# Patient Record
Sex: FEMALE | Race: WHITE | HISPANIC OR LATINO | Employment: OTHER | URBAN - METROPOLITAN AREA
[De-identification: names, ages, dates, MRNs, and addresses within clinical notes are randomized per-mention and may not be internally consistent; named-entity substitution may affect disease eponyms.]

---

## 2017-01-12 ENCOUNTER — TELEPHONE (OUTPATIENT)
Dept: TRANSPLANT | Facility: CLINIC | Age: 27
End: 2017-01-12

## 2017-01-12 NOTE — TELEPHONE ENCOUNTER
Spoke with patient and states she is back [place] the country from [place].Patient states will have fasting labs drawn on 1/16/17. Patient voice a  understanding of medication and appointment compliance.

## 2017-01-20 ENCOUNTER — TELEPHONE (OUTPATIENT)
Dept: TRANSPLANT | Facility: CLINIC | Age: 27
End: 2017-01-20

## 2017-01-20 NOTE — TELEPHONE ENCOUNTER
Patient no show for 1/16/17 labs. After leaving multiple voice messages spoke with patient and she states she will do labs on 1/23/17.

## 2017-01-23 ENCOUNTER — LAB VISIT (OUTPATIENT)
Dept: LAB | Facility: HOSPITAL | Age: 27
End: 2017-01-23
Attending: INTERNAL MEDICINE
Payer: MEDICARE

## 2017-01-23 DIAGNOSIS — Z94.0 KIDNEY REPLACED BY TRANSPLANT: ICD-10-CM

## 2017-01-23 LAB
ALBUMIN SERPL BCP-MCNC: 3.5 G/DL
ANION GAP SERPL CALC-SCNC: 11 MMOL/L
BASOPHILS # BLD AUTO: 0.03 K/UL
BASOPHILS NFR BLD: 0.5 %
BUN SERPL-MCNC: 89 MG/DL
CALCIUM SERPL-MCNC: 8.7 MG/DL
CHLORIDE SERPL-SCNC: 112 MMOL/L
CO2 SERPL-SCNC: 13 MMOL/L
CREAT SERPL-MCNC: 10.2 MG/DL
DIFFERENTIAL METHOD: ABNORMAL
EOSINOPHIL # BLD AUTO: 0.2 K/UL
EOSINOPHIL NFR BLD: 3.1 %
ERYTHROCYTE [DISTWIDTH] IN BLOOD BY AUTOMATED COUNT: 13.1 %
EST. GFR  (AFRICAN AMERICAN): 5.5 ML/MIN/1.73 M^2
EST. GFR  (NON AFRICAN AMERICAN): 4.7 ML/MIN/1.73 M^2
GLUCOSE SERPL-MCNC: 79 MG/DL
HCT VFR BLD AUTO: 31.7 %
HGB BLD-MCNC: 10.9 G/DL
LYMPHOCYTES # BLD AUTO: 0.6 K/UL
LYMPHOCYTES NFR BLD: 10.5 %
MAGNESIUM SERPL-MCNC: 1.7 MG/DL
MCH RBC QN AUTO: 30.8 PG
MCHC RBC AUTO-ENTMCNC: 34.4 %
MCV RBC AUTO: 90 FL
MONOCYTES # BLD AUTO: 0.4 K/UL
MONOCYTES NFR BLD: 7.1 %
NEUTROPHILS # BLD AUTO: 4.6 K/UL
NEUTROPHILS NFR BLD: 78.6 %
PHOSPHATE SERPL-MCNC: 6.5 MG/DL
PLATELET # BLD AUTO: 203 K/UL
PMV BLD AUTO: 11.6 FL
POTASSIUM SERPL-SCNC: 4.7 MMOL/L
RBC # BLD AUTO: 3.54 M/UL
SODIUM SERPL-SCNC: 136 MMOL/L
WBC # BLD AUTO: 5.88 K/UL

## 2017-01-23 PROCEDURE — 80069 RENAL FUNCTION PANEL: CPT

## 2017-01-23 PROCEDURE — 36415 COLL VENOUS BLD VENIPUNCTURE: CPT | Mod: PO

## 2017-01-23 PROCEDURE — 83735 ASSAY OF MAGNESIUM: CPT

## 2017-01-23 PROCEDURE — 85025 COMPLETE CBC W/AUTO DIFF WBC: CPT

## 2017-01-23 PROCEDURE — 80197 ASSAY OF TACROLIMUS: CPT

## 2017-01-24 LAB — TACROLIMUS BLD-MCNC: <1.5 NG/ML

## 2017-01-25 LAB
CYTOMEGALOVIRUS DNA: NOT DETECTED
CYTOMEGALOVIRUS LOG (IU/ML): <2.4 LOG (10) COPIES/ML
CYTOMEGALOVIRUS PCR, QUANT: <250 COPIES/ML
CYTOMEGALOVIRUS SOURCE: NORMAL

## 2017-01-26 ENCOUNTER — TELEPHONE (OUTPATIENT)
Dept: TRANSPLANT | Facility: CLINIC | Age: 27
End: 2017-01-26

## 2017-01-26 NOTE — PROGRESS NOTES
Results reviewed, and action is needed: Her creatinine had worsened dramatically.  Prograf level undetectable, suspicious for ongoing nonadherence.  At this time, I do not feel transplant has anything else to offer this young woman.  Please assess how she is feeling: Any shortness of breath, nausea/vomiting, confusion, marked lethargy?  [These would be symptoms she may need to start dialysis]. Also, encourage her to see her nephrologist ASAP.

## 2017-01-26 NOTE — TELEPHONE ENCOUNTER
----- Message from Linda Maya MD sent at 1/25/2017  6:35 PM CST -----  Results reviewed, and action is needed: Her creatinine had worsened dramatically.  Prograf level undetectable, suspicious for ongoing nonadherence.  At this time, I do not feel transplant has anything else to offer this young woman.  Please assess how she is feeling: Any shortness of breath, nausea/vomiting, confusion, marked lethargy?  [These would be symptoms she may need to start dialysis]. Also, encourage her to see her nephrologist ASAP.

## 2017-01-26 NOTE — TELEPHONE ENCOUNTER
Patient repeated back and voice a understanding of orders and denies any issues at present.Patient voice a understanding to present to the ER if she develops SOB ,Nausea/Vomiting ,confusion and marked lethargy.  Spoke with Fransisca Onofre LPN at DR Miller [facility] and will attempt to get patient in ASAP to see DR Miller.

## 2017-02-02 ENCOUNTER — TELEPHONE (OUTPATIENT)
Dept: PHARMACY | Facility: CLINIC | Age: 27
End: 2017-02-02

## 2017-02-02 ENCOUNTER — OFFICE VISIT (OUTPATIENT)
Dept: NEPHROLOGY | Facility: CLINIC | Age: 27
End: 2017-02-02
Payer: MEDICARE

## 2017-02-02 VITALS
WEIGHT: 230.38 LBS | SYSTOLIC BLOOD PRESSURE: 150 MMHG | HEART RATE: 76 BPM | DIASTOLIC BLOOD PRESSURE: 110 MMHG | HEIGHT: 66 IN | BODY MASS INDEX: 37.03 KG/M2

## 2017-02-02 DIAGNOSIS — E83.39 HYPERPHOSPHATEMIA: ICD-10-CM

## 2017-02-02 DIAGNOSIS — T86.11 KIDNEY TRANSPLANT REJECTION: ICD-10-CM

## 2017-02-02 DIAGNOSIS — E87.20 ACIDOSIS, METABOLIC: ICD-10-CM

## 2017-02-02 DIAGNOSIS — N18.5 CKD (CHRONIC KIDNEY DISEASE) STAGE 5, GFR LESS THAN 15 ML/MIN: ICD-10-CM

## 2017-02-02 DIAGNOSIS — Z94.0 HX OF KIDNEY TRANSPLANT: Primary | ICD-10-CM

## 2017-02-02 DIAGNOSIS — Z94.0 H/O KIDNEY TRANSPLANT: ICD-10-CM

## 2017-02-02 PROCEDURE — 99999 PR PBB SHADOW E&M-EST. PATIENT-LVL III: CPT | Mod: PBBFAC,,, | Performed by: INTERNAL MEDICINE

## 2017-02-02 PROCEDURE — 99213 OFFICE O/P EST LOW 20 MIN: CPT | Mod: PBBFAC,PO | Performed by: INTERNAL MEDICINE

## 2017-02-02 PROCEDURE — 99215 OFFICE O/P EST HI 40 MIN: CPT | Mod: S$PBB,,, | Performed by: INTERNAL MEDICINE

## 2017-02-02 RX ORDER — VALGANCICLOVIR 450 MG/1
450 TABLET, FILM COATED ORAL EVERY OTHER DAY
Qty: 30 TABLET | Refills: 10
Start: 2017-02-02 | End: 2017-02-06 | Stop reason: SDUPTHER

## 2017-02-02 RX ORDER — SULFAMETHOXAZOLE AND TRIMETHOPRIM 400; 80 MG/1; MG/1
1 TABLET ORAL
Qty: 12 TABLET | Refills: 5
Start: 2017-02-03 | End: 2017-02-06 | Stop reason: SDUPTHER

## 2017-02-02 RX ORDER — MYCOPHENOLIC ACID 360 MG/1
360 TABLET, DELAYED RELEASE ORAL 2 TIMES DAILY
Qty: 60 TABLET | Refills: 10 | Status: ON HOLD | OUTPATIENT
Start: 2017-02-02 | End: 2017-11-27

## 2017-02-02 RX ORDER — PREDNISONE 10 MG/1
TABLET ORAL
Qty: 100 TABLET | Refills: 0 | Status: SHIPPED | OUTPATIENT
Start: 2017-02-02 | End: 2017-02-03 | Stop reason: SDUPTHER

## 2017-02-02 RX ORDER — TACROLIMUS 1 MG/1
CAPSULE ORAL
Qty: 270 CAPSULE | Refills: 10
Start: 2017-02-02 | End: 2017-02-03 | Stop reason: SDUPTHER

## 2017-02-02 RX ORDER — SODIUM BICARBONATE 650 MG/1
2600 TABLET ORAL 4 TIMES DAILY
Qty: 480 TABLET | Refills: 11 | Status: ON HOLD | OUTPATIENT
Start: 2017-02-02 | End: 2017-11-27

## 2017-02-02 NOTE — PATIENT INSTRUCTIONS
Restart Prograf and sodium bicarbonate.   PROGRAF LEVEL ON 2/9/17 PLEASE HOLD MORNING DOSE UNTIL AFTER BLOOD WORK IS DONE

## 2017-02-02 NOTE — TELEPHONE ENCOUNTER
Rx for prograf required a PA and B vs D determination. It was determined that the medication to be covered under part B. Rx will be transferred to [facility] and pt verbalized understanding.

## 2017-02-02 NOTE — MR AVS SNAPSHOT
[facility]  [address] [place] [address]  Phone: [phone]  Fax: [phone]                  Leydi Cordero   2017 10:00 AM   Office Visit    Descripción:  Female : 1990   Personal Médico:  Won Miller MD   Departamento:  [facility]           Razón de la valentin     Kidney Transplant     Follow-up           Diagnósticos de Esta Visita        Comentarios    Hx of kidney transplant    -  Primario     CKD (chronic kidney disease) stage 5, GFR less than 15 ml/min         Kidney transplant rejection         Acidosis, metabolic         Hyperphosphatemia                Lista de tareas           Citas próximas        Personal Médico Departamento Tfno del dpto    2017 8:05 AM LABORATORY, [facility] [phone]    3/2/2017 8:10 AM SPECIMEN, [facility] [phone]    3/2/2017 8:20 AM LABORATORY, [facility] [phone]    3/9/2017 10:00 AM Won Miller MD [facility] [phone]      Metas (5 Years of Data)     Ninguna      Follow-Up and Disposition     Return in about 2 weeks (around 2017).      [facility]     [facility] Enfermeras - Asistencia   Enfermeras registradas de [facility] pueden ayudarle a reservar young valentin, proveer educación para la juliano, asesoría clínica, y otros servicios de asesoramiento.   Llame para remy servicio gratuito a [phone].             Medicamentos           Mensaje sobre Medicamentos     Verificar los cambios y / o adiciones a hernadez régimen de medicación son los mismos que discutir con hernadez médico. Si cualquiera de estos cambios o adiciones son incorrectos, por favor notifique a hernadez proveedor de atención médica.             Verifique que la siguiente lista de medicamentos es young representación exacta de los medicamentos que está tomando actualmente. Si no hay ningunos reportados, la lista puede estar en zhong. Si  "no es correcta, por favor póngase en contacto con hernadez proveedor de atención médica. Lleve esta lista con usted en leonarda de emergencia.           Medicamentos Actuales     amlodipine (NORVASC) 10 MG tablet Take 1 tablet (10 mg total) by mouth once daily.    aspirin (ECOTRIN) 81 MG EC tablet Take 1 tablet (81 mg total) by mouth once daily.    mycophenolate (MYFORTIC) 360 MG TbEC Take 1 tablet (360 mg total) by mouth 2 (two) times daily.    nystatin (MYCOSTATIN) 100,000 unit/mL suspension Take 5 mLs (500,000 Units total) by mouth 4 (four) times daily. STOP 11/9    pantoprazole (PROTONIX) 40 MG tablet Take 1 tablet (40 mg total) by mouth once daily.    predniSONE (DELTASONE) 10 MG tablet 20 mg qd 10/10-11/9; 15 mg qd 11/10-12/9; 10 mg 12/10-1/9; 5 mg thereafter    sevelamer carbonate (RENVELA) 800 mg Tab Take 1 tablet (800 mg total) by mouth 3 (three) times daily with meals.    sodium bicarbonate 650 MG tablet Take 4 tablets (2,600 mg total) by mouth 4 (four) times daily.    sulfamethoxazole-trimethoprim 400-80mg (BACTRIM,SEPTRA) 400-80 mg per tablet Take 1 tablet by mouth every Mon, Wed, Fri. STOP 4/2/17    tacrolimus (PROGRAF) 1 MG Cap Take 5 mg in the morning and 4 mg in the evening    valganciclovir (VALCYTE) 450 mg Tab Take 1 tablet (450 mg total) by mouth every other day.           Información de referencia clínica           Lakeisha signos vitales jasmine     PS Pulso Bessie Peso BMI (IMC)       150/110 76 5' 6" (1.676 m) 104.5 kg (230 lb 6.1 oz) 37.18 kg/m2       Blood Pressure          Most Recent Value    BP  (!)  150/110      Alergias     A partir del:  2/2/2017        Heparin Analogues      Vacunas     Administradas en la fecha de la visita:  2/2/2017        None      Orders Placed During Today's Visit     Exámenes/Procedimientos futuros Se espera el Vence    Protein / creatinine ratio, urine  2/2/2017 4/3/2018    PTH, intact  2/2/2017 4/3/2018    Urinalysis  2/2/2017 4/3/2018    Exámenes de laboratorio recurrentes " Intervalo Vence    CBC auto differential  Every Weekday until 2/2/2018 2/2/2018    Renal function panel  Every Weekday until 2/2/2018 2/2/2018    Tacrolimus level  Every Weekday until 2/2/2018 2/2/2018      Maintenance Dialysis History     Patient has no recorded history of maintenance dialysis.      Transplant Information        Txp Date Organ Coordinator Care Team    4/15/2015 Kidney Viki Baez RN       Registrarse para [facility]     La activación de hernadez cuenta [facility] es tan fácil timi 1-2-3!    1) Ir a [email or web address], seleccione Registrarse Ahora, meter el código de activación y hernadez fecha de nacimiento, y seleccione Próximo.    [id number]  Expires: 3/19/2017 10:43 AM      2) Crear un nombre de usuario y contraseña para usar cuando se visita [facility] en el futuro y selecciona young pregunta de seguridad en leonarda de que pierda hernadez contraseña y seleccione Próximo.    3) Introduzca hernadez dirección de correo electrónico y ayleen clic en Registrarse!    Información Adicional  Si tiene alguna pregunta, por favor, e-mail [email or web address] o llame al [phone] para hablar con nuestro personal. Recuerde, [facility] no debe ser usada para necesidades urgentes. En leonarda de emergencia médica, llame al [phone].        Instrucciones    Restart Prograf and sodium bicarbonate.   PROGRAF LEVEL ON 2/9/17 PLEASE HOLD MORNING DOSE UNTIL AFTER BLOOD WORK IS DONE       Smoking Cessation     Si desea dejar de fumar:  · Usted puede ser elegible para recibir servicios gratuitos si usted es un residente de [place] y comenzó a fumar cigarrillos antes del 1 de septiembre de 1988. Llame al Smoking Cessation Trust (SCT) a [phone] o [phone].   Llame 1-800-QUIT-NOW si usted no cumple con los criterios anteriores.            Language Assistance Services     ATTENTION: Language assistance services are available, free of charge. Please call [phone].      ATENCIÓN: Si habla yehuda, tiene a hernadez disposición servicios  gratuitos de asistencia lingüística. Llame al [phone].     [facility] Ý: N?u b?n nói Ti?ng Vi?t, có các d?ch v? h? tr? ngôn ng? mi?n phí dành cho b?n. G?i s? [phone].         [facility] cumple con las leyes federales aplicables de derechos civiles y no discrimina por motivos de mata, color, origen nacional, edad, discapacidad, o sexo.                 Leydi Lawrenceo Boswell   2017 10:00 AM   Office Visit    Description:  Female : 1990   Provider:  Won Miller MD   [facility]           Reason for Visit     Kidney Transplant     Follow-up           Diagnoses this Visit        Comments    Hx of kidney transplant    -  Primary     CKD (chronic kidney disease) stage 5, GFR less than 15 ml/min         Kidney transplant rejection         Acidosis, metabolic         Hyperphosphatemia                To Do List           Future Appointments        Provider Department Dept Phone    2017 8:05 AM LABORATORY, [facility] [phone]    3/2/2017 8:10 AM SPECIMEN, [facility] [phone]    3/2/2017 8:20 AM LABORATORY, [facility] [phone]    3/9/2017 10:00 AM Won Miller MD [facility] [phone]      Goals     None      Follow-Up and Disposition     Return in about 2 weeks (around 2017).      [facility]     [facility] Care [facility] -  Assistance  Registered nurses in the [facility] provide clinical advisement, health education, appointment booking, and other advisory services.  Call for this free service at [phone].             Medications           Message regarding Medications     Verify the changes and/or additions to your medication regime listed below are the same as discussed with your clinician today.  If any of these changes or additions are incorrect, please notify your healthcare provider.             Verify that the below list of  "medications is an accurate representation of the medications you are currently taking.  If none reported, the list may be blank. If incorrect, please contact your healthcare provider. Carry this list with you in case of emergency.           Current Medications     amlodipine (NORVASC) 10 MG tablet Take 1 tablet (10 mg total) by mouth once daily.    aspirin (ECOTRIN) 81 MG EC tablet Take 1 tablet (81 mg total) by mouth once daily.    mycophenolate (MYFORTIC) 360 MG TbEC Take 1 tablet (360 mg total) by mouth 2 (two) times daily.    nystatin (MYCOSTATIN) 100,000 unit/mL suspension Take 5 mLs (500,000 Units total) by mouth 4 (four) times daily. STOP 11/9    pantoprazole (PROTONIX) 40 MG tablet Take 1 tablet (40 mg total) by mouth once daily.    predniSONE (DELTASONE) 10 MG tablet 20 mg qd 10/10-11/9; 15 mg qd 11/10-12/9; 10 mg 12/10-1/9; 5 mg thereafter    sevelamer carbonate (RENVELA) 800 mg Tab Take 1 tablet (800 mg total) by mouth 3 (three) times daily with meals.    sodium bicarbonate 650 MG tablet Take 4 tablets (2,600 mg total) by mouth 4 (four) times daily.    sulfamethoxazole-trimethoprim 400-80mg (BACTRIM,SEPTRA) 400-80 mg per tablet Take 1 tablet by mouth every Mon, Wed, Fri. STOP 4/2/17    tacrolimus (PROGRAF) 1 MG Cap Take 5 mg in the morning and 4 mg in the evening    valganciclovir (VALCYTE) 450 mg Tab Take 1 tablet (450 mg total) by mouth every other day.           Clinical Reference Information           Your Vitals Were     BP Pulse Height Weight BMI       150/110 76 5' 6" (1.676 m) 104.5 kg (230 lb 6.1 oz) 37.18 kg/m2       Blood Pressure          Most Recent Value    BP  (!)  150/110      Allergies as of 2/2/2017     Heparin Analogues      Immunizations Administered on Date of Encounter - 2/2/2017     None      Orders Placed During Today's Visit     Future Labs/Procedures Expected by Expires    Protein / creatinine ratio, urine  2/2/2017 4/3/2018    PTH, intact  2/2/2017 4/3/2018    Urinalysis  " 2/2/2017 4/3/2018    Recurring Lab Work Interval Expires    CBC auto differential  Every Weekday until 2/2/2018 2/2/2018    Renal function panel  Every Weekday until 2/2/2018 2/2/2018    Tacrolimus level  Every Weekday until 2/2/2018 2/2/2018      Maintenance Dialysis History     Patient has no recorded history of maintenance dialysis.      Transplant Information        Txp Date Organ Coordinator Care Team    4/15/2015 Kidney Viki Baez RN       [facility] Sign-Up     Activating your [facility] account is as easy as 1-2-3!     1) Visit [email or web address], select Sign Up Now, enter this activation code and your date of birth, then select Next.  [email or web address]  Expires: 3/19/2017 10:43 AM      2) Create a username and password to use when you visit [facility] in the future and select a security question in case you lose your password and select Next.    3) Enter your e-mail address and click Sign Up!    Additional Information  If you have questions, please e-mail [email or web address] or call [phone] to talk to our [facility] staff. Remember, [facility] is NOT to be used for urgent needs. For medical emergencies, dial 911.         Instructions    Restart Prograf and sodium bicarbonate.   PROGRAF LEVEL ON 2/9/17 PLEASE HOLD MORNING DOSE UNTIL AFTER BLOOD WORK IS DONE       Smoking Cessation     If you would like to quit smoking:   You may be eligible for free services if you are a [place] resident and started smoking cigarettes before September 1, 1988.  Call the Smoking Cessation Trust (SCT) toll free at (110) 448-9982 or ([phone] 069-7717.   Call 8-164-QUIT-NOW if you do not meet the above criteria.            Language Assistance Services     ATTENTION: Language assistance services are available, free of charge. Please call [phone].      ATENCIÓN: Si habla español, tiene a hernadez disposición servicios gratuitos de asistencia lingüística. Llame al [phone].     CHÚ Ý: N?u b?n nói Ti?ng Vi?t, có các d?ch  v? h? tr? ngôn ng? mi?n phí ashah cho b?n. G?i s? [phone].         [facility] complies with applicable Federal civil rights laws and does not discriminate on the basis of race, color, national origin, age, disability, or sex.

## 2017-02-02 NOTE — PROGRESS NOTES
PROGRESS NOTE FOR ESTABLISHED PATIENT    PHYSICIAN REQUESTING THE CONSULT: Self-referred    REASON FOR VISIT: Renal transplant    26 y.o. year old female with history of ESRD, hypertensive nephropathy, s/p renal transplant, GERD presents to the renal clinic for evaluation of renal transplant.     Patient has been diagnosed with biopsy-proven renal rejection at [facility] at the end of 2016. She was started on treatment including, solumedrol, thymoglobulin, IV Ig and plasmapheresis. Patient also developed C. Diff. Colitis and was started on appropriate antibiotics. Renal function has been deteriorating and last creatinine from 1/23/17 was 10.2.    She presents to clinic today without major complaints. However, she reports poor appetite. She denies nausea/vomiting, LE edema. Patient states that she ran out of Prograf several weeks ago (was not able to get refills because of insurance issues).   She also needs refills for her sodium bicarbonate.     Patient is Eritrean-speaking only and presents with friend who is acting as .         Past Medical History   Diagnosis Date    ESRD (end stage renal disease)     H/O kidney transplant     Hypertension     Hypertensive nephropathy        Past Surgical History   Procedure Laterality Date    Right leg hemodialysis access      Kidney transplant  04/15/2015       Review of patient's allergies indicates:   Allergen Reactions    Heparin analogues          Current Outpatient Prescriptions   Medication Sig Dispense Refill    amlodipine (NORVASC) 10 MG tablet Take 1 tablet (10 mg total) by mouth once daily. 30 tablet 1    aspirin (ECOTRIN) 81 MG EC tablet Take 1 tablet (81 mg total) by mouth once daily. 30 tablet 10    mycophenolate (MYFORTIC) 360 MG TbEC Take 1 tablet (360 mg total) by mouth 2 (two) times daily. 60 tablet 10    nystatin (MYCOSTATIN) 100,000 unit/mL suspension Take 5 mLs (500,000 Units total) by mouth 4 (four) times daily. STOP 11/9 473  mL 1    pantoprazole (PROTONIX) 40 MG tablet Take 1 tablet (40 mg total) by mouth once daily. 30 tablet 10    predniSONE (DELTASONE) 10 MG tablet 20 mg qd 10/10-11/9; 15 mg qd 11/10-12/9; 10 mg 12/10-1/9; 5 mg thereafter 100 tablet 0    sevelamer carbonate (RENVELA) 800 mg Tab Take 1 tablet (800 mg total) by mouth 3 (three) times daily with meals. 90 tablet 11    sodium bicarbonate 650 MG tablet Take 4 tablets (2,600 mg total) by mouth 4 (four) times daily. 480 tablet 11    sulfamethoxazole-trimethoprim 400-80mg (BACTRIM,SEPTRA) 400-80 mg per tablet Take 1 tablet by mouth every Mon, Wed, Fri. STOP 4/2/17 12 tablet 5    tacrolimus (PROGRAF) 1 MG Cap Take 5 mg in the morning and 4 mg in the evening 270 capsule 10    valganciclovir (VALCYTE) 450 mg Tab Take 1 tablet (450 mg total) by mouth every other day. 30 tablet 10     No current facility-administered medications for this visit.        No family history on file.    Social History     Social History    Marital status: Single     Spouse name: N/A    Number of children: N/A    Years of education: N/A     Occupational History    Not on file.     Social History Main Topics    Smoking status: Current Every Day Smoker     Packs/day: 0.50    Smokeless tobacco: Not on file    Alcohol use No    Drug use: No    Sexual activity: Not on file     Other Topics Concern    Not on file     Social History Narrative       Review of Systems:  1. GENERAL: patient denies any fever, weight changes, generalized weakness, dizziness.  2. HEENT: patient denies headaches, visual disturbances, swallowing problems, sinus pain, nasal congestion.  3. CARDIOVASCULAR: patient denies chest pain, palpitations.  4. PULMONARY: patient reports SOB with exercise, none at present, no coughing, hemoptysis, wheezing.  5. GASTROINTESTINAL: patient denies abdominal pain, nausea, vomiting, diarrhea.  6. GENITOURINARY: patient denies dysuria, hematuria, hesitancy, frequency  7. EXTREMITIES:  "patient denies LE edema, LE cramping.  8. DERMATOLOGY: patient denies rashes, ulcers.  9. NEURO: patient denies tremors, extremity weakness, extremity numbness/tingling.  10. MUSCULOSKELETAL: patient denies joint pain  11. HEMATOLOGY: patient denies rectal or gum bleeding.  12: PSYCH: patient denies anxiety, depression.      PHYSICAL EXAM:  Visit Vitals    BP (!) 150/110    Ht 5' 6" (1.676 m)    Wt 104.5 kg (230 lb 6.1 oz)    BMI 37.18 kg/m2       GENERAL: Pleasant young lady presents to clinic with non-labored breathing.  HEENT: PER, no nasal discharge, no icterus, no oral exudates, moist mucosal membranes.  NECK: no thyroid mass, no lymphadenopathy.  HEART: RRR S1/S2, no rubs, good peripheral pulses.  LUNGS: CTA bilaterally, no wheezing, breathing is nonlabored.  ABDOMEN: soft, nontender, not distended, bowel sounds are present, no abdominal hernia.  EXTREM: no LE edema.  SKIN: no rashes, skin is warm and dry.  NEURO: A & O x 3, no obvious focal signs.        Lab Results   Component Value Date    CREATININE 10.2 (H) 01/23/2017    BUN 89 (H) 01/23/2017     01/23/2017    K 4.7 01/23/2017     (H) 01/23/2017    CO2 13 (L) 01/23/2017     Lab Results   Component Value Date    .0 (H) 08/22/2016    CALCIUM 8.7 01/23/2017    PHOS 6.5 (H) 01/23/2017     Lab Results   Component Value Date    ALBUMIN 3.5 01/23/2017     Lab Results   Component Value Date    WBC 5.88 01/23/2017    HGB 10.9 (L) 01/23/2017    HCT 31.7 (L) 01/23/2017    MCV 90 01/23/2017     01/23/2017        Protein Creatinine Ratios:    Creatinine, Random Ur   Date Value Ref Range Status   09/29/2016 54.0 15.0 - 325.0 mg/dL Final     Comment:     The random urine reference ranges provided were established   for 24 hour urine collections.  No reference ranges exist for  random urine specimens.  Correlate clinically.     08/22/2016 50.0 15.0 - 325.0 mg/dL Final     Comment:     The random urine reference ranges provided were " established   for 24 hour urine collections.  No reference ranges exist for  random urine specimens.  Correlate clinically.       Protein, Urine Random   Date Value Ref Range Status   09/29/2016 11 0 - 15 mg/dL Final     Comment:     The random urine reference ranges provided were established   for 24 hour urine collections.  No reference ranges exist for  random urine specimens.  Correlate clinically.     08/22/2016 <7 0 - 15 mg/dL Final     Comment:     The random urine reference ranges provided were established   for 24 hour urine collections.  No reference ranges exist for  random urine specimens.  Correlate clinically.       Prot/Creat Ratio, Ur   Date Value Ref Range Status   09/29/2016 0.20 0.00 - 0.20 Final   08/22/2016 Unable to calculate 0.00 - 0.20 Final     Tacrolimus: < 1.5 (1/23/17)        ASSESSMENT AND PLAN:  26 y.o. year old female with history of ESRD, hypertensive nephropathy, s/p renal transplant, GERD presents to the renal clinic for evaluation of renal transplant.     1. Renal transplant:  Patient's was diagnosed with biopsy-proven rejection at end of 2016. She was started on plasmapheresis, IV IG, thymoglobulin. Her renal function has been worsening since last visit with creatinine at 10.2. Tacrolimus level was < 1.5.  Patient was educated about likelihood of dialysis in near future and was asked to make decision about modality. She has opted for hemodialysis and referral to Vascular Surgery was made. Will refer patient for renal transplant evaluation. She will return in 2 weeks for follow up.      2. Electrolytes: Hyperphosphatemia: monitor for now.    3. Acid base status: metabolic acidosis. Will refill sodium bicarbonate.     4. Volume: Euvolemic.    5. Hypertension: acceptable BP control.     6. Medications: Reviewed. Patient has not been using bicarbonate and Prograf for several weeks. Will contact [facility] and ask for free refills. Will send patient to financial aid to help with  her insurance issues. Will check tacrolimus level in 1 week.     7. Mild hyperparathyroidism: monitor.     Total time spent 40 minutes. More than 50% of time was spent on educating patient about medication adherence and likelihood of dialysis in near future.

## 2017-02-03 RX ORDER — TACROLIMUS 1 MG/1
CAPSULE ORAL
Qty: 270 CAPSULE | Refills: 10
Start: 2017-02-03 | End: 2017-02-06 | Stop reason: SDUPTHER

## 2017-02-03 RX ORDER — PREDNISONE 10 MG/1
TABLET ORAL
Qty: 100 TABLET | Refills: 0 | Status: ON HOLD | OUTPATIENT
Start: 2017-02-03 | End: 2017-11-27

## 2017-02-06 RX ORDER — SULFAMETHOXAZOLE AND TRIMETHOPRIM 400; 80 MG/1; MG/1
1 TABLET ORAL
Qty: 12 TABLET | Refills: 5 | Status: SHIPPED | OUTPATIENT
Start: 2017-02-06 | End: 2017-08-05

## 2017-02-06 RX ORDER — TACROLIMUS 1 MG/1
CAPSULE ORAL
Qty: 270 CAPSULE | Refills: 10 | Status: ON HOLD | OUTPATIENT
Start: 2017-02-06 | End: 2017-11-27

## 2017-02-06 RX ORDER — VALGANCICLOVIR 450 MG/1
450 TABLET, FILM COATED ORAL EVERY OTHER DAY
Qty: 30 TABLET | Refills: 10 | Status: ON HOLD | OUTPATIENT
Start: 2017-02-06 | End: 2017-11-27

## 2017-02-07 ENCOUNTER — TELEPHONE (OUTPATIENT)
Dept: TRANSPLANT | Facility: CLINIC | Age: 27
End: 2017-02-07

## 2017-02-09 ENCOUNTER — LAB VISIT (OUTPATIENT)
Dept: LAB | Facility: HOSPITAL | Age: 27
End: 2017-02-09
Attending: INTERNAL MEDICINE
Payer: MEDICARE

## 2017-02-09 DIAGNOSIS — Z94.0 H/O KIDNEY TRANSPLANT: ICD-10-CM

## 2017-02-09 PROCEDURE — 80197 ASSAY OF TACROLIMUS: CPT

## 2017-02-09 PROCEDURE — 36415 COLL VENOUS BLD VENIPUNCTURE: CPT | Mod: PO

## 2017-02-10 LAB — TACROLIMUS BLD-MCNC: 7.3 NG/ML

## 2017-02-18 NOTE — PROGRESS NOTES
Patient came to the [facility] emergency room for nosebleed.  Emergency room physician called me and verified labs.  Patient's creatinine is at baseline which was 10 in the last visit with Dr. Miller.  Did not have any reason for admission.  No indication for acute hemodialysis.  Patient sent home.  Patient should be preparing for impending need for renal replacement therapy as outlined by Dr. Miller in his last note.

## 2017-03-08 DIAGNOSIS — N17.9 AKI (ACUTE KIDNEY INJURY): Primary | ICD-10-CM

## 2017-03-09 ENCOUNTER — TELEPHONE (OUTPATIENT)
Dept: NEPHROLOGY | Facility: CLINIC | Age: 27
End: 2017-03-09

## 2017-05-03 ENCOUNTER — TELEPHONE (OUTPATIENT)
Dept: TRANSPLANT | Facility: CLINIC | Age: 27
End: 2017-05-03

## 2017-05-03 NOTE — TELEPHONE ENCOUNTER
I reviewed pt chart and noticed that she has missed some FU labs and appts with Nephrology. I called her-I spoke with pt's friend/ Cousin (Neida) as pt is unable to speak English. I let Neida know that pt has missed several appts and I am unsure if she is getting appropriate FU.  Pt's friend says that Ms Cordero is very sick, and needs to go to the Hospital.  She is concerned that she needs dialysis.  I reassured her and reinforced that is likely a good decision.  She will take Ms Cordero to [facility] after she finishes work.  I will let the team know.      [phone]  Neida, pt's friend

## 2017-05-29 ENCOUNTER — DOCUMENTATION ONLY (OUTPATIENT)
Dept: TRANSPLANT | Facility: CLINIC | Age: 27
End: 2017-05-29

## 2017-11-27 ENCOUNTER — HOSPITAL ENCOUNTER (INPATIENT)
Facility: HOSPITAL | Age: 27
LOS: 7 days | Discharge: HOME | DRG: 659 | End: 2017-12-04
Attending: SURGERY | Admitting: SURGERY
Payer: MEDICARE

## 2017-11-27 ENCOUNTER — HOSPITAL ENCOUNTER (EMERGENCY)
Facility: HOSPITAL | Age: 27
Discharge: OTHER INSTITUTION | End: 2017-11-27
Attending: EMERGENCY MEDICINE
Payer: MEDICARE

## 2017-11-27 VITALS
OXYGEN SATURATION: 100 % | SYSTOLIC BLOOD PRESSURE: 151 MMHG | WEIGHT: 230 LBS | RESPIRATION RATE: 17 BRPM | TEMPERATURE: 98 F | HEIGHT: 66 IN | HEART RATE: 83 BPM | DIASTOLIC BLOOD PRESSURE: 100 MMHG | BODY MASS INDEX: 36.96 KG/M2

## 2017-11-27 DIAGNOSIS — T86.11 ANTIBODY MEDIATED REJECTION OF KIDNEY TRANSPLANT: ICD-10-CM

## 2017-11-27 DIAGNOSIS — T86.11 ACUTE REJECTION OF KIDNEY TRANSPLANT: ICD-10-CM

## 2017-11-27 DIAGNOSIS — Z01.818 PRE-OP EXAM: ICD-10-CM

## 2017-11-27 DIAGNOSIS — E87.5 HYPERKALEMIA: ICD-10-CM

## 2017-11-27 DIAGNOSIS — R31.9 HEMATURIA, UNSPECIFIED TYPE: Primary | ICD-10-CM

## 2017-11-27 DIAGNOSIS — Z94.0 H/O KIDNEY TRANSPLANT: ICD-10-CM

## 2017-11-27 DIAGNOSIS — R31.9 HEMATURIA, UNSPECIFIED TYPE: ICD-10-CM

## 2017-11-27 DIAGNOSIS — N18.6 ESRD (END STAGE RENAL DISEASE): ICD-10-CM

## 2017-11-27 DIAGNOSIS — I10 ESSENTIAL HYPERTENSION: ICD-10-CM

## 2017-11-27 DIAGNOSIS — E83.39 HYPERPHOSPHATEMIA: ICD-10-CM

## 2017-11-27 DIAGNOSIS — Z90.5 S/P NEPHRECTOMY: Primary | ICD-10-CM

## 2017-11-27 DIAGNOSIS — R31.9 HEMATURIA: ICD-10-CM

## 2017-11-27 LAB
ABO + RH BLD: NORMAL
ALBUMIN SERPL BCP-MCNC: 3.2 G/DL
ALP SERPL-CCNC: 179 U/L
ALT SERPL W/O P-5'-P-CCNC: 9 U/L
ANION GAP SERPL CALC-SCNC: 14 MMOL/L
AST SERPL-CCNC: 10 U/L
B-HCG UR QL: NEGATIVE
BACTERIA #/AREA URNS HPF: ABNORMAL /HPF
BASOPHILS # BLD AUTO: 0.01 K/UL
BASOPHILS NFR BLD: 0.3 %
BILIRUB SERPL-MCNC: 0.5 MG/DL
BILIRUB UR QL STRIP: NEGATIVE
BLD GP AB SCN CELLS X3 SERPL QL: NORMAL
BUN SERPL-MCNC: 76 MG/DL
CALCIUM SERPL-MCNC: 9.4 MG/DL
CHLORIDE SERPL-SCNC: 103 MMOL/L
CLARITY UR: ABNORMAL
CO2 SERPL-SCNC: 21 MMOL/L
COLOR UR: YELLOW
CREAT SERPL-MCNC: 17.2 MG/DL
DIFFERENTIAL METHOD: ABNORMAL
EOSINOPHIL # BLD AUTO: 0.3 K/UL
EOSINOPHIL NFR BLD: 7.4 %
ERYTHROCYTE [DISTWIDTH] IN BLOOD BY AUTOMATED COUNT: 17.3 %
EST. GFR  (AFRICAN AMERICAN): 3 ML/MIN/1.73 M^2
EST. GFR  (NON AFRICAN AMERICAN): 3 ML/MIN/1.73 M^2
GLUCOSE SERPL-MCNC: 81 MG/DL
GLUCOSE UR QL STRIP: NEGATIVE
HCT VFR BLD AUTO: 31.9 %
HGB BLD-MCNC: 10 G/DL
HGB UR QL STRIP: ABNORMAL
HYALINE CASTS #/AREA URNS LPF: 0 /LPF
KETONES UR QL STRIP: NEGATIVE
LEUKOCYTE ESTERASE UR QL STRIP: ABNORMAL
LYMPHOCYTES # BLD AUTO: 0.7 K/UL
LYMPHOCYTES NFR BLD: 17.2 %
MCH RBC QN AUTO: 28 PG
MCHC RBC AUTO-ENTMCNC: 31.3 G/DL
MCV RBC AUTO: 89 FL
MICROSCOPIC COMMENT: ABNORMAL
MONOCYTES # BLD AUTO: 0.2 K/UL
MONOCYTES NFR BLD: 5.9 %
NEUTROPHILS # BLD AUTO: 2.7 K/UL
NEUTROPHILS NFR BLD: 69.2 %
NITRITE UR QL STRIP: NEGATIVE
PH UR STRIP: 8 [PH] (ref 5–8)
PLATELET # BLD AUTO: 185 K/UL
PMV BLD AUTO: 10.7 FL
POTASSIUM SERPL-SCNC: 5.9 MMOL/L
PROT SERPL-MCNC: 7.2 G/DL
PROT UR QL STRIP: ABNORMAL
RBC # BLD AUTO: 3.57 M/UL
RBC #/AREA URNS HPF: >100 /HPF (ref 0–4)
SODIUM SERPL-SCNC: 138 MMOL/L
SP GR UR STRIP: 1 (ref 1–1.03)
SQUAMOUS #/AREA URNS HPF: 10 /HPF
URN SPEC COLLECT METH UR: ABNORMAL
UROBILINOGEN UR STRIP-ACNC: NEGATIVE EU/DL
WBC # BLD AUTO: 3.9 K/UL
WBC #/AREA URNS HPF: 10 /HPF (ref 0–5)

## 2017-11-27 PROCEDURE — 99285 EMERGENCY DEPT VISIT HI MDM: CPT | Mod: 25

## 2017-11-27 PROCEDURE — 96374 THER/PROPH/DIAG INJ IV PUSH: CPT

## 2017-11-27 PROCEDURE — 86900 BLOOD TYPING SEROLOGIC ABO: CPT

## 2017-11-27 PROCEDURE — 99223 1ST HOSP IP/OBS HIGH 75: CPT | Mod: AI,,, | Performed by: NURSE PRACTITIONER

## 2017-11-27 PROCEDURE — 81025 URINE PREGNANCY TEST: CPT

## 2017-11-27 PROCEDURE — 81000 URINALYSIS NONAUTO W/SCOPE: CPT

## 2017-11-27 PROCEDURE — 80053 COMPREHEN METABOLIC PANEL: CPT

## 2017-11-27 PROCEDURE — G0257 UNSCHED DIALYSIS ESRD PT HOS: HCPCS

## 2017-11-27 PROCEDURE — 63600175 PHARM REV CODE 636 W HCPCS: Performed by: EMERGENCY MEDICINE

## 2017-11-27 PROCEDURE — 86901 BLOOD TYPING SEROLOGIC RH(D): CPT

## 2017-11-27 PROCEDURE — 20600001 HC STEP DOWN PRIVATE ROOM

## 2017-11-27 PROCEDURE — 93005 ELECTROCARDIOGRAM TRACING: CPT

## 2017-11-27 PROCEDURE — 99223 1ST HOSP IP/OBS HIGH 75: CPT | Mod: ,,, | Performed by: INTERNAL MEDICINE

## 2017-11-27 PROCEDURE — 85025 COMPLETE CBC W/AUTO DIFF WBC: CPT

## 2017-11-27 RX ORDER — AMLODIPINE BESYLATE 10 MG/1
10 TABLET ORAL DAILY
Status: DISCONTINUED | OUTPATIENT
Start: 2017-11-28 | End: 2017-12-04 | Stop reason: HOSPADM

## 2017-11-27 RX ORDER — SODIUM CHLORIDE 9 MG/ML
INJECTION, SOLUTION INTRAVENOUS ONCE
Status: DISCONTINUED | OUTPATIENT
Start: 2017-11-27 | End: 2017-11-27 | Stop reason: HOSPADM

## 2017-11-27 RX ORDER — METHYLPREDNISOLONE SOD SUCC 125 MG
80 VIAL (EA) INJECTION
Status: COMPLETED | OUTPATIENT
Start: 2017-11-27 | End: 2017-11-27

## 2017-11-27 RX ORDER — SODIUM CHLORIDE 0.9 % (FLUSH) 0.9 %
3 SYRINGE (ML) INJECTION
Status: DISCONTINUED | OUTPATIENT
Start: 2017-11-28 | End: 2017-12-04 | Stop reason: HOSPADM

## 2017-11-27 RX ORDER — ACETAMINOPHEN 325 MG/1
650 TABLET ORAL EVERY 8 HOURS PRN
Status: DISCONTINUED | OUTPATIENT
Start: 2017-11-28 | End: 2017-11-28

## 2017-11-27 RX ORDER — SEVELAMER CARBONATE 800 MG/1
800 TABLET, FILM COATED ORAL
Status: DISCONTINUED | OUTPATIENT
Start: 2017-11-28 | End: 2017-12-04 | Stop reason: HOSPADM

## 2017-11-27 RX ORDER — SODIUM CHLORIDE 9 MG/ML
INJECTION, SOLUTION INTRAVENOUS
Status: DISCONTINUED | OUTPATIENT
Start: 2017-11-27 | End: 2017-11-27 | Stop reason: HOSPADM

## 2017-11-27 RX ORDER — PREDNISONE 20 MG/1
40 TABLET ORAL DAILY
Status: DISCONTINUED | OUTPATIENT
Start: 2017-11-28 | End: 2017-12-04

## 2017-11-27 RX ADMIN — METHYLPREDNISOLONE SODIUM SUCCINATE 80 MG: 125 INJECTION, POWDER, FOR SOLUTION INTRAMUSCULAR; INTRAVENOUS at 02:11

## 2017-11-27 NOTE — PLAN OF CARE
Patient received hd for 2 hours. Net removal 2 liters. No access problems. Dr. Parrish visited during hd.

## 2017-11-27 NOTE — HPI
Patient is a 27-year-old Brazilian-speaking female with a failed kidney transplant now on hemodialysis Monday Wednesday Friday under care of Dr. Miller.  Has been having gross hematuria for the last 2 weeks.  Comes in with right lower quadrant severe pain.  Denies any fevers.  Communication and review system and history taking is limited because of language barrier.  In the emergency room translation services were deployed.  I also used translation services.    Patient has not received dialysis today

## 2017-11-27 NOTE — ED PROVIDER NOTES
SCRIBE #1 NOTE: I, Mak Ayala, am scribing for, and in the presence of, Roberto Reddy MD. I have scribed the entire note.      History      Chief Complaint   Patient presents with    Chronic Kidney Disease     on dialysis, had a failed transplant 3 years ago, sent from [facility] for vaginal bleeding and nose bleeds for 2 weeks       Review of patient's allergies indicates:   Allergen Reactions    Heparin analogues         HPI   HPI    11/27/2017, 11:48 AM   History obtained from the patient      History of Present Illness: Leydi Cordero is a 27 y.o. female patient who presents to the Emergency Department for vaginal bleeding which onset gradually 2 days ago. Symptoms are constant and moderate in severity. Pt has a Hx of a failed kidney transplant 3 years ago. Pt was sent over from [facility] because Dr. Miller was concerned her failed kidney transplant was getting worse. Pt is currently on dialysis.   No mitigating or exacerbating factors reported. Associated sxs include hematuria and nose bleeds. Patient denies any fever, chills, n/v/d, abdominal pain, blood in stool, dysuria,  and all other sxs at this time. No further complaints or concerns at this time.         Arrival mode: [facility]    PCP: Won Miller MD       Past Medical History:  Past Medical History:   Diagnosis Date    ESRD (end stage renal disease)     H/O kidney transplant     Hypertension     Hypertensive nephropathy        Past Surgical History:  Past Surgical History:   Procedure Laterality Date    KIDNEY TRANSPLANT  04/15/2015    right leg hemodialysis access           Family History:  Unknown    Social History:  Social History     Social History Main Topics    Smoking status: Current Every Day Smoker     Packs/day: 0.50    Smokeless tobacco: Unknown    Alcohol use No    Drug use: No    Sexual activity: Unknown       ROS   Review of Systems   Constitutional: Negative for chills and fever.   HENT: Positive for nosebleeds.  "Negative for sore throat.    Respiratory: Negative for shortness of breath.    Cardiovascular: Negative for chest pain.   Gastrointestinal: Negative for abdominal pain, diarrhea, nausea and vomiting.   Genitourinary: Positive for hematuria and vaginal bleeding. Negative for dysuria, vaginal discharge and vaginal pain.   Musculoskeletal: Negative for back pain.   Skin: Negative for rash.   Neurological: Negative for dizziness, weakness, light-headedness and headaches.   Hematological: Does not bruise/bleed easily.       Physical Exam      Initial Vitals [11/27/17 1141]   BP Pulse Resp Temp SpO2   (!) 166/100 68 20 98 °F (36.7 °C) 98 %      MAP       122          Physical Exam  Nursing Notes and Vital Signs Reviewed.  Constitutional: Patient is in no acute distress. Well-developed and well-nourished.  Head: Atraumatic. Normocephalic.  Eyes: PERRL. EOM intact. Conjunctivae are not pale. No scleral icterus.  ENT: Mucous membranes are moist. Oropharynx is clear and symmetric.    Neck: Supple. Full ROM. No lymphadenopathy.  Cardiovascular: Regular rate. Regular rhythm. No murmurs, rubs, or gallops. Distal pulses are 2+ and symmetric.  Pulmonary/Chest: No respiratory distress. Clear to auscultation bilaterally. No wheezing or rales.  Abdominal: Soft and non-distended.  There is no tenderness.  No rebound, guarding, or rigidity.   Musculoskeletal: Moves all extremities. No obvious deformities. No edema.   Skin: Warm and dry.  Neurological:  Alert, awake, and appropriate.  Normal speech.  No acute focal neurological deficits are appreciated.  Psychiatric: Normal affect. Good eye contact. Appropriate in content.    ED Course    Procedures  ED Vital Signs:  Vitals:    11/27/17 1141   BP: (!) 166/100   Pulse: 68   Resp: 20   Temp: 98 °F (36.7 °C)   TempSrc: Oral   SpO2: 98%   Weight: 104.3 kg (230 lb)   Height: 5' 6" (1.676 m)       Abnormal Lab Results:  Labs Reviewed   CBC W/ AUTO DIFFERENTIAL - Abnormal; Notable for the " following:        Result Value    RBC 3.57 (*)     Hemoglobin 10.0 (*)     Hematocrit 31.9 (*)     MCHC 31.3 (*)     RDW 17.3 (*)     Lymph # 0.7 (*)     Mono # 0.2 (*)     Lymph% 17.2 (*)     All other components within normal limits   COMPREHENSIVE METABOLIC PANEL - Abnormal; Notable for the following:     Potassium 5.9 (*)     CO2 21 (*)     BUN, Bld 76 (*)     Creatinine 17.2 (*)     Albumin 3.2 (*)     Alkaline Phosphatase 179 (*)     ALT 9 (*)     eGFR if  3 (*)     eGFR if non  3 (*)     All other components within normal limits   URINALYSIS - Abnormal; Notable for the following:     Appearance, UA Hazy (*)     Protein, UA 2+ (*)     Occult Blood UA 3+ (*)     Leukocytes, UA Trace (*)     All other components within normal limits   URINALYSIS MICROSCOPIC - Abnormal; Notable for the following:     RBC, UA >100 (*)     WBC, UA 10 (*)     All other components within normal limits   PREGNANCY TEST, URINE RAPID   TYPE & SCREEN        All Lab Results:  Results for orders placed or performed during the hospital encounter of 11/27/17   CBC auto differential   Result Value Ref Range    WBC 3.90 3.90 - 12.70 K/uL    RBC 3.57 (L) 4.00 - 5.40 M/uL    Hemoglobin 10.0 (L) 12.0 - 16.0 g/dL    Hematocrit 31.9 (L) 37.0 - 48.5 %    MCV 89 82 - 98 fL    MCH 28.0 27.0 - 31.0 pg    MCHC 31.3 (L) 32.0 - 36.0 g/dL    RDW 17.3 (H) 11.5 - 14.5 %    Platelets 185 150 - 350 K/uL    MPV 10.7 9.2 - 12.9 fL    Gran # 2.7 1.8 - 7.7 K/uL    Lymph # 0.7 (L) 1.0 - 4.8 K/uL    Mono # 0.2 (L) 0.3 - 1.0 K/uL    Eos # 0.3 0.0 - 0.5 K/uL    Baso # 0.01 0.00 - 0.20 K/uL    Gran% 69.2 38.0 - 73.0 %    Lymph% 17.2 (L) 18.0 - 48.0 %    Mono% 5.9 4.0 - 15.0 %    Eosinophil% 7.4 0.0 - 8.0 %    Basophil% 0.3 0.0 - 1.9 %    Differential Method Automated    Comprehensive metabolic panel   Result Value Ref Range    Sodium 138 136 - 145 mmol/L    Potassium 5.9 (H) 3.5 - 5.1 mmol/L    Chloride 103 95 - 110 mmol/L    CO2 21 (L)  23 - 29 mmol/L    Glucose 81 70 - 110 mg/dL    BUN, Bld 76 (H) 6 - 20 mg/dL    Creatinine 17.2 (H) 0.5 - 1.4 mg/dL    Calcium 9.4 8.7 - 10.5 mg/dL    Total Protein 7.2 6.0 - 8.4 g/dL    Albumin 3.2 (L) 3.5 - 5.2 g/dL    Total Bilirubin 0.5 0.1 - 1.0 mg/dL    Alkaline Phosphatase 179 (H) 55 - 135 U/L    AST 10 10 - 40 U/L    ALT 9 (L) 10 - 44 U/L    Anion Gap 14 8 - 16 mmol/L    eGFR if African American 3 (A) >60 mL/min/1.73 m^2    eGFR if non African American 3 (A) >60 mL/min/1.73 m^2   Urinalysis   Result Value Ref Range    Specimen UA Urine, Clean Catch     Color, UA Yellow Yellow, Straw, Fransisca    Appearance, UA Hazy (A) Clear    pH, UA 8.0 5.0 - 8.0    Specific Gravity, UA 1.005 1.005 - 1.030    Protein, UA 2+ (A) Negative    Glucose, UA Negative Negative    Ketones, UA Negative Negative    Bilirubin (UA) Negative Negative    Occult Blood UA 3+ (A) Negative    Nitrite, UA Negative Negative    Urobilinogen, UA Negative <2.0 EU/dL    Leukocytes, UA Trace (A) Negative   Pregnancy, urine rapid   Result Value Ref Range    Preg Test, Ur Negative    Urinalysis Microscopic   Result Value Ref Range    RBC, UA >100 (H) 0 - 4 /hpf    WBC, UA 10 (H) 0 - 5 /hpf    Bacteria, UA None None-Occ /hpf    Squam Epithel, UA 10 /hpf    Hyaline Casts, UA 0 0-1/lpf /lpf    Microscopic Comment SEE COMMENT                   The Emergency Provider reviewed the vital signs and test results, which are outlined above.    ED Discussion     1:30 PM: Dr. Reddy discussed the pt's case with Dr. Parrish (Nephrology) who recommends to give the pt steroids and transfer the pt to [place] because the pt is having hyperacute rejection and we do not have transplant services here.    2:48 PM: Consult with Dr. Roman (Transplant Services) at [facility] concerning pt. There are no transplant services, which the patient requires, offered at [facility] at this time. Dr. Roman expresses understanding and will accept transfer for  transplant services.  Accepting Facility: [facility]  Accepting Physician: Dr. Roman    2:51 PM: Re-evaluated pt. Informed pt and family that there are no transplant services available at this time. I have discussed test results, shared treatment plan, and the need for transfer with patient and family at bedside. All historical, clinical, radiographic, and laboratory findings were reviewed with the patient/family in detail. Patient will be transferred by [facility] services with cardiac monitoring care required en route. Patient understands that there could be unforeseen motor vehicle accidents or loss of vital signs that could result in potential death or permanent disability. Pt and family express understanding at this time and agree with all information. All questions answered. Pt and family have no further questions or concerns at this time. Pt is ready for transfer.           ED Medication(s):  Medications   methylPREDNISolone sodium succinate injection 80 mg (80 mg Intravenous Given 11/27/17 1425)       New Prescriptions    No medications on file             Medical Decision Making    Medical Decision Making:   Clinical Tests:   Lab Tests: Ordered and Reviewed           Scribe Attestation:   Scribe #1: I performed the above scribed service and the documentation accurately describes the services I performed. I attest to the accuracy of the note.    Attending:   Physician Attestation Statement for Scribe #1: I, Roberto Reddy MD, personally performed the services described in this documentation, as scribed by Mak Ayala, in my presence, and it is both accurate and complete.          Clinical Impression       ICD-10-CM ICD-9-CM   1. Hematuria, unspecified type R31.9 599.70   2. Acute rejection of kidney transplant T86.11 996.81       Disposition:   Disposition: Transferred  Condition: Stable         Roberto Reddy MD  11/27/17 1613

## 2017-11-27 NOTE — CONSULTS
[facility] -   Nephrology  Consult Note    Patient Name: Leydi Cordero  MRN: [id number]  Admission Date: 11/27/2017  Hospital Length of Stay: 0 days  Attending Provider: Roberto Reddy MD   Primary Care Physician: Won Miller MD  Principal Problem:<principal problem not specified>    Consults  Subjective:     HPI: Patient is a 27-year-old Greek-speaking female with a failed kidney transplant now on hemodialysis Monday Wednesday Friday under care of Dr. Miller.  Has been having gross hematuria for the last 2 weeks.  Comes in with right lower quadrant severe pain.  Denies any fevers.  Communication and review system and history taking is limited because of language barrier.  In the emergency room translation services were deployed.  I also used translation services.    Patient has not received dialysis today    Past Medical History:   Diagnosis Date    ESRD (end stage renal disease)     H/O kidney transplant     Hypertension     Hypertensive nephropathy        Past Surgical History:   Procedure Laterality Date    KIDNEY TRANSPLANT  04/15/2015    right leg hemodialysis access         Review of patient's allergies indicates:   Allergen Reactions    Heparin analogues      No current facility-administered medications for this encounter.      Current Outpatient Prescriptions   Medication    amlodipine (NORVASC) 10 MG tablet    aspirin (ECOTRIN) 81 MG EC tablet    mycophenolate (MYFORTIC) 360 MG TbEC    nystatin (MYCOSTATIN) 100,000 unit/mL suspension    pantoprazole (PROTONIX) 40 MG tablet    predniSONE (DELTASONE) 10 MG tablet    sevelamer carbonate (RENVELA) 800 mg Tab    sodium bicarbonate 650 MG tablet    tacrolimus (PROGRAF) 1 MG Cap    valganciclovir (VALCYTE) 450 mg Tab     Family History     None        Social History Main Topics    Smoking status: Current Every Day Smoker     Packs/day: 0.50    Smokeless tobacco: Not on file    Alcohol use No    Drug use: No     Sexual activity: Not on file     Review of Systems   Constitutional: Negative for activity change, appetite change, chills, diaphoresis, fatigue, fever and unexpected weight change.   HENT: Negative for congestion, dental problem, drooling, postnasal drip, rhinorrhea and voice change.    Eyes: Negative for discharge.   Respiratory: Negative for apnea, cough, choking, chest tightness, shortness of breath, wheezing and stridor.    Cardiovascular: Negative for chest pain, palpitations and leg swelling.   Gastrointestinal: Negative for abdominal distention, blood in stool, constipation, diarrhea, nausea, rectal pain and vomiting.   Endocrine: Negative for cold intolerance, heat intolerance, polydipsia and polyuria.   Genitourinary: Positive for decreased urine volume, hematuria and pelvic pain. Negative for difficulty urinating, dysuria, enuresis, flank pain, frequency and urgency.   Musculoskeletal: Negative for arthralgias, back pain, gait problem and joint swelling.   Skin: Negative for rash.   Allergic/Immunologic: Negative for food allergies and immunocompromised state.   Neurological: Negative for dizziness, tremors, syncope, numbness and headaches.   Hematological: Does not bruise/bleed easily.   Psychiatric/Behavioral: Negative for agitation, behavioral problems and self-injury. The patient is not nervous/anxious and is not hyperactive.    All other systems reviewed and are negative.    Objective:     Vital Signs (Most Recent):  Temp: 98 °F (36.7 °C) (11/27/17 1141)  Pulse: 68 (11/27/17 1141)  Resp: 20 (11/27/17 1141)  BP: (!) 166/100 (11/27/17 1141)  SpO2: 98 % (11/27/17 1141)  O2 Device (Oxygen Therapy): room air (11/27/17 1141) Vital Signs (24h Range):  Temp:  [98 °F (36.7 °C)] 98 °F (36.7 °C)  Pulse:  [68] 68  Resp:  [20] 20  SpO2:  [98 %] 98 %  BP: (166)/(100) 166/100     Weight: 104.3 kg (230 lb) (11/27/17 1141)  Body mass index is 37.12 kg/m².  Body surface area is 2.2 meters squared.    No  intake/output data recorded.    Physical Exam   Constitutional: She is oriented to person, place, and time. No distress.   HENT:   Head: Normocephalic and atraumatic.   Nose: Nose normal.   Eyes: Conjunctivae and EOM are normal. Pupils are equal, round, and reactive to light.   Neck: Normal range of motion. No JVD present. No tracheal deviation present. No thyromegaly present.   Cardiovascular: Normal rate, regular rhythm, normal heart sounds and intact distal pulses.  Exam reveals no gallop and no friction rub.    No murmur heard.  Pulmonary/Chest: Effort normal and breath sounds normal. No respiratory distress. She has no wheezes. She has no rales. She exhibits no tenderness.   Abdominal: Soft. Bowel sounds are normal. She exhibits no distension and no mass. There is no tenderness. No hernia.   Severe tenderness and rebound tenderness in the allograft in the right lower quadrant.  Patient's old kidney transplant which had rejected and failed seems to be swollen and extremely tender.   Musculoskeletal: Normal range of motion. She exhibits no edema, tenderness or deformity.   Left upper arm fistula has a positive bruit and thrill   Neurological: She is alert and oriented to person, place, and time. She has normal reflexes. She displays normal reflexes. No cranial nerve deficit. She exhibits normal muscle tone. Coordination normal.   Skin: Skin is warm. She is not diaphoretic. No erythema. There is pallor.   Psychiatric: She has a normal mood and affect. Her behavior is normal. Judgment and thought content normal.   Nursing note and vitals reviewed.      Significant Labs:  All labs within the past 24 hours have been reviewed.    Significant Imaging:  Labs: Reviewed    Assessment/Plan:     ESRD (end stage renal disease)    Hemodialysis today before the transfer        Hyperkalemia    Patient has missed dialysis today.  We will provide emergency dialysis in the emergency room.  Prepare for transfer for [place] once  transfer arrangements have been completed.  Meanwhile we will continue to provide the care with urgent hemodialysis for hyperkalemia        H/O kidney transplant    Patient has failed kidney transplant and now has clinical signs and symptoms of hyperacute rejection.  Patient is very noncompliant in the past.  Ideally I would have put her on IV steroids and electively scheduled her for transplant nephrectomy.  Considering the patient's previous history of noncompliance I would recommend the patient to be transferred to [place] for further management and possible transplant nephrectomy.  He has discussed in detail with the transfer center as well as Dr. Reddy in the emergency room.  Patient agrees with the plan and is giving consent for transfer.                Thank you for your consult.     Ann Marie Parrish MD  Nephrology  [facility]

## 2017-11-27 NOTE — ED NOTES
Discussed POC with Dr. Reddy and Dr. Parrish. Pt will go to dialysis until bed is ready in [facility] NO for pt, dialysis RN called to state he is ready now.

## 2017-11-27 NOTE — SUBJECTIVE & OBJECTIVE
Past Medical History:   Diagnosis Date    ESRD (end stage renal disease)     H/O kidney transplant     Hypertension     Hypertensive nephropathy        Past Surgical History:   Procedure Laterality Date    KIDNEY TRANSPLANT  04/15/2015    right leg hemodialysis access         Review of patient's allergies indicates:   Allergen Reactions    Heparin analogues      No current facility-administered medications for this encounter.      Current Outpatient Prescriptions   Medication    amlodipine (NORVASC) 10 MG tablet    aspirin (ECOTRIN) 81 MG EC tablet    mycophenolate (MYFORTIC) 360 MG TbEC    nystatin (MYCOSTATIN) 100,000 unit/mL suspension    pantoprazole (PROTONIX) 40 MG tablet    predniSONE (DELTASONE) 10 MG tablet    sevelamer carbonate (RENVELA) 800 mg Tab    sodium bicarbonate 650 MG tablet    tacrolimus (PROGRAF) 1 MG Cap    valganciclovir (VALCYTE) 450 mg Tab     Family History     None        Social History Main Topics    Smoking status: Current Every Day Smoker     Packs/day: 0.50    Smokeless tobacco: Not on file    Alcohol use No    Drug use: No    Sexual activity: Not on file     Review of Systems   Constitutional: Negative for activity change, appetite change, chills, diaphoresis, fatigue, fever and unexpected weight change.   HENT: Negative for congestion, dental problem, drooling, postnasal drip, rhinorrhea and voice change.    Eyes: Negative for discharge.   Respiratory: Negative for apnea, cough, choking, chest tightness, shortness of breath, wheezing and stridor.    Cardiovascular: Negative for chest pain, palpitations and leg swelling.   Gastrointestinal: Negative for abdominal distention, blood in stool, constipation, diarrhea, nausea, rectal pain and vomiting.   Endocrine: Negative for cold intolerance, heat intolerance, polydipsia and polyuria.   Genitourinary: Positive for decreased urine volume, hematuria and pelvic pain. Negative for difficulty urinating, dysuria,  enuresis, flank pain, frequency and urgency.   Musculoskeletal: Negative for arthralgias, back pain, gait problem and joint swelling.   Skin: Negative for rash.   Allergic/Immunologic: Negative for food allergies and immunocompromised state.   Neurological: Negative for dizziness, tremors, syncope, numbness and headaches.   Hematological: Does not bruise/bleed easily.   Psychiatric/Behavioral: Negative for agitation, behavioral problems and self-injury. The patient is not nervous/anxious and is not hyperactive.    All other systems reviewed and are negative.    Objective:     Vital Signs (Most Recent):  Temp: 98 °F (36.7 °C) (11/27/17 1141)  Pulse: 68 (11/27/17 1141)  Resp: 20 (11/27/17 1141)  BP: (!) 166/100 (11/27/17 1141)  SpO2: 98 % (11/27/17 1141)  O2 Device (Oxygen Therapy): room air (11/27/17 1141) Vital Signs (24h Range):  Temp:  [98 °F (36.7 °C)] 98 °F (36.7 °C)  Pulse:  [68] 68  Resp:  [20] 20  SpO2:  [98 %] 98 %  BP: (166)/(100) 166/100     Weight: 104.3 kg (230 lb) (11/27/17 1141)  Body mass index is 37.12 kg/m².  Body surface area is 2.2 meters squared.    No intake/output data recorded.    Physical Exam   Constitutional: She is oriented to person, place, and time. No distress.   HENT:   Head: Normocephalic and atraumatic.   Nose: Nose normal.   Eyes: Conjunctivae and EOM are normal. Pupils are equal, round, and reactive to light.   Neck: Normal range of motion. No JVD present. No tracheal deviation present. No thyromegaly present.   Cardiovascular: Normal rate, regular rhythm, normal heart sounds and intact distal pulses.  Exam reveals no gallop and no friction rub.    No murmur heard.  Pulmonary/Chest: Effort normal and breath sounds normal. No respiratory distress. She has no wheezes. She has no rales. She exhibits no tenderness.   Abdominal: Soft. Bowel sounds are normal. She exhibits no distension and no mass. There is no tenderness. No hernia.   Severe tenderness and rebound tenderness in the  allograft in the right lower quadrant.  Patient's old kidney transplant which had rejected and failed seems to be swollen and extremely tender.   Musculoskeletal: Normal range of motion. She exhibits no edema, tenderness or deformity.   Left upper arm fistula has a positive bruit and thrill   Neurological: She is alert and oriented to person, place, and time. She has normal reflexes. She displays normal reflexes. No cranial nerve deficit. She exhibits normal muscle tone. Coordination normal.   Skin: Skin is warm. She is not diaphoretic. No erythema. There is pallor.   Psychiatric: She has a normal mood and affect. Her behavior is normal. Judgment and thought content normal.   Nursing note and vitals reviewed.      Significant Labs:  All labs within the past 24 hours have been reviewed.    Significant Imaging:  Labs: Reviewed

## 2017-11-27 NOTE — ED NOTES
Jackie from [facility] called with room 8071 at [facility], setting up for patient transport.

## 2017-11-27 NOTE — ASSESSMENT & PLAN NOTE
Patient has missed dialysis today.  We will provide emergency dialysis in the emergency room.  Prepare for transfer for [place] once transfer arrangements have been completed.  Meanwhile we will continue to provide the care with urgent hemodialysis for hyperkalemia

## 2017-11-28 LAB
ALBUMIN SERPL BCP-MCNC: 3 G/DL
ALP SERPL-CCNC: 182 U/L
ALT SERPL W/O P-5'-P-CCNC: 6 U/L
ANION GAP SERPL CALC-SCNC: 12 MMOL/L
ANION GAP SERPL CALC-SCNC: 18 MMOL/L
AST SERPL-CCNC: 14 U/L
BASOPHILS # BLD AUTO: 0.01 K/UL
BASOPHILS NFR BLD: 0.1 %
BILIRUB SERPL-MCNC: 0.3 MG/DL
BUN SERPL-MCNC: 56 MG/DL
BUN SERPL-MCNC: 61 MG/DL
CALCIUM SERPL-MCNC: 9.4 MG/DL
CALCIUM SERPL-MCNC: 9.6 MG/DL
CHLORIDE SERPL-SCNC: 101 MMOL/L
CHLORIDE SERPL-SCNC: 102 MMOL/L
CO2 SERPL-SCNC: 18 MMOL/L
CO2 SERPL-SCNC: 22 MMOL/L
CREAT SERPL-MCNC: 12.9 MG/DL
CREAT SERPL-MCNC: 13.5 MG/DL
DIFFERENTIAL METHOD: ABNORMAL
EOSINOPHIL # BLD AUTO: 0 K/UL
EOSINOPHIL NFR BLD: 0 %
ERYTHROCYTE [DISTWIDTH] IN BLOOD BY AUTOMATED COUNT: 16.9 %
EST. GFR  (AFRICAN AMERICAN): 3.9 ML/MIN/1.73 M^2
EST. GFR  (AFRICAN AMERICAN): 4.1 ML/MIN/1.73 M^2
EST. GFR  (NON AFRICAN AMERICAN): 3.4 ML/MIN/1.73 M^2
EST. GFR  (NON AFRICAN AMERICAN): 3.5 ML/MIN/1.73 M^2
GLUCOSE SERPL-MCNC: 111 MG/DL
GLUCOSE SERPL-MCNC: 273 MG/DL
HCT VFR BLD AUTO: 30.8 %
HGB BLD-MCNC: 9.7 G/DL
IMM GRANULOCYTES # BLD AUTO: 0.02 K/UL
IMM GRANULOCYTES NFR BLD AUTO: 0.3 %
INR PPP: 1
LYMPHOCYTES # BLD AUTO: 0.2 K/UL
LYMPHOCYTES NFR BLD: 2.9 %
MAGNESIUM SERPL-MCNC: 2 MG/DL
MCH RBC QN AUTO: 28.1 PG
MCHC RBC AUTO-ENTMCNC: 31.5 G/DL
MCV RBC AUTO: 89 FL
MONOCYTES # BLD AUTO: 0.3 K/UL
MONOCYTES NFR BLD: 4.4 %
NEUTROPHILS # BLD AUTO: 6.7 K/UL
NEUTROPHILS NFR BLD: 92.3 %
NRBC BLD-RTO: 0 /100 WBC
PHOSPHATE SERPL-MCNC: 2.7 MG/DL
PLATELET # BLD AUTO: 172 K/UL
PMV BLD AUTO: 11.3 FL
POTASSIUM SERPL-SCNC: 4.6 MMOL/L
POTASSIUM SERPL-SCNC: 6.1 MMOL/L
POTASSIUM SERPL-SCNC: 6.5 MMOL/L
PROT SERPL-MCNC: 7.2 G/DL
PROTHROMBIN TIME: 10.3 SEC
RBC # BLD AUTO: 3.45 M/UL
SODIUM SERPL-SCNC: 136 MMOL/L
SODIUM SERPL-SCNC: 137 MMOL/L
WBC # BLD AUTO: 7.21 K/UL

## 2017-11-28 PROCEDURE — 20600001 HC STEP DOWN PRIVATE ROOM

## 2017-11-28 PROCEDURE — 63600175 PHARM REV CODE 636 W HCPCS: Performed by: NURSE PRACTITIONER

## 2017-11-28 PROCEDURE — 93041 RHYTHM ECG TRACING: CPT

## 2017-11-28 PROCEDURE — 93005 ELECTROCARDIOGRAM TRACING: CPT

## 2017-11-28 PROCEDURE — 83735 ASSAY OF MAGNESIUM: CPT

## 2017-11-28 PROCEDURE — 36415 COLL VENOUS BLD VENIPUNCTURE: CPT

## 2017-11-28 PROCEDURE — 25000003 PHARM REV CODE 250

## 2017-11-28 PROCEDURE — 25000242 PHARM REV CODE 250 ALT 637 W/ HCPCS: Performed by: NURSE PRACTITIONER

## 2017-11-28 PROCEDURE — 36430 TRANSFUSION BLD/BLD COMPNT: CPT

## 2017-11-28 PROCEDURE — 94640 AIRWAY INHALATION TREATMENT: CPT

## 2017-11-28 PROCEDURE — 85610 PROTHROMBIN TIME: CPT

## 2017-11-28 PROCEDURE — 85025 COMPLETE CBC W/AUTO DIFF WBC: CPT

## 2017-11-28 PROCEDURE — 94761 N-INVAS EAR/PLS OXIMETRY MLT: CPT

## 2017-11-28 PROCEDURE — 99223 1ST HOSP IP/OBS HIGH 75: CPT | Mod: ,,, | Performed by: INTERNAL MEDICINE

## 2017-11-28 PROCEDURE — 80053 COMPREHEN METABOLIC PANEL: CPT

## 2017-11-28 PROCEDURE — 25000003 PHARM REV CODE 250: Performed by: NURSE PRACTITIONER

## 2017-11-28 PROCEDURE — 99232 SBSQ HOSP IP/OBS MODERATE 35: CPT | Mod: ,,, | Performed by: NURSE PRACTITIONER

## 2017-11-28 PROCEDURE — 80048 BASIC METABOLIC PNL TOTAL CA: CPT

## 2017-11-28 PROCEDURE — 84132 ASSAY OF SERUM POTASSIUM: CPT

## 2017-11-28 PROCEDURE — 93010 ELECTROCARDIOGRAM REPORT: CPT | Mod: ,,, | Performed by: INTERNAL MEDICINE

## 2017-11-28 PROCEDURE — 84100 ASSAY OF PHOSPHORUS: CPT

## 2017-11-28 RX ORDER — ACETAMINOPHEN 325 MG/1
650 TABLET ORAL EVERY 8 HOURS PRN
Status: DISCONTINUED | OUTPATIENT
Start: 2017-11-28 | End: 2017-12-04 | Stop reason: HOSPADM

## 2017-11-28 RX ORDER — SODIUM BICARBONATE 1 MEQ/ML
50 SYRINGE (ML) INTRAVENOUS ONCE
Status: COMPLETED | OUTPATIENT
Start: 2017-11-28 | End: 2017-11-28

## 2017-11-28 RX ORDER — DIPHENHYDRAMINE HCL 25 MG
25 CAPSULE ORAL EVERY 6 HOURS PRN
Status: DISCONTINUED | OUTPATIENT
Start: 2017-11-28 | End: 2017-12-04 | Stop reason: HOSPADM

## 2017-11-28 RX ORDER — DEXTROSE MONOHYDRATE 25 G/50ML
INJECTION, SOLUTION INTRAVENOUS
Status: COMPLETED
Start: 2017-11-28 | End: 2017-11-28

## 2017-11-28 RX ORDER — CALCIUM CARBONATE 200(500)MG
500 TABLET,CHEWABLE ORAL 3 TIMES DAILY PRN
Status: DISCONTINUED | OUTPATIENT
Start: 2017-11-28 | End: 2017-12-04 | Stop reason: HOSPADM

## 2017-11-28 RX ORDER — ALBUTEROL SULFATE 0.83 MG/ML
10 SOLUTION RESPIRATORY (INHALATION) ONCE
Status: COMPLETED | OUTPATIENT
Start: 2017-11-28 | End: 2017-11-28

## 2017-11-28 RX ADMIN — ALBUTEROL SULFATE 10 MG: 2.5 SOLUTION RESPIRATORY (INHALATION) at 01:11

## 2017-11-28 RX ADMIN — PREDNISONE 40 MG: 20 TABLET ORAL at 08:11

## 2017-11-28 RX ADMIN — SEVELAMER CARBONATE 800 MG: 800 TABLET, FILM COATED ORAL at 12:11

## 2017-11-28 RX ADMIN — ACETAMINOPHEN 650 MG: 325 TABLET ORAL at 03:11

## 2017-11-28 RX ADMIN — DEXTROSE MONOHYDRATE 50 ML: 25 INJECTION, SOLUTION INTRAVENOUS at 04:11

## 2017-11-28 RX ADMIN — CALCIUM GLUCONATE 1 G: 94 INJECTION, SOLUTION INTRAVENOUS at 04:11

## 2017-11-28 RX ADMIN — SODIUM POLYSTYRENE SULFONATE 30 G: 15 SUSPENSION ORAL; RECTAL at 04:11

## 2017-11-28 RX ADMIN — INSULIN HUMAN 10 UNITS: 100 INJECTION, SOLUTION PARENTERAL at 04:11

## 2017-11-28 RX ADMIN — SODIUM BICARBONATE 50 MEQ: 84 INJECTION, SOLUTION INTRAVENOUS at 04:11

## 2017-11-28 RX ADMIN — DIPHENHYDRAMINE HYDROCHLORIDE 25 MG: 25 CAPSULE ORAL at 03:11

## 2017-11-28 RX ADMIN — ALBUTEROL SULFATE 10 MG: 2.5 SOLUTION RESPIRATORY (INHALATION) at 05:11

## 2017-11-28 RX ADMIN — CALCIUM CARBONATE (ANTACID) CHEW TAB 500 MG 500 MG: 500 CHEW TAB at 10:11

## 2017-11-28 RX ADMIN — SEVELAMER CARBONATE 800 MG: 800 TABLET, FILM COATED ORAL at 05:11

## 2017-11-28 RX ADMIN — AMLODIPINE BESYLATE 10 MG: 10 TABLET ORAL at 08:11

## 2017-11-28 NOTE — HPI
Leydi Cordero is a 27yr female with a PMH of ESRD 2/2 hypertensive nephropathy, s/p renal transplant 4-15-15 at [facility] in [place] with Campath induction. 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 She reports her kidney txp failing in 2017 and resumed HD 5 months ago on a MWF schedule through a LUE graft (last HD 11/27/17). She reports urinating 1-2 times daily. She was transferred from [facility] for hematuria and concern for acute rejection of kidney requiring transplant nephrectomy. She reports hematuria and allograft site pain for 2 weeks. She denies taking any immunosuppressants currently. She was given solumedrol 80mg IV in [place] for presumed rejection. Will plan to continue steroids and surgery to discuss transplant nephrectomy.   Nephrology consulted for inpatient dialysis treatment. She normaly dialyzes on MWF in [facility] via [facility], duration of 3.5 hrs, UF 2 L, unknown EDW and las HD was on yesterday for hyperkalemia, 2 hrs and had 2 L removed .

## 2017-11-28 NOTE — ASSESSMENT & PLAN NOTE
Nephrology consulted for inpatient dialysis treatment. She normaly dialyzes on MWF in [facility] via [facility], duration of 3.5 hrs, UF 2 L, unknown EDW and las HD was on yesterday for hyperkalemia, 2 hrs and had 2 L removed .    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   - Blood pressure controlled 137/61 mmHg, continue current treatment  - Will follow renal transplant evaluation

## 2017-11-28 NOTE — SUBJECTIVE & OBJECTIVE
Subjective:   History of Present Illness:  Leydi Cordero is a 27yr female with a PMH of ESRD 2/2 hypertensive nephropathy, s/p renal transplant 4-15-15 at [facility] in [place] with Campath induction. 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 She reports her kidney txp failing in 2017 and resumed HD 5 months ago on a MWF schedule through a LUE graft (last HD 11/27/17). She reports urinating 1-2 times daily. She was transferred from [facility] for hematuria and concern for acute rejection of kidney requiring transplant nephrectomy. She reports hematuria and allograft site pain for 2 weeks. She denies taking any immunosuppressants currently. She was given solumedrol 80mg IV in [place] for presumed rejection. Will plan to continue steroids and surgery to discuss transplant nephrectomy tomorrow.       Hospital Course:  Interval history: no acute events overnight. Pt denies hematuria. No pain noted over allograft. Potassium 6.5 this AM. Shifted with insulin, d50, calcium gluconate and albuterol with improvement to 4.6. Nephrology consulted. Will resume HD tomorrow. Monitor.       Past Medical, Surgical, Family, and Social History:   Unchanged from H&P.    Scheduled Meds:   amLODIPine  10 mg Oral Daily    calcium gluconate IVPB  1 g Intravenous Once    dextrose 50%  25 g Intravenous Once    predniSONE  40 mg Oral Daily    sevelamer carbonate  800 mg Oral TID WM     Continuous Infusions:   PRN Meds:acetaminophen, calcium gluconate IVPB **AND** calcium gluconate IVPB, diphenhydrAMINE, sodium chloride 0.9%    Intake/Output - Last 3 Shifts       11/26 0700 - 11/27 0659 11/27 0700 - 11/28 0659 11/28 0700 - 11/29 0659    P.O.   240    Total Intake(mL/kg)   240 (2.5)    Urine (mL/kg/hr)  50 50 (0.1)    Stool   0 (0)    Total Output   50 50    Net   -50 +190           Urine Occurrence   0 x     "Stool Occurrence   1 x           Review of Systems   Constitutional: Negative for activity change, appetite change, chills, fatigue and fever.   HENT: Negative for congestion and facial swelling.    Eyes: Negative.    Respiratory: Negative for cough, chest tightness, shortness of breath and wheezing.    Cardiovascular: Negative for chest pain, palpitations and leg swelling.   Gastrointestinal: Positive for abdominal pain. Negative for abdominal distention, constipation, diarrhea, nausea and vomiting.   Endocrine: Negative.    Genitourinary: Positive for decreased urine volume and hematuria. Negative for difficulty urinating, dysuria, frequency and urgency.   Musculoskeletal: Negative for back pain and neck pain.   Skin: Negative for color change, pallor, rash and wound.   Allergic/Immunologic: Negative for immunocompromised state.   Neurological: Negative for dizziness, tremors, weakness and headaches.   Hematological: Negative.    Psychiatric/Behavioral: Negative for agitation, confusion and sleep disturbance.   All other systems reviewed and are negative.     Objective:     Vital Signs (Most Recent):  Temp: 97.4 °F (36.3 °C) (11/28/17 1138)  Pulse: 78 (11/28/17 1138)  Resp: 18 (11/28/17 1138)  BP: (!) 143/86 (11/28/17 1138)  SpO2: 100 % (11/28/17 1138) Vital Signs (24h Range):  Temp:  [97.4 °F (36.3 °C)-98.6 °F (37 °C)] 97.4 °F (36.3 °C)  Pulse:  [] 78  Resp:  [17-20] 18  SpO2:  [94 %-100 %] 100 %  BP: (128-174)/() 143/86     Weight: 94.2 kg (207 lb 10.8 oz)  Height: 5' 6" (167.6 cm)  Body mass index is 33.52 kg/m².    Physical Exam   Constitutional: She is oriented to person, place, and time. She is active and cooperative.   Eyes: Pupils are equal, round, and reactive to light.   Cardiovascular: Normal rate, regular rhythm, intact distal pulses and normal pulses.    Murmur heard.  Pulmonary/Chest: Effort normal and breath sounds normal. No respiratory distress. She has no decreased breath sounds. She " has no wheezes. She has no rales.   Abdominal: Soft. Normal appearance and bowel sounds are normal. She exhibits no distension. There is no tenderness (tenderness over allograft site). There is no guarding.   Musculoskeletal: Normal range of motion. She exhibits edema (+1 generalized).   Neurological: She is alert and oriented to person, place, and time.   Skin: Skin is warm, dry and intact.   LUE AV graft +thrill/+bruit   Nursing note and vitals reviewed.      Laboratory:  CBC:   Recent Labs  Lab 11/27/17  1220 11/28/17  0616   WBC 3.90 7.21   RBC 3.57* 3.45*   HGB 10.0* 9.7*   HCT 31.9* 30.8*    172   MCV 89 89   MCH 28.0 28.1   MCHC 31.3* 31.5*     BMP:   Recent Labs  Lab 11/27/17  1220 11/28/17  0007 11/28/17  0229 11/28/17  0616   GLU 81 111*  --  273*    136  --  137   K 5.9* 6.1* 6.5* 4.6    102  --  101   CO2 21* 22*  --  18*   BUN 76* 56*  --  61*   CREATININE 17.2* 12.9*  --  13.5*   CALCIUM 9.4 9.6  --  9.4       Diagnostic Results:  None

## 2017-11-28 NOTE — ASSESSMENT & PLAN NOTE
- hematuria x 2 weeks associated with allograft site tenderness. Given 80mg solumedrol in [place], will start prednisone 40mg PO.   - plan discussed with surgery  - NPO after midnight for possible transplant nephrectomy in AM, holding aspirin.

## 2017-11-28 NOTE — ASSESSMENT & PLAN NOTE
- K 6.5 this AM.  - shifted with insulin, d50, calcium gluconate and albuterol.  - repeat K improved to 4.6. Monitor.

## 2017-11-28 NOTE — SUBJECTIVE & OBJECTIVE
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 She reports her kidney txp failing in 2017 and resumed HD 5 months ago on a MWF schedule through a LUE graft (last HD 11/27/17). She reports urinating 1-2 times daily. She was transferred from [facility] for hematuria and concern for acute rejection of kidney requiring transplant nephrectomy. She reports hematuria and allograft site pain for 2 weeks. She denies taking any immunosuppressants currently. She was given solumedrol 80mg IV in [place] for presumed rejection. Will plan to continue steroids and surgery to discuss transplant nephrectomy tomorrow.       PTA Medications   Medication Sig    amlodipine (NORVASC) 10 MG tablet Take 1 tablet (10 mg total) by mouth once daily.    aspirin (ECOTRIN) 81 MG EC tablet Take 1 tablet (81 mg total) by mouth once daily.    mycophenolate (MYFORTIC) 360 MG TbEC Take 1 tablet (360 mg total) by mouth 2 (two) times daily.    nystatin (MYCOSTATIN) 100,000 unit/mL suspension Take 5 mLs (500,000 Units total) by mouth 4 (four) times daily. STOP 11/9    pantoprazole (PROTONIX) 40 MG tablet Take 1 tablet (40 mg total) by mouth once daily.    predniSONE (DELTASONE) 10 MG tablet 20 mg po qd 10/10-11/9; 15 mg po  qd 11/10-12/9; 10 mg po 12/10-1/9; 5 mg po daily thereafter    sevelamer carbonate (RENVELA) 800 mg Tab Take 1 tablet (800 mg total) by mouth 3 (three) times daily with meals.    sodium bicarbonate 650 MG tablet Take 4 tablets (2,600 mg total) by mouth 4 (four) times daily.    tacrolimus (PROGRAF) 1 MG Cap Take 5 mg po in the morning and 4 mg po in the  evening    valganciclovir (VALCYTE) 450 mg Tab Take 1 tablet (450 mg total) by mouth every other day.       Review of patient's allergies indicates:   Allergen Reactions    Heparin analogues        Past Medical History:   Diagnosis Date    ESRD (end stage renal disease)     H/O kidney transplant     Hypertension     Hypertensive nephropathy      Past Surgical History:   Procedure Laterality Date    KIDNEY TRANSPLANT  04/15/2015    right leg hemodialysis access       Family History     None        Social History Main Topics    Smoking status: Current Every Day Smoker     Packs/day: 0.50    Smokeless tobacco: Not on file    Alcohol use No    Drug use: No    Sexual activity: Not on file        Review of Systems   Constitutional: Negative for appetite change, chills, fatigue and fever.   Respiratory: Negative for cough, chest tightness and shortness of breath.    Cardiovascular: Negative for chest pain, palpitations and leg swelling.   Gastrointestinal: Positive for abdominal pain. Negative for abdominal distention, constipation, diarrhea, nausea and vomiting.   Genitourinary: Positive for decreased urine volume and hematuria. Negative for difficulty urinating, dysuria, frequency and urgency.   Musculoskeletal: Negative for back pain and neck pain.   Skin: Negative for color change, pallor, rash and wound.   Neurological: Negative for dizziness, weakness and headaches.   Psychiatric/Behavioral: Negative for confusion and sleep disturbance.   All other systems reviewed and are negative.    Objective:     Vital Signs (Most Recent):           There is no height or weight on file to calculate BMI.     Physical Exam   Constitutional: She is oriented to person, place, and time. She is active and cooperative.   Eyes: Pupils are equal, round, and reactive to light.   Cardiovascular: Normal rate, regular rhythm, intact distal pulses and normal pulses.    Murmur heard.  Pulmonary/Chest: Effort normal and breath  sounds normal. No respiratory distress. She has no decreased breath sounds. She has no wheezes. She has no rales.   Abdominal: Soft. Normal appearance and bowel sounds are normal. She exhibits no distension. There is tenderness (tenderness over allograft site) in the right lower quadrant. There is no guarding.   Musculoskeletal: Normal range of motion. She exhibits edema (+1 generalized).   Neurological: She is alert and oriented to person, place, and time.   Skin: Skin is warm, dry and intact.   LUE AV graft +thrill/+bruit   Nursing note and vitals reviewed.      Laboratory  CBC:   Recent Labs  Lab 11/27/17  1220   WBC 3.90   RBC 3.57*   HGB 10.0*   HCT 31.9*      MCV 89   MCH 28.0   MCHC 31.3*     CMP:   Recent Labs  Lab 11/27/17  1220   GLU 81   CALCIUM 9.4   ALBUMIN 3.2*   PROT 7.2      K 5.9*   CO2 21*      BUN 76*   CREATININE 17.2*   ALKPHOS 179*   ALT 9*   AST 10     Coagulation: No results for input(s): APTT in the last 168 hours.    Invalid input(s): PT  Labs within the past 24 hours have been reviewed.    Diagnostic Results:  recent diagnostic studies reviewed.

## 2017-11-28 NOTE — PROGRESS NOTES
[facility]  Kidney Transplant  Progress Note      Reason for Follow-up: Reassessment of Kidney Transplant Referral - 2/7/2017 recipient and management of immunosuppression.      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 She reports her kidney txp failing in 2017 and resumed HD 5 months ago on a MWF schedule through a LUE graft (last HD 11/27/17). She reports urinating 1-2 times daily. She was transferred from [place] [facility] for hematuria and concern for acute rejection of kidney requiring transplant nephrectomy. She reports hematuria and allograft site pain for 2 weeks. She denies taking any immunosuppressants currently. She was given solumedrol 80mg IV in [place] for presumed rejection. Will plan to continue steroids and surgery to discuss transplant nephrectomy tomorrow.       Hospital Course:  Interval history: no acute events overnight. Pt denies hematuria. No pain noted over allograft. Potassium 6.5 this AM. Shifted with insulin, d50, calcium gluconate and albuterol with improvement to 4.6. Nephrology consulted. Will resume HD tomorrow. Monitor.       Past Medical, Surgical, Family, and Social History:   Unchanged from H&P.    Scheduled Meds:   amLODIPine  10 mg Oral Daily    calcium gluconate IVPB  1 g Intravenous Once    dextrose 50%  25 g Intravenous Once    predniSONE  40 mg Oral Daily    sevelamer carbonate  800 mg Oral TID WM     Continuous Infusions:   PRN Meds:acetaminophen, calcium gluconate IVPB **AND** calcium gluconate IVPB, diphenhydrAMINE, sodium chloride 0.9%    Intake/Output - Last 3 Shifts       11/26 0700 - 11/27 0659 11/27 0700 - 11/28 0659 11/28  "0700 - 11/29 0659    P.O.   240    Total Intake(mL/kg)   240 (2.5)    Urine (mL/kg/hr)  50 50 (0.1)    Stool   0 (0)    Total Output   50 50    Net   -50 +190           Urine Occurrence   0 x    Stool Occurrence   1 x           Review of Systems   Constitutional: Negative for activity change, appetite change, chills, fatigue and fever.   HENT: Negative for congestion and facial swelling.    Eyes: Negative.    Respiratory: Negative for cough, chest tightness, shortness of breath and wheezing.    Cardiovascular: Negative for chest pain, palpitations and leg swelling.   Gastrointestinal: Positive for abdominal pain. Negative for abdominal distention, constipation, diarrhea, nausea and vomiting.   Endocrine: Negative.    Genitourinary: Positive for decreased urine volume and hematuria. Negative for difficulty urinating, dysuria, frequency and urgency.   Musculoskeletal: Negative for back pain and neck pain.   Skin: Negative for color change, pallor, rash and wound.   Allergic/Immunologic: Negative for immunocompromised state.   Neurological: Negative for dizziness, tremors, weakness and headaches.   Hematological: Negative.    Psychiatric/Behavioral: Negative for agitation, confusion and sleep disturbance.   All other systems reviewed and are negative.     Objective:     Vital Signs (Most Recent):  Temp: 97.4 °F (36.3 °C) (11/28/17 1138)  Pulse: 78 (11/28/17 1138)  Resp: 18 (11/28/17 1138)  BP: (!) 143/86 (11/28/17 1138)  SpO2: 100 % (11/28/17 1138) Vital Signs (24h Range):  Temp:  [97.4 °F (36.3 °C)-98.6 °F (37 °C)] 97.4 °F (36.3 °C)  Pulse:  [] 78  Resp:  [17-20] 18  SpO2:  [94 %-100 %] 100 %  BP: (128-174)/() 143/86     Weight: 94.2 kg (207 lb 10.8 oz)  Height: 5' 6" (167.6 cm)  Body mass index is 33.52 kg/m².    Physical Exam   Constitutional: She is oriented to person, place, and time. She is active and cooperative.   Eyes: Pupils are equal, round, and reactive to light.   Cardiovascular: Normal rate, " regular rhythm, intact distal pulses and normal pulses.    Murmur heard.  Pulmonary/Chest: Effort normal and breath sounds normal. No respiratory distress. She has no decreased breath sounds. She has no wheezes. She has no rales.   Abdominal: Soft. Normal appearance and bowel sounds are normal. She exhibits no distension. There is no tenderness (tenderness over allograft site). There is no guarding.   Musculoskeletal: Normal range of motion. She exhibits edema (+1 generalized).   Neurological: She is alert and oriented to person, place, and time.   Skin: Skin is warm, dry and intact.   LUE AV graft +thrill/+bruit   Nursing note and vitals reviewed.      Laboratory:  CBC:   Recent Labs  Lab 11/27/17  1220 11/28/17  0616   WBC 3.90 7.21   RBC 3.57* 3.45*   HGB 10.0* 9.7*   HCT 31.9* 30.8*    172   MCV 89 89   MCH 28.0 28.1   MCHC 31.3* 31.5*     BMP:   Recent Labs  Lab 11/27/17  1220 11/28/17  0007 11/28/17  0229 11/28/17  0616   GLU 81 111*  --  273*    136  --  137   K 5.9* 6.1* 6.5* 4.6    102  --  101   CO2 21* 22*  --  18*   BUN 76* 56*  --  61*   CREATININE 17.2* 12.9*  --  13.5*   CALCIUM 9.4 9.6  --  9.4       Diagnostic Results:  None    Assessment/Plan:     * Hematuria    - hematuria x 2 weeks associated with allograft site tenderness. Given 80 mg solumedrol in [place], will continue prednisone 40mg PO.   - plan discussed with surgery.  - hematuria resolved this AM. No pain over allograft.   - will hold off on transplant nephrectomy for now. Continue steroids.  - diet advanced.        ESRD (end stage renal disease)    - failed kidney txp, on HD MWF for approximately 5 months.  - last HD 11/27.   - nephrology consulted.  - plan for HD tomorrow.         H/O kidney transplant    - failed kidney txp, resumed HD approximately 5 months ago.  - denies taking any immunosuppressants drugs.          Essential hypertension    - continue amlodipine. Monitor.        Hyperphosphatemia    - continue renvela.  Stable.         Hyperkalemia    - K 6.5 this AM.  - shifted with insulin, d50, calcium gluconate and albuterol.  - repeat K improved to 4.6. Monitor.                Discharge Planning: not a candidate for dc at this time.    Leeanne Arriaga NP  Kidney Transplant  [facility]

## 2017-11-28 NOTE — ASSESSMENT & PLAN NOTE
- failed kidney txp, resumed HD approximately 5 months ago.  - denies taking any immunosuppressants drugs.

## 2017-11-28 NOTE — ASSESSMENT & PLAN NOTE
- failed kidney txp, resumed HD approximately 5 months ago.  - denies taking any immunosuppressants drugs  - possible transplant nephrectomy in AM

## 2017-11-28 NOTE — HPI
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 She reports her kidney txp failing in 2017 and resumed HD 5 months ago on a MWF schedule through a LUE graft (last HD 11/27/17). She reports urinating 1-2 times daily. She was transferred from [place] [facility] for hematuria and concern for acute rejection of kidney requiring transplant nephrectomy. She reports hematuria and allograft site pain for 2 weeks. She denies taking any immunosuppressants currently. She was given solumedrol 80mg IV in [place] for presumed rejection. Will plan to continue steroids and surgery to discuss transplant nephrectomy tomorrow.

## 2017-11-28 NOTE — CONSULTS
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 She reports her kidney txp failing in 2017 and resumed HD 5 months ago on a MWF schedule through a LUE graft (last HD 11/27/17). She reports urinating 1-2 times daily. She was transferred from [place] [facility] for hematuria and concern for acute rejection of kidney requiring transplant nephrectomy. She reports hematuria and allograft site pain for 2 weeks. She denies taking any immunosuppressants currently. She was given solumedrol 80mg IV in [place] for presumed rejection. Will plan to continue steroids and surgery to discuss transplant nephrectomy.   Nephrology consulted for inpatient dialysis treatment. She normaly dialyzes on MWF in [facility] via [facility], duration of 3.5 hrs, UF 2 L, unknown EDW and las HD was on yesterday for hyperkalemia, 2 hrs and had 2 L removed .    Past Medical History:   Diagnosis Date    ESRD (end stage renal disease)     H/O kidney transplant     Hypertension     Hypertensive nephropathy        Past Surgical History:   Procedure Laterality Date    KIDNEY TRANSPLANT  04/15/2015    right leg hemodialysis access         Review of patient's allergies indicates:   Allergen Reactions    Heparin analogues       Current Facility-Administered Medications   Medication Frequency    acetaminophen tablet 650 mg Q8H PRN    amLODIPine tablet 10 mg Daily    calcium gluconate 1 g in dextrose 5 % 100 mL IVPB (premix) Once    And    calcium gluconate 1 g in dextrose 5 % 100 mL IVPB (premix) Q10 Min PRN    dextrose 50% injection 25 g Once    diphenhydrAMINE capsule 25 mg Q6H PRN    predniSONE tablet 40 mg Daily    sevelamer carbonate tablet 800 mg TID WM    sodium chloride 0.9% flush 3 mL PRN     Family History     None        Social History Main Topics    Smoking status: Current Every Day Smoker     Packs/day: 0.50    Smokeless tobacco: Not on file    Alcohol use No    Drug use: No    Sexual activity: Not on file     Review of Systems   Constitutional: Negative for appetite change, chills, fatigue and fever.   Respiratory: Negative for cough, chest tightness and shortness of breath.    Cardiovascular: Negative for chest pain, palpitations and leg swelling.   Gastrointestinal: Positive for abdominal pain. Negative for abdominal distention, constipation, diarrhea, nausea and vomiting.   Genitourinary: Positive for decreased urine volume and hematuria. Negative for difficulty urinating, dysuria, frequency and urgency.   Musculoskeletal: Negative for back pain and neck pain.   Skin: Negative for color change, pallor, rash and wound.   Neurological: Negative for dizziness, weakness and headaches.   Psychiatric/Behavioral: Negative for confusion and sleep disturbance.   All other systems reviewed and are negative.    Objective:     Vital Signs (Most Recent):  Temp: 97.8 °F (36.6 °C) (11/28/17 0751)  Pulse: 96 (11/28/17 0751)  Resp: 18 (11/28/17 0751)  BP: 137/61 (11/28/17 0751)  SpO2: 100 % (11/28/17 0751)  O2 Device (Oxygen Therapy): room air (11/28/17 0506) Vital Signs (24h Range):  Temp:  [97.6 °F (36.4 °C)-98.6 °F (37 °C)] 97.8 °F (36.6 °C)  Pulse:  [] 96  Resp:  [17-20] 18  SpO2:  [94 %-100 %] 100 %  BP:  (128-174)/() 137/61     Weight: 94.2 kg (207 lb 10.8 oz) (11/28/17 5381)  Body mass index is 33.52 kg/m².  Body surface area is 2.09 meters squared.    I/O last 3 completed shifts:  In: -   Out: 50 [Urine:50]    Physical Exam   Constitutional: She is oriented to person, place, and time. She is active and cooperative.   Eyes: Pupils are equal, round, and reactive to light.   Cardiovascular: Normal rate, regular rhythm, intact distal pulses and normal pulses.    Murmur heard.  Pulmonary/Chest: Effort normal and breath sounds normal. No respiratory distress. She has no decreased breath sounds. She has no wheezes. She has no rales.   Abdominal: Soft. Normal appearance and bowel sounds are normal. She exhibits no distension. There is tenderness in the right lower quadrant. There is no guarding.   Musculoskeletal: Normal range of motion. She exhibits edema (+1 generalized).   Neurological: She is alert and oriented to person, place, and time.   Skin: Skin is warm, dry and intact.   LUE AV graft +thrill/+bruit   Nursing note and vitals reviewed.      Significant Labs:  ABGs: No results for input(s): PH, PCO2, HCO3, POCSATURATED, BE in the last 168 hours.  BMP:   Recent Labs  Lab 11/28/17  0616   *      CO2 18*   BUN 61*   CREATININE 13.5*   CALCIUM 9.4   MG 2.0     CBC:   Recent Labs  Lab 11/28/17  0616   WBC 7.21   RBC 3.45*   HGB 9.7*   HCT 30.8*      MCV 89   MCH 28.1   MCHC 31.5*     CMP:   Recent Labs  Lab 11/28/17  0616   *   CALCIUM 9.4   ALBUMIN 3.0*   PROT 7.2      K 4.6   CO2 18*      BUN 61*   CREATININE 13.5*   ALKPHOS 182*   ALT 6*   AST 14   BILITOT 0.3     LFTs:   Recent Labs  Lab 11/28/17  0616   ALT 6*   AST 14   ALKPHOS 182*   BILITOT 0.3   PROT 7.2   ALBUMIN 3.0*     PTH: No results for input(s): PTH in the last 168 hours.    Recent Labs  Lab 11/27/17  1232   COLORU Yellow   SPECGRAV 1.005   PHUR 8.0   PROTEINUA 2+*   BACTERIA None   NITRITE Negative    LEUKOCYTESUR Trace*   UROBILINOGEN Negative   HYALINECASTS 0     All labs within the past 24 hours have been reviewed.    Assessment/Plan:     ESRD (end stage renal disease)    Nephrology consulted for inpatient dialysis treatment. She normaly dialyzes on MWF in [facility] via [facility], duration of 3.5 hrs, UF 2 L, unknown EDW and las HD was on yesterday for hyperkalemia, 2 hrs and had 2 L removed .    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   - Blood pressure controlled 137/61 mmHg, continue current treatment  - Will follow renal transplant evaluation             Cordell Gramajo  Nephrology  Fellow  [facility]    Pager [phone]      I have reviewed and concur with the fellow's history, physical, assessment, and plan. I have personally interviewed and examined the patient at bedside.

## 2017-11-28 NOTE — HOSPITAL COURSE
Interval history: no acute events overnight. No pain noted over allograft but patient continues to have hematuria. HD this am prior to transplant nephrectomy. Nephrology following and managing dialysis. Monitor.

## 2017-11-28 NOTE — ASSESSMENT & PLAN NOTE
- failed kidney txp, on HD MWF for approximately 5 months.  - last HD 11/27.   - nephrology consulted.  - plan for HD tomorrow.

## 2017-11-28 NOTE — SUBJECTIVE & OBJECTIVE
Past Medical History:   Diagnosis Date    ESRD (end stage renal disease)     H/O kidney transplant     Hypertension     Hypertensive nephropathy        Past Surgical History:   Procedure Laterality Date    KIDNEY TRANSPLANT  04/15/2015    right leg hemodialysis access         Review of patient's allergies indicates:   Allergen Reactions    Heparin analogues      Current Facility-Administered Medications   Medication Frequency    acetaminophen tablet 650 mg Q8H PRN    amLODIPine tablet 10 mg Daily    calcium gluconate 1 g in dextrose 5 % 100 mL IVPB (premix) Once    And    calcium gluconate 1 g in dextrose 5 % 100 mL IVPB (premix) Q10 Min PRN    dextrose 50% injection 25 g Once    diphenhydrAMINE capsule 25 mg Q6H PRN    predniSONE tablet 40 mg Daily    sevelamer carbonate tablet 800 mg TID WM    sodium chloride 0.9% flush 3 mL PRN     Family History     None        Social History Main Topics    Smoking status: Current Every Day Smoker     Packs/day: 0.50    Smokeless tobacco: Not on file    Alcohol use No    Drug use: No    Sexual activity: Not on file     Review of Systems   Constitutional: Negative for appetite change, chills, fatigue and fever.   Respiratory: Negative for cough, chest tightness and shortness of breath.    Cardiovascular: Negative for chest pain, palpitations and leg swelling.   Gastrointestinal: Positive for abdominal pain. Negative for abdominal distention, constipation, diarrhea, nausea and vomiting.   Genitourinary: Positive for decreased urine volume and hematuria. Negative for difficulty urinating, dysuria, frequency and urgency.   Musculoskeletal: Negative for back pain and neck pain.   Skin: Negative for color change, pallor, rash and wound.   Neurological: Negative for dizziness, weakness and headaches.   Psychiatric/Behavioral: Negative for confusion and sleep disturbance.   All other systems reviewed and are negative.    Objective:     Vital Signs (Most  Recent):  Temp: 97.8 °F (36.6 °C) (11/28/17 0751)  Pulse: 96 (11/28/17 0751)  Resp: 18 (11/28/17 0751)  BP: 137/61 (11/28/17 0751)  SpO2: 100 % (11/28/17 0751)  O2 Device (Oxygen Therapy): room air (11/28/17 0506) Vital Signs (24h Range):  Temp:  [97.6 °F (36.4 °C)-98.6 °F (37 °C)] 97.8 °F (36.6 °C)  Pulse:  [] 96  Resp:  [17-20] 18  SpO2:  [94 %-100 %] 100 %  BP: (128-174)/() 137/61     Weight: 94.2 kg (207 lb 10.8 oz) (11/28/17 0751)  Body mass index is 33.52 kg/m².  Body surface area is 2.09 meters squared.    I/O last 3 completed shifts:  In: -   Out: 50 [Urine:50]    Physical Exam   Constitutional: She is oriented to person, place, and time. She is active and cooperative.   Eyes: Pupils are equal, round, and reactive to light.   Cardiovascular: Normal rate, regular rhythm, intact distal pulses and normal pulses.    Murmur heard.  Pulmonary/Chest: Effort normal and breath sounds normal. No respiratory distress. She has no decreased breath sounds. She has no wheezes. She has no rales.   Abdominal: Soft. Normal appearance and bowel sounds are normal. She exhibits no distension. There is tenderness in the right lower quadrant. There is no guarding.   Musculoskeletal: Normal range of motion. She exhibits edema (+1 generalized).   Neurological: She is alert and oriented to person, place, and time.   Skin: Skin is warm, dry and intact.   LUE AV graft +thrill/+bruit   Nursing note and vitals reviewed.      Significant Labs:  ABGs: No results for input(s): PH, PCO2, HCO3, POCSATURATED, BE in the last 168 hours.  BMP:   Recent Labs  Lab 11/28/17 0616   *      CO2 18*   BUN 61*   CREATININE 13.5*   CALCIUM 9.4   MG 2.0     CBC:   Recent Labs  Lab 11/28/17  0616   WBC 7.21   RBC 3.45*   HGB 9.7*   HCT 30.8*      MCV 89   MCH 28.1   MCHC 31.5*     CMP:   Recent Labs  Lab 11/28/17 0616   *   CALCIUM 9.4   ALBUMIN 3.0*   PROT 7.2      K 4.6   CO2 18*      BUN 61*    CREATININE 13.5*   ALKPHOS 182*   ALT 6*   AST 14   BILITOT 0.3     LFTs:   Recent Labs  Lab 11/28/17  0616   ALT 6*   AST 14   ALKPHOS 182*   BILITOT 0.3   PROT 7.2   ALBUMIN 3.0*     PTH: No results for input(s): PTH in the last 168 hours.    Recent Labs  Lab 11/27/17  1232   COLORU Yellow   SPECGRAV 1.005   PHUR 8.0   PROTEINUA 2+*   BACTERIA None   NITRITE Negative   LEUKOCYTESUR Trace*   UROBILINOGEN Negative   HYALINECASTS 0     All labs within the past 24 hours have been reviewed.

## 2017-11-28 NOTE — DISCHARGE SUMMARY
[facility]  Kidney Transplant  Discharge Summary    Patient Name: Leydi Cordero  MRN: [id number]  Admission Date: 11/27/2017  Hospital Length of Stay: 7 days  Discharge Date and Time:  12/04/2017 1:26 PM  Attending Physician: Fam Stuton MD  Discharging Provider: Leeanne Arriaga NP  Primary Care Provider: Won Miller MD    HPI: Leydi Cordero is a 27yr female with a PMH of ESRD 2/2 hypertensive nephropathy, s/p renal transplant 4-15-15 at [facility] in [place] with Campath induction. 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 She reports her kidney txp failing in 2017 and resumed HD 5 months ago on a MWF schedule through a LUE graft (last HD 11/27/17 prior to admit). She reports urinating 1-2 times daily. She was transferred from [place] ER for hematuria and concern for acute rejection of kidney requiring transplant nephrectomy. She reported hematuria and allograft site pain for 2 weeks. She underwent a right transplant nephrectomy on 12/1/17. Pt progressing well post operatively. GABBIE drain removed 12/4. RLQ incision intact.    She denied taking any immunosuppressants currently. She was given solumedrol 80mg IV in [place] for presumed rejection then prednisone 40 mg daily. Plan is to continue steroids with quick taper. Bactrim and Nystatin was started 11/29. Bactrim later dc'd 12/2 due to hyperkalemia. She was dialyzed due to hyperkalemia for 2 hours with 2 L removed. Last HD Monday 12/4 prior to discharge.    Ms. Cordero is stable for discharge today. She has no complaints. She will return to her home HD unit dialyzing on MWF schedule. She will f/u with Dr Marroquin in 2 weeks in clinic. Pt verbalized all discharge instructions.        Final Active Diagnoses:    Diagnosis Date Noted POA    ESRD (end stage renal disease) [N18.6] 11/27/2017 Yes    H/O kidney  transplant [Z94.0] 08/11/2016 Not Applicable    Essential hypertension [I10] 08/11/2016 Yes    Hyperphosphatemia [E83.39] 10/07/2016 Yes    S/p nephrectomy 12/1/17 [Z90.5] 12/03/2017 Not Applicable      Problems Resolved During this Admission:    Diagnosis Date Noted Date Resolved POA    PRINCIPAL PROBLEM:  Hematuria [R31.9] 11/27/2017 12/04/2017 Yes    Hyperkalemia [E87.5] 11/27/2017 12/04/2017 Yes       Treatments: as above    Consults         Status Ordering Provider     Inpatient consult to Nephrology  Once     Provider:  (Not yet assigned)    Completed MELBA SILVA          Pending Diagnostic Studies:     None        Significant Diagnostic Studies: Labs:   BMP:     Recent Labs  Lab 12/02/17 2045 12/03/17 0428 12/04/17  0404   GLU  --  81 86   NA  --  135* 132*   K 4.1 4.9 4.6   CL  --  98 97   CO2  --  25 21*   BUN  --  31* 50*   CREATININE  --  6.0* 8.3*   CALCIUM  --  8.6* 8.4*   MG  --  1.8 1.8    and CBC     Recent Labs  Lab 12/03/17 0428 12/04/17  0405   WBC 13.30* 10.52   HGB 10.4* 9.2*   HCT 32.2* 28.5*    161       Discharged Condition: good    Disposition: Home or Self Care    Follow Up: as above    Patient Instructions:     Diet general     Activity as tolerated     Call MD for:  temperature >100.4     Call MD for:  persistent nausea and vomiting or diarrhea     Call MD for:  severe uncontrolled pain     Call MD for:  redness, tenderness, or signs of infection (pain, swelling, redness, odor or green/yellow discharge around incision site)     Call MD for:  difficulty breathing or increased cough     Call MD for:  severe persistent headache     Call MD for:  worsening rash     Call MD for:  persistent dizziness, light-headedness, or visual disturbances     Call MD for:  increased confusion or weakness     Call MD for:   Order Comments: Any unusual problems or concerns.       Medications:  Reconciled Home Medications:   Discharge Medication List as of 12/4/2017  1:50 PM      START taking  these medications    Details   famotidine (PEPCID) 20 MG tablet Take 1 tablet (20 mg total) by mouth once daily., Starting Mon 12/4/2017, Until Tue 12/4/2018, Normal      nystatin (MYCOSTATIN) 100,000 unit/mL suspension Take 5 mLs (500,000 Units total) by mouth 4 (four) times daily with meals and nightly. Stop 12/18/17, Starting Mon 12/4/2017, Until Mon 12/18/2017, Normal      oxyCODONE-acetaminophen (PERCOCET) 5-325 mg per tablet Take 1-2 tablets by mouth every 4 (four) hours as needed for Pain., Starting Mon 12/4/2017, Print      predniSONE (DELTASONE) 10 MG tablet Take by mouth daily: 20mg 12/4-12/6, 15mg 12/7-12/9, 10mg 12/10-12/12, 5mg 12/13-, Normal         CONTINUE these medications which have NOT CHANGED    Details   amlodipine (NORVASC) 10 MG tablet Take 1 tablet (10 mg total) by mouth once daily., Starting Mon 10/10/2016, Until Mon 11/27/2017, Normal      aspirin (ECOTRIN) 81 MG EC tablet Take 1 tablet (81 mg total) by mouth once daily., Starting 8/30/2016, Until Discontinued, OTC      sevelamer carbonate (RENVELA) 800 mg Tab Take 1 tablet (800 mg total) by mouth 3 (three) times daily with meals., Starting Mon 10/10/2016, Until Mon 11/27/2017, Normal           Time spent caring for patient (Greater than 1/2 spent in direct face-to-face contact): > 30 minutes    Leeanne Arriaga NP  Kidney Transplant  [facility]

## 2017-11-28 NOTE — HPI
27 yr female with a PMH of ESRD 2/2 hypertensive nephropathy, s/p renal transplant 4-15-15 at [facility] in [place] with Campath induction. 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 She reports her kidney txp failing in 2017 and resumed HD 5 months ago on a MWF schedule through a LUE graft (last HD today 11/29/17). She reports urinating 1-2 times daily. She was transferred from [place] ER for hematuria and concern for acute rejection of kidney requiring transplant nephrectomy. She reports hematuria and allograft site pain for 2 weeks. She was given solumedrol 80mg IV in [place] for presumed rejection. Will plan to continue steroids 40 mg po daily. No pain noted over allograft but patient continues to have hematuria. Will plan for transplant nephrectomy later in week, likely Friday. Bactrim and Nystatin restarted today. Pt is French speaking only. Bactrim held today due to hyperkalemia.  HD this am then txp nephrectomy.

## 2017-11-28 NOTE — H&P
[facility]  Kidney Transplant  H&P      Subjective:     Chief Complaint/Reason for Admission: hematuria    History of Present Illness:  Leydi Cordero is a 27yr female with a PMH of ESRD 2/2 hypertensive nephropathy, s/p renal transplant 4-15-15 at [facility] in [place] with Campath induction. 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 She reports her kidney txp failing in 2017 and resumed HD 5 months ago on a MWF schedule through a LUE graft (last HD 11/27/17). She reports urinating 1-2 times daily. She was transferred from [facility] for hematuria and concern for acute rejection of kidney requiring transplant nephrectomy. She reports hematuria and allograft site pain for 2 weeks. She denies taking any immunosuppressants currently. She was given solumedrol 80mg IV in [place] for presumed rejection. Will plan to continue steroids and surgery to discuss transplant nephrectomy tomorrow.       PTA Medications   Medication Sig    amlodipine (NORVASC) 10 MG tablet Take 1 tablet (10 mg total) by mouth once daily.    aspirin (ECOTRIN) 81 MG EC tablet Take 1 tablet (81 mg total) by mouth once daily.    sevelamer carbonate (RENVELA) 800 mg Tab Take 1 tablet (800 mg total) by mouth 3 (three) times daily with meals.       Review of patient's allergies indicates:   Allergen Reactions    Heparin analogues        Past Medical History:   Diagnosis Date    ESRD (end stage renal disease)     H/O kidney transplant     Hypertension     Hypertensive nephropathy      Past Surgical History:   Procedure Laterality Date    KIDNEY TRANSPLANT  04/15/2015    right leg hemodialysis access       Family History     None        Social History Main Topics    Smoking status: Current Every Day Smoker     Packs/day: 0.50    Smokeless tobacco: Not on file    Alcohol use No    Drug use: No     Sexual activity: Not on file        Review of Systems   Constitutional: Negative for appetite change, chills, fatigue and fever.   Respiratory: Negative for cough, chest tightness and shortness of breath.    Cardiovascular: Negative for chest pain, palpitations and leg swelling.   Gastrointestinal: Positive for abdominal pain. Negative for abdominal distention, constipation, diarrhea, nausea and vomiting.   Genitourinary: Positive for decreased urine volume and hematuria. Negative for difficulty urinating, dysuria, frequency and urgency.   Musculoskeletal: Negative for back pain and neck pain.   Skin: Negative for color change, pallor, rash and wound.   Neurological: Negative for dizziness, weakness and headaches.   Psychiatric/Behavioral: Negative for confusion and sleep disturbance.   All other systems reviewed and are negative.    Objective:     Vital Signs (Most Recent):           There is no height or weight on file to calculate BMI.     Physical Exam   Constitutional: She is oriented to person, place, and time. She is active and cooperative.   Eyes: Pupils are equal, round, and reactive to light.   Cardiovascular: Normal rate, regular rhythm, intact distal pulses and normal pulses.    Murmur heard.  Pulmonary/Chest: Effort normal and breath sounds normal. No respiratory distress. She has no decreased breath sounds. She has no wheezes. She has no rales.   Abdominal: Soft. Normal appearance and bowel sounds are normal. She exhibits no distension. There is tenderness (tenderness over allograft site) in the right lower quadrant. There is no guarding.   Musculoskeletal: Normal range of motion. She exhibits edema (+1 generalized).   Neurological: She is alert and oriented to person, place, and time.   Skin: Skin is warm, dry and intact.   LUE AV graft +thrill/+bruit   Nursing note and vitals reviewed.      Laboratory  CBC:   Recent Labs  Lab 11/27/17  1220   WBC 3.90   RBC 3.57*   HGB 10.0*   HCT 31.9*       MCV 89   MCH 28.0   MCHC 31.3*     CMP:   Recent Labs  Lab 11/27/17  1220   GLU 81   CALCIUM 9.4   ALBUMIN 3.2*   PROT 7.2      K 5.9*   CO2 21*      BUN 76*   CREATININE 17.2*   ALKPHOS 179*   ALT 9*   AST 10     Coagulation: No results for input(s): APTT in the last 168 hours.    Invalid input(s): PT  Labs within the past 24 hours have been reviewed.    Diagnostic Results:  recent diagnostic studies reviewed.    Assessment/Plan:     * Hematuria    - hematuria x 2 weeks associated with allograft site tenderness. Given 80mg solumedrol in [place], will start prednisone 40mg PO.   - plan discussed with surgery  - NPO after midnight for possible transplant nephrectomy in AM, holding aspirin.        ESRD (end stage renal disease)    - failed kidney txp, on HD MWF for approximately 5 months  - last HD 11/27.         Hyperphosphatemia    - continue renvela. Repeat labs in AM.         Essential hypertension    - continue amlodipine. Monitor.        H/O kidney transplant    - failed kidney txp, resumed HD approximately 5 months ago.  - denies taking any immunosuppressants drugs  - possible transplant nephrectomy in AM              Beulah Pappas DNP  Kidney Transplant  [facility]

## 2017-11-28 NOTE — ASSESSMENT & PLAN NOTE
- hematuria x 2 weeks associated with allograft site tenderness. Given 80 mg solumedrol in [place], will continue prednisone 40mg PO.   - plan discussed with surgery.  - hematuria resolved this AM. No pain over allograft.   - will hold off on transplant nephrectomy for now. Continue steroids.  - diet advanced.

## 2017-11-28 NOTE — NURSING
Pt admitted to room via stretcher aaox4. Pt Puerto Rican speaking only.  called. NP  Beulah at bedside to assess. Pt npo after mn for possible nephrectomy. Pt presents with hematuria x 2 weeks. No other c/o. Pt ambulates independently. Pt had dialysis Monday x 2 hrs with 2liters removed.

## 2017-11-28 NOTE — PROGRESS NOTES
Admit Note     Met with patient and , Lcuía to assess needs. Patient is a 27 y.o.  female, admitted for post kidney txp in [place], 4/15/15 with graft failure roughly six months ago.  Pt is now on dialysis MWF at [facility] in [facility].      Patient admitted from home on 11/27/2017 .  At this time, patient presents as alert and oriented x 4, pleasant, good eye contact, well groomed, recall good, concentration/judgement good, average intelligence, calm, communicative, cooperative, asking and answering questions appropriately and speaking through an .  At this time, patients caregiver presents as unavailable.  Pt stated  was at work..    Household/Family Systems     Patient resides with patient's , at [id number];   [place] [address].  Support system includes  Eugene Pa [phone] and brother Eugene Paredes.  Patient does not have dependents that are need of being cared for.     Patients primary caregiver is Eugene Oanhsyedmary, patients , phone number [phone].  Confirmed patients contact information is [phone] (home);   Telephone Information:   Mobile [phone]   .    During admission, patient's caregiver plans to stay at home.  Confirmed patient and patients caregivers do have access to reliable transportation.    Cognitive Status/Learning     Patient reports reading ability as 10th grade in Scottish only and states patient does have difficulty with reading, writing and comprehensionin English.  Patient reports patient learns best by verbal instructions in Scottish.   Needed: Yes; primary language is  Scottish.  Patient informed of  services available and how to access services..   Highest education level: High School (9-12) or GED    Vocation/Disability   .  Working for Income: No  If no, reason not working: Disability    Patient is disabled due to ESRD since 2010.  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    Adherence     Patient reports a high level of adherence to patients health care regimen. However, pt has demonstrated multiple, serious instances of noncompliance with care (missed medications, missed appointments, rejection).  Adherence counseling and education provided. Patient verbalizes understanding.    Substance Use    Patient reports the following substance usage.    Tobacco: none, patient denies any use.  Alcohol: none, patient denies any use.  Illicit Drugs/Non-prescribed Medications: none, patient denies any use.  Patient states clear understanding of the potential impact of substance use.  Substance abstinence/cessation counseling, education and resources provided and reviewed.     Services Utilizing/ADLS    Infusion Service: Prior to admission, patient utilizing? no  Home Health: Prior to admission, patient utilizing? no  DME: Prior to admission, no  Pulmonary/Cardiac Rehab: Prior to admission, no  Dialysis:  Prior to admission, yes  Transplant Specialty Pharmacy:  Prior to admission, yes; [facility] [place] [address]  Pt stated she did not know the address or street..    Prior to admission, patient reports patient was independent with ADLS and was driving.  Patient reports patient is able to care for self at this time..  Patient indicates a willingness to care for self once medically cleared to do so.    Insurance/Medications    Insured by   Payor/Plan Subscr  Sex Relation Sub. Ins. ID Effective Group Num   1. [place] - [place]* EDMOND WILKES * 1990 Female  [id number]                                    [address]   2. [place] ED[place]IL[place] * 1990 Female  [id number]* 10/1/16                                    [address] [address]      Primary Insurance (for UNOS reporting): Public Insurance - Medicare FFS (Fee For Service)  Secondary Insurance (for UNOS reporting): Public Insurance - Medicaid    Patient reports patient is able to  obtain and afford medications at this time and at time of discharge.    Living Will/Healthcare Power of     Patient states patient does not have a LW and/or HCPA.   provided education regarding LW and HCPA and the completion of forms.    Coping/Mental Health    Patient is coping adequately with the aid of  family members and friends. .  Patient indicates mental health difficulties. She states she has difficulty with anxiety.    Discharge Planning    At time of discharge, patient plans to return to patient's home under the care of  or brother.  Patients  and brother will transport patient.  Per rounds today, expected discharge date is likely tomorrow.. Patient and patients caregiver  verbalize understanding and are involved in treatment planning and discharge process.    Additional Concerns    Patient is being followed for needs, education, resources, information, emotional support, supportive counseling, and for supportive and skilled discharge plan of care.  remains available. Patient denies additional needs and/or concerns at this time. Patient verbalizes understanding and agreement with information reviewed, social work availability, and how to access available resources as needed.

## 2017-11-29 LAB
ALBUMIN SERPL BCP-MCNC: 3.1 G/DL
ALP SERPL-CCNC: 157 U/L
ALT SERPL W/O P-5'-P-CCNC: 6 U/L
ANION GAP SERPL CALC-SCNC: 17 MMOL/L
AST SERPL-CCNC: 9 U/L
BASOPHILS # BLD AUTO: 0.01 K/UL
BASOPHILS NFR BLD: 0.1 %
BILIRUB SERPL-MCNC: 0.4 MG/DL
BUN SERPL-MCNC: 78 MG/DL
CALCIUM SERPL-MCNC: 9.1 MG/DL
CHLORIDE SERPL-SCNC: 99 MMOL/L
CO2 SERPL-SCNC: 21 MMOL/L
CREAT SERPL-MCNC: 15.3 MG/DL
DIFFERENTIAL METHOD: ABNORMAL
EOSINOPHIL # BLD AUTO: 0 K/UL
EOSINOPHIL NFR BLD: 0 %
ERYTHROCYTE [DISTWIDTH] IN BLOOD BY AUTOMATED COUNT: 17.1 %
EST. GFR  (AFRICAN AMERICAN): 3.3 ML/MIN/1.73 M^2
EST. GFR  (NON AFRICAN AMERICAN): 2.9 ML/MIN/1.73 M^2
GLUCOSE SERPL-MCNC: 83 MG/DL
HCT VFR BLD AUTO: 29.5 %
HGB BLD-MCNC: 9.2 G/DL
IMM GRANULOCYTES # BLD AUTO: 0.03 K/UL
IMM GRANULOCYTES NFR BLD AUTO: 0.4 %
LYMPHOCYTES # BLD AUTO: 0.6 K/UL
LYMPHOCYTES NFR BLD: 7.7 %
MAGNESIUM SERPL-MCNC: 2.1 MG/DL
MCH RBC QN AUTO: 27.6 PG
MCHC RBC AUTO-ENTMCNC: 31.2 G/DL
MCV RBC AUTO: 89 FL
MONOCYTES # BLD AUTO: 0.4 K/UL
MONOCYTES NFR BLD: 5.5 %
NEUTROPHILS # BLD AUTO: 6.8 K/UL
NEUTROPHILS NFR BLD: 86.3 %
NRBC BLD-RTO: 0 /100 WBC
PHOSPHATE SERPL-MCNC: 6.5 MG/DL
PLATELET # BLD AUTO: 166 K/UL
PMV BLD AUTO: 11.1 FL
POTASSIUM SERPL-SCNC: 4.8 MMOL/L
PROT SERPL-MCNC: 6.7 G/DL
RBC # BLD AUTO: 3.33 M/UL
SODIUM SERPL-SCNC: 137 MMOL/L
WBC # BLD AUTO: 7.82 K/UL

## 2017-11-29 PROCEDURE — 90935 HEMODIALYSIS ONE EVALUATION: CPT | Mod: ,,, | Performed by: INTERNAL MEDICINE

## 2017-11-29 PROCEDURE — 36415 COLL VENOUS BLD VENIPUNCTURE: CPT

## 2017-11-29 PROCEDURE — 84100 ASSAY OF PHOSPHORUS: CPT

## 2017-11-29 PROCEDURE — 99232 SBSQ HOSP IP/OBS MODERATE 35: CPT | Mod: ,,, | Performed by: NURSE PRACTITIONER

## 2017-11-29 PROCEDURE — 80100016 HC MAINTENANCE HEMODIALYSIS

## 2017-11-29 PROCEDURE — 25000003 PHARM REV CODE 250: Performed by: NURSE PRACTITIONER

## 2017-11-29 PROCEDURE — 63600175 PHARM REV CODE 636 W HCPCS: Performed by: NURSE PRACTITIONER

## 2017-11-29 PROCEDURE — 80053 COMPREHEN METABOLIC PANEL: CPT

## 2017-11-29 PROCEDURE — 83735 ASSAY OF MAGNESIUM: CPT

## 2017-11-29 PROCEDURE — 20600001 HC STEP DOWN PRIVATE ROOM

## 2017-11-29 PROCEDURE — 85025 COMPLETE CBC W/AUTO DIFF WBC: CPT

## 2017-11-29 RX ORDER — SULFAMETHOXAZOLE AND TRIMETHOPRIM 800; 160 MG/1; MG/1
1 TABLET ORAL
Status: CANCELLED | OUTPATIENT
Start: 2017-11-29

## 2017-11-29 RX ORDER — SODIUM CHLORIDE 9 MG/ML
INJECTION, SOLUTION INTRAVENOUS ONCE
Status: DISCONTINUED | OUTPATIENT
Start: 2017-11-29 | End: 2017-12-02

## 2017-11-29 RX ORDER — NYSTATIN 100000 [USP'U]/ML
500000 SUSPENSION ORAL
Status: DISCONTINUED | OUTPATIENT
Start: 2017-11-29 | End: 2017-12-04 | Stop reason: HOSPADM

## 2017-11-29 RX ORDER — SULFAMETHOXAZOLE AND TRIMETHOPRIM 400; 80 MG/1; MG/1
1 TABLET ORAL
Status: DISCONTINUED | OUTPATIENT
Start: 2017-11-29 | End: 2017-12-04

## 2017-11-29 RX ADMIN — NYSTATIN 500000 UNITS: 500000 SUSPENSION ORAL at 05:11

## 2017-11-29 RX ADMIN — NYSTATIN 500000 UNITS: 500000 SUSPENSION ORAL at 01:11

## 2017-11-29 RX ADMIN — SEVELAMER CARBONATE 800 MG: 800 TABLET, FILM COATED ORAL at 01:11

## 2017-11-29 RX ADMIN — SULFAMETHOXAZOLE AND TRIMETHOPRIM 1 TABLET: 400; 80 TABLET ORAL at 01:11

## 2017-11-29 RX ADMIN — SEVELAMER CARBONATE 800 MG: 800 TABLET, FILM COATED ORAL at 05:11

## 2017-11-29 RX ADMIN — AMLODIPINE BESYLATE 10 MG: 10 TABLET ORAL at 01:11

## 2017-11-29 RX ADMIN — NYSTATIN 500000 UNITS: 500000 SUSPENSION ORAL at 09:11

## 2017-11-29 RX ADMIN — PREDNISONE 40 MG: 20 TABLET ORAL at 01:11

## 2017-11-29 NOTE — PROGRESS NOTES
[facility]  Kidney Transplant  Progress Note      Reason for Follow-up: Reassessment of Kidney Transplant Referral - 2/7/2017 recipient and management of immunosuppression.      Subjective:   History of Present Illness:  Leydi Cordero is a 27yr female with a PMH of ESRD 2/2 hypertensive nephropathy, s/p renal transplant 4-15-15 at [facility] in [place] with Campath induction. 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 She reports her kidney txp failing in 2017 and resumed HD 5 months ago on a MWF schedule through a LUE graft (last HD 11/27/17). She reports urinating 1-2 times daily. She was transferred from [place] ER for hematuria and concern for acute rejection of kidney requiring transplant nephrectomy. She reports hematuria and allograft site pain for 2 weeks. She denies taking any immunosuppressants currently. She was given solumedrol 80mg IV in [place] for presumed rejection. Will plan to continue steroids and surgery to discuss transplant nephrectomy tomorrow.       Hospital Course:  Interval history: no acute events overnight. No pain noted over allograft but patient continues to have hematuria. Will plan for transplant nephrectomy later in week. Nephrology following. HD scheduled today. Monitor.       Past Medical, Surgical, Family, and Social History:   Unchanged from H&P.    Scheduled Meds:   sodium chloride 0.9%   Intravenous Once    amLODIPine  10 mg Oral Daily    nystatin  500,000 Units Oral QID (WM & HS)    predniSONE  40 mg Oral Daily    sevelamer carbonate  800 mg Oral TID WM    sulfamethoxazole-trimethoprim 400-80mg  1 tablet Oral Every Mon, Wed, Fri     Continuous Infusions:   PRN Meds:acetaminophen, calcium carbonate, diphenhydrAMINE, sodium chloride 0.9%    Intake/Output - Last 3 Shifts       11/27 0700 - 11/28 0659 11/28 0700 - 11/29 0659  "11/29 0700 - 11/30 0659    P.O.  920     Total Intake(mL/kg)  920 (9.7)     Urine (mL/kg/hr) 50 500 (0.2)     Stool  0 (0)     Total Output 50 500      Net -50 +420             Urine Occurrence  0 x     Stool Occurrence  2 x            Review of Systems   Constitutional: Negative for activity change, appetite change, chills, fatigue and fever.   HENT: Negative for congestion and facial swelling.    Eyes: Negative.    Respiratory: Negative for cough, chest tightness, shortness of breath and wheezing.    Cardiovascular: Negative for chest pain, palpitations and leg swelling.   Gastrointestinal: Positive for abdominal pain. Negative for abdominal distention, constipation, diarrhea, nausea and vomiting.   Endocrine: Negative.    Genitourinary: Positive for decreased urine volume and hematuria. Negative for difficulty urinating, dysuria, frequency and urgency.   Musculoskeletal: Negative for back pain and neck pain.   Skin: Negative for color change, pallor, rash and wound.   Allergic/Immunologic: Negative for immunocompromised state.   Neurological: Negative for dizziness, tremors, weakness and headaches.   Hematological: Negative.    Psychiatric/Behavioral: Negative for agitation, confusion and sleep disturbance.   All other systems reviewed and are negative.     Objective:     Vital Signs (Most Recent):  Temp: 97.8 °F (36.6 °C) (11/29/17 0855)  Pulse: 66 (11/29/17 1215)  Resp: 18 (11/29/17 0855)  BP: 137/87 (11/29/17 1215)  SpO2: 100 % (11/29/17 0746) Vital Signs (24h Range):  Temp:  [97.8 °F (36.6 °C)-98.6 °F (37 °C)] 97.8 °F (36.6 °C)  Pulse:  [] 66  Resp:  [18] 18  SpO2:  [98 %-100 %] 100 %  BP: (131-160)/(75-96) 137/87     Weight: 94.5 kg (208 lb 5.4 oz)  Height: 5' 6" (167.6 cm)  Body mass index is 33.63 kg/m².    Physical Exam   Constitutional: She is oriented to person, place, and time. She is active and cooperative.   Eyes: Pupils are equal, round, and reactive to light.   Cardiovascular: Normal rate, " regular rhythm, intact distal pulses and normal pulses.    Murmur heard.  Pulmonary/Chest: Effort normal and breath sounds normal. No respiratory distress. She has no decreased breath sounds. She has no wheezes. She has no rales.   Abdominal: Soft. Normal appearance and bowel sounds are normal. She exhibits no distension. There is no tenderness. There is no guarding.   Musculoskeletal: Normal range of motion. She exhibits edema (+1 generalized).   Neurological: She is alert and oriented to person, place, and time.   Skin: Skin is warm, dry and intact.   LUE AV graft +thrill/+bruit   Psychiatric: She has a normal mood and affect. Her behavior is normal.   Nursing note and vitals reviewed.      Laboratory:  CBC:   Recent Labs  Lab 11/27/17  1220 11/28/17  0616 11/29/17  0407   WBC 3.90 7.21 7.82   RBC 3.57* 3.45* 3.33*   HGB 10.0* 9.7* 9.2*   HCT 31.9* 30.8* 29.5*    172 166   MCV 89 89 89   MCH 28.0 28.1 27.6   MCHC 31.3* 31.5* 31.2*     BMP:   Recent Labs  Lab 11/28/17  0007 11/28/17  0229 11/28/17  0616 11/29/17  0407   *  --  273* 83     --  137 137   K 6.1* 6.5* 4.6 4.8     --  101 99   CO2 22*  --  18* 21*   BUN 56*  --  61* 78*   CREATININE 12.9*  --  13.5* 15.3*   CALCIUM 9.6  --  9.4 9.1       Diagnostic Results:  None    Assessment/Plan:     * Hematuria    - hematuria x 2 weeks associated with allograft site tenderness. Given 80 mg solumedrol in [place], will continue prednisone 40mg PO.   - plan discussed with surgery.  - no plan over allograft but pt continues to have hematuria.   - plan for transplant nephrectomy Friday 12/1/17.          ESRD (end stage renal disease)    - failed kidney txp, on HD MWF for approximately 5 months.  - last HD 11/27.   - nephrology consulted.  - HD scheduled today 11/29.        H/O kidney transplant    - failed kidney txp, resumed HD approximately 5 months ago.  - denies taking any immunosuppressants drugs.  - start Nystatin and Bactrim today.           Essential hypertension    - continue amlodipine. Monitor.        Hyperphosphatemia    - continue renvela.         Hyperkalemia    - Potassium improved.  - Shifted on admit for K of 6.5. Monitor.                Discharge Planning: not a candidate for dc at this time.    Leeanne Arriaga NP  Kidney Transplant  [facility]

## 2017-11-29 NOTE — PLAN OF CARE
Problem: Patient Care Overview  Goal: Plan of Care Review  Outcome: Ongoing (interventions implemented as appropriate)  North Korean speaking pt only.   utilized.  Pt AAOx4, VSS.  TELE monitoring in progress for high potassium overnight.  NSR, HR 70s-80s.  No complaints of pain, n/v, SOB.  Pt no longer w/ hematuria.  Urine clear yellow, 150 cc output.  Surgery canceled.  NPO status changed to renal diet.  Pt tolerating fine.  No accuchecks.  AM labs monitored.  Potassium 4.6 this am.  Plan for HD in the morning.  Bed lowered and locked in place.  Call bell in reach.  Pt instructed to call nurse for assistance.  No acute events/falls/injuries.  See flowsheet for further assessment findings.

## 2017-11-29 NOTE — ASSESSMENT & PLAN NOTE
- failed kidney txp, resumed HD approximately 5 months ago.  - denies taking any immunosuppressants drugs.  - start Nystatin and Bactrim today.

## 2017-11-29 NOTE — PROGRESS NOTES
3.5hr HD treatment completed 2L of fluid removed pt tolerated well. Both needles of a SANYA graft removed and pressure held until hemostasis achieved dressing applied. Report given to RIDGE Cuadra.

## 2017-11-29 NOTE — PROGRESS NOTES
SW attempted to meet with pt to assess for needs, however she was unavailable.  Per rounds this morning, she will be scheduled to have nephrectomy soon and will remain hospitalized.  SW remains available.

## 2017-11-29 NOTE — ASSESSMENT & PLAN NOTE
- failed kidney txp, on HD MWF for approximately 5 months.  - last HD 11/27.   - nephrology consulted.  - HD scheduled today 11/29.

## 2017-11-29 NOTE — PROGRESS NOTES
Report received from RIDGE Cuadra. Pt arrived from floor AAOx4. Maintenance dialysis initiated via SANYA graft without difficulty.

## 2017-11-29 NOTE — PROGRESS NOTES
Pt sent down to dialysis.  Report given to RIDGE Kendrick.  VSS before leaving unit.  No issues.  Will monitor for pts return to the unit.

## 2017-11-29 NOTE — ASSESSMENT & PLAN NOTE
- hematuria x 2 weeks associated with allograft site tenderness. Given 80 mg solumedrol in [place], will continue prednisone 40mg PO.   - plan discussed with surgery.  - no plan over allograft but pt continues to have hematuria.   - plan for transplant nephrectomy Friday 12/1/17.

## 2017-11-29 NOTE — SUBJECTIVE & OBJECTIVE
Subjective:   History of Present Illness:  Leydi Cordero is a 27yr female with a PMH of ESRD 2/2 hypertensive nephropathy, s/p renal transplant 4-15-15 at [facility] in [place] with Campath induction. 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 She reports her kidney txp failing in 2017 and resumed HD 5 months ago on a MWF schedule through a LUE graft (last HD 11/27/17). She reports urinating 1-2 times daily. She was transferred from [facility] for hematuria and concern for acute rejection of kidney requiring transplant nephrectomy. She reports hematuria and allograft site pain for 2 weeks. She denies taking any immunosuppressants currently. She was given solumedrol 80mg IV in [place] for presumed rejection. Will plan to continue steroids and surgery to discuss transplant nephrectomy tomorrow.       Hospital Course:  Interval history: no acute events overnight. No pain noted over allograft but patient continues to have hematuria. Will plan for transplant nephrectomy later in week. Nephrology following. HD scheduled today. Monitor.       Past Medical, Surgical, Family, and Social History:   Unchanged from H&P.    Scheduled Meds:   sodium chloride 0.9%   Intravenous Once    amLODIPine  10 mg Oral Daily    nystatin  500,000 Units Oral QID (WM & HS)    predniSONE  40 mg Oral Daily    sevelamer carbonate  800 mg Oral TID WM    sulfamethoxazole-trimethoprim 400-80mg  1 tablet Oral Every Mon, Wed, Fri     Continuous Infusions:   PRN Meds:acetaminophen, calcium carbonate, diphenhydrAMINE, sodium chloride 0.9%    Intake/Output - Last 3 Shifts       11/27 0700 - 11/28 0659 11/28 0700 - 11/29 0659 11/29 0700 - 11/30 0659    P.O.  920     Total Intake(mL/kg)  920 (9.7)     Urine (mL/kg/hr) 50 500 (0.2)     Stool  0 (0)     Total Output 50 500      Net -50 +420             Urine Occurrence  0 x   "   Stool Occurrence  2 x            Review of Systems   Constitutional: Negative for activity change, appetite change, chills, fatigue and fever.   HENT: Negative for congestion and facial swelling.    Eyes: Negative.    Respiratory: Negative for cough, chest tightness, shortness of breath and wheezing.    Cardiovascular: Negative for chest pain, palpitations and leg swelling.   Gastrointestinal: Positive for abdominal pain. Negative for abdominal distention, constipation, diarrhea, nausea and vomiting.   Endocrine: Negative.    Genitourinary: Positive for decreased urine volume and hematuria. Negative for difficulty urinating, dysuria, frequency and urgency.   Musculoskeletal: Negative for back pain and neck pain.   Skin: Negative for color change, pallor, rash and wound.   Allergic/Immunologic: Negative for immunocompromised state.   Neurological: Negative for dizziness, tremors, weakness and headaches.   Hematological: Negative.    Psychiatric/Behavioral: Negative for agitation, confusion and sleep disturbance.   All other systems reviewed and are negative.     Objective:     Vital Signs (Most Recent):  Temp: 97.8 °F (36.6 °C) (11/29/17 0855)  Pulse: 66 (11/29/17 1215)  Resp: 18 (11/29/17 0855)  BP: 137/87 (11/29/17 1215)  SpO2: 100 % (11/29/17 0746) Vital Signs (24h Range):  Temp:  [97.8 °F (36.6 °C)-98.6 °F (37 °C)] 97.8 °F (36.6 °C)  Pulse:  [] 66  Resp:  [18] 18  SpO2:  [98 %-100 %] 100 %  BP: (131-160)/(75-96) 137/87     Weight: 94.5 kg (208 lb 5.4 oz)  Height: 5' 6" (167.6 cm)  Body mass index is 33.63 kg/m².    Physical Exam   Constitutional: She is oriented to person, place, and time. She is active and cooperative.   Eyes: Pupils are equal, round, and reactive to light.   Cardiovascular: Normal rate, regular rhythm, intact distal pulses and normal pulses.    Murmur heard.  Pulmonary/Chest: Effort normal and breath sounds normal. No respiratory distress. She has no decreased breath sounds. She has no " wheezes. She has no rales.   Abdominal: Soft. Normal appearance and bowel sounds are normal. She exhibits no distension. There is no tenderness. There is no guarding.   Musculoskeletal: Normal range of motion. She exhibits edema (+1 generalized).   Neurological: She is alert and oriented to person, place, and time.   Skin: Skin is warm, dry and intact.   LUE AV graft +thrill/+bruit   Psychiatric: She has a normal mood and affect. Her behavior is normal.   Nursing note and vitals reviewed.      Laboratory:  CBC:   Recent Labs  Lab 11/27/17  1220 11/28/17  0616 11/29/17  0407   WBC 3.90 7.21 7.82   RBC 3.57* 3.45* 3.33*   HGB 10.0* 9.7* 9.2*   HCT 31.9* 30.8* 29.5*    172 166   MCV 89 89 89   MCH 28.0 28.1 27.6   MCHC 31.3* 31.5* 31.2*     BMP:   Recent Labs  Lab 11/28/17  0007 11/28/17  0229 11/28/17  0616 11/29/17  0407   *  --  273* 83     --  137 137   K 6.1* 6.5* 4.6 4.8     --  101 99   CO2 22*  --  18* 21*   BUN 56*  --  61* 78*   CREATININE 12.9*  --  13.5* 15.3*   CALCIUM 9.6  --  9.4 9.1       Diagnostic Results:  None

## 2017-11-30 LAB
ABO + RH BLD: NORMAL
ALBUMIN SERPL BCP-MCNC: 3.3 G/DL
ALP SERPL-CCNC: 157 U/L
ALT SERPL W/O P-5'-P-CCNC: 7 U/L
ANION GAP SERPL CALC-SCNC: 11 MMOL/L
AST SERPL-CCNC: 11 U/L
BASOPHILS # BLD AUTO: 0.01 K/UL
BASOPHILS NFR BLD: 0.1 %
BILIRUB SERPL-MCNC: 0.4 MG/DL
BLD GP AB SCN CELLS X3 SERPL QL: NORMAL
BUN SERPL-MCNC: 42 MG/DL
CALCIUM SERPL-MCNC: 9.2 MG/DL
CHLORIDE SERPL-SCNC: 97 MMOL/L
CO2 SERPL-SCNC: 25 MMOL/L
CREAT SERPL-MCNC: 9 MG/DL
DIFFERENTIAL METHOD: ABNORMAL
EOSINOPHIL # BLD AUTO: 0 K/UL
EOSINOPHIL NFR BLD: 0 %
ERYTHROCYTE [DISTWIDTH] IN BLOOD BY AUTOMATED COUNT: 17 %
EST. GFR  (AFRICAN AMERICAN): 6.3 ML/MIN/1.73 M^2
EST. GFR  (NON AFRICAN AMERICAN): 5.5 ML/MIN/1.73 M^2
GLUCOSE SERPL-MCNC: 89 MG/DL
HAV IGM SERPL QL IA: NEGATIVE
HBV CORE IGM SERPL QL IA: NEGATIVE
HBV SURFACE AG SERPL QL IA: NEGATIVE
HCT VFR BLD AUTO: 32 %
HCV AB SERPL QL IA: NEGATIVE
HGB BLD-MCNC: 10.1 G/DL
IMM GRANULOCYTES # BLD AUTO: 0.03 K/UL
IMM GRANULOCYTES NFR BLD AUTO: 0.4 %
LYMPHOCYTES # BLD AUTO: 0.5 K/UL
LYMPHOCYTES NFR BLD: 6.9 %
MAGNESIUM SERPL-MCNC: 2 MG/DL
MCH RBC QN AUTO: 28.4 PG
MCHC RBC AUTO-ENTMCNC: 31.6 G/DL
MCV RBC AUTO: 90 FL
MONOCYTES # BLD AUTO: 0.2 K/UL
MONOCYTES NFR BLD: 3.2 %
NEUTROPHILS # BLD AUTO: 6.5 K/UL
NEUTROPHILS NFR BLD: 89.4 %
NRBC BLD-RTO: 0 /100 WBC
PHOSPHATE SERPL-MCNC: 4.6 MG/DL
PLATELET # BLD AUTO: 179 K/UL
PMV BLD AUTO: 10.9 FL
POTASSIUM SERPL-SCNC: 5.9 MMOL/L
PROT SERPL-MCNC: 7.3 G/DL
RBC # BLD AUTO: 3.56 M/UL
SODIUM SERPL-SCNC: 133 MMOL/L
WBC # BLD AUTO: 7.24 K/UL

## 2017-11-30 PROCEDURE — 99233 SBSQ HOSP IP/OBS HIGH 50: CPT | Mod: ,,, | Performed by: NURSE PRACTITIONER

## 2017-11-30 PROCEDURE — 86901 BLOOD TYPING SEROLOGIC RH(D): CPT

## 2017-11-30 PROCEDURE — 80053 COMPREHEN METABOLIC PANEL: CPT

## 2017-11-30 PROCEDURE — 63600175 PHARM REV CODE 636 W HCPCS: Performed by: NURSE PRACTITIONER

## 2017-11-30 PROCEDURE — 83735 ASSAY OF MAGNESIUM: CPT

## 2017-11-30 PROCEDURE — 25000003 PHARM REV CODE 250: Performed by: NURSE PRACTITIONER

## 2017-11-30 PROCEDURE — 85025 COMPLETE CBC W/AUTO DIFF WBC: CPT

## 2017-11-30 PROCEDURE — 84100 ASSAY OF PHOSPHORUS: CPT

## 2017-11-30 PROCEDURE — 86920 COMPATIBILITY TEST SPIN: CPT

## 2017-11-30 PROCEDURE — 80074 ACUTE HEPATITIS PANEL: CPT

## 2017-11-30 PROCEDURE — 20600001 HC STEP DOWN PRIVATE ROOM

## 2017-11-30 PROCEDURE — 86900 BLOOD TYPING SEROLOGIC ABO: CPT

## 2017-11-30 RX ORDER — SODIUM CHLORIDE 9 MG/ML
INJECTION, SOLUTION INTRAVENOUS
Status: DISCONTINUED | OUTPATIENT
Start: 2017-11-30 | End: 2017-12-02

## 2017-11-30 RX ORDER — ALBUTEROL SULFATE 0.83 MG/ML
2.5 SOLUTION RESPIRATORY (INHALATION) ONCE AS NEEDED
Status: CANCELLED | OUTPATIENT
Start: 2017-11-30 | End: 2017-11-30

## 2017-11-30 RX ORDER — SODIUM CHLORIDE 9 MG/ML
INJECTION, SOLUTION INTRAVENOUS ONCE
Status: COMPLETED | OUTPATIENT
Start: 2017-11-30 | End: 2017-12-01

## 2017-11-30 RX ORDER — PENTAMIDINE ISETHIONATE 300 MG/300MG
300 INHALANT RESPIRATORY (INHALATION) ONCE
Status: CANCELLED | OUTPATIENT
Start: 2017-11-30 | End: 2017-11-30

## 2017-11-30 RX ADMIN — PREDNISONE 40 MG: 20 TABLET ORAL at 08:11

## 2017-11-30 RX ADMIN — AMLODIPINE BESYLATE 10 MG: 10 TABLET ORAL at 08:11

## 2017-11-30 RX ADMIN — SEVELAMER CARBONATE 800 MG: 800 TABLET, FILM COATED ORAL at 12:11

## 2017-11-30 RX ADMIN — NYSTATIN 500000 UNITS: 500000 SUSPENSION ORAL at 12:11

## 2017-11-30 RX ADMIN — NYSTATIN 500000 UNITS: 500000 SUSPENSION ORAL at 08:11

## 2017-11-30 RX ADMIN — NYSTATIN 500000 UNITS: 500000 SUSPENSION ORAL at 05:11

## 2017-11-30 RX ADMIN — SEVELAMER CARBONATE 800 MG: 800 TABLET, FILM COATED ORAL at 05:11

## 2017-11-30 RX ADMIN — SEVELAMER CARBONATE 800 MG: 800 TABLET, FILM COATED ORAL at 08:11

## 2017-11-30 RX ADMIN — CALCIUM CARBONATE (ANTACID) CHEW TAB 500 MG 500 MG: 500 CHEW TAB at 08:11

## 2017-11-30 NOTE — PHYSICIAN QUERY
PT Name: Leydi Cordero  MR #: [id number]     Physician Query Form - Documentation Clarification      CDS/: Nadia Alonzo RN, CCDS             Contact information: [email or web address]    This form is a permanent document in the medical record.     Query Date: November 30, 2017    By submitting this query, we are merely seeking further clarification of documentation. Please utilize your independent clinical judgment when addressing the question(s) below.    The Medical record reflects the following:    Supporting Clinical Findings Location in Medical Record     hematuria x 2 weeks     no plan over allograft but pt continues to have hematuria.    11/27 h/p    11/29 progress note                                                                                      Doctor, Please specify diagnosis or diagnoses associated with above clinical findings.  Please further specify hematuria.    Provider Use Only      (   x )  Gross    (    )  Recurrent    (    x)  Intermittent    (    )  Other_________________                                                                                                                         [  ] Clinically undetermined

## 2017-11-30 NOTE — ASSESSMENT & PLAN NOTE
- Potassium improved.  - Shifted on admit for K of 6.5. Monitor.  - hold bactrim as K 5.9 today, monitor

## 2017-11-30 NOTE — PLAN OF CARE
Problem: Patient Care Overview  Goal: Plan of Care Review  Outcome: Ongoing (interventions implemented as appropriate)  Turkmen speaking pt only.   utilized.  Meals ordered by phone .    Pt AAOx4, VSS.  TELE monitoring discontinued this afternoon.  No complaints of pain, n/v, SOB.    HD today w/ 2L off, no issues.  Urine now pink w/ some blood noted.  Clear, no clots seen.  150 cc output.  Plan for transplant nephrectomy at the end of the week on Friday 12/1.  Renal diet, pt tolerating fine.  No accuchecks.      Bed lowered and locked in place.  Call bell in reach.  Pt instructed to call nurse for assistance.  No acute events/falls/injuries.  See flowsheet for further assessment findings.

## 2017-11-30 NOTE — PLAN OF CARE
Problem: Patient Care Overview  Goal: Plan of Care Review  Outcome: Ongoing (interventions implemented as appropriate)  No acute events overnight. VSS. Plan for nephrectomy tomorrow. Denies pain. Still with blood noted in urine. Will cont to monitor.     Problem: Fall Risk (Adult)  Goal: Absence of Falls  Patient will demonstrate the desired outcomes by discharge/transition of care.   Outcome: Ongoing (interventions implemented as appropriate)  Fall precautions maintained. Bed wheels locked, bed in lowest position, upper SR up x 2, call light in reach, non-skid socks when OOB. Instructed pt to call for assistance as needed.     Problem: Hemodialysis (Adult)  Goal: Signs and Symptoms of Listed Potential Problems Will be Absent, Minimized or Managed (Hemodialysis)  Signs and symptoms of listed potential problems will be absent, minimized or managed by discharge/transition of care (reference Hemodialysis (Adult) CPG).   Outcome: Ongoing (interventions implemented as appropriate)  HD 11/29 with 2 L removed. Oliguric.

## 2017-11-30 NOTE — ASSESSMENT & PLAN NOTE
- failed kidney txp, on HD MWF for approximately 5 months.  - last HD 11/29.   - nephrology consulted.

## 2017-11-30 NOTE — SUBJECTIVE & OBJECTIVE
Subjective:   History of Present Illness:  Leydi Cordero is a 27yr female with a PMH of ESRD 2/2 hypertensive nephropathy, s/p renal transplant 4-15-15 at [facility] in [place] with Campath induction. 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 She reports her kidney txp failing in 2017 and resumed HD 5 months ago on a MWF schedule through a LUE graft (last HD 11/27/17). She reports urinating 1-2 times daily. She was transferred from [facility] for hematuria and concern for acute rejection of kidney requiring transplant nephrectomy. She reports hematuria and allograft site pain for 2 weeks. She denies taking any immunosuppressants currently. She was given solumedrol 80mg IV in [place] for presumed rejection. Will plan to continue steroids and surgery to discuss transplant nephrectomy tomorrow.       Hospital Course:  Interval history: no acute events overnight. No pain noted over allograft but patient continues to have hematuria. Will plan for transplant nephrectomy later in week. Nephrology following. HD scheduled today. Monitor.       Past Medical, Surgical, Family, and Social History:   Unchanged from H&P.    Scheduled Meds:   sodium chloride 0.9%   Intravenous Once    amLODIPine  10 mg Oral Daily    nystatin  500,000 Units Oral QID (WM & HS)    predniSONE  40 mg Oral Daily    sevelamer carbonate  800 mg Oral TID WM    sulfamethoxazole-trimethoprim 400-80mg  1 tablet Oral Every Mon, Wed, Fri     Continuous Infusions:   PRN Meds:acetaminophen, calcium carbonate, diphenhydrAMINE, sodium chloride 0.9%    Intake/Output - Last 3 Shifts       11/28 0700 - 11/29 0659 11/29 0700 - 11/30 0659 11/30 0700 - 12/01 0659    P.O. 920 580 450    Other  650     Total Intake(mL/kg) 920 (9.7) 1230 (13.6) 450 (5)    Urine (mL/kg/hr) 500 (0.2) 325 (0.1)     Other  2650 (1.2)     Stool 0 (0) 0 (0)      "Total Output 500 2975      Net +420 -1745 +450           Urine Occurrence 0 x 0 x     Stool Occurrence 2 x 0 x 0 x           Review of Systems   Constitutional: Negative for activity change, appetite change, chills, fatigue and fever.   HENT: Negative for congestion and facial swelling.    Eyes: Negative.    Respiratory: Negative for cough, chest tightness, shortness of breath and wheezing.    Cardiovascular: Negative for chest pain, palpitations and leg swelling.   Gastrointestinal: Positive for abdominal pain. Negative for abdominal distention, constipation, diarrhea, nausea and vomiting.   Endocrine: Negative.    Genitourinary: Positive for decreased urine volume and hematuria. Negative for difficulty urinating, dysuria, frequency and urgency.   Musculoskeletal: Negative for back pain and neck pain.   Skin: Negative for color change, pallor, rash and wound.   Allergic/Immunologic: Negative for immunocompromised state.   Neurological: Negative for dizziness, tremors, weakness and headaches.   Hematological: Negative.    Psychiatric/Behavioral: Negative for agitation, confusion and sleep disturbance.   All other systems reviewed and are negative.     Objective:     Vital Signs (Most Recent):  Temp: 98.4 °F (36.9 °C) (11/30/17 1124)  Pulse: 80 (11/30/17 1124)  Resp: 18 (11/30/17 1124)  BP: 131/74 (11/30/17 1124)  SpO2: 100 % (11/30/17 1124) Vital Signs (24h Range):  Temp:  [97.7 °F (36.5 °C)-98.4 °F (36.9 °C)] 98.4 °F (36.9 °C)  Pulse:  [68-86] 80  Resp:  [16-18] 18  SpO2:  [96 %-100 %] 100 %  BP: (121-135)/(69-94) 131/74     Weight: 90.5 kg (199 lb 8.3 oz)  Height: 5' 6" (167.6 cm)  Body mass index is 32.2 kg/m².    Physical Exam   Constitutional: She is oriented to person, place, and time. She appears well-developed and well-nourished. She is active and cooperative.   Eyes: Pupils are equal, round, and reactive to light.   Cardiovascular: Normal rate, regular rhythm, intact distal pulses and normal pulses.  "   Murmur heard.  Pulmonary/Chest: Effort normal and breath sounds normal. No respiratory distress. She has no decreased breath sounds. She has no wheezes. She has no rales.   Abdominal: Soft. Normal appearance and bowel sounds are normal. She exhibits no distension. There is no tenderness. There is no guarding.   Musculoskeletal: Normal range of motion. She exhibits edema (+1 generalized).   Neurological: She is alert and oriented to person, place, and time.   Skin: Skin is warm, dry and intact.   LUE AV graft +thrill/+bruit   Psychiatric: She has a normal mood and affect. Her behavior is normal.   Nursing note and vitals reviewed.      Laboratory:  CBC:     Recent Labs  Lab 11/28/17  0616 11/29/17 0407 11/30/17  0523   WBC 7.21 7.82 7.24   RBC 3.45* 3.33* 3.56*   HGB 9.7* 9.2* 10.1*   HCT 30.8* 29.5* 32.0*    166 179   MCV 89 89 90   MCH 28.1 27.6 28.4   MCHC 31.5* 31.2* 31.6*     BMP:     Recent Labs  Lab 11/28/17  0616 11/29/17  0407 11/30/17  0523   * 83 89    137 133*   K 4.6 4.8 5.9*    99 97   CO2 18* 21* 25   BUN 61* 78* 42*   CREATININE 13.5* 15.3* 9.0*   CALCIUM 9.4 9.1 9.2       Diagnostic Results:  None

## 2017-11-30 NOTE — ASSESSMENT & PLAN NOTE
- failed kidney txp, resumed HD approximately 5 months ago.  - denies taking any immunosuppressants drugs.  - start Nystatin and Bactrim due to steroids.

## 2017-11-30 NOTE — PLAN OF CARE
Problem: Patient Care Overview  Goal: Plan of Care Review  Outcome: Ongoing (interventions implemented as appropriate)  Patient is AAOx3, independent. Patient denies any pain or nausea. Patient has been sitting up in the chair and walking in the halls throughout the day. Patient will be NPO after MN for a transplant nephrectomy and will also have HD tomorrow. Patient voided 50cc of yellow urine, no blood noted. Reminded the patient to call for assistance. Call light and personal items are within reach.

## 2017-11-30 NOTE — PROGRESS NOTES
[facility]  K[facility] Trans[facility]  Progress Note      Reason for Follow-up: Reassessment of Kidney Transplant Referral - 2/7/2017 recipient and management of immunosuppression.      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 She reports her kidney txp failing in 2017 and resumed HD 5 months ago on a MWF schedule through a LUE graft (last HD 11/27/17). She reports urinating 1-2 times daily. She was transferred from [place] [facility] for hematuria and concern for acute rejection of kidney requiring transplant nephrectomy. She reports hematuria and allograft site pain for 2 weeks. She denies taking any immunosuppressants currently. She was given solumedrol 80mg IV in [place] for presumed rejection. Will plan to continue steroids and surgery to discuss transplant nephrectomy tomorrow.       Hospital Course:  Interval history: no acute events overnight. No pain noted over allograft but patient continues to have hematuria. Will plan for transplant nephrectomy later in week. Nephrology following. HD scheduled today. Monitor.       Past Medical, Surgical, Family, and Social History:   Unchanged from H&P.    Scheduled Meds:   sodium chloride 0.9%   Intravenous Once    amLODIPine  10 mg Oral Daily    nystatin  500,000 Units Oral QID (WM & HS)    predniSONE  40 mg Oral Daily    sevelamer carbonate  800 mg Oral TID WM    sulfamethoxazole-trimethoprim 400-80mg  1 tablet Oral Every Mon, Wed, Fri     Continuous Infusions:   PRN Meds:acetaminophen, calcium carbonate, diphenhydrAMINE, sodium chloride 0.9%    Intake/Output - Last 3 Shifts       11/28 0700 - 11/29 0659 11/29 0700 - 11/30 0659  "11/30 0700 - 12/01 0659    P.O. 920 580 450    Other  650     Total Intake(mL/kg) 920 (9.7) 1230 (13.6) 450 (5)    Urine (mL/kg/hr) 500 (0.2) 325 (0.1)     Other  2650 (1.2)     Stool 0 (0) 0 (0)     Total Output 500 2975      Net +420 -1745 +450           Urine Occurrence 0 x 0 x     Stool Occurrence 2 x 0 x 0 x           Review of Systems   Constitutional: Negative for activity change, appetite change, chills, fatigue and fever.   HENT: Negative for congestion and facial swelling.    Eyes: Negative.    Respiratory: Negative for cough, chest tightness, shortness of breath and wheezing.    Cardiovascular: Negative for chest pain, palpitations and leg swelling.   Gastrointestinal: Positive for abdominal pain. Negative for abdominal distention, constipation, diarrhea, nausea and vomiting.   Endocrine: Negative.    Genitourinary: Positive for decreased urine volume and hematuria. Negative for difficulty urinating, dysuria, frequency and urgency.   Musculoskeletal: Negative for back pain and neck pain.   Skin: Negative for color change, pallor, rash and wound.   Allergic/Immunologic: Negative for immunocompromised state.   Neurological: Negative for dizziness, tremors, weakness and headaches.   Hematological: Negative.    Psychiatric/Behavioral: Negative for agitation, confusion and sleep disturbance.   All other systems reviewed and are negative.     Objective:     Vital Signs (Most Recent):  Temp: 98.4 °F (36.9 °C) (11/30/17 1124)  Pulse: 80 (11/30/17 1124)  Resp: 18 (11/30/17 1124)  BP: 131/74 (11/30/17 1124)  SpO2: 100 % (11/30/17 1124) Vital Signs (24h Range):  Temp:  [97.7 °F (36.5 °C)-98.4 °F (36.9 °C)] 98.4 °F (36.9 °C)  Pulse:  [68-86] 80  Resp:  [16-18] 18  SpO2:  [96 %-100 %] 100 %  BP: (121-135)/(69-94) 131/74     Weight: 90.5 kg (199 lb 8.3 oz)  Height: 5' 6" (167.6 cm)  Body mass index is 32.2 kg/m².    Physical Exam   Constitutional: She is oriented to person, place, and time. She appears well-developed " and well-nourished. She is active and cooperative.   Eyes: Pupils are equal, round, and reactive to light.   Cardiovascular: Normal rate, regular rhythm, intact distal pulses and normal pulses.    Murmur heard.  Pulmonary/Chest: Effort normal and breath sounds normal. No respiratory distress. She has no decreased breath sounds. She has no wheezes. She has no rales.   Abdominal: Soft. Normal appearance and bowel sounds are normal. She exhibits no distension. There is no tenderness. There is no guarding.   Musculoskeletal: Normal range of motion. She exhibits edema (+1 generalized).   Neurological: She is alert and oriented to person, place, and time.   Skin: Skin is warm, dry and intact.   LUE AV graft +thrill/+bruit   Psychiatric: She has a normal mood and affect. Her behavior is normal.   Nursing note and vitals reviewed.      Laboratory:  CBC:     Recent Labs  Lab 11/28/17 0616 11/29/17 0407 11/30/17  0523   WBC 7.21 7.82 7.24   RBC 3.45* 3.33* 3.56*   HGB 9.7* 9.2* 10.1*   HCT 30.8* 29.5* 32.0*    166 179   MCV 89 89 90   MCH 28.1 27.6 28.4   MCHC 31.5* 31.2* 31.6*     BMP:     Recent Labs  Lab 11/28/17 0616 11/29/17 0407 11/30/17  0523   * 83 89    137 133*   K 4.6 4.8 5.9*    99 97   CO2 18* 21* 25   BUN 61* 78* 42*   CREATININE 13.5* 15.3* 9.0*   CALCIUM 9.4 9.1 9.2       Diagnostic Results:  None    Assessment/Plan:     * Hematuria    - hematuria x 2 weeks associated with allograft site tenderness. Given 80 mg solumedrol in BR, will continue prednisone 40mg PO.   - plan discussed with surgery.  - no plan over allograft but pt continues to have hematuria.   - plan for transplant nephrectomy Friday 12/1/17.          ESRD (end stage renal disease)    - failed kidney txp, on HD MWF for approximately 5 months.  - last HD 11/29.   - nephrology consulted.          H/O kidney transplant    - failed kidney txp, resumed HD approximately 5 months ago.  - denies taking any immunosuppressants  drugs.  - start Nystatin and Bactrim due to steroids.          Hyperkalemia    - Potassium improved.  - Shifted on admit for K of 6.5. Monitor.  - hold bactrim as K 5.9 today, monitor          Hyperphosphatemia    - continue renvela.         Essential hypertension    - continue amlodipine. Monitor.            Discharge Planning:  Not candidate at this time      Helena Jalloh NP  Kidney Transplant  [facility]

## 2017-12-01 ENCOUNTER — ANESTHESIA EVENT (OUTPATIENT)
Dept: SURGERY | Facility: HOSPITAL | Age: 27
DRG: 659 | End: 2017-12-01
Payer: MEDICARE

## 2017-12-01 ENCOUNTER — ANESTHESIA (OUTPATIENT)
Dept: SURGERY | Facility: HOSPITAL | Age: 27
DRG: 659 | End: 2017-12-01
Payer: MEDICARE

## 2017-12-01 LAB
ABO + RH BLD: NORMAL
ALBUMIN SERPL BCP-MCNC: 3.3 G/DL
ALP SERPL-CCNC: 165 U/L
ALT SERPL W/O P-5'-P-CCNC: 10 U/L
ANION GAP SERPL CALC-SCNC: 14 MMOL/L
AST SERPL-CCNC: 9 U/L
BASOPHILS # BLD AUTO: 0.02 K/UL
BASOPHILS NFR BLD: 0.3 %
BILIRUB SERPL-MCNC: 0.4 MG/DL
BLD GP AB SCN CELLS X3 SERPL QL: NORMAL
BUN SERPL-MCNC: 68 MG/DL
CALCIUM SERPL-MCNC: 9.3 MG/DL
CHLORIDE SERPL-SCNC: 96 MMOL/L
CO2 SERPL-SCNC: 23 MMOL/L
CREAT SERPL-MCNC: 10.8 MG/DL
DIFFERENTIAL METHOD: ABNORMAL
EOSINOPHIL # BLD AUTO: 0 K/UL
EOSINOPHIL NFR BLD: 0.1 %
ERYTHROCYTE [DISTWIDTH] IN BLOOD BY AUTOMATED COUNT: 16.7 %
EST. GFR  (AFRICAN AMERICAN): 5.1 ML/MIN/1.73 M^2
EST. GFR  (NON AFRICAN AMERICAN): 4.4 ML/MIN/1.73 M^2
GLUCOSE SERPL-MCNC: 72 MG/DL
HCT VFR BLD AUTO: 34.3 %
HGB BLD-MCNC: 11 G/DL
IMM GRANULOCYTES # BLD AUTO: 0.03 K/UL
IMM GRANULOCYTES NFR BLD AUTO: 0.4 %
LYMPHOCYTES # BLD AUTO: 0.9 K/UL
LYMPHOCYTES NFR BLD: 11.7 %
MAGNESIUM SERPL-MCNC: 2.1 MG/DL
MCH RBC QN AUTO: 28.6 PG
MCHC RBC AUTO-ENTMCNC: 32.1 G/DL
MCV RBC AUTO: 89 FL
MONOCYTES # BLD AUTO: 0.5 K/UL
MONOCYTES NFR BLD: 6 %
NEUTROPHILS # BLD AUTO: 6.2 K/UL
NEUTROPHILS NFR BLD: 81.5 %
NRBC BLD-RTO: 0 /100 WBC
PHOSPHATE SERPL-MCNC: 5.4 MG/DL
PLATELET # BLD AUTO: 176 K/UL
PMV BLD AUTO: 11.5 FL
POTASSIUM SERPL-SCNC: 5 MMOL/L
PROT SERPL-MCNC: 7.2 G/DL
RBC # BLD AUTO: 3.84 M/UL
SODIUM SERPL-SCNC: 133 MMOL/L
WBC # BLD AUTO: 7.62 K/UL

## 2017-12-01 PROCEDURE — C1751 CATH, INF, PER/CENT/MIDLINE: HCPCS | Performed by: NURSE ANESTHETIST, CERTIFIED REGISTERED

## 2017-12-01 PROCEDURE — 27201037 HC PRESSURE MONITORING SET UP

## 2017-12-01 PROCEDURE — 20600001 HC STEP DOWN PRIVATE ROOM

## 2017-12-01 PROCEDURE — 71000039 HC RECOVERY, EACH ADD'L HOUR: Performed by: SURGERY

## 2017-12-01 PROCEDURE — 25000003 PHARM REV CODE 250: Performed by: NURSE ANESTHETIST, CERTIFIED REGISTERED

## 2017-12-01 PROCEDURE — 90935 HEMODIALYSIS ONE EVALUATION: CPT | Mod: ,,, | Performed by: INTERNAL MEDICINE

## 2017-12-01 PROCEDURE — 25000003 PHARM REV CODE 250: Performed by: SURGERY

## 2017-12-01 PROCEDURE — 25000003 PHARM REV CODE 250: Performed by: NURSE PRACTITIONER

## 2017-12-01 PROCEDURE — 27201423 OPTIME MED/SURG SUP & DEVICES STERILE SUPPLY: Performed by: SURGERY

## 2017-12-01 PROCEDURE — 85025 COMPLETE CBC W/AUTO DIFF WBC: CPT

## 2017-12-01 PROCEDURE — 88307 TISSUE EXAM BY PATHOLOGIST: CPT | Mod: 26,,, | Performed by: PATHOLOGY

## 2017-12-01 PROCEDURE — 86901 BLOOD TYPING SEROLOGIC RH(D): CPT

## 2017-12-01 PROCEDURE — 50370 RMVL TRANSPLANTED RNL ALGRFT: CPT | Mod: GC,,, | Performed by: SURGERY

## 2017-12-01 PROCEDURE — 63600175 PHARM REV CODE 636 W HCPCS: Performed by: SURGERY

## 2017-12-01 PROCEDURE — 84100 ASSAY OF PHOSPHORUS: CPT

## 2017-12-01 PROCEDURE — 86900 BLOOD TYPING SEROLOGIC ABO: CPT

## 2017-12-01 PROCEDURE — 63600175 PHARM REV CODE 636 W HCPCS

## 2017-12-01 PROCEDURE — 63600175 PHARM REV CODE 636 W HCPCS: Performed by: NURSE PRACTITIONER

## 2017-12-01 PROCEDURE — D9220A PRA ANESTHESIA: Mod: ANES,,, | Performed by: ANESTHESIOLOGY

## 2017-12-01 PROCEDURE — 27800505 HC CATH, RADIAL ARTERY KIT: Performed by: NURSE ANESTHETIST, CERTIFIED REGISTERED

## 2017-12-01 PROCEDURE — 25000003 PHARM REV CODE 250: Performed by: INTERNAL MEDICINE

## 2017-12-01 PROCEDURE — 63600175 PHARM REV CODE 636 W HCPCS: Performed by: NURSE ANESTHETIST, CERTIFIED REGISTERED

## 2017-12-01 PROCEDURE — 83735 ASSAY OF MAGNESIUM: CPT

## 2017-12-01 PROCEDURE — 99233 SBSQ HOSP IP/OBS HIGH 50: CPT | Mod: ,,, | Performed by: NURSE PRACTITIONER

## 2017-12-01 PROCEDURE — 27100025 HC TUBING, SET FLUID WARMER: Performed by: NURSE ANESTHETIST, CERTIFIED REGISTERED

## 2017-12-01 PROCEDURE — 94761 N-INVAS EAR/PLS OXIMETRY MLT: CPT

## 2017-12-01 PROCEDURE — 37000008 HC ANESTHESIA 1ST 15 MINUTES: Performed by: SURGERY

## 2017-12-01 PROCEDURE — P9021 RED BLOOD CELLS UNIT: HCPCS

## 2017-12-01 PROCEDURE — 37000009 HC ANESTHESIA EA ADD 15 MINS: Performed by: SURGERY

## 2017-12-01 PROCEDURE — 80053 COMPREHEN METABOLIC PANEL: CPT

## 2017-12-01 PROCEDURE — 90935 HEMODIALYSIS ONE EVALUATION: CPT

## 2017-12-01 PROCEDURE — 36000709 HC OR TIME LEV III EA ADD 15 MIN: Performed by: SURGERY

## 2017-12-01 PROCEDURE — 27201040 HC RC 50 FILTER

## 2017-12-01 PROCEDURE — 71000033 HC RECOVERY, INTIAL HOUR: Performed by: SURGERY

## 2017-12-01 PROCEDURE — 88307 TISSUE EXAM BY PATHOLOGIST: CPT | Performed by: PATHOLOGY

## 2017-12-01 PROCEDURE — 36000708 HC OR TIME LEV III 1ST 15 MIN: Performed by: SURGERY

## 2017-12-01 PROCEDURE — 36620 INSERTION CATHETER ARTERY: CPT | Mod: 59,,, | Performed by: ANESTHESIOLOGY

## 2017-12-01 PROCEDURE — 0TT00ZZ RESECTION OF RIGHT KIDNEY, OPEN APPROACH: ICD-10-PCS | Performed by: SURGERY

## 2017-12-01 PROCEDURE — D9220A PRA ANESTHESIA: Mod: CRNA,,, | Performed by: NURSE ANESTHETIST, CERTIFIED REGISTERED

## 2017-12-01 PROCEDURE — 36415 COLL VENOUS BLD VENIPUNCTURE: CPT

## 2017-12-01 RX ORDER — ONDANSETRON 2 MG/ML
4 INJECTION INTRAMUSCULAR; INTRAVENOUS EVERY 12 HOURS PRN
Status: DISCONTINUED | OUTPATIENT
Start: 2017-12-01 | End: 2017-12-04 | Stop reason: HOSPADM

## 2017-12-01 RX ORDER — CEFAZOLIN SODIUM 2 G/50ML
2 SOLUTION INTRAVENOUS
Status: COMPLETED | OUTPATIENT
Start: 2017-12-01 | End: 2017-12-02

## 2017-12-01 RX ORDER — HYDROMORPHONE HCL IN 0.9% NACL 6 MG/30 ML
PATIENT CONTROLLED ANALGESIA SYRINGE INTRAVENOUS CONTINUOUS
Status: DISCONTINUED | OUTPATIENT
Start: 2017-12-01 | End: 2017-12-02

## 2017-12-01 RX ORDER — METOCLOPRAMIDE HYDROCHLORIDE 5 MG/ML
5 INJECTION INTRAMUSCULAR; INTRAVENOUS EVERY 6 HOURS PRN
Status: DISCONTINUED | OUTPATIENT
Start: 2017-12-01 | End: 2017-12-04 | Stop reason: HOSPADM

## 2017-12-01 RX ORDER — MIDAZOLAM HYDROCHLORIDE 1 MG/ML
INJECTION, SOLUTION INTRAMUSCULAR; INTRAVENOUS
Status: DISCONTINUED | OUTPATIENT
Start: 2017-12-01 | End: 2017-12-01

## 2017-12-01 RX ORDER — HYDROMORPHONE HCL IN 0.9% NACL 6 MG/30 ML
PATIENT CONTROLLED ANALGESIA SYRINGE INTRAVENOUS
Status: COMPLETED
Start: 2017-12-01 | End: 2017-12-01

## 2017-12-01 RX ORDER — MEPERIDINE HYDROCHLORIDE 50 MG/ML
12.5 INJECTION INTRAMUSCULAR; INTRAVENOUS; SUBCUTANEOUS ONCE AS NEEDED
Status: DISCONTINUED | OUTPATIENT
Start: 2017-12-01 | End: 2017-12-01 | Stop reason: HOSPADM

## 2017-12-01 RX ORDER — GLYCOPYRROLATE 0.2 MG/ML
INJECTION INTRAMUSCULAR; INTRAVENOUS
Status: DISCONTINUED | OUTPATIENT
Start: 2017-12-01 | End: 2017-12-01

## 2017-12-01 RX ORDER — PHENYLEPHRINE HYDROCHLORIDE 10 MG/ML
INJECTION INTRAVENOUS
Status: DISCONTINUED | OUTPATIENT
Start: 2017-12-01 | End: 2017-12-01

## 2017-12-01 RX ORDER — ACETAMINOPHEN 10 MG/ML
INJECTION, SOLUTION INTRAVENOUS
Status: DISCONTINUED | OUTPATIENT
Start: 2017-12-01 | End: 2017-12-01

## 2017-12-01 RX ORDER — CEFAZOLIN SODIUM 1 G/3ML
INJECTION, POWDER, FOR SOLUTION INTRAMUSCULAR; INTRAVENOUS
Status: DISCONTINUED | OUTPATIENT
Start: 2017-12-01 | End: 2017-12-01

## 2017-12-01 RX ORDER — DIPHENHYDRAMINE HCL 25 MG
25 CAPSULE ORAL EVERY 4 HOURS PRN
Status: DISCONTINUED | OUTPATIENT
Start: 2017-12-01 | End: 2017-12-01 | Stop reason: ALTCHOICE

## 2017-12-01 RX ORDER — DEXAMETHASONE SODIUM PHOSPHATE 4 MG/ML
INJECTION, SOLUTION INTRA-ARTICULAR; INTRALESIONAL; INTRAMUSCULAR; INTRAVENOUS; SOFT TISSUE
Status: DISCONTINUED | OUTPATIENT
Start: 2017-12-01 | End: 2017-12-01

## 2017-12-01 RX ORDER — MUPIROCIN 20 MG/G
1 OINTMENT TOPICAL 2 TIMES DAILY
Status: DISCONTINUED | OUTPATIENT
Start: 2017-12-01 | End: 2017-12-04 | Stop reason: HOSPADM

## 2017-12-01 RX ORDER — CISATRACURIUM BESYLATE 10 MG/ML
INJECTION, SOLUTION INTRAVENOUS
Status: DISCONTINUED | OUTPATIENT
Start: 2017-12-01 | End: 2017-12-01

## 2017-12-01 RX ORDER — AMOXICILLIN 250 MG
1 CAPSULE ORAL 2 TIMES DAILY
Status: DISCONTINUED | OUTPATIENT
Start: 2017-12-01 | End: 2017-12-04 | Stop reason: HOSPADM

## 2017-12-01 RX ORDER — NALOXONE HCL 0.4 MG/ML
0.02 VIAL (ML) INJECTION
Status: DISCONTINUED | OUTPATIENT
Start: 2017-12-01 | End: 2017-12-02

## 2017-12-01 RX ORDER — BUPIVACAINE HYDROCHLORIDE 2.5 MG/ML
INJECTION, SOLUTION EPIDURAL; INFILTRATION; INTRACAUDAL
Status: DISCONTINUED | OUTPATIENT
Start: 2017-12-01 | End: 2017-12-01 | Stop reason: HOSPADM

## 2017-12-01 RX ORDER — CEFAZOLIN SODIUM 1 G/3ML
INJECTION, POWDER, FOR SOLUTION INTRAMUSCULAR; INTRAVENOUS
Status: DISCONTINUED | OUTPATIENT
Start: 2017-12-01 | End: 2017-12-01 | Stop reason: HOSPADM

## 2017-12-01 RX ORDER — FENTANYL CITRATE 50 UG/ML
INJECTION, SOLUTION INTRAMUSCULAR; INTRAVENOUS
Status: DISCONTINUED | OUTPATIENT
Start: 2017-12-01 | End: 2017-12-01

## 2017-12-01 RX ORDER — ONDANSETRON 2 MG/ML
INJECTION INTRAMUSCULAR; INTRAVENOUS
Status: DISCONTINUED | OUTPATIENT
Start: 2017-12-01 | End: 2017-12-01

## 2017-12-01 RX ORDER — NEOSTIGMINE METHYLSULFATE 1 MG/ML
INJECTION, SOLUTION INTRAVENOUS
Status: DISCONTINUED | OUTPATIENT
Start: 2017-12-01 | End: 2017-12-01

## 2017-12-01 RX ORDER — LIDOCAINE HCL/PF 100 MG/5ML
SYRINGE (ML) INTRAVENOUS
Status: DISCONTINUED | OUTPATIENT
Start: 2017-12-01 | End: 2017-12-01

## 2017-12-01 RX ORDER — FAMOTIDINE 20 MG/1
20 TABLET, FILM COATED ORAL DAILY
Status: DISCONTINUED | OUTPATIENT
Start: 2017-12-02 | End: 2017-12-04 | Stop reason: HOSPADM

## 2017-12-01 RX ORDER — SUCCINYLCHOLINE CHLORIDE 20 MG/ML
INJECTION INTRAMUSCULAR; INTRAVENOUS
Status: DISCONTINUED | OUTPATIENT
Start: 2017-12-01 | End: 2017-12-01

## 2017-12-01 RX ORDER — HEPARIN SODIUM 5000 [USP'U]/ML
5000 INJECTION, SOLUTION INTRAVENOUS; SUBCUTANEOUS EVERY 8 HOURS
Status: DISCONTINUED | OUTPATIENT
Start: 2017-12-01 | End: 2017-12-04 | Stop reason: HOSPADM

## 2017-12-01 RX ORDER — HYDROMORPHONE HYDROCHLORIDE 1 MG/ML
0.2 INJECTION, SOLUTION INTRAMUSCULAR; INTRAVENOUS; SUBCUTANEOUS EVERY 5 MIN PRN
Status: DISCONTINUED | OUTPATIENT
Start: 2017-12-01 | End: 2017-12-04 | Stop reason: HOSPADM

## 2017-12-01 RX ORDER — PROPOFOL 10 MG/ML
VIAL (ML) INTRAVENOUS
Status: DISCONTINUED | OUTPATIENT
Start: 2017-12-01 | End: 2017-12-01

## 2017-12-01 RX ORDER — KETAMINE HYDROCHLORIDE 100 MG/ML
INJECTION, SOLUTION INTRAMUSCULAR; INTRAVENOUS
Status: DISCONTINUED | OUTPATIENT
Start: 2017-12-01 | End: 2017-12-01

## 2017-12-01 RX ORDER — SODIUM CHLORIDE 9 MG/ML
INJECTION, SOLUTION INTRAVENOUS CONTINUOUS
Status: DISCONTINUED | OUTPATIENT
Start: 2017-12-01 | End: 2017-12-02

## 2017-12-01 RX ORDER — SODIUM CHLORIDE 9 MG/ML
INJECTION, SOLUTION INTRAVENOUS CONTINUOUS
Status: DISCONTINUED | OUTPATIENT
Start: 2017-12-01 | End: 2017-12-01

## 2017-12-01 RX ORDER — SODIUM CHLORIDE 9 MG/ML
INJECTION, SOLUTION INTRAVENOUS CONTINUOUS PRN
Status: DISCONTINUED | OUTPATIENT
Start: 2017-12-01 | End: 2017-12-01

## 2017-12-01 RX ADMIN — FENTANYL CITRATE 100 MCG: 50 INJECTION, SOLUTION INTRAMUSCULAR; INTRAVENOUS at 01:12

## 2017-12-01 RX ADMIN — FENTANYL CITRATE 50 MCG: 50 INJECTION, SOLUTION INTRAMUSCULAR; INTRAVENOUS at 02:12

## 2017-12-01 RX ADMIN — AMLODIPINE BESYLATE 10 MG: 10 TABLET ORAL at 05:12

## 2017-12-01 RX ADMIN — Medication: at 04:12

## 2017-12-01 RX ADMIN — PROPOFOL 150 MG: 10 INJECTION, EMULSION INTRAVENOUS at 01:12

## 2017-12-01 RX ADMIN — ONDANSETRON 4 MG: 2 INJECTION INTRAMUSCULAR; INTRAVENOUS at 07:12

## 2017-12-01 RX ADMIN — SODIUM CHLORIDE 300 ML: 0.9 INJECTION, SOLUTION INTRAVENOUS at 10:12

## 2017-12-01 RX ADMIN — CEFAZOLIN SODIUM 2 G: 2 SOLUTION INTRAVENOUS at 11:12

## 2017-12-01 RX ADMIN — DIPHENHYDRAMINE HYDROCHLORIDE 25 MG: 25 CAPSULE ORAL at 07:12

## 2017-12-01 RX ADMIN — STANDARDIZED SENNA CONCENTRATE AND DOCUSATE SODIUM 1 TABLET: 8.6; 5 TABLET, FILM COATED ORAL at 09:12

## 2017-12-01 RX ADMIN — PREDNISONE 40 MG: 20 TABLET ORAL at 05:12

## 2017-12-01 RX ADMIN — MIDAZOLAM HYDROCHLORIDE 2 MG: 1 INJECTION, SOLUTION INTRAMUSCULAR; INTRAVENOUS at 01:12

## 2017-12-01 RX ADMIN — ACETAMINOPHEN 1000 MG: 10 INJECTION, SOLUTION INTRAVENOUS at 01:12

## 2017-12-01 RX ADMIN — SODIUM CHLORIDE, SODIUM GLUCONATE, SODIUM ACETATE, POTASSIUM CHLORIDE, MAGNESIUM CHLORIDE, SODIUM PHOSPHATE, DIBASIC, AND POTASSIUM PHOSPHATE: .53; .5; .37; .037; .03; .012; .00082 INJECTION, SOLUTION INTRAVENOUS at 01:12

## 2017-12-01 RX ADMIN — CISATRACURIUM BESYLATE 2 MG: 10 INJECTION INTRAVENOUS at 03:12

## 2017-12-01 RX ADMIN — SULFAMETHOXAZOLE AND TRIMETHOPRIM 1 TABLET: 400; 80 TABLET ORAL at 05:12

## 2017-12-01 RX ADMIN — NYSTATIN 500000 UNITS: 500000 SUSPENSION ORAL at 09:12

## 2017-12-01 RX ADMIN — SODIUM CHLORIDE: 0.9 INJECTION, SOLUTION INTRAVENOUS at 02:12

## 2017-12-01 RX ADMIN — ONDANSETRON 4 MG: 2 INJECTION INTRAMUSCULAR; INTRAVENOUS at 03:12

## 2017-12-01 RX ADMIN — SODIUM CHLORIDE: 0.9 INJECTION, SOLUTION INTRAVENOUS at 11:12

## 2017-12-01 RX ADMIN — NYSTATIN 500000 UNITS: 500000 SUSPENSION ORAL at 05:12

## 2017-12-01 RX ADMIN — DEXAMETHASONE SODIUM PHOSPHATE 8 MG: 4 INJECTION, SOLUTION INTRAMUSCULAR; INTRAVENOUS at 02:12

## 2017-12-01 RX ADMIN — CISATRACURIUM BESYLATE 20 MG: 10 INJECTION INTRAVENOUS at 01:12

## 2017-12-01 RX ADMIN — NEOSTIGMINE METHYLSULFATE 4 MG: 1 INJECTION INTRAVENOUS at 03:12

## 2017-12-01 RX ADMIN — GLYCOPYRROLATE 0.4 MG: 0.2 INJECTION, SOLUTION INTRAMUSCULAR; INTRAVENOUS at 03:12

## 2017-12-01 RX ADMIN — PHENYLEPHRINE HYDROCHLORIDE 100 MCG: 10 INJECTION INTRAVENOUS at 02:12

## 2017-12-01 RX ADMIN — LIDOCAINE HYDROCHLORIDE 100 MG: 20 INJECTION, SOLUTION INTRAVENOUS at 01:12

## 2017-12-01 RX ADMIN — MUPIROCIN 1 G: 20 OINTMENT TOPICAL at 09:12

## 2017-12-01 RX ADMIN — SODIUM CHLORIDE: 0.9 INJECTION, SOLUTION INTRAVENOUS at 04:12

## 2017-12-01 RX ADMIN — FENTANYL CITRATE 50 MCG: 50 INJECTION, SOLUTION INTRAMUSCULAR; INTRAVENOUS at 03:12

## 2017-12-01 RX ADMIN — PHENYLEPHRINE HYDROCHLORIDE 200 MCG: 10 INJECTION INTRAVENOUS at 02:12

## 2017-12-01 RX ADMIN — CEFAZOLIN 2 G: 1 INJECTION, POWDER, FOR SOLUTION INTRAVENOUS at 01:12

## 2017-12-01 RX ADMIN — KETAMINE HYDROCHLORIDE 30 MG: 100 INJECTION, SOLUTION, CONCENTRATE INTRAMUSCULAR; INTRAVENOUS at 02:12

## 2017-12-01 RX ADMIN — CISATRACURIUM BESYLATE 4 MG: 10 INJECTION INTRAVENOUS at 02:12

## 2017-12-01 NOTE — ANESTHESIA PROCEDURE NOTES
Arterial    Diagnosis: intraoperative BP monitoring    Patient location during procedure: done in OR  Procedure start time: 12/1/2017 1:41 PM  Timeout: 12/1/2017 1:40 PM  Procedure end time: 12/1/2017 1:42 PM  Staffing  Resident/CRNA: MACEY MAYS  Performed: resident/CRNA   Anesthesiologist was present at the time of the procedure.  Preanesthetic Checklist  Completed: patient identified, site marked, surgical consent, pre-op evaluation, timeout performed, IV checked, risks and benefits discussed, monitors and equipment checked and anesthesia consent givenArterial  Skin Prep: chlorhexidine gluconate  Local Infiltration: none  Orientation: right  Location: radial  Catheter Size: 20 G  Catheter placement by Anatomical landmarks. Heme positive aspiration all ports.Insertion Attempts: 1  Assessment  Dressing: secured with tape and tegaderm  Patient: Tolerated well

## 2017-12-01 NOTE — PROGRESS NOTES
HD called report - 3hrs, 2L off.  Plan for transfer to OR for transplant nephrectomy.  OR aware to call upon arrival so this rn can bring consents and take pts phone back to room.

## 2017-12-01 NOTE — NURSING TRANSFER
Nursing Transfer Note      12/1/2017     Transfer To: [id number]    Transfer via stretcher    Transfer with cardiac monitoring    Transported by PCT    Medicines sent: PCA dilaudid    Chart send with patient: Yes    Notified: patient    Patient reassessed at: 12/1/17 1700    Upon arrival to floor:

## 2017-12-01 NOTE — PROGRESS NOTES
SW attempted to meet with pt in room for continuity of care and to assess needs. Pt is currently in dialysis and SHANE was able to speak with pt's  Eugene'. Eugene denies any concerns at this time and reports coping well. SHANE remains available to pt and family.

## 2017-12-01 NOTE — ASSESSMENT & PLAN NOTE
- failed kidney txp, resumed HD approximately 5 months ago.  - denies taking any immunosuppressants drugs.  - start Nystatin and Bactrim due to steroids. Bactrim now on hold due to hyperkalemia

## 2017-12-01 NOTE — OP NOTE
Date of surgery: 12/1/2017    SURGEON:  Saloni Marroquin M.D.                                                                                                                           ASSISTANT:  Awa Padron MD.                                                                                                                         PREOPERATIVE DIAGNOSIS: Hematuria, acute cellular rejection                                                                                 POSTOPERATIVE DIAGNOSIS:  Hematuria, acute cellular rejection                                                                            OPERATION: Right transplant nephrectomy                                                                                                                        ANESTHESIA:  General.                                                                                                                                     PROCEDURE IN DETAIL:  Under general anesthesia with endotracheal             intubation, the patient was prepped and draped in standard fashion.  A       long right sided incision was used where his previous transplant was performed.  This incision was carried down through the lower fascia to the surface of the kidney.The castillo sedrick retractor was placed, bleeding was controlled with pressure and Knunet.    We made a small whole in the peritoneum in two areas, these were repaired with 3-0 vicryl suture.  We encountered bleeding from the capsule of the kidney and the kidney itself while we dissected it free using a combination of sharp and blunt dissection.  Vascular clamps were place high in the hilum of the kidney.  The hilum was divided between clamps and doubled tied with 0 silk ties. The artery, vein and collecting system were over sewn with running 4-0 prolene.    Hemostasis at the capsule was achieved using argon beam electrocautery, aura.    The patients blood pressure remained stable, total  blood loss was approximately 100 cc.      The inflamed transplant kidney allograft was handed off for final pathology.     Antibiotic irrigation was then used.  A 10 flat GABBIE was placed in the hilar area and secured to the skin.  We confirmed a bounding pulse in the iliac artery distal to the hilum.We placed knuknit in the cavity.    Marcaine local anesthetic was injected into the fascia.     The fascia, was closed with looped #1 PDS suture in a continuous manner in 2 layers.  Skin        was closed with staples.  Needle, instrument, and sponge counts werecorrect.      The patient was transferred to the recovery        unit in stable condition.         Details of the operation were reviewed with the patients family in detail by Dr. Awa Padron. All questions were answered.

## 2017-12-01 NOTE — ASSESSMENT & PLAN NOTE
- Potassium improved.  - Shifted on admit for K of 6.5. Monitor.  - holding bactrim as K 5.9 today, improved K 5 today

## 2017-12-01 NOTE — PROGRESS NOTES
[facility]  Kidney Transplant  Progress Note      Reason for Follow-up: Reassessment of Kidney Transplant Referral - 2/7/2017 recipient and management of immunosuppression.      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 She reports her kidney txp failing in 2017 and resumed HD 5 months ago on a MWF schedule through a LUE graft (last HD 11/27/17). She reports urinating 1-2 times daily. She was transferred from [place] [facility] for hematuria and concern for acute rejection of kidney requiring transplant nephrectomy. She reports hematuria and allograft site pain for 2 weeks. She denies taking any immunosuppressants currently. She was given solumedrol 80mg IV in [place] for presumed rejection. Will plan to continue steroids and surgery to discuss transplant nephrectomy tomorrow.       Hospital Course:  Interval history: no acute events overnight. No pain noted over allograft but patient continues to have hematuria. HD this am prior to transplant nephrectomy. Nephrology following and managing dialysis. Monitor.       Past Medical, Surgical, Family, and Social History:   Unchanged from H&P.    Scheduled Meds:   sodium chloride 0.9%   Intravenous Once    amLODIPine  10 mg Oral Daily    nystatin  500,000 Units Oral QID (WM & HS)    predniSONE  40 mg Oral Daily    sevelamer carbonate  800 mg Oral TID WM    sulfamethoxazole-trimethoprim 400-80mg  1 tablet Oral Every Mon, Wed, Fri     Continuous Infusions:   PRN Meds:sodium chloride 0.9%, acetaminophen, calcium carbonate, diphenhydrAMINE, sodium chloride 0.9%    Intake/Output - Last 3 Shifts       11/29 0700 - 11/30 0659 11/30 0700 -  "12/01 0659 12/01 0700 - 12/02 0659    P.O. 580 1665     I.V. (mL/kg)   600 (6.5)    Other 650      Total Intake(mL/kg) 1230 (13.6) 1665 (17.9) 600 (6.5)    Urine (mL/kg/hr) 325 (0.1) 450 (0.2)     Other 2650 (1.2)      Stool 0 (0) 0 (0)     Total Output 2975 450      Net -1745 +1215 +600           Urine Occurrence 0 x      Stool Occurrence 0 x 1 x            Review of Systems   Constitutional: Negative for activity change, appetite change, chills, fatigue and fever.   HENT: Negative for congestion and facial swelling.    Eyes: Negative.    Respiratory: Negative for cough, chest tightness, shortness of breath and wheezing.    Cardiovascular: Negative for chest pain, palpitations and leg swelling.   Gastrointestinal: Positive for abdominal pain. Negative for abdominal distention, constipation, diarrhea, nausea and vomiting.   Endocrine: Negative.    Genitourinary: Positive for decreased urine volume and hematuria. Negative for difficulty urinating, dysuria, frequency and urgency.   Musculoskeletal: Negative for back pain and neck pain.   Skin: Negative for color change, pallor, rash and wound.   Allergic/Immunologic: Negative for immunocompromised state.   Neurological: Negative for dizziness, tremors, weakness and headaches.   Hematological: Negative.    Psychiatric/Behavioral: Negative for agitation, confusion and sleep disturbance.   All other systems reviewed and are negative.     Objective:     Vital Signs (Most Recent):  Temp: 98.2 °F (36.8 °C) (12/01/17 1255)  Pulse: 74 (12/01/17 1255)  Resp: 16 (12/01/17 1255)  BP: 139/84 (12/01/17 1255)  SpO2: 100 % (12/01/17 1255) Vital Signs (24h Range):  Temp:  [97.8 °F (36.6 °C)-98.6 °F (37 °C)] 98.2 °F (36.8 °C)  Pulse:  [62-90] 74  Resp:  [16-20] 16  SpO2:  [97 %-100 %] 100 %  BP: (115-139)/(62-96) 139/84     Weight: 93 kg (205 lb)  Height: 5' 6" (167.6 cm)  Body mass index is 33.09 kg/m².    Physical Exam   Constitutional: She is oriented to person, place, and time. " She appears well-developed and well-nourished. She is active and cooperative.   HENT:   Head: Normocephalic and atraumatic.   Eyes: Pupils are equal, round, and reactive to light.   Neck: Normal range of motion.   Cardiovascular: Normal rate, regular rhythm, intact distal pulses and normal pulses.    Murmur heard.  Pulmonary/Chest: Effort normal and breath sounds normal. No respiratory distress. She has no decreased breath sounds. She has no wheezes. She has no rales.   Abdominal: Soft. Normal appearance and bowel sounds are normal. She exhibits no distension. There is no tenderness. There is no guarding.   Musculoskeletal: Normal range of motion. She exhibits edema (+1 generalized).   Neurological: She is alert and oriented to person, place, and time.   Skin: Skin is warm, dry and intact.   LUE AV graft +thrill/+bruit   Psychiatric: She has a normal mood and affect. Her behavior is normal.   Nursing note and vitals reviewed.      Laboratory:  CBC:     Recent Labs  Lab 11/29/17  0407 11/30/17  0523 12/01/17  0509   WBC 7.82 7.24 7.62   RBC 3.33* 3.56* 3.84*   HGB 9.2* 10.1* 11.0*   HCT 29.5* 32.0* 34.3*    179 176   MCV 89 90 89   MCH 27.6 28.4 28.6   MCHC 31.2* 31.6* 32.1     BMP:     Recent Labs  Lab 11/29/17  0407 11/30/17  0523 12/01/17  0509   GLU 83 89 72    133* 133*   K 4.8 5.9* 5.0   CL 99 97 96   CO2 21* 25 23   BUN 78* 42* 68*   CREATININE 15.3* 9.0* 10.8*   CALCIUM 9.1 9.2 9.3       Diagnostic Results:  None    Assessment/Plan:     * Hematuria    - hematuria x 2 weeks associated with allograft site tenderness. Given 80 mg solumedrol in BR, will continue prednisone 40mg PO.   - plan discussed with surgery.  - no plan over allograft but pt continues to have hematuria.   - plan for transplant nephrectomy Friday 12/1/17.          ESRD (end stage renal disease)    - failed kidney txp, on HD MWF for approximately 5 months.  - last HD 11/29.   - nephrology consulted.          H/O kidney transplant     - failed kidney txp, resumed HD approximately 5 months ago.  - denies taking any immunosuppressants drugs.  - start Nystatin and Bactrim due to steroids. Bactrim now on hold due to hyperkalemia          Hyperkalemia    - Potassium improved.  - Shifted on admit for K of 6.5. Monitor.  - holding bactrim as K 5.9 today, improved K 5 today        Hyperphosphatemia    - continue renvela.         Essential hypertension    - continue amlodipine. Monitor.            Discharge Planning: not candidate at this time      Helena Jalloh NP  Kidney Transplant  [facility]

## 2017-12-01 NOTE — PROGRESS NOTES
Dialysis completed. Needles removed from left upper arm graft with pressure held to sites for 10 minutes each with hemostasis achieved. Gauze and tape to sites. . Patient dialyzed for 3 hours with fluid removal of 2 liters. Tolerated well with stable vital signs. Report called to floor to RIDGE Richardson.

## 2017-12-01 NOTE — SUBJECTIVE & OBJECTIVE
Subjective:   History of Present Illness:  Leydi Cordero is a 27yr female with a PMH of ESRD 2/2 hypertensive nephropathy, s/p renal transplant 4-15-15 at [facility] in [place] with Campath induction. 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 She reports her kidney txp failing in 2017 and resumed HD 5 months ago on a MWF schedule through a LUE graft (last HD 11/27/17). She reports urinating 1-2 times daily. She was transferred from [facility] for hematuria and concern for acute rejection of kidney requiring transplant nephrectomy. She reports hematuria and allograft site pain for 2 weeks. She denies taking any immunosuppressants currently. She was given solumedrol 80mg IV in [place] for presumed rejection. Will plan to continue steroids and surgery to discuss transplant nephrectomy tomorrow.       Hospital Course:  Interval history: no acute events overnight. No pain noted over allograft but patient continues to have hematuria. HD this am prior to transplant nephrectomy. Nephrology following and managing dialysis. Monitor.       Past Medical, Surgical, Family, and Social History:   Unchanged from H&P.    Scheduled Meds:   sodium chloride 0.9%   Intravenous Once    amLODIPine  10 mg Oral Daily    nystatin  500,000 Units Oral QID (WM & HS)    predniSONE  40 mg Oral Daily    sevelamer carbonate  800 mg Oral TID WM    sulfamethoxazole-trimethoprim 400-80mg  1 tablet Oral Every Mon, Wed, Fri     Continuous Infusions:   PRN Meds:sodium chloride 0.9%, acetaminophen, calcium carbonate, diphenhydrAMINE, sodium chloride 0.9%    Intake/Output - Last 3 Shifts       11/29 0700 - 11/30 0659 11/30 0700 - 12/01 0659 12/01 0700 - 12/02 0659    P.O. 580 1665     I.V. (mL/kg)   600 (6.5)    Other 650      Total Intake(mL/kg) 1230 (13.6) 1665 (17.9) 600 (6.5)    Urine (mL/kg/hr) 325 (0.1) 450 (0.2)   "   Other 2650 (1.2)      Stool 0 (0) 0 (0)     Total Output 2975 450      Net -1745 +1215 +600           Urine Occurrence 0 x      Stool Occurrence 0 x 1 x            Review of Systems   Constitutional: Negative for activity change, appetite change, chills, fatigue and fever.   HENT: Negative for congestion and facial swelling.    Eyes: Negative.    Respiratory: Negative for cough, chest tightness, shortness of breath and wheezing.    Cardiovascular: Negative for chest pain, palpitations and leg swelling.   Gastrointestinal: Positive for abdominal pain. Negative for abdominal distention, constipation, diarrhea, nausea and vomiting.   Endocrine: Negative.    Genitourinary: Positive for decreased urine volume and hematuria. Negative for difficulty urinating, dysuria, frequency and urgency.   Musculoskeletal: Negative for back pain and neck pain.   Skin: Negative for color change, pallor, rash and wound.   Allergic/Immunologic: Negative for immunocompromised state.   Neurological: Negative for dizziness, tremors, weakness and headaches.   Hematological: Negative.    Psychiatric/Behavioral: Negative for agitation, confusion and sleep disturbance.   All other systems reviewed and are negative.     Objective:     Vital Signs (Most Recent):  Temp: 98.2 °F (36.8 °C) (12/01/17 1255)  Pulse: 74 (12/01/17 1255)  Resp: 16 (12/01/17 1255)  BP: 139/84 (12/01/17 1255)  SpO2: 100 % (12/01/17 1255) Vital Signs (24h Range):  Temp:  [97.8 °F (36.6 °C)-98.6 °F (37 °C)] 98.2 °F (36.8 °C)  Pulse:  [62-90] 74  Resp:  [16-20] 16  SpO2:  [97 %-100 %] 100 %  BP: (115-139)/(62-96) 139/84     Weight: 93 kg (205 lb)  Height: 5' 6" (167.6 cm)  Body mass index is 33.09 kg/m².    Physical Exam   Constitutional: She is oriented to person, place, and time. She appears well-developed and well-nourished. She is active and cooperative.   HENT:   Head: Normocephalic and atraumatic.   Eyes: Pupils are equal, round, and reactive to light.   Neck: Normal " range of motion.   Cardiovascular: Normal rate, regular rhythm, intact distal pulses and normal pulses.    Murmur heard.  Pulmonary/Chest: Effort normal and breath sounds normal. No respiratory distress. She has no decreased breath sounds. She has no wheezes. She has no rales.   Abdominal: Soft. Normal appearance and bowel sounds are normal. She exhibits no distension. There is no tenderness. There is no guarding.   Musculoskeletal: Normal range of motion. She exhibits edema (+1 generalized).   Neurological: She is alert and oriented to person, place, and time.   Skin: Skin is warm, dry and intact.   LUE AV graft +thrill/+bruit   Psychiatric: She has a normal mood and affect. Her behavior is normal.   Nursing note and vitals reviewed.      Laboratory:  CBC:     Recent Labs  Lab 11/29/17 0407 11/30/17 0523 12/01/17  0509   WBC 7.82 7.24 7.62   RBC 3.33* 3.56* 3.84*   HGB 9.2* 10.1* 11.0*   HCT 29.5* 32.0* 34.3*    179 176   MCV 89 90 89   MCH 27.6 28.4 28.6   MCHC 31.2* 31.6* 32.1     BMP:     Recent Labs  Lab 11/29/17 0407 11/30/17 0523 12/01/17  0509   GLU 83 89 72    133* 133*   K 4.8 5.9* 5.0   CL 99 97 96   CO2 21* 25 23   BUN 78* 42* 68*   CREATININE 15.3* 9.0* 10.8*   CALCIUM 9.1 9.2 9.3       Diagnostic Results:  None

## 2017-12-01 NOTE — PROGRESS NOTES
Patient received from floor with report from Yoly Dietz.  Maintenance dialysis began per orders via 15 gauge fistula needles to left upper arm graft.

## 2017-12-01 NOTE — PROGRESS NOTES
Back from pacu.  Incision with island drsg and scant drainaage that I outlines.  NS at 125/hr.  pca dil infusing.  Late meds administered at this time

## 2017-12-01 NOTE — TRANSFER OF CARE
"Anesthesia Transfer of Care Note    Patient: Leydi Cordero    Procedure(s) Performed: Procedure(s) (LRB):  TRANSPLANT NEPHRECTOMY (Right)    Patient location: PACU    Anesthesia Type: general    Transport from OR: Transported from OR on room air with adequate spontaneous ventilation    Post pain: adequate analgesia    Post assessment: no apparent anesthetic complications    Post vital signs: stable    Level of consciousness: awake    Nausea/Vomiting: no nausea/vomiting    Complications: none    Transfer of care protocol was followed      Last vitals:   Visit Vitals  /84 (BP Location: Right arm, Patient Position: Sitting)   Pulse 74   Temp 36.8 °C (98.2 °F) (Oral)   Resp 16   Ht 5' 6" (1.676 m)   Wt 93 kg (205 lb)   SpO2 100%   Breastfeeding? No   BMI 33.09 kg/m²     "

## 2017-12-01 NOTE — ANESTHESIA PREPROCEDURE EVALUATION
[facility]  Anesthesia Pre-Operative Evaluation         Patient Name: Leydi Cordero  YOB: 1990  MRN: [id number]    SUBJECTIVE:     Pre-operative evaluation for Procedure(s) (LRB):  TRANSPLANT NEPHRECTOMY (Right)     12/01/2017    Leydi Cordero is a 27 y.o. female, Bengali speaking, w/ a significant PMHx of ESRD 2/2 hypertensive nephropathy s/p renal transplant 4/15, course complicated by biopsy proven rejection, underwent rejection treatment, now on HD MWF, current smoker, presents with hematuria and concern for acute rejection of kidney and now presents for the above procedure(s).    Patient is undergoing HD today. NPO since midnight.      LDA:   AV Graft Lt arm  24 G Rt forearm    Prev airway: None documented.    Drips: None documented.       Patient Active Problem List   Diagnosis    H/O kidney transplant    Essential hypertension    Acidosis, metabolic    FRANCISCA (acute kidney injury)    Antibody mediated rejection of kidney transplant    Prophylactic immunotherapy    Immunosuppressive management encounter following kidney transplant    Acute rejection of kidney transplant    Hyperphosphatemia    Diarrhea    C. difficile colitis    Hyperkalemia    ESRD (end stage renal disease)    Hematuria       Review of patient's allergies indicates:   Allergen Reactions    Heparin analogues        Current Inpatient Medications:   sodium chloride 0.9%   Intravenous Once    sodium chloride 0.9%   Intravenous Once    amLODIPine  10 mg Oral Daily    nystatin  500,000 Units Oral QID (WM & HS)    predniSONE  40 mg Oral Daily    sevelamer carbonate  800 mg Oral TID WM    sulfamethoxazole-trimethoprim 400-80mg  1 tablet Oral Every Mon, Wed, Fri       No current facility-administered medications on file prior to encounter.      Current Outpatient Prescriptions on File Prior to Encounter   Medication Sig Dispense Refill    amlodipine (NORVASC) 10 MG tablet Take 1  tablet (10 mg total) by mouth once daily. 30 tablet 1    aspirin (ECOTRIN) 81 MG EC tablet Take 1 tablet (81 mg total) by mouth once daily. 30 tablet 10    sevelamer carbonate (RENVELA) 800 mg Tab Take 1 tablet (800 mg total) by mouth 3 (three) times daily with meals. 90 tablet 11       Past Surgical History:   Procedure Laterality Date    KIDNEY TRANSPLANT  04/15/2015    right leg hemodialysis access         Social History     Social History    Marital status: Single     Spouse name: N/A    Number of children: N/A    Years of education: N/A     Occupational History    Not on file.     Social History Main Topics    Smoking status: Current Every Day Smoker     Packs/day: 0.50    Smokeless tobacco: Not on file    Alcohol use No    Drug use: No    Sexual activity: Not on file     Other Topics Concern    Not on file     Social History Narrative    No narrative on file       OBJECTIVE:     Vital Signs Range (Last 24H):  Temp:  [36.6 °C (97.8 °F)-37 °C (98.6 °F)]   Pulse:  [70-90]   Resp:  [16-18]   BP: (127-135)/(74-90)   SpO2:  [97 %-100 %]       CBC:   Recent Labs      17   0523  17   0509   WBC  7.24  7.62   RBC  3.56*  3.84*   HGB  10.1*  11.0*   HCT  32.0*  34.3*   PLT  179  176   MCV  90  89   MCH  28.4  28.6   MCHC  31.6*  32.1       CMP:   Recent Labs      17   0523  17   0509   NA  133*  133*   K  5.9*  5.0   CL  97  96   CO2  25  23   BUN  42*  68*   CREATININE  9.0*  10.8*   GLU  89  72   MG  2.0  2.1   PHOS  4.6*  5.4*   CALCIUM  9.2  9.3   ALBUMIN  3.3*  3.3*   PROT  7.3  7.2   ALKPHOS  157*  165*   ALT  7*  10   AST  11  9*   BILITOT  0.4  0.4       INR:  No results for input(s): INR, PROTIME, APTT in the last 72 hours.    Invalid input(s): PT    Diagnostic Studies: No relevant studies.    EK17  Vent. Rate : 083 BPM     Atrial Rate : 083 BPM     P-R Int : 122 ms          QRS Dur : 078 ms      QT Int : 374 ms       P-R-T Axes : 060 020 058 degrees     QTc Int  : 439 ms    Normal sinus rhythm  Normal ECG    2D ECHO:  No results found for this or any previous visit.      ASSESSMENT/PLAN:         Pre-op Assessment    I have reviewed the Patient Summary Reports.      I have reviewed the Medications.     Review of Systems  Anesthesia Hx:  No problems with previous Anesthesia  History of prior surgery of interest to airway management or planning: Denies Family Hx of Anesthesia complications.   Denies Personal Hx of Anesthesia complications.   Social:  Smoker    Cardiovascular:   Hypertension    Pulmonary:  Pulmonary Normal    Renal/:   Chronic Renal Disease, ESRD    Hepatic/GI:  Hepatic/GI Normal    Neurological:  Neurology Normal    Endocrine:  Endocrine Normal        Physical Exam  General:  Obesity    Airway/Jaw/Neck:  Airway Findings: Mouth Opening: Normal Tongue: Normal  Mallampati: II        Eyes/Ears/Nose:  EYES/EARS/NOSE FINDINGS: Normal   Dental:  DENTAL FINDINGS: Normal   Chest/Lungs:  Chest/Lungs Findings: Clear to auscultation, Normal Respiratory Rate     Heart/Vascular:  Heart Findings: Rate: Normal  Rhythm: Regular Rhythm  Sounds: Normal     Abdomen:  Abdomen Findings: Normal    Musculoskeletal:  Musculoskeletal Findings: Normal   Skin:  Skin Findings: Normal    Mental Status:  Mental Status Findings: Normal        Anesthesia Plan  Type of Anesthesia, risks & benefits discussed:  Anesthesia Type:  general  Patient's Preference:   Intra-op Monitoring Plan: standard ASA monitors and arterial line  Intra-op Monitoring Plan Comments:   Post Op Pain Control Plan: per primary service following discharge from PACU and multimodal analgesia  Post Op Pain Control Plan Comments:   Induction:   IV  Beta Blocker:  Patient is not currently on a Beta-Blocker (No further documentation required).       Informed Consent: Patient understands risks and agrees with Anesthesia plan.  Questions answered. Anesthesia consent signed with patient.  ASA Score: 4     Day of Surgery Review  of History & Physical:

## 2017-12-01 NOTE — ANESTHESIA PREPROCEDURE EVALUATION
12/01/2017  Leydi Cordero is a 27 y.o., female.    Anesthesia Evaluation    I have reviewed the Patient Summary Reports.    I have reviewed the Nursing Notes.      Review of Systems  Anesthesia Hx:  No problems with previous Anesthesia    Hematology/Oncology:  Hematology Normal   Oncology Normal     EENT/Dental:EENT/Dental Normal   Cardiovascular:   Hypertension    Pulmonary:  Pulmonary Normal    Renal/:   Chronic Renal Disease    Hepatic/GI:  Hepatic/GI Normal    Musculoskeletal:  Musculoskeletal Normal    Neurological:  Neurology Normal    Endocrine:  Endocrine Normal    Dermatological:  Skin Normal    Psych:  Psychiatric Normal           Physical Exam  General:  Well nourished    Airway/Jaw/Neck:  Airway Findings: Mouth Opening: Normal Tongue: Normal  General Airway Assessment: Adult  Mallampati: II  TM Distance: Normal, at least 6 cm        Eyes/Ears/Nose:  EYES/EARS/NOSE FINDINGS: Normal   Dental:  Dental Findings: In tact   Chest/Lungs:  Chest/Lungs Clear    Heart/Vascular:  Heart Findings: Normal Heart murmur: negative Vascular Findings: Normal    Abdomen:  Abdomen Findings: Normal    Musculoskeletal:  Musculoskeletal Findings: Normal   Skin:  Skin Findings: Normal    Mental Status:  Mental Status Findings: Normal        Anesthesia Plan  Type of Anesthesia, risks & benefits discussed:  Anesthesia Type:  general  Patient's Preference:   Intra-op Monitoring Plan:   Intra-op Monitoring Plan Comments:   Post Op Pain Control Plan:   Post Op Pain Control Plan Comments:   Induction:   IV  Beta Blocker:  Patient is not currently on a Beta-Blocker (No further documentation required).       Informed Consent: Patient understands risks and agrees with Anesthesia plan.  Questions answered. Anesthesia consent signed with patient.  ASA Score: 4     Day of Surgery Review of History & Physical:    H&P  update referred to the surgeon.         Ready For Surgery From Anesthesia Perspective.

## 2017-12-02 LAB
ALBUMIN SERPL BCP-MCNC: 3 G/DL
ALBUMIN SERPL BCP-MCNC: 3.1 G/DL
ALP SERPL-CCNC: 153 U/L
ALP SERPL-CCNC: 154 U/L
ALT SERPL W/O P-5'-P-CCNC: 7 U/L
ALT SERPL W/O P-5'-P-CCNC: 8 U/L
ANION GAP SERPL CALC-SCNC: 10 MMOL/L
ANION GAP SERPL CALC-SCNC: 12 MMOL/L
AST SERPL-CCNC: 18 U/L
AST SERPL-CCNC: 24 U/L
BASOPHILS # BLD AUTO: 0.02 K/UL
BASOPHILS NFR BLD: 0.2 %
BILIRUB SERPL-MCNC: 0.2 MG/DL
BILIRUB SERPL-MCNC: 0.3 MG/DL
BUN SERPL-MCNC: 42 MG/DL
BUN SERPL-MCNC: 43 MG/DL
CALCIUM SERPL-MCNC: 8.7 MG/DL
CALCIUM SERPL-MCNC: 8.8 MG/DL
CHLORIDE SERPL-SCNC: 102 MMOL/L
CHLORIDE SERPL-SCNC: 105 MMOL/L
CO2 SERPL-SCNC: 16 MMOL/L
CO2 SERPL-SCNC: 19 MMOL/L
CREAT SERPL-MCNC: 7.8 MG/DL
CREAT SERPL-MCNC: 8 MG/DL
DIFFERENTIAL METHOD: ABNORMAL
EOSINOPHIL # BLD AUTO: 0 K/UL
EOSINOPHIL NFR BLD: 0 %
ERYTHROCYTE [DISTWIDTH] IN BLOOD BY AUTOMATED COUNT: 16.7 %
EST. GFR  (AFRICAN AMERICAN): 7.3 ML/MIN/1.73 M^2
EST. GFR  (AFRICAN AMERICAN): 7.5 ML/MIN/1.73 M^2
EST. GFR  (NON AFRICAN AMERICAN): 6.3 ML/MIN/1.73 M^2
EST. GFR  (NON AFRICAN AMERICAN): 6.5 ML/MIN/1.73 M^2
GLUCOSE SERPL-MCNC: 85 MG/DL
GLUCOSE SERPL-MCNC: 88 MG/DL
HCT VFR BLD AUTO: 32.3 %
HGB BLD-MCNC: 10.6 G/DL
IMM GRANULOCYTES # BLD AUTO: 0.16 K/UL
IMM GRANULOCYTES NFR BLD AUTO: 1.2 %
LYMPHOCYTES # BLD AUTO: 0.8 K/UL
LYMPHOCYTES NFR BLD: 5.8 %
MAGNESIUM SERPL-MCNC: 2.2 MG/DL
MCH RBC QN AUTO: 28.7 PG
MCHC RBC AUTO-ENTMCNC: 32.8 G/DL
MCV RBC AUTO: 88 FL
MONOCYTES # BLD AUTO: 0.4 K/UL
MONOCYTES NFR BLD: 3.3 %
NEUTROPHILS # BLD AUTO: 11.8 K/UL
NEUTROPHILS NFR BLD: 89.5 %
NRBC BLD-RTO: 0 /100 WBC
PHOSPHATE SERPL-MCNC: 5.1 MG/DL
PLATELET # BLD AUTO: 149 K/UL
PMV BLD AUTO: 11.6 FL
POCT GLUCOSE: 112 MG/DL (ref 70–110)
POCT GLUCOSE: 130 MG/DL (ref 70–110)
POCT GLUCOSE: 189 MG/DL (ref 70–110)
POCT GLUCOSE: 80 MG/DL (ref 70–110)
POCT GLUCOSE: 81 MG/DL (ref 70–110)
POCT GLUCOSE: 86 MG/DL (ref 70–110)
POCT GLUCOSE: 88 MG/DL (ref 70–110)
POTASSIUM SERPL-SCNC: 4.1 MMOL/L
POTASSIUM SERPL-SCNC: 6 MMOL/L
POTASSIUM SERPL-SCNC: 6.9 MMOL/L
POTASSIUM SERPL-SCNC: 6.9 MMOL/L
POTASSIUM SERPL-SCNC: 7.3 MMOL/L
POTASSIUM SERPL-SCNC: 8.1 MMOL/L
PROT SERPL-MCNC: 6.6 G/DL
PROT SERPL-MCNC: 7 G/DL
RBC # BLD AUTO: 3.69 M/UL
SODIUM SERPL-SCNC: 131 MMOL/L
SODIUM SERPL-SCNC: 133 MMOL/L
WBC # BLD AUTO: 13.15 K/UL

## 2017-12-02 PROCEDURE — 63600175 PHARM REV CODE 636 W HCPCS: Performed by: NURSE PRACTITIONER

## 2017-12-02 PROCEDURE — 99233 SBSQ HOSP IP/OBS HIGH 50: CPT | Mod: ,,, | Performed by: NURSE PRACTITIONER

## 2017-12-02 PROCEDURE — 25000003 PHARM REV CODE 250: Performed by: SURGERY

## 2017-12-02 PROCEDURE — 84100 ASSAY OF PHOSPHORUS: CPT

## 2017-12-02 PROCEDURE — 80053 COMPREHEN METABOLIC PANEL: CPT | Mod: 91

## 2017-12-02 PROCEDURE — 36415 COLL VENOUS BLD VENIPUNCTURE: CPT

## 2017-12-02 PROCEDURE — 90935 HEMODIALYSIS ONE EVALUATION: CPT

## 2017-12-02 PROCEDURE — 93010 ELECTROCARDIOGRAM REPORT: CPT | Mod: ,,, | Performed by: INTERNAL MEDICINE

## 2017-12-02 PROCEDURE — 25000003 PHARM REV CODE 250: Performed by: NURSE PRACTITIONER

## 2017-12-02 PROCEDURE — 84132 ASSAY OF SERUM POTASSIUM: CPT | Mod: 91

## 2017-12-02 PROCEDURE — 25000003 PHARM REV CODE 250: Performed by: INTERNAL MEDICINE

## 2017-12-02 PROCEDURE — 93005 ELECTROCARDIOGRAM TRACING: CPT

## 2017-12-02 PROCEDURE — 84132 ASSAY OF SERUM POTASSIUM: CPT

## 2017-12-02 PROCEDURE — 80053 COMPREHEN METABOLIC PANEL: CPT

## 2017-12-02 PROCEDURE — 63600175 PHARM REV CODE 636 W HCPCS: Performed by: SURGERY

## 2017-12-02 PROCEDURE — 83735 ASSAY OF MAGNESIUM: CPT

## 2017-12-02 PROCEDURE — 20600001 HC STEP DOWN PRIVATE ROOM

## 2017-12-02 PROCEDURE — 85025 COMPLETE CBC W/AUTO DIFF WBC: CPT

## 2017-12-02 PROCEDURE — 90935 HEMODIALYSIS ONE EVALUATION: CPT | Mod: ,,, | Performed by: INTERNAL MEDICINE

## 2017-12-02 RX ORDER — OXYCODONE AND ACETAMINOPHEN 10; 325 MG/1; MG/1
1 TABLET ORAL EVERY 4 HOURS PRN
Status: DISCONTINUED | OUTPATIENT
Start: 2017-12-02 | End: 2017-12-04 | Stop reason: HOSPADM

## 2017-12-02 RX ORDER — OXYCODONE AND ACETAMINOPHEN 5; 325 MG/1; MG/1
1 TABLET ORAL EVERY 4 HOURS PRN
Status: DISCONTINUED | OUTPATIENT
Start: 2017-12-02 | End: 2017-12-04 | Stop reason: HOSPADM

## 2017-12-02 RX ORDER — SODIUM CHLORIDE 9 MG/ML
INJECTION, SOLUTION INTRAVENOUS ONCE
Status: COMPLETED | OUTPATIENT
Start: 2017-12-02 | End: 2017-12-02

## 2017-12-02 RX ORDER — SODIUM CHLORIDE 9 MG/ML
INJECTION, SOLUTION INTRAVENOUS
Status: DISCONTINUED | OUTPATIENT
Start: 2017-12-02 | End: 2017-12-03

## 2017-12-02 RX ORDER — POTASSIUM CHLORIDE 29.8 MG/ML
40 INJECTION INTRAVENOUS ONCE
Status: DISCONTINUED | OUTPATIENT
Start: 2017-12-02 | End: 2017-12-02

## 2017-12-02 RX ADMIN — SODIUM CHLORIDE 300 ML: 900 INJECTION, SOLUTION INTRAVENOUS at 04:12

## 2017-12-02 RX ADMIN — SODIUM POLYSTYRENE SULFONATE 30 G: 15 SUSPENSION ORAL; RECTAL at 09:12

## 2017-12-02 RX ADMIN — NYSTATIN 500000 UNITS: 500000 SUSPENSION ORAL at 06:12

## 2017-12-02 RX ADMIN — FAMOTIDINE 20 MG: 20 TABLET, FILM COATED ORAL at 08:12

## 2017-12-02 RX ADMIN — AMLODIPINE BESYLATE 10 MG: 10 TABLET ORAL at 08:12

## 2017-12-02 RX ADMIN — PREDNISONE 40 MG: 20 TABLET ORAL at 08:12

## 2017-12-02 RX ADMIN — STANDARDIZED SENNA CONCENTRATE AND DOCUSATE SODIUM 1 TABLET: 8.6; 5 TABLET, FILM COATED ORAL at 08:12

## 2017-12-02 RX ADMIN — MUPIROCIN 1 G: 20 OINTMENT TOPICAL at 09:12

## 2017-12-02 RX ADMIN — MUPIROCIN 1 G: 20 OINTMENT TOPICAL at 08:12

## 2017-12-02 RX ADMIN — DEXTROSE MONOHYDRATE 25 G: 25 INJECTION, SOLUTION INTRAVENOUS at 09:12

## 2017-12-02 RX ADMIN — NYSTATIN 500000 UNITS: 500000 SUSPENSION ORAL at 12:12

## 2017-12-02 RX ADMIN — SEVELAMER CARBONATE 800 MG: 800 TABLET, FILM COATED ORAL at 12:12

## 2017-12-02 RX ADMIN — NYSTATIN 500000 UNITS: 500000 SUSPENSION ORAL at 09:12

## 2017-12-02 RX ADMIN — NYSTATIN 500000 UNITS: 500000 SUSPENSION ORAL at 08:12

## 2017-12-02 RX ADMIN — SODIUM CHLORIDE: 0.9 INJECTION, SOLUTION INTRAVENOUS at 08:12

## 2017-12-02 RX ADMIN — CEFAZOLIN SODIUM 2 G: 2 SOLUTION INTRAVENOUS at 06:12

## 2017-12-02 RX ADMIN — SEVELAMER CARBONATE 800 MG: 800 TABLET, FILM COATED ORAL at 06:12

## 2017-12-02 RX ADMIN — INSULIN HUMAN 10 UNITS: 100 INJECTION, SOLUTION PARENTERAL at 09:12

## 2017-12-02 RX ADMIN — SEVELAMER CARBONATE 800 MG: 800 TABLET, FILM COATED ORAL at 08:12

## 2017-12-02 RX ADMIN — STANDARDIZED SENNA CONCENTRATE AND DOCUSATE SODIUM 1 TABLET: 8.6; 5 TABLET, FILM COATED ORAL at 09:12

## 2017-12-02 NOTE — PROGRESS NOTES
Notified FIOR Willett NP of the following:   Lab reported critical K of 8.1, hemolyzed finger stick.   NP ordered to recollect STAT.   Will carry out orders. Will continue to monitor.

## 2017-12-02 NOTE — PROGRESS NOTES
HEMODIALYSIS NOTE  Patient evaluated while undergoing hemodialysis indicated for ESRD. Tolerating session with current UFR, no complications.

## 2017-12-02 NOTE — PROGRESS NOTES
Dialysis tx completed. # hours. 2 liters removed. Needles pulled from left arm fistula. Manual pressure held. Hemostasis achieved. Gauze and tape to sites. Tolerated tx well. Report given to Emilie SABILLON

## 2017-12-02 NOTE — PROGRESS NOTES
Notified FIOR Willett NP of the following:   Patient's repeat potassium resulted at 7.3 - still hemolyzed.   NP ordered stat K and stat EKG. Will continue to monitor.

## 2017-12-02 NOTE — PROGRESS NOTES
Patient transferred to HD per stretcher by transport aide.   Transport aide aware of need for telemetry monitoring, IV fluids and cuellar.   Report called to receiving nurse.   No acute distress noted. Will monitor for return.

## 2017-12-02 NOTE — PLAN OF CARE
Problem: Patient Care Overview  Goal: Plan of Care Review  Patient is AAOx4. Bed low, locked, call bell within reach, non skid socks on. Patient educated to use call bell for assistance, verbalized understanding. Patient remains free from falls or injury so far this shift - ambulates with standby assistance.   Afebrile, WBC 13.1. Continuing NS at 125 cc/hr.   Telemetry monitoring SR 60's. Potassium elevated at 6.9 - patient shifted with d5 and insulin.   RLQ incision with scant bloody drainage outlined. RLQ GABBIE with bloody output 30 cc.   Hendrickson to gravity with pink tinged urine - urine output 150 cc so far this shift.   Continuing PCA pump for pain - patient reports pain well controlled.   CR 8.0 - HD today.   See flow sheet for complete assessment details.

## 2017-12-02 NOTE — PROGRESS NOTES
Notified FIOR Willett NP of the following:  Orders to remove cuellar at 0600, urine characteristics changed from clear yellow to pink tinged.   NP stated to keep cuellar and continue to monitor.

## 2017-12-02 NOTE — PROGRESS NOTES
Notified FIOR Willett NP of the following:  Patient's repeat K 6.9.   NP to place orders to shift.   Will carry out orders. Will continue to monitor.

## 2017-12-02 NOTE — PROGRESS NOTES
Patient arrived on unit via stretcher. Report received from Emilie Frederick. Maintenance dialysis initiated via left arm fistula with 15 g needles x 2 without difficulty. Lines connected and secured. Good blood flows established.

## 2017-12-03 PROBLEM — Z90.5 S/P NEPHRECTOMY: Status: ACTIVE | Noted: 2017-12-03

## 2017-12-03 LAB
ALBUMIN SERPL BCP-MCNC: 3 G/DL
ALP SERPL-CCNC: 144 U/L
ALT SERPL W/O P-5'-P-CCNC: <5 U/L
ANION GAP SERPL CALC-SCNC: 12 MMOL/L
AST SERPL-CCNC: 19 U/L
BASOPHILS # BLD AUTO: 0.02 K/UL
BASOPHILS NFR BLD: 0.2 %
BILIRUB SERPL-MCNC: 0.3 MG/DL
BUN SERPL-MCNC: 31 MG/DL
CALCIUM SERPL-MCNC: 8.6 MG/DL
CHLORIDE SERPL-SCNC: 98 MMOL/L
CO2 SERPL-SCNC: 25 MMOL/L
CREAT SERPL-MCNC: 6 MG/DL
DIFFERENTIAL METHOD: ABNORMAL
EOSINOPHIL # BLD AUTO: 0 K/UL
EOSINOPHIL NFR BLD: 0.2 %
ERYTHROCYTE [DISTWIDTH] IN BLOOD BY AUTOMATED COUNT: 16.6 %
EST. GFR  (AFRICAN AMERICAN): 10.3 ML/MIN/1.73 M^2
EST. GFR  (NON AFRICAN AMERICAN): 8.9 ML/MIN/1.73 M^2
GLUCOSE SERPL-MCNC: 81 MG/DL
HCT VFR BLD AUTO: 32.2 %
HGB BLD-MCNC: 10.4 G/DL
IMM GRANULOCYTES # BLD AUTO: 0.09 K/UL
IMM GRANULOCYTES NFR BLD AUTO: 0.7 %
LYMPHOCYTES # BLD AUTO: 1 K/UL
LYMPHOCYTES NFR BLD: 7.4 %
MAGNESIUM SERPL-MCNC: 1.8 MG/DL
MCH RBC QN AUTO: 28.7 PG
MCHC RBC AUTO-ENTMCNC: 32.3 G/DL
MCV RBC AUTO: 89 FL
MONOCYTES # BLD AUTO: 0.8 K/UL
MONOCYTES NFR BLD: 5.6 %
NEUTROPHILS # BLD AUTO: 11.4 K/UL
NEUTROPHILS NFR BLD: 85.9 %
NRBC BLD-RTO: 0 /100 WBC
PHOSPHATE SERPL-MCNC: 5.2 MG/DL
PLATELET # BLD AUTO: 186 K/UL
PMV BLD AUTO: 11.7 FL
POTASSIUM SERPL-SCNC: 4.9 MMOL/L
PROT SERPL-MCNC: 6.5 G/DL
RBC # BLD AUTO: 3.63 M/UL
SODIUM SERPL-SCNC: 135 MMOL/L
WBC # BLD AUTO: 13.3 K/UL

## 2017-12-03 PROCEDURE — 85025 COMPLETE CBC W/AUTO DIFF WBC: CPT

## 2017-12-03 PROCEDURE — 25000003 PHARM REV CODE 250: Performed by: SURGERY

## 2017-12-03 PROCEDURE — 83735 ASSAY OF MAGNESIUM: CPT

## 2017-12-03 PROCEDURE — 99233 SBSQ HOSP IP/OBS HIGH 50: CPT | Mod: ,,, | Performed by: NURSE PRACTITIONER

## 2017-12-03 PROCEDURE — 25000003 PHARM REV CODE 250: Performed by: NURSE PRACTITIONER

## 2017-12-03 PROCEDURE — 63600175 PHARM REV CODE 636 W HCPCS: Performed by: NURSE PRACTITIONER

## 2017-12-03 PROCEDURE — 84100 ASSAY OF PHOSPHORUS: CPT

## 2017-12-03 PROCEDURE — 80053 COMPREHEN METABOLIC PANEL: CPT

## 2017-12-03 PROCEDURE — 20600001 HC STEP DOWN PRIVATE ROOM

## 2017-12-03 PROCEDURE — 36415 COLL VENOUS BLD VENIPUNCTURE: CPT

## 2017-12-03 PROCEDURE — 63600175 PHARM REV CODE 636 W HCPCS: Performed by: SURGERY

## 2017-12-03 RX ORDER — SODIUM CHLORIDE 9 MG/ML
INJECTION, SOLUTION INTRAVENOUS ONCE
Status: DISCONTINUED | OUTPATIENT
Start: 2017-12-03 | End: 2017-12-04 | Stop reason: HOSPADM

## 2017-12-03 RX ORDER — SODIUM CHLORIDE 9 MG/ML
INJECTION, SOLUTION INTRAVENOUS
Status: DISCONTINUED | OUTPATIENT
Start: 2017-12-03 | End: 2017-12-04 | Stop reason: HOSPADM

## 2017-12-03 RX ORDER — LIDOCAINE HYDROCHLORIDE 10 MG/ML
10 INJECTION, SOLUTION EPIDURAL; INFILTRATION; INTRACAUDAL; PERINEURAL ONCE
Status: DISCONTINUED | OUTPATIENT
Start: 2017-12-03 | End: 2017-12-03

## 2017-12-03 RX ADMIN — MUPIROCIN 1 G: 20 OINTMENT TOPICAL at 09:12

## 2017-12-03 RX ADMIN — SEVELAMER CARBONATE 800 MG: 800 TABLET, FILM COATED ORAL at 09:12

## 2017-12-03 RX ADMIN — SEVELAMER CARBONATE 800 MG: 800 TABLET, FILM COATED ORAL at 05:12

## 2017-12-03 RX ADMIN — AMLODIPINE BESYLATE 10 MG: 10 TABLET ORAL at 09:12

## 2017-12-03 RX ADMIN — STANDARDIZED SENNA CONCENTRATE AND DOCUSATE SODIUM 1 TABLET: 8.6; 5 TABLET, FILM COATED ORAL at 09:12

## 2017-12-03 RX ADMIN — FAMOTIDINE 20 MG: 20 TABLET, FILM COATED ORAL at 09:12

## 2017-12-03 RX ADMIN — NYSTATIN 500000 UNITS: 500000 SUSPENSION ORAL at 12:12

## 2017-12-03 RX ADMIN — PREDNISONE 40 MG: 20 TABLET ORAL at 09:12

## 2017-12-03 RX ADMIN — STANDARDIZED SENNA CONCENTRATE AND DOCUSATE SODIUM 1 TABLET: 8.6; 5 TABLET, FILM COATED ORAL at 08:12

## 2017-12-03 RX ADMIN — NYSTATIN 500000 UNITS: 500000 SUSPENSION ORAL at 09:12

## 2017-12-03 RX ADMIN — SEVELAMER CARBONATE 800 MG: 800 TABLET, FILM COATED ORAL at 12:12

## 2017-12-03 RX ADMIN — NYSTATIN 500000 UNITS: 500000 SUSPENSION ORAL at 08:12

## 2017-12-03 RX ADMIN — NYSTATIN 500000 UNITS: 500000 SUSPENSION ORAL at 05:12

## 2017-12-03 NOTE — HOSPITAL COURSE
No acute events overnight.  Doing well.  Plan to remove cuellar today.  Likely remove drain tomorrow and d/c home.  Tolerated HD well.  K+ 4.9 today.

## 2017-12-03 NOTE — PROGRESS NOTES
[facility]  Liver Transplant  Progress Note    Patient Name: Leydi Cordero  MRN: [id number]  Admission Date: 11/27/2017  Hospital Length of Stay: 6 days  Code Status: Full Code  Primary Care Provider: Won Miller MD  Post-Operative Day: 963    ORGAN:     Disease Etiology:   Donor Type:     CDC High Risk:     Donor CMV Status:   Donor CMV Status:   Donor HBcAB:     Donor HCV Status:     Whole or Partial:   Biliary Anastomosis:   Arterial Anatomy:   Subjective:     History of Present Illness:  27 yr female with a PMH of ESRD 2/2 hypertensive nephropathy, s/p renal transplant 4-15-15 at [facility] in [place] with Campath induction. 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 She reports her kidney txp failing in 2017 and resumed HD 5 months ago on a MWF schedule through a LUE graft (last HD today 11/29/17). She reports urinating 1-2 times daily. She was transferred from [facility] for hematuria and concern for acute rejection of kidney requiring transplant nephrectomy. She reports hematuria and allograft site pain for 2 weeks. She was given solumedrol 80mg IV in [place] for presumed rejection. Will plan to continue steroids 40 mg po daily. No pain noted over allograft but patient continues to have hematuria. Will plan for transplant nephrectomy later in week, likely Friday. Bactrim and Nystatin restarted today. Pt is [place] speaking only. Bactrim held today due to hyperkalemia.  HD this am then txp nephrectomy.    Hospital Course:  No acute events overnight.  Doing well.  Plan to remove cuellar today.  Likely remove drain tomorrow and d/c home.  Tolerated HD well.  K+ 4.9 today.        Subjective:   History of Present Illness:  27 yr female with a PMH of ESRD 2/2 hypertensive nephropathy, s/p renal transplant 4-15-15 at [facility] in [place] with  Campath induction. 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 She reports her kidney txp failing in 2017 and resumed HD 5 months ago on a MWF schedule through a LUE graft (last HD today 11/29/17). She reports urinating 1-2 times daily. She was transferred from [facility] for hematuria and concern for acute rejection of kidney requiring transplant nephrectomy. She reports hematuria and allograft site pain for 2 weeks. She was given solumedrol 80mg IV in [place] for presumed rejection. Will plan to continue steroids 40 mg po daily. No pain noted over allograft but patient continues to have hematuria. Will plan for transplant nephrectomy later in week, likely Friday. Bactrim and Nystatin restarted today. Pt is [place] speaking only. Bactrim held today due to hyperkalemia.  HD this am then txp nephrectomy.    Hospital Course:  No acute events overnight.  Doing well.  Plan to remove cuellar today.  Likely remove drain tomorrow and d/c home.  Tolerated HD well.  K+ 4.9 today.        Past Medical, Surgical, Family, and Social History:   Unchanged from H&P.    Scheduled Meds:   amLODIPine  10 mg Oral Daily    famotidine  20 mg Oral Daily    heparin (porcine)  5,000 Units Subcutaneous Q8H    mupirocin  1 g Nasal BID    nystatin  500,000 Units Oral QID (WM & HS)    predniSONE  40 mg Oral Daily    senna-docusate 8.6-50 mg  1 tablet Oral BID    sevelamer carbonate  800 mg Oral TID WM    sulfamethoxazole-trimethoprim 400-80mg  1 tablet Oral Every Mon, Wed, Fri     Continuous Infusions:   PRN Meds:sodium chloride 0.9%, acetaminophen, calcium carbonate, [COMPLETED] dextrose 50% **AND** dextrose 50% **AND** [COMPLETED] insulin regular, diphenhydrAMINE, HYDROmorphone, metoclopramide HCl, ondansetron, oxyCODONE-acetaminophen, oxyCODONE-acetaminophen, sodium chloride 0.9%    Intake/Output - Last 3 Shifts       12/01  0700 - 12/02 0659 12/02 0700 - 12/03 0659 12/03 0700 - 12/04 0659    P.O. 420 720     I.V. (mL/kg) 2954.8 (29.4) 1170 (11.8)     Other 600 600     IV Piggyback 100      Total Intake(mL/kg) 4074.8 (40.5) 2490 (25.2)     Urine (mL/kg/hr) 315 (0.1) 305 (0.1) 50 (0.2)    Emesis/NG output  0 (0)     Drains 130 (0.1) 60 (0)     Other 2600 (1.1) 2600 (1.1)     Stool 0 (0) 0 (0)     Total Output 3045 2965 50    Net +1029.8 -475 -50           Urine Occurrence  0 x     Stool Occurrence 0 x 0 x     Emesis Occurrence  0 x            Review of Systems   Constitutional: Negative for activity change, appetite change, chills, fatigue and fever.   HENT: Negative for congestion and facial swelling.    Eyes: Negative.    Respiratory: Negative for cough, chest tightness, shortness of breath and wheezing.    Cardiovascular: Negative for chest pain, palpitations and leg swelling.   Gastrointestinal: Positive for abdominal pain. Negative for abdominal distention, constipation, diarrhea, nausea and vomiting.   Endocrine: Negative.    Genitourinary: Positive for decreased urine volume. Negative for difficulty urinating, dysuria, frequency, hematuria and urgency.   Musculoskeletal: Negative for back pain and neck pain.   Skin: Negative for color change, pallor, rash and wound.   Allergic/Immunologic: Negative for immunocompromised state.   Neurological: Negative for dizziness, tremors, weakness and headaches.   Hematological: Negative.    Psychiatric/Behavioral: Negative for agitation, confusion and sleep disturbance.   All other systems reviewed and are negative.     Objective:     Vital Signs (Most Recent):  Temp: 98 °F (36.7 °C) (12/03/17 0743)  Pulse: 79 (12/03/17 0743)  Resp: 18 (12/03/17 0743)  BP: 130/77 (12/03/17 0743)  SpO2: 99 % (12/03/17 0743) Vital Signs (24h Range):  Temp:  [97.6 °F (36.4 °C)-98.4 °F (36.9 °C)] 98 °F (36.7 °C)  Pulse:  [62-84] 79  Resp:  [16-20] 18  SpO2:  [98 %-100 %] 99 %  BP: (119-151)/(71-98) 130/77  "    Weight: 98.9 kg (218 lb 0.6 oz)  Height: 5' 6" (167.6 cm)  Body mass index is 35.19 kg/m².    Physical Exam   Constitutional: She is oriented to person, place, and time. She appears well-developed and well-nourished. She is active and cooperative.   HENT:   Head: Normocephalic and atraumatic.   Eyes: Pupils are equal, round, and reactive to light.   Neck: Normal range of motion.   Cardiovascular: Normal rate, regular rhythm, intact distal pulses and normal pulses.    Murmur heard.  Pulmonary/Chest: Effort normal and breath sounds normal. No respiratory distress. She has no decreased breath sounds. She has no wheezes. She has no rales.   Abdominal: Soft. Normal appearance and bowel sounds are normal. She exhibits no distension. There is no tenderness. There is no guarding.   Musculoskeletal: Normal range of motion. She exhibits edema (+1 generalized).   Neurological: She is alert and oriented to person, place, and time.   Skin: Skin is warm, dry and intact.   LUE AV graft +thrill/+bruit   Psychiatric: She has a normal mood and affect. Her behavior is normal.   Nursing note and vitals reviewed.      Laboratory:  CBC:     Recent Labs  Lab 12/01/17  0509 12/02/17  0524 12/03/17  0428   WBC 7.62 13.15* 13.30*   RBC 3.84* 3.69* 3.63*   HGB 11.0* 10.6* 10.4*   HCT 34.3* 32.3* 32.2*    149* 186   MCV 89 88 89   MCH 28.6 28.7 28.7   MCHC 32.1 32.8 32.3     BMP:     Recent Labs  Lab 12/02/17  0524 12/02/17  0753  12/02/17  1137 12/02/17  2045 12/03/17  0428   GLU 88 85  --   --   --  81   * 131*  --   --   --  135*   K 8.1* 7.3*  < > 6.0* 4.1 4.9    102  --   --   --  98   CO2 16* 19*  --   --   --  25   BUN 43* 42*  --   --   --  31*   CREATININE 7.8* 8.0*  --   --   --  6.0*   CALCIUM 8.7 8.8  --   --   --  8.6*   < > = values in this interval not displayed.    Diagnostic Results:  None    Assessment/Plan:     * Hematuria    - improved.          S/p nephrectomy    - overall doing well.  Hendrickson removed " today.  Plan to remove GABBIE tomorrow.             ESRD (end stage renal disease)    - Cont HD MWF.          Hyperkalemia    - stable 4.9 today.  Tolerated HD 12/2.          Hyperphosphatemia    - stable.            Essential hypertension    - well managed.          H/O kidney transplant                  VTE Risk Mitigation         Ordered     heparin (porcine) injection 5,000 Units  Every 8 hours     Route:  Subcutaneous        12/01/17 1556     Medium Risk of VTE  Once      12/01/17 1556     Place sequential compression device  Until discontinued      12/01/17 1556          The patients clinical status was discussed at multidisplinary rounds, involving transplant surgery, transplant medicine, pharmacy, nursing, nutrition, and social work    Discharge Planning:  [facility]  Fax   Ph       Shahla Willett NP  Liver Transplant  [facility]

## 2017-12-03 NOTE — PLAN OF CARE
Problem: Patient Care Overview  Goal: Plan of Care Review  Outcome: Ongoing (interventions implemented as appropriate)  Patient remains free from falls. Patient and  have no questions or concerns at this time. PCA d/c per NP PO pain medications offered patient declined at this time. Surgical dressing remains intact dried drainage area nitish. Hendrickson catheter patent clear yellow urine noted. Potassium Q 4 last reading K=4.1 per orders frequent labs discontinued. Will continue to monitor.

## 2017-12-03 NOTE — PLAN OF CARE
Problem: Patient Care Overview  Goal: Plan of Care Review  Patient is AAOx4. Bed low, locked, call bell within reach, non skid socks on. Patient educated to use call bell for assistance, verbalized understanding. Patient remains free from falls or injury so far this shift - ambulates with standby assistance.   Afebrile, WBC 13.30.   Telemetry monitoring SR 60's. Potassium 4.9 with AM labs.   RLQ incision SOMMER with staples, painted with betadine. RLQ GABBIE with bloody output 30 cc - serous drainage noted from insertion site - gauze dressing applied.    Hendrickson dcd, patient is HD dependent and oliguric - urine output 50 cc so far this shift.   No complaints of pain so far this shift.   See flow sheet for complete assessment details.   Possible discharge tomorrow.

## 2017-12-03 NOTE — SUBJECTIVE & OBJECTIVE
Subjective:   History of Present Illness:  No notes on file    Hospital Course:  No notes on file      Past Medical, Surgical, Family, and Social History:   Unchanged from H&P.    Scheduled Meds:   amLODIPine  10 mg Oral Daily    famotidine  20 mg Oral Daily    heparin (porcine)  5,000 Units Subcutaneous Q8H    mupirocin  1 g Nasal BID    nystatin  500,000 Units Oral QID (WM & HS)    predniSONE  40 mg Oral Daily    senna-docusate 8.6-50 mg  1 tablet Oral BID    sevelamer carbonate  800 mg Oral TID WM    sulfamethoxazole-trimethoprim 400-80mg  1 tablet Oral Every Mon, Wed, Fri     Continuous Infusions:   PRN Meds:sodium chloride 0.9%, acetaminophen, calcium carbonate, [COMPLETED] dextrose 50% **AND** dextrose 50% **AND** [COMPLETED] insulin regular, diphenhydrAMINE, HYDROmorphone, metoclopramide HCl, ondansetron, oxyCODONE-acetaminophen, oxyCODONE-acetaminophen, sodium chloride 0.9%    Intake/Output - Last 3 Shifts       12/01 0700 - 12/02 0659 12/02 0700 - 12/03 0659 12/03 0700 - 12/04 0659    P.O. 420 720     I.V. (mL/kg) 2954.8 (29.4) 1170 (11.8)     Other 600 600     IV Piggyback 100      Total Intake(mL/kg) 4074.8 (40.5) 2490 (25.2)     Urine (mL/kg/hr) 315 (0.1) 305 (0.1)     Emesis/NG output  0 (0)     Drains 130 (0.1) 60 (0)     Other 2600 (1.1) 2600 (1.1)     Stool 0 (0) 0 (0)     Total Output 3045 2965      Net +1029.8 -475             Urine Occurrence  0 x     Stool Occurrence 0 x 0 x     Emesis Occurrence  0 x            Review of Systems   Constitutional: Negative for activity change, appetite change, chills, fatigue and fever.   HENT: Negative for congestion and facial swelling.    Eyes: Negative.    Respiratory: Negative for cough, chest tightness, shortness of breath and wheezing.    Cardiovascular: Negative for chest pain, palpitations and leg swelling.   Gastrointestinal: Positive for abdominal pain. Negative for abdominal distention, constipation, diarrhea, nausea and vomiting.  "  Endocrine: Negative.    Genitourinary: Positive for decreased urine volume and hematuria. Negative for difficulty urinating, dysuria, frequency and urgency.   Musculoskeletal: Negative for back pain and neck pain.   Skin: Negative for color change, pallor, rash and wound.   Allergic/Immunologic: Negative for immunocompromised state.   Neurological: Negative for dizziness, tremors, weakness and headaches.   Hematological: Negative.    Psychiatric/Behavioral: Negative for agitation, confusion and sleep disturbance.   All other systems reviewed and are negative.     Objective:     Vital Signs (Most Recent):  Temp: 98 °F (36.7 °C) (12/03/17 0743)  Pulse: 79 (12/03/17 0743)  Resp: 18 (12/03/17 0743)  BP: 130/77 (12/03/17 0743)  SpO2: 99 % (12/03/17 0743) Vital Signs (24h Range):  Temp:  [97.6 °F (36.4 °C)-98.4 °F (36.9 °C)] 98 °F (36.7 °C)  Pulse:  [62-84] 79  Resp:  [16-20] 18  SpO2:  [98 %-100 %] 99 %  BP: (119-151)/(71-98) 130/77     Weight: 98.9 kg (218 lb 0.6 oz)  Height: 5' 6" (167.6 cm)  Body mass index is 35.19 kg/m².    Physical Exam   Constitutional: She is oriented to person, place, and time. She appears well-developed and well-nourished. She is active and cooperative.   HENT:   Head: Normocephalic and atraumatic.   Eyes: Pupils are equal, round, and reactive to light.   Neck: Normal range of motion.   Cardiovascular: Normal rate, regular rhythm, intact distal pulses and normal pulses.    Murmur heard.  Pulmonary/Chest: Effort normal and breath sounds normal. No respiratory distress. She has no decreased breath sounds. She has no wheezes. She has no rales.   Abdominal: Soft. Normal appearance and bowel sounds are normal. She exhibits no distension. There is no tenderness. There is no guarding.   Musculoskeletal: Normal range of motion. She exhibits edema (+1 generalized).   Neurological: She is alert and oriented to person, place, and time.   Skin: Skin is warm, dry and intact.   LUE AV graft +thrill/+bruit "   Psychiatric: She has a normal mood and affect. Her behavior is normal.   Nursing note and vitals reviewed.      Laboratory:  CBC:     Recent Labs  Lab 12/01/17  0509 12/02/17  0524 12/03/17 0428   WBC 7.62 13.15* 13.30*   RBC 3.84* 3.69* 3.63*   HGB 11.0* 10.6* 10.4*   HCT 34.3* 32.3* 32.2*    149* 186   MCV 89 88 89   MCH 28.6 28.7 28.7   MCHC 32.1 32.8 32.3     BMP:     Recent Labs  Lab 12/02/17  0524 12/02/17  0753  12/02/17  1137 12/02/17 2045 12/03/17 0428   GLU 88 85  --   --   --  81   * 131*  --   --   --  135*   K 8.1* 7.3*  < > 6.0* 4.1 4.9    102  --   --   --  98   CO2 16* 19*  --   --   --  25   BUN 43* 42*  --   --   --  31*   CREATININE 7.8* 8.0*  --   --   --  6.0*   CALCIUM 8.7 8.8  --   --   --  8.6*   < > = values in this interval not displayed.    Diagnostic Results:  None

## 2017-12-03 NOTE — PROGRESS NOTES
Dr. Luu at bedside:  Requesting to remove patient's L EJ PIV and keep R FA 24 g.  Notified MD that the 24 g PIV is due to be changed and patient is a hard stick.   MD stated OK to extend 24 g PIV until tomorrow.

## 2017-12-03 NOTE — PROGRESS NOTES
[facility]  Liver Transplant  Progress Note    Patient Name: Leydi Cordero  MRN: [id number]  Admission Date: 11/27/2017  Hospital Length of Stay: 6 days  Code Status: Full Code  Primary Care Provider: Won Miller MD  Post-Operative Day: 963    ORGAN:     Disease Etiology:   Donor Type:     CDC High Risk:     Donor CMV Status:   Donor CMV Status:   Donor HBcAB:     Donor HCV Status:     Whole or Partial:   Biliary Anastomosis:   Arterial Anatomy:   Subjective:     History of Present Illness:  No notes on file    Hospital Course:  No notes on file      Subjective:   History of Present Illness:  No notes on file    Hospital Course:  No notes on file      Past Medical, Surgical, Family, and Social History:   Unchanged from H&P.    Scheduled Meds:   amLODIPine  10 mg Oral Daily    famotidine  20 mg Oral Daily    heparin (porcine)  5,000 Units Subcutaneous Q8H    mupirocin  1 g Nasal BID    nystatin  500,000 Units Oral QID (WM & HS)    predniSONE  40 mg Oral Daily    senna-docusate 8.6-50 mg  1 tablet Oral BID    sevelamer carbonate  800 mg Oral TID WM    sulfamethoxazole-trimethoprim 400-80mg  1 tablet Oral Every Mon, Wed, Fri     Continuous Infusions:   PRN Meds:sodium chloride 0.9%, acetaminophen, calcium carbonate, [COMPLETED] dextrose 50% **AND** dextrose 50% **AND** [COMPLETED] insulin regular, diphenhydrAMINE, HYDROmorphone, metoclopramide HCl, ondansetron, oxyCODONE-acetaminophen, oxyCODONE-acetaminophen, sodium chloride 0.9%    Intake/Output - Last 3 Shifts       12/01 0700 - 12/02 0659 12/02 0700 - 12/03 0659 12/03 0700 - 12/04 0659    P.O. 420 720     I.V. (mL/kg) 2954.8 (29.4) 1170 (11.8)     Other 600 600     IV Piggyback 100      Total Intake(mL/kg) 4074.8 (40.5) 2490 (25.2)     Urine (mL/kg/hr) 315 (0.1) 305 (0.1)     Emesis/NG output  0 (0)     Drains 130 (0.1) 60 (0)     Other 2600 (1.1) 2600 (1.1)     Stool 0 (0) 0 (0)     Total Output 3045 2965      Net +1029.8  "-475             Urine Occurrence  0 x     Stool Occurrence 0 x 0 x     Emesis Occurrence  0 x            Review of Systems   Constitutional: Negative for activity change, appetite change, chills, fatigue and fever.   HENT: Negative for congestion and facial swelling.    Eyes: Negative.    Respiratory: Negative for cough, chest tightness, shortness of breath and wheezing.    Cardiovascular: Negative for chest pain, palpitations and leg swelling.   Gastrointestinal: Positive for abdominal pain. Negative for abdominal distention, constipation, diarrhea, nausea and vomiting.   Endocrine: Negative.    Genitourinary: Positive for decreased urine volume and hematuria. Negative for difficulty urinating, dysuria, frequency and urgency.   Musculoskeletal: Negative for back pain and neck pain.   Skin: Negative for color change, pallor, rash and wound.   Allergic/Immunologic: Negative for immunocompromised state.   Neurological: Negative for dizziness, tremors, weakness and headaches.   Hematological: Negative.    Psychiatric/Behavioral: Negative for agitation, confusion and sleep disturbance.   All other systems reviewed and are negative.     Objective:     Vital Signs (Most Recent):  Temp: 98 °F (36.7 °C) (12/03/17 0743)  Pulse: 79 (12/03/17 0743)  Resp: 18 (12/03/17 0743)  BP: 130/77 (12/03/17 0743)  SpO2: 99 % (12/03/17 0743) Vital Signs (24h Range):  Temp:  [97.6 °F (36.4 °C)-98.4 °F (36.9 °C)] 98 °F (36.7 °C)  Pulse:  [62-84] 79  Resp:  [16-20] 18  SpO2:  [98 %-100 %] 99 %  BP: (119-151)/(71-98) 130/77     Weight: 98.9 kg (218 lb 0.6 oz)  Height: 5' 6" (167.6 cm)  Body mass index is 35.19 kg/m².    Physical Exam   Constitutional: She is oriented to person, place, and time. She appears well-developed and well-nourished. She is active and cooperative.   HENT:   Head: Normocephalic and atraumatic.   Eyes: Pupils are equal, round, and reactive to light.   Neck: Normal range of motion.   Cardiovascular: Normal rate, regular " rhythm, intact distal pulses and normal pulses.    Murmur heard.  Pulmonary/Chest: Effort normal and breath sounds normal. No respiratory distress. She has no decreased breath sounds. She has no wheezes. She has no rales.   Abdominal: Soft. Normal appearance and bowel sounds are normal. She exhibits no distension. There is no tenderness. There is no guarding.   Musculoskeletal: Normal range of motion. She exhibits edema (+1 generalized).   Neurological: She is alert and oriented to person, place, and time.   Skin: Skin is warm, dry and intact.   LUE AV graft +thrill/+bruit   Psychiatric: She has a normal mood and affect. Her behavior is normal.   Nursing note and vitals reviewed.      Laboratory:  CBC:     Recent Labs  Lab 12/01/17  0509 12/02/17  0524 12/03/17  0428   WBC 7.62 13.15* 13.30*   RBC 3.84* 3.69* 3.63*   HGB 11.0* 10.6* 10.4*   HCT 34.3* 32.3* 32.2*    149* 186   MCV 89 88 89   MCH 28.6 28.7 28.7   MCHC 32.1 32.8 32.3     BMP:     Recent Labs  Lab 12/02/17  0524 12/02/17  0753  12/02/17  1137 12/02/17  2045 12/03/17  0428   GLU 88 85  --   --   --  81   * 131*  --   --   --  135*   K 8.1* 7.3*  < > 6.0* 4.1 4.9    102  --   --   --  98   CO2 16* 19*  --   --   --  25   BUN 43* 42*  --   --   --  31*   CREATININE 7.8* 8.0*  --   --   --  6.0*   CALCIUM 8.7 8.8  --   --   --  8.6*   < > = values in this interval not displayed.    Diagnostic Results:  None    Assessment/Plan:     * Hematuria    - improved.          S/p nephrectomy    - overall doing well.  Hendrickson removed today.  Plan to remove GABBIE tomorrow.             ESRD (end stage renal disease)    - Cont HD MWF.          Hyperkalemia    - stable 4.9 today.  Tolerated HD 12/2.          Hyperphosphatemia    - stable.            Essential hypertension    - well managed.          H/O kidney transplant                  VTE Risk Mitigation         Ordered     heparin (porcine) injection 5,000 Units  Every 8 hours     Route:  Subcutaneous         12/01/17 1556     Medium Risk of VTE  Once      12/01/17 1556     Place sequential compression device  Until discontinued      12/01/17 1556          The patients clinical status was discussed at multidisplinary rounds, involving transplant surgery, transplant medicine, pharmacy, nursing, nutrition, and social work    Discharge Planning:  [facility] [place]  Fax   Ph       Shahla Willett NP  Liver Transplant  [facility]

## 2017-12-03 NOTE — SUBJECTIVE & OBJECTIVE
Subjective:   History of Present Illness:  27 yr female with a PMH of ESRD 2/2 hypertensive nephropathy, s/p renal transplant 4-15-15 at [facility] in [place] with Campath induction. 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 She reports her kidney txp failing in 2017 and resumed HD 5 months ago on a MWF schedule through a LUE graft (last HD today 11/29/17). She reports urinating 1-2 times daily. She was transferred from [facility] for hematuria and concern for acute rejection of kidney requiring transplant nephrectomy. She reports hematuria and allograft site pain for 2 weeks. She was given solumedrol 80mg IV in [place] for presumed rejection. Will plan to continue steroids 40 mg po daily. No pain noted over allograft but patient continues to have hematuria. Will plan for transplant nephrectomy later in week, likely Friday. Bactrim and Nystatin restarted today. Pt is Haitian speaking only. Bactrim held today due to hyperkalemia.  HD this am then txp nephrectomy.    Hospital Course:  No acute events overnight.  Doing well.  Plan to remove cuellar today.  Likely remove drain tomorrow and d/c home.  Tolerated HD well.  K+ 4.9 today.        Past Medical, Surgical, Family, and Social History:   Unchanged from H&P.    Scheduled Meds:   amLODIPine  10 mg Oral Daily    famotidine  20 mg Oral Daily    heparin (porcine)  5,000 Units Subcutaneous Q8H    mupirocin  1 g Nasal BID    nystatin  500,000 Units Oral QID (WM & HS)    predniSONE  40 mg Oral Daily    senna-docusate 8.6-50 mg  1 tablet Oral BID    sevelamer carbonate  800 mg Oral TID WM    sulfamethoxazole-trimethoprim 400-80mg  1 tablet Oral Every Mon, Wed, Fri     Continuous Infusions:   PRN Meds:sodium chloride 0.9%, acetaminophen, calcium carbonate, [COMPLETED] dextrose 50% **AND** dextrose 50% **AND** [COMPLETED] insulin regular,  diphenhydrAMINE, HYDROmorphone, metoclopramide HCl, ondansetron, oxyCODONE-acetaminophen, oxyCODONE-acetaminophen, sodium chloride 0.9%    Intake/Output - Last 3 Shifts       12/01 0700 - 12/02 0659 12/02 0700 - 12/03 0659 12/03 0700 - 12/04 0659    P.O. 420 720     I.V. (mL/kg) 2954.8 (29.4) 1170 (11.8)     Other 600 600     IV Piggyback 100      Total Intake(mL/kg) 4074.8 (40.5) 2490 (25.2)     Urine (mL/kg/hr) 315 (0.1) 305 (0.1) 50 (0.2)    Emesis/NG output  0 (0)     Drains 130 (0.1) 60 (0)     Other 2600 (1.1) 2600 (1.1)     Stool 0 (0) 0 (0)     Total Output 3045 2965 50    Net +1029.8 -475 -50           Urine Occurrence  0 x     Stool Occurrence 0 x 0 x     Emesis Occurrence  0 x            Review of Systems   Constitutional: Negative for activity change, appetite change, chills, fatigue and fever.   HENT: Negative for congestion and facial swelling.    Eyes: Negative.    Respiratory: Negative for cough, chest tightness, shortness of breath and wheezing.    Cardiovascular: Negative for chest pain, palpitations and leg swelling.   Gastrointestinal: Positive for abdominal pain. Negative for abdominal distention, constipation, diarrhea, nausea and vomiting.   Endocrine: Negative.    Genitourinary: Positive for decreased urine volume. Negative for difficulty urinating, dysuria, frequency, hematuria and urgency.   Musculoskeletal: Negative for back pain and neck pain.   Skin: Negative for color change, pallor, rash and wound.   Allergic/Immunologic: Negative for immunocompromised state.   Neurological: Negative for dizziness, tremors, weakness and headaches.   Hematological: Negative.    Psychiatric/Behavioral: Negative for agitation, confusion and sleep disturbance.   All other systems reviewed and are negative.     Objective:     Vital Signs (Most Recent):  Temp: 98 °F (36.7 °C) (12/03/17 0743)  Pulse: 79 (12/03/17 0743)  Resp: 18 (12/03/17 0743)  BP: 130/77 (12/03/17 0743)  SpO2: 99 % (12/03/17 0743) Vital  "Signs (24h Range):  Temp:  [97.6 °F (36.4 °C)-98.4 °F (36.9 °C)] 98 °F (36.7 °C)  Pulse:  [62-84] 79  Resp:  [16-20] 18  SpO2:  [98 %-100 %] 99 %  BP: (119-151)/(71-98) 130/77     Weight: 98.9 kg (218 lb 0.6 oz)  Height: 5' 6" (167.6 cm)  Body mass index is 35.19 kg/m².    Physical Exam   Constitutional: She is oriented to person, place, and time. She appears well-developed and well-nourished. She is active and cooperative.   HENT:   Head: Normocephalic and atraumatic.   Eyes: Pupils are equal, round, and reactive to light.   Neck: Normal range of motion.   Cardiovascular: Normal rate, regular rhythm, intact distal pulses and normal pulses.    Murmur heard.  Pulmonary/Chest: Effort normal and breath sounds normal. No respiratory distress. She has no decreased breath sounds. She has no wheezes. She has no rales.   Abdominal: Soft. Normal appearance and bowel sounds are normal. She exhibits no distension. There is no tenderness. There is no guarding.   Musculoskeletal: Normal range of motion. She exhibits edema (+1 generalized).   Neurological: She is alert and oriented to person, place, and time.   Skin: Skin is warm, dry and intact.   LUE AV graft +thrill/+bruit   Psychiatric: She has a normal mood and affect. Her behavior is normal.   Nursing note and vitals reviewed.      Laboratory:  CBC:     Recent Labs  Lab 12/01/17  0509 12/02/17 0524 12/03/17  0428   WBC 7.62 13.15* 13.30*   RBC 3.84* 3.69* 3.63*   HGB 11.0* 10.6* 10.4*   HCT 34.3* 32.3* 32.2*    149* 186   MCV 89 88 89   MCH 28.6 28.7 28.7   MCHC 32.1 32.8 32.3     BMP:     Recent Labs  Lab 12/02/17  0524 12/02/17  0753  12/02/17  1137 12/02/17 2045 12/03/17  0428   GLU 88 85  --   --   --  81   * 131*  --   --   --  135*   K 8.1* 7.3*  < > 6.0* 4.1 4.9    102  --   --   --  98   CO2 16* 19*  --   --   --  25   BUN 43* 42*  --   --   --  31*   CREATININE 7.8* 8.0*  --   --   --  6.0*   CALCIUM 8.7 8.8  --   --   --  8.6*   < > = values " in this interval not displayed.    Diagnostic Results:  None

## 2017-12-03 NOTE — PROGRESS NOTES
Patient returned to U 8071 per stretcher by transport aide.   No acute distress noted. Will continue to monitor.

## 2017-12-04 VITALS
DIASTOLIC BLOOD PRESSURE: 75 MMHG | HEART RATE: 80 BPM | TEMPERATURE: 98 F | RESPIRATION RATE: 18 BRPM | SYSTOLIC BLOOD PRESSURE: 126 MMHG | OXYGEN SATURATION: 100 % | WEIGHT: 217.81 LBS | HEIGHT: 66 IN | BODY MASS INDEX: 35.01 KG/M2

## 2017-12-04 PROBLEM — R31.9 HEMATURIA: Status: RESOLVED | Noted: 2017-11-27 | Resolved: 2017-12-04

## 2017-12-04 PROBLEM — E87.5 HYPERKALEMIA: Status: RESOLVED | Noted: 2017-11-27 | Resolved: 2017-12-04

## 2017-12-04 LAB
ALBUMIN SERPL BCP-MCNC: 2.8 G/DL
ALP SERPL-CCNC: 127 U/L
ALT SERPL W/O P-5'-P-CCNC: <5 U/L
ANION GAP SERPL CALC-SCNC: 14 MMOL/L
AST SERPL-CCNC: 11 U/L
BASOPHILS # BLD AUTO: 0.01 K/UL
BASOPHILS NFR BLD: 0.1 %
BILIRUB SERPL-MCNC: 0.3 MG/DL
BLD PROD TYP BPU: NORMAL
BLOOD UNIT EXPIRATION DATE: NORMAL
BLOOD UNIT TYPE CODE: 5100
BLOOD UNIT TYPE: NORMAL
BUN SERPL-MCNC: 50 MG/DL
CALCIUM SERPL-MCNC: 8.4 MG/DL
CHLORIDE SERPL-SCNC: 97 MMOL/L
CO2 SERPL-SCNC: 21 MMOL/L
CODING SYSTEM: NORMAL
CREAT SERPL-MCNC: 8.3 MG/DL
DIFFERENTIAL METHOD: ABNORMAL
DISPENSE STATUS: NORMAL
EOSINOPHIL # BLD AUTO: 0 K/UL
EOSINOPHIL NFR BLD: 0.2 %
ERYTHROCYTE [DISTWIDTH] IN BLOOD BY AUTOMATED COUNT: 16.4 %
EST. GFR  (AFRICAN AMERICAN): 7 ML/MIN/1.73 M^2
EST. GFR  (NON AFRICAN AMERICAN): 6 ML/MIN/1.73 M^2
GLUCOSE SERPL-MCNC: 69 MG/DL (ref 70–110)
GLUCOSE SERPL-MCNC: 86 MG/DL
HCO3 UR-SCNC: 20.9 MMOL/L (ref 24–28)
HCT VFR BLD AUTO: 28.5 %
HCT VFR BLD CALC: 21 %PCV (ref 36–54)
HGB BLD-MCNC: 9.2 G/DL
IMM GRANULOCYTES # BLD AUTO: 0.13 K/UL
IMM GRANULOCYTES NFR BLD AUTO: 1.2 %
LYMPHOCYTES # BLD AUTO: 1 K/UL
LYMPHOCYTES NFR BLD: 9 %
MAGNESIUM SERPL-MCNC: 1.8 MG/DL
MCH RBC QN AUTO: 28.1 PG
MCHC RBC AUTO-ENTMCNC: 32.3 G/DL
MCV RBC AUTO: 87 FL
MONOCYTES # BLD AUTO: 0.6 K/UL
MONOCYTES NFR BLD: 5.4 %
NEUTROPHILS # BLD AUTO: 8.8 K/UL
NEUTROPHILS NFR BLD: 84.1 %
NRBC BLD-RTO: 0 /100 WBC
PCO2 BLDA: 24 MMHG (ref 35–45)
PH SMN: 7.55 [PH] (ref 7.35–7.45)
PHOSPHATE SERPL-MCNC: 6 MG/DL
PLATELET # BLD AUTO: 161 K/UL
PMV BLD AUTO: 11.6 FL
PO2 BLDA: 373 MMHG (ref 80–100)
POC BE: -2 MMOL/L
POC IONIZED CALCIUM: 0.94 MMOL/L (ref 1.06–1.42)
POC SATURATED O2: 100 % (ref 95–100)
POC TCO2: 22 MMOL/L (ref 23–27)
POTASSIUM BLD-SCNC: 3 MMOL/L (ref 3.5–5.1)
POTASSIUM SERPL-SCNC: 4.6 MMOL/L
PROT SERPL-MCNC: 6 G/DL
RBC # BLD AUTO: 3.27 M/UL
SAMPLE: ABNORMAL
SODIUM BLD-SCNC: 141 MMOL/L (ref 136–145)
SODIUM SERPL-SCNC: 132 MMOL/L
TRANS ERYTHROCYTES VOL PATIENT: NORMAL ML
WBC # BLD AUTO: 10.52 K/UL

## 2017-12-04 PROCEDURE — 99239 HOSP IP/OBS DSCHRG MGMT >30: CPT | Mod: 24,,, | Performed by: NURSE PRACTITIONER

## 2017-12-04 PROCEDURE — 84100 ASSAY OF PHOSPHORUS: CPT

## 2017-12-04 PROCEDURE — 63600175 PHARM REV CODE 636 W HCPCS: Performed by: NURSE PRACTITIONER

## 2017-12-04 PROCEDURE — 25000003 PHARM REV CODE 250: Performed by: NURSE PRACTITIONER

## 2017-12-04 PROCEDURE — 36415 COLL VENOUS BLD VENIPUNCTURE: CPT

## 2017-12-04 PROCEDURE — 90935 HEMODIALYSIS ONE EVALUATION: CPT

## 2017-12-04 PROCEDURE — 83735 ASSAY OF MAGNESIUM: CPT

## 2017-12-04 PROCEDURE — 25000003 PHARM REV CODE 250: Performed by: SURGERY

## 2017-12-04 PROCEDURE — 85025 COMPLETE CBC W/AUTO DIFF WBC: CPT

## 2017-12-04 PROCEDURE — 80053 COMPREHEN METABOLIC PANEL: CPT

## 2017-12-04 RX ORDER — NYSTATIN 100000 [USP'U]/ML
500000 SUSPENSION ORAL
Qty: 280 ML | Refills: 0 | Status: SHIPPED | OUTPATIENT
Start: 2017-12-04 | End: 2017-12-18

## 2017-12-04 RX ORDER — PREDNISONE 20 MG/1
20 TABLET ORAL DAILY
Status: DISCONTINUED | OUTPATIENT
Start: 2017-12-05 | End: 2017-12-04 | Stop reason: HOSPADM

## 2017-12-04 RX ORDER — LIDOCAINE HYDROCHLORIDE 10 MG/ML
10 INJECTION INFILTRATION; PERINEURAL ONCE
Status: COMPLETED | OUTPATIENT
Start: 2017-12-04 | End: 2017-12-04

## 2017-12-04 RX ORDER — FAMOTIDINE 20 MG/1
20 TABLET, FILM COATED ORAL DAILY
Qty: 30 TABLET | Refills: 11 | Status: SHIPPED | OUTPATIENT
Start: 2017-12-04 | End: 2018-08-01 | Stop reason: ALTCHOICE

## 2017-12-04 RX ORDER — PREDNISONE 10 MG/1
TABLET ORAL
Qty: 50 TABLET | Refills: 0 | Status: SHIPPED | OUTPATIENT
Start: 2017-12-04 | End: 2018-08-01 | Stop reason: ALTCHOICE

## 2017-12-04 RX ORDER — OXYCODONE AND ACETAMINOPHEN 5; 325 MG/1; MG/1
1-2 TABLET ORAL EVERY 4 HOURS PRN
Qty: 40 TABLET | Refills: 0 | Status: SHIPPED | OUTPATIENT
Start: 2017-12-04 | End: 2018-02-05

## 2017-12-04 RX ORDER — PREDNISONE 20 MG/1
20 TABLET ORAL ONCE
Status: COMPLETED | OUTPATIENT
Start: 2017-12-04 | End: 2017-12-04

## 2017-12-04 RX ADMIN — SEVELAMER CARBONATE 800 MG: 800 TABLET, FILM COATED ORAL at 01:12

## 2017-12-04 RX ADMIN — LIDOCAINE HYDROCHLORIDE 10 ML: 10 INJECTION, SOLUTION INFILTRATION; PERINEURAL at 01:12

## 2017-12-04 RX ADMIN — PREDNISONE 20 MG: 20 TABLET ORAL at 01:12

## 2017-12-04 RX ADMIN — STANDARDIZED SENNA CONCENTRATE AND DOCUSATE SODIUM 1 TABLET: 8.6; 5 TABLET, FILM COATED ORAL at 09:12

## 2017-12-04 RX ADMIN — FAMOTIDINE 20 MG: 20 TABLET, FILM COATED ORAL at 01:12

## 2017-12-04 RX ADMIN — MUPIROCIN 1 G: 20 OINTMENT TOPICAL at 01:12

## 2017-12-04 RX ADMIN — AMLODIPINE BESYLATE 10 MG: 10 TABLET ORAL at 01:12

## 2017-12-04 RX ADMIN — NYSTATIN 500000 UNITS: 500000 SUSPENSION ORAL at 01:12

## 2017-12-04 NOTE — PROGRESS NOTES
Discharge instructions given to and reviewed with pt.  All understanding verbalized.   utilized during discharge process.  RLQ GABBIE removed by NP at bedside prior to discharge, no issues.  RLQ painted w/ betadine and pt educated on process.  All understanding demonstrated by pt.  Peripheral IV removed, no issues.  VSS before discharge.     Pt currently waiting on ride from friend.  Wheelchair refused by pt.  Will monitor until pt leaves unit.

## 2017-12-04 NOTE — PLAN OF CARE
Problem: Patient Care Overview  Goal: Plan of Care Review  Outcome: Ongoing (interventions implemented as appropriate)  Hemodialysis is complete.  Blood returned.  AV Fistula to SANYA  was then de accessed and needles removed.  Hemostasis attained.  No bleed or hematoma.  Fistula has Bruit and Thrill.  HD time = 210 min.  UF = 2500 ml.

## 2017-12-04 NOTE — ASSESSMENT & PLAN NOTE
Nephrology consulted for inpatient dialysis treatment. She normaly dialyzes on MWF in [facility] via SANYA AVF, duration of 3.5 hrs, UF 2 L, unknown EDW and las HD was on yesterday for hyperkalemia, 2 hrs and had 2 L removed .    Plan:  - HD today  - monitor labs and adjust baths as indicated

## 2017-12-04 NOTE — SUBJECTIVE & OBJECTIVE
Interval History: s/p nephrectomy, seen in [facility] on dialysis, no issues, tolerating well    Review of patient's allergies indicates:   Allergen Reactions    Heparin analogues      Current Facility-Administered Medications   Medication Frequency    0.9%  NaCl infusion PRN    0.9%  NaCl infusion Once    acetaminophen tablet 650 mg Q8H PRN    amLODIPine tablet 10 mg Daily    calcium carbonate 200 mg calcium (500 mg) chewable tablet 500 mg TID PRN    diphenhydrAMINE capsule 25 mg Q6H PRN    famotidine tablet 20 mg Daily    heparin (porcine) injection 5,000 Units Q8H    HYDROmorphone injection 0.2 mg Q5 Min PRN    lidocaine HCL 10 mg/ml (1%) injection 10 mL Once    metoclopramide HCl injection 5 mg Q6H PRN    mupirocin 2 % ointment 1 g BID    nystatin 100,000 unit/mL suspension 500,000 Units QID (WM & HS)    ondansetron injection 4 mg Q12H PRN    oxyCODONE-acetaminophen  mg per tablet 1 tablet Q4H PRN    oxyCODONE-acetaminophen 5-325 mg per tablet 1 tablet Q4H PRN    [START ON 12/5/2017] predniSONE tablet 20 mg Daily    senna-docusate 8.6-50 mg per tablet 1 tablet BID    sevelamer carbonate tablet 800 mg TID WM    sodium chloride 0.9% flush 3 mL PRN       Objective:     Vital Signs (Most Recent):  Temp: 97.8 °F (36.6 °C) (12/04/17 0733)  Pulse: 90 (12/04/17 1037)  Resp: 18 (12/04/17 0915)  BP: (!) 147/83 (12/04/17 1030)  SpO2: 100 % (12/04/17 0733)  O2 Device (Oxygen Therapy): room air (12/04/17 0915) Vital Signs (24h Range):  Temp:  [97.7 °F (36.5 °C)-98.2 °F (36.8 °C)] 97.8 °F (36.6 °C)  Pulse:  [68-98] 90  Resp:  [17-18] 18  SpO2:  [95 %-100 %] 100 %  BP: (130-149)/(77-97) 147/83     Weight: 98.8 kg (217 lb 13 oz) (12/04/17 0600)  Body mass index is 35.16 kg/m².  Body surface area is 2.14 meters squared.    I/O last 3 completed shifts:  In: 980 [P.O.:980]  Out: 775 [Urine:705; Drains:70]    Physical Exam   Constitutional: She is oriented to person, place, and time.   HENT:   Head:  Normocephalic and atraumatic.   Eyes: EOM are normal.   Neck: No JVD present.   Cardiovascular: Normal rate and regular rhythm.    Pulmonary/Chest: Effort normal and breath sounds normal.   Abdominal: Soft. She exhibits no distension.   Musculoskeletal: She exhibits no edema or tenderness.   Neurological: She is alert and oriented to person, place, and time.   Skin: Skin is warm.   Psychiatric: She has a normal mood and affect. Her behavior is normal.

## 2017-12-04 NOTE — PROGRESS NOTES
Discharge Note:    Pt will discharge to own home  under the care of pt's friend: Robert with no DME/HH/infusion needs. Pt aware of, involved in, and coping well with this discharge plan. SHANE utilized [facility] to communicate with pt. Pt did not have any concerns with the discharge plan at this time. SHANE remains available at [phone].

## 2017-12-04 NOTE — PLAN OF CARE
Problem: Patient Care Overview  Goal: Plan of Care Review  Outcome: Ongoing (interventions implemented as appropriate)  Pt free from falls. Pt wears non slip socks when ambulating. Pt bed low and locked position. Pt afebrile. Pt IV site without redness or edema.Pt has denied any pain or discomfort this shift. Incision to right abdomen [painted with betadine. Alpesh drain scant drainage of serosanguinous.Pt to have dialysis this am.

## 2017-12-04 NOTE — PROGRESS NOTES
Report given to dialysis RN.  Pt transferred down via stretcher w/ transport.  VSS before leaving unit.  No complaints from pt.  Will monitor for pts return to the unit.

## 2017-12-04 NOTE — PROGRESS NOTES
Pt arrived VIA Pt. Escort and was transferred to bed without incident.  .AV Fistula  To SANYA was then prepped and cannulated with 15 gauge needles as per protocol.  Both lines aspirate and flush without resistance or infiltration.  Patient denies any pain.  Maintenance dialysis was then initiated.

## 2017-12-04 NOTE — PROGRESS NOTES
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 She reports her kidney txp failing in 2017 and resumed HD 5 months ago on a MWF schedule through a LUE graft (last HD 11/27/17). She reports urinating 1-2 times daily. She was transferred from [place] [facility] for hematuria and concern for acute rejection of kidney requiring transplant nephrectomy. She reports hematuria and allograft site pain for 2 weeks. She denies taking any immunosuppressants currently. She was given solumedrol 80mg IV in [place] for presumed rejection. Will plan to continue steroids and surgery to discuss transplant nephrectomy.   Nephrology consulted for inpatient dialysis treatment. She normaly dialyzes on MWF in [facility] via SANYA AVF, duration of 3.5 hrs, UF 2 L, unknown EDW and las HD was on yesterday for hyperkalemia, 2 hrs and had 2 L removed .    Interval History: s/p nephrectomy, seen in [facility] on dialysis, no issues, tolerating well    Review of patient's allergies indicates:   Allergen Reactions    Heparin analogues      Current Facility-Administered Medications   Medication Frequency    0.9%  NaCl infusion PRN    0.9%  NaCl infusion Once    acetaminophen tablet 650 mg Q8H PRN    amLODIPine tablet 10 mg Daily    calcium carbonate 200 mg calcium (500 mg) chewable tablet 500 mg TID PRN    diphenhydrAMINE capsule 25 mg Q6H PRN    famotidine tablet 20  mg Daily    heparin (porcine) injection 5,000 Units Q8H    HYDROmorphone injection 0.2 mg Q5 Min PRN    lidocaine HCL 10 mg/ml (1%) injection 10 mL Once    metoclopramide HCl injection 5 mg Q6H PRN    mupirocin 2 % ointment 1 g BID    nystatin 100,000 unit/mL suspension 500,000 Units QID (WM & HS)    ondansetron injection 4 mg Q12H PRN    oxyCODONE-acetaminophen  mg per tablet 1 tablet Q4H PRN    oxyCODONE-acetaminophen 5-325 mg per tablet 1 tablet Q4H PRN    [START ON 12/5/2017] predniSONE tablet 20 mg Daily    senna-docusate 8.6-50 mg per tablet 1 tablet BID    sevelamer carbonate tablet 800 mg TID WM    sodium chloride 0.9% flush 3 mL PRN       Objective:     Vital Signs (Most Recent):  Temp: 97.8 °F (36.6 °C) (12/04/17 0733)  Pulse: 90 (12/04/17 1037)  Resp: 18 (12/04/17 0915)  BP: (!) 147/83 (12/04/17 1030)  SpO2: 100 % (12/04/17 0733)  O2 Device (Oxygen Therapy): room air (12/04/17 0915) Vital Signs (24h Range):  Temp:  [97.7 °F (36.5 °C)-98.2 °F (36.8 °C)] 97.8 °F (36.6 °C)  Pulse:  [68-98] 90  Resp:  [17-18] 18  SpO2:  [95 %-100 %] 100 %  BP: (130-149)/(77-97) 147/83     Weight: 98.8 kg (217 lb 13 oz) (12/04/17 0600)  Body mass index is 35.16 kg/m².  Body surface area is 2.14 meters squared.    I/O last 3 completed shifts:  In: 980 [P.O.:980]  Out: 775 [Urine:705; Drains:70]    Physical Exam   Constitutional: She is oriented to person, place, and time.   HENT:   Head: Normocephalic and atraumatic.   Eyes: EOM are normal.   Neck: No JVD present.   Cardiovascular: Normal rate and regular rhythm.    Pulmonary/Chest: Effort normal and breath sounds normal.   Abdominal: Soft. She exhibits no distension.   Musculoskeletal: She exhibits no edema or tenderness.   Neurological: She is alert and oriented to person, place, and time.   Skin: Skin is warm.   Psychiatric: She has a normal mood and affect. Her behavior is normal.           Assessment/Plan:     ESRD (end stage renal disease)    Nephrology  consulted for inpatient dialysis treatment. She normaly dialyzes on MWF in [facility] via SANYA AVF, duration of 3.5 hrs, UF 2 L, unknown EDW and las HD was on yesterday for hyperkalemia, 2 hrs and had 2 L removed .    Plan:  - HD today  - monitor labs and adjust baths as indicated            Thank you for your consult. I will follow-up with patient. Please contact us if you have any additional questions.    Fam Nash MD  Nephrology  [facility]      I have reviewed and concur with the fellow's history, physical, assessment, and plan. I have personally interviewed and examined the patient at bedside

## 2017-12-05 NOTE — ANESTHESIA POSTPROCEDURE EVALUATION
"Anesthesia Post Evaluation    Patient: Leydi Cordero    Procedure(s) Performed: Procedure(s) (LRB):  TRANSPLANT NEPHRECTOMY (Right)    Final Anesthesia Type: general  Patient location during evaluation: PACU  Patient participation: Yes- Able to Participate  Level of consciousness: awake and alert  Post-procedure vital signs: reviewed and stable  Pain management: adequate  Airway patency: patent  PONV status at discharge: No PONV  Anesthetic complications: no      Cardiovascular status: blood pressure returned to baseline  Respiratory status: spontaneous ventilation and room air  Hydration status: euvolemic  Follow-up not needed.        Visit Vitals  /75 (BP Location: Right arm, Patient Position: Lying)   Pulse 80   Temp 36.6 °C (97.8 °F) (Oral)   Resp 18   Ht 5' 6" (1.676 m)   Wt 98.8 kg (217 lb 13 oz)   SpO2 100%   Breastfeeding? No   BMI 35.16 kg/m²       Pain/Dilcia Score: Pain Assessment Performed: Yes (12/4/2017 11:50 AM)  Presence of Pain: denies (12/4/2017 11:50 AM)      "

## 2017-12-15 ENCOUNTER — TELEPHONE (OUTPATIENT)
Dept: TRANSPLANT | Facility: CLINIC | Age: 27
End: 2017-12-15

## 2017-12-15 NOTE — TELEPHONE ENCOUNTER
Placed call to patient to inform of appointment with surgeon on 12/20/17 at 1400, patient does not speak english, writer called international department and they will call and inform patient of appointment.

## 2017-12-20 ENCOUNTER — OFFICE VISIT (OUTPATIENT)
Dept: TRANSPLANT | Facility: CLINIC | Age: 27
End: 2017-12-20
Payer: MEDICARE

## 2017-12-20 VITALS
HEART RATE: 101 BPM | DIASTOLIC BLOOD PRESSURE: 100 MMHG | SYSTOLIC BLOOD PRESSURE: 148 MMHG | TEMPERATURE: 98 F | RESPIRATION RATE: 18 BRPM | HEIGHT: 66 IN | OXYGEN SATURATION: 100 % | WEIGHT: 204.13 LBS | BODY MASS INDEX: 32.81 KG/M2

## 2017-12-20 DIAGNOSIS — Z90.5 S/P NEPHRECTOMY: ICD-10-CM

## 2017-12-20 DIAGNOSIS — Z90.5 STATUS POST NEPHRECTOMY: Primary | ICD-10-CM

## 2017-12-20 PROCEDURE — 99213 OFFICE O/P EST LOW 20 MIN: CPT | Mod: PBBFAC | Performed by: TRANSPLANT SURGERY

## 2017-12-20 PROCEDURE — 99999 PR PBB SHADOW E&M-EST. PATIENT-LVL III: CPT | Mod: PBBFAC,,, | Performed by: TRANSPLANT SURGERY

## 2017-12-20 PROCEDURE — 99024 POSTOP FOLLOW-UP VISIT: CPT | Mod: ,,, | Performed by: TRANSPLANT SURGERY

## 2017-12-20 NOTE — LETTER
December 21, 2017        Won Miller  [address]  [place] [address]  Phone: [phone]  Fax: [phone]             [facility]  [address]  [facility] [address]  Phone: [phone]   Patient: Leydi Cordero   MR Number: [id number]   YOB: 1990   Date of Visit: 12/20/2017       Dear Dr. Won Miller    Thank you for referring Leydi Cordero to me for evaluation. Attached you will find relevant portions of my assessment and plan of care.    If you have questions, please do not hesitate to call me. I look forward to following Leydi Cordero along with you.    Sincerely,    Fam Sutton MD    Enclosure    If you would like to receive this communication electronically, please contact [email or web address] or [phone] to request [employer] access.    [employer] is a tool which provides read-only access to select patient information with whom you have a relationship. Its easy to use and provides real time access to review your patients record including encounter summaries, notes, results, and demographic information.    If you feel you have received this communication in error or would no longer like to receive these types of communications, please e-mail [email or web address]

## 2017-12-20 NOTE — PROGRESS NOTES
Transplant Surgery  Kidney Transplant Recipient Follow-up    Referring Physician: Won Miller  Current Nephrologist: Won Miller    Subjective:     Chief Complaint: Leydi Cordero is a 27 y.o. year old White female who is status post Kidney transplant performed on 4/15/2015.  She developed rejection of the kidney transplant and it was declared failed on June 2017, when she was started back on dialysis. Admitted in November with RLQ pain and hematuria, underwent a transplant nephrectomy on 12/1/2017. Comes in to clinic today for surgery follow up.        History of Present Illness: She reports no concerns.  From a transplant perspective, she reports normal urination and no incisional pain.      Review of Systems   Constitutional: Negative for chills, fatigue and fever.   HENT: Negative.    Eyes: Negative.    Respiratory: Negative for cough, shortness of breath and wheezing.    Cardiovascular: Negative for chest pain, palpitations and leg swelling.   Gastrointestinal: Negative for abdominal distention, abdominal pain, constipation, diarrhea, nausea and vomiting.   Genitourinary: Positive for decreased urine volume. Negative for dysuria, flank pain and hematuria.   Musculoskeletal: Negative for gait problem.   Neurological: Negative for tremors, light-headedness and headaches.   Psychiatric/Behavioral: Negative for behavioral problems and dysphoric mood.       Objective:     Physical Exam   Constitutional: She is oriented to person, place, and time. She appears well-nourished.   Cardiovascular: Normal rate and regular rhythm.    Pulmonary/Chest: Effort normal and breath sounds normal. No respiratory distress. She has no rales.   Abdominal: Soft. Bowel sounds are normal. She exhibits no distension. There is no tenderness.       Neurological: She is alert and oriented to person, place, and time.   Vitals reviewed.    Lab Results   Component Value Date    CREATININE 8.3 (H) 12/04/2017    BUN 50 (H) 12/04/2017      Lab Results   Component Value Date    WBC 10.52 12/04/2017    HGB 9.2 (L) 12/04/2017    HCT 28.5 (L) 12/04/2017    HCT 21 (L) 12/01/2017     12/04/2017     Lab Results   Component Value Date    TACROLIMUS 7.3 02/09/2017         Assessment and Plan:        · S/P Kidney transplant. Failed in June 2017.  · S/p transplant Nephrectomy on 12/1/2017.  · She has finished the course of steroids prescribed. No longer on IS medication  · Continue follow up by her PCP and General Nephrologist.  · If any complication related with her surgery, please don't hesitate to contact Transplant service.    Follow-up: Patient reminded to call with any health changes, since these can be early signs of significant complications.  Also, I advised the patient to be sure any new medications or changes of old medications are discussed with either a pharmacist, or physician knowledgeable with transplant to avoid rejection/drug toxicity related to significant drug interactions.    Awa Padron MD       [facility] Patient Status  Functional Status: 80% - Normal activity with effort: some symptoms of disease  Physical Capacity: No Limitations     Discussed with fellow and agree with above.    Fam Sutton MD

## 2018-01-11 ENCOUNTER — OFFICE VISIT (OUTPATIENT)
Dept: SURGERY | Facility: CLINIC | Age: 28
End: 2018-01-11
Payer: MEDICARE

## 2018-01-11 ENCOUNTER — LAB VISIT (OUTPATIENT)
Dept: LAB | Facility: HOSPITAL | Age: 28
End: 2018-01-11
Attending: SURGERY
Payer: MEDICARE

## 2018-01-11 VITALS
BODY MASS INDEX: 33.24 KG/M2 | HEIGHT: 66 IN | HEART RATE: 99 BPM | RESPIRATION RATE: 18 BRPM | SYSTOLIC BLOOD PRESSURE: 139 MMHG | DIASTOLIC BLOOD PRESSURE: 93 MMHG | WEIGHT: 206.81 LBS

## 2018-01-11 DIAGNOSIS — E21.2 HYPERPARATHYROIDISM, TERTIARY: ICD-10-CM

## 2018-01-11 DIAGNOSIS — E21.2 HYPERPARATHYROIDISM, TERTIARY: Primary | ICD-10-CM

## 2018-01-11 LAB — PTH-INTACT SERPL-MCNC: 993 PG/ML

## 2018-01-11 PROCEDURE — 83970 ASSAY OF PARATHORMONE: CPT

## 2018-01-11 PROCEDURE — 99204 OFFICE O/P NEW MOD 45 MIN: CPT | Mod: S$PBB,,, | Performed by: SURGERY

## 2018-01-11 PROCEDURE — 36415 COLL VENOUS BLD VENIPUNCTURE: CPT | Mod: PO

## 2018-01-11 PROCEDURE — 99999 PR PBB SHADOW E&M-EST. PATIENT-LVL IV: CPT | Mod: PBBFAC,,, | Performed by: SURGERY

## 2018-01-11 PROCEDURE — 99214 OFFICE O/P EST MOD 30 MIN: CPT | Mod: PBBFAC,PO | Performed by: SURGERY

## 2018-01-11 RX ORDER — AMLODIPINE BESYLATE 10 MG/1
TABLET ORAL
COMMUNITY
Start: 2017-05-09 | End: 2018-08-01 | Stop reason: ALTCHOICE

## 2018-01-11 RX ORDER — ONDANSETRON 4 MG/1
8 TABLET, ORALLY DISINTEGRATING ORAL EVERY 8 HOURS PRN
Status: CANCELLED | OUTPATIENT
Start: 2018-01-11

## 2018-01-11 NOTE — PROGRESS NOTES
Patient ID: Leydi Cordero is a 27 y.o. female.    Tertiary hyperparathyroidism    Chief Complaint: Consult (parathyroidectomy)      HPI:  Has end-stage renal disease.  She was treated with hemodialysis.  She underwent a kidney transplant but that failed.  She presents now to discuss parathyroidectomy per [facility] followed just.  She has a elevated parathyroid hormone level.  She only speaks Macedonian.  The [employer]  system was used during the visit        Review of Systems   Constitutional: Negative for appetite change, chills, fatigue, fever and unexpected weight change.   HENT: Negative for hearing loss and rhinorrhea.    Eyes: Negative for visual disturbance.   Respiratory: Negative for apnea, cough, shortness of breath and wheezing.    Cardiovascular: Negative for chest pain and palpitations.   Gastrointestinal: Negative for abdominal distention, abdominal pain, blood in stool, constipation, diarrhea, nausea and vomiting.   Genitourinary: Negative for dysuria, frequency and urgency.   Musculoskeletal: Negative for arthralgias and neck pain.   Skin: Negative for rash.   Neurological: Negative for seizures, weakness, numbness and headaches.   Hematological: Negative for adenopathy. Does not bruise/bleed easily.   Psychiatric/Behavioral: Negative for hallucinations. The patient is not nervous/anxious.        Current Outpatient Prescriptions   Medication Sig Dispense Refill    amLODIPine (NORVASC) 10 MG tablet TK 1 T PO ONCE D      aspirin (ECOTRIN) 81 MG EC tablet Take 1 tablet (81 mg total) by mouth once daily. 30 tablet 10    oxyCODONE-acetaminophen (PERCOCET) 5-325 mg per tablet Take 1-2 tablets by mouth every 4 (four) hours as needed for Pain. 40 tablet 0    predniSONE (DELTASONE) 10 MG tablet Take by mouth daily: 20mg 12/4-12/6, 15mg 12/7-12/9, 10mg 12/10-12/12, 5mg 12/13- 50 tablet 0    famotidine (PEPCID) 20 MG tablet Take 1 tablet (20 mg total) by mouth once daily. 30 tablet 11     sevelamer carbonate (RENVELA) 800 mg Tab Take 1 tablet (800 mg total) by mouth 3 (three) times daily with meals. 90 tablet 11     No current facility-administered medications for this visit.        Review of patient's allergies indicates:   Allergen Reactions    Heparin analogues        Past Medical History:   Diagnosis Date    ESRD (end stage renal disease)     H/O kidney transplant     Hypertension     Hypertensive nephropathy        Past Surgical History:   Procedure Laterality Date    KIDNEY TRANSPLANT  04/15/2015    right leg hemodialysis access         History reviewed. No pertinent family history.    Social History     Social History    Marital status: Single     Spouse name: N/A    Number of children: N/A    Years of education: N/A     Occupational History    Not on file.     Social History Main Topics    Smoking status: Current Every Day Smoker     Packs/day: 0.50    Smokeless tobacco: Not on file    Alcohol use No    Drug use: No    Sexual activity: Not on file     Other Topics Concern    Not on file     Social History Narrative    No narrative on file       Vitals:    01/11/18 1026   BP: (!) 139/93   Pulse: 99   Resp: 18       Physical Exam   Constitutional:   Overweight   HENT:   Head: Normocephalic and atraumatic.   Eyes: Pupils are equal, round, and reactive to light.   Neck: Normal range of motion. Neck supple. No JVD present. No tracheal deviation present. No thyromegaly present.   Cardiovascular: Normal rate, regular rhythm, normal heart sounds and intact distal pulses.    Pulmonary/Chest: Effort normal and breath sounds normal.   Abdominal: Soft. Bowel sounds are normal. She exhibits no distension. There is no tenderness.   Neurological: She is alert.   Skin: Skin is warm. Capillary refill takes less than 2 seconds.   Psychiatric: She has a normal mood and affect. Her behavior is normal. Judgment and thought content normal.   Vitals reviewed.    PTH, INTACT 1563  pg/mL(High)  CORRECTED CALCIUM 9.3 MG/DL(Normal)  CORRECTED CALCIUM  PHOSPHORUS  68.8 Calc(High)  CALCIUM 9.0 mg/dL(Normal)  PHOSPHORUS 7.4 mg/dL(High)  SUSANNAH/PHOS PRODUCT 66.6 Calc(High)  Assessment & Plan:    tertiary hyperparathyroidism.    Parathyroid exploration with 3-1/2 gland removal.  The rationale for surgery, risks benefits and complications were cusp.  This includes infection, bleeding, injury to the recurrent laryngeal nerve, postoperative echo calcium media.    We will obtain a thyroid ultrasound prior to surgery to make sure there were no thyroid abnormalities.  If there are any abnormalities found those will be addressed at the time of surgery.    Please note that the [facility]  system was used.  Our  was an inner ID number is [id number]

## 2018-01-11 NOTE — PATIENT INSTRUCTIONS
"    La cirugía está programada para el 6 de febrero de 2018 a las 7 de la mañana. El hospital lo llamará con un horario de llegada. Tienes que estar allí a las 545 o 6:00.    No hay comida sólida después de la medianoche con un sorbo de agua.    Voy a llamarlo con los resultados de ultrasonido de tiroides    Calcio (en milly)  ¿Tiene remy análisis otros nombres?  Calcio total, calcio ionizado.  ¿Qué es remy análisis?  Un análisis de calcio en la milly mide la cantidad de calcio que circula en hernadez torrente sanguíneo. Hernadez proveedor de atención médica puede usar remy análisis para ayudar a diagnosticar y monitorear varias afecciones que pueden ocurrir. Existen dos tipos de análisis de calcio en la milly: análisis de calcio total y otra de calcio ionizado. El análisis de calcio ionizado mide el calcio "iker" en hernadez milly, es decir, el calcio que no está unido a otras partes de la milly.   ¿Por qué daniela realizarme remy análisis?  Hernadez proveedor de atención médica puede pedirle un análisis de calcio en la milly para ayudar a diagnosticar varios trastornos, que incluyen las enfermedades renales, la pancreatitis y las enfermedades de la glándula paratiroidea. Los niveles de calcio también pueden ser anormales en muchos tipos de cáncer. También es posible que hernadez proveedor le pida remy análisis timi parte de un chequeo médico de rutina.   Un nivel normal de calcio en la milly es un buen signo de que hernadez cuerpo probablemente está funcionando batsheva. Los niveles de calcio que son demasiado bajos, lo que se denomina hipocalciemia, o demasiado altos, lo que se denomina hipercalciemia, pueden indicar diferentes problemas.  Las personas con niveles anormales de calcio pueden no tener ningún síntoma. Los niveles de calcio muy bajos pueden provocar convulsiones, latidos cardíacos irregulares, espasmos musculares u hormigueo en las gladys o los pies. Las personas con niveles altos de calcio pueden tener náuseas, vómito, mucha sed o " estreñimiento. Según cuáles son los resultados de young prueba de calcio en la milly, hernadez proveedor de atención médica puede determinar cómo tratar la causa subyacente de cualquier problema de jluiano que usted pueda tener.  ¿Qué otros análisis podrían hacerme junto con remy?   El nivel de calcio puede medirse por varios motivos. Las pruebas adicionales variarán según lo que hernadez proveedor de atención médica esté buscando.   Hernadez proveedor también puede pedirle pruebas y análisis sobre el funcionamiento de taylor riñones, taylor niveles de vitamina D, de fósforo y de la hormona paratiroidea. Estas pruebas y análisis pueden ayudar a averiguar cuál es la causa de taylor niveles de calcio anormales.  ¿Qué significan los resultados de mi análisis?  Muchos aspectos pueden afectar los resultados de taylor análisis de laboratorio. Estos incluyen el método que usa cada laboratorio para realizar el análisis. Es posible que algunos análisis de laboratorio tengan valores normales un poco diferentes de los que se mencionan a continuación. Aunque los resultados de taylor análisis trinity diferentes del valor normal, es posible que usted no tenga ningún problema. Para saber qué significan taylor resultados, hable con hernadez proveedor de atención médica.  Un rango normal de calcio total en la milly para los adultos es, por lo general, de entre 8.5 y 10.3 miligramos/decilitro (mg/dL). El nivel de calcio ionizado, generalmente, debería ser mayor de 4.6 mg/dL para que se considere normal.   ¿Cómo se realiza remy análisis?  Para remy análisis, se requiere young muestra de milly, que se extrae a través de young aguja que se coloca en young vena de hernadez brazo.  ¿Implica remy análisis algún riesgo?  Mary Ann young muestra de milly con young aguja implica riesgos que incluyen sangrado, infección, moretones o sensación de mareo. Cuando pinchen hernadez brazo con la aguja, es posible que sienta young leve sensación de escozor o dolor. Después, el sitio puede estar levemente adolorido.  ¿Qué  cosas podrían afectar los resultados de mi análisis?  Varios factores pueden afectar el resultado de un análisis de calcio en la milly. Celeste análisis, generalmente, se realiza al mismo tiempo que los análisis de otros componentes sanguíneos, para obtener un mejor panorama de hernadez estado de juliano general. Determinados medicamentos pueden cambiar los niveles de calcio en la milly y afectar los resultados del análisis.   ¿Cómo me preparo para celeste análisis?  No necesita prepararse para celeste análisis. Asegúrese de que hernadez proveedor de atención médica sepa todos los medicamentos, las hierbas medicinales, las vitaminas y los suplementos que usted está tomando. [place] incluye los medicamentos que no necesitan receta y cualquier droga ilícita que pueda usar.  © 2757-5471 [employer]. [address] [place] [address]. Todos los derechos reservados. Esta información no pretende sustituir la atención médica profesional. Sólo hernadez médico puede diagnosticar y tratar un problema de juliano.        ¿Qué es el hiperparatiroidismo primario?    Las glándulas paratiroides son cuatro glándulas muy pequeñas ubicadas en el zachary que producen la hormona paratiroidea humana (PTH, por taylor siglas en inglés). Hernadez función es controlar el nivel de calcio y fósforo en la milly. El hiperparatiroidismo primario se produce cuando hay demasiada PTH en la milly y eso sucede cuando young o más de estas glándulas están demasiado activas. A continuación, verá más información sobre esta afección.  ¿Cuál es hernadez causa?  El hiperparatiroidismo primario suele producirse porque se agranda young de las glándulas paratiroides. En la gran mayoría de los casos, esto es consecuencia de un crecimiento zora (no canceroso) llamado adenoma. En algunos casos, el agrandamiento afecta a más de young glándula paratiroides. También puede deberse a otros problemas médicos, timi young insuficiencia renal o raquitismo, heather en estos casos el calcio no suele  estar en niveles altos. A esto se le llama hiperparatiroidismo secundario.  ¿Por qué es un problema?  Con el hiperparatiroidismo primario, lakeisha glándulas producen demasiada PTH. La función de la PTH es indicarle al cuerpo que controle el nivel de calcio. Si hay demasiada PTH, eso significa que hernadez cuerpo aumentará el nivel de calcio en la milly, lo cual ocasiona un problema llamado hipercalcemia, que es cuando el nivel de calcio en la milly es demasiado alto. La hipercalcemia puede ocasionar problemas de juliano graves.     Un nivel alto de calcio puede deberse a otras condiciones, timi mary ann demasiados suplementos de calcio y vitamina D, ciertas afecciones genéticas u otras circunstancias. [place] médicos buscarán si el hiperparatiroidismo primario es la causa por la que usted tiene altos niveles de calcio. Si es así, conversarán con usted sobre un plan de tratamiento adecuado.  Factores de riesgo del hiperparatiroidismo primario  Los factores de riesgo de esta afección son los siguientes:  · Ser kimmy (es menos común en los hombres)  · Ser mayor (las probabilidades aumentan con la edad)  · Tener padres o hermanos con hiperparatiroidismo u otros tumores endocrinos  · Ciertos trastornos de los riñones  · Mary Ann ciertos medicamentos  · Mar recibido radiación en la maricarmen o el zachary  Síntomas del hiperparatiroidismo  Los síntomas de esta enfermedad pueden incluir:  · Debilidad muscular  · Depresión  · Cansancio  · Confusión y pérdida de la memoria  · Zunilda memoria  · Náuseas y vómito  · Dolor en la vanessa del estómago (abdomen)  · Excrementos duros (estreñimiento)  · Úlceras de estómago  · Necesidad de orinar a menudo  · Cálculos (piedras) en los riñones  · Dolor en los huesos o en las articulaciones  · Trastornos de los huesos (osteopenia u osteoporosis), más fracturas de los huesos  · Presión arterial chacorta  · Más sed y deseos de orinar  Lo que usted puede hacer  Si el hiperparatiroidismo primario se mannie sin tratar,  puede empeorar con el tiempo. El tratamiento puede consistir en young operación para eliminar las glándulas paratiroides agrandadas a fin de restablecer el nivel normal de calcio en la milly. Puede que taylor médicos deseen asegurarse de que usted consume un nivel adecuado de vitamina D y calcio antes de hacer young cirugía. [place] reducirá el riesgo de tener bajo nivel de calcio después de la operación. Hernadez médico hablará con usted acerca de taylor opciones de tratamiento. Asegúrese de hacerle todas las preguntas que tenga.  Date Last Reviewed: 8/13/2014  © 8533-4494 [employer]. [address] [place] [id number]. Todos los derechos reservados. Esta información no pretende sustituir la atención médica profesional. Sólo hernadez médico puede diagnosticar y tratar un problema de juliano.        ¿Qué son las glándulas paratiroides?  Las glándulas paratiroides son cuatro glándulas pequeñas (con el tamaño aproximado de un chícharo o arveja) ubicadas detrás de la glándula tiroides. Hernadez función principal es mantener el nivel de calcio en la milly dentro de ciertos límites. [place] ayuda a lograr un funcionamiento adecuado de los músculos y de los nervios, y también ayuda a mantener los huesos hannah. Cuando existe un problema con las glándulas paratiroides, el nivel de calcio en la milly puede llegar a ser demasiado alto, lo cual afecta a todo el cuerpo.      Las glándulas paratiroides  Las glándulas paratiroides se encuentran detrás de la glándula tiroides en el zachary. Las glándulas paratiroides producen la hormona paratiroidea (PTH, por taylor siglas en inglés) para controlar el nivel de calcio y fósforo en la milly. Esta es young sustancia química que indica al organismo cómo controlar el nivel de calcio.  ¿Cómo funcionan las glándulas paratiroides?  Cuando el nivel de calcio en la milly es bajo, las glándulas producen más PTH. [place] indica al organismo que debe aumentar la cantidad de calcio en la milly. Para ello,  el cuerpo puede absorber más calcio de los alimentos, a través del intestino. O batsheva puede faustina calcio de los huesos. Cuando el nivel de calcio en la milly es alto, las glándulas producen menos PTH. [place] indica al organismo que debe reducir la cantidad de calcio en la milly. Para ello, los intestinos absorben menos calcio, se obie menos calcio de los huesos y los riñones filtran más calcio de la milly y lo eliminan.  Date Last Reviewed: 8/13/2014  © 6409-6194 [employer]. [address] [place] [address], [place] [address]. Todos los derechos reservados. Esta información no pretende sustituir la atención médica profesional. Sólo hernadez médico puede diagnosticar y tratar un problema de juliano.        Paratirina  ¿Tiene remy análisis otros nombres?  Ensayo de hormona paratiroidea, hormona paratiroidea, parathormona, [id number]  ¿Qué es remy análisis?  Remy análisis mide young sustancia denominada paratirina (PTH, por taylor siglas en inglés) en hernadez milly. La PTH es producida por cuatro glándulas paratiroideas diminutas que se encuentran en hernadez zachary. La PTH circula en hernadez milly y es necesaria para regular el nivel de calcio en hernadez milly. Hernadez corazón, huesos, sistema nervioso y riñones necesitan un nivel normal de calcio en la milly para funcionar timi deberían.  Si hernadez nivel de calcio es demasiado bajo, taylor glándulas paratiroideas liberan PTH para hacer que haya más cantidad de calcio en hernadez milly. Si hernadez nivel de calcio es demasiado alto, taylor glándulas paratiroideas oneal de producir PTH. Medir la cantidad de PTH en hernadez milly ayuda a hernadez proveedor de atención médica a averiguar la causa de un nivel anormal de calcio.  Por lo general, remy análisis se realiza temprano por la mañana, que es el mejor momento para medir la PTH.  ¿Por qué daniela realizarme remy análisis?  ted podría necesitar remy análisis si en un análisis de milly anterior se halló que hernadez nivel de calcio era demasiado alto o demasiado bajo. También  podría necesitar celeste análisis si tiene signos o síntomas de un nivel anormal de calcio. Los niveles de PTH ayudan a hernadez proveedor de atención médica a averiguar si un nivel anormal de calcio se debe a problemas con taylor glándulas paratiroideas o tiene otra causa.  Los signos y síntomas de un nivel alto de calcio o hipercalcemia incluyen:  · Huesos débiles  · Cálculos renales  · Fatiga  · Rosalind  · Náuseas  · Necesidad de orinar a menudo  · Sed  Los síntomas de un nivel bajo de calcio o hipocalcemia incluyen:  · Hormigueo y entumecimiento  · Depresión u otros cambios de humor  · Espasmos musculares o convulsiones  · Latidos cardíacos irregulares  ¿Qué otros análisis podrían hacerme junto con celeste?   Es posible que hernadez proveedor de atención médica le pida otros análisis de milly junto con un análisis de PTH. Es común revisar hernadez nivel de calcio en la milly junto con el nivel de PTH. También es posible que le revisen los niveles de otros minerales en la milly. Además, podrían hacerle pruebas para medir hernadez función renal.  ¿Qué significan los resultados de mi análisis?  Muchas cosas pueden afectar los resultados de taylor análisis de laboratorio. Por ejemplo, el método que usa cada laboratorio para realizar el análisis. Aunque los resultados de taylor análisis trinity diferentes del valor normal, es posible que usted no tenga ningún problema. Para saber qué significan taylor resultados, hable con hernadez proveedor de atención médica.  Los resultados se indican en picogramos por mililitro (pg/mL). Celeste análisis mide regi formas de PTH: molécula intacta, fragmentos N terminal y fragmentos C terminal. Los niveles normales son:  · N-terminal: entre 8 y 24 pg/mL  · Molécula intacta: entre 10 y 65 pg/mL  · C-terminal: entre 50 y 330 pg/mL  Algunas de las causas más comunes de los niveles altos de PTH son:  · Glándulas paratiroideas hiperactivas, young afección denominada hiperparatiroidismo; esta puede tener diferentes causas  · Incapacidad de  los riñones de responder normalmente a la PTH  · Deficiencia de vitamina D hereditaria  · Lesión de la médula mackenzie  · Nivel bajo de calcio no relacionado con las glándulas paratiroideas  Algunas de las causas más comunes de los niveles bajos de PTH son:  · Glándulas paratiroideas con baja actividad, young afección denominada hipoparatiroidismo. Puede tener diferentes causas  · Glándula tiroidea hiperactiva o hipertiroidismo  · Deficiencia de magnesio  · Nivel alto de calcio no relacionado con las glándulas paratiroideas  Hernadez proveedor de atención médica analizará los resultados de la PTH junto con el nivel de calcio en la milly y, posiblemente, otros análisis para comprender mejor lo que significan los resultados.  ¿Cómo se realiza remy análisis?  Para remy análisis, se requiere young muestra de milly, que se extrae a través de young aguja que se coloca en young vena de hernadez brazo.  ¿Implica remy análisis algún riesgo?  Mary Ann young muestra de milly con young aguja implica riesgos que incluyen sangrado, infección, moretones o sensación de mareo. Cuando pinchen hernadez brazo con la aguja, es posible que sienta young leve sensación de escozor o dolor. Después, el sitio puede estar levemente adolorido.  ¿Qué cosas podrían afectar los resultados de mi análisis?  Las afecciones tales timi las grasas en milly más altas que lo normal, la deficiencia de vitamina D y el síndrome de leche y alcalinos, también denominado síndrome de [place], pueden interferir en los resultados de hernadez análisis. Muchos tipos de medicamentos también pueden alterar los resultados. Estos incluyen las sobredosis de medicamentos que contienen fosfatos y vitamina A o D.  ¿Cómo me preparo para remy análisis?  Es posible que tenga que estar en ayunas kika 10 horas antes del análisis. Asegúrese de que hernadez proveedor de atención médica sepa todos los medicamentos, las hierbas medicinales, las vitaminas y los suplementos que usted está tomando. [place] incluye los  medicamentos que no necesitan receta y cualquier droga ilícita que pudiera estar usando.  © 9190-7863 [employer]. [address] [place] [address]. Todos los derechos reservados. Esta información no pretende sustituir la atención médica profesional. Sólo hernadez médico puede diagnosticar y tratar un problema de juliano.        Cirugía de paratiroides: Hernadez evaluación por pruebas con imágenes         Tomografía con sestamibi       Ecografía        Tomografía computarizada       Hernadez médico deberá hacerle young evaluación para obtener más información acerca de hernadez enfermedad a fin de determinar si usted tiene hiperparatiroidismo primario. [place] también le dará al médico la información necesaria para determinar cuál es el mejor plan de tratamiento para usted.  Historia clínica y examen médico  Kika las preguntas sobre hernadez historia clínica, recuerde describir todos los síntomas que tenga, aunque parezcan menores. Mencione también otros problemas médicos que tenga o haya tenido en el pasado. Es posible que el médico le ayleen preguntas acerca de los alimentos que come y los medicamentos que obie. Kika el chequeo, el médico le examinará la maricarmen y el zachary. También es posible que le examine otras partes del cuerpo para descartar otros problemas.  Pruebas de diagnóstico  Le harán ciertas pruebas para diagnosticar el hiperparatiroidismo primario, descartar otras causas de problemas y evaluar el riesgo de problemas médicos asociados, timi trastornos de los riñones o de los huesos. Estas pruebas pueden consistir en:  · Análisis de milly. Se janie muestras de milly de young vena y se analizan para patricia si presentan un alto nivel de calcio y de la hormona paratiroidea humana (o PTH). También es posible que se revisen los niveles de vitamina D, magnesio, fosfatasa alcalina y fósforo.  · Análisis de orina. Se janie muestras de orina kika un período de 24 horas y se analizan para patricia si muestran un alto nivel de  calcio o problemas en los riñones.  · Prueba de densidad ósea. Se janie imágenes de la cadera, la parte inferior de la espalda o el antebrazo. En esta prueba, se mide la cantidad de calcio presente en los huesos para revisar en qué estado se encuentran.  Podrán realizarse pruebas con imágenes para ayudar al médico a ubicar las glándulas paratiroides y determinar si están agrandadas.  · La tomografía con sestamibi se utiliza para ubicar las glándulas paratiroides agrandadas. La prueba puede durar de 3 a 4 horas. Denise la prueba se inyecta en las venas un líquido radiactivo (no peligroso), el cual ayuda a identificar las glándulas paratiroides agrandadas cuando se usa young cámara especial.  · También podrán hacerle young ecografía para encontrar las glándulas paratiroides agrandadas. Esta es young prueba rápida en la que se utilizan ondas sonoras inocuas para formar imágenes de las glándulas paratiroides. Estas imágenes aparecen en un monitor.  · Young tomografía computarizada (TC) combina los manish X y la tecnología de procesamiento de datos por computadora para formar imágenes.   · En la resonancia magnética (RM) se utilizan imanes y ondas de radio para formar imágenes. Celeste tipo de pruebas es menos frecuente, heather también puede utilizarse para ubicar las glándulas paratiroides agrandadas.  Date Last Reviewed: 8/13/2014  © 7414-1418 [employer]. [address] [place] [id number]. Todos los derechos reservados. Esta información no pretende sustituir la atención médica profesional. Sólo hernadez médico puede diagnosticar y tratar un problema de juliano.        Paratiroidectomía (cirugía de paratiroides)    Hernadez médico ha descubierto que young o varias de taylor glándulas paratiroides están agrandadas, lo cual puede ser la causa de hernadez hiperparatiroidismo primario. Las glándulas paratiroides controlan el nivel de calcio en la milly. El hiperparatiroidismo primario produce un aumento del nivel de calcio en la milly  (hipercalcemia), lo cual puede ann lugar a numerosos problemas en todo el cuerpo. Para tratar el hiperparatiroidismo primario suelen eliminarse las glándulas paratiroides agrandadas mediante cirugía.  Preparativos para la operación  Young vez programada la fecha de la operación, le indicarán cómo debe prepararse. Siga todas las instrucciones y no dude en hacer todas las preguntas necesarias para aclarar las dudas que tenga. Niko preparación para la cirugía, lauren vez deba hacer lo siguiente:  · Informe a hernadez médico de todos los medicamentos que esté tomando, incluso medicamentos de venta iker, vitaminas y suplementos. Es posible que deba dejar de faustina ciertos medicamentos, niko aspirina o ibuprofeno, young o dos semanas antes de la operación.  · No coma ni yogesh nada kika 6 a 8 horas antes de la operación. El médico le dará instrucciones específicas de antemano.  · Póngase de acuerdo con un familiar o amigo adulto para que lo lleve a casa después de la operación.  El día de la operación  Llegue puntualmente a hernadez valentin. Antes de ir a la operación:  · Necesitará registrarse. [place] puede hacerse de antemano kika young visita previa, o batsheva por Internet o por teléfono. Tenga preparados taylor documentos de identificación y hernadez seguro médico.  · Le colocarán young sonda intravenosa en young vena del brazo o de la mano para administrarle líquidos y medicamentos.  · Un médico o young enfermera hablarán con usted acerca del tipo de anestesia (medicamento contra el dolor) que recibirá kika la operación.  Kika la operación  Es posible que deban quitarle young o más glándulas paratiroides. La decisión acerca del número de glándulas que le quitarán suele tomarse kika la operación. Consulte con hernadez médico para obtener más información.  Extracción de las glándulas  · Se realiza young incisión en el zachary.  · Se ubican y se eliminan las glándulas paratiroides agrandadas.  · En algunos casos, las cuatro glándulas están agrandadas. En remy  leonarda, se eliminan regi glándulas y la mitad de otra. La media glándula restante suele producir layne cantidad de hormona suficiente para reemplazar las cuatro glándulas normales. En algunos casos, poco frecuentes, se eliminan las cuatro glándulas. A continuación se coloca layne parte de layne glándula en otra parte del cuerpo, normalmente en el zachary o en el brazo. Celeste procedimiento se llama autotrasplante paratiroideo. La glándula trasplantada sigue funcionando desde hernadez nueva ubicación.  · Layne vez terminada la cirugía, se neelima la incisión con suturas, tiras de cinta quirúrgica o pegamento quirúrgico.     Riesgos y complicaciones  Hernadez médico hablará con usted acerca de los riesgos y complicaciones posibles de la cirugía. Entre estos se encuentran los siguientes:  · Lesiones a los nervios de la laringe que se conectan con las cuerdas vocales  · Imposibilidad de encontrar la glándula o glándulas agrandadas, lo cual requerirá cirugía adicional  · Sangrado  · Infección  · Reacción a la anestesia  · Complicaciones de la glándula tiroides  · Bajos niveles de calcio y de hormona paratiroidea, lo cual puede requerir tratamiento temporal con tabletas de calcio y vitamina D activa   Hernadez recuperación  La recuperación de la paratiroidectomía suele ser rápida. Es posible que regrese a casa el mismo día o que deba pasar la noche en el hospital. Layne vez que esté listo para regresar a casa, le darán instrucciones sobre cómo cuidarse. Siga exactamente las instrucciones.     Cuándo debe llamar al médico  Llame a hernadez médico si tiene cualquiera de estos síntomas kika hernadez recuperación:  · Adormecimiento u hormigueo en las puntas de los dedos o alrededor de la boca  · Calambres o espasmos musculares  · Hinchazón en el zachary  · Fiebre superior a 100.4°F (38.0°C)  · Aumento del enrojecimiento, hinchazón o supuración en el sitio de la incisión  · Náuseas o vómito  · Voz cada vez más ronca  · Dificultad para respirar  · Dificultad para  tragar  · Ritmo cardíaco irregular   Date Last Reviewed: 8/13/2014  © 6954-2587 [employer], [employer]. [address] [place] [address], [place] [address]. Todos los derechos reservados. Esta información no pretende sustituir la atención médica profesional. Sólo hernadez médico puede diagnosticar y tratar un problema de juliano.

## 2018-01-11 NOTE — PROGRESS NOTES
Patient Leydi Cordero, MRN [id number], was dependent on dialysis (ICD10 Z99.2) at the time of this visit on 1/11/18. This addendum is made to the medical record on 01/11/2018.

## 2018-01-16 NOTE — PROGRESS NOTES
Patient Leydi Cordero, MRN [id number], was dependent on dialysis (ICD10 Z99.2) at the time of this visit on 1/11/18. This addendum is made to the medical record on 01/16/2018.

## 2018-01-18 NOTE — PROGRESS NOTES
Patient Leydi Cordero, MRN [id number], was dependent on dialysis (ICD10 Z99.2) at the time of this visit on 1/11/18. This addendum is made to the medical record on 01/18/2018.

## 2018-01-19 ENCOUNTER — HOSPITAL ENCOUNTER (OUTPATIENT)
Dept: RADIOLOGY | Facility: HOSPITAL | Age: 28
Discharge: HOME | End: 2018-01-19
Attending: SURGERY
Payer: MEDICARE

## 2018-01-19 DIAGNOSIS — E21.2 HYPERPARATHYROIDISM, TERTIARY: ICD-10-CM

## 2018-01-19 PROCEDURE — 76536 US EXAM OF HEAD AND NECK: CPT | Mod: 26,,, | Performed by: RADIOLOGY

## 2018-01-19 PROCEDURE — 76536 US EXAM OF HEAD AND NECK: CPT | Mod: TC,PO

## 2018-02-02 RX ORDER — LIDOCAINE AND PRILOCAINE 25; 25 MG/G; MG/G
CREAM TOPICAL
Qty: 30 G | Refills: 5 | Status: SHIPPED | OUTPATIENT
Start: 2018-02-02 | End: 2018-09-27

## 2018-02-05 ENCOUNTER — ANESTHESIA EVENT (OUTPATIENT)
Dept: SURGERY | Facility: HOSPITAL | Age: 28
End: 2018-02-05
Payer: MEDICARE

## 2018-02-05 NOTE — PRE ADMISSION SCREENING
Pre op instructions reviewed with patient per phone: via [employer]    To confirm, Your surgeon has instructed you:  Surgery is scheduled 02/06/18 at 0700.      [facility] main lobby by 0500.   Pre admit office to call later today only if arrival time changes.      INSTRUCTIONS IMPORTANT!!!  ¨ Do not eat, drink, or smoke after 12 midnight, may have water and apple juice until 3 h prior to surgery. OK to brush teeth, no gum, candy or mints!    ¨ Take only these medicines with a small swallow of water-morning of surgery.  Amlodipine, Pepcid, Prednisone      ____  Do not wear makeup, including mascara.  ____  No powder, lotions or creams to surgical area.  ____  Please remove all jewelry, including piercings and leave at home.  ____  No money or valuables needed. Please leave at home.  ____  Please bring identification and insurance information to hospital.  ____  If going home the same day, arrange for a ride home. You will not be able to   drive if Anesthesia was used.  ____  Children, under 12 years old, must remain in the waiting room with an adult.  They are not allowed in patient areas.  ____  Wear loose fitting clothing. Allow for dressings, bandages.  ____  Stop Aspirin, Ibuprofen, Motrin and Aleve at least 5-7 days before surgery, unless otherwise instructed by your doctor, or the nurse.   You MAY use Tylenol/acetaminophen until day of surgery.  ____  If you take diabetic medication, do not take am of surgery unless instructed by   Doctor.  ____ Stop taking any Fish Oil supplement or any Vitamins that contain Vitamin E at least 5 days prior to surgery.          Bathing Instructions-- The night before surgery and the morning prior to coming to the hospital:   -Do not shave the surgical area.   -Shower and wash your hair and body as usual with anti-bacterial  soap and shampoo.   -Rinse your hair and body completely.   -Use one packet of hibiclens to  wash the surgical site (using your hand) gently for 5 minutes.  Do not scrub you skin too hard.   -Do not use hibiclens on your head, face, or genitals.   -Do not wash with anti-bacterial soap after you use the hibiclens.   -Rinse your body thoroughly.   -Dry with clean, soft towel.  Do not use lotion, cream, deodorant, or powders on   the surgical site.    Use antibacterial soap in place of hibiclens if your surgery is on the head, face or genitals.         Surgical Site Infection    Prevention of surgical site infections:     -Keep incisions clean and dry.   -Do not soak/submerge incisions in water until completely healed.   -Do not apply lotions, powders, creams, or deodorants to site.   -Always make sure hands are cleaned with antibacterial soap/ alcohol-based   prior to touching the surgical site.  (This includes doctors, nurses, staff, and yourself.)    Signs and symptoms:   -Redness and pain around the area where you had surgery   -Drainage of cloudy fluid from your surgical wound   -Fever over 100.4  I have read or had read and explained to me, and understand the above information.

## 2018-02-06 ENCOUNTER — SURGERY (OUTPATIENT)
Age: 28
End: 2018-02-06

## 2018-02-06 ENCOUNTER — ANESTHESIA (OUTPATIENT)
Dept: SURGERY | Facility: HOSPITAL | Age: 28
End: 2018-02-06
Payer: MEDICARE

## 2018-02-06 ENCOUNTER — HOSPITAL ENCOUNTER (OUTPATIENT)
Facility: HOSPITAL | Age: 28
Discharge: HOME | End: 2018-02-07
Attending: SURGERY | Admitting: SURGERY
Payer: MEDICARE

## 2018-02-06 DIAGNOSIS — N18.6 ESRD (END STAGE RENAL DISEASE): ICD-10-CM

## 2018-02-06 DIAGNOSIS — E21.2 HYPERPARATHYROIDISM, TERTIARY: ICD-10-CM

## 2018-02-06 LAB
ANION GAP SERPL CALC-SCNC: 14 MMOL/L
BASOPHILS # BLD AUTO: 0.02 K/UL
BASOPHILS NFR BLD: 0.3 %
BUN SERPL-MCNC: 47 MG/DL
CALCIUM SERPL-MCNC: 9.4 MG/DL
CHLORIDE SERPL-SCNC: 98 MMOL/L
CO2 SERPL-SCNC: 25 MMOL/L
CREAT SERPL-MCNC: 10.8 MG/DL
DIFFERENTIAL METHOD: ABNORMAL
EOSINOPHIL # BLD AUTO: 0.4 K/UL
EOSINOPHIL NFR BLD: 6.3 %
ERYTHROCYTE [DISTWIDTH] IN BLOOD BY AUTOMATED COUNT: 18.6 %
EST. GFR  (AFRICAN AMERICAN): 5 ML/MIN/1.73 M^2
EST. GFR  (NON AFRICAN AMERICAN): 4 ML/MIN/1.73 M^2
GLUCOSE SERPL-MCNC: 82 MG/DL
HCT VFR BLD AUTO: 38.6 %
HGB BLD-MCNC: 12.2 G/DL
LYMPHOCYTES # BLD AUTO: 1 K/UL
LYMPHOCYTES NFR BLD: 15 %
MCH RBC QN AUTO: 29.6 PG
MCHC RBC AUTO-ENTMCNC: 31.6 G/DL
MCV RBC AUTO: 94 FL
MONOCYTES # BLD AUTO: 0.6 K/UL
MONOCYTES NFR BLD: 8 %
NEUTROPHILS # BLD AUTO: 4.8 K/UL
NEUTROPHILS NFR BLD: 70.4 %
PLATELET # BLD AUTO: 161 K/UL
PMV BLD AUTO: 10 FL
POTASSIUM SERPL-SCNC: 5 MMOL/L
POTASSIUM SERPL-SCNC: 5 MMOL/L
RBC # BLD AUTO: 4.12 M/UL
SODIUM SERPL-SCNC: 137 MMOL/L
WBC # BLD AUTO: 6.87 K/UL

## 2018-02-06 PROCEDURE — 36000706: Performed by: SURGERY

## 2018-02-06 PROCEDURE — 60500 EXPLORE PARATHYROID GLANDS: CPT | Mod: 80,,, | Performed by: SURGERY

## 2018-02-06 PROCEDURE — 27201423 OPTIME MED/SURG SUP & DEVICES STERILE SUPPLY: Performed by: SURGERY

## 2018-02-06 PROCEDURE — 25000003 PHARM REV CODE 250: Performed by: SURGERY

## 2018-02-06 PROCEDURE — 88331 PATH CONSLTJ SURG 1 BLK 1SPC: CPT | Mod: 26,,, | Performed by: PATHOLOGY

## 2018-02-06 PROCEDURE — 85025 COMPLETE CBC W/AUTO DIFF WBC: CPT

## 2018-02-06 PROCEDURE — 63600175 PHARM REV CODE 636 W HCPCS: Performed by: SURGERY

## 2018-02-06 PROCEDURE — 37000008 HC ANESTHESIA 1ST 15 MINUTES: Performed by: SURGERY

## 2018-02-06 PROCEDURE — 63600175 PHARM REV CODE 636 W HCPCS: Performed by: NURSE ANESTHETIST, CERTIFIED REGISTERED

## 2018-02-06 PROCEDURE — 71000033 HC RECOVERY, INTIAL HOUR: Performed by: SURGERY

## 2018-02-06 PROCEDURE — 80048 BASIC METABOLIC PNL TOTAL CA: CPT

## 2018-02-06 PROCEDURE — 25000003 PHARM REV CODE 250: Performed by: NURSE ANESTHETIST, CERTIFIED REGISTERED

## 2018-02-06 PROCEDURE — 88305 TISSUE EXAM BY PATHOLOGIST: CPT | Mod: 59 | Performed by: PATHOLOGY

## 2018-02-06 PROCEDURE — 36000707: Performed by: SURGERY

## 2018-02-06 PROCEDURE — 63600175 PHARM REV CODE 636 W HCPCS: Performed by: ANESTHESIOLOGY

## 2018-02-06 PROCEDURE — 88305 TISSUE EXAM BY PATHOLOGIST: CPT | Mod: 26,,, | Performed by: PATHOLOGY

## 2018-02-06 PROCEDURE — 99214 OFFICE O/P EST MOD 30 MIN: CPT | Mod: ,,, | Performed by: INTERNAL MEDICINE

## 2018-02-06 PROCEDURE — 71000039 HC RECOVERY, EACH ADD'L HOUR: Performed by: SURGERY

## 2018-02-06 PROCEDURE — 60500 EXPLORE PARATHYROID GLANDS: CPT | Mod: ,,, | Performed by: SURGERY

## 2018-02-06 PROCEDURE — 37000009 HC ANESTHESIA EA ADD 15 MINS: Performed by: SURGERY

## 2018-02-06 RX ORDER — CHLORHEXIDINE GLUCONATE ORAL RINSE 1.2 MG/ML
10 SOLUTION DENTAL 2 TIMES DAILY
Status: DISCONTINUED | OUTPATIENT
Start: 2018-02-06 | End: 2018-02-07 | Stop reason: HOSPADM

## 2018-02-06 RX ORDER — LABETALOL HYDROCHLORIDE 5 MG/ML
10 INJECTION, SOLUTION INTRAVENOUS ONCE
Status: DISCONTINUED | OUTPATIENT
Start: 2018-02-06 | End: 2018-02-06 | Stop reason: DRUGHIGH

## 2018-02-06 RX ORDER — OXYCODONE HYDROCHLORIDE 5 MG/1
10 TABLET ORAL ONCE AS NEEDED
Status: DISCONTINUED | OUTPATIENT
Start: 2018-02-06 | End: 2018-02-06 | Stop reason: HOSPADM

## 2018-02-06 RX ORDER — HYDROCODONE BITARTRATE AND ACETAMINOPHEN 5; 325 MG/1; MG/1
1 TABLET ORAL EVERY 4 HOURS PRN
Status: DISCONTINUED | OUTPATIENT
Start: 2018-02-06 | End: 2018-02-07 | Stop reason: HOSPADM

## 2018-02-06 RX ORDER — ONDANSETRON 2 MG/ML
INJECTION INTRAMUSCULAR; INTRAVENOUS
Status: DISCONTINUED | OUTPATIENT
Start: 2018-02-06 | End: 2018-02-06

## 2018-02-06 RX ORDER — ONDANSETRON 8 MG/1
8 TABLET, ORALLY DISINTEGRATING ORAL EVERY 8 HOURS PRN
Status: DISCONTINUED | OUTPATIENT
Start: 2018-02-06 | End: 2018-02-06 | Stop reason: HOSPADM

## 2018-02-06 RX ORDER — FENTANYL CITRATE 50 UG/ML
INJECTION, SOLUTION INTRAMUSCULAR; INTRAVENOUS
Status: DISCONTINUED | OUTPATIENT
Start: 2018-02-06 | End: 2018-02-06

## 2018-02-06 RX ORDER — FAMOTIDINE 20 MG/1
20 TABLET, FILM COATED ORAL DAILY
Status: DISCONTINUED | OUTPATIENT
Start: 2018-02-06 | End: 2018-02-07 | Stop reason: HOSPADM

## 2018-02-06 RX ORDER — HYDROCODONE BITARTRATE AND ACETAMINOPHEN 7.5; 325 MG/1; MG/1
1 TABLET ORAL EVERY 4 HOURS PRN
Status: DISCONTINUED | OUTPATIENT
Start: 2018-02-06 | End: 2018-02-07 | Stop reason: HOSPADM

## 2018-02-06 RX ORDER — MEPERIDINE HYDROCHLORIDE 25 MG/ML
25 INJECTION INTRAMUSCULAR; INTRAVENOUS; SUBCUTANEOUS
Status: DISCONTINUED | OUTPATIENT
Start: 2018-02-06 | End: 2018-02-07 | Stop reason: HOSPADM

## 2018-02-06 RX ORDER — ACETAMINOPHEN 10 MG/ML
INJECTION, SOLUTION INTRAVENOUS
Status: DISCONTINUED | OUTPATIENT
Start: 2018-02-06 | End: 2018-02-06

## 2018-02-06 RX ORDER — SODIUM CHLORIDE 9 MG/ML
INJECTION, SOLUTION INTRAVENOUS CONTINUOUS PRN
Status: DISCONTINUED | OUTPATIENT
Start: 2018-02-06 | End: 2018-02-06

## 2018-02-06 RX ORDER — PROPOFOL 10 MG/ML
VIAL (ML) INTRAVENOUS
Status: DISCONTINUED | OUTPATIENT
Start: 2018-02-06 | End: 2018-02-06

## 2018-02-06 RX ORDER — SODIUM CHLORIDE 9 MG/ML
INJECTION, SOLUTION INTRAVENOUS CONTINUOUS
Status: DISCONTINUED | OUTPATIENT
Start: 2018-02-06 | End: 2018-02-06

## 2018-02-06 RX ORDER — ONDANSETRON 2 MG/ML
4 INJECTION INTRAMUSCULAR; INTRAVENOUS DAILY PRN
Status: DISCONTINUED | OUTPATIENT
Start: 2018-02-06 | End: 2018-02-06 | Stop reason: HOSPADM

## 2018-02-06 RX ORDER — SUCCINYLCHOLINE CHLORIDE 20 MG/ML
INJECTION INTRAMUSCULAR; INTRAVENOUS
Status: DISCONTINUED | OUTPATIENT
Start: 2018-02-06 | End: 2018-02-06

## 2018-02-06 RX ORDER — CEFAZOLIN SODIUM 2 G/50ML
2 SOLUTION INTRAVENOUS
Status: COMPLETED | OUTPATIENT
Start: 2018-02-06 | End: 2018-02-06

## 2018-02-06 RX ORDER — MIDAZOLAM HYDROCHLORIDE 1 MG/ML
INJECTION, SOLUTION INTRAMUSCULAR; INTRAVENOUS
Status: DISCONTINUED | OUTPATIENT
Start: 2018-02-06 | End: 2018-02-06

## 2018-02-06 RX ORDER — LABETALOL HYDROCHLORIDE 5 MG/ML
10 INJECTION, SOLUTION INTRAVENOUS ONCE
Status: COMPLETED | OUTPATIENT
Start: 2018-02-06 | End: 2018-02-06

## 2018-02-06 RX ORDER — LIDOCAINE HYDROCHLORIDE 20 MG/ML
INJECTION, SOLUTION EPIDURAL; INFILTRATION; INTRACAUDAL; PERINEURAL
Status: DISCONTINUED | OUTPATIENT
Start: 2018-02-06 | End: 2018-02-06

## 2018-02-06 RX ORDER — FENTANYL CITRATE 50 UG/ML
25 INJECTION, SOLUTION INTRAMUSCULAR; INTRAVENOUS EVERY 5 MIN PRN
Status: DISCONTINUED | OUTPATIENT
Start: 2018-02-06 | End: 2018-02-06 | Stop reason: HOSPADM

## 2018-02-06 RX ORDER — ONDANSETRON 8 MG/1
8 TABLET, ORALLY DISINTEGRATING ORAL EVERY 8 HOURS PRN
Status: DISCONTINUED | OUTPATIENT
Start: 2018-02-06 | End: 2018-02-07 | Stop reason: HOSPADM

## 2018-02-06 RX ORDER — AMLODIPINE BESYLATE 10 MG/1
10 TABLET ORAL DAILY
Status: DISCONTINUED | OUTPATIENT
Start: 2018-02-06 | End: 2018-02-07 | Stop reason: HOSPADM

## 2018-02-06 RX ORDER — PREDNISONE 20 MG/1
20 TABLET ORAL DAILY
Status: DISCONTINUED | OUTPATIENT
Start: 2018-02-06 | End: 2018-02-07 | Stop reason: HOSPADM

## 2018-02-06 RX ORDER — SODIUM CHLORIDE, SODIUM LACTATE, POTASSIUM CHLORIDE, CALCIUM CHLORIDE 600; 310; 30; 20 MG/100ML; MG/100ML; MG/100ML; MG/100ML
INJECTION, SOLUTION INTRAVENOUS CONTINUOUS
Status: DISCONTINUED | OUTPATIENT
Start: 2018-02-06 | End: 2018-02-06

## 2018-02-06 RX ORDER — ACETAMINOPHEN 10 MG/ML
1000 INJECTION, SOLUTION INTRAVENOUS ONCE
Status: DISCONTINUED | OUTPATIENT
Start: 2018-02-06 | End: 2018-02-06 | Stop reason: SDUPTHER

## 2018-02-06 RX ORDER — BUPIVACAINE HYDROCHLORIDE 2.5 MG/ML
INJECTION, SOLUTION EPIDURAL; INFILTRATION; INTRACAUDAL
Status: DISCONTINUED | OUTPATIENT
Start: 2018-02-06 | End: 2018-02-06 | Stop reason: HOSPADM

## 2018-02-06 RX ADMIN — PROPOFOL 50 MG: 10 INJECTION, EMULSION INTRAVENOUS at 08:02

## 2018-02-06 RX ADMIN — ACETAMINOPHEN 1000 MG: 10 INJECTION, SOLUTION INTRAVENOUS at 08:02

## 2018-02-06 RX ADMIN — SUCCINYLCHOLINE CHLORIDE 80 MG: 20 INJECTION, SOLUTION INTRAMUSCULAR; INTRAVENOUS at 07:02

## 2018-02-06 RX ADMIN — FENTANYL CITRATE 100 MCG: 50 INJECTION, SOLUTION INTRAMUSCULAR; INTRAVENOUS at 07:02

## 2018-02-06 RX ADMIN — PROPOFOL 150 MG: 10 INJECTION, EMULSION INTRAVENOUS at 07:02

## 2018-02-06 RX ADMIN — CEFAZOLIN SODIUM 2 G: 2 SOLUTION INTRAVENOUS at 07:02

## 2018-02-06 RX ADMIN — FENTANYL CITRATE 50 MCG: 50 INJECTION, SOLUTION INTRAMUSCULAR; INTRAVENOUS at 07:02

## 2018-02-06 RX ADMIN — SODIUM CHLORIDE 100 ML: 9 INJECTION, SOLUTION INTRAVENOUS at 08:02

## 2018-02-06 RX ADMIN — SODIUM CHLORIDE: 0.9 INJECTION, SOLUTION INTRAVENOUS at 07:02

## 2018-02-06 RX ADMIN — CHLORHEXIDINE GLUCONATE 10 ML: 1.2 RINSE ORAL at 08:02

## 2018-02-06 RX ADMIN — BUPIVACAINE HYDROCHLORIDE 30 ML: 2.5 INJECTION, SOLUTION EPIDURAL; INFILTRATION; INTRACAUDAL; PERINEURAL at 08:02

## 2018-02-06 RX ADMIN — LIDOCAINE HYDROCHLORIDE 80 MG: 20 INJECTION, SOLUTION EPIDURAL; INFILTRATION; INTRACAUDAL; PERINEURAL at 07:02

## 2018-02-06 RX ADMIN — FAMOTIDINE 20 MG: 20 TABLET, FILM COATED ORAL at 02:02

## 2018-02-06 RX ADMIN — PREDNISONE 20 MG: 20 TABLET ORAL at 02:02

## 2018-02-06 RX ADMIN — FENTANYL CITRATE 25 MCG: 50 INJECTION, SOLUTION INTRAMUSCULAR; INTRAVENOUS at 10:02

## 2018-02-06 RX ADMIN — MIDAZOLAM HYDROCHLORIDE 2 MG: 1 INJECTION, SOLUTION INTRAMUSCULAR; INTRAVENOUS at 07:02

## 2018-02-06 RX ADMIN — LABETALOL HYDROCHLORIDE 10 MG: 5 INJECTION, SOLUTION INTRAVENOUS at 09:02

## 2018-02-06 RX ADMIN — CHLORHEXIDINE GLUCONATE 10 ML: 1.2 RINSE ORAL at 02:02

## 2018-02-06 RX ADMIN — ONDANSETRON 4 MG: 2 INJECTION, SOLUTION INTRAMUSCULAR; INTRAVENOUS at 08:02

## 2018-02-06 RX ADMIN — AMLODIPINE BESYLATE 10 MG: 10 TABLET ORAL at 02:02

## 2018-02-06 RX ADMIN — SODIUM CHLORIDE: 0.9 INJECTION, SOLUTION INTRAVENOUS at 02:02

## 2018-02-06 NOTE — H&P (VIEW-ONLY)
Patient ID: Leydi Cordero is a 27 y.o. female.    Tertiary hyperparathyroidism    Chief Complaint: Consult (parathyroidectomy)      HPI:  Has end-stage renal disease.  She was treated with hemodialysis.  She underwent a kidney transplant but that failed.  She presents now to discuss parathyroidectomy per [facility] followed just.  She has a elevated parathyroid hormone level.  She only speaks Korean.  The [employer]  system was used during the visit        Review of Systems   Constitutional: Negative for appetite change, chills, fatigue, fever and unexpected weight change.   HENT: Negative for hearing loss and rhinorrhea.    Eyes: Negative for visual disturbance.   Respiratory: Negative for apnea, cough, shortness of breath and wheezing.    Cardiovascular: Negative for chest pain and palpitations.   Gastrointestinal: Negative for abdominal distention, abdominal pain, blood in stool, constipation, diarrhea, nausea and vomiting.   Genitourinary: Negative for dysuria, frequency and urgency.   Musculoskeletal: Negative for arthralgias and neck pain.   Skin: Negative for rash.   Neurological: Negative for seizures, weakness, numbness and headaches.   Hematological: Negative for adenopathy. Does not bruise/bleed easily.   Psychiatric/Behavioral: Negative for hallucinations. The patient is not nervous/anxious.        Current Outpatient Prescriptions   Medication Sig Dispense Refill    amLODIPine (NORVASC) 10 MG tablet TK 1 T PO ONCE D      aspirin (ECOTRIN) 81 MG EC tablet Take 1 tablet (81 mg total) by mouth once daily. 30 tablet 10    oxyCODONE-acetaminophen (PERCOCET) 5-325 mg per tablet Take 1-2 tablets by mouth every 4 (four) hours as needed for Pain. 40 tablet 0    predniSONE (DELTASONE) 10 MG tablet Take by mouth daily: 20mg 12/4-12/6, 15mg 12/7-12/9, 10mg 12/10-12/12, 5mg 12/13- 50 tablet 0    famotidine (PEPCID) 20 MG tablet Take 1 tablet (20 mg total) by mouth once daily. 30 tablet 11     sevelamer carbonate (RENVELA) 800 mg Tab Take 1 tablet (800 mg total) by mouth 3 (three) times daily with meals. 90 tablet 11     No current facility-administered medications for this visit.        Review of patient's allergies indicates:   Allergen Reactions    Heparin analogues        Past Medical History:   Diagnosis Date    ESRD (end stage renal disease)     H/O kidney transplant     Hypertension     Hypertensive nephropathy        Past Surgical History:   Procedure Laterality Date    KIDNEY TRANSPLANT  04/15/2015    right leg hemodialysis access         History reviewed. No pertinent family history.    Social History     Social History    Marital status: Single     Spouse name: N/A    Number of children: N/A    Years of education: N/A     Occupational History    Not on file.     Social History Main Topics    Smoking status: Current Every Day Smoker     Packs/day: 0.50    Smokeless tobacco: Not on file    Alcohol use No    Drug use: No    Sexual activity: Not on file     Other Topics Concern    Not on file     Social History Narrative    No narrative on file       Vitals:    01/11/18 1026   BP: (!) 139/93   Pulse: 99   Resp: 18       Physical Exam   Constitutional:   Overweight   HENT:   Head: Normocephalic and atraumatic.   Eyes: Pupils are equal, round, and reactive to light.   Neck: Normal range of motion. Neck supple. No JVD present. No tracheal deviation present. No thyromegaly present.   Cardiovascular: Normal rate, regular rhythm, normal heart sounds and intact distal pulses.    Pulmonary/Chest: Effort normal and breath sounds normal.   Abdominal: Soft. Bowel sounds are normal. She exhibits no distension. There is no tenderness.   Neurological: She is alert.   Skin: Skin is warm. Capillary refill takes less than 2 seconds.   Psychiatric: She has a normal mood and affect. Her behavior is normal. Judgment and thought content normal.   Vitals reviewed.    PTH, INTACT 1563  pg/mL(High)  CORRECTED CALCIUM 9.3 MG/DL(Normal)  CORRECTED CALCIUM  PHOSPHORUS  68.8 Calc(High)  CALCIUM 9.0 mg/dL(Normal)  PHOSPHORUS 7.4 mg/dL(High)  SUSANNAH/PHOS PRODUCT 66.6 Calc(High)  Assessment & Plan:    tertiary hyperparathyroidism.    Parathyroid exploration with 3-1/2 gland removal.  The rationale for surgery, risks benefits and complications were cusp.  This includes infection, bleeding, injury to the recurrent laryngeal nerve, postoperative echo calcium media.    We will obtain a thyroid ultrasound prior to surgery to make sure there were no thyroid abnormalities.  If there are any abnormalities found those will be addressed at the time of surgery.    Please note that the WatchGuard  system was used.  Our  was an inner ID number is [id number]

## 2018-02-06 NOTE — TRANSFER OF CARE
"Anesthesia Transfer of Care Note    Patient: Leydi Cordero    Procedure(s) Performed: Procedure(s) (LRB):  PARATHYROIDECTOMY-PARTIAL (N/A)    Patient location: PACU    Anesthesia Type: general    Transport from OR: Transported from OR on room air with adequate spontaneous ventilation    Post pain: adequate analgesia    Post assessment: no apparent anesthetic complications    Post vital signs: stable    Level of consciousness: awake, alert and oriented    Nausea/Vomiting: no nausea/vomiting    Complications: none    Transfer of care protocol was followed      Last vitals:   Visit Vitals  BP (!) 206/88   Pulse 96   Temp 36.6 °C (97.9 °F) (Temporal)   Resp 19   Ht 5' 6" (1.676 m)   Wt 93.6 kg (206 lb 5.6 oz)   LMP 11/06/2017 (Approximate)   SpO2 98%   Breastfeeding? No   BMI 33.31 kg/m²     "

## 2018-02-06 NOTE — ASSESSMENT & PLAN NOTE
We will evaluate the patient with laboratory/calcium phosphorus and potassium evaluation in the morning and decide on dialysis needs to keep her on scheduled for Monday Wednesday Friday.

## 2018-02-06 NOTE — OP NOTE
[facility]  Surgery Department  Operative Note    SUMMARY     Date of Procedure: 2/6/2018     Procedure: Procedure(s) (LRB):  PARATHYROIDECTOMY-PARTIAL (N/A)     Surgeon(s) and Role:     * Brandon Jimenez MD - Primary     * Anslemo Arvizu MD - Assisting        Pre-Operative Diagnosis: Hyperparathyroidism, tertiary [E21.2]    Post-Operative Diagnosis: Post-Op Diagnosis Codes:     * Hyperparathyroidism, tertiary [E21.2]    Anesthesia: General    Technical Procedures Used: 4 parathyroid glands were identified and confirmed with frozen section.  3-1/2 were removed.    The left inferior gland was left in place and tagged with a Prolene suture    Description of the Findings of the Procedure:     The patient is brought into the operating room placed on the operating table in the supine position.  Endotracheal anesthesia was induced.  IV antibiotics were administered.  Pneumatic compression devices placed on the lower extremities.  The neck was placed in extension.  The upper chest and neck were prepped and draped in the standard fashion    A timeout was performed.    A collar incision was made between the sternocleidomastoid muscles.  Subcutaneous tissues were dissected using electrocautery.  Subplatysmal flaps were raised.  The strap muscle was identified and opened in the midline.  The strap muscles were freed from the undersurface of the right thyroid gland.    We dissected around the lower pole vessels and identified an enlarged parathyroid gland.  This was biopsied and sent for frozen section.  We initially thought that this was the superior gland and labeled as such.  Further dissection along the lower pole vessels did not identify any enlarged glands but 2 candidates were identified and biopsied.    We then dissected the left strap muscles from the undersurface of the thyroid gland and once again enlarged parathyroid gland was easily identified.  We felt that this was the superior gland and  biopsy dated been sent for frozen section.    We dissected along the inferior vessels but no enlarged glands were identified.    We did elevate some thymic fat and no glands were located in that.    We then returned to the right side were thymic fat was identified elevated and removed but no parathyroid glands were identified.    At this point the decision was made to look again for superior glands.    We rotated the right lobe of the thyroid more medially and identified an enlarged right superior parathyroid gland.  This was biopsied and sent for frozen section.    We then rotated the left lobe of the thyroid gland more superiorly, clipping tying and dividing the middle thyroid vein during this process and identified an enlarged left superior gland.  The left superior gland was removed in its entirety and sent for frozen section.    The pathologist confirmed that we had identified for parathyroid glands with the original right and left superior glands actually being inferior gland and the new heart true right and left superior glands being parathyroids.    We then removed most of the left inferior gland, take the area with a Prolene suture.    We then removed the right superior gland in its entirety and sent it for permanent pathologic analysis.  We reinspected the left inferior gland and it appeared healthy as such we removed the right inferior gland in its entirety and sent it for permanent section.    The final gland removal is as follows the right and left superior glands were removed.  The right inferior gland was removed.  Most of the left inferior gland was removed and this area was tagged with a Prolene suture.    After ensuring hemostasis Surgicel was placed.  The strap muscles were closed with 2-0 Vicryl in a figure-of-eight fashion.  Marcaine was infiltrated.  Evie was closed with 3-0 Vicryl.  The skin was closed with 4-0 Monocryl in a subcuticular manner.  A derma Flex dressing was placed     She  tolerated the procedure well  Counts were correct    Significant Surgical Tasks Conducted by the Assistant(s), if Applicable:     Assistance with identification and removal of 3-1/2 parathyroid glands    Complications: No    Estimated Blood Loss (EBL): 5 mL           Implants: * No implants in log *    Specimens:   Specimen (12h ago through future)    Start     Ordered    02/06/18 0824  Specimen to Pathology - Surgery  Once     Comments:  1. Right inferior parathyroid (frozen)2. Right inferior parathyroid #1?(frozen)3. Right inferior parathyroid #2?( frozen)4. Left inferior parathyroid ( frozen)5. Left inferior parathyroid (Frozen )6. True Right superior parathyroid( frozen )7. True left superior  parathyroid (frozen)8. True left superior parathyroid (perm)9. Remainder of true right inferior parathyroid gland (perm)10.Remainder of true right superior parathyroid gland (perm)11. Right thymic fatDx.Hyperparathyroidism, tertiary      02/06/18 0919                  Condition: Stable    Disposition: PACU - hemodynamically stable.    Attestation: I performed the procedure.

## 2018-02-06 NOTE — NURSING
Secure chat message sent to Dr Jimenez in regards to order for NS at 75. Pt is a renal patient and scheduled for dialysis tomorrow. Pt is on clear liquids and tolerating well. Wanted to clarify that the normal saline was indeed needed. Answer back from provider was to follow the order placed.

## 2018-02-06 NOTE — PROGRESS NOTES
Dr gutierrez spoke with pt's mother deja through the language line  no. [id number]. Explanation of surgery given.

## 2018-02-06 NOTE — HPI
Patient is a 27-year-old [place] American female with ESRD on hemodialysis after failed kidney transplantation.  She is currently on hemodialysis Monday Wednesday Friday under care of Dr. Miller.  She is admitted today for elective parathyroidectomy.  Patient seen postoperatively in the postop care.  Patient will need dialysis tomorrow.  Follow the consultation is requested for management of calcium and phosphorus balance postoperatively as well as evaluation and management of dialysis needs

## 2018-02-06 NOTE — NURSING
Pt's neck measurement at 15.75 inches. Pt denies any pain or difficulty breathing or swallowing at this time. Tape measure at bedside. Trach tray at beside.

## 2018-02-06 NOTE — ASSESSMENT & PLAN NOTE
We'll start on calcitriol and institute  parathyroidectomy protocol in the dialysis unit.  Check calcium and phosphorus in the morning.

## 2018-02-06 NOTE — CONSULTS
[facility]   Nephrology  Consult Note    Patient Name: Leydi Cordero  MRN: [id number]  Admission Date: 2/6/2018  Hospital Length of Stay: 0 days  Attending Provider: Brandon Jimenez MD   Primary Care Physician: oWn Miller MD  Principal Problem:<principal problem not specified>    Consults  Subjective:     HPI: Patient is a 27-year-old [place] American female with ESRD on hemodialysis after failed kidney transplantation.  She is currently on hemodialysis Monday Wednesday Friday under care of Dr. Miller.  She is admitted today for elective parathyroidectomy.  Patient seen postoperatively in the postop care.  Patient will need dialysis tomorrow.  Follow the consultation is requested for management of calcium and phosphorus balance postoperatively as well as evaluation and management of dialysis needs    Past Medical History:   Diagnosis Date    Encounter for blood transfusion     ESRD (end stage renal disease)     H/O kidney transplant     Hypertension     Hypertensive nephropathy        Past Surgical History:   Procedure Laterality Date    hemodialysis access left arm      kidney removal       KIDNEY TRANSPLANT  04/15/2015    right leg hemodialysis access         Review of patient's allergies indicates:   Allergen Reactions    Heparin analogues Rash     Current Facility-Administered Medications   Medication Frequency    0.9%  NaCl infusion Continuous    amLODIPine tablet 10 mg Daily    chlorhexidine 0.12 % solution 10 mL BID    famotidine tablet 20 mg Daily    hydrocodone-acetaminophen 5-325mg per tablet 1 tablet Q4H PRN    hydrocodone-acetaminophen 7.5-325mg per tablet 1 tablet Q4H PRN    meperidine (PF) injection 25 mg Q3H PRN    nozaseptin (NOZIN) nasal  BID    ondansetron disintegrating tablet 8 mg Q8H PRN    predniSONE tablet 20 mg Daily     Family History     None        Social History Main Topics    Smoking status: Current Every Day Smoker      Packs/day: 0.50    Smokeless tobacco: Never Used      Comment: no smoking after m.n prior to surgery    Alcohol use No    Drug use: No    Sexual activity: Not on file     Review of Systems   Constitutional: Negative for activity change, appetite change, chills, diaphoresis, fatigue, fever and unexpected weight change.   HENT: Positive for sore throat. Negative for congestion, dental problem, drooling, postnasal drip, rhinorrhea and voice change.         Postoperative neck pain   Eyes: Negative for discharge.   Respiratory: Negative for apnea, cough, choking, chest tightness, shortness of breath, wheezing and stridor.    Cardiovascular: Negative for chest pain, palpitations and leg swelling.   Gastrointestinal: Negative for abdominal distention, blood in stool, constipation, diarrhea, nausea, rectal pain and vomiting.   Endocrine: Negative for cold intolerance, heat intolerance, polydipsia and polyuria.   Genitourinary: Negative for decreased urine volume, difficulty urinating, dysuria, enuresis, flank pain, frequency, hematuria and urgency.   Musculoskeletal: Negative for arthralgias, back pain, gait problem and joint swelling.   Skin: Negative for rash.   Allergic/Immunologic: Negative for food allergies and immunocompromised state.   Neurological: Negative for dizziness, tremors, syncope, numbness and headaches.   Hematological: Does not bruise/bleed easily.   Psychiatric/Behavioral: Negative for agitation, behavioral problems and self-injury. The patient is not nervous/anxious and is not hyperactive.    All other systems reviewed and are negative.    Objective:     Vital Signs (Most Recent):  Temp: 97.6 °F (36.4 °C) (02/06/18 1302)  Pulse: 78 (02/06/18 1302)  Resp: 18 (02/06/18 1302)  BP: (!) 160/94 (02/06/18 1302)  SpO2: 97 % (02/06/18 1302)  O2 Device (Oxygen Therapy): room air (02/06/18 1302) Vital Signs (24h Range):  Temp:  [97.6 °F (36.4 °C)-98.4 °F (36.9 °C)] 97.6 °F (36.4 °C)  Pulse:  []  78  Resp:  [14-20] 18  SpO2:  [94 %-100 %] 97 %  BP: (141-214)/() 160/94     Weight: 94 kg (207 lb 3.7 oz) (02/06/18 1302)  Body mass index is 33.45 kg/m².  Body surface area is 2.09 meters squared.    No intake/output data recorded.    Physical Exam   Constitutional: She is oriented to person, place, and time. No distress.   HENT:   Head: Normocephalic and atraumatic.   Nose: Nose normal.   Eyes: Conjunctivae and EOM are normal. Pupils are equal, round, and reactive to light.   Neck: Normal range of motion. No JVD present. No tracheal deviation present. No thyromegaly present.   Postoperative scar mildly tender but no swelling   Cardiovascular: Normal rate, regular rhythm, normal heart sounds and intact distal pulses.  Exam reveals no gallop and no friction rub.    No murmur heard.  Pulmonary/Chest: Effort normal and breath sounds normal. No respiratory distress. She has no wheezes. She has no rales. She exhibits no tenderness.   Abdominal: Soft. Bowel sounds are normal. She exhibits no distension and no mass. There is no tenderness. No hernia.   No tenderness in the area of the kidney transplant   Musculoskeletal: Normal range of motion. She exhibits no edema, tenderness or deformity.   Neurological: She is alert and oriented to person, place, and time. She has normal reflexes. She displays normal reflexes. No cranial nerve deficit. She exhibits normal muscle tone. Coordination normal.   Skin: Skin is warm. She is not diaphoretic. No erythema. There is pallor.   Psychiatric: She has a normal mood and affect. Her behavior is normal. Judgment and thought content normal.   Nursing note and vitals reviewed.      Significant Labs:  All labs within the past 24 hours have been reviewed.    Significant Imaging:  Labs: Reviewed    Assessment/Plan:     Hyperparathyroidism, tertiary    We'll start on calcitriol and institute  parathyroidectomy protocol in the dialysis unit.  Check calcium and phosphorus in the  morning.        ESRD (end stage renal disease)    We will evaluate the patient with laboratory/calcium phosphorus and potassium evaluation in the morning and decide on dialysis needs to keep her on scheduled for Monday Wednesday Friday.            Thank you for your consult.     Ann Marie Parrish MD  Nephrology  [facility]

## 2018-02-06 NOTE — SUBJECTIVE & OBJECTIVE
Past Medical History:   Diagnosis Date    Encounter for blood transfusion     ESRD (end stage renal disease)     H/O kidney transplant     Hypertension     Hypertensive nephropathy        Past Surgical History:   Procedure Laterality Date    hemodialysis access left arm      kidney removal       KIDNEY TRANSPLANT  04/15/2015    right leg hemodialysis access         Review of patient's allergies indicates:   Allergen Reactions    Heparin analogues Rash     Current Facility-Administered Medications   Medication Frequency    0.9%  NaCl infusion Continuous    amLODIPine tablet 10 mg Daily    chlorhexidine 0.12 % solution 10 mL BID    famotidine tablet 20 mg Daily    hydrocodone-acetaminophen 5-325mg per tablet 1 tablet Q4H PRN    hydrocodone-acetaminophen 7.5-325mg per tablet 1 tablet Q4H PRN    meperidine (PF) injection 25 mg Q3H PRN    nozaseptin (NOZIN) nasal  BID    ondansetron disintegrating tablet 8 mg Q8H PRN    predniSONE tablet 20 mg Daily     Family History     None        Social History Main Topics    Smoking status: Current Every Day Smoker     Packs/day: 0.50    Smokeless tobacco: Never Used      Comment: no smoking after m.n prior to surgery    Alcohol use No    Drug use: No    Sexual activity: Not on file     Review of Systems   Constitutional: Negative for activity change, appetite change, chills, diaphoresis, fatigue, fever and unexpected weight change.   HENT: Positive for sore throat. Negative for congestion, dental problem, drooling, postnasal drip, rhinorrhea and voice change.         Postoperative neck pain   Eyes: Negative for discharge.   Respiratory: Negative for apnea, cough, choking, chest tightness, shortness of breath, wheezing and stridor.    Cardiovascular: Negative for chest pain, palpitations and leg swelling.   Gastrointestinal: Negative for abdominal distention, blood in stool, constipation, diarrhea, nausea, rectal pain and vomiting.   Endocrine:  Negative for cold intolerance, heat intolerance, polydipsia and polyuria.   Genitourinary: Negative for decreased urine volume, difficulty urinating, dysuria, enuresis, flank pain, frequency, hematuria and urgency.   Musculoskeletal: Negative for arthralgias, back pain, gait problem and joint swelling.   Skin: Negative for rash.   Allergic/Immunologic: Negative for food allergies and immunocompromised state.   Neurological: Negative for dizziness, tremors, syncope, numbness and headaches.   Hematological: Does not bruise/bleed easily.   Psychiatric/Behavioral: Negative for agitation, behavioral problems and self-injury. The patient is not nervous/anxious and is not hyperactive.    All other systems reviewed and are negative.    Objective:     Vital Signs (Most Recent):  Temp: 97.6 °F (36.4 °C) (02/06/18 1302)  Pulse: 78 (02/06/18 1302)  Resp: 18 (02/06/18 1302)  BP: (!) 160/94 (02/06/18 1302)  SpO2: 97 % (02/06/18 1302)  O2 Device (Oxygen Therapy): room air (02/06/18 1302) Vital Signs (24h Range):  Temp:  [97.6 °F (36.4 °C)-98.4 °F (36.9 °C)] 97.6 °F (36.4 °C)  Pulse:  [] 78  Resp:  [14-20] 18  SpO2:  [94 %-100 %] 97 %  BP: (141-214)/() 160/94     Weight: 94 kg (207 lb 3.7 oz) (02/06/18 1302)  Body mass index is 33.45 kg/m².  Body surface area is 2.09 meters squared.    No intake/output data recorded.    Physical Exam   Constitutional: She is oriented to person, place, and time. No distress.   HENT:   Head: Normocephalic and atraumatic.   Nose: Nose normal.   Eyes: Conjunctivae and EOM are normal. Pupils are equal, round, and reactive to light.   Neck: Normal range of motion. No JVD present. No tracheal deviation present. No thyromegaly present.   Postoperative scar mildly tender but no swelling   Cardiovascular: Normal rate, regular rhythm, normal heart sounds and intact distal pulses.  Exam reveals no gallop and no friction rub.    No murmur heard.  Pulmonary/Chest: Effort normal and breath sounds  normal. No respiratory distress. She has no wheezes. She has no rales. She exhibits no tenderness.   Abdominal: Soft. Bowel sounds are normal. She exhibits no distension and no mass. There is no tenderness. No hernia.   No tenderness in the area of the kidney transplant   Musculoskeletal: Normal range of motion. She exhibits no edema, tenderness or deformity.   Neurological: She is alert and oriented to person, place, and time. She has normal reflexes. She displays normal reflexes. No cranial nerve deficit. She exhibits normal muscle tone. Coordination normal.   Skin: Skin is warm. She is not diaphoretic. No erythema. There is pallor.   Psychiatric: She has a normal mood and affect. Her behavior is normal. Judgment and thought content normal.   Nursing note and vitals reviewed.      Significant Labs:  All labs within the past 24 hours have been reviewed.    Significant Imaging:  Labs: Reviewed

## 2018-02-06 NOTE — NURSING
Patient arrived to room via stretcher from PACU accompanied by nurse. Transferred to bed . Patient is aaox4 with no distress noted. Patient has been orientated to room. Heart monitor in place, vital signs taken and stable, no questions or concerns at this time. Personal belongings and call light within reach. Pt reminded to call for assistance. Bed in lowest position and locked in place. No c/o pain at this time.

## 2018-02-06 NOTE — INTERVAL H&P NOTE
The patient has been examined and the H&P has been reviewed:    I concur with the findings and no changes have occurred since H&P was written.     Lap drawn    Anesthesia/Surgery risks, benefits and alternative options discussed and understood by patient/family.          Active Hospital Problems    Diagnosis  POA    Hyperparathyroidism, tertiary [E21.2]  Yes      Resolved Hospital Problems    Diagnosis Date Resolved POA   No resolved problems to display.

## 2018-02-06 NOTE — ANESTHESIA POSTPROCEDURE EVALUATION
"Anesthesia Post Evaluation    Patient: Leydi Cordero    Procedure(s) Performed: Procedure(s) (LRB):  PARATHYROIDECTOMY-PARTIAL (N/A)    Final Anesthesia Type: general  Patient location during evaluation: PACU  Patient participation: Yes- Able to Participate  Level of consciousness: awake  Post-procedure vital signs: reviewed and stable  Pain management: adequate  Airway patency: patent  PONV status at discharge: No PONV  Anesthetic complications: no      Cardiovascular status: stable  Respiratory status: unassisted  Hydration status: euvolemic  Follow-up not needed.        Visit Vitals  BP (!) 160/94 (Patient Position: Lying)   Pulse 78   Temp 36.4 °C (97.6 °F) (Oral)   Resp 18   Ht 5' 6" (1.676 m)   Wt 94 kg (207 lb 3.7 oz)   LMP 11/06/2017 (Approximate)   SpO2 97%   Breastfeeding? No   BMI 33.45 kg/m²       Pain/Dilcia Score: Pain Assessment Performed: Yes (2/6/2018  1:00 PM)  Presence of Pain: denies (2/6/2018  1:00 PM)  Pain Rating Prior to Med Admin: 6 (2/6/2018 10:15 AM)  Dilcia Score: 10 (2/6/2018 12:00 PM)      "

## 2018-02-06 NOTE — PLAN OF CARE
Problem: Patient Care Overview  Goal: Plan of Care Review  Outcome: Ongoing (interventions implemented as appropriate)  Pt AAO x 4   VSS.  Pt remained free of falls this shift.   Pt has no c/o of pain this shift.  Plan of care reviewed. Patient verbalizes understanding.    Bed low, side rails up x 2, wheels locked, call light in reach.   Patient instructed to call for assistance.   Hourly rounding completed.   Will continue to monitor.    Pt speaks minimal english, mainly Swazi. Mother at bedside to interpret. Pt will plan for dialysis tomorrow.   Pt has yet to void, but states despite dialysis, she does normally void.

## 2018-02-07 LAB
ALBUMIN SERPL BCP-MCNC: 3.5 G/DL
ANION GAP SERPL CALC-SCNC: 19 MMOL/L
BASOPHILS # BLD AUTO: 0.01 K/UL
BASOPHILS NFR BLD: 0.1 %
BUN SERPL-MCNC: 57 MG/DL
CALCIUM SERPL-MCNC: 8.3 MG/DL
CHLORIDE SERPL-SCNC: 97 MMOL/L
CO2 SERPL-SCNC: 18 MMOL/L
CREAT SERPL-MCNC: 12.3 MG/DL
DIFFERENTIAL METHOD: ABNORMAL
EOSINOPHIL # BLD AUTO: 0 K/UL
EOSINOPHIL NFR BLD: 0 %
ERYTHROCYTE [DISTWIDTH] IN BLOOD BY AUTOMATED COUNT: 18.3 %
EST. GFR  (AFRICAN AMERICAN): 4 ML/MIN/1.73 M^2
EST. GFR  (NON AFRICAN AMERICAN): 4 ML/MIN/1.73 M^2
GLUCOSE SERPL-MCNC: 99 MG/DL
HCT VFR BLD AUTO: 36.4 %
HGB BLD-MCNC: 11.8 G/DL
LYMPHOCYTES # BLD AUTO: 0.8 K/UL
LYMPHOCYTES NFR BLD: 8.4 %
MCH RBC QN AUTO: 30.2 PG
MCHC RBC AUTO-ENTMCNC: 32.4 G/DL
MCV RBC AUTO: 93 FL
MONOCYTES # BLD AUTO: 0.6 K/UL
MONOCYTES NFR BLD: 6.5 %
NEUTROPHILS # BLD AUTO: 7.8 K/UL
NEUTROPHILS NFR BLD: 85 %
PHOSPHATE SERPL-MCNC: 4.8 MG/DL
PLATELET # BLD AUTO: 165 K/UL
PMV BLD AUTO: 11 FL
POTASSIUM SERPL-SCNC: 5.2 MMOL/L
PTH-INTACT SERPL-MCNC: 123.5 PG/ML
RBC # BLD AUTO: 3.91 M/UL
SODIUM SERPL-SCNC: 134 MMOL/L
WBC # BLD AUTO: 9.22 K/UL

## 2018-02-07 PROCEDURE — 83970 ASSAY OF PARATHORMONE: CPT

## 2018-02-07 PROCEDURE — 90935 HEMODIALYSIS ONE EVALUATION: CPT | Mod: ,,, | Performed by: INTERNAL MEDICINE

## 2018-02-07 PROCEDURE — 25000003 PHARM REV CODE 250: Performed by: SURGERY

## 2018-02-07 PROCEDURE — 36415 COLL VENOUS BLD VENIPUNCTURE: CPT

## 2018-02-07 PROCEDURE — 80069 RENAL FUNCTION PANEL: CPT

## 2018-02-07 PROCEDURE — G0257 UNSCHED DIALYSIS ESRD PT HOS: HCPCS

## 2018-02-07 PROCEDURE — 85025 COMPLETE CBC W/AUTO DIFF WBC: CPT

## 2018-02-07 PROCEDURE — 63600175 PHARM REV CODE 636 W HCPCS: Performed by: SURGERY

## 2018-02-07 PROCEDURE — 25000003 PHARM REV CODE 250: Performed by: INTERNAL MEDICINE

## 2018-02-07 RX ORDER — CALCIUM ACETATE 667 MG/1
2668 CAPSULE ORAL
Status: DISCONTINUED | OUTPATIENT
Start: 2018-02-07 | End: 2018-02-07 | Stop reason: HOSPADM

## 2018-02-07 RX ORDER — SODIUM CHLORIDE 9 MG/ML
INJECTION, SOLUTION INTRAVENOUS
Status: DISCONTINUED | OUTPATIENT
Start: 2018-02-07 | End: 2018-02-07 | Stop reason: HOSPADM

## 2018-02-07 RX ORDER — SODIUM CHLORIDE 9 MG/ML
INJECTION, SOLUTION INTRAVENOUS ONCE
Status: DISCONTINUED | OUTPATIENT
Start: 2018-02-07 | End: 2018-02-07 | Stop reason: HOSPADM

## 2018-02-07 RX ORDER — HYDROCODONE BITARTRATE AND ACETAMINOPHEN 5; 325 MG/1; MG/1
1 TABLET ORAL EVERY 4 HOURS PRN
Qty: 20 TABLET | Refills: 0 | Status: SHIPPED | OUTPATIENT
Start: 2018-02-07 | End: 2018-08-01 | Stop reason: ALTCHOICE

## 2018-02-07 RX ORDER — CALCITRIOL 0.25 UG/1
0.5 CAPSULE ORAL DAILY
Status: DISCONTINUED | OUTPATIENT
Start: 2018-02-07 | End: 2018-02-07 | Stop reason: HOSPADM

## 2018-02-07 RX ADMIN — CALCITRIOL 0.5 MCG: 0.25 CAPSULE, LIQUID FILLED ORAL at 01:02

## 2018-02-07 RX ADMIN — CALCIUM ACETATE 2668 MG: 667 CAPSULE ORAL at 01:02

## 2018-02-07 RX ADMIN — CHLORHEXIDINE GLUCONATE 10 ML: 1.2 RINSE ORAL at 08:02

## 2018-02-07 RX ADMIN — PREDNISONE 20 MG: 20 TABLET ORAL at 08:02

## 2018-02-07 RX ADMIN — FAMOTIDINE 20 MG: 20 TABLET, FILM COATED ORAL at 08:02

## 2018-02-07 NOTE — PROGRESS NOTES
[facility]  General Surgery  Progress Note    Subjective:     History of Present Illness:  No notes on file    Post-Op Info:  Procedure(s) (LRB):  PARATHYROIDECTOMY-PARTIAL (N/A)   1 Day Post-Op     Interval History: Pain controlled, diet tolerated    Medications:  Continuous Infusions:  Scheduled Meds:   amLODIPine  10 mg Oral Daily    chlorhexidine  10 mL Mouth/Throat BID    famotidine  20 mg Oral Daily    nozaseptin   Each Nare BID    predniSONE  20 mg Oral Daily     PRN Meds:hydrocodone-acetaminophen 5-325mg, hydrocodone-acetaminophen 7.5-325mg, meperidine, ondansetron     Review of patient's allergies indicates:   Allergen Reactions    Heparin analogues Rash     Objective:     Vital Signs (Most Recent):  Temp: 97.4 °F (36.3 °C) (02/07/18 0713)  Pulse: 85 (02/07/18 0713)  Resp: 16 (02/07/18 0713)  BP: 122/74 (02/07/18 0713)  SpO2: 96 % (02/07/18 0713) Vital Signs (24h Range):  Temp:  [97.4 °F (36.3 °C)-98.1 °F (36.7 °C)] 97.4 °F (36.3 °C)  Pulse:  [] 85  Resp:  [14-20] 16  SpO2:  [94 %-100 %] 96 %  BP: (122-214)/() 122/74     Weight: 94.5 kg (208 lb 5.4 oz)  Body mass index is 33.63 kg/m².    Intake/Output - Last 3 Shifts       02/05 0700 - 02/06 0659 02/06 0700 - 02/07 0659 02/07 0700 - 02/08 0659    P.O.  355     I.V. (mL/kg)  250 (2.7)     Total Intake(mL/kg)  605 (6.4)     Blood  5     Total Output   5      Net   +600             Urine Occurrence  2 x           Physical Exam   Constitutional: No distress.   Overweight   HENT:   Head: Normocephalic and atraumatic.   Collar incision is clean   Neck: Normal range of motion.   Cardiovascular: Normal rate and regular rhythm.    Pulmonary/Chest: Effort normal and breath sounds normal.   Abdominal: Soft. Bowel sounds are normal.   Skin:   Dialysis access with thrill and bruit       Significant Labs:  CBC:   Recent Labs  Lab 02/07/18  0429   WBC 9.22   RBC 3.91*   HGB 11.8*   HCT 36.4*      MCV 93   MCH 30.2   MCHC 32.4      BMP:   Recent Labs  Lab 02/07/18  0429   GLU 99   *   K 5.2*   CL 97   CO2 18*   BUN 57*   CREATININE 12.3*   CALCIUM 8.3*     CMP:   Recent Labs  Lab 02/07/18  0429   GLU 99   CALCIUM 8.3*   ALBUMIN 3.5   *   K 5.2*   CO2 18*   CL 97   BUN 57*   CREATININE 12.3*     Parathyroid hormone 125    Significant Diagnostics:  None    Assessment/Plan:     Hyperparathyroidism, tertiary    Postop day 1 from 3 to have gland parathyroidectomy.  Doing well.  Calcium is acceptable limits      Patient can be discharged home from the surgical point.  We'll be happy to discharge her if there is no contraindications from nephrology        ESRD (end stage renal disease)    Nephrology has been consult.            Brandon Jimenez MD  General Surgery  [facility]

## 2018-02-07 NOTE — ASSESSMENT & PLAN NOTE
Postop day 1 from 3 to have gland parathyroidectomy.  Doing well.  Calcium is acceptable limits      Patient can be discharged home from the surgical point.  We'll be happy to discharge her if there is no contraindications from nephrology

## 2018-02-07 NOTE — PLAN OF CARE
Problem: Patient Care Overview  Goal: Plan of Care Review  Outcome: Ongoing (interventions implemented as appropriate)  Pt. on RA; family at bedside. Neck circumference measured at 15 in. Family at bedside; no c/o of pain. LUE shunt intact; plan for dialysis in the A.M. Trach set at bedside. Will continue to monitor.

## 2018-02-07 NOTE — PROCEDURES
"Leydi Cordero is a 27 y.o. female patient.    Temp: 98.2 °F (36.8 °C) (02/07/18 0915)  Pulse: 78 (02/07/18 1200)  Resp: 18 (02/07/18 1200)  BP: (!) 137/98 (02/07/18 1200)  SpO2: 96 % (02/07/18 0713)  Weight: 94.5 kg (208 lb 5.4 oz) (02/07/18 0713)  Height: 5' 6" (167.6 cm) (02/06/18 0714)       Hemodialysis inpatient If "per protocol" is selected for one or more ingredients (K+, Ca++, Na+, Bicarb) for the dialysate bath solution, select the hyperlink for the protocol instructions.  Date/Time: 2/7/2018 12:03 PM  Performed by: ANN MARIE PARRISH  Authorized by: HOWARD LEDESMA     Prepare patient for dialysis  Date/Time: 2/7/2018 12:03 PM  Performed by: ANN MARIE PARRISH  Authorized by: HOWARD LEDESMA         Patient Seen on HD ; HD is for ESRD ; HD orders written and reviewed with HD nurse and nursing staff ;   Access is functioning well ; UF Goal is 3 litres as tolerated ; Will Give Epogen for anemia ; F-180 dialyzer ;  DFR is 500 ; patient is tolerating HD well ; next HD will be scheduled based on daily rounding and patient's clinical situation     Start PO calcitriol 0.5 mcg daily and Phoslo and d/c renvela --explained to patient using translation demetrius     Parameters for Hypotension outlined in orders and communications with nurses     Ann Marie Parrish  2/7/2018  "

## 2018-02-07 NOTE — SUBJECTIVE & OBJECTIVE
Interval History: Pain controlled, diet tolerated    Medications:  Continuous Infusions:  Scheduled Meds:   amLODIPine  10 mg Oral Daily    chlorhexidine  10 mL Mouth/Throat BID    famotidine  20 mg Oral Daily    nozaseptin   Each Nare BID    predniSONE  20 mg Oral Daily     PRN Meds:hydrocodone-acetaminophen 5-325mg, hydrocodone-acetaminophen 7.5-325mg, meperidine, ondansetron     Review of patient's allergies indicates:   Allergen Reactions    Heparin analogues Rash     Objective:     Vital Signs (Most Recent):  Temp: 97.4 °F (36.3 °C) (02/07/18 0713)  Pulse: 85 (02/07/18 0713)  Resp: 16 (02/07/18 0713)  BP: 122/74 (02/07/18 0713)  SpO2: 96 % (02/07/18 0713) Vital Signs (24h Range):  Temp:  [97.4 °F (36.3 °C)-98.1 °F (36.7 °C)] 97.4 °F (36.3 °C)  Pulse:  [] 85  Resp:  [14-20] 16  SpO2:  [94 %-100 %] 96 %  BP: (122-214)/() 122/74     Weight: 94.5 kg (208 lb 5.4 oz)  Body mass index is 33.63 kg/m².    Intake/Output - Last 3 Shifts       02/05 0700 - 02/06 0659 02/06 0700 - 02/07 0659 02/07 0700 - 02/08 0659    P.O.  355     I.V. (mL/kg)  250 (2.7)     Total Intake(mL/kg)  605 (6.4)     Blood  5     Total Output   5      Net   +600             Urine Occurrence  2 x           Physical Exam   Constitutional: No distress.   Overweight   HENT:   Head: Normocephalic and atraumatic.   Collar incision is clean   Neck: Normal range of motion.   Cardiovascular: Normal rate and regular rhythm.    Pulmonary/Chest: Effort normal and breath sounds normal.   Abdominal: Soft. Bowel sounds are normal.   Skin:   Dialysis access with thrill and bruit       Significant Labs:  CBC:   Recent Labs  Lab 02/07/18  0429   WBC 9.22   RBC 3.91*   HGB 11.8*   HCT 36.4*      MCV 93   MCH 30.2   MCHC 32.4     BMP:   Recent Labs  Lab 02/07/18 0429   GLU 99   *   K 5.2*   CL 97   CO2 18*   BUN 57*   CREATININE 12.3*   CALCIUM 8.3*     CMP:   Recent Labs  Lab 02/07/18 0429   GLU 99   CALCIUM 8.3*   ALBUMIN 3.5   NA  134*   K 5.2*   CO2 18*   CL 97   BUN 57*   CREATININE 12.3*       Significant Diagnostics:  None

## 2018-02-07 NOTE — PLAN OF CARE
Patient received hd as ordered today. Net removal 3 liters. No access problems. Dr. Parrish visited during hd.

## 2018-02-07 NOTE — NURSING
Patient discharged from observation after treatment for parathyroidectomy. Reviewed discharge instructions and medications. Expressed the need for follow up appointments per physicians recommendations. Patient was given the opportunity for questions and all were answered to their satisfaction. Patient discharged in no acute distress.   IV removed from Rt forearm without incident. Compression dressing applied and patient instructed to leave on no longer than 30 minutes.

## 2018-02-07 NOTE — NURSING
Pt returned from dialysis with no complaints. Meds administered per orders.   Secure chat msg to DR Parrish for his ok to d/c pt today, which he replied would be fine. Msg to DR Jimenez to place d/c orders. Pt waiting in room with mother at bedside.

## 2018-02-07 NOTE — DISCHARGE INSTRUCTIONS
Please call for any fever, increase in pain, nausea or vomiting or redness or drainage from incision(s).    No lifting more than 30 pounds for 2 weeks    May shower once dressings are removed  Remove steri strips as they begin to fall off    If you become constipated from the pain medication you can use over the counter laxatives,  Miralax or Glycolax, or Magnesium Citrate for severe constipation.      Neck swelling, difficultly swallowing  Tingling of numbness of your hand or around your mouth      Removed the glued on dressing as it start to come loose.  You may need to cut it away slowly    Llame para detectar fiebre, aumento del dolor, náuseas, vómitos, enrojecimiento o drenaje de la incisión.    Sin levantar más de 30 libras por 2 semanas    Si se constipa del medicamento para el dolor, puede usar laxantes de venta iker, Miralax o Glycolax, o Citrato de magnesio para el estreñimiento severo.    Puede ducharse    Hinchazón del zachary, dificultad para tragar  Hormigueo de entumecimiento de hernadez mano o alrededor de hernadez boca      Se quitó el pegamento del vendaje cuando comenzó a aflojarse. Es posible que deba cortarlo lentamente

## 2018-02-07 NOTE — DISCHARGE SUMMARY
[facility]   General Surgery  Discharge Summary      Patient Name: Leydi Cordero  MRN: [id number]  Admission Date: 2/6/2018  Hospital Length of Stay: 0 days  Discharge Date and Time:  02/07/2018 2:21 PM  Attending Physician: Howard Ledesma MD   Discharging Provider: Howard Ledesma MD  Primary Care Provider: Won Miller MD    HPI:   No notes on file    Procedure(s) (LRB):  PARATHYROIDECTOMY-PARTIAL (N/A)      Indwelling Lines/Drains at time of discharge:   Lines/Drains/Airways     Drain                 Hemodialysis AV Fistula Left upper arm -- days         Hemodialysis AV Graft Left upper arm -- days              Hospital Course: Postop day 1.  Patient tolerated diet.  Very little pain.  No neck swelling    Seen by nephology and can be discharged homed    Consults:   Consults         Status Ordering Provider     Inpatient consult to Nephrology  Once     Provider:  Ann Marie Parrish MD    Acknowledged HOWARD LEDESMA          Significant Diagnostic Studies: Labs:   BMP:   Recent Labs  Lab 02/06/18  0650 02/07/18  0429   GLU 82 99    134*   K 5.0  5.0 5.2*   CL 98 97   CO2 25 18*   BUN 47* 57*   CREATININE 10.8* 12.3*   CALCIUM 9.4 8.3*   , CMP   Recent Labs  Lab 02/06/18  0650 02/07/18  0429    134*   K 5.0  5.0 5.2*   CL 98 97   CO2 25 18*   GLU 82 99   BUN 47* 57*   CREATININE 10.8* 12.3*   CALCIUM 9.4 8.3*   ALBUMIN  --  3.5   ANIONGAP 14 19*   ESTGFRAFRICA 5* 4*   EGFRNONAA 4* 4*    and CBC   Recent Labs  Lab 02/06/18  0650 02/07/18  0429   WBC 6.87 9.22   HGB 12.2 11.8*   HCT 38.6 36.4*    165     Parathyroid hormone 125    Pending Diagnostic Studies:     None        Final Active Diagnoses:    Diagnosis Date Noted POA    PRINCIPAL PROBLEM:  Hyperparathyroidism, tertiary [E21.2] 01/11/2018 Yes    ESRD (end stage renal disease) [N18.6] 11/27/2017 Yes      Problems Resolved During this Admission:    Diagnosis Date Noted Date Resolved POA      Discharged  Condition: stable    Disposition: Home or Self Care    Follow Up:  Follow-up Information     Brandon Jimenez MD In 2 weeks.    Specialty:  General Surgery  Why:  post op appt, or as scheduled  Contact information:  [address]  [place] [address] [phone]                 Patient Instructions:     Lifting restrictions   Order Comments: No lifting more than 30 pounds for 2 weeks     Notify your health care provider if you experience any of the following:  temperature >100.4     Notify your health care provider if you experience any of the following:  persistent nausea and vomiting or diarrhea     Notify your health care provider if you experience any of the following:  severe uncontrolled pain     Notify your health care provider if you experience any of the following:  redness, tenderness, or signs of infection (pain, swelling, redness, odor or green/yellow discharge around incision site)     Notify your health care provider if you experience any of the following:   Order Comments: Neck swelling, difficultly swallowing  Tingling of numbness of your hand or around your mouth     No dressing needed   Order Comments: Leave the glued on dressing in place  Remove as it comes loose       Medications:  Reconciled Home Medications:   Current Discharge Medication List      START taking these medications    Details   hydrocodone-acetaminophen 5-325mg (NORCO) 5-325 mg per tablet Take 1 tablet by mouth every 4 (four) hours as needed.  Qty: 20 tablet, Refills: 0         CONTINUE these medications which have NOT CHANGED    Details   amLODIPine (NORVASC) 10 MG tablet TK 1 T PO ONCE D      aspirin (ECOTRIN) 81 MG EC tablet Take 1 tablet (81 mg total) by mouth once daily.  Qty: 30 tablet, Refills: 10    Associated Diagnoses: H/O kidney transplant      famotidine (PEPCID) 20 MG tablet Take 1 tablet (20 mg total) by mouth once daily.  Qty: 30 tablet, Refills: 11      lidocaine-prilocaine (EMLA) cream APPLY HALF AN HOUR  BEFORE  DIALYSIS  Qty: 30 g, Refills: 5      predniSONE (DELTASONE) 10 MG tablet Take by mouth daily: 20mg 12/4-12/6, 15mg 12/7-12/9, 10mg 12/10-12/12, 5mg 12/13-  Qty: 50 tablet, Refills: 0      sevelamer carbonate (RENVELA) 800 mg Tab Take 1 tablet (800 mg total) by mouth 3 (three) times daily with meals.  Qty: 90 tablet, Refills: 11           Time spent on the discharge of patient: 15 minutes    Brandon Jimenez MD  General Surgery  [facility]

## 2018-02-09 VITALS
HEIGHT: 66 IN | OXYGEN SATURATION: 96 % | HEART RATE: 82 BPM | WEIGHT: 208.31 LBS | RESPIRATION RATE: 16 BRPM | SYSTOLIC BLOOD PRESSURE: 129 MMHG | BODY MASS INDEX: 33.48 KG/M2 | TEMPERATURE: 99 F | DIASTOLIC BLOOD PRESSURE: 81 MMHG

## 2018-05-31 ENCOUNTER — TELEPHONE (OUTPATIENT)
Dept: TRANSPLANT | Facility: CLINIC | Age: 28
End: 2018-05-31

## 2018-06-16 RX ORDER — LISINOPRIL 10 MG/1
TABLET ORAL
Qty: 90 TABLET | Refills: 2 | Status: SHIPPED | OUTPATIENT
Start: 2018-06-16 | End: 2019-02-01

## 2018-07-18 DIAGNOSIS — Z76.82 ORGAN TRANSPLANT CANDIDATE: ICD-10-CM

## 2018-07-19 DIAGNOSIS — Z76.82 ORGAN TRANSPLANT CANDIDATE: Primary | ICD-10-CM

## 2018-08-01 ENCOUNTER — HOSPITAL ENCOUNTER (OUTPATIENT)
Dept: RADIOLOGY | Facility: HOSPITAL | Age: 28
Discharge: HOME | End: 2018-08-01
Attending: NURSE PRACTITIONER
Payer: MEDICARE

## 2018-08-01 ENCOUNTER — OFFICE VISIT (OUTPATIENT)
Dept: TRANSPLANT | Facility: CLINIC | Age: 28
End: 2018-08-01
Payer: MEDICARE

## 2018-08-01 ENCOUNTER — TELEPHONE (OUTPATIENT)
Dept: TRANSPLANT | Facility: CLINIC | Age: 28
End: 2018-08-01

## 2018-08-01 VITALS
SYSTOLIC BLOOD PRESSURE: 138 MMHG | DIASTOLIC BLOOD PRESSURE: 98 MMHG | BODY MASS INDEX: 31.9 KG/M2 | OXYGEN SATURATION: 100 % | TEMPERATURE: 98 F | RESPIRATION RATE: 16 BRPM | WEIGHT: 203.25 LBS | HEIGHT: 67 IN | HEART RATE: 77 BPM

## 2018-08-01 DIAGNOSIS — Z76.82 ORGAN TRANSPLANT CANDIDATE: Primary | ICD-10-CM

## 2018-08-01 DIAGNOSIS — Z76.82 ORGAN TRANSPLANT CANDIDATE: ICD-10-CM

## 2018-08-01 DIAGNOSIS — I10 ESSENTIAL HYPERTENSION: ICD-10-CM

## 2018-08-01 DIAGNOSIS — Z94.0 H/O KIDNEY TRANSPLANT: ICD-10-CM

## 2018-08-01 DIAGNOSIS — Z90.89 S/P PARATHYROIDECTOMY: ICD-10-CM

## 2018-08-01 DIAGNOSIS — E21.2 HYPERPARATHYROIDISM, TERTIARY: ICD-10-CM

## 2018-08-01 DIAGNOSIS — Z01.818 PRE-TRANSPLANT EVALUATION FOR CHRONIC KIDNEY DISEASE: ICD-10-CM

## 2018-08-01 DIAGNOSIS — Z90.5 S/P NEPHRECTOMY: ICD-10-CM

## 2018-08-01 DIAGNOSIS — Z98.890 S/P PARATHYROIDECTOMY: ICD-10-CM

## 2018-08-01 DIAGNOSIS — N18.6 ESRD (END STAGE RENAL DISEASE): ICD-10-CM

## 2018-08-01 DIAGNOSIS — E83.39 HYPERPHOSPHATEMIA: ICD-10-CM

## 2018-08-01 PROBLEM — E89.2 S/P PARATHYROIDECTOMY: Status: ACTIVE | Noted: 2018-08-01

## 2018-08-01 PROCEDURE — 72170 X-RAY EXAM OF PELVIS: CPT | Mod: TC,TXP

## 2018-08-01 PROCEDURE — 99999 PR PBB SHADOW E&M-EST. PATIENT-LVL IV: CPT | Mod: PBBFAC,TXP,, | Performed by: NURSE PRACTITIONER

## 2018-08-01 PROCEDURE — 99205 OFFICE O/P NEW HI 60 MIN: CPT | Mod: S$PBB,TXP,, | Performed by: NURSE PRACTITIONER

## 2018-08-01 PROCEDURE — 99214 OFFICE O/P EST MOD 30 MIN: CPT | Mod: PBBFAC,25,TXP | Performed by: NURSE PRACTITIONER

## 2018-08-01 PROCEDURE — 99204 OFFICE O/P NEW MOD 45 MIN: CPT | Mod: S$PBB,TXP,, | Performed by: PHYSICIAN ASSISTANT

## 2018-08-01 PROCEDURE — 71046 X-RAY EXAM CHEST 2 VIEWS: CPT | Mod: 26,TXP,, | Performed by: RADIOLOGY

## 2018-08-01 PROCEDURE — 76770 US EXAM ABDO BACK WALL COMP: CPT | Mod: 26,TXP,, | Performed by: RADIOLOGY

## 2018-08-01 PROCEDURE — 72170 X-RAY EXAM OF PELVIS: CPT | Mod: 26,TXP,, | Performed by: RADIOLOGY

## 2018-08-01 PROCEDURE — 99244 OFF/OP CNSLTJ NEW/EST MOD 40: CPT | Mod: S$PBB,TXP,, | Performed by: TRANSPLANT SURGERY

## 2018-08-01 PROCEDURE — 76770 US EXAM ABDO BACK WALL COMP: CPT | Mod: TC,TXP

## 2018-08-01 PROCEDURE — 71046 X-RAY EXAM CHEST 2 VIEWS: CPT | Mod: TC,TXP

## 2018-08-01 PROCEDURE — 93978 VASCULAR STUDY: CPT | Mod: 26,TXP,, | Performed by: RADIOLOGY

## 2018-08-01 PROCEDURE — 93978 VASCULAR STUDY: CPT | Mod: TC,TXP

## 2018-08-01 RX ORDER — CALCITRIOL 0.5 UG/1
1 CAPSULE ORAL DAILY
Status: ON HOLD | COMMUNITY
End: 2018-08-10 | Stop reason: HOSPADM

## 2018-08-01 RX ORDER — SEVELAMER HYDROCHLORIDE 800 MG/1
1600 TABLET, FILM COATED ORAL
COMMUNITY
End: 2019-05-21 | Stop reason: SDUPTHER

## 2018-08-01 RX ORDER — CALCIUM CARBONATE 200(500)MG
1 TABLET,CHEWABLE ORAL
Status: ON HOLD | COMMUNITY
End: 2018-08-10 | Stop reason: HOSPADM

## 2018-08-01 NOTE — PROGRESS NOTES
Transplant Nephrology  Kidney Transplant Recipient Evaluation    Referring Physician: Won Miller  Current Nephrologist: Won Miller    Subjective:   CC:  Initial evaluation of kidney transplant candidacy.    HPI:  Ms. Cordero is a 28 y.o. year old White female who has presented to be evaluated as a potential kidney transplant recipient.  She has ESRD secondary to HTN and failed kidney transplant .  Patient is currently on hemodialysis started ~ 10/ 2017. Patient is dialyzing on TTS schedule.  Patient reports that she is tolerating dialysis well..She dialyzes for 3 1/2 hours. Her dialysis time was recently lowered from 4 hours.  She has a LUE AV fistula for dialysis access.     Overall feels well. No health concerns today. Denies chest pain, SOB, leg pain, abdominal pain or LUTs.  Pt reports HTN since ~ age 16.  No donors at this time.     Family HX colon cancer--Her father was diagnosed with colon cancer and  at age 63.     HX C-diff infection 10/2016  2018 partial parathyroidectomy    Previous Transplant: yes; organ: kidney, date:  in [place], complications: [facility] in , tx w/ solumedrol pulse, thymo and plasma pharesis . Pt underwent transplant nephrectomy in 2017. See noted below:    10/3/2016 [facility] Admission note:  Summary: 26 y.o.  female with history of ESRD secondary to HTN s/p  donor renal transplant on 4/15/15 (Campath induction, < 21 y/o donor, pumped, CIT 46 hours, at [facility] in [place], [place]), GERD who presents as a transfer from [facility] ED after being found to have abnormal labs requested by her PCP. Post transplant complications include ACR/ABMR late 2016 for which she received Thymoglobulin x 7 and Plasmapheresis x 8 at [facility] ([place] that time). She  Was on  tacrolimus, mycophenolate, and prednisone as maintenance immunosuppression. She is no longer on IS meds.    She was [place] at [facility] for pyuria and bacteriuria (in addition  to FRANCISCA) for which she received 1 dose of ceftriaxone.   She underwent a right transplant nephrectomy on 12/1/17.         Past Medical History:   Diagnosis Date    Encounter for blood transfusion     ESRD (end stage renal disease)     H/O kidney transplant     Hypertension     Hypertensive nephropathy          Past Medical and Surgical History: Ms. Cordero  has a past medical history of Encounter for blood transfusion; ESRD (end stage renal disease); H/O kidney transplant; Hypertension; and Hypertensive nephropathy.  She has a past surgical history that includes right leg hemodialysis access; Kidney transplant (04/15/2015); kidney removal ; hemodialysis access left arm; Parathyroidectomy; Nephrectomy; and transplant nephrectomy (12/01/2017).    Past Social and Family History: Ms. Cordero reports that she has been smoking.  She has been smoking about 0.50 packs per day. She has never used smokeless tobacco. She reports that she does not drink alcohol or use drugs. Her family history includes Cancer in her father; Colon cancer in her father; Diabetes in her maternal uncle; Hypertension in her father; Kidney disease in her maternal aunt; No Known Problems in her brother, mother, and sister.    Review of Systems   Constitutional: Negative for activity change, appetite change, chills, fatigue, fever and unexpected weight change.   HENT: Negative for congestion, facial swelling, postnasal drip, rhinorrhea, sinus pressure, sore throat and trouble swallowing.    Eyes: Negative for pain, redness and visual disturbance.   Respiratory: Positive for cough. Negative for chest tightness, shortness of breath and wheezing.         Reports dry cough for the past 2 months.   Is being tx by her nephrologist.  Denies fever or chills, no SOB or wheezing.    Cardiovascular: Negative.  Negative for chest pain, palpitations and leg swelling.   Gastrointestinal: Negative for abdominal pain, diarrhea, nausea and vomiting.  "  Genitourinary: Negative for dysuria, flank pain and urgency.   Musculoskeletal: Negative for gait problem, neck pain and neck stiffness.   Skin: Negative for rash.   Allergic/Immunologic: Negative for environmental allergies, food allergies and immunocompromised state.   Neurological: Negative for dizziness, weakness, light-headedness and headaches.   Psychiatric/Behavioral: Negative for agitation and confusion. The patient is not nervous/anxious.        Objective:   Blood pressure (!) 138/98, pulse 77, temperature 98.1 °F (36.7 °C), temperature source Oral, resp. rate 16, height 5' 6.93" (1.7 m), weight 92.2 kg (203 lb 4.2 oz), SpO2 100 %.body mass index is 31.9 kg/m².    Physical Exam   Constitutional: She is oriented to person, place, and time. She appears well-developed and well-nourished.   HENT:   Head: Normocephalic.   Mouth/Throat: Oropharynx is clear and moist. No oropharyngeal exudate.   Eyes: Conjunctivae and EOM are normal. Pupils are equal, round, and reactive to light. No scleral icterus.   Neck: Normal range of motion. Neck supple.   Cardiovascular: Normal rate, regular rhythm and normal heart sounds.    Pulmonary/Chest: Effort normal and breath sounds normal.   Abdominal: Soft. Normal appearance and bowel sounds are normal. She exhibits no distension and no mass. There is no splenomegaly or hepatomegaly. There is no tenderness. There is no rebound, no guarding, no CVA tenderness, no tenderness at McBurney's point and negative Moon's sign.       Musculoskeletal: Normal range of motion. She exhibits no edema.        Arms:  Lymphadenopathy:     She has no cervical adenopathy.   Neurological: She is alert and oriented to person, place, and time. She exhibits normal muscle tone. Coordination normal.   Skin: Skin is warm and dry.   Psychiatric: She has a normal mood and affect. Her behavior is normal.   Vitals reviewed.      Labs:  Lab Results   Component Value Date    WBC 8.08 08/01/2018    HGB 12.1 " 08/01/2018    HCT 37.1 08/01/2018     (L) 08/01/2018    K 5.0 08/01/2018    CL 97 08/01/2018    CO2 24 08/01/2018    BUN 49 (H) 08/01/2018    CREATININE 11.8 (H) 08/01/2018    EGFRNONAA 3.9 (A) 08/01/2018    CALCIUM 10.6 (H) 08/01/2018    PHOS 5.8 (H) 08/01/2018    MG 1.8 12/04/2017    ALBUMIN 3.9 08/01/2018    AST 11 08/01/2018    ALT 9 (L) 08/01/2018    UTPCR 0.20 09/29/2016    PTH 25.0 08/01/2018    TACROLIMUS 7.3 02/09/2017       Lab Results   Component Value Date    BILIRUBINUA Negative 11/27/2017    PROTEINUA 2+ (A) 11/27/2017    NITRITE Negative 11/27/2017    RBCUA >100 (H) 11/27/2017    WBCUA 10 (H) 11/27/2017       No results found for: HLAABCTYPE    Labs were reviewed with the patient.    Assessment:     1. Organ transplant candidate    2. ESRD (end stage renal disease)    3. Pre-transplant evaluation for chronic kidney disease    4. Essential hypertension    5. H/O kidney transplant    6. Hyperparathyroidism, tertiary    7. Hyperphosphatemia    8. S/p nephrectomy 12/1/17    9. S/P parathyroidectomy--partial        Plan:     Transplant Candidacy:   Based on available information, Ms. Cordero is a high-risk kidney transplant candidate d/t HX long time exposure to IS meds and past failed kidney txp. .  Final determination of transplant candidacy will be made once workup is complete and reviewed by the selection committee.    Lamar Marcum NP       [facility] Patient Status  Functional Status: 60% - Requires occasional assistance but is able to care for needs  Physical Capacity: No Limitations

## 2018-08-01 NOTE — PROGRESS NOTES
PHARM.D. PRE-TRANSPLANT NOTE:    This patient's medication therapy was evaluated as part of her pre-transplant evaluation.    The following pharmacologic concerns were noted:    A) patient on aspirin 81mg daily      Current Outpatient Prescriptions   Medication Sig Dispense Refill    aspirin (ECOTRIN) 81 MG EC tablet Take 1 tablet (81 mg total) by mouth once daily. 30 tablet 10    calcitRIOL (ROCALTROL) 0.5 MCG Cap Take 1 mcg by mouth once daily.      calcium carbonate (TUMS) 200 mg calcium (500 mg) chewable tablet Take 1 tablet by mouth 3 (three) times daily with meals.      lidocaine-prilocaine (EMLA) cream APPLY HALF AN HOUR BEFORE  DIALYSIS 30 g 5    lisinopril 10 MG tablet TAKE 1 BY MOUTH DAILY 90 tablet 2    sevelamer HCl (RENAGEL) 800 MG Tab Take 1,600 mg by mouth 3 (three) times daily with meals.       No current facility-administered medications for this visit.        Patient is Yakut speaking and requires an . Saw patient with .  Currently Ms. Cordero is responsible for preparing / administering this patient's medications on a daily basis.  I am available for consultation and can be contacted, as needed by the other members of the Kidney Transplant team.

## 2018-08-01 NOTE — PROGRESS NOTES
Pre Transplant Infectious Diseases Consult  Kidney Transplant Recipient Evaluation    Requesting Physician:     Reason for Visit:    Chief Complaint   Patient presents with    Kidney Transplant Evaluation     History of Present Illness  Leydi Cordero is a 28 y.o. year old female Mosotho speaking only with advanced Kidney disease currently being evaluated for Kidney transplant. Etiology of kidney disease is HTN. S/p kidney transplant in [place] 2015. . Currently on HD, LUE AVF. Denies having any complications with her access. Not on any immunosuppressive medications.    Patient denies any recent fever, chills, or infective illnesses.      1) Do you have a history of:   YES NO   Diabetes      [] [x]     Diabetic Foot Infection/Bone Infection  []        [x]     Surgical Removal of Spleen   []  [x]     2) Have you had recurrent infections involving:             YES NO  Sinus infections  []         [x]   Sore Throat   []         [x]                 Prostate Infections  []         [x]              Bladder Infections  [x]         [] would take abx but unknown which abx she took.                     Kidney Infections  []         [x]                               Intestinal Infections  []         [x]      Skin Infections   [x]         [] had multiple abscesses in various regions. Last abscess was 7 months ago. She had one on her right underarm that required I&D which was about 1.5 years ago.       Reproductive Infections          []  [x]   Periodontal Disease  []         [x]        3)Have you ever had: YES     NO UNKNOWN      Chicken Pox   [x]         []  []   Shingles   []         [x]  []   Orolabial Herpes             []  [x]  []   Genital Herpes  []         [x]  []   Cytomegalovirus  []         [x]  []   Vanessa-Barr Virus  []         [x]  []   Genital Warts   []         [x]  []   Hepatitis A   []         [x]  []   Hepatitis B   []         [x]  []   Hepatitis C   []         [x]  []   Syphilis   []         [x]  []    Gonorrhea   []         [x]  []   Pelvic Inflammatory   Disease   []         [x]  []   Chlamydia Infection  []         [x]  []   Intestinal parasites   or worms   []         [x]  []   Fungal Infections  []         [x]  []   Blood Infections  []         [x]  []   Comment:     Had a catheter site infection that was used for dialysis about 4 years ago. She was seen in [facility]. Had it removed and replaced of her right groin. She had that removed once she underwent her kidney transplant.     4) Have you ever been exposed   YES NO  To someone with tuberculosis?  []   [x]   If yes, what treatment did you receive:     5) What states have you lived in?  [place]     6) What countries have you visited for more than 2 weeks? [place]                        YES NO  7) Did you have any associated infections?  []  [x]       8) Are you planning to travel outside the    [x]  []   [place] after your transplant?    9) Household                   YES NO  Do you have pets living in your house?    [x]         []   If yes, describe:      Do you spend time or live on a farm or     []         [x]   have livestock or other farm animals?  If yes, which ones:    Do you have a fish tank?          []  [x]       Do you have a litter box?      []         [x]     Do you fish or hunt?       []         [x]     Do you clean or skin fish or animals?    []         [x]     Do you consume raw or undercooked    []         [x]   meat, fish, or shellfish?      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         12)Do you garden or otherwise  YES NO   work in the soil?    []         [x]   13)Do you hike, camp, or spend     time in wooded areas?   []         [x]        14) The patient's immunization history was reviewed.    Have you ever received:  YES NO UNKNOWN DATES   Routine Childhood vaccines  [x]         []  []      Influenza vaccine   [x]  []  []    Pneumovax    [x]  []  []     Tetanus-diptheria   []         [x]  []     Hepatitis A vaccine series       []  [x]  []    Hepatitis B vaccine series         [x]  []  []    Meningitis vaccine   []         [x]  []    Varicella vaccine   []         [x]  []        Based on the patients immunization history and serologies, immunizations were ordered:         Ordered  Not Ordered  Influenza Vaccine     []    [x]   Hepatitis A series at 0,  6 months   [x]    []   Hepatitis B seriesat 0, 1, and 6 months  []    [x]   Hepatitis B High Dose 0,1, and 6 months  []    [x]   Prevnar      []    [x]   Pneumovax      []    [x]    TDap       [x]    []    Zoster       []    [x]   Menactra      []    [x]            The patient was encouraged to contact us about any problems that may develop after immunization and possible side effects were reviewed.       Previous Transplant: yes; organ: kidney, date: 2015, complications: failure.    Etiology of Kidney Disease:     Allergies: Heparin analogues    There is no immunization history on file for this patient.  Past Medical History:   Diagnosis Date    Encounter for blood transfusion     ESRD (end stage renal disease)     H/O kidney transplant     Hypertension     Hypertensive nephropathy      Past Surgical History:   Procedure Laterality Date    hemodialysis access left arm      kidney removal       KIDNEY TRANSPLANT  04/15/2015    right leg hemodialysis access        Social History     Social History    Marital status: Single     Spouse name: N/A    Number of children: N/A    Years of education: N/A     Occupational History    Not on file.     Social History Main Topics    Smoking status: Current Every Day Smoker     Packs/day: 0.50    Smokeless tobacco: Never Used      Comment: no smoking after m.n prior to surgery    Alcohol use No    Drug use: No    Sexual activity: Not on file     Other Topics Concern    Not on file     Social History Narrative    No narrative on file       Review of Systems   Constitution: Negative for chills,  decreased appetite, fever, weakness, malaise/fatigue, night sweats, weight gain and weight loss.   HENT: Negative for congestion, ear pain, hearing loss, hoarse voice, sore throat and tinnitus.    Eyes: Negative for blurred vision, pain, vision loss in left eye, vision loss in right eye and visual disturbance.   Cardiovascular: Negative for chest pain, dyspnea on exertion, leg swelling and palpitations.   Respiratory: Negative for cough, shortness of breath, sputum production and wheezing.    Skin: Negative for dry skin, itching, rash and suspicious lesions.   Musculoskeletal: Negative for back pain, joint pain, myalgias and neck pain.   Gastrointestinal: Negative for abdominal pain, constipation, diarrhea, heartburn, nausea and vomiting.   Genitourinary: Negative for dysuria, flank pain, frequency, hematuria, hesitancy and urgency.   Neurological: Negative for dizziness, headaches, numbness and paresthesias.   Psychiatric/Behavioral: Negative for depression and memory loss. The patient does not have insomnia and is not nervous/anxious.      Physical Exam   Constitutional: She is oriented to person, place, and time. She appears well-developed and well-nourished. No distress.       HENT:   Head: Normocephalic and atraumatic.   Eyes: EOM are normal. Pupils are equal, round, and reactive to light.   Neck: Normal range of motion. Neck supple.   Cardiovascular: Normal rate, regular rhythm, normal heart sounds and intact distal pulses.  Exam reveals no gallop and no friction rub.    No murmur heard.  Pulmonary/Chest: Effort normal and breath sounds normal. No respiratory distress. She has no wheezes. She has no rales. She exhibits no tenderness.   Abdominal: Soft. Bowel sounds are normal. She exhibits no distension and no mass. There is no tenderness. There is no rebound and no guarding. No hernia.   Musculoskeletal: Normal range of motion. She exhibits no edema, tenderness or deformity.   Neurological: She is alert and  oriented to person, place, and time. No cranial nerve deficit. Coordination normal.   Skin: Skin is warm and dry. She is not diaphoretic. No erythema. No pallor.   Psychiatric: She has a normal mood and affect. Her behavior is normal. Thought content normal.   Nursing note and vitals reviewed.    Diagnostics: No results found for: RPR  No results found for: CMVANTIBODIE  No results found for: HIV1X2  No results found for: HTLVIIIANTIB  Hepatitis B Surface Ag   Date Value Ref Range Status   11/30/2017 Negative  Final     No results found for: HEPBCAB  Hepatitis C Ab   Date Value Ref Range Status   11/30/2017 Negative  Final     No results found for: TOXOIGG  No components found for: TOXOIGGINTER  No results found for: IRO3REL  No results found for: BGY2ADA  No results found for: VARICELLAZOS  No results found for: VARICELLAINT  No results found for: STRONGANTIGG  No results found for: EPSTEINBARRV  No results found for: HEPBSAB  No results found for: QUANTIFERON  Hep A IgM   Date Value Ref Range Status   11/30/2017 Negative  Final     No results found for: PPD           Transplant Infectious Diseases - Candidacy    Assessment/Plan:     Transplant Candidacy: Based on available information, there are no identified significant barriers to transplantation from an infectious disease standpoint pending acceptable serologies.  Final determination of transplant candidacy will be made once evaluation is complete and reviewed by the Transplant Selection Committee.    Quantiferon gold, HIV, Strongyloides, and RPR pending. If positive, please refer to ID clinic.    Vaccines today:  Tdap  Hepatitis A vaccine today, 6 months     Aaliyah Lee PA-C         Counseling:I discussed with Aguilakathrin the risk for increased susceptibility to infections following transplantation including increased risk for infection right after transplant and if rejection should occur.  The patients has been counseled on the importance of vaccinations  including but not limited to a yearly flu vaccine.  Specific guidance has been provided to the patient regarding the patients occupation, hobbies and activities to avoid future infectious complications including but not limited to avoiding undercooked meats and seafood, proper hygiene, and contact with animals.

## 2018-08-01 NOTE — LETTER
August 2, 2018        Won Miller  [address]  [place] [address]  Phone: [phone]  Fax: [phone]             [facility]- [facility]  [address]  [facility] [address]  Phone: [phone]   Patient: Leydi Cordero   MR Number: [id number]   YOB: 1990   Date of Visit: 8/1/2018       Dear Dr. Won Miller    Thank you for referring Leydi Cordero to me for evaluation. Attached you will find relevant portions of my assessment and plan of care.    If you have questions, please do not hesitate to call me. I look forward to following Leydi Cordero along with you.    Sincerely,    Lamar Marcum, NP    Enclosure    If you would like to receive this communication electronically, please contact [email or web address] or [phone] to request [facility] [employer] access.    [facility] [employer] is a tool which provides read-only access to select patient information with whom you have a relationship. Its easy to use and provides real time access to review your patients record including encounter summaries, notes, results, and demographic information.    If you feel you have received this communication in error or would no longer like to receive these types of communications, please e-mail [email or web address]

## 2018-08-01 NOTE — PROGRESS NOTES
MYCOPHENOLATE REMS PROGRAM:    I reviewed the mycophenolate REMS program with the patient.  Provided the mycophenolate REMS Guide to the patient.   The patient and prescriber signed the acknowledgement form, which will be scanned into the permanent electronic medical record.  Pt verbalized understanding and had the opportunity to ask questions.

## 2018-08-01 NOTE — TELEPHONE ENCOUNTER
Reviewed pt transplant labs.  Notified dialysis unit dietitian of the following abnormal labs via fax.     Phosphorus    5.8mg/dl        Lamar Ko MS RD LDN

## 2018-08-01 NOTE — PROGRESS NOTES
Transplant Surgery  Kidney Transplant Recipient Evaluation    Referring Physician: Won Miller  Current Nephrologist: Won Miller    Subjective:     Reason for Visit: evaluate transplant candidacy    History of Present Illness: Leydi Cordero is a 28 y.o. year old female undergoing transplant evaluation.    Dialysis History: Leydi is on hemodialysis.      Transplant History: 4/15/2015 (Kidney)    Etiology of Renal Disease: Hypertensive Nephrosclerosis (based on medical records from referral).    Review of Systems    Objective:     Physical Exam:  Constitutional:   Vitals reviewed: yes   Well-nourished and well-groomed: yes  Eyes:   Sclerae icteric: no   Extraocular movements intact: yes  GI:    Bowel sounds normal: yes   Tenderness: no    If yes, quadrant/location: not applicable   Palpable masses: no    If yes, quadrant/location: not applicable   Hepatosplenomegaly: no   Ascites: no   Hernia: no    If yes, type/location: not applicable   Surgical scars: yes    If yes, type/location: right kidney transplant  Resp:   Effort normal: yes   Breath sounds normal: yes    CV:   Regular rate and rhythm: yes   Heart sounds normal: yes   Femoral pulses normal: yes   Extremities edematous: no  Skin:   Rashes or lesions present: no    If yes, describe:not applicable   Jaundice:: no    Musculoskeletal:   Gait normal: yes   Strength normal: yes  Psych:   Oriented to person, place, and time: yes   Affect and mood normal: yes    Additional comments: substantial truncal obesity although not prohibitive of transplant    Counseling: We provided Leydi Cordero with a group education session today.  We discussed kidney transplantation at length with her, including risks, potential complications, and alternatives in the management of her renal failure.  The discussion included complications related to anesthesia, bleeding, infection, primary nonfunction, and ATN.  I discussed the typical postoperative course, length of  hospitalization, the need for long-term immunosuppression, and the need for long-term routine follow-up.  I discussed living-donor and -donor transplantation and the relative advantages and disadvantages of each.  I also discussed average waiting times for both living donation and  donation.  I discussed national and center-specific survival rates.  I also mentioned the potential benefit of multicenter listing to candidates listed with centers within more than one organ procurement organization.  All questions were answered.    Final determination of transplant candidacy will be made once evaluation is complete and reviewed by the Kidney & Kidney/Pancreas Selection Committee.         Transplant Surgery - Candidacy   Assessment/Plan:   Leydi Cordero has end stage renal disease (ESRD) on dialysis. I see no surgical contraindication to placing a kidney transplant. Based on available information, Leydi Cordero is a suitable kidney transplant candidate. Weight loss advisable.    Mak Briones MD

## 2018-08-02 NOTE — PROGRESS NOTES
INITIAL PATIENT EDUCATION NOTE    Ms. Leydi Cordero was seen in pre-kidney transplant clinic for evaluation for kidney, kidney/pancreas or pancreas only transplant.  The patient attended a group education session that discussed/reviewed the following aspects of transplantation: evaluation and selection committee process, UNOS waitlist management/multiple listings, types of organs offered (KDPI < 85%, KDPI > 85%, PHS increased risk, DCD), financial aspects, surgical procedures, dietary instruction pre- and post-transplant, health maintenance pre- and post-transplant, post-transplant hospitalization and outpatient follow-up, potential to participate in a research protocol, and medication management and side effects.  A question and answer session was provided after the presentation.    The patient was seen by all members of the multi-disciplinary team to include: Nephrologist/PA, Surgeon, , Transplant Coordinator, , Pharmacist and Dietician (if applicable).    The patient reviewed and signed all consents for evaluation which were witnessed and sent to scanning into the EPIC chart.    The patient was given an education book and plan for further evaluation based on her individual assessment.      The patient was encouraged to call with any questions or concerns.    Education provided via

## 2018-08-06 ENCOUNTER — TELEPHONE (OUTPATIENT)
Dept: TRANSPLANT | Facility: CLINIC | Age: 28
End: 2018-08-06

## 2018-08-06 NOTE — TELEPHONE ENCOUNTER
Nutritional assessment from dialysis unit received and reviewed--no nutritional changes to plan needed at this time.  Pt to continue to follow-up with renal dietitians recommendations.      Lamar Ko MS RD LDN

## 2018-08-07 DIAGNOSIS — Z76.82 ORGAN TRANSPLANT CANDIDATE: Primary | ICD-10-CM

## 2018-08-08 NOTE — PROGRESS NOTES
Transplant Recipient Adult Psychosocial Assessment  Speaks Monegasque only; interviewed with  [employer].    Leydi Cordero  [address]  [place] [address]    Telephone Information:   Mobile [phone]   Home  [phone] (home)  Work  There is no work phone number on file.  E-mail  [email or web address]    Sex: female  YOB: 1990  Age: 28 y.o.    Encounter Date: 2018  [place] Citizen: yes  Primary Language: Monegasque   Needed: yes    Emergency Contact:  Name: Sanjeev Green  *Speaks Monegasque Only*  Relationship: significant other  Address: same as above  Phone Numbers:  n/a (home), n/a (work), [phone] (mobile)    Family/Social Support:   Number of dependents/: none  Marital history: never , no children  Other family dynamics: Father is , mother is living; 5 sisters and 9 brothers    Household Composition:  Name: Leydi Lawrenceo  Age: 28  Relationship: patient  Does person drive? yes    Name: Sanjeev Green  Age: 35  Relationship: significant other  Does person drive? yes    Name: Eugene Cordero [phone]  Age: 22  Relationship: brother  Does person drive? yes    Do you and your caregivers have access to reliable transportation? yes     PRIMARY CAREGIVER: Marisela Correa, age 53, friend will be primary caregiver, phone number [phone]. Marisela drives, lives in [place] and states she is able to take off work.  Initially, pt stated her SANDI, Kalina Hutchison would be primary caregiver, but she was not present.  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      provided in-depth information to patient and caregiver regarding pre- and post-transplant caregiver role.   strongly encourages patient and caregiver to have concrete plan regarding post-transplant care giving, including back-up caregiver(s) to ensure care giving needs are met as  "needed.    Patient and Caregiver states understanding all aspects of caregiver role/commitment and is able/willing/committed to being caregiver to the fullest extent necessary.    Patient and Caregiver verbalizes understanding of the education provided today and caregiver responsibilities.         remains available. Patient and Caregiver agree to contact  in a timely manner if concerns arise.      Able to take time off work without financial concerns: yes.     Additional Significant Others who will Assist with Transplant:  Name: Sanjeev Green  Age: 35  City:  State: [place]  Relationship: significant other  Does person drive? yes    Name: Eugene Cordero  Age: 22  City: [place] State: [place]  Relationship: brother  Does person drive? yes    Living Will: no  Healthcare Power of : no  Advance Directives on file: <<no information> per medical record.  Verbally reviewed LW/HCPA information.   provided patient with copy of LW/HCPA documents and provided education on completion of forms.    Living Donors: No. Education and resource information given to patient.    Highest Education Level: High School (9-12) or GED in [place]  Reading Ability: Pt was unable to rank grade at which she reads but stated "she does not learn well.".Speaks and reads Macedonian only  Reports difficulty with: learning and reading and speaking English  Learns Best By:  Verbal instructions in Macedonian     Status: no  VA Benefits: no     Working for Income: No  If no, reason not working: Disability  Patient is disabled.  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    Spouse/Significant Other Employment:  and  (paid cash)    Disabled: yes: date disability began: age 18, due to: ESRD.    Monthly Income:  SSI: $720.00  Food [place]: $193.00  Able to afford all costs now and if transplanted, including medications: yes  Patient and Caregiver " verbalizes understanding of personal responsibilities related to transplant costs and the importance of having a financial plan to ensure that patients transplant costs are fully covered.       provided fundraising information/education. Patient and Caregiververbalizes understanding.   remains available.    Insurance:   Payor/Plan Subscr  Sex Relation Sub. Ins. ID Effective Group Num   1. [place] - [place]* EDMON[place] WILKES * 1990 Female  [id number] NO                                   [address]   2. [place] * 1990 Female  [id number]* 10/1/16 NO                                   [address] [address]     Primary Insurance (for UNOS reporting): Public Insurance - Medicare FFS (Fee For Service)  Secondary Insurance (for UNOS reporting): Public Insurance - Medicaid  Patient and Caregiver verbalizes clear understanding that patient may experience difficulty obtaining and/or be denied insurance coverage post-surgery. This includes and is not limited to disability insurance, life insurance, health insurance, burial insurance, long term care insurance, and other insurances.      Patient and Caregiver also reports understanding that future health concerns related to or unrelated to transplantation may not be covered by patient's insurance.  Resources and information provided and reviewed.     Patient and Caregiver provides verbal permission to release any necessary information to outside resources for patient care and discharge planning.  Resources and information provided are reviewed.      Dialysis Adherence: Patient reports good adherence to all dialysis treatments.  Dialysis center reports over last four months that the patient has had 0 AMAs, 2 no-shows, and rescheduled 3 times.  Nurses, Margot and Agustina stated  are a problem for her to get transportation.  She refuses to change her schedule and has declined transportation services as she does not  "want to "wait on a bus".  "Patient does not always have a ride and refuses to have transportation set up for her."  .    Infusion Service: patient utilizing? no  Home Health: patient utilizing? no  DME: no  Pulmonary/Cardiac Rehab: none   ADLS:  Pt states ability to independently accomplish all adls.    Adherence:   Pt states current and expected compliance with all healthcare recommendations.  However pt admits she missed immunosuppressant medications occasionally prior to going into rejection.  She also has a 25% appointment no show rate and a history of difficulty with transportation.  Adherence education and counseling provided.     Per History Section:  Past Medical History:   Diagnosis Date    Encounter for blood transfusion     ESRD (end stage renal disease)     H/O kidney transplant     Hypertension     Hypertensive nephropathy      Social History   Substance Use Topics    Smoking status: Current Every Day Smoker     Packs/day: 0.50    Smokeless tobacco: Never Used      Comment: no smoking after m.n prior to surgery    Alcohol use No     History   Drug Use No     History   Sexual Activity    Sexual activity: Not on file       Per Today's Psychosocial:  Tobacco: Pt stated she smokes about five cigarettes a day.  .  Alcohol: none, patient denies any use.  Illicit Drugs/Non-prescribed Medications: none, patient denies any use.    Patient and Caregiver states clear understanding of the potential impact of substance use as it relates to transplant candidacy and is aware of possible random substance screening.  Substance abstinence/cessation counseling, education and resources provided and reviewed.     Arrests/DWI/Treatment/Rehab: patient denies    Psychiatric History:    Mental Health: anxiety  Psychiatrist/Counselor: PCP  Medications:  Cannot remember the name  Suicide/Homicide Issues: Pt denies current or past suicidal/homicidal ideation.   Safety at home: Pt denies any home safety " "concerns.    Knowledge: Patient and Caregiver states having clear understanding and realistic expectations regarding the potential risks and potential benefits of organ transplantation and organ donation and agrees to discuss with health care team members and support system members, as well as to utilize available resources and express questions and/or concerns in order to further facilitate the pt informed decision-making.  Resources and information provided and reviewed.    Patient and Caregiver is aware of [facility]'s affiliation and/or partnership with agencies in home health care, LTAC, SNF, [facility], and other hospitals and clinics.    Understanding: Patient and Caregiver reports having a clear understanding of the many lifetime commitments involved with being a transplant recipient, including costs, compliance, medications, lab work, procedures, appointments, concrete and financial planning, preparedness, timely and appropriate communication of concerns, abstinence (ETOH, tobacco, illicit non-prescribed drugs), adherence to all health care team recommendations, support system and caregiver involvement, appropriate and timely resource utilization and follow-through, mental health counseling as needed/recommended, and patient and caregiver responsibilities.  Social Service Handbook, resources and detailed educational information provided and reviewed.  Educational information provided.    Patient and Caregiver also reports current and expected compliance with health care regime and states having a clear understanding of the importance of compliance.  Counseling and education provided.  See above in "Adherence".    Patient and Caregiver reports a clear understanding that risks and benefits may be involved with organ transplantation and with organ donation.       Patient and Caregiver also reports clear understanding that psychosocial risk factors may affect patient, and include but are not limited to feelings of " depression, generalized anxiety, anxiety regarding dependence on others, post traumatic stress disorder, feelings of guilt and other emotional and/or mental concerns, and/or exacerbation of existing mental health concerns.  Detailed resources provided and discussed.      Patient and Caregiver agrees to access appropriate resources in a timely manner as needed and/or as recommended, and to communicate concerns appropriately.  Patient and Caregiver also reports a clear understanding of treatment options available.     Patient and Caregiver received education in a group setting.   reviewed education, provided additional information, and answered questions.    Feelings or Concerns: denies concerns    Coping: pt stated she ivni by cooking and going out dancing.    Goals: get off dialysis.  Patient referred to Vocational Rehabilitation.    Interview Behavior: Patient and Caregiver presents as alert and oriented x 4, pleasant, good eye contact, well groomed, recall good, concentration/judgement good, average intelligence, calm, communicative, cooperative and asking and answering questions appropriately. Pt was accompanied by her friend Marisela.  Encounter lasted well over an hour.         Transplant Social Work - Candidacy  Assessment/Plan:     Psychosocial Suitability: Patient presents as an unacceptable candidate for kidney transplant at this time. Based on psychosocial risk factors, patient presents as high risk, due to low income, fragile support system, transportation issues, pt's history of noncompliance and high no-show rate.    Recommendations/Additional Comments: Recommend pt fundraise, demonstrate strong compliance with appointments and caregiver support.  Return to clinic with consistent caregivers.    Vane Gilbert LCSW

## 2018-08-09 ENCOUNTER — HOSPITAL ENCOUNTER (OUTPATIENT)
Facility: HOSPITAL | Age: 28
Discharge: HOME | End: 2018-08-10
Attending: EMERGENCY MEDICINE | Admitting: INTERNAL MEDICINE
Payer: MEDICARE

## 2018-08-09 DIAGNOSIS — R11.0 NAUSEA: ICD-10-CM

## 2018-08-09 DIAGNOSIS — E87.1 HYPONATREMIA: ICD-10-CM

## 2018-08-09 DIAGNOSIS — R51.9 NONINTRACTABLE HEADACHE, UNSPECIFIED CHRONICITY PATTERN, UNSPECIFIED HEADACHE TYPE: ICD-10-CM

## 2018-08-09 DIAGNOSIS — Z99.2 END-STAGE RENAL DISEASE ON HEMODIALYSIS: ICD-10-CM

## 2018-08-09 DIAGNOSIS — E87.5 HYPERKALEMIA: ICD-10-CM

## 2018-08-09 DIAGNOSIS — E83.52 HYPERCALCEMIA: Primary | ICD-10-CM

## 2018-08-09 DIAGNOSIS — Z90.89 S/P PARATHYROIDECTOMY: ICD-10-CM

## 2018-08-09 DIAGNOSIS — Z98.890 S/P PARATHYROIDECTOMY: ICD-10-CM

## 2018-08-09 DIAGNOSIS — N18.6 ESRD (END STAGE RENAL DISEASE): ICD-10-CM

## 2018-08-09 DIAGNOSIS — N18.6 END-STAGE RENAL DISEASE ON HEMODIALYSIS: ICD-10-CM

## 2018-08-09 DIAGNOSIS — I10 ESSENTIAL HYPERTENSION: ICD-10-CM

## 2018-08-09 PROBLEM — Z72.0 TOBACCO ABUSE: Status: ACTIVE | Noted: 2018-08-09

## 2018-08-09 PROBLEM — R11.2 NAUSEA AND VOMITING: Status: ACTIVE | Noted: 2018-08-09

## 2018-08-09 LAB
ALBUMIN SERPL BCP-MCNC: 4.2 G/DL
ALP SERPL-CCNC: 72 U/L
ALT SERPL W/O P-5'-P-CCNC: 13 U/L
ANION GAP SERPL CALC-SCNC: 14 MMOL/L
AST SERPL-CCNC: 28 U/L
BASOPHILS # BLD AUTO: 0.02 K/UL
BASOPHILS NFR BLD: 0.2 %
BILIRUB SERPL-MCNC: 0.6 MG/DL
BUN SERPL-MCNC: 25 MG/DL
CALCIUM SERPL-MCNC: 12.7 MG/DL
CHLORIDE SERPL-SCNC: 95 MMOL/L
CO2 SERPL-SCNC: 19 MMOL/L
CREAT SERPL-MCNC: 7.5 MG/DL
DIFFERENTIAL METHOD: ABNORMAL
EOSINOPHIL # BLD AUTO: 0 K/UL
EOSINOPHIL NFR BLD: 0.4 %
ERYTHROCYTE [DISTWIDTH] IN BLOOD BY AUTOMATED COUNT: 14.9 %
EST. GFR  (AFRICAN AMERICAN): 8 ML/MIN/1.73 M^2
EST. GFR  (NON AFRICAN AMERICAN): 7 ML/MIN/1.73 M^2
GLUCOSE SERPL-MCNC: 98 MG/DL
HCT VFR BLD AUTO: 41.1 %
HGB BLD-MCNC: 14 G/DL
LYMPHOCYTES # BLD AUTO: 0.7 K/UL
LYMPHOCYTES NFR BLD: 7.2 %
MCH RBC QN AUTO: 33 PG
MCHC RBC AUTO-ENTMCNC: 34.1 G/DL
MCV RBC AUTO: 97 FL
MONOCYTES # BLD AUTO: 0.9 K/UL
MONOCYTES NFR BLD: 8.8 %
NEUTROPHILS # BLD AUTO: 8.6 K/UL
NEUTROPHILS NFR BLD: 83.4 %
PLATELET # BLD AUTO: 184 K/UL
PMV BLD AUTO: 10.9 FL
POCT GLUCOSE: 109 MG/DL (ref 70–110)
POTASSIUM SERPL-SCNC: 5.5 MMOL/L
PROT SERPL-MCNC: 8.2 G/DL
RBC # BLD AUTO: 4.24 M/UL
SODIUM SERPL-SCNC: 128 MMOL/L
TROPONIN I SERPL DL<=0.01 NG/ML-MCNC: <0.006 NG/ML
TSH SERPL DL<=0.005 MIU/L-ACNC: 1.27 UIU/ML
WBC # BLD AUTO: 10.29 K/UL

## 2018-08-09 PROCEDURE — 85025 COMPLETE CBC W/AUTO DIFF WBC: CPT | Mod: NTX

## 2018-08-09 PROCEDURE — 80053 COMPREHEN METABOLIC PANEL: CPT | Mod: NTX

## 2018-08-09 PROCEDURE — 96361 HYDRATE IV INFUSION ADD-ON: CPT | Mod: NTX

## 2018-08-09 PROCEDURE — 25000003 PHARM REV CODE 250: Mod: NTX | Performed by: EMERGENCY MEDICINE

## 2018-08-09 PROCEDURE — 99214 OFFICE O/P EST MOD 30 MIN: CPT | Mod: NTX,,, | Performed by: INTERNAL MEDICINE

## 2018-08-09 PROCEDURE — 93010 ELECTROCARDIOGRAM REPORT: CPT | Mod: NTX,,, | Performed by: INTERNAL MEDICINE

## 2018-08-09 PROCEDURE — 63600175 PHARM REV CODE 636 W HCPCS: Mod: NTX | Performed by: EMERGENCY MEDICINE

## 2018-08-09 PROCEDURE — 93005 ELECTROCARDIOGRAM TRACING: CPT | Mod: NTX

## 2018-08-09 PROCEDURE — 99285 EMERGENCY DEPT VISIT HI MDM: CPT | Mod: 25,NTX

## 2018-08-09 PROCEDURE — 96374 THER/PROPH/DIAG INJ IV PUSH: CPT | Mod: NTX

## 2018-08-09 PROCEDURE — G0378 HOSPITAL OBSERVATION PER HR: HCPCS | Mod: NTX

## 2018-08-09 PROCEDURE — 84484 ASSAY OF TROPONIN QUANT: CPT | Mod: NTX

## 2018-08-09 PROCEDURE — 96375 TX/PRO/DX INJ NEW DRUG ADDON: CPT | Mod: NTX

## 2018-08-09 PROCEDURE — 84443 ASSAY THYROID STIM HORMONE: CPT | Mod: NTX

## 2018-08-09 PROCEDURE — 82962 GLUCOSE BLOOD TEST: CPT | Mod: NTX

## 2018-08-09 RX ORDER — ACETAMINOPHEN 325 MG/1
650 TABLET ORAL EVERY 8 HOURS PRN
Status: DISCONTINUED | OUTPATIENT
Start: 2018-08-09 | End: 2018-08-10 | Stop reason: HOSPADM

## 2018-08-09 RX ORDER — MORPHINE SULFATE 4 MG/ML
4 INJECTION, SOLUTION INTRAMUSCULAR; INTRAVENOUS EVERY 4 HOURS PRN
Status: DISCONTINUED | OUTPATIENT
Start: 2018-08-09 | End: 2018-08-10 | Stop reason: HOSPADM

## 2018-08-09 RX ORDER — CLONIDINE HYDROCHLORIDE 0.1 MG/1
0.1 TABLET ORAL
Status: COMPLETED | OUTPATIENT
Start: 2018-08-09 | End: 2018-08-09

## 2018-08-09 RX ORDER — SEVELAMER HYDROCHLORIDE 800 MG/1
1600 TABLET, FILM COATED ORAL
Status: DISCONTINUED | OUTPATIENT
Start: 2018-08-09 | End: 2018-08-09 | Stop reason: RX

## 2018-08-09 RX ORDER — FAMOTIDINE 20 MG/1
20 TABLET, FILM COATED ORAL DAILY
Status: DISCONTINUED | OUTPATIENT
Start: 2018-08-10 | End: 2018-08-10 | Stop reason: HOSPADM

## 2018-08-09 RX ORDER — IPRATROPIUM BROMIDE AND ALBUTEROL SULFATE 2.5; .5 MG/3ML; MG/3ML
3 SOLUTION RESPIRATORY (INHALATION) EVERY 6 HOURS PRN
Status: DISCONTINUED | OUTPATIENT
Start: 2018-08-09 | End: 2018-08-10 | Stop reason: HOSPADM

## 2018-08-09 RX ORDER — ASPIRIN 81 MG/1
81 TABLET ORAL DAILY
Status: DISCONTINUED | OUTPATIENT
Start: 2018-08-10 | End: 2018-08-10 | Stop reason: HOSPADM

## 2018-08-09 RX ORDER — SODIUM CHLORIDE 9 MG/ML
1000 INJECTION, SOLUTION INTRAVENOUS
Status: COMPLETED | OUTPATIENT
Start: 2018-08-09 | End: 2018-08-09

## 2018-08-09 RX ORDER — SEVELAMER CARBONATE 800 MG/1
1600 TABLET, FILM COATED ORAL
Status: DISCONTINUED | OUTPATIENT
Start: 2018-08-09 | End: 2018-08-10 | Stop reason: HOSPADM

## 2018-08-09 RX ORDER — METOCLOPRAMIDE HYDROCHLORIDE 5 MG/ML
10 INJECTION INTRAMUSCULAR; INTRAVENOUS
Status: COMPLETED | OUTPATIENT
Start: 2018-08-09 | End: 2018-08-09

## 2018-08-09 RX ORDER — ONDANSETRON 2 MG/ML
4 INJECTION INTRAMUSCULAR; INTRAVENOUS EVERY 8 HOURS PRN
Status: DISCONTINUED | OUTPATIENT
Start: 2018-08-09 | End: 2018-08-10 | Stop reason: HOSPADM

## 2018-08-09 RX ORDER — MORPHINE SULFATE 4 MG/ML
2 INJECTION, SOLUTION INTRAMUSCULAR; INTRAVENOUS EVERY 4 HOURS PRN
Status: DISCONTINUED | OUTPATIENT
Start: 2018-08-09 | End: 2018-08-10 | Stop reason: HOSPADM

## 2018-08-09 RX ORDER — HYDROMORPHONE HYDROCHLORIDE 2 MG/ML
0.5 INJECTION, SOLUTION INTRAMUSCULAR; INTRAVENOUS; SUBCUTANEOUS
Status: COMPLETED | OUTPATIENT
Start: 2018-08-09 | End: 2018-08-09

## 2018-08-09 RX ORDER — IBUPROFEN 200 MG
1 TABLET ORAL DAILY
Status: DISCONTINUED | OUTPATIENT
Start: 2018-08-10 | End: 2018-08-10 | Stop reason: HOSPADM

## 2018-08-09 RX ORDER — LISINOPRIL 10 MG/1
10 TABLET ORAL DAILY
Status: DISCONTINUED | OUTPATIENT
Start: 2018-08-10 | End: 2018-08-10 | Stop reason: HOSPADM

## 2018-08-09 RX ORDER — DIPHENHYDRAMINE HYDROCHLORIDE 50 MG/ML
25 INJECTION INTRAMUSCULAR; INTRAVENOUS
Status: COMPLETED | OUTPATIENT
Start: 2018-08-09 | End: 2018-08-09

## 2018-08-09 RX ORDER — ONDANSETRON 2 MG/ML
4 INJECTION INTRAMUSCULAR; INTRAVENOUS
Status: COMPLETED | OUTPATIENT
Start: 2018-08-09 | End: 2018-08-09

## 2018-08-09 RX ADMIN — DIPHENHYDRAMINE HYDROCHLORIDE 25 MG: 50 INJECTION, SOLUTION INTRAMUSCULAR; INTRAVENOUS at 02:08

## 2018-08-09 RX ADMIN — METOCLOPRAMIDE 10 MG: 5 INJECTION, SOLUTION INTRAMUSCULAR; INTRAVENOUS at 02:08

## 2018-08-09 RX ADMIN — HYDROMORPHONE HYDROCHLORIDE 0.5 MG: 2 INJECTION, SOLUTION INTRAMUSCULAR; INTRAVENOUS; SUBCUTANEOUS at 02:08

## 2018-08-09 RX ADMIN — SEVELAMER CARBONATE 1600 MG: 800 TABLET, FILM COATED ORAL at 06:08

## 2018-08-09 RX ADMIN — SODIUM POLYSTYRENE SULFONATE 15 G: 15 SUSPENSION ORAL; RECTAL at 04:08

## 2018-08-09 RX ADMIN — CLONIDINE HYDROCHLORIDE 0.1 MG: 0.1 TABLET ORAL at 01:08

## 2018-08-09 RX ADMIN — ONDANSETRON 4 MG: 2 INJECTION, SOLUTION INTRAMUSCULAR; INTRAVENOUS at 01:08

## 2018-08-09 RX ADMIN — ACETAMINOPHEN 650 MG: 325 TABLET ORAL at 10:08

## 2018-08-09 RX ADMIN — SODIUM CHLORIDE 1000 ML: 0.9 INJECTION, SOLUTION INTRAVENOUS at 04:08

## 2018-08-09 NOTE — ED NOTES
"Pt reports having headache and dizziness after having dialysis this morning. States "its hard to talk" -per Marcel SABILLON who is translating at bedside.  "

## 2018-08-09 NOTE — HPI
Leydi Cordero is a 28 y.o. female Gabonese speaking patient with ESRD (HD T,TH,S), HTN, and h/o renal transplant 3 years ago in [place]  who presents to the Emergency Department for a HA which onset gradually after dialysis today. She states that she has had HAs in the past after dialysis but was not evaluated in the ED. She states that this HA is worse. Associated sxs include dizziness and N/V. Symptoms are constant and moderate in severity. No mitigating or exacerbating factors reported. Patient denies fever, chills, CP, SOB, abd pain, syncope, and all other sxs at this time. Patient confirms hx of  HTN but did not take her medications this morning. She states that she has them at home and does not need a refill. No further complaints or concerns at this time.  Pt smokes 1/2 ppd of cigarettes.  Pt is followed outpatient by Dr. Petty (Nephrology).  ER workup: K 5.5, Calcium 12.7, BUN/Creatinine 25/7.5, Na 128.  Chest xray unremarkable and CT of head negative.  ER discussed case with Nephrology.  Hospital Medicine consulted for admission to Observation Unit.

## 2018-08-09 NOTE — ED NOTES
Pt lying in bed with eyes closed. No further vomiting at this time. Skin warm and dry. Resp even and unlabored with equal chest rise and fall. No s/s of pain or distress at this time.

## 2018-08-09 NOTE — H&P
[facility] Hospital Medicine  History & Physical    Patient Name: Leydi Cordero  MRN: [id number]  Admission Date: 8/9/2018  Attending Physician: Ezra Smith MD   Primary Care Provider: Primary Doctor No         Patient information was obtained from patient and ER records.     Subjective:     Principal Problem:  Nausea and Vomiting    Chief Complaint:   Chief Complaint   Patient presents with    Emesis     headache after dialysis today-pt completed full tx today. [facility] reports pt having anxiety-out of anx medication, R arm numbness.         HPI: Leydi Cordero is a 28 y.o. female Syriac speaking patient with ESRD (HD T,TH,S), HTN, and h/o renal transplant 3 years ago in [place]  who presents to the Emergency Department for a HA which onset gradually after dialysis today. She states that she has had HAs in the past after dialysis but was not evaluated in the ED. She states that this HA is worse. Associated sxs include dizziness and N/V. Symptoms are constant and moderate in severity. No mitigating or exacerbating factors reported. Patient denies fever, chills, CP, SOB, abd pain, syncope, and all other sxs at this time. Patient confirms hx of HTN and reports compliance with prescribed medications but did not take her medications this morning. She states that she has them at home and does not need a refill. No further complaints or concerns at this time.  Pt smokes 1/2 ppd of cigarettes.  Pt is followed outpatient by Dr. Petty (Nephrology).  ER workup: K 5.5, Calcium 12.7, BUN/Creatinine 25/7.5, Na 128.  Chest xray unremarkable and CT of head negative.  ER discussed case with Nephrology.  Hospital Medicine consulted for admission to Observation Unit.      Past Medical History:   Diagnosis Date    Encounter for blood transfusion     ESRD (end stage renal disease)     H/O kidney transplant     Hypertension     Hypertensive nephropathy        Past Surgical History:    Procedure Laterality Date    hemodialysis access left arm      kidney removal       KIDNEY TRANSPLANT  04/15/2015    NEPHRECTOMY      OVARIAN CYST REMOVAL      PARATHYROIDECTOMY      partial     right leg hemodialysis access      transplant nephrectomy  12/01/2017       Review of patient's allergies indicates:   Allergen Reactions    Heparin analogues Rash       No current facility-administered medications on file prior to encounter.      Current Outpatient Prescriptions on File Prior to Encounter   Medication Sig    aspirin (ECOTRIN) 81 MG EC tablet Take 1 tablet (81 mg total) by mouth once daily.    calcitRIOL (ROCALTROL) 0.5 MCG Cap Take 1 mcg by mouth once daily.    calcium carbonate (TUMS) 200 mg calcium (500 mg) chewable tablet Take 1 tablet by mouth 3 (three) times daily with meals.    lisinopril 10 MG tablet TAKE 1 BY MOUTH DAILY    sevelamer HCl (RENAGEL) 800 MG Tab Take 1,600 mg by mouth 3 (three) times daily with meals.    lidocaine-prilocaine (EMLA) cream APPLY HALF AN HOUR BEFORE  DIALYSIS     Family History     Problem Relation (Age of Onset)    Cancer Father    Colon cancer Father    Diabetes Maternal Uncle    Hypertension Father    Kidney disease Maternal Aunt    No Known Problems Mother, Sister, Brother        Social History Main Topics    Smoking status: Current Every Day Smoker     Packs/day: 0.50    Smokeless tobacco: Never Used      Comment: no smoking after m.n prior to surgery    Alcohol use No    Drug use: No    Sexual activity: Not on file     Review of Systems   Constitutional: Positive for fatigue. Negative for activity change, appetite change, chills and unexpected weight change.   HENT: Negative for congestion, postnasal drip, sinus pressure, sore throat and voice change.    Eyes: Negative for redness, itching and visual disturbance.   Respiratory: Negative for cough, chest tightness, shortness of breath and wheezing.    Cardiovascular: Negative for chest pain,  palpitations and leg swelling.   Gastrointestinal: Positive for nausea and vomiting. Negative for abdominal distention, abdominal pain and diarrhea.   Endocrine: Negative for cold intolerance and heat intolerance.   Genitourinary: Negative for flank pain.        Anuric   Musculoskeletal: Negative for arthralgias, back pain, gait problem and myalgias.   Skin: Negative for color change and wound.   Neurological: Positive for dizziness and headaches. Negative for syncope and weakness.   Hematological: Does not bruise/bleed easily.   Psychiatric/Behavioral: Negative for agitation, confusion and sleep disturbance. The patient is not nervous/anxious.      Objective:     Vital Signs (Most Recent):  Temp: 98.2 °F (36.8 °C) (08/09/18 1601)  Pulse: 70 (08/09/18 1601)  Resp: 20 (08/09/18 1601)  BP: (!) 141/66 (08/09/18 1601)  SpO2: 99 % (08/09/18 1601) Vital Signs (24h Range):  Temp:  [97.8 °F (36.6 °C)-98.7 °F (37.1 °C)] 98.2 °F (36.8 °C)  Pulse:  [67-90] 70  Resp:  [13-21] 20  SpO2:  [96 %-100 %] 99 %  BP: (141-162)/() 141/66     Weight: 92.1 kg (203 lb)  Body mass index is 31.86 kg/m².    Physical Exam   Constitutional: She is oriented to person, place, and time. She appears well-developed and well-nourished.   HENT:   Head: Normocephalic.   Nose: Nose normal.   Mouth/Throat: Oropharynx is clear and moist.   Eyes: Conjunctivae are normal.   Neck: Normal range of motion.   Cardiovascular: Normal rate, regular rhythm, normal heart sounds and intact distal pulses.  Exam reveals no friction rub.    No murmur heard.  Pulmonary/Chest: Effort normal and breath sounds normal. No respiratory distress. She has no wheezes.   Abdominal: Soft. Bowel sounds are normal. She exhibits no distension. There is no tenderness.   Genitourinary:   Genitourinary Comments: Anuric   Musculoskeletal: Normal range of motion.   AVG to LUE-+ bruit and +thrill    Neurological: She is alert and oriented to person, place, and time.   Skin: Skin is  warm and dry.   Psychiatric: She has a normal mood and affect. Her behavior is normal. Thought content normal.           Significant Labs:   CBC:   Recent Labs  Lab 08/09/18  1309   WBC 10.29   HGB 14.0   HCT 41.1        CMP:   Recent Labs  Lab 08/09/18  1309   *   K 5.5*   CL 95   CO2 19*   GLU 98   BUN 25*   CREATININE 7.5*   CALCIUM 12.7*   PROT 8.2   ALBUMIN 4.2   BILITOT 0.6   ALKPHOS 72   AST 28   ALT 13   ANIONGAP 14   EGFRNONAA 7*     Troponin:   Recent Labs  Lab 08/09/18  1309   TROPONINI <0.006       Significant Imaging:   Imaging Results          CT Head Without Contrast (Final result)  Result time 08/09/18 14:34:19    Final result by Mak Kearney MD (08/09/18 14:34:19)                 Impression:      No acute abnormality.    All CT scans at this facility use dose modulation, iterative reconstruction, and/or weight based dosing when appropriate to reduce radiation dose to as low as reasonable achievable.      Electronically signed by: Mak Kearney MD  Date:    08/09/2018  Time:    14:34             Narrative:    EXAMINATION:  CT HEAD WITHOUT CONTRAST    CLINICAL HISTORY:  Headache, acute, norm neuro exam;    TECHNIQUE:  Low dose axial CT images obtained throughout the head without intravenous contrast. Sagittal and coronal reconstructions were performed.    All CT scans at this facility use dose modulation, iterative reconstruction, and/or weight based dosing when appropriate to reduce radiation dose to as low as reasonable achievable.    COMPARISON:  None.    FINDINGS:  Intracranial compartment:    The brain parenchyma appears normal. No parenchymal mass, hemorrhage, edema or major vascular distribution infarct.    Ventricles and sulci are normal in size for age without evidence of hydrocephalus.    No extra-axial blood or fluid collections.    Skull/extracranial contents (limited evaluation): No fracture. Mastoid air cells and paranasal sinuses are essentially clear.                                X-Ray Chest PA And Lateral (Final result)  Result time 08/09/18 12:19:26    Final result by Eze Ibarra Jr., MD (08/09/18 12:19:26)                 Impression:      No acute findings.      Electronically signed by: Eze Ibarra MD  Date:    08/09/2018  Time:    12:19             Narrative:    EXAMINATION:  XR CHEST PA AND LATERAL    CLINICAL HISTORY:  Chest Pain;    COMPARISON:  08/01/2018    FINDINGS:  Heart size is normal.  Lungs appear clear of active disease.  No infiltrates or effusions.  No suspicious mass. No significant change.                              Assessment/Plan:     Nausea and vomiting    -post HD  -antiemetics prn   -CT of head unremarkable          Hyponatremia    Na 128  -gentle hydration  -repeat lab in am          Tobacco abuse    -smoking cessation   -Nicotine patch   -Duonebs prn          Hypercalcemia    -pt takes tums and calcitriol at home  Gentle hydration   -Nephrology following             ESRD (end stage renal disease)    -Nephrology consulted   -pt completed HD today with 3 L removed  -outpatient HD schedule T, TH, S  -pt followed outpatient by Dr. Petty (Nephrology)          Hyperkalemia    -mildly elevated post HD  -Kayexalate given in ER  -repeat labs in am           Essential hypertension    -home medications resumed in am   -Clonidine given in ER          VTE Risk Mitigation         Ordered     IP VTE HIGH RISK PATIENT  Once      08/09/18 1814     Place KEERTHI hose  Until discontinued      08/09/18 1814     Place sequential compression device  Until discontinued      08/09/18 1814             Enedelia Clemente NP  Department of Hospital Medicine   [facility]

## 2018-08-09 NOTE — ASSESSMENT & PLAN NOTE
1. ESRD on HD : TTS , schedule, had HD today ,     2. Nausea - not sure if this is due to significant hypercalcemia, patient had parathyroid surgery in the past and is on calcium supplements, maybe these need to be discontinued, also if patient is on high calcium bath at outpatient dialysis this need to be adjusted, will contact her dialysis unit tomorrow and discuss same    3.  Hyperkalemia - mild at 5.5, patient received Kayexalate    4.  Hypertension - resume home blood pressure medications.    5.  Anemia of chronic kidney disease - stable Hb     6.  Disposition - discharged home in stable

## 2018-08-09 NOTE — ED NOTES
Pt lying in bed comfortably. Family at bedside. AAO x 4. Skin warm and dry. Resp even and unlabored with equal chest rise and fall. Denies any needs or assist at this time.

## 2018-08-09 NOTE — CONSULTS
[facility] -   Nephrology  Consult Note      Patient Name: Leydi Cordero  MRN: [id number]  Admission Date: 8/9/2018  Hospital Length of Stay: 0 days  Attending Provider: Ezra Smith MD   Primary Care Physician: Primary Doctor No  Principal Problem:<principal problem not specified>    Consults  Subjective:     HPI: Leydi Cordero is a pleasant 28-year-old  woman was sent to the emergency room from dialysis unit today as she was not feeling well, patient has been having some headache nausea and vomitings.  Admission labs revealed significant hypercalcemia with a serum calcium of 12.7, also mild hyperkalemia with a serum potassium of 5.5.  Patient had couple of episodes of vomitings in the emergency room.  She has no fevers chills, no elevated WBC count noted. She has a LUE AVF for access,     Past Medical History:   Diagnosis Date    Encounter for blood transfusion     ESRD (end stage renal disease)     H/O kidney transplant     Hypertension     Hypertensive nephropathy        Past Surgical History:   Procedure Laterality Date    hemodialysis access left arm      kidney removal       KIDNEY TRANSPLANT  04/15/2015    NEPHRECTOMY      OVARIAN CYST REMOVAL      PARATHYROIDECTOMY      partial     right leg hemodialysis access      transplant nephrectomy  12/01/2017       Review of patient's allergies indicates:   Allergen Reactions    Heparin analogues Rash     No current facility-administered medications for this encounter.      Current Outpatient Prescriptions   Medication    aspirin (ECOTRIN) 81 MG EC tablet    calcitRIOL (ROCALTROL) 0.5 MCG Cap    calcium carbonate (TUMS) 200 mg calcium (500 mg) chewable tablet    lisinopril 10 MG tablet    sevelamer HCl (RENAGEL) 800 MG Tab    lidocaine-prilocaine (EMLA) cream     Family History     Problem Relation (Age of Onset)    Cancer Father    Colon cancer Father    Diabetes Maternal Uncle    Hypertension Father     Kidney disease Maternal Aunt    No Known Problems Mother, Sister, Brother        Social History Main Topics    Smoking status: Current Every Day Smoker     Packs/day: 0.50    Smokeless tobacco: Never Used      Comment: no smoking after m.n prior to surgery    Alcohol use No    Drug use: No    Sexual activity: Not on file     Review of Systems   Constitutional: Positive for activity change and fatigue. Negative for appetite change and fever.   HENT: Negative for congestion, facial swelling, sore throat, trouble swallowing and voice change.    Eyes: Negative for redness and visual disturbance.   Respiratory: Negative for apnea, cough, chest tightness, shortness of breath and wheezing.    Cardiovascular: Negative for chest pain, palpitations and leg swelling.   Gastrointestinal: Positive for nausea and vomiting. Negative for abdominal distention, abdominal pain, blood in stool, constipation and diarrhea.   Genitourinary: Negative for decreased urine volume, difficulty urinating, dysuria, flank pain, frequency, hematuria, pelvic pain and urgency.   Musculoskeletal: Negative for back pain, gait problem and joint swelling.   Skin: Negative for color change and rash.   Neurological: Negative for dizziness, syncope, weakness and headaches.   Hematological: Does not bruise/bleed easily.   Psychiatric/Behavioral: Negative for agitation, behavioral problems and confusion. The patient is not nervous/anxious.      Objective:     Vital Signs (Most Recent):  Temp: 98.2 °F (36.8 °C) (08/09/18 1601)  Pulse: 70 (08/09/18 1601)  Resp: 20 (08/09/18 1601)  BP: (!) 141/66 (08/09/18 1601)  SpO2: 99 % (08/09/18 1601) Vital Signs (24h Range):  Temp:  [97.8 °F (36.6 °C)-98.7 °F (37.1 °C)] 98.2 °F (36.8 °C)  Pulse:  [67-90] 70  Resp:  [13-21] 20  SpO2:  [96 %-100 %] 99 %  BP: (141-162)/() 141/66     Weight: 92.1 kg (203 lb) (08/09/18 1229)  Body mass index is 31.86 kg/m².  Body surface area is 2.09 meters squared.    No  intake/output data recorded.    Physical Exam   Constitutional: She appears well-developed and well-nourished. No distress.   HENT:   Head: Normocephalic and atraumatic.   Mouth/Throat: Oropharynx is clear and moist. No oropharyngeal exudate.   Eyes: Conjunctivae and EOM are normal. Pupils are equal, round, and reactive to light.   Neck: Normal range of motion. Neck supple. No JVD present. Carotid bruit is not present. No tracheal deviation present. No thyroid mass and no thyromegaly present.   Cardiovascular: Normal rate, regular rhythm, normal heart sounds and intact distal pulses.  Exam reveals no gallop and no friction rub.    No murmur heard.  Pulmonary/Chest: Effort normal and breath sounds normal. No respiratory distress. She has no wheezes. She has no rales. She exhibits no tenderness.   Abdominal: Soft. Bowel sounds are normal. She exhibits no distension, no abdominal bruit, no ascites and no mass. There is no hepatosplenomegaly. There is no tenderness. There is no rebound, no guarding and no CVA tenderness.   Musculoskeletal: Normal range of motion. She exhibits no edema or tenderness.   LUE AVF with a good thrill    Lymphadenopathy:     She has no cervical adenopathy.   Neurological: She is alert. She has normal reflexes. She displays normal reflexes. No cranial nerve deficit. She exhibits normal muscle tone. Coordination normal.   Skin: Skin is warm and intact. No rash noted. No erythema. No pallor.       Significant Labs:  CBC:   Recent Labs  Lab 08/09/18  1309   WBC 10.29   RBC 4.24   HGB 14.0   HCT 41.1      MCV 97   MCH 33.0*   MCHC 34.1     CMP:   Recent Labs  Lab 08/09/18  1309   GLU 98   CALCIUM 12.7*   ALBUMIN 4.2   PROT 8.2   *   K 5.5*   CO2 19*   CL 95   BUN 25*   CREATININE 7.5*   ALKPHOS 72   ALT 13   AST 28   BILITOT 0.6     Coagulation: No results for input(s): PT, INR, APTT in the last 168 hours.  All labs within the past 24 hours have been reviewed.    Significant Imaging:  reviewed     Assessment/Plan:     ESRD (end stage renal disease)    1. ESRD on HD : TTS , schedule, had HD today ,     2. Nausea - not sure if this is due to significant hypercalcemia, patient had parathyroid surgery in the past and is on calcium supplements, maybe these need to be discontinued, also if patient is on high calcium bath at outpatient dialysis this need to be adjusted, will contact her dialysis unit tomorrow and discuss same    3.  Hyperkalemia - mild at 5.5, patient received Kayexalate    4.  Hypertension - resume home blood pressure medications.    5.  Anemia of chronic kidney disease - stable Hb     6.  Disposition - discharged home in stable            Thank you for your consult. I will follow-up with patient. Please contact us if you have any additional questions.     Total time spent 70 minutes including time needed to review the records,  patient  evaluation, documentation, face-to-face discussion with the patient,  primary and ER teams   more than 50% of the time was spent on coordination of care and counseling.       Rah Santoyo MD   Nephrology  [facility] [place]

## 2018-08-09 NOTE — SUBJECTIVE & OBJECTIVE
Past Medical History:   Diagnosis Date    Encounter for blood transfusion     ESRD (end stage renal disease)     H/O kidney transplant     Hypertension     Hypertensive nephropathy        Past Surgical History:   Procedure Laterality Date    hemodialysis access left arm      kidney removal       KIDNEY TRANSPLANT  04/15/2015    NEPHRECTOMY      OVARIAN CYST REMOVAL      PARATHYROIDECTOMY      partial     right leg hemodialysis access      transplant nephrectomy  12/01/2017       Review of patient's allergies indicates:   Allergen Reactions    Heparin analogues Rash     No current facility-administered medications for this encounter.      Current Outpatient Prescriptions   Medication    aspirin (ECOTRIN) 81 MG EC tablet    calcitRIOL (ROCALTROL) 0.5 MCG Cap    calcium carbonate (TUMS) 200 mg calcium (500 mg) chewable tablet    lisinopril 10 MG tablet    sevelamer HCl (RENAGEL) 800 MG Tab    lidocaine-prilocaine (EMLA) cream     Family History     Problem Relation (Age of Onset)    Cancer Father    Colon cancer Father    Diabetes Maternal Uncle    Hypertension Father    Kidney disease Maternal Aunt    No Known Problems Mother, Sister, Brother        Social History Main Topics    Smoking status: Current Every Day Smoker     Packs/day: 0.50    Smokeless tobacco: Never Used      Comment: no smoking after m.n prior to surgery    Alcohol use No    Drug use: No    Sexual activity: Not on file     Review of Systems   Constitutional: Positive for activity change and fatigue. Negative for appetite change and fever.   HENT: Negative for congestion, facial swelling, sore throat, trouble swallowing and voice change.    Eyes: Negative for redness and visual disturbance.   Respiratory: Negative for apnea, cough, chest tightness, shortness of breath and wheezing.    Cardiovascular: Negative for chest pain, palpitations and leg swelling.   Gastrointestinal: Positive for nausea and vomiting. Negative for  abdominal distention, abdominal pain, blood in stool, constipation and diarrhea.   Genitourinary: Negative for decreased urine volume, difficulty urinating, dysuria, flank pain, frequency, hematuria, pelvic pain and urgency.   Musculoskeletal: Negative for back pain, gait problem and joint swelling.   Skin: Negative for color change and rash.   Neurological: Negative for dizziness, syncope, weakness and headaches.   Hematological: Does not bruise/bleed easily.   Psychiatric/Behavioral: Negative for agitation, behavioral problems and confusion. The patient is not nervous/anxious.      Objective:     Vital Signs (Most Recent):  Temp: 98.2 °F (36.8 °C) (08/09/18 1601)  Pulse: 70 (08/09/18 1601)  Resp: 20 (08/09/18 1601)  BP: (!) 141/66 (08/09/18 1601)  SpO2: 99 % (08/09/18 1601) Vital Signs (24h Range):  Temp:  [97.8 °F (36.6 °C)-98.7 °F (37.1 °C)] 98.2 °F (36.8 °C)  Pulse:  [67-90] 70  Resp:  [13-21] 20  SpO2:  [96 %-100 %] 99 %  BP: (141-162)/() 141/66     Weight: 92.1 kg (203 lb) (08/09/18 1229)  Body mass index is 31.86 kg/m².  Body surface area is 2.09 meters squared.    No intake/output data recorded.    Physical Exam   Constitutional: She appears well-developed and well-nourished. No distress.   HENT:   Head: Normocephalic and atraumatic.   Mouth/Throat: Oropharynx is clear and moist. No oropharyngeal exudate.   Eyes: Conjunctivae and EOM are normal. Pupils are equal, round, and reactive to light.   Neck: Normal range of motion. Neck supple. No JVD present. Carotid bruit is not present. No tracheal deviation present. No thyroid mass and no thyromegaly present.   Cardiovascular: Normal rate, regular rhythm, normal heart sounds and intact distal pulses.  Exam reveals no gallop and no friction rub.    No murmur heard.  Pulmonary/Chest: Effort normal and breath sounds normal. No respiratory distress. She has no wheezes. She has no rales. She exhibits no tenderness.   Abdominal: Soft. Bowel sounds are normal.  She exhibits no distension, no abdominal bruit, no ascites and no mass. There is no hepatosplenomegaly. There is no tenderness. There is no rebound, no guarding and no CVA tenderness.   Musculoskeletal: Normal range of motion. She exhibits no edema or tenderness.   LUE AVF with a good thrill    Lymphadenopathy:     She has no cervical adenopathy.   Neurological: She is alert. She has normal reflexes. She displays normal reflexes. No cranial nerve deficit. She exhibits normal muscle tone. Coordination normal.   Skin: Skin is warm and intact. No rash noted. No erythema. No pallor.       Significant Labs:  CBC:   Recent Labs  Lab 08/09/18  1309   WBC 10.29   RBC 4.24   HGB 14.0   HCT 41.1      MCV 97   MCH 33.0*   MCHC 34.1     CMP:   Recent Labs  Lab 08/09/18  1309   GLU 98   CALCIUM 12.7*   ALBUMIN 4.2   PROT 8.2   *   K 5.5*   CO2 19*   CL 95   BUN 25*   CREATININE 7.5*   ALKPHOS 72   ALT 13   AST 28   BILITOT 0.6     Coagulation: No results for input(s): PT, INR, APTT in the last 168 hours.  All labs within the past 24 hours have been reviewed.    Significant Imaging: reviewed

## 2018-08-09 NOTE — SUBJECTIVE & OBJECTIVE
Past Medical History:   Diagnosis Date    Encounter for blood transfusion     ESRD (end stage renal disease)     H/O kidney transplant     Hypertension     Hypertensive nephropathy        Past Surgical History:   Procedure Laterality Date    hemodialysis access left arm      kidney removal       KIDNEY TRANSPLANT  04/15/2015    NEPHRECTOMY      OVARIAN CYST REMOVAL      PARATHYROIDECTOMY      partial     right leg hemodialysis access      transplant nephrectomy  12/01/2017       Review of patient's allergies indicates:   Allergen Reactions    Heparin analogues Rash       No current facility-administered medications on file prior to encounter.      Current Outpatient Prescriptions on File Prior to Encounter   Medication Sig    aspirin (ECOTRIN) 81 MG EC tablet Take 1 tablet (81 mg total) by mouth once daily.    calcitRIOL (ROCALTROL) 0.5 MCG Cap Take 1 mcg by mouth once daily.    calcium carbonate (TUMS) 200 mg calcium (500 mg) chewable tablet Take 1 tablet by mouth 3 (three) times daily with meals.    lisinopril 10 MG tablet TAKE 1 BY MOUTH DAILY    sevelamer HCl (RENAGEL) 800 MG Tab Take 1,600 mg by mouth 3 (three) times daily with meals.    lidocaine-prilocaine (EMLA) cream APPLY HALF AN HOUR BEFORE  DIALYSIS     Family History     Problem Relation (Age of Onset)    Cancer Father    Colon cancer Father    Diabetes Maternal Uncle    Hypertension Father    Kidney disease Maternal Aunt    No Known Problems Mother, Sister, Brother        Social History Main Topics    Smoking status: Current Every Day Smoker     Packs/day: 0.50    Smokeless tobacco: Never Used      Comment: no smoking after m.n prior to surgery    Alcohol use No    Drug use: No    Sexual activity: Not on file     Review of Systems   Constitutional: Positive for fatigue. Negative for activity change, appetite change, chills and unexpected weight change.   HENT: Negative for congestion, postnasal drip, sinus pressure, sore throat  and voice change.    Eyes: Negative for redness, itching and visual disturbance.   Respiratory: Negative for cough, chest tightness, shortness of breath and wheezing.    Cardiovascular: Negative for chest pain, palpitations and leg swelling.   Gastrointestinal: Positive for nausea and vomiting. Negative for abdominal distention, abdominal pain and diarrhea.   Endocrine: Negative for cold intolerance and heat intolerance.   Genitourinary: Negative for flank pain.        Anuric   Musculoskeletal: Negative for arthralgias, back pain, gait problem and myalgias.   Skin: Negative for color change and wound.   Neurological: Positive for dizziness and headaches. Negative for syncope and weakness.   Hematological: Does not bruise/bleed easily.   Psychiatric/Behavioral: Negative for agitation, confusion and sleep disturbance. The patient is not nervous/anxious.      Objective:     Vital Signs (Most Recent):  Temp: 98.2 °F (36.8 °C) (08/09/18 1601)  Pulse: 70 (08/09/18 1601)  Resp: 20 (08/09/18 1601)  BP: (!) 141/66 (08/09/18 1601)  SpO2: 99 % (08/09/18 1601) Vital Signs (24h Range):  Temp:  [97.8 °F (36.6 °C)-98.7 °F (37.1 °C)] 98.2 °F (36.8 °C)  Pulse:  [67-90] 70  Resp:  [13-21] 20  SpO2:  [96 %-100 %] 99 %  BP: (141-162)/() 141/66     Weight: 92.1 kg (203 lb)  Body mass index is 31.86 kg/m².    Physical Exam   Constitutional: She is oriented to person, place, and time. She appears well-developed and well-nourished.   HENT:   Head: Normocephalic.   Nose: Nose normal.   Mouth/Throat: Oropharynx is clear and moist.   Eyes: Conjunctivae are normal.   Neck: Normal range of motion.   Cardiovascular: Normal rate, regular rhythm, normal heart sounds and intact distal pulses.  Exam reveals no friction rub.    No murmur heard.  Pulmonary/Chest: Effort normal and breath sounds normal. No respiratory distress. She has no wheezes.   Abdominal: Soft. Bowel sounds are normal. She exhibits no distension. There is no tenderness.    Genitourinary:   Genitourinary Comments: Anuric   Musculoskeletal: Normal range of motion.   AVG to LUE-+ bruit and +thrill    Neurological: She is alert and oriented to person, place, and time.   Skin: Skin is warm and dry.   Psychiatric: She has a normal mood and affect. Her behavior is normal. Thought content normal.           Significant Labs:   CBC:   Recent Labs  Lab 08/09/18  1309   WBC 10.29   HGB 14.0   HCT 41.1        CMP:   Recent Labs  Lab 08/09/18  1309   *   K 5.5*   CL 95   CO2 19*   GLU 98   BUN 25*   CREATININE 7.5*   CALCIUM 12.7*   PROT 8.2   ALBUMIN 4.2   BILITOT 0.6   ALKPHOS 72   AST 28   ALT 13   ANIONGAP 14   EGFRNONAA 7*     Troponin:   Recent Labs  Lab 08/09/18  1309   TROPONINI <0.006       Significant Imaging:   Imaging Results          CT Head Without Contrast (Final result)  Result time 08/09/18 14:34:19    Final result by Mak Kearney MD (08/09/18 14:34:19)                 Impression:      No acute abnormality.    All CT scans at this facility use dose modulation, iterative reconstruction, and/or weight based dosing when appropriate to reduce radiation dose to as low as reasonable achievable.      Electronically signed by: Mak Kearney MD  Date:    08/09/2018  Time:    14:34             Narrative:    EXAMINATION:  CT HEAD WITHOUT CONTRAST    CLINICAL HISTORY:  Headache, acute, norm neuro exam;    TECHNIQUE:  Low dose axial CT images obtained throughout the head without intravenous contrast. Sagittal and coronal reconstructions were performed.    All CT scans at this facility use dose modulation, iterative reconstruction, and/or weight based dosing when appropriate to reduce radiation dose to as low as reasonable achievable.    COMPARISON:  None.    FINDINGS:  Intracranial compartment:    The brain parenchyma appears normal. No parenchymal mass, hemorrhage, edema or major vascular distribution infarct.    Ventricles and sulci are normal in size for age without  evidence of hydrocephalus.    No extra-axial blood or fluid collections.    Skull/extracranial contents (limited evaluation): No fracture. Mastoid air cells and paranasal sinuses are essentially clear.                               X-Ray Chest PA And Lateral (Final result)  Result time 08/09/18 12:19:26    Final result by Eze Ibarra Jr., MD (08/09/18 12:19:26)                 Impression:      No acute findings.      Electronically signed by: Eze Ibarra MD  Date:    08/09/2018  Time:    12:19             Narrative:    EXAMINATION:  XR CHEST PA AND LATERAL    CLINICAL HISTORY:  Chest Pain;    COMPARISON:  08/01/2018    FINDINGS:  Heart size is normal.  Lungs appear clear of active disease.  No infiltrates or effusions.  No suspicious mass. No significant change.

## 2018-08-09 NOTE — HPI
Leydi Cordero is a pleasant 28-year-old  woman was sent to the emergency room from dialysis unit today as she was not feeling well, patient has been having some headache nausea and vomitings.  Admission labs revealed significant hypercalcemia with a serum calcium of 12.7, also mild hyperkalemia with a serum potassium of 5.5.  Patient had couple of episodes of vomitings in the emergency room.  She has no fevers chills, no elevated WBC count noted. She has a LUE AVF for access,

## 2018-08-09 NOTE — ED PROVIDER NOTES
SCRIBE #1 NOTE: I, Sarah Mcgrath, am scribing for, and in the presence of, Ezra Smith MD. I have scribed the entire note.      History      Chief Complaint   Patient presents with    Emesis     headache after dialysis today-pt completed full tx today. [facility] reports pt having anxiety-out of anx medication, R arm numbness.        Review of patient's allergies indicates:   Allergen Reactions    Heparin analogues Rash        HPI   HPI    8/9/2018, 12:19 PM   History obtained from the       History of Present Illness: Leydi Cordero is a 28 y.o. female Maltese speaking patient with ESRD on dialysis who presents to the Emergency Department for a HA which onset gradually after dialysis today. She states that she has had HAs in the past after dialysis but was not evaluated in the ED. She states that this HA is worse. Associated sxs include dizziness, N/V. Symptoms are constant and moderate in severity. No mitigating or exacerbating factors reported. Patient denies fever, chills, CP, SOB, abd pain, syncope, and all other sxs at this time. Blood pressure is 146/107 here. Patient has HTN but did not take her medications this morning. She states that she has them at home and does not need a refill. No further complaints or concerns at this time.       Arrival mode: Personal vehicle    PCP: Primary Doctor No       Past Medical History:  Past Medical History:   Diagnosis Date    Encounter for blood transfusion     ESRD (end stage renal disease)     H/O kidney transplant     Hypertension     Hypertensive nephropathy        Past Surgical History:  Past Surgical History:   Procedure Laterality Date    hemodialysis access left arm      kidney removal       KIDNEY TRANSPLANT  04/15/2015    NEPHRECTOMY      OVARIAN CYST REMOVAL      PARATHYROIDECTOMY      partial     right leg hemodialysis access      transplant nephrectomy  12/01/2017         Family History:  Family History   Problem  Relation Age of Onset    No Known Problems Mother     Colon cancer Father     Cancer Father     Hypertension Father     No Known Problems Sister     No Known Problems Brother     Kidney disease Maternal Aunt     Diabetes Maternal Uncle        Social History:  Social History     Social History Main Topics    Smoking status: Current Every Day Smoker     Packs/day: 0.50    Smokeless tobacco: Never Used      Comment: no smoking after m.n prior to surgery    Alcohol use No    Drug use: No    Sexual activity: Unknown       ROS   Review of Systems   Constitutional: Negative for chills, diaphoresis and fever.   HENT: Negative for sore throat.    Respiratory: Negative for shortness of breath.    Cardiovascular: Negative for chest pain.   Gastrointestinal: Positive for nausea and vomiting. Negative for abdominal pain.   Genitourinary: Negative for dysuria.   Musculoskeletal: Negative for back pain.   Skin: Negative for rash.   Neurological: Positive for dizziness and headaches. Negative for seizures, syncope, weakness, light-headedness and numbness.   Hematological: Does not bruise/bleed easily.   All other systems reviewed and are negative.      Physical Exam      Initial Vitals [08/09/18 1157]   BP Pulse Resp Temp SpO2   (!) 150/100 86 18 97.8 °F (36.6 °C) 100 %      MAP       --          Physical Exam  Nursing Notes and Vital Signs Reviewed.  Constitutional: Patient is in no acute distress. Awake and alert. Well-developed and well-nourished.  Head: Atraumatic. Normocephalic.  Eyes: PERRL. EOM intact. Conjunctivae are not pale. No scleral icterus.  ENT: Mucous membranes are moist. Oropharynx is clear and symmetric.    Neck: Supple. Full ROM. No lymphadenopathy.  Cardiovascular: Regular rate. Regular rhythm. No murmurs, rubs, or gallops. Distal pulses are 2+ and symmetric.  Pulmonary/Chest: No respiratory distress. Clear to auscultation bilaterally. No wheezing, rales, or rhonchi.  Abdominal: Soft and  non-distended.  There is no tenderness.  No rebound, guarding, or rigidity.  Good bowel sounds.    Musculoskeletal: Moves all extremities. No obvious deformities. No edema. No calf tenderness.  Skin: Warm and dry.  Neurological:  Alert, awake, and appropriate.  Normal speech.  No acute focal neurological deficits are appreciated.  Psychiatric: Normal affect. Good eye contact. Appropriate in content.    ED Course    Procedures  ED Vital Signs:  Vitals:    08/09/18 1157 08/09/18 1229 08/09/18 1232 08/09/18 1254   BP: (!) 150/100  (!) 147/106    Pulse: 86  75 71   Resp: 18  13    Temp: 97.8 °F (36.6 °C)      TempSrc: Oral      SpO2: 100%  98%    Weight:  92.1 kg (203 lb)      08/09/18 1338 08/09/18 1352 08/09/18 1420 08/09/18 1421   BP: (!) 143/92 (!) 143/92 (!) 162/102 (!) 148/100   Pulse: 68  67 69   Resp: (!) 21  15 19   Temp: 98.7 °F (37.1 °C)      TempSrc: Oral      SpO2: 100%  100% 96%   Weight:        08/09/18 1423 08/09/18 1459   BP: (!) 155/112 (!) 151/87   Pulse: 90 72   Resp: 20 20   Temp:     TempSrc:     SpO2: 100% 100%   Weight:         Abnormal Lab Results:  Labs Reviewed   CBC W/ AUTO DIFFERENTIAL - Abnormal; Notable for the following:        Result Value    MCH 33.0 (*)     RDW 14.9 (*)     Gran # (ANC) 8.6 (*)     Lymph # 0.7 (*)     Gran% 83.4 (*)     Lymph% 7.2 (*)     All other components within normal limits   COMPREHENSIVE METABOLIC PANEL - Abnormal; Notable for the following:     Sodium 128 (*)     Potassium 5.5 (*)     CO2 19 (*)     BUN, Bld 25 (*)     Creatinine 7.5 (*)     Calcium 12.7 (*)     eGFR if  8 (*)     eGFR if non  7 (*)     All other components within normal limits    Narrative:       CA, critical result(s) called and verbal readback obtained from   Luz Marina Aiken RN, 08/09/2018 13:48   TSH   TROPONIN I   POCT GLUCOSE   POCT GLUCOSE MONITORING CONTINUOUS        All Lab Results:  Results for orders placed or performed during the hospital  encounter of 08/09/18   CBC auto differential   Result Value Ref Range    WBC 10.29 3.90 - 12.70 K/uL    RBC 4.24 4.00 - 5.40 M/uL    Hemoglobin 14.0 12.0 - 16.0 g/dL    Hematocrit 41.1 37.0 - 48.5 %    MCV 97 82 - 98 fL    MCH 33.0 (H) 27.0 - 31.0 pg    MCHC 34.1 32.0 - 36.0 g/dL    RDW 14.9 (H) 11.5 - 14.5 %    Platelets 184 150 - 350 K/uL    MPV 10.9 9.2 - 12.9 fL    Gran # (ANC) 8.6 (H) 1.8 - 7.7 K/uL    Lymph # 0.7 (L) 1.0 - 4.8 K/uL    Mono # 0.9 0.3 - 1.0 K/uL    Eos # 0.0 0.0 - 0.5 K/uL    Baso # 0.02 0.00 - 0.20 K/uL    Gran% 83.4 (H) 38.0 - 73.0 %    Lymph% 7.2 (L) 18.0 - 48.0 %    Mono% 8.8 4.0 - 15.0 %    Eosinophil% 0.4 0.0 - 8.0 %    Basophil% 0.2 0.0 - 1.9 %    Differential Method Automated    Comprehensive metabolic panel   Result Value Ref Range    Sodium 128 (L) 136 - 145 mmol/L    Potassium 5.5 (H) 3.5 - 5.1 mmol/L    Chloride 95 95 - 110 mmol/L    CO2 19 (L) 23 - 29 mmol/L    Glucose 98 70 - 110 mg/dL    BUN, Bld 25 (H) 6 - 20 mg/dL    Creatinine 7.5 (H) 0.5 - 1.4 mg/dL    Calcium 12.7 (HH) 8.7 - 10.5 mg/dL    Total Protein 8.2 6.0 - 8.4 g/dL    Albumin 4.2 3.5 - 5.2 g/dL    Total Bilirubin 0.6 0.1 - 1.0 mg/dL    Alkaline Phosphatase 72 55 - 135 U/L    AST 28 10 - 40 U/L    ALT 13 10 - 44 U/L    Anion Gap 14 8 - 16 mmol/L    eGFR if African American 8 (A) >60 mL/min/1.73 m^2    eGFR if non African American 7 (A) >60 mL/min/1.73 m^2   TSH   Result Value Ref Range    TSH 1.274 0.400 - 4.000 uIU/mL   Troponin I   Result Value Ref Range    Troponin I <0.006 0.000 - 0.026 ng/mL   POCT glucose   Result Value Ref Range    POCT Glucose 109 70 - 110 mg/dL     Imaging Results:  Imaging Results          CT Head Without Contrast (Final result)  Result time 08/09/18 14:34:19    Final result by Mak Kearney MD (08/09/18 14:34:19)                 Impression:      No acute abnormality.    All CT scans at this facility use dose modulation, iterative reconstruction, and/or weight based dosing when appropriate  to reduce radiation dose to as low as reasonable achievable.      Electronically signed by: Mak Kearney MD  Date:    08/09/2018  Time:    14:34             Narrative:    EXAMINATION:  CT HEAD WITHOUT CONTRAST    CLINICAL HISTORY:  Headache, acute, norm neuro exam;    TECHNIQUE:  Low dose axial CT images obtained throughout the head without intravenous contrast. Sagittal and coronal reconstructions were performed.    All CT scans at this facility use dose modulation, iterative reconstruction, and/or weight based dosing when appropriate to reduce radiation dose to as low as reasonable achievable.    COMPARISON:  None.    FINDINGS:  Intracranial compartment:    The brain parenchyma appears normal. No parenchymal mass, hemorrhage, edema or major vascular distribution infarct.    Ventricles and sulci are normal in size for age without evidence of hydrocephalus.    No extra-axial blood or fluid collections.    Skull/extracranial contents (limited evaluation): No fracture. Mastoid air cells and paranasal sinuses are essentially clear.                               X-Ray Chest PA And Lateral (Final result)  Result time 08/09/18 12:19:26    Final result by Eze Ibarra Jr., MD (08/09/18 12:19:26)                 Impression:      No acute findings.      Electronically signed by: Eze Ibarra MD  Date:    08/09/2018  Time:    12:19             Narrative:    EXAMINATION:  XR CHEST PA AND LATERAL    CLINICAL HISTORY:  Chest Pain;    COMPARISON:  08/01/2018    FINDINGS:  Heart size is normal.  Lungs appear clear of active disease.  No infiltrates or effusions.  No suspicious mass. No significant change.                               The EKG was ordered, reviewed, and independently interpreted by the ED provider.  Interpretation time: 12:35  Rate: 70 BPM  Rhythm: normal sinus rhythm  Interpretation: No acute ST changes. No STEMI.      The Emergency Provider reviewed the vital signs and test results, which are outlined  above.    ED Discussion     3:32 PM: Dr. Smith discussed the pt's case with Dr. Santoyo (Nephrology) who recommends 50 ml per hr of NS for 10 hours to total 500 cc, Kayexalate, and to place patient in Observation.    3:50 PM: Discussed case with Kate Remy ([facility]). Dr. Tucker agrees with current care and management of pt and accepts admission.   Admitting Service: Hospital medicine   Admitting Physician: Dr. Tucker  Admit to: Obs-Tele    Re-evaluated pt. I have discussed test results, shared treatment plan, and the need for admission with patient via . Pt expresses understanding at this time and agree with all information. All questions answered. Pt has no further questions or concerns at this time. Pt is ready for admit.    ED Medication(s):  Medications   sodium polystyrene 15 gram/60 mL suspension 15 g (not administered)   0.9%  NaCl infusion (not administered)   ondansetron injection 4 mg (4 mg Intravenous Given 8/9/18 1324)   cloNIDine tablet 0.1 mg (0.1 mg Oral Given 8/9/18 1352)   hydromorphone (PF) injection 0.5 mg (0.5 mg Intravenous Given 8/9/18 1457)   metoclopramide HCl injection 10 mg (10 mg Intravenous Given 8/9/18 1452)   diphenhydrAMINE injection 25 mg (25 mg Intravenous Given 8/9/18 1451)       New Prescriptions    No medications on file            Medical Decision Making    Medical Decision Making:   Clinical Tests:   Lab Tests: Reviewed and Ordered  Radiological Study: Ordered and Reviewed  Medical Tests: Ordered and Reviewed           Scribe Attestation:   Scribe #1: I performed the above scribed service and the documentation accurately describes the services I performed. I attest to the accuracy of the note.    Attending:   Physician Attestation Statement for Scribe #1: I, Ezra Smith MD, personally performed the services described in this documentation, as scribed by Sarah Mcgrath, in my presence, and it is both accurate and complete.          Clinical Impression        ICD-10-CM ICD-9-CM   1. Hypercalcemia E83.52 275.42   2. Hyponatremia E87.1 276.1   3. Nonintractable headache, unspecified chronicity pattern, unspecified headache type R51 784.0   4. Nausea R11.0 787.02   5. End-stage renal disease on hemodialysis N18.6 585.6    Z99.2 V45.11       Disposition:   Disposition: Placed in Observation (Obs-Tele)  Condition: Jesse Smith MD  08/10/18 1552

## 2018-08-10 VITALS
TEMPERATURE: 98 F | OXYGEN SATURATION: 97 % | HEART RATE: 78 BPM | RESPIRATION RATE: 16 BRPM | BODY MASS INDEX: 32.49 KG/M2 | HEIGHT: 67 IN | DIASTOLIC BLOOD PRESSURE: 80 MMHG | WEIGHT: 207 LBS | SYSTOLIC BLOOD PRESSURE: 134 MMHG

## 2018-08-10 PROBLEM — R11.0 NAUSEA: Status: ACTIVE | Noted: 2018-08-09

## 2018-08-10 LAB
ALBUMIN SERPL BCP-MCNC: 3.5 G/DL
ALP SERPL-CCNC: 57 U/L
ALT SERPL W/O P-5'-P-CCNC: 10 U/L
ANION GAP SERPL CALC-SCNC: 10 MMOL/L
AST SERPL-CCNC: 13 U/L
BASOPHILS # BLD AUTO: 0.02 K/UL
BASOPHILS NFR BLD: 0.3 %
BILIRUB SERPL-MCNC: 0.5 MG/DL
BUN SERPL-MCNC: 35 MG/DL
CALCIUM SERPL-MCNC: 10.9 MG/DL
CHLORIDE SERPL-SCNC: 94 MMOL/L
CO2 SERPL-SCNC: 24 MMOL/L
CREAT SERPL-MCNC: 9.3 MG/DL
DIFFERENTIAL METHOD: ABNORMAL
EOSINOPHIL # BLD AUTO: 0.1 K/UL
EOSINOPHIL NFR BLD: 1.5 %
ERYTHROCYTE [DISTWIDTH] IN BLOOD BY AUTOMATED COUNT: 14.9 %
EST. GFR  (AFRICAN AMERICAN): 6 ML/MIN/1.73 M^2
EST. GFR  (NON AFRICAN AMERICAN): 5 ML/MIN/1.73 M^2
GLUCOSE SERPL-MCNC: 85 MG/DL
HCT VFR BLD AUTO: 37.1 %
HGB BLD-MCNC: 12.4 G/DL
LYMPHOCYTES # BLD AUTO: 1 K/UL
LYMPHOCYTES NFR BLD: 15.9 %
MCH RBC QN AUTO: 33.2 PG
MCHC RBC AUTO-ENTMCNC: 33.4 G/DL
MCV RBC AUTO: 99 FL
MONOCYTES # BLD AUTO: 0.5 K/UL
MONOCYTES NFR BLD: 9 %
NEUTROPHILS # BLD AUTO: 4.4 K/UL
NEUTROPHILS NFR BLD: 73.3 %
PLATELET # BLD AUTO: 133 K/UL
PMV BLD AUTO: 11 FL
POTASSIUM SERPL-SCNC: 4.9 MMOL/L
PROT SERPL-MCNC: 6.5 G/DL
RBC # BLD AUTO: 3.74 M/UL
SODIUM SERPL-SCNC: 128 MMOL/L
WBC # BLD AUTO: 5.97 K/UL

## 2018-08-10 PROCEDURE — 36415 COLL VENOUS BLD VENIPUNCTURE: CPT | Mod: NTX

## 2018-08-10 PROCEDURE — S4991 NICOTINE PATCH NONLEGEND: HCPCS | Mod: NTX | Performed by: NURSE PRACTITIONER

## 2018-08-10 PROCEDURE — 80053 COMPREHEN METABOLIC PANEL: CPT | Mod: NTX

## 2018-08-10 PROCEDURE — G0378 HOSPITAL OBSERVATION PER HR: HCPCS | Mod: NTX

## 2018-08-10 PROCEDURE — 99214 OFFICE O/P EST MOD 30 MIN: CPT | Mod: NTX,,, | Performed by: INTERNAL MEDICINE

## 2018-08-10 PROCEDURE — 85025 COMPLETE CBC W/AUTO DIFF WBC: CPT | Mod: NTX

## 2018-08-10 PROCEDURE — 25000003 PHARM REV CODE 250: Mod: NTX | Performed by: EMERGENCY MEDICINE

## 2018-08-10 PROCEDURE — 25000003 PHARM REV CODE 250: Mod: NTX | Performed by: NURSE PRACTITIONER

## 2018-08-10 RX ADMIN — LISINOPRIL 10 MG: 10 TABLET ORAL at 09:08

## 2018-08-10 RX ADMIN — SEVELAMER CARBONATE 1600 MG: 800 TABLET, FILM COATED ORAL at 07:08

## 2018-08-10 RX ADMIN — NICOTINE 1 PATCH: 14 PATCH, EXTENDED RELEASE TRANSDERMAL at 09:08

## 2018-08-10 RX ADMIN — ASPIRIN 81 MG: 81 TABLET, COATED ORAL at 09:08

## 2018-08-10 RX ADMIN — FAMOTIDINE 20 MG: 20 TABLET ORAL at 09:08

## 2018-08-10 NOTE — HOSPITAL COURSE
The pt is a 29 yo female who was palced in observation for symptomatic hypercalcemia of 12.7 on gentle IV hydration. 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 Calcium improved to 10.9. Care discussed with Nephrology who switched pt to standard calcium baths. Pt received HD.Dr. Ovalle recommended discontinuing Tums and calcitriol at discharge. Outpt HD unit will do f/u labs and will determine is Tums and calcitriol need to be re-started and at what dose.   Pt was counseled on this using language line. She verbalized understanding.Pt will present to outpt HD unit tomorrow

## 2018-08-10 NOTE — PROGRESS NOTES
Patient arrived to room via stretcher from ER accompanied by nurse: Luz Marina. Transferred to bed vi a indpenedent ambulation . Patient is aaox4 appears lethargic and tired, Tajik speaker only with no distress noted. Patient verbalized tiredness. Patient has been orientated to room, fall precautions, rounding sheet, menu, and board. Heart monitor in place, vital signs taken and stable, no questions or concerns at this time. Personal belongings and call bell within reach. Pt reminded to call for assistance. Bed in lowest position and locked in place. No c/o pain at this time.    Patient states that N/V has been intermittently occurring since patient restarted her dialysis, roughly, one year ago. States she suffers a lot of anxiety during dialysis. Pt had a kidney transplanted in Dec. 2017. mHx of active tobacco use: approx. 6-7 cigarettes weekly. SKIN: Scars noted to LR quad of abdomen. Scar to anterior lower neck. Scars noted to left upper extremety where shunt for dialysis is in place. No N/V upon admit. Patient is infusing NS @50 per MD orders until midnight. POC discussed with patient in native language by [place] speaking nurse. Needs further reinforcement, will reassess further in morning.

## 2018-08-10 NOTE — PLAN OF CARE
CM met with patient and spouse at bedside to assess discharge needs. Patient lives at home with spouse and is independent with needs. Receives HD @ [facility]. Patient ambulates safely independently and denies any recent falls. No dc needs identified at this time. CM assessment and MOON completed, placed in chart, and copy given to patient.     DC Plan: Home/Selfcare  Pt agrees with dc plan    Primary Plan/payor: Medicare Part A&B  Secondary: Medicaid       08/10/18 8953   Discharge Assessment   Assessment Type Discharge Planning Assessment   Confirmed/corrected address and phone number on facesheet? Yes   Assessment information obtained from? Patient   Expected Length of Stay (days) 1   Communicated expected length of stay with patient/caregiver yes   Prior to hospitilization cognitive status: Alert/Oriented   Prior to hospitalization functional status: Independent   Current cognitive status: Alert/Oriented   Current Functional Status: Independent   Lives With spouse   Able to Return to Prior Arrangements yes   Is patient able to care for self after discharge? Yes   Patient's perception of discharge disposition home or selfcare   Readmission Within The Last 30 Days no previous admission in last 30 days   Patient currently being followed by outpatient case management? No   Patient currently receives any other outside agency services? No   Equipment Currently Used at Home none   Do you have any problems affording any of your prescribed medications? No   Is the patient taking medications as prescribed? yes   Does the patient have transportation home? Yes   Dialysis Name and Scheduled days [facility]   Does the patient receive services at the [facility]? No   Discharge Plan A Home   Patient/Family In Agreement With Plan yes

## 2018-08-10 NOTE — SUBJECTIVE & OBJECTIVE
"Interval History: Pt was seen and examined. H/o was reviewed. 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 Pt remained stable overnight, no c/o's (I spoke pt in [place]). Feels "well." Labs were reviewed.    Review of patient's allergies indicates:   Allergen Reactions    Heparin analogues Rash     Current Facility-Administered Medications   Medication Frequency    acetaminophen tablet 650 mg Q8H PRN    albuterol-ipratropium 2.5 mg-0.5 mg/3 mL nebulizer solution 3 mL Q6H PRN    aspirin EC tablet 81 mg Daily    famotidine tablet 20 mg Daily    lisinopril tablet 10 mg Daily    morphine injection 2 mg Q4H PRN    morphine injection 4 mg Q4H PRN    nicotine 14 mg/24 hr 1 patch Daily    ondansetron injection 4 mg Q8H PRN    sevelamer carbonate tablet 1,600 mg TID WM       Objective:     Vital Signs (Most Recent):  Temp: 98.1 °F (36.7 °C) (08/10/18 0741)  Pulse: 72 (08/10/18 0741)  Resp: 16 (08/10/18 0741)  BP: 113/61 (08/10/18 0741)  SpO2: 96 % (08/10/18 0741)  O2 Device (Oxygen Therapy): room air (08/10/18 0741) Vital Signs (24h Range):  Temp:  [96.8 °F (36 °C)-98.7 °F (37.1 °C)] 98.1 °F (36.7 °C)  Pulse:  [67-90] 72  Resp:  [13-25] 16  SpO2:  [96 %-100 %] 96 %  BP: (100-162)/() 113/61     Weight: 93.9 kg (207 lb 0.2 oz) (08/09/18 2324)  Body mass index is 32.42 kg/m².  Body surface area is 2.11 meters squared.    No intake/output data recorded.    Physical Exam   Constitutional: She appears well-developed and well-nourished.   HENT:   Head: Normocephalic and atraumatic.   Neck: No JVD present.   Cardiovascular: Normal rate, regular rhythm and normal heart sounds.  Exam reveals no friction rub.    Pulmonary/Chest: Effort normal and breath sounds normal. No respiratory distress. She has no rales.   Abdominal: Soft. Bowel sounds are normal. She exhibits no " distension. There is no tenderness.   Musculoskeletal: She exhibits no edema.   Neurological: She is alert.   Skin: Skin is warm and dry.   Psychiatric: She has a normal mood and affect. Her behavior is normal. Thought content normal.   Nursing note and vitals reviewed.      Significant Labs: reviewed  BMP  Lab Results   Component Value Date     (L) 08/10/2018    K 4.9 08/10/2018    CL 94 (L) 08/10/2018    CO2 24 08/10/2018    BUN 35 (H) 08/10/2018    CREATININE 9.3 (H) 08/10/2018    CALCIUM 10.9 (H) 08/10/2018    ANIONGAP 10 08/10/2018    ESTGFRAFRICA 6 (A) 08/10/2018    EGFRNONAA 5 (A) 08/10/2018     Lab Results   Component Value Date    WBC 5.97 08/10/2018    HGB 12.4 08/10/2018    HCT 37.1 08/10/2018    MCV 99 (H) 08/10/2018     (L) 08/10/2018         Significant Imaging: reviewed

## 2018-08-10 NOTE — PROGRESS NOTES
"[facility]  Nephrology  Progress Note    Patient Name: Leydi Cordero  MRN: [id number]  Admission Date: 8/9/2018  Hospital Length of Stay: 0 days  Attending Provider: Tim Tucker MD   Primary Care Physician: Primary Doctor No  Principal Problem:Nausea and vomiting, ESRD    Subjective:     HPI: Leydi Cordero is a pleasant 28-year-old  woman was sent to the emergency room from dialysis unit today as she was not feeling well, patient has been having some headache nausea and vomitings.  Admission labs revealed significant hypercalcemia with a serum calcium of 12.7, also mild hyperkalemia with a serum potassium of 5.5.  Patient had couple of episodes of vomitings in the emergency room.  She has no fevers chills, no elevated WBC count noted. She has a LUE AVF for access,     Interval History: Pt was seen and examined. H/o was reviewed. 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 Pt remained stable overnight, no c/o's (I spoke pt in [place]). Feels "well." Labs were reviewed.    Review of patient's allergies indicates:   Allergen Reactions    Heparin analogues Rash     Current Facility-Administered Medications   Medication Frequency    acetaminophen tablet 650 mg Q8H PRN    albuterol-ipratropium 2.5 mg-0.5 mg/3 mL nebulizer solution 3 mL Q6H PRN    aspirin EC tablet 81 mg Daily    famotidine tablet 20 mg Daily    lisinopril tablet 10 mg Daily    morphine injection 2 mg Q4H PRN    morphine injection 4 mg Q4H PRN    nicotine 14 mg/24 hr 1 patch Daily    ondansetron injection 4 mg Q8H PRN    sevelamer carbonate tablet 1,600 mg TID WM       Objective:     Vital Signs (Most Recent):  Temp: 98.1 °F (36.7 °C) (08/10/18 0741)  Pulse: 72 (08/10/18 0741)  Resp: 16 (08/10/18 0741)  BP: 113/61 (08/10/18 0741)  SpO2: 96 % " (08/10/18 0741)  O2 Device (Oxygen Therapy): room air (08/10/18 0741) Vital Signs (24h Range):  Temp:  [96.8 °F (36 °C)-98.7 °F (37.1 °C)] 98.1 °F (36.7 °C)  Pulse:  [67-90] 72  Resp:  [13-25] 16  SpO2:  [96 %-100 %] 96 %  BP: (100-162)/() 113/61     Weight: 93.9 kg (207 lb 0.2 oz) (08/09/18 2324)  Body mass index is 32.42 kg/m².  Body surface area is 2.11 meters squared.    No intake/output data recorded.    Physical Exam   Constitutional: She appears well-developed and well-nourished.   HENT:   Head: Normocephalic and atraumatic.   Neck: No JVD present.   Cardiovascular: Normal rate, regular rhythm and normal heart sounds.  Exam reveals no friction rub.    Pulmonary/Chest: Effort normal and breath sounds normal. No respiratory distress. She has no rales.   Abdominal: Soft. Bowel sounds are normal. She exhibits no distension. There is no tenderness.   Musculoskeletal: She exhibits no edema.   Neurological: She is alert.   Skin: Skin is warm and dry.   Psychiatric: She has a normal mood and affect. Her behavior is normal. Thought content normal.   Nursing note and vitals reviewed.      Significant Labs: reviewed  BMP  Lab Results   Component Value Date     (L) 08/10/2018    K 4.9 08/10/2018    CL 94 (L) 08/10/2018    CO2 24 08/10/2018    BUN 35 (H) 08/10/2018    CREATININE 9.3 (H) 08/10/2018    CALCIUM 10.9 (H) 08/10/2018    ANIONGAP 10 08/10/2018    ESTGFRAFRICA 6 (A) 08/10/2018    EGFRNONAA 5 (A) 08/10/2018     Lab Results   Component Value Date    WBC 5.97 08/10/2018    HGB 12.4 08/10/2018    HCT 37.1 08/10/2018    MCV 99 (H) 08/10/2018     (L) 08/10/2018         Significant Imaging: reviewed    Assessment/Plan:   27 y/o female with ESRD and hypercalcemia:    ESRD (end stage renal disease)    1. ESRD on HD qTTS  Last HD was yesterday  GI sx's are secondary to hypercalcemia  Hypercalcemia improved, s Ca is almost normal  GI sx's resolved.  Hypercalcemia has occured iatrogenically: due to pt  taking Tums and calcitriol and being on high Ca bath on HD (3.5)  Discussed with outpt HD nurse (Margot this am)  Switched pt to standard Ca bath  Recommend d/c Tums and calcitriol on d/c  Outpt HD unit will do f/u labs and will determine is Tums and calcitriol need to be re-started and at what dose.  Discussed with hospitalist and with pt.    Hyperkalemia - K normal this am    Hyponatremia: chronic, mild, stable, no changed from yesterday  Due to ESRD  will correct with routine HD  No indications to correct or treat aggressively.    Hypertension - BP controlled  Meds reviewed  On monotherapy with low dose ACE-I.        Plans and recommendations:  As discussed above  OK to d/c home  Pt will present to outpt HD unit tomorrow. Pt verbalized understanding.    Kerri Ovalle MD  Nephrology  [facility]

## 2018-08-10 NOTE — PLAN OF CARE
Pt AAOx4, Congolese speaker. POC discussed w/patient, verbalized understanding. NSR on monitor. VSS. Voids per [facility]. Patient turns independently in bed. Fall precautions in place, bed alarm REFUSED, patient will call for mobility, bed in lowest, call light and personal items within reach. Patient has been free of any N/V. Discontinued NS infusion at midnight per MD orders.

## 2018-08-10 NOTE — PLAN OF CARE
08/10/18 1151   AARON Message   Medicare Outpatient and Observation Notification regarding financial responsibility Given to patient/caregiver;Explained to patient/caregiver;Signed/date by patient/caregiver   Date AARON was signed 08/10/18   Time AARON was signed 1157

## 2018-08-10 NOTE — PROGRESS NOTES
Discharge instructions given to pt. Verbalized understanding. Iv removed. Cath tip intact. Tolerated. Tele removed. Pt refused wheelchair. Walked to front entrance. Home with friend. No distress noted. Encouraged to get a primary care physician. Verbalized understanding.

## 2018-08-10 NOTE — DISCHARGE SUMMARY
[facility] Hospital Medicine  Discharge Summary      Patient Name: Leydi Cordero  MRN: [id number]  Admission Date: 8/9/2018  Hospital Length of Stay: 0 days  Discharge Date and Time:  08/10/2018 4:44 PM  Attending Physician: Dr. Tucker   Discharging Provider: Rachelle Saucedo NP  Primary Care Provider: Primary Doctor No      HPI:   Leydi Cordero is a 28 y.o. female Nepalese speaking patient with ESRD (HD T,TH,S), HTN, and h/o renal transplant 3 years ago in [place]  who presents to the Emergency Department for a HA which onset gradually after dialysis today. She states that she has had HAs in the past after dialysis but was not evaluated in the ED. She states that this HA is worse. Associated sxs include dizziness and N/V. Symptoms are constant and moderate in severity. No mitigating or exacerbating factors reported. Patient denies fever, chills, CP, SOB, abd pain, syncope, and all other sxs at this time. Patient confirms hx of  HTN but did not take her medications this morning. She states that she has them at home and does not need a refill. No further complaints or concerns at this time.  Pt smokes 1/2 ppd of cigarettes.  Pt is followed outpatient by Dr. Petty (Nephrology).  ER workup: K 5.5, Calcium 12.7, BUN/Creatinine 25/7.5, Na 128.  Chest xray unremarkable and CT of head negative.  ER discussed case with Nephrology.  Hospital Medicine consulted for admission to Observation Unit.      * No surgery found *      Hospital Course:   The pt is a 29 yo female who was palced in observation for symptomatic hypercalcemia of 12.7 on gentle IV hydration. 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 Calcium improved to 10.9. Care discussed with Nephrology who switched pt to standard calcium baths. Pt received HD.Dr. Ovalle recommended discontinuing Tums and calcitriol at  discharge. Outpt HD unit will do f/u labs and will determine is Tums and calcitriol need to be re-started and at what dose.   Pt was counseled on this using language line. She verbalized understanding.Pt will present to outpt HD unit tomorrow     Consults:   Consults         Status Ordering Provider     Inpatient consult to Nephrology  Once     Provider:  MD Davie Monteiro JON M.            Final Active Diagnoses:    Diagnosis Date Noted POA    PRINCIPAL PROBLEM:  Hypercalcemia [E83.52] 08/09/2018 Yes    Hyperkalemia [E87.5] 11/27/2017 Yes    End-stage renal disease on hemodialysis [N18.6, Z99.2]  Not Applicable    Tobacco abuse [Z72.0] 08/09/2018 Yes    Hyponatremia [E87.1] 08/09/2018 Yes    Nausea [R11.0] 08/09/2018 Yes    S/P parathyroidectomy--partial [E89.2] 08/01/2018 Not Applicable    ESRD (end stage renal disease) [N18.6] 11/27/2017 Yes    Essential hypertension [I10] 08/11/2016 Yes      Problems Resolved During this Admission:    Diagnosis Date Noted Date Resolved POA       Discharged Condition: stable    Disposition: Home or Self Care    Follow Up:  Follow-up Information     Please follow up.    Why:  HD as scheduled               Patient Instructions:     Activity as tolerated         Significant Diagnostic Studies:   Imaging Results          CT Head Without Contrast (Final result)  Result time 08/09/18 14:34:19    Final result by Mak Kearney MD (08/09/18 14:34:19)                 Impression:      No acute abnormality.    All CT scans at this facility use dose modulation, iterative reconstruction, and/or weight based dosing when appropriate to reduce radiation dose to as low as reasonable achievable.      Electronically signed by: Mak Kearney MD  Date:    08/09/2018  Time:    14:34             Narrative:    EXAMINATION:  CT HEAD WITHOUT CONTRAST    CLINICAL HISTORY:  Headache, acute, norm neuro exam;    TECHNIQUE:  Low dose axial CT images obtained throughout the head  without intravenous contrast. Sagittal and coronal reconstructions were performed.    All CT scans at this facility use dose modulation, iterative reconstruction, and/or weight based dosing when appropriate to reduce radiation dose to as low as reasonable achievable.    COMPARISON:  None.    FINDINGS:  Intracranial compartment:    The brain parenchyma appears normal. No parenchymal mass, hemorrhage, edema or major vascular distribution infarct.    Ventricles and sulci are normal in size for age without evidence of hydrocephalus.    No extra-axial blood or fluid collections.    Skull/extracranial contents (limited evaluation): No fracture. Mastoid air cells and paranasal sinuses are essentially clear.                               X-Ray Chest PA And Lateral (Final result)  Result time 08/09/18 12:19:26    Final result by Eze Ibarra Jr., MD (08/09/18 12:19:26)                 Impression:      No acute findings.      Electronically signed by: Eze Ibarra MD  Date:    08/09/2018  Time:    12:19             Narrative:    EXAMINATION:  XR CHEST PA AND LATERAL    CLINICAL HISTORY:  Chest Pain;    COMPARISON:  08/01/2018    FINDINGS:  Heart size is normal.  Lungs appear clear of active disease.  No infiltrates or effusions.  No suspicious mass. No significant change.                                Pending Diagnostic Studies:     None         Medications:  Reconciled Home Medications:      Medication List      CONTINUE taking these medications    aspirin 81 MG EC tablet  Commonly known as:  ECOTRIN  Take 1 tablet (81 mg total) by mouth once daily.     lidocaine-prilocaine cream  Commonly known as:  EMLA  APPLY HALF AN HOUR BEFORE  DIALYSIS     lisinopril 10 MG tablet  TAKE 1 BY MOUTH DAILY     sevelamer HCl 800 MG Tab  Commonly known as:  RENAGEL  Take 1,600 mg by mouth 3 (three) times daily with meals.        STOP taking these medications    calcitRIOL 0.5 MCG Cap  Commonly known as:  ROCALTROL     TUMS 200 mg  calcium (500 mg) chewable tablet  Generic drug:  calcium carbonate            Indwelling Lines/Drains at time of discharge:   Lines/Drains/Airways     Drain                 Hemodialysis AV Fistula Left upper arm -- days         Hemodialysis AV Graft Left upper arm -- days                Time spent on the discharge of patient: 42 minutes  Patient was seen and examined on the date of discharge and determined to be suitable for discharge.         Rachelle Saucedo NP  Department of Hospital Medicine  [facility]

## 2018-08-10 NOTE — ED NOTES
Pt lying in bed comfortably. AAO x 4. Skin warm and dry. Resp even and unlabored with equal chest rise and fall. Denies any needs or assist at this time.

## 2018-08-22 ENCOUNTER — HOSPITAL ENCOUNTER (OUTPATIENT)
Dept: CARDIOLOGY | Facility: HOSPITAL | Age: 28
Discharge: HOME | End: 2018-08-22
Attending: NURSE PRACTITIONER
Payer: MEDICARE

## 2018-08-22 ENCOUNTER — HOSPITAL ENCOUNTER (OUTPATIENT)
Dept: RADIOLOGY | Facility: HOSPITAL | Age: 28
Discharge: HOME | End: 2018-08-22
Attending: NURSE PRACTITIONER
Payer: MEDICARE

## 2018-08-22 ENCOUNTER — HOSPITAL ENCOUNTER (OUTPATIENT)
Dept: PULMONOLOGY | Facility: HOSPITAL | Age: 28
Discharge: HOME | End: 2018-08-22
Attending: NURSE PRACTITIONER
Payer: MEDICARE

## 2018-08-22 DIAGNOSIS — Z76.82 ORGAN TRANSPLANT CANDIDATE: ICD-10-CM

## 2018-08-22 LAB
DIASTOLIC DYSFUNCTION: NO
DIASTOLIC DYSFUNCTION: NO
ESTIMATED PA SYSTOLIC PRESSURE: 23.25
RETIRED EF AND QEF - SEE NOTES: 55 (ref 55–65)
TRICUSPID VALVE REGURGITATION: ABNORMAL

## 2018-08-22 PROCEDURE — 78452 HT MUSCLE IMAGE SPECT MULT: CPT | Mod: 26,TXP,, | Performed by: INTERNAL MEDICINE

## 2018-08-22 PROCEDURE — 93016 CV STRESS TEST SUPVJ ONLY: CPT | Mod: TXP,,, | Performed by: INTERNAL MEDICINE

## 2018-08-22 PROCEDURE — 93306 TTE W/DOPPLER COMPLETE: CPT | Mod: TXP

## 2018-08-22 PROCEDURE — 93017 CV STRESS TEST TRACING ONLY: CPT | Mod: TXP

## 2018-08-22 PROCEDURE — 93018 CV STRESS TEST I&R ONLY: CPT | Mod: TXP,,, | Performed by: INTERNAL MEDICINE

## 2018-08-22 PROCEDURE — A9502 TC99M TETROFOSMIN: HCPCS | Mod: TXP

## 2018-08-22 PROCEDURE — 63600175 PHARM REV CODE 636 W HCPCS: Mod: TXP | Performed by: NURSE PRACTITIONER

## 2018-08-22 PROCEDURE — 93306 TTE W/DOPPLER COMPLETE: CPT | Mod: 26,TXP,, | Performed by: INTERNAL MEDICINE

## 2018-08-22 RX ORDER — REGADENOSON 0.08 MG/ML
0.4 INJECTION, SOLUTION INTRAVENOUS ONCE
Status: COMPLETED | OUTPATIENT
Start: 2018-08-22 | End: 2018-08-22

## 2018-08-22 RX ADMIN — REGADENOSON 0.4 MG: 0.08 INJECTION, SOLUTION INTRAVENOUS at 03:08

## 2018-08-24 DIAGNOSIS — Z94.0 H/O KIDNEY TRANSPLANT: ICD-10-CM

## 2018-08-25 RX ORDER — ASPIRIN 81 MG/1
TABLET ORAL
Qty: 100 TABLET | Refills: 10 | Status: ON HOLD | OUTPATIENT
Start: 2018-08-25 | End: 2021-01-14 | Stop reason: HOSPADM

## 2018-09-05 ENCOUNTER — OFFICE VISIT (OUTPATIENT)
Dept: OBSTETRICS AND GYNECOLOGY | Facility: CLINIC | Age: 28
End: 2018-09-05
Payer: MEDICARE

## 2018-09-05 VITALS
SYSTOLIC BLOOD PRESSURE: 130 MMHG | WEIGHT: 207.88 LBS | BODY MASS INDEX: 33.55 KG/M2 | DIASTOLIC BLOOD PRESSURE: 90 MMHG

## 2018-09-05 DIAGNOSIS — N94.89 OTHER SPECIFIED CONDITIONS ASSOCIATED WITH FEMALE GENITAL ORGANS AND MENSTRUAL CYCLE: ICD-10-CM

## 2018-09-05 DIAGNOSIS — Z11.3 SCREEN FOR STD (SEXUALLY TRANSMITTED DISEASE): ICD-10-CM

## 2018-09-05 DIAGNOSIS — N93.9 ABNORMAL UTERINE BLEEDING (AUB): Primary | ICD-10-CM

## 2018-09-05 PROCEDURE — 99204 OFFICE O/P NEW MOD 45 MIN: CPT | Mod: S$PBB,,, | Performed by: OBSTETRICS & GYNECOLOGY

## 2018-09-05 PROCEDURE — 99212 OFFICE O/P EST SF 10 MIN: CPT | Mod: PBBFAC | Performed by: OBSTETRICS & GYNECOLOGY

## 2018-09-05 PROCEDURE — 81025 URINE PREGNANCY TEST: CPT | Mod: PBBFAC | Performed by: OBSTETRICS & GYNECOLOGY

## 2018-09-05 PROCEDURE — 99999 PR PBB SHADOW E&M-EST. PATIENT-LVL II: CPT | Mod: PBBFAC,,, | Performed by: OBSTETRICS & GYNECOLOGY

## 2018-09-05 PROCEDURE — 87491 CHLMYD TRACH DNA AMP PROBE: CPT

## 2018-09-05 RX ORDER — FERROUS SULFATE, DRIED 160(50) MG
1 TABLET, EXTENDED RELEASE ORAL 2 TIMES DAILY WITH MEALS
COMMUNITY
End: 2019-11-20 | Stop reason: ALTCHOICE

## 2018-09-05 RX ORDER — ACETAMINOPHEN AND CODEINE PHOSPHATE 120; 12 MG/5ML; MG/5ML
1 SOLUTION ORAL DAILY
Qty: 28 TABLET | Refills: 11 | Status: SHIPPED | OUTPATIENT
Start: 2018-09-05 | End: 2018-09-07 | Stop reason: SDUPTHER

## 2018-09-05 NOTE — PROGRESS NOTES
Subjective:       Patient ID: Leydi Cordero is a 28 y.o. female.    Chief Complaint:  Menstrual Problem      History of Present Illness  HPI  Dysfunctional Uterine Bleeding  Patient complains of irregular menses. She had been bleeding regularly.  Her most recent periods has been heavy and prolonged (now on 4th week of bleeding). Periods normally last 7 days. Dysmenorrhea:mild, occurring first 1-2 days of flow. Cyclic symptoms include: none. Current contraception: none. History of infertility: no. History of abnormal Pap smear: no.  Last pap NILM in 2018 with Dr. Ana New at .  Was told at this same visit that she had uterine fibroids and an ovarian cyst.  Pt has not followed up with Dr. New since.  Pt has a long history of renal disease and is on kidney transplant list.  Has dialysis 3 x weekly.        GYN & OB History  No LMP recorded.   Date of Last Pap: No result found    OB History    Para Term  AB Living   0 0 0 0 0 0   SAB TAB Ectopic Multiple Live Births   0 0 0 0 0             Review of Systems  Review of Systems   Constitutional: Positive for fatigue. Negative for activity change, appetite change and fever.   Respiratory: Negative for shortness of breath.    Cardiovascular: Negative for chest pain, palpitations and leg swelling.   Gastrointestinal: Negative for abdominal pain, bloating, blood in stool, constipation, diarrhea, nausea and vomiting.   Genitourinary: Positive for menorrhagia, menstrual problem and vaginal bleeding. Negative for dyspareunia, dysuria, flank pain, frequency, genital sores, hematuria, pelvic pain, vaginal discharge, vaginal pain, dysmenorrhea, urinary incontinence and vaginal odor.   Musculoskeletal: Negative for back pain.   Neurological: Negative for syncope and headaches.   Breast: Negative for breast mass, breast pain, nipple discharge and skin changes          Objective:    Physical Exam:   Constitutional: She is oriented to person,  place, and time. She appears well-developed and well-nourished. No distress.    HENT:   Head: Normocephalic and atraumatic.    Eyes: EOM are normal. Pupils are equal, round, and reactive to light.    Neck: Normal range of motion. Neck supple.    Cardiovascular: Normal rate, regular rhythm and normal heart sounds.     Pulmonary/Chest: Effort normal and breath sounds normal.        Abdominal: Soft. Bowel sounds are normal. She exhibits no distension. There is no tenderness.     Genitourinary: Vagina normal and uterus normal. Pelvic exam was performed with patient supine. There is no rash, tenderness, lesion or injury on the right labia. There is no rash, tenderness, lesion or injury on the left labia. Uterus is not deviated, not enlarged and not tender. Cervix is normal. Right adnexum displays no mass, no tenderness and no fullness. Left adnexum displays no mass, no tenderness and no fullness. No erythema, tenderness or bleeding in the vagina. No foreign body in the vagina. No signs of injury around the vagina. No vaginal discharge found. Cervix exhibits no motion tenderness, no discharge and no friability.   Genitourinary Comments: UPT today Negative           Musculoskeletal: Normal range of motion and moves all extremeties. She exhibits no edema or tenderness.       Neurological: She is alert and oriented to person, place, and time.    Skin: Skin is warm and dry.    Psychiatric: She has a normal mood and affect. Her behavior is normal. Thought content normal.          Assessment:        1. Abnormal uterine bleeding (AUB)    2. Screen for STD (sexually transmitted disease)    3. Other specified conditions associated with female genital organs and menstrual cycle              Plan:      Abnormal uterine bleeding (AUB)  -     POCT urine pregnancy  -     US Pelvis Comp with Transvag NON-OB (xpd; Future; Expected date: 09/05/2018  -     norethindrone (ORTHO MICRONOR) 0.35 mg tablet; Take 1 tablet (0.35 mg total) by  mouth once daily.  Dispense: 28 tablet; Refill: 11  -     Recent labs reviewed (WNL).  Pt was counseled on treatment options, including associated risks and benefits of each.  Options are limited given history of renal failure.  Pt voiced understanding and desires to proceed with Micronor.  Medication dosing, side-effects, risks, benefits, and alternatives were discussed.  Medical history was reviewed and pt is a candidate for Micronoruse.    Screen for STD (sexually transmitted disease)  -     C. trachomatis/N. gonorrhoeae by AMP DNA    Other specified conditions associated with female genital organs and menstrual cycle   -     C. trachomatis/N. gonorrhoeae by AMP DNA      Follow-up in about 3 months (around 12/5/2018).

## 2018-09-06 LAB
C TRACH DNA SPEC QL NAA+PROBE: NOT DETECTED
N GONORRHOEA DNA SPEC QL NAA+PROBE: NOT DETECTED

## 2018-09-07 ENCOUNTER — TELEPHONE (OUTPATIENT)
Dept: OBSTETRICS AND GYNECOLOGY | Facility: CLINIC | Age: 28
End: 2018-09-07

## 2018-09-07 DIAGNOSIS — N93.9 ABNORMAL UTERINE BLEEDING (AUB): ICD-10-CM

## 2018-09-07 RX ORDER — ACETAMINOPHEN AND CODEINE PHOSPHATE 120; 12 MG/5ML; MG/5ML
1 SOLUTION ORAL DAILY
Qty: 28 TABLET | Refills: 11 | Status: SHIPPED | OUTPATIENT
Start: 2018-09-07 | End: 2019-02-08

## 2018-09-07 NOTE — TELEPHONE ENCOUNTER
----- Message from Gay Blanco LPN sent at 9/7/2018 11:10 AM CDT -----  Will you please call her?  J  ----- Message -----  From: Jourdan Conklin MD  Sent: 9/6/2018   8:05 AM  To: Rubin Soliman Staff    Please contact pt to inform that test results are within normal range.  Keep scheduled appt.

## 2018-09-07 NOTE — TELEPHONE ENCOUNTER
Spoke with patient. Notified patient of negative gonorrhea and chlamydia results. Patient verbalized understanding.

## 2018-09-10 ENCOUNTER — TELEPHONE (OUTPATIENT)
Dept: OBSTETRICS AND GYNECOLOGY | Facility: CLINIC | Age: 28
End: 2018-09-10

## 2018-09-12 ENCOUNTER — TELEPHONE (OUTPATIENT)
Dept: RADIOLOGY | Facility: HOSPITAL | Age: 28
End: 2018-09-12

## 2018-09-20 RX ORDER — ALPRAZOLAM 0.25 MG/1
TABLET ORAL
Qty: 12 TABLET | Refills: 0 | OUTPATIENT
Start: 2018-09-20 | End: 2019-03-23

## 2018-09-24 ENCOUNTER — HOSPITAL ENCOUNTER (EMERGENCY)
Facility: HOSPITAL | Age: 28
Discharge: HOME | End: 2018-09-24
Attending: EMERGENCY MEDICINE
Payer: MEDICARE

## 2018-09-24 VITALS
RESPIRATION RATE: 20 BRPM | HEART RATE: 82 BPM | DIASTOLIC BLOOD PRESSURE: 74 MMHG | HEIGHT: 66 IN | OXYGEN SATURATION: 99 % | SYSTOLIC BLOOD PRESSURE: 147 MMHG | WEIGHT: 204.56 LBS | TEMPERATURE: 98 F | BODY MASS INDEX: 32.88 KG/M2

## 2018-09-24 DIAGNOSIS — N92.0 MENORRHAGIA WITH REGULAR CYCLE: Primary | ICD-10-CM

## 2018-09-24 DIAGNOSIS — N18.6 END-STAGE RENAL DISEASE ON HEMODIALYSIS: ICD-10-CM

## 2018-09-24 DIAGNOSIS — R42 DIZZINESS: ICD-10-CM

## 2018-09-24 DIAGNOSIS — Z99.2 END-STAGE RENAL DISEASE ON HEMODIALYSIS: ICD-10-CM

## 2018-09-24 LAB
ALBUMIN SERPL BCP-MCNC: 3.5 G/DL
ALP SERPL-CCNC: 58 U/L
ALT SERPL W/O P-5'-P-CCNC: 8 U/L
ANION GAP SERPL CALC-SCNC: 20 MMOL/L
AST SERPL-CCNC: 12 U/L
BASOPHILS # BLD AUTO: 0.02 K/UL
BASOPHILS NFR BLD: 0.3 %
BILIRUB SERPL-MCNC: 0.5 MG/DL
BUN SERPL-MCNC: 75 MG/DL
CALCIUM SERPL-MCNC: 6.6 MG/DL
CHLORIDE SERPL-SCNC: 95 MMOL/L
CO2 SERPL-SCNC: 22 MMOL/L
CREAT SERPL-MCNC: 13.1 MG/DL
DIFFERENTIAL METHOD: ABNORMAL
EOSINOPHIL # BLD AUTO: 0.2 K/UL
EOSINOPHIL NFR BLD: 3.8 %
ERYTHROCYTE [DISTWIDTH] IN BLOOD BY AUTOMATED COUNT: 15 %
EST. GFR  (AFRICAN AMERICAN): 4 ML/MIN/1.73 M^2
EST. GFR  (NON AFRICAN AMERICAN): 3 ML/MIN/1.73 M^2
GLUCOSE SERPL-MCNC: 90 MG/DL
HCT VFR BLD AUTO: 30.5 %
HGB BLD-MCNC: 10.2 G/DL
LYMPHOCYTES # BLD AUTO: 1.5 K/UL
LYMPHOCYTES NFR BLD: 26.3 %
MCH RBC QN AUTO: 32.3 PG
MCHC RBC AUTO-ENTMCNC: 33.4 G/DL
MCV RBC AUTO: 97 FL
MONOCYTES # BLD AUTO: 0.3 K/UL
MONOCYTES NFR BLD: 4.5 %
NEUTROPHILS # BLD AUTO: 3.7 K/UL
NEUTROPHILS NFR BLD: 65.1 %
PLATELET # BLD AUTO: 131 K/UL
PMV BLD AUTO: 11 FL
POTASSIUM SERPL-SCNC: 5.1 MMOL/L
PROT SERPL-MCNC: 6.4 G/DL
RBC # BLD AUTO: 3.16 M/UL
SODIUM SERPL-SCNC: 137 MMOL/L
TROPONIN I SERPL DL<=0.01 NG/ML-MCNC: 0.01 NG/ML
WBC # BLD AUTO: 5.74 K/UL

## 2018-09-24 PROCEDURE — 93010 ELECTROCARDIOGRAM REPORT: CPT | Mod: NTX,,, | Performed by: INTERNAL MEDICINE

## 2018-09-24 PROCEDURE — 36415 COLL VENOUS BLD VENIPUNCTURE: CPT | Mod: NTX

## 2018-09-24 PROCEDURE — 93005 ELECTROCARDIOGRAM TRACING: CPT | Mod: NTX

## 2018-09-24 PROCEDURE — 80053 COMPREHEN METABOLIC PANEL: CPT | Mod: NTX

## 2018-09-24 PROCEDURE — 84484 ASSAY OF TROPONIN QUANT: CPT | Mod: NTX

## 2018-09-24 PROCEDURE — 99284 EMERGENCY DEPT VISIT MOD MDM: CPT | Mod: 25,NTX

## 2018-09-24 PROCEDURE — 85025 COMPLETE CBC W/AUTO DIFF WBC: CPT | Mod: NTX

## 2018-09-24 NOTE — ED PROVIDER NOTES
"SCRIBE #1 NOTE: I, Lucy Jose, am scribing for, and in the presence of, Bam Mcgrath Jr., MD. I have scribed the entire note.      History      Chief Complaint   Patient presents with    Dizziness     SIster states, "SHe has been bleeding for 2 months and is dizzy and she's also a dialysis patient."       Review of patient's allergies indicates:   Allergen Reactions    Heparin analogues Rash        HPI   HPI    9/24/2018, 6:34 PM   History obtained from the patient via sister translating at   HPI limited secondary to language barrier      History of Present Illness: Leydi Cordero is a 28 y.o. female patient with a PMHx of ESRD, HTN, PSHx Kidney Txp, partial parathyroidectomy, who presents to the Emergency Department for dizziness which onset gradually PTA. Symptoms are episodic and moderate in severity.  No mitigating or exacerbating factors reported. Associated sxs include vaginal bleeding x2-3 months. Patient denies any fever, chills, syncope, n/v, abd pain, CP, SOB, HA, visual disturbances, and all other sxs at this time. Pt dialyzes T Th Sat, she last dialyzed 2 days ago. Her care is followed by Dr. Miller (Nephrology) and Dr. Conklin (OB/GYN). Pt has been evaluated by Dr. Conklin for the vaginal bleeding and was placed on oral contraceptives. She reports they have not been helping. No further complaints or concerns at this time.       Arrival mode: Personal vehicle    PCP: Primary Doctor No       Past Medical History:  Past Medical History:   Diagnosis Date    Encounter for blood transfusion     ESRD (end stage renal disease)     Fibroma     H/O kidney transplant     Hypertension     Hypertensive nephropathy     Ovarian cyst        Past Surgical History:  Past Surgical History:   Procedure Laterality Date    hemodialysis access left arm      kidney removal       KIDNEY TRANSPLANT  04/15/2015    NEPHRECTOMY      OVARIAN CYST REMOVAL      PARATHYROIDECTOMY      partial     " PARATHYROIDECTOMY-PARTIAL N/A 2/6/2018    Performed by [facility] at [facility] OR    right leg hemodialysis access      transplant nephrectomy  12/01/2017    TRANSPLANT NEPHRECTOMY Right 12/1/2017    Performed by Juan Marroquin MD at [facility] OR [address] [place] [address]         Family History:  Family History   Problem Relation Age of Onset    No Known Problems Mother     Colon cancer Father     Cancer Father     Hypertension Father     No Known Problems Sister     No Known Problems Brother     Kidney disease Maternal Aunt     Diabetes Maternal Uncle        Social History:  Social History     Tobacco Use    Smoking status: Current Every Day Smoker     Packs/day: 0.25    Smokeless tobacco: Never Used    Tobacco comment: no smoking after m.n prior to surgery   Substance and Sexual Activity    Alcohol use: No    Drug use: No    Sexual activity: Yes     Partners: Male     Birth control/protection: None       ROS   Review of Systems   Constitutional: Negative for chills, fatigue and fever.   HENT: Negative for sore throat.    Eyes: Negative for visual disturbance.   Respiratory: Negative for shortness of breath.    Cardiovascular: Negative for chest pain and palpitations.   Gastrointestinal: Negative for abdominal pain, nausea and vomiting.   Genitourinary: Positive for vaginal bleeding. Negative for dysuria.   Musculoskeletal: Negative for back pain.   Skin: Negative for rash.   Neurological: Positive for dizziness. Negative for syncope, weakness, numbness and headaches.   Hematological: Does not bruise/bleed easily.   All other systems reviewed and are negative.    Physical Exam      Initial Vitals [09/24/18 1816]   BP Pulse Resp Temp SpO2   (!) 155/92 82 20 97.5 °F (36.4 °C) 99 %      MAP       --          Physical Exam  Nursing Notes and Vital Signs Reviewed.  Constitutional: Patient is in no acute distress. Well-developed and well-nourished.  Head: Atraumatic. Normocephalic.  Eyes: PERRL. EOM intact. Conjunctivae  "are not pale. No scleral icterus.  ENT: Mucous membranes are moist. Oropharynx is clear and symmetric.    Neck: Supple. Full ROM. No lymphadenopathy.  Cardiovascular: Regular rate. Regular rhythm. No murmurs, rubs, or gallops. Distal pulses are 2+ and symmetric.  Pulmonary/Chest: No respiratory distress. Clear to auscultation bilaterally. No wheezing or rales.  Abdominal: Soft and non-distended.  There is no tenderness.  No rebound, guarding, or rigidity. Good bowel sounds.  Genitourinary: No CVA tenderness  Musculoskeletal: Moves all extremities. No obvious deformities. No edema. No calf tenderness. AV graft in LUE.   Skin: Warm and dry.  Neurological:  Alert, awake, and appropriate.  Normal speech.  No acute focal neurological deficits are appreciated.  Psychiatric: Anxious affect. Good eye contact. Appropriate in content.    ED Course    Procedures  ED Vital Signs:  Vitals:    09/24/18 1816 09/24/18 1928 09/24/18 1930 09/24/18 1932   BP: (!) 155/92 131/81 (!) 147/85 (!) 147/74   Pulse: 82 78 76 82   Resp: 20      Temp: 97.5 °F (36.4 °C)      TempSrc: Oral      SpO2: 99%      Weight: 92.8 kg (204 lb 9.4 oz)      Height: 5' 6" (1.676 m)          Abnormal Lab Results:  Labs Reviewed   CBC W/ AUTO DIFFERENTIAL - Abnormal; Notable for the following components:       Result Value    RBC 3.16 (*)     Hemoglobin 10.2 (*)     Hematocrit 30.5 (*)     MCH 32.3 (*)     RDW 15.0 (*)     Platelets 131 (*)     All other components within normal limits   COMPREHENSIVE METABOLIC PANEL - Abnormal; Notable for the following components:    CO2 22 (*)     BUN, Bld 75 (*)     Creatinine 13.1 (*)     Calcium 6.6 (*)     ALT 8 (*)     Anion Gap 20 (*)     eGFR if  4 (*)     eGFR if non  3 (*)     All other components within normal limits    Narrative:     CA critical result(s) called and verbal readback obtained from   Linda Sharp RN ED, 09/24/2018 20:23   TROPONIN I        All Lab Results:  Results " for orders placed or performed during the hospital encounter of 09/24/18   CBC auto differential   Result Value Ref Range    WBC 5.74 3.90 - 12.70 K/uL    RBC 3.16 (L) 4.00 - 5.40 M/uL    Hemoglobin 10.2 (L) 12.0 - 16.0 g/dL    Hematocrit 30.5 (L) 37.0 - 48.5 %    MCV 97 82 - 98 fL    MCH 32.3 (H) 27.0 - 31.0 pg    MCHC 33.4 32.0 - 36.0 g/dL    RDW 15.0 (H) 11.5 - 14.5 %    Platelets 131 (L) 150 - 350 K/uL    MPV 11.0 9.2 - 12.9 fL    Gran # (ANC) 3.7 1.8 - 7.7 K/uL    Lymph # 1.5 1.0 - 4.8 K/uL    Mono # 0.3 0.3 - 1.0 K/uL    Eos # 0.2 0.0 - 0.5 K/uL    Baso # 0.02 0.00 - 0.20 K/uL    Gran% 65.1 38.0 - 73.0 %    Lymph% 26.3 18.0 - 48.0 %    Mono% 4.5 4.0 - 15.0 %    Eosinophil% 3.8 0.0 - 8.0 %    Basophil% 0.3 0.0 - 1.9 %    Differential Method Automated    Comprehensive metabolic panel   Result Value Ref Range    Sodium 137 136 - 145 mmol/L    Potassium 5.1 3.5 - 5.1 mmol/L    Chloride 95 95 - 110 mmol/L    CO2 22 (L) 23 - 29 mmol/L    Glucose 90 70 - 110 mg/dL    BUN, Bld 75 (H) 6 - 20 mg/dL    Creatinine 13.1 (H) 0.5 - 1.4 mg/dL    Calcium 6.6 (LL) 8.7 - 10.5 mg/dL    Total Protein 6.4 6.0 - 8.4 g/dL    Albumin 3.5 3.5 - 5.2 g/dL    Total Bilirubin 0.5 0.1 - 1.0 mg/dL    Alkaline Phosphatase 58 55 - 135 U/L    AST 12 10 - 40 U/L    ALT 8 (L) 10 - 44 U/L    Anion Gap 20 (H) 8 - 16 mmol/L    eGFR if African American 4 (A) >60 mL/min/1.73 m^2    eGFR if non African American 3 (A) >60 mL/min/1.73 m^2   Troponin I   Result Value Ref Range    Troponin I 0.009 0.000 - 0.026 ng/mL     The EKG was ordered, reviewed, and independently interpreted by the ED provider.  Interpretation time: 1853  Rate: 78 BPM  Rhythm: normal sinus rhythm  Interpretation: Possible LAE. Prolonged QT. No STEMI.         The Emergency Provider reviewed the vital signs and test results, which are outlined above.    ED Discussion     7:55 PM: Discussed pt's case with Dr. Miller (Nephrology) who recommends discharging pt home and having her f/u OP  with ob/gyn.     8:41 PM: Reassessed pt at this time. Discussed with pt all pertinent ED information and results. Discussed pt dx and plan of tx. Pt needs to f/u with OB/GYN and Nephrology. Gave pt all f/u and return to the ED instructions. All questions and concerns were addressed at this time. Pt expresses understanding of information and instructions, and is comfortable with plan to discharge. Pt is stable for discharge.    I discussed with patient and/or family/caretaker that evaluation in the ED does not suggest any emergent or life threatening medical conditions requiring immediate intervention beyond what was provided in the ED, and I believe patient is safe for discharge.  Regardless, an unremarkable evaluation in the ED does not preclude the development or presence of a serious of life threatening condition. As such, patient was instructed to return immediately for any worsening or change in current symptoms.    ED Medication(s):  Medications - No data to display    Follow-up Information     Go to  Jourdan Conklin MD.    Specialty:  Obstetrics and Gynecology  Why:  Go to your appt scheduled this Thursdya with Dr. Conklin to discuss further treatment options of hevay vaginal bleeding not stopped despite birth control pills  Contact information:  [address]  [place] [address]  [phone]             Go to  Won Miller MD.    Specialty:  Nephrology  Why:  Go to dialysis as previously scheduled on Tuesday, thursday and saturday  Contact information:  [address] [address]  [phone]                     Medical Decision Making    Medical Decision Making:   Clinical Tests:   Lab Tests: Ordered and Reviewed  Medical Tests: Reviewed and Ordered           Scribe Attestation:   Scribe #1: I performed the above scribed service and the documentation accurately describes the services I performed. I attest to the accuracy of the note.    Attending:   Physician Attestation Statement for Scribe  #1: I, Bam Mcgrath Jr., MD, personally performed the services described in this documentation, as scribed by Lucy Garcia, in my presence, and it is both accurate and complete.          Clinical Impression       ICD-10-CM ICD-9-CM   1. Menorrhagia with regular cycle N92.0 626.2   2. Dizziness R42 780.4   3. End-stage renal disease on hemodialysis N18.6 585.6    Z99.2 V45.11       Disposition:   Disposition: Discharged  Condition: Stable         Bam Mcgrath Jr., MD  09/24/18 3677

## 2018-09-25 NOTE — ED NOTES
Unable to obtain IV at this time. Will wait for further orders prior to attempting IV placement again.

## 2018-09-27 ENCOUNTER — OFFICE VISIT (OUTPATIENT)
Dept: OBSTETRICS AND GYNECOLOGY | Facility: CLINIC | Age: 28
End: 2018-09-27
Payer: MEDICARE

## 2018-09-27 DIAGNOSIS — N93.9 ABNORMAL UTERINE BLEEDING (AUB): Primary | ICD-10-CM

## 2018-09-27 PROCEDURE — 99213 OFFICE O/P EST LOW 20 MIN: CPT | Mod: S$PBB,TXP,, | Performed by: OBSTETRICS & GYNECOLOGY

## 2018-09-27 PROCEDURE — 99212 OFFICE O/P EST SF 10 MIN: CPT | Mod: PBBFAC,NTX | Performed by: OBSTETRICS & GYNECOLOGY

## 2018-09-27 PROCEDURE — 99999 PR PBB SHADOW E&M-EST. PATIENT-LVL II: CPT | Mod: PBBFAC,TXP,, | Performed by: OBSTETRICS & GYNECOLOGY

## 2018-09-27 RX ORDER — MEDROXYPROGESTERONE ACETATE 10 MG/1
10 TABLET ORAL DAILY
Qty: 10 TABLET | Refills: 0 | Status: SHIPPED | OUTPATIENT
Start: 2018-09-27 | End: 2019-02-08

## 2018-09-27 NOTE — PROGRESS NOTES
Subjective:       Patient ID: Leydi Cordero is a 28 y.o. female.    Chief Complaint:  Menorrhagia      History of Present Illness  HPI  Pt is here to follow up on a recent ER visit.  Pt reports that she started bleeding again shortly after her last visit with me.  She has been bleeding on a daily basis since.  Bleeding is heavy and she is passing clots.  Is currently on third week of Micronor.  Pt was not able to make ultrasound appointment.    GYN & OB History  No LMP recorded.   Date of Last Pap: No result found    OB History    Para Term  AB Living   0 0 0 0 0 0   SAB TAB Ectopic Multiple Live Births   0 0 0 0 0             Review of Systems  Review of Systems   Constitutional: Positive for fatigue. Negative for activity change, appetite change, fever and unexpected weight change.   Respiratory: Negative for shortness of breath.    Cardiovascular: Negative for chest pain.   Gastrointestinal: Negative for abdominal pain, bloating, blood in stool, constipation, diarrhea, nausea and vomiting.   Genitourinary: Positive for menorrhagia, menstrual problem and vaginal bleeding. Negative for pelvic pain, vaginal discharge, vaginal pain and vaginal odor.   Musculoskeletal: Negative for back pain.   Neurological: Negative for syncope and headaches.           Objective:    Physical Exam:   Constitutional: She is oriented to person, place, and time. She appears well-developed and well-nourished. No distress.       Cardiovascular: Normal rate and regular rhythm.     Pulmonary/Chest: Effort normal.        Abdominal: Soft. Bowel sounds are normal. She exhibits no distension. There is no tenderness.     Genitourinary: Uterus normal. Pelvic exam was performed with patient supine. There is no rash, tenderness, lesion or injury on the right labia. There is no rash, tenderness, lesion or injury on the left labia. Uterus is not deviated, not enlarged and not tender. Cervix is normal. Right adnexum displays  no mass, no tenderness and no fullness. Left adnexum displays no mass, no tenderness and no fullness. There is bleeding (moderate amount of old clot in vault; minimal active bleeding) in the vagina. No erythema or tenderness in the vagina. No foreign body in the vagina. No signs of injury around the vagina. No vaginal discharge found. Cervix exhibits no motion tenderness, no discharge and no friability.           Musculoskeletal: Normal range of motion and moves all extremeties. She exhibits no edema or tenderness.       Neurological: She is alert and oriented to person, place, and time.    Skin: Skin is warm and dry.    Psychiatric: She has a normal mood and affect. Her behavior is normal. Thought content normal.          Assessment:        1. Abnormal uterine bleeding (AUB)             Plan:      Abnormal uterine bleeding (AUB)  -     medroxyPROGESTERone (PROVERA) 10 MG tablet; Take 1 tablet (10 mg total) by mouth once daily.  Dispense: 10 tablet; Refill: 0  -     Recommend Provera x 10 days and monitor progress.  Will also need ultrasound as ordered at last visit.  Restart Micronor with next menses.  If symptoms worsen or fail to improve, recommend [facility] (pt would need preop clearance).  Pt voiced understanding.      Follow-up in about 3 months (around 12/27/2018).

## 2018-09-28 ENCOUNTER — COMMITTEE REVIEW (OUTPATIENT)
Dept: TRANSPLANT | Facility: CLINIC | Age: 28
End: 2018-09-28

## 2018-09-28 NOTE — COMMITTEE REVIEW
Native Organ Dx: Hypertensive Nephrosclerosis      Not approved for LRD/CAD transplant due to transportation issues, non-compliance with dialysis treatments, and poor caregiver plan.  May be re-referred in the future after showing at least 6 months of compliance with all dialysis treatments and medical recommendations, have a caregiver plan, and adequate transportation. We recommend smoking cessation.     Called [employer] at [phone]. [place],  translated to patient. Pt voiced understanding. Answered all questions. Pt states she will work on doing what she needs to do to be reconsidered in the future.     Note written by China Allison RN    ===============================================    I was present at the meeting and attest to the decision of the committee.    José Antonio Venegas  09/28/2018

## 2018-09-28 NOTE — LETTER
September 28, 2018    Leydi Chi  [address]  [place] [address]    Dear Leydi Chi:  MRN: [id number]    It is the duty of the [facility] Selection Committee to determine which patients are candidates for a transplant. For this reason, our committee has the difficult task of evaluating patients to determine which ones have the greatest chance of having a successful transplant. We are aware of the magnitude of this responsibility, and we approach it with reverence and humility.    It is with regret I inform you that you are not approved as a transplant candidate due to lack of suitable caregivers, lack of adequate transportation, and non-compliance with dialysis treatments. We also recommend smoking cessation.  Your future transplant appointments for kidney transplant have been canceled.  Based on this review, we have determined that at this time, you are not a candidate for a transplant at [facility].  You may be re-referred for consideration for a kidney transplant in the future once you show at least 6 months of compliance with dialysis and all medical recommendations, have adequate transportation, and a confirmed caregiver.     The selection committee carefully considers each patient's transplant candidacy and determines whether it is safe to proceed with transplantation on a case-by-case basis using established selection criteria.  At present, the risk of proceeding with an elective transplant surgery has become too high.                                                                               Although the selection committee believes you are not a suitable transplant candidate, you have the option to be evaluated at other transplant centers who may have different selection criteria.  You may request your [facility] records be sent to any center of your choice by contacting our Medical Records Department at [phone].                                                                                Attached is a letter from the [employer] Network for Organ Sharing (UNOS).  It describes the services and information offered to patients by UNOS and the Organ Procurement and Transplant Network.    The [facility] sincerely wishes you the best and remains available to answer any questions.  Please do not hesitate to contact our pre-transplant office if we can assist you in any other way.                                                                               Sincerely,    Linda Marx MD  Medical Director, Kidney & Kidney/Pancreas Transplantation    Cc: Dr. Won Miller/[facility]    Encl: UNOS Letter    CSC/kp          OPTN/UNOS: Your Resource for Organ Transplant Information        If you have a question regarding your own medical care, you always should call your transplant center first. However, for general organ transplant-related information, you can call the [employer] Organ Sharing (UNOS) toll-free patient services line at [phone].    Anyone, including potential transplant candidates, recipients, family members/friends, living donors, and/or donor family members can call this number to:    · talk about organ donation, living donation, how transplant and donation work, the donation process, transplant policies, and transplant/donor information;  · get a free patient information kit with helpful booklets, waiting list and transplant information, and a list of all transplant centers;  · ask questions about the Organ Procurement and Transplantation Network (OPTN) web site (www.optn.transplant.hrsa.gov); the UNOS Web site (www.unos.org); or the UNOS web site for living donors and transplant recipients (www.transplantliving.org);  · learn how UNOS and the OPTN can help you;  · talk about any concerns that you may have with a transplant center and how they perform    UNOS is a not-for-profit organization that  provides all of the administrative services for the national OPTN under federal contract to the Health Resources and Services Administration (HRSA), an agency under the U.S. Department of Health and Human Services (HHS).     UNOS and OPTN responsibilities include:    · writing educational material for patients, the public and professionals;  · helping to make people aware of the need for donated organs and tissue;  · writing organ transplant policy with help from doctors, nurses, transplant patients/candidates, donor families, living donors, and the public;  · coordinating the organ matching and placement process;  · collecting information about every organ transplant and donation that occurs in the [place].    Remember, you should contact your transplant center directly if you have questions or concerns about your own medical care including medical records, work-up progress and test reports. [facility] is not your transplant center, and staff at [facility] will not be able to transfer you to your transplant center, so keep your transplant centers phone number handy. But, while you research your transplant needs and learn as much as you can about transplantation and donation, we welcome your call to our toll-free patient services line at [phone].

## 2018-10-03 ENCOUNTER — HOSPITAL ENCOUNTER (OUTPATIENT)
Dept: RADIOLOGY | Facility: HOSPITAL | Age: 28
Discharge: HOME | End: 2018-10-03
Attending: OBSTETRICS & GYNECOLOGY
Payer: MEDICARE

## 2018-10-03 ENCOUNTER — TELEPHONE (OUTPATIENT)
Dept: RADIOLOGY | Facility: HOSPITAL | Age: 28
End: 2018-10-03

## 2018-10-03 DIAGNOSIS — N93.9 ABNORMAL UTERINE BLEEDING (AUB): ICD-10-CM

## 2018-10-03 PROCEDURE — 76856 US EXAM PELVIC COMPLETE: CPT | Mod: TC

## 2018-10-03 PROCEDURE — 76830 TRANSVAGINAL US NON-OB: CPT | Mod: 26,,, | Performed by: RADIOLOGY

## 2018-10-03 PROCEDURE — 76830 TRANSVAGINAL US NON-OB: CPT | Mod: TC

## 2018-10-03 PROCEDURE — 76856 US EXAM PELVIC COMPLETE: CPT | Mod: 26,,, | Performed by: RADIOLOGY

## 2018-12-05 ENCOUNTER — DOCUMENTATION ONLY (OUTPATIENT)
Dept: NEPHROLOGY | Facility: CLINIC | Age: 28
End: 2018-12-05

## 2018-12-05 NOTE — H&P
History & Physical      Chief Complaint:  H&P    HPI:        28 y.o. Female  with ESRD on HD TTS at [facility]. Pt recieves HD via a left upper AVG.           ROS:        Constitutional: Negative for fever, chills, weight loss, malaise/fatigue and diaphoresis.   HENT: Negative for hearing loss, ear pain, nosebleeds, congestion, sore throat, neck pain, tinnitus and ear discharge.    Eyes: Negative for blurred vision, double vision, photophobia, pain, discharge and redness.   Respiratory: Negative for cough, hemoptysis, sputum production, shortness of breath, wheezing and stridor.    Cardiovascular: Negative for chest pain, palpitations, orthopnea, claudication, leg swelling and PND.   Gastrointestinal: Negative for heartburn, nausea, vomiting, abdominal pain, diarrhea, constipation, blood in stool and melena.   Genitourinary: Negative for dysuria, urgency, frequency, hematuria and flank pain.   Musculoskeletal: Negative for myalgias, back pain, joint pain and falls.   Skin: Negative for itching and rash.   Neurological: Negative for dizziness, tingling, tremors, sensory change, speech change, focal weakness, seizures, loss of consciousness, weakness and headaches.   Endo/Heme/Allergies: Negative for environmental allergies and polydipsia. Does not bruise/bleed easily.   Psychiatric/Behavioral: Negative for depression, suicidal ideas, hallucinations, memory loss and substance abuse. The patient is not nervous/anxious and does not have insomnia.    All 14 systems reviewed and negative except as noted above.      PMHx:      Past Medical History:   Diagnosis Date    A-fib     On Xarelto 20 qd since 3/2017    Anxiety     CHF (congestive heart failure) 03/24/2018    Dx[facility]    ESRD (end stage renal disease) on dialysis     HCV antibody positive 04/13/2018    Hypertension     Kidney disease     Pancytopenia     Recurrent pleural effusion on right     Started following catheter  placement c/b pneumothorax        PMSx:      Past Surgical History:   Procedure Laterality Date    ARTERIAL BYPASS SURGRY      bilateral breast cyst excisions      bilateral kidney cancer      s/p bilateral nephrectomy    CHOLECYSTECTOMY      COLONOSCOPY W/ POLYPECTOMY  02/04/2016    DR. RANDALL PARKS / FESTUS. TUBALR ADENOMA MID TRANSVERSE AND DISTAL SIGMOID COLON, EARLY HYPERPLASTIC SPLENIC FLEXURE . REPEAT 3 YRS    left shoulder repair      NEPHRECTOMY Bilateral 2005    PARATHYROIDECTOMY          Social Hx:      Social History     Social History    Marital status: Single     Spouse name: N/A    Number of children: N/A    Years of education: N/A     Occupational History    Not on file.     Social History Main Topics    Smoking status: Light Tobacco Smoker    Smokeless tobacco: Never Used    Alcohol use No    Drug use: Unknown    Sexual activity: Not on file     Other Topics Concern    Not on file     Social History Narrative    No narrative on file        Family Hx:      No family history on file.     VITALS:          Physical Exam   Nursing Notes and Vital Signs Reviewed.     Constitutional: Well developed, well nourished. AAOx3, NAD, speech/ comprehension clear   Head: Atraumatic. Normocephalic.   Eyes: PERRL. EOMI. Conjunctivae are not pale. No scleral icterus.   ENT: Mucous membranes are dry. No tongue tremors. Throat clear.  Neck: Supple. No JVD or LN or Carotid Bruits noted B.  Cardiovascular: S1S2 RRR, no murmurs, rubs, or gallops. Distal pulses are 2+ and symmetric.   Pulmonary/Chest: No evidence of respiratory distress. Clear to auscultation bilaterally. No wheezing, rales or rhonchi. No chest wall TTP.   Abdominal: Soft and non-distended. There is no tenderness. No rebound, guarding, or rigidity. No organomegaly. No mass or viscera palpable  Musculoskeletal: FROM in all extremities. No deformities, no TTP, no edema. No midline spinal TTP. No step-offs. Pelvis is stable to compression. No  cyanosis. Moves all four extremities.   Skin: Skin is warm and dry.   Neurological: No gross neurological deficits, Strength 5/5 B, is equal in the upper and lower extremities bilaterally. No sensory deficits to light touch. No pronator drift.  DTRs are 2+ and equal throughout.   Psychiatric: Good eye contact. Normal Affect.      Laboratory Data:  Reviewed and noted in plan where applicable- Please see chart for full laboratory data.         Lab Results   Component Value Date    INR 1.1 01/02/2013    INR 1.1 11/06/2012       Lab Results   Component Value Date    WBC 3.83 (L) 04/11/2018    HGB 10.8 (L) 04/11/2018    HCT 32.8 (L) 04/11/2018     (H) 04/11/2018    PLT 90 (L) 04/11/2018       BMP  @[id number](GLU,NA,K,Cl,CO2,BUN,Creatinine,Calcium,MG)@      Radiology:  Reviewed and noted in plan where applicable- Please see chart for full radiology data.    Medications:  Current Outpatient Prescriptions   Medication Sig    atenolol (TENORMIN) 100 MG tablet take 1 tablet by mouth once daily    AURYXIA 210 mg iron Tab TAKE (2) TABLETS THREE TIMES DAILY WITH MEALS.    calcitRIOL (ROCALTROL) 0.5 MCG Cap TAKE  (1)  CAPSULE  TWICE DAILY.    clobetasol 0.05% (TEMOVATE) 0.05 % Oint Apply topically 2 (two) times daily.    cloNIDine (CATAPRES) 0.1 MG tablet TAKE ONE TABLET BY MOUTH THREE TIMES DAILY    hydrALAZINE (APRESOLINE) 100 MG tablet TAKE ONE TABLET BY MOUTH TWICE DAILY    hydrOXYzine pamoate (VISTARIL) 50 MG Cap Take 1 capsule (50 mg total) by mouth nightly as needed (insomnia, anxiety, itchiness).    morphine (MSIR) 15 MG tablet Take 15 mg by mouth 2 (two) times daily.    NIFEDIAC CC 90 mg TbSR TAKE ONE TABLET BY MOUTH TWICE DAILY    ondansetron (ZOFRAN) 4 MG tablet Take 1 tablet (4 mg total) by mouth every 8 (eight) hours as needed for Nausea.    oxymorphone (OPANA) 10 MG tablet Take 10 mg by mouth 4 (four) times daily.    RENVELA 800 mg Tab TAKE 4 TABLETS THREE TIMES DAILY WITH MEALS AND 3 WITH  SNACKS    XARELTO 20 mg Tab      No current facility-administered medications for this visit.          ASSESSMENT/PLAN:     ACTIVE PROBLEMS:    Patient Active Problem List   Diagnosis    Hypertension    Anemia in ESRD (end-stage renal disease)    Chronic hepatitis C without hepatic coma    Thrombocytopenia    History of colon polyps           PLAN:      Assessment and plan:    1.  ESRD: Doing very well on  dialysis    2.  Anemia continue Epogen and iron per protocol    3.  Hypertension much better controlled    4.  Hyperparathyroidism, patient had parathyroidectomy.    5. Hyperphosphatemia- controlled with phos binders.     6. Hypocalcemia- controlled with Tums.      Viv Keenan, FNP-C

## 2019-02-01 ENCOUNTER — OFFICE VISIT (OUTPATIENT)
Dept: INTERNAL MEDICINE | Facility: CLINIC | Age: 29
End: 2019-02-01
Payer: MEDICARE

## 2019-02-01 VITALS
HEART RATE: 86 BPM | SYSTOLIC BLOOD PRESSURE: 158 MMHG | DIASTOLIC BLOOD PRESSURE: 110 MMHG | WEIGHT: 208.75 LBS | BODY MASS INDEX: 33.55 KG/M2 | OXYGEN SATURATION: 100 % | HEIGHT: 66 IN | TEMPERATURE: 97 F

## 2019-02-01 DIAGNOSIS — N18.6 END-STAGE RENAL DISEASE ON HEMODIALYSIS: ICD-10-CM

## 2019-02-01 DIAGNOSIS — Z99.2 END-STAGE RENAL DISEASE ON HEMODIALYSIS: ICD-10-CM

## 2019-02-01 DIAGNOSIS — I10 ESSENTIAL HYPERTENSION: Primary | ICD-10-CM

## 2019-02-01 DIAGNOSIS — R21 RASH: ICD-10-CM

## 2019-02-01 PROCEDURE — 99999 PR PBB SHADOW E&M-EST. PATIENT-LVL III: CPT | Mod: PBBFAC,,, | Performed by: FAMILY MEDICINE

## 2019-02-01 PROCEDURE — 99999 PR PBB SHADOW E&M-EST. PATIENT-LVL III: ICD-10-PCS | Mod: PBBFAC,,, | Performed by: FAMILY MEDICINE

## 2019-02-01 PROCEDURE — 99204 OFFICE O/P NEW MOD 45 MIN: CPT | Mod: S$PBB,,, | Performed by: FAMILY MEDICINE

## 2019-02-01 PROCEDURE — 99213 OFFICE O/P EST LOW 20 MIN: CPT | Mod: PBBFAC,PN | Performed by: FAMILY MEDICINE

## 2019-02-01 PROCEDURE — 99204 PR OFFICE/OUTPT VISIT, NEW, LEVL IV, 45-59 MIN: ICD-10-PCS | Mod: S$PBB,,, | Performed by: FAMILY MEDICINE

## 2019-02-01 RX ORDER — HYDROXYZINE HYDROCHLORIDE 25 MG/1
25 TABLET, FILM COATED ORAL 3 TIMES DAILY PRN
Qty: 30 TABLET | Refills: 0 | Status: SHIPPED | OUTPATIENT
Start: 2019-02-01 | End: 2019-11-20 | Stop reason: ALTCHOICE

## 2019-02-01 RX ORDER — LISINOPRIL 20 MG/1
20 TABLET ORAL DAILY
Qty: 90 TABLET | Refills: 3 | Status: SHIPPED | OUTPATIENT
Start: 2019-02-01 | End: 2019-11-20 | Stop reason: ALTCHOICE

## 2019-02-01 RX ORDER — METOPROLOL SUCCINATE 25 MG/1
25 TABLET, EXTENDED RELEASE ORAL 2 TIMES DAILY
COMMUNITY
End: 2019-02-08

## 2019-02-01 NOTE — PROGRESS NOTES
Subjective:       Patient ID: Leydi Chi is a 28 y.o. female.    Chief Complaint: Establish Care and Rash    Establish Care:      Pt is a 28 year old S/P Kidney transplant on currently dialysis presenting with rash and HTN. Pt reports rash has been there for about 2 weeks. Not gotten worse but itching. Just on her back. She does report change in soaps. Pt is on dialysis Tue, Thur and Sat. Pt is on the transplant list for a 2nd time. Review of labs      Review of Systems   Constitutional: Negative.    Respiratory: Negative.    Cardiovascular: Negative.    Genitourinary: Negative.    Hematological: Negative.    Psychiatric/Behavioral: Negative.        Objective:      Physical Exam   Constitutional: She appears well-developed and well-nourished.   Cardiovascular: Normal rate and regular rhythm. Exam reveals no friction rub.   No murmur heard.  Pulmonary/Chest: Effort normal and breath sounds normal. No stridor. She has no wheezes.   Abdominal: Soft. Bowel sounds are normal. There is no tenderness. There is no guarding.   Skin:            Assessment:       1. Essential hypertension    2. End-stage renal disease on hemodialysis    3. Rash        Plan:         Essential hypertension  Comments:  Will increase the the lisinopril 20 mg.     End-stage renal disease on hemodialysis  Comments:  Continue on dialysis    Rash  Comments:   hydroxyzine 25 mg tid      Other orders  -     lisinopril (PRINIVIL,ZESTRIL) 20 MG tablet; Take 1 tablet (20 mg total) by mouth once daily.  Dispense: 90 tablet; Refill: 3  -     hydrOXYzine HCl (ATARAX) 25 MG tablet; Take 1 tablet (25 mg total) by mouth 3 (three) times daily as needed for Itching.  Dispense: 30 tablet; Refill: 0

## 2019-02-08 ENCOUNTER — OFFICE VISIT (OUTPATIENT)
Dept: OBSTETRICS AND GYNECOLOGY | Facility: CLINIC | Age: 29
End: 2019-02-08
Payer: MEDICARE

## 2019-02-08 VITALS
SYSTOLIC BLOOD PRESSURE: 140 MMHG | DIASTOLIC BLOOD PRESSURE: 84 MMHG | BODY MASS INDEX: 33.38 KG/M2 | HEIGHT: 66 IN | WEIGHT: 207.69 LBS

## 2019-02-08 DIAGNOSIS — N93.9 ABNORMAL UTERINE BLEEDING (AUB): Primary | ICD-10-CM

## 2019-02-08 PROCEDURE — 99213 OFFICE O/P EST LOW 20 MIN: CPT | Mod: PBBFAC,PN | Performed by: OBSTETRICS & GYNECOLOGY

## 2019-02-08 PROCEDURE — 99999 PR PBB SHADOW E&M-EST. PATIENT-LVL III: CPT | Mod: PBBFAC,,, | Performed by: OBSTETRICS & GYNECOLOGY

## 2019-02-08 PROCEDURE — 99212 OFFICE O/P EST SF 10 MIN: CPT | Mod: S$PBB,,, | Performed by: OBSTETRICS & GYNECOLOGY

## 2019-02-08 PROCEDURE — 99212 PR OFFICE/OUTPT VISIT, EST, LEVL II, 10-19 MIN: ICD-10-PCS | Mod: S$PBB,,, | Performed by: OBSTETRICS & GYNECOLOGY

## 2019-02-08 PROCEDURE — 99999 PR PBB SHADOW E&M-EST. PATIENT-LVL III: ICD-10-PCS | Mod: PBBFAC,,, | Performed by: OBSTETRICS & GYNECOLOGY

## 2019-02-08 RX ORDER — MEDROXYPROGESTERONE ACETATE 10 MG/1
TABLET ORAL
Qty: 10 TABLET | Refills: 5 | Status: SHIPPED | OUTPATIENT
Start: 2019-02-08 | End: 2019-11-20 | Stop reason: ALTCHOICE

## 2019-02-08 NOTE — PROGRESS NOTES
Subjective:       Patient ID: Leydi Chi is a 28 y.o. female.    Chief Complaint:  Metrorrhagia      History of Present Illness  HPI  Pt is here for follow up AUB.  Pt reports that she has not been taking the Micronor she was prescribed at last visit.  Pt only took the 10 days of Provera and states that her bleeding has not improved.  Pt went to [place] to visit in 10/2018.  While there, she felt week and went to the hospital ([facility];[phone]).  She was diagnosed with severe anemia and was transfused with 1 unit of blood product.  Pt also had a D+C performed by Gyn there (Dr. Javi Miller).  Pt did not follow up afterwards and is unaware of pathology results.   Bleeding stopped for one month after surgery then restarted.  Has been bleeding since.  Pt is tired of these symptoms and would like to discuss options.  Pt would like to know if she is a candidate for hysterectomy.    GYN & OB History  No LMP recorded (lmp unknown).   Date of Last Pap: No result found    OB History    Para Term  AB Living   0 0 0 0 0 0   SAB TAB Ectopic Multiple Live Births   0 0 0 0 0             Review of Systems  Review of Systems   Constitutional: Positive for fatigue. Negative for activity change, appetite change, fever and unexpected weight change.   Respiratory: Negative for shortness of breath.    Cardiovascular: Negative for chest pain, palpitations and leg swelling.   Gastrointestinal: Negative for abdominal pain, bloating, blood in stool, constipation, diarrhea, nausea and vomiting.   Genitourinary: Positive for menorrhagia, menstrual problem and vaginal bleeding. Negative for dysmenorrhea, vaginal discharge, vaginal pain, vaginal dryness and vaginal odor.   Musculoskeletal: Negative for back pain.   Neurological: Negative for syncope and headaches.           Objective:    Physical Exam:   Constitutional: She is oriented to person, place, and time. She appears well-developed  and well-nourished. No distress.                           Neurological: She is alert and oriented to person, place, and time.     Psychiatric: She has a normal mood and affect. Her behavior is normal. Thought content normal.          Assessment:        1. Abnormal uterine bleeding (AUB)              Plan:      Abnormal uterine bleeding (AUB)  -     medroxyPROGESTERone (PROVERA) 10 MG tablet; Take one tablet by mouth daily for ten days each month.  Start taking on menstrual cycle day # 14.  Dispense: 10 tablet; Refill: 5  -     Pt has been non-compliant with medications/treatments.  Pt was advised that this non-compliance plays a large role in her lack of improvement and was counseled on risks of continued non-compliance.  Pt was again counseled on treatment options, including medical vs surgical.  Would recommend restarting trial of medical therapy.  Pt voiced understanding and desires to proceed with cyclic Provera.  Medication dosing, side-effects, risks, benefits, and alternatives were discussed.  Medical history was reviewed and pt is a candidate for Provera use.      Follow-up in about 3 months (around 5/8/2019).

## 2019-02-12 ENCOUNTER — HOSPITAL ENCOUNTER (EMERGENCY)
Facility: HOSPITAL | Age: 29
Discharge: HOME | End: 2019-02-12
Attending: EMERGENCY MEDICINE
Payer: MEDICARE

## 2019-02-12 VITALS
WEIGHT: 211.69 LBS | HEART RATE: 72 BPM | RESPIRATION RATE: 26 BRPM | HEIGHT: 66 IN | BODY MASS INDEX: 34.02 KG/M2 | SYSTOLIC BLOOD PRESSURE: 164 MMHG | TEMPERATURE: 99 F | DIASTOLIC BLOOD PRESSURE: 105 MMHG | OXYGEN SATURATION: 99 %

## 2019-02-12 DIAGNOSIS — I95.1 ORTHOSTATIC SYNCOPE: Primary | ICD-10-CM

## 2019-02-12 DIAGNOSIS — R42 DIZZINESS: ICD-10-CM

## 2019-02-12 LAB
ALBUMIN SERPL BCP-MCNC: 3.7 G/DL
ALP SERPL-CCNC: 80 U/L
ALT SERPL W/O P-5'-P-CCNC: 13 U/L
ANION GAP SERPL CALC-SCNC: 15 MMOL/L
APTT BLDCRRT: 24.6 SEC
AST SERPL-CCNC: 20 U/L
BASOPHILS # BLD AUTO: 0.02 K/UL
BASOPHILS NFR BLD: 0.3 %
BILIRUB SERPL-MCNC: 0.4 MG/DL
BUN SERPL-MCNC: 46 MG/DL
CALCIUM SERPL-MCNC: 8.1 MG/DL
CHLORIDE SERPL-SCNC: 91 MMOL/L
CO2 SERPL-SCNC: 24 MMOL/L
CREAT SERPL-MCNC: 8.9 MG/DL
DIFFERENTIAL METHOD: ABNORMAL
EOSINOPHIL # BLD AUTO: 0.1 K/UL
EOSINOPHIL NFR BLD: 0.9 %
ERYTHROCYTE [DISTWIDTH] IN BLOOD BY AUTOMATED COUNT: 15.4 %
EST. GFR  (AFRICAN AMERICAN): 6 ML/MIN/1.73 M^2
EST. GFR  (NON AFRICAN AMERICAN): 5 ML/MIN/1.73 M^2
GLUCOSE SERPL-MCNC: 116 MG/DL
HCT VFR BLD AUTO: 31.6 %
HGB BLD-MCNC: 10.6 G/DL
INR PPP: 0.9
LYMPHOCYTES # BLD AUTO: 1 K/UL
LYMPHOCYTES NFR BLD: 14.9 %
MCH RBC QN AUTO: 33.8 PG
MCHC RBC AUTO-ENTMCNC: 33.5 G/DL
MCV RBC AUTO: 101 FL
MONOCYTES # BLD AUTO: 0.2 K/UL
MONOCYTES NFR BLD: 3.6 %
NEUTROPHILS # BLD AUTO: 5.3 K/UL
NEUTROPHILS NFR BLD: 80.3 %
PLATELET # BLD AUTO: 136 K/UL
PMV BLD AUTO: 11.8 FL
POTASSIUM SERPL-SCNC: 3.9 MMOL/L
PROT SERPL-MCNC: 7 G/DL
PROTHROMBIN TIME: 10 SEC
RBC # BLD AUTO: 3.14 M/UL
SODIUM SERPL-SCNC: 130 MMOL/L
TROPONIN I SERPL DL<=0.01 NG/ML-MCNC: <0.006 NG/ML
WBC # BLD AUTO: 6.65 K/UL

## 2019-02-12 PROCEDURE — 86803 HEPATITIS C AB TEST: CPT

## 2019-02-12 PROCEDURE — 85610 PROTHROMBIN TIME: CPT

## 2019-02-12 PROCEDURE — 96360 HYDRATION IV INFUSION INIT: CPT

## 2019-02-12 PROCEDURE — 80053 COMPREHEN METABOLIC PANEL: CPT

## 2019-02-12 PROCEDURE — 86703 HIV-1/HIV-2 1 RESULT ANTBDY: CPT

## 2019-02-12 PROCEDURE — 85025 COMPLETE CBC W/AUTO DIFF WBC: CPT

## 2019-02-12 PROCEDURE — 85730 THROMBOPLASTIN TIME PARTIAL: CPT

## 2019-02-12 PROCEDURE — 84484 ASSAY OF TROPONIN QUANT: CPT

## 2019-02-12 PROCEDURE — 93010 ELECTROCARDIOGRAM REPORT: CPT | Mod: ,,, | Performed by: INTERNAL MEDICINE

## 2019-02-12 PROCEDURE — 25000003 PHARM REV CODE 250: Performed by: EMERGENCY MEDICINE

## 2019-02-12 PROCEDURE — 99285 EMERGENCY DEPT VISIT HI MDM: CPT | Mod: 25

## 2019-02-12 PROCEDURE — 93005 ELECTROCARDIOGRAM TRACING: CPT

## 2019-02-12 PROCEDURE — 93010 EKG 12-LEAD: ICD-10-PCS | Mod: ,,, | Performed by: INTERNAL MEDICINE

## 2019-02-12 RX ADMIN — SODIUM CHLORIDE 250 ML: 0.9 INJECTION, SOLUTION INTRAVENOUS at 01:02

## 2019-02-12 NOTE — DISCHARGE INSTRUCTIONS
Continue all home medications.  Follow up with her doctor tomorrow for re-evaluation.  Return as needed for any worsening symptoms, problems, questions or concerns.

## 2019-02-12 NOTE — ED PROVIDER NOTES
SCRIBE #1 NOTE: I, Lucy Garcia, am scribing for, and in the presence of, Montrell Chavez Jr., MD. I have scribed the entire note.      History      Chief Complaint   Patient presents with    Anxiety     with dizziness at dialysis today       Review of patient's allergies indicates:   Allergen Reactions    Heparin analogues Rash        HPI   HPI    2/12/2019, 10:54 AM   History obtained from the patient   HPI limited secondary to language barrier      History of Present Illness: Leydi Chi is a 29 y.o. female patient with a PMHx of HTN and ESRD, PSHx of failed kidney txp who presents to the Emergency Department for dizziness which onset suddenly just PTA. Pt states during dialysis treatment she became very dizzy but did not lose conciousness. Symptoms are episodic and moderate in severity. No mitigating or exacerbating factors reported. Associated sxs include n/v and generalized weakness. Pt is also c/o anxiety. Patient denies any fever, chills, rhinorrhea, abd pain, CP, leg swelling, diarrhea, falls, head trauma, and all other sxs at this time. No further complaints or concerns at this time.       Arrival mode: EMS    PCP: Primary Doctor No       Past Medical History:  Past Medical History:   Diagnosis Date    Encounter for blood transfusion     ESRD (end stage renal disease)     Fibroma     H/O kidney transplant     Hypertension     Hypertensive nephropathy     Ovarian cyst        Past Surgical History:  Past Surgical History:   Procedure Laterality Date    hemodialysis access left arm      kidney removal       KIDNEY TRANSPLANT  04/15/2015    NEPHRECTOMY      OVARIAN CYST REMOVAL      PARATHYROIDECTOMY      partial     PARATHYROIDECTOMY-PARTIAL N/A 2/6/2018    Performed by [facility] at [facility] OR    right leg hemodialysis access      transplant nephrectomy  12/01/2017    TRANSPLANT NEPHRECTOMY Right 12/1/2017    Performed by Juan Marroquin MD at [facility] OR [address] [place] [address]          Family History:  Family History   Problem Relation Age of Onset    No Known Problems Mother     Colon cancer Father     Cancer Father     Hypertension Father     No Known Problems Sister     No Known Problems Brother     Kidney disease Maternal Aunt     Diabetes Maternal Uncle        Social History:  Social History     Tobacco Use    Smoking status: Current Every Day Smoker     Packs/day: 0.25    Smokeless tobacco: Never Used    Tobacco comment: no smoking after m.n prior to surgery   Substance and Sexual Activity    Alcohol use: No    Drug use: No    Sexual activity: Yes     Partners: Male     Birth control/protection: None       ROS   Review of Systems   Constitutional: Negative for chills and fever.        (+) generalized weakness   HENT: Negative for rhinorrhea and sore throat.    Respiratory: Negative for cough and shortness of breath.    Cardiovascular: Negative for chest pain and leg swelling.   Gastrointestinal: Positive for nausea and vomiting. Negative for abdominal pain and diarrhea.   Genitourinary: Negative for dysuria.   Musculoskeletal: Negative for back pain.   Skin: Negative for rash.   Neurological: Positive for dizziness. Negative for syncope, weakness and headaches.   Hematological: Does not bruise/bleed easily.   All other systems reviewed and are negative.      Physical Exam      Initial Vitals [02/12/19 1048]   BP Pulse Resp Temp SpO2   (!) 154/109 78 18 98.5 °F (36.9 °C) 98 %      MAP       --          Physical Exam  Nursing Notes and Vital Signs Reviewed.  Constitutional: Patient is in no acute distress. Well-developed and well-nourished.  Head: Atraumatic. Normocephalic.  Eyes: PERRL. EOM intact. Conjunctivae are not pale. No scleral icterus.  ENT: Mucous membranes are moist. Oropharynx is clear and symmetric.    Neck: Supple. Full ROM. No lymphadenopathy.  Cardiovascular: Regular rate. Regular rhythm. No murmurs, rubs, or gallops. Distal pulses are 2+ and  "symmetric.  Pulmonary/Chest: No respiratory distress. Clear to auscultation bilaterally. No wheezing or rales.  Abdominal: Soft and non-distended.  There is no tenderness.  No rebound, guarding, or rigidity.  Musculoskeletal: Moves all extremities. No obvious deformities. No edema. No calf tenderness.  Skin: Warm and dry.  Neurological:  Alert, awake, and appropriate.  Normal speech.  No acute focal neurological deficits are appreciated.  Psychiatric: Normal affect. Good eye contact. Appropriate in content.    ED Course    Procedures  ED Vital Signs:  Vitals:    02/12/19 1048 02/12/19 1102 02/12/19 1103 02/12/19 1120   BP: (!) 154/109   (!) 180/109   Pulse: 78  73 72   Resp: 18   (!) 21   Temp: 98.5 °F (36.9 °C)      TempSrc: Oral      SpO2: 98%   100%   Weight:  96 kg (211 lb 11.2 oz)     Height: 5' 6" (1.676 m)       02/12/19 1205 02/12/19 1206 02/12/19 1207 02/12/19 1310   BP: (!) 165/100 (!) 156/93 (!) 141/88 (!) 168/99   Pulse: 67 80 85 75   Resp: 20 (!) 22 (!) 22 18   Temp:       TempSrc:       SpO2: 100% 100% 100% 100%   Weight:       Height:           Abnormal Lab Results:  Labs Reviewed   CBC W/ AUTO DIFFERENTIAL - Abnormal; Notable for the following components:       Result Value    RBC 3.14 (*)     Hemoglobin 10.6 (*)     Hematocrit 31.6 (*)      (*)     MCH 33.8 (*)     RDW 15.4 (*)     Platelets 136 (*)     Mono # 0.2 (*)     Gran% 80.3 (*)     Lymph% 14.9 (*)     Mono% 3.6 (*)     All other components within normal limits   COMPREHENSIVE METABOLIC PANEL - Abnormal; Notable for the following components:    Sodium 130 (*)     Chloride 91 (*)     Glucose 116 (*)     BUN, Bld 46 (*)     Creatinine 8.9 (*)     Calcium 8.1 (*)     eGFR if  6 (*)     eGFR if non  5 (*)     All other components within normal limits   TROPONIN I   APTT   PROTIME-INR   HIV 1 / 2 ANTIBODY   HEPATITIS C ANTIBODY   POCT GLUCOSE MONITORING CONTINUOUS        All Lab Results:  Results for orders " placed or performed during the hospital encounter of 02/12/19   CBC auto differential   Result Value Ref Range    WBC 6.65 3.90 - 12.70 K/uL    RBC 3.14 (L) 4.00 - 5.40 M/uL    Hemoglobin 10.6 (L) 12.0 - 16.0 g/dL    Hematocrit 31.6 (L) 37.0 - 48.5 %     (H) 82 - 98 fL    MCH 33.8 (H) 27.0 - 31.0 pg    MCHC 33.5 32.0 - 36.0 g/dL    RDW 15.4 (H) 11.5 - 14.5 %    Platelets 136 (L) 150 - 350 K/uL    MPV 11.8 9.2 - 12.9 fL    Gran # (ANC) 5.3 1.8 - 7.7 K/uL    Lymph # 1.0 1.0 - 4.8 K/uL    Mono # 0.2 (L) 0.3 - 1.0 K/uL    Eos # 0.1 0.0 - 0.5 K/uL    Baso # 0.02 0.00 - 0.20 K/uL    Gran% 80.3 (H) 38.0 - 73.0 %    Lymph% 14.9 (L) 18.0 - 48.0 %    Mono% 3.6 (L) 4.0 - 15.0 %    Eosinophil% 0.9 0.0 - 8.0 %    Basophil% 0.3 0.0 - 1.9 %    Differential Method Automated    Comprehensive metabolic panel   Result Value Ref Range    Sodium 130 (L) 136 - 145 mmol/L    Potassium 3.9 3.5 - 5.1 mmol/L    Chloride 91 (L) 95 - 110 mmol/L    CO2 24 23 - 29 mmol/L    Glucose 116 (H) 70 - 110 mg/dL    BUN, Bld 46 (H) 6 - 20 mg/dL    Creatinine 8.9 (H) 0.5 - 1.4 mg/dL    Calcium 8.1 (L) 8.7 - 10.5 mg/dL    Total Protein 7.0 6.0 - 8.4 g/dL    Albumin 3.7 3.5 - 5.2 g/dL    Total Bilirubin 0.4 0.1 - 1.0 mg/dL    Alkaline Phosphatase 80 55 - 135 U/L    AST 20 10 - 40 U/L    ALT 13 10 - 44 U/L    Anion Gap 15 8 - 16 mmol/L    eGFR if African American 6 (A) >60 mL/min/1.73 m^2    eGFR if non African American 5 (A) >60 mL/min/1.73 m^2   Troponin I   Result Value Ref Range    Troponin I <0.006 0.000 - 0.026 ng/mL   APTT   Result Value Ref Range    aPTT 24.6 21.0 - 32.0 sec   Protime-INR   Result Value Ref Range    Prothrombin Time 10.0 9.0 - 12.5 sec    INR 0.9 0.8 - 1.2       Imaging Results:  Imaging Results          CT Head Without Contrast (Final result)  Result time 02/12/19 12:44:03    Final result by HOWARD Lucas Sr., MD (02/12/19 12:44:03)                 Impression:      1. There are subacute to chronic appearing ischemic  changes in the left basal ganglia.  2. There is no intracranial hemorrhage.  3. If additional imaging evaluation is clinically indicated, I recommend consideration of an MRI examination of the brain without and with IV contrast.  All CT scans at this facility use dose modulation, iterative reconstruction, and/or weight base dosing when appropriate to reduce radiation dose when appropriate to reduce radiation dose to as low as reasonably achievable.      Electronically signed by: Josef Lucas MD  Date:    02/12/2019  Time:    12:44             Narrative:    EXAMINATION:  CT HEAD WITHOUT CONTRAST    CLINICAL HISTORY:  Dizziness;Syncope/fainting;    TECHNIQUE:  Standard brain CT protocol without IV contrast was performed.    COMPARISON:  08/09/2018    FINDINGS:  There are subacute to chronic appearing ischemic changes in the left basal ganglia.  There is no intracranial hemorrhage.  There is no skull fracture. The paranasal sinuses are normal in appearance.                               X-Ray Chest AP Portable (Final result)  Result time 02/12/19 12:09:05    Final result by HOWARD Lucas Sr., MD (02/12/19 12:09:05)                 Impression:      1. The lungs are clear.  2. Surgical changes  .      Electronically signed by: Josef Lucas MD  Date:    02/12/2019  Time:    12:09             Narrative:    EXAMINATION:  XR CHEST AP PORTABLE    CLINICAL HISTORY:  dizziness;    COMPARISON:  08/10/2018    FINDINGS:  The size of the heart is normal. The lungs are clear. There is no pneumothorax.  The costophrenic angles are sharp.  There are surgical clips projected over the superior aspect of the thoracic spine.  There are surgical clips projected over the medial aspect of the left upper extremity.                               The EKG was ordered, reviewed, and independently interpreted by the ED provider.  Interpretation time: 11:07  Rate: 75 BPM  Rhythm: normal sinus rhythm  Interpretation: Minimal voltage  criteria for LVH. Prolonged QT. No STEMI.         The Emergency Provider reviewed the vital signs and test results, which are outlined above.    ED Discussion     1:09 PM: Discussed pt's case with Dr. Kearney (Radiology) who states there are no acute infarcts. 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    1:10 PM: Reassessed pt at this time.  Pt states her condition has improved at this time. Discussed with pt all pertinent ED information and results. Discussed pt dx and plan of tx. Gave pt all f/u and return to the ED instructions. All questions and concerns were addressed at this time. Pt expresses understanding of information and instructions, and is comfortable with plan to discharge. Pt is stable for discharge.    I discussed with patient and/or family/caretaker that evaluation in the ED does not suggest any emergent or life threatening medical conditions requiring immediate intervention beyond what was provided in the ED, and I believe patient is safe for discharge.  Regardless, an unremarkable evaluation in the ED does not preclude the development or presence of a serious of life threatening condition. As such, patient was instructed to return immediately for any worsening or change in current symptoms.    2:10 PM  Patient was stable nontoxic at discharge. She was orthostatic on exam felt much better after IV fluids.  Onset of symptoms during dialysis consistent with orthostatic syncope as cause of her issues.  The CT scan was initially read as subacute ischemic changes, had this reviewed by Radiology  who noted that the findings in question were actually an old infarct.  Nothing acute going on inside of the patient's brain and she has a reason for her syncope, she was safe for discharge in my opinion with close follow-up.  The discussed all with the patient verbalized understanding agreement.  She seems reliable.    ED Medication(s):  Medications   sodium chloride 0.9% bolus 250 mL (not administered)      Current Discharge Medication List   None         Follow-up Information     Ines Alaniz MD. Call in 1 day.    Specialty:  Family Medicine  Contact information:  [address] [facility] [id number] [phone]                     Medical Decision Making    Medical Decision Making:   Clinical Tests:   Lab Tests: Ordered and Reviewed  Radiological Study: Reviewed and Ordered  Medical Tests: Ordered and Reviewed           Scribe Attestation:   Scribe #1: I performed the above scribed service and the documentation accurately describes the services I performed. I attest to the accuracy of the note.    Attending:   Physician Attestation Statement for Scribe #1: I, Montrell Chavez Jr., MD, personally performed the services described in this documentation, as scribed by Lucy Garcia, in my presence, and it is both accurate and complete.          Clinical Impression       ICD-10-CM ICD-9-CM   1. Orthostatic syncope I95.1 458.0   2. Dizziness R42 780.4       Disposition:   Disposition: Discharged  Condition: Stable         Montrell Chavez Jr., MD  02/12/19 1411

## 2019-02-13 LAB
HCV AB SERPL QL IA: NEGATIVE
HIV 1+2 AB+HIV1 P24 AG SERPL QL IA: NEGATIVE

## 2019-02-21 ENCOUNTER — PATIENT OUTREACH (OUTPATIENT)
Dept: ADMINISTRATIVE | Facility: HOSPITAL | Age: 29
End: 2019-02-21

## 2019-04-30 NOTE — ANESTHESIA PREPROCEDURE EVALUATION
02/05/2018  Leydi Cordero is a 27 y.o., female.    Anesthesia Evaluation    I have reviewed the Patient Summary Reports.    I have reviewed the Nursing Notes.      Review of Systems  Anesthesia Hx:  No problems with previous Anesthesia  History of prior surgery of interest to airway management or planning: Denies Family Hx of Anesthesia complications.   Denies Personal Hx of Anesthesia complications.   Cardiovascular:   Hypertension ECG has been reviewed.    Renal/:   Chronic Renal Disease, ESRD, Dialysis Failed kidney transplant   Musculoskeletal:  Musculoskeletal Normal    Neurological:  Neurology Normal    Endocrine:   tertiary hyperparathyrodidsm  Chronic steroids       Physical Exam  General:  Well nourished    Airway/Jaw/Neck:  Airway Findings: Mouth Opening: Normal General Airway Assessment: Adult  Mallampati: II       Chest/Lungs:  Chest/Lungs Findings: Clear to auscultation     Heart/Vascular:  Heart Findings: Rate: Normal  Rhythm: Regular Rhythm        Mental Status:  Mental Status Findings:  Cooperative         Anesthesia Plan  Type of Anesthesia, risks & benefits discussed:  Anesthesia Type:  general  Patient's Preference:   Intra-op Monitoring Plan:   Intra-op Monitoring Plan Comments:   Post Op Pain Control Plan: multimodal analgesia  Post Op Pain Control Plan Comments:   Induction:   IV  Beta Blocker:  Patient is not currently on a Beta-Blocker (No further documentation required).       Informed Consent: Patient understands risks and agrees with Anesthesia plan.  Questions answered. Anesthesia consent signed with patient.  ASA Score: 3     Day of Surgery Review of History & Physical: I have interviewed and examined the patient. I have reviewed the patient's H&P dated: 1/11/18.       Anesthesia Plan Notes: Language line utilzed        Ready For Surgery From Anesthesia Perspective.  Hpi Title: Evaluation of Skin Lesions

## 2019-05-21 DIAGNOSIS — Z76.0 MEDICATION REFILL: ICD-10-CM

## 2019-05-21 DIAGNOSIS — N18.6 ESRD (END STAGE RENAL DISEASE): Primary | ICD-10-CM

## 2019-05-21 RX ORDER — SEVELAMER HYDROCHLORIDE 800 MG/1
2400 TABLET, FILM COATED ORAL
Qty: 330 TABLET | Refills: 3 | Status: SHIPPED | OUTPATIENT
Start: 2019-05-21 | End: 2020-04-06 | Stop reason: SDUPTHER

## 2019-07-03 ENCOUNTER — HOSPITAL ENCOUNTER (EMERGENCY)
Facility: HOSPITAL | Age: 29
Discharge: HOME | End: 2019-07-03
Attending: EMERGENCY MEDICINE
Payer: MEDICARE

## 2019-07-03 VITALS
WEIGHT: 206.25 LBS | RESPIRATION RATE: 16 BRPM | SYSTOLIC BLOOD PRESSURE: 175 MMHG | HEART RATE: 77 BPM | HEIGHT: 67 IN | OXYGEN SATURATION: 100 % | BODY MASS INDEX: 32.37 KG/M2 | TEMPERATURE: 98 F | DIASTOLIC BLOOD PRESSURE: 88 MMHG

## 2019-07-03 DIAGNOSIS — M79.602 PAIN OF LEFT UPPER EXTREMITY: Primary | ICD-10-CM

## 2019-07-03 DIAGNOSIS — L98.8 FISTULA: ICD-10-CM

## 2019-07-03 DIAGNOSIS — M79.603 ARM PAIN: ICD-10-CM

## 2019-07-03 LAB
ALBUMIN SERPL BCP-MCNC: 3.9 G/DL (ref 3.5–5.2)
ALP SERPL-CCNC: 105 U/L (ref 55–135)
ALT SERPL W/O P-5'-P-CCNC: 13 U/L (ref 10–44)
ANION GAP SERPL CALC-SCNC: 19 MMOL/L (ref 8–16)
AST SERPL-CCNC: 17 U/L (ref 10–40)
BASOPHILS # BLD AUTO: 0.01 K/UL (ref 0–0.2)
BASOPHILS NFR BLD: 0.2 % (ref 0–1.9)
BILIRUB SERPL-MCNC: 0.5 MG/DL (ref 0.1–1)
BUN SERPL-MCNC: 74 MG/DL (ref 6–20)
CALCIUM SERPL-MCNC: 7.7 MG/DL (ref 8.7–10.5)
CHLORIDE SERPL-SCNC: 98 MMOL/L (ref 95–110)
CO2 SERPL-SCNC: 20 MMOL/L (ref 23–29)
CREAT SERPL-MCNC: 15.3 MG/DL (ref 0.5–1.4)
DIFFERENTIAL METHOD: ABNORMAL
EOSINOPHIL # BLD AUTO: 0.2 K/UL (ref 0–0.5)
EOSINOPHIL NFR BLD: 2.7 % (ref 0–8)
ERYTHROCYTE [DISTWIDTH] IN BLOOD BY AUTOMATED COUNT: 15.8 % (ref 11.5–14.5)
EST. GFR  (AFRICAN AMERICAN): 3 ML/MIN/1.73 M^2
EST. GFR  (NON AFRICAN AMERICAN): 3 ML/MIN/1.73 M^2
GLUCOSE SERPL-MCNC: 71 MG/DL (ref 70–110)
HCT VFR BLD AUTO: 32.2 % (ref 37–48.5)
HGB BLD-MCNC: 10.4 G/DL (ref 12–16)
LYMPHOCYTES # BLD AUTO: 1.2 K/UL (ref 1–4.8)
LYMPHOCYTES NFR BLD: 17.6 % (ref 18–48)
MCH RBC QN AUTO: 30.5 PG (ref 27–31)
MCHC RBC AUTO-ENTMCNC: 32.3 G/DL (ref 32–36)
MCV RBC AUTO: 94 FL (ref 82–98)
MONOCYTES # BLD AUTO: 0.3 K/UL (ref 0.3–1)
MONOCYTES NFR BLD: 3.8 % (ref 4–15)
NEUTROPHILS # BLD AUTO: 5 K/UL (ref 1.8–7.7)
NEUTROPHILS NFR BLD: 76 % (ref 38–73)
PLATELET # BLD AUTO: 171 K/UL (ref 150–350)
PMV BLD AUTO: 10.6 FL (ref 9.2–12.9)
POTASSIUM SERPL-SCNC: 5.8 MMOL/L (ref 3.5–5.1)
PROT SERPL-MCNC: 7.5 G/DL (ref 6–8.4)
RBC # BLD AUTO: 3.41 M/UL (ref 4–5.4)
SODIUM SERPL-SCNC: 137 MMOL/L (ref 136–145)
WBC # BLD AUTO: 6.64 K/UL (ref 3.9–12.7)

## 2019-07-03 PROCEDURE — 85025 COMPLETE CBC W/AUTO DIFF WBC: CPT

## 2019-07-03 PROCEDURE — 99284 EMERGENCY DEPT VISIT MOD MDM: CPT | Mod: 25

## 2019-07-03 PROCEDURE — 36000 PLACE NEEDLE IN VEIN: CPT

## 2019-07-03 PROCEDURE — 80053 COMPREHEN METABOLIC PANEL: CPT

## 2019-07-03 RX ORDER — HYDROCODONE BITARTRATE AND ACETAMINOPHEN 5; 325 MG/1; MG/1
1 TABLET ORAL EVERY 4 HOURS PRN
Qty: 11 TABLET | Refills: 0 | Status: SHIPPED | OUTPATIENT
Start: 2019-07-03 | End: 2019-07-13

## 2019-07-03 NOTE — ED NOTES
Patient identifiers verified and correct for Leydi Cordero.    LOC: The patient is awake, alert and aware of environment with an appropriate affect, the patient is oriented x 3 and speaking appropriately.  APPEARANCE: Patient resting comfortably and in no acute distress, patient is clean and well groomed, patient's clothing is properly fastened.  SKIN: The skin is warm and dry, color consistent with ethnicity, patient has normal skin turgor and moist mucus membranes, skin intact, no breakdown or bruising noted.  MUSCULOSKELETAL: Patient moving all extremities spontaneously, no obvious swelling or deformities noted. Pt reports pain to LUE  RESPIRATORY: Airway is open and patent, respirations are spontaneous, patient has a normal effort and rate, no accessory muscle use noted, bilateral breath sounds clear.  CARDIAC: Patient has a normal rate and regular rhythm, no periphreal edema noted, capillary refill < 3 seconds. AV fistula noted to LUE.  ABDOMEN: Soft and non tender to palpation, no distention noted, normoactive bowel sounds present in all four quadrants.  NEUROLOGIC: PERRL, **mm bilaterally, eyes open spontaneously, behavior appropriate to situation, follows commands, facial expression symmetrical, bilateral hand grasp equal and even, purposeful motor response noted, normal sensation in all extremities when touched with a finger.

## 2019-07-03 NOTE — ED PROVIDER NOTES
SCRIBE #1 NOTE: I, Corinne Mack, am scribing for, and in the presence of, Roberto Reddy MD. I have scribed the entire note.      History      Chief Complaint   Patient presents with    Shunt Problem     Dialysis shunt to LUE x 2 days. Pain to arm. Last dialysis saturday.        Review of patient's allergies indicates:   Allergen Reactions    Heparin analogues Rash        HPI   HPI    7/3/2019, 1:49 PM   History obtained from the patient      History of Present Illness: Leydi Cordero is a 29 y.o. female patient with PMHx of ESRD and HTN who presents to the Emergency Department for shunt pain which onset gradually 2 days ago. Pt dialyzes on Tuesdays, Thursdays, and Saturdays and last had dialysis on Saturday. Symptoms are constant and moderate in severity. Palpation worsens the pt's pain. No mitigating factors reported. No associated sxs reported. Patient denies any fever, chills, CP, SOB, N/V/D, abd pain, back pain, neck pain, HA, dizziness, and all other sxs at this time. No prior Tx reported. No further complaints or concerns at this time.         Arrival mode: Personal vehicle    PCP: Primary Doctor No       Past Medical History:  Past Medical History:   Diagnosis Date    Encounter for blood transfusion     ESRD (end stage renal disease)     Fibroma     H/O kidney transplant     Hypertension     Hypertensive nephropathy     Ovarian cyst        Past Surgical History:  Past Surgical History:   Procedure Laterality Date    hemodialysis access left arm      kidney removal       KIDNEY TRANSPLANT  04/15/2015    NEPHRECTOMY      OVARIAN CYST REMOVAL      PARATHYROIDECTOMY      partial     PARATHYROIDECTOMY-PARTIAL N/A 2/6/2018    Performed by Brandon Jimenez MD at [facility]    right leg hemodialysis access      transplant nephrectomy  12/01/2017    TRANSPLANT NEPHRECTOMY Right 12/1/2017    Performed by Juan Marroquin MD at [facility] OR [address] [place] [address]         Family History:  Family History    Problem Relation Age of Onset    No Known Problems Mother     Colon cancer Father     Cancer Father     Hypertension Father     No Known Problems Sister     No Known Problems Brother     Kidney disease Maternal Aunt     Diabetes Maternal Uncle        Social History:  Social History     Tobacco Use    Smoking status: Current Every Day Smoker     Packs/day: 0.25    Smokeless tobacco: Never Used    Tobacco comment: no smoking after m.n prior to surgery   Substance and Sexual Activity    Alcohol use: No    Drug use: No    Sexual activity: Yes     Partners: Male     Birth control/protection: None       ROS   Review of Systems   Constitutional: Negative for chills and fever.   Respiratory: Negative for cough and shortness of breath.    Cardiovascular: Negative for chest pain and leg swelling.   Gastrointestinal: Negative for abdominal pain, diarrhea, nausea and vomiting.   Musculoskeletal: Negative for back pain, neck pain and neck stiffness.        (+) LUE shunt pain   Skin: Negative for rash and wound.   Neurological: Negative for dizziness, light-headedness, numbness and headaches.   All other systems reviewed and are negative.    Physical Exam      Initial Vitals [07/03/19 1340]   BP Pulse Resp Temp SpO2   (!) 179/103 85 16 97.8 °F (36.6 °C) 99 %      MAP       --          Physical Exam  Nursing Notes and Vital Signs Reviewed.  Constitutional: Patient is in no acute distress. Well-developed and well-nourished.  Head: Atraumatic. Normocephalic.  Eyes: PERRL. EOM intact. Conjunctivae are not pale. No scleral icterus.  ENT: Mucous membranes are moist. Oropharynx is clear and symmetric.    Neck: Supple. Full ROM. No lymphadenopathy.  Cardiovascular: Regular rate. Regular rhythm. No murmurs, rubs, or gallops. Distal pulses are 2+ and symmetric.  Pulmonary/Chest: No respiratory distress. Clear to auscultation bilaterally. No wheezing or rales.  Abdominal: Soft and non-distended.  There is no tenderness.  No  "rebound, guarding, or rigidity.   Musculoskeletal: Moves all extremities. No obvious deformities. No edema. Good thrill to the lateral aspect of the LUE shunt. There is slight thrill to the medial aspect of the LUE shunt with some TTP.  Skin: Warm and dry.  Neurological:  Alert, awake, and appropriate.  Normal speech.  No acute focal neurological deficits are appreciated.  Psychiatric: Normal affect. Good eye contact. Appropriate in content.    ED Course    Procedures  ED Vital Signs:  Vitals:    07/03/19 1339 07/03/19 1340 07/03/19 1411 07/03/19 1531   BP:  (!) 179/103  (!) 161/104   Pulse:  85 82 86   Resp:  16     Temp:  97.8 °F (36.6 °C)     TempSrc:  Oral     SpO2:  99% 100% 99%   Weight: 93.6 kg (206 lb 3.9 oz)      Height: 5' 7" (1.702 m)       07/03/19 1555   BP: (!) 175/88   Pulse: 77   Resp:    Temp:    TempSrc:    SpO2: 100%   Weight:    Height:        Abnormal Lab Results:  Labs Reviewed   CBC W/ AUTO DIFFERENTIAL - Abnormal; Notable for the following components:       Result Value    RBC 3.41 (*)     Hemoglobin 10.4 (*)     Hematocrit 32.2 (*)     RDW 15.8 (*)     Gran% 76.0 (*)     Lymph% 17.6 (*)     Mono% 3.8 (*)     All other components within normal limits   COMPREHENSIVE METABOLIC PANEL - Abnormal; Notable for the following components:    Potassium 5.8 (*)     CO2 20 (*)     BUN, Bld 74 (*)     Creatinine 15.3 (*)     Calcium 7.7 (*)     Anion Gap 19 (*)     eGFR if  3 (*)     eGFR if non  3 (*)     All other components within normal limits        All Lab Results:  Results for orders placed or performed during the hospital encounter of 07/03/19   CBC auto differential   Result Value Ref Range    WBC 6.64 3.90 - 12.70 K/uL    RBC 3.41 (L) 4.00 - 5.40 M/uL    Hemoglobin 10.4 (L) 12.0 - 16.0 g/dL    Hematocrit 32.2 (L) 37.0 - 48.5 %    Mean Corpuscular Volume 94 82 - 98 fL    Mean Corpuscular Hemoglobin 30.5 27.0 - 31.0 pg    Mean Corpuscular Hemoglobin Conc 32.3 32.0 " - 36.0 g/dL    RDW 15.8 (H) 11.5 - 14.5 %    Platelets 171 150 - 350 K/uL    MPV 10.6 9.2 - 12.9 fL    Gran # (ANC) 5.0 1.8 - 7.7 K/uL    Lymph # 1.2 1.0 - 4.8 K/uL    Mono # 0.3 0.3 - 1.0 K/uL    Eos # 0.2 0.0 - 0.5 K/uL    Baso # 0.01 0.00 - 0.20 K/uL    Gran% 76.0 (H) 38.0 - 73.0 %    Lymph% 17.6 (L) 18.0 - 48.0 %    Mono% 3.8 (L) 4.0 - 15.0 %    Eosinophil% 2.7 0.0 - 8.0 %    Basophil% 0.2 0.0 - 1.9 %    Differential Method Automated    Comprehensive metabolic panel   Result Value Ref Range    Sodium 137 136 - 145 mmol/L    Potassium 5.8 (H) 3.5 - 5.1 mmol/L    Chloride 98 95 - 110 mmol/L    CO2 20 (L) 23 - 29 mmol/L    Glucose 71 70 - 110 mg/dL    BUN, Bld 74 (H) 6 - 20 mg/dL    Creatinine 15.3 (H) 0.5 - 1.4 mg/dL    Calcium 7.7 (L) 8.7 - 10.5 mg/dL    Total Protein 7.5 6.0 - 8.4 g/dL    Albumin 3.9 3.5 - 5.2 g/dL    Total Bilirubin 0.5 0.1 - 1.0 mg/dL    Alkaline Phosphatase 105 55 - 135 U/L    AST 17 10 - 40 U/L    ALT 13 10 - 44 U/L    Anion Gap 19 (H) 8 - 16 mmol/L    eGFR if African American 3 (A) >60 mL/min/1.73 m^2    eGFR if non African American 3 (A) >60 mL/min/1.73 m^2       Imaging Results:  Imaging Results          US Upper Extremity Arteries Left (In process)              Per ultrasound tech.  No clot, No abscess, no abnormality of the fistula.           The Emergency Provider reviewed the vital signs and test results, which are outlined above.    ED Discussion     3:51 PM: Reassessed pt at this time. Pt is awake, alert, and in no distress. Discussed with pt all pertinent ED information and results. Discussed pt dx and plan of tx. Pt should go to dialysis after d/c. Gave pt all f/u and return to the ED instructions. All questions and concerns were addressed at this time. Pt expresses understanding of information and instructions, and is comfortable with plan to discharge. Pt is stable for discharge.    I discussed with patient and/or family/caretaker that evaluation in the ED does not suggest any  emergent or life threatening medical conditions requiring immediate intervention beyond what was provided in the ED, and I believe patient is safe for discharge.  Regardless, an unremarkable evaluation in the ED does not preclude the development or presence of a serious of life threatening condition. As such, patient was instructed to return immediately for any worsening or change in current symptoms.      ED Medication(s):  Medications - No data to display       Medication List      START taking these medications    HYDROcodone-acetaminophen 5-325 mg per tablet  Commonly known as:  NORCO  Take 1 tablet by mouth every 4 (four) hours as needed for Pain.        ASK your doctor about these medications    ALPRAZolam 0.25 MG tablet  Commonly known as:  XANAX  Take 1 tablet by mouth 30 minutes prior to dialysis on mon, wed, and friday     aspirin 81 MG EC tablet  Commonly known as:  ECOTRIN  TAKE 1 BY MOUTH DAILY     CALCIUM 500 + D 500 mg(1,250mg) -200 unit per tablet  Generic drug:  calcium-vitamin D3     hydrOXYzine HCl 25 MG tablet  Commonly known as:  ATARAX  Take 1 tablet (25 mg total) by mouth 3 (three) times daily as needed for Itching.     lisinopril 20 MG tablet  Commonly known as:  PRINIVIL,ZESTRIL  Take 1 tablet (20 mg total) by mouth once daily.     medroxyPROGESTERone 10 MG tablet  Commonly known as:  PROVERA  Take one tablet by mouth daily for ten days each month.  Start taking on menstrual cycle day # 14.     sevelamer HCl 800 MG Tab  Commonly known as:  RENAGEL  Take 3 tablets (2,400 mg total) by mouth 3 (three) times daily with meals. Take 3 tablets TID with meals and 2 with snacks.           Where to Get Your Medications      You can get these medications from any pharmacy    Bring a paper prescription for each of these medications  · HYDROcodone-acetaminophen 5-325 mg per tablet         Follow-up Information     [facility] In 2 days.    Contact information:  [address] [facility]  [place] [address]  [phone]                     Medical Decision Making    Medical Decision Making:   Clinical Tests:   Lab Tests: Ordered and Reviewed  Radiological Study: Ordered and Reviewed     Additional MDM:   Smoking Cessation: The patient is a smoker. The patient was counseled on smoking cessation for: 3 minutes. The patient was counseled on tobacco related  health complications.        Scribe Attestation:   Scribe #1: I performed the above scribed service and the documentation accurately describes the services I performed. I attest to the accuracy of the note 07/03/2019.    Attending:   Physician Attestation Statement for Scribe #1: I, Roberto Reddy MD, personally performed the services described in this documentation, as scribed by Corinne Mack, in my presence, and it is both accurate and complete.          Clinical Impression       ICD-10-CM ICD-9-CM   1. Pain of left upper extremity M79.602 729.5   2. Arm pain M79.603 729.5   3. Fistula L98.8 686.9       Disposition:   Disposition: Discharged  Condition: Stable           Roberto Reddy MD  07/03/19 1600

## 2019-09-11 ENCOUNTER — TELEPHONE (OUTPATIENT)
Dept: TRANSPLANT | Facility: HOSPITAL | Age: 29
End: 2019-09-11

## 2019-09-11 NOTE — TELEPHONE ENCOUNTER
Compliance check:  SW attempted to contact dialysis SW for updates about caregivers compliance and transportation.     SW will follow up accordingly.

## 2019-09-12 ENCOUNTER — TELEPHONE (OUTPATIENT)
Dept: TRANSPLANT | Facility: HOSPITAL | Age: 29
End: 2019-09-12

## 2019-09-12 NOTE — TELEPHONE ENCOUNTER
SHANE spoke with pt's dialysis unit SHANE Albertina about concerns regarding previous transplant and now being referred for another transplant. Albertina reports that in the last 3 months, pt has had 0 AMAs, 0 no-shows and is overall compliant. No concerns for transportation as patient has a partner who brings her frequently. She reports she spoke with pt about making sure she had transportation and caregivers before making the referral. When SW asked how she communicates with her(pt is Yakut speaking only) she reports she call's pt's family member and lets them  for her.    SHANE explained that may be a reliable solution and in fact they shouldn't be using family members to interpret medical information to her. SHANE encouraged them to please start using the language line. Albertina reports that she feels the patients family are reliable and the patient is stable enough to be transplant.     SHANE remains available. SHANE feels patient can come in for round bisi based on compliance with dialysis. SHANE will discuss other caregiver concerns in person. SW remains available.

## 2019-09-13 DIAGNOSIS — Z76.82 ORGAN TRANSPLANT CANDIDATE: Primary | ICD-10-CM

## 2019-09-19 ENCOUNTER — TELEPHONE (OUTPATIENT)
Dept: TRANSPLANT | Facility: CLINIC | Age: 29
End: 2019-09-19

## 2019-10-07 ENCOUNTER — TELEPHONE (OUTPATIENT)
Dept: TRANSPLANT | Facility: CLINIC | Age: 29
End: 2019-10-07

## 2019-11-06 DIAGNOSIS — Z76.82 ORGAN TRANSPLANT CANDIDATE: Primary | ICD-10-CM

## 2019-11-20 ENCOUNTER — TELEPHONE (OUTPATIENT)
Dept: TRANSPLANT | Facility: CLINIC | Age: 29
End: 2019-11-20

## 2019-11-20 ENCOUNTER — OFFICE VISIT (OUTPATIENT)
Dept: TRANSPLANT | Facility: CLINIC | Age: 29
End: 2019-11-20
Payer: MEDICARE

## 2019-11-20 ENCOUNTER — CLINICAL SUPPORT (OUTPATIENT)
Dept: INFECTIOUS DISEASES | Facility: CLINIC | Age: 29
End: 2019-11-20
Payer: MEDICARE

## 2019-11-20 ENCOUNTER — HOSPITAL ENCOUNTER (OUTPATIENT)
Dept: RADIOLOGY | Facility: HOSPITAL | Age: 29
Discharge: HOME | End: 2019-11-20
Attending: NURSE PRACTITIONER
Payer: MEDICARE

## 2019-11-20 VITALS
BODY MASS INDEX: 31.85 KG/M2 | TEMPERATURE: 98 F | HEART RATE: 71 BPM | RESPIRATION RATE: 16 BRPM | DIASTOLIC BLOOD PRESSURE: 96 MMHG | WEIGHT: 198.19 LBS | OXYGEN SATURATION: 99 % | SYSTOLIC BLOOD PRESSURE: 148 MMHG | HEIGHT: 66 IN

## 2019-11-20 DIAGNOSIS — T86.12 FAILED KIDNEY TRANSPLANT: ICD-10-CM

## 2019-11-20 DIAGNOSIS — Z76.82 ORGAN TRANSPLANT CANDIDATE: ICD-10-CM

## 2019-11-20 DIAGNOSIS — N18.6 ESRD ON DIALYSIS: Primary | ICD-10-CM

## 2019-11-20 DIAGNOSIS — Z99.2 ESRD ON DIALYSIS: ICD-10-CM

## 2019-11-20 DIAGNOSIS — Z01.818 PRE-TRANSPLANT EVALUATION FOR CHRONIC KIDNEY DISEASE: ICD-10-CM

## 2019-11-20 DIAGNOSIS — Z99.2 ESRD ON DIALYSIS: Primary | ICD-10-CM

## 2019-11-20 DIAGNOSIS — Z91.199 H/O NONCOMPLIANCE WITH MEDICAL TREATMENT, PRESENTING HAZARDS TO HEALTH: ICD-10-CM

## 2019-11-20 DIAGNOSIS — N18.6 ESRD ON DIALYSIS: ICD-10-CM

## 2019-11-20 DIAGNOSIS — N18.6 ESRD (END STAGE RENAL DISEASE): ICD-10-CM

## 2019-11-20 DIAGNOSIS — I12.9 RENAL HYPERTENSION: ICD-10-CM

## 2019-11-20 PROCEDURE — 99215 PR OFFICE/OUTPT VISIT, EST, LEVL V, 40-54 MIN: ICD-10-PCS | Mod: S$PBB,TXP,, | Performed by: INTERNAL MEDICINE

## 2019-11-20 PROCEDURE — 99204 OFFICE O/P NEW MOD 45 MIN: CPT | Mod: S$PBB,TXP,, | Performed by: TRANSPLANT SURGERY

## 2019-11-20 PROCEDURE — 99999 PR PBB SHADOW E&M-EST. PATIENT-LVL IV: CPT | Mod: PBBFAC,TXP,, | Performed by: INTERNAL MEDICINE

## 2019-11-20 PROCEDURE — 90472 IMMUNIZATION ADMIN EACH ADD: CPT | Mod: PBBFAC,TXP

## 2019-11-20 PROCEDURE — 99204 PR OFFICE/OUTPT VISIT, NEW, LEVL IV, 45-59 MIN: ICD-10-PCS | Mod: S$PBB,TXP,, | Performed by: TRANSPLANT SURGERY

## 2019-11-20 PROCEDURE — 99215 OFFICE O/P EST HI 40 MIN: CPT | Mod: S$PBB,TXP,, | Performed by: INTERNAL MEDICINE

## 2019-11-20 PROCEDURE — 76770 US EXAM ABDO BACK WALL COMP: CPT | Mod: 26,TXP,, | Performed by: RADIOLOGY

## 2019-11-20 PROCEDURE — 99214 OFFICE O/P EST MOD 30 MIN: CPT | Mod: PBBFAC,25,TXP | Performed by: INTERNAL MEDICINE

## 2019-11-20 PROCEDURE — 99999 PR PBB SHADOW E&M-EST. PATIENT-LVL IV: ICD-10-PCS | Mod: PBBFAC,TXP,, | Performed by: INTERNAL MEDICINE

## 2019-11-20 PROCEDURE — 99204 PR OFFICE/OUTPT VISIT, NEW, LEVL IV, 45-59 MIN: ICD-10-PCS | Mod: S$PBB,TXP,, | Performed by: PHYSICIAN ASSISTANT

## 2019-11-20 PROCEDURE — 99204 OFFICE O/P NEW MOD 45 MIN: CPT | Mod: S$PBB,TXP,, | Performed by: PHYSICIAN ASSISTANT

## 2019-11-20 PROCEDURE — 93978 VASCULAR STUDY: CPT | Mod: TC,TXP

## 2019-11-20 PROCEDURE — 93978 US DOPP ILIACS BILATERAL: ICD-10-PCS | Mod: 26,TXP,, | Performed by: RADIOLOGY

## 2019-11-20 PROCEDURE — 76770 US EXAM ABDO BACK WALL COMP: CPT | Mod: TC,TXP

## 2019-11-20 PROCEDURE — 90715 TDAP VACCINE 7 YRS/> IM: CPT | Mod: PBBFAC,TXP

## 2019-11-20 PROCEDURE — 90471 IMMUNIZATION ADMIN: CPT | Mod: PBBFAC,TXP

## 2019-11-20 PROCEDURE — 76770 US RETROPERITONEAL COMPLETE: ICD-10-PCS | Mod: 26,TXP,, | Performed by: RADIOLOGY

## 2019-11-20 PROCEDURE — 93978 VASCULAR STUDY: CPT | Mod: 26,TXP,, | Performed by: RADIOLOGY

## 2019-11-20 RX ORDER — LABETALOL 100 MG/1
100 TABLET, FILM COATED ORAL 2 TIMES DAILY
COMMUNITY
End: 2020-01-02 | Stop reason: SDUPTHER

## 2019-11-20 NOTE — PROGRESS NOTES
INITIAL PATIENT EDUCATION NOTE    Ms. Leydi Cordero was seen in pre-kidney transplant clinic for evaluation for kidney, kidney/pancreas or pancreas only transplant.  The patient attended a group education session that discussed/reviewed the following aspects of transplantation: evaluation and selection committee process, UNOS waitlist management/multiple listings, types of organs offered (KDPI < 85%, KDPI > 85%, PHS increased risk, DCD, HCV+, HIV+ for HIV+ recipients and enbloc/dual), financial aspects, surgical procedures, dietary instruction pre- and post-transplant, health maintenance pre- and post-transplant, post-transplant hospitalization and outpatient follow-up, potential to participate in a research protocol, and medication management and side effects.  A question and answer session was provided after the presentation.    The patient was seen by all members of the multi-disciplinary team to include: Nephrologist/PA, Surgeon, , Transplant Coordinator, , Pharmacist and Dietician (if applicable).    The patient reviewed and signed all consents for evaluation which were witnessed and sent to scanning into the [facility] chart.    The patient was given an education book and plan for further evaluation based on her individual assessment.      The patient was encouraged to call with any questions or concerns.

## 2019-11-20 NOTE — PROGRESS NOTES
"   Transplant Nephrology  Kidney Transplant Recipient Evaluation    Referring Physician: Won Miller  Current Nephrologist: Won Miller    Subjective:   CC:  Initial evaluation of kidney transplant candidacy.    HPI:  Ms. Oumar Cordero is a 29 y.o. year old White female who has presented to be evaluated as a potential kidney transplant recipient.  She has ESRD secondary to HTN.  Patient is currently on hemodialysis started on 2/7/2008 until transplant 4/15/15, then restarted around 2017. Patient is dialyzing on TTS schedule.  Patient reports that she is tolerating dialysis well.. She has a LUE AV graft for dialysis access.     Previous Transplant: yes; Failed DD renal transplant 4/15/15 (per old notes:in EMR [place] induction, < 21 y/o donor, pumped, CIT 46 hours, at [facility] in [place], [place]). AMR 2016 rx'd SM, thymo x3, PLEXx 5, IVIG x2.  She reports she traveled to [place] to visit her dying father, and ran out of medication, which then precipitated AMR.    Previously evaluated in 2018 for 2nd transplant, but found not suitable: "not approved as a transplant candidate due to lack of suitable caregivers, lack of adequate transportation, and non-compliance with dialysis treatments. We also recommend smoking cessation."    Note from GYN 2/8/19: "Pt has been non-compliant with medications/treatments [for abnormal uterine bleeding].  Pt was advised that this non-compliance plays a large role in her lack of improvement and was counseled on risks of continued non-compliance."  When questioned about her gyn issues, she states that she received massage that help correct the problem.  She states she was not taking the medicine this is it was not helping her.    She reports she is tolerating dialysis well.  She is active doing house chores and independent in ADLs.  She does not exercise, however.  She reports making urine about twice a day.  She notes she had urinary tract infections prior to her " Patient started period early and she isn't sure if she should stop taking her birth control or continue taking them. transplant, but not while on immunosuppression.    Past Medical and Surgical History: Ms. Oumar Cordero  has a past medical history of Encounter for blood transfusion, ESRD (end stage renal disease), Fibroma, H/O kidney transplant, Hypertension, Hypertensive nephropathy, and Ovarian cyst.  She has a past surgical history that includes right leg hemodialysis access; Kidney transplant (04/15/2015); kidney removal ; hemodialysis access left arm; Parathyroidectomy; Nephrectomy; transplant nephrectomy (12/01/2017); and Ovarian cyst removal.    Past Social and Family History: Ms. Oumar Cordero reports that she quit smoking about a year ago. She smoked 0.50 packs per day. She has never used smokeless tobacco. She reports that she does not drink alcohol or use drugs. Her family history includes Cancer in her father; Colon cancer in her father; Diabetes in her maternal uncle; Hypertension in her father, maternal aunt, and other; Kidney disease in her maternal aunt and other; No Known Problems in her brother, mother, and sister.  Total smoking hx 14 yrs x 0.45ppd = 7 pk yrs    Review of Systems   Constitutional: Positive for fatigue. Negative for unexpected weight change.   HENT: Negative for hearing loss and mouth sores.    Eyes: Negative for visual disturbance.   Respiratory: Positive for shortness of breath (Only when fluid overloaded, not presently).    Cardiovascular: Negative for chest pain.   Gastrointestinal: Negative for abdominal pain, nausea and vomiting.   Genitourinary: Positive for decreased urine volume (2x day) and vaginal bleeding (irregular). Negative for dysuria and urgency.   Musculoskeletal: Negative for gait problem.   Skin: Negative for rash.   Neurological: Positive for weakness. Negative for seizures.   Hematological: Does not bruise/bleed easily.   Psychiatric/Behavioral: Negative for sleep disturbance.     Objective:   Blood pressure (!) 148/96, pulse 71, temperature 97.7 °F (36.5 °C),  "temperature source Oral, resp. rate 16, height 5' 6.3" (1.684 m), weight 89.9 kg (198 lb 3.1 oz), SpO2 99 %.body mass index is 31.7 kg/m².    Physical Exam   Constitutional: She is oriented to person, place, and time. She is cooperative. No distress.   HENT:   Mouth/Throat: Mucous membranes are normal. No oral lesions.   Eyes: Conjunctivae and lids are normal. No scleral icterus.   Neck: Trachea normal. Neck supple. No JVD present. No thyroid mass present.   Cardiovascular: Normal rate and regular rhythm.   Murmur heard.  Pulmonary/Chest: Effort normal. She has no rales.   Abdominal: Soft. She exhibits no mass. There is no hepatosplenomegaly. There is no tenderness. There is no CVA tenderness.   Musculoskeletal: Normal range of motion. She exhibits no edema.   Neurological: She is alert and oriented to person, place, and time. She displays no tremor. Gait normal.   Skin: Skin is warm and dry. No lesion and no rash noted. No cyanosis. Nails show no clubbing.   Psychiatric: She has a normal mood and affect. Her speech is normal. Cognition and memory are normal.     Labs:  Lab Results   Component Value Date    WBC 6.21 11/20/2019    HGB 11.6 (L) 11/20/2019    HCT 37.2 11/20/2019     11/20/2019    K 4.9 11/20/2019    CL 97 11/20/2019    CO2 26 11/20/2019    BUN 46 (H) 11/20/2019    CREATININE 9.7 (H) 11/20/2019    EGFRNONAA 4.9 (A) 11/20/2019    CALCIUM 7.7 (L) 11/20/2019    PHOS 5.0 (H) 11/20/2019    MG 1.8 12/04/2017    ALBUMIN 3.6 11/20/2019    AST 15 11/20/2019    ALT 12 11/20/2019    UTPCR 0.20 09/29/2016    .0 (H) 11/20/2019    TACROLIMUS 7.3 02/09/2017     cPRA 100% on 8/1/18  Labs were reviewed with the patient.    Assessment:     1. ESRD on dialysis    2. Failed kidney transplant    3. Renal hypertension    4. H/O noncompliance with medical treatment, presenting hazards to health      Plan:     Transplant Candidacy:   Based on available information, Ms. Oumar Cordero is a high-risk kidney " transplant candidate d/t cPRA 100%, prior AMR causing graft loss and h/o non-adherence.   -She requires the routine w/u and no additional testing at present.    Meets center eligibility for accepting HCV+ donor offer - yes.  Patient educated on HCV+ donors. Leydi is willing to accept HCV+ donor offer - no  [not sure]  Patient is a candidate for KDPI > 85 kidney donor offer - no [age].  Final determination of transplant candidacy will be made once workup is complete and reviewed by the selection committee.    Linda Maya MD     [facility] Patient Status  Functional Status: 60% - Requires occasional assistance but is able to care for needs  Physical Capacity: No Limitations

## 2019-11-20 NOTE — PROGRESS NOTES
MYCOPHENOLATE REMS PROGRAM:    I reviewed the mycophenolate REMS program with the patient and .  Provided the mycophenolate REMS Guide to the patient.   The patient and prescriber signed the acknowledgement form, which will be scanned into the permanent electronic medical record.  Pt verbalized understanding and had the opportunity to ask questions.

## 2019-11-20 NOTE — PROGRESS NOTES
Transplant Surgery  Kidney Transplant Recipient Evaluation    Referring Physician: Won Miller  Current Nephrologist: Won Miller    Subjective:     Reason for Visit: evaluate transplant candidacy    History of Present Illness: Leydi Cordero is a 29 y.o. year old female undergoing transplant evaluation.    Dialysis History: Leydi is on hemodialysis.      Transplant History: 4/15/2015 (Kidney)    Etiology of Renal Disease: Hypertensive Nephrosclerosis (based on medical records from referral).    Review of Systems   Constitutional: Negative for activity change and chills.   HENT: Negative for congestion and sore throat.    Eyes: Negative for discharge and redness.   Respiratory: Negative for cough and shortness of breath.    Cardiovascular: Negative for chest pain and palpitations.   Gastrointestinal: Negative for nausea and vomiting.   Endocrine: Negative for polydipsia and polyuria.   Genitourinary: Negative for dysuria and frequency.   Musculoskeletal: Negative for arthralgias and gait problem.   Skin: Negative for pallor and rash.   Neurological: Negative for dizziness and light-headedness.   Hematological: Negative for adenopathy. Does not bruise/bleed easily.   Psychiatric/Behavioral: Negative for agitation and suicidal ideas.       Objective:     Physical Exam:  Constitutional:   Vitals reviewed: yes   Well-nourished and well-groomed: yes  Eyes:   Sclerae icteric: no   Extraocular movements intact: yes  GI:    Bowel sounds normal: yes   Tenderness: no    If yes, quadrant/location: not applicable   Palpable masses: no    If yes, quadrant/location: not applicable   Hepatosplenomegaly: no   Ascites: no   Hernia: no    If yes, type/location: not applicable   Surgical scars: yes    If yes, type/location: right kidney transplant  Resp:   Effort normal: yes   Breath sounds normal: yes    CV:   Regular rate and rhythm: yes   Heart sounds normal: yes   Femoral pulses normal: yes   Extremities  edematous: no  Skin:   Rashes or lesions present: no    If yes, describe:not applicable   Jaundice:: no    Musculoskeletal:   Gait normal: yes   Strength normal: yes  Psych:   Oriented to person, place, and time: yes   Affect and mood normal: yes    Additional comments: not applicable    Counseling: We provided Leydi Cordero with a group education session today.  We discussed kidney transplantation at length with her, including risks, potential complications, and alternatives in the management of her renal failure.  The discussion included complications related to anesthesia, bleeding, infection, primary nonfunction, and ATN.  I discussed the typical postoperative course, length of hospitalization, the need for long-term immunosuppression, and the need for long-term routine follow-up.  I discussed living-donor and -donor transplantation and the relative advantages and disadvantages of each.  I also discussed average waiting times for both living donation and  donation.  I discussed national and center-specific survival rates.  I also mentioned the potential benefit of multicenter listing to candidates listed with centers within more than one organ procurement organization.  All questions were answered.    Final determination of transplant candidacy will be made once evaluation is complete and reviewed by the Kidney & Kidney/Pancreas Selection Committee.         Transplant Surgery - Candidacy   Assessment/Plan:   Leydi Cordero has end stage renal disease (ESRD) on dialysis. I see no surgical contraindication to placing a kidney transplant. Based on available information, Leydi Cordero is a suitable kidney transplant candidate. Prior transplant located in the right iliac fossa.    Fam Sutton MD

## 2019-11-20 NOTE — PROGRESS NOTES
Pre Transplant Infectious Diseases Consult  Kidney Transplant Recipient Evaluation    Requesting Physician: Won Miller MD    Reason for Visit:    Chief Complaint   Patient presents with    Kidney Transplant Pre-evaluation     History of Present Illness  Leydi Cordero is a 29 y.o. year old female Jordanian speaking only with advanced Kidney disease currently being evaluated for Kidney transplant. Etiology of kidney disease is HTN. S/p kidney transplant in [place] 2015 and failed in 2017 and was removed 12/1/17. Currently on HD, LUE AVF. Denies having any complications with her access. Not on any immunosuppressive medications.  She has history of C Diff x 1 in past that was treated without recurrence.  Patient denies any recent fever, chills, or infective illnesses.      1) Do you have a history of:   YES NO   Diabetes      [] [x]     Diabetic Foot Infection/Bone Infection  []        [x]     Surgical Removal of Spleen   []  [x]     2) Have you had recurrent infections involving:             YES NO  Sinus infections  []         [x]   Sore Throat   []         [x]                 Prostate Infections  []         [x]              Bladder Infections  [x]         [] would take abx but unknown which abx she took.                     Kidney Infections  []         [x]                               Intestinal Infections  []         [x]      Skin Infections   [x]         [] had multiple abscesses in various regions. Last abscess was 7 months ago. She had one on her right underarm that required I&D which was >1yr ago.       Reproductive Infections          []  [x]   Periodontal Disease  []         [x]        3)Have you ever had: YES     NO UNKNOWN      Chicken Pox   [x]         []  []   Shingles   []         [x]  []   Orolabial Herpes             []  [x]  []   Genital Herpes  []         [x]  []   Cytomegalovirus  []         [x]  []   Vanessa-Barr Virus  []         [x]  []   Genital Warts   []         [x]  []   Hepatitis  A   []         [x]  []   Hepatitis B   []         [x]  []   Hepatitis C   []         [x]  []   Syphilis   []         [x]  []   Gonorrhea   []         [x]  []   Pelvic Inflammatory   Disease   []         [x]  []   Chlamydia Infection  []         [x]  []   Intestinal parasites   or worms   []         [x]  []   Fungal Infections  []         [x]  []   Blood Infections  []         [x]  []   Comment:     Had a catheter site infection that was used for dialysis about 5 years ago. She was seen in [facility]. Had it removed and replaced of her right groin which was removed and replaced with AV fistula LUE.     4) Have you ever been exposed   YES NO  To someone with tuberculosis?  []   [x]   If yes, what treatment did you receive:     5) What states have you lived in?  [place]     6) What countries have you visited for more than 2 weeks? [place]                        YES NO  7) Did you have any associated infections?  []  [x]       8) Are you planning to travel outside the    [x]  []   [place] after your transplant?    9) Household                   YES NO  Do you have pets living in your house?    []         [x]   If yes, describe:      Do you spend time or live on a farm or     []         [x]   have livestock or other farm animals?  If yes, which ones:    Do you have a fish tank?          []  [x]       Do you have a litter box?      []         [x]     Do you fish or hunt?       []         [x]     Do you clean or skin fish or animals?    []         [x]     Do you consume raw or undercooked    []         [x]   meat, fish, or shellfish?      10) What occupations have you had?  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         12)Do you garden or otherwise  YES NO   work in the soil?    []         [x]   13)Do you hike, camp, or spend     time in wooded areas?   []         [x]        14) The patient's immunization history was reviewed.    Have you ever received:  YES NO UNKNOWN DATES   Routine Childhood vaccines  [x]          []  []      Influenza vaccine   [x]  []  [] 10/2019   Pneumovax    []  [x]  []     Tetanus-diptheria   []         [x]  []    Hepatitis A vaccine series       []  [x]  []    Hepatitis B vaccine series         [x]  []  [] Immune   Meningitis vaccine   []         [x]  []    Varicella vaccine   []         [x]  []        Based on the patients immunization history and serologies, immunizations were ordered:         Ordered  Not Ordered  Influenza Vaccine     []    [x]   Hepatitis A series at 0,  6 months   [x]    []   Hepatitis B seriesat 0, 1, and 6 months  []    [x]   Hepatitis B High Dose 0,1, and 6 months  []    [x]   Prevnar      []    [x]   Pneumovax      []    [x]    TDap       [x]    []    Zoster       []    [x]   Menactra      []    [x]            The patient was encouraged to contact us about any problems that may develop after immunization and possible side effects were reviewed.       Previous Transplant: yes; organ: kidney, date: 2015, complications: failure.    Etiology of Kidney Disease:     Allergies: Heparin analogues    There is no immunization history on file for this patient.  Past Medical History:   Diagnosis Date    Encounter for blood transfusion     ESRD (end stage renal disease)     Fibroma     H/O kidney transplant     Hypertension     Hypertensive nephropathy     Ovarian cyst      Past Surgical History:   Procedure Laterality Date    hemodialysis access left arm      kidney removal       KIDNEY TRANSPLANT  04/15/2015    NEPHRECTOMY      OVARIAN CYST REMOVAL      PARATHYROIDECTOMY      partial     right leg hemodialysis access      transplant nephrectomy  12/01/2017      Social History     Socioeconomic History    Marital status: Single     Spouse name: Not on file    Number of children: Not on file    Years of education: Not on file    Highest education level: Not on file   Occupational History    Not on file   Social Needs    Financial resource strain: Not on file     Food insecurity:     Worry: Not on file     Inability: Not on file    Transportation needs:     Medical: Not on file     Non-medical: Not on file   Tobacco Use    Smoking status: Former Smoker     Packs/day: 0.50     Years: 14.00     Pack years: 7.00     Last attempt to quit: 2018     Years since quittin.0    Smokeless tobacco: Never Used   Substance and Sexual Activity    Alcohol use: No    Drug use: No    Sexual activity: Yes     Partners: Male     Birth control/protection: None   Lifestyle    Physical activity:     Days per week: Not on file     Minutes per session: Not on file    Stress: Not on file   Relationships    Social connections:     Talks on phone: Not on file     Gets together: Not on file     Attends Jainism service: Not on file     Active member of club or organization: Not on file     Attends meetings of clubs or organizations: Not on file     Relationship status: Not on file   Other Topics Concern    Not on file   Social History Narrative    Not on file       Review of Systems   Constitution: Negative for chills, decreased appetite, fever, malaise/fatigue, night sweats, weight gain and weight loss.   HENT: Negative for congestion, ear pain, hearing loss, hoarse voice, sore throat and tinnitus.    Eyes: Negative for blurred vision, pain, vision loss in left eye, vision loss in right eye and visual disturbance.   Cardiovascular: Negative for chest pain, dyspnea on exertion, leg swelling and palpitations.   Respiratory: Positive for shortness of breath (before HD). Negative for cough, sputum production and wheezing.    Skin: Negative for dry skin, itching, rash and suspicious lesions.   Musculoskeletal: Negative for back pain, joint pain, myalgias and neck pain.   Gastrointestinal: Positive for constipation. Negative for abdominal pain, diarrhea, heartburn, nausea and vomiting.   Genitourinary: Negative for dysuria, flank pain, frequency, hematuria, hesitancy and urgency.    Neurological: Negative for dizziness, headaches, numbness, paresthesias and weakness.   Psychiatric/Behavioral: Negative for depression and memory loss. The patient does not have insomnia and is not nervous/anxious.      Physical Exam   Constitutional: She is oriented to person, place, and time. She appears well-developed and well-nourished. No distress.       HENT:   Head: Normocephalic and atraumatic.   Mouth/Throat: She does not have dentures. No oral lesions. Normal dentition. No dental abscesses, uvula swelling, lacerations or dental caries.   Eyes: Pupils are equal, round, and reactive to light. EOM are normal.   Neck: Normal range of motion. Neck supple.   Cardiovascular: Normal rate, regular rhythm, normal heart sounds and intact distal pulses. Exam reveals no gallop and no friction rub.   No murmur heard.  Pulmonary/Chest: Effort normal and breath sounds normal. No stridor. No respiratory distress. She has no wheezes. She has no rales. She exhibits no tenderness.   Abdominal: Soft. Bowel sounds are normal. She exhibits no distension and no mass. There is no tenderness. There is no rebound and no guarding. No hernia.   Musculoskeletal: Normal range of motion. She exhibits no edema, tenderness or deformity.   Lymphadenopathy:        Head (right side): No submental, no submandibular, no tonsillar, no preauricular, no posterior auricular and no occipital adenopathy present.        Head (left side): No submental, no submandibular, no tonsillar, no preauricular, no posterior auricular and no occipital adenopathy present.     She has no cervical adenopathy.        Right cervical: No superficial cervical, no deep cervical and no posterior cervical adenopathy present.       Left cervical: No superficial cervical, no deep cervical and no posterior cervical adenopathy present.     She has no axillary adenopathy.        Right axillary: No pectoral and no lateral adenopathy present.        Left axillary: No lateral  adenopathy present.       Right: No inguinal, no supraclavicular and no epitrochlear adenopathy present.        Left: No inguinal, no supraclavicular and no epitrochlear adenopathy present.   Neurological: She is alert and oriented to person, place, and time. No cranial nerve deficit. Coordination normal.   Skin: Skin is warm and dry. She is not diaphoretic. No erythema. No pallor.   Psychiatric: She has a normal mood and affect. Her behavior is normal. Thought content normal.   Nursing note and vitals reviewed.    Diagnostics:   RPR   Date Value Ref Range Status   08/01/2018 Non-reactive Non-reactive Final     No results found for: CMVANTIBODIE  No results found for: HIV1X2  No results found for: HTLVIIIANTIB  Hepatitis B Surface Ag   Date Value Ref Range Status   11/20/2019 Negative Negative Final     Hep B Core Total Ab   Date Value Ref Range Status   11/20/2019 Negative  Final     Hepatitis C Ab   Date Value Ref Range Status   11/20/2019 Negative Negative Final     No results found for: TOXOIGG  No components found for: TOXOIGGINTER  No results found for: VWP9UGE  No results found for: ENZ3YXE  Varicella Zoster IgG   Date Value Ref Range Status   08/01/2018 3.34 (H) 0.00 - 0.90 ISR Final     Varicella Interpretation   Date Value Ref Range Status   08/01/2018 Positive (A) Negative Final     Comment:     <or=0.90     Negative        No detectable IgG antibody to Varicella zoster  by the LM test. Such individuals are presumed to be   uninfected with Varicella zoster and to be susceptible to   primary infection.  0.91-1.09    Equivocal  >or=1.10     Positive        Indicates presence of detectable IgG antibody to Varicella   zoster by the LM test. Indicative of previous or current   infection.       Strongyloides Ab IgG   Date Value Ref Range Status   08/01/2018 Negative Negative Final     Comment:     No detectable levels of IgG antibodies to Strongyloides.  Repeat testing in 1-2 weeks if clinically  indicated.  Test Performed by:  [facility]  [address] [place] [address]       No results found for: EPSTEINBARRV  No results found for: HEPBSAB  No results found for: QUANTIFERON  Hep A IgM   Date Value Ref Range Status   11/30/2017 Negative  Final     No results found for: PPD           Transplant Infectious Diseases - Candidacy    Assessment/Plan:     Transplant Candidacy: Based on available information, there are no identified significant barriers to transplantation from an infectious disease standpoint pending acceptable serologies. Refer to ID clinic for any serologies that require further evaluation.  Given prior Hx of C Diff may be at increased risk of recurrence post TXPLT.   Final determination of transplant candidacy will be made once evaluation is complete and reviewed by the Transplant Selection Committee.    Vaccines today:  Tdap  Hepatitis A vaccine today, 6 months - rx given for second dose  Prevnar/Pneumovax - Rx given for dialysis    [place]         Counseling:I discussed with Aguilai the risk for increased susceptibility to infections following transplantation including increased risk for infection right after transplant and if rejection should occur.  The patients has been counseled on the importance of vaccinations including but not limited to a yearly flu vaccine.  Specific guidance has been provided to the patient regarding the patients occupation, hobbies and activities to avoid future infectious complications including but not limited to avoiding undercooked meats and seafood, proper hygiene, and contact with animals.

## 2019-11-20 NOTE — PATIENT INSTRUCTIONS
From your doctor:  Thank you for visiting us today and considering kidney transplantation.    Congratulations on stopping smoking!  Keep it up!  Do your best to increase your physical activity and do some exercise.  See some helpful hints below.  Please feel free to contact us with any questions or concerns.  Regards,  Dr. Marni Story    Aerobic Exercise for a Healthy Heart  Exercise is a lot more than an energy booster and a stress reliever. It also strengthens your heart muscle, lowers your blood pressure and blood cholesterol, and burns calories.      Remember, some activity is better than none.     Choose an Aerobic Activity  Choose a nonstop activity that makes your heart and lungs work harder than they do when you rest or walk normally. This aerobic exercise can improve the way your heart and other muscles use oxygen. Make it fun by exercising with a friend and choosing an activity you enjoy. Here are some ideas:  · Walking  · Swimming  · Bicycling  · Stair climbing  · Dancing  · Jogging  Exercise Regularly  If you havent been exercising regularly,  get your doctors okay first. Then start slowly.  Here are some tips:  · Begin exercising 3 times a week for 5-10 minutes at a time.  · When you feel comfortable, add a few minutes each week.  · Slowly build up to exercising 3-4 times each week for 20-40 minutes. Aim for a total of 150 or more minutes a week.  · Be sure to carry your nitroglycerin with you when you exercise.  · If you get angina when youre exercising, stop what youre doing, take your nitroglycerin, and call your doctor.  © 8028-9777 [facility], [address] [place] [address], [place] [address]. All rights reserved. This information is not intended as a substitute for professional medical care. Always follow your healthcare professional's instructions.

## 2019-11-20 NOTE — PROGRESS NOTES
PHARM.D. PRE-TRANSPLANT NOTE:    This patient's medication therapy was evaluated as part of her pre-transplant evaluation.      The following general pharmacologic concerns were noted: none    The following pharmacologic concerns related to HCV therapy were noted: none      This patient's medication profile was reviewed for considerations for DAA Hepatitis C therapy:    [x]  No current inducers of CYP 3A4 or PGP  [x]  No amiodarone on this patient's EMR profile in the last 24 months  [x]  No past or current atrial fibrillation on this patient's EMR profile       Current Outpatient Medications   Medication Sig Dispense Refill    aspirin (ECOTRIN) 81 MG EC tablet TAKE 1 BY MOUTH DAILY 100 tablet 10    labetalol (NORMODYNE) 100 MG tablet Take 100 mg by mouth 2 (two) times daily.      sevelamer HCl (RENAGEL) 800 MG Tab Take 3 tablets (2,400 mg total) by mouth 3 (three) times daily with meals. Take 3 tablets TID with meals and 2 with snacks. 330 tablet 3     No current facility-administered medications for this visit.          Currently she is responsible for preparing / administering this patient's medications on a daily basis.  I am available for consultation and can be contacted, as needed by the other members of the Kidney Transplant team.

## 2019-11-20 NOTE — LETTER
November 21, 2019        Won Miller  [address] [place] [address]  Phone: [phone]  Fax: [phone]             [facility]  [address]  [facility] [address]  Phone: [phone]   Patient: Leydi Cordero   MR Number: [id number]   YOB: 1990   Date of Visit: 11/20/2019       Dear Dr. Won Miller    Thank you for referring Leydi Cordero to me for evaluation. Attached you will find relevant portions of my assessment and plan of care.    If you have questions, please do not hesitate to call me. I look forward to following Leydi Cordero along with you.    Sincerely,    Linda Maya MD    Enclosure    If you would like to receive this communication electronically, please contact [email or web address] or [phone] to request alaTest Link access.    alaTest Link is a tool which provides read-only access to select patient information with whom you have a relationship. Its easy to use and provides real time access to review your patients record including encounter summaries, notes, results, and demographic information.    If you feel you have received this communication in error or would no longer like to receive these types of communications, please e-mail [email or web address]

## 2019-11-20 NOTE — TELEPHONE ENCOUNTER
Reviewed pt transplant labs.  Notified dialysis unit dietitian of the following abnormal labs via fax and requested their most recent nutrition note on this pt.  Once this note is received it will be scanned into pt's chart.    [employer]

## 2019-11-21 NOTE — PROGRESS NOTES
"Transplant Recipient Adult Psychosocial Assessment  Speaks Portuguese only; interviewed with  Jamilah.    Leydi Cordero  [address]  [facility] [address]  Telephone Information:   Mobile [phone]   Home  [phone] (home)  Work  There is no work phone number on file.  E-mail  [email or web address]    Sex: female  YOB: 1990  Age: 29 y.o.    Encounter Date: 2019  [place] Citizen: yes  Primary Language: Portuguese   Needed: yes    Emergency Contact:  Name: Kyle Moody *Speaks Portuguese Only*  Relationship: friend (moved from [place] together, friends for 11 years)  Address: same as pt  Phone Numbers:  [phone] (mobile)    Family/Social Support:   Number of dependents/: none  Marital history: Pt was  once and  1 1/2 years ago.   Other family dynamics: Father is , mother is living in [place]; 5 sisters and 9 brothers. Family is supportive of pt going through transplant process.     Household Composition:  Name: Leydi Oseguera  Age: 29  Relationship: patient  Does person drive? yes    Name: Kyle Moody [phone]  Age: 29  Relationship: friend Pt referred to friend as "brother"   Does person drive? yes    Name: Eugene Cordero [phone]  Age: 23  Relationship: brother  Does person drive? yes   Works most of the time    Do you and your caregivers have access to reliable transportation? yes     PRIMARY CAREGIVER: Kyle Moody, friend will be primary caregiver, phone number [phone]. Kyle drives, lives in [facility] and doesn't work.      provided in-depth information to patient and caregiver regarding pre- and post-transplant caregiver role.   strongly encourages patient and caregiver to have concrete plan regarding post-transplant care giving, including back-up caregiver(s) to ensure care giving needs are met as needed.    Patient and Caregiver states " understanding all aspects of caregiver role/commitment and is able/willing/committed to being caregiver to the fullest extent necessary.    Patient and Caregiver verbalizes understanding of the education provided today and caregiver responsibilities.         remains available. Patient and Caregiver agree to contact  in a timely manner if concerns arise.      Able to take time off work without financial concerns: yes.     Additional Significant Others who will Assist with Transplant:  Pt reports her brother could help out if needed but that he works most of the time.     Living Will: no  Healthcare Power of : no  Advance Directives on file: <<no information> per medical record.  Verbally reviewed LW/HCPA information.   provided patient with copy of LW/HCPA documents and provided education on completion of forms.    Living Donors: No. Education and resource information given to patient.    Highest Education Level: High School (9-12) or GED in [place]  Reading Ability: 12th grade Speaks and reads Vietnamese only  Reports difficulty with: N/A  Learns Best By:  Visual learner     Status: no  VA Benefits: no     Working for Income: No  If no, reason not working: Disability  Patient is disabled.  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    Spouse/Significant Other Employment: Pt denies S.O. Pt's friend Kyle doesn't work.     Disabled: yes: date disability began: age 18, due to: ESRD.    Monthly Income:  SSI: $668  Food [place]: $194  Able to afford all costs now and if transplanted, including medications: yes Pt received a kidney transplant in 2015 in [place], [place] and is able to afford medications now.   Patient and Caregiver verbalizes understanding of personal responsibilities related to transplant costs and the importance of having a financial plan to ensure that patients transplant costs are fully covered.        provided fundraising information/education. Patient and Caregiververbalizes understanding.   remains available.    Insurance:   Payor/Plan Subscr  Sex Relation Sub. Ins. ID Effective Group Num   1. [place] - [place]* EDMOND WILKES * 1990 Female  [id number] NO                                   [address]   2. [place] * 1990 Female  [id number]* 10/1/16 NO                                   [address] [address]     Primary Insurance (for UNOS reporting): Public Insurance - Medicare FFS (Fee For Service)  Secondary Insurance (for UNOS reporting): Public Insurance - Medicaid  Patient and Caregiver verbalizes clear understanding that patient may experience difficulty obtaining and/or be denied insurance coverage post-surgery. This includes and is not limited to disability insurance, life insurance, health insurance, burial insurance, long term care insurance, and other insurances.      Patient and Caregiver also reports understanding that future health concerns related to or unrelated to transplantation may not be covered by patient's insurance.  Resources and information provided and reviewed.     Patient and Caregiver provides verbal permission to release any necessary information to outside resources for patient care and discharge planning.  Resources and information provided are reviewed.      Dialysis Adherence: Patient reports good adherence to all dialysis treatments.  Pt reports having poor compliance in the past and understands it impacts her suitability for transplant and has been compliant now. SW left a message with Nicci at pt's dialysis unit. SW to follow up, please see other note.     Infusion Service: patient utilizing? no  Home Health: patient utilizing? no  DME: no  Pulmonary/Cardiac Rehab: none   ADLS:  Pt states ability to independently accomplish all adls.    Adherence:   Pt states current and expected compliance with all healthcare  recommendations.  Adherence education and counseling provided.     Per History Section:  Past Medical History:   Diagnosis Date    Encounter for blood transfusion     ESRD (end stage renal disease)     Fibroma     H/O kidney transplant     Hypertension     Hypertensive nephropathy     Ovarian cyst      Social History     Tobacco Use    Smoking status: Former Smoker     Packs/day: 0.50     Years: 14.00     Pack years: 7.00     Last attempt to quit: 2018     Years since quittin.0    Smokeless tobacco: Never Used   Substance Use Topics    Alcohol use: No     Social History     Substance and Sexual Activity   Drug Use No     Social History     Substance and Sexual Activity   Sexual Activity Yes    Partners: Male    Birth control/protection: None       Per Today's Psychosocial:  Tobacco: Pt reports she quit smoking cigarettes in 2018. Pt reports she started smoking at age 15 and used to smoke 5 cigarettes per day. Pt reports she quit so that she would be suitable for transplant.   Alcohol: none, patient denies any use.  Illicit Drugs/Non-prescribed Medications: none, patient denies any use.    Patient and Caregiver states clear understanding of the potential impact of substance use as it relates to transplant candidacy and is aware of possible random substance screening.  Substance abstinence/cessation counseling, education and resources provided and reviewed.     Arrests/DWI/Treatment/Rehab: patient denies    Psychiatric History:    Mental Health: Pt denies any mental health difficulties at this time.   Psychiatrist/Counselor: Pt denies ever seeing a psychiatrist or counselor.   Medications:  Pt denies  Suicide/Homicide Issues: Pt denies current or past suicidal/homicidal ideation.   Safety at home: Pt denies any home safety concerns.    Knowledge: Patient and Caregiver states having clear understanding and realistic expectations regarding the potential risks and potential benefits of organ  transplantation and organ donation and agrees to discuss with health care team members and support system members, as well as to utilize available resources and express questions and/or concerns in order to further facilitate the pt informed decision-making.  Resources and information provided and reviewed.    Patient and Caregiver is aware of [facility]'s affiliation and/or partnership with agencies in home health care, LTAC, SNF, [facility] and other hospitals and clinics.    Understanding: Patient and Caregiver reports having a clear understanding of the many lifetime commitments involved with being a transplant recipient, including costs, compliance, medications, lab work, procedures, appointments, concrete and financial planning, preparedness, timely and appropriate communication of concerns, abstinence (ETOH, tobacco, illicit non-prescribed drugs), adherence to all health care team recommendations, support system and caregiver involvement, appropriate and timely resource utilization and follow-through, mental health counseling as needed/recommended, and patient and caregiver responsibilities.  Social Service Handbook, resources and detailed educational information provided and reviewed.  Educational information provided.    Patient and Caregiver also reports current and expected compliance with health care regime and states having a clear understanding of the importance of compliance.      Patient and Caregiver reports a clear understanding that risks and benefits may be involved with organ transplantation and with organ donation.       Patient and Caregiver also reports clear understanding that psychosocial risk factors may affect patient, and include but are not limited to feelings of depression, generalized anxiety, anxiety regarding dependence on others, post traumatic stress disorder, feelings of guilt and other emotional and/or mental concerns, and/or exacerbation of existing mental health concerns.  Detailed  resources provided and discussed.      Patient and Caregiver agrees to access appropriate resources in a timely manner as needed and/or as recommended, and to communicate concerns appropriately.  Patient and Caregiver also reports a clear understanding of treatment options available.     Patient and Caregiver received education in a group setting.   reviewed education, provided additional information, and answered questions.    Feelings or Concerns: Pt and caregiver deny feelings or concerns.     Coping: Pt reports coping by watching TV and using her phone. Pt reports using these coping methods in the hospital and post transplant.     Goals: Pt reports wanting to get off dialysis and travel to [place] post transplant.  Patient referred to Vocational Rehabilitation.    Interview Behavior: Patient and Caregiver presents as alert and oriented x 4, pleasant, good eye contact, well groomed, recall good, concentration/judgement good, average intelligence, calm, communicative, cooperative and asking and answering questions appropriately. Pt was accompanied by her friend Kyle Moody, per pt's request.          Transplant Social Work - Candidacy  Assessment/Plan:     Psychosocial Suitability: Patient presents as a suitable candidate for kidney transplant at this time. Based on psychosocial risk factors, patient presents as medium risk, due to past history of non-compliance. Pt reports being highly compliant now that she understands it impacts her suitability for transplant. SW awaiting dialysis adherence check. Pt can afford her transplant medications now, has no psychosocial concerns and has a suitable caregiver plan in place.     Recommendations/Additional Comments: SW recommends pt conduct fundraising to assist in the transplant process. SW recommends that pt remain aware of potential mental health concerns and contact the team if any concerns arise. SW recommends that pt remain abstinent from  tobacco, ETOH and drug use. SW support pt's continued dialysis adherence. SW remains available to answer any questions or concerns that arise as the pt moves through the transplant process.     Елена Ayon LMSW

## 2019-11-26 ENCOUNTER — TELEPHONE (OUTPATIENT)
Dept: TRANSPLANT | Facility: CLINIC | Age: 29
End: 2019-11-26

## 2019-12-03 ENCOUNTER — OFFICE VISIT (OUTPATIENT)
Dept: OBSTETRICS AND GYNECOLOGY | Facility: CLINIC | Age: 29
End: 2019-12-03
Payer: MEDICARE

## 2019-12-03 VITALS
DIASTOLIC BLOOD PRESSURE: 92 MMHG | HEIGHT: 66 IN | BODY MASS INDEX: 32.95 KG/M2 | SYSTOLIC BLOOD PRESSURE: 154 MMHG | WEIGHT: 205 LBS

## 2019-12-03 DIAGNOSIS — Z01.419 WELL WOMAN EXAM WITH ROUTINE GYNECOLOGICAL EXAM: Primary | ICD-10-CM

## 2019-12-03 PROCEDURE — G0101 CA SCREEN;PELVIC/BREAST EXAM: HCPCS | Mod: PBBFAC

## 2019-12-03 PROCEDURE — 99212 OFFICE O/P EST SF 10 MIN: CPT | Mod: PBBFAC | Performed by: OBSTETRICS & GYNECOLOGY

## 2019-12-03 PROCEDURE — 87624 HPV HI-RISK TYP POOLED RSLT: CPT

## 2019-12-03 PROCEDURE — G0101 PR CA SCREEN;PELVIC/BREAST EXAM: ICD-10-PCS | Mod: S$PBB,,, | Performed by: OBSTETRICS & GYNECOLOGY

## 2019-12-03 PROCEDURE — 88141 PR  CYTOPATH CERV/VAG INTERPRET: ICD-10-PCS | Mod: ,,, | Performed by: PATHOLOGY

## 2019-12-03 PROCEDURE — G0101 CA SCREEN;PELVIC/BREAST EXAM: HCPCS | Mod: S$PBB,,, | Performed by: OBSTETRICS & GYNECOLOGY

## 2019-12-03 PROCEDURE — 99999 PR PBB SHADOW E&M-EST. PATIENT-LVL II: ICD-10-PCS | Mod: PBBFAC,,, | Performed by: OBSTETRICS & GYNECOLOGY

## 2019-12-03 PROCEDURE — 99999 PR PBB SHADOW E&M-EST. PATIENT-LVL II: CPT | Mod: PBBFAC,,, | Performed by: OBSTETRICS & GYNECOLOGY

## 2019-12-03 PROCEDURE — 88141 CYTOPATH C/V INTERPRET: CPT | Mod: ,,, | Performed by: PATHOLOGY

## 2019-12-03 PROCEDURE — 88175 CYTOPATH C/V AUTO FLUID REDO: CPT | Performed by: PATHOLOGY

## 2019-12-03 RX ORDER — AMLODIPINE BESYLATE 10 MG/1
TABLET ORAL
Status: ON HOLD | COMMUNITY
Start: 2017-05-09 | End: 2021-01-14 | Stop reason: HOSPADM

## 2019-12-03 RX ORDER — SEVELAMER CARBONATE 800 MG/1
TABLET, FILM COATED ORAL
Refills: 6 | COMMUNITY
Start: 2019-09-14 | End: 2019-12-03 | Stop reason: SDUPTHER

## 2019-12-03 RX ORDER — ONDANSETRON 4 MG/1
4 TABLET, ORALLY DISINTEGRATING ORAL EVERY 8 HOURS PRN
COMMUNITY
Start: 2019-04-14 | End: 2021-01-15 | Stop reason: ALTCHOICE

## 2019-12-03 NOTE — PROGRESS NOTES
Subjective:       Patient ID: Leydi Cordero is a 29 y.o. female.    Chief Complaint:  Well Woman      History of Present Illness  HPI  Annual Exam-Premenopausal  Patient presents for annual exam. The patient has no complaints today. The patient is sexually active. GYN screening history: last pap: approximate date  and was normal. The patient wears seatbelts: yes. The patient participates in regular exercise: no. Has the patient ever been transfused or tattooed?: yes. The patient reports that there is not domestic violence in her life.  Menses are regular.  Denies excessive bleeding or cramping.      GYN & OB History  Patient's last menstrual period was 2019.   Date of Last Pap: No result found    OB History    Para Term  AB Living   0 0 0 0 0 0   SAB TAB Ectopic Multiple Live Births   0 0 0 0 0       Review of Systems  Review of Systems   Constitutional: Negative for activity change, appetite change, chills, fatigue, fever and unexpected weight change.   Respiratory: Negative for shortness of breath.    Cardiovascular: Negative for chest pain, palpitations and leg swelling.   Gastrointestinal: Negative for abdominal pain, bloating, blood in stool, constipation, diarrhea, nausea and vomiting.   Genitourinary: Negative for dysmenorrhea, dyspareunia, dysuria, flank pain, frequency, genital sores, hematuria, menorrhagia, menstrual problem, pelvic pain, urgency, vaginal bleeding, vaginal discharge, vaginal pain, urinary incontinence, postcoital bleeding, vaginal dryness and vaginal odor.   Musculoskeletal: Negative for back pain.   Integumentary:  Negative for breast mass, nipple discharge, breast skin changes and breast tenderness.   Neurological: Negative for syncope and headaches.   Breast: Negative for asymmetry, lump, mass, mastodynia, nipple discharge, skin changes and tenderness          Objective:    Physical Exam:   Constitutional: She is oriented to person, place, and time.  She appears well-developed and well-nourished. No distress.    HENT:   Head: Normocephalic and atraumatic.    Eyes: Pupils are equal, round, and reactive to light. EOM are normal.    Neck: Normal range of motion.    Cardiovascular: Normal rate, regular rhythm and normal heart sounds.     Pulmonary/Chest: Effort normal and breath sounds normal. Right breast exhibits no inverted nipple, no mass, no nipple discharge, no skin change, no tenderness, no bleeding and no swelling. Left breast exhibits no inverted nipple, no mass, no nipple discharge, no skin change, no tenderness, no bleeding and no swelling. Breasts are symmetrical.        Abdominal: Soft. Bowel sounds are normal. She exhibits no distension. There is no tenderness.     Genitourinary: Vagina normal and uterus normal. Pelvic exam was performed with patient supine. There is no rash, tenderness, lesion or injury on the right labia. There is no rash, tenderness, lesion or injury on the left labia. Uterus is not deviated, not enlarged and not tender. Cervix is normal. Right adnexum displays no mass, no tenderness and no fullness. Left adnexum displays no mass, no tenderness and no fullness. No erythema, tenderness or bleeding in the vagina. No foreign body in the vagina. No signs of injury around the vagina. No vaginal discharge found. Cervix exhibits no motion tenderness, no discharge and no friability. Additional cervical findings: pap smear done          Musculoskeletal: Normal range of motion and moves all extremeties. She exhibits no edema or tenderness.       Neurological: She is alert and oriented to person, place, and time.    Skin: Skin is warm and dry.    Psychiatric: She has a normal mood and affect. Her behavior is normal. Thought content normal.          Assessment:        1. Well woman exam with routine gynecological exam             Plan:      Well woman exam with routine gynecological exam  -     Liquid-Based Pap Smear, Screening  -     Pt was  counseled on cervical/vaginal screening guidelines and recommendations.  Last pap NILM on 2018.  If today's pap smear result is negative, next pap smear will be due in 2020 (renal transplant).  -     Pt was advised on current breast cancer screening recommendations.  Pt desires to proceed with breast exam today.  -     Follow up with PCP for routine health maintenance needs.      Follow up in about 1 year (around 12/3/2020).

## 2019-12-04 ENCOUNTER — HOSPITAL ENCOUNTER (OUTPATIENT)
Dept: CARDIOLOGY | Facility: HOSPITAL | Age: 29
Discharge: HOME | End: 2019-12-04
Attending: NURSE PRACTITIONER
Payer: MEDICARE

## 2019-12-04 DIAGNOSIS — Z76.82 ORGAN TRANSPLANT CANDIDATE: ICD-10-CM

## 2019-12-04 LAB
DIASTOLIC DYSFUNCTION: NO
ESTIMATED PA SYSTOLIC PRESSURE: 44.73
MITRAL VALVE REGURGITATION: ABNORMAL
RETIRED EF AND QEF - SEE NOTES: 60 (ref 55–65)
TRICUSPID VALVE REGURGITATION: ABNORMAL

## 2019-12-04 PROCEDURE — 93306 TTE W/DOPPLER COMPLETE: CPT | Mod: 26,TXP,, | Performed by: INTERNAL MEDICINE

## 2019-12-04 PROCEDURE — 93306 TTE W/DOPPLER COMPLETE: CPT | Mod: TXP

## 2019-12-04 PROCEDURE — 93306 2D ECHO WITH COLOR FLOW DOPPLER: ICD-10-PCS | Mod: 26,TXP,, | Performed by: INTERNAL MEDICINE

## 2019-12-11 ENCOUNTER — TELEPHONE (OUTPATIENT)
Dept: TRANSPLANT | Facility: CLINIC | Age: 29
End: 2019-12-11

## 2019-12-11 DIAGNOSIS — Z76.82 ORGAN TRANSPLANT CANDIDATE: Primary | ICD-10-CM

## 2019-12-11 NOTE — TELEPHONE ENCOUNTER
----- Message from Michelle Abadie, RN sent at 12/11/2019 11:15 AM CST -----      ----- Message -----  From: Lamar Marcum NP  Sent: 12/4/2019   4:04 PM CST  To: [facility] Pre-Kidney Transplant Clinical    Results reviewed, action needed.  Fax labs to dialysis/nephrologist concerning  pulm HTN  Moderate to severe tricuspid regurgitation--CARDIOLOGY REFERRAL

## 2019-12-11 NOTE — TELEPHONE ENCOUNTER
With the use of hospital interpretor I have spoken with Leydi and explained the need for cardiology referral and she is agreeable. I also advised I would send the report to her dialysis unit to assist with fluid removal. She verbalized understanding.

## 2019-12-19 LAB
FINAL PATHOLOGIC DIAGNOSIS: ABNORMAL
Lab: ABNORMAL

## 2019-12-23 ENCOUNTER — DOCUMENTATION ONLY (OUTPATIENT)
Dept: NEPHROLOGY | Facility: CLINIC | Age: 29
End: 2019-12-23

## 2019-12-23 DIAGNOSIS — Z76.82 ORGAN TRANSPLANT CANDIDATE: Primary | ICD-10-CM

## 2019-12-23 NOTE — H&P
History & Physical      Chief Complaint:  H&P    HPI:        29 y.o.  female Patient with ESRD on HD MWF at [facility]. She started HD 2/7/2008 and had a transplant in [place] 4/15/15.   Her cause of end-stage renal disease is HTN and failed transplant.  His/Her current dialysis   prescription is as follows:  Dialyzer:  Gambro Revaclr 300.  Dialysate:    Sodium 138, calcium 2.5, potassium 2.0.  Duration of treatment is 210 mins three   times a week on Monday, Wednesday, and Friday.  Blood flow rate 350 mL/min,   dialysate flow rate 600 mL/min.  Current hemodialysis access is a left   arm hemodialysis graft, has positive thrill, positive bruit.  Established dry   weight 90.5    kg.           ROS:        Constitutional: Negative for fever, chills, weight loss, malaise/fatigue and diaphoresis.   HENT: Negative for hearing loss, ear pain, nosebleeds, congestion, sore throat, neck pain, tinnitus and ear discharge.    Eyes: Negative for blurred vision, double vision, photophobia, pain, discharge and redness.   Respiratory: Negative for cough, hemoptysis, sputum production, shortness of breath, wheezing and stridor.    Cardiovascular: Negative for chest pain, palpitations, orthopnea, claudication, leg swelling and PND.   Gastrointestinal: Negative for heartburn, nausea, vomiting, abdominal pain, diarrhea, constipation, blood in stool and melena.   Genitourinary: Negative for dysuria, urgency, frequency, hematuria and flank pain.   Musculoskeletal: Negative for myalgias, back pain, joint pain and falls.   Skin: Negative for itching and rash.   Neurological: Negative for dizziness, tingling, tremors, sensory change, speech change, focal weakness, seizures, loss of consciousness, weakness and headaches.   Endo/Heme/Allergies: Negative for environmental allergies and polydipsia. Does not bruise/bleed easily.   Psychiatric/Behavioral: Negative for depression, suicidal ideas, hallucinations, memory  loss and substance abuse. The patient is not nervous/anxious and does not have insomnia.    All 14 systems reviewed and negative except as noted above.            Past Medical History:   Diagnosis Date    Encounter for blood transfusion     ESRD (end stage renal disease)     Fibroma     H/O kidney transplant     Hypertension     Hypertensive nephropathy     Ovarian cyst        Past Surgical History:   Procedure Laterality Date    hemodialysis access left arm      kidney removal       KIDNEY TRANSPLANT  04/15/2015    NEPHRECTOMY      OVARIAN CYST REMOVAL      PARATHYROIDECTOMY      partial     right leg hemodialysis access      transplant nephrectomy  2017       Family History   Problem Relation Age of Onset    No Known Problems Mother     Colon cancer Father     Cancer Father     Hypertension Father     No Known Problems Sister     No Known Problems Brother     Kidney disease Maternal Aunt     Hypertension Maternal Aunt     Diabetes Maternal Uncle     Kidney disease Other     Hypertension Other        Social History     Socioeconomic History    Marital status: Single     Spouse name: Not on file    Number of children: Not on file    Years of education: Not on file    Highest education level: Not on file   Occupational History    Not on file   Social Needs    Financial resource strain: Not on file    Food insecurity:     Worry: Not on file     Inability: Not on file    Transportation needs:     Medical: Not on file     Non-medical: Not on file   Tobacco Use    Smoking status: Former Smoker     Packs/day: 0.50     Years: 14.00     Pack years: 7.00     Last attempt to quit: 2018     Years since quittin.0    Smokeless tobacco: Never Used   Substance and Sexual Activity    Alcohol use: No    Drug use: No    Sexual activity: Yes     Partners: Male     Birth control/protection: None   Lifestyle    Physical activity:     Days per week: Not on file     Minutes per  session: Not on file    Stress: Not on file   Relationships    Social connections:     Talks on phone: Not on file     Gets together: Not on file     Attends Oriental orthodox service: Not on file     Active member of club or organization: Not on file     Attends meetings of clubs or organizations: Not on file     Relationship status: Not on file   Other Topics Concern    Not on file   Social History Narrative    Not on file       Current Outpatient Medications   Medication Sig Dispense Refill    amLODIPine (NORVASC) 10 MG tablet AMLODIPINE BESYLATE 10MG TABLETS  TK 1 T PO ONCE D      aspirin (ECOTRIN) 81 MG EC tablet TAKE 1 BY MOUTH DAILY 100 tablet 10    labetalol (NORMODYNE) 100 MG tablet Take 100 mg by mouth 2 (two) times daily.      ondansetron (ZOFRAN-ODT) 4 MG TbDL Take 4 mg by mouth every 8 (eight) hours as needed.      sevelamer HCl (RENAGEL) 800 MG Tab Take 3 tablets (2,400 mg total) by mouth 3 (three) times daily with meals. Take 3 tablets TID with meals and 2 with snacks. 330 tablet 3     No current facility-administered medications for this visit.        Review of patient's allergies indicates:   Allergen Reactions    Heparin analogues Rash         There were no vitals filed for this visit.          Physical Exam   Nursing Notes and Vital Signs Reviewed.     Constitutional: Well developed, well nourished. AAOx3, NAD, speech/ comprehension clear   Head: Atraumatic. Normocephalic.   Eyes: PERRL. EOMI. Conjunctivae are not pale. No scleral icterus.   ENT: Mucous membranes are dry. No tongue tremors. Throat clear.  Neck: Supple. No JVD or LN or Carotid Bruits noted B.  Cardiovascular: S1S2 RRR, no murmurs, rubs, or gallops. Distal pulses are 2+ and symmetric.   Pulmonary/Chest: No evidence of respiratory distress. Clear to auscultation bilaterally. No wheezing, rales or rhonchi. No chest wall TTP.   Abdominal: Soft and non-distended. There is no tenderness. No rebound, guarding, or rigidity. No  organomegaly. No mass or viscera palpable  Musculoskeletal: FROM in all extremities. No deformities, no TTP, no edema. No midline spinal TTP. No step-offs. Pelvis is stable to compression. No cyanosis. Moves all four extremities.   Skin: Skin is warm and dry.   Neurological: No gross neurological deficits, Strength 5/5 B, is equal in the upper and lower extremities bilaterally. No sensory deficits to light touch. No pronator drift.  DTRs are 2+ and equal throughout.   Psychiatric: Good eye contact. Normal Affect.      Laboratory Data:  Reviewed and noted in plan where applicable- Please see chart for full laboratory data.       Lab Results   Component Value Date    WBC 6.21 11/20/2019    HGB 11.6 (L) 11/20/2019    HCT 37.2 11/20/2019     (H) 11/20/2019     11/20/2019     BMP  Lab Results   Component Value Date     11/20/2019    K 4.9 11/20/2019    CL 97 11/20/2019    CO2 26 11/20/2019    BUN 46 (H) 11/20/2019    CREATININE 9.7 (H) 11/20/2019    CALCIUM 7.7 (L) 11/20/2019    ANIONGAP 13 11/20/2019    ESTGFRAFRICA 5.7 (A) 11/20/2019    EGFRNONAA 4.9 (A) 11/20/2019     CMP  Sodium   Date Value Ref Range Status   11/20/2019 136 136 - 145 mmol/L Final     Potassium   Date Value Ref Range Status   11/20/2019 4.9 3.5 - 5.1 mmol/L Final     Chloride   Date Value Ref Range Status   11/20/2019 97 95 - 110 mmol/L Final     CO2   Date Value Ref Range Status   11/20/2019 26 23 - 29 mmol/L Final     Glucose   Date Value Ref Range Status   11/20/2019 76 70 - 110 mg/dL Final     BUN, Bld   Date Value Ref Range Status   11/20/2019 46 (H) 6 - 20 mg/dL Final     Creatinine   Date Value Ref Range Status   11/20/2019 9.7 (H) 0.5 - 1.4 mg/dL Final     Calcium   Date Value Ref Range Status   11/20/2019 7.7 (L) 8.7 - 10.5 mg/dL Final     Total Protein   Date Value Ref Range Status   11/20/2019 7.1 6.0 - 8.4 g/dL Final     Albumin   Date Value Ref Range Status   11/20/2019 3.6 3.5 - 5.2 g/dL Final     Total Bilirubin    Date Value Ref Range Status   11/20/2019 0.5 0.1 - 1.0 mg/dL Final     Comment:     For infants and newborns, interpretation of results should be based  on gestational age, weight and in agreement with clinical  observations.  Premature Infant recommended reference ranges:  Up to 24 hours.............<8.0 mg/dL  Up to 48 hours............<12.0 mg/dL  3-5 days..................<15.0 mg/dL  6-29 days.................<15.0 mg/dL       Alkaline Phosphatase   Date Value Ref Range Status   11/20/2019 99 55 - 135 U/L Final     AST   Date Value Ref Range Status   11/20/2019 15 10 - 40 U/L Final     ALT   Date Value Ref Range Status   11/20/2019 12 10 - 44 U/L Final     Anion Gap   Date Value Ref Range Status   11/20/2019 13 8 - 16 mmol/L Final     eGFR if    Date Value Ref Range Status   11/20/2019 5.7 (A) >60 mL/min/1.73 m^2 Final     eGFR if non    Date Value Ref Range Status   11/20/2019 4.9 (A) >60 mL/min/1.73 m^2 Final     Comment:     Calculation used to obtain the estimated glomerular filtration  rate (eGFR) is the CKD-EPI equation.        Lab Results   Component Value Date    CALCIUM 7.7 (L) 11/20/2019    PHOS 5.0 (H) 11/20/2019     Lab Results   Component Value Date    K 4.9 11/20/2019     Lab Results   Component Value Date    LABPROT 10.1 11/20/2019    ALBUMIN 3.6 11/20/2019     Lab Results   Component Value Date    HGBA1C 4.4 08/01/2018       BMP  @[id number](GLU,NA,K,Cl,CO2,BUN,Creatinine,Calcium,MG)@      Radiology:  Reviewed and noted in plan where applicable- Please see chart for full radiology data.            ASSESSMENT/PLAN:     Patient Active Problem List   Diagnosis    H/O kidney transplant    Essential hypertension    Acidosis, metabolic    Antibody mediated rejection of kidney transplant    Prophylactic immunotherapy    Immunosuppressive management encounter following kidney transplant    Acute rejection of kidney transplant    Hyperphosphatemia    Diarrhea     C. difficile colitis    Hyperkalemia    ESRD (end stage renal disease)    S/p nephrectomy 12/1/17    Hyperparathyroidism, tertiary    S/P parathyroidectomy--partial    Hypercalcemia    Tobacco abuse    Hyponatremia    Nausea    End-stage renal disease on hemodialysis    Rash         PLAN:      Assessment and plan:    1.  ESRD: Doing very well on  dialysis. We will continue hemodialysis treatments three   times a week, 210 mins each treatment, maintaining a URR of 70% or greater and   a Kt/V of 1.20.  Currently, the patient is stable.    2.  Anemia: .  We will check hemoglobin at least monthly,   target range 10 to 11, transferrin saturation monthly, and ferritin quarterly.    We will dose Epogen and iron according to monthly blood work and according to   protocol.    3 . Hypertension.  The patient's blood pressure generally stabilizes during   Treatment. Currently controlled with current medications, sodium and fluid   restrictions and dialysis prescription. BP is much better controlled    4.  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  We will check intact PTH on   a quarterly basis.  We will dose vitamin D according to blood work and protocol.      TISHA Javier

## 2019-12-24 LAB
HPV HR 12 DNA SPEC QL NAA+PROBE: NEGATIVE
HPV16 AG SPEC QL: POSITIVE
HPV18 DNA SPEC QL NAA+PROBE: NEGATIVE

## 2019-12-26 DIAGNOSIS — Z76.82 ORGAN TRANSPLANT CANDIDATE: Primary | ICD-10-CM

## 2020-01-02 ENCOUNTER — OFFICE VISIT (OUTPATIENT)
Dept: CARDIOLOGY | Facility: CLINIC | Age: 30
End: 2020-01-02
Payer: MEDICARE

## 2020-01-02 ENCOUNTER — CLINICAL SUPPORT (OUTPATIENT)
Dept: CARDIOLOGY | Facility: CLINIC | Age: 30
End: 2020-01-02
Payer: MEDICARE

## 2020-01-02 ENCOUNTER — TELEPHONE (OUTPATIENT)
Dept: PULMONOLOGY | Facility: CLINIC | Age: 30
End: 2020-01-02

## 2020-01-02 VITALS
DIASTOLIC BLOOD PRESSURE: 92 MMHG | SYSTOLIC BLOOD PRESSURE: 140 MMHG | WEIGHT: 210.13 LBS | HEART RATE: 79 BPM | BODY MASS INDEX: 33.6 KG/M2

## 2020-01-02 DIAGNOSIS — I10 ESSENTIAL HYPERTENSION: ICD-10-CM

## 2020-01-02 DIAGNOSIS — I36.1 NONRHEUMATIC TRICUSPID VALVE REGURGITATION: ICD-10-CM

## 2020-01-02 DIAGNOSIS — R06.09 DYSPNEA ON EXERTION: ICD-10-CM

## 2020-01-02 DIAGNOSIS — N18.6 END-STAGE RENAL DISEASE ON HEMODIALYSIS: ICD-10-CM

## 2020-01-02 DIAGNOSIS — I10 ESSENTIAL HYPERTENSION: Primary | ICD-10-CM

## 2020-01-02 DIAGNOSIS — R06.09 DOE (DYSPNEA ON EXERTION): ICD-10-CM

## 2020-01-02 DIAGNOSIS — R07.89 OTHER CHEST PAIN: ICD-10-CM

## 2020-01-02 DIAGNOSIS — I27.20 PULMONARY HYPERTENSION: ICD-10-CM

## 2020-01-02 DIAGNOSIS — Z01.810 PREOP CARDIOVASCULAR EXAM: Primary | ICD-10-CM

## 2020-01-02 DIAGNOSIS — Z99.2 END-STAGE RENAL DISEASE ON HEMODIALYSIS: ICD-10-CM

## 2020-01-02 PROCEDURE — 99205 PR OFFICE/OUTPT VISIT, NEW, LEVL V, 60-74 MIN: ICD-10-PCS | Mod: S$PBB,25,NTX, | Performed by: INTERNAL MEDICINE

## 2020-01-02 PROCEDURE — 93010 ELECTROCARDIOGRAM REPORT: CPT | Mod: S$PBB,NTX,, | Performed by: INTERNAL MEDICINE

## 2020-01-02 PROCEDURE — 99213 OFFICE O/P EST LOW 20 MIN: CPT | Mod: PBBFAC,NTX,25 | Performed by: INTERNAL MEDICINE

## 2020-01-02 PROCEDURE — 93010 EKG 12-LEAD: ICD-10-PCS | Mod: S$PBB,NTX,, | Performed by: INTERNAL MEDICINE

## 2020-01-02 PROCEDURE — 99999 PR PBB SHADOW E&M-EST. PATIENT-LVL III: ICD-10-PCS | Mod: PBBFAC,TXP,, | Performed by: INTERNAL MEDICINE

## 2020-01-02 PROCEDURE — 93005 ELECTROCARDIOGRAM TRACING: CPT | Mod: PBBFAC,NTX | Performed by: INTERNAL MEDICINE

## 2020-01-02 PROCEDURE — 99205 OFFICE O/P NEW HI 60 MIN: CPT | Mod: S$PBB,25,NTX, | Performed by: INTERNAL MEDICINE

## 2020-01-02 PROCEDURE — 99999 PR PBB SHADOW E&M-EST. PATIENT-LVL III: CPT | Mod: PBBFAC,TXP,, | Performed by: INTERNAL MEDICINE

## 2020-01-02 RX ORDER — LABETALOL 200 MG/1
200 TABLET, FILM COATED ORAL 2 TIMES DAILY
Qty: 60 TABLET | Refills: 2 | Status: ON HOLD | OUTPATIENT
Start: 2020-01-02 | End: 2021-01-14 | Stop reason: SDUPTHER

## 2020-01-02 NOTE — PROGRESS NOTES
Subjective:   Patient ID:  Leydi Cordero is a 29 y.o. female who presents for cardiac consult of Dizziness (consult ) and Shortness of Breath      HPI  The patient came in today for cardiac consult of Dizziness (consult ) and Shortness of Breath    Leydi Cordero is a 29 y.o. female pt with HTN, ESRD on HD, h/o kidney transplant, pulm HTN, mod-severe TR, anemia here for preop CV eval.     Info from chart -  female Patient with ESRD on HD MWF at [facility]. She started HD 2/7/2008 and had a transplant in [place] 4/15/15.   Her cause of end-stage renal disease is HTN and failed transplant.     ECHO with mod to severe TR with elevated PA pressure. She gets abd pain/cramping if more than 2 L removed with HD. Her BP has been high.   Stress test scheduled for next Friday.     Patient feels no chest pain, no leg swelling, no PND, no palpitation, no dizziness, no syncope, no CNS symptoms.    Patient has fairly good exercise tolerance.    Patient is compliant with medications.    ECG - NSR    CONCLUSIONS     1 - Concentric hypertrophy.     2 - No wall motion abnormalities.     3 - Normal left ventricular systolic function (EF 60-65%).     4 - Normal left ventricular diastolic function.     5 - Normal right ventricular systolic function .     6 - Pulmonary hypertension. The estimated PA systolic pressure is 45 mmHg.     7 - Trivial mitral regurgitation.     8 - Moderate to severe tricuspid regurgitation.       This document has been electronically    SIGNED BY: Max Casey MD On: 12/04/2019 14:30    Past Medical History:   Diagnosis Date    Encounter for blood transfusion     ESRD (end stage renal disease)     Fibroma     H/O kidney transplant     Hypertension     Hypertensive nephropathy     Ovarian cyst        Past Surgical History:   Procedure Laterality Date    hemodialysis access left arm      kidney removal       KIDNEY TRANSPLANT  04/15/2015    NEPHRECTOMY      OVARIAN CYST  REMOVAL      PARATHYROIDECTOMY      partial     right leg hemodialysis access      transplant nephrectomy  2017       Social History     Tobacco Use    Smoking status: Former Smoker     Packs/day: 0.50     Years: 14.00     Pack years: 7.00     Last attempt to quit: 2018     Years since quittin.1    Smokeless tobacco: Never Used   Substance Use Topics    Alcohol use: No    Drug use: No       Family History   Problem Relation Age of Onset    No Known Problems Mother     Colon cancer Father     Cancer Father     Hypertension Father     No Known Problems Sister     No Known Problems Brother     Kidney disease Maternal Aunt     Hypertension Maternal Aunt     Diabetes Maternal Uncle     Kidney disease Other     Hypertension Other        Patient's Medications   New Prescriptions    No medications on file   Previous Medications    AMLODIPINE (NORVASC) 10 MG TABLET    AMLODIPINE BESYLATE 10MG TABLETS  TK 1 T PO ONCE D    ASPIRIN (ECOTRIN) 81 MG EC TABLET    TAKE 1 BY MOUTH DAILY    ONDANSETRON (ZOFRAN-ODT) 4 MG TBDL    Take 4 mg by mouth every 8 (eight) hours as needed.    SEVELAMER HCL (RENAGEL) 800 MG TAB    Take 3 tablets (2,400 mg total) by mouth 3 (three) times daily with meals. Take 3 tablets TID with meals and 2 with snacks.   Modified Medications    Modified Medication Previous Medication    LABETALOL (NORMODYNE) 200 MG TABLET labetalol (NORMODYNE) 100 MG tablet       Take 1 tablet (200 mg total) by mouth 2 (two) times daily.    Take 100 mg by mouth 2 (two) times daily.   Discontinued Medications    No medications on file       Review of Systems   Constitutional: Positive for malaise/fatigue.   HENT: Negative.    Eyes: Negative.    Respiratory: Positive for shortness of breath.    Cardiovascular: Negative.    Gastrointestinal: Negative.    Genitourinary: Negative.    Musculoskeletal: Negative.    Skin: Negative.    Neurological: Negative.    Endo/Heme/Allergies: Negative.     Psychiatric/Behavioral: Negative.    All 12 systems otherwise negative.      Wt Readings from Last 3 Encounters:   01/02/20 95.3 kg (210 lb 1.6 oz)   12/03/19 93 kg (205 lb 0.4 oz)   11/20/19 89.9 kg (198 lb 3.1 oz)     Temp Readings from Last 3 Encounters:   11/20/19 97.7 °F (36.5 °C) (Oral)   07/03/19 97.8 °F (36.6 °C) (Oral)   02/12/19 98.5 °F (36.9 °C) (Oral)     BP Readings from Last 3 Encounters:   01/02/20 (!) 140/92   12/03/19 (!) 154/92   11/20/19 (!) 148/96     Pulse Readings from Last 3 Encounters:   01/02/20 79   11/20/19 71   07/03/19 77       BP (!) 140/92 (BP Location: Right arm, Patient Position: Sitting, BP Method: Medium (Manual))   Pulse 79   Wt 95.3 kg (210 lb 1.6 oz)   BMI 33.60 kg/m²     Objective:   Physical Exam   Constitutional: She is oriented to person, place, and time. She appears well-developed and well-nourished. No distress.   HENT:   Head: Normocephalic and atraumatic.   Nose: Nose normal.   Mouth/Throat: Oropharynx is clear and moist.   Eyes: Conjunctivae and EOM are normal. No scleral icterus.   Neck: Normal range of motion. Neck supple. No JVD present. No thyromegaly present.   Cardiovascular: Normal rate, regular rhythm, S1 normal and S2 normal. Exam reveals no gallop, no S3, no S4 and no friction rub.   Murmur heard.  Pulmonary/Chest: Effort normal and breath sounds normal. No stridor. No respiratory distress. She has no wheezes. She has no rales. She exhibits no tenderness.   Abdominal: Soft. Bowel sounds are normal. She exhibits no distension and no mass. There is no tenderness. There is no rebound.   Genitourinary:   Genitourinary Comments: Deferred   Musculoskeletal: Normal range of motion. She exhibits no edema, tenderness or deformity.   Lymphadenopathy:     She has no cervical adenopathy.   Neurological: She is alert and oriented to person, place, and time. She exhibits normal muscle tone. Coordination normal.   Skin: Skin is warm and dry. No rash noted. She is not  diaphoretic. No erythema. No pallor.   Psychiatric: She has a normal mood and affect. Her behavior is normal. Judgment and thought content normal.   Nursing note and vitals reviewed.      Lab Results   Component Value Date     11/20/2019    K 4.9 11/20/2019    CL 97 11/20/2019    CO2 26 11/20/2019    BUN 46 (H) 11/20/2019    CREATININE 9.7 (H) 11/20/2019    GLU 76 11/20/2019    HGBA1C 4.4 08/01/2018    MG 1.8 12/04/2017    AST 15 11/20/2019    ALT 12 11/20/2019    ALBUMIN 3.6 11/20/2019    PROT 7.1 11/20/2019    BILITOT 0.5 11/20/2019    WBC 6.21 11/20/2019    HGB 11.6 (L) 11/20/2019    HCT 37.2 11/20/2019    HCT 21 (L) 12/01/2017     (H) 11/20/2019     11/20/2019    INR 1.0 11/20/2019    TSH 1.274 08/09/2018    CHOL 130 11/20/2019    HDL 44 11/20/2019    LDLCALC 60.6 (L) 11/20/2019    TRIG 127 11/20/2019     Assessment:      1. Preop cardiovascular exam    2. End-stage renal disease on hemodialysis    3. Essential hypertension    4. Nonrheumatic tricuspid valve regurgitation    5. Pulmonary hypertension    6. DAVIDSON (dyspnea on exertion)    7. Other chest pain    8. Dyspnea on exertion        Plan:    1. Preop CV eval - renal transplant  - nuclear stress pending  - ECHO complete    2. HTN  - cont meds  - increase Labetolol    3. Tricuspid Reg  - cont to monitor  - needs more volume removal    4. Pulm HTN  - refer to pulm - r/o NASIMA vs other pulm etiologies  - likely needs more volume removal, may need RHC    5. Dyspnea  - check CXR, BNP    Thank you for allowing me to participate in this patient's care. Please do not hesitate to contact me with any questions or concerns. Consult note has been forwarded to the referral physician.

## 2020-01-03 ENCOUNTER — HOSPITAL ENCOUNTER (OUTPATIENT)
Dept: RADIOLOGY | Facility: HOSPITAL | Age: 30
Discharge: HOME | End: 2020-01-03
Attending: INTERNAL MEDICINE
Payer: MEDICARE

## 2020-01-03 DIAGNOSIS — R06.09 DOE (DYSPNEA ON EXERTION): ICD-10-CM

## 2020-01-03 PROCEDURE — 71046 X-RAY EXAM CHEST 2 VIEWS: CPT | Mod: TC,TXP

## 2020-01-03 PROCEDURE — 71046 XR CHEST PA AND LATERAL: ICD-10-PCS | Mod: 26,NTX,, | Performed by: RADIOLOGY

## 2020-01-03 PROCEDURE — 71046 X-RAY EXAM CHEST 2 VIEWS: CPT | Mod: 26,NTX,, | Performed by: RADIOLOGY

## 2020-01-08 ENCOUNTER — TELEPHONE (OUTPATIENT)
Dept: PULMONOLOGY | Facility: CLINIC | Age: 30
End: 2020-01-08

## 2020-01-08 NOTE — TELEPHONE ENCOUNTER
----- Message from Tracy Langston RRT sent at 1/6/2020  3:55 PM CST -----  Contact: Pt       ----- Message -----  From: Lanre Jack  Sent: 1/6/2020   3:47 PM CST  To: [facility] Clinical Staff    Type:  Patient Returning Call    Who Called:Leydi Cordero  Who Left Message for Patient:PETRA SUE  Does the patient know what this is regarding?:appointment  Would the patient rather a call back or a response [facility]? Call Back  Best Call Back Number:[phone] (home)  Additional Information:

## 2020-01-09 ENCOUNTER — CLINICAL SUPPORT (OUTPATIENT)
Dept: CARDIOLOGY | Facility: CLINIC | Age: 30
End: 2020-01-09
Attending: INTERNAL MEDICINE
Payer: MEDICARE

## 2020-01-09 ENCOUNTER — HOSPITAL ENCOUNTER (OUTPATIENT)
Dept: RADIOLOGY | Facility: HOSPITAL | Age: 30
Discharge: HOME | End: 2020-01-09
Attending: INTERNAL MEDICINE
Payer: MEDICARE

## 2020-01-09 DIAGNOSIS — R06.09 DYSPNEA ON EXERTION: ICD-10-CM

## 2020-01-09 DIAGNOSIS — R07.89 OTHER CHEST PAIN: ICD-10-CM

## 2020-01-09 DIAGNOSIS — Z01.810 PREOP CARDIOVASCULAR EXAM: ICD-10-CM

## 2020-01-09 PROCEDURE — 93016 CV STRESS TEST SUPVJ ONLY: CPT | Mod: S$PBB,NTX,, | Performed by: INTERNAL MEDICINE

## 2020-01-09 PROCEDURE — A9502 TC99M TETROFOSMIN: HCPCS | Mod: TXP

## 2020-01-09 PROCEDURE — 93016 NM MULTI TREAD STRESS CARDIAC COMPONENT: ICD-10-PCS | Mod: S$PBB,NTX,, | Performed by: INTERNAL MEDICINE

## 2020-01-09 PROCEDURE — 78452 NM MULTI TREAD STRESS CARDIAC COMPONENT: ICD-10-PCS | Mod: 26,NTX,, | Performed by: INTERNAL MEDICINE

## 2020-01-09 PROCEDURE — 93018 NM MULTI TREAD STRESS CARDIAC COMPONENT: ICD-10-PCS | Mod: S$PBB,NTX,, | Performed by: INTERNAL MEDICINE

## 2020-01-09 PROCEDURE — 93018 CV STRESS TEST I&R ONLY: CPT | Mod: S$PBB,NTX,, | Performed by: INTERNAL MEDICINE

## 2020-01-09 PROCEDURE — 78452 HT MUSCLE IMAGE SPECT MULT: CPT | Mod: 26,NTX,, | Performed by: INTERNAL MEDICINE

## 2020-01-09 PROCEDURE — 93017 CV STRESS TEST TRACING ONLY: CPT | Mod: PBBFAC,NTX | Performed by: INTERNAL MEDICINE

## 2020-01-10 LAB — DIASTOLIC DYSFUNCTION: NO

## 2020-01-17 ENCOUNTER — OFFICE VISIT (OUTPATIENT)
Dept: PULMONOLOGY | Facility: CLINIC | Age: 30
End: 2020-01-17
Payer: MEDICARE

## 2020-01-17 VITALS
OXYGEN SATURATION: 96 % | HEIGHT: 66 IN | RESPIRATION RATE: 20 BRPM | BODY MASS INDEX: 33.04 KG/M2 | DIASTOLIC BLOOD PRESSURE: 84 MMHG | SYSTOLIC BLOOD PRESSURE: 130 MMHG | HEART RATE: 95 BPM | WEIGHT: 205.56 LBS

## 2020-01-17 DIAGNOSIS — I07.1 MODERATE TRICUSPID REGURGITATION: ICD-10-CM

## 2020-01-17 DIAGNOSIS — Z87.891 FORMER SMOKER: ICD-10-CM

## 2020-01-17 DIAGNOSIS — J45.901 ASTHMATIC BRONCHITIS WITH ACUTE EXACERBATION, UNSPECIFIED ASTHMA SEVERITY, UNSPECIFIED WHETHER PERSISTENT: Primary | ICD-10-CM

## 2020-01-17 DIAGNOSIS — N18.6 ESRD (END STAGE RENAL DISEASE): ICD-10-CM

## 2020-01-17 DIAGNOSIS — I27.20 PULMONARY HYPERTENSION: ICD-10-CM

## 2020-01-17 PROCEDURE — 99999 PR PBB SHADOW E&M-EST. PATIENT-LVL IV: ICD-10-PCS | Mod: PBBFAC,TXP,, | Performed by: INTERNAL MEDICINE

## 2020-01-17 PROCEDURE — 99214 OFFICE O/P EST MOD 30 MIN: CPT | Mod: PBBFAC,TXP | Performed by: INTERNAL MEDICINE

## 2020-01-17 PROCEDURE — 99214 PR OFFICE/OUTPT VISIT, EST, LEVL IV, 30-39 MIN: ICD-10-PCS | Mod: S$PBB,ICN,NTX, | Performed by: INTERNAL MEDICINE

## 2020-01-17 PROCEDURE — 99999 PR PBB SHADOW E&M-EST. PATIENT-LVL IV: CPT | Mod: PBBFAC,TXP,, | Performed by: INTERNAL MEDICINE

## 2020-01-17 PROCEDURE — 99214 OFFICE O/P EST MOD 30 MIN: CPT | Mod: S$PBB,ICN,NTX, | Performed by: INTERNAL MEDICINE

## 2020-01-17 RX ORDER — SEVELAMER CARBONATE 800 MG/1
TABLET, FILM COATED ORAL
COMMUNITY
Start: 2019-12-11 | End: 2020-01-17 | Stop reason: SDUPTHER

## 2020-01-17 RX ORDER — PREDNISONE 10 MG/1
TABLET ORAL
Qty: 21 TABLET | Refills: 0 | Status: ON HOLD | OUTPATIENT
Start: 2020-01-17 | End: 2021-01-11 | Stop reason: HOSPADM

## 2020-01-17 RX ORDER — AZITHROMYCIN 250 MG/1
TABLET, FILM COATED ORAL
Qty: 6 TABLET | Refills: 0 | Status: SHIPPED | OUTPATIENT
Start: 2020-01-17 | End: 2020-06-29

## 2020-01-17 RX ORDER — ALBUTEROL SULFATE 90 UG/1
2 AEROSOL, METERED RESPIRATORY (INHALATION) EVERY 6 HOURS PRN
Qty: 18 G | Refills: 11 | Status: SHIPPED | OUTPATIENT
Start: 2020-01-17 | End: 2021-01-15 | Stop reason: ALTCHOICE

## 2020-01-17 NOTE — PROGRESS NOTES
Initial Outpatient Pulmonary Evaluation       SUBJECTIVE:     Chief Complaint   Patient presents with    Shortness of Breath       History of Present Illness:    Patient is a 29 y.o. female O[place]i-speaking patient translators on the screen translating.    Referred to me by Cardiology for evaluation of worsening coughing with yellow sputum production wheezing and shortness of breaths at chest tightness and nighttime symptoms at least 2 nights per week for the last 3 months.    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  She is still urinating small amounts.    Seven pack year smoking history quit in 2018.    Has a cousin and an aunt with asthma.        Review of Systems   Constitutional: Positive for weight gain, fatigue and weakness. Negative for fever and chills.   HENT: Negative for nosebleeds.    Eyes: Negative for redness.   Respiratory: Positive for cough, sputum production, shortness of breath, wheezing and dyspnea on extertion. Negative for choking.    Cardiovascular: Negative for chest pain and leg swelling.   Genitourinary: Negative for hematuria.        Kidney transplant rejection now on dialysis   Endocrine: Negative for cold intolerance.    Musculoskeletal: Positive for arthralgias.        Left upper extremity AV fistula   Skin: Negative for rash.   Gastrointestinal: Negative for vomiting.   Neurological: Negative for syncope.   Hematological: Negative for adenopathy.   Psychiatric/Behavioral: Positive for sleep disturbance. Negative for confusion.       Review of patient's allergies indicates:   Allergen Reactions    Heparin analogues Rash       Current Outpatient Medications   Medication Sig Dispense Refill    amLODIPine (NORVASC) 10 MG tablet AMLODIPINE BESYLATE 10MG  TABLETS  TK 1 T PO ONCE D      aspirin (ECOTRIN) 81 MG EC tablet TAKE 1 BY MOUTH DAILY 100 tablet 10    labetalol (NORMODYNE) 200 MG tablet Take 1 tablet (200 mg total) by mouth 2 (two) times daily. 60 tablet 2    ondansetron (ZOFRAN-ODT) 4 MG TbDL Take 4 mg by mouth every 8 (eight) hours as needed.      sevelamer HCl (RENAGEL) 800 MG Tab Take 3 tablets (2,400 mg total) by mouth 3 (three) times daily with meals. Take 3 tablets TID with meals and 2 with snacks. 330 tablet 3    albuterol (PROVENTIL/VENTOLIN HFA) 90 mcg/actuation inhaler Inhale 2 puffs into the lungs every 6 (six) hours as needed for Wheezing. Rescue 18 g 11    azithromycin (Z-ANGEL) 250 MG tablet Take 2 tablets by mouth on day 1; Take 1 tablet by mouth on days 2-5 6 tablet 0    predniSONE (DELTASONE) 10 MG tablet Prednisone 40 mg daily for 3 days then 20 mg daily for 3 days then 10 mg daily for 3 days 21 tablet 0     No current facility-administered medications for this visit.        Past Medical History:   Diagnosis Date    Encounter for blood transfusion     ESRD (end stage renal disease)     Fibroma     H/O kidney transplant     Hypertension     Hypertensive nephropathy     Ovarian cyst      Past Surgical History:   Procedure Laterality Date    hemodialysis access left arm      kidney removal       KIDNEY TRANSPLANT  04/15/2015    NEPHRECTOMY      OVARIAN CYST REMOVAL      PARATHYROIDECTOMY      partial     right leg hemodialysis access      transplant nephrectomy  12/01/2017     Family History   Problem Relation Age of Onset    No Known Problems Mother     Colon cancer Father     Cancer Father     Hypertension Father     No Known Problems Sister     No Known Problems Brother     Kidney disease Maternal Aunt     Hypertension Maternal Aunt     Diabetes Maternal Uncle     Kidney disease Other     Hypertension Other      Social History     Tobacco Use    Smoking status: Former Smoker     Packs/day: 0.50     Years: 14.00  "    Pack years: 7.00     Last attempt to quit: 2018     Years since quittin.1    Smokeless tobacco: Never Used   Substance Use Topics    Alcohol use: No    Drug use: No          OBJECTIVE:     Vital Signs (Most Recent)  Vital Signs  Pulse: 95  Resp: 20  SpO2: 96 %  BP: 130/84  Height and Weight  Height: 5' 6.3" (168.4 cm)  Weight: 93.2 kg (205 lb 9.3 oz)  BSA (Calculated - sq m): 2.09 sq meters  BMI (Calculated): 32.9  Weight in (lb) to have BMI = 25: 156]  Wt Readings from Last 2 Encounters:   20 93.2 kg (205 lb 9.3 oz)   20 95.3 kg (210 lb 1.6 oz)         Physical Exam:  Physical Exam   Constitutional: She is oriented to person, place, and time. She appears well-developed and well-nourished. She is obese.   HENT:   Head: Normocephalic.   Neck: Neck supple.   Cardiovascular: Normal rate, regular rhythm and normal heart sounds.   Pulmonary/Chest: Normal expansion and effort normal. No stridor. No respiratory distress. She has decreased breath sounds. She has wheezes. She exhibits no tenderness.   Abdominal: Soft.   Musculoskeletal: She exhibits deformity. She exhibits no tenderness.   Left upper extremity AV fistula   Lymphadenopathy:     She has no cervical adenopathy.   Neurological: She is alert and oriented to person, place, and time. Gait normal.   Skin: Skin is warm. No cyanosis. Nails show no clubbing.   Psychiatric: She has a normal mood and affect. Her behavior is normal. Judgment and thought content normal.   Nursing note and vitals reviewed.      Laboratory:    Lab Results   Component Value Date    WBC 6.21 2019    RBC 3.65 (L) 2019    HGB 11.6 (L) 2019    HCT 37.2 2019     (H) 2019    MCH 31.8 (H) 2019    MCHC 31.2 (L) 2019    RDW 16.7 (H) 2019     2019    MPV 10.9 2019    GRAN 4.3 2019    GRAN 69.6 2019    LYMPH 1.1 2019    LYMPH 18.4 2019    MONO 0.5 2019    MONO 8.5 " 11/20/2019    EOS 0.2 11/20/2019    BASO 0.04 11/20/2019    EOSINOPHIL 2.6 11/20/2019    BASOPHIL 0.6 11/20/2019       BMP  Lab Results   Component Value Date     11/20/2019    K 4.9 11/20/2019    CL 97 11/20/2019    CO2 26 11/20/2019    BUN 46 (H) 11/20/2019    CREATININE 9.7 (H) 11/20/2019    CALCIUM 7.7 (L) 11/20/2019    ANIONGAP 13 11/20/2019    ESTGFRAFRICA 5.7 (A) 11/20/2019    EGFRNONAA 4.9 (A) 11/20/2019    AST 15 11/20/2019    ALT 12 11/20/2019    PROT 7.1 11/20/2019       Lab Results   Component Value Date     (H) 01/03/2020       Lab Results   Component Value Date    TSH 1.274 08/09/2018       No results found for: SEDRATE    No results found for: CRP    No results found for: IGE    No results found for: ASPERGILLUS  No results found for: AFUMIGATUSCL     No results found for: ACE    Diagnostic Results:    I have personally reviewed today the following studies :      CXR 1/3/2019 clear lungs     Nuc stress 1/2020:    Impression: NORMAL MYOCARDIAL PERFUSION  1. The perfusion scan is free of evidence for myocardial ischemia or injury.   2. Resting wall motion is physiologic.   3. Resting LV function is normal.   4. The ventricular volumes are normal at rest and stress.   5. The extracardiac distribution of radioactivity is normal.      Echo Dec 2019:      1 - Concentric hypertrophy.     2 - No wall motion abnormalities.     3 - Normal left ventricular systolic function (EF 60-65%).     4 - Normal left ventricular diastolic function.     5 - Normal right ventricular systolic function .     6 - Pulmonary hypertension. The estimated PA systolic pressure is 45 mmHg.     7 - Trivial mitral regurgitation.     8 - Moderate to severe tricuspid regurgitation    Echo 8/2018    1 - Concentric remodeling.     2 - No wall motion abnormalities.     3 - Normal left ventricular systolic function (EF 55-60%).     4 - Normal left ventricular diastolic function.     5 - Normal right ventricular systolic function  .     6 - The estimated PA systolic pressure is 23 mmHg.     7 - Moderate tricuspid regurgitation    ASSESSMENT/PLAN:     Asthmatic bronchitis with acute exacerbation, unspecified asthma severity, unspecified whether persistent  -     azithromycin (Z-ANGEL) 250 MG tablet; Take 2 tablets by mouth on day 1; Take 1 tablet by mouth on days 2-5  Dispense: 6 tablet; Refill: 0  -     predniSONE (DELTASONE) 10 MG tablet; Prednisone 40 mg daily for 3 days then 20 mg daily for 3 days then 10 mg daily for 3 days  Dispense: 21 tablet; Refill: 0  -     albuterol (PROVENTIL/VENTOLIN HFA) 90 mcg/actuation inhaler; Inhale 2 puffs into the lungs every 6 (six) hours as needed for Wheezing. Rescue  Dispense: 18 g; Refill: 11  -     Spirometry with/without bronchodilator; Future; Expected date: 01/31/2020    Pulmonary hypertension  -     Ambulatory Referral to Cardiology  -     NM Lung Scan Ventilation Perfusion; Future; Expected date: 01/17/2020    Moderate tricuspid regurgitation  -     NM Lung Scan Ventilation Perfusion; Future; Expected date: 01/17/2020    ESRD (end stage renal disease)    Former smoker      Asthmatic bronchitis.  With exacerbation.  Albuterol p.r.n.    Prednisone taper.  Z-Angel.    Rule out pulmonary venous thromboembolism V/Q scan.    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    AV fistula presence also can be a contributing factor.    Continue hemodialysis for now.    Encouraged patient to continue smoking cessation.    Will likely start a controller inhaler next visit.    Follow up in about 3 weeks (around 2/7/2020).    This note was prepared using voice recognition system and is likely to have sound alike errors that may have been overlooked even after proof reading.  Please call me with any questions    Discussed diagnosis, its evaluation, treatment and usual course. All questions answered.    Thank you for the courtesy of participating in the care of this  patient    Maribeth Barajas MD

## 2020-01-17 NOTE — LETTER
January 17, 2020      Carlos Trinidad MD  [address]  [place] [address]           [facility]  [address] [place] [id number]  Phone: [phone]  Fax: [phone]          Patient: Leydi Cordero   MR Number: [id number]   YOB: 1990   Date of Visit: 1/17/2020       Dear Dr. Carlos Trinidad:    Thank you for referring Leydi Cordero to me for evaluation. Attached you will find relevant portions of my assessment and plan of care.    If you have questions, please do not hesitate to call me. I look forward to following Leydi Cordero along with you.    Sincerely,    Maribeth Barajas MD    Enclosure  CC:  No Recipients    If you would like to receive this communication electronically, please contact [email or web address] or [phone] to request more information on [employer] access.    For providers and/or their staff who would like to refer a patient to [facility], please contact us through our one-stop-shop provider referral line, [facility], at [phone].    If you feel you have received this communication in error or would no longer like to receive these types of communications, please e-mail [email or web address]

## 2020-01-23 ENCOUNTER — HOSPITAL ENCOUNTER (OUTPATIENT)
Dept: RADIOLOGY | Facility: HOSPITAL | Age: 30
Discharge: HOME | End: 2020-01-23
Attending: INTERNAL MEDICINE
Payer: MEDICARE

## 2020-01-23 DIAGNOSIS — I27.20 PULMONARY HYPERTENSION: ICD-10-CM

## 2020-01-23 DIAGNOSIS — I07.1 MODERATE TRICUSPID REGURGITATION: ICD-10-CM

## 2020-01-23 PROCEDURE — A9540 TC99M MAA: HCPCS | Mod: NTX

## 2020-01-23 PROCEDURE — 71046 X-RAY EXAM CHEST 2 VIEWS: CPT | Mod: TC,TXP

## 2020-02-27 ENCOUNTER — TELEPHONE (OUTPATIENT)
Dept: PULMONOLOGY | Facility: CLINIC | Age: 30
End: 2020-02-27

## 2020-04-06 DIAGNOSIS — Z76.0 MEDICATION REFILL: ICD-10-CM

## 2020-04-06 DIAGNOSIS — N18.6 ESRD (END STAGE RENAL DISEASE): ICD-10-CM

## 2020-04-06 RX ORDER — SEVELAMER HYDROCHLORIDE 800 MG/1
2400 TABLET, FILM COATED ORAL
Qty: 330 TABLET | Refills: 3 | Status: ON HOLD | OUTPATIENT
Start: 2020-04-06 | End: 2021-01-14 | Stop reason: HOSPADM

## 2020-04-15 ENCOUNTER — HOSPITAL ENCOUNTER (EMERGENCY)
Facility: HOSPITAL | Age: 30
Discharge: HOME | End: 2020-04-15
Attending: EMERGENCY MEDICINE
Payer: MEDICARE

## 2020-04-15 VITALS
TEMPERATURE: 98 F | SYSTOLIC BLOOD PRESSURE: 147 MMHG | HEART RATE: 83 BPM | RESPIRATION RATE: 19 BRPM | OXYGEN SATURATION: 100 % | DIASTOLIC BLOOD PRESSURE: 83 MMHG

## 2020-04-15 DIAGNOSIS — N18.6 ESRD ON HEMODIALYSIS: ICD-10-CM

## 2020-04-15 DIAGNOSIS — R51.9 NONINTRACTABLE HEADACHE, UNSPECIFIED CHRONICITY PATTERN, UNSPECIFIED HEADACHE TYPE: Primary | ICD-10-CM

## 2020-04-15 DIAGNOSIS — Z99.2 ESRD ON HEMODIALYSIS: ICD-10-CM

## 2020-04-15 DIAGNOSIS — R20.0 NUMBNESS: ICD-10-CM

## 2020-04-15 LAB
ALBUMIN SERPL BCP-MCNC: 3.6 G/DL (ref 3.5–5.2)
ALP SERPL-CCNC: 104 U/L (ref 55–135)
ALT SERPL W/O P-5'-P-CCNC: 14 U/L (ref 10–44)
ANION GAP SERPL CALC-SCNC: 19 MMOL/L (ref 8–16)
AST SERPL-CCNC: 25 U/L (ref 10–40)
BASOPHILS # BLD AUTO: 0.03 K/UL (ref 0–0.2)
BASOPHILS NFR BLD: 0.2 % (ref 0–1.9)
BILIRUB SERPL-MCNC: 0.4 MG/DL (ref 0.1–1)
BUN SERPL-MCNC: 26 MG/DL (ref 6–20)
CALCIUM SERPL-MCNC: 8.4 MG/DL (ref 8.7–10.5)
CHLORIDE SERPL-SCNC: 91 MMOL/L (ref 95–110)
CO2 SERPL-SCNC: 23 MMOL/L (ref 23–29)
CREAT SERPL-MCNC: 6.6 MG/DL (ref 0.5–1.4)
DIFFERENTIAL METHOD: ABNORMAL
EOSINOPHIL # BLD AUTO: 0 K/UL (ref 0–0.5)
EOSINOPHIL NFR BLD: 0.3 % (ref 0–8)
ERYTHROCYTE [DISTWIDTH] IN BLOOD BY AUTOMATED COUNT: 14.2 % (ref 11.5–14.5)
EST. GFR  (AFRICAN AMERICAN): 9 ML/MIN/1.73 M^2
EST. GFR  (NON AFRICAN AMERICAN): 8 ML/MIN/1.73 M^2
GLUCOSE SERPL-MCNC: 88 MG/DL (ref 70–110)
HCT VFR BLD AUTO: 41.2 % (ref 37–48.5)
HGB BLD-MCNC: 14.1 G/DL (ref 12–16)
IMM GRANULOCYTES # BLD AUTO: 0.09 K/UL (ref 0–0.04)
IMM GRANULOCYTES NFR BLD AUTO: 0.7 % (ref 0–0.5)
LIPASE SERPL-CCNC: 55 U/L (ref 4–60)
LYMPHOCYTES # BLD AUTO: 0.7 K/UL (ref 1–4.8)
LYMPHOCYTES NFR BLD: 5.7 % (ref 18–48)
MCH RBC QN AUTO: 35 PG (ref 27–31)
MCHC RBC AUTO-ENTMCNC: 34.2 G/DL (ref 32–36)
MCV RBC AUTO: 102 FL (ref 82–98)
MONOCYTES # BLD AUTO: 0.7 K/UL (ref 0.3–1)
MONOCYTES NFR BLD: 5.4 % (ref 4–15)
NEUTROPHILS # BLD AUTO: 10.9 K/UL (ref 1.8–7.7)
NEUTROPHILS NFR BLD: 87.7 % (ref 38–73)
NRBC BLD-RTO: 0 /100 WBC
PLATELET # BLD AUTO: 124 K/UL (ref 150–350)
PMV BLD AUTO: 10.7 FL (ref 9.2–12.9)
POCT GLUCOSE: 96 MG/DL (ref 70–110)
POTASSIUM SERPL-SCNC: 4.6 MMOL/L (ref 3.5–5.1)
PROT SERPL-MCNC: 8 G/DL (ref 6–8.4)
RBC # BLD AUTO: 4.03 M/UL (ref 4–5.4)
SODIUM SERPL-SCNC: 133 MMOL/L (ref 136–145)
WBC # BLD AUTO: 12.49 K/UL (ref 3.9–12.7)

## 2020-04-15 PROCEDURE — 93010 EKG 12-LEAD: ICD-10-PCS | Mod: NTX,,, | Performed by: INTERNAL MEDICINE

## 2020-04-15 PROCEDURE — 85025 COMPLETE CBC W/AUTO DIFF WBC: CPT | Mod: NTX

## 2020-04-15 PROCEDURE — 80053 COMPREHEN METABOLIC PANEL: CPT | Mod: NTX

## 2020-04-15 PROCEDURE — 93010 ELECTROCARDIOGRAM REPORT: CPT | Mod: NTX,,, | Performed by: INTERNAL MEDICINE

## 2020-04-15 PROCEDURE — 93005 ELECTROCARDIOGRAM TRACING: CPT | Mod: NTX

## 2020-04-15 PROCEDURE — 99284 EMERGENCY DEPT VISIT MOD MDM: CPT | Mod: 25,NTX

## 2020-04-15 PROCEDURE — 83690 ASSAY OF LIPASE: CPT | Mod: NTX

## 2020-04-15 PROCEDURE — 82962 GLUCOSE BLOOD TEST: CPT | Mod: NTX

## 2020-04-15 RX ORDER — BUTALBITAL, ACETAMINOPHEN AND CAFFEINE 50; 325; 40 MG/1; MG/1; MG/1
1 TABLET ORAL EVERY 4 HOURS PRN
Qty: 30 TABLET | Refills: 0 | Status: SHIPPED | OUTPATIENT
Start: 2020-04-15 | End: 2020-05-15

## 2020-04-15 NOTE — ED PROVIDER NOTES
SCRIBE #1 NOTE: ILokesh am scribing for, and in the presence of, Montrell Chavez Jr., MD. I have scribed the HPI, ROS, and PEx.     SCRIBE #2 NOTE: I, Vane Moore, am scribing for, and in the presence of,  Daniel Collier Jr., MD. I have scribed the remaining portions of the note not scribed by Scribe #1.     History      Chief Complaint   Patient presents with    Numbness     c/o numbness to left arm onset at 1345 with swelling to her tongue. Pt has HD fistula to left arm.    Oral Swelling       Review of patient's allergies indicates:   Allergen Reactions    Heparin analogues Rash        HPI   HPI    4/15/2020, 3:18 PM   History obtained from the patient      History of Present Illness: Leydi Cordero is a 30 y.o. female patient with a PMHx of ESRD who presents to the Emergency Department for LUE numbness, onset 13:45 today after dialysis. Pt dialyzes every M/W/F. Symptoms are constant and moderate in severity. No mitigating or exacerbating factors reported. Associated sxs include n/v en route to the ED. Patient denies any fever, chills, SOB, CP, weakness, dizziness, headache, and all other sxs at this time. No prior Tx reported. No further complaints or concerns at this time.     Arrival mode: EMS    PCP: Primary Doctor No       Past Medical History:  Past Medical History:   Diagnosis Date    Encounter for blood transfusion     ESRD (end stage renal disease)     Fibroma     H/O kidney transplant     Hypertension     Hypertensive nephropathy     Ovarian cyst        Past Surgical History:  Past Surgical History:   Procedure Laterality Date    hemodialysis access left arm      kidney removal       KIDNEY TRANSPLANT  04/15/2015    NEPHRECTOMY      OVARIAN CYST REMOVAL      PARATHYROIDECTOMY      partial     right leg hemodialysis access      transplant nephrectomy  12/01/2017         Family History:  Family History   Problem Relation Age of Onset    No Known Problems Mother      Colon cancer Father     Cancer Father     Hypertension Father     No Known Problems Sister     No Known Problems Brother     Kidney disease Maternal Aunt     Hypertension Maternal Aunt     Diabetes Maternal Uncle     Kidney disease Other     Hypertension Other        Social History:  Social History     Tobacco Use    Smoking status: Former Smoker     Packs/day: 0.50     Years: 14.00     Pack years: 7.00     Last attempt to quit: 2018     Years since quittin.4    Smokeless tobacco: Never Used   Substance and Sexual Activity    Alcohol use: No    Drug use: No    Sexual activity: Yes     Partners: Male     Birth control/protection: None       ROS   Review of Systems   Constitutional: Negative for chills, diaphoresis, fatigue and fever.   HENT: Negative for sore throat.    Respiratory: Negative for shortness of breath.    Cardiovascular: Negative for chest pain.   Gastrointestinal: Positive for nausea (en route to the ED) and vomiting (en route to the ED). Negative for diarrhea.   Genitourinary: Negative for dysuria.   Musculoskeletal: Negative for back pain.   Skin: Negative for rash.   Neurological: Positive for numbness (LUE). Negative for dizziness, seizures, weakness, light-headedness and headaches.   Hematological: Does not bruise/bleed easily.   All other systems reviewed and are negative.    Physical Exam      Initial Vitals [04/15/20 1509]   BP Pulse Resp Temp SpO2   (!) 130/100 90 20 97.9 °F (36.6 °C) 99 %      MAP       --          Physical Exam  Nursing Notes and Vital Signs Reviewed.  Constitutional: Patient is in no acute distress. Well-developed and well-nourished.  Head: Atraumatic. Normocephalic.  Eyes: PERRL. EOM intact. Conjunctivae are not pale. No scleral icterus.  ENT: Mucous membranes are moist. Oropharynx is clear and symmetric.    Neck: Supple. Full ROM. No lymphadenopathy.  Cardiovascular: Regular rate. Regular rhythm. No murmurs, rubs, or gallops. Distal pulses are 2+  and symmetric.  Pulmonary/Chest: No respiratory distress. Clear to auscultation bilaterally. No wheezing or rales.  Abdominal: Soft and non-distended.  There is no tenderness.  No rebound, guarding, or rigidity.   Musculoskeletal: Moves all extremities. No obvious deformities. No edema. Tape to LUE dialysis shunt.  Skin: Warm and dry.  Neurological:  Alert, awake, and appropriate.  Normal speech.  No acute focal neurological deficits are appreciated.  Psychiatric: Normal affect. Good eye contact. Appropriate in content.    ED Course    Procedures  ED Vital Signs:  Vitals:    04/15/20 1509 04/15/20 1521 04/15/20 1529 04/15/20 1708   BP: (!) 130/100      Pulse: 90 86 92 80   Resp: 20 17  13   Temp: 97.9 °F (36.6 °C)      TempSrc: Oral      SpO2: 99% 96%  100%    04/15/20 1718 04/15/20 1732   BP: (!) 149/92 (!) 147/83   Pulse:  83   Resp:  19   Temp:     TempSrc:     SpO2:  100%       Abnormal Lab Results:  Labs Reviewed   CBC W/ AUTO DIFFERENTIAL - Abnormal; Notable for the following components:       Result Value    Mean Corpuscular Volume 102 (*)     Mean Corpuscular Hemoglobin 35.0 (*)     Platelets 124 (*)     Immature Granulocytes 0.7 (*)     Gran # (ANC) 10.9 (*)     Immature Grans (Abs) 0.09 (*)     Lymph # 0.7 (*)     Gran% 87.7 (*)     Lymph% 5.7 (*)     All other components within normal limits   COMPREHENSIVE METABOLIC PANEL - Abnormal; Notable for the following components:    Sodium 133 (*)     Chloride 91 (*)     BUN, Bld 26 (*)     Creatinine 6.6 (*)     Calcium 8.4 (*)     Anion Gap 19 (*)     eGFR if  9 (*)     eGFR if non  8 (*)     All other components within normal limits   LIPASE   POCT GLUCOSE        All Lab Results:  Results for orders placed or performed during the hospital encounter of 04/15/20   CBC auto differential   Result Value Ref Range    WBC 12.49 3.90 - 12.70 K/uL    RBC 4.03 4.00 - 5.40 M/uL    Hemoglobin 14.1 12.0 - 16.0 g/dL    Hematocrit 41.2 37.0 -  48.5 %    Mean Corpuscular Volume 102 (H) 82 - 98 fL    Mean Corpuscular Hemoglobin 35.0 (H) 27.0 - 31.0 pg    Mean Corpuscular Hemoglobin Conc 34.2 32.0 - 36.0 g/dL    RDW 14.2 11.5 - 14.5 %    Platelets 124 (L) 150 - 350 K/uL    MPV 10.7 9.2 - 12.9 fL    Immature Granulocytes 0.7 (H) 0.0 - 0.5 %    Gran # (ANC) 10.9 (H) 1.8 - 7.7 K/uL    Immature Grans (Abs) 0.09 (H) 0.00 - 0.04 K/uL    Lymph # 0.7 (L) 1.0 - 4.8 K/uL    Mono # 0.7 0.3 - 1.0 K/uL    Eos # 0.0 0.0 - 0.5 K/uL    Baso # 0.03 0.00 - 0.20 K/uL    nRBC 0 0 /100 WBC    Gran% 87.7 (H) 38.0 - 73.0 %    Lymph% 5.7 (L) 18.0 - 48.0 %    Mono% 5.4 4.0 - 15.0 %    Eosinophil% 0.3 0.0 - 8.0 %    Basophil% 0.2 0.0 - 1.9 %    Differential Method Automated    Comprehensive metabolic panel   Result Value Ref Range    Sodium 133 (L) 136 - 145 mmol/L    Potassium 4.6 3.5 - 5.1 mmol/L    Chloride 91 (L) 95 - 110 mmol/L    CO2 23 23 - 29 mmol/L    Glucose 88 70 - 110 mg/dL    BUN, Bld 26 (H) 6 - 20 mg/dL    Creatinine 6.6 (H) 0.5 - 1.4 mg/dL    Calcium 8.4 (L) 8.7 - 10.5 mg/dL    Total Protein 8.0 6.0 - 8.4 g/dL    Albumin 3.6 3.5 - 5.2 g/dL    Total Bilirubin 0.4 0.1 - 1.0 mg/dL    Alkaline Phosphatase 104 55 - 135 U/L    AST 25 10 - 40 U/L    ALT 14 10 - 44 U/L    Anion Gap 19 (H) 8 - 16 mmol/L    eGFR if African American 9 (A) >60 mL/min/1.73 m^2    eGFR if non African American 8 (A) >60 mL/min/1.73 m^2   Lipase   Result Value Ref Range    Lipase 55 4 - 60 U/L   POCT glucose   Result Value Ref Range    POCT Glucose 96 70 - 110 mg/dL     Imaging Results:  Imaging Results          CT Head Without Contrast (Final result)  Result time 04/15/20 16:23:11    Final result by Reno Marie Jr., MD (04/15/20 16:23:11)                 Impression:      No acute or significant CT abnormality.    All CT scans at this facility use dose modulation, iterative reconstruction, and/or weight base dosing when appropriate to reduce radiation dose to as low as reasonably  achievable.      Electronically signed by: Reno Marie Jr., MD  Date:    04/15/2020  Time:    16:23             Narrative:    EXAMINATION:  CT HEAD WITHOUT CONTRAST    CLINICAL HISTORY:  Focal neuro deficit, new, fixed or worsening, <6 hours;    TECHNIQUE:  Contiguous axial images were obtained from the skull base through the vertex without intravenous contrast.    COMPARISON:  Prior CT from February 12, 2019.    FINDINGS:  No intracranial hemorrhage. No mass effect or midline shift. Normal parenchymal attenuation. The ventricles and sulci are normal in size and configuration. The paranasal sinuses and mastoid air cells are clear.  No concerning osseous findings.                               US Upper Extremity Arteries Left (Final result)  Result time 04/15/20 16:19:14    Final result by Reno Marie Jr., MD (04/15/20 16:19:14)                 Impression:      Elevated velocities between the left axillary artery and dialysis graft suggest stenosis of 20-49% in this area.  No sign of occlusion.      Electronically signed by: Reno Marie Jr., MD  Date:    04/15/2020  Time:    16:19             Narrative:    EXAMINATION:  US UPPER EXTREMITY ARTERIES LEFT    CLINICAL HISTORY:  Anesthesia of skin    TECHNIQUE:  Real-time sonographic evaluation was performed of the left upper extremity arteries.  Color flow, Doppler, and grayscale spot images were obtained.    COMPARISON:  None    FINDINGS:  Arterial waveforms and peak systolic velocities in cm/s were obtained as detailed below:    Left subclavian artery: Triphasic, 100 centimeters/second    Left axillary artery: Triphasic, 95 centimeters/second    Left brachial artery dialysis graft: Triphasic, 189 centimeters/second    Left brachial artery mid: Triphasic, 60 centimeters/second    Left brachial artery distal: Triphasic, 79 centimeters/second    Left radial artery: Triphasic, 51 centimeters/second    Surrounding soft tissues are unremarkable.                                The EKG was ordered, reviewed, and independently interpreted by the ED provider.  Interpretation time: 15:29  Rate: 92 BPM  Rhythm: normal sinus rhythm  Interpretation: LVH. Prolonged QT. No STEMI.           The Emergency Provider reviewed the vital signs and test results, which are outlined above.    ED Discussion     4:00 PM: Dr. Chavez transfers care of pt to Dr. Collier pending lab and imaging results.    4:27 PM: Dr. Collier evaluated pt. Agree with Dr. Chavez's assessment. D/w pt any concerns expressed at this time. Answered all questions. Pt expresses understanding at this time.    5:57 PM: Reassessed pt at this time. Pt states numbness has completely resolved. No focal neurology deficits noted. He is c/o of a mild HA but it is improving. Discussed with pt all pertinent ED information and results. Discussed pt dx and plan of tx. Gave pt all f/u and return to the ED instructions. All questions and concerns were addressed at this time. Pt expresses understanding of information and instructions, and is comfortable with plan to discharge. Pt is stable for discharge.    I discussed with patient and/or family/caretaker that evaluation in the ED does not suggest any emergent or life threatening medical conditions requiring immediate intervention beyond what was provided in the ED, and I believe patient is safe for discharge.  Regardless, an unremarkable evaluation in the ED does not preclude the development or presence of a serious of life threatening condition. As such, patient was instructed to return immediately for any worsening or change in current symptoms.       Patient's headache is either consistent with previous headache and/or lacks features concerning for emergent or life threatening condition.  I do not suspect SAH, meningitis, increased IC pressure, infectious, toxic, vascular, CNS, or other EMC.  I have discussed this at length with patient and/or family/caretaker.         ED  Medication(s):  Medications - No data to display    Follow-up Information     [facility]. Schedule an appointment as soon as possible for a visit in 1 week.    Contact information:  [address] [facility] [address] [phone]             [facility] - Internal Medicine. Schedule an appointment as soon as possible for a visit in 1 week.    Specialty:  Internal Medicine  Contact information:  [address] [facility] [address]  [facility] [address] [phone]           [facility] - .    Specialty:  Emergency Medicine  Why:  As needed, If symptoms worsen  Contact information:  [address] [facility] [address] [phone]                Discharge Medication List as of 4/15/2020  5:50 PM      START taking these medications    Details   butalbital-acetaminophen-caffeine -40 mg (FIORICET, ESGIC) -40 mg per tablet Take 1 tablet by mouth every 4 (four) hours as needed for Pain or Headaches., Starting Wed 4/15/2020, Until Fri 5/15/2020, Print               Medical Decision Making    Medical Decision Making:   Clinical Tests:   Lab Tests: Ordered and Reviewed  Radiological Study: Ordered and Reviewed  Medical Tests: Ordered and Reviewed           Scribe Attestation:   Scribe #1: I performed the above scribed service and the documentation accurately describes the services I performed. I attest to the accuracy of the note.    Attending:   Physician Attestation Statement for Scribe #1: I, Montrell Chavez Jr., MD, personally performed the services described in this documentation, as scribed by Lokesh Reid, in my presence, and it is both accurate and complete.       Scribe Attestation:   Scribe #2: I performed the above scribed service and the documentation accurately describes the services I performed. I attest to the accuracy of the note.    Attending Attestation:           Physician Attestation for Scribe:    Physician Attestation Statement for Scribe #2: I,  Daniel Collier Jr., MD, reviewed documentation, as scribed by Vane Moore in my presence, and it is both accurate and complete. I also acknowledge and confirm the content of the note done by Scribe #1.          Clinical Impression       ICD-10-CM ICD-9-CM   1. Nonintractable headache, unspecified chronicity pattern, unspecified headache type R51 784.0   2. Numbness R20.0 782.0   3. ESRD on hemodialysis N18.6 585.6    Z99.2 V45.11       Disposition:   Disposition: Discharged  Condition: Stable         Daniel Collier Jr., MD  04/16/20 0016

## 2020-06-11 ENCOUNTER — TELEPHONE (OUTPATIENT)
Dept: TRANSPLANT | Facility: CLINIC | Age: 30
End: 2020-06-11

## 2020-06-11 NOTE — TELEPHONE ENCOUNTER
I attempted to call Leydi about her transplant workup using  however I got voice mail. I called the dialysis unit and left a message to have her call me via  phone number. She will be there tomorrow and get that message. She has no showed appts, potentially related to Covid,  for pulmonary testing and follow up, and a colposcopy.

## 2020-06-12 ENCOUNTER — TELEPHONE (OUTPATIENT)
Dept: TRANSPLANT | Facility: CLINIC | Age: 30
End: 2020-06-12

## 2020-06-12 NOTE — TELEPHONE ENCOUNTER
Call received via interpretor as a return call. I advised that Leydi has not completed her workup and this must be done to complete her transplant workup. I verified with her that she is still interested in transplant. I advised that we will be rescheduling he appts and if she fails to follow through she is at risk of having her transplant chart closed. She is agreeable to appts and voices understanding. I have sent a message to gyn to get her colpo rescheduled.

## 2020-06-18 ENCOUNTER — TELEPHONE (OUTPATIENT)
Dept: TRANSPLANT | Facility: CLINIC | Age: 30
End: 2020-06-18

## 2020-06-23 ENCOUNTER — TELEPHONE (OUTPATIENT)
Dept: TRANSPLANT | Facility: CLINIC | Age: 30
End: 2020-06-23

## 2020-06-29 ENCOUNTER — PROCEDURE VISIT (OUTPATIENT)
Dept: OBSTETRICS AND GYNECOLOGY | Facility: CLINIC | Age: 30
End: 2020-06-29
Payer: MEDICARE

## 2020-06-29 DIAGNOSIS — R87.810 ASCUS WITH POSITIVE HIGH RISK HPV CERVICAL: Primary | ICD-10-CM

## 2020-06-29 DIAGNOSIS — R87.610 ASCUS WITH POSITIVE HIGH RISK HPV CERVICAL: Primary | ICD-10-CM

## 2020-06-29 PROCEDURE — 81025 URINE PREGNANCY TEST: CPT | Mod: PBBFAC | Performed by: OBSTETRICS & GYNECOLOGY

## 2020-06-29 PROCEDURE — 88305 TISSUE EXAM BY PATHOLOGIST: CPT | Mod: 26,,, | Performed by: PATHOLOGY

## 2020-06-29 PROCEDURE — 57456 ENDOCERV CURETTAGE W/SCOPE: CPT | Mod: PBBFAC | Performed by: OBSTETRICS & GYNECOLOGY

## 2020-06-29 PROCEDURE — 88305 TISSUE EXAM BY PATHOLOGIST: CPT | Performed by: PATHOLOGY

## 2020-06-29 PROCEDURE — 57456 COLPOSCOPY-TODAY: ICD-10-PCS | Mod: S$PBB,,, | Performed by: OBSTETRICS & GYNECOLOGY

## 2020-06-29 PROCEDURE — 88305 TISSUE EXAM BY PATHOLOGIST: ICD-10-PCS | Mod: 26,,, | Performed by: PATHOLOGY

## 2020-06-29 NOTE — PATIENT INSTRUCTIONS
Colposcopy  Colposcopy is a procedure that gives your health care provider a magnified view of the cervix. It is done using a lighted microscope called a colposcope. In most cases, a sample of cervical cells is taken during a biopsy. The sample can then be studied in a lab. If any problems are found, you and your health care provider will discuss treatment options. It usually takes less than 30 minutes, and you can often go back to your normal routine right away.  Reasons for the procedure  Colposcopy is usually done as a follow-up exam to help find the cause of an abnormal Pap test. Results of an abnormal Pap test can mean that the cells do not appear normal or that there are cancerous cells. Abnormal cells can also be caused by infections. HPV (human papillomavirus) is a large family of viruses that can be passed from person to person through sex. HPV can cause genital warts. It can also cause changes in cervical cells. If an HPV test is positive and the Pap test is abnormal, a colposcopy may be recommended. Colposcopy is also used to evaluate other problems. These include pain or bleeding during sexual intercourse, or a lesion on the vulva or vagina.  What are the risks?  Problems after colposcopy are very rare, but can include:  · Bleeding (if a biopsy is done)  · Infection  Getting ready for the procedure  Colposcopy is normally done in your health care providers office. It will be scheduled for a time when youre not having your menstrual period. You may be asked to sign a form giving your consent to have the procedure. A day or 2 before the procedure, your health care provider may also ask you to:  · Avoid sexual intercourse.  · Stop using tampons.  · Avoid using creams or other vaginal medicines.  · Avoid douching.  · Take over-the-counter pain medicines an hour or 2 before the procedure.  During colposcopy  · You will be asked to lie on an exam table with your knees bent, just as you do for a Pap  test.  · An instrument called a speculum is inserted into the vagina to hold it open.  · A vinegar solution is applied to the cervix to make the abnormal cells easier to see. You may feel pressure or a slight burning for a few moments. In some cases, the cervix may be numbed first with an anesthetic.  · The cervix is viewed through the colposcope, which is placed outside the vagina.  · If your health care provider sees abnormal areas on the cervix, a biopsy will be done. The tissue sample is sent to a lab for study.  · An endocervical curettage may also be done at the time of colposcopy. In this procedure, an instrument is put into the endocervical canal to get a sample of cells from the endocervix. This area can't be seen with a colposcope.   · You may feel slight pinching or cramping during the biopsy. Medicine may be applied to the biopsy site to stop bleeding.  After the procedure  · If you feel lightheaded or dizzy, you can rest on the table until youre ready to get dressed.  · If a biopsy was done, you may have mild cramping or light bleeding for a few days. You may also have discharge from the medicine used to stop bleeding at the biopsy site.  · Use pads, not tampons, for at least the first 24 hours.  · If you have any discomfort, over-the-counter pain medicine can provide relief.  · Ask your health care provider when you can resume sexual intercourse.  Follow-up  If a biopsy was done, your health care provider will get the lab report in a week or 2. You and your health care provider can then discuss the results. In some cases, you may be scheduled for further tests or treatment. Be sure to keep follow-up appointments with your health care provider.     Call your health care provider if you have:  · Heavy vaginal bleeding (more than a pad an hour for 2 hours).  · Severe or increasing pelvic pain.  · A fever over 100.4°F (38°C)  · Foul-smelling or unusual vaginal discharge.   Date Last Reviewed: 5/10/2015  ©  2458-6379 [employer]. [address] [place] [address], [place] [address]. All rights reserved. This information is not intended as a substitute for professional medical care. Always follow your healthcare professional's instructions.

## 2020-06-29 NOTE — PROCEDURES
Colposcopy-Today    Date/Time: 2020 2:30 PM  Performed by: Jourdan Conklin MD  Authorized by: Jourdan Conklin MD     Consent Done?:  Yes (Written)  Timeout:Immediately prior to procedure a time out was called to verify the correct patient, procedure, equipment, support staff and site/side marked as required  Assistants?: No      Colposcopy Site:  Cervix  Position:  Supine  Acrowhite Lesion: No    Atypical Vessels: No    Transformation Zone Adequate?: Yes    Biopsy?: No    ECC Performed?: Yes    LEEP Performed?: No    Estimated blood loss (cc):  1   Patient tolerated the procedure well with no immediate complications.   Post-operative instructions were provided for the patient.   Patient was discharged and will follow up if any complications occur     COLPOSCOPY:    Leydi Cordero is a 30 y.o. female   presents for colposcopy.  No LMP recorded..  Her most recent pap smear shows atypical squamous cellularity of undetermined significance (ASCUS).      The abnormal test findings were discussed, as well as HPV infection, need for colposcopy and possible biopsies to determine the plan of care, treatments available, the minimal risk of bleeding and infection with colposcopy, and alternatives to colposcopy.  Pt voiced understanding and desires to proceed.      UPT is negative    COLPOSCOPY EXAM:   TIME OUT PERFORMED.     No gross vulvovaginal or cervix lesions noted.  On colpo: no visible lesions, no mosaicism, no punctation and no abnormal vasculature    No biopsies.  ECC was performed.  SCJ was entirely visualized.    Hemostasis was adequate with application of Monsel's solution.  The speculum was removed.  The patient did tolerate the procedure well.    All collected specimens sent to pathology for histologic analysis.    Post-colposcopy counseling:  The patient was instructed to manage post-colposcopy cramping with NSAIDs or Tylenol, or with a prescription per the medication card.  Avoid  intercourse, douching, or tampons in the vagina for at least 2-3 days.  Expect a clumpy blackish discharge due to Monsel's solution application for several days.  Report heavy bleeding, worsening pain or pain that does not respond to above medications, or foul-smelling vaginal discharge. HPV vaccine recommended according to FDA age guidelines.  Importance of follow-up stressed.      Follow up based on colposcopy results.  Impression:  Negative colpo.  If confirmed by pathology results, recommend follow-up pap in 12 months.

## 2020-07-02 LAB
FINAL PATHOLOGIC DIAGNOSIS: NORMAL
GROSS: NORMAL

## 2020-07-06 ENCOUNTER — TELEPHONE (OUTPATIENT)
Dept: OBSTETRICS AND GYNECOLOGY | Facility: CLINIC | Age: 30
End: 2020-07-06

## 2020-07-06 NOTE — TELEPHONE ENCOUNTER
----- Message from Jourdan Conklin MD sent at 7/6/2020  3:31 PM CDT -----  ECC results benign.  Results are consistent with pap and colpo findings.  Recommend follow up pap in 12 months.  Will have nurse contact pt to notify.

## 2020-07-09 DIAGNOSIS — J45.901 ASTHMATIC BRONCHITIS WITH ACUTE EXACERBATION, UNSPECIFIED ASTHMA SEVERITY, UNSPECIFIED WHETHER PERSISTENT: Primary | ICD-10-CM

## 2020-07-18 ENCOUNTER — LAB VISIT (OUTPATIENT)
Dept: OTOLARYNGOLOGY | Facility: CLINIC | Age: 30
End: 2020-07-18
Payer: MEDICARE

## 2020-07-18 DIAGNOSIS — J45.901 ASTHMATIC BRONCHITIS WITH ACUTE EXACERBATION, UNSPECIFIED ASTHMA SEVERITY, UNSPECIFIED WHETHER PERSISTENT: ICD-10-CM

## 2020-07-18 PROCEDURE — U0003 INFECTIOUS AGENT DETECTION BY NUCLEIC ACID (DNA OR RNA); SEVERE ACUTE RESPIRATORY SYNDROME CORONAVIRUS 2 (SARS-COV-2) (CORONAVIRUS DISEASE [COVID-19]), AMPLIFIED PROBE TECHNIQUE, MAKING USE OF HIGH THROUGHPUT TECHNOLOGIES AS DESCRIBED BY CMS-2020-01-R: HCPCS | Mod: NTX

## 2020-07-19 LAB — SARS-COV-2 RNA RESP QL NAA+PROBE: NOT DETECTED

## 2020-07-21 ENCOUNTER — CLINICAL SUPPORT (OUTPATIENT)
Dept: PULMONOLOGY | Facility: CLINIC | Age: 30
End: 2020-07-21
Payer: MEDICARE

## 2020-07-21 ENCOUNTER — OFFICE VISIT (OUTPATIENT)
Dept: CARDIOLOGY | Facility: CLINIC | Age: 30
End: 2020-07-21
Payer: MEDICARE

## 2020-07-21 VITALS
WEIGHT: 226.06 LBS | OXYGEN SATURATION: 99 % | HEIGHT: 66 IN | SYSTOLIC BLOOD PRESSURE: 110 MMHG | HEART RATE: 94 BPM | RESPIRATION RATE: 18 BRPM | DIASTOLIC BLOOD PRESSURE: 70 MMHG | BODY MASS INDEX: 36.33 KG/M2

## 2020-07-21 DIAGNOSIS — Z94.0 H/O KIDNEY TRANSPLANT: ICD-10-CM

## 2020-07-21 DIAGNOSIS — I10 ESSENTIAL HYPERTENSION: Primary | ICD-10-CM

## 2020-07-21 DIAGNOSIS — Z01.810 PREOP CARDIOVASCULAR EXAM: ICD-10-CM

## 2020-07-21 DIAGNOSIS — I36.1 NONRHEUMATIC TRICUSPID VALVE REGURGITATION: ICD-10-CM

## 2020-07-21 DIAGNOSIS — N18.6 ESRD (END STAGE RENAL DISEASE): ICD-10-CM

## 2020-07-21 DIAGNOSIS — J45.901 ASTHMATIC BRONCHITIS WITH ACUTE EXACERBATION, UNSPECIFIED ASTHMA SEVERITY, UNSPECIFIED WHETHER PERSISTENT: ICD-10-CM

## 2020-07-21 LAB
BRPFT: NORMAL
FEF 25 75 LLN: 2.44
FEF 25 75 PRE REF: 99.7 %
FEF 25 75 REF: 3.78
FEV1 FVC LLN: 73
FEV1 FVC PRE REF: 100.3 %
FEV1 FVC REF: 85
FEV1 LLN: 2.77
FEV1 PRE REF: 95.7 %
FEV1 REF: 3.45
FVC LLN: 3.27
FVC PRE REF: 94.9 %
FVC REF: 4.1
PEF LLN: 5.29
PEF PRE REF: 99.1 %
PEF REF: 7.44
PRE FEF 25 75: 3.76 L/S (ref 2.44–5.11)
PRE FET 100: 7.27 SEC
PRE FEV1 FVC: 85.07 % (ref 73.33–96.38)
PRE FEV1: 3.31 L (ref 2.77–4.13)
PRE FVC: 3.89 L (ref 3.27–4.92)
PRE PEF: 7.37 L/S (ref 5.29–9.58)

## 2020-07-21 PROCEDURE — 99999 PR PBB SHADOW E&M-EST. PATIENT-LVL III: ICD-10-PCS | Mod: PBBFAC,TXP,, | Performed by: INTERNAL MEDICINE

## 2020-07-21 PROCEDURE — 99215 OFFICE O/P EST HI 40 MIN: CPT | Mod: S$PBB,TXP,, | Performed by: INTERNAL MEDICINE

## 2020-07-21 PROCEDURE — 94010 BREATHING CAPACITY TEST: CPT | Mod: PBBFAC,TXP

## 2020-07-21 PROCEDURE — 94010 BREATHING CAPACITY TEST: ICD-10-PCS | Mod: 26,S$PBB,TXP, | Performed by: INTERNAL MEDICINE

## 2020-07-21 PROCEDURE — 99999 PR PBB SHADOW E&M-EST. PATIENT-LVL III: CPT | Mod: PBBFAC,TXP,, | Performed by: INTERNAL MEDICINE

## 2020-07-21 PROCEDURE — 99213 OFFICE O/P EST LOW 20 MIN: CPT | Mod: PBBFAC,TXP,25 | Performed by: INTERNAL MEDICINE

## 2020-07-21 PROCEDURE — 99215 PR OFFICE/OUTPT VISIT, EST, LEVL V, 40-54 MIN: ICD-10-PCS | Mod: S$PBB,TXP,, | Performed by: INTERNAL MEDICINE

## 2020-07-21 PROCEDURE — 94010 BREATHING CAPACITY TEST: CPT | Mod: 26,S$PBB,TXP, | Performed by: INTERNAL MEDICINE

## 2020-07-21 NOTE — PROGRESS NOTES
Subjective:   Patient ID:  Leydi Cordero is a 30 y.o. female who presents for cardiac consult of Pre-op Exam      HPI  The patient came in today for cardiac consult of Pre-op Exam    Leydi Cordero is a 30 y.o. female pt with HTN, ESRD on HD, h/o kidney transplant, pulm HTN, mod-severe TR, anemia here for preop CV eval.     1/2/20 visit  Info from chart -  female Patient with ESRD on HD MWF at [facility]. She started HD 2/7/2008 and had a transplant in [place] 4/15/15.   Her cause of end-stage renal disease is HTN and failed transplant.     ECHO with mod to severe TR with elevated PA pressure. She gets abd pain/cramping if more than 2 L removed with HD. Her BP has been high.   Stress test scheduled for next Friday.     7/21/20  Nuclear stress neg for ischemia 1/2020. BNP was elevated last visit and referred to pulm. She had pulm eval. She had neg VQ scan. Will repeat nuclear stress and ECHO in Jan 2021. Overall feels well today.     Patient feels no chest pain, no leg swelling, no PND, no palpitation, no dizziness, no syncope, no CNS symptoms.    Patient has fairly good exercise tolerance.    Patient is compliant with medications.    ECG - NSR    Nuclear Quantitative Functional Analysis:   LVEF: 65 %     Impression: NORMAL MYOCARDIAL PERFUSION   1. The perfusion scan is free of evidence for myocardial ischemia or injury.   2. Resting wall motion is physiologic.   3. Resting LV function is normal.   4. The ventricular volumes are normal at rest and stress.   5. The extracardiac distribution of radioactivity is normal.       This document has been electronically    SIGNED BY: Max Casey MD On: 01/10/2020 12:42      CONCLUSIONS     1 - Concentric hypertrophy.     2 - No wall motion abnormalities.     3 - Normal left ventricular systolic function (EF 60-65%).     4 - Normal left ventricular diastolic function.     5 - Normal right ventricular systolic function .     6 - Pulmonary  hypertension. The estimated PA systolic pressure is 45 mmHg.     7 - Trivial mitral regurgitation.     8 - Moderate to severe tricuspid regurgitation.       This document has been electronically    SIGNED BY: Max Casey MD On: 2019 14:30    Past Medical History:   Diagnosis Date    Encounter for blood transfusion     ESRD (end stage renal disease)     Fibroma     H/O kidney transplant     Hypertension     Hypertensive nephropathy     Ovarian cyst        Past Surgical History:   Procedure Laterality Date    hemodialysis access left arm      kidney removal       KIDNEY TRANSPLANT  04/15/2015    NEPHRECTOMY      OVARIAN CYST REMOVAL      PARATHYROIDECTOMY      partial     right leg hemodialysis access      transplant nephrectomy  2017       Social History     Tobacco Use    Smoking status: Former Smoker     Packs/day: 0.50     Years: 14.00     Pack years: 7.00     Quit date: 2018     Years since quittin.6    Smokeless tobacco: Never Used   Substance Use Topics    Alcohol use: No    Drug use: No       Family History   Problem Relation Age of Onset    No Known Problems Mother     Colon cancer Father     Cancer Father     Hypertension Father     No Known Problems Sister     No Known Problems Brother     Kidney disease Maternal Aunt     Hypertension Maternal Aunt     Diabetes Maternal Uncle     Kidney disease Other     Hypertension Other        Patient's Medications   New Prescriptions    No medications on file   Previous Medications    ALBUTEROL (PROVENTIL/VENTOLIN HFA) 90 MCG/ACTUATION INHALER    Inhale 2 puffs into the lungs every 6 (six) hours as needed for Wheezing. Rescue    AMLODIPINE (NORVASC) 10 MG TABLET    AMLODIPINE BESYLATE 10MG TABLETS  TK 1 T PO ONCE D    ASPIRIN (ECOTRIN) 81 MG EC TABLET    TAKE 1 BY MOUTH DAILY    LABETALOL (NORMODYNE) 200 MG TABLET    Take 1 tablet (200 mg total) by mouth 2 (two) times daily.    ONDANSETRON (ZOFRAN-ODT) 4 MG TBDL    Take  "4 mg by mouth every 8 (eight) hours as needed.    PREDNISONE (DELTASONE) 10 MG TABLET    Prednisone 40 mg daily for 3 days then 20 mg daily for 3 days then 10 mg daily for 3 days    SEVELAMER HCL (RENAGEL) 800 MG TAB    Take 3 tablets (2,400 mg total) by mouth 3 (three) times daily with meals. Take 3 tablets TID with meals and 2 with snacks.   Modified Medications    No medications on file   Discontinued Medications    No medications on file       Review of Systems   Constitutional: Positive for malaise/fatigue.   HENT: Negative.    Eyes: Negative.    Respiratory: Positive for shortness of breath.    Cardiovascular: Negative.    Gastrointestinal: Negative.    Genitourinary: Negative.    Musculoskeletal: Negative.    Skin: Negative.    Neurological: Negative.    Endo/Heme/Allergies: Negative.    Psychiatric/Behavioral: Negative.    All 12 systems otherwise negative.      Wt Readings from Last 3 Encounters:   07/21/20 102.6 kg (226 lb 1.3 oz)   01/17/20 93.2 kg (205 lb 9.3 oz)   01/02/20 95.3 kg (210 lb 1.6 oz)     Temp Readings from Last 3 Encounters:   04/15/20 97.9 °F (36.6 °C) (Oral)   11/20/19 97.7 °F (36.5 °C) (Oral)   07/03/19 97.8 °F (36.6 °C) (Oral)     BP Readings from Last 3 Encounters:   07/21/20 110/70   04/15/20 (!) 147/83   01/17/20 130/84     Pulse Readings from Last 3 Encounters:   07/21/20 94   04/15/20 83   01/17/20 95       /70 (BP Location: Right arm, Patient Position: Sitting, BP Method: Medium (Manual))   Pulse 94   Resp 18   Ht 5' 6" (1.676 m)   Wt 102.6 kg (226 lb 1.3 oz)   SpO2 99%   BMI 36.49 kg/m²     Objective:   Physical Exam   Constitutional: She is oriented to person, place, and time. She appears well-developed and well-nourished. No distress.   HENT:   Head: Normocephalic and atraumatic.   Nose: Nose normal.   Mouth/Throat: Oropharynx is clear and moist.   Eyes: Conjunctivae and EOM are normal. No scleral icterus.   Neck: Normal range of motion. Neck supple. No JVD present. " No thyromegaly present.   Cardiovascular: Normal rate, regular rhythm, S1 normal and S2 normal. Exam reveals no gallop, no S3, no S4 and no friction rub.   Murmur heard.  Pulmonary/Chest: Effort normal and breath sounds normal. No stridor. No respiratory distress. She has no wheezes. She has no rales. She exhibits no tenderness.   Abdominal: Soft. Bowel sounds are normal. She exhibits no distension and no mass. There is no abdominal tenderness. There is no rebound.   Genitourinary:    Genitourinary Comments: Deferred     Musculoskeletal: Normal range of motion.         General: No tenderness, deformity or edema.   Lymphadenopathy:     She has no cervical adenopathy.   Neurological: She is alert and oriented to person, place, and time. She exhibits normal muscle tone. Coordination normal.   Skin: Skin is warm and dry. No rash noted. She is not diaphoretic. No erythema. No pallor.   Psychiatric: She has a normal mood and affect. Her behavior is normal. Judgment and thought content normal.   Nursing note and vitals reviewed.      Lab Results   Component Value Date     (L) 04/15/2020    K 4.6 04/15/2020    CL 91 (L) 04/15/2020    CO2 23 04/15/2020    BUN 26 (H) 04/15/2020    CREATININE 6.6 (H) 04/15/2020    GLU 88 04/15/2020    HGBA1C 4.4 08/01/2018    MG 1.8 12/04/2017    AST 25 04/15/2020    ALT 14 04/15/2020    ALBUMIN 3.6 04/15/2020    PROT 8.0 04/15/2020    BILITOT 0.4 04/15/2020    WBC 12.49 04/15/2020    HGB 14.1 04/15/2020    HCT 41.2 04/15/2020    HCT 21 (L) 12/01/2017     (H) 04/15/2020     (L) 04/15/2020    INR 1.0 11/20/2019    TSH 1.274 08/09/2018    CHOL 130 11/20/2019    HDL 44 11/20/2019    LDLCALC 60.6 (L) 11/20/2019    TRIG 127 11/20/2019     (H) 01/03/2020     Assessment:      1. Essential hypertension    2. Preop cardiovascular exam    3. Nonrheumatic tricuspid valve regurgitation    4. ESRD (end stage renal disease)    5. H/O kidney transplant        Plan:    1. Pre-OP CV  evaluation prior to renal transplant  Low periop risk of CV events for moderate risk procedure.  Good functional and exercise capacity.  No chest pain, active arrhythmia and CHF symptoms.  Ok to proceed to the scheduled surgery without further cardiac study until 1/2021.  Continue Labetolol  Periop.  Repeat ECHO and Stress in Jan 2021.     2. HTN  - cont meds  - increased Labetolol    3. Tricuspid Reg  - cont to monitor    4. Pulm HTN  - f/u - r/o NASIMA vs other pulm etiologies    5. ESRD on HD, s/p renal transplant  - cont tx per nephro    Thank you for allowing me to participate in this patient's care. Please do not hesitate to contact me with any questions or concerns. Consult note has been forwarded to the referral physician.

## 2020-07-27 ENCOUNTER — OFFICE VISIT (OUTPATIENT)
Dept: PULMONOLOGY | Facility: CLINIC | Age: 30
End: 2020-07-27
Payer: MEDICARE

## 2020-07-27 VITALS
RESPIRATION RATE: 17 BRPM | SYSTOLIC BLOOD PRESSURE: 128 MMHG | WEIGHT: 223.88 LBS | BODY MASS INDEX: 35.98 KG/M2 | HEIGHT: 66 IN | OXYGEN SATURATION: 98 % | TEMPERATURE: 98 F | HEART RATE: 107 BPM | DIASTOLIC BLOOD PRESSURE: 86 MMHG

## 2020-07-27 DIAGNOSIS — I27.20 PULMONARY HYPERTENSION: ICD-10-CM

## 2020-07-27 DIAGNOSIS — I07.1 MODERATE TRICUSPID REGURGITATION: ICD-10-CM

## 2020-07-27 DIAGNOSIS — R29.818 SUSPECTED SLEEP APNEA: Primary | ICD-10-CM

## 2020-07-27 DIAGNOSIS — F17.210 TOBACCO DEPENDENCE DUE TO CIGARETTES: ICD-10-CM

## 2020-07-27 DIAGNOSIS — R06.83 SNORING: ICD-10-CM

## 2020-07-27 DIAGNOSIS — N18.6 ESRD (END STAGE RENAL DISEASE): ICD-10-CM

## 2020-07-27 DIAGNOSIS — G47.8 NON-RESTORATIVE SLEEP: ICD-10-CM

## 2020-07-27 PROCEDURE — 99215 PR OFFICE/OUTPT VISIT, EST, LEVL V, 40-54 MIN: ICD-10-PCS | Mod: S$PBB,NTX,, | Performed by: INTERNAL MEDICINE

## 2020-07-27 PROCEDURE — 99214 OFFICE O/P EST MOD 30 MIN: CPT | Mod: PBBFAC,NTX | Performed by: INTERNAL MEDICINE

## 2020-07-27 PROCEDURE — 99999 PR PBB SHADOW E&M-EST. PATIENT-LVL IV: ICD-10-PCS | Mod: PBBFAC,TXP,, | Performed by: INTERNAL MEDICINE

## 2020-07-27 PROCEDURE — 99215 OFFICE O/P EST HI 40 MIN: CPT | Mod: S$PBB,NTX,, | Performed by: INTERNAL MEDICINE

## 2020-07-27 PROCEDURE — 99999 PR PBB SHADOW E&M-EST. PATIENT-LVL IV: CPT | Mod: PBBFAC,TXP,, | Performed by: INTERNAL MEDICINE

## 2020-07-27 RX ORDER — NITROFURANTOIN 25; 75 MG/1; MG/1
CAPSULE ORAL
Status: ON HOLD | COMMUNITY
Start: 2020-05-16 | End: 2021-01-14 | Stop reason: HOSPADM

## 2020-07-27 NOTE — PROGRESS NOTES
Pulmonary Outpatient Follow Up Visit     Subjective:       Patient ID: Leydi Cordero is a 30 y.o. female.    Chief Complaint: Asthma      HPI        30-year-old female patient presenting for 6 months follow-up.      She was initially referred January 2020 by Cardiology for evaluation of worsening coughing with yellow sputum production wheezing and shortness of breaths at chest tightness and nighttime symptoms at least 2 nights per week for the last 3 months.  I had the impression of asthmatic bronchitis with exacerbation, she was treated with prednisone and a Z-Kalia.    She states she is not having coughing wheezing or shortness of breath and not needing albuterol.         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  She is still urinating small amounts.     Seven pack year smoking history quit in 2018.  Now smoking again last cigarette was yesterday.     Has a cousin and an aunt with asthma.        STOP - BANG Questionnaire:     1. Snoring : Do you snore loudly ?    Yes    2. Tired : Do you often feel tired, fatigued, or sleepy during daytime? Yes    3. Observed: Has anyone observed you stop breathing during your sleep?   Yes     4. Blood pressure : Do you have or are you being treated for high blood pressure?   Yes    5. BMI :BMI more than 35 kg/m2?   Yes    6. Age : Age over 50 yr old?   No    7. Neck circumference:   For male, is your shirt collar 17 inches / 43cm or larger?  For female, is your shirt collar 16 inches / 41cm or larger?    Yes    8. Gender: Gender male?   No    STOP BANG SCORE 6    High risk of NASIMA: Yes 5 - 8  Intermediate risk of NASIMA: Yes 3 - 4  Low risk of NASIMA: Yes 0 - 2      References:   STOP Questionnaire   A Tool to Screen Patients for Obstructive Sleep Apnea:  JUDITH Echols.C.P.C., SUSAN Norris.B.B.S., Dre Frederick M.D.,Radha Carrizales, Ph.D., JESSICA EricksonB.B.S.,_ Santosh Louis.,_ Carlie Roth M.D., REINA Ravi.R.C.P.C.; Anesthesiology 2008; 108:812-21 Copyright © 2008, the American Society of Anesthesiologists, Inc. [employer]      Review of Systems   Constitutional: Positive for weight gain, fatigue and weakness. Negative for fever and chills.   HENT: Negative for nosebleeds.    Eyes: Negative for redness.   Respiratory: Positive for snoring and shortness of breath. Negative for cough, sputum production, choking, wheezing and dyspnea on extertion.    Cardiovascular: Negative for chest pain and leg swelling.   Genitourinary: Negative for hematuria.        Kidney transplant rejection now on dialysis   Endocrine: Negative for cold intolerance.    Musculoskeletal: Positive for arthralgias.        Left upper extremity AV fistula   Skin: Negative for rash.   Gastrointestinal: Negative for vomiting.   Neurological: Positive for weakness. Negative for syncope.   Hematological: Negative for adenopathy.   Psychiatric/Behavioral: Positive for sleep disturbance. Negative for confusion.       Outpatient Encounter Medications as of 7/27/2020   Medication Sig Dispense Refill    amLODIPine (NORVASC) 10 MG tablet AMLODIPINE BESYLATE 10MG TABLETS  TK 1 T PO ONCE D      aspirin (ECOTRIN) 81 MG EC tablet TAKE 1 BY MOUTH DAILY 100 tablet 10    labetalol (NORMODYNE) 200 MG tablet Take 1 tablet (200 mg total) by mouth 2 (two) times daily. 60 tablet 2    nitrofurantoin, macrocrystal-monohydrate, (MACROBID) 100 MG capsule       ondansetron (ZOFRAN-ODT) 4 MG TbDL Take 4 mg by mouth every 8 (eight) hours as needed.      predniSONE (DELTASONE) 10 MG tablet Prednisone 40 mg daily for 3 days then 20 mg daily for 3 days then 10 mg daily for 3 days 21 tablet 0    sevelamer HCL (RENAGEL) 800 MG Tab Take 3 tablets (2,400 mg total)  "by mouth 3 (three) times daily with meals. Take 3 tablets TID with meals and 2 with snacks. 330 tablet 3    albuterol (PROVENTIL/VENTOLIN HFA) 90 mcg/actuation inhaler Inhale 2 puffs into the lungs every 6 (six) hours as needed for Wheezing. Rescue (Patient not taking: Reported on 7/27/2020) 18 g 11     No facility-administered encounter medications on file as of 7/27/2020.        Objective:     Vital Signs (Most Recent)  Vital Signs  Temp: 97.9 °F (36.6 °C)  Temp src: Temporal  Pulse: 107  Resp: 17  SpO2: 98 %  BP: 128/86  Height and Weight  Height: 5' 6" (167.6 cm)  Weight: 101.5 kg (223 lb 14 oz)  BSA (Calculated - sq m): 2.17 sq meters  BMI (Calculated): 36.2  Weight in (lb) to have BMI = 25: 154.6]  Wt Readings from Last 2 Encounters:   07/27/20 101.5 kg (223 lb 14 oz)   07/21/20 102.6 kg (226 lb 1.3 oz)       Physical Exam   Constitutional: She is oriented to person, place, and time. She appears well-developed and well-nourished. She is obese.   HENT:   Head: Normocephalic.   Neck: Neck supple.   Cardiovascular: Normal rate, regular rhythm and normal heart sounds.   Pulmonary/Chest: Normal expansion and effort normal. No stridor. No respiratory distress. She has decreased breath sounds. She has wheezes. She exhibits no tenderness.   Abdominal: Soft.   Musculoskeletal:         General: Deformity present. No tenderness.      Comments: Left upper extremity AV fistula   Lymphadenopathy:     She has no cervical adenopathy.   Neurological: She is alert and oriented to person, place, and time. Gait normal.   Skin: Skin is warm. No cyanosis. Nails show no clubbing.   Psychiatric: She has a normal mood and affect. Her behavior is normal. Judgment and thought content normal.   Nursing note and vitals reviewed.      Laboratory  Lab Results   Component Value Date    WBC 12.49 04/15/2020    RBC 4.03 04/15/2020    HGB 14.1 04/15/2020    HCT 41.2 04/15/2020     (H) 04/15/2020    MCH 35.0 (H) 04/15/2020    MCHC 34.2 " 04/15/2020    RDW 14.2 04/15/2020     (L) 04/15/2020    MPV 10.7 04/15/2020    GRAN 10.9 (H) 04/15/2020    GRAN 87.7 (H) 04/15/2020    LYMPH 0.7 (L) 04/15/2020    LYMPH 5.7 (L) 04/15/2020    MONO 0.7 04/15/2020    MONO 5.4 04/15/2020    EOS 0.0 04/15/2020    BASO 0.03 04/15/2020    EOSINOPHIL 0.3 04/15/2020    BASOPHIL 0.2 04/15/2020       BMP  Lab Results   Component Value Date     (L) 04/15/2020    K 4.6 04/15/2020    CL 91 (L) 04/15/2020    CO2 23 04/15/2020    BUN 26 (H) 04/15/2020    CREATININE 6.6 (H) 04/15/2020    CALCIUM 8.4 (L) 04/15/2020    ANIONGAP 19 (H) 04/15/2020    ESTGFRAFRICA 9 (A) 04/15/2020    EGFRNONAA 8 (A) 04/15/2020    AST 25 04/15/2020    ALT 14 04/15/2020    PROT 8.0 04/15/2020       Lab Results   Component Value Date     (H) 01/03/2020       Lab Results   Component Value Date    TSH 1.274 08/09/2018       No results found for: SEDRATE    No results found for: CRP  No results found for: IGE     No results found for: ASPERGILLUS  No results found for: AFUMIGATUSCL     No results found for: ACE     Diagnostic Results:      I have personally reviewed today the following studies:    CXR 1/3/2019 clear lungs      Nuc stress 1/2020:     Impression: NORMAL MYOCARDIAL PERFUSION  1. The perfusion scan is free of evidence for myocardial ischemia or injury.   2. Resting wall motion is physiologic.   3. Resting LV function is normal.   4. The ventricular volumes are normal at rest and stress.   5. The extracardiac distribution of radioactivity is normal.        Echo Dec 2019:      1 - Concentric hypertrophy.     2 - No wall motion abnormalities.     3 - Normal left ventricular systolic function (EF 60-65%).     4 - Normal left ventricular diastolic function.     5 - Normal right ventricular systolic function .     6 - Pulmonary hypertension. The estimated PA systolic pressure is 45 mmHg.     7 - Trivial mitral regurgitation.     8 - Moderate to severe tricuspid regurgitation     Echo  8/2018    1 - Concentric remodeling.     2 - No wall motion abnormalities.     3 - Normal left ventricular systolic function (EF 55-60%).     4 - Normal left ventricular diastolic function.     5 - Normal right ventricular systolic function .     6 - The estimated PA systolic pressure is 23 mmHg.     7 - Moderate tricuspid regurgitation       Spirometry 7/21/2020:    Grade A-D -acceptable quality of tracings   Normal spirometry. (FEV1/VC greater than or equal to LLN and FVC greater than or equal to LLN)   Flow volume loops are normal.   Overall there is no significant ventilatory impairment. (FEV1 >LLN)         Assessment/Plan:   Suspected sleep apnea  -     Home Sleep Studies; Future    Snoring  -     Home Sleep Studies; Future    Non-restorative sleep  -     Home Sleep Studies; Future    Pulmonary hypertension    Moderate tricuspid regurgitation    ESRD (end stage renal disease)    Tobacco dependence due to cigarettes  -     Ambulatory referral/consult to Smoking Cessation Program; Future; Expected date: 08/03/2020      Continue albuterol p.r.n.    Spirometry not in favor of asthma at the moment.    Will not add controller inhaler.    Suspected obstructive sleep apnea, proceed with home sleep study.    No evidence of pulmonary vascular disease on V/Q scan, pulmonary hypertension to be reassessed with 2D echo ordered by Cardiology, rule out nocturnal hypoxemia/NASIMA.    AV fistula presents could be contributing to pulmonary hypertension via shunt    Counseled patient 5 min about smoking cessation.    Willing to continue effort to quit smoking.     was called today during today's visit.          Follow up in about 3 months (around 10/27/2020).    This note was prepared using voice recognition system and is likely to have sound alike errors that may have been overlooked even after proof reading.  Please call me with any questions    Discussed diagnosis, its evaluation, treatment and usual course. All  questions answered.      Maribeth Barajas MD

## 2020-07-30 ENCOUNTER — TELEPHONE (OUTPATIENT)
Dept: PULMONOLOGY | Facility: CLINIC | Age: 30
End: 2020-07-30

## 2020-07-30 NOTE — TELEPHONE ENCOUNTER
----- Message from Nikki Sanches sent at 7/30/2020 11:56 AM CDT -----  Regarding: her machine  Contact: cassius with [facility] [employer] department  Caller is requesting a call back regarding her machine that was suppose to be delivery today. please call back at [phone] (home). Thanks.

## 2020-07-30 NOTE — TELEPHONE ENCOUNTER
Spoke to pt about Home Sleep pt had misinformation about when she would receive Home Sleep equipment with  on the line she accepted and understood

## 2020-07-31 ENCOUNTER — PROCEDURE VISIT (OUTPATIENT)
Dept: SLEEP MEDICINE | Facility: CLINIC | Age: 30
End: 2020-07-31
Payer: MEDICARE

## 2020-07-31 DIAGNOSIS — R29.818 SUSPECTED SLEEP APNEA: ICD-10-CM

## 2020-07-31 DIAGNOSIS — R06.83 SNORING: ICD-10-CM

## 2020-07-31 DIAGNOSIS — G47.33 OSA (OBSTRUCTIVE SLEEP APNEA): Primary | ICD-10-CM

## 2020-07-31 DIAGNOSIS — G47.8 NON-RESTORATIVE SLEEP: ICD-10-CM

## 2020-07-31 PROCEDURE — 95806 SLEEP STUDY UNATT&RESP EFFT: CPT | Mod: 26,S$PBB,NTX, | Performed by: INTERNAL MEDICINE

## 2020-07-31 PROCEDURE — 95806 PR SLEEP STUDY, UNATTENDED, SIMUL RECORD HR/O2 SAT/RESP FLOW/RESP EFFT: ICD-10-PCS | Mod: 26,S$PBB,NTX, | Performed by: INTERNAL MEDICINE

## 2020-07-31 PROCEDURE — 95806 SLEEP STUDY UNATT&RESP EFFT: CPT | Mod: PBBFAC,NTX | Performed by: INTERNAL MEDICINE

## 2020-07-31 NOTE — Clinical Note
1 night study  MODERATE OBSTRUCTIVE SLEEP APNEA with overall AHI 27.6/hr ( 204 events): night #1  Oxygen desaturation: 87%. SpO2 between 85% to 89% for < 1 min.  Patient snored 91% time above 50 .  Heart rate range: 73 bpm - 107 bpm  REC's:  Therapy with APAP at 5-20 cm WP using mask of choice with heated humidification is an option.

## 2020-08-01 NOTE — PROCEDURES
Home Sleep Studies    Date/Time: 7/31/2020 8:00 AM  Performed by: Shan Smyth MD  Authorized by: Maribeth Barajas MD       1 night study  MODERATE OBSTRUCTIVE SLEEP APNEA with overall AHI 27.6/hr ( 204 events): night #1  Oxygen desaturation: 87%. SpO2 between 85% to 89% for < 1 min.  Patient snored 91% time above 50 .  Heart rate range: 73 bpm - 107 bpm  REC's:  Therapy with APAP at 5-20 cm WP using mask of choice with heated humidification is an option.

## 2020-08-03 ENCOUNTER — CLINICAL SUPPORT (OUTPATIENT)
Dept: SMOKING CESSATION | Facility: CLINIC | Age: 30
End: 2020-08-03

## 2020-08-03 DIAGNOSIS — F17.200 NICOTINE DEPENDENCE: Primary | ICD-10-CM

## 2020-08-03 DIAGNOSIS — G47.33 SEVERE OBSTRUCTIVE SLEEP APNEA: Primary | ICD-10-CM

## 2020-08-03 PROCEDURE — 99999 PR PBB SHADOW E&M-EST. PATIENT-LVL II: ICD-10-PCS | Mod: PBBFAC,TXP,,

## 2020-08-03 PROCEDURE — 99404 PREV MED CNSL INDIV APPRX 60: CPT | Mod: S$GLB,TXP,,

## 2020-08-03 PROCEDURE — 99999 PR PBB SHADOW E&M-EST. PATIENT-LVL II: CPT | Mod: PBBFAC,TXP,,

## 2020-08-03 PROCEDURE — 99404 PR PREVENT COUNSEL,INDIV,60 MIN: ICD-10-PCS | Mod: S$GLB,TXP,,

## 2020-08-03 RX ORDER — NICOTINE 7MG/24HR
1 PATCH, TRANSDERMAL 24 HOURS TRANSDERMAL DAILY
Qty: 28 PATCH | Refills: 0 | Status: ON HOLD | OUTPATIENT
Start: 2020-08-03 | End: 2021-01-14 | Stop reason: HOSPADM

## 2020-08-03 NOTE — PROGRESS NOTES
Pt was contacted today for smoking cessation initial intake. [facility]. She is smoking 3-4 cpd, but reports not having any since yesterday noon. Reports still having a few left, but goal is to get rid of pack and not buy anymore. She is ready to get quit for her health and to get listed for a kidney transplant. She will be participating in weekly tobacco cessation phone follow up visits. She is interested in Chantix which is the only medication that is covered through the [facility] fund; however because of her ESRD asked her to get clearance from her nephrologist first. She is interested in using nicotine patches as well; however explained to patient it is not covered through [facility] fund, but will send Rx and hopefully her private insurance covers it. Will send Rx for 7 mg nicotine patches. Pt is to follow up next week via telephone call.

## 2020-08-03 NOTE — Clinical Note
Pt was contacted today for smoking cessation initial intake. Pt is an [facility] smoking cessation fund patient. She is smoking 3-4 cpd, but reports not having any since yesterday noon. Reports still having a few left, but goal is to get rid of pack and not buy anymore. She is ready to get quit for her health and to get listed for a kidney transplant. She will be participating in weekly tobacco cessation phone follow up visits. She is interested in Chantix which is the only medication that is covered through the [facility] fund; however because of her ESRD asked her to get clearance from her nephrologist first. She is interested in using nicotine patches as well; however explained to patient it is not covered through [facility] fund, but will send Rx and hopefully her private insurance covers it. Will send Rx for 7 mg nicotine patches. Pt is to follow up next week via telephone call.

## 2020-08-04 ENCOUNTER — TELEPHONE (OUTPATIENT)
Dept: TRANSPLANT | Facility: CLINIC | Age: 30
End: 2020-08-04

## 2020-08-04 NOTE — TELEPHONE ENCOUNTER
----- Message from Maribeth Barajas MD sent at 8/3/2020  5:39 PM CDT -----  Regarding: RE: Leydi Oseguera  She was just tested for obstructive sleep apnea and she was found to have severe obstructive sleep apnea CPAP was ordered and she is supposed to wear it ideally continuously during sleep with a minimum of 4 hr a night 7 nights out of 10 to be considered compliant however  obstructive sleep apnea is not a condition that should preclude her from proceeding with kidney transplant planning.  Thank you   ----- Message -----  From: Annette Perez RN  Sent: 8/3/2020  10:58 AM CDT  To: Maribeth Barajas MD  Subject: Leydi Oseguera                                 Hi Dr. Barajas,  Ms. Oumar is being evaluated for kidney transplant. I reviewed her progress note and I see she was referred for sleep medicine. At this time is she cleared from a pulmonary perspective to be listed for kidney transplant?  I would like to present her case to the committee next week.  Thanks so much,  Annette Perez RN  Kidney Transplant Coordinator

## 2020-08-10 ENCOUNTER — CLINICAL SUPPORT (OUTPATIENT)
Dept: SMOKING CESSATION | Facility: CLINIC | Age: 30
End: 2020-08-10

## 2020-08-10 DIAGNOSIS — F17.200 NICOTINE DEPENDENCE: Primary | ICD-10-CM

## 2020-08-10 PROCEDURE — 99402 PR PREVENT COUNSEL,INDIV,30 MIN: ICD-10-PCS | Mod: S$GLB,TXP,,

## 2020-08-10 PROCEDURE — 99999 PR PBB SHADOW E&M-EST. PATIENT-LVL II: CPT | Mod: PBBFAC,TXP,,

## 2020-08-10 PROCEDURE — 99999 PR PBB SHADOW E&M-EST. PATIENT-LVL II: ICD-10-PCS | Mod: PBBFAC,TXP,,

## 2020-08-10 PROCEDURE — 99402 PREV MED CNSL INDIV APPRX 30: CPT | Mod: S$GLB,TXP,,

## 2020-08-10 NOTE — PROGRESS NOTES
Individual Follow-Up Form    8/10/2020    Quit Date: 8/2/2020    Clinical Status of Patient: Outpatient    Length of Service: 30 minutes    Continuing Medication: no    Other Medications: none     Target Symptoms: Withdrawal and medication side effects. The following were  rated moderate (3) to severe (4) on TCRS:  · Moderate (3): none  · Severe (4): none    Comments: Followed up with patient via telephone call on her smoking cessation progress. Patient remains tobacco free since 8/2/2020 with no lapses or relapses. Congratulated patient on her hard work and success. Pt stated her insurance didn't cover the nicotine patches, so she paid for them out of pocket. She reports using a patch for 1 day and then not wearing again. She feels fine and doesn't really have any cravings. Most of the cravings are after meals and she uses gum to help with those cravings. Advised patient to keep patches handy and if she was to start having strong urges or cravings to use the patch. Patient stated she feels fine and knows that she has to push through and remain tobacco free in order to move on with her kidney transplant requirements. Reviewed session handout. The patient denies any abnormal behavioral or mental changes at this time. Will follow up with patient next week.      Diagnosis: F17.200    Next Visit: 1 week

## 2020-08-10 NOTE — Clinical Note
Followed up with patient via telephone call on her smoking cessation progress. Patient remains tobacco free since 8/2/2020 with no lapses or relapses. Congratulated patient on her hard work and success. Pt stated her insurance didn't cover the nicotine patches, so she paid for them out of pocket. She reports using a patch for 1 day and then not wearing again. She feels fine and doesn't really have any cravings. Most of the cravings are after meals and she uses gum to help with those cravings. Advised patient to keep patches handy and if she was to start having strong urges or cravings to use the patch. Patient stated she feels fine and knows that she has to push through and remain tobacco free in order to move on with her kidney transplant requirements. Reviewed session handout. The patient denies any abnormal behavioral or mental changes at this time. Will follow up with patient next week.

## 2020-08-12 ENCOUNTER — TELEPHONE (OUTPATIENT)
Dept: TRANSPLANT | Facility: CLINIC | Age: 30
End: 2020-08-12

## 2020-08-12 NOTE — TELEPHONE ENCOUNTER
Dialysis Adherence:    Katrin nurse at 's dialysis unit reports over the last three months that patient has had 0 AMAs, 0 no shows and no issues with labs, medication management, transportation or caregiver support.    SW remains available at [phone].

## 2020-08-14 ENCOUNTER — COMMITTEE REVIEW (OUTPATIENT)
Dept: TRANSPLANT | Facility: CLINIC | Age: 30
End: 2020-08-14

## 2020-08-14 ENCOUNTER — TELEPHONE (OUTPATIENT)
Dept: TRANSPLANT | Facility: CLINIC | Age: 30
End: 2020-08-14

## 2020-08-14 NOTE — COMMITTEE REVIEW
Native Organ Dx: Hypertensive Nephrosclerosis      SELECTION COMMITTEE NOTE    Leydi Cordero was presented at selection committee on 8/21/2020.  Patient met selection criteria for kidney transplant related to ESRD due to   Hypertensive Nephrosclerosis.  No absolute contraindications to transplant at this time.  Patient will be placed on the cadaveric wait list pending final financial approval from insurance company.  Patient will return to clinic for routine appointment in 1 year(s). Patient does not meet criteria for High KDPI kidney offer due to age. Patient does not meet HCV+ kidney offer due to choice. Patient does not meet criteria for dual/enbloc, due to age.          Note written by Annette Perez RN    ===============================================    I was present at the meeting and attest to the decision of the committee

## 2020-08-14 NOTE — LETTER
August 14, 2020    Won Miller  [address]  [place] [address]     Dear Dr. Miller:    Patient: Leydi Cordero   MR Number: [id number]   YOB: 1990     Your patient, Leydi Cordero, was recently discussed at the [facility] Selection Committee meeting on 8/14/2020. I am happy to inform you that Leydi has been approved for transplantation.  She has met selection criteria for a kidney transplant related to ESRD secondary to primary diagnosis of Hypertensive Nephrosclerosis. Your patient will be placed on the cadaveric wait list pending final financial approval from insurance company.     We appreciate your confidence in allowing us to participate in your patients care.  If you have any questions or concerns, please do not hesitate to contact me.    Sincerely,      Linda Marx MD  Medical Director, Kidney & Kidney/Pancreas Transplantation    tj/Enclosed    Cc:Won Miller MD       [facility]

## 2020-08-14 NOTE — TELEPHONE ENCOUNTER
I called patient with assistance of  to inform her that her case was personated at  committee this morning for kidney transplant listing and she was approved for listing. I explained that her information was sent to financial coordinators to obtain approval to list from her insurance company. Patient has high PRA due to previous transplant and it was explained to her that it will be difficult for her to crossmatch negative due to her prior sensitizing event, however, not impossible. Patient given time to ask questions and all questions answered. Patient verbalized understanding of all information discussed.  Patient informed once she is listed she will received an official letter in the mail and a call for her listing coordinator.

## 2020-08-17 ENCOUNTER — CLINICAL SUPPORT (OUTPATIENT)
Dept: SMOKING CESSATION | Facility: CLINIC | Age: 30
End: 2020-08-17

## 2020-08-17 DIAGNOSIS — F17.200 NICOTINE DEPENDENCE: Primary | ICD-10-CM

## 2020-08-17 PROCEDURE — 99403 PREV MED CNSL INDIV APPRX 45: CPT | Mod: S$GLB,TXP,,

## 2020-08-17 PROCEDURE — 99403 PR PREVENT COUNSEL,INDIV,45 MIN: ICD-10-PCS | Mod: S$GLB,TXP,,

## 2020-08-17 PROCEDURE — 99999 PR PBB SHADOW E&M-EST. PATIENT-LVL II: CPT | Mod: PBBFAC,TXP,,

## 2020-08-17 PROCEDURE — 99999 PR PBB SHADOW E&M-EST. PATIENT-LVL II: ICD-10-PCS | Mod: PBBFAC,TXP,,

## 2020-08-17 NOTE — PROGRESS NOTES
Individual Follow-Up Form    8/17/2020    Quit Date: 8/2/2020    Clinical Status of Patient: Outpatient    Length of Service: 45 minutes    Continuing Medication: yes  Patches    Other Medications: none     Target Symptoms: Withdrawal and medication side effects. The following were  rated moderate (3) to severe (4) on TCRS:  · Moderate (3): none  · Severe (4): none    Comments: Followed up with patient via telephone call on her smoking cessation progress. Patient remains tobacco free since 8/2/2020 with no lapses or relapses. Congratulated patient on her hard work and success. Pt used 7 mg nicotine patches 3 times, she reports she feels fine and doesn't really have any cravings. Most of the cravings are after meals and she uses gum to help with those cravings. She states she was around her brother-in-law recently,  who smokes, but reports going inside the house before he offered her one.  Reviewed strategies, cues, and triggers. Introduced the negative impact of tobacco on health, the health advantages of discontinuing the use of tobacco, time line improved health changes after a quit, withdrawal issues to expect from nicotine and habit, and ways to achieve the goal of a quit. The patient denies any abnormal behavioral or mental changes at this time. Will follow up with patient next week.       Diagnosis: F17.200    Next Visit: 1 week

## 2020-08-19 ENCOUNTER — TELEPHONE (OUTPATIENT)
Dept: TRANSPLANT | Facility: CLINIC | Age: 30
End: 2020-08-19

## 2020-08-19 DIAGNOSIS — Z76.82 ORGAN TRANSPLANT CANDIDATE: Primary | ICD-10-CM

## 2020-08-19 DIAGNOSIS — G47.33 SEVERE OBSTRUCTIVE SLEEP APNEA: Primary | ICD-10-CM

## 2020-08-19 NOTE — LETTER
2020    Leydi Cordero  [address] [place] [address]    Dear Leydi Cordero:  MRN: [id number]  Congratulations! As of 2020, your name has been placed on the  donor waiting list at the [facility].  Your candidacy for kidney transplant is based on the following criteria: Hypertension.    If you have any friends or family members interested in donating a kidney to you, please have them call the donor coordinator.  If you are a pre-dialysis patient or if you dialyze at home monthly transplant blood kits will be sent directly to you.  If you dialyze at a dialysis center the kits will be sent to your dialysis unit.      While active on the list, you must keep us notified of any changes in your health status.  Should your medical condition change and transplantation is no longer a viable option, it may be necessary to deactivate your status or to remove you from the UNOS list.    Please notify your coordinator when there are changes to your telephone number, address, insurance coverage, or dialysis unit.  If you have any alternate phone numbers that you would like us to have, please let us know.  Your Listed Transplant Coordinator will be Neel Garrett RN.  Feel free to call your coordinator at [phone] or [phone] should you have any questions.    The [facility] for Medicaid [employer] and the [employer] for Organ Sharing requires that we inform any patient placed on our waiting list of information that would impact their ability to receive a transplant.  [facility] and Kidney/Pancreas transplant program has a transplant surgeon and physician available 365 days a year, 24 hours a day, and 7 days a week to facilitate organ acceptance, procurement, and implantation, and to address urgent patient issues.  You will be notified in writing of any changes to our key personnel and of any changes to our staffing plan that would  impact your ability to receive a transplant.    Attached is a letter from the [employer] for Organ Sharing (UNOS).  It describes the services and information offered to patients by UNOS and the Organ Procurement and Transplant Network.    Again, we congratulate you on your success and look forward to working with you very closely in the future.  Sincerely,    Linda Marx M.D.                                                Medical Director, Kidney and Kidney/Pancreas Transplant    tj/Enclosed  Cc: MD [facility]  [address] [place] [address]  [phone]               The Organ Procurement and Transplantation Network   Toll-free patient services line: Your resource for organ transplant information     If you have a question regarding your own medical care, you always should call your transplant hospital first. However, for general organ transplant-related information, you can call the Organ Procurement and Transplantation Network (OPTN) toll-free patient services line at [phone].     Anyone, including potential transplant candidates, candidates, recipients, family members, friends, living donors, and donor family members, can call this number to:     · Talk about organ donation, living donation, the transplant process, the donation process, and transplant policies.   · Get a free patient information kit with helpful booklets, waiting list and transplant information, and a list of all transplant hospitals.   · Ask questions about the OPTN website (https://optn.transplant.hrsa.gov/), the [employer] Network for Organ Sharings (UNOS) website (https://unos.org/), or the UNOS website for living donors and transplant recipients. (https://www.transplantliving.org/).   · Learn how the OPTN can help you.   · Talk about any concerns that you may have with a transplant hospital.     The nations transplant system, the  OPTN, is managed under federal contract by the [employer] for Organ Sharing (UNOS), which is a non-profit charitable organization. The OPTN helps create and define organ sharing policies that make the best use of donated organs. This process continuously evaluating new advances and discoveries so policies can be adapted to best serve patients waiting for transplants. To do so, the OPTN works closely with transplant professionals, transplant patients, transplant candidates, donor families, living donors, and the public. All transplant programs and organ procurement organizations throughout the country are OPTN members and are obligated to follow the policies the OPTN creates for allocating organs.     The OPTN also is responsible for:   · Providing educational material for patients, the public, and professionals.   · Raising awareness of the need for donated organs and tissue.   · Coordinating organ procurement, matching, and placement.   · Collecting information about every organ transplant and donation that occurs in the [place].     Remember, you should contact your transplant hospital directly if you have questions or concerns about your own medical care including medical records, work-up progress, and test results.     We are not your transplant hospital, and our staff will not be able to answer questions about your case, so please keep your transplant hospitals phone number handy.   However, while you research your transplant needs and learn as much as you can about transplantation and donation, we welcome your call to our toll-free patient services line at [phone].

## 2020-08-19 NOTE — TELEPHONE ENCOUNTER
KIDNEY WAIT LISTING NOTE    Date of Financial clearance to list: 2020    [facility]:     Organ: Kidney  Name:       Leydi Cordero   : 1990          Gender:     female    MRN#: [id number]                                 State of Permanent Residence:  [address] [place] [address] [address]  [place] [address]  Ethnicity: //   Race:      White    CLINICAL INFORMATION   Candidate Medical Urgency Status: Active (1)  Number of Previous Kidney Transplants: 1  Number of Previous Solid Organ Transplants: 1  Did you enter number of previous kidney or other solid organ transplants? yes  Is this Candidate a Prior Living Donor: no  (If yes, please generate letter to UNOS with patient's date of donation, recipient SSN, signed by Surgical Director after patient is listed in order to receive priority points).      ABO  ABO Blood Group:   O POS     ABO Confirmation: (THESE DATES MUST BE PRIOR TO THE LIST DATE AND SUPPORTED BY SEPARATE LAB REPORTS)    Internal Results    Lab Results   Component Value Date    GROUPTRH O POS 2018    GROUPTRH O POS 2017     No results found for: ABO    External Results    ABO Date 1:    ABO Date 2  Are either of these ABO results based on External Labs? yes  (If Yes, STOP and go to source document in Media Tab for verification).    VITALS  Height: 5'6   Weight:  89.9 kg  (Use height from Transplant clinic visits only).  Did you enter height/weight? Yes    HLA    Class I:  Lab Results   Component Value Date    QSUV7UA 2 2018    YTGX7ZP XX 2018    PZBO1CM 51 2018    WZPK3AR 56 2018    QHLXP7AM 4 2018    ANEKR5AC 6 2018    XXCUG5WT 1 2018    HFESS7KJ 15 2018       Class II:  Lab Results   Component Value Date    KFUNNI56QF 1 2018    TQBVEW68BW 10 2018    XSMVVK446TE XX 2018    DNVXPA4705 XX 2018    BWRCU0UF 5 2018    WSACS3LM XX 2018       Tested for HLA Antibodies: Yes,  "antibodies detected     If result is "Positive" antibodies are detected     If result is "Negative or questionable" no antibodies detected    Lab Results   Component Value Date    CIPRAS Positive 08/01/2018    CIIPRAS Positive 08/01/2018       DIALYSIS INFORMATION  Is patient Pre-Dialysis: No     GFR Information  Report GFR being used as the criteria for placement on the kidney list. If not, leave blank  GFR < or = 20 ml/min? n/a  If Yes, Specify value  ___   ml/min     Initial date GFR became 20 or less:   Is GFR obtained from an Outside lab Result? n/a  (If YES verify with source document scanned into media)    If patient on Dialysis:    Is candidate currently on dialysis for ESRD? Yes  If Yes,  Date Chronic Dialysis Started:   5/10/2017  (verify with source document in Media Tab)   Dialysis Unit Name: LUCILA  CARLITA CAMARILLO  [facility] [facility] [facility] [address]                        Physician Name:  Dr. Linda Story  NPI#: [id number]    DIABETES INFORMATION  Primary Native Kidney Diagnosis: Hypertensive Nephrosclerosis  C-Peptide Value - No results found for: CPEPTIDE  Current Diabetes Status: None    FOR NON-KIDNEY DEPARTMENT USE ONLY:  Additional Organs Registered? none    Maximum Acceptable Number of HLA Mismatches  ABDR:     6      (0-6)               AB:               (0-4)  ADR:   _____  (0-4)              BDR: _____ (0-4)  A:        _____  (0-2)              B:      _____ (0-2)          DR: ______ (0-2)    Will Recipient Accept?   Accept HBcAB Positive Organ:            Yes  Accept HBV LEELA Positive Organ:        no  Accept HCV Antibody Positive Organ: no   Accept HCV LEELA Positive Organ: no    Dual Kidney and En Bloc Opt In : No  Dual  Local:   No  Dual Import:   No  En Bloc Local:   No  En Bloc Import: No     Accept KDPI > 85: Single: No     Local: No     Import: No  Accept KDPI > 85: Dual: No     Local: No     Import: No      ### NURSE TO VERIFY CONSENT AND MAKE ANY NECESSARY CHANGES NEEDED IN " UNET AT THE TIME OF VERIFICATION ###    Unacceptible Antigens  If yes, list     Lab Results   Component Value Date    FW0YWXN  11/20/2019     A25,A26,A66,A32,A34,A43,A3,Cw7,A23,A68,A69,A33,A24,A11,A80,A74,A1,A31,A29,A36,A30,B45,B63,B44,B38,B82    CIABCLM WEAK CW12, B39 11/20/2019    CIIAB  11/20/2019     DR52,DR13,DR8,DR17,DR14,DR7,DR12,DR18,DR11,DR9,DR15,DR16,    ABCMT DR4, DR51-- WEAK DQ6 11/20/2019       ### DO NOT LIST IF ANTIGEN VALUE WEAK ###

## 2020-08-20 DIAGNOSIS — Z76.82 ORGAN TRANSPLANT CANDIDATE: Primary | ICD-10-CM

## 2020-08-24 ENCOUNTER — CLINICAL SUPPORT (OUTPATIENT)
Dept: SMOKING CESSATION | Facility: CLINIC | Age: 30
End: 2020-08-24

## 2020-08-24 DIAGNOSIS — F17.200 NICOTINE DEPENDENCE: Primary | ICD-10-CM

## 2020-08-24 PROCEDURE — 99999 PR PBB SHADOW E&M-EST. PATIENT-LVL II: ICD-10-PCS | Mod: PBBFAC,TXP,,

## 2020-08-24 PROCEDURE — 99401 PR PREVENT COUNSEL,INDIV,15 MIN: ICD-10-PCS | Mod: S$GLB,TXP,,

## 2020-08-24 PROCEDURE — 99999 PR PBB SHADOW E&M-EST. PATIENT-LVL II: CPT | Mod: PBBFAC,TXP,,

## 2020-08-24 PROCEDURE — 99401 PREV MED CNSL INDIV APPRX 15: CPT | Mod: S$GLB,TXP,,

## 2020-08-24 NOTE — Clinical Note
Followed up with patient via telephone call on her smoking cessation progress. Patient remains tobacco free since 8/2/2020 with no lapses or relapses. Pt reports using the  7 mg nicotine patches when cravings are strong. Most of the cravings are after meals and she uses gum to help with those cravings as well. Pt stated she feels great, and doesn't plan on going back to smoking. Reviewed strategies, controlling environment, cues, triggers, new goals set. Introduced high risk situations with preparation interventions, caffeine similarities with withdrawal issues of habit and nicotine, Alcohol, Understanding urges, cravings, stress and relaxation. Open discussion with intervention discussion.Pt denies any abnormal behavioral or mental changes at this time. Will follow up with patient next week.

## 2020-08-24 NOTE — PROGRESS NOTES
Individual Follow-Up Form    8/24/2020    Quit Date: 8/2/2020    Clinical Status of Patient: Outpatient via telephone call    Length of Service: 15 minutes    Continuing Medication: yes  Patches    Other Medications: none     Target Symptoms: Withdrawal and medication side effects. The following were  rated moderate (3) to severe (4) on TCRS:  · Moderate (3): none  · Severe (4): none    Comments:  Followed up with patient via telephone call on her smoking cessation progress. Patient remains tobacco free since 8/2/2020 with no lapses or relapses. Pt reports using the  7 mg nicotine patches when cravings are strong. Most of the cravings are after meals and she uses gum to help with those cravings as well. Pt stated she feels great, and doesn't plan on going back to smoking. Reviewed strategies, controlling environment, cues, triggers, new goals set. Introduced high risk situations with preparation interventions, caffeine similarities with withdrawal issues of habit and nicotine, Alcohol, Understanding urges, cravings, stress and relaxation. Open discussion with intervention discussion.Pt denies any abnormal behavioral or mental changes at this time. Will follow up with patient next week.       Diagnosis: F17.200    Next Visit: 1 week

## 2020-08-26 ENCOUNTER — TELEPHONE (OUTPATIENT)
Dept: PULMONOLOGY | Facility: CLINIC | Age: 30
End: 2020-08-26

## 2020-08-31 ENCOUNTER — CLINICAL SUPPORT (OUTPATIENT)
Dept: SMOKING CESSATION | Facility: CLINIC | Age: 30
End: 2020-08-31

## 2020-08-31 DIAGNOSIS — F17.200 NICOTINE DEPENDENCE: Primary | ICD-10-CM

## 2020-08-31 PROCEDURE — 99999 PR PBB SHADOW E&M-EST. PATIENT-LVL II: ICD-10-PCS | Mod: PBBFAC,TXP,,

## 2020-08-31 PROCEDURE — 99401 PR PREVENT COUNSEL,INDIV,15 MIN: ICD-10-PCS | Mod: S$GLB,TXP,,

## 2020-08-31 PROCEDURE — 99999 PR PBB SHADOW E&M-EST. PATIENT-LVL II: CPT | Mod: PBBFAC,TXP,,

## 2020-08-31 PROCEDURE — 99401 PREV MED CNSL INDIV APPRX 15: CPT | Mod: S$GLB,TXP,,

## 2020-08-31 NOTE — PROGRESS NOTES
Individual Follow-Up Form    8/31/2020    Quit Date: 8/2/20    Clinical Status of Patient: Outpatient    Length of Service: 15 minutes    Continuing Medication: no    Other Medications: none     Target Symptoms: Withdrawal and medication side effects. The following were  rated moderate (3) to severe (4) on TCRS:  · Moderate (3): none  · Severe (4): none    Comments:  Followed up with patient via telephone call on her smoking cessation progress. Patient remains tobacco free since 8/2/2020 with no lapses or relapses. Congratulated patient on making it 1 month tobacco free. Pt is no longer using patches, but she has plenty left if needed. Most of the cravings are after meals and she uses gum to help with those cravings as well. Pt stated she feels great, and doesn't plan on going back to smoking.Reviewed strategies, habitual behavior, stress, and high risk situations. Introduced stress with addition interventions, SOLVE, relaxation with interventions, nutrition, exercise, weight gain, and the importance of rewarding oneself for accomplishments toward becoming tobacco free. Open discussion of all items with interventions. Pt denies any abnormal behavioral or mental changes at this time. Patient has completed program and will follow up PRN. Provided her with name and number to call if further help or support was needed.       Diagnosis: F17.200    Next Visit: PRN

## 2020-08-31 NOTE — Clinical Note
Followed up with patient via telephone call on her smoking cessation progress. Patient remains tobacco free since 8/2/2020 with no lapses or relapses. Congratulated patient on making it 1 month tobacco free. Pt is no longer using patches, but she has plenty left if needed. Most of the cravings are after meals and she uses gum to help with those cravings as well. Pt stated she feels great, and doesn't plan on going back to smoking. Pt denies any abnormal behavioral or mental changes at this time. Patient has completed program and will follow up PRN. Provided her with name and number to call if further help or support was needed.

## 2020-12-03 ENCOUNTER — OFFICE VISIT (OUTPATIENT)
Dept: PULMONOLOGY | Facility: CLINIC | Age: 30
End: 2020-12-03
Payer: MEDICARE

## 2020-12-03 VITALS
OXYGEN SATURATION: 98 % | HEIGHT: 66 IN | HEART RATE: 95 BPM | WEIGHT: 239.19 LBS | BODY MASS INDEX: 38.44 KG/M2 | RESPIRATION RATE: 16 BRPM | TEMPERATURE: 98 F | SYSTOLIC BLOOD PRESSURE: 120 MMHG | DIASTOLIC BLOOD PRESSURE: 76 MMHG

## 2020-12-03 DIAGNOSIS — I27.20 PULMONARY HYPERTENSION: ICD-10-CM

## 2020-12-03 DIAGNOSIS — N18.6 ESRD (END STAGE RENAL DISEASE): ICD-10-CM

## 2020-12-03 DIAGNOSIS — G47.33 OSA ON CPAP: ICD-10-CM

## 2020-12-03 DIAGNOSIS — G47.34 NOCTURNAL HYPOXEMIA: ICD-10-CM

## 2020-12-03 DIAGNOSIS — G47.33 MODERATE OBSTRUCTIVE SLEEP APNEA: Primary | ICD-10-CM

## 2020-12-03 DIAGNOSIS — Z87.891 FORMER SMOKER: ICD-10-CM

## 2020-12-03 PROCEDURE — 99215 PR OFFICE/OUTPT VISIT, EST, LEVL V, 40-54 MIN: ICD-10-PCS | Mod: S$PBB,NTX,, | Performed by: INTERNAL MEDICINE

## 2020-12-03 PROCEDURE — 99999 PR PBB SHADOW E&M-EST. PATIENT-LVL V: CPT | Mod: PBBFAC,TXP,, | Performed by: INTERNAL MEDICINE

## 2020-12-03 PROCEDURE — 99215 OFFICE O/P EST HI 40 MIN: CPT | Mod: PBBFAC,TXP | Performed by: INTERNAL MEDICINE

## 2020-12-03 PROCEDURE — 99999 PR PBB SHADOW E&M-EST. PATIENT-LVL V: ICD-10-PCS | Mod: PBBFAC,TXP,, | Performed by: INTERNAL MEDICINE

## 2020-12-03 PROCEDURE — 99215 OFFICE O/P EST HI 40 MIN: CPT | Mod: S$PBB,NTX,, | Performed by: INTERNAL MEDICINE

## 2020-12-03 RX ORDER — HYDROCODONE BITARTRATE AND ACETAMINOPHEN 5; 325 MG/1; MG/1
TABLET ORAL
Status: ON HOLD | COMMUNITY
Start: 2020-09-19 | End: 2021-01-14 | Stop reason: HOSPADM

## 2020-12-03 NOTE — PROGRESS NOTES
Pulmonary Outpatient Follow Up Visit     Subjective:       Patient ID: Leydi Cordero is a 30 y.o. female.    Chief Complaint: Pulmonary Hypertension      HPI          30-year-old female patient presenting for 3 months follow-up.      She was initially referred January 2020 by Cardiology for evaluation of worsening coughing with yellow sputum production wheezing and shortness of breaths at chest tightness and nighttime symptoms at least 2 nights per week for the last 3 months.  I had the impression of asthmatic bronchitis with exacerbation, she was treated with prednisone and a Z-Kalia.    Denies coughing wheezing or shortness of breath and not needing albuterol.    Not smoking since 2018.      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  She is on the list for transplant as per the last letter she received, she is awaiting a donor.      She is still urinating small amounts.     Seven pack year smoking history quit in 2018.  Now smoking again last cigarette was yesterday.     Has a cousin and an aunt with asthma.    Underwent home sleep study, moderate obstructive sleep apnea with AHI of 27 events per hour.  Suboptimal compliance with CPAP therapy.  Requesting a nasal mask.    Review of Systems   Constitutional: Positive for weight gain, fatigue and weakness. Negative for fever and chills.   HENT: Negative for nosebleeds.    Eyes: Negative for redness.   Respiratory: Positive for snoring and shortness of breath. Negative for cough, sputum production, choking, wheezing and dyspnea on extertion.    Cardiovascular: Negative for chest pain and leg swelling.   Genitourinary: Negative for hematuria.        Kidney transplant rejection now on dialysis   Endocrine: Negative for cold intolerance.    Musculoskeletal:  "Positive for arthralgias.        Left upper extremity AV fistula   Skin: Negative for rash.   Gastrointestinal: Negative for vomiting.   Neurological: Positive for weakness. Negative for syncope.   Hematological: Negative for adenopathy.   Psychiatric/Behavioral: Positive for sleep disturbance. Negative for confusion.       Outpatient Encounter Medications as of 12/3/2020   Medication Sig Dispense Refill    albuterol (PROVENTIL/VENTOLIN HFA) 90 mcg/actuation inhaler Inhale 2 puffs into the lungs every 6 (six) hours as needed for Wheezing. Rescue 18 g 11    amLODIPine (NORVASC) 10 MG tablet AMLODIPINE BESYLATE 10MG TABLETS  TK 1 T PO ONCE D      aspirin (ECOTRIN) 81 MG EC tablet TAKE 1 BY MOUTH DAILY 100 tablet 10    HYDROcodone-acetaminophen (NORCO) 5-325 mg per tablet TK 1 T PO  Q 4 H PRF PAIN      labetalol (NORMODYNE) 200 MG tablet Take 1 tablet (200 mg total) by mouth 2 (two) times daily. 60 tablet 2    nicotine (NICODERM CQ) 7 mg/24 hr Place 1 patch onto the skin once daily. 28 patch 0    nitrofurantoin, macrocrystal-monohydrate, (MACROBID) 100 MG capsule       ondansetron (ZOFRAN-ODT) 4 MG TbDL Take 4 mg by mouth every 8 (eight) hours as needed.      predniSONE (DELTASONE) 10 MG tablet Prednisone 40 mg daily for 3 days then 20 mg daily for 3 days then 10 mg daily for 3 days 21 tablet 0    sevelamer HCL (RENAGEL) 800 MG Tab Take 3 tablets (2,400 mg total) by mouth 3 (three) times daily with meals. Take 3 tablets TID with meals and 2 with snacks. 330 tablet 3     No facility-administered encounter medications on file as of 12/3/2020.        Objective:     Vital Signs (Most Recent)  Vital Signs  Temp: 97.5 °F (36.4 °C)  Pulse: 95  Resp: 16  SpO2: 98 %  BP: 120/76  Height and Weight  Height: 5' 6" (167.6 cm)  Weight: 108.5 kg (239 lb 3.2 oz)  BSA (Calculated - sq m): 2.25 sq meters  BMI (Calculated): 38.6  Weight in (lb) to have BMI = 25: 154.6]  Wt Readings from Last 2 Encounters:   12/03/20 108.5 kg " (239 lb 3.2 oz)   07/27/20 101.5 kg (223 lb 14 oz)       Physical Exam   Constitutional: She is oriented to person, place, and time. She appears well-developed and well-nourished. She is obese.   HENT:   Head: Normocephalic.   Neck: Neck supple.   Cardiovascular: Normal rate, regular rhythm and normal heart sounds.   Pulmonary/Chest: Normal expansion and effort normal. No stridor. No respiratory distress. She has decreased breath sounds. She has no wheezes. She exhibits no tenderness.   Abdominal: Soft.   Musculoskeletal:         General: Deformity present. No tenderness.      Comments: Left upper extremity AV fistula   Lymphadenopathy:     She has no cervical adenopathy.   Neurological: She is alert and oriented to person, place, and time. Gait normal.   Skin: Skin is warm. No cyanosis. Nails show no clubbing.   Psychiatric: She has a normal mood and affect. Her behavior is normal. Judgment and thought content normal.   Nursing note and vitals reviewed.      Laboratory  Lab Results   Component Value Date    WBC 12.49 04/15/2020    RBC 4.03 04/15/2020    HGB 14.1 04/15/2020    HCT 41.2 04/15/2020     (H) 04/15/2020    MCH 35.0 (H) 04/15/2020    MCHC 34.2 04/15/2020    RDW 14.2 04/15/2020     (L) 04/15/2020    MPV 10.7 04/15/2020    GRAN 10.9 (H) 04/15/2020    GRAN 87.7 (H) 04/15/2020    LYMPH 0.7 (L) 04/15/2020    LYMPH 5.7 (L) 04/15/2020    MONO 0.7 04/15/2020    MONO 5.4 04/15/2020    EOS 0.0 04/15/2020    BASO 0.03 04/15/2020    EOSINOPHIL 0.3 04/15/2020    BASOPHIL 0.2 04/15/2020       BMP  Lab Results   Component Value Date     (L) 04/15/2020    K 4.6 04/15/2020    CL 91 (L) 04/15/2020    CO2 23 04/15/2020    BUN 26 (H) 04/15/2020    CREATININE 6.6 (H) 04/15/2020    CALCIUM 8.4 (L) 04/15/2020    ANIONGAP 19 (H) 04/15/2020    ESTGFRAFRICA 9 (A) 04/15/2020    EGFRNONAA 8 (A) 04/15/2020    AST 25 04/15/2020    ALT 14 04/15/2020    PROT 8.0 04/15/2020       Lab Results   Component Value Date      (H) 01/03/2020       Lab Results   Component Value Date    TSH 1.274 08/09/2018       No results found for: SEDRATE    No results found for: CRP  No results found for: IGE     No results found for: ASPERGILLUS  No results found for: AFUMIGATUSCL     No results found for: ACE     Diagnostic Results:      I have personally reviewed today the following studies:    CXR 1/3/2019 clear lungs      Nuc stress 1/2020:     Impression: NORMAL MYOCARDIAL PERFUSION  1. The perfusion scan is free of evidence for myocardial ischemia or injury.   2. Resting wall motion is physiologic.   3. Resting LV function is normal.   4. The ventricular volumes are normal at rest and stress.   5. The extracardiac distribution of radioactivity is normal.        Echo Dec 2019:      1 - Concentric hypertrophy.     2 - No wall motion abnormalities.     3 - Normal left ventricular systolic function (EF 60-65%).     4 - Normal left ventricular diastolic function.     5 - Normal right ventricular systolic function .     6 - Pulmonary hypertension. The estimated PA systolic pressure is 45 mmHg.     7 - Trivial mitral regurgitation.     8 - Moderate to severe tricuspid regurgitation     Echo 8/2018    1 - Concentric remodeling.     2 - No wall motion abnormalities.     3 - Normal left ventricular systolic function (EF 55-60%).     4 - Normal left ventricular diastolic function.     5 - Normal right ventricular systolic function .     6 - The estimated PA systolic pressure is 23 mmHg.     7 - Moderate tricuspid regurgitation       Spirometry 7/21/2020:    Grade A-D -acceptable quality of tracings   Normal spirometry. (FEV1/VC greater than or equal to LLN and FVC greater than or equal to LLN)   Flow volume loops are normal.   Overall there is no significant ventilatory impairment. (FEV1 >LLN)       Compliance Summary  10/28/2020 - 11/26/2020 (30 days)  Days with Device Usage 12 days  Days without Device Usage 18 days  Percent Days with Device  Usage 40.0%  Cumulative Usage 1 day 17 hrs. 58 mins. 50 secs.  Maximum Usage (1 Day) 5 hrs. 59 mins. 40 secs.  Average Usage (All Days) 1 hrs. 23 mins. 57 secs.  Average Usage (Days Used) 3 hrs. 29 mins. 54 secs.  Minimum Usage (1 Day) 31 mins. 26 secs.  Percent of Days with Usage >= 4 Hours 16.7%  Percent of Days with Usage < 4 Hours 83.3%  Date Range  Total Blower Time 1 day 18 hrs. 53 secs.  Average AHI 7.4  Auto-CPAP Summary  Auto-CPAP Mean Pressure 11.8 cmH2O  Auto-CPAP Peak Average Pressure 15.9 cmH2O  Average Device Pressure <= 90% of Time 15.5 cmH2O  Average Time in Large Leak Per Day 50 secs.      Assessment and Recommendations  Based on the [employer] practice parameter of CPAP would be the guideline  recommendation of choice.  Other therapies may include ENT procedures were appropriate, significant weight loss.  Inspire hypoglossal nerve stimulator and nasal PROVENT or mandibular advancement device may also  be  considered.  Close follow up to ensure resolution of symptoms.  1 night study  MODERATE OBSTRUCTIVE SLEEP APNEA with overall AHI 27.6/hr ( 204 events): night #1  Oxygen desaturation: 87%. SpO2 between 85% to 89% for < 1 min.  Patient snored 91% time above 50 .  Heart rate range: 73 bpm - 107 bpm  REC's:  Therapy with APAP at 5-20 cm WP using mask of choice with heated humidification is an option.  Weight loss/management. with regular exercise per direction of physician.  Avoid drowsy driving.  Follow up in sleep clinic    Assessment/Plan:   Moderate obstructive sleep apnea  -     HME - OTHER    NASIMA on CPAP  -     HME - OTHER    Nocturnal hypoxemia  -     HME - OTHER    Pulmonary hypertension  -     HME - OTHER    ESRD (end stage renal disease)    Former smoker      Continue albuterol p.r.n.    Spirometry not in favor of asthma at the moment.    Remains asymptomatic.    No evidence of pulmonary vascular disease on V/Q scan, pulmonary hypertension to be reassessed with 2D  echo ordered by Cardiology, I asked the staff to schedule 2D echo.    AV fistula presents could be contributing to pulmonary hypertension via shunt, also NASIMA/hypoxemia are partially contributing to her pulmonary hypertension.  Will decide on the need for right heart catheter after repeat 2D echo.    Encouraged patient to continue smoking cessation.    Will change CPAP pressure to 15 cm water since her residual is 7.3 and the 90% pressure is 15.5 cm water.  Change mask to nasal mask.  I emphasized to the patient the need to be compliant with CPAP therapy ideally continuously during sleep with a minimum of 4 hr a night 7 nights out of 10.      Up-to-date on influenza pneumococcal vaccines with dialysis center.    [employer]  was used today.  Patient states understanding of the plan.      Follow up in about 3 months (around 3/3/2021).    This note was prepared using voice recognition system and is likely to have sound alike errors that may have been overlooked even after proof reading.  Please call me with any questions    Discussed diagnosis, its evaluation, treatment and usual course. All questions answered.      Maribeth Barajas MD

## 2020-12-09 ENCOUNTER — DOCUMENTATION ONLY (OUTPATIENT)
Dept: NEPHROLOGY | Facility: CLINIC | Age: 30
End: 2020-12-09

## 2020-12-09 NOTE — H&P
History & Physical      Chief Complaint:  H&P    HPI:        29 y.o.  female Patient with ESRD on HD MWF at [facility]. She started HD 2/7/2008 and had a transplant in [place] 4/15/15.   Her cause of end-stage renal disease is HTN and failed transplant.  His/Her current dialysis   prescription is as follows:  Dialyzer:  Gambro Revaclr 300.  Dialysate:    Sodium 138, calcium 2.5, potassium 2.0.  Duration of treatment is 210 mins three   times a week on Monday, Wednesday, and Friday.  Blood flow rate 350 mL/min,   dialysate flow rate 600 mL/min.  Current hemodialysis access is a left   leg hemodialysis graft, has positive thrill, positive bruit.  Established dry   weight  106  kg.           ROS:        Constitutional: Negative for fever, chills, weight loss, malaise/fatigue and diaphoresis.   HENT: Negative for hearing loss, ear pain, nosebleeds, congestion, sore throat, neck pain, tinnitus and ear discharge.    Eyes: Negative for blurred vision, double vision, photophobia, pain, discharge and redness.   Respiratory: Negative for cough, hemoptysis, sputum production, shortness of breath, wheezing and stridor.    Cardiovascular: Negative for chest pain, palpitations, orthopnea, claudication, leg swelling and PND.   Gastrointestinal: Negative for heartburn, nausea, vomiting, abdominal pain, diarrhea, constipation, blood in stool and melena.   Genitourinary: Negative for dysuria, urgency, frequency, hematuria and flank pain.   Musculoskeletal: Negative for myalgias, back pain, joint pain and falls.   Skin: Negative for itching and rash.   Neurological: Negative for dizziness, tingling, tremors, sensory change, speech change, focal weakness, seizures, loss of consciousness, weakness and headaches.   Endo/Heme/Allergies: Negative for environmental allergies and polydipsia. Does not bruise/bleed easily.   Psychiatric/Behavioral: Negative for depression, suicidal ideas, hallucinations, memory loss  and substance abuse. The patient is not nervous/anxious and does not have insomnia.    All 14 systems reviewed and negative except as noted above.            Past Medical History:   Diagnosis Date    Anxiety     Encounter for blood transfusion     ESRD (end stage renal disease)     Fibroma     H/O kidney transplant     Hypertension     Hypertensive nephropathy     Ovarian cyst        Past Surgical History:   Procedure Laterality Date    hemodialysis access left arm      kidney removal       KIDNEY TRANSPLANT  04/15/2015    NEPHRECTOMY      OVARIAN CYST REMOVAL      PARATHYROIDECTOMY      partial     right leg hemodialysis access      transplant nephrectomy  2017       Family History   Problem Relation Age of Onset    No Known Problems Mother     Colon cancer Father     Cancer Father     Hypertension Father     No Known Problems Sister     No Known Problems Brother     Kidney disease Maternal Aunt     Hypertension Maternal Aunt     Diabetes Maternal Uncle     Kidney disease Other     Hypertension Other        Social History     Socioeconomic History    Marital status: Single     Spouse name: Not on file    Number of children: Not on file    Years of education: Not on file    Highest education level: Not on file   Occupational History    Not on file   Social Needs    Financial resource strain: Not on file    Food insecurity     Worry: Not on file     Inability: Not on file    Transportation needs     Medical: Not on file     Non-medical: Not on file   Tobacco Use    Smoking status: Former Smoker     Packs/day: 0.50     Years: 14.00     Pack years: 7.00     Types: Cigarettes     Quit date: 2020     Years since quittin.3    Smokeless tobacco: Never Used   Substance and Sexual Activity    Alcohol use: No    Drug use: No    Sexual activity: Yes     Partners: Male     Birth control/protection: None   Lifestyle    Physical activity     Days per week: Not on file      Minutes per session: Not on file    Stress: Not on file   Relationships    Social connections     Talks on phone: Not on file     Gets together: Not on file     Attends Buddhist service: Not on file     Active member of club or organization: Not on file     Attends meetings of clubs or organizations: Not on file     Relationship status: Not on file   Other Topics Concern    Not on file   Social History Narrative    Not on file       Current Outpatient Medications   Medication Sig Dispense Refill    albuterol (PROVENTIL/VENTOLIN HFA) 90 mcg/actuation inhaler Inhale 2 puffs into the lungs every 6 (six) hours as needed for Wheezing. Rescue 18 g 11    amLODIPine (NORVASC) 10 MG tablet AMLODIPINE BESYLATE 10MG TABLETS  TK 1 T PO ONCE D      aspirin (ECOTRIN) 81 MG EC tablet TAKE 1 BY MOUTH DAILY 100 tablet 10    HYDROcodone-acetaminophen (NORCO) 5-325 mg per tablet TK 1 T PO  Q 4 H PRF PAIN      labetalol (NORMODYNE) 200 MG tablet Take 1 tablet (200 mg total) by mouth 2 (two) times daily. 60 tablet 2    nicotine (NICODERM CQ) 7 mg/24 hr Place 1 patch onto the skin once daily. 28 patch 0    nitrofurantoin, macrocrystal-monohydrate, (MACROBID) 100 MG capsule       ondansetron (ZOFRAN-ODT) 4 MG TbDL Take 4 mg by mouth every 8 (eight) hours as needed.      predniSONE (DELTASONE) 10 MG tablet Prednisone 40 mg daily for 3 days then 20 mg daily for 3 days then 10 mg daily for 3 days 21 tablet 0    sevelamer HCL (RENAGEL) 800 MG Tab Take 3 tablets (2,400 mg total) by mouth 3 (three) times daily with meals. Take 3 tablets TID with meals and 2 with snacks. 330 tablet 3     No current facility-administered medications for this visit.        Review of patient's allergies indicates:   Allergen Reactions    Heparin analogues Rash         There were no vitals filed for this visit.          Physical Exam   Nursing Notes and Vital Signs Reviewed.     Constitutional: Well developed, well nourished. AAOx3, NAD, speech/  comprehension clear   Head: Atraumatic. Normocephalic.   Eyes: PERRL. EOMI. Conjunctivae are not pale. No scleral icterus.   ENT: Mucous membranes are dry. No tongue tremors. Throat clear.  Neck: Supple. No JVD or LN or Carotid Bruits noted B.  Cardiovascular: S1S2 RRR, no murmurs, rubs, or gallops. Distal pulses are 2+ and symmetric.   Pulmonary/Chest: No evidence of respiratory distress. Clear to auscultation bilaterally. No wheezing, rales or rhonchi. No chest wall TTP.   Abdominal: Soft and non-distended. There is no tenderness. No rebound, guarding, or rigidity. No organomegaly. No mass or viscera palpable  Musculoskeletal: FROM in all extremities. No deformities, no TTP, no edema. No midline spinal TTP. No step-offs. Pelvis is stable to compression. No cyanosis. Moves all four extremities.   Skin: Skin is warm and dry.   Neurological: No gross neurological deficits, Strength 5/5 B, is equal in the upper and lower extremities bilaterally. No sensory deficits to light touch. No pronator drift.  DTRs are 2+ and equal throughout.   Psychiatric: Good eye contact. Normal Affect.      Laboratory Data:  Reviewed and noted in plan where applicable- Please see chart for full laboratory data.       Lab Results   Component Value Date    WBC 12.49 04/15/2020    HGB 14.1 04/15/2020    HCT 41.2 04/15/2020     (H) 04/15/2020     (L) 04/15/2020     BMP  Lab Results   Component Value Date     (L) 04/15/2020    K 4.6 04/15/2020    CL 91 (L) 04/15/2020    CO2 23 04/15/2020    BUN 26 (H) 04/15/2020    CREATININE 6.6 (H) 04/15/2020    CALCIUM 8.4 (L) 04/15/2020    ANIONGAP 19 (H) 04/15/2020    ESTGFRAFRICA 9 (A) 04/15/2020    EGFRNONAA 8 (A) 04/15/2020     CMP  Sodium   Date Value Ref Range Status   04/15/2020 133 (L) 136 - 145 mmol/L Final     Potassium   Date Value Ref Range Status   04/15/2020 4.6 3.5 - 5.1 mmol/L Final     Chloride   Date Value Ref Range Status   04/15/2020 91 (L) 95 - 110 mmol/L Final      CO2   Date Value Ref Range Status   04/15/2020 23 23 - 29 mmol/L Final     Glucose   Date Value Ref Range Status   04/15/2020 88 70 - 110 mg/dL Final     BUN   Date Value Ref Range Status   04/15/2020 26 (H) 6 - 20 mg/dL Final     Creatinine   Date Value Ref Range Status   04/15/2020 6.6 (H) 0.5 - 1.4 mg/dL Final     Calcium   Date Value Ref Range Status   04/15/2020 8.4 (L) 8.7 - 10.5 mg/dL Final     Total Protein   Date Value Ref Range Status   04/15/2020 8.0 6.0 - 8.4 g/dL Final     Albumin   Date Value Ref Range Status   04/15/2020 3.6 3.5 - 5.2 g/dL Final     Total Bilirubin   Date Value Ref Range Status   04/15/2020 0.4 0.1 - 1.0 mg/dL Final     Comment:     For infants and newborns, interpretation of results should be based  on gestational age, weight and in agreement with clinical  observations.  Premature Infant recommended reference ranges:  Up to 24 hours.............<8.0 mg/dL  Up to 48 hours............<12.0 mg/dL  3-5 days..................<15.0 mg/dL  6-29 days.................<15.0 mg/dL       Alkaline Phosphatase   Date Value Ref Range Status   04/15/2020 104 55 - 135 U/L Final     AST   Date Value Ref Range Status   04/15/2020 25 10 - 40 U/L Final     ALT   Date Value Ref Range Status   04/15/2020 14 10 - 44 U/L Final     Anion Gap   Date Value Ref Range Status   04/15/2020 19 (H) 8 - 16 mmol/L Final     eGFR if    Date Value Ref Range Status   04/15/2020 9 (A) >60 mL/min/1.73 m^2 Final     eGFR if non    Date Value Ref Range Status   04/15/2020 8 (A) >60 mL/min/1.73 m^2 Final     Comment:     Calculation used to obtain the estimated glomerular filtration  rate (eGFR) is the CKD-EPI equation.        Lab Results   Component Value Date    CALCIUM 8.4 (L) 04/15/2020    PHOS 5.0 (H) 11/20/2019     Lab Results   Component Value Date    K 4.6 04/15/2020     Lab Results   Component Value Date    LABPROT 10.1 11/20/2019    ALBUMIN 3.6 04/15/2020     Lab Results    Component Value Date    HGBA1C 4.4 08/01/2018       BMP  @[id number](GLU,NA,K,Cl,CO2,BUN,Creatinine,Calcium,MG)@      Radiology:  Reviewed and noted in plan where applicable- Please see chart for full radiology data.            ASSESSMENT/PLAN:     Patient Active Problem List   Diagnosis    H/O kidney transplant    Essential hypertension    Acidosis, metabolic    Antibody mediated rejection of kidney transplant    Prophylactic immunotherapy    Immunosuppressive management encounter following kidney transplant    Acute rejection of kidney transplant    Hyperphosphatemia    Diarrhea    C. difficile colitis    Hyperkalemia    ESRD (end stage renal disease)    S/p nephrectomy 12/1/17    Hyperparathyroidism, tertiary    S/P parathyroidectomy--partial    Hypercalcemia    Tobacco abuse    Hyponatremia    Nausea    ESRD on hemodialysis    Rash    Nonrheumatic tricuspid valve regurgitation    Pulmonary hypertension    Nonintractable headache    Preop cardiovascular exam         PLAN:      Assessment and plan:    1.  ESRD: Doing very well on  dialysis. We will continue hemodialysis treatments three   times a week, 210 mins each treatment, maintaining a URR of 70% or greater and   a Kt/V of 1.20.  Currently, the patient is stable.    2.  Anemia: .  We will check hemoglobin at least monthly,   target range 10 to 11, transferrin saturation monthly, and ferritin quarterly.    We will dose Epogen and iron according to monthly blood work and according to   protocol.    3 . Hypertension.  The patient's blood pressure generally stabilizes during   Treatment. Currently controlled with current medications, sodium and fluid   restrictions and dialysis prescription. BP is much better controlled    4.  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  We will check intact PTH on   a quarterly basis.  We will dose vitamin D according to blood work and protocol.      TISHA Javier

## 2020-12-11 ENCOUNTER — PES CALL (OUTPATIENT)
Dept: ADMINISTRATIVE | Facility: CLINIC | Age: 30
End: 2020-12-11

## 2021-01-08 ENCOUNTER — TELEPHONE (OUTPATIENT)
Dept: TRANSPLANT | Facility: CLINIC | Age: 31
End: 2021-01-08

## 2021-01-08 DIAGNOSIS — Z76.82 AWAITING ORGAN TRANSPLANT STATUS: Primary | ICD-10-CM

## 2021-01-09 ENCOUNTER — HOSPITAL ENCOUNTER (INPATIENT)
Facility: HOSPITAL | Age: 31
LOS: 5 days | Discharge: HOME | DRG: 652 | End: 2021-01-14
Attending: TRANSPLANT SURGERY | Admitting: SURGERY
Payer: MEDICARE

## 2021-01-09 ENCOUNTER — ANESTHESIA EVENT (OUTPATIENT)
Dept: SURGERY | Facility: HOSPITAL | Age: 31
DRG: 652 | End: 2021-01-09
Payer: MEDICARE

## 2021-01-09 ENCOUNTER — ANESTHESIA (OUTPATIENT)
Dept: SURGERY | Facility: HOSPITAL | Age: 31
DRG: 652 | End: 2021-01-09
Payer: MEDICARE

## 2021-01-09 DIAGNOSIS — E87.5 HYPERKALEMIA: ICD-10-CM

## 2021-01-09 DIAGNOSIS — N18.6 ESRD (END STAGE RENAL DISEASE): ICD-10-CM

## 2021-01-09 DIAGNOSIS — Z94.0 DECEASED-DONOR KIDNEY TRANSPLANT: Primary | ICD-10-CM

## 2021-01-09 DIAGNOSIS — Z99.2 DELAYED GRAFT FUNCTION OF KIDNEY TRANSPLANT DUE TO ATN REQUIRING ACUTE DIALYSIS: ICD-10-CM

## 2021-01-09 DIAGNOSIS — Z94.0 H/O KIDNEY TRANSPLANT: ICD-10-CM

## 2021-01-09 DIAGNOSIS — Z29.89 PROPHYLACTIC IMMUNOTHERAPY: ICD-10-CM

## 2021-01-09 DIAGNOSIS — Z79.60 LONG-TERM USE OF IMMUNOSUPPRESSANT MEDICATION: ICD-10-CM

## 2021-01-09 DIAGNOSIS — Z94.0 DECEASED-DONOR KIDNEY TRANSPLANT: ICD-10-CM

## 2021-01-09 DIAGNOSIS — T86.12 FAILED KIDNEY TRANSPLANT: ICD-10-CM

## 2021-01-09 DIAGNOSIS — Z01.811 PRE-OP CHEST EXAM: ICD-10-CM

## 2021-01-09 DIAGNOSIS — I10 ESSENTIAL HYPERTENSION: ICD-10-CM

## 2021-01-09 DIAGNOSIS — T82.868D THROMBOSIS DUE TO CENTRAL VENOUS ACCESS DEVICE, SUBSEQUENT ENCOUNTER: ICD-10-CM

## 2021-01-09 DIAGNOSIS — D69.6 THROMBOCYTOPENIA: ICD-10-CM

## 2021-01-09 DIAGNOSIS — D62 ACUTE BLOOD LOSS ANEMIA: ICD-10-CM

## 2021-01-09 DIAGNOSIS — Z91.89 AT RISK FOR OPPORTUNISTIC INFECTIONS: ICD-10-CM

## 2021-01-09 DIAGNOSIS — T86.19 DELAYED GRAFT FUNCTION OF KIDNEY TRANSPLANT DUE TO ATN REQUIRING ACUTE DIALYSIS: ICD-10-CM

## 2021-01-09 LAB
25(OH)D3+25(OH)D2 SERPL-MCNC: 32 NG/ML (ref 30–96)
ABO + RH BLD: NORMAL
ALBUMIN SERPL BCP-MCNC: 3.5 G/DL (ref 3.5–5.2)
ALP SERPL-CCNC: 98 U/L (ref 55–135)
ALT SERPL W/O P-5'-P-CCNC: 28 U/L (ref 10–44)
ANION GAP SERPL CALC-SCNC: 16 MMOL/L (ref 8–16)
APTT BLDCRRT: 27.2 SEC (ref 21–32)
AST SERPL-CCNC: 25 U/L (ref 10–40)
BASOPHILS # BLD AUTO: 0.06 K/UL (ref 0–0.2)
BASOPHILS NFR BLD: 0.8 % (ref 0–1.9)
BILIRUB SERPL-MCNC: 0.4 MG/DL (ref 0.1–1)
BLD GP AB SCN CELLS X3 SERPL QL: NORMAL
BUN SERPL-MCNC: 28 MG/DL (ref 6–20)
CALCIUM SERPL-MCNC: 8.2 MG/DL (ref 8.7–10.5)
CHLORIDE SERPL-SCNC: 93 MMOL/L (ref 95–110)
CHOLEST SERPL-MCNC: 171 MG/DL (ref 120–199)
CHOLEST/HDLC SERPL: 3.7 {RATIO} (ref 2–5)
CO2 SERPL-SCNC: 29 MMOL/L (ref 23–29)
CREAT SERPL-MCNC: 7.9 MG/DL (ref 0.5–1.4)
CREAT UR-MCNC: <5 MG/DL (ref 15–325)
DIFFERENTIAL METHOD: ABNORMAL
EOSINOPHIL # BLD AUTO: 0.1 K/UL (ref 0–0.5)
EOSINOPHIL NFR BLD: 1.2 % (ref 0–8)
ERYTHROCYTE [DISTWIDTH] IN BLOOD BY AUTOMATED COUNT: 15.4 % (ref 11.5–14.5)
EST. GFR  (AFRICAN AMERICAN): 7.2 ML/MIN/1.73 M^2
EST. GFR  (NON AFRICAN AMERICAN): 6.3 ML/MIN/1.73 M^2
GLUCOSE SERPL-MCNC: 81 MG/DL (ref 70–110)
HCT VFR BLD AUTO: 30.5 % (ref 37–48.5)
HCT VFR BLD AUTO: 40.3 % (ref 37–48.5)
HDLC SERPL-MCNC: 46 MG/DL (ref 40–75)
HDLC SERPL: 26.9 % (ref 20–50)
HGB BLD-MCNC: 13 G/DL (ref 12–16)
IMM GRANULOCYTES # BLD AUTO: 0.02 K/UL (ref 0–0.04)
IMM GRANULOCYTES NFR BLD AUTO: 0.3 % (ref 0–0.5)
INR PPP: 1 (ref 0.8–1.2)
LDH SERPL L TO P-CCNC: 233 U/L (ref 110–260)
LDLC SERPL CALC-MCNC: 92.6 MG/DL (ref 63–159)
LYMPHOCYTES # BLD AUTO: 1.7 K/UL (ref 1–4.8)
LYMPHOCYTES NFR BLD: 22.7 % (ref 18–48)
MCH RBC QN AUTO: 33.9 PG (ref 27–31)
MCHC RBC AUTO-ENTMCNC: 32.3 G/DL (ref 32–36)
MCV RBC AUTO: 105 FL (ref 82–98)
MONOCYTES # BLD AUTO: 0.5 K/UL (ref 0.3–1)
MONOCYTES NFR BLD: 6.4 % (ref 4–15)
NEUTROPHILS # BLD AUTO: 5.1 K/UL (ref 1.8–7.7)
NEUTROPHILS NFR BLD: 68.6 % (ref 38–73)
NONHDLC SERPL-MCNC: 125 MG/DL
NRBC BLD-RTO: 0 /100 WBC
PHOSPHATE SERPL-MCNC: 4.1 MG/DL (ref 2.7–4.5)
PLATELET # BLD AUTO: 182 K/UL (ref 150–350)
PMV BLD AUTO: 11.2 FL (ref 9.2–12.9)
POTASSIUM SERPL-SCNC: 4.1 MMOL/L (ref 3.5–5.1)
PROT SERPL-MCNC: 7 G/DL (ref 6–8.4)
PROT UR-MCNC: 512 MG/DL (ref 0–15)
PROT/CREAT UR: ABNORMAL MG/G{CREAT} (ref 0–0.2)
PROTHROMBIN TIME: 10.6 SEC (ref 9–12.5)
PTH-INTACT SERPL-MCNC: 292 PG/ML (ref 9–77)
RBC # BLD AUTO: 3.83 M/UL (ref 4–5.4)
SARS-COV-2 RDRP RESP QL NAA+PROBE: NEGATIVE
SODIUM SERPL-SCNC: 138 MMOL/L (ref 136–145)
TRIGL SERPL-MCNC: 162 MG/DL (ref 30–150)
URATE SERPL-MCNC: 3.5 MG/DL (ref 2.4–5.7)
WBC # BLD AUTO: 7.48 K/UL (ref 3.9–12.7)

## 2021-01-09 PROCEDURE — 81200001 HC KIDNEY ACQUISITION - CADAVER

## 2021-01-09 PROCEDURE — 85014 HEMATOCRIT: CPT

## 2021-01-09 PROCEDURE — 50323 PR TRANSPLANT,PREP CADAVER RENAL GRAFT: ICD-10-PCS | Mod: 51,RT,GC, | Performed by: TRANSPLANT SURGERY

## 2021-01-09 PROCEDURE — 63600175 PHARM REV CODE 636 W HCPCS: Mod: JG,NTX | Performed by: PHYSICIAN ASSISTANT

## 2021-01-09 PROCEDURE — 86920 COMPATIBILITY TEST SPIN: CPT

## 2021-01-09 PROCEDURE — 36620 PR INSERT CATH,ART,PERCUT,SHORTTERM: ICD-10-PCS | Mod: 59,,, | Performed by: ANESTHESIOLOGY

## 2021-01-09 PROCEDURE — 87522 HEPATITIS C REVRS TRNSCRPJ: CPT

## 2021-01-09 PROCEDURE — 50360 PR TRANSPLANTATION OF KIDNEY: ICD-10-PCS | Mod: RT,GC,, | Performed by: TRANSPLANT SURGERY

## 2021-01-09 PROCEDURE — 85025 COMPLETE CBC W/AUTO DIFF WBC: CPT

## 2021-01-09 PROCEDURE — 81261 IGH GENE REARRANGE AMP METH: CPT | Performed by: PATHOLOGY

## 2021-01-09 PROCEDURE — 88342 IMHCHEM/IMCYTCHM 1ST ANTB: CPT | Performed by: PATHOLOGY

## 2021-01-09 PROCEDURE — 36000930 HC OR TIME LEV VII 1ST 15 MIN: Performed by: TRANSPLANT SURGERY

## 2021-01-09 PROCEDURE — 50360 RNL ALTRNSPLJ W/O RCP NFRCT: CPT | Mod: RT,GC,, | Performed by: TRANSPLANT SURGERY

## 2021-01-09 PROCEDURE — 93005 ELECTROCARDIOGRAM TRACING: CPT | Mod: NTX

## 2021-01-09 PROCEDURE — 88342 IMHCHEM/IMCYTCHM 1ST ANTB: CPT | Mod: 26,,, | Performed by: PATHOLOGY

## 2021-01-09 PROCEDURE — 86705 HEP B CORE ANTIBODY IGM: CPT

## 2021-01-09 PROCEDURE — 36000931 HC OR TIME LEV VII EA ADD 15 MIN: Performed by: TRANSPLANT SURGERY

## 2021-01-09 PROCEDURE — 93010 EKG 12-LEAD: ICD-10-PCS | Mod: NTX,,, | Performed by: INTERNAL MEDICINE

## 2021-01-09 PROCEDURE — 87340 HEPATITIS B SURFACE AG IA: CPT

## 2021-01-09 PROCEDURE — 86706 HEP B SURFACE ANTIBODY: CPT

## 2021-01-09 PROCEDURE — 50605 PR URETEROTOMY TO INSERT STENT: ICD-10-PCS | Mod: 51,RT,GC, | Performed by: TRANSPLANT SURGERY

## 2021-01-09 PROCEDURE — 37000008 HC ANESTHESIA 1ST 15 MINUTES: Performed by: TRANSPLANT SURGERY

## 2021-01-09 PROCEDURE — 88341 IMHCHEM/IMCYTCHM EA ADD ANTB: CPT | Mod: 59 | Performed by: PATHOLOGY

## 2021-01-09 PROCEDURE — 99223 1ST HOSP IP/OBS HIGH 75: CPT | Mod: AI,NTX,, | Performed by: PHYSICIAN ASSISTANT

## 2021-01-09 PROCEDURE — 86900 BLOOD TYPING SEROLOGIC ABO: CPT

## 2021-01-09 PROCEDURE — U0002 COVID-19 LAB TEST NON-CDC: HCPCS | Mod: NTX

## 2021-01-09 PROCEDURE — 82570 ASSAY OF URINE CREATININE: CPT

## 2021-01-09 PROCEDURE — 84550 ASSAY OF BLOOD/URIC ACID: CPT

## 2021-01-09 PROCEDURE — D9220A PRA ANESTHESIA: ICD-10-PCS | Mod: ANES,,, | Performed by: ANESTHESIOLOGY

## 2021-01-09 PROCEDURE — 25000003 PHARM REV CODE 250: Performed by: STUDENT IN AN ORGANIZED HEALTH CARE EDUCATION/TRAINING PROGRAM

## 2021-01-09 PROCEDURE — 88341 PR IHC OR ICC EACH ADD'L SINGLE ANTIBODY  STAINPR: ICD-10-PCS | Mod: 26,,, | Performed by: PATHOLOGY

## 2021-01-09 PROCEDURE — 88342 CHG IMMUNOCYTOCHEMISTRY: ICD-10-PCS | Mod: 26,,, | Performed by: PATHOLOGY

## 2021-01-09 PROCEDURE — D9220A PRA ANESTHESIA: Mod: ANES,,, | Performed by: ANESTHESIOLOGY

## 2021-01-09 PROCEDURE — 86703 HIV-1/HIV-2 1 RESULT ANTBDY: CPT

## 2021-01-09 PROCEDURE — C1729 CATH, DRAINAGE: HCPCS | Performed by: TRANSPLANT SURGERY

## 2021-01-09 PROCEDURE — 20600001 HC STEP DOWN PRIVATE ROOM

## 2021-01-09 PROCEDURE — 25000003 PHARM REV CODE 250: Performed by: NURSE ANESTHETIST, CERTIFIED REGISTERED

## 2021-01-09 PROCEDURE — 81264 IGK REARRANGEABN CLONAL POP: CPT | Performed by: PATHOLOGY

## 2021-01-09 PROCEDURE — 88305 TISSUE EXAM BY PATHOLOGIST: ICD-10-PCS | Mod: 26,,, | Performed by: PATHOLOGY

## 2021-01-09 PROCEDURE — 71000039 HC RECOVERY, EACH ADD'L HOUR: Performed by: TRANSPLANT SURGERY

## 2021-01-09 PROCEDURE — 63600175 PHARM REV CODE 636 W HCPCS: Performed by: TRANSPLANT SURGERY

## 2021-01-09 PROCEDURE — 80053 COMPREHEN METABOLIC PANEL: CPT

## 2021-01-09 PROCEDURE — 81300002 HC KIDNEY TRANSPORT, GROUND 4-5 HOURS

## 2021-01-09 PROCEDURE — 36620 INSERTION CATHETER ARTERY: CPT | Mod: 59,,, | Performed by: ANESTHESIOLOGY

## 2021-01-09 PROCEDURE — 88341 IMHCHEM/IMCYTCHM EA ADD ANTB: CPT | Mod: 26,,, | Performed by: PATHOLOGY

## 2021-01-09 PROCEDURE — 50327 PR TRANSPLANT,PREP RENAL GRAFT/VENOUS: ICD-10-PCS | Mod: 51,RT,GC, | Performed by: TRANSPLANT SURGERY

## 2021-01-09 PROCEDURE — 37000009 HC ANESTHESIA EA ADD 15 MINS: Performed by: TRANSPLANT SURGERY

## 2021-01-09 PROCEDURE — 80069 RENAL FUNCTION PANEL: CPT

## 2021-01-09 PROCEDURE — 88305 TISSUE EXAM BY PATHOLOGIST: CPT | Performed by: PATHOLOGY

## 2021-01-09 PROCEDURE — 82306 VITAMIN D 25 HYDROXY: CPT

## 2021-01-09 PROCEDURE — 88365 INSITU HYBRIDIZATION (FISH): CPT | Mod: 26,,, | Performed by: PATHOLOGY

## 2021-01-09 PROCEDURE — 63600175 PHARM REV CODE 636 W HCPCS: Performed by: STUDENT IN AN ORGANIZED HEALTH CARE EDUCATION/TRAINING PROGRAM

## 2021-01-09 PROCEDURE — 71000015 HC POSTOP RECOV 1ST HR: Performed by: TRANSPLANT SURGERY

## 2021-01-09 PROCEDURE — 80061 LIPID PANEL: CPT

## 2021-01-09 PROCEDURE — 71000033 HC RECOVERY, INTIAL HOUR: Performed by: TRANSPLANT SURGERY

## 2021-01-09 PROCEDURE — 88364 INSITU HYBRIDIZATION (FISH): CPT | Mod: 59 | Performed by: PATHOLOGY

## 2021-01-09 PROCEDURE — C2617 STENT, NON-COR, TEM W/O DEL: HCPCS | Performed by: TRANSPLANT SURGERY

## 2021-01-09 PROCEDURE — 71000016 HC POSTOP RECOV ADDL HR: Performed by: TRANSPLANT SURGERY

## 2021-01-09 PROCEDURE — 88365 PR  TISSUE HYBRIDIZATION: ICD-10-PCS | Mod: 26,,, | Performed by: PATHOLOGY

## 2021-01-09 PROCEDURE — 85610 PROTHROMBIN TIME: CPT

## 2021-01-09 PROCEDURE — 84100 ASSAY OF PHOSPHORUS: CPT

## 2021-01-09 PROCEDURE — D9220A PRA ANESTHESIA: ICD-10-PCS | Mod: CRNA,,, | Performed by: NURSE ANESTHETIST, CERTIFIED REGISTERED

## 2021-01-09 PROCEDURE — 88305 TISSUE EXAM BY PATHOLOGIST: CPT | Mod: 26,,, | Performed by: PATHOLOGY

## 2021-01-09 PROCEDURE — S5010 5% DEXTROSE AND 0.45% SALINE: HCPCS | Performed by: STUDENT IN AN ORGANIZED HEALTH CARE EDUCATION/TRAINING PROGRAM

## 2021-01-09 PROCEDURE — 63600175 PHARM REV CODE 636 W HCPCS: Performed by: NURSE ANESTHETIST, CERTIFIED REGISTERED

## 2021-01-09 PROCEDURE — D9220A PRA ANESTHESIA: Mod: CRNA,,, | Performed by: NURSE ANESTHETIST, CERTIFIED REGISTERED

## 2021-01-09 PROCEDURE — 27201423 OPTIME MED/SURG SUP & DEVICES STERILE SUPPLY: Performed by: TRANSPLANT SURGERY

## 2021-01-09 PROCEDURE — 25000003 PHARM REV CODE 250: Mod: NTX | Performed by: PHYSICIAN ASSISTANT

## 2021-01-09 PROCEDURE — 83615 LACTATE (LD) (LDH) ENZYME: CPT

## 2021-01-09 PROCEDURE — 85730 THROMBOPLASTIN TIME PARTIAL: CPT

## 2021-01-09 PROCEDURE — 93010 ELECTROCARDIOGRAM REPORT: CPT | Mod: NTX,,, | Performed by: INTERNAL MEDICINE

## 2021-01-09 PROCEDURE — 88365 INSITU HYBRIDIZATION (FISH): CPT | Performed by: PATHOLOGY

## 2021-01-09 PROCEDURE — 83970 ASSAY OF PARATHORMONE: CPT

## 2021-01-09 PROCEDURE — 50605 INSERT URETERAL SUPPORT: CPT | Mod: 51,RT,GC, | Performed by: TRANSPLANT SURGERY

## 2021-01-09 PROCEDURE — 99223 PR INITIAL HOSPITAL CARE,LEVL III: ICD-10-PCS | Mod: AI,NTX,, | Performed by: PHYSICIAN ASSISTANT

## 2021-01-09 DEVICE — STENT DOUBLE J 7FRX12CM
Type: IMPLANTABLE DEVICE | Site: URETER | Status: NON-FUNCTIONAL
Removed: 2021-02-03

## 2021-01-09 RX ORDER — PROPOFOL 10 MG/ML
VIAL (ML) INTRAVENOUS
Status: DISCONTINUED | OUTPATIENT
Start: 2021-01-09 | End: 2021-01-09

## 2021-01-09 RX ORDER — ACETAMINOPHEN 650 MG/20.3ML
650 LIQUID ORAL ONCE
Status: COMPLETED | OUTPATIENT
Start: 2021-01-09 | End: 2021-01-09

## 2021-01-09 RX ORDER — DIPHENHYDRAMINE HCL 50 MG
50 CAPSULE ORAL ONCE AS NEEDED
Status: DISCONTINUED | OUTPATIENT
Start: 2021-01-09 | End: 2021-01-14 | Stop reason: HOSPADM

## 2021-01-09 RX ORDER — ONDANSETRON 2 MG/ML
INJECTION INTRAMUSCULAR; INTRAVENOUS
Status: DISCONTINUED | OUTPATIENT
Start: 2021-01-09 | End: 2021-01-09

## 2021-01-09 RX ORDER — MANNITOL 250 MG/ML
INJECTION, SOLUTION INTRAVENOUS
Status: DISCONTINUED | OUTPATIENT
Start: 2021-01-09 | End: 2021-01-09

## 2021-01-09 RX ORDER — NYSTATIN 100000 [USP'U]/ML
500000 SUSPENSION ORAL
Status: DISCONTINUED | OUTPATIENT
Start: 2021-01-10 | End: 2021-01-14 | Stop reason: HOSPADM

## 2021-01-09 RX ORDER — METHYLPREDNISOLONE SODIUM SUCCINATE 1 G/16ML
INJECTION INTRAMUSCULAR; INTRAVENOUS
Status: DISCONTINUED | OUTPATIENT
Start: 2021-01-09 | End: 2021-01-09

## 2021-01-09 RX ORDER — MYCOPHENOLATE MOFETIL 250 MG/1
1000 CAPSULE ORAL 2 TIMES DAILY
Status: DISCONTINUED | OUTPATIENT
Start: 2021-01-09 | End: 2021-01-14 | Stop reason: HOSPADM

## 2021-01-09 RX ORDER — TACROLIMUS 1 MG/1
3 CAPSULE ORAL 2 TIMES DAILY
Status: DISCONTINUED | OUTPATIENT
Start: 2021-01-10 | End: 2021-01-11

## 2021-01-09 RX ORDER — PREGABALIN 75 MG/1
75 CAPSULE ORAL
Status: DISCONTINUED | OUTPATIENT
Start: 2021-01-09 | End: 2021-01-11

## 2021-01-09 RX ORDER — CISATRACURIUM BESYLATE 10 MG/ML
INJECTION, SOLUTION INTRAVENOUS
Status: DISCONTINUED | OUTPATIENT
Start: 2021-01-09 | End: 2021-01-09

## 2021-01-09 RX ORDER — CEFAZOLIN SODIUM 1 G/3ML
2 INJECTION, POWDER, FOR SOLUTION INTRAMUSCULAR; INTRAVENOUS
Status: COMPLETED | OUTPATIENT
Start: 2021-01-10 | End: 2021-01-10

## 2021-01-09 RX ORDER — TRAMADOL HYDROCHLORIDE 50 MG/1
50 TABLET ORAL EVERY 6 HOURS PRN
Status: DISCONTINUED | OUTPATIENT
Start: 2021-01-09 | End: 2021-01-11

## 2021-01-09 RX ORDER — NEOSTIGMINE METHYLSULFATE 0.5 MG/ML
INJECTION, SOLUTION INTRAVENOUS
Status: DISCONTINUED | OUTPATIENT
Start: 2021-01-09 | End: 2021-01-09

## 2021-01-09 RX ORDER — LIDOCAINE HYDROCHLORIDE 20 MG/ML
INJECTION INTRAVENOUS
Status: DISCONTINUED | OUTPATIENT
Start: 2021-01-09 | End: 2021-01-09

## 2021-01-09 RX ORDER — SODIUM CHLORIDE 0.9 % (FLUSH) 0.9 %
10 SYRINGE (ML) INJECTION
Status: DISCONTINUED | OUTPATIENT
Start: 2021-01-09 | End: 2021-01-12

## 2021-01-09 RX ORDER — MIDAZOLAM HYDROCHLORIDE 1 MG/ML
INJECTION INTRAMUSCULAR; INTRAVENOUS
Status: DISCONTINUED | OUTPATIENT
Start: 2021-01-09 | End: 2021-01-09

## 2021-01-09 RX ORDER — LABETALOL 200 MG/1
200 TABLET, FILM COATED ORAL 2 TIMES DAILY
Status: DISCONTINUED | OUTPATIENT
Start: 2021-01-09 | End: 2021-01-09

## 2021-01-09 RX ORDER — HEPARIN SODIUM 10000 [USP'U]/ML
INJECTION, SOLUTION INTRAVENOUS; SUBCUTANEOUS
Status: DISCONTINUED | OUTPATIENT
Start: 2021-01-09 | End: 2021-01-09 | Stop reason: HOSPADM

## 2021-01-09 RX ORDER — FAMOTIDINE 20 MG/1
20 TABLET, FILM COATED ORAL NIGHTLY
Status: DISCONTINUED | OUTPATIENT
Start: 2021-01-09 | End: 2021-01-13

## 2021-01-09 RX ORDER — HYDROMORPHONE HYDROCHLORIDE 2 MG/ML
INJECTION, SOLUTION INTRAMUSCULAR; INTRAVENOUS; SUBCUTANEOUS
Status: DISCONTINUED | OUTPATIENT
Start: 2021-01-09 | End: 2021-01-09

## 2021-01-09 RX ORDER — PREDNISONE 20 MG/1
20 TABLET ORAL DAILY
Status: DISCONTINUED | OUTPATIENT
Start: 2021-01-12 | End: 2021-01-14 | Stop reason: HOSPADM

## 2021-01-09 RX ORDER — EPINEPHRINE 1 MG/ML
1 INJECTION, SOLUTION INTRACARDIAC; INTRAMUSCULAR; INTRAVENOUS; SUBCUTANEOUS ONCE AS NEEDED
Status: DISCONTINUED | OUTPATIENT
Start: 2021-01-09 | End: 2021-01-14 | Stop reason: HOSPADM

## 2021-01-09 RX ORDER — ACETAMINOPHEN 325 MG/1
650 TABLET ORAL
Status: DISCONTINUED | OUTPATIENT
Start: 2021-01-10 | End: 2021-01-11

## 2021-01-09 RX ORDER — BISACODYL 5 MG
10 TABLET, DELAYED RELEASE (ENTERIC COATED) ORAL NIGHTLY
Status: DISCONTINUED | OUTPATIENT
Start: 2021-01-09 | End: 2021-01-13

## 2021-01-09 RX ORDER — DIPHENHYDRAMINE HYDROCHLORIDE 50 MG/ML
50 INJECTION INTRAMUSCULAR; INTRAVENOUS ONCE
Status: DISCONTINUED | OUTPATIENT
Start: 2021-01-09 | End: 2021-01-09

## 2021-01-09 RX ORDER — HEPARIN SODIUM 5000 [USP'U]/ML
5000 INJECTION, SOLUTION INTRAVENOUS; SUBCUTANEOUS ONCE
Status: DISCONTINUED | OUTPATIENT
Start: 2021-01-09 | End: 2021-01-09

## 2021-01-09 RX ORDER — ONDANSETRON 2 MG/ML
4 INJECTION INTRAMUSCULAR; INTRAVENOUS ONCE AS NEEDED
Status: DISCONTINUED | OUTPATIENT
Start: 2021-01-09 | End: 2021-01-14 | Stop reason: HOSPADM

## 2021-01-09 RX ORDER — HEPARIN SODIUM 5000 [USP'U]/ML
5000 INJECTION, SOLUTION INTRAVENOUS; SUBCUTANEOUS EVERY 8 HOURS
Status: DISCONTINUED | OUTPATIENT
Start: 2021-01-09 | End: 2021-01-11

## 2021-01-09 RX ORDER — GLUCAGON 1 MG
1 KIT INJECTION CONTINUOUS PRN
Status: DISCONTINUED | OUTPATIENT
Start: 2021-01-09 | End: 2021-01-14 | Stop reason: HOSPADM

## 2021-01-09 RX ORDER — HYDROMORPHONE HYDROCHLORIDE 1 MG/ML
0.2 INJECTION, SOLUTION INTRAMUSCULAR; INTRAVENOUS; SUBCUTANEOUS EVERY 5 MIN PRN
Status: DISCONTINUED | OUTPATIENT
Start: 2021-01-09 | End: 2021-01-11

## 2021-01-09 RX ORDER — DIPHENHYDRAMINE HCL 25 MG
25 CAPSULE ORAL
Status: COMPLETED | OUTPATIENT
Start: 2021-01-10 | End: 2021-01-11

## 2021-01-09 RX ORDER — OXYCODONE HYDROCHLORIDE 5 MG/1
5 TABLET ORAL EVERY 6 HOURS PRN
Status: DISCONTINUED | OUTPATIENT
Start: 2021-01-09 | End: 2021-01-11

## 2021-01-09 RX ORDER — DEXTROSE MONOHYDRATE AND SODIUM CHLORIDE 5; .45 G/100ML; G/100ML
INJECTION, SOLUTION INTRAVENOUS CONTINUOUS
Status: DISCONTINUED | OUTPATIENT
Start: 2021-01-09 | End: 2021-01-11

## 2021-01-09 RX ORDER — MUPIROCIN 20 MG/G
1 OINTMENT TOPICAL 2 TIMES DAILY
Status: DISCONTINUED | OUTPATIENT
Start: 2021-01-09 | End: 2021-01-14 | Stop reason: HOSPADM

## 2021-01-09 RX ORDER — IBUPROFEN 200 MG
24 TABLET ORAL
Status: DISCONTINUED | OUTPATIENT
Start: 2021-01-09 | End: 2021-01-14 | Stop reason: HOSPADM

## 2021-01-09 RX ORDER — CEFAZOLIN SODIUM 1 G/3ML
INJECTION, POWDER, FOR SOLUTION INTRAMUSCULAR; INTRAVENOUS
Status: DISCONTINUED | OUTPATIENT
Start: 2021-01-09 | End: 2021-01-09 | Stop reason: HOSPADM

## 2021-01-09 RX ORDER — IBUPROFEN 200 MG
16 TABLET ORAL
Status: DISCONTINUED | OUTPATIENT
Start: 2021-01-09 | End: 2021-01-14 | Stop reason: HOSPADM

## 2021-01-09 RX ORDER — ALBUTEROL SULFATE 90 UG/1
2 AEROSOL, METERED RESPIRATORY (INHALATION) EVERY 6 HOURS PRN
Status: DISCONTINUED | OUTPATIENT
Start: 2021-01-09 | End: 2021-01-14 | Stop reason: HOSPADM

## 2021-01-09 RX ORDER — METHYLPREDNISOLONE SOD SUCC 125 MG
125 VIAL (EA) INJECTION ONCE
Status: COMPLETED | OUTPATIENT
Start: 2021-01-11 | End: 2021-01-11

## 2021-01-09 RX ORDER — HEPARIN SODIUM 1000 [USP'U]/ML
INJECTION, SOLUTION INTRAVENOUS; SUBCUTANEOUS
Status: DISCONTINUED | OUTPATIENT
Start: 2021-01-09 | End: 2021-01-09

## 2021-01-09 RX ORDER — PHENYLEPHRINE HCL IN 0.9% NACL 20MG/250ML
0-5 PLASTIC BAG, INJECTION (ML) INTRAVENOUS CONTINUOUS
Status: DISCONTINUED | OUTPATIENT
Start: 2021-01-09 | End: 2021-01-11

## 2021-01-09 RX ORDER — FUROSEMIDE 10 MG/ML
INJECTION INTRAMUSCULAR; INTRAVENOUS
Status: DISCONTINUED | OUTPATIENT
Start: 2021-01-09 | End: 2021-01-09

## 2021-01-09 RX ORDER — SUCCINYLCHOLINE CHLORIDE 20 MG/ML
INJECTION INTRAMUSCULAR; INTRAVENOUS
Status: DISCONTINUED | OUTPATIENT
Start: 2021-01-09 | End: 2021-01-09

## 2021-01-09 RX ORDER — SULFAMETHOXAZOLE AND TRIMETHOPRIM 400; 80 MG/1; MG/1
1 TABLET ORAL EVERY MORNING
Status: DISCONTINUED | OUTPATIENT
Start: 2021-01-10 | End: 2021-01-10

## 2021-01-09 RX ORDER — PHENYLEPHRINE HYDROCHLORIDE 10 MG/ML
INJECTION INTRAVENOUS
Status: DISCONTINUED | OUTPATIENT
Start: 2021-01-09 | End: 2021-01-09

## 2021-01-09 RX ORDER — CEFAZOLIN SODIUM 1 G/3ML
2 INJECTION, POWDER, FOR SOLUTION INTRAMUSCULAR; INTRAVENOUS
Status: COMPLETED | OUTPATIENT
Start: 2021-01-09 | End: 2021-01-09

## 2021-01-09 RX ORDER — SODIUM CHLORIDE 9 MG/ML
INJECTION, SOLUTION INTRAVENOUS CONTINUOUS
Status: DISCONTINUED | OUTPATIENT
Start: 2021-01-09 | End: 2021-01-11

## 2021-01-09 RX ORDER — DOCUSATE SODIUM 100 MG/1
100 CAPSULE, LIQUID FILLED ORAL 3 TIMES DAILY
Status: DISCONTINUED | OUTPATIENT
Start: 2021-01-10 | End: 2021-01-13

## 2021-01-09 RX ORDER — DIPHENHYDRAMINE HYDROCHLORIDE 50 MG/ML
INJECTION INTRAMUSCULAR; INTRAVENOUS
Status: DISCONTINUED | OUTPATIENT
Start: 2021-01-09 | End: 2021-01-09

## 2021-01-09 RX ORDER — FENTANYL CITRATE 50 UG/ML
INJECTION, SOLUTION INTRAMUSCULAR; INTRAVENOUS
Status: DISCONTINUED | OUTPATIENT
Start: 2021-01-09 | End: 2021-01-09

## 2021-01-09 RX ORDER — MUPIROCIN 20 MG/G
OINTMENT TOPICAL
Status: DISCONTINUED | OUTPATIENT
Start: 2021-01-09 | End: 2021-01-14 | Stop reason: HOSPADM

## 2021-01-09 RX ORDER — ACETAMINOPHEN 500 MG
1000 TABLET ORAL EVERY 8 HOURS
Status: DISPENSED | OUTPATIENT
Start: 2021-01-09 | End: 2021-01-11

## 2021-01-09 RX ADMIN — CISATRACURIUM BESYLATE 6 MG: 10 INJECTION INTRAVENOUS at 04:01

## 2021-01-09 RX ADMIN — CALCIUM CHLORIDE 300 MG: 100 INJECTION, SOLUTION INTRAVENOUS at 03:01

## 2021-01-09 RX ADMIN — MANNITOL 25 G: 250 INJECTION, SOLUTION INTRAVENOUS at 05:01

## 2021-01-09 RX ADMIN — PHENYLEPHRINE HYDROCHLORIDE 200 MCG: 10 INJECTION INTRAVENOUS at 06:01

## 2021-01-09 RX ADMIN — CALCIUM CHLORIDE 200 MG: 100 INJECTION, SOLUTION INTRAVENOUS at 05:01

## 2021-01-09 RX ADMIN — CISATRACURIUM BESYLATE 8 MG: 10 INJECTION INTRAVENOUS at 03:01

## 2021-01-09 RX ADMIN — LIDOCAINE HYDROCHLORIDE 100 MG: 20 INJECTION, SOLUTION INTRAVENOUS at 08:01

## 2021-01-09 RX ADMIN — CISATRACURIUM BESYLATE 4 MG: 10 INJECTION INTRAVENOUS at 06:01

## 2021-01-09 RX ADMIN — PHENYLEPHRINE HYDROCHLORIDE 100 MCG: 10 INJECTION INTRAVENOUS at 07:01

## 2021-01-09 RX ADMIN — CEFAZOLIN 2 G: 330 INJECTION, POWDER, FOR SOLUTION INTRAMUSCULAR; INTRAVENOUS at 03:01

## 2021-01-09 RX ADMIN — CALCIUM CHLORIDE 100 MG: 100 INJECTION, SOLUTION INTRAVENOUS at 06:01

## 2021-01-09 RX ADMIN — SODIUM CHLORIDE 0.4 MCG/KG/MIN: 9 INJECTION, SOLUTION INTRAVENOUS at 02:01

## 2021-01-09 RX ADMIN — PROPOFOL 50 MG: 10 INJECTION, EMULSION INTRAVENOUS at 03:01

## 2021-01-09 RX ADMIN — MIDAZOLAM HYDROCHLORIDE 2 MG: 1 INJECTION, SOLUTION INTRAMUSCULAR; INTRAVENOUS at 02:01

## 2021-01-09 RX ADMIN — PHENYLEPHRINE HYDROCHLORIDE 100 MCG: 10 INJECTION INTRAVENOUS at 05:01

## 2021-01-09 RX ADMIN — FENTANYL CITRATE 50 MCG: 50 INJECTION, SOLUTION INTRAMUSCULAR; INTRAVENOUS at 03:01

## 2021-01-09 RX ADMIN — HEPARIN SODIUM 3000 UNITS: 1000 INJECTION, SOLUTION INTRAVENOUS; SUBCUTANEOUS at 04:01

## 2021-01-09 RX ADMIN — HEPARIN SODIUM 150 MG: 1000 INJECTION, SOLUTION INTRAVENOUS; SUBCUTANEOUS at 03:01

## 2021-01-09 RX ADMIN — PHENYLEPHRINE HYDROCHLORIDE 100 MCG: 10 INJECTION INTRAVENOUS at 08:01

## 2021-01-09 RX ADMIN — CALCIUM CHLORIDE 200 MG: 100 INJECTION, SOLUTION INTRAVENOUS at 06:01

## 2021-01-09 RX ADMIN — CALCIUM CHLORIDE 200 MG: 100 INJECTION, SOLUTION INTRAVENOUS at 03:01

## 2021-01-09 RX ADMIN — FUROSEMIDE 100 MG: 10 INJECTION, SOLUTION INTRAMUSCULAR; INTRAVENOUS at 05:01

## 2021-01-09 RX ADMIN — PROPOFOL 200 MG: 10 INJECTION, EMULSION INTRAVENOUS at 02:01

## 2021-01-09 RX ADMIN — CEFAZOLIN 2 G: 330 INJECTION, POWDER, FOR SOLUTION INTRAMUSCULAR; INTRAVENOUS at 07:01

## 2021-01-09 RX ADMIN — FENTANYL CITRATE 25 MCG: 50 INJECTION, SOLUTION INTRAMUSCULAR; INTRAVENOUS at 06:01

## 2021-01-09 RX ADMIN — CALCIUM CHLORIDE 200 MG: 100 INJECTION, SOLUTION INTRAVENOUS at 07:01

## 2021-01-09 RX ADMIN — SUCCINYLCHOLINE CHLORIDE 160 MG: 20 INJECTION, SOLUTION INTRAMUSCULAR; INTRAVENOUS at 02:01

## 2021-01-09 RX ADMIN — FENTANYL CITRATE 25 MCG: 50 INJECTION, SOLUTION INTRAMUSCULAR; INTRAVENOUS at 03:01

## 2021-01-09 RX ADMIN — CALCIUM CHLORIDE 300 MG: 100 INJECTION, SOLUTION INTRAVENOUS at 05:01

## 2021-01-09 RX ADMIN — PHENYLEPHRINE HYDROCHLORIDE 200 MCG: 10 INJECTION INTRAVENOUS at 05:01

## 2021-01-09 RX ADMIN — PHENYLEPHRINE HYDROCHLORIDE 200 MCG: 10 INJECTION INTRAVENOUS at 07:01

## 2021-01-09 RX ADMIN — DIPHENHYDRAMINE HYDROCHLORIDE 50 MG: 50 INJECTION INTRAMUSCULAR; INTRAVENOUS at 02:01

## 2021-01-09 RX ADMIN — HYDROMORPHONE HYDROCHLORIDE 0.4 MG: 2 INJECTION INTRAMUSCULAR; INTRAVENOUS; SUBCUTANEOUS at 08:01

## 2021-01-09 RX ADMIN — ACETAMINOPHEN ORAL SOLUTION 650 MG: 650 SOLUTION ORAL at 02:01

## 2021-01-09 RX ADMIN — CISATRACURIUM BESYLATE 2 MG: 10 INJECTION INTRAVENOUS at 02:01

## 2021-01-09 RX ADMIN — LABETALOL HYDROCHLORIDE 200 MG: 200 TABLET, FILM COATED ORAL at 09:01

## 2021-01-09 RX ADMIN — FENTANYL CITRATE 25 MCG: 50 INJECTION, SOLUTION INTRAMUSCULAR; INTRAVENOUS at 02:01

## 2021-01-09 RX ADMIN — ONDANSETRON 4 MG: 2 INJECTION, SOLUTION INTRAMUSCULAR; INTRAVENOUS at 07:01

## 2021-01-09 RX ADMIN — ACETAMINOPHEN 1000 MG: 325 TABLET ORAL at 11:01

## 2021-01-09 RX ADMIN — METHYLPREDNISOLONE SODIUM SUCCINATE 500 MG: 1 INJECTION, POWDER, LYOPHILIZED, FOR SOLUTION INTRAMUSCULAR; INTRAVENOUS at 02:01

## 2021-01-09 RX ADMIN — PHENYLEPHRINE HYDROCHLORIDE 300 MCG: 10 INJECTION INTRAVENOUS at 05:01

## 2021-01-09 RX ADMIN — PHENYLEPHRINE HYDROCHLORIDE 200 MCG: 10 INJECTION INTRAVENOUS at 08:01

## 2021-01-09 RX ADMIN — DEXTROSE AND SODIUM CHLORIDE 50 ML/HR: 5; .45 INJECTION, SOLUTION INTRAVENOUS at 10:01

## 2021-01-09 RX ADMIN — CISATRACURIUM BESYLATE 6 MG: 10 INJECTION INTRAVENOUS at 02:01

## 2021-01-09 RX ADMIN — CISATRACURIUM BESYLATE 4 MG: 10 INJECTION INTRAVENOUS at 04:01

## 2021-01-09 RX ADMIN — FENTANYL CITRATE 25 MCG: 50 INJECTION, SOLUTION INTRAMUSCULAR; INTRAVENOUS at 05:01

## 2021-01-09 RX ADMIN — CISATRACURIUM BESYLATE 4 MG: 10 INJECTION INTRAVENOUS at 05:01

## 2021-01-09 RX ADMIN — LIDOCAINE HYDROCHLORIDE 80 MG: 20 INJECTION, SOLUTION INTRAVENOUS at 02:01

## 2021-01-09 RX ADMIN — NEOSTIGMINE METHYLSULFATE 5 MG: 0.5 INJECTION INTRAVENOUS at 08:01

## 2021-01-09 RX ADMIN — FAMOTIDINE 20 MG: 20 TABLET, FILM COATED ORAL at 10:01

## 2021-01-10 PROBLEM — T82.868A: Status: ACTIVE | Noted: 2021-01-10

## 2021-01-10 LAB
ACANTHOCYTES BLD QL SMEAR: ABNORMAL
ALBUMIN SERPL BCP-MCNC: 2.6 G/DL (ref 3.5–5.2)
ALBUMIN SERPL BCP-MCNC: 3.2 G/DL (ref 3.5–5.2)
ALBUMIN SERPL BCP-MCNC: 3.4 G/DL (ref 3.5–5.2)
ANION GAP SERPL CALC-SCNC: 11 MMOL/L (ref 8–16)
ANION GAP SERPL CALC-SCNC: 13 MMOL/L (ref 8–16)
ANION GAP SERPL CALC-SCNC: 13 MMOL/L (ref 8–16)
ANION GAP SERPL CALC-SCNC: 19 MMOL/L (ref 8–16)
ANION GAP SERPL CALC-SCNC: 21 MMOL/L (ref 8–16)
ANISOCYTOSIS BLD QL SMEAR: ABNORMAL
AUER BODIES BLD QL SMEAR: ABNORMAL
BASO STIPL BLD QL SMEAR: ABNORMAL
BASOPHILS # BLD AUTO: 0.01 K/UL (ref 0–0.2)
BASOPHILS # BLD AUTO: 0.01 K/UL (ref 0–0.2)
BASOPHILS # BLD AUTO: 0.02 K/UL (ref 0–0.2)
BASOPHILS # BLD AUTO: ABNORMAL K/UL (ref 0–0.2)
BASOPHILS NFR BLD: 0.1 % (ref 0–1.9)
BASOPHILS NFR BLD: ABNORMAL % (ref 0–1.9)
BLASTS NFR BLD MANUAL: ABNORMAL %
BUN SERPL-MCNC: 16 MG/DL (ref 6–20)
BUN SERPL-MCNC: 18 MG/DL (ref 6–20)
BUN SERPL-MCNC: 22 MG/DL (ref 6–20)
BUN SERPL-MCNC: 35 MG/DL (ref 6–20)
BUN SERPL-MCNC: 43 MG/DL (ref 6–20)
BURR CELLS BLD QL SMEAR: ABNORMAL
CABOT RINGS BLD QL SMEAR: ABNORMAL
CALCIUM SERPL-MCNC: 7.8 MG/DL (ref 8.7–10.5)
CALCIUM SERPL-MCNC: 8.2 MG/DL (ref 8.7–10.5)
CALCIUM SERPL-MCNC: 8.3 MG/DL (ref 8.7–10.5)
CALCIUM SERPL-MCNC: 8.4 MG/DL (ref 8.7–10.5)
CALCIUM SERPL-MCNC: 8.9 MG/DL (ref 8.7–10.5)
CHLORIDE SERPL-SCNC: 100 MMOL/L (ref 95–110)
CHLORIDE SERPL-SCNC: 92 MMOL/L (ref 95–110)
CHLORIDE SERPL-SCNC: 97 MMOL/L (ref 95–110)
CHLORIDE SERPL-SCNC: 98 MMOL/L (ref 95–110)
CHLORIDE SERPL-SCNC: 99 MMOL/L (ref 95–110)
CO2 SERPL-SCNC: 18 MMOL/L (ref 23–29)
CO2 SERPL-SCNC: 19 MMOL/L (ref 23–29)
CO2 SERPL-SCNC: 26 MMOL/L (ref 23–29)
CO2 SERPL-SCNC: 27 MMOL/L (ref 23–29)
CO2 SERPL-SCNC: 27 MMOL/L (ref 23–29)
CREAT SERPL-MCNC: 3.5 MG/DL (ref 0.5–1.4)
CREAT SERPL-MCNC: 3.5 MG/DL (ref 0.5–1.4)
CREAT SERPL-MCNC: 4.7 MG/DL (ref 0.5–1.4)
CREAT SERPL-MCNC: 8.3 MG/DL (ref 0.5–1.4)
CREAT SERPL-MCNC: 8.9 MG/DL (ref 0.5–1.4)
DACRYOCYTES BLD QL SMEAR: ABNORMAL
DIFFERENTIAL METHOD: ABNORMAL
DOHLE BOD BLD QL SMEAR: ABNORMAL
EOSINOPHIL # BLD AUTO: 0 K/UL (ref 0–0.5)
EOSINOPHIL # BLD AUTO: ABNORMAL K/UL (ref 0–0.5)
EOSINOPHIL NFR BLD: 0 % (ref 0–8)
EOSINOPHIL NFR BLD: ABNORMAL % (ref 0–8)
ERYTHROCYTE [DISTWIDTH] IN BLOOD BY AUTOMATED COUNT: 17 % (ref 11.5–14.5)
ERYTHROCYTE [DISTWIDTH] IN BLOOD BY AUTOMATED COUNT: 17.8 % (ref 11.5–14.5)
ERYTHROCYTE [DISTWIDTH] IN BLOOD BY AUTOMATED COUNT: 18.3 % (ref 11.5–14.5)
ERYTHROCYTE [DISTWIDTH] IN BLOOD BY AUTOMATED COUNT: ABNORMAL % (ref 11.5–14.5)
EST. GFR  (AFRICAN AMERICAN): 13.5 ML/MIN/1.73 M^2
EST. GFR  (AFRICAN AMERICAN): 19.3 ML/MIN/1.73 M^2
EST. GFR  (AFRICAN AMERICAN): 19.3 ML/MIN/1.73 M^2
EST. GFR  (AFRICAN AMERICAN): 6.3 ML/MIN/1.73 M^2
EST. GFR  (AFRICAN AMERICAN): 6.8 ML/MIN/1.73 M^2
EST. GFR  (NON AFRICAN AMERICAN): 11.8 ML/MIN/1.73 M^2
EST. GFR  (NON AFRICAN AMERICAN): 16.8 ML/MIN/1.73 M^2
EST. GFR  (NON AFRICAN AMERICAN): 16.8 ML/MIN/1.73 M^2
EST. GFR  (NON AFRICAN AMERICAN): 5.4 ML/MIN/1.73 M^2
EST. GFR  (NON AFRICAN AMERICAN): 5.9 ML/MIN/1.73 M^2
GIANT PLATELETS BLD QL SMEAR: ABNORMAL
GLUCOSE SERPL-MCNC: 105 MG/DL (ref 70–110)
GLUCOSE SERPL-MCNC: 121 MG/DL (ref 70–110)
GLUCOSE SERPL-MCNC: 122 MG/DL (ref 70–110)
GLUCOSE SERPL-MCNC: 137 MG/DL (ref 70–110)
GLUCOSE SERPL-MCNC: 147 MG/DL (ref 70–110)
HCT VFR BLD AUTO: 25.7 % (ref 37–48.5)
HCT VFR BLD AUTO: 26.5 % (ref 37–48.5)
HCT VFR BLD AUTO: 27 % (ref 37–48.5)
HCT VFR BLD AUTO: ABNORMAL % (ref 37–48.5)
HEINZ BOD BLD QL SMEAR: ABNORMAL
HGB BLD-MCNC: 8.6 G/DL (ref 12–16)
HGB BLD-MCNC: 8.7 G/DL (ref 12–16)
HGB BLD-MCNC: 9 G/DL (ref 12–16)
HGB BLD-MCNC: ABNORMAL G/DL (ref 12–16)
HGB C CRY RBC QL MICRO: ABNORMAL
HOWELL-JOLLY BOD BLD QL SMEAR: ABNORMAL
HYPOCHROMIA BLD QL SMEAR: ABNORMAL
IMM GRANULOCYTES # BLD AUTO: 0.06 K/UL (ref 0–0.04)
IMM GRANULOCYTES # BLD AUTO: 0.08 K/UL (ref 0–0.04)
IMM GRANULOCYTES # BLD AUTO: 0.09 K/UL (ref 0–0.04)
IMM GRANULOCYTES # BLD AUTO: ABNORMAL K/UL (ref 0–0.04)
IMM GRANULOCYTES NFR BLD AUTO: 0.4 % (ref 0–0.5)
IMM GRANULOCYTES NFR BLD AUTO: 0.5 % (ref 0–0.5)
IMM GRANULOCYTES NFR BLD AUTO: 0.6 % (ref 0–0.5)
IMM GRANULOCYTES NFR BLD AUTO: ABNORMAL % (ref 0–0.5)
LYMPHOCYTES # BLD AUTO: 0.1 K/UL (ref 1–4.8)
LYMPHOCYTES # BLD AUTO: 0.1 K/UL (ref 1–4.8)
LYMPHOCYTES # BLD AUTO: 0.2 K/UL (ref 1–4.8)
LYMPHOCYTES # BLD AUTO: ABNORMAL K/UL (ref 1–4.8)
LYMPHOCYTES NFR BLD: 0.5 % (ref 18–48)
LYMPHOCYTES NFR BLD: 0.5 % (ref 18–48)
LYMPHOCYTES NFR BLD: 1 % (ref 18–48)
LYMPHOCYTES NFR BLD: ABNORMAL % (ref 18–48)
MAGNESIUM SERPL-MCNC: 1.7 MG/DL (ref 1.6–2.6)
MAGNESIUM SERPL-MCNC: 1.7 MG/DL (ref 1.6–2.6)
MAGNESIUM SERPL-MCNC: 2 MG/DL (ref 1.6–2.6)
MAGNESIUM SERPL-MCNC: 2.7 MG/DL (ref 1.6–2.6)
MCH RBC QN AUTO: 32.7 PG (ref 27–31)
MCH RBC QN AUTO: 33.1 PG (ref 27–31)
MCH RBC QN AUTO: 33.3 PG (ref 27–31)
MCH RBC QN AUTO: ABNORMAL PG (ref 27–31)
MCHC RBC AUTO-ENTMCNC: 32.8 G/DL (ref 32–36)
MCHC RBC AUTO-ENTMCNC: 33.3 G/DL (ref 32–36)
MCHC RBC AUTO-ENTMCNC: 33.5 G/DL (ref 32–36)
MCHC RBC AUTO-ENTMCNC: ABNORMAL G/DL (ref 32–36)
MCV RBC AUTO: 100 FL (ref 82–98)
MCV RBC AUTO: 100 FL (ref 82–98)
MCV RBC AUTO: 99 FL (ref 82–98)
MCV RBC AUTO: ABNORMAL FL (ref 82–98)
MEGAKARYOCYTIC FRAGMENTS: ABNORMAL
METAMYELOCYTES NFR BLD MANUAL: ABNORMAL %
MONOCYTES # BLD AUTO: 0.4 K/UL (ref 0.3–1)
MONOCYTES # BLD AUTO: 0.5 K/UL (ref 0.3–1)
MONOCYTES # BLD AUTO: 0.6 K/UL (ref 0.3–1)
MONOCYTES # BLD AUTO: ABNORMAL K/UL (ref 0.3–1)
MONOCYTES NFR BLD: 2.7 % (ref 4–15)
MONOCYTES NFR BLD: 3.4 % (ref 4–15)
MONOCYTES NFR BLD: 3.6 % (ref 4–15)
MONOCYTES NFR BLD: ABNORMAL % (ref 4–15)
MYELOCYTES NFR BLD MANUAL: ABNORMAL %
NEUTROPHILS # BLD AUTO: 13.7 K/UL (ref 1.8–7.7)
NEUTROPHILS # BLD AUTO: 14.5 K/UL (ref 1.8–7.7)
NEUTROPHILS # BLD AUTO: 16.8 K/UL (ref 1.8–7.7)
NEUTROPHILS # BLD AUTO: ABNORMAL K/UL (ref 1.8–7.7)
NEUTROPHILS NFR BLD: 95.1 % (ref 38–73)
NEUTROPHILS NFR BLD: 95.3 % (ref 38–73)
NEUTROPHILS NFR BLD: 96.1 % (ref 38–73)
NEUTROPHILS NFR BLD: ABNORMAL % (ref 38–73)
NEUTS BAND NFR BLD MANUAL: ABNORMAL %
NRBC BLD-RTO: 0 /100 WBC
NRBC BLD-RTO: ABNORMAL /100 WBC
OVALOCYTES BLD QL SMEAR: ABNORMAL
PAPPENHEIMER BOD BLD QL SMEAR: ABNORMAL
PHOSPHATE SERPL-MCNC: 3 MG/DL (ref 2.7–4.5)
PHOSPHATE SERPL-MCNC: 3.4 MG/DL (ref 2.7–4.5)
PHOSPHATE SERPL-MCNC: 4.8 MG/DL (ref 2.7–4.5)
PHOSPHATE SERPL-MCNC: 5 MG/DL (ref 2.7–4.5)
PHOSPHATE SERPL-MCNC: 5.3 MG/DL (ref 2.7–4.5)
PLASMODIUM BLD QL SMEAR: ABNORMAL
PLATELET # BLD AUTO: 100 K/UL (ref 150–350)
PLATELET # BLD AUTO: 84 K/UL (ref 150–350)
PLATELET # BLD AUTO: 90 K/UL (ref 150–350)
PLATELET # BLD AUTO: ABNORMAL K/UL (ref 150–350)
PLATELET BLD QL SMEAR: ABNORMAL
PMV BLD AUTO: 11.2 FL (ref 9.2–12.9)
PMV BLD AUTO: 11.6 FL (ref 9.2–12.9)
PMV BLD AUTO: 11.9 FL (ref 9.2–12.9)
PMV BLD AUTO: ABNORMAL FL (ref 9.2–12.9)
POCT GLUCOSE: 108 MG/DL (ref 70–110)
POCT GLUCOSE: 110 MG/DL (ref 70–110)
POCT GLUCOSE: 121 MG/DL (ref 70–110)
POCT GLUCOSE: 125 MG/DL (ref 70–110)
POCT GLUCOSE: 149 MG/DL (ref 70–110)
POCT GLUCOSE: 176 MG/DL (ref 70–110)
POCT GLUCOSE: 186 MG/DL (ref 70–110)
POCT GLUCOSE: <20 MG/DL (ref 70–110)
POIKILOCYTOSIS BLD QL SMEAR: ABNORMAL
POLYCHROMASIA BLD QL SMEAR: ABNORMAL
POTASSIUM SERPL-SCNC: 4 MMOL/L (ref 3.5–5.1)
POTASSIUM SERPL-SCNC: 4.1 MMOL/L (ref 3.5–5.1)
POTASSIUM SERPL-SCNC: 4.8 MMOL/L (ref 3.5–5.1)
POTASSIUM SERPL-SCNC: 6.3 MMOL/L (ref 3.5–5.1)
POTASSIUM SERPL-SCNC: 7.4 MMOL/L (ref 3.5–5.1)
PROMYELOCYTES NFR BLD MANUAL: ABNORMAL %
RBC # BLD AUTO: 2.6 M/UL (ref 4–5.4)
RBC # BLD AUTO: 2.66 M/UL (ref 4–5.4)
RBC # BLD AUTO: 2.7 M/UL (ref 4–5.4)
RBC # BLD AUTO: ABNORMAL M/UL (ref 4–5.4)
RBC AGGLUT BLD QL: ABNORMAL
ROULEAUX BLD QL SMEAR: ABNORMAL
SCHISTOCYTES BLD QL SMEAR: ABNORMAL
SCHISTOCYTES BLD QL SMEAR: ABNORMAL
SICKLE CELLS BLD QL SMEAR: ABNORMAL
SMUDGE CELLS BLD QL SMEAR: ABNORMAL
SODIUM SERPL-SCNC: 132 MMOL/L (ref 136–145)
SODIUM SERPL-SCNC: 134 MMOL/L (ref 136–145)
SODIUM SERPL-SCNC: 137 MMOL/L (ref 136–145)
SODIUM SERPL-SCNC: 138 MMOL/L (ref 136–145)
SODIUM SERPL-SCNC: 139 MMOL/L (ref 136–145)
SPHEROCYTES BLD QL SMEAR: ABNORMAL
STOMATOCYTES BLD QL SMEAR: ABNORMAL
TACROLIMUS BLD-MCNC: <1.5 NG/ML (ref 5–15)
TARGETS BLD QL SMEAR: ABNORMAL
TOXIC GRANULES BLD QL SMEAR: ABNORMAL
WBC # BLD AUTO: 14.26 K/UL (ref 3.9–12.7)
WBC # BLD AUTO: 15.2 K/UL (ref 3.9–12.7)
WBC # BLD AUTO: 17.57 K/UL (ref 3.9–12.7)
WBC # BLD AUTO: ABNORMAL K/UL (ref 3.9–12.7)
WBC NRBC COR # BLD: ABNORMAL K/UL (ref 3.9–12.7)
WBC OTHER NFR BLD MANUAL: ABNORMAL %
WBC TOXIC VACUOLES BLD QL SMEAR: ABNORMAL

## 2021-01-10 PROCEDURE — 90945 DIALYSIS ONE EVALUATION: CPT | Mod: ,,, | Performed by: INTERNAL MEDICINE

## 2021-01-10 PROCEDURE — 83735 ASSAY OF MAGNESIUM: CPT

## 2021-01-10 PROCEDURE — 94761 N-INVAS EAR/PLS OXIMETRY MLT: CPT

## 2021-01-10 PROCEDURE — 83735 ASSAY OF MAGNESIUM: CPT | Mod: 91

## 2021-01-10 PROCEDURE — 80069 RENAL FUNCTION PANEL: CPT | Mod: 91

## 2021-01-10 PROCEDURE — 63600175 PHARM REV CODE 636 W HCPCS: Performed by: STUDENT IN AN ORGANIZED HEALTH CARE EDUCATION/TRAINING PROGRAM

## 2021-01-10 PROCEDURE — 90945 PR DIALYSIS, NOT HEMO, 1 EVAL: ICD-10-PCS | Mod: ,,, | Performed by: INTERNAL MEDICINE

## 2021-01-10 PROCEDURE — 93010 EKG 12-LEAD: ICD-10-PCS | Mod: ,,, | Performed by: INTERNAL MEDICINE

## 2021-01-10 PROCEDURE — 20000000 HC ICU ROOM

## 2021-01-10 PROCEDURE — 90945 DIALYSIS ONE EVALUATION: CPT

## 2021-01-10 PROCEDURE — 99223 PR INITIAL HOSPITAL CARE,LEVL III: ICD-10-PCS | Mod: 25,,, | Performed by: INTERNAL MEDICINE

## 2021-01-10 PROCEDURE — 99233 SBSQ HOSP IP/OBS HIGH 50: CPT | Mod: ,,, | Performed by: SURGERY

## 2021-01-10 PROCEDURE — 25000003 PHARM REV CODE 250: Performed by: STUDENT IN AN ORGANIZED HEALTH CARE EDUCATION/TRAINING PROGRAM

## 2021-01-10 PROCEDURE — P9016 RBC LEUKOCYTES REDUCED: HCPCS

## 2021-01-10 PROCEDURE — 25000003 PHARM REV CODE 250: Performed by: INTERNAL MEDICINE

## 2021-01-10 PROCEDURE — 99223 1ST HOSP IP/OBS HIGH 75: CPT | Mod: 25,,, | Performed by: INTERNAL MEDICINE

## 2021-01-10 PROCEDURE — P9047 ALBUMIN (HUMAN), 25%, 50ML: HCPCS | Mod: JG | Performed by: STUDENT IN AN ORGANIZED HEALTH CARE EDUCATION/TRAINING PROGRAM

## 2021-01-10 PROCEDURE — 93005 ELECTROCARDIOGRAM TRACING: CPT

## 2021-01-10 PROCEDURE — 99900035 HC TECH TIME PER 15 MIN (STAT)

## 2021-01-10 PROCEDURE — 85025 COMPLETE CBC W/AUTO DIFF WBC: CPT | Mod: 91

## 2021-01-10 PROCEDURE — 93010 ELECTROCARDIOGRAM REPORT: CPT | Mod: ,,, | Performed by: INTERNAL MEDICINE

## 2021-01-10 PROCEDURE — S5010 5% DEXTROSE AND 0.45% SALINE: HCPCS | Performed by: STUDENT IN AN ORGANIZED HEALTH CARE EDUCATION/TRAINING PROGRAM

## 2021-01-10 PROCEDURE — 80197 ASSAY OF TACROLIMUS: CPT

## 2021-01-10 PROCEDURE — 99233 PR SUBSEQUENT HOSPITAL CARE,LEVL III: ICD-10-PCS | Mod: ,,, | Performed by: SURGERY

## 2021-01-10 RX ORDER — SODIUM CHLORIDE 9 MG/ML
INJECTION, SOLUTION INTRAVENOUS ONCE
Status: CANCELLED | OUTPATIENT
Start: 2021-01-10 | End: 2021-01-10

## 2021-01-10 RX ORDER — HYDRALAZINE HYDROCHLORIDE 25 MG/1
25 TABLET, FILM COATED ORAL EVERY 8 HOURS PRN
Status: DISCONTINUED | OUTPATIENT
Start: 2021-01-10 | End: 2021-01-13

## 2021-01-10 RX ORDER — MAGNESIUM SULFATE HEPTAHYDRATE 40 MG/ML
2 INJECTION, SOLUTION INTRAVENOUS
Status: DISCONTINUED | OUTPATIENT
Start: 2021-01-10 | End: 2021-01-11

## 2021-01-10 RX ORDER — SODIUM CHLORIDE 9 MG/ML
INJECTION, SOLUTION INTRAVENOUS
Status: CANCELLED | OUTPATIENT
Start: 2021-01-10

## 2021-01-10 RX ORDER — HYDROCODONE BITARTRATE AND ACETAMINOPHEN 500; 5 MG/1; MG/1
TABLET ORAL CONTINUOUS
Status: DISCONTINUED | OUTPATIENT
Start: 2021-01-10 | End: 2021-01-11

## 2021-01-10 RX ORDER — NOREPINEPHRINE BITARTRATE/D5W 4MG/250ML
0-3 PLASTIC BAG, INJECTION (ML) INTRAVENOUS CONTINUOUS
Status: DISCONTINUED | OUTPATIENT
Start: 2021-01-10 | End: 2021-01-11

## 2021-01-10 RX ORDER — NICARDIPINE HYDROCHLORIDE 0.2 MG/ML
0-15 INJECTION INTRAVENOUS CONTINUOUS
Status: DISCONTINUED | OUTPATIENT
Start: 2021-01-10 | End: 2021-01-11

## 2021-01-10 RX ORDER — ALBUMIN HUMAN 50 G/1000ML
SOLUTION INTRAVENOUS
Status: COMPLETED
Start: 2021-01-10 | End: 2021-01-10

## 2021-01-10 RX ORDER — MAGNESIUM SULFATE HEPTAHYDRATE 40 MG/ML
2 INJECTION, SOLUTION INTRAVENOUS ONCE
Status: COMPLETED | OUTPATIENT
Start: 2021-01-10 | End: 2021-01-10

## 2021-01-10 RX ORDER — LABETALOL HCL 20 MG/4 ML
5 SYRINGE (ML) INTRAVENOUS ONCE
Status: COMPLETED | OUTPATIENT
Start: 2021-01-10 | End: 2021-01-10

## 2021-01-10 RX ORDER — SULFAMETHOXAZOLE AND TRIMETHOPRIM 400; 80 MG/1; MG/1
1 TABLET ORAL EVERY MORNING
Status: DISCONTINUED | OUTPATIENT
Start: 2021-01-19 | End: 2021-01-14 | Stop reason: HOSPADM

## 2021-01-10 RX ORDER — HYDROCODONE BITARTRATE AND ACETAMINOPHEN 500; 5 MG/1; MG/1
TABLET ORAL
Status: DISCONTINUED | OUTPATIENT
Start: 2021-01-10 | End: 2021-01-12

## 2021-01-10 RX ORDER — ALBUMIN HUMAN 250 G/1000ML
50 SOLUTION INTRAVENOUS ONCE
Status: DISCONTINUED | OUTPATIENT
Start: 2021-01-10 | End: 2021-01-10 | Stop reason: ALTCHOICE

## 2021-01-10 RX ORDER — NOREPINEPHRINE BITARTRATE/D5W 4MG/250ML
0-3 PLASTIC BAG, INJECTION (ML) INTRAVENOUS CONTINUOUS
Status: DISCONTINUED | OUTPATIENT
Start: 2021-01-10 | End: 2021-01-10

## 2021-01-10 RX ADMIN — OXYCODONE HYDROCHLORIDE 5 MG: 5 TABLET ORAL at 08:01

## 2021-01-10 RX ADMIN — CALCIUM GLUCONATE 1000 MG: 94 INJECTION, SOLUTION INTRAVENOUS at 01:01

## 2021-01-10 RX ADMIN — ACETAMINOPHEN 1000 MG: 325 TABLET ORAL at 03:01

## 2021-01-10 RX ADMIN — THERA TABS 1 TABLET: TAB at 08:01

## 2021-01-10 RX ADMIN — NYSTATIN 500000 UNITS: 100000 SUSPENSION ORAL at 08:01

## 2021-01-10 RX ADMIN — HEPARIN SODIUM 150 MG: 1000 INJECTION, SOLUTION INTRAVENOUS; SUBCUTANEOUS at 04:01

## 2021-01-10 RX ADMIN — DEXTROSE AND SODIUM CHLORIDE 50 ML/HR: 5; .45 INJECTION, SOLUTION INTRAVENOUS at 05:01

## 2021-01-10 RX ADMIN — ACETAMINOPHEN 1000 MG: 325 TABLET ORAL at 08:01

## 2021-01-10 RX ADMIN — DIPHENHYDRAMINE HYDROCHLORIDE 25 MG: 25 CAPSULE ORAL at 08:01

## 2021-01-10 RX ADMIN — MYCOPHENOLATE MOFETIL 1000 MG: 250 CAPSULE ORAL at 11:01

## 2021-01-10 RX ADMIN — TACROLIMUS 3 MG: 1 CAPSULE ORAL at 06:01

## 2021-01-10 RX ADMIN — Medication 5 MG: at 05:01

## 2021-01-10 RX ADMIN — MYCOPHENOLATE MOFETIL 1000 MG: 250 CAPSULE ORAL at 09:01

## 2021-01-10 RX ADMIN — MAGNESIUM SULFATE 2 G: 2 INJECTION INTRAVENOUS at 05:01

## 2021-01-10 RX ADMIN — FAMOTIDINE 20 MG: 20 TABLET, FILM COATED ORAL at 08:01

## 2021-01-10 RX ADMIN — DOCUSATE SODIUM 100 MG: 100 CAPSULE, LIQUID FILLED ORAL at 08:01

## 2021-01-10 RX ADMIN — ALBUMIN (HUMAN) 50 G: 12.5 SOLUTION INTRAVENOUS at 03:01

## 2021-01-10 RX ADMIN — Medication 0.04 MCG/KG/MIN: at 05:01

## 2021-01-10 RX ADMIN — CEFAZOLIN 2 G: 330 INJECTION, POWDER, FOR SOLUTION INTRAMUSCULAR; INTRAVENOUS at 11:01

## 2021-01-10 RX ADMIN — HEPARIN SODIUM 5000 UNITS: 5000 INJECTION INTRAVENOUS; SUBCUTANEOUS at 09:01

## 2021-01-10 RX ADMIN — HEPARIN SODIUM 5000 UNITS: 5000 INJECTION INTRAVENOUS; SUBCUTANEOUS at 02:01

## 2021-01-10 RX ADMIN — MUPIROCIN 1 G: 20 OINTMENT TOPICAL at 12:01

## 2021-01-10 RX ADMIN — INSULIN HUMAN 5 UNITS: 100 INJECTION, SOLUTION PARENTERAL at 01:01

## 2021-01-10 RX ADMIN — MUPIROCIN 1 G: 20 OINTMENT TOPICAL at 08:01

## 2021-01-10 RX ADMIN — BISACODYL 10 MG: 5 TABLET, COATED ORAL at 08:01

## 2021-01-10 RX ADMIN — CEFAZOLIN 2 G: 330 INJECTION, POWDER, FOR SOLUTION INTRAMUSCULAR; INTRAVENOUS at 03:01

## 2021-01-10 RX ADMIN — SODIUM CHLORIDE 200 ML/HR: 0.9 INJECTION, SOLUTION INTRAVENOUS at 09:01

## 2021-01-10 RX ADMIN — TRAMADOL HYDROCHLORIDE 50 MG: 50 TABLET, COATED ORAL at 04:01

## 2021-01-10 RX ADMIN — DEXTROSE 250 MG: 5 SOLUTION INTRAVENOUS at 03:01

## 2021-01-10 RX ADMIN — BISACODYL 10 MG: 5 TABLET, COATED ORAL at 12:01

## 2021-01-10 RX ADMIN — NICARDIPINE HYDROCHLORIDE 2.5 MG/HR: 0.2 INJECTION, SOLUTION INTRAVENOUS at 07:01

## 2021-01-10 RX ADMIN — TACROLIMUS 3 MG: 1 CAPSULE ORAL at 08:01

## 2021-01-10 RX ADMIN — HYDRALAZINE HYDROCHLORIDE 25 MG: 25 TABLET, FILM COATED ORAL at 04:01

## 2021-01-10 RX ADMIN — DEXTROSE AND SODIUM CHLORIDE 50 ML/HR: 5; .45 INJECTION, SOLUTION INTRAVENOUS at 07:01

## 2021-01-10 RX ADMIN — MYCOPHENOLATE MOFETIL 1000 MG: 250 CAPSULE ORAL at 12:01

## 2021-01-10 RX ADMIN — DOCUSATE SODIUM 100 MG: 100 CAPSULE, LIQUID FILLED ORAL at 03:01

## 2021-01-11 LAB
ALBUMIN SERPL BCP-MCNC: 2.9 G/DL (ref 3.5–5.2)
ALBUMIN SERPL BCP-MCNC: 3.1 G/DL (ref 3.5–5.2)
ALBUMIN SERPL BCP-MCNC: 3.1 G/DL (ref 3.5–5.2)
ALBUMIN SERPL BCP-MCNC: 3.2 G/DL (ref 3.5–5.2)
ANION GAP SERPL CALC-SCNC: 10 MMOL/L (ref 8–16)
ANION GAP SERPL CALC-SCNC: 12 MMOL/L (ref 8–16)
ANION GAP SERPL CALC-SCNC: 12 MMOL/L (ref 8–16)
ANION GAP SERPL CALC-SCNC: 15 MMOL/L (ref 8–16)
BASOPHILS # BLD AUTO: 0 K/UL (ref 0–0.2)
BASOPHILS # BLD AUTO: 0 K/UL (ref 0–0.2)
BASOPHILS # BLD AUTO: 0.01 K/UL (ref 0–0.2)
BASOPHILS NFR BLD: 0 % (ref 0–1.9)
BASOPHILS NFR BLD: 0 % (ref 0–1.9)
BASOPHILS NFR BLD: 0.1 % (ref 0–1.9)
BUN SERPL-MCNC: 27 MG/DL (ref 6–20)
BUN SERPL-MCNC: 29 MG/DL (ref 6–20)
BUN SERPL-MCNC: 34 MG/DL (ref 6–20)
BUN SERPL-MCNC: 38 MG/DL (ref 6–20)
CALCIUM SERPL-MCNC: 7.3 MG/DL (ref 8.7–10.5)
CALCIUM SERPL-MCNC: 8 MG/DL (ref 8.7–10.5)
CALCIUM SERPL-MCNC: 8.1 MG/DL (ref 8.7–10.5)
CALCIUM SERPL-MCNC: 8.4 MG/DL (ref 8.7–10.5)
CHLORIDE SERPL-SCNC: 98 MMOL/L (ref 95–110)
CHLORIDE SERPL-SCNC: 99 MMOL/L (ref 95–110)
CO2 SERPL-SCNC: 24 MMOL/L (ref 23–29)
CO2 SERPL-SCNC: 26 MMOL/L (ref 23–29)
CREAT SERPL-MCNC: 5 MG/DL (ref 0.5–1.4)
CREAT SERPL-MCNC: 5 MG/DL (ref 0.5–1.4)
CREAT SERPL-MCNC: 5.2 MG/DL (ref 0.5–1.4)
CREAT SERPL-MCNC: 5.4 MG/DL (ref 0.5–1.4)
DIFFERENTIAL METHOD: ABNORMAL
EOSINOPHIL # BLD AUTO: 0 K/UL (ref 0–0.5)
EOSINOPHIL NFR BLD: 0 % (ref 0–8)
EOSINOPHIL NFR BLD: 0.1 % (ref 0–8)
ERYTHROCYTE [DISTWIDTH] IN BLOOD BY AUTOMATED COUNT: 18.2 % (ref 11.5–14.5)
ERYTHROCYTE [DISTWIDTH] IN BLOOD BY AUTOMATED COUNT: 18.2 % (ref 11.5–14.5)
ERYTHROCYTE [DISTWIDTH] IN BLOOD BY AUTOMATED COUNT: 18.4 % (ref 11.5–14.5)
ERYTHROCYTE [DISTWIDTH] IN BLOOD BY AUTOMATED COUNT: 18.6 % (ref 11.5–14.5)
EST. GFR  (AFRICAN AMERICAN): 11.5 ML/MIN/1.73 M^2
EST. GFR  (AFRICAN AMERICAN): 12 ML/MIN/1.73 M^2
EST. GFR  (AFRICAN AMERICAN): 12.6 ML/MIN/1.73 M^2
EST. GFR  (AFRICAN AMERICAN): 12.6 ML/MIN/1.73 M^2
EST. GFR  (NON AFRICAN AMERICAN): 10.4 ML/MIN/1.73 M^2
EST. GFR  (NON AFRICAN AMERICAN): 10.9 ML/MIN/1.73 M^2
EST. GFR  (NON AFRICAN AMERICAN): 10.9 ML/MIN/1.73 M^2
EST. GFR  (NON AFRICAN AMERICAN): 9.9 ML/MIN/1.73 M^2
GLUCOSE SERPL-MCNC: 100 MG/DL (ref 70–110)
GLUCOSE SERPL-MCNC: 104 MG/DL (ref 70–110)
GLUCOSE SERPL-MCNC: 106 MG/DL (ref 70–110)
GLUCOSE SERPL-MCNC: 121 MG/DL (ref 70–110)
GLUCOSE SERPL-MCNC: 148 MG/DL (ref 70–110)
GLUCOSE SERPL-MCNC: 159 MG/DL (ref 70–110)
GLUCOSE SERPL-MCNC: 79 MG/DL (ref 70–110)
HBV CORE IGM SERPL QL IA: NEGATIVE
HBV SURFACE AB SER QL IA: POSITIVE
HBV SURFACE AB SERPL IA-ACNC: 222 MIU/ML
HBV SURFACE AG SERPL QL IA: NEGATIVE
HCO3 UR-SCNC: 24.7 MMOL/L (ref 24–28)
HCO3 UR-SCNC: 25 MMOL/L (ref 24–28)
HCO3 UR-SCNC: 28.4 MMOL/L (ref 24–28)
HCT VFR BLD AUTO: 23.4 % (ref 37–48.5)
HCT VFR BLD AUTO: 23.6 % (ref 37–48.5)
HCT VFR BLD AUTO: 25.7 % (ref 37–48.5)
HCT VFR BLD AUTO: 25.9 % (ref 37–48.5)
HCT VFR BLD AUTO: 26.1 % (ref 37–48.5)
HCT VFR BLD CALC: 25 %PCV (ref 36–54)
HCT VFR BLD CALC: 28 %PCV (ref 36–54)
HCT VFR BLD CALC: 32 %PCV (ref 36–54)
HCV RNA SERPL NAA+PROBE-LOG IU: <1.08 LOG (10) IU/ML
HCV RNA SERPL QL NAA+PROBE: NOT DETECTED IU/ML
HCV RNA SPEC NAA+PROBE-ACNC: <12 IU/ML
HGB BLD-MCNC: 7.6 G/DL (ref 12–16)
HGB BLD-MCNC: 7.7 G/DL (ref 12–16)
HGB BLD-MCNC: 8.5 G/DL (ref 12–16)
HGB BLD-MCNC: 8.6 G/DL (ref 12–16)
HGB BLD-MCNC: 8.6 G/DL (ref 12–16)
HIV 1+2 AB+HIV1 P24 AG SERPL QL IA: NEGATIVE
IMM GRANULOCYTES # BLD AUTO: 0.05 K/UL (ref 0–0.04)
IMM GRANULOCYTES # BLD AUTO: 0.06 K/UL (ref 0–0.04)
IMM GRANULOCYTES # BLD AUTO: 0.07 K/UL (ref 0–0.04)
IMM GRANULOCYTES NFR BLD AUTO: 0.4 % (ref 0–0.5)
IMM GRANULOCYTES NFR BLD AUTO: 0.5 % (ref 0–0.5)
IMM GRANULOCYTES NFR BLD AUTO: 1 % (ref 0–0.5)
LYMPHOCYTES # BLD AUTO: 0.1 K/UL (ref 1–4.8)
LYMPHOCYTES NFR BLD: 0.6 % (ref 18–48)
LYMPHOCYTES NFR BLD: 0.9 % (ref 18–48)
LYMPHOCYTES NFR BLD: 1.1 % (ref 18–48)
LYMPHOCYTES NFR BLD: 1.3 % (ref 18–48)
LYMPHOCYTES NFR BLD: 1.4 % (ref 18–48)
MAGNESIUM SERPL-MCNC: 2.5 MG/DL (ref 1.6–2.6)
MCH RBC QN AUTO: 32.9 PG (ref 27–31)
MCH RBC QN AUTO: 33.1 PG (ref 27–31)
MCH RBC QN AUTO: 33.2 PG (ref 27–31)
MCH RBC QN AUTO: 33.3 PG (ref 27–31)
MCH RBC QN AUTO: 33.5 PG (ref 27–31)
MCHC RBC AUTO-ENTMCNC: 32.2 G/DL (ref 32–36)
MCHC RBC AUTO-ENTMCNC: 32.9 G/DL (ref 32–36)
MCHC RBC AUTO-ENTMCNC: 33 G/DL (ref 32–36)
MCHC RBC AUTO-ENTMCNC: 33.1 G/DL (ref 32–36)
MCHC RBC AUTO-ENTMCNC: 33.2 G/DL (ref 32–36)
MCV RBC AUTO: 100 FL (ref 82–98)
MCV RBC AUTO: 100 FL (ref 82–98)
MCV RBC AUTO: 101 FL (ref 82–98)
MCV RBC AUTO: 102 FL (ref 82–98)
MCV RBC AUTO: 102 FL (ref 82–98)
MONOCYTES # BLD AUTO: 0.1 K/UL (ref 0.3–1)
MONOCYTES # BLD AUTO: 0.2 K/UL (ref 0.3–1)
MONOCYTES # BLD AUTO: 0.2 K/UL (ref 0.3–1)
MONOCYTES # BLD AUTO: 0.4 K/UL (ref 0.3–1)
MONOCYTES NFR BLD: 1 % (ref 4–15)
MONOCYTES NFR BLD: 1.8 % (ref 4–15)
MONOCYTES NFR BLD: 1.9 % (ref 4–15)
MONOCYTES NFR BLD: 2.8 % (ref 4–15)
MONOCYTES NFR BLD: 3.7 % (ref 4–15)
NEUTROPHILS # BLD AUTO: 10.5 K/UL (ref 1.8–7.7)
NEUTROPHILS # BLD AUTO: 11 K/UL (ref 1.8–7.7)
NEUTROPHILS # BLD AUTO: 11.9 K/UL (ref 1.8–7.7)
NEUTROPHILS # BLD AUTO: 6.5 K/UL (ref 1.8–7.7)
NEUTROPHILS # BLD AUTO: 9.1 K/UL (ref 1.8–7.7)
NEUTROPHILS NFR BLD: 94.2 % (ref 38–73)
NEUTROPHILS NFR BLD: 95.5 % (ref 38–73)
NEUTROPHILS NFR BLD: 96.7 % (ref 38–73)
NEUTROPHILS NFR BLD: 96.8 % (ref 38–73)
NEUTROPHILS NFR BLD: 97 % (ref 38–73)
NRBC BLD-RTO: 0 /100 WBC
PCO2 BLDA: 34.6 MMHG (ref 35–45)
PCO2 BLDA: 37.6 MMHG (ref 35–45)
PCO2 BLDA: 42.3 MMHG (ref 35–45)
PH SMN: 7.38 [PH] (ref 7.35–7.45)
PH SMN: 7.43 [PH] (ref 7.35–7.45)
PH SMN: 7.52 [PH] (ref 7.35–7.45)
PHOSPHATE SERPL-MCNC: 5 MG/DL (ref 2.7–4.5)
PHOSPHATE SERPL-MCNC: 5.1 MG/DL (ref 2.7–4.5)
PHOSPHATE SERPL-MCNC: 5.1 MG/DL (ref 2.7–4.5)
PHOSPHATE SERPL-MCNC: 5.4 MG/DL (ref 2.7–4.5)
PLATELET # BLD AUTO: 58 K/UL (ref 150–350)
PLATELET # BLD AUTO: 66 K/UL (ref 150–350)
PLATELET # BLD AUTO: 70 K/UL (ref 150–350)
PLATELET # BLD AUTO: 73 K/UL (ref 150–350)
PLATELET # BLD AUTO: 74 K/UL (ref 150–350)
PMV BLD AUTO: 11 FL (ref 9.2–12.9)
PMV BLD AUTO: 11.5 FL (ref 9.2–12.9)
PMV BLD AUTO: 11.7 FL (ref 9.2–12.9)
PMV BLD AUTO: 12.2 FL (ref 9.2–12.9)
PMV BLD AUTO: 12.2 FL (ref 9.2–12.9)
PO2 BLDA: 145 MMHG (ref 80–100)
PO2 BLDA: 165 MMHG (ref 80–100)
PO2 BLDA: 180 MMHG (ref 80–100)
POC BE: 0 MMOL/L
POC BE: 1 MMOL/L
POC BE: 6 MMOL/L
POC IONIZED CALCIUM: 0.79 MMOL/L (ref 1.06–1.42)
POC IONIZED CALCIUM: 0.85 MMOL/L (ref 1.06–1.42)
POC IONIZED CALCIUM: 1.03 MMOL/L (ref 1.06–1.42)
POC SATURATED O2: 100 % (ref 95–100)
POC SATURATED O2: 100 % (ref 95–100)
POC SATURATED O2: 99 % (ref 95–100)
POC TCO2: 26 MMOL/L (ref 23–27)
POC TCO2: 26 MMOL/L (ref 23–27)
POC TCO2: 29 MMOL/L (ref 23–27)
POCT GLUCOSE: 113 MG/DL (ref 70–110)
POCT GLUCOSE: 118 MG/DL (ref 70–110)
POCT GLUCOSE: 126 MG/DL (ref 70–110)
POCT GLUCOSE: 139 MG/DL (ref 70–110)
POTASSIUM BLD-SCNC: 5 MMOL/L (ref 3.5–5.1)
POTASSIUM BLD-SCNC: 5.4 MMOL/L (ref 3.5–5.1)
POTASSIUM BLD-SCNC: 5.9 MMOL/L (ref 3.5–5.1)
POTASSIUM SERPL-SCNC: 4 MMOL/L (ref 3.5–5.1)
POTASSIUM SERPL-SCNC: 4.5 MMOL/L (ref 3.5–5.1)
POTASSIUM SERPL-SCNC: 4.7 MMOL/L (ref 3.5–5.1)
POTASSIUM SERPL-SCNC: 4.9 MMOL/L (ref 3.5–5.1)
RBC # BLD AUTO: 2.31 M/UL (ref 4–5.4)
RBC # BLD AUTO: 2.56 M/UL (ref 4–5.4)
RBC # BLD AUTO: 2.57 M/UL (ref 4–5.4)
RBC # BLD AUTO: 2.6 M/UL (ref 4–5.4)
SAMPLE: ABNORMAL
SODIUM BLD-SCNC: 131 MMOL/L (ref 136–145)
SODIUM BLD-SCNC: 132 MMOL/L (ref 136–145)
SODIUM BLD-SCNC: 134 MMOL/L (ref 136–145)
SODIUM SERPL-SCNC: 134 MMOL/L (ref 136–145)
SODIUM SERPL-SCNC: 137 MMOL/L (ref 136–145)
SODIUM SERPL-SCNC: 137 MMOL/L (ref 136–145)
SODIUM SERPL-SCNC: 138 MMOL/L (ref 136–145)
WBC # BLD AUTO: 10.85 K/UL (ref 3.9–12.7)
WBC # BLD AUTO: 11.39 K/UL (ref 3.9–12.7)
WBC # BLD AUTO: 12.44 K/UL (ref 3.9–12.7)
WBC # BLD AUTO: 6.73 K/UL (ref 3.9–12.7)
WBC # BLD AUTO: 9.68 K/UL (ref 3.9–12.7)

## 2021-01-11 PROCEDURE — 25000003 PHARM REV CODE 250: Performed by: STUDENT IN AN ORGANIZED HEALTH CARE EDUCATION/TRAINING PROGRAM

## 2021-01-11 PROCEDURE — 63600175 PHARM REV CODE 636 W HCPCS: Performed by: STUDENT IN AN ORGANIZED HEALTH CARE EDUCATION/TRAINING PROGRAM

## 2021-01-11 PROCEDURE — 83735 ASSAY OF MAGNESIUM: CPT

## 2021-01-11 PROCEDURE — 99223 PR INITIAL HOSPITAL CARE,LEVL III: ICD-10-PCS | Mod: ,,, | Performed by: SURGERY

## 2021-01-11 PROCEDURE — 99223 1ST HOSP IP/OBS HIGH 75: CPT | Mod: ,,, | Performed by: SURGERY

## 2021-01-11 PROCEDURE — 80069 RENAL FUNCTION PANEL: CPT | Mod: 91

## 2021-01-11 PROCEDURE — 85025 COMPLETE CBC W/AUTO DIFF WBC: CPT | Mod: 91

## 2021-01-11 PROCEDURE — 99233 PR SUBSEQUENT HOSPITAL CARE,LEVL III: ICD-10-PCS | Mod: 24,GC,, | Performed by: SURGERY

## 2021-01-11 PROCEDURE — 80069 RENAL FUNCTION PANEL: CPT

## 2021-01-11 PROCEDURE — 99233 SBSQ HOSP IP/OBS HIGH 50: CPT | Mod: 24,GC,, | Performed by: SURGERY

## 2021-01-11 PROCEDURE — 94761 N-INVAS EAR/PLS OXIMETRY MLT: CPT

## 2021-01-11 PROCEDURE — 25000003 PHARM REV CODE 250: Performed by: PHYSICIAN ASSISTANT

## 2021-01-11 PROCEDURE — 63600175 PHARM REV CODE 636 W HCPCS: Performed by: ANESTHESIOLOGY

## 2021-01-11 PROCEDURE — 99233 SBSQ HOSP IP/OBS HIGH 50: CPT | Mod: ,,, | Performed by: INTERNAL MEDICINE

## 2021-01-11 PROCEDURE — 97802 MEDICAL NUTRITION INDIV IN: CPT

## 2021-01-11 PROCEDURE — 99900035 HC TECH TIME PER 15 MIN (STAT)

## 2021-01-11 PROCEDURE — 63600175 PHARM REV CODE 636 W HCPCS

## 2021-01-11 PROCEDURE — 20600001 HC STEP DOWN PRIVATE ROOM

## 2021-01-11 PROCEDURE — 99233 PR SUBSEQUENT HOSPITAL CARE,LEVL III: ICD-10-PCS | Mod: ,,, | Performed by: INTERNAL MEDICINE

## 2021-01-11 RX ORDER — TACROLIMUS 1 MG/1
4 CAPSULE ORAL 2 TIMES DAILY
Status: DISCONTINUED | OUTPATIENT
Start: 2021-01-11 | End: 2021-01-12

## 2021-01-11 RX ORDER — METHYLPREDNISOLONE SOD SUCC 125 MG
VIAL (EA) INJECTION
Status: COMPLETED
Start: 2021-01-11 | End: 2021-01-11

## 2021-01-11 RX ORDER — HYDROMORPHONE HYDROCHLORIDE 1 MG/ML
0.2 INJECTION, SOLUTION INTRAMUSCULAR; INTRAVENOUS; SUBCUTANEOUS EVERY 6 HOURS PRN
Status: DISCONTINUED | OUTPATIENT
Start: 2021-01-11 | End: 2021-01-12

## 2021-01-11 RX ORDER — DIPHENHYDRAMINE HCL 25 MG
25 CAPSULE ORAL ONCE
Status: COMPLETED | OUTPATIENT
Start: 2021-01-11 | End: 2021-01-11

## 2021-01-11 RX ORDER — SULFAMETHOXAZOLE AND TRIMETHOPRIM 400; 80 MG/1; MG/1
1 TABLET ORAL EVERY MORNING
Qty: 30 TABLET | Refills: 5 | Status: SHIPPED | OUTPATIENT
Start: 2021-01-11 | End: 2021-01-14

## 2021-01-11 RX ORDER — PREDNISONE 5 MG/1
TABLET ORAL
Qty: 120 TABLET | Refills: 11 | Status: SHIPPED | OUTPATIENT
Start: 2021-01-11 | End: 2022-01-12 | Stop reason: SDUPTHER

## 2021-01-11 RX ORDER — LABETALOL 100 MG/1
200 TABLET, FILM COATED ORAL 2 TIMES DAILY
Status: DISCONTINUED | OUTPATIENT
Start: 2021-01-11 | End: 2021-01-14 | Stop reason: HOSPADM

## 2021-01-11 RX ORDER — VALGANCICLOVIR 450 MG/1
900 TABLET, FILM COATED ORAL DAILY
Qty: 60 TABLET | Refills: 2 | Status: SHIPPED | OUTPATIENT
Start: 2021-01-09 | End: 2021-01-14

## 2021-01-11 RX ORDER — OXYCODONE HYDROCHLORIDE 10 MG/1
10 TABLET ORAL EVERY 4 HOURS PRN
Status: DISCONTINUED | OUTPATIENT
Start: 2021-01-11 | End: 2021-01-14 | Stop reason: HOSPADM

## 2021-01-11 RX ORDER — HEPARIN SODIUM 5000 [USP'U]/ML
5000 INJECTION, SOLUTION INTRAVENOUS; SUBCUTANEOUS EVERY 12 HOURS
Status: DISCONTINUED | OUTPATIENT
Start: 2021-01-11 | End: 2021-01-14 | Stop reason: HOSPADM

## 2021-01-11 RX ORDER — DIPHENHYDRAMINE HCL 25 MG
25 CAPSULE ORAL EVERY 6 HOURS PRN
Status: DISCONTINUED | OUTPATIENT
Start: 2021-01-11 | End: 2021-01-11

## 2021-01-11 RX ORDER — MYCOPHENOLATE MOFETIL 250 MG/1
1000 CAPSULE ORAL 2 TIMES DAILY
Qty: 240 CAPSULE | Refills: 11 | Status: ON HOLD | OUTPATIENT
Start: 2021-01-11 | End: 2021-01-20 | Stop reason: SDUPTHER

## 2021-01-11 RX ORDER — DIPHENHYDRAMINE HCL 25 MG
25 CAPSULE ORAL EVERY 6 HOURS PRN
Status: DISCONTINUED | OUTPATIENT
Start: 2021-01-11 | End: 2021-01-14 | Stop reason: HOSPADM

## 2021-01-11 RX ORDER — OXYCODONE HYDROCHLORIDE 5 MG/1
5 TABLET ORAL EVERY 4 HOURS PRN
Status: DISCONTINUED | OUTPATIENT
Start: 2021-01-11 | End: 2021-01-11

## 2021-01-11 RX ORDER — NYSTATIN 100000 [USP'U]/ML
500000 SUSPENSION ORAL
Qty: 480 ML | Refills: 0 | Status: ON HOLD | OUTPATIENT
Start: 2021-01-11 | End: 2021-02-14 | Stop reason: HOSPADM

## 2021-01-11 RX ORDER — OXYCODONE HYDROCHLORIDE 5 MG/1
5 TABLET ORAL EVERY 4 HOURS PRN
Status: DISCONTINUED | OUTPATIENT
Start: 2021-01-11 | End: 2021-01-14 | Stop reason: HOSPADM

## 2021-01-11 RX ORDER — SODIUM CHLORIDE 9 MG/ML
INJECTION, SOLUTION INTRAVENOUS CONTINUOUS
Status: DISCONTINUED | OUTPATIENT
Start: 2021-01-11 | End: 2021-01-12

## 2021-01-11 RX ORDER — TACROLIMUS 1 MG/1
6 CAPSULE ORAL EVERY 12 HOURS
Qty: 360 CAPSULE | Refills: 11 | Status: ON HOLD | OUTPATIENT
Start: 2021-01-11 | End: 2021-01-20 | Stop reason: SDUPTHER

## 2021-01-11 RX ORDER — VALGANCICLOVIR 450 MG/1
450 TABLET, FILM COATED ORAL
Status: DISCONTINUED | OUTPATIENT
Start: 2021-01-18 | End: 2021-01-14 | Stop reason: HOSPADM

## 2021-01-11 RX ADMIN — NYSTATIN 500000 UNITS: 100000 SUSPENSION ORAL at 03:01

## 2021-01-11 RX ADMIN — ACETAMINOPHEN 1000 MG: 325 TABLET ORAL at 04:01

## 2021-01-11 RX ADMIN — HEPARIN SODIUM 150 MG: 1000 INJECTION, SOLUTION INTRAVENOUS; SUBCUTANEOUS at 05:01

## 2021-01-11 RX ADMIN — SODIUM CHLORIDE 125 ML/HR: 0.9 INJECTION, SOLUTION INTRAVENOUS at 11:01

## 2021-01-11 RX ADMIN — TRAMADOL HYDROCHLORIDE 50 MG: 50 TABLET, COATED ORAL at 02:01

## 2021-01-11 RX ADMIN — METHYLPREDNISOLONE SODIUM SUCCINATE 125 MG: 125 INJECTION, POWDER, FOR SOLUTION INTRAMUSCULAR; INTRAVENOUS at 03:01

## 2021-01-11 RX ADMIN — TACROLIMUS 4 MG: 1 CAPSULE ORAL at 05:01

## 2021-01-11 RX ADMIN — DIPHENHYDRAMINE HYDROCHLORIDE 25 MG: 25 CAPSULE ORAL at 04:01

## 2021-01-11 RX ADMIN — HEPARIN SODIUM 5000 UNITS: 5000 INJECTION INTRAVENOUS; SUBCUTANEOUS at 05:01

## 2021-01-11 RX ADMIN — DIPHENHYDRAMINE HYDROCHLORIDE 25 MG: 25 CAPSULE ORAL at 08:01

## 2021-01-11 RX ADMIN — HYDROMORPHONE HYDROCHLORIDE 0.2 MG: 1 INJECTION, SOLUTION INTRAMUSCULAR; INTRAVENOUS; SUBCUTANEOUS at 03:01

## 2021-01-11 RX ADMIN — NYSTATIN 500000 UNITS: 100000 SUSPENSION ORAL at 08:01

## 2021-01-11 RX ADMIN — LABETALOL HYDROCHLORIDE 200 MG: 100 TABLET, FILM COATED ORAL at 08:01

## 2021-01-11 RX ADMIN — DOCUSATE SODIUM 100 MG: 100 CAPSULE, LIQUID FILLED ORAL at 03:01

## 2021-01-11 RX ADMIN — OXYCODONE 5 MG: 5 TABLET ORAL at 10:01

## 2021-01-11 RX ADMIN — MYCOPHENOLATE MOFETIL 1000 MG: 250 CAPSULE ORAL at 10:01

## 2021-01-11 RX ADMIN — MUPIROCIN 1 G: 20 OINTMENT TOPICAL at 08:01

## 2021-01-11 RX ADMIN — HYDRALAZINE HYDROCHLORIDE 25 MG: 25 TABLET, FILM COATED ORAL at 12:01

## 2021-01-11 RX ADMIN — FAMOTIDINE 20 MG: 20 TABLET, FILM COATED ORAL at 08:01

## 2021-01-11 RX ADMIN — DOCUSATE SODIUM 100 MG: 100 CAPSULE, LIQUID FILLED ORAL at 08:01

## 2021-01-11 RX ADMIN — NYSTATIN 500000 UNITS: 100000 SUSPENSION ORAL at 05:01

## 2021-01-11 RX ADMIN — HEPARIN SODIUM 5000 UNITS: 5000 INJECTION INTRAVENOUS; SUBCUTANEOUS at 08:01

## 2021-01-11 RX ADMIN — SODIUM CHLORIDE 50 ML/HR: 0.9 INJECTION, SOLUTION INTRAVENOUS at 11:01

## 2021-01-11 RX ADMIN — BISACODYL 10 MG: 5 TABLET, COATED ORAL at 08:01

## 2021-01-11 RX ADMIN — ACETAMINOPHEN 650 MG: 325 TABLET ORAL at 08:01

## 2021-01-11 RX ADMIN — TACROLIMUS 3 MG: 1 CAPSULE ORAL at 08:01

## 2021-01-11 RX ADMIN — THERA TABS 1 TABLET: TAB at 08:01

## 2021-01-11 RX ADMIN — ACETAMINOPHEN 1000 MG: 325 TABLET ORAL at 05:01

## 2021-01-12 PROBLEM — Z91.89 AT RISK FOR OPPORTUNISTIC INFECTIONS: Status: ACTIVE | Noted: 2021-01-12

## 2021-01-12 PROBLEM — Z79.60 LONG-TERM USE OF IMMUNOSUPPRESSANT MEDICATION: Status: ACTIVE | Noted: 2021-01-12

## 2021-01-12 LAB
ALBUMIN SERPL BCP-MCNC: 2.9 G/DL (ref 3.5–5.2)
ANION GAP SERPL CALC-SCNC: 9 MMOL/L (ref 8–16)
ANISOCYTOSIS BLD QL SMEAR: SLIGHT
BASOPHILS # BLD AUTO: 0 K/UL (ref 0–0.2)
BASOPHILS NFR BLD: 0 % (ref 0–1.9)
BUN SERPL-MCNC: 43 MG/DL (ref 6–20)
CALCIUM SERPL-MCNC: 7 MG/DL (ref 8.7–10.5)
CHLORIDE SERPL-SCNC: 102 MMOL/L (ref 95–110)
CO2 SERPL-SCNC: 24 MMOL/L (ref 23–29)
CREAT SERPL-MCNC: 5.3 MG/DL (ref 0.5–1.4)
CREAT UR-MCNC: 40 MG/DL (ref 15–325)
DIFFERENTIAL METHOD: ABNORMAL
EOSINOPHIL # BLD AUTO: 0 K/UL (ref 0–0.5)
EOSINOPHIL NFR BLD: 0 % (ref 0–8)
ERYTHROCYTE [DISTWIDTH] IN BLOOD BY AUTOMATED COUNT: 17.9 % (ref 11.5–14.5)
ERYTHROCYTE [DISTWIDTH] IN BLOOD BY AUTOMATED COUNT: 17.9 % (ref 11.5–14.5)
ERYTHROCYTE [DISTWIDTH] IN BLOOD BY AUTOMATED COUNT: 18.3 % (ref 11.5–14.5)
EST. GFR  (AFRICAN AMERICAN): 11.7 ML/MIN/1.73 M^2
EST. GFR  (NON AFRICAN AMERICAN): 10.2 ML/MIN/1.73 M^2
GLUCOSE SERPL-MCNC: 111 MG/DL (ref 70–110)
HAPTOGLOB SERPL-MCNC: 112 MG/DL (ref 30–250)
HCT VFR BLD AUTO: 22.3 % (ref 37–48.5)
HCT VFR BLD AUTO: 22.7 % (ref 37–48.5)
HCT VFR BLD AUTO: 23.1 % (ref 37–48.5)
HGB BLD-MCNC: 7.1 G/DL (ref 12–16)
HGB BLD-MCNC: 7.3 G/DL (ref 12–16)
HGB BLD-MCNC: 7.4 G/DL (ref 12–16)
HYPOCHROMIA BLD QL SMEAR: ABNORMAL
IMM GRANULOCYTES # BLD AUTO: 0.06 K/UL (ref 0–0.04)
IMM GRANULOCYTES # BLD AUTO: 0.07 K/UL (ref 0–0.04)
IMM GRANULOCYTES # BLD AUTO: 0.08 K/UL (ref 0–0.04)
IMM GRANULOCYTES NFR BLD AUTO: 1.1 % (ref 0–0.5)
IMM GRANULOCYTES NFR BLD AUTO: 1.3 % (ref 0–0.5)
IMM GRANULOCYTES NFR BLD AUTO: 1.5 % (ref 0–0.5)
LDH SERPL L TO P-CCNC: 313 U/L (ref 110–260)
LYMPHOCYTES # BLD AUTO: 0.1 K/UL (ref 1–4.8)
LYMPHOCYTES # BLD AUTO: 0.2 K/UL (ref 1–4.8)
LYMPHOCYTES # BLD AUTO: 0.2 K/UL (ref 1–4.8)
LYMPHOCYTES NFR BLD: 2 % (ref 18–48)
LYMPHOCYTES NFR BLD: 2.8 % (ref 18–48)
LYMPHOCYTES NFR BLD: 3.2 % (ref 18–48)
MAGNESIUM SERPL-MCNC: 2.5 MG/DL (ref 1.6–2.6)
MCH RBC QN AUTO: 32.9 PG (ref 27–31)
MCH RBC QN AUTO: 33.6 PG (ref 27–31)
MCH RBC QN AUTO: 33.6 PG (ref 27–31)
MCHC RBC AUTO-ENTMCNC: 31.8 G/DL (ref 32–36)
MCHC RBC AUTO-ENTMCNC: 32 G/DL (ref 32–36)
MCHC RBC AUTO-ENTMCNC: 32.2 G/DL (ref 32–36)
MCV RBC AUTO: 102 FL (ref 82–98)
MCV RBC AUTO: 105 FL (ref 82–98)
MCV RBC AUTO: 106 FL (ref 82–98)
MONOCYTES # BLD AUTO: 0.2 K/UL (ref 0.3–1)
MONOCYTES NFR BLD: 3.3 % (ref 4–15)
MONOCYTES NFR BLD: 4.2 % (ref 4–15)
MONOCYTES NFR BLD: 4.5 % (ref 4–15)
NEUTROPHILS # BLD AUTO: 4.2 K/UL (ref 1.8–7.7)
NEUTROPHILS # BLD AUTO: 5.2 K/UL (ref 1.8–7.7)
NEUTROPHILS # BLD AUTO: 5.7 K/UL (ref 1.8–7.7)
NEUTROPHILS NFR BLD: 90.8 % (ref 38–73)
NEUTROPHILS NFR BLD: 91.9 % (ref 38–73)
NEUTROPHILS NFR BLD: 93.4 % (ref 38–73)
NRBC BLD-RTO: 0 /100 WBC
OVALOCYTES BLD QL SMEAR: ABNORMAL
PHOSPHATE SERPL-MCNC: 5 MG/DL (ref 2.7–4.5)
PLATELET # BLD AUTO: 61 K/UL (ref 150–350)
PLATELET # BLD AUTO: 64 K/UL (ref 150–350)
PLATELET # BLD AUTO: 66 K/UL (ref 150–350)
PMV BLD AUTO: 12.2 FL (ref 9.2–12.9)
PMV BLD AUTO: 12.4 FL (ref 9.2–12.9)
PMV BLD AUTO: 12.6 FL (ref 9.2–12.9)
POCT GLUCOSE: 112 MG/DL (ref 70–110)
POCT GLUCOSE: 99 MG/DL (ref 70–110)
POIKILOCYTOSIS BLD QL SMEAR: SLIGHT
POLYCHROMASIA BLD QL SMEAR: ABNORMAL
POTASSIUM SERPL-SCNC: 4.3 MMOL/L (ref 3.5–5.1)
PROT UR-MCNC: 33 MG/DL (ref 0–15)
PROT/CREAT UR: 0.83 MG/G{CREAT} (ref 0–0.2)
RBC # BLD AUTO: 2.11 M/UL (ref 4–5.4)
RBC # BLD AUTO: 2.2 M/UL (ref 4–5.4)
RBC # BLD AUTO: 2.22 M/UL (ref 4–5.4)
SODIUM SERPL-SCNC: 135 MMOL/L (ref 136–145)
TACROLIMUS BLD-MCNC: 5.9 NG/ML (ref 5–15)
WBC # BLD AUTO: 4.62 K/UL (ref 3.9–12.7)
WBC # BLD AUTO: 5.67 K/UL (ref 3.9–12.7)
WBC # BLD AUTO: 6.11 K/UL (ref 3.9–12.7)

## 2021-01-12 PROCEDURE — 99232 SBSQ HOSP IP/OBS MODERATE 35: CPT | Mod: ,,, | Performed by: SURGERY

## 2021-01-12 PROCEDURE — 86359 T CELLS TOTAL COUNT: CPT

## 2021-01-12 PROCEDURE — 80069 RENAL FUNCTION PANEL: CPT

## 2021-01-12 PROCEDURE — 25000003 PHARM REV CODE 250: Performed by: STUDENT IN AN ORGANIZED HEALTH CARE EDUCATION/TRAINING PROGRAM

## 2021-01-12 PROCEDURE — 25000003 PHARM REV CODE 250: Performed by: PHYSICIAN ASSISTANT

## 2021-01-12 PROCEDURE — 83735 ASSAY OF MAGNESIUM: CPT

## 2021-01-12 PROCEDURE — 86022 PLATELET ANTIBODIES: CPT

## 2021-01-12 PROCEDURE — 20600001 HC STEP DOWN PRIVATE ROOM

## 2021-01-12 PROCEDURE — 63600175 PHARM REV CODE 636 W HCPCS: Performed by: STUDENT IN AN ORGANIZED HEALTH CARE EDUCATION/TRAINING PROGRAM

## 2021-01-12 PROCEDURE — 97803 MED NUTRITION INDIV SUBSEQ: CPT

## 2021-01-12 PROCEDURE — 83615 LACTATE (LD) (LDH) ENZYME: CPT

## 2021-01-12 PROCEDURE — 99233 SBSQ HOSP IP/OBS HIGH 50: CPT | Mod: ,,, | Performed by: PHYSICIAN ASSISTANT

## 2021-01-12 PROCEDURE — 83010 ASSAY OF HAPTOGLOBIN QUANT: CPT

## 2021-01-12 PROCEDURE — 99233 PR SUBSEQUENT HOSPITAL CARE,LEVL III: ICD-10-PCS | Mod: ,,, | Performed by: PHYSICIAN ASSISTANT

## 2021-01-12 PROCEDURE — 99232 PR SUBSEQUENT HOSPITAL CARE,LEVL II: ICD-10-PCS | Mod: ,,, | Performed by: SURGERY

## 2021-01-12 PROCEDURE — 63600175 PHARM REV CODE 636 W HCPCS: Performed by: TRANSPLANT SURGERY

## 2021-01-12 PROCEDURE — 80197 ASSAY OF TACROLIMUS: CPT

## 2021-01-12 PROCEDURE — 82570 ASSAY OF URINE CREATININE: CPT

## 2021-01-12 PROCEDURE — 85025 COMPLETE CBC W/AUTO DIFF WBC: CPT

## 2021-01-12 RX ORDER — CALCIUM CARBONATE 500(1250)
1000 TABLET ORAL 2 TIMES DAILY
Status: DISCONTINUED | OUTPATIENT
Start: 2021-01-12 | End: 2021-01-14 | Stop reason: HOSPADM

## 2021-01-12 RX ORDER — TACROLIMUS 1 MG/1
5 CAPSULE ORAL 2 TIMES DAILY
Status: DISCONTINUED | OUTPATIENT
Start: 2021-01-12 | End: 2021-01-13

## 2021-01-12 RX ORDER — SODIUM CHLORIDE 9 MG/ML
INJECTION, SOLUTION INTRAVENOUS CONTINUOUS
Status: ACTIVE | OUTPATIENT
Start: 2021-01-12 | End: 2021-01-12

## 2021-01-12 RX ADMIN — HEPARIN SODIUM 5000 UNITS: 5000 INJECTION INTRAVENOUS; SUBCUTANEOUS at 10:01

## 2021-01-12 RX ADMIN — LABETALOL HYDROCHLORIDE 200 MG: 100 TABLET, FILM COATED ORAL at 10:01

## 2021-01-12 RX ADMIN — CALCIUM 1000 MG: 500 TABLET ORAL at 08:01

## 2021-01-12 RX ADMIN — LABETALOL HYDROCHLORIDE 200 MG: 100 TABLET, FILM COATED ORAL at 08:01

## 2021-01-12 RX ADMIN — NYSTATIN 500000 UNITS: 100000 SUSPENSION ORAL at 06:01

## 2021-01-12 RX ADMIN — THERA TABS 1 TABLET: TAB at 10:01

## 2021-01-12 RX ADMIN — HEPARIN SODIUM 5000 UNITS: 5000 INJECTION INTRAVENOUS; SUBCUTANEOUS at 08:01

## 2021-01-12 RX ADMIN — LABETALOL HYDROCHLORIDE 200 MG: 100 TABLET, FILM COATED ORAL at 09:01

## 2021-01-12 RX ADMIN — SODIUM CHLORIDE 100 ML/HR: 0.9 INJECTION, SOLUTION INTRAVENOUS at 10:01

## 2021-01-12 RX ADMIN — FAMOTIDINE 20 MG: 20 TABLET, FILM COATED ORAL at 08:01

## 2021-01-12 RX ADMIN — MYCOPHENOLATE MOFETIL 1000 MG: 250 CAPSULE ORAL at 10:01

## 2021-01-12 RX ADMIN — NYSTATIN 500000 UNITS: 100000 SUSPENSION ORAL at 08:01

## 2021-01-12 RX ADMIN — CALCIUM 1000 MG: 500 TABLET ORAL at 01:01

## 2021-01-12 RX ADMIN — NYSTATIN 500000 UNITS: 100000 SUSPENSION ORAL at 01:01

## 2021-01-12 RX ADMIN — OXYCODONE HYDROCHLORIDE 10 MG: 10 TABLET ORAL at 07:01

## 2021-01-12 RX ADMIN — NYSTATIN 500000 UNITS: 100000 SUSPENSION ORAL at 10:01

## 2021-01-12 RX ADMIN — PREDNISONE 20 MG: 20 TABLET ORAL at 10:01

## 2021-01-12 RX ADMIN — OXYCODONE 5 MG: 5 TABLET ORAL at 03:01

## 2021-01-12 RX ADMIN — TACROLIMUS 5 MG: 1 CAPSULE ORAL at 06:01

## 2021-01-12 RX ADMIN — MUPIROCIN 1 G: 20 OINTMENT TOPICAL at 08:01

## 2021-01-12 RX ADMIN — MUPIROCIN 1 G: 20 OINTMENT TOPICAL at 10:01

## 2021-01-12 RX ADMIN — MYCOPHENOLATE MOFETIL 1000 MG: 250 CAPSULE ORAL at 08:01

## 2021-01-12 RX ADMIN — TACROLIMUS 4 MG: 1 CAPSULE ORAL at 07:01

## 2021-01-13 PROBLEM — D62 ACUTE BLOOD LOSS ANEMIA: Status: ACTIVE | Noted: 2021-01-13

## 2021-01-13 PROBLEM — D69.6 THROMBOCYTOPENIA: Status: ACTIVE | Noted: 2021-01-13

## 2021-01-13 LAB
ABSOLUTE CD3: 2 CELLS/UL (ref 700–2100)
ALBUMIN SERPL BCP-MCNC: 2.9 G/DL (ref 3.5–5.2)
ANION GAP SERPL CALC-SCNC: 11 MMOL/L (ref 8–16)
BASOPHILS # BLD AUTO: 0 K/UL (ref 0–0.2)
BASOPHILS # BLD AUTO: 0.01 K/UL (ref 0–0.2)
BASOPHILS # BLD AUTO: 0.01 K/UL (ref 0–0.2)
BASOPHILS NFR BLD: 0 % (ref 0–1.9)
BASOPHILS NFR BLD: 0.2 % (ref 0–1.9)
BASOPHILS NFR BLD: 0.2 % (ref 0–1.9)
BLD PROD TYP BPU: NORMAL
BLD PROD TYP BPU: NORMAL
BLOOD UNIT EXPIRATION DATE: NORMAL
BLOOD UNIT EXPIRATION DATE: NORMAL
BLOOD UNIT TYPE CODE: 5100
BLOOD UNIT TYPE CODE: 5100
BLOOD UNIT TYPE: NORMAL
BLOOD UNIT TYPE: NORMAL
BUN SERPL-MCNC: 43 MG/DL (ref 6–20)
CALCIUM SERPL-MCNC: 7.4 MG/DL (ref 8.7–10.5)
CD3%: 1.6 % (ref 55–83)
CHLORIDE SERPL-SCNC: 107 MMOL/L (ref 95–110)
CO2 SERPL-SCNC: 22 MMOL/L (ref 23–29)
CODING SYSTEM: NORMAL
CODING SYSTEM: NORMAL
CREAT SERPL-MCNC: 5 MG/DL (ref 0.5–1.4)
DIFFERENTIAL METHOD: ABNORMAL
DISPENSE STATUS: NORMAL
DISPENSE STATUS: NORMAL
EOSINOPHIL # BLD AUTO: 0 K/UL (ref 0–0.5)
EOSINOPHIL NFR BLD: 0 % (ref 0–8)
EOSINOPHIL NFR BLD: 0.2 % (ref 0–8)
EOSINOPHIL NFR BLD: 0.5 % (ref 0–8)
ERYTHROCYTE [DISTWIDTH] IN BLOOD BY AUTOMATED COUNT: 17.6 % (ref 11.5–14.5)
ERYTHROCYTE [DISTWIDTH] IN BLOOD BY AUTOMATED COUNT: 17.7 % (ref 11.5–14.5)
ERYTHROCYTE [DISTWIDTH] IN BLOOD BY AUTOMATED COUNT: 17.8 % (ref 11.5–14.5)
EST. GFR  (AFRICAN AMERICAN): 12.6 ML/MIN/1.73 M^2
EST. GFR  (NON AFRICAN AMERICAN): 10.9 ML/MIN/1.73 M^2
GLUCOSE SERPL-MCNC: 90 MG/DL (ref 70–110)
HCT VFR BLD AUTO: 22.4 % (ref 37–48.5)
HCT VFR BLD AUTO: 22.6 % (ref 37–48.5)
HCT VFR BLD AUTO: 23.1 % (ref 37–48.5)
HGB BLD-MCNC: 7.1 G/DL (ref 12–16)
HGB BLD-MCNC: 7.2 G/DL (ref 12–16)
HGB BLD-MCNC: 7.3 G/DL (ref 12–16)
IMM GRANULOCYTES # BLD AUTO: 0.07 K/UL (ref 0–0.04)
IMM GRANULOCYTES # BLD AUTO: 0.07 K/UL (ref 0–0.04)
IMM GRANULOCYTES # BLD AUTO: 0.09 K/UL (ref 0–0.04)
IMM GRANULOCYTES NFR BLD AUTO: 1.6 % (ref 0–0.5)
IMM GRANULOCYTES NFR BLD AUTO: 1.7 % (ref 0–0.5)
IMM GRANULOCYTES NFR BLD AUTO: 2.2 % (ref 0–0.5)
LYMPHOCYTES # BLD AUTO: 0.1 K/UL (ref 1–4.8)
LYMPHOCYTES # BLD AUTO: 0.1 K/UL (ref 1–4.8)
LYMPHOCYTES # BLD AUTO: 0.2 K/UL (ref 1–4.8)
LYMPHOCYTES NFR BLD: 2.7 % (ref 18–48)
LYMPHOCYTES NFR BLD: 3.4 % (ref 18–48)
LYMPHOCYTES NFR BLD: 3.5 % (ref 18–48)
MAGNESIUM SERPL-MCNC: 2.2 MG/DL (ref 1.6–2.6)
MCH RBC QN AUTO: 33.2 PG (ref 27–31)
MCH RBC QN AUTO: 33.2 PG (ref 27–31)
MCH RBC QN AUTO: 33.8 PG (ref 27–31)
MCHC RBC AUTO-ENTMCNC: 31.6 G/DL (ref 32–36)
MCHC RBC AUTO-ENTMCNC: 31.7 G/DL (ref 32–36)
MCHC RBC AUTO-ENTMCNC: 31.9 G/DL (ref 32–36)
MCV RBC AUTO: 105 FL (ref 82–98)
MCV RBC AUTO: 105 FL (ref 82–98)
MCV RBC AUTO: 106 FL (ref 82–98)
MONOCYTES # BLD AUTO: 0.2 K/UL (ref 0.3–1)
MONOCYTES NFR BLD: 3.9 % (ref 4–15)
MONOCYTES NFR BLD: 5.4 % (ref 4–15)
MONOCYTES NFR BLD: 5.7 % (ref 4–15)
NEUTROPHILS # BLD AUTO: 3.6 K/UL (ref 1.8–7.7)
NEUTROPHILS # BLD AUTO: 3.7 K/UL (ref 1.8–7.7)
NEUTROPHILS # BLD AUTO: 3.8 K/UL (ref 1.8–7.7)
NEUTROPHILS NFR BLD: 88.8 % (ref 38–73)
NEUTROPHILS NFR BLD: 89.4 % (ref 38–73)
NEUTROPHILS NFR BLD: 90.6 % (ref 38–73)
NRBC BLD-RTO: 0 /100 WBC
NRBC BLD-RTO: 0 /100 WBC
NRBC BLD-RTO: 1 /100 WBC
NUM UNITS TRANS PACKED RBC: NORMAL
NUM UNITS TRANS PACKED RBC: NORMAL
PF4 HEPARIN CMPLX AB SER QL: 0.13 OD (ref 0–0.4)
PHOSPHATE SERPL-MCNC: 3.7 MG/DL (ref 2.7–4.5)
PLATELET # BLD AUTO: 65 K/UL (ref 150–350)
PLATELET # BLD AUTO: 65 K/UL (ref 150–350)
PLATELET # BLD AUTO: 71 K/UL (ref 150–350)
PMV BLD AUTO: 11.9 FL (ref 9.2–12.9)
PMV BLD AUTO: 12.1 FL (ref 9.2–12.9)
PMV BLD AUTO: 12.3 FL (ref 9.2–12.9)
POTASSIUM SERPL-SCNC: 3.8 MMOL/L (ref 3.5–5.1)
RBC # BLD AUTO: 2.13 M/UL (ref 4–5.4)
RBC # BLD AUTO: 2.14 M/UL (ref 4–5.4)
RBC # BLD AUTO: 2.2 M/UL (ref 4–5.4)
SODIUM SERPL-SCNC: 140 MMOL/L (ref 136–145)
TACROLIMUS BLD-MCNC: 6.8 NG/ML (ref 5–15)
WBC # BLD AUTO: 4.02 K/UL (ref 3.9–12.7)
WBC # BLD AUTO: 4.07 K/UL (ref 3.9–12.7)
WBC # BLD AUTO: 4.25 K/UL (ref 3.9–12.7)

## 2021-01-13 PROCEDURE — 63600175 PHARM REV CODE 636 W HCPCS: Performed by: TRANSPLANT SURGERY

## 2021-01-13 PROCEDURE — 25000003 PHARM REV CODE 250: Performed by: NURSE PRACTITIONER

## 2021-01-13 PROCEDURE — 99233 SBSQ HOSP IP/OBS HIGH 50: CPT | Mod: ,,, | Performed by: NURSE PRACTITIONER

## 2021-01-13 PROCEDURE — 83735 ASSAY OF MAGNESIUM: CPT

## 2021-01-13 PROCEDURE — 85025 COMPLETE CBC W/AUTO DIFF WBC: CPT

## 2021-01-13 PROCEDURE — 80197 ASSAY OF TACROLIMUS: CPT

## 2021-01-13 PROCEDURE — 36415 COLL VENOUS BLD VENIPUNCTURE: CPT

## 2021-01-13 PROCEDURE — 25000003 PHARM REV CODE 250: Performed by: PHYSICIAN ASSISTANT

## 2021-01-13 PROCEDURE — 63600175 PHARM REV CODE 636 W HCPCS: Performed by: INTERNAL MEDICINE

## 2021-01-13 PROCEDURE — 25000003 PHARM REV CODE 250: Performed by: STUDENT IN AN ORGANIZED HEALTH CARE EDUCATION/TRAINING PROGRAM

## 2021-01-13 PROCEDURE — 99233 PR SUBSEQUENT HOSPITAL CARE,LEVL III: ICD-10-PCS | Mod: ,,, | Performed by: NURSE PRACTITIONER

## 2021-01-13 PROCEDURE — 80069 RENAL FUNCTION PANEL: CPT

## 2021-01-13 PROCEDURE — 63600175 PHARM REV CODE 636 W HCPCS: Performed by: STUDENT IN AN ORGANIZED HEALTH CARE EDUCATION/TRAINING PROGRAM

## 2021-01-13 PROCEDURE — 20600001 HC STEP DOWN PRIVATE ROOM

## 2021-01-13 RX ORDER — CLONIDINE HYDROCHLORIDE 0.1 MG/1
0.1 TABLET ORAL EVERY 6 HOURS PRN
Status: DISCONTINUED | OUTPATIENT
Start: 2021-01-13 | End: 2021-01-14 | Stop reason: HOSPADM

## 2021-01-13 RX ORDER — CLONIDINE HYDROCHLORIDE 0.1 MG/1
0.1 TABLET ORAL EVERY 6 HOURS PRN
Status: DISCONTINUED | OUTPATIENT
Start: 2021-01-13 | End: 2021-01-13

## 2021-01-13 RX ORDER — BISACODYL 5 MG
10 TABLET, DELAYED RELEASE (ENTERIC COATED) ORAL DAILY PRN
Status: DISCONTINUED | OUTPATIENT
Start: 2021-01-13 | End: 2021-01-14 | Stop reason: HOSPADM

## 2021-01-13 RX ORDER — CALCIUM CARBONATE 200(500)MG
500 TABLET,CHEWABLE ORAL ONCE
Status: COMPLETED | OUTPATIENT
Start: 2021-01-13 | End: 2021-01-13

## 2021-01-13 RX ORDER — ACETAMINOPHEN 325 MG/1
650 TABLET ORAL EVERY 6 HOURS PRN
Status: DISCONTINUED | OUTPATIENT
Start: 2021-01-13 | End: 2021-01-14 | Stop reason: HOSPADM

## 2021-01-13 RX ORDER — TACROLIMUS 1 MG/1
1 CAPSULE ORAL ONCE
Status: COMPLETED | OUTPATIENT
Start: 2021-01-13 | End: 2021-01-13

## 2021-01-13 RX ORDER — NIFEDIPINE 30 MG/1
30 TABLET, EXTENDED RELEASE ORAL 2 TIMES DAILY
Status: DISCONTINUED | OUTPATIENT
Start: 2021-01-13 | End: 2021-01-14 | Stop reason: HOSPADM

## 2021-01-13 RX ORDER — TACROLIMUS 1 MG/1
6 CAPSULE ORAL 2 TIMES DAILY
Status: DISCONTINUED | OUTPATIENT
Start: 2021-01-13 | End: 2021-01-14 | Stop reason: HOSPADM

## 2021-01-13 RX ORDER — DOCUSATE SODIUM 100 MG/1
100 CAPSULE, LIQUID FILLED ORAL 2 TIMES DAILY PRN
Status: DISCONTINUED | OUTPATIENT
Start: 2021-01-13 | End: 2021-01-14 | Stop reason: HOSPADM

## 2021-01-13 RX ORDER — PANTOPRAZOLE SODIUM 40 MG/1
40 TABLET, DELAYED RELEASE ORAL DAILY
Status: DISCONTINUED | OUTPATIENT
Start: 2021-01-13 | End: 2021-01-14 | Stop reason: HOSPADM

## 2021-01-13 RX ORDER — CALCIUM CARBONATE 500(1250)
1000 TABLET ORAL 2 TIMES DAILY
Qty: 120 TABLET | Refills: 5 | Status: CANCELLED | COMMUNITY
Start: 2021-01-13

## 2021-01-13 RX ADMIN — TACROLIMUS 5 MG: 1 CAPSULE ORAL at 08:01

## 2021-01-13 RX ADMIN — CALCIUM 1000 MG: 500 TABLET ORAL at 08:01

## 2021-01-13 RX ADMIN — HEPARIN SODIUM 5000 UNITS: 5000 INJECTION INTRAVENOUS; SUBCUTANEOUS at 08:01

## 2021-01-13 RX ADMIN — LABETALOL HYDROCHLORIDE 200 MG: 100 TABLET, FILM COATED ORAL at 08:01

## 2021-01-13 RX ADMIN — MYCOPHENOLATE MOFETIL 1000 MG: 250 CAPSULE ORAL at 08:01

## 2021-01-13 RX ADMIN — NYSTATIN 500000 UNITS: 100000 SUSPENSION ORAL at 01:01

## 2021-01-13 RX ADMIN — TACROLIMUS 6 MG: 1 CAPSULE ORAL at 05:01

## 2021-01-13 RX ADMIN — NYSTATIN 500000 UNITS: 100000 SUSPENSION ORAL at 08:01

## 2021-01-13 RX ADMIN — NYSTATIN 500000 UNITS: 100000 SUSPENSION ORAL at 05:01

## 2021-01-13 RX ADMIN — ACETAMINOPHEN 650 MG: 325 TABLET ORAL at 04:01

## 2021-01-13 RX ADMIN — TACROLIMUS 1 MG: 1 CAPSULE ORAL at 11:01

## 2021-01-13 RX ADMIN — NIFEDIPINE 30 MG: 30 TABLET, FILM COATED, EXTENDED RELEASE ORAL at 08:01

## 2021-01-13 RX ADMIN — FAMOTIDINE 20 MG: 20 TABLET, FILM COATED ORAL at 08:01

## 2021-01-13 RX ADMIN — CALCIUM CARBONATE (ANTACID) CHEW TAB 500 MG 500 MG: 500 CHEW TAB at 08:01

## 2021-01-13 RX ADMIN — HYDRALAZINE HYDROCHLORIDE 25 MG: 25 TABLET, FILM COATED ORAL at 12:01

## 2021-01-13 RX ADMIN — PANTOPRAZOLE SODIUM 40 MG: 40 TABLET, DELAYED RELEASE ORAL at 08:01

## 2021-01-13 RX ADMIN — PREDNISONE 20 MG: 20 TABLET ORAL at 08:01

## 2021-01-13 RX ADMIN — THERA TABS 1 TABLET: TAB at 08:01

## 2021-01-13 RX ADMIN — OXYCODONE 5 MG: 5 TABLET ORAL at 08:01

## 2021-01-13 RX ADMIN — NIFEDIPINE 30 MG: 30 TABLET, FILM COATED, EXTENDED RELEASE ORAL at 09:01

## 2021-01-13 RX ADMIN — MUPIROCIN 1 G: 20 OINTMENT TOPICAL at 08:01

## 2021-01-14 VITALS
RESPIRATION RATE: 27 BRPM | TEMPERATURE: 98 F | HEIGHT: 66 IN | WEIGHT: 271.63 LBS | OXYGEN SATURATION: 98 % | DIASTOLIC BLOOD PRESSURE: 103 MMHG | HEART RATE: 96 BPM | BODY MASS INDEX: 43.65 KG/M2 | SYSTOLIC BLOOD PRESSURE: 154 MMHG

## 2021-01-14 DIAGNOSIS — Z94.0 KIDNEY REPLACED BY TRANSPLANT: Primary | ICD-10-CM

## 2021-01-14 DIAGNOSIS — E55.9 VITAMIN D DEFICIENCY: ICD-10-CM

## 2021-01-14 PROBLEM — R21 RASH: Status: RESOLVED | Noted: 2019-02-01 | Resolved: 2021-01-14

## 2021-01-14 PROBLEM — R11.0 NAUSEA: Status: RESOLVED | Noted: 2018-08-09 | Resolved: 2021-01-14

## 2021-01-14 PROBLEM — Z01.810 PREOP CARDIOVASCULAR EXAM: Status: RESOLVED | Noted: 2020-07-21 | Resolved: 2021-01-14

## 2021-01-14 PROBLEM — R51.9 NONINTRACTABLE HEADACHE: Status: RESOLVED | Noted: 2020-04-15 | Resolved: 2021-01-14

## 2021-01-14 PROBLEM — E87.1 HYPONATREMIA: Status: RESOLVED | Noted: 2018-08-09 | Resolved: 2021-01-14

## 2021-01-14 LAB
ALBUMIN SERPL BCP-MCNC: 3.1 G/DL (ref 3.5–5.2)
ANION GAP SERPL CALC-SCNC: 14 MMOL/L (ref 8–16)
BASOPHILS # BLD AUTO: 0.01 K/UL (ref 0–0.2)
BASOPHILS NFR BLD: 0.3 % (ref 0–1.9)
BUN SERPL-MCNC: 41 MG/DL (ref 6–20)
CALCIUM SERPL-MCNC: 8.2 MG/DL (ref 8.7–10.5)
CHLORIDE SERPL-SCNC: 108 MMOL/L (ref 95–110)
CO2 SERPL-SCNC: 20 MMOL/L (ref 23–29)
CREAT SERPL-MCNC: 4.6 MG/DL (ref 0.5–1.4)
DIFFERENTIAL METHOD: ABNORMAL
EOSINOPHIL # BLD AUTO: 0 K/UL (ref 0–0.5)
EOSINOPHIL NFR BLD: 0.8 % (ref 0–8)
ERYTHROCYTE [DISTWIDTH] IN BLOOD BY AUTOMATED COUNT: 17.7 % (ref 11.5–14.5)
EST. GFR  (AFRICAN AMERICAN): 13.9 ML/MIN/1.73 M^2
EST. GFR  (NON AFRICAN AMERICAN): 12.1 ML/MIN/1.73 M^2
FERRITIN SERPL-MCNC: 358 NG/ML (ref 20–300)
GLUCOSE SERPL-MCNC: 90 MG/DL (ref 70–110)
HCT VFR BLD AUTO: 22.7 % (ref 37–48.5)
HGB BLD-MCNC: 7.3 G/DL (ref 12–16)
IMM GRANULOCYTES # BLD AUTO: 0.17 K/UL (ref 0–0.04)
IMM GRANULOCYTES NFR BLD AUTO: 4.5 % (ref 0–0.5)
IRON SERPL-MCNC: 73 UG/DL (ref 30–160)
LYMPHOCYTES # BLD AUTO: 0.2 K/UL (ref 1–4.8)
LYMPHOCYTES NFR BLD: 5.2 % (ref 18–48)
MAGNESIUM SERPL-MCNC: 2.3 MG/DL (ref 1.6–2.6)
MCH RBC QN AUTO: 33.5 PG (ref 27–31)
MCHC RBC AUTO-ENTMCNC: 32.2 G/DL (ref 32–36)
MCV RBC AUTO: 104 FL (ref 82–98)
MONOCYTES # BLD AUTO: 0.2 K/UL (ref 0.3–1)
MONOCYTES NFR BLD: 6 % (ref 4–15)
NEUTROPHILS # BLD AUTO: 3.2 K/UL (ref 1.8–7.7)
NEUTROPHILS NFR BLD: 83.2 % (ref 38–73)
NRBC BLD-RTO: 0 /100 WBC
PHOSPHATE SERPL-MCNC: 3.7 MG/DL (ref 2.7–4.5)
PLATELET # BLD AUTO: 85 K/UL (ref 150–350)
PMV BLD AUTO: 11.8 FL (ref 9.2–12.9)
POTASSIUM SERPL-SCNC: 3.9 MMOL/L (ref 3.5–5.1)
RBC # BLD AUTO: 2.18 M/UL (ref 4–5.4)
SATURATED IRON: 27 % (ref 20–50)
SODIUM SERPL-SCNC: 142 MMOL/L (ref 136–145)
TACROLIMUS BLD-MCNC: 8.7 NG/ML (ref 5–15)
TOTAL IRON BINDING CAPACITY: 269 UG/DL (ref 250–450)
TRANSFERRIN SERPL-MCNC: 182 MG/DL (ref 200–375)
WBC # BLD AUTO: 3.82 K/UL (ref 3.9–12.7)

## 2021-01-14 PROCEDURE — 99239 HOSP IP/OBS DSCHRG MGMT >30: CPT | Mod: 24,,, | Performed by: PHYSICIAN ASSISTANT

## 2021-01-14 PROCEDURE — 25000003 PHARM REV CODE 250: Performed by: PHYSICIAN ASSISTANT

## 2021-01-14 PROCEDURE — 25000003 PHARM REV CODE 250: Performed by: STUDENT IN AN ORGANIZED HEALTH CARE EDUCATION/TRAINING PROGRAM

## 2021-01-14 PROCEDURE — 80197 ASSAY OF TACROLIMUS: CPT

## 2021-01-14 PROCEDURE — 99239 PR HOSPITAL DISCHARGE DAY,>30 MIN: ICD-10-PCS | Mod: 24,,, | Performed by: PHYSICIAN ASSISTANT

## 2021-01-14 PROCEDURE — 63600175 PHARM REV CODE 636 W HCPCS: Performed by: STUDENT IN AN ORGANIZED HEALTH CARE EDUCATION/TRAINING PROGRAM

## 2021-01-14 PROCEDURE — 25000003 PHARM REV CODE 250: Performed by: NURSE PRACTITIONER

## 2021-01-14 PROCEDURE — 83735 ASSAY OF MAGNESIUM: CPT

## 2021-01-14 PROCEDURE — 63600175 PHARM REV CODE 636 W HCPCS: Performed by: INTERNAL MEDICINE

## 2021-01-14 PROCEDURE — 83540 ASSAY OF IRON: CPT

## 2021-01-14 PROCEDURE — 82728 ASSAY OF FERRITIN: CPT

## 2021-01-14 PROCEDURE — 85025 COMPLETE CBC W/AUTO DIFF WBC: CPT

## 2021-01-14 PROCEDURE — 80069 RENAL FUNCTION PANEL: CPT

## 2021-01-14 RX ORDER — NIFEDIPINE 30 MG/1
30 TABLET, EXTENDED RELEASE ORAL 2 TIMES DAILY
Qty: 60 TABLET | Refills: 11 | Status: SHIPPED | OUTPATIENT
Start: 2021-01-14 | End: 2022-01-11

## 2021-01-14 RX ORDER — SODIUM BICARBONATE 650 MG/1
650 TABLET ORAL 2 TIMES DAILY
Qty: 60 TABLET | Refills: 11 | Status: SHIPPED | OUTPATIENT
Start: 2021-01-14 | End: 2021-01-23 | Stop reason: SDUPTHER

## 2021-01-14 RX ORDER — SULFAMETHOXAZOLE AND TRIMETHOPRIM 400; 80 MG/1; MG/1
1 TABLET ORAL
Qty: 12 TABLET | Refills: 5 | Status: SHIPPED | OUTPATIENT
Start: 2021-01-15 | End: 2021-01-15

## 2021-01-14 RX ORDER — CALCIUM CARBONATE 500(1250)
1000 TABLET ORAL 2 TIMES DAILY
Qty: 120 TABLET | Refills: 5 | Status: SHIPPED | OUTPATIENT
Start: 2021-01-14 | End: 2021-02-24

## 2021-01-14 RX ORDER — VALGANCICLOVIR 450 MG/1
450 TABLET, FILM COATED ORAL
Qty: 12 TABLET | Refills: 2 | Status: ON HOLD | OUTPATIENT
Start: 2021-01-15 | End: 2021-01-20 | Stop reason: SDUPTHER

## 2021-01-14 RX ORDER — PANTOPRAZOLE SODIUM 40 MG/1
40 TABLET, DELAYED RELEASE ORAL DAILY
Qty: 30 TABLET | Refills: 11 | Status: ON HOLD | OUTPATIENT
Start: 2021-01-15 | End: 2021-02-14 | Stop reason: HOSPADM

## 2021-01-14 RX ORDER — SODIUM BICARBONATE 650 MG/1
650 TABLET ORAL 2 TIMES DAILY
Status: DISCONTINUED | OUTPATIENT
Start: 2021-01-14 | End: 2021-01-14 | Stop reason: HOSPADM

## 2021-01-14 RX ORDER — LABETALOL 200 MG/1
200 TABLET, FILM COATED ORAL 2 TIMES DAILY
Qty: 60 TABLET | Refills: 2 | Status: SHIPPED | OUTPATIENT
Start: 2021-01-14 | End: 2021-04-20

## 2021-01-14 RX ORDER — LIDOCAINE HYDROCHLORIDE 10 MG/ML
10 INJECTION INFILTRATION; PERINEURAL ONCE
Status: COMPLETED | OUTPATIENT
Start: 2021-01-14 | End: 2021-01-14

## 2021-01-14 RX ORDER — OXYCODONE HYDROCHLORIDE 5 MG/1
5 TABLET ORAL EVERY 6 HOURS PRN
Qty: 28 TABLET | Refills: 0 | Status: SHIPPED | OUTPATIENT
Start: 2021-01-14 | End: 2021-01-15 | Stop reason: CLARIF

## 2021-01-14 RX ORDER — OXYCODONE AND ACETAMINOPHEN 5; 325 MG/1; MG/1
1 TABLET ORAL EVERY 6 HOURS PRN
Qty: 28 TABLET | Refills: 0 | Status: SHIPPED | OUTPATIENT
Start: 2021-01-14 | End: 2021-06-08

## 2021-01-14 RX ADMIN — PREDNISONE 20 MG: 20 TABLET ORAL at 08:01

## 2021-01-14 RX ADMIN — PANTOPRAZOLE SODIUM 40 MG: 40 TABLET, DELAYED RELEASE ORAL at 08:01

## 2021-01-14 RX ADMIN — CALCIUM 1000 MG: 500 TABLET ORAL at 08:01

## 2021-01-14 RX ADMIN — LIDOCAINE HYDROCHLORIDE 10 ML: 10 INJECTION, SOLUTION INFILTRATION; PERINEURAL at 08:01

## 2021-01-14 RX ADMIN — THERA TABS 1 TABLET: TAB at 09:01

## 2021-01-14 RX ADMIN — NYSTATIN 500000 UNITS: 100000 SUSPENSION ORAL at 08:01

## 2021-01-14 RX ADMIN — HEPARIN SODIUM 5000 UNITS: 5000 INJECTION INTRAVENOUS; SUBCUTANEOUS at 08:01

## 2021-01-14 RX ADMIN — MYCOPHENOLATE MOFETIL 1000 MG: 250 CAPSULE ORAL at 09:01

## 2021-01-14 RX ADMIN — LABETALOL HYDROCHLORIDE 200 MG: 100 TABLET, FILM COATED ORAL at 08:01

## 2021-01-14 RX ADMIN — NIFEDIPINE 30 MG: 30 TABLET, FILM COATED, EXTENDED RELEASE ORAL at 08:01

## 2021-01-14 RX ADMIN — SODIUM BICARBONATE 650 MG TABLET 650 MG: at 11:01

## 2021-01-14 RX ADMIN — MUPIROCIN 1 G: 20 OINTMENT TOPICAL at 09:01

## 2021-01-14 RX ADMIN — TACROLIMUS 6 MG: 1 CAPSULE ORAL at 08:01

## 2021-01-15 ENCOUNTER — TELEPHONE (OUTPATIENT)
Dept: INTERNAL MEDICINE | Facility: CLINIC | Age: 31
End: 2021-01-15

## 2021-01-15 ENCOUNTER — CLINICAL SUPPORT (OUTPATIENT)
Dept: TRANSPLANT | Facility: CLINIC | Age: 31
DRG: 690 | End: 2021-01-15
Payer: MEDICARE

## 2021-01-15 VITALS
OXYGEN SATURATION: 100 % | WEIGHT: 249.31 LBS | RESPIRATION RATE: 18 BRPM | TEMPERATURE: 99 F | HEIGHT: 66 IN | SYSTOLIC BLOOD PRESSURE: 131 MMHG | BODY MASS INDEX: 40.07 KG/M2 | WEIGHT: 249.31 LBS | HEART RATE: 98 BPM | DIASTOLIC BLOOD PRESSURE: 90 MMHG | DIASTOLIC BLOOD PRESSURE: 90 MMHG | RESPIRATION RATE: 18 BRPM | HEART RATE: 98 BPM | BODY MASS INDEX: 40.07 KG/M2 | OXYGEN SATURATION: 100 % | HEIGHT: 66 IN | SYSTOLIC BLOOD PRESSURE: 131 MMHG | TEMPERATURE: 99 F

## 2021-01-15 DIAGNOSIS — Z94.0 DECEASED-DONOR KIDNEY TRANSPLANT: Primary | ICD-10-CM

## 2021-01-15 PROCEDURE — 99213 OFFICE O/P EST LOW 20 MIN: CPT | Mod: PBBFAC

## 2021-01-15 PROCEDURE — 99999 PR PBB SHADOW E&M-EST. PATIENT-LVL III: ICD-10-PCS | Mod: PBBFAC,,,

## 2021-01-15 PROCEDURE — 99999 PR PBB SHADOW E&M-EST. PATIENT-LVL III: CPT | Mod: PBBFAC,,,

## 2021-01-15 PROCEDURE — 99213 OFFICE O/P EST LOW 20 MIN: CPT | Mod: PBBFAC,27

## 2021-01-15 RX ORDER — SULFAMETHOXAZOLE AND TRIMETHOPRIM 400; 80 MG/1; MG/1
1 TABLET ORAL DAILY
Qty: 30 TABLET | Refills: 5 | Status: SHIPPED | OUTPATIENT
Start: 2021-01-15 | End: 2021-07-20

## 2021-01-16 ENCOUNTER — HOSPITAL ENCOUNTER (INPATIENT)
Facility: HOSPITAL | Age: 31
LOS: 4 days | Discharge: HOME | DRG: 690 | End: 2021-01-20
Attending: EMERGENCY MEDICINE | Admitting: TRANSPLANT SURGERY
Payer: MEDICARE

## 2021-01-16 DIAGNOSIS — D62 ACUTE BLOOD LOSS ANEMIA: ICD-10-CM

## 2021-01-16 DIAGNOSIS — Z91.89 AT RISK FOR OPPORTUNISTIC INFECTIONS: ICD-10-CM

## 2021-01-16 DIAGNOSIS — T86.19: ICD-10-CM

## 2021-01-16 DIAGNOSIS — I10 ESSENTIAL HYPERTENSION: ICD-10-CM

## 2021-01-16 DIAGNOSIS — R31.9 URINARY TRACT INFECTION WITH HEMATURIA, SITE UNSPECIFIED: ICD-10-CM

## 2021-01-16 DIAGNOSIS — Z94.0 HX OF KIDNEY TRANSPLANT: ICD-10-CM

## 2021-01-16 DIAGNOSIS — T86.12 FAILED KIDNEY TRANSPLANT: ICD-10-CM

## 2021-01-16 DIAGNOSIS — T82.868D THROMBOSIS DUE TO CENTRAL VENOUS ACCESS DEVICE, SUBSEQUENT ENCOUNTER: ICD-10-CM

## 2021-01-16 DIAGNOSIS — S37.019A: ICD-10-CM

## 2021-01-16 DIAGNOSIS — N30.01 ACUTE CYSTITIS WITH HEMATURIA: Primary | ICD-10-CM

## 2021-01-16 DIAGNOSIS — R10.9 ABDOMINAL PAIN: ICD-10-CM

## 2021-01-16 DIAGNOSIS — R10.32 LEFT LOWER QUADRANT ABDOMINAL PAIN: ICD-10-CM

## 2021-01-16 DIAGNOSIS — Z29.89 PROPHYLACTIC IMMUNOTHERAPY: ICD-10-CM

## 2021-01-16 DIAGNOSIS — Z94.0 DECEASED-DONOR KIDNEY TRANSPLANT: ICD-10-CM

## 2021-01-16 DIAGNOSIS — R10.9 ABDOMINAL PAIN, UNSPECIFIED ABDOMINAL LOCATION: ICD-10-CM

## 2021-01-16 DIAGNOSIS — N39.0 URINARY TRACT INFECTION WITH HEMATURIA, SITE UNSPECIFIED: ICD-10-CM

## 2021-01-16 LAB
ALBUMIN SERPL BCP-MCNC: 3.2 G/DL (ref 3.5–5.2)
ALP SERPL-CCNC: 66 U/L (ref 55–135)
ALT SERPL W/O P-5'-P-CCNC: 15 U/L (ref 10–44)
ANION GAP SERPL CALC-SCNC: 12 MMOL/L (ref 8–16)
ANISOCYTOSIS BLD QL SMEAR: SLIGHT
AST SERPL-CCNC: 20 U/L (ref 10–40)
B-HCG UR QL: NEGATIVE
BACTERIA #/AREA URNS AUTO: ABNORMAL /HPF
BASOPHILS # BLD AUTO: 0.01 K/UL (ref 0–0.2)
BASOPHILS NFR BLD: 0.1 % (ref 0–1.9)
BILIRUB SERPL-MCNC: 0.6 MG/DL (ref 0.1–1)
BILIRUB UR QL STRIP: NEGATIVE
BUN SERPL-MCNC: 32 MG/DL (ref 6–30)
BUN SERPL-MCNC: 36 MG/DL (ref 6–20)
CALCIUM SERPL-MCNC: 8.3 MG/DL (ref 8.7–10.5)
CHLORIDE SERPL-SCNC: 104 MMOL/L (ref 95–110)
CHLORIDE SERPL-SCNC: 106 MMOL/L (ref 95–110)
CLARITY UR REFRACT.AUTO: ABNORMAL
CO2 SERPL-SCNC: 21 MMOL/L (ref 23–29)
COLOR UR AUTO: YELLOW
CREAT SERPL-MCNC: 3.9 MG/DL (ref 0.5–1.4)
CREAT SERPL-MCNC: 4.4 MG/DL (ref 0.5–1.4)
CTP QC/QA: YES
CTP QC/QA: YES
DIFFERENTIAL METHOD: ABNORMAL
EOSINOPHIL # BLD AUTO: 0.1 K/UL (ref 0–0.5)
EOSINOPHIL NFR BLD: 0.6 % (ref 0–8)
ERYTHROCYTE [DISTWIDTH] IN BLOOD BY AUTOMATED COUNT: 17.2 % (ref 11.5–14.5)
EST. GFR  (AFRICAN AMERICAN): 17 ML/MIN/1.73 M^2
EST. GFR  (NON AFRICAN AMERICAN): 14.7 ML/MIN/1.73 M^2
GLUCOSE SERPL-MCNC: 96 MG/DL (ref 70–110)
GLUCOSE SERPL-MCNC: 97 MG/DL (ref 70–110)
GLUCOSE UR QL STRIP: NEGATIVE
HCT VFR BLD AUTO: 23.1 % (ref 37–48.5)
HCT VFR BLD CALC: 20 %PCV (ref 36–54)
HGB BLD-MCNC: 7.2 G/DL (ref 12–16)
HGB UR QL STRIP: ABNORMAL
HYALINE CASTS UR QL AUTO: 0 /LPF
IMM GRANULOCYTES # BLD AUTO: 0.23 K/UL (ref 0–0.04)
IMM GRANULOCYTES NFR BLD AUTO: 2.6 % (ref 0–0.5)
KETONES UR QL STRIP: NEGATIVE
LACTATE SERPL-SCNC: 0.8 MMOL/L (ref 0.5–2.2)
LEUKOCYTE ESTERASE UR QL STRIP: ABNORMAL
LIPASE SERPL-CCNC: 31 U/L (ref 4–60)
LYMPHOCYTES # BLD AUTO: 0.2 K/UL (ref 1–4.8)
LYMPHOCYTES NFR BLD: 1.9 % (ref 18–48)
MCH RBC QN AUTO: 33.2 PG (ref 27–31)
MCHC RBC AUTO-ENTMCNC: 31.2 G/DL (ref 32–36)
MCV RBC AUTO: 107 FL (ref 82–98)
MICROSCOPIC COMMENT: ABNORMAL
MONOCYTES # BLD AUTO: 0.5 K/UL (ref 0.3–1)
MONOCYTES NFR BLD: 5.3 % (ref 4–15)
NEUTROPHILS # BLD AUTO: 8 K/UL (ref 1.8–7.7)
NEUTROPHILS NFR BLD: 89.5 % (ref 38–73)
NITRITE UR QL STRIP: NEGATIVE
NRBC BLD-RTO: 0 /100 WBC
OVALOCYTES BLD QL SMEAR: ABNORMAL
PH UR STRIP: 8 [PH] (ref 5–8)
PLATELET # BLD AUTO: 141 K/UL (ref 150–350)
PLATELET BLD QL SMEAR: ABNORMAL
PMV BLD AUTO: 10.9 FL (ref 9.2–12.9)
POC IONIZED CALCIUM: 1.03 MMOL/L (ref 1.06–1.42)
POC TCO2 (MEASURED): 21 MMOL/L (ref 23–29)
POIKILOCYTOSIS BLD QL SMEAR: SLIGHT
POTASSIUM BLD-SCNC: 3.6 MMOL/L (ref 3.5–5.1)
POTASSIUM SERPL-SCNC: 3.7 MMOL/L (ref 3.5–5.1)
PROT SERPL-MCNC: 5.8 G/DL (ref 6–8.4)
PROT UR QL STRIP: ABNORMAL
RBC # BLD AUTO: 2.17 M/UL (ref 4–5.4)
RBC #/AREA URNS AUTO: >100 /HPF (ref 0–4)
SAMPLE: ABNORMAL
SARS-COV-2 RDRP RESP QL NAA+PROBE: NEGATIVE
SODIUM BLD-SCNC: 137 MMOL/L (ref 136–145)
SODIUM SERPL-SCNC: 139 MMOL/L (ref 136–145)
SP GR UR STRIP: 1 (ref 1–1.03)
SQUAMOUS #/AREA URNS AUTO: 4 /HPF
TACROLIMUS BLD-MCNC: 13.9 NG/ML (ref 5–15)
URN SPEC COLLECT METH UR: ABNORMAL
WBC # BLD AUTO: 8.88 K/UL (ref 3.9–12.7)
WBC #/AREA URNS AUTO: >100 /HPF (ref 0–5)

## 2021-01-16 PROCEDURE — 25000003 PHARM REV CODE 250: Performed by: NURSE PRACTITIONER

## 2021-01-16 PROCEDURE — 87077 CULTURE AEROBIC IDENTIFY: CPT | Mod: 59

## 2021-01-16 PROCEDURE — 99223 1ST HOSP IP/OBS HIGH 75: CPT | Mod: 24,AI,, | Performed by: NURSE PRACTITIONER

## 2021-01-16 PROCEDURE — 96365 THER/PROPH/DIAG IV INF INIT: CPT

## 2021-01-16 PROCEDURE — 87040 BLOOD CULTURE FOR BACTERIA: CPT | Mod: 59

## 2021-01-16 PROCEDURE — 63600175 PHARM REV CODE 636 W HCPCS: Performed by: SURGERY

## 2021-01-16 PROCEDURE — 80053 COMPREHEN METABOLIC PANEL: CPT

## 2021-01-16 PROCEDURE — 96375 TX/PRO/DX INJ NEW DRUG ADDON: CPT

## 2021-01-16 PROCEDURE — 85025 COMPLETE CBC W/AUTO DIFF WBC: CPT

## 2021-01-16 PROCEDURE — 81025 URINE PREGNANCY TEST: CPT | Performed by: PHYSICIAN ASSISTANT

## 2021-01-16 PROCEDURE — 96376 TX/PRO/DX INJ SAME DRUG ADON: CPT

## 2021-01-16 PROCEDURE — 99285 EMERGENCY DEPT VISIT HI MDM: CPT | Mod: 25

## 2021-01-16 PROCEDURE — 63600175 PHARM REV CODE 636 W HCPCS: Performed by: EMERGENCY MEDICINE

## 2021-01-16 PROCEDURE — 63600175 PHARM REV CODE 636 W HCPCS: Performed by: PHYSICIAN ASSISTANT

## 2021-01-16 PROCEDURE — 99285 EMERGENCY DEPT VISIT HI MDM: CPT | Mod: ,,, | Performed by: PHYSICIAN ASSISTANT

## 2021-01-16 PROCEDURE — U0002 COVID-19 LAB TEST NON-CDC: HCPCS | Performed by: PHYSICIAN ASSISTANT

## 2021-01-16 PROCEDURE — 80047 BASIC METABLC PNL IONIZED CA: CPT

## 2021-01-16 PROCEDURE — 87186 SC STD MICRODIL/AGAR DIL: CPT | Mod: 59

## 2021-01-16 PROCEDURE — 99223 PR INITIAL HOSPITAL CARE,LEVL III: ICD-10-PCS | Mod: 24,AI,, | Performed by: NURSE PRACTITIONER

## 2021-01-16 PROCEDURE — 80197 ASSAY OF TACROLIMUS: CPT

## 2021-01-16 PROCEDURE — 83690 ASSAY OF LIPASE: CPT

## 2021-01-16 PROCEDURE — 87086 URINE CULTURE/COLONY COUNT: CPT

## 2021-01-16 PROCEDURE — 81001 URINALYSIS AUTO W/SCOPE: CPT

## 2021-01-16 PROCEDURE — 83605 ASSAY OF LACTIC ACID: CPT

## 2021-01-16 PROCEDURE — 99285 PR EMERGENCY DEPT VISIT,LEVEL V: ICD-10-PCS | Mod: ,,, | Performed by: PHYSICIAN ASSISTANT

## 2021-01-16 PROCEDURE — 20600001 HC STEP DOWN PRIVATE ROOM

## 2021-01-16 PROCEDURE — 87088 URINE BACTERIA CULTURE: CPT

## 2021-01-16 PROCEDURE — 25000003 PHARM REV CODE 250: Performed by: PHYSICIAN ASSISTANT

## 2021-01-16 RX ORDER — TACROLIMUS 1 MG/1
6 CAPSULE ORAL 2 TIMES DAILY
Status: DISCONTINUED | OUTPATIENT
Start: 2021-01-16 | End: 2021-01-16

## 2021-01-16 RX ORDER — CALCIUM CARBONATE 500(1250)
1000 TABLET ORAL 2 TIMES DAILY
Status: DISCONTINUED | OUTPATIENT
Start: 2021-01-16 | End: 2021-01-20 | Stop reason: HOSPADM

## 2021-01-16 RX ORDER — HYDROMORPHONE HYDROCHLORIDE 1 MG/ML
0.5 INJECTION, SOLUTION INTRAMUSCULAR; INTRAVENOUS; SUBCUTANEOUS ONCE
Status: COMPLETED | OUTPATIENT
Start: 2021-01-16 | End: 2021-01-16

## 2021-01-16 RX ORDER — SODIUM BICARBONATE 650 MG/1
650 TABLET ORAL 2 TIMES DAILY
Status: DISCONTINUED | OUTPATIENT
Start: 2021-01-16 | End: 2021-01-18

## 2021-01-16 RX ORDER — SODIUM CHLORIDE 9 MG/ML
INJECTION, SOLUTION INTRAVENOUS CONTINUOUS
Status: DISCONTINUED | OUTPATIENT
Start: 2021-01-16 | End: 2021-01-19

## 2021-01-16 RX ORDER — VALGANCICLOVIR 450 MG/1
450 TABLET, FILM COATED ORAL
Status: DISCONTINUED | OUTPATIENT
Start: 2021-01-18 | End: 2021-01-20 | Stop reason: HOSPADM

## 2021-01-16 RX ORDER — NIFEDIPINE 30 MG/1
30 TABLET, EXTENDED RELEASE ORAL 2 TIMES DAILY
Status: DISCONTINUED | OUTPATIENT
Start: 2021-01-16 | End: 2021-01-20 | Stop reason: HOSPADM

## 2021-01-16 RX ORDER — OXYCODONE AND ACETAMINOPHEN 5; 325 MG/1; MG/1
1 TABLET ORAL EVERY 4 HOURS PRN
Status: DISCONTINUED | OUTPATIENT
Start: 2021-01-16 | End: 2021-01-20 | Stop reason: HOSPADM

## 2021-01-16 RX ORDER — PHENAZOPYRIDINE HYDROCHLORIDE 100 MG/1
100 TABLET, FILM COATED ORAL ONCE
Status: DISCONTINUED | OUTPATIENT
Start: 2021-01-16 | End: 2021-01-16

## 2021-01-16 RX ORDER — CEFTRIAXONE 1 G/1
1 INJECTION, POWDER, FOR SOLUTION INTRAMUSCULAR; INTRAVENOUS
Status: DISCONTINUED | OUTPATIENT
Start: 2021-01-17 | End: 2021-01-16

## 2021-01-16 RX ORDER — LABETALOL 100 MG/1
200 TABLET, FILM COATED ORAL 2 TIMES DAILY
Status: DISCONTINUED | OUTPATIENT
Start: 2021-01-16 | End: 2021-01-20 | Stop reason: HOSPADM

## 2021-01-16 RX ORDER — ONDANSETRON 2 MG/ML
4 INJECTION INTRAMUSCULAR; INTRAVENOUS
Status: COMPLETED | OUTPATIENT
Start: 2021-01-16 | End: 2021-01-16

## 2021-01-16 RX ORDER — OXYBUTYNIN CHLORIDE 5 MG/1
5 TABLET ORAL 3 TIMES DAILY
Status: DISCONTINUED | OUTPATIENT
Start: 2021-01-16 | End: 2021-01-20 | Stop reason: HOSPADM

## 2021-01-16 RX ORDER — HYDROMORPHONE HYDROCHLORIDE 1 MG/ML
1 INJECTION, SOLUTION INTRAMUSCULAR; INTRAVENOUS; SUBCUTANEOUS
Status: COMPLETED | OUTPATIENT
Start: 2021-01-16 | End: 2021-01-16

## 2021-01-16 RX ORDER — SODIUM CHLORIDE 0.9 % (FLUSH) 0.9 %
10 SYRINGE (ML) INJECTION
Status: DISCONTINUED | OUTPATIENT
Start: 2021-01-16 | End: 2021-01-20 | Stop reason: HOSPADM

## 2021-01-16 RX ORDER — SULFAMETHOXAZOLE AND TRIMETHOPRIM 400; 80 MG/1; MG/1
1 TABLET ORAL DAILY
Status: DISCONTINUED | OUTPATIENT
Start: 2021-01-16 | End: 2021-01-20 | Stop reason: HOSPADM

## 2021-01-16 RX ORDER — PANTOPRAZOLE SODIUM 40 MG/1
40 TABLET, DELAYED RELEASE ORAL DAILY
Status: DISCONTINUED | OUTPATIENT
Start: 2021-01-16 | End: 2021-01-20 | Stop reason: HOSPADM

## 2021-01-16 RX ORDER — NYSTATIN 100000 [USP'U]/ML
500000 SUSPENSION ORAL
Status: DISCONTINUED | OUTPATIENT
Start: 2021-01-16 | End: 2021-01-20 | Stop reason: HOSPADM

## 2021-01-16 RX ORDER — PREDNISONE 20 MG/1
20 TABLET ORAL DAILY
Status: DISCONTINUED | OUTPATIENT
Start: 2021-01-16 | End: 2021-01-20 | Stop reason: HOSPADM

## 2021-01-16 RX ORDER — CEFTRIAXONE 1 G/1
1 INJECTION, POWDER, FOR SOLUTION INTRAMUSCULAR; INTRAVENOUS
Status: DISCONTINUED | OUTPATIENT
Start: 2021-01-17 | End: 2021-01-18

## 2021-01-16 RX ORDER — ACETAMINOPHEN 325 MG/1
650 TABLET ORAL EVERY 8 HOURS PRN
Status: DISCONTINUED | OUTPATIENT
Start: 2021-01-16 | End: 2021-01-20 | Stop reason: HOSPADM

## 2021-01-16 RX ORDER — OXYCODONE AND ACETAMINOPHEN 10; 325 MG/1; MG/1
1 TABLET ORAL EVERY 4 HOURS PRN
Status: DISCONTINUED | OUTPATIENT
Start: 2021-01-16 | End: 2021-01-20 | Stop reason: HOSPADM

## 2021-01-16 RX ORDER — DOCUSATE SODIUM 100 MG/1
100 CAPSULE, LIQUID FILLED ORAL 2 TIMES DAILY
Status: DISCONTINUED | OUTPATIENT
Start: 2021-01-16 | End: 2021-01-20 | Stop reason: HOSPADM

## 2021-01-16 RX ADMIN — CEFTRIAXONE 2 G: 2 INJECTION, SOLUTION INTRAVENOUS at 04:01

## 2021-01-16 RX ADMIN — OXYBUTYNIN CHLORIDE 5 MG: 5 TABLET ORAL at 08:01

## 2021-01-16 RX ADMIN — NYSTATIN 500000 UNITS: 100000 SUSPENSION ORAL at 05:01

## 2021-01-16 RX ADMIN — OXYBUTYNIN CHLORIDE 5 MG: 5 TABLET ORAL at 09:01

## 2021-01-16 RX ADMIN — LABETALOL HYDROCHLORIDE 200 MG: 100 TABLET, FILM COATED ORAL at 08:01

## 2021-01-16 RX ADMIN — OXYCODONE HYDROCHLORIDE AND ACETAMINOPHEN 1 TABLET: 10; 325 TABLET ORAL at 09:01

## 2021-01-16 RX ADMIN — CALCIUM 1000 MG: 500 TABLET ORAL at 08:01

## 2021-01-16 RX ADMIN — SULFAMETHOXAZOLE AND TRIMETHOPRIM 1 TABLET: 400; 80 TABLET ORAL at 08:01

## 2021-01-16 RX ADMIN — SODIUM BICARBONATE 650 MG TABLET 650 MG: at 09:01

## 2021-01-16 RX ADMIN — DOCUSATE SODIUM 100 MG: 100 CAPSULE, LIQUID FILLED ORAL at 09:01

## 2021-01-16 RX ADMIN — SODIUM CHLORIDE 100 ML/HR: 0.9 INJECTION, SOLUTION INTRAVENOUS at 09:01

## 2021-01-16 RX ADMIN — PANTOPRAZOLE SODIUM 40 MG: 40 TABLET, DELAYED RELEASE ORAL at 08:01

## 2021-01-16 RX ADMIN — LABETALOL HYDROCHLORIDE 200 MG: 100 TABLET, FILM COATED ORAL at 09:01

## 2021-01-16 RX ADMIN — NYSTATIN 500000 UNITS: 100000 SUSPENSION ORAL at 12:01

## 2021-01-16 RX ADMIN — NIFEDIPINE 30 MG: 30 TABLET, FILM COATED, EXTENDED RELEASE ORAL at 09:01

## 2021-01-16 RX ADMIN — PREDNISONE 20 MG: 20 TABLET ORAL at 08:01

## 2021-01-16 RX ADMIN — SODIUM BICARBONATE 650 MG TABLET 650 MG: at 08:01

## 2021-01-16 RX ADMIN — NYSTATIN 500000 UNITS: 100000 SUSPENSION ORAL at 08:01

## 2021-01-16 RX ADMIN — CALCIUM 1000 MG: 500 TABLET ORAL at 09:01

## 2021-01-16 RX ADMIN — OXYCODONE HYDROCHLORIDE AND ACETAMINOPHEN 1 TABLET: 10; 325 TABLET ORAL at 03:01

## 2021-01-16 RX ADMIN — HYDROMORPHONE HYDROCHLORIDE 0.5 MG: 1 INJECTION, SOLUTION INTRAMUSCULAR; INTRAVENOUS; SUBCUTANEOUS at 08:01

## 2021-01-16 RX ADMIN — OXYCODONE HYDROCHLORIDE AND ACETAMINOPHEN 1 TABLET: 10; 325 TABLET ORAL at 07:01

## 2021-01-16 RX ADMIN — HYDROMORPHONE HYDROCHLORIDE 1 MG: 1 INJECTION, SOLUTION INTRAMUSCULAR; INTRAVENOUS; SUBCUTANEOUS at 03:01

## 2021-01-16 RX ADMIN — OXYCODONE HYDROCHLORIDE AND ACETAMINOPHEN 1 TABLET: 10; 325 TABLET ORAL at 11:01

## 2021-01-16 RX ADMIN — TACROLIMUS 6 MG: 1 CAPSULE ORAL at 05:01

## 2021-01-16 RX ADMIN — DOCUSATE SODIUM 100 MG: 100 CAPSULE, LIQUID FILLED ORAL at 08:01

## 2021-01-16 RX ADMIN — HYDROMORPHONE HYDROCHLORIDE 1 MG: 1 INJECTION, SOLUTION INTRAMUSCULAR; INTRAVENOUS; SUBCUTANEOUS at 04:01

## 2021-01-16 RX ADMIN — ONDANSETRON 4 MG: 2 INJECTION INTRAMUSCULAR; INTRAVENOUS at 03:01

## 2021-01-16 RX ADMIN — OXYBUTYNIN CHLORIDE 5 MG: 5 TABLET ORAL at 03:01

## 2021-01-16 RX ADMIN — TACROLIMUS 6 MG: 1 CAPSULE ORAL at 08:01

## 2021-01-17 LAB
ABO + RH BLD: NORMAL
ALBUMIN SERPL BCP-MCNC: 2.7 G/DL (ref 3.5–5.2)
ALBUMIN SERPL BCP-MCNC: 3.1 G/DL (ref 3.5–5.2)
ANION GAP SERPL CALC-SCNC: 13 MMOL/L (ref 8–16)
ANION GAP SERPL CALC-SCNC: 9 MMOL/L (ref 8–16)
ANISOCYTOSIS BLD QL SMEAR: SLIGHT
BASOPHILS # BLD AUTO: 0.01 K/UL (ref 0–0.2)
BASOPHILS # BLD AUTO: 0.01 K/UL (ref 0–0.2)
BASOPHILS NFR BLD: 0 % (ref 0–1.9)
BASOPHILS NFR BLD: 0.1 % (ref 0–1.9)
BASOPHILS NFR BLD: 0.1 % (ref 0–1.9)
BLD GP AB SCN CELLS X3 SERPL QL: NORMAL
BLD PROD TYP BPU: NORMAL
BLD PROD TYP BPU: NORMAL
BLOOD UNIT EXPIRATION DATE: NORMAL
BLOOD UNIT EXPIRATION DATE: NORMAL
BLOOD UNIT TYPE CODE: 5100
BLOOD UNIT TYPE CODE: 5100
BLOOD UNIT TYPE: NORMAL
BLOOD UNIT TYPE: NORMAL
BUN SERPL-MCNC: 38 MG/DL (ref 6–20)
BUN SERPL-MCNC: 40 MG/DL (ref 6–20)
CALCIUM SERPL-MCNC: 7.5 MG/DL (ref 8.7–10.5)
CALCIUM SERPL-MCNC: 8 MG/DL (ref 8.7–10.5)
CHLORIDE SERPL-SCNC: 104 MMOL/L (ref 95–110)
CHLORIDE SERPL-SCNC: 109 MMOL/L (ref 95–110)
CO2 SERPL-SCNC: 17 MMOL/L (ref 23–29)
CO2 SERPL-SCNC: 19 MMOL/L (ref 23–29)
CODING SYSTEM: NORMAL
CODING SYSTEM: NORMAL
CREAT SERPL-MCNC: 4.4 MG/DL (ref 0.5–1.4)
CREAT SERPL-MCNC: 5 MG/DL (ref 0.5–1.4)
DIFFERENTIAL METHOD: ABNORMAL
DISPENSE STATUS: NORMAL
DISPENSE STATUS: NORMAL
EOSINOPHIL # BLD AUTO: 0.1 K/UL (ref 0–0.5)
EOSINOPHIL # BLD AUTO: 0.1 K/UL (ref 0–0.5)
EOSINOPHIL NFR BLD: 0 % (ref 0–8)
EOSINOPHIL NFR BLD: 1.2 % (ref 0–8)
EOSINOPHIL NFR BLD: 1.2 % (ref 0–8)
ERYTHROCYTE [DISTWIDTH] IN BLOOD BY AUTOMATED COUNT: 17.1 % (ref 11.5–14.5)
ERYTHROCYTE [DISTWIDTH] IN BLOOD BY AUTOMATED COUNT: 17.2 % (ref 11.5–14.5)
ERYTHROCYTE [DISTWIDTH] IN BLOOD BY AUTOMATED COUNT: 18.9 % (ref 11.5–14.5)
EST. GFR  (AFRICAN AMERICAN): 12.6 ML/MIN/1.73 M^2
EST. GFR  (AFRICAN AMERICAN): 14.7 ML/MIN/1.73 M^2
EST. GFR  (NON AFRICAN AMERICAN): 10.9 ML/MIN/1.73 M^2
EST. GFR  (NON AFRICAN AMERICAN): 12.7 ML/MIN/1.73 M^2
GLUCOSE SERPL-MCNC: 102 MG/DL (ref 70–110)
GLUCOSE SERPL-MCNC: 89 MG/DL (ref 70–110)
HCT VFR BLD AUTO: 20.7 % (ref 37–48.5)
HCT VFR BLD AUTO: 21 % (ref 37–48.5)
HCT VFR BLD AUTO: 28.1 % (ref 37–48.5)
HGB BLD-MCNC: 6.3 G/DL (ref 12–16)
HGB BLD-MCNC: 6.6 G/DL (ref 12–16)
HGB BLD-MCNC: 9.6 G/DL (ref 12–16)
IMM GRANULOCYTES # BLD AUTO: 0.35 K/UL (ref 0–0.04)
IMM GRANULOCYTES # BLD AUTO: 0.37 K/UL (ref 0–0.04)
IMM GRANULOCYTES # BLD AUTO: ABNORMAL K/UL (ref 0–0.04)
IMM GRANULOCYTES NFR BLD AUTO: 4.4 % (ref 0–0.5)
IMM GRANULOCYTES NFR BLD AUTO: 4.5 % (ref 0–0.5)
IMM GRANULOCYTES NFR BLD AUTO: ABNORMAL % (ref 0–0.5)
LYMPHOCYTES # BLD AUTO: 0.1 K/UL (ref 1–4.8)
LYMPHOCYTES # BLD AUTO: 0.2 K/UL (ref 1–4.8)
LYMPHOCYTES NFR BLD: 1 % (ref 18–48)
LYMPHOCYTES NFR BLD: 1.1 % (ref 18–48)
LYMPHOCYTES NFR BLD: 2.3 % (ref 18–48)
MAGNESIUM SERPL-MCNC: 1.7 MG/DL (ref 1.6–2.6)
MCH RBC QN AUTO: 31.9 PG (ref 27–31)
MCH RBC QN AUTO: 32.1 PG (ref 27–31)
MCH RBC QN AUTO: 33.2 PG (ref 27–31)
MCHC RBC AUTO-ENTMCNC: 30.4 G/DL (ref 32–36)
MCHC RBC AUTO-ENTMCNC: 31.4 G/DL (ref 32–36)
MCHC RBC AUTO-ENTMCNC: 34.2 G/DL (ref 32–36)
MCV RBC AUTO: 106 FL (ref 82–98)
MCV RBC AUTO: 106 FL (ref 82–98)
MCV RBC AUTO: 93 FL (ref 82–98)
MONOCYTES # BLD AUTO: 0.4 K/UL (ref 0.3–1)
MONOCYTES # BLD AUTO: 0.4 K/UL (ref 0.3–1)
MONOCYTES NFR BLD: 3 % (ref 4–15)
MONOCYTES NFR BLD: 4.3 % (ref 4–15)
MONOCYTES NFR BLD: 5 % (ref 4–15)
NEUTROPHILS # BLD AUTO: 6.7 K/UL (ref 1.8–7.7)
NEUTROPHILS # BLD AUTO: 7.5 K/UL (ref 1.8–7.7)
NEUTROPHILS NFR BLD: 86.9 % (ref 38–73)
NEUTROPHILS NFR BLD: 88.9 % (ref 38–73)
NEUTROPHILS NFR BLD: 96 % (ref 38–73)
NRBC BLD-RTO: 0 /100 WBC
NUM UNITS TRANS PACKED RBC: NORMAL
NUM UNITS TRANS PACKED RBC: NORMAL
PHOSPHATE SERPL-MCNC: 3.6 MG/DL (ref 2.7–4.5)
PHOSPHATE SERPL-MCNC: 4.1 MG/DL (ref 2.7–4.5)
PLATELET # BLD AUTO: 159 K/UL (ref 150–350)
PLATELET # BLD AUTO: 170 K/UL (ref 150–350)
PLATELET # BLD AUTO: 201 K/UL (ref 150–350)
PMV BLD AUTO: 11.2 FL (ref 9.2–12.9)
PMV BLD AUTO: 11.2 FL (ref 9.2–12.9)
PMV BLD AUTO: 11.3 FL (ref 9.2–12.9)
POLYCHROMASIA BLD QL SMEAR: ABNORMAL
POTASSIUM SERPL-SCNC: 3.9 MMOL/L (ref 3.5–5.1)
POTASSIUM SERPL-SCNC: 4.7 MMOL/L (ref 3.5–5.1)
RBC # BLD AUTO: 1.96 M/UL (ref 4–5.4)
RBC # BLD AUTO: 1.99 M/UL (ref 4–5.4)
RBC # BLD AUTO: 3.01 M/UL (ref 4–5.4)
SODIUM SERPL-SCNC: 132 MMOL/L (ref 136–145)
SODIUM SERPL-SCNC: 139 MMOL/L (ref 136–145)
TACROLIMUS BLD-MCNC: 11.7 NG/ML (ref 5–15)
WBC # BLD AUTO: 10.86 K/UL (ref 3.9–12.7)
WBC # BLD AUTO: 7.74 K/UL (ref 3.9–12.7)
WBC # BLD AUTO: 8.42 K/UL (ref 3.9–12.7)

## 2021-01-17 PROCEDURE — 20600001 HC STEP DOWN PRIVATE ROOM

## 2021-01-17 PROCEDURE — 36430 TRANSFUSION BLD/BLD COMPNT: CPT

## 2021-01-17 PROCEDURE — 85007 BL SMEAR W/DIFF WBC COUNT: CPT

## 2021-01-17 PROCEDURE — 85025 COMPLETE CBC W/AUTO DIFF WBC: CPT

## 2021-01-17 PROCEDURE — 80069 RENAL FUNCTION PANEL: CPT

## 2021-01-17 PROCEDURE — 83735 ASSAY OF MAGNESIUM: CPT

## 2021-01-17 PROCEDURE — 86920 COMPATIBILITY TEST SPIN: CPT

## 2021-01-17 PROCEDURE — 85027 COMPLETE CBC AUTOMATED: CPT

## 2021-01-17 PROCEDURE — 63600175 PHARM REV CODE 636 W HCPCS: Performed by: PHYSICIAN ASSISTANT

## 2021-01-17 PROCEDURE — 99233 SBSQ HOSP IP/OBS HIGH 50: CPT | Mod: GC,,, | Performed by: INTERNAL MEDICINE

## 2021-01-17 PROCEDURE — 99233 PR SUBSEQUENT HOSPITAL CARE,LEVL III: ICD-10-PCS | Mod: GC,,, | Performed by: INTERNAL MEDICINE

## 2021-01-17 PROCEDURE — P9016 RBC LEUKOCYTES REDUCED: HCPCS

## 2021-01-17 PROCEDURE — 25000003 PHARM REV CODE 250: Performed by: PHYSICIAN ASSISTANT

## 2021-01-17 PROCEDURE — 86900 BLOOD TYPING SEROLOGIC ABO: CPT

## 2021-01-17 PROCEDURE — 36415 COLL VENOUS BLD VENIPUNCTURE: CPT

## 2021-01-17 PROCEDURE — 80197 ASSAY OF TACROLIMUS: CPT

## 2021-01-17 PROCEDURE — 80069 RENAL FUNCTION PANEL: CPT | Mod: 91

## 2021-01-17 PROCEDURE — 25000003 PHARM REV CODE 250: Performed by: NURSE PRACTITIONER

## 2021-01-17 RX ORDER — HYDROCODONE BITARTRATE AND ACETAMINOPHEN 500; 5 MG/1; MG/1
TABLET ORAL
Status: DISCONTINUED | OUTPATIENT
Start: 2021-01-17 | End: 2021-01-19

## 2021-01-17 RX ADMIN — CALCIUM 1000 MG: 500 TABLET ORAL at 08:01

## 2021-01-17 RX ADMIN — NIFEDIPINE 30 MG: 30 TABLET, FILM COATED, EXTENDED RELEASE ORAL at 08:01

## 2021-01-17 RX ADMIN — PANTOPRAZOLE SODIUM 40 MG: 40 TABLET, DELAYED RELEASE ORAL at 08:01

## 2021-01-17 RX ADMIN — OXYBUTYNIN CHLORIDE 5 MG: 5 TABLET ORAL at 08:01

## 2021-01-17 RX ADMIN — DOCUSATE SODIUM 100 MG: 100 CAPSULE, LIQUID FILLED ORAL at 08:01

## 2021-01-17 RX ADMIN — SULFAMETHOXAZOLE AND TRIMETHOPRIM 1 TABLET: 400; 80 TABLET ORAL at 08:01

## 2021-01-17 RX ADMIN — LABETALOL HYDROCHLORIDE 200 MG: 100 TABLET, FILM COATED ORAL at 08:01

## 2021-01-17 RX ADMIN — OXYCODONE HYDROCHLORIDE AND ACETAMINOPHEN 1 TABLET: 10; 325 TABLET ORAL at 02:01

## 2021-01-17 RX ADMIN — OXYCODONE HYDROCHLORIDE AND ACETAMINOPHEN 1 TABLET: 5; 325 TABLET ORAL at 06:01

## 2021-01-17 RX ADMIN — CEFTRIAXONE SODIUM 1 G: 1 INJECTION, POWDER, FOR SOLUTION INTRAMUSCULAR; INTRAVENOUS at 06:01

## 2021-01-17 RX ADMIN — OXYCODONE HYDROCHLORIDE AND ACETAMINOPHEN 1 TABLET: 10; 325 TABLET ORAL at 07:01

## 2021-01-17 RX ADMIN — NYSTATIN 500000 UNITS: 100000 SUSPENSION ORAL at 08:01

## 2021-01-17 RX ADMIN — SODIUM BICARBONATE 650 MG TABLET 650 MG: at 08:01

## 2021-01-17 RX ADMIN — NYSTATIN 500000 UNITS: 100000 SUSPENSION ORAL at 04:01

## 2021-01-17 RX ADMIN — NYSTATIN 500000 UNITS: 100000 SUSPENSION ORAL at 12:01

## 2021-01-17 RX ADMIN — OXYCODONE HYDROCHLORIDE AND ACETAMINOPHEN 1 TABLET: 10; 325 TABLET ORAL at 03:01

## 2021-01-17 RX ADMIN — PREDNISONE 20 MG: 20 TABLET ORAL at 08:01

## 2021-01-17 RX ADMIN — OXYBUTYNIN CHLORIDE 5 MG: 5 TABLET ORAL at 02:01

## 2021-01-18 LAB
ALBUMIN SERPL BCP-MCNC: 2.9 G/DL (ref 3.5–5.2)
ANION GAP SERPL CALC-SCNC: 12 MMOL/L (ref 8–16)
ANISOCYTOSIS BLD QL SMEAR: SLIGHT
BACTERIA UR CULT: ABNORMAL
BACTERIA UR CULT: ABNORMAL
BASOPHILS # BLD AUTO: ABNORMAL K/UL (ref 0–0.2)
BASOPHILS NFR BLD: 0 % (ref 0–1.9)
BLD PROD TYP BPU: NORMAL
BLOOD UNIT EXPIRATION DATE: NORMAL
BLOOD UNIT TYPE CODE: 5100
BLOOD UNIT TYPE: NORMAL
BUN SERPL-MCNC: 32 MG/DL (ref 6–20)
CALCIUM SERPL-MCNC: 8.5 MG/DL (ref 8.7–10.5)
CHLORIDE SERPL-SCNC: 109 MMOL/L (ref 95–110)
CO2 SERPL-SCNC: 18 MMOL/L (ref 23–29)
CODING SYSTEM: NORMAL
CREAT SERPL-MCNC: 3.6 MG/DL (ref 0.5–1.4)
DIFFERENTIAL METHOD: ABNORMAL
DISPENSE STATUS: NORMAL
EOSINOPHIL # BLD AUTO: ABNORMAL K/UL (ref 0–0.5)
EOSINOPHIL NFR BLD: 1 % (ref 0–8)
ERYTHROCYTE [DISTWIDTH] IN BLOOD BY AUTOMATED COUNT: 19.4 % (ref 11.5–14.5)
EST. GFR  (AFRICAN AMERICAN): 18.7 ML/MIN/1.73 M^2
EST. GFR  (NON AFRICAN AMERICAN): 16.2 ML/MIN/1.73 M^2
GLUCOSE SERPL-MCNC: 95 MG/DL (ref 70–110)
HCT VFR BLD AUTO: 30.1 % (ref 37–48.5)
HGB BLD-MCNC: 9.3 G/DL (ref 12–16)
HYPOCHROMIA BLD QL SMEAR: ABNORMAL
IMM GRANULOCYTES # BLD AUTO: ABNORMAL K/UL (ref 0–0.04)
IMM GRANULOCYTES NFR BLD AUTO: ABNORMAL % (ref 0–0.5)
LYMPHOCYTES # BLD AUTO: ABNORMAL K/UL (ref 1–4.8)
LYMPHOCYTES NFR BLD: 5 % (ref 18–48)
MAGNESIUM SERPL-MCNC: 1.5 MG/DL (ref 1.6–2.6)
MCH RBC QN AUTO: 31.6 PG (ref 27–31)
MCHC RBC AUTO-ENTMCNC: 30.9 G/DL (ref 32–36)
MCV RBC AUTO: 102 FL (ref 82–98)
MONOCYTES # BLD AUTO: ABNORMAL K/UL (ref 0.3–1)
MONOCYTES NFR BLD: 6 % (ref 4–15)
NEUTROPHILS NFR BLD: 88 % (ref 38–73)
NRBC BLD-RTO: 0 /100 WBC
NUM UNITS TRANS PACKED RBC: NORMAL
OVALOCYTES BLD QL SMEAR: ABNORMAL
PHOSPHATE SERPL-MCNC: 2.8 MG/DL (ref 2.7–4.5)
PLATELET # BLD AUTO: 197 K/UL (ref 150–350)
PLATELET BLD QL SMEAR: ABNORMAL
PMV BLD AUTO: 11.2 FL (ref 9.2–12.9)
POIKILOCYTOSIS BLD QL SMEAR: SLIGHT
POLYCHROMASIA BLD QL SMEAR: ABNORMAL
POTASSIUM SERPL-SCNC: 3.7 MMOL/L (ref 3.5–5.1)
RBC # BLD AUTO: 2.94 M/UL (ref 4–5.4)
SODIUM SERPL-SCNC: 139 MMOL/L (ref 136–145)
TACROLIMUS BLD-MCNC: 5.8 NG/ML (ref 5–15)
WBC # BLD AUTO: 7.16 K/UL (ref 3.9–12.7)

## 2021-01-18 PROCEDURE — 99233 PR SUBSEQUENT HOSPITAL CARE,LEVL III: ICD-10-PCS | Mod: GC,,, | Performed by: INTERNAL MEDICINE

## 2021-01-18 PROCEDURE — 25000003 PHARM REV CODE 250: Performed by: PHYSICIAN ASSISTANT

## 2021-01-18 PROCEDURE — 20600001 HC STEP DOWN PRIVATE ROOM

## 2021-01-18 PROCEDURE — 63600175 PHARM REV CODE 636 W HCPCS: Performed by: TRANSPLANT SURGERY

## 2021-01-18 PROCEDURE — 25000003 PHARM REV CODE 250: Performed by: NURSE PRACTITIONER

## 2021-01-18 PROCEDURE — 63600175 PHARM REV CODE 636 W HCPCS: Performed by: PHYSICIAN ASSISTANT

## 2021-01-18 PROCEDURE — 80197 ASSAY OF TACROLIMUS: CPT

## 2021-01-18 PROCEDURE — 63600175 PHARM REV CODE 636 W HCPCS: Performed by: INTERNAL MEDICINE

## 2021-01-18 PROCEDURE — 99233 SBSQ HOSP IP/OBS HIGH 50: CPT | Mod: GC,,, | Performed by: INTERNAL MEDICINE

## 2021-01-18 PROCEDURE — 25000003 PHARM REV CODE 250: Performed by: INTERNAL MEDICINE

## 2021-01-18 PROCEDURE — 36415 COLL VENOUS BLD VENIPUNCTURE: CPT

## 2021-01-18 PROCEDURE — 83735 ASSAY OF MAGNESIUM: CPT

## 2021-01-18 PROCEDURE — 85007 BL SMEAR W/DIFF WBC COUNT: CPT

## 2021-01-18 PROCEDURE — 80069 RENAL FUNCTION PANEL: CPT

## 2021-01-18 PROCEDURE — 85027 COMPLETE CBC AUTOMATED: CPT

## 2021-01-18 RX ORDER — TACROLIMUS 1 MG/1
4 CAPSULE ORAL 2 TIMES DAILY
Status: DISCONTINUED | OUTPATIENT
Start: 2021-01-18 | End: 2021-01-19

## 2021-01-18 RX ORDER — SODIUM BICARBONATE 650 MG/1
1300 TABLET ORAL 2 TIMES DAILY
Status: DISCONTINUED | OUTPATIENT
Start: 2021-01-18 | End: 2021-01-20

## 2021-01-18 RX ADMIN — SODIUM CHLORIDE 100 ML/HR: 0.9 INJECTION, SOLUTION INTRAVENOUS at 11:01

## 2021-01-18 RX ADMIN — VALGANCICLOVIR 450 MG: 450 TABLET, FILM COATED ORAL at 05:01

## 2021-01-18 RX ADMIN — NYSTATIN 500000 UNITS: 100000 SUSPENSION ORAL at 05:01

## 2021-01-18 RX ADMIN — OXYCODONE HYDROCHLORIDE AND ACETAMINOPHEN 1 TABLET: 10; 325 TABLET ORAL at 09:01

## 2021-01-18 RX ADMIN — CALCIUM 1000 MG: 500 TABLET ORAL at 09:01

## 2021-01-18 RX ADMIN — OXYCODONE HYDROCHLORIDE AND ACETAMINOPHEN 1 TABLET: 5; 325 TABLET ORAL at 08:01

## 2021-01-18 RX ADMIN — OXYBUTYNIN CHLORIDE 5 MG: 5 TABLET ORAL at 08:01

## 2021-01-18 RX ADMIN — LABETALOL HYDROCHLORIDE 200 MG: 100 TABLET, FILM COATED ORAL at 08:01

## 2021-01-18 RX ADMIN — DOCUSATE SODIUM 100 MG: 100 CAPSULE, LIQUID FILLED ORAL at 09:01

## 2021-01-18 RX ADMIN — NIFEDIPINE 30 MG: 30 TABLET, FILM COATED, EXTENDED RELEASE ORAL at 08:01

## 2021-01-18 RX ADMIN — CEFTRIAXONE SODIUM 1 G: 1 INJECTION, POWDER, FOR SOLUTION INTRAMUSCULAR; INTRAVENOUS at 05:01

## 2021-01-18 RX ADMIN — SODIUM BICARBONATE 650 MG TABLET 1300 MG: at 09:01

## 2021-01-18 RX ADMIN — PANTOPRAZOLE SODIUM 40 MG: 40 TABLET, DELAYED RELEASE ORAL at 08:01

## 2021-01-18 RX ADMIN — DOCUSATE SODIUM 100 MG: 100 CAPSULE, LIQUID FILLED ORAL at 08:01

## 2021-01-18 RX ADMIN — VANCOMYCIN HYDROCHLORIDE 1750 MG: 750 INJECTION, POWDER, LYOPHILIZED, FOR SOLUTION INTRAVENOUS at 11:01

## 2021-01-18 RX ADMIN — SULFAMETHOXAZOLE AND TRIMETHOPRIM 1 TABLET: 400; 80 TABLET ORAL at 08:01

## 2021-01-18 RX ADMIN — NIFEDIPINE 30 MG: 30 TABLET, FILM COATED, EXTENDED RELEASE ORAL at 09:01

## 2021-01-18 RX ADMIN — SODIUM CHLORIDE 100 ML/HR: 0.9 INJECTION, SOLUTION INTRAVENOUS at 03:01

## 2021-01-18 RX ADMIN — TACROLIMUS 4 MG: 1 CAPSULE ORAL at 05:01

## 2021-01-18 RX ADMIN — CALCIUM 1000 MG: 500 TABLET ORAL at 08:01

## 2021-01-18 RX ADMIN — OXYBUTYNIN CHLORIDE 5 MG: 5 TABLET ORAL at 03:01

## 2021-01-18 RX ADMIN — NYSTATIN 500000 UNITS: 100000 SUSPENSION ORAL at 11:01

## 2021-01-18 RX ADMIN — NYSTATIN 500000 UNITS: 100000 SUSPENSION ORAL at 08:01

## 2021-01-18 RX ADMIN — OXYBUTYNIN CHLORIDE 5 MG: 5 TABLET ORAL at 09:01

## 2021-01-18 RX ADMIN — SODIUM BICARBONATE 650 MG TABLET 650 MG: at 08:01

## 2021-01-18 RX ADMIN — LABETALOL HYDROCHLORIDE 200 MG: 100 TABLET, FILM COATED ORAL at 09:01

## 2021-01-18 RX ADMIN — PREDNISONE 20 MG: 20 TABLET ORAL at 08:01

## 2021-01-19 LAB
ALBUMIN SERPL BCP-MCNC: 2.8 G/DL (ref 3.5–5.2)
ANION GAP SERPL CALC-SCNC: 11 MMOL/L (ref 8–16)
ANISOCYTOSIS BLD QL SMEAR: SLIGHT
BASOPHILS NFR BLD: 1 % (ref 0–1.9)
BUN SERPL-MCNC: 28 MG/DL (ref 6–20)
CALCIUM SERPL-MCNC: 8.1 MG/DL (ref 8.7–10.5)
CHLORIDE SERPL-SCNC: 111 MMOL/L (ref 95–110)
CO2 SERPL-SCNC: 19 MMOL/L (ref 23–29)
CREAT SERPL-MCNC: 2.8 MG/DL (ref 0.5–1.4)
DIFFERENTIAL METHOD: ABNORMAL
EOSINOPHIL NFR BLD: 1 % (ref 0–8)
ERYTHROCYTE [DISTWIDTH] IN BLOOD BY AUTOMATED COUNT: 19 % (ref 11.5–14.5)
EST. GFR  (AFRICAN AMERICAN): 25.3 ML/MIN/1.73 M^2
EST. GFR  (NON AFRICAN AMERICAN): 22 ML/MIN/1.73 M^2
GLUCOSE SERPL-MCNC: 81 MG/DL (ref 70–110)
HCT VFR BLD AUTO: 26.8 % (ref 37–48.5)
HGB BLD-MCNC: 8.9 G/DL (ref 12–16)
IMM GRANULOCYTES # BLD AUTO: ABNORMAL K/UL (ref 0–0.04)
IMM GRANULOCYTES NFR BLD AUTO: ABNORMAL % (ref 0–0.5)
LYMPHOCYTES NFR BLD: 3 % (ref 18–48)
MAGNESIUM SERPL-MCNC: 1.4 MG/DL (ref 1.6–2.6)
MCH RBC QN AUTO: 31.8 PG (ref 27–31)
MCHC RBC AUTO-ENTMCNC: 33.2 G/DL (ref 32–36)
MCV RBC AUTO: 96 FL (ref 82–98)
METAMYELOCYTES NFR BLD MANUAL: 1 %
MONOCYTES NFR BLD: 4 % (ref 4–15)
MYELOCYTES NFR BLD MANUAL: 1 %
NEUTROPHILS NFR BLD: 87 % (ref 38–73)
NEUTS BAND NFR BLD MANUAL: 2 %
NRBC BLD-RTO: 0 /100 WBC
PHOSPHATE SERPL-MCNC: 2.7 MG/DL (ref 2.7–4.5)
PLATELET # BLD AUTO: 249 K/UL (ref 150–350)
PLATELET BLD QL SMEAR: ABNORMAL
PMV BLD AUTO: 10.6 FL (ref 9.2–12.9)
POLYCHROMASIA BLD QL SMEAR: ABNORMAL
POTASSIUM SERPL-SCNC: 3.6 MMOL/L (ref 3.5–5.1)
RBC # BLD AUTO: 2.8 M/UL (ref 4–5.4)
SODIUM SERPL-SCNC: 141 MMOL/L (ref 136–145)
TACROLIMUS BLD-MCNC: 6.7 NG/ML (ref 5–15)
VANCOMYCIN SERPL-MCNC: 15.9 UG/ML
WBC # BLD AUTO: 7.56 K/UL (ref 3.9–12.7)

## 2021-01-19 PROCEDURE — 36415 COLL VENOUS BLD VENIPUNCTURE: CPT

## 2021-01-19 PROCEDURE — 20600001 HC STEP DOWN PRIVATE ROOM

## 2021-01-19 PROCEDURE — 80197 ASSAY OF TACROLIMUS: CPT

## 2021-01-19 PROCEDURE — A4216 STERILE WATER/SALINE, 10 ML: HCPCS | Performed by: PHYSICIAN ASSISTANT

## 2021-01-19 PROCEDURE — 25000003 PHARM REV CODE 250: Performed by: PHYSICIAN ASSISTANT

## 2021-01-19 PROCEDURE — 25000003 PHARM REV CODE 250: Performed by: INTERNAL MEDICINE

## 2021-01-19 PROCEDURE — 63600175 PHARM REV CODE 636 W HCPCS: Performed by: INTERNAL MEDICINE

## 2021-01-19 PROCEDURE — 83735 ASSAY OF MAGNESIUM: CPT

## 2021-01-19 PROCEDURE — 63600175 PHARM REV CODE 636 W HCPCS: Performed by: NURSE PRACTITIONER

## 2021-01-19 PROCEDURE — 80202 ASSAY OF VANCOMYCIN: CPT

## 2021-01-19 PROCEDURE — 80069 RENAL FUNCTION PANEL: CPT

## 2021-01-19 PROCEDURE — 85027 COMPLETE CBC AUTOMATED: CPT

## 2021-01-19 PROCEDURE — 63600175 PHARM REV CODE 636 W HCPCS: Performed by: PHYSICIAN ASSISTANT

## 2021-01-19 PROCEDURE — 25000003 PHARM REV CODE 250: Performed by: NURSE PRACTITIONER

## 2021-01-19 PROCEDURE — 99233 SBSQ HOSP IP/OBS HIGH 50: CPT | Mod: ,,, | Performed by: NURSE PRACTITIONER

## 2021-01-19 PROCEDURE — 99233 PR SUBSEQUENT HOSPITAL CARE,LEVL III: ICD-10-PCS | Mod: ,,, | Performed by: NURSE PRACTITIONER

## 2021-01-19 PROCEDURE — 85007 BL SMEAR W/DIFF WBC COUNT: CPT

## 2021-01-19 RX ORDER — SODIUM CHLORIDE 9 MG/ML
INJECTION, SOLUTION INTRAVENOUS CONTINUOUS
Status: DISCONTINUED | OUTPATIENT
Start: 2021-01-19 | End: 2021-01-19

## 2021-01-19 RX ORDER — BISACODYL 10 MG
10 SUPPOSITORY, RECTAL RECTAL ONCE
Status: COMPLETED | OUTPATIENT
Start: 2021-01-19 | End: 2021-01-19

## 2021-01-19 RX ORDER — TACROLIMUS 1 MG/1
5 CAPSULE ORAL 2 TIMES DAILY
Status: DISCONTINUED | OUTPATIENT
Start: 2021-01-19 | End: 2021-01-20 | Stop reason: HOSPADM

## 2021-01-19 RX ORDER — CEFPODOXIME PROXETIL 200 MG/1
200 TABLET, FILM COATED ORAL EVERY 12 HOURS
Status: DISCONTINUED | OUTPATIENT
Start: 2021-01-19 | End: 2021-01-20 | Stop reason: HOSPADM

## 2021-01-19 RX ORDER — MAGNESIUM SULFATE HEPTAHYDRATE 40 MG/ML
2 INJECTION, SOLUTION INTRAVENOUS ONCE
Status: COMPLETED | OUTPATIENT
Start: 2021-01-19 | End: 2021-01-19

## 2021-01-19 RX ORDER — HEPARIN SODIUM 5000 [USP'U]/ML
5000 INJECTION, SOLUTION INTRAVENOUS; SUBCUTANEOUS EVERY 8 HOURS
Status: DISCONTINUED | OUTPATIENT
Start: 2021-01-19 | End: 2021-01-20 | Stop reason: HOSPADM

## 2021-01-19 RX ORDER — AMOXICILLIN 875 MG/1
875 TABLET, FILM COATED ORAL EVERY 12 HOURS
Status: DISCONTINUED | OUTPATIENT
Start: 2021-01-19 | End: 2021-01-20 | Stop reason: HOSPADM

## 2021-01-19 RX ORDER — SODIUM BICARBONATE 650 MG/1
1300 TABLET ORAL 2 TIMES DAILY
Status: DISCONTINUED | OUTPATIENT
Start: 2021-01-19 | End: 2021-01-19 | Stop reason: SDUPTHER

## 2021-01-19 RX ADMIN — MAGNESIUM SULFATE 2 G: 2 INJECTION INTRAVENOUS at 10:01

## 2021-01-19 RX ADMIN — DOCUSATE SODIUM 100 MG: 100 CAPSULE, LIQUID FILLED ORAL at 08:01

## 2021-01-19 RX ADMIN — HEPARIN SODIUM 5000 UNITS: 5000 INJECTION INTRAVENOUS; SUBCUTANEOUS at 08:01

## 2021-01-19 RX ADMIN — SODIUM CHLORIDE 10 ML: 9 INJECTION, SOLUTION INTRAMUSCULAR; INTRAVENOUS; SUBCUTANEOUS at 05:01

## 2021-01-19 RX ADMIN — TACROLIMUS 5 MG: 1 CAPSULE ORAL at 05:01

## 2021-01-19 RX ADMIN — OXYBUTYNIN CHLORIDE 5 MG: 5 TABLET ORAL at 08:01

## 2021-01-19 RX ADMIN — NYSTATIN 500000 UNITS: 100000 SUSPENSION ORAL at 05:01

## 2021-01-19 RX ADMIN — CALCIUM 1000 MG: 500 TABLET ORAL at 08:01

## 2021-01-19 RX ADMIN — CEFPODOXIME PROXETIL 200 MG: 200 TABLET, FILM COATED ORAL at 08:01

## 2021-01-19 RX ADMIN — PREDNISONE 20 MG: 20 TABLET ORAL at 08:01

## 2021-01-19 RX ADMIN — NIFEDIPINE 30 MG: 30 TABLET, FILM COATED, EXTENDED RELEASE ORAL at 08:01

## 2021-01-19 RX ADMIN — CALCIUM 1000 MG: 500 TABLET ORAL at 09:01

## 2021-01-19 RX ADMIN — SODIUM BICARBONATE 650 MG TABLET 1300 MG: at 08:01

## 2021-01-19 RX ADMIN — CEFPODOXIME PROXETIL 200 MG: 200 TABLET, FILM COATED ORAL at 12:01

## 2021-01-19 RX ADMIN — BISACODYL 10 MG: 10 SUPPOSITORY RECTAL at 12:01

## 2021-01-19 RX ADMIN — LABETALOL HYDROCHLORIDE 200 MG: 100 TABLET, FILM COATED ORAL at 08:01

## 2021-01-19 RX ADMIN — PANTOPRAZOLE SODIUM 40 MG: 40 TABLET, DELAYED RELEASE ORAL at 08:01

## 2021-01-19 RX ADMIN — HEPARIN SODIUM 5000 UNITS: 5000 INJECTION INTRAVENOUS; SUBCUTANEOUS at 02:01

## 2021-01-19 RX ADMIN — SULFAMETHOXAZOLE AND TRIMETHOPRIM 1 TABLET: 400; 80 TABLET ORAL at 08:01

## 2021-01-19 RX ADMIN — NYSTATIN 500000 UNITS: 100000 SUSPENSION ORAL at 12:01

## 2021-01-19 RX ADMIN — AMOXICILLIN 875 MG: 875 TABLET, FILM COATED ORAL at 08:01

## 2021-01-19 RX ADMIN — TACROLIMUS 4 MG: 1 CAPSULE ORAL at 08:01

## 2021-01-19 RX ADMIN — AMOXICILLIN 875 MG: 875 TABLET, FILM COATED ORAL at 12:01

## 2021-01-19 RX ADMIN — OXYBUTYNIN CHLORIDE 5 MG: 5 TABLET ORAL at 02:01

## 2021-01-20 VITALS
BODY MASS INDEX: 43.22 KG/M2 | RESPIRATION RATE: 17 BRPM | HEIGHT: 66 IN | OXYGEN SATURATION: 97 % | SYSTOLIC BLOOD PRESSURE: 119 MMHG | TEMPERATURE: 98 F | WEIGHT: 268.94 LBS | DIASTOLIC BLOOD PRESSURE: 68 MMHG | HEART RATE: 80 BPM

## 2021-01-20 PROBLEM — R10.9 ABDOMINAL PAIN: Status: RESOLVED | Noted: 2021-01-16 | Resolved: 2021-01-20

## 2021-01-20 PROBLEM — D62 ACUTE BLOOD LOSS ANEMIA: Status: RESOLVED | Noted: 2021-01-13 | Resolved: 2021-01-20

## 2021-01-20 LAB
ALBUMIN SERPL BCP-MCNC: 3 G/DL (ref 3.5–5.2)
ANION GAP SERPL CALC-SCNC: 11 MMOL/L (ref 8–16)
BASOPHILS # BLD AUTO: 0.04 K/UL (ref 0–0.2)
BASOPHILS NFR BLD: 0.5 % (ref 0–1.9)
BUN SERPL-MCNC: 22 MG/DL (ref 6–20)
CALCIUM SERPL-MCNC: 8.2 MG/DL (ref 8.7–10.5)
CHLORIDE SERPL-SCNC: 108 MMOL/L (ref 95–110)
CO2 SERPL-SCNC: 19 MMOL/L (ref 23–29)
CREAT SERPL-MCNC: 2.3 MG/DL (ref 0.5–1.4)
DIFFERENTIAL METHOD: ABNORMAL
EOSINOPHIL # BLD AUTO: 0.2 K/UL (ref 0–0.5)
EOSINOPHIL NFR BLD: 1.9 % (ref 0–8)
ERYTHROCYTE [DISTWIDTH] IN BLOOD BY AUTOMATED COUNT: 18.6 % (ref 11.5–14.5)
EST. GFR  (AFRICAN AMERICAN): 32.1 ML/MIN/1.73 M^2
EST. GFR  (NON AFRICAN AMERICAN): 27.9 ML/MIN/1.73 M^2
FOLATE SERPL-MCNC: 5.7 NG/ML (ref 4–24)
GLUCOSE SERPL-MCNC: 84 MG/DL (ref 70–110)
HCT VFR BLD AUTO: 29.2 % (ref 37–48.5)
HGB BLD-MCNC: 9.2 G/DL (ref 12–16)
IMM GRANULOCYTES # BLD AUTO: 0.35 K/UL (ref 0–0.04)
IMM GRANULOCYTES NFR BLD AUTO: 4.4 % (ref 0–0.5)
LYMPHOCYTES # BLD AUTO: 0.3 K/UL (ref 1–4.8)
LYMPHOCYTES NFR BLD: 4.1 % (ref 18–48)
MAGNESIUM SERPL-MCNC: 1.4 MG/DL (ref 1.6–2.6)
MCH RBC QN AUTO: 31.3 PG (ref 27–31)
MCHC RBC AUTO-ENTMCNC: 31.5 G/DL (ref 32–36)
MCV RBC AUTO: 99 FL (ref 82–98)
MONOCYTES # BLD AUTO: 0.4 K/UL (ref 0.3–1)
MONOCYTES NFR BLD: 4.9 % (ref 4–15)
NEUTROPHILS # BLD AUTO: 6.6 K/UL (ref 1.8–7.7)
NEUTROPHILS NFR BLD: 84.2 % (ref 38–73)
NRBC BLD-RTO: 0 /100 WBC
PHOSPHATE SERPL-MCNC: 2.7 MG/DL (ref 2.7–4.5)
PLATELET # BLD AUTO: 283 K/UL (ref 150–350)
PMV BLD AUTO: 10.4 FL (ref 9.2–12.9)
POTASSIUM SERPL-SCNC: 3.5 MMOL/L (ref 3.5–5.1)
RBC # BLD AUTO: 2.94 M/UL (ref 4–5.4)
SODIUM SERPL-SCNC: 138 MMOL/L (ref 136–145)
TACROLIMUS BLD-MCNC: 8.2 NG/ML (ref 5–15)
VIT B12 SERPL-MCNC: 377 PG/ML (ref 210–950)
WBC # BLD AUTO: 7.89 K/UL (ref 3.9–12.7)

## 2021-01-20 PROCEDURE — 25000003 PHARM REV CODE 250: Performed by: PHYSICIAN ASSISTANT

## 2021-01-20 PROCEDURE — 63600175 PHARM REV CODE 636 W HCPCS: Performed by: PHYSICIAN ASSISTANT

## 2021-01-20 PROCEDURE — 25000003 PHARM REV CODE 250: Performed by: INTERNAL MEDICINE

## 2021-01-20 PROCEDURE — 80197 ASSAY OF TACROLIMUS: CPT

## 2021-01-20 PROCEDURE — 82746 ASSAY OF FOLIC ACID SERUM: CPT

## 2021-01-20 PROCEDURE — 63600175 PHARM REV CODE 636 W HCPCS: Performed by: INTERNAL MEDICINE

## 2021-01-20 PROCEDURE — 82607 VITAMIN B-12: CPT

## 2021-01-20 PROCEDURE — 80069 RENAL FUNCTION PANEL: CPT

## 2021-01-20 PROCEDURE — 99239 PR HOSPITAL DISCHARGE DAY,>30 MIN: ICD-10-PCS | Mod: 24,,, | Performed by: PHYSICIAN ASSISTANT

## 2021-01-20 PROCEDURE — 25000003 PHARM REV CODE 250: Performed by: NURSE PRACTITIONER

## 2021-01-20 PROCEDURE — 85025 COMPLETE CBC W/AUTO DIFF WBC: CPT

## 2021-01-20 PROCEDURE — 99239 HOSP IP/OBS DSCHRG MGMT >30: CPT | Mod: 24,,, | Performed by: PHYSICIAN ASSISTANT

## 2021-01-20 PROCEDURE — 36415 COLL VENOUS BLD VENIPUNCTURE: CPT

## 2021-01-20 PROCEDURE — 83735 ASSAY OF MAGNESIUM: CPT

## 2021-01-20 RX ORDER — AMOXICILLIN 875 MG/1
875 TABLET, FILM COATED ORAL EVERY 12 HOURS
Qty: 24 TABLET | Refills: 0 | Status: SHIPPED | OUTPATIENT
Start: 2021-01-20 | End: 2021-02-01

## 2021-01-20 RX ORDER — SODIUM BICARBONATE 650 MG/1
1950 TABLET ORAL 2 TIMES DAILY
Status: DISCONTINUED | OUTPATIENT
Start: 2021-01-20 | End: 2021-01-20 | Stop reason: HOSPADM

## 2021-01-20 RX ORDER — CEFPODOXIME PROXETIL 200 MG/1
200 TABLET, FILM COATED ORAL EVERY 12 HOURS
Qty: 26 TABLET | Refills: 0 | Status: SHIPPED | OUTPATIENT
Start: 2021-01-20 | End: 2021-01-30 | Stop reason: SDUPTHER

## 2021-01-20 RX ORDER — VALGANCICLOVIR 450 MG/1
450 TABLET, FILM COATED ORAL DAILY
Qty: 30 TABLET | Refills: 2 | Status: ON HOLD | OUTPATIENT
Start: 2021-01-20 | End: 2021-02-14 | Stop reason: SDUPTHER

## 2021-01-20 RX ORDER — TACROLIMUS 1 MG/1
5 CAPSULE ORAL EVERY 12 HOURS
Qty: 300 CAPSULE | Refills: 11 | Status: SHIPPED | OUTPATIENT
Start: 2021-01-20 | End: 2021-01-22 | Stop reason: SDUPTHER

## 2021-01-20 RX ORDER — MYCOPHENOLATE MOFETIL 250 MG/1
500 CAPSULE ORAL 2 TIMES DAILY
Qty: 120 CAPSULE | Refills: 11 | Status: SHIPPED | OUTPATIENT
Start: 2021-01-20 | End: 2021-01-27 | Stop reason: SDUPTHER

## 2021-01-20 RX ORDER — MAGNESIUM SULFATE HEPTAHYDRATE 40 MG/ML
2 INJECTION, SOLUTION INTRAVENOUS ONCE
Status: COMPLETED | OUTPATIENT
Start: 2021-01-20 | End: 2021-01-20

## 2021-01-20 RX ORDER — LANOLIN ALCOHOL/MO/W.PET/CERES
400 CREAM (GRAM) TOPICAL 2 TIMES DAILY
Qty: 60 TABLET | Refills: 0 | COMMUNITY
Start: 2021-01-20 | End: 2021-01-23 | Stop reason: SDUPTHER

## 2021-01-20 RX ORDER — LANOLIN ALCOHOL/MO/W.PET/CERES
400 CREAM (GRAM) TOPICAL 2 TIMES DAILY
Status: DISCONTINUED | OUTPATIENT
Start: 2021-01-20 | End: 2021-01-20 | Stop reason: HOSPADM

## 2021-01-20 RX ADMIN — MAGNESIUM SULFATE 2 G: 2 INJECTION INTRAVENOUS at 11:01

## 2021-01-20 RX ADMIN — NYSTATIN 500000 UNITS: 100000 SUSPENSION ORAL at 02:01

## 2021-01-20 RX ADMIN — SULFAMETHOXAZOLE AND TRIMETHOPRIM 1 TABLET: 400; 80 TABLET ORAL at 08:01

## 2021-01-20 RX ADMIN — SODIUM BICARBONATE 650 MG TABLET 1300 MG: at 08:01

## 2021-01-20 RX ADMIN — OXYBUTYNIN CHLORIDE 5 MG: 5 TABLET ORAL at 02:01

## 2021-01-20 RX ADMIN — PREDNISONE 20 MG: 20 TABLET ORAL at 08:01

## 2021-01-20 RX ADMIN — NYSTATIN 500000 UNITS: 100000 SUSPENSION ORAL at 08:01

## 2021-01-20 RX ADMIN — TACROLIMUS 5 MG: 1 CAPSULE ORAL at 08:01

## 2021-01-20 RX ADMIN — LABETALOL HYDROCHLORIDE 200 MG: 100 TABLET, FILM COATED ORAL at 08:01

## 2021-01-20 RX ADMIN — DOCUSATE SODIUM 100 MG: 100 CAPSULE, LIQUID FILLED ORAL at 08:01

## 2021-01-20 RX ADMIN — CEFPODOXIME PROXETIL 200 MG: 200 TABLET, FILM COATED ORAL at 08:01

## 2021-01-20 RX ADMIN — AMOXICILLIN 875 MG: 875 TABLET, FILM COATED ORAL at 08:01

## 2021-01-20 RX ADMIN — OXYBUTYNIN CHLORIDE 5 MG: 5 TABLET ORAL at 08:01

## 2021-01-20 RX ADMIN — PANTOPRAZOLE SODIUM 40 MG: 40 TABLET, DELAYED RELEASE ORAL at 08:01

## 2021-01-20 RX ADMIN — Medication 400 MG: at 11:01

## 2021-01-20 RX ADMIN — NIFEDIPINE 30 MG: 30 TABLET, FILM COATED, EXTENDED RELEASE ORAL at 08:01

## 2021-01-20 RX ADMIN — CALCIUM 1000 MG: 500 TABLET ORAL at 08:01

## 2021-01-21 LAB
BACTERIA BLD CULT: NORMAL
BACTERIA BLD CULT: NORMAL

## 2021-01-22 ENCOUNTER — HOSPITAL ENCOUNTER (OUTPATIENT)
Dept: RADIOLOGY | Facility: HOSPITAL | Age: 31
Discharge: HOME | End: 2021-01-22
Attending: INTERNAL MEDICINE
Payer: MEDICARE

## 2021-01-22 ENCOUNTER — TELEPHONE (OUTPATIENT)
Dept: TRANSPLANT | Facility: CLINIC | Age: 31
End: 2021-01-22

## 2021-01-22 DIAGNOSIS — D72.829 LEUKOCYTOSIS, UNSPECIFIED TYPE: ICD-10-CM

## 2021-01-22 DIAGNOSIS — Z94.0 KIDNEY REPLACED BY TRANSPLANT: Primary | ICD-10-CM

## 2021-01-22 DIAGNOSIS — N30.01 ACUTE CYSTITIS WITH HEMATURIA: ICD-10-CM

## 2021-01-22 DIAGNOSIS — Z94.0 KIDNEY REPLACED BY TRANSPLANT: ICD-10-CM

## 2021-01-22 PROCEDURE — 76776 US TRANSPLANT KIDNEY WITH DOPPLER: ICD-10-PCS | Mod: 26,,, | Performed by: RADIOLOGY

## 2021-01-22 PROCEDURE — 76776 US EXAM K TRANSPL W/DOPPLER: CPT | Mod: TC

## 2021-01-22 PROCEDURE — 76776 US EXAM K TRANSPL W/DOPPLER: CPT | Mod: 26,,, | Performed by: RADIOLOGY

## 2021-01-22 RX ORDER — TACROLIMUS 1 MG/1
4 CAPSULE ORAL EVERY 12 HOURS
Qty: 240 CAPSULE | Refills: 11 | Status: SHIPPED | OUTPATIENT
Start: 2021-01-22 | End: 2021-01-28 | Stop reason: SDUPTHER

## 2021-01-23 ENCOUNTER — LAB VISIT (OUTPATIENT)
Dept: LAB | Facility: HOSPITAL | Age: 31
End: 2021-01-23
Attending: INTERNAL MEDICINE
Payer: MEDICARE

## 2021-01-23 DIAGNOSIS — Z94.0 KIDNEY REPLACED BY TRANSPLANT: ICD-10-CM

## 2021-01-23 LAB
ALBUMIN SERPL BCP-MCNC: 3.3 G/DL (ref 3.5–5.2)
ANION GAP SERPL CALC-SCNC: 13 MMOL/L (ref 8–16)
BASOPHILS # BLD AUTO: 0.05 K/UL (ref 0–0.2)
BASOPHILS NFR BLD: 0.4 % (ref 0–1.9)
BUN SERPL-MCNC: 25 MG/DL (ref 6–20)
CALCIUM SERPL-MCNC: 8.5 MG/DL (ref 8.7–10.5)
CHLORIDE SERPL-SCNC: 106 MMOL/L (ref 95–110)
CO2 SERPL-SCNC: 18 MMOL/L (ref 23–29)
CREAT SERPL-MCNC: 2.2 MG/DL (ref 0.5–1.4)
DIFFERENTIAL METHOD: ABNORMAL
EOSINOPHIL # BLD AUTO: 0.2 K/UL (ref 0–0.5)
EOSINOPHIL NFR BLD: 1.3 % (ref 0–8)
ERYTHROCYTE [DISTWIDTH] IN BLOOD BY AUTOMATED COUNT: 17.4 % (ref 11.5–14.5)
EST. GFR  (AFRICAN AMERICAN): 33.9 ML/MIN/1.73 M^2
EST. GFR  (NON AFRICAN AMERICAN): 29.4 ML/MIN/1.73 M^2
GLUCOSE SERPL-MCNC: 99 MG/DL (ref 70–110)
HCT VFR BLD AUTO: 32.6 % (ref 37–48.5)
HGB BLD-MCNC: 10.2 G/DL (ref 12–16)
IMM GRANULOCYTES # BLD AUTO: 0.24 K/UL (ref 0–0.04)
IMM GRANULOCYTES NFR BLD AUTO: 2 % (ref 0–0.5)
LYMPHOCYTES # BLD AUTO: 0.4 K/UL (ref 1–4.8)
LYMPHOCYTES NFR BLD: 3.5 % (ref 18–48)
MAGNESIUM SERPL-MCNC: 1.4 MG/DL (ref 1.6–2.6)
MCH RBC QN AUTO: 30.8 PG (ref 27–31)
MCHC RBC AUTO-ENTMCNC: 31.3 G/DL (ref 32–36)
MCV RBC AUTO: 99 FL (ref 82–98)
MONOCYTES # BLD AUTO: 0.5 K/UL (ref 0.3–1)
MONOCYTES NFR BLD: 4.3 % (ref 4–15)
NEUTROPHILS # BLD AUTO: 10.4 K/UL (ref 1.8–7.7)
NEUTROPHILS NFR BLD: 88.5 % (ref 38–73)
NRBC BLD-RTO: 0 /100 WBC
PHOSPHATE SERPL-MCNC: 2.7 MG/DL (ref 2.7–4.5)
PLATELET # BLD AUTO: 335 K/UL (ref 150–350)
PMV BLD AUTO: 10 FL (ref 9.2–12.9)
POTASSIUM SERPL-SCNC: 4.4 MMOL/L (ref 3.5–5.1)
RBC # BLD AUTO: 3.31 M/UL (ref 4–5.4)
SODIUM SERPL-SCNC: 137 MMOL/L (ref 136–145)
TACROLIMUS BLD-MCNC: 8.1 NG/ML (ref 5–15)
WBC # BLD AUTO: 11.77 K/UL (ref 3.9–12.7)

## 2021-01-23 PROCEDURE — 80197 ASSAY OF TACROLIMUS: CPT

## 2021-01-23 PROCEDURE — 85025 COMPLETE CBC W/AUTO DIFF WBC: CPT

## 2021-01-23 PROCEDURE — 36415 COLL VENOUS BLD VENIPUNCTURE: CPT

## 2021-01-23 PROCEDURE — 80069 RENAL FUNCTION PANEL: CPT

## 2021-01-23 PROCEDURE — 83735 ASSAY OF MAGNESIUM: CPT

## 2021-01-23 RX ORDER — SODIUM BICARBONATE 650 MG/1
2600 TABLET ORAL 2 TIMES DAILY
Qty: 240 TABLET | Refills: 11 | Status: SHIPPED | OUTPATIENT
Start: 2021-01-23 | End: 2021-02-09 | Stop reason: SDUPTHER

## 2021-01-23 RX ORDER — LANOLIN ALCOHOL/MO/W.PET/CERES
800 CREAM (GRAM) TOPICAL 2 TIMES DAILY
Qty: 120 TABLET | Refills: 11 | Status: SHIPPED | OUTPATIENT
Start: 2021-01-23 | End: 2021-01-28 | Stop reason: SDUPTHER

## 2021-01-23 RX ORDER — ERGOCALCIFEROL 1.25 MG/1
50000 CAPSULE ORAL
Qty: 1 CAPSULE | Refills: 11 | Status: SHIPPED | OUTPATIENT
Start: 2021-01-23 | End: 2021-02-09 | Stop reason: SDUPTHER

## 2021-01-25 ENCOUNTER — OFFICE VISIT (OUTPATIENT)
Dept: TRANSPLANT | Facility: CLINIC | Age: 31
End: 2021-01-25
Payer: MEDICARE

## 2021-01-25 ENCOUNTER — LAB VISIT (OUTPATIENT)
Dept: LAB | Facility: HOSPITAL | Age: 31
End: 2021-01-25
Attending: INTERNAL MEDICINE
Payer: MEDICARE

## 2021-01-25 VITALS
DIASTOLIC BLOOD PRESSURE: 79 MMHG | HEART RATE: 106 BPM | RESPIRATION RATE: 18 BRPM | HEIGHT: 66 IN | SYSTOLIC BLOOD PRESSURE: 141 MMHG | TEMPERATURE: 99 F | BODY MASS INDEX: 38.44 KG/M2 | WEIGHT: 239.19 LBS | OXYGEN SATURATION: 100 %

## 2021-01-25 DIAGNOSIS — Z91.89 AT RISK FOR OPPORTUNISTIC INFECTIONS: ICD-10-CM

## 2021-01-25 DIAGNOSIS — Z79.899 IMMUNOSUPPRESSIVE MANAGEMENT ENCOUNTER FOLLOWING KIDNEY TRANSPLANT: ICD-10-CM

## 2021-01-25 DIAGNOSIS — E87.20 ACIDOSIS, METABOLIC: ICD-10-CM

## 2021-01-25 DIAGNOSIS — Z94.0 IMMUNOSUPPRESSIVE MANAGEMENT ENCOUNTER FOLLOWING KIDNEY TRANSPLANT: ICD-10-CM

## 2021-01-25 DIAGNOSIS — Z29.89 PROPHYLACTIC IMMUNOTHERAPY: ICD-10-CM

## 2021-01-25 DIAGNOSIS — E83.39 HYPOPHOSPHATEMIA: ICD-10-CM

## 2021-01-25 DIAGNOSIS — E21.2 HYPERPARATHYROIDISM, TERTIARY: ICD-10-CM

## 2021-01-25 DIAGNOSIS — I10 ESSENTIAL HYPERTENSION: ICD-10-CM

## 2021-01-25 DIAGNOSIS — Z79.60 LONG-TERM USE OF IMMUNOSUPPRESSANT MEDICATION: ICD-10-CM

## 2021-01-25 DIAGNOSIS — Z94.0 KIDNEY REPLACED BY TRANSPLANT: ICD-10-CM

## 2021-01-25 DIAGNOSIS — Z94.0 DECEASED-DONOR KIDNEY TRANSPLANT: Primary | ICD-10-CM

## 2021-01-25 PROBLEM — E87.5 HYPERKALEMIA: Status: RESOLVED | Noted: 2017-11-27 | Resolved: 2021-01-25

## 2021-01-25 PROBLEM — E83.52 HYPERCALCEMIA: Status: RESOLVED | Noted: 2018-08-09 | Resolved: 2021-01-25

## 2021-01-25 LAB
ALBUMIN SERPL BCP-MCNC: 3.3 G/DL (ref 3.5–5.2)
ANION GAP SERPL CALC-SCNC: 11 MMOL/L (ref 8–16)
BASOPHILS # BLD AUTO: 0.06 K/UL (ref 0–0.2)
BASOPHILS NFR BLD: 0.6 % (ref 0–1.9)
BUN SERPL-MCNC: 28 MG/DL (ref 6–20)
CALCIUM SERPL-MCNC: 8.7 MG/DL (ref 8.7–10.5)
CHLORIDE SERPL-SCNC: 106 MMOL/L (ref 95–110)
CO2 SERPL-SCNC: 21 MMOL/L (ref 23–29)
CREAT SERPL-MCNC: 2.4 MG/DL (ref 0.5–1.4)
DIFFERENTIAL METHOD: ABNORMAL
EOSINOPHIL # BLD AUTO: 0.2 K/UL (ref 0–0.5)
EOSINOPHIL NFR BLD: 1.7 % (ref 0–8)
ERYTHROCYTE [DISTWIDTH] IN BLOOD BY AUTOMATED COUNT: 17.2 % (ref 11.5–14.5)
EST. GFR  (AFRICAN AMERICAN): 30.5 ML/MIN/1.73 M^2
EST. GFR  (NON AFRICAN AMERICAN): 26.5 ML/MIN/1.73 M^2
GLUCOSE SERPL-MCNC: 80 MG/DL (ref 70–110)
HCT VFR BLD AUTO: 33.8 % (ref 37–48.5)
HGB BLD-MCNC: 10.3 G/DL (ref 12–16)
IMM GRANULOCYTES # BLD AUTO: 0.21 K/UL (ref 0–0.04)
IMM GRANULOCYTES NFR BLD AUTO: 2 % (ref 0–0.5)
LYMPHOCYTES # BLD AUTO: 0.4 K/UL (ref 1–4.8)
LYMPHOCYTES NFR BLD: 4 % (ref 18–48)
MAGNESIUM SERPL-MCNC: 1.4 MG/DL (ref 1.6–2.6)
MCH RBC QN AUTO: 30.7 PG (ref 27–31)
MCHC RBC AUTO-ENTMCNC: 30.5 G/DL (ref 32–36)
MCV RBC AUTO: 101 FL (ref 82–98)
MONOCYTES # BLD AUTO: 0.4 K/UL (ref 0.3–1)
MONOCYTES NFR BLD: 4.1 % (ref 4–15)
NEUTROPHILS # BLD AUTO: 9.4 K/UL (ref 1.8–7.7)
NEUTROPHILS NFR BLD: 87.6 % (ref 38–73)
NRBC BLD-RTO: 0 /100 WBC
PHOSPHATE SERPL-MCNC: 2.4 MG/DL (ref 2.7–4.5)
PLATELET # BLD AUTO: 302 K/UL (ref 150–350)
PMV BLD AUTO: 10.2 FL (ref 9.2–12.9)
POTASSIUM SERPL-SCNC: 4.6 MMOL/L (ref 3.5–5.1)
RBC # BLD AUTO: 3.35 M/UL (ref 4–5.4)
SODIUM SERPL-SCNC: 138 MMOL/L (ref 136–145)
TACROLIMUS BLD-MCNC: 9.8 NG/ML (ref 5–15)
WBC # BLD AUTO: 10.7 K/UL (ref 3.9–12.7)

## 2021-01-25 PROCEDURE — 99999 PR PBB SHADOW E&M-EST. PATIENT-LVL V: CPT | Mod: PBBFAC,,, | Performed by: NURSE PRACTITIONER

## 2021-01-25 PROCEDURE — 85025 COMPLETE CBC W/AUTO DIFF WBC: CPT

## 2021-01-25 PROCEDURE — 99999 PR PBB SHADOW E&M-EST. PATIENT-LVL V: ICD-10-PCS | Mod: PBBFAC,,, | Performed by: NURSE PRACTITIONER

## 2021-01-25 PROCEDURE — 80069 RENAL FUNCTION PANEL: CPT

## 2021-01-25 PROCEDURE — 36415 COLL VENOUS BLD VENIPUNCTURE: CPT

## 2021-01-25 PROCEDURE — 99215 OFFICE O/P EST HI 40 MIN: CPT | Mod: S$PBB,,, | Performed by: NURSE PRACTITIONER

## 2021-01-25 PROCEDURE — 99215 PR OFFICE/OUTPT VISIT, EST, LEVL V, 40-54 MIN: ICD-10-PCS | Mod: S$PBB,,, | Performed by: NURSE PRACTITIONER

## 2021-01-25 PROCEDURE — 83735 ASSAY OF MAGNESIUM: CPT

## 2021-01-25 PROCEDURE — 99215 OFFICE O/P EST HI 40 MIN: CPT | Mod: PBBFAC | Performed by: NURSE PRACTITIONER

## 2021-01-25 PROCEDURE — 80197 ASSAY OF TACROLIMUS: CPT

## 2021-01-25 RX ORDER — CALCITRIOL 0.25 UG/1
0.25 CAPSULE ORAL DAILY
Qty: 30 CAPSULE | Refills: 11 | Status: SHIPPED | OUTPATIENT
Start: 2021-01-25 | End: 2021-12-08 | Stop reason: SDUPTHER

## 2021-01-26 ENCOUNTER — HOSPITAL ENCOUNTER (OUTPATIENT)
Dept: RADIOLOGY | Facility: HOSPITAL | Age: 31
Discharge: HOME | End: 2021-01-26
Attending: INTERNAL MEDICINE
Payer: MEDICARE

## 2021-01-26 DIAGNOSIS — Z94.0 KIDNEY REPLACED BY TRANSPLANT: ICD-10-CM

## 2021-01-26 PROCEDURE — 76776 US TRANSPLANT KIDNEY WITH DOPPLER: ICD-10-PCS | Mod: 26,,, | Performed by: RADIOLOGY

## 2021-01-26 PROCEDURE — 76776 US EXAM K TRANSPL W/DOPPLER: CPT | Mod: 26,,, | Performed by: RADIOLOGY

## 2021-01-26 PROCEDURE — 76776 US EXAM K TRANSPL W/DOPPLER: CPT | Mod: TC

## 2021-01-27 ENCOUNTER — TELEPHONE (OUTPATIENT)
Dept: INTERVENTIONAL RADIOLOGY/VASCULAR | Facility: HOSPITAL | Age: 31
End: 2021-01-27

## 2021-01-27 DIAGNOSIS — Z94.0 KIDNEY REPLACED BY TRANSPLANT: Primary | ICD-10-CM

## 2021-01-27 LAB
COMMENT: NORMAL
FINAL PATHOLOGIC DIAGNOSIS: NORMAL
GROSS: NORMAL
Lab: NORMAL
SUPPLEMENTAL DIAGNOSIS: NORMAL

## 2021-01-27 RX ORDER — MYCOPHENOLATE MOFETIL 250 MG/1
1000 CAPSULE ORAL 2 TIMES DAILY
Qty: 240 CAPSULE | Refills: 11 | Status: SHIPPED | OUTPATIENT
Start: 2021-01-27 | End: 2022-02-08 | Stop reason: SDUPTHER

## 2021-01-28 ENCOUNTER — INFUSION (OUTPATIENT)
Dept: INFUSION THERAPY | Facility: OTHER | Age: 31
End: 2021-01-28
Attending: STUDENT IN AN ORGANIZED HEALTH CARE EDUCATION/TRAINING PROGRAM
Payer: MEDICARE

## 2021-01-28 VITALS
RESPIRATION RATE: 16 BRPM | OXYGEN SATURATION: 100 % | SYSTOLIC BLOOD PRESSURE: 114 MMHG | HEART RATE: 90 BPM | DIASTOLIC BLOOD PRESSURE: 86 MMHG | TEMPERATURE: 98 F

## 2021-01-28 DIAGNOSIS — E83.42 HYPOMAGNESEMIA: ICD-10-CM

## 2021-01-28 DIAGNOSIS — Z94.0 KIDNEY REPLACED BY TRANSPLANT: ICD-10-CM

## 2021-01-28 DIAGNOSIS — E83.42 HYPOMAGNESEMIA: Primary | ICD-10-CM

## 2021-01-28 PROCEDURE — 63600175 PHARM REV CODE 636 W HCPCS: Performed by: INTERNAL MEDICINE

## 2021-01-28 PROCEDURE — 25000003 PHARM REV CODE 250: Performed by: INTERNAL MEDICINE

## 2021-01-28 PROCEDURE — 96366 THER/PROPH/DIAG IV INF ADDON: CPT

## 2021-01-28 PROCEDURE — 96365 THER/PROPH/DIAG IV INF INIT: CPT

## 2021-01-28 RX ORDER — SODIUM CHLORIDE 0.9 % (FLUSH) 0.9 %
10 SYRINGE (ML) INJECTION
Status: CANCELLED | OUTPATIENT
Start: 2021-01-28

## 2021-01-28 RX ORDER — LANOLIN ALCOHOL/MO/W.PET/CERES
1200 CREAM (GRAM) TOPICAL 3 TIMES DAILY
Qty: 270 TABLET | Refills: 11 | Status: SHIPPED | OUTPATIENT
Start: 2021-01-28 | End: 2021-04-21 | Stop reason: SDUPTHER

## 2021-01-28 RX ORDER — SODIUM CHLORIDE 0.9 % (FLUSH) 0.9 %
10 SYRINGE (ML) INJECTION
OUTPATIENT
Start: 2021-01-28

## 2021-01-28 RX ORDER — SODIUM CHLORIDE 0.9 % (FLUSH) 0.9 %
10 SYRINGE (ML) INJECTION
Status: DISCONTINUED | OUTPATIENT
Start: 2021-01-28 | End: 2021-01-28 | Stop reason: HOSPADM

## 2021-01-28 RX ORDER — MAGNESIUM SULFATE HEPTAHYDRATE 40 MG/ML
4 INJECTION, SOLUTION INTRAVENOUS ONCE
Status: COMPLETED | OUTPATIENT
Start: 2021-01-28 | End: 2021-01-28

## 2021-01-28 RX ORDER — MAGNESIUM SULFATE HEPTAHYDRATE 40 MG/ML
4 INJECTION, SOLUTION INTRAVENOUS ONCE
Status: CANCELLED
Start: 2021-01-28 | End: 2021-01-28

## 2021-01-28 RX ADMIN — MAGNESIUM SULFATE IN WATER 4 G: 40 INJECTION, SOLUTION INTRAVENOUS at 11:01

## 2021-01-28 RX ADMIN — SODIUM CHLORIDE: 0.9 INJECTION, SOLUTION INTRAVENOUS at 11:01

## 2021-01-29 ENCOUNTER — LAB VISIT (OUTPATIENT)
Dept: LAB | Facility: HOSPITAL | Age: 31
End: 2021-01-29
Attending: INTERNAL MEDICINE
Payer: MEDICARE

## 2021-01-29 DIAGNOSIS — E83.51 HYPOCALCEMIA: Primary | ICD-10-CM

## 2021-01-29 DIAGNOSIS — Z94.0 KIDNEY REPLACED BY TRANSPLANT: ICD-10-CM

## 2021-01-29 LAB
ALBUMIN SERPL BCP-MCNC: 3 G/DL (ref 3.5–5.2)
ANION GAP SERPL CALC-SCNC: 7 MMOL/L (ref 8–16)
BASOPHILS # BLD AUTO: 0.06 K/UL (ref 0–0.2)
BASOPHILS NFR BLD: 0.7 % (ref 0–1.9)
BUN SERPL-MCNC: 28 MG/DL (ref 6–20)
CALCIUM SERPL-MCNC: 7.5 MG/DL (ref 8.7–10.5)
CHLORIDE SERPL-SCNC: 107 MMOL/L (ref 95–110)
CO2 SERPL-SCNC: 24 MMOL/L (ref 23–29)
CREAT SERPL-MCNC: 1.8 MG/DL (ref 0.5–1.4)
DIFFERENTIAL METHOD: ABNORMAL
EOSINOPHIL # BLD AUTO: 0.2 K/UL (ref 0–0.5)
EOSINOPHIL NFR BLD: 2.2 % (ref 0–8)
ERYTHROCYTE [DISTWIDTH] IN BLOOD BY AUTOMATED COUNT: 17.2 % (ref 11.5–14.5)
EST. GFR  (AFRICAN AMERICAN): 43.2 ML/MIN/1.73 M^2
EST. GFR  (NON AFRICAN AMERICAN): 37.5 ML/MIN/1.73 M^2
GLUCOSE SERPL-MCNC: 89 MG/DL (ref 70–110)
HCT VFR BLD AUTO: 33.9 % (ref 37–48.5)
HGB BLD-MCNC: 10.6 G/DL (ref 12–16)
IMM GRANULOCYTES # BLD AUTO: 0.14 K/UL (ref 0–0.04)
IMM GRANULOCYTES NFR BLD AUTO: 1.6 % (ref 0–0.5)
LYMPHOCYTES # BLD AUTO: 0.4 K/UL (ref 1–4.8)
LYMPHOCYTES NFR BLD: 4.8 % (ref 18–48)
MAGNESIUM SERPL-MCNC: 2 MG/DL (ref 1.6–2.6)
MCH RBC QN AUTO: 30.8 PG (ref 27–31)
MCHC RBC AUTO-ENTMCNC: 31.3 G/DL (ref 32–36)
MCV RBC AUTO: 99 FL (ref 82–98)
MONOCYTES # BLD AUTO: 0.2 K/UL (ref 0.3–1)
MONOCYTES NFR BLD: 2.5 % (ref 4–15)
NEUTROPHILS # BLD AUTO: 7.6 K/UL (ref 1.8–7.7)
NEUTROPHILS NFR BLD: 88.2 % (ref 38–73)
NRBC BLD-RTO: 0 /100 WBC
PHOSPHATE SERPL-MCNC: 2.7 MG/DL (ref 2.7–4.5)
PLATELET # BLD AUTO: 266 K/UL (ref 150–350)
PMV BLD AUTO: 10.4 FL (ref 9.2–12.9)
POTASSIUM SERPL-SCNC: 4.2 MMOL/L (ref 3.5–5.1)
RBC # BLD AUTO: 3.44 M/UL (ref 4–5.4)
SODIUM SERPL-SCNC: 138 MMOL/L (ref 136–145)
TACROLIMUS BLD-MCNC: 11.1 NG/ML (ref 5–15)
WBC # BLD AUTO: 8.66 K/UL (ref 3.9–12.7)

## 2021-01-29 PROCEDURE — 80069 RENAL FUNCTION PANEL: CPT

## 2021-01-29 PROCEDURE — 80197 ASSAY OF TACROLIMUS: CPT

## 2021-01-29 PROCEDURE — 85025 COMPLETE CBC W/AUTO DIFF WBC: CPT

## 2021-01-29 PROCEDURE — 83735 ASSAY OF MAGNESIUM: CPT

## 2021-01-29 PROCEDURE — 36415 COLL VENOUS BLD VENIPUNCTURE: CPT

## 2021-01-29 RX ORDER — TACROLIMUS 1 MG/1
3 CAPSULE ORAL EVERY 12 HOURS
Qty: 180 CAPSULE | Refills: 11 | Status: SHIPPED | OUTPATIENT
Start: 2021-01-29 | End: 2021-03-03 | Stop reason: SDUPTHER

## 2021-02-01 ENCOUNTER — LAB VISIT (OUTPATIENT)
Dept: LAB | Facility: HOSPITAL | Age: 31
End: 2021-02-01
Attending: INTERNAL MEDICINE
Payer: MEDICARE

## 2021-02-01 DIAGNOSIS — E83.51 HYPOCALCEMIA: ICD-10-CM

## 2021-02-01 DIAGNOSIS — Z94.0 KIDNEY REPLACED BY TRANSPLANT: ICD-10-CM

## 2021-02-01 LAB
ALBUMIN SERPL BCP-MCNC: 3.2 G/DL (ref 3.5–5.2)
ANION GAP SERPL CALC-SCNC: 12 MMOL/L (ref 8–16)
BASOPHILS # BLD AUTO: 0.07 K/UL (ref 0–0.2)
BASOPHILS NFR BLD: 0.9 % (ref 0–1.9)
BUN SERPL-MCNC: 23 MG/DL (ref 6–20)
CA-I BLDV-SCNC: 1.21 MMOL/L (ref 1.06–1.42)
CALCIUM SERPL-MCNC: 8.6 MG/DL (ref 8.7–10.5)
CHLORIDE SERPL-SCNC: 105 MMOL/L (ref 95–110)
CO2 SERPL-SCNC: 19 MMOL/L (ref 23–29)
CREAT SERPL-MCNC: 1.8 MG/DL (ref 0.5–1.4)
DIFFERENTIAL METHOD: ABNORMAL
EOSINOPHIL # BLD AUTO: 0.2 K/UL (ref 0–0.5)
EOSINOPHIL NFR BLD: 2.1 % (ref 0–8)
ERYTHROCYTE [DISTWIDTH] IN BLOOD BY AUTOMATED COUNT: 17.1 % (ref 11.5–14.5)
EST. GFR  (AFRICAN AMERICAN): 43.2 ML/MIN/1.73 M^2
EST. GFR  (NON AFRICAN AMERICAN): 37.5 ML/MIN/1.73 M^2
GLUCOSE SERPL-MCNC: 87 MG/DL (ref 70–110)
HCT VFR BLD AUTO: 35.3 % (ref 37–48.5)
HGB BLD-MCNC: 11 G/DL (ref 12–16)
IMM GRANULOCYTES # BLD AUTO: 0.17 K/UL (ref 0–0.04)
IMM GRANULOCYTES NFR BLD AUTO: 2.1 % (ref 0–0.5)
LYMPHOCYTES # BLD AUTO: 0.5 K/UL (ref 1–4.8)
LYMPHOCYTES NFR BLD: 5.6 % (ref 18–48)
MAGNESIUM SERPL-MCNC: 1.7 MG/DL (ref 1.6–2.6)
MCH RBC QN AUTO: 30.7 PG (ref 27–31)
MCHC RBC AUTO-ENTMCNC: 31.2 G/DL (ref 32–36)
MCV RBC AUTO: 99 FL (ref 82–98)
MONOCYTES # BLD AUTO: 0.2 K/UL (ref 0.3–1)
MONOCYTES NFR BLD: 2 % (ref 4–15)
NEUTROPHILS # BLD AUTO: 7 K/UL (ref 1.8–7.7)
NEUTROPHILS NFR BLD: 87.3 % (ref 38–73)
NRBC BLD-RTO: 0 /100 WBC
PHOSPHATE SERPL-MCNC: 2.8 MG/DL (ref 2.7–4.5)
PLATELET # BLD AUTO: 252 K/UL (ref 150–350)
PMV BLD AUTO: 10.2 FL (ref 9.2–12.9)
POTASSIUM SERPL-SCNC: 4.7 MMOL/L (ref 3.5–5.1)
RBC # BLD AUTO: 3.58 M/UL (ref 4–5.4)
SODIUM SERPL-SCNC: 136 MMOL/L (ref 136–145)
TACROLIMUS BLD-MCNC: 8.7 NG/ML (ref 5–15)
WBC # BLD AUTO: 8.05 K/UL (ref 3.9–12.7)

## 2021-02-01 PROCEDURE — 83735 ASSAY OF MAGNESIUM: CPT

## 2021-02-01 PROCEDURE — 80197 ASSAY OF TACROLIMUS: CPT

## 2021-02-01 PROCEDURE — 80069 RENAL FUNCTION PANEL: CPT

## 2021-02-01 PROCEDURE — 82330 ASSAY OF CALCIUM: CPT

## 2021-02-01 PROCEDURE — 85025 COMPLETE CBC W/AUTO DIFF WBC: CPT

## 2021-02-01 PROCEDURE — 36415 COLL VENOUS BLD VENIPUNCTURE: CPT

## 2021-02-01 RX ORDER — CEFPODOXIME PROXETIL 200 MG/1
200 TABLET, FILM COATED ORAL EVERY 12 HOURS
Qty: 26 TABLET | Refills: 0 | Status: SHIPPED | OUTPATIENT
Start: 2021-02-01 | End: 2021-02-14

## 2021-02-03 ENCOUNTER — TELEPHONE (OUTPATIENT)
Dept: TRANSPLANT | Facility: CLINIC | Age: 31
End: 2021-02-03

## 2021-02-03 ENCOUNTER — OFFICE VISIT (OUTPATIENT)
Dept: TRANSPLANT | Facility: CLINIC | Age: 31
End: 2021-02-03
Payer: MEDICARE

## 2021-02-03 ENCOUNTER — PROCEDURE VISIT (OUTPATIENT)
Dept: UROLOGY | Facility: CLINIC | Age: 31
End: 2021-02-03
Payer: MEDICARE

## 2021-02-03 VITALS
WEIGHT: 241.19 LBS | OXYGEN SATURATION: 100 % | SYSTOLIC BLOOD PRESSURE: 125 MMHG | RESPIRATION RATE: 18 BRPM | TEMPERATURE: 99 F | HEART RATE: 91 BPM | HEIGHT: 66 IN | BODY MASS INDEX: 38.76 KG/M2 | DIASTOLIC BLOOD PRESSURE: 82 MMHG

## 2021-02-03 VITALS
TEMPERATURE: 98 F | HEIGHT: 66 IN | HEART RATE: 95 BPM | WEIGHT: 241.38 LBS | SYSTOLIC BLOOD PRESSURE: 113 MMHG | RESPIRATION RATE: 18 BRPM | DIASTOLIC BLOOD PRESSURE: 79 MMHG | BODY MASS INDEX: 38.79 KG/M2

## 2021-02-03 DIAGNOSIS — Z79.899 ENCOUNTER FOR LONG-TERM (CURRENT) USE OF OTHER MEDICATIONS: ICD-10-CM

## 2021-02-03 DIAGNOSIS — Z79.60 LONG-TERM USE OF IMMUNOSUPPRESSANT MEDICATION: ICD-10-CM

## 2021-02-03 DIAGNOSIS — R82.81 PYURIA: Primary | ICD-10-CM

## 2021-02-03 DIAGNOSIS — Z51.81 ENCOUNTER FOR THERAPEUTIC DRUG MONITORING: ICD-10-CM

## 2021-02-03 DIAGNOSIS — Z79.899 IMMUNOSUPPRESSIVE MANAGEMENT ENCOUNTER FOLLOWING KIDNEY TRANSPLANT: Primary | ICD-10-CM

## 2021-02-03 DIAGNOSIS — Z94.0 KIDNEY REPLACED BY TRANSPLANT: ICD-10-CM

## 2021-02-03 DIAGNOSIS — S37.20XA INJURY OF BLADDER, INITIAL ENCOUNTER: ICD-10-CM

## 2021-02-03 DIAGNOSIS — Z94.0 IMMUNOSUPPRESSIVE MANAGEMENT ENCOUNTER FOLLOWING KIDNEY TRANSPLANT: Primary | ICD-10-CM

## 2021-02-03 DIAGNOSIS — Z29.89 PROPHYLACTIC IMMUNOTHERAPY: ICD-10-CM

## 2021-02-03 DIAGNOSIS — Z90.5 S/P NEPHRECTOMY: ICD-10-CM

## 2021-02-03 LAB
BACTERIA #/AREA URNS AUTO: ABNORMAL /HPF
MICROSCOPIC COMMENT: ABNORMAL
RBC #/AREA URNS AUTO: 40 /HPF (ref 0–4)
WBC #/AREA URNS AUTO: >100 /HPF (ref 0–5)

## 2021-02-03 PROCEDURE — 52310 CYSTOSCOPY AND TREATMENT: CPT | Mod: PBBFAC | Performed by: UROLOGY

## 2021-02-03 PROCEDURE — 87086 URINE CULTURE/COLONY COUNT: CPT

## 2021-02-03 PROCEDURE — 52310 PR CYSTOSCOPY,REMV CALCULUS,SIMPLE: ICD-10-PCS | Mod: S$PBB,,, | Performed by: UROLOGY

## 2021-02-03 PROCEDURE — 81001 URINALYSIS AUTO W/SCOPE: CPT

## 2021-02-03 PROCEDURE — 99999 PR PBB SHADOW E&M-EST. PATIENT-LVL IV: ICD-10-PCS | Mod: PBBFAC,,, | Performed by: TRANSPLANT SURGERY

## 2021-02-03 PROCEDURE — 99214 PR OFFICE/OUTPT VISIT, EST, LEVL IV, 30-39 MIN: ICD-10-PCS | Mod: 24,S$PBB,, | Performed by: TRANSPLANT SURGERY

## 2021-02-03 PROCEDURE — 52310 CYSTOSCOPY AND TREATMENT: CPT | Mod: S$PBB,,, | Performed by: UROLOGY

## 2021-02-03 PROCEDURE — 99214 OFFICE O/P EST MOD 30 MIN: CPT | Mod: PBBFAC,25 | Performed by: TRANSPLANT SURGERY

## 2021-02-03 PROCEDURE — 99999 PR PBB SHADOW E&M-EST. PATIENT-LVL IV: CPT | Mod: PBBFAC,,, | Performed by: TRANSPLANT SURGERY

## 2021-02-03 PROCEDURE — 99214 OFFICE O/P EST MOD 30 MIN: CPT | Mod: 24,S$PBB,, | Performed by: TRANSPLANT SURGERY

## 2021-02-03 RX ORDER — LIDOCAINE HYDROCHLORIDE 20 MG/ML
JELLY TOPICAL
Status: COMPLETED | OUTPATIENT
Start: 2021-02-03 | End: 2021-02-03

## 2021-02-03 RX ORDER — DOXYCYCLINE HYCLATE 100 MG
100 TABLET ORAL
Status: COMPLETED | OUTPATIENT
Start: 2021-02-03 | End: 2021-02-03

## 2021-02-03 RX ADMIN — Medication 100 MG: at 11:02

## 2021-02-03 RX ADMIN — LIDOCAINE HYDROCHLORIDE: 20 JELLY TOPICAL at 11:02

## 2021-02-04 ENCOUNTER — HOSPITAL ENCOUNTER (OUTPATIENT)
Dept: INTERVENTIONAL RADIOLOGY/VASCULAR | Facility: HOSPITAL | Age: 31
Discharge: HOME | End: 2021-02-04
Attending: INTERNAL MEDICINE
Payer: MEDICARE

## 2021-02-04 VITALS
WEIGHT: 241 LBS | BODY MASS INDEX: 38.73 KG/M2 | SYSTOLIC BLOOD PRESSURE: 125 MMHG | DIASTOLIC BLOOD PRESSURE: 72 MMHG | HEART RATE: 79 BPM | OXYGEN SATURATION: 100 % | TEMPERATURE: 97 F | RESPIRATION RATE: 16 BRPM | HEIGHT: 66 IN

## 2021-02-04 DIAGNOSIS — Z94.0 KIDNEY REPLACED BY TRANSPLANT: ICD-10-CM

## 2021-02-04 LAB
B-HCG UR QL: NEGATIVE
CTP QC/QA: YES
GRAM STN SPEC: NORMAL
GRAM STN SPEC: NORMAL

## 2021-02-04 PROCEDURE — 63600175 PHARM REV CODE 636 W HCPCS: Performed by: RADIOLOGY

## 2021-02-04 PROCEDURE — 99153 MOD SED SAME PHYS/QHP EA: CPT | Performed by: RADIOLOGY

## 2021-02-04 PROCEDURE — 99152 MOD SED SAME PHYS/QHP 5/>YRS: CPT | Performed by: RADIOLOGY

## 2021-02-04 PROCEDURE — 87205 SMEAR GRAM STAIN: CPT

## 2021-02-04 PROCEDURE — 10160 PNXR ASPIR ABSC HMTMA BULLA: CPT | Performed by: RADIOLOGY

## 2021-02-04 PROCEDURE — 99152 MOD SED SAME PHYS/QHP 5/>YRS: CPT

## 2021-02-04 PROCEDURE — 99152 MOD SED SAME PHYS/QHP 5/>YRS: CPT | Mod: ,,, | Performed by: RADIOLOGY

## 2021-02-04 PROCEDURE — 49406 IMAGE CATH FLUID PERI/RETRO: CPT | Mod: 59 | Performed by: RADIOLOGY

## 2021-02-04 PROCEDURE — 87075 CULTR BACTERIA EXCEPT BLOOD: CPT | Mod: 59

## 2021-02-04 PROCEDURE — 10160 PR PUNCTURE DRAINAGE, LESION: ICD-10-PCS | Mod: ,,, | Performed by: RADIOLOGY

## 2021-02-04 PROCEDURE — 81025 URINE PREGNANCY TEST: CPT | Performed by: FAMILY MEDICINE

## 2021-02-04 PROCEDURE — 87070 CULTURE OTHR SPECIMN AEROBIC: CPT

## 2021-02-04 PROCEDURE — 10160 PNXR ASPIR ABSC HMTMA BULLA: CPT | Mod: ,,, | Performed by: RADIOLOGY

## 2021-02-04 PROCEDURE — 63600175 PHARM REV CODE 636 W HCPCS: Performed by: FAMILY MEDICINE

## 2021-02-04 PROCEDURE — 99152 PR MOD CONSCIOUS SEDATION, SAME PHYS, 5+ YRS, FIRST 15 MIN: ICD-10-PCS | Mod: ,,, | Performed by: RADIOLOGY

## 2021-02-04 PROCEDURE — 49406 IR ABSCESS DRAINAGE PERITONEAL WITH IMAGING: ICD-10-PCS | Mod: 59,,, | Performed by: RADIOLOGY

## 2021-02-04 PROCEDURE — A4215 STERILE NEEDLE: HCPCS

## 2021-02-04 PROCEDURE — 87102 FUNGUS ISOLATION CULTURE: CPT

## 2021-02-04 PROCEDURE — 99153 MOD SED SAME PHYS/QHP EA: CPT

## 2021-02-04 RX ORDER — FENTANYL CITRATE 50 UG/ML
INJECTION, SOLUTION INTRAMUSCULAR; INTRAVENOUS CODE/TRAUMA/SEDATION MEDICATION
Status: COMPLETED | OUTPATIENT
Start: 2021-02-04 | End: 2021-02-04

## 2021-02-04 RX ORDER — SODIUM CHLORIDE 9 MG/ML
INJECTION, SOLUTION INTRAVENOUS ONCE
Status: DISCONTINUED | OUTPATIENT
Start: 2021-02-04 | End: 2021-02-05 | Stop reason: HOSPADM

## 2021-02-04 RX ORDER — FENTANYL CITRATE 50 UG/ML
50 INJECTION, SOLUTION INTRAMUSCULAR; INTRAVENOUS
Status: DISCONTINUED | OUTPATIENT
Start: 2021-02-04 | End: 2021-02-05 | Stop reason: HOSPADM

## 2021-02-04 RX ORDER — MIDAZOLAM HYDROCHLORIDE 1 MG/ML
INJECTION INTRAMUSCULAR; INTRAVENOUS CODE/TRAUMA/SEDATION MEDICATION
Status: COMPLETED | OUTPATIENT
Start: 2021-02-04 | End: 2021-02-04

## 2021-02-04 RX ORDER — MIDAZOLAM HYDROCHLORIDE 1 MG/ML
1 INJECTION INTRAMUSCULAR; INTRAVENOUS
Status: DISCONTINUED | OUTPATIENT
Start: 2021-02-04 | End: 2021-02-05 | Stop reason: HOSPADM

## 2021-02-04 RX ADMIN — MIDAZOLAM HYDROCHLORIDE 1 MG: 1 INJECTION, SOLUTION INTRAMUSCULAR; INTRAVENOUS at 05:02

## 2021-02-04 RX ADMIN — MIDAZOLAM HYDROCHLORIDE 1 MG: 1 INJECTION, SOLUTION INTRAMUSCULAR; INTRAVENOUS at 04:02

## 2021-02-04 RX ADMIN — FENTANYL CITRATE 50 MCG: 50 INJECTION, SOLUTION INTRAMUSCULAR; INTRAVENOUS at 05:02

## 2021-02-04 RX ADMIN — FENTANYL CITRATE 50 MCG: 50 INJECTION, SOLUTION INTRAMUSCULAR; INTRAVENOUS at 04:02

## 2021-02-05 ENCOUNTER — TELEPHONE (OUTPATIENT)
Dept: TRANSPLANT | Facility: CLINIC | Age: 31
End: 2021-02-05

## 2021-02-05 LAB
BACTERIA UR CULT: NO GROWTH
GRAM STN SPEC: NORMAL

## 2021-02-08 ENCOUNTER — HOSPITAL ENCOUNTER (OUTPATIENT)
Dept: RADIOLOGY | Facility: OTHER | Age: 31
Discharge: HOME | End: 2021-02-08
Attending: UROLOGY
Payer: MEDICARE

## 2021-02-08 DIAGNOSIS — S37.20XA INJURY OF BLADDER, INITIAL ENCOUNTER: ICD-10-CM

## 2021-02-08 DIAGNOSIS — Z94.0 TRANSPLANTED KIDNEY: Primary | ICD-10-CM

## 2021-02-08 LAB
BACTERIA SPEC AEROBE CULT: NO GROWTH

## 2021-02-08 PROCEDURE — 72192 CT CYSTOGRAPHY W/O CONTRAST: ICD-10-PCS | Mod: 26,,, | Performed by: RADIOLOGY

## 2021-02-08 PROCEDURE — 72192 CT PELVIS W/O DYE: CPT | Mod: 26,,, | Performed by: RADIOLOGY

## 2021-02-08 PROCEDURE — 25500020 PHARM REV CODE 255: Performed by: UROLOGY

## 2021-02-08 PROCEDURE — 72194 CT PELVIS W/O & W/DYE: CPT | Mod: TC

## 2021-02-08 RX ADMIN — DIATRIZOATE MEGLUMINE 50 ML: 300 INJECTION, SOLUTION INTRAVENOUS at 04:02

## 2021-02-09 ENCOUNTER — CLINICAL SUPPORT (OUTPATIENT)
Dept: TRANSPLANT | Facility: CLINIC | Age: 31
End: 2021-02-09
Payer: MEDICARE

## 2021-02-09 DIAGNOSIS — Z94.0 KIDNEY REPLACED BY TRANSPLANT: Primary | ICD-10-CM

## 2021-02-09 DIAGNOSIS — T86.898 URINE LEAK FROM TRANSPLANTED URETER: ICD-10-CM

## 2021-02-09 RX ORDER — ERGOCALCIFEROL 1.25 MG/1
50000 CAPSULE ORAL
Qty: 1 CAPSULE | Refills: 11 | Status: SHIPPED | OUTPATIENT
Start: 2021-02-09 | End: 2022-01-11

## 2021-02-09 RX ORDER — SODIUM BICARBONATE 650 MG/1
2600 TABLET ORAL 2 TIMES DAILY
Qty: 240 TABLET | Refills: 11 | Status: SHIPPED | OUTPATIENT
Start: 2021-02-09 | End: 2021-04-21 | Stop reason: SDUPTHER

## 2021-02-10 ENCOUNTER — DOCUMENTATION ONLY (OUTPATIENT)
Dept: TRANSPLANT | Facility: CLINIC | Age: 31
End: 2021-02-10

## 2021-02-10 ENCOUNTER — OFFICE VISIT (OUTPATIENT)
Dept: TRANSPLANT | Facility: CLINIC | Age: 31
DRG: 700 | End: 2021-02-10
Payer: MEDICARE

## 2021-02-10 ENCOUNTER — COMMITTEE REVIEW (OUTPATIENT)
Dept: TRANSPLANT | Facility: CLINIC | Age: 31
End: 2021-02-10

## 2021-02-10 VITALS
RESPIRATION RATE: 18 BRPM | BODY MASS INDEX: 39.18 KG/M2 | HEIGHT: 66 IN | SYSTOLIC BLOOD PRESSURE: 116 MMHG | TEMPERATURE: 98 F | OXYGEN SATURATION: 100 % | DIASTOLIC BLOOD PRESSURE: 79 MMHG | WEIGHT: 243.81 LBS | HEART RATE: 89 BPM

## 2021-02-10 DIAGNOSIS — Z94.0 KIDNEY REPLACED BY TRANSPLANT: Primary | ICD-10-CM

## 2021-02-10 DIAGNOSIS — N13.39 OTHER HYDRONEPHROSIS: ICD-10-CM

## 2021-02-10 DIAGNOSIS — T86.898 URINE LEAK FROM TRANSPLANTED URETER: ICD-10-CM

## 2021-02-10 DIAGNOSIS — N99.89 URINE LEAKAGE FROM SURGICAL INCISION: ICD-10-CM

## 2021-02-10 LAB
BODY FLUID SOURCE, CREATININE: NORMAL
CREAT FLD-MCNC: 1.5 MG/DL

## 2021-02-10 PROCEDURE — 99215 PR OFFICE/OUTPT VISIT, EST, LEVL V, 40-54 MIN: ICD-10-PCS | Mod: 24,S$PBB,, | Performed by: TRANSPLANT SURGERY

## 2021-02-10 PROCEDURE — 82570 ASSAY OF URINE CREATININE: CPT

## 2021-02-10 PROCEDURE — 99215 OFFICE O/P EST HI 40 MIN: CPT | Mod: 24,S$PBB,, | Performed by: TRANSPLANT SURGERY

## 2021-02-10 PROCEDURE — 99999 PR PBB SHADOW E&M-EST. PATIENT-LVL IV: ICD-10-PCS | Mod: PBBFAC,,,

## 2021-02-10 PROCEDURE — 99999 PR PBB SHADOW E&M-EST. PATIENT-LVL IV: CPT | Mod: PBBFAC,,,

## 2021-02-10 PROCEDURE — 99214 OFFICE O/P EST MOD 30 MIN: CPT | Mod: PBBFAC

## 2021-02-11 ENCOUNTER — TELEPHONE (OUTPATIENT)
Dept: TRANSPLANT | Facility: CLINIC | Age: 31
End: 2021-02-11

## 2021-02-11 DIAGNOSIS — T86.898 URINE LEAK FROM TRANSPLANTED URETER: ICD-10-CM

## 2021-02-11 DIAGNOSIS — Z94.0 KIDNEY REPLACED BY TRANSPLANT: Primary | ICD-10-CM

## 2021-02-11 LAB
BODY FLUID SOURCE, CREATININE: NORMAL
CREAT FLD-MCNC: 19.1 MG/DL

## 2021-02-12 ENCOUNTER — HOSPITAL ENCOUNTER (INPATIENT)
Facility: HOSPITAL | Age: 31
LOS: 2 days | Discharge: HOME | DRG: 700 | End: 2021-02-14
Attending: TRANSPLANT SURGERY | Admitting: TRANSPLANT SURGERY
Payer: MEDICARE

## 2021-02-12 ENCOUNTER — TELEPHONE (OUTPATIENT)
Dept: TRANSPLANT | Facility: CLINIC | Age: 31
End: 2021-02-12

## 2021-02-12 DIAGNOSIS — Z94.0 KIDNEY REPLACED BY TRANSPLANT: ICD-10-CM

## 2021-02-12 DIAGNOSIS — Z91.89 AT RISK FOR OPPORTUNISTIC INFECTIONS: ICD-10-CM

## 2021-02-12 DIAGNOSIS — E21.2 HYPERPARATHYROIDISM, TERTIARY: ICD-10-CM

## 2021-02-12 DIAGNOSIS — I10 ESSENTIAL HYPERTENSION: ICD-10-CM

## 2021-02-12 DIAGNOSIS — Z29.89 PROPHYLACTIC IMMUNOTHERAPY: ICD-10-CM

## 2021-02-12 DIAGNOSIS — T86.898 URINE LEAK FROM TRANSPLANTED URETER: Primary | ICD-10-CM

## 2021-02-12 DIAGNOSIS — T86.12 FAILED KIDNEY TRANSPLANT: ICD-10-CM

## 2021-02-12 DIAGNOSIS — D69.6 THROMBOCYTOPENIA: ICD-10-CM

## 2021-02-12 DIAGNOSIS — Z79.60 LONG-TERM USE OF IMMUNOSUPPRESSANT MEDICATION: ICD-10-CM

## 2021-02-12 DIAGNOSIS — T86.898 URINE LEAK FROM TRANSPLANTED URETER: ICD-10-CM

## 2021-02-12 LAB
ALBUMIN SERPL BCP-MCNC: 3.1 G/DL (ref 3.5–5.2)
ALP SERPL-CCNC: 71 U/L (ref 55–135)
ALT SERPL W/O P-5'-P-CCNC: 25 U/L (ref 10–44)
ANION GAP SERPL CALC-SCNC: 8 MMOL/L (ref 8–16)
AST SERPL-CCNC: 16 U/L (ref 10–40)
BACTERIA #/AREA URNS AUTO: NORMAL /HPF
BACTERIA SPEC ANAEROBE CULT: NORMAL
BASOPHILS # BLD AUTO: 0.03 K/UL (ref 0–0.2)
BASOPHILS NFR BLD: 0.3 % (ref 0–1.9)
BILIRUB SERPL-MCNC: 0.1 MG/DL (ref 0.1–1)
BILIRUB UR QL STRIP: NEGATIVE
BUN SERPL-MCNC: 19 MG/DL (ref 6–20)
CALCIUM SERPL-MCNC: 7.9 MG/DL (ref 8.7–10.5)
CHLORIDE SERPL-SCNC: 108 MMOL/L (ref 95–110)
CLARITY UR REFRACT.AUTO: CLEAR
CO2 SERPL-SCNC: 22 MMOL/L (ref 23–29)
COLOR UR AUTO: YELLOW
CREAT SERPL-MCNC: 1.1 MG/DL (ref 0.5–1.4)
DIFFERENTIAL METHOD: ABNORMAL
EOSINOPHIL # BLD AUTO: 0 K/UL (ref 0–0.5)
EOSINOPHIL NFR BLD: 0.2 % (ref 0–8)
ERYTHROCYTE [DISTWIDTH] IN BLOOD BY AUTOMATED COUNT: 16.7 % (ref 11.5–14.5)
EST. GFR  (AFRICAN AMERICAN): >60 ML/MIN/1.73 M^2
EST. GFR  (NON AFRICAN AMERICAN): >60 ML/MIN/1.73 M^2
GLUCOSE SERPL-MCNC: 121 MG/DL (ref 70–110)
GLUCOSE UR QL STRIP: NEGATIVE
HCT VFR BLD AUTO: 35.4 % (ref 37–48.5)
HGB BLD-MCNC: 11.2 G/DL (ref 12–16)
HGB UR QL STRIP: NEGATIVE
HYALINE CASTS UR QL AUTO: 0 /LPF
IMM GRANULOCYTES # BLD AUTO: 0.28 K/UL (ref 0–0.04)
IMM GRANULOCYTES NFR BLD AUTO: 3.2 % (ref 0–0.5)
KETONES UR QL STRIP: NEGATIVE
LEUKOCYTE ESTERASE UR QL STRIP: NEGATIVE
LYMPHOCYTES # BLD AUTO: 0.4 K/UL (ref 1–4.8)
LYMPHOCYTES NFR BLD: 5 % (ref 18–48)
MAGNESIUM SERPL-MCNC: 1.4 MG/DL (ref 1.6–2.6)
MCH RBC QN AUTO: 30.9 PG (ref 27–31)
MCHC RBC AUTO-ENTMCNC: 31.6 G/DL (ref 32–36)
MCV RBC AUTO: 98 FL (ref 82–98)
MICROSCOPIC COMMENT: NORMAL
MONOCYTES # BLD AUTO: 0.3 K/UL (ref 0.3–1)
MONOCYTES NFR BLD: 3.8 % (ref 4–15)
NEUTROPHILS # BLD AUTO: 7.8 K/UL (ref 1.8–7.7)
NEUTROPHILS NFR BLD: 87.5 % (ref 38–73)
NITRITE UR QL STRIP: NEGATIVE
NRBC BLD-RTO: 0 /100 WBC
PH UR STRIP: 6 [PH] (ref 5–8)
PHOSPHATE SERPL-MCNC: 2.4 MG/DL (ref 2.7–4.5)
PLATELET # BLD AUTO: 238 K/UL (ref 150–350)
PMV BLD AUTO: 10.9 FL (ref 9.2–12.9)
POTASSIUM SERPL-SCNC: 4.3 MMOL/L (ref 3.5–5.1)
PROT SERPL-MCNC: 6 G/DL (ref 6–8.4)
PROT UR QL STRIP: ABNORMAL
RBC # BLD AUTO: 3.63 M/UL (ref 4–5.4)
RBC #/AREA URNS AUTO: 0 /HPF (ref 0–4)
SARS-COV-2 RDRP RESP QL NAA+PROBE: NEGATIVE
SODIUM SERPL-SCNC: 138 MMOL/L (ref 136–145)
SP GR UR STRIP: 1.01 (ref 1–1.03)
URN SPEC COLLECT METH UR: ABNORMAL
WBC # BLD AUTO: 8.87 K/UL (ref 3.9–12.7)
WBC #/AREA URNS AUTO: 0 /HPF (ref 0–5)

## 2021-02-12 PROCEDURE — 81001 URINALYSIS AUTO W/SCOPE: CPT

## 2021-02-12 PROCEDURE — 25000003 PHARM REV CODE 250: Performed by: PHYSICIAN ASSISTANT

## 2021-02-12 PROCEDURE — 83735 ASSAY OF MAGNESIUM: CPT

## 2021-02-12 PROCEDURE — U0002 COVID-19 LAB TEST NON-CDC: HCPCS

## 2021-02-12 PROCEDURE — 99223 1ST HOSP IP/OBS HIGH 75: CPT | Mod: 24,AI,, | Performed by: PHYSICIAN ASSISTANT

## 2021-02-12 PROCEDURE — 36415 COLL VENOUS BLD VENIPUNCTURE: CPT

## 2021-02-12 PROCEDURE — 80053 COMPREHEN METABOLIC PANEL: CPT

## 2021-02-12 PROCEDURE — 84100 ASSAY OF PHOSPHORUS: CPT

## 2021-02-12 PROCEDURE — 85025 COMPLETE CBC W/AUTO DIFF WBC: CPT

## 2021-02-12 PROCEDURE — 87086 URINE CULTURE/COLONY COUNT: CPT

## 2021-02-12 PROCEDURE — 63600175 PHARM REV CODE 636 W HCPCS: Performed by: PHYSICIAN ASSISTANT

## 2021-02-12 PROCEDURE — 99223 PR INITIAL HOSPITAL CARE,LEVL III: ICD-10-PCS | Mod: 24,AI,, | Performed by: PHYSICIAN ASSISTANT

## 2021-02-12 PROCEDURE — 25000003 PHARM REV CODE 250: Performed by: TRANSPLANT SURGERY

## 2021-02-12 PROCEDURE — 20600001 HC STEP DOWN PRIVATE ROOM

## 2021-02-12 RX ORDER — LABETALOL 100 MG/1
200 TABLET, FILM COATED ORAL 2 TIMES DAILY
Status: DISCONTINUED | OUTPATIENT
Start: 2021-02-12 | End: 2021-02-14 | Stop reason: HOSPADM

## 2021-02-12 RX ORDER — CEFPODOXIME PROXETIL 200 MG/1
200 TABLET, FILM COATED ORAL EVERY 12 HOURS
Status: DISCONTINUED | OUTPATIENT
Start: 2021-02-12 | End: 2021-02-12

## 2021-02-12 RX ORDER — NIFEDIPINE 30 MG/1
30 TABLET, EXTENDED RELEASE ORAL 2 TIMES DAILY
Status: DISCONTINUED | OUTPATIENT
Start: 2021-02-12 | End: 2021-02-14 | Stop reason: HOSPADM

## 2021-02-12 RX ORDER — LANOLIN ALCOHOL/MO/W.PET/CERES
1200 CREAM (GRAM) TOPICAL 3 TIMES DAILY
Status: DISCONTINUED | OUTPATIENT
Start: 2021-02-12 | End: 2021-02-14 | Stop reason: HOSPADM

## 2021-02-12 RX ORDER — SODIUM CHLORIDE 0.9 % (FLUSH) 0.9 %
3 SYRINGE (ML) INJECTION
Status: DISCONTINUED | OUTPATIENT
Start: 2021-02-12 | End: 2021-02-14 | Stop reason: HOSPADM

## 2021-02-12 RX ORDER — CALCIUM CARBONATE 500(1250)
1000 TABLET ORAL 2 TIMES DAILY
Status: DISCONTINUED | OUTPATIENT
Start: 2021-02-12 | End: 2021-02-14 | Stop reason: HOSPADM

## 2021-02-12 RX ORDER — TACROLIMUS 1 MG/1
3 CAPSULE ORAL 2 TIMES DAILY
Status: DISCONTINUED | OUTPATIENT
Start: 2021-02-12 | End: 2021-02-14 | Stop reason: HOSPADM

## 2021-02-12 RX ORDER — SULFAMETHOXAZOLE AND TRIMETHOPRIM 400; 80 MG/1; MG/1
1 TABLET ORAL DAILY
Status: DISCONTINUED | OUTPATIENT
Start: 2021-02-13 | End: 2021-02-14 | Stop reason: HOSPADM

## 2021-02-12 RX ORDER — ONDANSETRON 8 MG/1
8 TABLET, ORALLY DISINTEGRATING ORAL EVERY 8 HOURS PRN
Status: DISCONTINUED | OUTPATIENT
Start: 2021-02-12 | End: 2021-02-14 | Stop reason: HOSPADM

## 2021-02-12 RX ORDER — SODIUM BICARBONATE 650 MG/1
2600 TABLET ORAL 2 TIMES DAILY
Status: DISCONTINUED | OUTPATIENT
Start: 2021-02-12 | End: 2021-02-14 | Stop reason: HOSPADM

## 2021-02-12 RX ORDER — MUPIROCIN 20 MG/G
OINTMENT TOPICAL 2 TIMES DAILY
Status: DISCONTINUED | OUTPATIENT
Start: 2021-02-12 | End: 2021-02-14 | Stop reason: HOSPADM

## 2021-02-12 RX ORDER — MAGNESIUM SULFATE HEPTAHYDRATE 40 MG/ML
2 INJECTION, SOLUTION INTRAVENOUS ONCE
Status: COMPLETED | OUTPATIENT
Start: 2021-02-12 | End: 2021-02-12

## 2021-02-12 RX ORDER — CALCITRIOL 0.25 UG/1
0.25 CAPSULE ORAL DAILY
Status: DISCONTINUED | OUTPATIENT
Start: 2021-02-13 | End: 2021-02-14 | Stop reason: HOSPADM

## 2021-02-12 RX ORDER — VALGANCICLOVIR 450 MG/1
450 TABLET, FILM COATED ORAL DAILY
Status: DISCONTINUED | OUTPATIENT
Start: 2021-02-13 | End: 2021-02-14 | Stop reason: HOSPADM

## 2021-02-12 RX ORDER — CEFPODOXIME PROXETIL 200 MG/1
200 TABLET, FILM COATED ORAL EVERY 12 HOURS
Status: DISCONTINUED | OUTPATIENT
Start: 2021-02-12 | End: 2021-02-13

## 2021-02-12 RX ORDER — ACETAMINOPHEN 325 MG/1
650 TABLET ORAL EVERY 8 HOURS PRN
Status: DISCONTINUED | OUTPATIENT
Start: 2021-02-12 | End: 2021-02-14 | Stop reason: HOSPADM

## 2021-02-12 RX ORDER — PANTOPRAZOLE SODIUM 40 MG/1
40 TABLET, DELAYED RELEASE ORAL DAILY
Status: DISCONTINUED | OUTPATIENT
Start: 2021-02-13 | End: 2021-02-14 | Stop reason: HOSPADM

## 2021-02-12 RX ADMIN — SODIUM BICARBONATE 650 MG TABLET 2600 MG: at 08:02

## 2021-02-12 RX ADMIN — MAGNESIUM SULFATE 2 G: 2 INJECTION INTRAVENOUS at 10:02

## 2021-02-12 RX ADMIN — LABETALOL HYDROCHLORIDE 200 MG: 100 TABLET, FILM COATED ORAL at 08:02

## 2021-02-12 RX ADMIN — CALCIUM 1000 MG: 500 TABLET ORAL at 08:02

## 2021-02-12 RX ADMIN — NIFEDIPINE 30 MG: 30 TABLET, FILM COATED, EXTENDED RELEASE ORAL at 08:02

## 2021-02-12 RX ADMIN — Medication 1200 MG: at 08:02

## 2021-02-12 RX ADMIN — CEFPODOXIME PROXETIL 200 MG: 200 TABLET, FILM COATED ORAL at 08:02

## 2021-02-12 RX ADMIN — TACROLIMUS 3 MG: 1 CAPSULE ORAL at 08:02

## 2021-02-12 RX ADMIN — MUPIROCIN: 20 OINTMENT TOPICAL at 08:02

## 2021-02-13 ENCOUNTER — ANESTHESIA EVENT (OUTPATIENT)
Dept: ENDOSCOPY | Facility: HOSPITAL | Age: 31
DRG: 700 | End: 2021-02-13
Payer: MEDICARE

## 2021-02-13 ENCOUNTER — ANESTHESIA (OUTPATIENT)
Dept: ENDOSCOPY | Facility: HOSPITAL | Age: 31
DRG: 700 | End: 2021-02-13
Payer: MEDICARE

## 2021-02-13 LAB
ALBUMIN SERPL BCP-MCNC: 2.9 G/DL (ref 3.5–5.2)
ANION GAP SERPL CALC-SCNC: 9 MMOL/L (ref 8–16)
ANISOCYTOSIS BLD QL SMEAR: SLIGHT
B-HCG UR QL: NEGATIVE
BASOPHILS NFR BLD: 0 % (ref 0–1.9)
BUN SERPL-MCNC: 16 MG/DL (ref 6–20)
CALCIUM SERPL-MCNC: 8.2 MG/DL (ref 8.7–10.5)
CHLORIDE SERPL-SCNC: 108 MMOL/L (ref 95–110)
CO2 SERPL-SCNC: 22 MMOL/L (ref 23–29)
CREAT SERPL-MCNC: 0.9 MG/DL (ref 0.5–1.4)
CTP QC/QA: YES
DIFFERENTIAL METHOD: ABNORMAL
EOSINOPHIL NFR BLD: 0 % (ref 0–8)
ERYTHROCYTE [DISTWIDTH] IN BLOOD BY AUTOMATED COUNT: 16.8 % (ref 11.5–14.5)
EST. GFR  (AFRICAN AMERICAN): >60 ML/MIN/1.73 M^2
EST. GFR  (NON AFRICAN AMERICAN): >60 ML/MIN/1.73 M^2
GLUCOSE SERPL-MCNC: 80 MG/DL (ref 70–110)
HCT VFR BLD AUTO: 34.8 % (ref 37–48.5)
HGB BLD-MCNC: 10.9 G/DL (ref 12–16)
IMM GRANULOCYTES # BLD AUTO: ABNORMAL K/UL (ref 0–0.04)
IMM GRANULOCYTES NFR BLD AUTO: ABNORMAL % (ref 0–0.5)
INR PPP: 0.9 (ref 0.8–1.2)
LYMPHOCYTES NFR BLD: 13 % (ref 18–48)
MAGNESIUM SERPL-MCNC: 1.8 MG/DL (ref 1.6–2.6)
MCH RBC QN AUTO: 30.4 PG (ref 27–31)
MCHC RBC AUTO-ENTMCNC: 31.3 G/DL (ref 32–36)
MCV RBC AUTO: 97 FL (ref 82–98)
MONOCYTES NFR BLD: 5 % (ref 4–15)
NEUTROPHILS NFR BLD: 82 % (ref 38–73)
NRBC BLD-RTO: 0 /100 WBC
PHOSPHATE SERPL-MCNC: 2.8 MG/DL (ref 2.7–4.5)
PLATELET # BLD AUTO: 197 K/UL (ref 150–350)
PLATELET BLD QL SMEAR: ABNORMAL
PMV BLD AUTO: 10.1 FL (ref 9.2–12.9)
POIKILOCYTOSIS BLD QL SMEAR: SLIGHT
POTASSIUM SERPL-SCNC: 4.2 MMOL/L (ref 3.5–5.1)
PROTHROMBIN TIME: 10 SEC (ref 9–12.5)
RBC # BLD AUTO: 3.59 M/UL (ref 4–5.4)
SODIUM SERPL-SCNC: 139 MMOL/L (ref 136–145)
TACROLIMUS BLD-MCNC: 6.3 NG/ML (ref 5–15)
WBC # BLD AUTO: 6.29 K/UL (ref 3.9–12.7)

## 2021-02-13 PROCEDURE — D9220A PRA ANESTHESIA: Mod: ANES,,, | Performed by: ANESTHESIOLOGY

## 2021-02-13 PROCEDURE — 85007 BL SMEAR W/DIFF WBC COUNT: CPT

## 2021-02-13 PROCEDURE — 80197 ASSAY OF TACROLIMUS: CPT

## 2021-02-13 PROCEDURE — D9220A PRA ANESTHESIA: ICD-10-PCS | Mod: CRNA,,, | Performed by: STUDENT IN AN ORGANIZED HEALTH CARE EDUCATION/TRAINING PROGRAM

## 2021-02-13 PROCEDURE — 37000008 HC ANESTHESIA 1ST 15 MINUTES

## 2021-02-13 PROCEDURE — D9220A PRA ANESTHESIA: ICD-10-PCS | Mod: ANES,,, | Performed by: ANESTHESIOLOGY

## 2021-02-13 PROCEDURE — 25500020 PHARM REV CODE 255: Performed by: TRANSPLANT SURGERY

## 2021-02-13 PROCEDURE — 25000003 PHARM REV CODE 250: Performed by: PHYSICIAN ASSISTANT

## 2021-02-13 PROCEDURE — 85610 PROTHROMBIN TIME: CPT

## 2021-02-13 PROCEDURE — 63600175 PHARM REV CODE 636 W HCPCS: Performed by: PHYSICIAN ASSISTANT

## 2021-02-13 PROCEDURE — 37000009 HC ANESTHESIA EA ADD 15 MINS

## 2021-02-13 PROCEDURE — 99223 1ST HOSP IP/OBS HIGH 75: CPT | Mod: GC,,, | Performed by: INTERNAL MEDICINE

## 2021-02-13 PROCEDURE — 71000033 HC RECOVERY, INTIAL HOUR

## 2021-02-13 PROCEDURE — 20600001 HC STEP DOWN PRIVATE ROOM

## 2021-02-13 PROCEDURE — D9220A PRA ANESTHESIA: Mod: CRNA,,, | Performed by: STUDENT IN AN ORGANIZED HEALTH CARE EDUCATION/TRAINING PROGRAM

## 2021-02-13 PROCEDURE — 81025 URINE PREGNANCY TEST: CPT | Performed by: STUDENT IN AN ORGANIZED HEALTH CARE EDUCATION/TRAINING PROGRAM

## 2021-02-13 PROCEDURE — 85027 COMPLETE CBC AUTOMATED: CPT

## 2021-02-13 PROCEDURE — 63600175 PHARM REV CODE 636 W HCPCS: Performed by: STUDENT IN AN ORGANIZED HEALTH CARE EDUCATION/TRAINING PROGRAM

## 2021-02-13 PROCEDURE — 80069 RENAL FUNCTION PANEL: CPT

## 2021-02-13 PROCEDURE — 83735 ASSAY OF MAGNESIUM: CPT

## 2021-02-13 PROCEDURE — 99223 PR INITIAL HOSPITAL CARE,LEVL III: ICD-10-PCS | Mod: GC,,, | Performed by: INTERNAL MEDICINE

## 2021-02-13 PROCEDURE — 36415 COLL VENOUS BLD VENIPUNCTURE: CPT

## 2021-02-13 PROCEDURE — 25000003 PHARM REV CODE 250: Performed by: STUDENT IN AN ORGANIZED HEALTH CARE EDUCATION/TRAINING PROGRAM

## 2021-02-13 PROCEDURE — 94761 N-INVAS EAR/PLS OXIMETRY MLT: CPT

## 2021-02-13 RX ORDER — ONDANSETRON 2 MG/ML
4 INJECTION INTRAMUSCULAR; INTRAVENOUS DAILY PRN
Status: DISCONTINUED | OUTPATIENT
Start: 2021-02-13 | End: 2021-02-14 | Stop reason: HOSPADM

## 2021-02-13 RX ORDER — FENTANYL CITRATE 50 UG/ML
INJECTION, SOLUTION INTRAMUSCULAR; INTRAVENOUS
Status: DISCONTINUED | OUTPATIENT
Start: 2021-02-13 | End: 2021-02-13

## 2021-02-13 RX ORDER — PROPOFOL 10 MG/ML
VIAL (ML) INTRAVENOUS CONTINUOUS PRN
Status: DISCONTINUED | OUTPATIENT
Start: 2021-02-13 | End: 2021-02-13

## 2021-02-13 RX ORDER — LIDOCAINE HYDROCHLORIDE 20 MG/ML
INJECTION INTRAVENOUS
Status: DISCONTINUED | OUTPATIENT
Start: 2021-02-13 | End: 2021-02-13

## 2021-02-13 RX ORDER — ONDANSETRON 2 MG/ML
INJECTION INTRAMUSCULAR; INTRAVENOUS
Status: DISCONTINUED | OUTPATIENT
Start: 2021-02-13 | End: 2021-02-13

## 2021-02-13 RX ORDER — SODIUM CHLORIDE 0.9 % (FLUSH) 0.9 %
10 SYRINGE (ML) INJECTION
Status: DISCONTINUED | OUTPATIENT
Start: 2021-02-13 | End: 2021-02-14 | Stop reason: HOSPADM

## 2021-02-13 RX ORDER — OXYCODONE HYDROCHLORIDE 5 MG/1
5 TABLET ORAL
Status: DISCONTINUED | OUTPATIENT
Start: 2021-02-13 | End: 2021-02-14 | Stop reason: HOSPADM

## 2021-02-13 RX ORDER — CEFPODOXIME PROXETIL 200 MG/1
200 TABLET, FILM COATED ORAL EVERY 12 HOURS
Status: DISCONTINUED | OUTPATIENT
Start: 2021-02-13 | End: 2021-02-14 | Stop reason: HOSPADM

## 2021-02-13 RX ORDER — FENTANYL CITRATE 50 UG/ML
25 INJECTION, SOLUTION INTRAMUSCULAR; INTRAVENOUS EVERY 5 MIN PRN
Status: DISCONTINUED | OUTPATIENT
Start: 2021-02-13 | End: 2021-02-14 | Stop reason: HOSPADM

## 2021-02-13 RX ORDER — MIDAZOLAM HYDROCHLORIDE 1 MG/ML
INJECTION INTRAMUSCULAR; INTRAVENOUS
Status: DISCONTINUED | OUTPATIENT
Start: 2021-02-13 | End: 2021-02-13

## 2021-02-13 RX ORDER — PROPOFOL 10 MG/ML
VIAL (ML) INTRAVENOUS
Status: DISCONTINUED | OUTPATIENT
Start: 2021-02-13 | End: 2021-02-13

## 2021-02-13 RX ADMIN — SODIUM BICARBONATE 650 MG TABLET 2600 MG: at 08:02

## 2021-02-13 RX ADMIN — CEFTRIAXONE 1 G: 1 INJECTION, POWDER, FOR SOLUTION INTRAMUSCULAR; INTRAVENOUS at 11:02

## 2021-02-13 RX ADMIN — Medication 1200 MG: at 08:02

## 2021-02-13 RX ADMIN — CEFPODOXIME PROXETIL 200 MG: 200 TABLET, FILM COATED ORAL at 08:02

## 2021-02-13 RX ADMIN — PANTOPRAZOLE SODIUM 40 MG: 40 TABLET, DELAYED RELEASE ORAL at 08:02

## 2021-02-13 RX ADMIN — FENTANYL CITRATE 50 MCG: 50 INJECTION, SOLUTION INTRAMUSCULAR; INTRAVENOUS at 11:02

## 2021-02-13 RX ADMIN — PROPOFOL 75 MCG/KG/MIN: 10 INJECTION, EMULSION INTRAVENOUS at 11:02

## 2021-02-13 RX ADMIN — IOHEXOL 30 ML: 300 INJECTION, SOLUTION INTRAVENOUS at 01:02

## 2021-02-13 RX ADMIN — NIFEDIPINE 30 MG: 30 TABLET, FILM COATED, EXTENDED RELEASE ORAL at 08:02

## 2021-02-13 RX ADMIN — LABETALOL HYDROCHLORIDE 200 MG: 100 TABLET, FILM COATED ORAL at 08:02

## 2021-02-13 RX ADMIN — VALGANCICLOVIR 450 MG: 450 TABLET, FILM COATED ORAL at 08:02

## 2021-02-13 RX ADMIN — ONDANSETRON 4 MG: 2 INJECTION INTRAMUSCULAR; INTRAVENOUS at 11:02

## 2021-02-13 RX ADMIN — CALCITRIOL CAPSULES 0.25 MCG 0.25 MCG: 0.25 CAPSULE ORAL at 08:02

## 2021-02-13 RX ADMIN — TACROLIMUS 3 MG: 1 CAPSULE ORAL at 08:02

## 2021-02-13 RX ADMIN — CALCIUM 1000 MG: 500 TABLET ORAL at 08:02

## 2021-02-13 RX ADMIN — SULFAMETHOXAZOLE AND TRIMETHOPRIM 1 TABLET: 400; 80 TABLET ORAL at 08:02

## 2021-02-13 RX ADMIN — MUPIROCIN: 20 OINTMENT TOPICAL at 08:02

## 2021-02-13 RX ADMIN — PROPOFOL 20 MG: 10 INJECTION, EMULSION INTRAVENOUS at 11:02

## 2021-02-13 RX ADMIN — LIDOCAINE HYDROCHLORIDE 50 MG: 20 INJECTION, SOLUTION INTRAVENOUS at 11:02

## 2021-02-13 RX ADMIN — Medication 1200 MG: at 03:02

## 2021-02-13 RX ADMIN — MIDAZOLAM 2 MG: 1 INJECTION INTRAMUSCULAR; INTRAVENOUS at 11:02

## 2021-02-13 RX ADMIN — PREDNISONE 15 MG: 10 TABLET ORAL at 08:02

## 2021-02-13 RX ADMIN — TACROLIMUS 3 MG: 1 CAPSULE ORAL at 05:02

## 2021-02-14 VITALS
HEART RATE: 91 BPM | DIASTOLIC BLOOD PRESSURE: 82 MMHG | TEMPERATURE: 98 F | BODY MASS INDEX: 39.58 KG/M2 | SYSTOLIC BLOOD PRESSURE: 127 MMHG | RESPIRATION RATE: 19 BRPM | OXYGEN SATURATION: 98 % | HEIGHT: 66 IN | WEIGHT: 246.25 LBS

## 2021-02-14 LAB
ALBUMIN SERPL BCP-MCNC: 2.8 G/DL (ref 3.5–5.2)
ANION GAP SERPL CALC-SCNC: 8 MMOL/L (ref 8–16)
BACTERIA UR CULT: NO GROWTH
BASOPHILS # BLD AUTO: 0.04 K/UL (ref 0–0.2)
BASOPHILS NFR BLD: 0.6 % (ref 0–1.9)
BUN SERPL-MCNC: 22 MG/DL (ref 6–20)
CALCIUM SERPL-MCNC: 8.3 MG/DL (ref 8.7–10.5)
CHLORIDE SERPL-SCNC: 109 MMOL/L (ref 95–110)
CO2 SERPL-SCNC: 24 MMOL/L (ref 23–29)
CREAT SERPL-MCNC: 1.1 MG/DL (ref 0.5–1.4)
DIFFERENTIAL METHOD: ABNORMAL
EOSINOPHIL # BLD AUTO: 0 K/UL (ref 0–0.5)
EOSINOPHIL NFR BLD: 0.4 % (ref 0–8)
ERYTHROCYTE [DISTWIDTH] IN BLOOD BY AUTOMATED COUNT: 16.7 % (ref 11.5–14.5)
EST. GFR  (AFRICAN AMERICAN): >60 ML/MIN/1.73 M^2
EST. GFR  (NON AFRICAN AMERICAN): >60 ML/MIN/1.73 M^2
GLUCOSE SERPL-MCNC: 75 MG/DL (ref 70–110)
HCT VFR BLD AUTO: 36.6 % (ref 37–48.5)
HGB BLD-MCNC: 11.5 G/DL (ref 12–16)
IMM GRANULOCYTES # BLD AUTO: 0.29 K/UL (ref 0–0.04)
IMM GRANULOCYTES NFR BLD AUTO: 4.1 % (ref 0–0.5)
LYMPHOCYTES # BLD AUTO: 0.5 K/UL (ref 1–4.8)
LYMPHOCYTES NFR BLD: 7.2 % (ref 18–48)
MAGNESIUM SERPL-MCNC: 1.8 MG/DL (ref 1.6–2.6)
MCH RBC QN AUTO: 30.8 PG (ref 27–31)
MCHC RBC AUTO-ENTMCNC: 31.4 G/DL (ref 32–36)
MCV RBC AUTO: 98 FL (ref 82–98)
MONOCYTES # BLD AUTO: 0.4 K/UL (ref 0.3–1)
MONOCYTES NFR BLD: 5.6 % (ref 4–15)
NEUTROPHILS # BLD AUTO: 5.9 K/UL (ref 1.8–7.7)
NEUTROPHILS NFR BLD: 82.1 % (ref 38–73)
NRBC BLD-RTO: 0 /100 WBC
PHOSPHATE SERPL-MCNC: 2.8 MG/DL (ref 2.7–4.5)
PLATELET # BLD AUTO: 196 K/UL (ref 150–350)
PMV BLD AUTO: 10.2 FL (ref 9.2–12.9)
POTASSIUM SERPL-SCNC: 4.8 MMOL/L (ref 3.5–5.1)
RBC # BLD AUTO: 3.73 M/UL (ref 4–5.4)
SODIUM SERPL-SCNC: 141 MMOL/L (ref 136–145)
TACROLIMUS BLD-MCNC: 9.3 NG/ML (ref 5–15)
WBC # BLD AUTO: 7.12 K/UL (ref 3.9–12.7)

## 2021-02-14 PROCEDURE — 85025 COMPLETE CBC W/AUTO DIFF WBC: CPT

## 2021-02-14 PROCEDURE — 83735 ASSAY OF MAGNESIUM: CPT

## 2021-02-14 PROCEDURE — 80069 RENAL FUNCTION PANEL: CPT

## 2021-02-14 PROCEDURE — 99239 PR HOSPITAL DISCHARGE DAY,>30 MIN: ICD-10-PCS | Mod: ,,, | Performed by: NURSE PRACTITIONER

## 2021-02-14 PROCEDURE — 25000003 PHARM REV CODE 250: Performed by: PHYSICIAN ASSISTANT

## 2021-02-14 PROCEDURE — 80197 ASSAY OF TACROLIMUS: CPT

## 2021-02-14 PROCEDURE — 99239 HOSP IP/OBS DSCHRG MGMT >30: CPT | Mod: ,,, | Performed by: NURSE PRACTITIONER

## 2021-02-14 PROCEDURE — 63600175 PHARM REV CODE 636 W HCPCS: Performed by: PHYSICIAN ASSISTANT

## 2021-02-14 PROCEDURE — 36415 COLL VENOUS BLD VENIPUNCTURE: CPT

## 2021-02-14 RX ORDER — VALGANCICLOVIR 450 MG/1
900 TABLET, FILM COATED ORAL DAILY
Qty: 60 TABLET | Refills: 1 | Status: SHIPPED | OUTPATIENT
Start: 2021-02-14 | End: 2021-06-08

## 2021-02-14 RX ORDER — VALGANCICLOVIR 450 MG/1
900 TABLET, FILM COATED ORAL DAILY
Status: CANCELLED | OUTPATIENT
Start: 2021-02-15

## 2021-02-14 RX ADMIN — SULFAMETHOXAZOLE AND TRIMETHOPRIM 1 TABLET: 400; 80 TABLET ORAL at 08:02

## 2021-02-14 RX ADMIN — PANTOPRAZOLE SODIUM 40 MG: 40 TABLET, DELAYED RELEASE ORAL at 08:02

## 2021-02-14 RX ADMIN — Medication 1200 MG: at 03:02

## 2021-02-14 RX ADMIN — LABETALOL HYDROCHLORIDE 200 MG: 100 TABLET, FILM COATED ORAL at 08:02

## 2021-02-14 RX ADMIN — SODIUM BICARBONATE 650 MG TABLET 2600 MG: at 08:02

## 2021-02-14 RX ADMIN — PREDNISONE 15 MG: 10 TABLET ORAL at 08:02

## 2021-02-14 RX ADMIN — Medication 1200 MG: at 08:02

## 2021-02-14 RX ADMIN — NIFEDIPINE 30 MG: 30 TABLET, FILM COATED, EXTENDED RELEASE ORAL at 08:02

## 2021-02-14 RX ADMIN — MUPIROCIN: 20 OINTMENT TOPICAL at 08:02

## 2021-02-14 RX ADMIN — TACROLIMUS 3 MG: 1 CAPSULE ORAL at 08:02

## 2021-02-14 RX ADMIN — CALCIUM 1000 MG: 500 TABLET ORAL at 08:02

## 2021-02-14 RX ADMIN — VALGANCICLOVIR 450 MG: 450 TABLET, FILM COATED ORAL at 08:02

## 2021-02-14 RX ADMIN — CEFPODOXIME PROXETIL 200 MG: 200 TABLET, FILM COATED ORAL at 08:02

## 2021-02-14 RX ADMIN — CALCITRIOL CAPSULES 0.25 MCG 0.25 MCG: 0.25 CAPSULE ORAL at 08:02

## 2021-02-15 ENCOUNTER — LAB VISIT (OUTPATIENT)
Dept: LAB | Facility: HOSPITAL | Age: 31
End: 2021-02-15
Attending: INTERNAL MEDICINE
Payer: MEDICARE

## 2021-02-15 DIAGNOSIS — Z94.0 KIDNEY REPLACED BY TRANSPLANT: ICD-10-CM

## 2021-02-15 DIAGNOSIS — E83.39 HYPOPHOSPHATEMIA: Primary | ICD-10-CM

## 2021-02-15 LAB
ALBUMIN SERPL BCP-MCNC: 3.2 G/DL (ref 3.5–5.2)
ANION GAP SERPL CALC-SCNC: 6 MMOL/L (ref 8–16)
BASOPHILS # BLD AUTO: 0.07 K/UL (ref 0–0.2)
BASOPHILS NFR BLD: 0.8 % (ref 0–1.9)
BUN SERPL-MCNC: 17 MG/DL (ref 6–20)
CALCIUM SERPL-MCNC: 8.7 MG/DL (ref 8.7–10.5)
CHLORIDE SERPL-SCNC: 108 MMOL/L (ref 95–110)
CO2 SERPL-SCNC: 26 MMOL/L (ref 23–29)
CREAT SERPL-MCNC: 1.1 MG/DL (ref 0.5–1.4)
DIFFERENTIAL METHOD: ABNORMAL
EOSINOPHIL # BLD AUTO: 0.1 K/UL (ref 0–0.5)
EOSINOPHIL NFR BLD: 0.7 % (ref 0–8)
ERYTHROCYTE [DISTWIDTH] IN BLOOD BY AUTOMATED COUNT: 16.6 % (ref 11.5–14.5)
EST. GFR  (AFRICAN AMERICAN): >60 ML/MIN/1.73 M^2
EST. GFR  (NON AFRICAN AMERICAN): >60 ML/MIN/1.73 M^2
GLUCOSE SERPL-MCNC: 78 MG/DL (ref 70–110)
HCT VFR BLD AUTO: 41.4 % (ref 37–48.5)
HGB BLD-MCNC: 12.5 G/DL (ref 12–16)
IMM GRANULOCYTES # BLD AUTO: 0.35 K/UL (ref 0–0.04)
IMM GRANULOCYTES NFR BLD AUTO: 4 % (ref 0–0.5)
LYMPHOCYTES # BLD AUTO: 0.6 K/UL (ref 1–4.8)
LYMPHOCYTES NFR BLD: 6.4 % (ref 18–48)
MAGNESIUM SERPL-MCNC: 1.8 MG/DL (ref 1.6–2.6)
MCH RBC QN AUTO: 30 PG (ref 27–31)
MCHC RBC AUTO-ENTMCNC: 30.2 G/DL (ref 32–36)
MCV RBC AUTO: 99 FL (ref 82–98)
MONOCYTES # BLD AUTO: 0.5 K/UL (ref 0.3–1)
MONOCYTES NFR BLD: 5.9 % (ref 4–15)
NEUTROPHILS # BLD AUTO: 7.2 K/UL (ref 1.8–7.7)
NEUTROPHILS NFR BLD: 82.2 % (ref 38–73)
NRBC BLD-RTO: 0 /100 WBC
PHOSPHATE SERPL-MCNC: 2.1 MG/DL (ref 2.7–4.5)
PLATELET # BLD AUTO: 235 K/UL (ref 150–350)
PMV BLD AUTO: 9.9 FL (ref 9.2–12.9)
POTASSIUM SERPL-SCNC: 4.3 MMOL/L (ref 3.5–5.1)
RBC # BLD AUTO: 4.17 M/UL (ref 4–5.4)
SODIUM SERPL-SCNC: 140 MMOL/L (ref 136–145)
TACROLIMUS BLD-MCNC: 7.2 NG/ML (ref 5–15)
WBC # BLD AUTO: 8.71 K/UL (ref 3.9–12.7)

## 2021-02-15 PROCEDURE — 80197 ASSAY OF TACROLIMUS: CPT

## 2021-02-15 PROCEDURE — 36415 COLL VENOUS BLD VENIPUNCTURE: CPT

## 2021-02-15 PROCEDURE — 85025 COMPLETE CBC W/AUTO DIFF WBC: CPT

## 2021-02-15 PROCEDURE — 80069 RENAL FUNCTION PANEL: CPT

## 2021-02-15 PROCEDURE — 83735 ASSAY OF MAGNESIUM: CPT

## 2021-02-17 DIAGNOSIS — Z94.0 KIDNEY REPLACED BY TRANSPLANT: Primary | ICD-10-CM

## 2021-02-22 ENCOUNTER — LAB VISIT (OUTPATIENT)
Dept: LAB | Facility: HOSPITAL | Age: 31
End: 2021-02-22
Attending: INTERNAL MEDICINE
Payer: MEDICARE

## 2021-02-22 DIAGNOSIS — I12.9 RENAL HYPERTENSION: ICD-10-CM

## 2021-02-22 DIAGNOSIS — Z94.0 KIDNEY REPLACED BY TRANSPLANT: Primary | ICD-10-CM

## 2021-02-22 DIAGNOSIS — Z94.0 KIDNEY REPLACED BY TRANSPLANT: ICD-10-CM

## 2021-02-22 LAB
ALBUMIN SERPL BCP-MCNC: 3.4 G/DL (ref 3.5–5.2)
ANION GAP SERPL CALC-SCNC: 12 MMOL/L (ref 8–16)
BASOPHILS # BLD AUTO: 0.05 K/UL (ref 0–0.2)
BASOPHILS NFR BLD: 0.6 % (ref 0–1.9)
BUN SERPL-MCNC: 21 MG/DL (ref 6–20)
CALCIUM SERPL-MCNC: 9 MG/DL (ref 8.7–10.5)
CHLORIDE SERPL-SCNC: 104 MMOL/L (ref 95–110)
CO2 SERPL-SCNC: 21 MMOL/L (ref 23–29)
CREAT SERPL-MCNC: 1 MG/DL (ref 0.5–1.4)
DIFFERENTIAL METHOD: ABNORMAL
EOSINOPHIL # BLD AUTO: 0.1 K/UL (ref 0–0.5)
EOSINOPHIL NFR BLD: 1.1 % (ref 0–8)
ERYTHROCYTE [DISTWIDTH] IN BLOOD BY AUTOMATED COUNT: 15.9 % (ref 11.5–14.5)
EST. GFR  (AFRICAN AMERICAN): >60 ML/MIN/1.73 M^2
EST. GFR  (NON AFRICAN AMERICAN): >60 ML/MIN/1.73 M^2
GLUCOSE SERPL-MCNC: 77 MG/DL (ref 70–110)
HCT VFR BLD AUTO: 42.4 % (ref 37–48.5)
HGB BLD-MCNC: 13.1 G/DL (ref 12–16)
IMM GRANULOCYTES # BLD AUTO: 0.12 K/UL (ref 0–0.04)
IMM GRANULOCYTES NFR BLD AUTO: 1.3 % (ref 0–0.5)
LYMPHOCYTES # BLD AUTO: 0.6 K/UL (ref 1–4.8)
LYMPHOCYTES NFR BLD: 6.5 % (ref 18–48)
MAGNESIUM SERPL-MCNC: 1.6 MG/DL (ref 1.6–2.6)
MCH RBC QN AUTO: 29.8 PG (ref 27–31)
MCHC RBC AUTO-ENTMCNC: 30.9 G/DL (ref 32–36)
MCV RBC AUTO: 96 FL (ref 82–98)
MONOCYTES # BLD AUTO: 0.3 K/UL (ref 0.3–1)
MONOCYTES NFR BLD: 3.8 % (ref 4–15)
NEUTROPHILS # BLD AUTO: 7.7 K/UL (ref 1.8–7.7)
NEUTROPHILS NFR BLD: 86.7 % (ref 38–73)
NRBC BLD-RTO: 0 /100 WBC
PHOSPHATE SERPL-MCNC: 2.2 MG/DL (ref 2.7–4.5)
PLATELET # BLD AUTO: 262 K/UL (ref 150–350)
PMV BLD AUTO: 10.6 FL (ref 9.2–12.9)
POTASSIUM SERPL-SCNC: 4.1 MMOL/L (ref 3.5–5.1)
RBC # BLD AUTO: 4.4 M/UL (ref 4–5.4)
SODIUM SERPL-SCNC: 137 MMOL/L (ref 136–145)
TACROLIMUS BLD-MCNC: 7.9 NG/ML (ref 5–15)
WBC # BLD AUTO: 8.9 K/UL (ref 3.9–12.7)

## 2021-02-22 PROCEDURE — 80197 ASSAY OF TACROLIMUS: CPT

## 2021-02-22 PROCEDURE — 85025 COMPLETE CBC W/AUTO DIFF WBC: CPT

## 2021-02-22 PROCEDURE — 80069 RENAL FUNCTION PANEL: CPT

## 2021-02-22 PROCEDURE — 36415 COLL VENOUS BLD VENIPUNCTURE: CPT

## 2021-02-22 PROCEDURE — 83735 ASSAY OF MAGNESIUM: CPT

## 2021-02-22 RX ORDER — OLMESARTAN MEDOXOMIL 20 MG/1
20 TABLET ORAL DAILY
Qty: 30 TABLET | Refills: 11 | Status: SHIPPED | OUTPATIENT
Start: 2021-02-22 | End: 2022-01-11

## 2021-02-24 ENCOUNTER — OFFICE VISIT (OUTPATIENT)
Dept: TRANSPLANT | Facility: CLINIC | Age: 31
End: 2021-02-24
Payer: MEDICARE

## 2021-02-24 VITALS
BODY MASS INDEX: 39.29 KG/M2 | HEIGHT: 66 IN | DIASTOLIC BLOOD PRESSURE: 77 MMHG | BODY MASS INDEX: 39.29 KG/M2 | OXYGEN SATURATION: 100 % | HEART RATE: 81 BPM | DIASTOLIC BLOOD PRESSURE: 77 MMHG | RESPIRATION RATE: 18 BRPM | HEIGHT: 66 IN | SYSTOLIC BLOOD PRESSURE: 119 MMHG | WEIGHT: 244.5 LBS | TEMPERATURE: 98 F | HEART RATE: 81 BPM | WEIGHT: 244.5 LBS | TEMPERATURE: 98 F | RESPIRATION RATE: 18 BRPM | SYSTOLIC BLOOD PRESSURE: 119 MMHG | OXYGEN SATURATION: 100 %

## 2021-02-24 DIAGNOSIS — Z79.60 LONG-TERM USE OF IMMUNOSUPPRESSANT MEDICATION: ICD-10-CM

## 2021-02-24 DIAGNOSIS — T86.898 URINE LEAK FROM TRANSPLANTED URETER: Primary | ICD-10-CM

## 2021-02-24 DIAGNOSIS — I10 ESSENTIAL HYPERTENSION: ICD-10-CM

## 2021-02-24 DIAGNOSIS — N18.2 CKD (CHRONIC KIDNEY DISEASE) STAGE 2, GFR 60-89 ML/MIN: ICD-10-CM

## 2021-02-24 DIAGNOSIS — Z94.0 KIDNEY REPLACED BY TRANSPLANT: Primary | ICD-10-CM

## 2021-02-24 PROCEDURE — 99999 PR PBB SHADOW E&M-EST. PATIENT-LVL IV: ICD-10-PCS | Mod: PBBFAC,,, | Performed by: NURSE PRACTITIONER

## 2021-02-24 PROCEDURE — 99215 PR OFFICE/OUTPT VISIT, EST, LEVL V, 40-54 MIN: ICD-10-PCS | Mod: S$PBB,,, | Performed by: TRANSPLANT SURGERY

## 2021-02-24 PROCEDURE — 99999 PR PBB SHADOW E&M-EST. PATIENT-LVL IV: CPT | Mod: PBBFAC,,, | Performed by: NURSE PRACTITIONER

## 2021-02-24 PROCEDURE — 99214 OFFICE O/P EST MOD 30 MIN: CPT | Mod: PBBFAC | Performed by: NURSE PRACTITIONER

## 2021-02-24 PROCEDURE — 99999 PR PBB SHADOW E&M-EST. PATIENT-LVL III: CPT | Mod: PBBFAC,,,

## 2021-02-24 PROCEDURE — 99215 OFFICE O/P EST HI 40 MIN: CPT | Mod: S$PBB,,, | Performed by: TRANSPLANT SURGERY

## 2021-02-24 PROCEDURE — 99215 OFFICE O/P EST HI 40 MIN: CPT | Mod: S$PBB,,, | Performed by: NURSE PRACTITIONER

## 2021-02-24 PROCEDURE — 99215 PR OFFICE/OUTPT VISIT, EST, LEVL V, 40-54 MIN: ICD-10-PCS | Mod: S$PBB,,, | Performed by: NURSE PRACTITIONER

## 2021-02-24 PROCEDURE — 99999 PR PBB SHADOW E&M-EST. PATIENT-LVL III: ICD-10-PCS | Mod: PBBFAC,,,

## 2021-02-24 PROCEDURE — 99213 OFFICE O/P EST LOW 20 MIN: CPT | Mod: PBBFAC,27

## 2021-02-24 RX ORDER — CALCIUM CARBONATE 500(1250)
1 TABLET ORAL 2 TIMES DAILY
Qty: 120 TABLET | Refills: 5 | Status: SHIPPED | OUTPATIENT
Start: 2021-02-24 | End: 2021-03-18 | Stop reason: SDUPTHER

## 2021-02-25 DIAGNOSIS — Z94.0 KIDNEY REPLACED BY TRANSPLANT: Primary | ICD-10-CM

## 2021-03-02 ENCOUNTER — LAB VISIT (OUTPATIENT)
Dept: LAB | Facility: HOSPITAL | Age: 31
End: 2021-03-02
Attending: INTERNAL MEDICINE
Payer: MEDICARE

## 2021-03-02 DIAGNOSIS — Z94.0 KIDNEY REPLACED BY TRANSPLANT: ICD-10-CM

## 2021-03-02 DIAGNOSIS — Z94.0 KIDNEY REPLACED BY TRANSPLANT: Primary | ICD-10-CM

## 2021-03-02 LAB
ALBUMIN SERPL BCP-MCNC: 3.1 G/DL (ref 3.5–5.2)
ANION GAP SERPL CALC-SCNC: 10 MMOL/L (ref 8–16)
BASOPHILS # BLD AUTO: 0.04 K/UL (ref 0–0.2)
BASOPHILS NFR BLD: 0.5 % (ref 0–1.9)
BUN SERPL-MCNC: 15 MG/DL (ref 6–20)
CALCIUM SERPL-MCNC: 8.7 MG/DL (ref 8.7–10.5)
CHLORIDE SERPL-SCNC: 108 MMOL/L (ref 95–110)
CO2 SERPL-SCNC: 20 MMOL/L (ref 23–29)
CREAT SERPL-MCNC: 1.2 MG/DL (ref 0.5–1.4)
DIFFERENTIAL METHOD: ABNORMAL
EOSINOPHIL # BLD AUTO: 0.1 K/UL (ref 0–0.5)
EOSINOPHIL NFR BLD: 1 % (ref 0–8)
ERYTHROCYTE [DISTWIDTH] IN BLOOD BY AUTOMATED COUNT: 15.2 % (ref 11.5–14.5)
EST. GFR  (AFRICAN AMERICAN): >60 ML/MIN/1.73 M^2
EST. GFR  (NON AFRICAN AMERICAN): >60 ML/MIN/1.73 M^2
GLUCOSE SERPL-MCNC: 82 MG/DL (ref 70–110)
HCT VFR BLD AUTO: 40.5 % (ref 37–48.5)
HGB BLD-MCNC: 12.5 G/DL (ref 12–16)
IMM GRANULOCYTES # BLD AUTO: 0.13 K/UL (ref 0–0.04)
IMM GRANULOCYTES NFR BLD AUTO: 1.6 % (ref 0–0.5)
LYMPHOCYTES # BLD AUTO: 0.4 K/UL (ref 1–4.8)
LYMPHOCYTES NFR BLD: 5.4 % (ref 18–48)
MAGNESIUM SERPL-MCNC: 1.8 MG/DL (ref 1.6–2.6)
MCH RBC QN AUTO: 29.6 PG (ref 27–31)
MCHC RBC AUTO-ENTMCNC: 30.9 G/DL (ref 32–36)
MCV RBC AUTO: 96 FL (ref 82–98)
MONOCYTES # BLD AUTO: 0.3 K/UL (ref 0.3–1)
MONOCYTES NFR BLD: 4.2 % (ref 4–15)
NEUTROPHILS # BLD AUTO: 7.1 K/UL (ref 1.8–7.7)
NEUTROPHILS NFR BLD: 87.3 % (ref 38–73)
NRBC BLD-RTO: 0 /100 WBC
PHOSPHATE SERPL-MCNC: 2.2 MG/DL (ref 2.7–4.5)
PLATELET # BLD AUTO: 263 K/UL (ref 150–350)
PMV BLD AUTO: 10.1 FL (ref 9.2–12.9)
POTASSIUM SERPL-SCNC: 4.5 MMOL/L (ref 3.5–5.1)
RBC # BLD AUTO: 4.22 M/UL (ref 4–5.4)
SODIUM SERPL-SCNC: 138 MMOL/L (ref 136–145)
WBC # BLD AUTO: 8.13 K/UL (ref 3.9–12.7)

## 2021-03-02 PROCEDURE — 36415 COLL VENOUS BLD VENIPUNCTURE: CPT

## 2021-03-02 PROCEDURE — 83735 ASSAY OF MAGNESIUM: CPT

## 2021-03-02 PROCEDURE — 80197 ASSAY OF TACROLIMUS: CPT

## 2021-03-02 PROCEDURE — 80069 RENAL FUNCTION PANEL: CPT

## 2021-03-02 PROCEDURE — 85025 COMPLETE CBC W/AUTO DIFF WBC: CPT

## 2021-03-03 ENCOUNTER — OFFICE VISIT (OUTPATIENT)
Dept: TRANSPLANT | Facility: CLINIC | Age: 31
End: 2021-03-03
Payer: MEDICARE

## 2021-03-03 VITALS
WEIGHT: 245.81 LBS | TEMPERATURE: 97 F | RESPIRATION RATE: 18 BRPM | SYSTOLIC BLOOD PRESSURE: 119 MMHG | HEART RATE: 88 BPM | HEIGHT: 66 IN | DIASTOLIC BLOOD PRESSURE: 77 MMHG | BODY MASS INDEX: 39.51 KG/M2 | OXYGEN SATURATION: 99 %

## 2021-03-03 DIAGNOSIS — T86.898 URINE LEAK FROM TRANSPLANTED URETER: Primary | ICD-10-CM

## 2021-03-03 DIAGNOSIS — L03.311 CELLULITIS OF ABDOMINAL WALL: ICD-10-CM

## 2021-03-03 DIAGNOSIS — N30.00 ACUTE CYSTITIS WITHOUT HEMATURIA: ICD-10-CM

## 2021-03-03 DIAGNOSIS — Z94.0 KIDNEY REPLACED BY TRANSPLANT: ICD-10-CM

## 2021-03-03 LAB
BACTERIA #/AREA URNS AUTO: NORMAL /HPF
BILIRUB UR QL STRIP: NEGATIVE
CLARITY UR REFRACT.AUTO: CLEAR
COLOR UR AUTO: YELLOW
FUNGUS SPEC CULT: NORMAL
GLUCOSE UR QL STRIP: NEGATIVE
HGB UR QL STRIP: NEGATIVE
KETONES UR QL STRIP: NEGATIVE
LEUKOCYTE ESTERASE UR QL STRIP: ABNORMAL
MICROSCOPIC COMMENT: NORMAL
NITRITE UR QL STRIP: NEGATIVE
PH UR STRIP: 7 [PH] (ref 5–8)
PROT UR QL STRIP: NEGATIVE
RBC #/AREA URNS AUTO: 3 /HPF (ref 0–4)
SP GR UR STRIP: 1.01 (ref 1–1.03)
TACROLIMUS BLD-MCNC: 6.8 NG/ML (ref 5–15)
URN SPEC COLLECT METH UR: ABNORMAL
WBC #/AREA URNS AUTO: 4 /HPF (ref 0–5)

## 2021-03-03 PROCEDURE — 99215 OFFICE O/P EST HI 40 MIN: CPT | Mod: S$PBB,,, | Performed by: TRANSPLANT SURGERY

## 2021-03-03 PROCEDURE — 81001 URINALYSIS AUTO W/SCOPE: CPT | Performed by: TRANSPLANT SURGERY

## 2021-03-03 PROCEDURE — 99999 PR PBB SHADOW E&M-EST. PATIENT-LVL IV: CPT | Mod: PBBFAC,,,

## 2021-03-03 PROCEDURE — 99215 PR OFFICE/OUTPT VISIT, EST, LEVL V, 40-54 MIN: ICD-10-PCS | Mod: S$PBB,,, | Performed by: TRANSPLANT SURGERY

## 2021-03-03 PROCEDURE — 87077 CULTURE AEROBIC IDENTIFY: CPT | Performed by: TRANSPLANT SURGERY

## 2021-03-03 PROCEDURE — 87086 URINE CULTURE/COLONY COUNT: CPT | Performed by: TRANSPLANT SURGERY

## 2021-03-03 PROCEDURE — 99999 PR PBB SHADOW E&M-EST. PATIENT-LVL IV: ICD-10-PCS | Mod: PBBFAC,,,

## 2021-03-03 PROCEDURE — 99214 OFFICE O/P EST MOD 30 MIN: CPT | Mod: PBBFAC

## 2021-03-03 PROCEDURE — 87088 URINE BACTERIA CULTURE: CPT | Performed by: TRANSPLANT SURGERY

## 2021-03-03 PROCEDURE — 87186 SC STD MICRODIL/AGAR DIL: CPT | Performed by: TRANSPLANT SURGERY

## 2021-03-03 RX ORDER — TACROLIMUS 1 MG/1
4 CAPSULE ORAL EVERY 12 HOURS
Qty: 240 CAPSULE | Refills: 11 | Status: SHIPPED | OUTPATIENT
Start: 2021-03-03 | End: 2021-03-16 | Stop reason: SDUPTHER

## 2021-03-03 RX ORDER — AMOXICILLIN AND CLAVULANATE POTASSIUM 875; 125 MG/1; MG/1
1 TABLET, FILM COATED ORAL EVERY 12 HOURS
Qty: 14 TABLET | Refills: 0 | Status: SHIPPED | OUTPATIENT
Start: 2021-03-03 | End: 2021-03-03 | Stop reason: SDUPTHER

## 2021-03-03 RX ORDER — AMOXICILLIN AND CLAVULANATE POTASSIUM 875; 125 MG/1; MG/1
1 TABLET, FILM COATED ORAL EVERY 12 HOURS
Qty: 14 TABLET | Refills: 0 | Status: SHIPPED | OUTPATIENT
Start: 2021-03-03 | End: 2021-03-10

## 2021-03-04 DIAGNOSIS — Z94.0 KIDNEY REPLACED BY TRANSPLANT: Primary | ICD-10-CM

## 2021-03-04 DIAGNOSIS — T86.898 URINE LEAK FROM TRANSPLANTED URETER: ICD-10-CM

## 2021-03-05 ENCOUNTER — OFFICE VISIT (OUTPATIENT)
Dept: TRANSPLANT | Facility: CLINIC | Age: 31
End: 2021-03-05
Payer: MEDICARE

## 2021-03-05 ENCOUNTER — DOCUMENTATION ONLY (OUTPATIENT)
Dept: TRANSPLANT | Facility: CLINIC | Age: 31
End: 2021-03-05

## 2021-03-05 ENCOUNTER — TELEPHONE (OUTPATIENT)
Dept: TRANSPLANT | Facility: CLINIC | Age: 31
End: 2021-03-05

## 2021-03-05 VITALS
TEMPERATURE: 99 F | WEIGHT: 244.5 LBS | SYSTOLIC BLOOD PRESSURE: 116 MMHG | BODY MASS INDEX: 39.29 KG/M2 | HEART RATE: 91 BPM | OXYGEN SATURATION: 100 % | DIASTOLIC BLOOD PRESSURE: 74 MMHG | RESPIRATION RATE: 18 BRPM | HEIGHT: 66 IN

## 2021-03-05 DIAGNOSIS — T86.898 URINE LEAK FROM TRANSPLANTED URETER: ICD-10-CM

## 2021-03-05 DIAGNOSIS — I10 ESSENTIAL HYPERTENSION: ICD-10-CM

## 2021-03-05 DIAGNOSIS — Z79.899 IMMUNOSUPPRESSIVE MANAGEMENT ENCOUNTER FOLLOWING KIDNEY TRANSPLANT: ICD-10-CM

## 2021-03-05 DIAGNOSIS — Z94.0 IMMUNOSUPPRESSIVE MANAGEMENT ENCOUNTER FOLLOWING KIDNEY TRANSPLANT: ICD-10-CM

## 2021-03-05 DIAGNOSIS — N18.2 CKD (CHRONIC KIDNEY DISEASE) STAGE 2, GFR 60-89 ML/MIN: Primary | ICD-10-CM

## 2021-03-05 DIAGNOSIS — Z98.890 S/P PARATHYROIDECTOMY: ICD-10-CM

## 2021-03-05 DIAGNOSIS — E21.2 HYPERPARATHYROIDISM, TERTIARY: ICD-10-CM

## 2021-03-05 DIAGNOSIS — Z90.89 S/P PARATHYROIDECTOMY: ICD-10-CM

## 2021-03-05 DIAGNOSIS — Z94.0 DECEASED-DONOR KIDNEY TRANSPLANT: ICD-10-CM

## 2021-03-05 PROCEDURE — 99214 OFFICE O/P EST MOD 30 MIN: CPT | Mod: PBBFAC | Performed by: INTERNAL MEDICINE

## 2021-03-05 PROCEDURE — 99999 PR PBB SHADOW E&M-EST. PATIENT-LVL IV: ICD-10-PCS | Mod: PBBFAC,,, | Performed by: INTERNAL MEDICINE

## 2021-03-05 PROCEDURE — 99215 PR OFFICE/OUTPT VISIT, EST, LEVL V, 40-54 MIN: ICD-10-PCS | Mod: S$PBB,,, | Performed by: INTERNAL MEDICINE

## 2021-03-05 PROCEDURE — 99999 PR PBB SHADOW E&M-EST. PATIENT-LVL IV: CPT | Mod: PBBFAC,,, | Performed by: INTERNAL MEDICINE

## 2021-03-05 PROCEDURE — 99215 OFFICE O/P EST HI 40 MIN: CPT | Mod: S$PBB,,, | Performed by: INTERNAL MEDICINE

## 2021-03-06 LAB — BACTERIA UR CULT: ABNORMAL

## 2021-03-08 ENCOUNTER — LAB VISIT (OUTPATIENT)
Dept: LAB | Facility: HOSPITAL | Age: 31
End: 2021-03-08
Attending: INTERNAL MEDICINE
Payer: MEDICARE

## 2021-03-08 DIAGNOSIS — Z94.0 KIDNEY REPLACED BY TRANSPLANT: ICD-10-CM

## 2021-03-08 LAB
ALBUMIN SERPL BCP-MCNC: 3.4 G/DL (ref 3.5–5.2)
ALBUMIN SERPL BCP-MCNC: 3.4 G/DL (ref 3.5–5.2)
ALP SERPL-CCNC: 90 U/L (ref 55–135)
ALT SERPL W/O P-5'-P-CCNC: 20 U/L (ref 10–44)
ANION GAP SERPL CALC-SCNC: 10 MMOL/L (ref 8–16)
AST SERPL-CCNC: 12 U/L (ref 10–40)
BASOPHILS # BLD AUTO: 0.08 K/UL (ref 0–0.2)
BASOPHILS NFR BLD: 0.7 % (ref 0–1.9)
BILIRUB DIRECT SERPL-MCNC: <0.1 MG/DL (ref 0.1–0.3)
BILIRUB SERPL-MCNC: 0.2 MG/DL (ref 0.1–1)
BUN SERPL-MCNC: 18 MG/DL (ref 6–20)
CALCIUM SERPL-MCNC: 9.1 MG/DL (ref 8.7–10.5)
CHLORIDE SERPL-SCNC: 106 MMOL/L (ref 95–110)
CO2 SERPL-SCNC: 22 MMOL/L (ref 23–29)
CREAT SERPL-MCNC: 1.2 MG/DL (ref 0.5–1.4)
DIFFERENTIAL METHOD: ABNORMAL
EOSINOPHIL # BLD AUTO: 0.1 K/UL (ref 0–0.5)
EOSINOPHIL NFR BLD: 0.7 % (ref 0–8)
ERYTHROCYTE [DISTWIDTH] IN BLOOD BY AUTOMATED COUNT: 14.7 % (ref 11.5–14.5)
EST. GFR  (AFRICAN AMERICAN): >60 ML/MIN/1.73 M^2
EST. GFR  (NON AFRICAN AMERICAN): >60 ML/MIN/1.73 M^2
GLUCOSE SERPL-MCNC: 75 MG/DL (ref 70–110)
HCT VFR BLD AUTO: 42.5 % (ref 37–48.5)
HGB BLD-MCNC: 13.3 G/DL (ref 12–16)
IMM GRANULOCYTES # BLD AUTO: 0.18 K/UL (ref 0–0.04)
IMM GRANULOCYTES NFR BLD AUTO: 1.7 % (ref 0–0.5)
LYMPHOCYTES # BLD AUTO: 0.7 K/UL (ref 1–4.8)
LYMPHOCYTES NFR BLD: 6 % (ref 18–48)
MAGNESIUM SERPL-MCNC: 1.6 MG/DL (ref 1.6–2.6)
MCH RBC QN AUTO: 30.4 PG (ref 27–31)
MCHC RBC AUTO-ENTMCNC: 31.3 G/DL (ref 32–36)
MCV RBC AUTO: 97 FL (ref 82–98)
MONOCYTES # BLD AUTO: 0.5 K/UL (ref 0.3–1)
MONOCYTES NFR BLD: 4.2 % (ref 4–15)
NEUTROPHILS # BLD AUTO: 9.3 K/UL (ref 1.8–7.7)
NEUTROPHILS NFR BLD: 86.7 % (ref 38–73)
NRBC BLD-RTO: 0 /100 WBC
PHOSPHATE SERPL-MCNC: 2.5 MG/DL (ref 2.7–4.5)
PLATELET # BLD AUTO: 277 K/UL (ref 150–350)
PMV BLD AUTO: 10.4 FL (ref 9.2–12.9)
POTASSIUM SERPL-SCNC: 4.2 MMOL/L (ref 3.5–5.1)
PROT SERPL-MCNC: 7 G/DL (ref 6–8.4)
RBC # BLD AUTO: 4.37 M/UL (ref 4–5.4)
SODIUM SERPL-SCNC: 138 MMOL/L (ref 136–145)
WBC # BLD AUTO: 10.75 K/UL (ref 3.9–12.7)

## 2021-03-08 PROCEDURE — 80069 RENAL FUNCTION PANEL: CPT | Performed by: INTERNAL MEDICINE

## 2021-03-08 PROCEDURE — 85025 COMPLETE CBC W/AUTO DIFF WBC: CPT | Performed by: INTERNAL MEDICINE

## 2021-03-08 PROCEDURE — 80197 ASSAY OF TACROLIMUS: CPT | Performed by: INTERNAL MEDICINE

## 2021-03-08 PROCEDURE — 36415 COLL VENOUS BLD VENIPUNCTURE: CPT | Performed by: INTERNAL MEDICINE

## 2021-03-08 PROCEDURE — 83735 ASSAY OF MAGNESIUM: CPT | Performed by: INTERNAL MEDICINE

## 2021-03-08 PROCEDURE — 87799 DETECT AGENT NOS DNA QUANT: CPT | Performed by: INTERNAL MEDICINE

## 2021-03-08 PROCEDURE — 84075 ASSAY ALKALINE PHOSPHATASE: CPT | Performed by: INTERNAL MEDICINE

## 2021-03-09 LAB — TACROLIMUS BLD-MCNC: 10.6 NG/ML (ref 5–15)

## 2021-03-09 RX ORDER — AMOXICILLIN 500 MG/1
500 TABLET, FILM COATED ORAL EVERY 12 HOURS
Qty: 14 TABLET | Refills: 0 | Status: SHIPPED | OUTPATIENT
Start: 2021-03-11 | End: 2021-03-18 | Stop reason: SDUPTHER

## 2021-03-11 LAB
BKV DNA SERPL NAA+PROBE-ACNC: <125 COPIES/ML
BKV DNA SERPL NAA+PROBE-LOG#: <2.1 LOG (10) COPIES/ML
BKV DNA SERPL QL NAA+PROBE: NOT DETECTED

## 2021-03-15 ENCOUNTER — LAB VISIT (OUTPATIENT)
Dept: LAB | Facility: HOSPITAL | Age: 31
End: 2021-03-15
Attending: INTERNAL MEDICINE
Payer: MEDICARE

## 2021-03-15 DIAGNOSIS — Z94.0 KIDNEY REPLACED BY TRANSPLANT: ICD-10-CM

## 2021-03-15 LAB
ALBUMIN SERPL BCP-MCNC: 3.4 G/DL (ref 3.5–5.2)
ANION GAP SERPL CALC-SCNC: 8 MMOL/L (ref 8–16)
BASOPHILS # BLD AUTO: 0.08 K/UL (ref 0–0.2)
BASOPHILS NFR BLD: 0.9 % (ref 0–1.9)
BUN SERPL-MCNC: 20 MG/DL (ref 6–20)
CALCIUM SERPL-MCNC: 8.9 MG/DL (ref 8.7–10.5)
CHLORIDE SERPL-SCNC: 107 MMOL/L (ref 95–110)
CO2 SERPL-SCNC: 23 MMOL/L (ref 23–29)
CREAT SERPL-MCNC: 1.2 MG/DL (ref 0.5–1.4)
DIFFERENTIAL METHOD: ABNORMAL
EOSINOPHIL # BLD AUTO: 0.1 K/UL (ref 0–0.5)
EOSINOPHIL NFR BLD: 1.2 % (ref 0–8)
ERYTHROCYTE [DISTWIDTH] IN BLOOD BY AUTOMATED COUNT: 15.2 % (ref 11.5–14.5)
EST. GFR  (AFRICAN AMERICAN): >60 ML/MIN/1.73 M^2
EST. GFR  (NON AFRICAN AMERICAN): >60 ML/MIN/1.73 M^2
GLUCOSE SERPL-MCNC: 85 MG/DL (ref 70–110)
HCT VFR BLD AUTO: 42.7 % (ref 37–48.5)
HGB BLD-MCNC: 13 G/DL (ref 12–16)
IMM GRANULOCYTES # BLD AUTO: 0.29 K/UL (ref 0–0.04)
IMM GRANULOCYTES NFR BLD AUTO: 3.4 % (ref 0–0.5)
LYMPHOCYTES # BLD AUTO: 0.5 K/UL (ref 1–4.8)
LYMPHOCYTES NFR BLD: 6.2 % (ref 18–48)
MAGNESIUM SERPL-MCNC: 1.8 MG/DL (ref 1.6–2.6)
MCH RBC QN AUTO: 29.5 PG (ref 27–31)
MCHC RBC AUTO-ENTMCNC: 30.4 G/DL (ref 32–36)
MCV RBC AUTO: 97 FL (ref 82–98)
MONOCYTES # BLD AUTO: 0.6 K/UL (ref 0.3–1)
MONOCYTES NFR BLD: 6.7 % (ref 4–15)
NEUTROPHILS # BLD AUTO: 7 K/UL (ref 1.8–7.7)
NEUTROPHILS NFR BLD: 81.6 % (ref 38–73)
NRBC BLD-RTO: 0 /100 WBC
PHOSPHATE SERPL-MCNC: 2.8 MG/DL (ref 2.7–4.5)
PLATELET # BLD AUTO: 277 K/UL (ref 150–350)
PMV BLD AUTO: 10.3 FL (ref 9.2–12.9)
POTASSIUM SERPL-SCNC: 4.7 MMOL/L (ref 3.5–5.1)
RBC # BLD AUTO: 4.4 M/UL (ref 4–5.4)
SODIUM SERPL-SCNC: 138 MMOL/L (ref 136–145)
WBC # BLD AUTO: 8.55 K/UL (ref 3.9–12.7)

## 2021-03-15 PROCEDURE — 85025 COMPLETE CBC W/AUTO DIFF WBC: CPT | Performed by: INTERNAL MEDICINE

## 2021-03-15 PROCEDURE — 80197 ASSAY OF TACROLIMUS: CPT | Performed by: INTERNAL MEDICINE

## 2021-03-15 PROCEDURE — 36415 COLL VENOUS BLD VENIPUNCTURE: CPT | Performed by: INTERNAL MEDICINE

## 2021-03-15 PROCEDURE — 83735 ASSAY OF MAGNESIUM: CPT | Performed by: INTERNAL MEDICINE

## 2021-03-15 PROCEDURE — 80069 RENAL FUNCTION PANEL: CPT | Performed by: INTERNAL MEDICINE

## 2021-03-16 DIAGNOSIS — Z94.0 KIDNEY REPLACED BY TRANSPLANT: ICD-10-CM

## 2021-03-16 LAB — TACROLIMUS BLD-MCNC: 10.8 NG/ML (ref 5–15)

## 2021-03-16 RX ORDER — TACROLIMUS 1 MG/1
CAPSULE ORAL
Qty: 210 CAPSULE | Refills: 11 | Status: SHIPPED | OUTPATIENT
Start: 2021-03-16 | End: 2021-03-22 | Stop reason: SDUPTHER

## 2021-03-17 ENCOUNTER — OFFICE VISIT (OUTPATIENT)
Dept: TRANSPLANT | Facility: CLINIC | Age: 31
End: 2021-03-17
Payer: MEDICARE

## 2021-03-17 VITALS
OXYGEN SATURATION: 100 % | WEIGHT: 249.81 LBS | HEIGHT: 66 IN | RESPIRATION RATE: 18 BRPM | DIASTOLIC BLOOD PRESSURE: 68 MMHG | HEART RATE: 73 BPM | BODY MASS INDEX: 40.15 KG/M2 | TEMPERATURE: 98 F | SYSTOLIC BLOOD PRESSURE: 107 MMHG

## 2021-03-17 DIAGNOSIS — Z94.0 DECEASED-DONOR KIDNEY TRANSPLANT: Primary | ICD-10-CM

## 2021-03-17 DIAGNOSIS — Z94.0 IMMUNOSUPPRESSIVE MANAGEMENT ENCOUNTER FOLLOWING KIDNEY TRANSPLANT: ICD-10-CM

## 2021-03-17 DIAGNOSIS — Z79.899 IMMUNOSUPPRESSIVE MANAGEMENT ENCOUNTER FOLLOWING KIDNEY TRANSPLANT: ICD-10-CM

## 2021-03-17 DIAGNOSIS — T86.898 URINE LEAK FROM TRANSPLANTED URETER: ICD-10-CM

## 2021-03-17 PROCEDURE — 99999 PR PBB SHADOW E&M-EST. PATIENT-LVL III: CPT | Mod: PBBFAC,,, | Performed by: TRANSPLANT SURGERY

## 2021-03-17 PROCEDURE — 99215 PR OFFICE/OUTPT VISIT, EST, LEVL V, 40-54 MIN: ICD-10-PCS | Mod: 24,S$PBB,, | Performed by: TRANSPLANT SURGERY

## 2021-03-17 PROCEDURE — 99215 OFFICE O/P EST HI 40 MIN: CPT | Mod: 24,S$PBB,, | Performed by: TRANSPLANT SURGERY

## 2021-03-17 PROCEDURE — 99213 OFFICE O/P EST LOW 20 MIN: CPT | Mod: PBBFAC | Performed by: TRANSPLANT SURGERY

## 2021-03-17 PROCEDURE — 99999 PR PBB SHADOW E&M-EST. PATIENT-LVL III: ICD-10-PCS | Mod: PBBFAC,,, | Performed by: TRANSPLANT SURGERY

## 2021-03-19 DIAGNOSIS — Z94.0 KIDNEY REPLACED BY TRANSPLANT: Primary | ICD-10-CM

## 2021-03-19 RX ORDER — MIDAZOLAM HYDROCHLORIDE 1 MG/ML
1 INJECTION INTRAMUSCULAR; INTRAVENOUS
Status: CANCELLED | OUTPATIENT
Start: 2021-03-19

## 2021-03-19 RX ORDER — FENTANYL CITRATE 50 UG/ML
50 INJECTION, SOLUTION INTRAMUSCULAR; INTRAVENOUS
Status: CANCELLED | OUTPATIENT
Start: 2021-03-19

## 2021-03-19 RX ORDER — AMOXICILLIN 500 MG/1
500 TABLET, FILM COATED ORAL EVERY 12 HOURS
Qty: 14 TABLET | Refills: 0 | Status: SHIPPED | OUTPATIENT
Start: 2021-03-19 | End: 2021-03-29

## 2021-03-19 RX ORDER — CALCIUM CARBONATE 500(1250)
1 TABLET ORAL 2 TIMES DAILY
Qty: 120 TABLET | Refills: 5 | Status: SHIPPED | OUTPATIENT
Start: 2021-03-19 | End: 2021-04-21 | Stop reason: ALTCHOICE

## 2021-03-22 ENCOUNTER — HOSPITAL ENCOUNTER (OUTPATIENT)
Dept: INTERVENTIONAL RADIOLOGY/VASCULAR | Facility: HOSPITAL | Age: 31
Discharge: HOME | End: 2021-03-22
Attending: TRANSPLANT SURGERY
Payer: MEDICARE

## 2021-03-22 VITALS
OXYGEN SATURATION: 97 % | DIASTOLIC BLOOD PRESSURE: 70 MMHG | HEIGHT: 66 IN | HEART RATE: 68 BPM | TEMPERATURE: 97 F | BODY MASS INDEX: 40.15 KG/M2 | WEIGHT: 249.81 LBS | SYSTOLIC BLOOD PRESSURE: 106 MMHG | RESPIRATION RATE: 20 BRPM

## 2021-03-22 DIAGNOSIS — T86.898 URINE LEAK FROM TRANSPLANTED URETER: ICD-10-CM

## 2021-03-22 DIAGNOSIS — Z94.0 KIDNEY REPLACED BY TRANSPLANT: ICD-10-CM

## 2021-03-22 LAB
B-HCG UR QL: NEGATIVE
CTP QC/QA: YES

## 2021-03-22 PROCEDURE — 81025 URINE PREGNANCY TEST: CPT | Performed by: PHYSICIAN ASSISTANT

## 2021-03-22 PROCEDURE — 25500020 PHARM REV CODE 255: Performed by: TRANSPLANT SURGERY

## 2021-03-22 PROCEDURE — 25000003 PHARM REV CODE 250: Performed by: PHYSICIAN ASSISTANT

## 2021-03-22 PROCEDURE — 50435 EXCHANGE NEPHROSTOMY CATH: CPT | Performed by: RADIOLOGY

## 2021-03-22 PROCEDURE — 63600175 PHARM REV CODE 636 W HCPCS: Performed by: PHYSICIAN ASSISTANT

## 2021-03-22 PROCEDURE — 63600175 PHARM REV CODE 636 W HCPCS: Performed by: RADIOLOGY

## 2021-03-22 PROCEDURE — 50435 IR NEPHROSTOGRAM: ICD-10-PCS | Mod: RT,,, | Performed by: RADIOLOGY

## 2021-03-22 PROCEDURE — 99152 MOD SED SAME PHYS/QHP 5/>YRS: CPT | Performed by: RADIOLOGY

## 2021-03-22 PROCEDURE — A4550 SURGICAL TRAYS: HCPCS

## 2021-03-22 PROCEDURE — C1729 CATH, DRAINAGE: HCPCS

## 2021-03-22 RX ORDER — MIDAZOLAM HYDROCHLORIDE 1 MG/ML
INJECTION INTRAMUSCULAR; INTRAVENOUS CODE/TRAUMA/SEDATION MEDICATION
Status: COMPLETED | OUTPATIENT
Start: 2021-03-22 | End: 2021-03-22

## 2021-03-22 RX ORDER — FENTANYL CITRATE 50 UG/ML
INJECTION, SOLUTION INTRAMUSCULAR; INTRAVENOUS CODE/TRAUMA/SEDATION MEDICATION
Status: COMPLETED | OUTPATIENT
Start: 2021-03-22 | End: 2021-03-22

## 2021-03-22 RX ORDER — ONDANSETRON 2 MG/ML
INJECTION INTRAMUSCULAR; INTRAVENOUS CODE/TRAUMA/SEDATION MEDICATION
Status: COMPLETED | OUTPATIENT
Start: 2021-03-22 | End: 2021-03-22

## 2021-03-22 RX ORDER — TACROLIMUS 1 MG/1
CAPSULE ORAL
Qty: 150 CAPSULE | Refills: 11 | Status: SHIPPED | OUTPATIENT
Start: 2021-03-22 | End: 2021-04-06 | Stop reason: SDUPTHER

## 2021-03-22 RX ORDER — CEFTRIAXONE 1 G/1
1 INJECTION, POWDER, FOR SOLUTION INTRAMUSCULAR; INTRAVENOUS
Status: COMPLETED | OUTPATIENT
Start: 2021-03-22 | End: 2021-03-22

## 2021-03-22 RX ORDER — SODIUM CHLORIDE 9 MG/ML
INJECTION, SOLUTION INTRAVENOUS CONTINUOUS
Status: DISCONTINUED | OUTPATIENT
Start: 2021-03-22 | End: 2021-03-23 | Stop reason: HOSPADM

## 2021-03-22 RX ADMIN — ONDANSETRON 4 MG: 2 INJECTION, SOLUTION INTRAMUSCULAR; INTRAVENOUS at 03:03

## 2021-03-22 RX ADMIN — SODIUM CHLORIDE: 0.9 INJECTION, SOLUTION INTRAVENOUS at 02:03

## 2021-03-22 RX ADMIN — IOHEXOL 30 ML: 300 INJECTION, SOLUTION INTRAVENOUS at 04:03

## 2021-03-22 RX ADMIN — MIDAZOLAM HYDROCHLORIDE 0.5 MG: 1 INJECTION, SOLUTION INTRAMUSCULAR; INTRAVENOUS at 03:03

## 2021-03-22 RX ADMIN — CEFTRIAXONE 1 G: 1 INJECTION, POWDER, FOR SOLUTION INTRAMUSCULAR; INTRAVENOUS at 03:03

## 2021-03-22 RX ADMIN — FENTANYL CITRATE 25 MCG: 50 INJECTION, SOLUTION INTRAMUSCULAR; INTRAVENOUS at 03:03

## 2021-03-22 RX ADMIN — MIDAZOLAM HYDROCHLORIDE 1 MG: 1 INJECTION, SOLUTION INTRAMUSCULAR; INTRAVENOUS at 03:03

## 2021-03-22 RX ADMIN — FENTANYL CITRATE 50 MCG: 50 INJECTION, SOLUTION INTRAMUSCULAR; INTRAVENOUS at 03:03

## 2021-03-24 ENCOUNTER — OFFICE VISIT (OUTPATIENT)
Dept: TRANSPLANT | Facility: CLINIC | Age: 31
End: 2021-03-24
Payer: MEDICARE

## 2021-03-24 ENCOUNTER — TELEPHONE (OUTPATIENT)
Dept: TRANSPLANT | Facility: CLINIC | Age: 31
End: 2021-03-24

## 2021-03-24 VITALS
RESPIRATION RATE: 18 BRPM | SYSTOLIC BLOOD PRESSURE: 134 MMHG | BODY MASS INDEX: 39.9 KG/M2 | DIASTOLIC BLOOD PRESSURE: 74 MMHG | HEART RATE: 109 BPM | OXYGEN SATURATION: 100 % | HEIGHT: 66 IN | TEMPERATURE: 98 F | WEIGHT: 248.25 LBS

## 2021-03-24 DIAGNOSIS — Z29.89 PROPHYLACTIC IMMUNOTHERAPY: ICD-10-CM

## 2021-03-24 DIAGNOSIS — Z94.0 IMMUNOSUPPRESSIVE MANAGEMENT ENCOUNTER FOLLOWING KIDNEY TRANSPLANT: ICD-10-CM

## 2021-03-24 DIAGNOSIS — Z91.89 AT RISK FOR OPPORTUNISTIC INFECTIONS: ICD-10-CM

## 2021-03-24 DIAGNOSIS — I10 ESSENTIAL HYPERTENSION: ICD-10-CM

## 2021-03-24 DIAGNOSIS — Z79.60 LONG-TERM USE OF IMMUNOSUPPRESSANT MEDICATION: ICD-10-CM

## 2021-03-24 DIAGNOSIS — T86.898 URINE LEAK FROM TRANSPLANTED URETER: Primary | ICD-10-CM

## 2021-03-24 DIAGNOSIS — Z98.890 S/P PARATHYROIDECTOMY: ICD-10-CM

## 2021-03-24 DIAGNOSIS — Z94.0 KIDNEY REPLACED BY TRANSPLANT: ICD-10-CM

## 2021-03-24 DIAGNOSIS — Z79.899 IMMUNOSUPPRESSIVE MANAGEMENT ENCOUNTER FOLLOWING KIDNEY TRANSPLANT: ICD-10-CM

## 2021-03-24 DIAGNOSIS — E21.2 HYPERPARATHYROIDISM, TERTIARY: ICD-10-CM

## 2021-03-24 DIAGNOSIS — Z90.89 S/P PARATHYROIDECTOMY: ICD-10-CM

## 2021-03-24 DIAGNOSIS — N18.2 CKD (CHRONIC KIDNEY DISEASE) STAGE 2, GFR 60-89 ML/MIN: ICD-10-CM

## 2021-03-24 DIAGNOSIS — Z90.5 S/P NEPHRECTOMY: ICD-10-CM

## 2021-03-24 DIAGNOSIS — Z94.0 KIDNEY REPLACED BY TRANSPLANT: Primary | ICD-10-CM

## 2021-03-24 PROCEDURE — 99214 OFFICE O/P EST MOD 30 MIN: CPT | Mod: PBBFAC | Performed by: INTERNAL MEDICINE

## 2021-03-24 PROCEDURE — 99215 PR OFFICE/OUTPT VISIT, EST, LEVL V, 40-54 MIN: ICD-10-PCS | Mod: S$PBB,,, | Performed by: INTERNAL MEDICINE

## 2021-03-24 PROCEDURE — 99215 OFFICE O/P EST HI 40 MIN: CPT | Mod: S$PBB,,, | Performed by: INTERNAL MEDICINE

## 2021-03-24 PROCEDURE — 99999 PR PBB SHADOW E&M-EST. PATIENT-LVL IV: ICD-10-PCS | Mod: PBBFAC,,, | Performed by: INTERNAL MEDICINE

## 2021-03-24 PROCEDURE — 99999 PR PBB SHADOW E&M-EST. PATIENT-LVL IV: CPT | Mod: PBBFAC,,, | Performed by: INTERNAL MEDICINE

## 2021-03-26 ENCOUNTER — TELEPHONE (OUTPATIENT)
Dept: PULMONOLOGY | Facility: CLINIC | Age: 31
End: 2021-03-26

## 2021-03-29 ENCOUNTER — LAB VISIT (OUTPATIENT)
Dept: LAB | Facility: HOSPITAL | Age: 31
End: 2021-03-29
Attending: INTERNAL MEDICINE
Payer: MEDICARE

## 2021-03-29 DIAGNOSIS — Z94.0 KIDNEY REPLACED BY TRANSPLANT: ICD-10-CM

## 2021-03-29 LAB
ALBUMIN SERPL BCP-MCNC: 3.5 G/DL (ref 3.5–5.2)
ANION GAP SERPL CALC-SCNC: 7 MMOL/L (ref 8–16)
BASOPHILS NFR BLD: 0 % (ref 0–1.9)
BUN SERPL-MCNC: 19 MG/DL (ref 6–20)
CALCIUM SERPL-MCNC: 8.4 MG/DL (ref 8.7–10.5)
CHLORIDE SERPL-SCNC: 106 MMOL/L (ref 95–110)
CO2 SERPL-SCNC: 24 MMOL/L (ref 23–29)
CREAT SERPL-MCNC: 1.2 MG/DL (ref 0.5–1.4)
DIFFERENTIAL METHOD: ABNORMAL
EOSINOPHIL NFR BLD: 1 % (ref 0–8)
ERYTHROCYTE [DISTWIDTH] IN BLOOD BY AUTOMATED COUNT: 14.7 % (ref 11.5–14.5)
EST. GFR  (AFRICAN AMERICAN): >60 ML/MIN/1.73 M^2
EST. GFR  (NON AFRICAN AMERICAN): >60 ML/MIN/1.73 M^2
GLUCOSE SERPL-MCNC: 72 MG/DL (ref 70–110)
HCT VFR BLD AUTO: 42 % (ref 37–48.5)
HGB BLD-MCNC: 13.1 G/DL (ref 12–16)
IMM GRANULOCYTES # BLD AUTO: ABNORMAL K/UL (ref 0–0.04)
IMM GRANULOCYTES NFR BLD AUTO: ABNORMAL % (ref 0–0.5)
LYMPHOCYTES NFR BLD: 10 % (ref 18–48)
MAGNESIUM SERPL-MCNC: 1.8 MG/DL (ref 1.6–2.6)
MCH RBC QN AUTO: 29.7 PG (ref 27–31)
MCHC RBC AUTO-ENTMCNC: 31.2 G/DL (ref 32–36)
MCV RBC AUTO: 95 FL (ref 82–98)
METAMYELOCYTES NFR BLD MANUAL: 1 %
MONOCYTES NFR BLD: 5 % (ref 4–15)
MYELOCYTES NFR BLD MANUAL: 1 %
NEUTROPHILS NFR BLD: 81 % (ref 38–73)
NEUTS BAND NFR BLD MANUAL: 1 %
NRBC BLD-RTO: 0 /100 WBC
PHOSPHATE SERPL-MCNC: 2.5 MG/DL (ref 2.7–4.5)
PLATELET # BLD AUTO: 245 K/UL (ref 150–450)
PMV BLD AUTO: 10.5 FL (ref 9.2–12.9)
POTASSIUM SERPL-SCNC: 4.3 MMOL/L (ref 3.5–5.1)
RBC # BLD AUTO: 4.41 M/UL (ref 4–5.4)
SODIUM SERPL-SCNC: 137 MMOL/L (ref 136–145)
WBC # BLD AUTO: 7.02 K/UL (ref 3.9–12.7)

## 2021-03-29 PROCEDURE — 85007 BL SMEAR W/DIFF WBC COUNT: CPT | Performed by: INTERNAL MEDICINE

## 2021-03-29 PROCEDURE — 83735 ASSAY OF MAGNESIUM: CPT | Performed by: INTERNAL MEDICINE

## 2021-03-29 PROCEDURE — 80069 RENAL FUNCTION PANEL: CPT | Performed by: INTERNAL MEDICINE

## 2021-03-29 PROCEDURE — 80197 ASSAY OF TACROLIMUS: CPT | Performed by: INTERNAL MEDICINE

## 2021-03-29 PROCEDURE — 36415 COLL VENOUS BLD VENIPUNCTURE: CPT | Performed by: INTERNAL MEDICINE

## 2021-03-29 PROCEDURE — 85027 COMPLETE CBC AUTOMATED: CPT | Performed by: INTERNAL MEDICINE

## 2021-03-30 ENCOUNTER — LAB VISIT (OUTPATIENT)
Dept: LAB | Facility: HOSPITAL | Age: 31
End: 2021-03-30
Attending: INTERNAL MEDICINE
Payer: MEDICARE

## 2021-03-30 ENCOUNTER — TELEPHONE (OUTPATIENT)
Dept: TRANSPLANT | Facility: CLINIC | Age: 31
End: 2021-03-30

## 2021-03-30 DIAGNOSIS — Z94.0 KIDNEY REPLACED BY TRANSPLANT: ICD-10-CM

## 2021-03-30 DIAGNOSIS — N30.01 ACUTE CYSTITIS WITH HEMATURIA: ICD-10-CM

## 2021-03-30 DIAGNOSIS — D72.829 LEUKOCYTOSIS, UNSPECIFIED TYPE: ICD-10-CM

## 2021-03-30 LAB
BILIRUB UR QL STRIP: NEGATIVE
CLARITY UR REFRACT.AUTO: ABNORMAL
COLOR UR AUTO: YELLOW
GLUCOSE UR QL STRIP: NEGATIVE
HGB UR QL STRIP: NEGATIVE
KETONES UR QL STRIP: NEGATIVE
LEUKOCYTE ESTERASE UR QL STRIP: ABNORMAL
MICROSCOPIC COMMENT: NORMAL
NITRITE UR QL STRIP: NEGATIVE
PH UR STRIP: 7 [PH] (ref 5–8)
PROT UR QL STRIP: NEGATIVE
RBC #/AREA URNS AUTO: 0 /HPF (ref 0–4)
SP GR UR STRIP: 1.01 (ref 1–1.03)
SQUAMOUS #/AREA URNS AUTO: 8 /HPF
TACROLIMUS BLD-MCNC: 8.9 NG/ML (ref 5–15)
URN SPEC COLLECT METH UR: ABNORMAL
WBC #/AREA URNS AUTO: 1 /HPF (ref 0–5)

## 2021-03-30 PROCEDURE — 87086 URINE CULTURE/COLONY COUNT: CPT | Performed by: INTERNAL MEDICINE

## 2021-03-30 PROCEDURE — 81001 URINALYSIS AUTO W/SCOPE: CPT | Performed by: INTERNAL MEDICINE

## 2021-04-01 ENCOUNTER — HOSPITAL ENCOUNTER (OUTPATIENT)
Dept: RADIOLOGY | Facility: HOSPITAL | Age: 31
Discharge: HOME | End: 2021-04-01
Attending: TRANSPLANT SURGERY
Payer: MEDICARE

## 2021-04-01 ENCOUNTER — TELEPHONE (OUTPATIENT)
Dept: TRANSPLANT | Facility: CLINIC | Age: 31
End: 2021-04-01

## 2021-04-01 DIAGNOSIS — Z94.0 KIDNEY REPLACED BY TRANSPLANT: Primary | ICD-10-CM

## 2021-04-01 DIAGNOSIS — Z94.0 KIDNEY REPLACED BY TRANSPLANT: ICD-10-CM

## 2021-04-01 LAB — BACTERIA UR CULT: NORMAL

## 2021-04-01 PROCEDURE — 76776 US EXAM K TRANSPL W/DOPPLER: CPT | Mod: 26,,, | Performed by: RADIOLOGY

## 2021-04-01 PROCEDURE — 76776 US TRANSPLANT KIDNEY WITH DOPPLER: ICD-10-PCS | Mod: 26,,, | Performed by: RADIOLOGY

## 2021-04-01 PROCEDURE — 76776 US EXAM K TRANSPL W/DOPPLER: CPT | Mod: TC

## 2021-04-05 ENCOUNTER — LAB VISIT (OUTPATIENT)
Dept: LAB | Facility: HOSPITAL | Age: 31
End: 2021-04-05
Attending: INTERNAL MEDICINE
Payer: MEDICARE

## 2021-04-05 DIAGNOSIS — Z94.0 KIDNEY REPLACED BY TRANSPLANT: ICD-10-CM

## 2021-04-05 DIAGNOSIS — E55.9 VITAMIN D DEFICIENCY: ICD-10-CM

## 2021-04-05 LAB
25(OH)D3+25(OH)D2 SERPL-MCNC: 20 NG/ML (ref 30–96)
ALBUMIN SERPL BCP-MCNC: 3.4 G/DL (ref 3.5–5.2)
ALBUMIN SERPL BCP-MCNC: 3.4 G/DL (ref 3.5–5.2)
ALP SERPL-CCNC: 69 U/L (ref 55–135)
ALT SERPL W/O P-5'-P-CCNC: 22 U/L (ref 10–44)
ANION GAP SERPL CALC-SCNC: 10 MMOL/L (ref 8–16)
ANISOCYTOSIS BLD QL SMEAR: SLIGHT
AST SERPL-CCNC: 16 U/L (ref 10–40)
BASOPHILS # BLD AUTO: ABNORMAL K/UL (ref 0–0.2)
BASOPHILS NFR BLD: 0 % (ref 0–1.9)
BILIRUB DIRECT SERPL-MCNC: 0.1 MG/DL (ref 0.1–0.3)
BILIRUB SERPL-MCNC: 0.2 MG/DL (ref 0.1–1)
BUN SERPL-MCNC: 22 MG/DL (ref 6–20)
CALCIUM SERPL-MCNC: 9.6 MG/DL (ref 8.7–10.5)
CHLORIDE SERPL-SCNC: 104 MMOL/L (ref 95–110)
CHOLEST SERPL-MCNC: 221 MG/DL (ref 120–199)
CHOLEST/HDLC SERPL: 3.7 {RATIO} (ref 2–5)
CO2 SERPL-SCNC: 23 MMOL/L (ref 23–29)
CREAT SERPL-MCNC: 1.3 MG/DL (ref 0.5–1.4)
DIFFERENTIAL METHOD: ABNORMAL
EOSINOPHIL # BLD AUTO: ABNORMAL K/UL (ref 0–0.5)
EOSINOPHIL NFR BLD: 4 % (ref 0–8)
ERYTHROCYTE [DISTWIDTH] IN BLOOD BY AUTOMATED COUNT: 14.8 % (ref 11.5–14.5)
EST. GFR  (AFRICAN AMERICAN): >60 ML/MIN/1.73 M^2
EST. GFR  (NON AFRICAN AMERICAN): 55.2 ML/MIN/1.73 M^2
GLUCOSE SERPL-MCNC: 75 MG/DL (ref 70–110)
HCT VFR BLD AUTO: 43.1 % (ref 37–48.5)
HDLC SERPL-MCNC: 60 MG/DL (ref 40–75)
HDLC SERPL: 27.1 % (ref 20–50)
HGB BLD-MCNC: 13.5 G/DL (ref 12–16)
IMM GRANULOCYTES # BLD AUTO: ABNORMAL K/UL (ref 0–0.04)
IMM GRANULOCYTES NFR BLD AUTO: ABNORMAL % (ref 0–0.5)
LDLC SERPL CALC-MCNC: 113.8 MG/DL (ref 63–159)
LYMPHOCYTES # BLD AUTO: ABNORMAL K/UL (ref 1–4.8)
LYMPHOCYTES NFR BLD: 13 % (ref 18–48)
MAGNESIUM SERPL-MCNC: 1.9 MG/DL (ref 1.6–2.6)
MCH RBC QN AUTO: 29.5 PG (ref 27–31)
MCHC RBC AUTO-ENTMCNC: 31.3 G/DL (ref 32–36)
MCV RBC AUTO: 94 FL (ref 82–98)
MONOCYTES # BLD AUTO: ABNORMAL K/UL (ref 0.3–1)
MONOCYTES NFR BLD: 3 % (ref 4–15)
NEUTROPHILS NFR BLD: 76 % (ref 38–73)
NEUTS BAND NFR BLD MANUAL: 4 %
NONHDLC SERPL-MCNC: 161 MG/DL
NRBC BLD-RTO: 0 /100 WBC
PHOSPHATE SERPL-MCNC: 3.2 MG/DL (ref 2.7–4.5)
PLATELET # BLD AUTO: 244 K/UL (ref 150–450)
PLATELET BLD QL SMEAR: ABNORMAL
PMV BLD AUTO: 10.5 FL (ref 9.2–12.9)
POTASSIUM SERPL-SCNC: 4.7 MMOL/L (ref 3.5–5.1)
PROT SERPL-MCNC: 6.6 G/DL (ref 6–8.4)
RBC # BLD AUTO: 4.57 M/UL (ref 4–5.4)
SODIUM SERPL-SCNC: 137 MMOL/L (ref 136–145)
TRIGL SERPL-MCNC: 236 MG/DL (ref 30–150)
WBC # BLD AUTO: 6.62 K/UL (ref 3.9–12.7)

## 2021-04-05 PROCEDURE — 80069 RENAL FUNCTION PANEL: CPT | Performed by: INTERNAL MEDICINE

## 2021-04-05 PROCEDURE — 82306 VITAMIN D 25 HYDROXY: CPT | Performed by: INTERNAL MEDICINE

## 2021-04-05 PROCEDURE — 36415 COLL VENOUS BLD VENIPUNCTURE: CPT | Performed by: INTERNAL MEDICINE

## 2021-04-05 PROCEDURE — 84075 ASSAY ALKALINE PHOSPHATASE: CPT | Performed by: INTERNAL MEDICINE

## 2021-04-05 PROCEDURE — 87799 DETECT AGENT NOS DNA QUANT: CPT | Performed by: INTERNAL MEDICINE

## 2021-04-05 PROCEDURE — 83735 ASSAY OF MAGNESIUM: CPT | Performed by: INTERNAL MEDICINE

## 2021-04-05 PROCEDURE — 85007 BL SMEAR W/DIFF WBC COUNT: CPT | Performed by: INTERNAL MEDICINE

## 2021-04-05 PROCEDURE — 85027 COMPLETE CBC AUTOMATED: CPT | Performed by: INTERNAL MEDICINE

## 2021-04-05 PROCEDURE — 80061 LIPID PANEL: CPT | Performed by: INTERNAL MEDICINE

## 2021-04-05 PROCEDURE — 80197 ASSAY OF TACROLIMUS: CPT | Performed by: INTERNAL MEDICINE

## 2021-04-05 PROCEDURE — 84450 TRANSFERASE (AST) (SGOT): CPT | Performed by: INTERNAL MEDICINE

## 2021-04-06 DIAGNOSIS — Z94.0 KIDNEY REPLACED BY TRANSPLANT: ICD-10-CM

## 2021-04-06 LAB — TACROLIMUS BLD-MCNC: 6.6 NG/ML (ref 5–15)

## 2021-04-06 RX ORDER — TACROLIMUS 1 MG/1
CAPSULE ORAL
Qty: 210 CAPSULE | Refills: 11 | Status: SHIPPED | OUTPATIENT
Start: 2021-04-06 | End: 2021-04-13 | Stop reason: SDUPTHER

## 2021-04-07 ENCOUNTER — HOSPITAL ENCOUNTER (OUTPATIENT)
Dept: RADIOLOGY | Facility: HOSPITAL | Age: 31
Discharge: HOME | End: 2021-04-07
Attending: TRANSPLANT SURGERY
Payer: MEDICARE

## 2021-04-07 DIAGNOSIS — Z94.0 KIDNEY REPLACED BY TRANSPLANT: ICD-10-CM

## 2021-04-07 PROCEDURE — 74176 CT ABDOMEN PELVIS WITHOUT CONTRAST: ICD-10-PCS | Mod: 26,,, | Performed by: RADIOLOGY

## 2021-04-07 PROCEDURE — 74176 CT ABD & PELVIS W/O CONTRAST: CPT | Mod: TC

## 2021-04-07 PROCEDURE — 74176 CT ABD & PELVIS W/O CONTRAST: CPT | Mod: 26,,, | Performed by: RADIOLOGY

## 2021-04-08 DIAGNOSIS — Z94.0 KIDNEY REPLACED BY TRANSPLANT: Primary | ICD-10-CM

## 2021-04-09 ENCOUNTER — TELEPHONE (OUTPATIENT)
Dept: TRANSPLANT | Facility: CLINIC | Age: 31
End: 2021-04-09

## 2021-04-09 DIAGNOSIS — Z94.0 KIDNEY REPLACED BY TRANSPLANT: Primary | ICD-10-CM

## 2021-04-12 ENCOUNTER — LAB VISIT (OUTPATIENT)
Dept: LAB | Facility: HOSPITAL | Age: 31
End: 2021-04-12
Attending: INTERNAL MEDICINE
Payer: MEDICARE

## 2021-04-12 DIAGNOSIS — Z94.0 KIDNEY REPLACED BY TRANSPLANT: ICD-10-CM

## 2021-04-12 LAB
ALBUMIN SERPL BCP-MCNC: 3.6 G/DL (ref 3.5–5.2)
ANION GAP SERPL CALC-SCNC: 7 MMOL/L (ref 8–16)
BASOPHILS # BLD AUTO: 0.06 K/UL (ref 0–0.2)
BASOPHILS NFR BLD: 0.9 % (ref 0–1.9)
BUN SERPL-MCNC: 21 MG/DL (ref 6–20)
CALCIUM SERPL-MCNC: 8.8 MG/DL (ref 8.7–10.5)
CHLORIDE SERPL-SCNC: 106 MMOL/L (ref 95–110)
CO2 SERPL-SCNC: 23 MMOL/L (ref 23–29)
CREAT SERPL-MCNC: 1.2 MG/DL (ref 0.5–1.4)
DIFFERENTIAL METHOD: ABNORMAL
EOSINOPHIL # BLD AUTO: 0.1 K/UL (ref 0–0.5)
EOSINOPHIL NFR BLD: 1.6 % (ref 0–8)
ERYTHROCYTE [DISTWIDTH] IN BLOOD BY AUTOMATED COUNT: 15.1 % (ref 11.5–14.5)
EST. GFR  (AFRICAN AMERICAN): >60 ML/MIN/1.73 M^2
EST. GFR  (NON AFRICAN AMERICAN): >60 ML/MIN/1.73 M^2
GLUCOSE SERPL-MCNC: 80 MG/DL (ref 70–110)
HCT VFR BLD AUTO: 44.4 % (ref 37–48.5)
HGB BLD-MCNC: 13.6 G/DL (ref 12–16)
IMM GRANULOCYTES # BLD AUTO: 0.22 K/UL (ref 0–0.04)
IMM GRANULOCYTES NFR BLD AUTO: 3.2 % (ref 0–0.5)
LYMPHOCYTES # BLD AUTO: 0.6 K/UL (ref 1–4.8)
LYMPHOCYTES NFR BLD: 8.6 % (ref 18–48)
MAGNESIUM SERPL-MCNC: 1.7 MG/DL (ref 1.6–2.6)
MCH RBC QN AUTO: 29.6 PG (ref 27–31)
MCHC RBC AUTO-ENTMCNC: 30.6 G/DL (ref 32–36)
MCV RBC AUTO: 97 FL (ref 82–98)
MONOCYTES # BLD AUTO: 0.4 K/UL (ref 0.3–1)
MONOCYTES NFR BLD: 6.2 % (ref 4–15)
NEUTROPHILS # BLD AUTO: 5.5 K/UL (ref 1.8–7.7)
NEUTROPHILS NFR BLD: 79.5 % (ref 38–73)
NRBC BLD-RTO: 0 /100 WBC
PHOSPHATE SERPL-MCNC: 2.7 MG/DL (ref 2.7–4.5)
PLATELET # BLD AUTO: 238 K/UL (ref 150–450)
PMV BLD AUTO: 11.1 FL (ref 9.2–12.9)
POTASSIUM SERPL-SCNC: 4.3 MMOL/L (ref 3.5–5.1)
RBC # BLD AUTO: 4.59 M/UL (ref 4–5.4)
SODIUM SERPL-SCNC: 136 MMOL/L (ref 136–145)
WBC # BLD AUTO: 6.94 K/UL (ref 3.9–12.7)

## 2021-04-12 PROCEDURE — 85025 COMPLETE CBC W/AUTO DIFF WBC: CPT | Performed by: INTERNAL MEDICINE

## 2021-04-12 PROCEDURE — 80069 RENAL FUNCTION PANEL: CPT | Performed by: INTERNAL MEDICINE

## 2021-04-12 PROCEDURE — 80197 ASSAY OF TACROLIMUS: CPT | Performed by: INTERNAL MEDICINE

## 2021-04-12 PROCEDURE — 83735 ASSAY OF MAGNESIUM: CPT | Performed by: INTERNAL MEDICINE

## 2021-04-12 PROCEDURE — 36415 COLL VENOUS BLD VENIPUNCTURE: CPT | Performed by: INTERNAL MEDICINE

## 2021-04-13 DIAGNOSIS — Z94.0 KIDNEY REPLACED BY TRANSPLANT: ICD-10-CM

## 2021-04-13 LAB — TACROLIMUS BLD-MCNC: 10.1 NG/ML (ref 5–15)

## 2021-04-13 RX ORDER — TACROLIMUS 1 MG/1
3 CAPSULE ORAL EVERY 12 HOURS
Qty: 180 CAPSULE | Refills: 11 | Status: SHIPPED | OUTPATIENT
Start: 2021-04-13 | End: 2021-04-29 | Stop reason: SDUPTHER

## 2021-04-15 ENCOUNTER — TELEPHONE (OUTPATIENT)
Dept: SMOKING CESSATION | Facility: CLINIC | Age: 31
End: 2021-04-15

## 2021-04-19 ENCOUNTER — LAB VISIT (OUTPATIENT)
Dept: LAB | Facility: HOSPITAL | Age: 31
End: 2021-04-19
Attending: INTERNAL MEDICINE
Payer: MEDICARE

## 2021-04-19 DIAGNOSIS — Z94.0 KIDNEY REPLACED BY TRANSPLANT: ICD-10-CM

## 2021-04-19 LAB
ALBUMIN SERPL BCP-MCNC: 3.7 G/DL (ref 3.5–5.2)
ANION GAP SERPL CALC-SCNC: 7 MMOL/L (ref 8–16)
BASOPHILS # BLD AUTO: 0.04 K/UL (ref 0–0.2)
BASOPHILS NFR BLD: 0.6 % (ref 0–1.9)
BUN SERPL-MCNC: 16 MG/DL (ref 6–20)
CALCIUM SERPL-MCNC: 9.9 MG/DL (ref 8.7–10.5)
CHLORIDE SERPL-SCNC: 104 MMOL/L (ref 95–110)
CO2 SERPL-SCNC: 26 MMOL/L (ref 23–29)
CREAT SERPL-MCNC: 1.2 MG/DL (ref 0.5–1.4)
DIFFERENTIAL METHOD: ABNORMAL
EOSINOPHIL # BLD AUTO: 0.1 K/UL (ref 0–0.5)
EOSINOPHIL NFR BLD: 1.7 % (ref 0–8)
ERYTHROCYTE [DISTWIDTH] IN BLOOD BY AUTOMATED COUNT: 14.7 % (ref 11.5–14.5)
EST. GFR  (AFRICAN AMERICAN): >60 ML/MIN/1.73 M^2
EST. GFR  (NON AFRICAN AMERICAN): >60 ML/MIN/1.73 M^2
GLUCOSE SERPL-MCNC: 74 MG/DL (ref 70–110)
HCT VFR BLD AUTO: 44.5 % (ref 37–48.5)
HGB BLD-MCNC: 14.2 G/DL (ref 12–16)
IMM GRANULOCYTES # BLD AUTO: 0.06 K/UL (ref 0–0.04)
IMM GRANULOCYTES NFR BLD AUTO: 0.9 % (ref 0–0.5)
LYMPHOCYTES # BLD AUTO: 0.5 K/UL (ref 1–4.8)
LYMPHOCYTES NFR BLD: 7.5 % (ref 18–48)
MAGNESIUM SERPL-MCNC: 1.7 MG/DL (ref 1.6–2.6)
MCH RBC QN AUTO: 29.6 PG (ref 27–31)
MCHC RBC AUTO-ENTMCNC: 31.9 G/DL (ref 32–36)
MCV RBC AUTO: 93 FL (ref 82–98)
MONOCYTES # BLD AUTO: 0.7 K/UL (ref 0.3–1)
MONOCYTES NFR BLD: 11.4 % (ref 4–15)
NEUTROPHILS # BLD AUTO: 5.1 K/UL (ref 1.8–7.7)
NEUTROPHILS NFR BLD: 77.9 % (ref 38–73)
NRBC BLD-RTO: 0 /100 WBC
PHOSPHATE SERPL-MCNC: 3 MG/DL (ref 2.7–4.5)
PLATELET # BLD AUTO: 236 K/UL (ref 150–450)
PMV BLD AUTO: 10.7 FL (ref 9.2–12.9)
POTASSIUM SERPL-SCNC: 4.4 MMOL/L (ref 3.5–5.1)
RBC # BLD AUTO: 4.8 M/UL (ref 4–5.4)
SODIUM SERPL-SCNC: 137 MMOL/L (ref 136–145)
WBC # BLD AUTO: 6.51 K/UL (ref 3.9–12.7)

## 2021-04-19 PROCEDURE — 85025 COMPLETE CBC W/AUTO DIFF WBC: CPT | Performed by: INTERNAL MEDICINE

## 2021-04-19 PROCEDURE — 80069 RENAL FUNCTION PANEL: CPT | Performed by: INTERNAL MEDICINE

## 2021-04-19 PROCEDURE — 83735 ASSAY OF MAGNESIUM: CPT | Performed by: INTERNAL MEDICINE

## 2021-04-19 PROCEDURE — 80197 ASSAY OF TACROLIMUS: CPT | Performed by: INTERNAL MEDICINE

## 2021-04-19 PROCEDURE — 36415 COLL VENOUS BLD VENIPUNCTURE: CPT | Performed by: INTERNAL MEDICINE

## 2021-04-19 RX ORDER — MIDAZOLAM HYDROCHLORIDE 1 MG/ML
1 INJECTION INTRAMUSCULAR; INTRAVENOUS
Status: CANCELLED | OUTPATIENT
Start: 2021-04-19

## 2021-04-19 RX ORDER — FENTANYL CITRATE 50 UG/ML
50 INJECTION, SOLUTION INTRAMUSCULAR; INTRAVENOUS
Status: CANCELLED | OUTPATIENT
Start: 2021-04-19

## 2021-04-19 RX ORDER — SODIUM CHLORIDE 9 MG/ML
INJECTION, SOLUTION INTRAVENOUS ONCE
Status: CANCELLED | OUTPATIENT
Start: 2021-04-19 | End: 2021-04-19

## 2021-04-20 LAB — TACROLIMUS BLD-MCNC: 9.2 NG/ML (ref 5–15)

## 2021-04-20 RX ORDER — LABETALOL 200 MG/1
200 TABLET, FILM COATED ORAL 2 TIMES DAILY
Qty: 60 TABLET | Refills: 2 | Status: SHIPPED | OUTPATIENT
Start: 2021-04-20 | End: 2021-04-23

## 2021-04-21 ENCOUNTER — OFFICE VISIT (OUTPATIENT)
Dept: TRANSPLANT | Facility: CLINIC | Age: 31
End: 2021-04-21
Payer: MEDICARE

## 2021-04-21 VITALS
HEART RATE: 98 BPM | WEIGHT: 255.06 LBS | OXYGEN SATURATION: 98 % | BODY MASS INDEX: 40.99 KG/M2 | DIASTOLIC BLOOD PRESSURE: 73 MMHG | HEIGHT: 66 IN | TEMPERATURE: 98 F | SYSTOLIC BLOOD PRESSURE: 134 MMHG | RESPIRATION RATE: 18 BRPM

## 2021-04-21 DIAGNOSIS — T86.19: ICD-10-CM

## 2021-04-21 DIAGNOSIS — I10 ESSENTIAL HYPERTENSION: ICD-10-CM

## 2021-04-21 DIAGNOSIS — E21.2 HYPERPARATHYROIDISM, TERTIARY: ICD-10-CM

## 2021-04-21 DIAGNOSIS — Z29.89 PROPHYLACTIC IMMUNOTHERAPY: ICD-10-CM

## 2021-04-21 DIAGNOSIS — N18.2 CKD (CHRONIC KIDNEY DISEASE) STAGE 2, GFR 60-89 ML/MIN: Primary | ICD-10-CM

## 2021-04-21 DIAGNOSIS — S37.019A: ICD-10-CM

## 2021-04-21 DIAGNOSIS — Z79.899 IMMUNOSUPPRESSIVE MANAGEMENT ENCOUNTER FOLLOWING KIDNEY TRANSPLANT: ICD-10-CM

## 2021-04-21 DIAGNOSIS — Z94.0 DECEASED-DONOR KIDNEY TRANSPLANT: ICD-10-CM

## 2021-04-21 DIAGNOSIS — T86.11 ANTIBODY MEDIATED REJECTION OF KIDNEY TRANSPLANT: ICD-10-CM

## 2021-04-21 DIAGNOSIS — Z98.890 S/P PARATHYROIDECTOMY: ICD-10-CM

## 2021-04-21 DIAGNOSIS — Z90.5 S/P NEPHRECTOMY: ICD-10-CM

## 2021-04-21 DIAGNOSIS — Z90.89 S/P PARATHYROIDECTOMY: ICD-10-CM

## 2021-04-21 DIAGNOSIS — Z94.0 IMMUNOSUPPRESSIVE MANAGEMENT ENCOUNTER FOLLOWING KIDNEY TRANSPLANT: ICD-10-CM

## 2021-04-21 PROCEDURE — 99999 PR PBB SHADOW E&M-EST. PATIENT-LVL IV: CPT | Mod: PBBFAC,,, | Performed by: INTERNAL MEDICINE

## 2021-04-21 PROCEDURE — 99215 PR OFFICE/OUTPT VISIT, EST, LEVL V, 40-54 MIN: ICD-10-PCS | Mod: S$PBB,,, | Performed by: INTERNAL MEDICINE

## 2021-04-21 PROCEDURE — 99214 OFFICE O/P EST MOD 30 MIN: CPT | Mod: PBBFAC | Performed by: INTERNAL MEDICINE

## 2021-04-21 PROCEDURE — 99999 PR PBB SHADOW E&M-EST. PATIENT-LVL IV: ICD-10-PCS | Mod: PBBFAC,,, | Performed by: INTERNAL MEDICINE

## 2021-04-21 PROCEDURE — 99215 OFFICE O/P EST HI 40 MIN: CPT | Mod: S$PBB,,, | Performed by: INTERNAL MEDICINE

## 2021-04-21 RX ORDER — LANOLIN ALCOHOL/MO/W.PET/CERES
400 CREAM (GRAM) TOPICAL 2 TIMES DAILY
Qty: 60 TABLET | Refills: 11 | Status: SHIPPED | OUTPATIENT
Start: 2021-04-21 | End: 2022-05-09

## 2021-04-21 RX ORDER — SODIUM BICARBONATE 650 MG/1
625 TABLET ORAL 2 TIMES DAILY
Qty: 60 TABLET | Refills: 11 | Status: SHIPPED | OUTPATIENT
Start: 2021-04-21 | End: 2021-04-29 | Stop reason: SDUPTHER

## 2021-04-22 ENCOUNTER — HOSPITAL ENCOUNTER (OUTPATIENT)
Dept: INTERVENTIONAL RADIOLOGY/VASCULAR | Facility: HOSPITAL | Age: 31
Discharge: HOME | End: 2021-04-22
Attending: TRANSPLANT SURGERY
Payer: MEDICARE

## 2021-04-22 VITALS
DIASTOLIC BLOOD PRESSURE: 80 MMHG | HEART RATE: 75 BPM | OXYGEN SATURATION: 99 % | RESPIRATION RATE: 17 BRPM | SYSTOLIC BLOOD PRESSURE: 118 MMHG

## 2021-04-22 DIAGNOSIS — Z94.0 KIDNEY REPLACED BY TRANSPLANT: ICD-10-CM

## 2021-04-22 PROCEDURE — 50389 IR NEPHROSTOMY TUBE CHANGE: ICD-10-PCS | Mod: ,,, | Performed by: RADIOLOGY

## 2021-04-22 PROCEDURE — 50389 REMOVE RENAL TUBE W/FLUORO: CPT | Performed by: RADIOLOGY

## 2021-04-22 PROCEDURE — C1769 GUIDE WIRE: HCPCS

## 2021-04-27 RX ORDER — LABETALOL 200 MG/1
200 TABLET, FILM COATED ORAL 2 TIMES DAILY
Qty: 60 TABLET | Refills: 10 | Status: SHIPPED | OUTPATIENT
Start: 2021-04-27 | End: 2022-04-08

## 2021-04-28 ENCOUNTER — LAB VISIT (OUTPATIENT)
Dept: LAB | Facility: HOSPITAL | Age: 31
End: 2021-04-28
Attending: INTERNAL MEDICINE
Payer: MEDICARE

## 2021-04-28 DIAGNOSIS — Z94.0 KIDNEY REPLACED BY TRANSPLANT: ICD-10-CM

## 2021-04-28 LAB
ALBUMIN SERPL BCP-MCNC: 3.6 G/DL (ref 3.5–5.2)
ANION GAP SERPL CALC-SCNC: 10 MMOL/L (ref 8–16)
BASOPHILS # BLD AUTO: 0.04 K/UL (ref 0–0.2)
BASOPHILS NFR BLD: 0.6 % (ref 0–1.9)
BUN SERPL-MCNC: 18 MG/DL (ref 6–20)
CALCIUM SERPL-MCNC: 9.1 MG/DL (ref 8.7–10.5)
CHLORIDE SERPL-SCNC: 108 MMOL/L (ref 95–110)
CO2 SERPL-SCNC: 19 MMOL/L (ref 23–29)
CREAT SERPL-MCNC: 1.3 MG/DL (ref 0.5–1.4)
DIFFERENTIAL METHOD: ABNORMAL
EOSINOPHIL # BLD AUTO: 0.1 K/UL (ref 0–0.5)
EOSINOPHIL NFR BLD: 1.8 % (ref 0–8)
ERYTHROCYTE [DISTWIDTH] IN BLOOD BY AUTOMATED COUNT: 14.6 % (ref 11.5–14.5)
EST. GFR  (AFRICAN AMERICAN): >60 ML/MIN/1.73 M^2
EST. GFR  (NON AFRICAN AMERICAN): 55.2 ML/MIN/1.73 M^2
GLUCOSE SERPL-MCNC: 86 MG/DL (ref 70–110)
HCT VFR BLD AUTO: 43.6 % (ref 37–48.5)
HGB BLD-MCNC: 13.7 G/DL (ref 12–16)
IMM GRANULOCYTES # BLD AUTO: 0.05 K/UL (ref 0–0.04)
IMM GRANULOCYTES NFR BLD AUTO: 0.8 % (ref 0–0.5)
LYMPHOCYTES # BLD AUTO: 0.5 K/UL (ref 1–4.8)
LYMPHOCYTES NFR BLD: 8.2 % (ref 18–48)
MAGNESIUM SERPL-MCNC: 1.5 MG/DL (ref 1.6–2.6)
MCH RBC QN AUTO: 29.2 PG (ref 27–31)
MCHC RBC AUTO-ENTMCNC: 31.4 G/DL (ref 32–36)
MCV RBC AUTO: 93 FL (ref 82–98)
MONOCYTES # BLD AUTO: 0.7 K/UL (ref 0.3–1)
MONOCYTES NFR BLD: 11.4 % (ref 4–15)
NEUTROPHILS # BLD AUTO: 4.8 K/UL (ref 1.8–7.7)
NEUTROPHILS NFR BLD: 77.2 % (ref 38–73)
NRBC BLD-RTO: 0 /100 WBC
PHOSPHATE SERPL-MCNC: 2.5 MG/DL (ref 2.7–4.5)
PLATELET # BLD AUTO: 236 K/UL (ref 150–450)
PMV BLD AUTO: 11.3 FL (ref 9.2–12.9)
POTASSIUM SERPL-SCNC: 4.7 MMOL/L (ref 3.5–5.1)
RBC # BLD AUTO: 4.69 M/UL (ref 4–5.4)
SODIUM SERPL-SCNC: 137 MMOL/L (ref 136–145)
WBC # BLD AUTO: 6.25 K/UL (ref 3.9–12.7)

## 2021-04-28 PROCEDURE — 80069 RENAL FUNCTION PANEL: CPT | Performed by: INTERNAL MEDICINE

## 2021-04-28 PROCEDURE — 36415 COLL VENOUS BLD VENIPUNCTURE: CPT | Performed by: INTERNAL MEDICINE

## 2021-04-28 PROCEDURE — 80197 ASSAY OF TACROLIMUS: CPT | Performed by: INTERNAL MEDICINE

## 2021-04-28 PROCEDURE — 85025 COMPLETE CBC W/AUTO DIFF WBC: CPT | Performed by: INTERNAL MEDICINE

## 2021-04-28 PROCEDURE — 83735 ASSAY OF MAGNESIUM: CPT | Performed by: INTERNAL MEDICINE

## 2021-04-29 ENCOUNTER — OFFICE VISIT (OUTPATIENT)
Dept: CARDIOLOGY | Facility: CLINIC | Age: 31
End: 2021-04-29
Payer: MEDICARE

## 2021-04-29 VITALS
BODY MASS INDEX: 41.13 KG/M2 | HEART RATE: 96 BPM | DIASTOLIC BLOOD PRESSURE: 76 MMHG | SYSTOLIC BLOOD PRESSURE: 130 MMHG | RESPIRATION RATE: 16 BRPM | OXYGEN SATURATION: 99 % | WEIGHT: 255.94 LBS | HEIGHT: 66 IN

## 2021-04-29 DIAGNOSIS — Z94.0 KIDNEY REPLACED BY TRANSPLANT: ICD-10-CM

## 2021-04-29 DIAGNOSIS — N18.1 ANEMIA OF CHRONIC RENAL FAILURE, STAGE 1: ICD-10-CM

## 2021-04-29 DIAGNOSIS — I36.1 NONRHEUMATIC TRICUSPID VALVE REGURGITATION: ICD-10-CM

## 2021-04-29 DIAGNOSIS — N18.2 CKD (CHRONIC KIDNEY DISEASE) STAGE 2, GFR 60-89 ML/MIN: Primary | ICD-10-CM

## 2021-04-29 DIAGNOSIS — I27.20 PULMONARY HYPERTENSION: ICD-10-CM

## 2021-04-29 DIAGNOSIS — I10 ESSENTIAL HYPERTENSION: ICD-10-CM

## 2021-04-29 DIAGNOSIS — R06.09 OTHER FORM OF DYSPNEA: Primary | ICD-10-CM

## 2021-04-29 DIAGNOSIS — D63.1 ANEMIA OF CHRONIC RENAL FAILURE, STAGE 1: ICD-10-CM

## 2021-04-29 DIAGNOSIS — E66.01 MORBID OBESITY: ICD-10-CM

## 2021-04-29 PROCEDURE — 99214 OFFICE O/P EST MOD 30 MIN: CPT | Mod: S$PBB,,, | Performed by: INTERNAL MEDICINE

## 2021-04-29 PROCEDURE — 99214 OFFICE O/P EST MOD 30 MIN: CPT | Mod: PBBFAC | Performed by: INTERNAL MEDICINE

## 2021-04-29 PROCEDURE — 99214 PR OFFICE/OUTPT VISIT, EST, LEVL IV, 30-39 MIN: ICD-10-PCS | Mod: S$PBB,,, | Performed by: INTERNAL MEDICINE

## 2021-04-29 PROCEDURE — 99999 PR PBB SHADOW E&M-EST. PATIENT-LVL IV: ICD-10-PCS | Mod: PBBFAC,,, | Performed by: INTERNAL MEDICINE

## 2021-04-29 PROCEDURE — 99999 PR PBB SHADOW E&M-EST. PATIENT-LVL IV: CPT | Mod: PBBFAC,,, | Performed by: INTERNAL MEDICINE

## 2021-04-29 RX ORDER — SODIUM BICARBONATE 650 MG/1
1300 TABLET ORAL 2 TIMES DAILY
Qty: 120 TABLET | Refills: 11 | Status: SHIPPED | OUTPATIENT
Start: 2021-04-29 | End: 2021-08-10 | Stop reason: SDUPTHER

## 2021-04-29 RX ORDER — TACROLIMUS 1 MG/1
2 CAPSULE ORAL EVERY 12 HOURS
Qty: 120 CAPSULE | Refills: 11 | Status: SHIPPED | OUTPATIENT
Start: 2021-04-29 | End: 2021-05-19 | Stop reason: SDUPTHER

## 2021-05-03 ENCOUNTER — LAB VISIT (OUTPATIENT)
Dept: LAB | Facility: HOSPITAL | Age: 31
End: 2021-05-03
Attending: INTERNAL MEDICINE
Payer: MEDICARE

## 2021-05-03 DIAGNOSIS — Z94.0 KIDNEY REPLACED BY TRANSPLANT: ICD-10-CM

## 2021-05-03 LAB
ALBUMIN SERPL BCP-MCNC: 3.5 G/DL (ref 3.5–5.2)
ANION GAP SERPL CALC-SCNC: 9 MMOL/L (ref 8–16)
BASOPHILS # BLD AUTO: 0.04 K/UL (ref 0–0.2)
BASOPHILS NFR BLD: 0.6 % (ref 0–1.9)
BUN SERPL-MCNC: 17 MG/DL (ref 6–20)
CALCIUM SERPL-MCNC: 9 MG/DL (ref 8.7–10.5)
CHLORIDE SERPL-SCNC: 107 MMOL/L (ref 95–110)
CO2 SERPL-SCNC: 20 MMOL/L (ref 23–29)
CREAT SERPL-MCNC: 1.3 MG/DL (ref 0.5–1.4)
DIFFERENTIAL METHOD: ABNORMAL
EOSINOPHIL # BLD AUTO: 0.2 K/UL (ref 0–0.5)
EOSINOPHIL NFR BLD: 2.3 % (ref 0–8)
ERYTHROCYTE [DISTWIDTH] IN BLOOD BY AUTOMATED COUNT: 14.4 % (ref 11.5–14.5)
EST. GFR  (AFRICAN AMERICAN): >60 ML/MIN/1.73 M^2
EST. GFR  (NON AFRICAN AMERICAN): 55.2 ML/MIN/1.73 M^2
GLUCOSE SERPL-MCNC: 85 MG/DL (ref 70–110)
HCT VFR BLD AUTO: 42.1 % (ref 37–48.5)
HGB BLD-MCNC: 13.6 G/DL (ref 12–16)
IMM GRANULOCYTES # BLD AUTO: 0.1 K/UL (ref 0–0.04)
IMM GRANULOCYTES NFR BLD AUTO: 1.4 % (ref 0–0.5)
LYMPHOCYTES # BLD AUTO: 0.6 K/UL (ref 1–4.8)
LYMPHOCYTES NFR BLD: 7.9 % (ref 18–48)
MAGNESIUM SERPL-MCNC: 1.7 MG/DL (ref 1.6–2.6)
MCH RBC QN AUTO: 29 PG (ref 27–31)
MCHC RBC AUTO-ENTMCNC: 32.3 G/DL (ref 32–36)
MCV RBC AUTO: 90 FL (ref 82–98)
MONOCYTES # BLD AUTO: 0.8 K/UL (ref 0.3–1)
MONOCYTES NFR BLD: 11.7 % (ref 4–15)
NEUTROPHILS # BLD AUTO: 5.4 K/UL (ref 1.8–7.7)
NEUTROPHILS NFR BLD: 76.1 % (ref 38–73)
NRBC BLD-RTO: 0 /100 WBC
PHOSPHATE SERPL-MCNC: 2.7 MG/DL (ref 2.7–4.5)
PLATELET # BLD AUTO: 230 K/UL (ref 150–450)
PMV BLD AUTO: 10.8 FL (ref 9.2–12.9)
POTASSIUM SERPL-SCNC: 4.4 MMOL/L (ref 3.5–5.1)
RBC # BLD AUTO: 4.69 M/UL (ref 4–5.4)
SODIUM SERPL-SCNC: 136 MMOL/L (ref 136–145)
WBC # BLD AUTO: 7.09 K/UL (ref 3.9–12.7)

## 2021-05-03 PROCEDURE — 85025 COMPLETE CBC W/AUTO DIFF WBC: CPT | Performed by: INTERNAL MEDICINE

## 2021-05-03 PROCEDURE — 36415 COLL VENOUS BLD VENIPUNCTURE: CPT | Performed by: INTERNAL MEDICINE

## 2021-05-03 PROCEDURE — 83735 ASSAY OF MAGNESIUM: CPT | Performed by: INTERNAL MEDICINE

## 2021-05-03 PROCEDURE — 80069 RENAL FUNCTION PANEL: CPT | Performed by: INTERNAL MEDICINE

## 2021-05-03 PROCEDURE — 87799 DETECT AGENT NOS DNA QUANT: CPT | Performed by: INTERNAL MEDICINE

## 2021-05-03 PROCEDURE — 80197 ASSAY OF TACROLIMUS: CPT | Performed by: INTERNAL MEDICINE

## 2021-05-04 ENCOUNTER — TELEPHONE (OUTPATIENT)
Dept: SMOKING CESSATION | Facility: CLINIC | Age: 31
End: 2021-05-04

## 2021-05-04 LAB — TACROLIMUS BLD-MCNC: 8 NG/ML (ref 5–15)

## 2021-05-09 LAB — TACROLIMUS BLD-MCNC: 12.8 NG/ML (ref 5–15)

## 2021-05-10 ENCOUNTER — TELEPHONE (OUTPATIENT)
Dept: SMOKING CESSATION | Facility: CLINIC | Age: 31
End: 2021-05-10

## 2021-05-17 ENCOUNTER — HOSPITAL ENCOUNTER (OUTPATIENT)
Dept: CARDIOLOGY | Facility: HOSPITAL | Age: 31
Discharge: HOME | End: 2021-05-17
Attending: INTERNAL MEDICINE
Payer: MEDICARE

## 2021-05-17 VITALS
DIASTOLIC BLOOD PRESSURE: 76 MMHG | HEIGHT: 66 IN | WEIGHT: 255 LBS | HEART RATE: 90 BPM | BODY MASS INDEX: 40.98 KG/M2 | SYSTOLIC BLOOD PRESSURE: 130 MMHG

## 2021-05-17 DIAGNOSIS — I27.20 PULMONARY HYPERTENSION: ICD-10-CM

## 2021-05-17 DIAGNOSIS — I36.1 NONRHEUMATIC TRICUSPID VALVE REGURGITATION: ICD-10-CM

## 2021-05-17 DIAGNOSIS — Z94.0 KIDNEY REPLACED BY TRANSPLANT: ICD-10-CM

## 2021-05-17 DIAGNOSIS — I10 ESSENTIAL HYPERTENSION: ICD-10-CM

## 2021-05-17 DIAGNOSIS — R06.09 OTHER FORM OF DYSPNEA: ICD-10-CM

## 2021-05-17 LAB
ASCENDING AORTA: 1.77 CM
AV INDEX (PROSTH): 0.84
AV MEAN GRADIENT: 6 MMHG
AV PEAK GRADIENT: 10 MMHG
AV VALVE AREA: 2.06 CM2
AV VELOCITY RATIO: 0.77
BSA FOR ECHO PROCEDURE: 2.32 M2
CV ECHO LV RWT: 0.63 CM
DOP CALC AO PEAK VEL: 1.58 M/S
DOP CALC AO VTI: 29.09 CM
DOP CALC LVOT AREA: 2.5 CM2
DOP CALC LVOT DIAMETER: 1.77 CM
DOP CALC LVOT PEAK VEL: 1.21 M/S
DOP CALC LVOT STROKE VOLUME: 60.06 CM3
DOP CALC RVOT PEAK VEL: 0.85 M/S
DOP CALC RVOT VTI: 12.19 CM
DOP CALCLVOT PEAK VEL VTI: 24.42 CM
E WAVE DECELERATION TIME: 211.67 MSEC
E/A RATIO: 0.84
E/E' RATIO: 5.78 M/S
ECHO LV POSTERIOR WALL: 1.08 CM (ref 0.6–1.1)
EJECTION FRACTION: 60 %
FRACTIONAL SHORTENING: 45 % (ref 28–44)
INTERVENTRICULAR SEPTUM: 1.03 CM (ref 0.6–1.1)
IVRT: 88.49 MSEC
LA MAJOR: 4.21 CM
LA MINOR: 4.08 CM
LA WIDTH: 3.32 CM
LEFT ATRIUM SIZE: 2.89 CM
LEFT ATRIUM VOLUME INDEX: 15.2 ML/M2
LEFT ATRIUM VOLUME: 33.8 CM3
LEFT INTERNAL DIMENSION IN SYSTOLE: 1.89 CM (ref 2.1–4)
LEFT VENTRICLE DIASTOLIC VOLUME INDEX: 21.87 ML/M2
LEFT VENTRICLE DIASTOLIC VOLUME: 48.55 ML
LEFT VENTRICLE MASS INDEX: 49 G/M2
LEFT VENTRICLE SYSTOLIC VOLUME INDEX: 5 ML/M2
LEFT VENTRICLE SYSTOLIC VOLUME: 10.99 ML
LEFT VENTRICULAR INTERNAL DIMENSION IN DIASTOLE: 3.43 CM (ref 3.5–6)
LEFT VENTRICULAR MASS: 108.49 G
LV LATERAL E/E' RATIO: 4.33 M/S
LV SEPTAL E/E' RATIO: 8.67 M/S
MV A" WAVE DURATION": 9.42 MSEC
MV PEAK A VEL: 0.62 M/S
MV PEAK E VEL: 0.52 M/S
MV STENOSIS PRESSURE HALF TIME: 61.38 MS
MV VALVE AREA P 1/2 METHOD: 3.58 CM2
PISA TR MAX VEL: 2.73 M/S
PULM VEIN S/D RATIO: 1.49
PV MEAN GRADIENT: 1 MMHG
PV PEAK D VEL: 0.39 M/S
PV PEAK S VEL: 0.58 M/S
PV PEAK VELOCITY: 1.47 CM/S
RA MAJOR: 3.92 CM
RA WIDTH: 3.25 CM
RIGHT VENTRICULAR END-DIASTOLIC DIMENSION: 3.25 CM
SINUS: 2.21 CM
STJ: 1.86 CM
TDI LATERAL: 0.12 M/S
TDI SEPTAL: 0.06 M/S
TDI: 0.09 M/S
TR MAX PG: 30 MMHG
TRICUSPID ANNULAR PLANE SYSTOLIC EXCURSION: 2.27 CM

## 2021-05-17 PROCEDURE — 93306 TTE W/DOPPLER COMPLETE: CPT | Mod: 26,,, | Performed by: INTERNAL MEDICINE

## 2021-05-17 PROCEDURE — 93306 TTE W/DOPPLER COMPLETE: CPT

## 2021-05-17 PROCEDURE — 93306 ECHO (CUPID ONLY): ICD-10-PCS | Mod: 26,,, | Performed by: INTERNAL MEDICINE

## 2021-05-18 ENCOUNTER — LAB VISIT (OUTPATIENT)
Dept: LAB | Facility: HOSPITAL | Age: 31
End: 2021-05-18
Attending: INTERNAL MEDICINE
Payer: MEDICARE

## 2021-05-18 DIAGNOSIS — Z94.0 KIDNEY REPLACED BY TRANSPLANT: ICD-10-CM

## 2021-05-18 LAB
BASOPHILS # BLD AUTO: 0.04 K/UL (ref 0–0.2)
BASOPHILS NFR BLD: 0.5 % (ref 0–1.9)
DIFFERENTIAL METHOD: ABNORMAL
EOSINOPHIL # BLD AUTO: 0.1 K/UL (ref 0–0.5)
EOSINOPHIL NFR BLD: 1.8 % (ref 0–8)
ERYTHROCYTE [DISTWIDTH] IN BLOOD BY AUTOMATED COUNT: 14.1 % (ref 11.5–14.5)
HCT VFR BLD AUTO: 41.8 % (ref 37–48.5)
HGB BLD-MCNC: 13.5 G/DL (ref 12–16)
IMM GRANULOCYTES # BLD AUTO: 0.05 K/UL (ref 0–0.04)
IMM GRANULOCYTES NFR BLD AUTO: 0.7 % (ref 0–0.5)
LYMPHOCYTES # BLD AUTO: 0.5 K/UL (ref 1–4.8)
LYMPHOCYTES NFR BLD: 6.7 % (ref 18–48)
MCH RBC QN AUTO: 28.8 PG (ref 27–31)
MCHC RBC AUTO-ENTMCNC: 32.3 G/DL (ref 32–36)
MCV RBC AUTO: 89 FL (ref 82–98)
MONOCYTES # BLD AUTO: 0.6 K/UL (ref 0.3–1)
MONOCYTES NFR BLD: 8 % (ref 4–15)
NEUTROPHILS # BLD AUTO: 6.1 K/UL (ref 1.8–7.7)
NEUTROPHILS NFR BLD: 82.3 % (ref 38–73)
NRBC BLD-RTO: 0 /100 WBC
PLATELET # BLD AUTO: 214 K/UL (ref 150–450)
PMV BLD AUTO: 11.5 FL (ref 9.2–12.9)
RBC # BLD AUTO: 4.68 M/UL (ref 4–5.4)
WBC # BLD AUTO: 7.41 K/UL (ref 3.9–12.7)

## 2021-05-18 PROCEDURE — 83735 ASSAY OF MAGNESIUM: CPT | Performed by: INTERNAL MEDICINE

## 2021-05-18 PROCEDURE — 85025 COMPLETE CBC W/AUTO DIFF WBC: CPT | Performed by: INTERNAL MEDICINE

## 2021-05-18 PROCEDURE — 80197 ASSAY OF TACROLIMUS: CPT | Performed by: INTERNAL MEDICINE

## 2021-05-18 PROCEDURE — 80069 RENAL FUNCTION PANEL: CPT | Performed by: INTERNAL MEDICINE

## 2021-05-18 PROCEDURE — 36415 COLL VENOUS BLD VENIPUNCTURE: CPT | Performed by: INTERNAL MEDICINE

## 2021-05-19 DIAGNOSIS — Z94.0 KIDNEY REPLACED BY TRANSPLANT: ICD-10-CM

## 2021-05-19 LAB
ALBUMIN SERPL BCP-MCNC: 3.5 G/DL (ref 3.5–5.2)
ANION GAP SERPL CALC-SCNC: 10 MMOL/L (ref 8–16)
BUN SERPL-MCNC: 16 MG/DL (ref 6–20)
CALCIUM SERPL-MCNC: 10.1 MG/DL (ref 8.7–10.5)
CHLORIDE SERPL-SCNC: 107 MMOL/L (ref 95–110)
CO2 SERPL-SCNC: 21 MMOL/L (ref 23–29)
CREAT SERPL-MCNC: 1.1 MG/DL (ref 0.5–1.4)
EST. GFR  (AFRICAN AMERICAN): >60 ML/MIN/1.73 M^2
EST. GFR  (NON AFRICAN AMERICAN): >60 ML/MIN/1.73 M^2
GLUCOSE SERPL-MCNC: 82 MG/DL (ref 70–110)
MAGNESIUM SERPL-MCNC: 1.7 MG/DL (ref 1.6–2.6)
PHOSPHATE SERPL-MCNC: 3 MG/DL (ref 2.7–4.5)
POTASSIUM SERPL-SCNC: 4.3 MMOL/L (ref 3.5–5.1)
SODIUM SERPL-SCNC: 138 MMOL/L (ref 136–145)
TACROLIMUS BLD-MCNC: 7.7 NG/ML (ref 5–15)
TACROLIMUS, NORMALIZED: 6.8 NG/ML (ref 5–15)

## 2021-05-19 RX ORDER — TACROLIMUS 1 MG/1
CAPSULE ORAL
Qty: 150 CAPSULE | Refills: 11 | Status: SHIPPED | OUTPATIENT
Start: 2021-05-19 | End: 2022-06-07 | Stop reason: SDUPTHER

## 2021-06-02 ENCOUNTER — LAB VISIT (OUTPATIENT)
Dept: LAB | Facility: HOSPITAL | Age: 31
End: 2021-06-02
Attending: INTERNAL MEDICINE
Payer: MEDICARE

## 2021-06-02 DIAGNOSIS — Z94.0 KIDNEY REPLACED BY TRANSPLANT: ICD-10-CM

## 2021-06-02 DIAGNOSIS — N18.2 CKD (CHRONIC KIDNEY DISEASE) STAGE 2, GFR 60-89 ML/MIN: ICD-10-CM

## 2021-06-02 DIAGNOSIS — D63.1 ANEMIA OF CHRONIC RENAL FAILURE, STAGE 1: ICD-10-CM

## 2021-06-02 DIAGNOSIS — N18.1 ANEMIA OF CHRONIC RENAL FAILURE, STAGE 1: ICD-10-CM

## 2021-06-02 LAB
ALBUMIN SERPL BCP-MCNC: 3.5 G/DL (ref 3.5–5.2)
ALBUMIN SERPL BCP-MCNC: 3.5 G/DL (ref 3.5–5.2)
ANION GAP SERPL CALC-SCNC: 9 MMOL/L (ref 8–16)
ANION GAP SERPL CALC-SCNC: 9 MMOL/L (ref 8–16)
BASOPHILS # BLD AUTO: 0.04 K/UL (ref 0–0.2)
BASOPHILS # BLD AUTO: 0.04 K/UL (ref 0–0.2)
BASOPHILS NFR BLD: 0.6 % (ref 0–1.9)
BASOPHILS NFR BLD: 0.6 % (ref 0–1.9)
BUN SERPL-MCNC: 17 MG/DL (ref 6–20)
BUN SERPL-MCNC: 17 MG/DL (ref 6–20)
CALCIUM SERPL-MCNC: 9 MG/DL (ref 8.7–10.5)
CALCIUM SERPL-MCNC: 9 MG/DL (ref 8.7–10.5)
CHLORIDE SERPL-SCNC: 107 MMOL/L (ref 95–110)
CHLORIDE SERPL-SCNC: 107 MMOL/L (ref 95–110)
CO2 SERPL-SCNC: 21 MMOL/L (ref 23–29)
CO2 SERPL-SCNC: 21 MMOL/L (ref 23–29)
CREAT SERPL-MCNC: 1.3 MG/DL (ref 0.5–1.4)
CREAT SERPL-MCNC: 1.3 MG/DL (ref 0.5–1.4)
DIFFERENTIAL METHOD: ABNORMAL
DIFFERENTIAL METHOD: ABNORMAL
EOSINOPHIL # BLD AUTO: 0.1 K/UL (ref 0–0.5)
EOSINOPHIL # BLD AUTO: 0.1 K/UL (ref 0–0.5)
EOSINOPHIL NFR BLD: 1.5 % (ref 0–8)
EOSINOPHIL NFR BLD: 1.5 % (ref 0–8)
ERYTHROCYTE [DISTWIDTH] IN BLOOD BY AUTOMATED COUNT: 13.8 % (ref 11.5–14.5)
ERYTHROCYTE [DISTWIDTH] IN BLOOD BY AUTOMATED COUNT: 13.8 % (ref 11.5–14.5)
EST. GFR  (AFRICAN AMERICAN): >60 ML/MIN/1.73 M^2
EST. GFR  (AFRICAN AMERICAN): >60 ML/MIN/1.73 M^2
EST. GFR  (NON AFRICAN AMERICAN): 55.2 ML/MIN/1.73 M^2
EST. GFR  (NON AFRICAN AMERICAN): 55.2 ML/MIN/1.73 M^2
GLUCOSE SERPL-MCNC: 82 MG/DL (ref 70–110)
GLUCOSE SERPL-MCNC: 82 MG/DL (ref 70–110)
HCT VFR BLD AUTO: 42.6 % (ref 37–48.5)
HCT VFR BLD AUTO: 42.6 % (ref 37–48.5)
HGB BLD-MCNC: 13.4 G/DL (ref 12–16)
HGB BLD-MCNC: 13.4 G/DL (ref 12–16)
IMM GRANULOCYTES # BLD AUTO: 0.04 K/UL (ref 0–0.04)
IMM GRANULOCYTES # BLD AUTO: 0.04 K/UL (ref 0–0.04)
IMM GRANULOCYTES NFR BLD AUTO: 0.6 % (ref 0–0.5)
IMM GRANULOCYTES NFR BLD AUTO: 0.6 % (ref 0–0.5)
IRON SERPL-MCNC: 48 UG/DL (ref 30–160)
LYMPHOCYTES # BLD AUTO: 0.7 K/UL (ref 1–4.8)
LYMPHOCYTES # BLD AUTO: 0.7 K/UL (ref 1–4.8)
LYMPHOCYTES NFR BLD: 9.1 % (ref 18–48)
LYMPHOCYTES NFR BLD: 9.1 % (ref 18–48)
MAGNESIUM SERPL-MCNC: 1.6 MG/DL (ref 1.6–2.6)
MCH RBC QN AUTO: 28.4 PG (ref 27–31)
MCH RBC QN AUTO: 28.4 PG (ref 27–31)
MCHC RBC AUTO-ENTMCNC: 31.5 G/DL (ref 32–36)
MCHC RBC AUTO-ENTMCNC: 31.5 G/DL (ref 32–36)
MCV RBC AUTO: 90 FL (ref 82–98)
MCV RBC AUTO: 90 FL (ref 82–98)
MONOCYTES # BLD AUTO: 0.6 K/UL (ref 0.3–1)
MONOCYTES # BLD AUTO: 0.6 K/UL (ref 0.3–1)
MONOCYTES NFR BLD: 8.4 % (ref 4–15)
MONOCYTES NFR BLD: 8.4 % (ref 4–15)
NEUTROPHILS # BLD AUTO: 5.7 K/UL (ref 1.8–7.7)
NEUTROPHILS # BLD AUTO: 5.7 K/UL (ref 1.8–7.7)
NEUTROPHILS NFR BLD: 79.8 % (ref 38–73)
NEUTROPHILS NFR BLD: 79.8 % (ref 38–73)
NRBC BLD-RTO: 0 /100 WBC
NRBC BLD-RTO: 0 /100 WBC
PHOSPHATE SERPL-MCNC: 2.7 MG/DL (ref 2.7–4.5)
PHOSPHATE SERPL-MCNC: 2.7 MG/DL (ref 2.7–4.5)
PLATELET # BLD AUTO: 231 K/UL (ref 150–450)
PLATELET # BLD AUTO: 231 K/UL (ref 150–450)
PMV BLD AUTO: 11.3 FL (ref 9.2–12.9)
PMV BLD AUTO: 11.3 FL (ref 9.2–12.9)
POTASSIUM SERPL-SCNC: 4.4 MMOL/L (ref 3.5–5.1)
POTASSIUM SERPL-SCNC: 4.4 MMOL/L (ref 3.5–5.1)
PTH-INTACT SERPL-MCNC: 109 PG/ML (ref 9–77)
RBC # BLD AUTO: 4.72 M/UL (ref 4–5.4)
RBC # BLD AUTO: 4.72 M/UL (ref 4–5.4)
SATURATED IRON: 14 % (ref 20–50)
SODIUM SERPL-SCNC: 137 MMOL/L (ref 136–145)
SODIUM SERPL-SCNC: 137 MMOL/L (ref 136–145)
TOTAL IRON BINDING CAPACITY: 333 UG/DL (ref 250–450)
TRANSFERRIN SERPL-MCNC: 225 MG/DL (ref 200–375)
WBC # BLD AUTO: 7.14 K/UL (ref 3.9–12.7)
WBC # BLD AUTO: 7.14 K/UL (ref 3.9–12.7)

## 2021-06-02 PROCEDURE — 83735 ASSAY OF MAGNESIUM: CPT | Performed by: INTERNAL MEDICINE

## 2021-06-02 PROCEDURE — 85025 COMPLETE CBC W/AUTO DIFF WBC: CPT | Performed by: INTERNAL MEDICINE

## 2021-06-02 PROCEDURE — 83970 ASSAY OF PARATHORMONE: CPT | Performed by: INTERNAL MEDICINE

## 2021-06-02 PROCEDURE — 87799 DETECT AGENT NOS DNA QUANT: CPT | Performed by: INTERNAL MEDICINE

## 2021-06-02 PROCEDURE — 80069 RENAL FUNCTION PANEL: CPT | Performed by: INTERNAL MEDICINE

## 2021-06-02 PROCEDURE — 83540 ASSAY OF IRON: CPT | Performed by: INTERNAL MEDICINE

## 2021-06-02 PROCEDURE — 36415 COLL VENOUS BLD VENIPUNCTURE: CPT | Performed by: INTERNAL MEDICINE

## 2021-06-02 PROCEDURE — 80197 ASSAY OF TACROLIMUS: CPT | Performed by: INTERNAL MEDICINE

## 2021-06-03 ENCOUNTER — TELEPHONE (OUTPATIENT)
Dept: TRANSPLANT | Facility: CLINIC | Age: 31
End: 2021-06-03

## 2021-06-03 LAB
TACROLIMUS BLD-MCNC: 10.7 NG/ML (ref 5–15)
TACROLIMUS, NORMALIZED: 9.4 NG/ML (ref 5–15)

## 2021-06-08 ENCOUNTER — OFFICE VISIT (OUTPATIENT)
Dept: NEPHROLOGY | Facility: CLINIC | Age: 31
End: 2021-06-08
Payer: MEDICARE

## 2021-06-08 VITALS
BODY MASS INDEX: 41.34 KG/M2 | DIASTOLIC BLOOD PRESSURE: 60 MMHG | RESPIRATION RATE: 20 BRPM | HEART RATE: 62 BPM | SYSTOLIC BLOOD PRESSURE: 90 MMHG | HEIGHT: 66 IN | WEIGHT: 257.25 LBS

## 2021-06-08 DIAGNOSIS — Z94.0 KIDNEY TRANSPLANT STATUS: ICD-10-CM

## 2021-06-08 DIAGNOSIS — N18.2 CKD (CHRONIC KIDNEY DISEASE) STAGE 2, GFR 60-89 ML/MIN: Primary | ICD-10-CM

## 2021-06-08 DIAGNOSIS — I95.9 HYPOTENSION, UNSPECIFIED HYPOTENSION TYPE: ICD-10-CM

## 2021-06-08 PROCEDURE — 99213 OFFICE O/P EST LOW 20 MIN: CPT | Mod: S$PBB,,, | Performed by: INTERNAL MEDICINE

## 2021-06-08 PROCEDURE — 99999 PR PBB SHADOW E&M-EST. PATIENT-LVL III: ICD-10-PCS | Mod: PBBFAC,,, | Performed by: INTERNAL MEDICINE

## 2021-06-08 PROCEDURE — 99999 PR PBB SHADOW E&M-EST. PATIENT-LVL III: CPT | Mod: PBBFAC,,, | Performed by: INTERNAL MEDICINE

## 2021-06-08 PROCEDURE — 99213 PR OFFICE/OUTPT VISIT, EST, LEVL III, 20-29 MIN: ICD-10-PCS | Mod: S$PBB,,, | Performed by: INTERNAL MEDICINE

## 2021-06-08 PROCEDURE — 99213 OFFICE O/P EST LOW 20 MIN: CPT | Mod: PBBFAC | Performed by: INTERNAL MEDICINE

## 2021-06-15 ENCOUNTER — LAB VISIT (OUTPATIENT)
Dept: LAB | Facility: HOSPITAL | Age: 31
End: 2021-06-15
Attending: INTERNAL MEDICINE
Payer: MEDICARE

## 2021-06-15 DIAGNOSIS — Z94.0 KIDNEY REPLACED BY TRANSPLANT: ICD-10-CM

## 2021-06-15 LAB
ALBUMIN SERPL BCP-MCNC: 3.6 G/DL (ref 3.5–5.2)
ANION GAP SERPL CALC-SCNC: 11 MMOL/L (ref 8–16)
BASOPHILS # BLD AUTO: 0.03 K/UL (ref 0–0.2)
BASOPHILS NFR BLD: 0.4 % (ref 0–1.9)
BUN SERPL-MCNC: 18 MG/DL (ref 6–20)
CALCIUM SERPL-MCNC: 9.3 MG/DL (ref 8.7–10.5)
CHLORIDE SERPL-SCNC: 109 MMOL/L (ref 95–110)
CO2 SERPL-SCNC: 21 MMOL/L (ref 23–29)
CREAT SERPL-MCNC: 1.3 MG/DL (ref 0.5–1.4)
DIFFERENTIAL METHOD: ABNORMAL
EOSINOPHIL # BLD AUTO: 0.1 K/UL (ref 0–0.5)
EOSINOPHIL NFR BLD: 1.8 % (ref 0–8)
ERYTHROCYTE [DISTWIDTH] IN BLOOD BY AUTOMATED COUNT: 14 % (ref 11.5–14.5)
EST. GFR  (AFRICAN AMERICAN): >60 ML/MIN/1.73 M^2
EST. GFR  (NON AFRICAN AMERICAN): 55.2 ML/MIN/1.73 M^2
GLUCOSE SERPL-MCNC: 79 MG/DL (ref 70–110)
HCT VFR BLD AUTO: 43.2 % (ref 37–48.5)
HGB BLD-MCNC: 13.5 G/DL (ref 12–16)
IMM GRANULOCYTES # BLD AUTO: 0.05 K/UL (ref 0–0.04)
IMM GRANULOCYTES NFR BLD AUTO: 0.7 % (ref 0–0.5)
LYMPHOCYTES # BLD AUTO: 0.5 K/UL (ref 1–4.8)
LYMPHOCYTES NFR BLD: 7.8 % (ref 18–48)
MAGNESIUM SERPL-MCNC: 1.7 MG/DL (ref 1.6–2.6)
MCH RBC QN AUTO: 28.2 PG (ref 27–31)
MCHC RBC AUTO-ENTMCNC: 31.3 G/DL (ref 32–36)
MCV RBC AUTO: 90 FL (ref 82–98)
MONOCYTES # BLD AUTO: 0.6 K/UL (ref 0.3–1)
MONOCYTES NFR BLD: 9.4 % (ref 4–15)
NEUTROPHILS # BLD AUTO: 5.4 K/UL (ref 1.8–7.7)
NEUTROPHILS NFR BLD: 79.9 % (ref 38–73)
NRBC BLD-RTO: 0 /100 WBC
PHOSPHATE SERPL-MCNC: 2.4 MG/DL (ref 2.7–4.5)
PLATELET # BLD AUTO: 238 K/UL (ref 150–450)
PMV BLD AUTO: 11 FL (ref 9.2–12.9)
POTASSIUM SERPL-SCNC: 4.2 MMOL/L (ref 3.5–5.1)
RBC # BLD AUTO: 4.79 M/UL (ref 4–5.4)
SODIUM SERPL-SCNC: 141 MMOL/L (ref 136–145)
WBC # BLD AUTO: 6.81 K/UL (ref 3.9–12.7)

## 2021-06-15 PROCEDURE — 80197 ASSAY OF TACROLIMUS: CPT | Performed by: INTERNAL MEDICINE

## 2021-06-15 PROCEDURE — 36415 COLL VENOUS BLD VENIPUNCTURE: CPT | Performed by: INTERNAL MEDICINE

## 2021-06-15 PROCEDURE — 80069 RENAL FUNCTION PANEL: CPT | Performed by: INTERNAL MEDICINE

## 2021-06-15 PROCEDURE — 83735 ASSAY OF MAGNESIUM: CPT | Performed by: INTERNAL MEDICINE

## 2021-06-15 PROCEDURE — 85025 COMPLETE CBC W/AUTO DIFF WBC: CPT | Performed by: INTERNAL MEDICINE

## 2021-06-16 LAB
TACROLIMUS BLD-MCNC: 9.4 NG/ML (ref 5–15)
TACROLIMUS, NORMALIZED: 8.3 NG/ML (ref 5–15)

## 2021-07-06 ENCOUNTER — LAB VISIT (OUTPATIENT)
Dept: LAB | Facility: HOSPITAL | Age: 31
End: 2021-07-06
Attending: INTERNAL MEDICINE
Payer: MEDICARE

## 2021-07-06 DIAGNOSIS — Z94.0 KIDNEY REPLACED BY TRANSPLANT: ICD-10-CM

## 2021-07-06 LAB
ALBUMIN SERPL BCP-MCNC: 3.6 G/DL (ref 3.5–5.2)
ALBUMIN SERPL BCP-MCNC: 3.6 G/DL (ref 3.5–5.2)
ALP SERPL-CCNC: 69 U/L (ref 55–135)
ALT SERPL W/O P-5'-P-CCNC: 15 U/L (ref 10–44)
ANION GAP SERPL CALC-SCNC: 8 MMOL/L (ref 8–16)
AST SERPL-CCNC: 15 U/L (ref 10–40)
BASOPHILS # BLD AUTO: 0.03 K/UL (ref 0–0.2)
BASOPHILS NFR BLD: 0.5 % (ref 0–1.9)
BILIRUB DIRECT SERPL-MCNC: 0.1 MG/DL (ref 0.1–0.3)
BILIRUB SERPL-MCNC: 0.3 MG/DL (ref 0.1–1)
BUN SERPL-MCNC: 15 MG/DL (ref 6–20)
CALCIUM SERPL-MCNC: 8.8 MG/DL (ref 8.7–10.5)
CHLORIDE SERPL-SCNC: 107 MMOL/L (ref 95–110)
CO2 SERPL-SCNC: 20 MMOL/L (ref 23–29)
CREAT SERPL-MCNC: 1.3 MG/DL (ref 0.5–1.4)
DIFFERENTIAL METHOD: ABNORMAL
EOSINOPHIL # BLD AUTO: 0.1 K/UL (ref 0–0.5)
EOSINOPHIL NFR BLD: 1.4 % (ref 0–8)
ERYTHROCYTE [DISTWIDTH] IN BLOOD BY AUTOMATED COUNT: 13.7 % (ref 11.5–14.5)
EST. GFR  (AFRICAN AMERICAN): >60 ML/MIN/1.73 M^2
EST. GFR  (NON AFRICAN AMERICAN): 55.2 ML/MIN/1.73 M^2
GLUCOSE SERPL-MCNC: 84 MG/DL (ref 70–110)
HCT VFR BLD AUTO: 42.9 % (ref 37–48.5)
HGB BLD-MCNC: 13.3 G/DL (ref 12–16)
IMM GRANULOCYTES # BLD AUTO: 0.03 K/UL (ref 0–0.04)
IMM GRANULOCYTES NFR BLD AUTO: 0.5 % (ref 0–0.5)
LYMPHOCYTES # BLD AUTO: 0.6 K/UL (ref 1–4.8)
LYMPHOCYTES NFR BLD: 10.1 % (ref 18–48)
MAGNESIUM SERPL-MCNC: 1.8 MG/DL (ref 1.6–2.6)
MCH RBC QN AUTO: 29.5 PG (ref 27–31)
MCHC RBC AUTO-ENTMCNC: 31 G/DL (ref 32–36)
MCV RBC AUTO: 95 FL (ref 82–98)
MONOCYTES # BLD AUTO: 0.6 K/UL (ref 0.3–1)
MONOCYTES NFR BLD: 9.3 % (ref 4–15)
NEUTROPHILS # BLD AUTO: 4.9 K/UL (ref 1.8–7.7)
NEUTROPHILS NFR BLD: 78.2 % (ref 38–73)
NRBC BLD-RTO: 0 /100 WBC
PHOSPHATE SERPL-MCNC: 2.3 MG/DL (ref 2.7–4.5)
PLATELET # BLD AUTO: 212 K/UL (ref 150–450)
PMV BLD AUTO: 11.9 FL (ref 9.2–12.9)
POTASSIUM SERPL-SCNC: 4.6 MMOL/L (ref 3.5–5.1)
PROT SERPL-MCNC: 6.5 G/DL (ref 6–8.4)
RBC # BLD AUTO: 4.51 M/UL (ref 4–5.4)
SODIUM SERPL-SCNC: 135 MMOL/L (ref 136–145)
WBC # BLD AUTO: 6.23 K/UL (ref 3.9–12.7)

## 2021-07-06 PROCEDURE — 83735 ASSAY OF MAGNESIUM: CPT | Performed by: INTERNAL MEDICINE

## 2021-07-06 PROCEDURE — 84460 ALANINE AMINO (ALT) (SGPT): CPT | Performed by: INTERNAL MEDICINE

## 2021-07-06 PROCEDURE — 80197 ASSAY OF TACROLIMUS: CPT | Performed by: INTERNAL MEDICINE

## 2021-07-06 PROCEDURE — 80069 RENAL FUNCTION PANEL: CPT | Performed by: INTERNAL MEDICINE

## 2021-07-06 PROCEDURE — 85025 COMPLETE CBC W/AUTO DIFF WBC: CPT | Performed by: INTERNAL MEDICINE

## 2021-07-06 PROCEDURE — 87799 DETECT AGENT NOS DNA QUANT: CPT | Performed by: INTERNAL MEDICINE

## 2021-07-06 PROCEDURE — 36415 COLL VENOUS BLD VENIPUNCTURE: CPT | Performed by: INTERNAL MEDICINE

## 2021-07-06 PROCEDURE — 84075 ASSAY ALKALINE PHOSPHATASE: CPT | Performed by: INTERNAL MEDICINE

## 2021-07-07 LAB
TACROLIMUS BLD-MCNC: 10.2 NG/ML (ref 5–15)
TACROLIMUS, NORMALIZED: 8.9 NG/ML (ref 5–15)

## 2021-07-28 ENCOUNTER — TELEPHONE (OUTPATIENT)
Dept: TRANSPLANT | Facility: CLINIC | Age: 31
End: 2021-07-28

## 2021-07-29 DIAGNOSIS — N18.2 CKD (CHRONIC KIDNEY DISEASE) STAGE 2, GFR 60-89 ML/MIN: ICD-10-CM

## 2021-07-29 DIAGNOSIS — Z72.0 TOBACCO ABUSE: ICD-10-CM

## 2021-07-29 DIAGNOSIS — E83.42 HYPOMAGNESEMIA: ICD-10-CM

## 2021-07-29 DIAGNOSIS — I27.20 PULMONARY HYPERTENSION: Primary | ICD-10-CM

## 2021-07-29 DIAGNOSIS — I10 ESSENTIAL HYPERTENSION: ICD-10-CM

## 2021-07-29 DIAGNOSIS — T86.11 ACUTE REJECTION OF KIDNEY TRANSPLANT: ICD-10-CM

## 2021-07-29 DIAGNOSIS — E66.01 MORBID OBESITY: ICD-10-CM

## 2021-08-09 ENCOUNTER — LAB VISIT (OUTPATIENT)
Dept: LAB | Facility: HOSPITAL | Age: 31
End: 2021-08-09
Attending: INTERNAL MEDICINE
Payer: MEDICARE

## 2021-08-09 DIAGNOSIS — Z94.0 KIDNEY REPLACED BY TRANSPLANT: ICD-10-CM

## 2021-08-09 LAB
BASOPHILS # BLD AUTO: 0.04 K/UL (ref 0–0.2)
BASOPHILS NFR BLD: 0.7 % (ref 0–1.9)
DIFFERENTIAL METHOD: ABNORMAL
EOSINOPHIL # BLD AUTO: 0.1 K/UL (ref 0–0.5)
EOSINOPHIL NFR BLD: 2.2 % (ref 0–8)
ERYTHROCYTE [DISTWIDTH] IN BLOOD BY AUTOMATED COUNT: 13.6 % (ref 11.5–14.5)
HCT VFR BLD AUTO: 41.5 % (ref 37–48.5)
HGB BLD-MCNC: 13 G/DL (ref 12–16)
IMM GRANULOCYTES # BLD AUTO: 0.05 K/UL (ref 0–0.04)
IMM GRANULOCYTES NFR BLD AUTO: 0.9 % (ref 0–0.5)
LYMPHOCYTES # BLD AUTO: 0.4 K/UL (ref 1–4.8)
LYMPHOCYTES NFR BLD: 7.6 % (ref 18–48)
MCH RBC QN AUTO: 28.9 PG (ref 27–31)
MCHC RBC AUTO-ENTMCNC: 31.3 G/DL (ref 32–36)
MCV RBC AUTO: 92 FL (ref 82–98)
MONOCYTES # BLD AUTO: 0.6 K/UL (ref 0.3–1)
MONOCYTES NFR BLD: 10.5 % (ref 4–15)
NEUTROPHILS # BLD AUTO: 4.6 K/UL (ref 1.8–7.7)
NEUTROPHILS NFR BLD: 78.1 % (ref 38–73)
NRBC BLD-RTO: 0 /100 WBC
PLATELET # BLD AUTO: 212 K/UL (ref 150–450)
PMV BLD AUTO: 11.7 FL (ref 9.2–12.9)
RBC # BLD AUTO: 4.5 M/UL (ref 4–5.4)
WBC # BLD AUTO: 5.82 K/UL (ref 3.9–12.7)

## 2021-08-09 PROCEDURE — 83735 ASSAY OF MAGNESIUM: CPT | Performed by: INTERNAL MEDICINE

## 2021-08-09 PROCEDURE — 80069 RENAL FUNCTION PANEL: CPT | Performed by: INTERNAL MEDICINE

## 2021-08-09 PROCEDURE — 36415 COLL VENOUS BLD VENIPUNCTURE: CPT | Performed by: INTERNAL MEDICINE

## 2021-08-09 PROCEDURE — 85025 COMPLETE CBC W/AUTO DIFF WBC: CPT | Performed by: INTERNAL MEDICINE

## 2021-08-09 PROCEDURE — 80197 ASSAY OF TACROLIMUS: CPT | Performed by: INTERNAL MEDICINE

## 2021-08-10 LAB
ALBUMIN SERPL BCP-MCNC: 3.2 G/DL (ref 3.5–5.2)
ANION GAP SERPL CALC-SCNC: 13 MMOL/L (ref 8–16)
BUN SERPL-MCNC: 14 MG/DL (ref 6–20)
CALCIUM SERPL-MCNC: 9.2 MG/DL (ref 8.7–10.5)
CHLORIDE SERPL-SCNC: 104 MMOL/L (ref 95–110)
CO2 SERPL-SCNC: 19 MMOL/L (ref 23–29)
CREAT SERPL-MCNC: 1.1 MG/DL (ref 0.5–1.4)
EST. GFR  (AFRICAN AMERICAN): >60 ML/MIN/1.73 M^2
EST. GFR  (NON AFRICAN AMERICAN): >60 ML/MIN/1.73 M^2
GLUCOSE SERPL-MCNC: 75 MG/DL (ref 70–110)
MAGNESIUM SERPL-MCNC: 1.5 MG/DL (ref 1.6–2.6)
PHOSPHATE SERPL-MCNC: 3.6 MG/DL (ref 2.7–4.5)
POTASSIUM SERPL-SCNC: 4.1 MMOL/L (ref 3.5–5.1)
SODIUM SERPL-SCNC: 136 MMOL/L (ref 136–145)
TACROLIMUS BLD-MCNC: 8.2 NG/ML (ref 5–15)
TACROLIMUS, NORMALIZED: 7.6 NG/ML (ref 5–15)

## 2021-08-10 RX ORDER — SODIUM BICARBONATE 650 MG/1
1300 TABLET ORAL 3 TIMES DAILY
Qty: 180 TABLET | Refills: 11 | Status: SHIPPED | OUTPATIENT
Start: 2021-08-10 | End: 2022-08-30

## 2021-08-23 ENCOUNTER — LAB VISIT (OUTPATIENT)
Dept: LAB | Facility: HOSPITAL | Age: 31
End: 2021-08-23
Attending: INTERNAL MEDICINE
Payer: MEDICARE

## 2021-08-23 ENCOUNTER — OFFICE VISIT (OUTPATIENT)
Dept: TRANSPLANT | Facility: CLINIC | Age: 31
End: 2021-08-23
Payer: MEDICARE

## 2021-08-23 VITALS
OXYGEN SATURATION: 98 % | DIASTOLIC BLOOD PRESSURE: 60 MMHG | WEIGHT: 261.69 LBS | TEMPERATURE: 97 F | BODY MASS INDEX: 42.06 KG/M2 | SYSTOLIC BLOOD PRESSURE: 105 MMHG | RESPIRATION RATE: 18 BRPM | HEIGHT: 66 IN | HEART RATE: 85 BPM

## 2021-08-23 DIAGNOSIS — Z94.0 DECEASED-DONOR KIDNEY TRANSPLANT: ICD-10-CM

## 2021-08-23 DIAGNOSIS — Z94.0 IMMUNOSUPPRESSIVE MANAGEMENT ENCOUNTER FOLLOWING KIDNEY TRANSPLANT: ICD-10-CM

## 2021-08-23 DIAGNOSIS — Z79.899 IMMUNOSUPPRESSIVE MANAGEMENT ENCOUNTER FOLLOWING KIDNEY TRANSPLANT: ICD-10-CM

## 2021-08-23 DIAGNOSIS — Z91.89 AT RISK FOR OPPORTUNISTIC INFECTIONS: ICD-10-CM

## 2021-08-23 DIAGNOSIS — N18.2 CKD (CHRONIC KIDNEY DISEASE) STAGE 2, GFR 60-89 ML/MIN: ICD-10-CM

## 2021-08-23 DIAGNOSIS — Z79.60 LONG-TERM USE OF IMMUNOSUPPRESSANT MEDICATION: ICD-10-CM

## 2021-08-23 DIAGNOSIS — T86.11 ANTIBODY MEDIATED REJECTION OF KIDNEY TRANSPLANT: ICD-10-CM

## 2021-08-23 DIAGNOSIS — I27.20 PULMONARY HYPERTENSION: ICD-10-CM

## 2021-08-23 DIAGNOSIS — Z90.89 S/P PARATHYROIDECTOMY: ICD-10-CM

## 2021-08-23 DIAGNOSIS — I10 ESSENTIAL HYPERTENSION: ICD-10-CM

## 2021-08-23 DIAGNOSIS — E66.01 MORBID OBESITY: ICD-10-CM

## 2021-08-23 DIAGNOSIS — N18.2 CKD (CHRONIC KIDNEY DISEASE) STAGE 2, GFR 60-89 ML/MIN: Primary | ICD-10-CM

## 2021-08-23 DIAGNOSIS — Z90.5 S/P NEPHRECTOMY: ICD-10-CM

## 2021-08-23 DIAGNOSIS — Z98.890 S/P PARATHYROIDECTOMY: ICD-10-CM

## 2021-08-23 LAB
BASOPHILS # BLD AUTO: 0.02 K/UL (ref 0–0.2)
BASOPHILS NFR BLD: 0.3 % (ref 0–1.9)
DIFFERENTIAL METHOD: ABNORMAL
EOSINOPHIL # BLD AUTO: 0.1 K/UL (ref 0–0.5)
EOSINOPHIL NFR BLD: 1.2 % (ref 0–8)
ERYTHROCYTE [DISTWIDTH] IN BLOOD BY AUTOMATED COUNT: 13.3 % (ref 11.5–14.5)
HCT VFR BLD AUTO: 40.5 % (ref 37–48.5)
HGB BLD-MCNC: 13.1 G/DL (ref 12–16)
IMM GRANULOCYTES # BLD AUTO: 0.03 K/UL (ref 0–0.04)
IMM GRANULOCYTES NFR BLD AUTO: 0.5 % (ref 0–0.5)
LYMPHOCYTES # BLD AUTO: 0.5 K/UL (ref 1–4.8)
LYMPHOCYTES NFR BLD: 6.8 % (ref 18–48)
MCH RBC QN AUTO: 28.7 PG (ref 27–31)
MCHC RBC AUTO-ENTMCNC: 32.3 G/DL (ref 32–36)
MCV RBC AUTO: 89 FL (ref 82–98)
MONOCYTES # BLD AUTO: 0.4 K/UL (ref 0.3–1)
MONOCYTES NFR BLD: 6.5 % (ref 4–15)
NEUTROPHILS # BLD AUTO: 5.6 K/UL (ref 1.8–7.7)
NEUTROPHILS NFR BLD: 84.7 % (ref 38–73)
NRBC BLD-RTO: 0 /100 WBC
PLATELET # BLD AUTO: 265 K/UL (ref 150–450)
PMV BLD AUTO: 10.6 FL (ref 9.2–12.9)
RBC # BLD AUTO: 4.57 M/UL (ref 4–5.4)
WBC # BLD AUTO: 6.57 K/UL (ref 3.9–12.7)

## 2021-08-23 PROCEDURE — 36415 COLL VENOUS BLD VENIPUNCTURE: CPT | Performed by: INTERNAL MEDICINE

## 2021-08-23 PROCEDURE — 99999 PR PBB SHADOW E&M-EST. PATIENT-LVL V: ICD-10-PCS | Mod: PBBFAC,,, | Performed by: INTERNAL MEDICINE

## 2021-08-23 PROCEDURE — 99215 OFFICE O/P EST HI 40 MIN: CPT | Mod: PBBFAC | Performed by: INTERNAL MEDICINE

## 2021-08-23 PROCEDURE — 85025 COMPLETE CBC W/AUTO DIFF WBC: CPT | Performed by: INTERNAL MEDICINE

## 2021-08-23 PROCEDURE — 87040 BLOOD CULTURE FOR BACTERIA: CPT | Performed by: INTERNAL MEDICINE

## 2021-08-23 PROCEDURE — 99999 PR PBB SHADOW E&M-EST. PATIENT-LVL V: CPT | Mod: PBBFAC,,, | Performed by: INTERNAL MEDICINE

## 2021-08-23 PROCEDURE — 99215 OFFICE O/P EST HI 40 MIN: CPT | Mod: S$PBB,,, | Performed by: INTERNAL MEDICINE

## 2021-08-23 PROCEDURE — 99215 PR OFFICE/OUTPT VISIT, EST, LEVL V, 40-54 MIN: ICD-10-PCS | Mod: S$PBB,,, | Performed by: INTERNAL MEDICINE

## 2021-08-24 ENCOUNTER — TELEPHONE (OUTPATIENT)
Dept: BARIATRICS | Facility: CLINIC | Age: 31
End: 2021-08-24

## 2021-08-24 DIAGNOSIS — Z94.0 KIDNEY REPLACED BY TRANSPLANT: Primary | ICD-10-CM

## 2021-08-26 ENCOUNTER — OFFICE VISIT (OUTPATIENT)
Dept: INFECTIOUS DISEASES | Facility: CLINIC | Age: 31
End: 2021-08-26
Payer: MEDICARE

## 2021-08-26 VITALS
HEIGHT: 66 IN | TEMPERATURE: 98 F | BODY MASS INDEX: 42.17 KG/M2 | HEART RATE: 90 BPM | WEIGHT: 262.38 LBS | DIASTOLIC BLOOD PRESSURE: 61 MMHG | SYSTOLIC BLOOD PRESSURE: 100 MMHG

## 2021-08-26 DIAGNOSIS — E66.01 MORBID OBESITY: ICD-10-CM

## 2021-08-26 DIAGNOSIS — Z79.899 IMMUNOSUPPRESSIVE MANAGEMENT ENCOUNTER FOLLOWING KIDNEY TRANSPLANT: ICD-10-CM

## 2021-08-26 DIAGNOSIS — Z94.0 IMMUNOSUPPRESSIVE MANAGEMENT ENCOUNTER FOLLOWING KIDNEY TRANSPLANT: ICD-10-CM

## 2021-08-26 DIAGNOSIS — Z90.5 S/P NEPHRECTOMY: ICD-10-CM

## 2021-08-26 DIAGNOSIS — Z94.0 DECEASED-DONOR KIDNEY TRANSPLANT: ICD-10-CM

## 2021-08-26 DIAGNOSIS — T86.11 ANTIBODY MEDIATED REJECTION OF KIDNEY TRANSPLANT: ICD-10-CM

## 2021-08-26 DIAGNOSIS — I27.20 PULMONARY HYPERTENSION: ICD-10-CM

## 2021-08-26 DIAGNOSIS — Z90.89 S/P PARATHYROIDECTOMY: ICD-10-CM

## 2021-08-26 DIAGNOSIS — Z79.60 LONG-TERM USE OF IMMUNOSUPPRESSANT MEDICATION: ICD-10-CM

## 2021-08-26 DIAGNOSIS — I10 ESSENTIAL HYPERTENSION: ICD-10-CM

## 2021-08-26 DIAGNOSIS — Z98.890 S/P PARATHYROIDECTOMY: ICD-10-CM

## 2021-08-26 DIAGNOSIS — Z91.89 AT RISK FOR OPPORTUNISTIC INFECTIONS: ICD-10-CM

## 2021-08-26 DIAGNOSIS — N18.2 CKD (CHRONIC KIDNEY DISEASE) STAGE 2, GFR 60-89 ML/MIN: ICD-10-CM

## 2021-08-26 DIAGNOSIS — L02.92 BOILS OF MULTIPLE SITES: Primary | ICD-10-CM

## 2021-08-26 PROCEDURE — 99214 PR OFFICE/OUTPT VISIT, EST, LEVL IV, 30-39 MIN: ICD-10-PCS | Mod: S$PBB,GC,, | Performed by: STUDENT IN AN ORGANIZED HEALTH CARE EDUCATION/TRAINING PROGRAM

## 2021-08-26 PROCEDURE — 99214 OFFICE O/P EST MOD 30 MIN: CPT | Mod: PBBFAC | Performed by: STUDENT IN AN ORGANIZED HEALTH CARE EDUCATION/TRAINING PROGRAM

## 2021-08-26 PROCEDURE — 99214 OFFICE O/P EST MOD 30 MIN: CPT | Mod: S$PBB,GC,, | Performed by: STUDENT IN AN ORGANIZED HEALTH CARE EDUCATION/TRAINING PROGRAM

## 2021-08-26 PROCEDURE — 99999 PR PBB SHADOW E&M-EST. PATIENT-LVL IV: CPT | Mod: PBBFAC,GC,, | Performed by: STUDENT IN AN ORGANIZED HEALTH CARE EDUCATION/TRAINING PROGRAM

## 2021-08-26 PROCEDURE — 99999 PR PBB SHADOW E&M-EST. PATIENT-LVL IV: ICD-10-PCS | Mod: PBBFAC,GC,, | Performed by: STUDENT IN AN ORGANIZED HEALTH CARE EDUCATION/TRAINING PROGRAM

## 2021-08-26 RX ORDER — CEFADROXIL 1000 MG/1
1 TABLET ORAL 2 TIMES DAILY
Qty: 60 TABLET | Refills: 0 | Status: SHIPPED | OUTPATIENT
Start: 2021-08-26 | End: 2021-09-25

## 2021-08-26 RX ORDER — DOXYCYCLINE 100 MG/1
100 CAPSULE ORAL EVERY 12 HOURS
Qty: 60 CAPSULE | Refills: 0 | Status: SHIPPED | OUTPATIENT
Start: 2021-08-26 | End: 2021-08-26 | Stop reason: SDUPTHER

## 2021-08-26 RX ORDER — DOXYCYCLINE 100 MG/1
100 CAPSULE ORAL EVERY 12 HOURS
Qty: 60 CAPSULE | Refills: 0 | Status: SHIPPED | OUTPATIENT
Start: 2021-08-26 | End: 2021-09-25

## 2021-08-26 RX ORDER — CEFADROXIL 1000 MG/1
1 TABLET ORAL 2 TIMES DAILY
Qty: 60 TABLET | Refills: 0 | Status: SHIPPED | OUTPATIENT
Start: 2021-08-26 | End: 2021-08-26 | Stop reason: SDUPTHER

## 2021-08-29 LAB — BACTERIA BLD CULT: NORMAL

## 2021-09-03 ENCOUNTER — PATIENT MESSAGE (OUTPATIENT)
Dept: PHARMACY | Facility: CLINIC | Age: 31
End: 2021-09-03

## 2021-09-08 ENCOUNTER — LAB VISIT (OUTPATIENT)
Dept: LAB | Facility: HOSPITAL | Age: 31
End: 2021-09-08
Attending: INTERNAL MEDICINE
Payer: MEDICARE

## 2021-09-08 DIAGNOSIS — Z94.0 KIDNEY REPLACED BY TRANSPLANT: ICD-10-CM

## 2021-09-08 LAB
BASOPHILS # BLD AUTO: 0.03 K/UL (ref 0–0.2)
BASOPHILS NFR BLD: 0.4 % (ref 0–1.9)
DIFFERENTIAL METHOD: ABNORMAL
EOSINOPHIL # BLD AUTO: 0.1 K/UL (ref 0–0.5)
EOSINOPHIL NFR BLD: 1.5 % (ref 0–8)
ERYTHROCYTE [DISTWIDTH] IN BLOOD BY AUTOMATED COUNT: 13.5 % (ref 11.5–14.5)
HCT VFR BLD AUTO: 43.1 % (ref 37–48.5)
HGB BLD-MCNC: 13.7 G/DL (ref 12–16)
IMM GRANULOCYTES # BLD AUTO: 0.04 K/UL (ref 0–0.04)
IMM GRANULOCYTES NFR BLD AUTO: 0.5 % (ref 0–0.5)
LYMPHOCYTES # BLD AUTO: 0.7 K/UL (ref 1–4.8)
LYMPHOCYTES NFR BLD: 9.7 % (ref 18–48)
MCH RBC QN AUTO: 28.2 PG (ref 27–31)
MCHC RBC AUTO-ENTMCNC: 31.8 G/DL (ref 32–36)
MCV RBC AUTO: 89 FL (ref 82–98)
MONOCYTES # BLD AUTO: 0.5 K/UL (ref 0.3–1)
MONOCYTES NFR BLD: 7 % (ref 4–15)
NEUTROPHILS # BLD AUTO: 6 K/UL (ref 1.8–7.7)
NEUTROPHILS NFR BLD: 80.9 % (ref 38–73)
NRBC BLD-RTO: 0 /100 WBC
PLATELET # BLD AUTO: 239 K/UL (ref 150–450)
PMV BLD AUTO: 11.5 FL (ref 9.2–12.9)
RBC # BLD AUTO: 4.85 M/UL (ref 4–5.4)
WBC # BLD AUTO: 7.4 K/UL (ref 3.9–12.7)

## 2021-09-08 PROCEDURE — 85025 COMPLETE CBC W/AUTO DIFF WBC: CPT | Performed by: INTERNAL MEDICINE

## 2021-09-08 PROCEDURE — 36415 COLL VENOUS BLD VENIPUNCTURE: CPT | Performed by: INTERNAL MEDICINE

## 2021-09-08 PROCEDURE — 83735 ASSAY OF MAGNESIUM: CPT | Performed by: INTERNAL MEDICINE

## 2021-09-08 PROCEDURE — 80197 ASSAY OF TACROLIMUS: CPT | Performed by: INTERNAL MEDICINE

## 2021-09-08 PROCEDURE — 80069 RENAL FUNCTION PANEL: CPT | Performed by: INTERNAL MEDICINE

## 2021-09-09 LAB
ALBUMIN SERPL BCP-MCNC: 3.6 G/DL (ref 3.5–5.2)
ANION GAP SERPL CALC-SCNC: 11 MMOL/L (ref 8–16)
BUN SERPL-MCNC: 20 MG/DL (ref 6–20)
CALCIUM SERPL-MCNC: 9.4 MG/DL (ref 8.7–10.5)
CHLORIDE SERPL-SCNC: 106 MMOL/L (ref 95–110)
CO2 SERPL-SCNC: 21 MMOL/L (ref 23–29)
CREAT SERPL-MCNC: 1.2 MG/DL (ref 0.5–1.4)
EST. GFR  (AFRICAN AMERICAN): >60 ML/MIN/1.73 M^2
EST. GFR  (NON AFRICAN AMERICAN): >60 ML/MIN/1.73 M^2
GLUCOSE SERPL-MCNC: 75 MG/DL (ref 70–110)
MAGNESIUM SERPL-MCNC: 1.8 MG/DL (ref 1.6–2.6)
PHOSPHATE SERPL-MCNC: 2.8 MG/DL (ref 2.7–4.5)
POTASSIUM SERPL-SCNC: 4 MMOL/L (ref 3.5–5.1)
SODIUM SERPL-SCNC: 138 MMOL/L (ref 136–145)
TACROLIMUS BLD-MCNC: 11 NG/ML (ref 5–15)
TACROLIMUS, NORMALIZED: 10.3 NG/ML (ref 5–15)

## 2021-09-22 ENCOUNTER — TELEPHONE (OUTPATIENT)
Dept: SMOKING CESSATION | Facility: CLINIC | Age: 31
End: 2021-09-22

## 2021-09-22 ENCOUNTER — CLINICAL SUPPORT (OUTPATIENT)
Dept: SMOKING CESSATION | Facility: CLINIC | Age: 31
End: 2021-09-22

## 2021-09-22 DIAGNOSIS — F17.200 NICOTINE DEPENDENCE: Primary | ICD-10-CM

## 2021-09-22 PROCEDURE — 99406 BEHAV CHNG SMOKING 3-10 MIN: CPT | Mod: S$PBB,,,

## 2021-09-22 PROCEDURE — 99406 PR TOBACCO USE CESSATION INTERMEDIATE 3-10 MINUTES: ICD-10-PCS | Mod: S$PBB,,,

## 2021-09-23 ENCOUNTER — TELEPHONE (OUTPATIENT)
Dept: SURGERY | Facility: CLINIC | Age: 31
End: 2021-09-23

## 2021-10-05 ENCOUNTER — LAB VISIT (OUTPATIENT)
Dept: LAB | Facility: HOSPITAL | Age: 31
End: 2021-10-05
Attending: INTERNAL MEDICINE
Payer: MEDICARE

## 2021-10-05 DIAGNOSIS — Z94.0 KIDNEY REPLACED BY TRANSPLANT: ICD-10-CM

## 2021-10-05 LAB
ALBUMIN SERPL BCP-MCNC: 3.5 G/DL (ref 3.5–5.2)
ALBUMIN SERPL BCP-MCNC: 3.5 G/DL (ref 3.5–5.2)
ALP SERPL-CCNC: 91 U/L (ref 55–135)
ALT SERPL W/O P-5'-P-CCNC: 14 U/L (ref 10–44)
ANION GAP SERPL CALC-SCNC: 14 MMOL/L (ref 8–16)
AST SERPL-CCNC: 12 U/L (ref 10–40)
BASOPHILS # BLD AUTO: 0.03 K/UL (ref 0–0.2)
BASOPHILS NFR BLD: 0.4 % (ref 0–1.9)
BILIRUB DIRECT SERPL-MCNC: 0.1 MG/DL (ref 0.1–0.3)
BILIRUB SERPL-MCNC: 0.2 MG/DL (ref 0.1–1)
BUN SERPL-MCNC: 15 MG/DL (ref 6–20)
CALCIUM SERPL-MCNC: 9.3 MG/DL (ref 8.7–10.5)
CHLORIDE SERPL-SCNC: 107 MMOL/L (ref 95–110)
CO2 SERPL-SCNC: 19 MMOL/L (ref 23–29)
CREAT SERPL-MCNC: 1.1 MG/DL (ref 0.5–1.4)
DIFFERENTIAL METHOD: ABNORMAL
EOSINOPHIL # BLD AUTO: 0.1 K/UL (ref 0–0.5)
EOSINOPHIL NFR BLD: 1.6 % (ref 0–8)
ERYTHROCYTE [DISTWIDTH] IN BLOOD BY AUTOMATED COUNT: 14 % (ref 11.5–14.5)
EST. GFR  (AFRICAN AMERICAN): >60 ML/MIN/1.73 M^2
EST. GFR  (NON AFRICAN AMERICAN): >60 ML/MIN/1.73 M^2
GLUCOSE SERPL-MCNC: 79 MG/DL (ref 70–110)
HCT VFR BLD AUTO: 45.2 % (ref 37–48.5)
HGB BLD-MCNC: 14.2 G/DL (ref 12–16)
IMM GRANULOCYTES # BLD AUTO: 0.04 K/UL (ref 0–0.04)
IMM GRANULOCYTES NFR BLD AUTO: 0.5 % (ref 0–0.5)
LYMPHOCYTES # BLD AUTO: 0.5 K/UL (ref 1–4.8)
LYMPHOCYTES NFR BLD: 6 % (ref 18–48)
MAGNESIUM SERPL-MCNC: 1.7 MG/DL (ref 1.6–2.6)
MCH RBC QN AUTO: 29 PG (ref 27–31)
MCHC RBC AUTO-ENTMCNC: 31.4 G/DL (ref 32–36)
MCV RBC AUTO: 92 FL (ref 82–98)
MONOCYTES # BLD AUTO: 0.6 K/UL (ref 0.3–1)
MONOCYTES NFR BLD: 7.7 % (ref 4–15)
NEUTROPHILS # BLD AUTO: 6.4 K/UL (ref 1.8–7.7)
NEUTROPHILS NFR BLD: 83.8 % (ref 38–73)
NRBC BLD-RTO: 0 /100 WBC
PHOSPHATE SERPL-MCNC: 2.6 MG/DL (ref 2.7–4.5)
PLATELET # BLD AUTO: 198 K/UL (ref 150–450)
PMV BLD AUTO: 11.9 FL (ref 9.2–12.9)
POTASSIUM SERPL-SCNC: 4.1 MMOL/L (ref 3.5–5.1)
PROT SERPL-MCNC: 6.4 G/DL (ref 6–8.4)
RBC # BLD AUTO: 4.9 M/UL (ref 4–5.4)
SODIUM SERPL-SCNC: 140 MMOL/L (ref 136–145)
WBC # BLD AUTO: 7.64 K/UL (ref 3.9–12.7)

## 2021-10-05 PROCEDURE — 80069 RENAL FUNCTION PANEL: CPT | Performed by: INTERNAL MEDICINE

## 2021-10-05 PROCEDURE — 84460 ALANINE AMINO (ALT) (SGPT): CPT | Performed by: INTERNAL MEDICINE

## 2021-10-05 PROCEDURE — 85025 COMPLETE CBC W/AUTO DIFF WBC: CPT | Performed by: INTERNAL MEDICINE

## 2021-10-05 PROCEDURE — 87799 DETECT AGENT NOS DNA QUANT: CPT | Performed by: INTERNAL MEDICINE

## 2021-10-05 PROCEDURE — 36415 COLL VENOUS BLD VENIPUNCTURE: CPT | Performed by: INTERNAL MEDICINE

## 2021-10-05 PROCEDURE — 80197 ASSAY OF TACROLIMUS: CPT | Performed by: INTERNAL MEDICINE

## 2021-10-05 PROCEDURE — 83735 ASSAY OF MAGNESIUM: CPT | Performed by: INTERNAL MEDICINE

## 2021-10-05 PROCEDURE — 84075 ASSAY ALKALINE PHOSPHATASE: CPT | Performed by: INTERNAL MEDICINE

## 2021-10-06 LAB
TACROLIMUS BLD-MCNC: 8.7 NG/ML (ref 5–15)
TACROLIMUS, NORMALIZED: 8.1 NG/ML (ref 5–15)

## 2021-10-11 LAB
BKV DNA SERPL NAA+PROBE-ACNC: <125 COPIES/ML
BKV DNA SERPL NAA+PROBE-LOG#: <2.1 LOG (10) COPIES/ML
BKV DNA SERPL QL NAA+PROBE: NORMAL

## 2021-10-27 ENCOUNTER — TELEPHONE (OUTPATIENT)
Dept: PULMONOLOGY | Facility: CLINIC | Age: 31
End: 2021-10-27
Payer: MEDICARE

## 2021-10-27 ENCOUNTER — OFFICE VISIT (OUTPATIENT)
Dept: TRANSPLANT | Facility: CLINIC | Age: 31
End: 2021-10-27
Payer: MEDICARE

## 2021-10-27 VITALS
RESPIRATION RATE: 20 BRPM | BODY MASS INDEX: 41.27 KG/M2 | DIASTOLIC BLOOD PRESSURE: 64 MMHG | WEIGHT: 256.81 LBS | HEIGHT: 66 IN | HEART RATE: 80 BPM | TEMPERATURE: 97 F | OXYGEN SATURATION: 97 % | SYSTOLIC BLOOD PRESSURE: 113 MMHG

## 2021-10-27 DIAGNOSIS — Z79.899 IMMUNOSUPPRESSIVE MANAGEMENT ENCOUNTER FOLLOWING KIDNEY TRANSPLANT: ICD-10-CM

## 2021-10-27 DIAGNOSIS — I10 ESSENTIAL HYPERTENSION: ICD-10-CM

## 2021-10-27 DIAGNOSIS — Z94.0 DECEASED-DONOR KIDNEY TRANSPLANT: Primary | ICD-10-CM

## 2021-10-27 DIAGNOSIS — E21.2 HYPERPARATHYROIDISM, TERTIARY: ICD-10-CM

## 2021-10-27 DIAGNOSIS — Z94.0 KIDNEY REPLACED BY TRANSPLANT: ICD-10-CM

## 2021-10-27 DIAGNOSIS — N18.2 CKD (CHRONIC KIDNEY DISEASE) STAGE 2, GFR 60-89 ML/MIN: ICD-10-CM

## 2021-10-27 DIAGNOSIS — Z91.89 AT RISK FOR OPPORTUNISTIC INFECTIONS: ICD-10-CM

## 2021-10-27 DIAGNOSIS — R05.9 COUGH: Primary | ICD-10-CM

## 2021-10-27 DIAGNOSIS — R05.9 COUGH: ICD-10-CM

## 2021-10-27 DIAGNOSIS — Z94.0 IMMUNOSUPPRESSIVE MANAGEMENT ENCOUNTER FOLLOWING KIDNEY TRANSPLANT: ICD-10-CM

## 2021-10-27 DIAGNOSIS — Z90.5 S/P NEPHRECTOMY: ICD-10-CM

## 2021-10-27 PROCEDURE — 99215 PR OFFICE/OUTPT VISIT, EST, LEVL V, 40-54 MIN: ICD-10-PCS | Mod: S$PBB,,, | Performed by: INTERNAL MEDICINE

## 2021-10-27 PROCEDURE — 99999 PR PBB SHADOW E&M-EST. PATIENT-LVL IV: ICD-10-PCS | Mod: PBBFAC,,, | Performed by: INTERNAL MEDICINE

## 2021-10-27 PROCEDURE — 99999 PR PBB SHADOW E&M-EST. PATIENT-LVL IV: CPT | Mod: PBBFAC,,, | Performed by: INTERNAL MEDICINE

## 2021-10-27 PROCEDURE — 99214 OFFICE O/P EST MOD 30 MIN: CPT | Mod: PBBFAC | Performed by: INTERNAL MEDICINE

## 2021-10-27 PROCEDURE — 99215 OFFICE O/P EST HI 40 MIN: CPT | Mod: S$PBB,,, | Performed by: INTERNAL MEDICINE

## 2021-10-27 RX ORDER — DOXYCYCLINE 100 MG/1
100 CAPSULE ORAL 2 TIMES DAILY
Qty: 17 CAPSULE | Refills: 0 | Status: SHIPPED | OUTPATIENT
Start: 2021-10-27 | End: 2022-06-03

## 2021-10-28 ENCOUNTER — HOSPITAL ENCOUNTER (OUTPATIENT)
Dept: RADIOLOGY | Facility: HOSPITAL | Age: 31
Discharge: HOME | End: 2021-10-28
Attending: INTERNAL MEDICINE
Payer: MEDICARE

## 2021-10-28 ENCOUNTER — LAB VISIT (OUTPATIENT)
Dept: PRIMARY CARE CLINIC | Facility: CLINIC | Age: 31
End: 2021-10-28
Payer: MEDICARE

## 2021-10-28 ENCOUNTER — OFFICE VISIT (OUTPATIENT)
Dept: PULMONOLOGY | Facility: CLINIC | Age: 31
End: 2021-10-28
Payer: MEDICARE

## 2021-10-28 VITALS
BODY MASS INDEX: 41.2 KG/M2 | HEART RATE: 98 BPM | DIASTOLIC BLOOD PRESSURE: 84 MMHG | SYSTOLIC BLOOD PRESSURE: 110 MMHG | RESPIRATION RATE: 16 BRPM | OXYGEN SATURATION: 96 % | WEIGHT: 256.38 LBS | HEIGHT: 66 IN

## 2021-10-28 DIAGNOSIS — Z94.0 KIDNEY REPLACED BY TRANSPLANT: ICD-10-CM

## 2021-10-28 DIAGNOSIS — Z94.0 IMMUNOSUPPRESSIVE MANAGEMENT ENCOUNTER FOLLOWING KIDNEY TRANSPLANT: ICD-10-CM

## 2021-10-28 DIAGNOSIS — R05.9 COUGH: ICD-10-CM

## 2021-10-28 DIAGNOSIS — E21.2 HYPERPARATHYROIDISM, TERTIARY: ICD-10-CM

## 2021-10-28 DIAGNOSIS — Z87.891 FORMER LIGHT CIGARETTE SMOKER (1-9 PER DAY): ICD-10-CM

## 2021-10-28 DIAGNOSIS — Z79.899 IMMUNOSUPPRESSIVE MANAGEMENT ENCOUNTER FOLLOWING KIDNEY TRANSPLANT: ICD-10-CM

## 2021-10-28 DIAGNOSIS — Z91.89 AT RISK FOR OPPORTUNISTIC INFECTIONS: ICD-10-CM

## 2021-10-28 DIAGNOSIS — Z94.0 DECEASED-DONOR KIDNEY TRANSPLANT: ICD-10-CM

## 2021-10-28 DIAGNOSIS — N18.2 CKD (CHRONIC KIDNEY DISEASE) STAGE 2, GFR 60-89 ML/MIN: ICD-10-CM

## 2021-10-28 DIAGNOSIS — E66.01 MORBID OBESITY: ICD-10-CM

## 2021-10-28 DIAGNOSIS — I27.20 PULMONARY HYPERTENSION: ICD-10-CM

## 2021-10-28 DIAGNOSIS — Z90.5 S/P NEPHRECTOMY: ICD-10-CM

## 2021-10-28 DIAGNOSIS — J45.20 ASTHMA, MILD INTERMITTENT, WELL-CONTROLLED: ICD-10-CM

## 2021-10-28 DIAGNOSIS — I10 ESSENTIAL HYPERTENSION: ICD-10-CM

## 2021-10-28 DIAGNOSIS — G47.33 OSA ON CPAP: Primary | ICD-10-CM

## 2021-10-28 LAB
SARS-COV-2 RNA RESP QL NAA+PROBE: NOT DETECTED
SARS-COV-2- CYCLE NUMBER: NORMAL

## 2021-10-28 PROCEDURE — 71046 XR CHEST PA AND LATERAL: ICD-10-PCS | Mod: 26,,, | Performed by: RADIOLOGY

## 2021-10-28 PROCEDURE — 99213 PR OFFICE/OUTPT VISIT, EST, LEVL III, 20-29 MIN: ICD-10-PCS | Mod: S$PBB,,, | Performed by: NURSE PRACTITIONER

## 2021-10-28 PROCEDURE — 99213 OFFICE O/P EST LOW 20 MIN: CPT | Mod: PBBFAC,25 | Performed by: NURSE PRACTITIONER

## 2021-10-28 PROCEDURE — 99999 PR PBB SHADOW E&M-EST. PATIENT-LVL III: CPT | Mod: PBBFAC,,, | Performed by: NURSE PRACTITIONER

## 2021-10-28 PROCEDURE — 99999 PR PBB SHADOW E&M-EST. PATIENT-LVL III: ICD-10-PCS | Mod: PBBFAC,,, | Performed by: NURSE PRACTITIONER

## 2021-10-28 PROCEDURE — U0003 INFECTIOUS AGENT DETECTION BY NUCLEIC ACID (DNA OR RNA); SEVERE ACUTE RESPIRATORY SYNDROME CORONAVIRUS 2 (SARS-COV-2) (CORONAVIRUS DISEASE [COVID-19]), AMPLIFIED PROBE TECHNIQUE, MAKING USE OF HIGH THROUGHPUT TECHNOLOGIES AS DESCRIBED BY CMS-2020-01-R: HCPCS | Performed by: INTERNAL MEDICINE

## 2021-10-28 PROCEDURE — 99213 OFFICE O/P EST LOW 20 MIN: CPT | Mod: S$PBB,,, | Performed by: NURSE PRACTITIONER

## 2021-10-28 PROCEDURE — U0005 INFEC AGEN DETEC AMPLI PROBE: HCPCS | Performed by: INTERNAL MEDICINE

## 2021-10-28 PROCEDURE — 71046 X-RAY EXAM CHEST 2 VIEWS: CPT | Mod: TC

## 2021-10-28 PROCEDURE — 71046 X-RAY EXAM CHEST 2 VIEWS: CPT | Mod: 26,,, | Performed by: RADIOLOGY

## 2021-10-28 RX ORDER — ALBUTEROL SULFATE 90 UG/1
2 AEROSOL, METERED RESPIRATORY (INHALATION) EVERY 4 HOURS PRN
Qty: 8.5 G | Refills: 11 | Status: SHIPPED | OUTPATIENT
Start: 2021-10-28 | End: 2022-11-13

## 2021-10-28 RX ORDER — SEVELAMER HYDROCHLORIDE 800 MG/1
TABLET, FILM COATED ORAL
COMMUNITY
End: 2022-06-02

## 2021-10-29 ENCOUNTER — TELEPHONE (OUTPATIENT)
Dept: TRANSPLANT | Facility: CLINIC | Age: 31
End: 2021-10-29
Payer: MEDICARE

## 2021-10-29 ENCOUNTER — IMMUNIZATION (OUTPATIENT)
Dept: PHARMACY | Facility: CLINIC | Age: 31
End: 2021-10-29
Payer: MEDICARE

## 2021-10-29 DIAGNOSIS — Z23 NEED FOR VACCINATION: Primary | ICD-10-CM

## 2021-10-29 PROBLEM — J45.909 ASTHMA, WELL CONTROLLED: Status: ACTIVE | Noted: 2021-10-29

## 2021-11-09 ENCOUNTER — LAB VISIT (OUTPATIENT)
Dept: LAB | Facility: HOSPITAL | Age: 31
End: 2021-11-09
Attending: INTERNAL MEDICINE
Payer: MEDICARE

## 2021-11-09 DIAGNOSIS — Z94.0 KIDNEY REPLACED BY TRANSPLANT: ICD-10-CM

## 2021-11-09 LAB
ALBUMIN SERPL BCP-MCNC: 3.4 G/DL (ref 3.5–5.2)
ANION GAP SERPL CALC-SCNC: 7 MMOL/L (ref 8–16)
BASOPHILS # BLD AUTO: 0.04 K/UL (ref 0–0.2)
BASOPHILS NFR BLD: 0.6 % (ref 0–1.9)
BUN SERPL-MCNC: 17 MG/DL (ref 6–20)
CALCIUM SERPL-MCNC: 9.4 MG/DL (ref 8.7–10.5)
CHLORIDE SERPL-SCNC: 104 MMOL/L (ref 95–110)
CO2 SERPL-SCNC: 25 MMOL/L (ref 23–29)
CREAT SERPL-MCNC: 1.2 MG/DL (ref 0.5–1.4)
DIFFERENTIAL METHOD: ABNORMAL
EOSINOPHIL # BLD AUTO: 0.1 K/UL (ref 0–0.5)
EOSINOPHIL NFR BLD: 1.7 % (ref 0–8)
ERYTHROCYTE [DISTWIDTH] IN BLOOD BY AUTOMATED COUNT: 14.1 % (ref 11.5–14.5)
EST. GFR  (AFRICAN AMERICAN): >60 ML/MIN/1.73 M^2
EST. GFR  (NON AFRICAN AMERICAN): >60 ML/MIN/1.73 M^2
GLUCOSE SERPL-MCNC: 84 MG/DL (ref 70–110)
HCT VFR BLD AUTO: 43.7 % (ref 37–48.5)
HGB BLD-MCNC: 13.2 G/DL (ref 12–16)
IMM GRANULOCYTES # BLD AUTO: 0.04 K/UL (ref 0–0.04)
IMM GRANULOCYTES NFR BLD AUTO: 0.6 % (ref 0–0.5)
LYMPHOCYTES # BLD AUTO: 0.7 K/UL (ref 1–4.8)
LYMPHOCYTES NFR BLD: 10.3 % (ref 18–48)
MAGNESIUM SERPL-MCNC: 1.7 MG/DL (ref 1.6–2.6)
MCH RBC QN AUTO: 28.3 PG (ref 27–31)
MCHC RBC AUTO-ENTMCNC: 30.2 G/DL (ref 32–36)
MCV RBC AUTO: 94 FL (ref 82–98)
MONOCYTES # BLD AUTO: 0.6 K/UL (ref 0.3–1)
MONOCYTES NFR BLD: 9.2 % (ref 4–15)
NEUTROPHILS # BLD AUTO: 4.9 K/UL (ref 1.8–7.7)
NEUTROPHILS NFR BLD: 77.6 % (ref 38–73)
NRBC BLD-RTO: 0 /100 WBC
PHOSPHATE SERPL-MCNC: 2.5 MG/DL (ref 2.7–4.5)
PLATELET # BLD AUTO: 252 K/UL (ref 150–450)
PMV BLD AUTO: 12 FL (ref 9.2–12.9)
POTASSIUM SERPL-SCNC: 4.1 MMOL/L (ref 3.5–5.1)
RBC # BLD AUTO: 4.67 M/UL (ref 4–5.4)
SODIUM SERPL-SCNC: 136 MMOL/L (ref 136–145)
WBC # BLD AUTO: 6.38 K/UL (ref 3.9–12.7)

## 2021-11-09 PROCEDURE — 80197 ASSAY OF TACROLIMUS: CPT | Performed by: INTERNAL MEDICINE

## 2021-11-09 PROCEDURE — 83735 ASSAY OF MAGNESIUM: CPT | Performed by: INTERNAL MEDICINE

## 2021-11-09 PROCEDURE — 36415 COLL VENOUS BLD VENIPUNCTURE: CPT | Performed by: INTERNAL MEDICINE

## 2021-11-09 PROCEDURE — 85025 COMPLETE CBC W/AUTO DIFF WBC: CPT | Performed by: INTERNAL MEDICINE

## 2021-11-09 PROCEDURE — 80069 RENAL FUNCTION PANEL: CPT | Performed by: INTERNAL MEDICINE

## 2021-11-10 LAB — TACROLIMUS BLD-MCNC: 7.2 NG/ML (ref 5–15)

## 2021-11-18 ENCOUNTER — NURSE TRIAGE (OUTPATIENT)
Dept: ADMINISTRATIVE | Facility: CLINIC | Age: 31
End: 2021-11-18
Payer: MEDICARE

## 2021-11-23 ENCOUNTER — TELEPHONE (OUTPATIENT)
Dept: TRANSPLANT | Facility: CLINIC | Age: 31
End: 2021-11-23
Payer: MEDICARE

## 2021-12-03 DIAGNOSIS — E21.2 HYPERPARATHYROIDISM, TERTIARY: ICD-10-CM

## 2021-12-03 DIAGNOSIS — Z94.0 DECEASED-DONOR KIDNEY TRANSPLANT: ICD-10-CM

## 2021-12-03 RX ORDER — CALCITRIOL 0.25 UG/1
0.25 CAPSULE ORAL DAILY
Qty: 30 CAPSULE | Refills: 11 | OUTPATIENT
Start: 2021-12-03

## 2021-12-06 RX ORDER — CALCITRIOL 0.25 UG/1
0.25 CAPSULE ORAL DAILY
Qty: 30 CAPSULE | Refills: 11 | OUTPATIENT
Start: 2021-12-06

## 2021-12-07 ENCOUNTER — LAB VISIT (OUTPATIENT)
Dept: LAB | Facility: HOSPITAL | Age: 31
End: 2021-12-07
Attending: INTERNAL MEDICINE
Payer: MEDICARE

## 2021-12-07 DIAGNOSIS — Z94.0 KIDNEY REPLACED BY TRANSPLANT: ICD-10-CM

## 2021-12-07 LAB
ALBUMIN SERPL BCP-MCNC: 3.8 G/DL (ref 3.5–5.2)
ANION GAP SERPL CALC-SCNC: 12 MMOL/L (ref 8–16)
BASOPHILS # BLD AUTO: 0.03 K/UL (ref 0–0.2)
BASOPHILS NFR BLD: 0.5 % (ref 0–1.9)
BUN SERPL-MCNC: 19 MG/DL (ref 6–20)
CALCIUM SERPL-MCNC: 9.3 MG/DL (ref 8.7–10.5)
CHLORIDE SERPL-SCNC: 110 MMOL/L (ref 95–110)
CO2 SERPL-SCNC: 17 MMOL/L (ref 23–29)
CREAT SERPL-MCNC: 1.1 MG/DL (ref 0.5–1.4)
DIFFERENTIAL METHOD: ABNORMAL
EOSINOPHIL # BLD AUTO: 0.1 K/UL (ref 0–0.5)
EOSINOPHIL NFR BLD: 2 % (ref 0–8)
ERYTHROCYTE [DISTWIDTH] IN BLOOD BY AUTOMATED COUNT: 14.4 % (ref 11.5–14.5)
EST. GFR  (AFRICAN AMERICAN): >60 ML/MIN/1.73 M^2
EST. GFR  (NON AFRICAN AMERICAN): >60 ML/MIN/1.73 M^2
GLUCOSE SERPL-MCNC: 76 MG/DL (ref 70–110)
HCT VFR BLD AUTO: 43.6 % (ref 37–48.5)
HGB BLD-MCNC: 13.7 G/DL (ref 12–16)
IMM GRANULOCYTES # BLD AUTO: 0.04 K/UL (ref 0–0.04)
IMM GRANULOCYTES NFR BLD AUTO: 0.6 % (ref 0–0.5)
LYMPHOCYTES # BLD AUTO: 0.5 K/UL (ref 1–4.8)
LYMPHOCYTES NFR BLD: 8.4 % (ref 18–48)
MAGNESIUM SERPL-MCNC: 1.8 MG/DL (ref 1.6–2.6)
MCH RBC QN AUTO: 28.7 PG (ref 27–31)
MCHC RBC AUTO-ENTMCNC: 31.4 G/DL (ref 32–36)
MCV RBC AUTO: 91 FL (ref 82–98)
MONOCYTES # BLD AUTO: 0.4 K/UL (ref 0.3–1)
MONOCYTES NFR BLD: 6.2 % (ref 4–15)
NEUTROPHILS # BLD AUTO: 5.3 K/UL (ref 1.8–7.7)
NEUTROPHILS NFR BLD: 82.3 % (ref 38–73)
NRBC BLD-RTO: 0 /100 WBC
PHOSPHATE SERPL-MCNC: 2.7 MG/DL (ref 2.7–4.5)
PLATELET # BLD AUTO: 230 K/UL (ref 150–450)
PMV BLD AUTO: 12.2 FL (ref 9.2–12.9)
POTASSIUM SERPL-SCNC: 4.6 MMOL/L (ref 3.5–5.1)
RBC # BLD AUTO: 4.77 M/UL (ref 4–5.4)
SODIUM SERPL-SCNC: 139 MMOL/L (ref 136–145)
WBC # BLD AUTO: 6.41 K/UL (ref 3.9–12.7)

## 2021-12-07 PROCEDURE — 80197 ASSAY OF TACROLIMUS: CPT | Performed by: INTERNAL MEDICINE

## 2021-12-07 PROCEDURE — 85025 COMPLETE CBC W/AUTO DIFF WBC: CPT | Performed by: INTERNAL MEDICINE

## 2021-12-07 PROCEDURE — 80069 RENAL FUNCTION PANEL: CPT | Performed by: INTERNAL MEDICINE

## 2021-12-07 PROCEDURE — 36415 COLL VENOUS BLD VENIPUNCTURE: CPT | Performed by: INTERNAL MEDICINE

## 2021-12-07 PROCEDURE — 83735 ASSAY OF MAGNESIUM: CPT | Performed by: INTERNAL MEDICINE

## 2021-12-08 ENCOUNTER — TELEPHONE (OUTPATIENT)
Dept: TRANSPLANT | Facility: CLINIC | Age: 31
End: 2021-12-08
Payer: MEDICARE

## 2021-12-08 DIAGNOSIS — Z94.0 DECEASED-DONOR KIDNEY TRANSPLANT: ICD-10-CM

## 2021-12-08 DIAGNOSIS — E21.2 HYPERPARATHYROIDISM, TERTIARY: ICD-10-CM

## 2021-12-08 LAB — TACROLIMUS BLD-MCNC: 10.5 NG/ML (ref 5–15)

## 2021-12-08 RX ORDER — CALCITRIOL 0.25 UG/1
0.25 CAPSULE ORAL DAILY
Qty: 30 CAPSULE | Refills: 11 | Status: SHIPPED | OUTPATIENT
Start: 2021-12-08 | End: 2021-12-10 | Stop reason: SDUPTHER

## 2021-12-10 DIAGNOSIS — Z94.0 DECEASED-DONOR KIDNEY TRANSPLANT: ICD-10-CM

## 2021-12-10 DIAGNOSIS — E21.2 HYPERPARATHYROIDISM, TERTIARY: ICD-10-CM

## 2021-12-10 RX ORDER — CALCITRIOL 0.25 UG/1
0.25 CAPSULE ORAL DAILY
Qty: 30 CAPSULE | Refills: 11 | Status: SHIPPED | OUTPATIENT
Start: 2021-12-10 | End: 2023-01-18 | Stop reason: SDUPTHER

## 2021-12-15 ENCOUNTER — IMMUNIZATION (OUTPATIENT)
Dept: PHARMACY | Facility: CLINIC | Age: 31
End: 2021-12-15
Payer: MEDICARE

## 2021-12-15 DIAGNOSIS — Z23 NEED FOR VACCINATION: Primary | ICD-10-CM

## 2022-01-04 ENCOUNTER — LAB VISIT (OUTPATIENT)
Dept: LAB | Facility: HOSPITAL | Age: 32
End: 2022-01-04
Attending: INTERNAL MEDICINE
Payer: MEDICARE

## 2022-01-04 DIAGNOSIS — Z94.0 KIDNEY REPLACED BY TRANSPLANT: ICD-10-CM

## 2022-01-04 LAB
ALBUMIN SERPL BCP-MCNC: 3.5 G/DL (ref 3.5–5.2)
ALBUMIN SERPL BCP-MCNC: 3.5 G/DL (ref 3.5–5.2)
ALP SERPL-CCNC: 89 U/L (ref 55–135)
ALT SERPL W/O P-5'-P-CCNC: 16 U/L (ref 10–44)
ANION GAP SERPL CALC-SCNC: 9 MMOL/L (ref 8–16)
AST SERPL-CCNC: 13 U/L (ref 10–40)
BASOPHILS # BLD AUTO: 0.03 K/UL (ref 0–0.2)
BASOPHILS NFR BLD: 0.4 % (ref 0–1.9)
BILIRUB DIRECT SERPL-MCNC: 0.1 MG/DL (ref 0.1–0.3)
BILIRUB SERPL-MCNC: 0.3 MG/DL (ref 0.1–1)
BUN SERPL-MCNC: 15 MG/DL (ref 6–20)
CALCIUM SERPL-MCNC: 8.8 MG/DL (ref 8.7–10.5)
CHLORIDE SERPL-SCNC: 108 MMOL/L (ref 95–110)
CO2 SERPL-SCNC: 20 MMOL/L (ref 23–29)
CREAT SERPL-MCNC: 1.1 MG/DL (ref 0.5–1.4)
DIFFERENTIAL METHOD: ABNORMAL
EOSINOPHIL # BLD AUTO: 0.1 K/UL (ref 0–0.5)
EOSINOPHIL NFR BLD: 1.6 % (ref 0–8)
ERYTHROCYTE [DISTWIDTH] IN BLOOD BY AUTOMATED COUNT: 13.9 % (ref 11.5–14.5)
EST. GFR  (AFRICAN AMERICAN): >60 ML/MIN/1.73 M^2
EST. GFR  (NON AFRICAN AMERICAN): >60 ML/MIN/1.73 M^2
GLUCOSE SERPL-MCNC: 76 MG/DL (ref 70–110)
HCT VFR BLD AUTO: 43.3 % (ref 37–48.5)
HGB BLD-MCNC: 13.1 G/DL (ref 12–16)
IMM GRANULOCYTES # BLD AUTO: 0.05 K/UL (ref 0–0.04)
IMM GRANULOCYTES NFR BLD AUTO: 0.6 % (ref 0–0.5)
LYMPHOCYTES # BLD AUTO: 0.6 K/UL (ref 1–4.8)
LYMPHOCYTES NFR BLD: 7.3 % (ref 18–48)
MAGNESIUM SERPL-MCNC: 1.7 MG/DL (ref 1.6–2.6)
MCH RBC QN AUTO: 28.6 PG (ref 27–31)
MCHC RBC AUTO-ENTMCNC: 30.3 G/DL (ref 32–36)
MCV RBC AUTO: 95 FL (ref 82–98)
MONOCYTES # BLD AUTO: 0.7 K/UL (ref 0.3–1)
MONOCYTES NFR BLD: 8.3 % (ref 4–15)
NEUTROPHILS # BLD AUTO: 6.8 K/UL (ref 1.8–7.7)
NEUTROPHILS NFR BLD: 81.8 % (ref 38–73)
NRBC BLD-RTO: 0 /100 WBC
PHOSPHATE SERPL-MCNC: 2.6 MG/DL (ref 2.7–4.5)
PLATELET # BLD AUTO: 216 K/UL (ref 150–450)
PMV BLD AUTO: 11.6 FL (ref 9.2–12.9)
POTASSIUM SERPL-SCNC: 4.6 MMOL/L (ref 3.5–5.1)
PROT SERPL-MCNC: 6.2 G/DL (ref 6–8.4)
RBC # BLD AUTO: 4.58 M/UL (ref 4–5.4)
SODIUM SERPL-SCNC: 137 MMOL/L (ref 136–145)
WBC # BLD AUTO: 8.24 K/UL (ref 3.9–12.7)

## 2022-01-04 PROCEDURE — 80197 ASSAY OF TACROLIMUS: CPT | Performed by: INTERNAL MEDICINE

## 2022-01-04 PROCEDURE — 83735 ASSAY OF MAGNESIUM: CPT | Performed by: INTERNAL MEDICINE

## 2022-01-04 PROCEDURE — 87799 DETECT AGENT NOS DNA QUANT: CPT | Performed by: INTERNAL MEDICINE

## 2022-01-04 PROCEDURE — 85025 COMPLETE CBC W/AUTO DIFF WBC: CPT | Performed by: INTERNAL MEDICINE

## 2022-01-04 PROCEDURE — 84075 ASSAY ALKALINE PHOSPHATASE: CPT | Performed by: INTERNAL MEDICINE

## 2022-01-04 PROCEDURE — 80069 RENAL FUNCTION PANEL: CPT | Performed by: INTERNAL MEDICINE

## 2022-01-05 ENCOUNTER — HOSPITAL ENCOUNTER (EMERGENCY)
Facility: HOSPITAL | Age: 32
Discharge: HOME | End: 2022-01-05
Attending: EMERGENCY MEDICINE
Payer: MEDICARE

## 2022-01-05 VITALS
OXYGEN SATURATION: 97 % | BODY MASS INDEX: 42.66 KG/M2 | WEIGHT: 265.44 LBS | HEART RATE: 101 BPM | DIASTOLIC BLOOD PRESSURE: 62 MMHG | RESPIRATION RATE: 20 BRPM | HEIGHT: 66 IN | SYSTOLIC BLOOD PRESSURE: 111 MMHG | TEMPERATURE: 99 F

## 2022-01-05 DIAGNOSIS — U07.1 COVID-19 VIRUS DETECTED: ICD-10-CM

## 2022-01-05 DIAGNOSIS — U07.1 COVID-19: Primary | ICD-10-CM

## 2022-01-05 LAB
INFLUENZA A, MOLECULAR: NEGATIVE
INFLUENZA B, MOLECULAR: NEGATIVE
SARS-COV-2 RDRP RESP QL NAA+PROBE: POSITIVE
SPECIMEN SOURCE: NORMAL
TACROLIMUS BLD-MCNC: 7.8 NG/ML (ref 5–15)

## 2022-01-05 PROCEDURE — 87502 INFLUENZA DNA AMP PROBE: CPT | Performed by: EMERGENCY MEDICINE

## 2022-01-05 PROCEDURE — 99283 EMERGENCY DEPT VISIT LOW MDM: CPT

## 2022-01-05 PROCEDURE — U0002 COVID-19 LAB TEST NON-CDC: HCPCS | Performed by: EMERGENCY MEDICINE

## 2022-01-05 RX ORDER — PROMETHAZINE HYDROCHLORIDE AND DEXTROMETHORPHAN HYDROBROMIDE 6.25; 15 MG/5ML; MG/5ML
5 SYRUP ORAL EVERY 4 HOURS PRN
Qty: 180 ML | Refills: 0 | Status: SHIPPED | OUTPATIENT
Start: 2022-01-05 | End: 2022-01-15

## 2022-01-05 NOTE — ED PROVIDER NOTES
HISTORY     Chief Complaint   Patient presents with    COVID-19 Concerns     Pt CO congestion, cough, fever, body aches, HA. Pt had kidney transplant in          Review of patient's allergies indicates:   Allergen Reactions    Heparin analogues Rash        HPI   HPI     Pt reports being exposed to Covid-19. Pt reports the following symptoms: headache, body aches and fever. Pt denies any of the following: CP, SOB, NVD, abdominal pain or any other symptoms at this time.     PCP: Primary Doctor No     Past Medical History:  Past Medical History:   Diagnosis Date    Anxiety     -donor kidney transplant 2021    cPRA 100%, XM negative 3 arteries, 2 on common patch, WIT 40 min    Encounter for blood transfusion     ESRD (end stage renal disease)     Fibroma     H/O kidney transplant     Hypertension     Hypertensive nephropathy     Ovarian cyst         Past Surgical History:  Past Surgical History:   Procedure Laterality Date    COLPOSCOPY  2020    Needs follow up on or after 21    hemodialysis access left arm      kidney removal       KIDNEY TRANSPLANT  04/15/2015    KIDNEY TRANSPLANT N/A 2021    Procedure: TRANSPLANT, KIDNEY;  Surgeon: Fam Sutton MD;  Location: [address];  Service: Transplant;  Laterality: N/A;    NEPHRECTOMY      OVARIAN CYST REMOVAL      PARATHYROIDECTOMY      partial     PERCUTANEOUS NEPHROSTOMY Left 2021    Procedure: Creation, Nephrostomy, Percutaneous;  Surgeon: Elen Surgeon;  [facility];  Service: Anesthesiology;  Laterality: Left;    right leg hemodialysis access      transplant nephrectomy  2017        Family History:  Family History   Problem Relation Age of Onset    No Known Problems Mother     Colon cancer Father     Cancer Father     Hypertension Father     No Known Problems Sister     No Known Problems Brother     Kidney disease Maternal Aunt     Hypertension Maternal Aunt     Diabetes  "Maternal Uncle     Kidney disease Other     Hypertension Other         Social History:  Social History     Tobacco Use    Smoking status: Former Smoker     Packs/day: 0.25     Years: 12.00     Pack years: 3.00     Types: Cigarettes     Start date:      Quit date: 2020     Years since quittin.4    Smokeless tobacco: Never Used   Substance and Sexual Activity    Alcohol use: No    Drug use: No    Sexual activity: Yes     Partners: Male     Birth control/protection: None         ROS   Review of Systems   Constitutional: Positive for fever.   HENT: Negative for sore throat.    Respiratory: Negative for shortness of breath.    Cardiovascular: Negative for chest pain.   Gastrointestinal: Negative for nausea.   Genitourinary: Negative for dysuria.   Musculoskeletal: Positive for myalgias. Negative for back pain.   Skin: Negative for rash.   Neurological: Positive for headaches. Negative for weakness.   Hematological: Does not bruise/bleed easily.       PHYSICAL EXAM     Initial Vitals [22]   BP Pulse Resp Temp SpO2   111/62 101 20 98.7 °F (37.1 °C) 97 %      MAP       --           Physical Exam     Nursing Notes and Vital Signs Reviewed.  Constitutional: Patient is in no acute distress.   Pulmonary/Chest: No respiratory distress.   Musculoskeletal: Moves all extremities.   Neurological:  Alert, awake, and appropriate.  Normal speech.    Psychiatric: Normal affect. Good eye contact. Appropriate in content.      ED COURSE   Procedures  ED ONGOING VITALS:  Vitals:    22   BP: 111/62   Pulse: 101   Resp: 20   Temp: 98.7 °F (37.1 °C)   TempSrc: Oral   SpO2: 97%   Weight: 120.4 kg (265 lb 6.9 oz)   Height: 5' 6" (1.676 m)         ABNORMAL LAB VALUES:  Labs Reviewed   SARS-COV-2 RNA AMPLIFICATION, QUAL - Abnormal; Notable for the following components:       Result Value    SARS-CoV-2 RNA, Amplification, Qual Positive (*)     All other components within normal limits   INFLUENZA A & B BY " MOLECULAR         ALL LAB VALUES:        RADIOLOGY STUDIES:  Imaging Results    None                   The above vital signs and test results have been reviewed by the emergency provider.     ED Medications:  Current Discharge Medication List        Discharge Medications:  Discharge Medication List as of 1/5/2022  2:21 AM      START taking these medications    Details   promethazine-dextromethorphan (PROMETHAZINE-DM) 6.25-15 mg/5 mL Syrp Take 5 mLs by mouth every 4 (four) hours as needed (cough)., Starting Wed 1/5/2022, Until Sat 1/15/2022 at 2359, Print                 I discussed with patient and/or family/caretaker that evaluation in the ED does not suggest any emergent or life threatening medical conditions requiring immediate intervention beyond what was provided in the ED, and I believe patient is safe for discharge. Regardless, an unremarkable evaluation in the ED does not preclude the development or presence of a serious or life threatening condition. As such, patient was instructed to return immediately for any worsening or change in current symptoms.        MEDICAL DECISION MAKING                 CLINICAL IMPRESSION       ICD-10-CM ICD-9-CM   1. COVID-19  U07.1 079.89               Chad Hogan NP  01/10/22 0928

## 2022-01-11 ENCOUNTER — TELEPHONE (OUTPATIENT)
Dept: TRANSPLANT | Facility: CLINIC | Age: 32
End: 2022-01-11
Payer: MEDICARE

## 2022-01-11 DIAGNOSIS — Z94.0 KIDNEY REPLACED BY TRANSPLANT: ICD-10-CM

## 2022-01-11 DIAGNOSIS — T86.11 ACUTE REJECTION OF KIDNEY TRANSPLANT: Primary | ICD-10-CM

## 2022-01-11 NOTE — TELEPHONE ENCOUNTER
States she was Dx COVID Positive on 1/5, S/S started on 1/4.  Requesting the [facility] Infusion.  Instructed to Hold MMF x 14 days and quarantine for 21 days.  She lives in [place].    Patient clinic appointment reschedule for 2/9/2022 and will have labs repeated on 1/27/2022.    ----- Message from Andrei Siegel RN sent at 1/11/2022  2:03 PM CST -----    ----- Message -----  From: Tom Lin  Sent: 1/11/2022   1:30 PM CST  To: [facility] Post-Kidney Transplant Clinical    Pt calling to speak w/ Andrei rubio. Stating she's tested positive for Covid.    [phone]    #Note: pt requires #

## 2022-01-12 RX ORDER — PREDNISONE 5 MG/1
TABLET ORAL
Qty: 120 TABLET | Refills: 11 | Status: SHIPPED | OUTPATIENT
Start: 2022-01-12 | End: 2022-06-03

## 2022-01-13 ENCOUNTER — INFUSION (OUTPATIENT)
Dept: INFECTIOUS DISEASES | Facility: HOSPITAL | Age: 32
End: 2022-01-13
Attending: INTERNAL MEDICINE
Payer: MEDICARE

## 2022-01-13 VITALS
SYSTOLIC BLOOD PRESSURE: 94 MMHG | DIASTOLIC BLOOD PRESSURE: 57 MMHG | RESPIRATION RATE: 18 BRPM | TEMPERATURE: 97 F | HEART RATE: 79 BPM | OXYGEN SATURATION: 97 %

## 2022-01-13 DIAGNOSIS — U07.1 COVID-19: Primary | ICD-10-CM

## 2022-01-13 DIAGNOSIS — T86.11 ACUTE REJECTION OF KIDNEY TRANSPLANT: ICD-10-CM

## 2022-01-13 PROCEDURE — M0243 CASIRIVI AND IMDEVI INFUSION: HCPCS | Performed by: INTERNAL MEDICINE

## 2022-01-13 PROCEDURE — 63600175 PHARM REV CODE 636 W HCPCS: Performed by: INTERNAL MEDICINE

## 2022-01-13 PROCEDURE — 25000003 PHARM REV CODE 250: Performed by: INTERNAL MEDICINE

## 2022-01-13 RX ORDER — ACETAMINOPHEN 325 MG/1
650 TABLET ORAL ONCE AS NEEDED
Status: DISCONTINUED | OUTPATIENT
Start: 2022-01-13 | End: 2023-09-23 | Stop reason: HOSPADM

## 2022-01-13 RX ORDER — ONDANSETRON 4 MG/1
4 TABLET, ORALLY DISINTEGRATING ORAL ONCE AS NEEDED
Status: DISCONTINUED | OUTPATIENT
Start: 2022-01-13 | End: 2023-09-23 | Stop reason: HOSPADM

## 2022-01-13 RX ORDER — EPINEPHRINE 0.3 MG/.3ML
0.3 INJECTION SUBCUTANEOUS
Status: DISCONTINUED | OUTPATIENT
Start: 2022-01-13 | End: 2023-09-23 | Stop reason: HOSPADM

## 2022-01-13 RX ORDER — DIPHENHYDRAMINE HYDROCHLORIDE 50 MG/ML
25 INJECTION INTRAMUSCULAR; INTRAVENOUS ONCE AS NEEDED
Status: DISCONTINUED | OUTPATIENT
Start: 2022-01-13 | End: 2023-09-23 | Stop reason: HOSPADM

## 2022-01-13 RX ORDER — SODIUM CHLORIDE 0.9 % (FLUSH) 0.9 %
10 SYRINGE (ML) INJECTION
Status: DISCONTINUED | OUTPATIENT
Start: 2022-01-13 | End: 2023-09-23 | Stop reason: HOSPADM

## 2022-01-13 RX ORDER — ALBUTEROL SULFATE 90 UG/1
2 AEROSOL, METERED RESPIRATORY (INHALATION)
Status: DISCONTINUED | OUTPATIENT
Start: 2022-01-13 | End: 2022-11-13

## 2022-01-13 RX ADMIN — CASIRIVIMAB AND IMDEVIMAB 600 MG: 600; 600 INJECTION, SOLUTION, CONCENTRATE INTRAVENOUS at 10:01

## 2022-01-13 NOTE — PROGRESS NOTES
1000  used via [employer] #[id number].  Verified patient identifiers and all admission questions answered.  Pt voices understanding.  Instructed patient to follow Kidney transplant instructions to quarantine for 21 days and to follow up with them or primary MD for any concerns. Pt has no questions at this time.

## 2022-01-13 NOTE — PROGRESS NOTES
If you have any further COVID related symtoms that have not improved or feel you're having a reaction to your infusion please notify your primary care physician, go to the nearest emergency room or call 911. Patient discharged via ambulatory with escort to front door of hospital in no apparent distress. Tolerated infusion well. Pt had no questions at this time.  , [employer], used via [employer].  [id number]

## 2022-01-27 ENCOUNTER — LAB VISIT (OUTPATIENT)
Dept: LAB | Facility: HOSPITAL | Age: 32
End: 2022-01-27
Attending: INTERNAL MEDICINE
Payer: MEDICARE

## 2022-01-27 DIAGNOSIS — Z94.0 KIDNEY REPLACED BY TRANSPLANT: ICD-10-CM

## 2022-01-27 LAB
ALBUMIN SERPL BCP-MCNC: 3.3 G/DL (ref 3.5–5.2)
ANION GAP SERPL CALC-SCNC: 8 MMOL/L (ref 8–16)
BASOPHILS # BLD AUTO: 0.04 K/UL (ref 0–0.2)
BASOPHILS NFR BLD: 0.4 % (ref 0–1.9)
BUN SERPL-MCNC: 17 MG/DL (ref 6–20)
CALCIUM SERPL-MCNC: 9.8 MG/DL (ref 8.7–10.5)
CHLORIDE SERPL-SCNC: 105 MMOL/L (ref 95–110)
CO2 SERPL-SCNC: 25 MMOL/L (ref 23–29)
CREAT SERPL-MCNC: 1.2 MG/DL (ref 0.5–1.4)
DIFFERENTIAL METHOD: ABNORMAL
EOSINOPHIL # BLD AUTO: 0.1 K/UL (ref 0–0.5)
EOSINOPHIL NFR BLD: 1.2 % (ref 0–8)
ERYTHROCYTE [DISTWIDTH] IN BLOOD BY AUTOMATED COUNT: 13.5 % (ref 11.5–14.5)
EST. GFR  (AFRICAN AMERICAN): >60 ML/MIN/1.73 M^2
EST. GFR  (NON AFRICAN AMERICAN): >60 ML/MIN/1.73 M^2
GLUCOSE SERPL-MCNC: 99 MG/DL (ref 70–110)
HCT VFR BLD AUTO: 43.6 % (ref 37–48.5)
HGB BLD-MCNC: 13.3 G/DL (ref 12–16)
IMM GRANULOCYTES # BLD AUTO: 0.12 K/UL (ref 0–0.04)
IMM GRANULOCYTES NFR BLD AUTO: 1.3 % (ref 0–0.5)
LYMPHOCYTES # BLD AUTO: 0.7 K/UL (ref 1–4.8)
LYMPHOCYTES NFR BLD: 8.2 % (ref 18–48)
MAGNESIUM SERPL-MCNC: 1.6 MG/DL (ref 1.6–2.6)
MCH RBC QN AUTO: 28.5 PG (ref 27–31)
MCHC RBC AUTO-ENTMCNC: 30.5 G/DL (ref 32–36)
MCV RBC AUTO: 93 FL (ref 82–98)
MONOCYTES # BLD AUTO: 0.8 K/UL (ref 0.3–1)
MONOCYTES NFR BLD: 8.7 % (ref 4–15)
NEUTROPHILS # BLD AUTO: 7.2 K/UL (ref 1.8–7.7)
NEUTROPHILS NFR BLD: 80.2 % (ref 38–73)
NRBC BLD-RTO: 0 /100 WBC
PHOSPHATE SERPL-MCNC: 2.6 MG/DL (ref 2.7–4.5)
PLATELET # BLD AUTO: 380 K/UL (ref 150–450)
PMV BLD AUTO: 11.4 FL (ref 9.2–12.9)
POTASSIUM SERPL-SCNC: 4.7 MMOL/L (ref 3.5–5.1)
RBC # BLD AUTO: 4.67 M/UL (ref 4–5.4)
SODIUM SERPL-SCNC: 138 MMOL/L (ref 136–145)
WBC # BLD AUTO: 8.93 K/UL (ref 3.9–12.7)

## 2022-01-27 PROCEDURE — 80069 RENAL FUNCTION PANEL: CPT | Performed by: INTERNAL MEDICINE

## 2022-01-27 PROCEDURE — 83735 ASSAY OF MAGNESIUM: CPT | Performed by: INTERNAL MEDICINE

## 2022-01-27 PROCEDURE — 85025 COMPLETE CBC W/AUTO DIFF WBC: CPT | Performed by: INTERNAL MEDICINE

## 2022-01-27 PROCEDURE — 36415 COLL VENOUS BLD VENIPUNCTURE: CPT | Performed by: INTERNAL MEDICINE

## 2022-01-27 PROCEDURE — 80197 ASSAY OF TACROLIMUS: CPT | Performed by: INTERNAL MEDICINE

## 2022-01-28 LAB — TACROLIMUS BLD-MCNC: 9.1 NG/ML (ref 5–15)

## 2022-02-08 ENCOUNTER — TELEPHONE (OUTPATIENT)
Dept: TRANSPLANT | Facility: CLINIC | Age: 32
End: 2022-02-08
Payer: MEDICARE

## 2022-02-08 RX ORDER — MYCOPHENOLATE MOFETIL 250 MG/1
1000 CAPSULE ORAL 2 TIMES DAILY
Qty: 240 CAPSULE | Refills: 11 | Status: SHIPPED | OUTPATIENT
Start: 2022-02-08 | End: 2023-03-01 | Stop reason: SDUPTHER

## 2022-02-08 NOTE — TELEPHONE ENCOUNTER
Spoke with patient about rescheduling upcoming appointments from 2/9 to 3/2 and 3/9. With the help of language line- Darrel ext [phone]

## 2022-02-24 ENCOUNTER — PATIENT MESSAGE (OUTPATIENT)
Dept: RESEARCH | Facility: HOSPITAL | Age: 32
End: 2022-02-24
Payer: MEDICARE

## 2022-03-02 ENCOUNTER — LAB VISIT (OUTPATIENT)
Dept: LAB | Facility: HOSPITAL | Age: 32
End: 2022-03-02
Attending: INTERNAL MEDICINE
Payer: MEDICARE

## 2022-03-02 DIAGNOSIS — Z94.0 KIDNEY REPLACED BY TRANSPLANT: ICD-10-CM

## 2022-03-02 LAB
ALBUMIN SERPL BCP-MCNC: 3.4 G/DL (ref 3.5–5.2)
ALBUMIN SERPL BCP-MCNC: 3.4 G/DL (ref 3.5–5.2)
ALP SERPL-CCNC: 85 U/L (ref 55–135)
ALT SERPL W/O P-5'-P-CCNC: 18 U/L (ref 10–44)
ANION GAP SERPL CALC-SCNC: 9 MMOL/L (ref 8–16)
AST SERPL-CCNC: 13 U/L (ref 10–40)
BASOPHILS # BLD AUTO: 0.02 K/UL (ref 0–0.2)
BASOPHILS NFR BLD: 0.2 % (ref 0–1.9)
BILIRUB DIRECT SERPL-MCNC: 0.1 MG/DL (ref 0.1–0.3)
BILIRUB SERPL-MCNC: 0.3 MG/DL (ref 0.1–1)
BUN SERPL-MCNC: 14 MG/DL (ref 6–20)
CALCIUM SERPL-MCNC: 8.9 MG/DL (ref 8.7–10.5)
CHLORIDE SERPL-SCNC: 106 MMOL/L (ref 95–110)
CO2 SERPL-SCNC: 24 MMOL/L (ref 23–29)
CREAT SERPL-MCNC: 1 MG/DL (ref 0.5–1.4)
DIFFERENTIAL METHOD: ABNORMAL
EOSINOPHIL # BLD AUTO: 0.1 K/UL (ref 0–0.5)
EOSINOPHIL NFR BLD: 0.9 % (ref 0–8)
ERYTHROCYTE [DISTWIDTH] IN BLOOD BY AUTOMATED COUNT: 14.9 % (ref 11.5–14.5)
EST. GFR  (AFRICAN AMERICAN): >60 ML/MIN/1.73 M^2
EST. GFR  (NON AFRICAN AMERICAN): >60 ML/MIN/1.73 M^2
GLUCOSE SERPL-MCNC: 76 MG/DL (ref 70–110)
HCT VFR BLD AUTO: 39.2 % (ref 37–48.5)
HGB BLD-MCNC: 11.7 G/DL (ref 12–16)
IMM GRANULOCYTES # BLD AUTO: 0.03 K/UL (ref 0–0.04)
IMM GRANULOCYTES NFR BLD AUTO: 0.4 % (ref 0–0.5)
LYMPHOCYTES # BLD AUTO: 0.4 K/UL (ref 1–4.8)
LYMPHOCYTES NFR BLD: 5 % (ref 18–48)
MAGNESIUM SERPL-MCNC: 1.5 MG/DL (ref 1.6–2.6)
MCH RBC QN AUTO: 28.7 PG (ref 27–31)
MCHC RBC AUTO-ENTMCNC: 29.8 G/DL (ref 32–36)
MCV RBC AUTO: 96 FL (ref 82–98)
MONOCYTES # BLD AUTO: 0.7 K/UL (ref 0.3–1)
MONOCYTES NFR BLD: 7.8 % (ref 4–15)
NEUTROPHILS # BLD AUTO: 7.3 K/UL (ref 1.8–7.7)
NEUTROPHILS NFR BLD: 85.7 % (ref 38–73)
NRBC BLD-RTO: 0 /100 WBC
PHOSPHATE SERPL-MCNC: 2.6 MG/DL (ref 2.7–4.5)
PLATELET # BLD AUTO: 217 K/UL (ref 150–450)
PMV BLD AUTO: 11.7 FL (ref 9.2–12.9)
POTASSIUM SERPL-SCNC: 4.3 MMOL/L (ref 3.5–5.1)
PROT SERPL-MCNC: 6.1 G/DL (ref 6–8.4)
RBC # BLD AUTO: 4.07 M/UL (ref 4–5.4)
SODIUM SERPL-SCNC: 139 MMOL/L (ref 136–145)
WBC # BLD AUTO: 8.56 K/UL (ref 3.9–12.7)

## 2022-03-02 PROCEDURE — 84075 ASSAY ALKALINE PHOSPHATASE: CPT | Performed by: INTERNAL MEDICINE

## 2022-03-02 PROCEDURE — 80197 ASSAY OF TACROLIMUS: CPT | Performed by: INTERNAL MEDICINE

## 2022-03-02 PROCEDURE — 80069 RENAL FUNCTION PANEL: CPT | Performed by: INTERNAL MEDICINE

## 2022-03-02 PROCEDURE — 87799 DETECT AGENT NOS DNA QUANT: CPT | Performed by: INTERNAL MEDICINE

## 2022-03-02 PROCEDURE — 85025 COMPLETE CBC W/AUTO DIFF WBC: CPT | Performed by: INTERNAL MEDICINE

## 2022-03-02 PROCEDURE — 83735 ASSAY OF MAGNESIUM: CPT | Performed by: INTERNAL MEDICINE

## 2022-03-03 LAB — TACROLIMUS BLD-MCNC: 7.2 NG/ML (ref 5–15)

## 2022-03-08 NOTE — PROGRESS NOTES
Kidney Post-Transplant Assessment    Referring Physician: Won Miller  Current Nephrologist: Julio Jaffe    ORGAN: RIGHT KIDNEY  Donor Type: donation after brain death  PHS Increased Risk: no  Cold Ischemia: 1,251 mins  Induction Medications: thymoglobulin    Subjective:     CC:  Reassessment of renal allograft function and management of chronic immunosuppression.    HPI:  Ms. Oumar Cordero is a 32 y.o. 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  She has CKD stage 2 - GFR 60-89 and her baseline creatinine is between 1 to 1.4. She takes mycophenolate mofetil, prednisone and tacrolimus for maintenance immunosuppression. She denies any recent hospitalizations or ER visits since her previous clinic visit.    Refers that saw her nephrologist more than 6 months ago     She was 30 minuets late for the appointment today     denied any changes with medications no admissions to any hospital    Diagnosed with COVID 3 months ago. Received IV abs    Received the covid19 vaccine    Patient tells me that she has an appointment on 3/28 with surgery for wt loss surgery     She also has an appointment to see Pulmonary medicine for sleep apnea                  Current Outpatient Medications on File Prior to Visit   Medication Sig Dispense Refill    albuterol (PROVENTIL/VENTOLIN HFA) 90 mcg/actuation inhaler Inhale 2 puffs into the lungs every 4 (four) hours as needed for Wheezing or Shortness of Breath. Rescue 8.5 g 11    calcitRIOL (ROCALTROL) 0.25 MCG Cap Take 1 capsule (0.25 mcg total) by mouth once daily. 30 capsule 11    doxycycline (VIBRAMYCIN) 100 MG Cap Take 1 capsule (100 mg total) by mouth 2 (two) times daily. 17 capsule 0    ergocalciferol (VITAMIN D2) 50,000 unit Cap Take 1 capsule (50,000 Units total) by mouth every 30 days. 1 capsule 11    labetaloL (NORMODYNE) 200 MG tablet Take 1 tablet (200 mg total) by mouth 2 (two) times daily. 60 tablet 10     magnesium oxide (MAG-OX) 400 mg (241.3 mg magnesium) tablet Take 1 tablet (400 mg total) by mouth 2 (two) times daily. 60 tablet 11    mycophenolate (CELLCEPT) 250 mg Cap Take 4 capsules (1,000 mg total) by mouth 2 (two) times daily. 240 capsule 11    NIFEdipine (PROCARDIA-XL) 30 MG (OSM) 24 hr tablet Take 1 tablet (30 mg total) by mouth 2 (two) times a day. 60 tablet 11    olmesartan (BENICAR) 20 MG tablet Take 1 tablet (20 mg total) by mouth once daily. 30 tablet 11    predniSONE (DELTASONE) 5 MG tablet Take 1 tablet (5mg total) by mouth once daily. (Patient taking differently: Take 1 tablet (5mg total) by mouth once daily.) 120 tablet 11    sars-cov-2, covid-19, (MODERNA COVID-19) 100 mcg/0.5 ml injection Inject 0.5 mLs into the muscle. 0.5 mL 0    sevelamer HCL (RENAGEL) 800 MG Tab 1 tablet with meals      sodium bicarbonate 650 MG tablet Take 2 tablets (1,300 mg total) by mouth 3 (three) times daily. 180 tablet 11    tacrolimus (PROGRAF) 1 MG Cap Take 3 capsules (3 mg total) by mouth every morning AND 2 capsules (2 mg total) every evening. 150 capsule 11     Current Facility-Administered Medications on File Prior to Visit   Medication Dose Route Frequency Provider Last Rate Last Admin    acetaminophen tablet 650 mg  650 mg Oral Once PRN Govind Garcia MD        albuterol inhaler 2 puff  2 puff Inhalation Q20 Min PRN Govind Garcia MD        diphenhydrAMINE injection 25 mg  25 mg Intravenous Once PRN Govind Garcai MD        EPINEPHrine (EPIPEN) 0.3 mg/0.3 mL pen injection 0.3 mg  0.3 mg Intramuscular PRN Govind Garcia MD        methylPREDNISolone sodium succinate injection 40 mg  40 mg Intravenous Once PRN Govind Garcia MD        ondansetron disintegrating tablet 4 mg  4 mg Oral Once PRN Govind Garcia MD        sodium chloride 0.9% 500 mL flush bag   Intravenous PRN Govind Garcia MD        sodium chloride 0.9% flush 10 mL  10 mL Intravenous PRN Govind Garcia MD      "       Review of Systems     Skin: no skin rash  CNS; no headaches, blurred vision, seizure, or syncope  ENT: No JVD,  Adenopathies,  nasal congestion. No oral lesions  Cardiac: No chest pain, dyspnea, claudication, edema or palpitations  Respiratory: No SOB, cough, hemoptysis   Gastro-intestinal: No diarrhea, constipation, abdominal pain, nausea, vomit. No ascitis  Genitourinary: no hematuria, dysuria, frequency, frequency  Musculoskeletal: joint pain, arthritis or vasculitic changes  Psych: alert awake, oriented, No cranial nerves deficit.      Objective:         Physical Exam     /76 (BP Location: Right arm, Patient Position: Sitting, BP Method: Large (Automatic))   Pulse 75   Temp 97.3 °F (36.3 °C) (Temporal)   Resp 16   Ht 5' 6" (1.676 m)   Wt 119.8 kg (264 lb 1.8 oz)   SpO2 98%   BMI 42.63 kg/m²       Head: normocephalic  Neck: No JVD, cervical axillary, or femoral adenopathies  Heart: no murmurs, Normal s1 and s2, No gallops, no rubs, No murmurs  Lungs; CTA, good respiratory effort, no crackles  Abdomen: soft, non tender, no splenomegaly or hepatomegaly, no massess, no bruits  Extremities: No edema, skin rash, joint pain  SNC: awake, alert oriented. Cranial nerves are intact, no focalized, sensitivity and strength preserved    Labs:  Lab Results   Component Value Date    WBC 8.56 03/02/2022    HGB 11.7 (L) 03/02/2022    HCT 39.2 03/02/2022     03/02/2022    K 4.3 03/02/2022     03/02/2022    CO2 24 03/02/2022    BUN 14 03/02/2022    CREATININE 1.0 03/02/2022    EGFRNONAA >60.0 03/02/2022    CALCIUM 8.9 03/02/2022    PHOS 2.6 (L) 03/02/2022    MG 1.5 (L) 03/02/2022    ALBUMIN 3.4 (L) 03/02/2022    ALBUMIN 3.4 (L) 03/02/2022    AST 13 03/02/2022    ALT 18 03/02/2022    UTPCR Unable to calculate 03/02/2022    .0 (H) 06/02/2021    TACROLIMUS 7.2 03/02/2022       No results found for: EXTANC, EXTWBC, EXTSEGS, EXTPLATELETS, EXTHEMOGLOBI, EXTHEMATOCRI, EXTCREATININ, EXTSODIUM, " EXTPOTASSIUM, EXTBUN, EXTCO2, EXTCALCIUM, EXTPHOSPHORU, EXTGLUCOSE, EXTALBUMIN, EXTAST, EXTALT, EXTBILITOTAL, EXTLIPASE, EXTAMYLASE    No results found for: EXTCYCLOSLVL, EXTSIROLIMUS, EXTTACROLVL, EXTPROTCRE, EXTPTHINTACT, EXTPROTEINUA, EXTWBCUA, EXTRBCUA    Labs were reviewed with the patient.    Assessment:     1. -donor kidney transplant 2021    2. S/p nephrectomy 17    3. Hyperparathyroidism, tertiary    4. Essential hypertension    5. Immunosuppressive management encounter following kidney transplant    6. Long-term use of immunosuppressant medication    7. Morbid obesity    8. CKD (chronic kidney disease) stage 2, GFR 60-89 ml/min        Plan:   Patient to see surgery for wt management  I advised patient to see Ob/Gyn I helped patient to send a message to her provider to do this due to mild anemia  I advised patient to see her nephrologist     1. CKD stage 2 with normal creatinine at 1 mg/dL: will continue follow up as per our center guidelines. patient to continue close follow up with the local General nephrologist. Education provided in appropriate fluid intake, potassium intake. Continue with oral hydration.        2. Immunosuppression: at target MMF 1000 bid prednisone 5 mg daily  Lab Results   Component Value Date    TACROLIMUS 7.2 2022    TACROLIMUS 9.1 2022    TACROLIMUS 7.8 2022     No results found for: CYCLOSPORINE  @   Will closely monitor for toxicities, education provided about adherence to medicines and need to communicate any side effect to the transplant nurse or physician.    3. Allograft Function:stable at baseline for the patient. Continue follow up as per our guidelines and with the local General nephrologist. Communication will be sent today.  Lab Results   Component Value Date    CREATININE 1.0 2022    CREATININE 1.2 2022    CREATININE 1.1 2022     Lab Results   Component Value Date    LIPASE 31 2021    LIPASE 55 04/15/2020        4. Hypertension management:  Continue with home blood pressure monitoring, low salt and healthy life discussed with the patient..    5. Metabolic Bone Disease/Secondary Hyperparathyroidism:calcium and phosphorus level discussed with the patient, patient will continue follow up with the general nephrologist for management of metabolic bone disease   calcium and phosphorus as per our center protocol. Will monitor PTH, Vit D level, calcium.      Lab Results   Component Value Date    .0 (H) 06/02/2021    CALCIUM 8.9 03/02/2022    CAION 1.21 02/01/2021    PHOS 2.6 (L) 03/02/2022    PHOS 2.6 (L) 01/27/2022    PHOS 2.6 (L) 01/04/2022       6. Electrolytes: reviewed with the patient, essentially within the normal range no need for acute changes today, will monitor as per our center guidelines.     Lab Results   Component Value Date     03/02/2022    K 4.3 03/02/2022     03/02/2022    CO2 24 03/02/2022    CO2 25 01/27/2022    CO2 20 (L) 01/04/2022       7. Anemia: will continue monitoring as per our center guidelines. No indication for acute intervention today.     Lab Results   Component Value Date    WBC 8.56 03/02/2022    HGB 11.7 (L) 03/02/2022    HCT 39.2 03/02/2022    MCV 96 03/02/2022     03/02/2022       8.Proteinuria: will continue with pr/cr ratio as per our center guidelines  Lab Results   Component Value Date    PROTEINURINE <7 03/02/2022    CREATRANDUR 78.0 03/02/2022    UTPCR Unable to calculate 03/02/2022    UTPCR 0.05 01/04/2022    UTPCR Unable to calculate 10/05/2021        9. BK virus infection screening: will continue with urine or blood PCR as per our guidelines to prevent BK virus viremia and allograft dysfunction  Lab Results   Component Value Date    BKVIRUSPCRQB <125 03/02/2022         10. Weight education: provided during the clinic visit.   Body mass index is 42.63 kg/m².       11.Patient safety education regarding immunosuppression including prophylaxis  posttransplant for CMV, PCP : Education provided about vaccination and prevention of infections.    12.  Cytopenias: no significant cytopenias will monitor as per our guidelines. Medicine list reviewed including potential causes of drug-induced cytopenias     Lab Results   Component Value Date    WBC 8.56 03/02/2022    HGB 11.7 (L) 03/02/2022    HCT 39.2 03/02/2022    MCV 96 03/02/2022     03/02/2022       13. Post-transplant Prophylaxis; CMV Infection, PJP and Candida mucosistis and other indicated for this particular patient. OFF prophylaxis > 1 year.     I spoke with the patient for 30 minutes. More than half dedicated to counseling and education. All questions answered    José Antonio Venegas MD  Transplant Nephrology            Follow-up:   Clinic: return to transplant clinic weekly for the first month after transplant; every 2 weeks during months 2-3; then at 6-, 9-, 12-, 18-, 24-, and 36- months post-transplant to reassess for complications from immunosuppression toxicity and monitor for rejection.  Annually thereafter.    Labs: since patient remains at high risk for rejection and drug-related complications that warrant close monitoring, labs will be ordered as follows: continue twice weekly CBC, renal panel, and drug level for first month; then same labs once weekly through 3rd month post-transplant.  Urine for UA and protein/creatinine ratio monthly.  Serum BK - PCR at 1-, 3-, 6-, 9-, 12-, 18-, 24-, 36- 48-, and 60 months post-transplant.  Hepatic panel at 1-, 2-, 3-, 6-, 9-, 12-, 18-, 24-, and 36- months post-transplant.    José Antonio Venegas MD       Education:   Material provided to the patient.  Patient reminded to call with any health changes since these can be early signs of significant complications.  Also, I advised the patient to be sure any new medications or changes of old medications are discussed with either a pharmacist or physician knowledgeable with transplant to avoid rejection/drug  toxicity related to significant drug interactions.    Patient advised that it is recommended that all transplanted patients, and their close contacts and household members receive Covid vaccination.    UNOS Patient Status  Functional Status: 100% - Normal, no complaints, no evidence of disease  Physical Capacity: No Limitations

## 2022-03-09 ENCOUNTER — OFFICE VISIT (OUTPATIENT)
Dept: TRANSPLANT | Facility: CLINIC | Age: 32
End: 2022-03-09
Payer: MEDICARE

## 2022-03-09 VITALS
HEIGHT: 66 IN | BODY MASS INDEX: 42.45 KG/M2 | RESPIRATION RATE: 16 BRPM | HEART RATE: 75 BPM | OXYGEN SATURATION: 98 % | DIASTOLIC BLOOD PRESSURE: 76 MMHG | TEMPERATURE: 97 F | WEIGHT: 264.13 LBS | SYSTOLIC BLOOD PRESSURE: 131 MMHG

## 2022-03-09 DIAGNOSIS — Z90.5 S/P NEPHRECTOMY: ICD-10-CM

## 2022-03-09 DIAGNOSIS — E21.2 HYPERPARATHYROIDISM, TERTIARY: ICD-10-CM

## 2022-03-09 DIAGNOSIS — E66.01 MORBID OBESITY: ICD-10-CM

## 2022-03-09 DIAGNOSIS — Z94.0 DECEASED-DONOR KIDNEY TRANSPLANT: Primary | ICD-10-CM

## 2022-03-09 DIAGNOSIS — Z79.60 LONG-TERM USE OF IMMUNOSUPPRESSANT MEDICATION: ICD-10-CM

## 2022-03-09 DIAGNOSIS — Z94.0 IMMUNOSUPPRESSIVE MANAGEMENT ENCOUNTER FOLLOWING KIDNEY TRANSPLANT: ICD-10-CM

## 2022-03-09 DIAGNOSIS — N18.2 CKD (CHRONIC KIDNEY DISEASE) STAGE 2, GFR 60-89 ML/MIN: ICD-10-CM

## 2022-03-09 DIAGNOSIS — I10 ESSENTIAL HYPERTENSION: ICD-10-CM

## 2022-03-09 DIAGNOSIS — Z79.899 IMMUNOSUPPRESSIVE MANAGEMENT ENCOUNTER FOLLOWING KIDNEY TRANSPLANT: ICD-10-CM

## 2022-03-09 PROCEDURE — 99215 OFFICE O/P EST HI 40 MIN: CPT | Mod: S$PBB,,, | Performed by: INTERNAL MEDICINE

## 2022-03-09 PROCEDURE — 99215 OFFICE O/P EST HI 40 MIN: CPT | Mod: PBBFAC | Performed by: INTERNAL MEDICINE

## 2022-03-09 PROCEDURE — 99215 PR OFFICE/OUTPT VISIT, EST, LEVL V, 40-54 MIN: ICD-10-PCS | Mod: S$PBB,,, | Performed by: INTERNAL MEDICINE

## 2022-03-09 PROCEDURE — 99999 PR PBB SHADOW E&M-EST. PATIENT-LVL V: ICD-10-PCS | Mod: PBBFAC,,, | Performed by: INTERNAL MEDICINE

## 2022-03-09 PROCEDURE — 99999 PR PBB SHADOW E&M-EST. PATIENT-LVL V: CPT | Mod: PBBFAC,,, | Performed by: INTERNAL MEDICINE

## 2022-03-09 NOTE — LETTER
March 9, 2022        Julio Jaffe  [address] [address] NEPHROLOGY  [place] [address]  Phone: [phone]  Fax: [phone]             [facility]- Transplant 1st Fl  [address]  [facility] [address]  Phone: [phone]   Patient: Leydi Cordero   MR Number: [id number]   YOB: 1990   Date of Visit: 3/9/2022       Dear Dr. Julio Jaffe    Thank you for referring Leydi Cordero to me for evaluation. Attached you will find relevant portions of my assessment and plan of care.    If you have questions, please do not hesitate to call me. I look forward to following Leydi Cordero along with you.    Sincerely,    José Antonio Venegas MD    Enclosure    If you would like to receive this communication electronically, please contact [email or web address] or [phone] to request [employer] Link access.    [employer] Link is a tool which provides read-only access to select patient information with whom you have a relationship. Its easy to use and provides real time access to review your patients record including encounter summaries, notes, results, and demographic information.    If you feel you have received this communication in error or would no longer like to receive these types of communications, please e-mail [email or web address]

## 2022-03-28 ENCOUNTER — OFFICE VISIT (OUTPATIENT)
Dept: SURGERY | Facility: CLINIC | Age: 32
End: 2022-03-28
Payer: MEDICARE

## 2022-03-28 ENCOUNTER — TELEPHONE (OUTPATIENT)
Dept: PSYCHIATRY | Facility: CLINIC | Age: 32
End: 2022-03-28
Payer: MEDICARE

## 2022-03-28 VITALS
HEART RATE: 78 BPM | BODY MASS INDEX: 42.24 KG/M2 | HEIGHT: 66 IN | SYSTOLIC BLOOD PRESSURE: 102 MMHG | WEIGHT: 262.81 LBS | DIASTOLIC BLOOD PRESSURE: 71 MMHG | TEMPERATURE: 97 F

## 2022-03-28 DIAGNOSIS — I10 ESSENTIAL HYPERTENSION: Primary | ICD-10-CM

## 2022-03-28 DIAGNOSIS — E66.01 MORBID OBESITY WITH BMI OF 40.0-44.9, ADULT: ICD-10-CM

## 2022-03-28 DIAGNOSIS — Z94.0 KIDNEY REPLACED BY TRANSPLANT: ICD-10-CM

## 2022-03-28 PROCEDURE — 99214 PR OFFICE/OUTPT VISIT, EST, LEVL IV, 30-39 MIN: ICD-10-PCS | Mod: S$PBB,,, | Performed by: SURGERY

## 2022-03-28 PROCEDURE — 99215 OFFICE O/P EST HI 40 MIN: CPT | Mod: PBBFAC | Performed by: SURGERY

## 2022-03-28 PROCEDURE — 99214 OFFICE O/P EST MOD 30 MIN: CPT | Mod: S$PBB,,, | Performed by: SURGERY

## 2022-03-28 PROCEDURE — 99999 PR PBB SHADOW E&M-EST. PATIENT-LVL V: CPT | Mod: PBBFAC,,, | Performed by: SURGERY

## 2022-03-28 PROCEDURE — 99999 PR PBB SHADOW E&M-EST. PATIENT-LVL V: ICD-10-PCS | Mod: PBBFAC,,, | Performed by: SURGERY

## 2022-03-28 NOTE — TELEPHONE ENCOUNTER
Pt was contacted and a vm was left----- Message from Marisabel Keller MA sent at 3/28/2022 11:33 AM CDT -----  Please schedule and call patient with appt        Procedure: Ambulatory referral/consult to Psychiatry Status: Needs Scheduling  Requested appt date: 4/4/2022 Authorizing: Jaimie Chopra MD in [facility] GENERAL SURGERY  Referral: [id number] (Authorized)      Expires: 4/28/2023 Priority: Routine  Diagnosis: Essential hypertension [I10]  Morbid obesity with BMI of 40.0-44.9, adult [E66.01, Z68.41]  Order Class: Internal Referral Referred to Provider: DESMOND ROBLES    Comments  Morbid obesity  Order Specific Questions  What is the reason for the visit?  Evaluation/Testing          What is the reason for visit?  Bariatric Testing          Referred to Region: Only select region(s) you would like the patient to be seen in if it is outside of the current encounter's department.  [place]     Request History  Action Date and Time User Details    Request Created 03/28/2022 11:26 Jaimie Chopra MD Appointment Encounter, Details  Workqueue Summary  Current Workqueues Entry Current Tab   All Orders: [place] [facility]...  [72533]  [place] [facility] Referral Orders: Behavi...  [11114] 03/28/2022 11:26  03/28/2022 11:26 Active  Active Details  Details         Possibly Related Referrals  Referral # Status Status Reason Scheduling Status Referring Provider Services   [id number] Pending Review System Automatically Pend Pending Authorization JAIMIE CHOPRA [[id number]] 43882(CPT®)-PRMEDNUTRTHER,SUBSQ,INDIV,[id number]  92860(CPT®)-PRMEDNUTRTHER,GROUP,[id number]

## 2022-03-29 NOTE — PROGRESS NOTES
"BARIATRIC NEW PATIENT EVALUATION    CHIEF COMPLAINT:   morbid obesity with a BMI of 42 and inability to lose weight.    HISTORY OF PRESENT ILLNESS:  Leydi Cordero is a 32 y.o.-year-old female presenting for morbid obesity with a BMI of 42 and inability to lose weight. The patient reports weight gain affecting her health following her kidney transplant. She has had difficulty losing weight since this time.    Height 5'6"  Weight 262lbs  BMI 42    HPI    CO-MORBIDITIES:  hypertension    PAST MEDICAL HISTORY:  Past Medical History:   Diagnosis Date    Anxiety     -donor kidney transplant 2021    cPRA 100%, XM negative 3 arteries, 2 on common patch, WIT 40 min    Encounter for blood transfusion     ESRD (end stage renal disease)     Fibroma     H/O kidney transplant     Hypertension     Hypertensive nephropathy     Ovarian cyst         PAST SURGICAL HISTORY:  Past Surgical History:   Procedure Laterality Date    COLPOSCOPY  2020    Needs follow up on or after 21    hemodialysis access left arm      kidney removal       KIDNEY TRANSPLANT  04/15/2015    KIDNEY TRANSPLANT N/A 2021    Procedure: TRANSPLANT, KIDNEY;  Surgeon: Fam Sutton MD;  Location: [address];  Service: Transplant;  Laterality: N/A;    NEPHRECTOMY      OVARIAN CYST REMOVAL      PARATHYROIDECTOMY      partial     PERCUTANEOUS NEPHROSTOMY Left 2021    Procedure: Creation, Nephrostomy, Percutaneous;  Surgeon: Mateo Surgeon;  Location: [facility];  Service: Anesthesiology;  Laterality: Left;    right leg hemodialysis access      transplant nephrectomy  2017       FAMILY HISTORY:  Family History   Problem Relation Age of Onset    No Known Problems Mother     Colon cancer Father     Cancer Father     Hypertension Father     No Known Problems Sister     No Known Problems Brother     Kidney disease Maternal Aunt     Hypertension Maternal Aunt     Diabetes Maternal Uncle  "    Kidney disease Other     Hypertension Other         SOCIAL HISTORY:   reports that she quit smoking about 19 months ago. Her smoking use included cigarettes. She started smoking about 14 years ago. She has a 3.00 pack-year smoking history. She has never used smokeless tobacco. She reports that she does not drink alcohol and does not use drugs.     MEDICATIONS:  Current Outpatient Medications on File Prior to Visit   Medication Sig Dispense Refill    albuterol (PROVENTIL/VENTOLIN HFA) 90 mcg/actuation inhaler Inhale 2 puffs into the lungs every 4 (four) hours as needed for Wheezing or Shortness of Breath. Rescue 8.5 g 11    calcitRIOL (ROCALTROL) 0.25 MCG Cap Take 1 capsule (0.25 mcg total) by mouth once daily. 30 capsule 11    doxycycline (VIBRAMYCIN) 100 MG Cap Take 1 capsule (100 mg total) by mouth 2 (two) times daily. 17 capsule 0    ergocalciferol (VITAMIN D2) 50,000 unit Cap Take 1 capsule (50,000 Units total) by mouth every 30 days. 1 capsule 11    labetaloL (NORMODYNE) 200 MG tablet Take 1 tablet (200 mg total) by mouth 2 (two) times daily. 60 tablet 10    magnesium oxide (MAG-OX) 400 mg (241.3 mg magnesium) tablet Take 1 tablet (400 mg total) by mouth 2 (two) times daily. 60 tablet 11    mycophenolate (CELLCEPT) 250 mg Cap Take 4 capsules (1,000 mg total) by mouth 2 (two) times daily. 240 capsule 11    NIFEdipine (PROCARDIA-XL) 30 MG (OSM) 24 hr tablet Take 1 tablet (30 mg total) by mouth 2 (two) times a day. 60 tablet 11    olmesartan (BENICAR) 20 MG tablet Take 1 tablet (20 mg total) by mouth once daily. 30 tablet 11    predniSONE (DELTASONE) 5 MG tablet Take 1 tablet (5mg total) by mouth once daily. (Patient taking differently: Take 1 tablet (5mg total) by mouth once daily.) 120 tablet 11    sars-cov-2, covid-19, (MODERNA COVID-19) 100 mcg/0.5 ml injection Inject 0.5 mLs into the muscle. 0.5 mL 0    sevelamer HCL (RENAGEL) 800 MG Tab 1 tablet with meals      sodium bicarbonate 650 MG  tablet Take 2 tablets (1,300 mg total) by mouth 3 (three) times daily. 180 tablet 11    tacrolimus (PROGRAF) 1 MG Cap Take 3 capsules (3 mg total) by mouth every morning AND 2 capsules (2 mg total) every evening. 150 capsule 11     Current Facility-Administered Medications on File Prior to Visit   Medication Dose Route Frequency Provider Last Rate Last Admin    acetaminophen tablet 650 mg  650 mg Oral Once PRN Govind Garcia MD        albuterol inhaler 2 puff  2 puff Inhalation Q20 Min PRN Govind Garcia MD        diphenhydrAMINE injection 25 mg  25 mg Intravenous Once PRN Govind Garcia MD        EPINEPHrine (EPIPEN) 0.3 mg/0.3 mL pen injection 0.3 mg  0.3 mg Intramuscular PRN Govind Garcia MD        methylPREDNISolone sodium succinate injection 40 mg  40 mg Intravenous Once PRN Govind Garcia MD        ondansetron disintegrating tablet 4 mg  4 mg Oral Once PRN Govind Garcia MD        sodium chloride 0.9% 500 mL flush bag   Intravenous PRN Govind Garcia MD        sodium chloride 0.9% flush 10 mL  10 mL Intravenous PRN Govind Garcia MD         Medications have been reviewed.    ALLERGIES:  Review of patient's allergies indicates:   Allergen Reactions    Heparin analogues Rash     Allergies have been reviewed.    ROS:  Review of Systems   Constitutional: Negative for activity change, appetite change, chills, diaphoresis, fatigue, fever and unexpected weight change.   HENT: Negative for congestion, dental problem, rhinorrhea and sore throat.    Eyes: Negative for visual disturbance.   Respiratory: Negative for cough, chest tightness, shortness of breath and wheezing.    Cardiovascular: Negative for chest pain, palpitations and leg swelling.   Gastrointestinal: Negative for abdominal distention, abdominal pain, constipation, diarrhea, nausea and vomiting.   Endocrine: Negative for cold intolerance, heat intolerance, polydipsia, polyphagia and polyuria.   Genitourinary: Negative for  difficulty urinating, dysuria, frequency, hematuria and urgency.   Musculoskeletal: Negative for arthralgias, gait problem, myalgias and neck pain.   Skin: Negative for color change, pallor, rash and wound.   Neurological: Negative for dizziness, syncope, weakness, light-headedness, numbness and headaches.   Hematological: Negative for adenopathy. Does not bruise/bleed easily.   Psychiatric/Behavioral: Negative for confusion, decreased concentration and sleep disturbance. The patient is not nervous/anxious.        PE:  Physical Exam  Vitals reviewed.   Constitutional:       General: She is not in acute distress.     Appearance: She is well-developed. She is not diaphoretic.   HENT:      Head: Normocephalic and atraumatic.      Right Ear: External ear normal.      Left Ear: External ear normal.   Eyes:      General: No scleral icterus.     Conjunctiva/sclera: Conjunctivae normal.      Pupils: Pupils are equal, round, and reactive to light.   Neck:      Thyroid: No thyromegaly.      Trachea: No tracheal deviation.   Cardiovascular:      Rate and Rhythm: Normal rate and regular rhythm.      Heart sounds: Normal heart sounds. No murmur heard.    No friction rub. No gallop.   Pulmonary:      Effort: Pulmonary effort is normal. No respiratory distress.      Breath sounds: Normal breath sounds. No wheezing or rales.   Chest:      Chest wall: No tenderness.   Abdominal:      General: Bowel sounds are normal. There is no distension.      Palpations: Abdomen is soft.      Tenderness: There is no abdominal tenderness.      Hernia: No hernia is present.      Comments: Well healed surgical scars   Musculoskeletal:         General: No tenderness or deformity. Normal range of motion.      Cervical back: Normal range of motion and neck supple.   Lymphadenopathy:      Cervical: No cervical adenopathy.   Skin:     General: Skin is warm and dry.      Coloration: Skin is not pale.      Findings: No erythema or rash.   Neurological:       Mental Status: She is alert and oriented to person, place, and time.   Psychiatric:         Behavior: Behavior normal.         Thought Content: Thought content normal.         Judgment: Judgment normal.         DIAGNOSIS:  1. Morbid obesity with a BMI of 42 and inability to lose weight.  2. Co-morbidities: hypertension    PLAN:  The patient is a good candidate for Bariatric Surgery. She is interested in sleeve gastrectomy. The surgery and post-op care was discussed in detail with the patient. All questions were answered.    She understands that bariatric surgery is a tool to aid in weight loss and that she needs to be committed to the diet and exercise post-operatively for successful weight loss. Discussed with patient that bariatric surgery is not the easy way out and that it will take plenty of dedication on the patient's part to be successful. Also discussed the possibility of weight regain if the patient strays from the diet guidelines or exercise requirements. Patient verbalized understanding and wishes to proceed with the work-up.    The patient is a good candidate for operatively-induced weight loss.  The patient's comorbidities can significantly improve from weight loss following surgery.    We discussed preliminary steps of the program including the evaluation process.  I have outlined the risks of the procedures including, but not limited to, bleeding, slippage, erosion, stricture, death, deep venous thrombosis, pulmonary embolus, healing issues including intra-abdominal leakage or perforation with abscess or need for drainage or reoperation, wound infection, need for open surgery, injury to intra-abdominal organs or bleeding requiring transfusion, intensive care unit care, and possible prolonged hospitalization.      Other long-term issues were discussed including, but not limited to, permanent change in volume of food and type of foods eaten and importance of ongoing long-term followup.  I advised the  patient that if they can lose weight before surgery, it will reduce her operative risk.  The patient understands and wishes to proceed.    PLAN: The patient is interested in proceeding with laparoscopic vertical sleeve gastrectomy with possible robotic assistance. We will start the next step of the evaluation process to include acquiring letters of medical necessity and insurance preapproval.  In addition, she will be sent for a pre-surgical psychological evaluation.  Once insurance approves request or the patient has made other financial arrangements for surgery, we will schedule the following preoperative tests:  EKG, chest x-ray, full panel of baseline labs and an upper endoscopy to evaluate the extent of reflux and/or presence of a hiatal hernia.  The patient will also see the dietician preoperatively.  We will see her back for a final visit after the above have been completed.    HTN - stable/medical mgmt    Kidney transplant Nephrology clearance.     used for entirety of visit    Referring Physician: Self, Aaareferral  RTC: As scheduled.

## 2022-04-04 ENCOUNTER — HOSPITAL ENCOUNTER (OUTPATIENT)
Dept: CARDIOLOGY | Facility: HOSPITAL | Age: 32
Discharge: HOME | End: 2022-04-04
Attending: SURGERY
Payer: MEDICARE

## 2022-04-04 ENCOUNTER — LAB VISIT (OUTPATIENT)
Dept: LAB | Facility: HOSPITAL | Age: 32
End: 2022-04-04
Attending: SURGERY
Payer: MEDICARE

## 2022-04-04 ENCOUNTER — HOSPITAL ENCOUNTER (OUTPATIENT)
Dept: RADIOLOGY | Facility: HOSPITAL | Age: 32
Discharge: HOME | End: 2022-04-04
Attending: SURGERY
Payer: MEDICARE

## 2022-04-04 DIAGNOSIS — E66.01 MORBID OBESITY WITH BMI OF 40.0-44.9, ADULT: ICD-10-CM

## 2022-04-04 DIAGNOSIS — I10 ESSENTIAL HYPERTENSION: ICD-10-CM

## 2022-04-04 LAB
25(OH)D3+25(OH)D2 SERPL-MCNC: 20 NG/ML (ref 30–96)
ALBUMIN SERPL BCP-MCNC: 3.4 G/DL (ref 3.5–5.2)
ALP SERPL-CCNC: 81 U/L (ref 55–135)
ALT SERPL W/O P-5'-P-CCNC: 15 U/L (ref 10–44)
ANION GAP SERPL CALC-SCNC: 7 MMOL/L (ref 8–16)
AST SERPL-CCNC: 12 U/L (ref 10–40)
BASOPHILS # BLD AUTO: 0.04 K/UL (ref 0–0.2)
BASOPHILS NFR BLD: 0.5 % (ref 0–1.9)
BILIRUB SERPL-MCNC: 0.4 MG/DL (ref 0.1–1)
BUN SERPL-MCNC: 17 MG/DL (ref 6–20)
CALCIUM SERPL-MCNC: 8.6 MG/DL (ref 8.7–10.5)
CHLORIDE SERPL-SCNC: 110 MMOL/L (ref 95–110)
CO2 SERPL-SCNC: 22 MMOL/L (ref 23–29)
CREAT SERPL-MCNC: 1 MG/DL (ref 0.5–1.4)
DIFFERENTIAL METHOD: ABNORMAL
EOSINOPHIL # BLD AUTO: 0.1 K/UL (ref 0–0.5)
EOSINOPHIL NFR BLD: 1.5 % (ref 0–8)
ERYTHROCYTE [DISTWIDTH] IN BLOOD BY AUTOMATED COUNT: 14.1 % (ref 11.5–14.5)
EST. GFR  (AFRICAN AMERICAN): >60 ML/MIN/1.73 M^2
EST. GFR  (NON AFRICAN AMERICAN): >60 ML/MIN/1.73 M^2
ESTIMATED AVG GLUCOSE: 94 MG/DL (ref 68–131)
GLUCOSE SERPL-MCNC: 85 MG/DL (ref 70–110)
HBA1C MFR BLD: 4.9 % (ref 4–5.6)
HCT VFR BLD AUTO: 40.5 % (ref 37–48.5)
HGB BLD-MCNC: 12.6 G/DL (ref 12–16)
IMM GRANULOCYTES # BLD AUTO: 0.05 K/UL (ref 0–0.04)
IMM GRANULOCYTES NFR BLD AUTO: 0.7 % (ref 0–0.5)
IRON SERPL-MCNC: 66 UG/DL (ref 30–160)
LYMPHOCYTES # BLD AUTO: 0.7 K/UL (ref 1–4.8)
LYMPHOCYTES NFR BLD: 9 % (ref 18–48)
MCH RBC QN AUTO: 29.1 PG (ref 27–31)
MCHC RBC AUTO-ENTMCNC: 31.1 G/DL (ref 32–36)
MCV RBC AUTO: 94 FL (ref 82–98)
MONOCYTES # BLD AUTO: 0.6 K/UL (ref 0.3–1)
MONOCYTES NFR BLD: 8 % (ref 4–15)
NEUTROPHILS # BLD AUTO: 6.1 K/UL (ref 1.8–7.7)
NEUTROPHILS NFR BLD: 80.3 % (ref 38–73)
NRBC BLD-RTO: 0 /100 WBC
PLATELET # BLD AUTO: 219 K/UL (ref 150–450)
PMV BLD AUTO: 11.9 FL (ref 9.2–12.9)
POTASSIUM SERPL-SCNC: 4.3 MMOL/L (ref 3.5–5.1)
PREALB SERPL-MCNC: 29 MG/DL (ref 20–43)
PROT SERPL-MCNC: 6.3 G/DL (ref 6–8.4)
RBC # BLD AUTO: 4.33 M/UL (ref 4–5.4)
SATURATED IRON: 18 % (ref 20–50)
SODIUM SERPL-SCNC: 139 MMOL/L (ref 136–145)
T4 FREE SERPL-MCNC: 0.88 NG/DL (ref 0.71–1.51)
TOTAL IRON BINDING CAPACITY: 369 UG/DL (ref 250–450)
TRANSFERRIN SERPL-MCNC: 249 MG/DL (ref 200–375)
TSH SERPL DL<=0.005 MIU/L-ACNC: 1.89 UIU/ML (ref 0.4–4)
VIT B12 SERPL-MCNC: 254 PG/ML (ref 210–950)
WBC # BLD AUTO: 7.58 K/UL (ref 3.9–12.7)

## 2022-04-04 PROCEDURE — 82306 VITAMIN D 25 HYDROXY: CPT | Performed by: SURGERY

## 2022-04-04 PROCEDURE — 93005 ELECTROCARDIOGRAM TRACING: CPT

## 2022-04-04 PROCEDURE — 93010 EKG 12-LEAD: ICD-10-PCS | Mod: ,,, | Performed by: INTERNAL MEDICINE

## 2022-04-04 PROCEDURE — 84439 ASSAY OF FREE THYROXINE: CPT | Performed by: SURGERY

## 2022-04-04 PROCEDURE — 93010 ELECTROCARDIOGRAM REPORT: CPT | Mod: ,,, | Performed by: INTERNAL MEDICINE

## 2022-04-04 PROCEDURE — 80053 COMPREHEN METABOLIC PANEL: CPT | Performed by: SURGERY

## 2022-04-04 PROCEDURE — 36415 COLL VENOUS BLD VENIPUNCTURE: CPT | Performed by: SURGERY

## 2022-04-04 PROCEDURE — 71046 X-RAY EXAM CHEST 2 VIEWS: CPT | Mod: TC

## 2022-04-04 PROCEDURE — 84134 ASSAY OF PREALBUMIN: CPT | Performed by: SURGERY

## 2022-04-04 PROCEDURE — 85025 COMPLETE CBC W/AUTO DIFF WBC: CPT | Performed by: SURGERY

## 2022-04-04 PROCEDURE — 71046 XR CHEST PA AND LATERAL: ICD-10-PCS | Mod: 26,,, | Performed by: RADIOLOGY

## 2022-04-04 PROCEDURE — 83036 HEMOGLOBIN GLYCOSYLATED A1C: CPT | Performed by: SURGERY

## 2022-04-04 PROCEDURE — 84466 ASSAY OF TRANSFERRIN: CPT | Performed by: SURGERY

## 2022-04-04 PROCEDURE — 82607 VITAMIN B-12: CPT | Performed by: SURGERY

## 2022-04-04 PROCEDURE — 84443 ASSAY THYROID STIM HORMONE: CPT | Performed by: SURGERY

## 2022-04-04 PROCEDURE — 71046 X-RAY EXAM CHEST 2 VIEWS: CPT | Mod: 26,,, | Performed by: RADIOLOGY

## 2022-04-04 PROCEDURE — 84425 ASSAY OF VITAMIN B-1: CPT | Performed by: SURGERY

## 2022-04-08 LAB — VIT B1 BLD-MCNC: 78 UG/L (ref 38–122)

## 2022-04-12 ENCOUNTER — PATIENT MESSAGE (OUTPATIENT)
Dept: PULMONOLOGY | Facility: CLINIC | Age: 32
End: 2022-04-12
Payer: MEDICARE

## 2022-04-22 ENCOUNTER — TELEPHONE (OUTPATIENT)
Dept: PULMONOLOGY | Facility: CLINIC | Age: 32
End: 2022-04-22
Payer: MEDICARE

## 2022-05-10 ENCOUNTER — NUTRITION (OUTPATIENT)
Dept: INTERNAL MEDICINE | Facility: CLINIC | Age: 32
End: 2022-05-10
Payer: MEDICARE

## 2022-05-10 DIAGNOSIS — Z71.3 PRE-BARIATRIC SURGERY NUTRITION EVALUATION: ICD-10-CM

## 2022-05-10 DIAGNOSIS — Z71.3 DIETARY COUNSELING: ICD-10-CM

## 2022-05-10 DIAGNOSIS — E66.01 MORBID OBESITY WITH BMI OF 40.0-44.9, ADULT: ICD-10-CM

## 2022-05-10 DIAGNOSIS — I10 ESSENTIAL HYPERTENSION: ICD-10-CM

## 2022-05-10 PROCEDURE — 97802 MEDICAL NUTRITION INDIV IN: CPT | Mod: S$GLB,,, | Performed by: DIETITIAN, REGISTERED

## 2022-05-10 PROCEDURE — 97802 PR MED NUTR THER, 1ST, INDIV, EA 15 MIN: ICD-10-PCS | Mod: S$GLB,,, | Performed by: DIETITIAN, REGISTERED

## 2022-05-10 NOTE — PROGRESS NOTES
"Consult conducted using [employer] for help with translating.       NUTRITIONAL CONSULT    Referring Physician: Dr. Parekh  Reason for MNT Referral: Initial assessment for sleeve gastrectomy work-up    PAST MEDICAL HISTORY:   32 y.o. female presents with a BMI of There is no height or weight on file to calculate BMI..  Weight history includes: currently at highest weight.  Dieting attempts include: no previous programs attempted, no previous attempts.    Past Medical History:   Diagnosis Date    Anxiety     -donor kidney transplant 2021    cPRA 100%, XM negative 3 arteries, 2 on common patch, WIT 40 min    Encounter for blood transfusion     ESRD (end stage renal disease)     Fibroma     H/O kidney transplant     Hypertension     Hypertensive nephropathy     Ovarian cyst        CLINICAL DATA:  32 y.o.-year-old White female.  Height: 66"  Weight: 264 lbs  IBW: 183 lbs +/-10%  BMI: 42.7  EBW: 109 lbs  Personal goal weight: no set weight; focusing on health and feeling better; current gets tired easily     Goal for Bariatric Surgery: to improve health and to improve quality of life    NUTRITION & HEALTH HISTORY:  Greatest challenge: dining out frequency    Current diet recall:   24 hour recall:      Lunch: rice (white) + beans + chicken in tomato sauce  Ice cream   Soda (Coke), water (all throughout day)    Current Diet:  Meal pattern: 2 (typically skips dinner)  Protein supplements: 0; drank previously in dialysis   Snacking: several / day// ice cream, sweet cookies   Vegetables: Likes a few. Eats 3-4x/month.  Fruits: Likes a few. Eats never.  Beverages: mostly water, soda (regular, 1x/day), juices, coffee (1x/day)  Dining out: 3x/week. Mostly fast food.  Cooking at home: Weekly. Mostly baked and grilled meat, fish, starchy CHO and vegetables.    Exercise:  Past exercise: None    Current exercise: None  Restrictions to exercise: none    Vitamins / Minerals / Herbs: "   Vitamin D.     Food Allergies:   NKFA    Social:  No work.  Lives with: brother.  Grocery shopping and food prep: self .  Patient believes the household will be supportive after surgery.  Alcohol: None.  Smoking: None.    ASSESSMENT:  · Patient reports attempts at weight loss, only to regain lost weight.  · Patient demonstrated knowledge of healthy eating behaviors and exercise patterns; admits to not eating healthy and not exercising at this point.  · Patient states willingness to change lifestyle and make behavior modifications.  · Expect good  compliance after surgery at this time.    Insurance requires medically supervised diet prior to consideration for bariatric surgery.    BARIATRIC DIET DISCUSSION:  Discussed diet after surgery and related to patients food record.  Reviewed diet progression before and after surgery.  Reinforced that surgery is not a magic bullet and importance of low fat foods and no snacking.  Stressed importance of exercise and its role in achieving weight loss goals.  Answered all questions.    RECOMMENDATIONS:  Patient is a good candidate for bariatric surgery.    Needs 2 additional visit(s) with RD.    PLAN:  Work on eliminating beverages from diet that contain: sugar, caffeine, and carbonation  Start shopping for protein supplement and introduce into diet once daily.     Return to clinic in 1 month.     SESSION TIME:  45 minutes

## 2022-05-11 ENCOUNTER — PATIENT MESSAGE (OUTPATIENT)
Dept: RESEARCH | Facility: CLINIC | Age: 32
End: 2022-05-11
Payer: MEDICARE

## 2022-05-24 ENCOUNTER — OFFICE VISIT (OUTPATIENT)
Dept: PSYCHIATRY | Facility: CLINIC | Age: 32
End: 2022-05-24
Payer: MEDICARE

## 2022-05-24 DIAGNOSIS — Z01.818 PREOP EXAMINATION: Primary | ICD-10-CM

## 2022-05-24 DIAGNOSIS — I10 ESSENTIAL HYPERTENSION: ICD-10-CM

## 2022-05-24 DIAGNOSIS — E66.01 MORBID OBESITY WITH BMI OF 40.0-44.9, ADULT: ICD-10-CM

## 2022-05-24 PROCEDURE — 90791 PSYCH DIAGNOSTIC EVALUATION: CPT | Mod: ,,, | Performed by: SOCIAL WORKER

## 2022-05-24 PROCEDURE — 90791 PR PSYCHIATRIC DIAGNOSTIC EVALUATION: ICD-10-PCS | Mod: ,,, | Performed by: SOCIAL WORKER
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
## 2022-05-24 NOTE — LETTER
May 27, 2022        Cal Parekh MD  [address] [facility]  4th Floor  [facility] [address]             [facility] [address]  [address]  [place] [address]  Phone: [phone]  Fax: [phone]   Patient: Leydi Cordero   MR Number: [id number]   YOB: 1990   Date of Visit: 5/24/2022       Dear Dr. Parekh:    Thank you for referring Leydi Cordero to me for evaluation. Below are the relevant portions of my assessment and plan of care.    If you have questions, please do not hesitate to call me. I look forward to following Leydi along with you.    Sincerely,      Romulo Kinsey, BRANDAN           CC  No Recipients

## 2022-05-27 ENCOUNTER — PATIENT MESSAGE (OUTPATIENT)
Dept: PULMONOLOGY | Facility: CLINIC | Age: 32
End: 2022-05-27
Payer: MEDICARE

## 2022-05-30 ENCOUNTER — PATIENT MESSAGE (OUTPATIENT)
Dept: PULMONOLOGY | Facility: CLINIC | Age: 32
End: 2022-05-30
Payer: MEDICARE

## 2022-05-31 ENCOUNTER — LAB VISIT (OUTPATIENT)
Dept: LAB | Facility: HOSPITAL | Age: 32
End: 2022-05-31
Attending: INTERNAL MEDICINE
Payer: MEDICARE

## 2022-05-31 ENCOUNTER — PATIENT MESSAGE (OUTPATIENT)
Dept: PULMONOLOGY | Facility: CLINIC | Age: 32
End: 2022-05-31
Payer: MEDICARE

## 2022-05-31 DIAGNOSIS — Z94.0 KIDNEY TRANSPLANT STATUS: Primary | ICD-10-CM

## 2022-05-31 DIAGNOSIS — Z94.0 KIDNEY TRANSPLANT STATUS: ICD-10-CM

## 2022-05-31 LAB
ALBUMIN SERPL BCP-MCNC: 3.4 G/DL (ref 3.5–5.2)
ANION GAP SERPL CALC-SCNC: 8 MMOL/L (ref 8–16)
BUN SERPL-MCNC: 23 MG/DL (ref 6–20)
CALCIUM SERPL-MCNC: 9.4 MG/DL (ref 8.7–10.5)
CHLORIDE SERPL-SCNC: 106 MMOL/L (ref 95–110)
CO2 SERPL-SCNC: 20 MMOL/L (ref 23–29)
CREAT SERPL-MCNC: 1.2 MG/DL (ref 0.5–1.4)
EST. GFR  (AFRICAN AMERICAN): >60 ML/MIN/1.73 M^2
EST. GFR  (NON AFRICAN AMERICAN): >60 ML/MIN/1.73 M^2
GLUCOSE SERPL-MCNC: 91 MG/DL (ref 70–110)
PHOSPHATE SERPL-MCNC: 2.7 MG/DL (ref 2.7–4.5)
POTASSIUM SERPL-SCNC: 4.8 MMOL/L (ref 3.5–5.1)
SODIUM SERPL-SCNC: 134 MMOL/L (ref 136–145)

## 2022-05-31 PROCEDURE — 36415 COLL VENOUS BLD VENIPUNCTURE: CPT | Performed by: INTERNAL MEDICINE

## 2022-05-31 PROCEDURE — 80197 ASSAY OF TACROLIMUS: CPT | Performed by: INTERNAL MEDICINE

## 2022-05-31 PROCEDURE — 80069 RENAL FUNCTION PANEL: CPT | Performed by: INTERNAL MEDICINE

## 2022-06-01 LAB — TACROLIMUS BLD-MCNC: 13.2 NG/ML (ref 5–15)

## 2022-06-02 ENCOUNTER — OFFICE VISIT (OUTPATIENT)
Dept: NEPHROLOGY | Facility: CLINIC | Age: 32
End: 2022-06-02
Payer: MEDICARE

## 2022-06-02 VITALS
DIASTOLIC BLOOD PRESSURE: 62 MMHG | BODY MASS INDEX: 42.1 KG/M2 | SYSTOLIC BLOOD PRESSURE: 102 MMHG | HEART RATE: 98 BPM | WEIGHT: 261.94 LBS | HEIGHT: 66 IN

## 2022-06-02 DIAGNOSIS — Z94.0 KIDNEY TRANSPLANT STATUS: Primary | ICD-10-CM

## 2022-06-02 DIAGNOSIS — N18.5 CHRONIC KIDNEY DISEASE, STAGE 5: ICD-10-CM

## 2022-06-02 DIAGNOSIS — D84.9 IMMUNOSUPPRESSION: ICD-10-CM

## 2022-06-02 DIAGNOSIS — N18.2 CKD (CHRONIC KIDNEY DISEASE) STAGE 2, GFR 60-89 ML/MIN: ICD-10-CM

## 2022-06-02 DIAGNOSIS — I12.9 PARENCHYMAL RENAL HYPERTENSION, STAGE 1 THROUGH STAGE 4 OR UNSPECIFIED CHRONIC KIDNEY DISEASE: ICD-10-CM

## 2022-06-02 PROCEDURE — 99999 PR PBB SHADOW E&M-EST. PATIENT-LVL IV: CPT | Mod: PBBFAC,,, | Performed by: INTERNAL MEDICINE

## 2022-06-02 PROCEDURE — 99215 PR OFFICE/OUTPT VISIT, EST, LEVL V, 40-54 MIN: ICD-10-PCS | Mod: S$PBB,,, | Performed by: INTERNAL MEDICINE

## 2022-06-02 PROCEDURE — 99999 PR PBB SHADOW E&M-EST. PATIENT-LVL IV: ICD-10-PCS | Mod: PBBFAC,,, | Performed by: INTERNAL MEDICINE

## 2022-06-02 PROCEDURE — 99215 OFFICE O/P EST HI 40 MIN: CPT | Mod: S$PBB,,, | Performed by: INTERNAL MEDICINE

## 2022-06-02 PROCEDURE — 99214 OFFICE O/P EST MOD 30 MIN: CPT | Mod: PBBFAC | Performed by: INTERNAL MEDICINE

## 2022-06-02 NOTE — PROGRESS NOTES
"Subjective:       Patient ID: Leydi Cordero is a 32 y.o. female.    Chief Complaint:  Kidney transplant 01/09/2021     HPI    She presents to clinic today for routine follow-up.  Translation was via the [employer] system.  Since her last office visit she has been doing well.  She reports having an area of drainage that she thinks is either a pimple or an abscess.  No fevers or chills.  I encouraged her to follow-up with her primary care physician or urgent care.  Her laboratory studies and medications were reviewed.  All Nephrology related questions were answered to her satisfaction.      Review of Systems   Constitutional: Negative.    HENT: Negative.    Eyes: Negative.    Respiratory: Negative.    Cardiovascular: Negative.    Gastrointestinal: Negative.    Genitourinary: Negative.    Musculoskeletal: Negative.    Skin: Negative.    Neurological: Negative.          /62   Pulse 98   Ht 5' 6" (1.676 m)   Wt 118.8 kg (261 lb 14.5 oz)   BMI 42.27 kg/m²     Lab Results   Component Value Date    WBC 7.58 04/04/2022    HGB 12.6 04/04/2022    HCT 40.5 04/04/2022    MCV 94 04/04/2022     04/04/2022      BMP  Lab Results   Component Value Date     (L) 05/31/2022    K 4.8 05/31/2022     05/31/2022    CO2 20 (L) 05/31/2022    BUN 23 (H) 05/31/2022    CREATININE 1.2 05/31/2022    CALCIUM 9.4 05/31/2022    ANIONGAP 8 05/31/2022    ESTGFRAFRICA >60.0 05/31/2022    EGFRNONAA >60.0 05/31/2022     CMP  Sodium   Date Value Ref Range Status   05/31/2022 134 (L) 136 - 145 mmol/L Final     Potassium   Date Value Ref Range Status   05/31/2022 4.8 3.5 - 5.1 mmol/L Final     Chloride   Date Value Ref Range Status   05/31/2022 106 95 - 110 mmol/L Final     CO2   Date Value Ref Range Status   05/31/2022 20 (L) 23 - 29 mmol/L Final     Glucose   Date Value Ref Range Status   05/31/2022 91 70 - 110 mg/dL Final     BUN   Date Value Ref Range Status   05/31/2022 23 (H) 6 - 20 mg/dL Final     Creatinine   Date " Value Ref Range Status   05/31/2022 1.2 0.5 - 1.4 mg/dL Final     Calcium   Date Value Ref Range Status   05/31/2022 9.4 8.7 - 10.5 mg/dL Final     Total Protein   Date Value Ref Range Status   04/04/2022 6.3 6.0 - 8.4 g/dL Final     Albumin   Date Value Ref Range Status   05/31/2022 3.4 (L) 3.5 - 5.2 g/dL Final     Total Bilirubin   Date Value Ref Range Status   04/04/2022 0.4 0.1 - 1.0 mg/dL Final     Comment:     For infants and newborns, interpretation of results should be based  on gestational age, weight and in agreement with clinical  observations.    Premature Infant recommended reference ranges:  Up to 24 hours.............<8.0 mg/dL  Up to 48 hours............<12.0 mg/dL  3-5 days..................<15.0 mg/dL  6-29 days.................<15.0 mg/dL       Alkaline Phosphatase   Date Value Ref Range Status   04/04/2022 81 55 - 135 U/L Final     AST   Date Value Ref Range Status   04/04/2022 12 10 - 40 U/L Final     ALT   Date Value Ref Range Status   04/04/2022 15 10 - 44 U/L Final     Anion Gap   Date Value Ref Range Status   05/31/2022 8 8 - 16 mmol/L Final     eGFR if    Date Value Ref Range Status   05/31/2022 >60.0 >60 mL/min/1.73 m^2 Final     eGFR if non    Date Value Ref Range Status   05/31/2022 >60.0 >60 mL/min/1.73 m^2 Final     Comment:     Calculation used to obtain the estimated glomerular filtration  rate (eGFR) is the CKD-EPI equation.        Current Outpatient Medications on File Prior to Visit   Medication Sig Dispense Refill    albuterol (PROVENTIL/VENTOLIN HFA) 90 mcg/actuation inhaler Inhale 2 puffs into the lungs every 4 (four) hours as needed for Wheezing or Shortness of Breath. Rescue 8.5 g 11    calcitRIOL (ROCALTROL) 0.25 MCG Cap Take 1 capsule (0.25 mcg total) by mouth once daily. 30 capsule 11    doxycycline (VIBRAMYCIN) 100 MG Cap Take 1 capsule (100 mg total) by mouth 2 (two) times daily. 17 capsule 0    ergocalciferol (VITAMIN D2) 50,000 unit  Cap Take 1 capsule (50,000 Units total) by mouth every 30 days. 1 capsule 11    labetaloL (NORMODYNE) 200 MG tablet Take 1 tablet (200 mg total) by mouth 2 (two) times daily. 60 tablet 10    magnesium oxide (MAG-OX) 400 mg (241.3 mg magnesium) tablet Take 1 tablet (400 mg total) by mouth 2 (two) times daily. 60 tablet 11    mycophenolate (CELLCEPT) 250 mg Cap Take 4 capsules (1,000 mg total) by mouth 2 (two) times daily. 240 capsule 11    NIFEdipine (PROCARDIA-XL) 30 MG (OSM) 24 hr tablet Take 1 tablet (30 mg total) by mouth 2 (two) times a day. 60 tablet 11    olmesartan (BENICAR) 20 MG tablet Take 1 tablet (20 mg total) by mouth once daily. 30 tablet 11    predniSONE (DELTASONE) 5 MG tablet Take 1 tablet (5mg total) by mouth once daily. (Patient taking differently: Take 1 tablet (5mg total) by mouth once daily.) 120 tablet 11    sars-cov-2, covid-19, (MODERNA COVID-19) 100 mcg/0.5 ml injection Inject 0.5 mLs into the muscle. 0.5 mL 0    sodium bicarbonate 650 MG tablet Take 2 tablets (1,300 mg total) by mouth 3 (three) times daily. 180 tablet 11    tacrolimus (PROGRAF) 1 MG Cap Take 3 capsules (3 mg total) by mouth every morning AND 2 capsules (2 mg total) every evening. 150 capsule 11    [DISCONTINUED] sevelamer HCL (RENAGEL) 800 MG Tab 1 tablet with meals       Current Facility-Administered Medications on File Prior to Visit   Medication Dose Route Frequency Provider Last Rate Last Admin    acetaminophen tablet 650 mg  650 mg Oral Once PRN Govind Garcia MD        albuterol inhaler 2 puff  2 puff Inhalation Q20 Min PRN Govind Garcia MD        diphenhydrAMINE injection 25 mg  25 mg Intravenous Once PRN Govind Garcia MD        EPINEPHrine (EPIPEN) 0.3 mg/0.3 mL pen injection 0.3 mg  0.3 mg Intramuscular PRN Govind Garcia MD        methylPREDNISolone sodium succinate injection 40 mg  40 mg Intravenous Once PRN Govind Garcia MD        ondansetron disintegrating tablet 4 mg  4 mg  Oral Once PRN Govind Garcia MD        sodium chloride 0.9% 500 mL flush bag   Intravenous PRN Govind Garcia MD        sodium chloride 0.9% flush 10 mL  10 mL Intravenous PRN Govind Garcia MD                Objective:            Physical Exam  Constitutional:       Appearance: Normal appearance.   HENT:      Head: Normocephalic and atraumatic.   Eyes:      General: No scleral icterus.     Extraocular Movements: Extraocular movements intact.      Pupils: Pupils are equal, round, and reactive to light.   Pulmonary:      Effort: Pulmonary effort is normal.      Breath sounds: No stridor.   Musculoskeletal:      Right lower leg: No edema.      Left lower leg: No edema.   Skin:     General: Skin is warm and dry.   Neurological:      General: No focal deficit present.      Mental Status: She is alert and oriented to person, place, and time.   Psychiatric:         Mood and Affect: Mood normal.         Behavior: Behavior normal.         Assessment:       1. Kidney transplant status    2. Immunosuppression    3. CKD (chronic kidney disease) stage 2, GFR 60-89 ml/min    4. Parenchymal renal hypertension, stage 1 through stage 4 or unspecified chronic kidney disease        Plan:       1. Stable kidney transplant, from 01/09/2021.  Creatinine has ranged between 1.0 and 1.2 for the past several months.  Urinalysis is bland without evidence of proteinuria.    2. Prograf level is elevated at 13.2.  She relates that she had taken her Prograf dose that morning so this does not reflect a true trough.  She is on 3 mg in the morning and 2 mg in the evening of Prograf.  She is on 5 mg prednisone and 1000 mg of mycophenolate twice a day.    3. Creatinine is stable between 1.0 and 1.2.  She has stage II CKD due to calculated EGFR.    4. Blood pressure is well controlled on current regiment.  She is on a RASS inhibitor.  Potassium is stable 4.8.    Approximately 45 minutes was spent in face-to-face conversation, chart review  and coordination of care.        Julio Jaffe MD

## 2022-06-03 ENCOUNTER — OFFICE VISIT (OUTPATIENT)
Dept: PRIMARY CARE CLINIC | Facility: CLINIC | Age: 32
End: 2022-06-03
Payer: MEDICARE

## 2022-06-03 VITALS
HEIGHT: 66 IN | WEIGHT: 263.31 LBS | DIASTOLIC BLOOD PRESSURE: 72 MMHG | BODY MASS INDEX: 42.32 KG/M2 | HEART RATE: 74 BPM | SYSTOLIC BLOOD PRESSURE: 118 MMHG

## 2022-06-03 DIAGNOSIS — L02.91 ABSCESS: Primary | ICD-10-CM

## 2022-06-03 DIAGNOSIS — Z79.899 IMMUNOSUPPRESSIVE MANAGEMENT ENCOUNTER FOLLOWING KIDNEY TRANSPLANT: ICD-10-CM

## 2022-06-03 DIAGNOSIS — L08.9 RECURRENT INFECTION OF SKIN: ICD-10-CM

## 2022-06-03 DIAGNOSIS — Z00.00 WELLNESS EXAMINATION: ICD-10-CM

## 2022-06-03 DIAGNOSIS — Z94.0 IMMUNOSUPPRESSIVE MANAGEMENT ENCOUNTER FOLLOWING KIDNEY TRANSPLANT: ICD-10-CM

## 2022-06-03 PROCEDURE — 10060 INCISION & DRAINAGE: ICD-10-PCS | Mod: S$PBB,,, | Performed by: PHYSICIAN ASSISTANT

## 2022-06-03 PROCEDURE — 99214 PR OFFICE/OUTPT VISIT, EST, LEVL IV, 30-39 MIN: ICD-10-PCS | Mod: S$PBB,25,, | Performed by: PHYSICIAN ASSISTANT

## 2022-06-03 PROCEDURE — 99214 OFFICE O/P EST MOD 30 MIN: CPT | Mod: PBBFAC,PN | Performed by: PHYSICIAN ASSISTANT

## 2022-06-03 PROCEDURE — 10060 I&D ABSCESS SIMPLE/SINGLE: CPT | Mod: S$PBB,,, | Performed by: PHYSICIAN ASSISTANT

## 2022-06-03 PROCEDURE — 99999 PR PBB SHADOW E&M-EST. PATIENT-LVL IV: ICD-10-PCS | Mod: PBBFAC,,, | Performed by: PHYSICIAN ASSISTANT

## 2022-06-03 PROCEDURE — 87077 CULTURE AEROBIC IDENTIFY: CPT | Performed by: PHYSICIAN ASSISTANT

## 2022-06-03 PROCEDURE — 99999 PR PBB SHADOW E&M-EST. PATIENT-LVL IV: CPT | Mod: PBBFAC,,, | Performed by: PHYSICIAN ASSISTANT

## 2022-06-03 PROCEDURE — 87186 SC STD MICRODIL/AGAR DIL: CPT | Performed by: PHYSICIAN ASSISTANT

## 2022-06-03 PROCEDURE — 99214 OFFICE O/P EST MOD 30 MIN: CPT | Mod: S$PBB,25,, | Performed by: PHYSICIAN ASSISTANT

## 2022-06-03 PROCEDURE — 10060 I&D ABSCESS SIMPLE/SINGLE: CPT | Mod: PBBFAC,PN | Performed by: PHYSICIAN ASSISTANT

## 2022-06-03 PROCEDURE — 87070 CULTURE OTHR SPECIMN AEROBIC: CPT | Performed by: PHYSICIAN ASSISTANT

## 2022-06-03 RX ORDER — LIDOCAINE HYDROCHLORIDE 10 MG/ML
5 INJECTION, SOLUTION EPIDURAL; INFILTRATION; INTRACAUDAL; PERINEURAL
Status: DISCONTINUED | OUTPATIENT
Start: 2022-06-03 | End: 2022-06-03

## 2022-06-03 RX ORDER — DOXYCYCLINE 100 MG/1
100 CAPSULE ORAL EVERY 12 HOURS
Qty: 20 CAPSULE | Refills: 0 | Status: SHIPPED | OUTPATIENT
Start: 2022-06-03 | End: 2022-06-18

## 2022-06-03 RX ORDER — LIDOCAINE HYDROCHLORIDE AND EPINEPHRINE 10; 10 MG/ML; UG/ML
1 INJECTION, SOLUTION INFILTRATION; PERINEURAL
Status: COMPLETED | OUTPATIENT
Start: 2022-06-03 | End: 2022-06-03

## 2022-06-03 RX ADMIN — LIDOCAINE HYDROCHLORIDE,EPINEPHRINE BITARTRATE 1 ML: 10; .01 INJECTION, SOLUTION INFILTRATION; PERINEURAL at 11:06

## 2022-06-03 NOTE — PROGRESS NOTES
Subjective:      Patient ID: Leydi Cordero is a 32 y.o. female.    Chief Complaint: Establish Care and Abscess    Leydi Cordero is a 32 y.o. female who presents to clinic to establish care     Yesterday, saw neprhologist, and Dr. Jaffe recommended est. with primary care     Admits recurrent abscesses, abscess to right side has been there for two weeks.  Getting one after the other.  Get them on thighs, stomach, back abdomen.  Got abscess prior to kidney transplant but not very often.  Hasn't tried anything for current symptoms.  Admits severe pain to touch and redness.  No fever/chills       Past Medical History:  No date: Anxiety  2021: -donor kidney transplant 2021      Comment:  cPRA 100%, XM negative 3 arteries, 2 on common patch,                WIT 40 min  No date: Encounter for blood transfusion  No date: ESRD (end stage renal disease)  No date: Fibroma  No date: H/O kidney transplant  No date: Hypertension - on olmesartan, labetolol, and procardia   No date: Hypertensive nephropathy  No date: Ovarian cyst    Past Surgical History:  2020: COLPOSCOPY      Comment:  Needs follow up on or after 21  No date: hemodialysis access left arm  No date: kidney removal   04/15/2015: KIDNEY TRANSPLANT  2021: KIDNEY TRANSPLANT; N/A      Comment:  Procedure: TRANSPLANT, KIDNEY;  Surgeon: Fam Sutton MD;  Location: [address];  Service: Transplant;                 Laterality: N/A;  No date: NEPHRECTOMY  No date: OVARIAN CYST REMOVAL  No date: PARATHYROIDECTOMY      Comment:  partial   2021: PERCUTANEOUS NEPHROSTOMY; Left      Comment:  Procedure: Creation, Nephrostomy, Percutaneous;                 Surgeon: Elen Surgeon;  Location: [facility];  Service:                Anesthesiology;  Laterality: Left;  No date: right leg hemodialysis access  2017: transplant nephrectomy    · Concentric remodeling and normal systolic function.  · The  "estimated ejection fraction is 60%.  · Normal left ventricular diastolic function.  · With normal right ventricular systolic function.             Review of Systems   Constitutional: Negative for activity change, appetite change, fatigue, fever and unexpected weight change.   HENT: Negative for congestion, ear pain, sore throat and trouble swallowing.    Respiratory: Negative for cough and shortness of breath.    Cardiovascular: Negative for chest pain and palpitations.   Gastrointestinal: Negative for abdominal distention, abdominal pain, constipation, diarrhea, nausea and vomiting.   Genitourinary: Negative for difficulty urinating, frequency and urgency.   Musculoskeletal: Negative for arthralgias and myalgias.   Skin:        Abscess    Neurological: Negative for dizziness, weakness and light-headedness.   Psychiatric/Behavioral: Negative for decreased concentration and dysphoric mood. The patient is not nervous/anxious.        Objective:   /72   Pulse 74   Ht 5' 6" (1.676 m)   Wt 119.4 kg (263 lb 5.4 oz)   BMI 42.50 kg/m²   Physical Exam  Vitals reviewed.   Constitutional:       General: She is not in acute distress.     Appearance: She is well-developed. She is not ill-appearing, toxic-appearing or diaphoretic.   HENT:      Head: Normocephalic and atraumatic.      Right Ear: External ear normal.      Left Ear: External ear normal.      Nose: Nose normal.   Cardiovascular:      Rate and Rhythm: Normal rate and regular rhythm.      Pulses:           Radial pulses are 2+ on the right side and 2+ on the left side.      Heart sounds: Normal heart sounds, S1 normal and S2 normal.   Pulmonary:      Effort: Pulmonary effort is normal. No tachypnea, bradypnea, accessory muscle usage or respiratory distress.      Breath sounds: Normal breath sounds. No decreased breath sounds, wheezing, rhonchi or rales.   Chest:      Chest wall: No tenderness.   Skin:     General: Skin is warm and dry.      Capillary Refill: " Capillary refill takes less than 2 seconds.      Comments: Large abscess with surrounding erythema, induration, exquisite ttp, fluctuance to right flank    Neurological:      Mental Status: She is alert and oriented to person, place, and time. She is not disoriented.      Cranial Nerves: No cranial nerve deficit.      Sensory: No sensory deficit.      Motor: No abnormal muscle tone.   Psychiatric:         Mood and Affect: Mood normal.         Behavior: Behavior normal.         Thought Content: Thought content normal.       Assessment:      1. Abscess    2. Recurrent infection of skin    3. Immunosuppressive management encounter following kidney transplant    4. Wellness examination       Plan:   Abscess  Comments:  I&D performed in clinic, follow-up for wound check Monday, culture taken, start Doxycycline   Orders:  -     doxycycline (VIBRAMYCIN) 100 MG Cap; Take 1 capsule (100 mg total) by mouth every 12 (twelve) hours. for 10 days  Dispense: 20 capsule; Refill: 0  -     Discontinue: LIDOcaine (PF) 10 mg/ml (1%) injection 50 mg  -     Aerobic culture; Future; Expected date: 06/03/2022  -     LIDOcaine-EPINEPHrine 1%-1:100,000 injection 1 mL  -     Incision & Drainage    Recurrent infection of skin  Comments:  discussed bleach baths for prevention of recurrent infections, referral to dermatology for follow-up   Orders:  -     Ambulatory referral/consult to Dermatology; Future; Expected date: 06/10/2022    Immunosuppressive management encounter following kidney transplant  Comments:  likely more susceptible to skin infections because of medications s/p transplant     Wellness examination  Comments:  check fasting lipid panel   Orders:  -     Lipid Panel; Future; Expected date: 06/03/2022    f/u Monday for fasting lipids/wound check       Helena Zepeda PA-C   Physician Assistant   [facility]

## 2022-06-03 NOTE — PROCEDURES
Incision & Drainage    Date/Time: 6/3/2022 10:00 AM  Performed by: Helena Zepeda PA-C  Authorized by: Helena Zepeda PA-C       Type:  Abscess  Body area:  Trunk  Anesthesia:  Local infiltration  Local anesthetic: lidocaine 1% with epinephrine  Scalpel size:  11  Incision type:  Single straight  Complexity:  Simple  Drainage:  Pus  Drainage amount:  Copious  Packing material:  1/4 in gauze  Patient tolerance:  Patient tolerated the procedure well with no immediate complications    Helena Zepeda PA-C   Physician Assistant   [facility]

## 2022-06-06 ENCOUNTER — OFFICE VISIT (OUTPATIENT)
Dept: PRIMARY CARE CLINIC | Facility: CLINIC | Age: 32
End: 2022-06-06
Payer: MEDICARE

## 2022-06-06 ENCOUNTER — LAB VISIT (OUTPATIENT)
Dept: LAB | Facility: HOSPITAL | Age: 32
End: 2022-06-06
Attending: FAMILY MEDICINE
Payer: MEDICARE

## 2022-06-06 DIAGNOSIS — Z00.00 WELLNESS EXAMINATION: ICD-10-CM

## 2022-06-06 DIAGNOSIS — L02.91 ABSCESS: Primary | ICD-10-CM

## 2022-06-06 LAB
BACTERIA SPEC AEROBE CULT: ABNORMAL
CHOLEST SERPL-MCNC: 187 MG/DL (ref 120–199)
CHOLEST/HDLC SERPL: 4.8 {RATIO} (ref 2–5)
HDLC SERPL-MCNC: 39 MG/DL (ref 40–75)
HDLC SERPL: 20.9 % (ref 20–50)
LDLC SERPL CALC-MCNC: 120.2 MG/DL (ref 63–159)
NONHDLC SERPL-MCNC: 148 MG/DL
TRIGL SERPL-MCNC: 139 MG/DL (ref 30–150)

## 2022-06-06 PROCEDURE — 80061 LIPID PANEL: CPT | Performed by: PHYSICIAN ASSISTANT

## 2022-06-06 PROCEDURE — 99024 PR POST-OP FOLLOW-UP VISIT: ICD-10-PCS | Mod: S$PBB,,, | Performed by: PHYSICIAN ASSISTANT

## 2022-06-06 PROCEDURE — 99999 PR PBB SHADOW E&M-EST. PATIENT-LVL III: CPT | Mod: PBBFAC,,, | Performed by: PHYSICIAN ASSISTANT

## 2022-06-06 PROCEDURE — 99999 PR PBB SHADOW E&M-EST. PATIENT-LVL III: ICD-10-PCS | Mod: PBBFAC,,, | Performed by: PHYSICIAN ASSISTANT

## 2022-06-06 PROCEDURE — 36415 COLL VENOUS BLD VENIPUNCTURE: CPT | Mod: PN | Performed by: PHYSICIAN ASSISTANT

## 2022-06-06 PROCEDURE — 99024 POSTOP FOLLOW-UP VISIT: CPT | Mod: S$PBB,,, | Performed by: PHYSICIAN ASSISTANT

## 2022-06-06 PROCEDURE — 99213 OFFICE O/P EST LOW 20 MIN: CPT | Mod: PBBFAC,PN | Performed by: PHYSICIAN ASSISTANT

## 2022-06-06 NOTE — PROGRESS NOTES
Subjective:      Patient ID: Leydi Cordero is a 32 y.o. female.    Chief Complaint: Follow-up (Abcess )    Ukrainian speaking,  present   F/u abscess   Doesn't hurt unless touch abscess, before hurting all the time   No problems with taking the antibiotic - doxycycline   No fever/chills   Did get dressing a little wet in shower   Checking cholesterol lab today     Review of Systems   Constitutional: Negative for chills, diaphoresis, fatigue and fever.   Respiratory: Negative for cough, shortness of breath and wheezing.    Cardiovascular: Negative for chest pain and palpitations.   Gastrointestinal: Negative for abdominal pain, diarrhea, nausea and vomiting.   Skin: Positive for wound.       Objective:   There were no vitals taken for this visit.  Physical Exam  Vitals reviewed.   Constitutional:       General: She is not in acute distress.     Appearance: She is well-developed. She is not diaphoretic.   HENT:      Head: Normocephalic and atraumatic.      Right Ear: External ear normal.      Left Ear: External ear normal.      Nose: Nose normal.   Cardiovascular:      Rate and Rhythm: Normal rate.   Pulmonary:      Effort: Pulmonary effort is normal. No respiratory distress.   Skin:     General: Skin is warm and dry.      Capillary Refill: Capillary refill takes less than 2 seconds.      Comments: Packing in place, packing removed, wound irrigated with sterile saline, wound redressed   Erythema and tenderness improved, but still present    Neurological:      Mental Status: She is alert and oriented to person, place, and time.      Motor: No abnormal muscle tone.   Psychiatric:         Behavior: Behavior normal.       Assessment:      1. Abscess       Plan:   Abscess    patient with significant tenderness when wound irrigated today with sterile water   Recommended return for wound check tomorrow to ensure improving   If not improving, then referral to general surgery - for possible ultrasound guided  I&D vs. OR for wash out   Still awaiting final culture results     Helena Zepeda PA-C   Physician Assistant   [facility]

## 2022-06-07 ENCOUNTER — NUTRITION (OUTPATIENT)
Dept: INTERNAL MEDICINE | Facility: CLINIC | Age: 32
End: 2022-06-07
Payer: MEDICARE

## 2022-06-07 ENCOUNTER — OFFICE VISIT (OUTPATIENT)
Dept: PRIMARY CARE CLINIC | Facility: CLINIC | Age: 32
End: 2022-06-07
Payer: MEDICARE

## 2022-06-07 DIAGNOSIS — Z22.322 MRSA (METHICILLIN RESISTANT STAPH AUREUS) CULTURE POSITIVE: ICD-10-CM

## 2022-06-07 DIAGNOSIS — E78.6 LOW HDL (UNDER 40): Primary | ICD-10-CM

## 2022-06-07 DIAGNOSIS — Z71.3 DIETARY COUNSELING: ICD-10-CM

## 2022-06-07 DIAGNOSIS — Z94.0 KIDNEY REPLACED BY TRANSPLANT: ICD-10-CM

## 2022-06-07 DIAGNOSIS — Z71.3 PRE-BARIATRIC SURGERY NUTRITION EVALUATION: ICD-10-CM

## 2022-06-07 PROCEDURE — 99024 PR POST-OP FOLLOW-UP VISIT: ICD-10-PCS | Mod: S$PBB,,, | Performed by: PHYSICIAN ASSISTANT

## 2022-06-07 PROCEDURE — 99999 PR PBB SHADOW E&M-EST. PATIENT-LVL III: ICD-10-PCS | Mod: PBBFAC,,, | Performed by: PHYSICIAN ASSISTANT

## 2022-06-07 PROCEDURE — 99999 PR PBB SHADOW E&M-EST. PATIENT-LVL III: CPT | Mod: PBBFAC,,, | Performed by: PHYSICIAN ASSISTANT

## 2022-06-07 PROCEDURE — 97803 MED NUTRITION INDIV SUBSEQ: CPT | Mod: S$GLB,,, | Performed by: DIETITIAN, REGISTERED

## 2022-06-07 PROCEDURE — 97803 PR MED NUTR THER, SUBSQ, INDIV, EA 15 MIN: ICD-10-PCS | Mod: S$GLB,,, | Performed by: DIETITIAN, REGISTERED

## 2022-06-07 PROCEDURE — 99213 OFFICE O/P EST LOW 20 MIN: CPT | Mod: PBBFAC,PN | Performed by: PHYSICIAN ASSISTANT

## 2022-06-07 PROCEDURE — 99024 POSTOP FOLLOW-UP VISIT: CPT | Mod: S$PBB,,, | Performed by: PHYSICIAN ASSISTANT

## 2022-06-07 RX ORDER — PREDNISONE 5 MG/1
5 TABLET ORAL DAILY
Qty: 120 TABLET | Refills: 11 | Status: SHIPPED | OUTPATIENT
Start: 2022-06-07 | End: 2022-12-22 | Stop reason: SDUPTHER

## 2022-06-07 RX ORDER — TACROLIMUS 1 MG/1
CAPSULE ORAL
Qty: 150 CAPSULE | Refills: 11 | Status: CANCELLED | OUTPATIENT
Start: 2022-06-07 | End: 2023-06-07

## 2022-06-07 RX ORDER — TACROLIMUS 1 MG/1
CAPSULE ORAL
Qty: 150 CAPSULE | Refills: 11 | Status: SHIPPED | OUTPATIENT
Start: 2022-06-07 | End: 2023-07-06 | Stop reason: SDUPTHER

## 2022-06-07 NOTE — PROGRESS NOTES
Was an  used for this encounter? Yes ; [place],  ID [id number]    NUTRITION NOTE    Referring Physician: Dr. Parekh  Reason for MNT Referral: Medically Supervised Diet pending Gastric Sleeve    PAST MEDICAL HISTORY:    Patient presents for 2nd visit for MSD with weight loss over the past month; total weight loss by making numerous dietary and lifestyle changes.    Changes made since last appointment:  -Premier Protein shake for dinner  -Reduced intake of fast food  -Eliminated all drinks containing sugar, carbonation, and/or caffeine. Only drinks water and crystal light now.  -Began walking for exercise, 3-4x/week for 30 minute sessions     Past Medical History:   Diagnosis Date    Anxiety     -donor kidney transplant 2021    cPRA 100%, XM negative 3 arteries, 2 on common patch, WIT 40 min    Encounter for blood transfusion     ESRD (end stage renal disease)     Fibroma     H/O kidney transplant     Hypertension     Hypertensive nephropathy     Ovarian cyst        CLINICAL DATA:  32 y.o. female.    There were no vitals filed for this visit.    Current Weight: 262 lbs   5-: 264 lbs  Weight Change Since Initial Visit: -2 lbs  Ideal Body Weight: 183 lbs +/-10%  BMI: 42.47    CURRENT DIET:  Regular diet.  Diet Recall: Food records are not present.    Diet Includes:   Meal pattern: 2 meals + 1 shake  Protein supplements: 1   Snacking:  Eliminated.   Vegetables: Likes a few. Eats 3-4x/month.  Fruits: Likes a few. Eats never.  Beverages: water, crystal light  Dining out: Reduced intake.   Cooking at home: Weekly. Mostly baked and grilled meat, fish, starchy CHO and vegetables.     Exercise:  Current exercise: Increased//  walking at night, 3-4x/week. 30 minute sessions.        Vitamins / Minerals / Herbs:   Vitamin D.      Food Allergies:   NKFA     Social:  No work.  Alcohol: None.  Smoking: None.    ASSESSMENT:  Patient demonstrates all willingness to change  lifestyle habits as evidenced by weight loss, good exercise, dietary changes, including protein drinks and reduced dining out.    Doing well with working on greatest challenges (dining out frequency).    Adequate calorie intake.  Adequate protein intake.    PLAN:    Diet: Adjust diet plan.   Reduce intake of starch carbohydrates    Exercise: Increase. Frequency or duration    Behavior Modification: Begin to document food & activity logs daily.    Weight loss prior to surgery: -2 lbs    Return to clinic in one month.    SESSION TIME:  30 minutes

## 2022-06-07 NOTE — PROGRESS NOTES
Subjective:      Patient ID: Leydi Cordero is a 32 y.o. female.    Chief Complaint: Follow-up    Leydi Cordero is a 32 y.o. female who presents to clinic for follow-up for abscess and to review cholesterol lab      present   Abscess culture showing MRSA - doxycycline showing sensitivity   Patient reports improved tenderness   No drainage from wound  No fever/chills   Improved redness     Cholesterol panel reviewed, HDL decreased, discussed exercise/healthy fats in diet     Review of Systems   Constitutional: Negative for chills, diaphoresis, fatigue and fever.   Gastrointestinal: Negative for abdominal pain, constipation, diarrhea, nausea and vomiting.   Skin: Positive for wound. Color change: decreased redness and tenderness        Objective:   There were no vitals taken for this visit.  Physical Exam  Vitals reviewed.   Constitutional:       General: She is not in acute distress.     Appearance: She is well-developed. She is not diaphoretic.   HENT:      Head: Normocephalic and atraumatic.      Right Ear: External ear normal.      Left Ear: External ear normal.      Nose: Nose normal.   Cardiovascular:      Rate and Rhythm: Normal rate.   Pulmonary:      Effort: Pulmonary effort is normal. No respiratory distress.   Skin:     General: Skin is warm and dry.      Capillary Refill: Capillary refill takes less than 2 seconds.      Comments: Abscess with no active drainage, improved ttp, improved surrounding erythema    Neurological:      Mental Status: She is alert and oriented to person, place, and time.      Motor: No abnormal muscle tone.   Psychiatric:         Behavior: Behavior normal.       Assessment:      1. Low HDL (under 40)    2. MRSA (methicillin resistant staph aureus) culture positive       Plan:   Low HDL (under 40)  Comments:  increase exercise (30 minutes most days per week) and increase healthy fats (fish, avocado, walnuts)     MRSA (methicillin resistant staph  aureus) culture positive  Comments:  doxycycline showing effectiveness, discussed bleach baths for prevention of recurrent infections       F/u in 6 mths/est. Care with BECCA Bell-C   Physician Assistant   [facility]

## 2022-06-21 ENCOUNTER — TELEPHONE (OUTPATIENT)
Dept: PREADMISSION TESTING | Facility: HOSPITAL | Age: 32
End: 2022-06-21
Payer: MEDICARE

## 2022-06-21 NOTE — PRE-PROCEDURE INSTRUCTIONS
PAT call completed with  Tarsha.  Patient educated on procedure instructions.  Medical history discussed and patient informed of arrival time of 8:30 AM on Thursday, June 23, 2022 at the [facility], and was made aware of the limited-visitor policy, and that  is to remain during the entire visit.  All questions and concerns addressed.  Endoscopy instructions reviewed. Instructed nothing to eat or drink after midnight the night before procedure.  Also instructed to only take blood pressure medication Labetolol, Procardia, Benicar the morning of procedure with just a few small sips of water.  Pre-procedure covid testing not needed, patient is covid vaccinated.  Patient verbalized understanding of all instructions.

## 2022-06-21 NOTE — TELEPHONE ENCOUNTER
PAT call completed with  Tarsha.  Patient educated on procedure instructions.  Medical history discussed and patient informed of arrival time of 8:30 AM on Thursday, June 23, 2022 at the [facility], and was made aware of the limited-visitor policy, and that  is to remain during the entire visit.  All questions and concerns addressed.  Endoscopy instructions reviewed. Instructed nothing to eat or drink after midnight the night before procedure.  Also instructed to only take blood pressure medication Labetolol, Procardia, Benicar the morning of procedure with just a few small sips of water.  Pre-procedure covid testing not needed, patient is covid vaccinated.  Patient verbalized understanding of all instructions.

## 2022-06-22 ENCOUNTER — ANESTHESIA EVENT (OUTPATIENT)
Dept: ENDOSCOPY | Facility: HOSPITAL | Age: 32
End: 2022-06-22
Payer: MEDICARE

## 2022-06-22 NOTE — ANESTHESIA PREPROCEDURE EVALUATION
2022  Leydi Cordero is a 32 y.o., female.  Past Medical History:   Diagnosis Date    Anxiety     -donor kidney transplant 2021    cPRA 100%, XM negative 3 arteries, 2 on common patch, WIT 40 min    Encounter for blood transfusion     ESRD (end stage renal disease)     Fibroma     H/O kidney transplant     Hypertension     Hypertensive nephropathy     Ovarian cyst      Essential hypertension  Antibody mediated rejection of kidney transplant  Acute rejection of kidney transplant  Nonrheumatic tricuspid valve regurgitation  Pulmonary hypertension  Thrombosis complicating venous access device  Perinephric hematoma of kidney transplant  CKD (chronic kidney disease) stage 2, GFR 60-89 ml/min  Asthma, well controlled  Acidosis, metabolic    Prophylactic immunotherapy    Immunosuppressive management encounter following kidney transplant    S/p nephrectomy 17    Hyperparathyroidism, tertiary    S/P parathyroidectomy--partial    Former light cigarette smoker (1-9 per day)    -donor kidney transplant 2021    At risk for opportunistic infections    Long-term use of immunosuppressant medication    Hypomagnesemia    Kidney replaced by transplant    Urine leak    Morbid obesity    Boils of multiple sites      Nephrology Note 2022    Assessment:       1. Kidney transplant status    2. Immunosuppression    3. CKD (chronic kidney disease) stage 2, GFR 60-89 ml/min    4. Parenchymal renal hypertension, stage 1 through stage 4 or unspecified chronic kidney disease        Plan:       1. Stable kidney transplant, from 2021.  Creatinine has ranged between 1.0 and 1.2 for the past several months.  Urinalysis is bland without evidence of proteinuria.     2. Prograf level is elevated at 13.2.  She relates that she had taken her Prograf dose that morning so this  does not reflect a true trough.  She is on 3 mg in the morning and 2 mg in the evening of Prograf.  She is on 5 mg prednisone and 1000 mg of mycophenolate twice a day.     3. Creatinine is stable between 1.0 and 1.2.  She has stage II CKD due to calculated EGFR.     4. Blood pressure is well controlled on current regiment.  She is on a RASS inhibitor.  Potassium is stable 4.8.      Past Surgical History:   Procedure Laterality Date    COLPOSCOPY  06/29/2020    Needs follow up on or after 6/29/21    hemodialysis access left arm      kidney removal       KIDNEY TRANSPLANT  04/15/2015    KIDNEY TRANSPLANT N/A 1/9/2021    Procedure: TRANSPLANT, KIDNEY;  Surgeon: Fam Sutton MD;  Location: [address];  Service: Transplant;  Laterality: N/A;    NEPHRECTOMY      OVARIAN CYST REMOVAL      PARATHYROIDECTOMY      partial     PERCUTANEOUS NEPHROSTOMY Left 2/13/2021    Procedure: Creation, Nephrostomy, Percutaneous;  Surgeon: Elen Surgeon;  Location: [facility];  Service: Anesthesiology;  Laterality: Left;    right leg hemodialysis access      transplant nephrectomy  12/01/2017     PCP Note 6/7/22    Chief Complaint: Follow-up     Leydi Cordero is a 32 y.o. female who presents to clinic for follow-up for abscess and to review cholesterol lab       present   Abscess culture showing MRSA - doxycycline showing sensitivity   Patient reports improved tenderness   No drainage from wound  No fever/chills   Improved redness        Pre-op Assessment    I have reviewed the Patient Summary Reports.     I have reviewed the Nursing Notes. I have reviewed the NPO Status.   I have reviewed the Medications.     Review of Systems  Anesthesia Hx:  No problems with previous Anesthesia    Social:  Non-Smoker    Cardiovascular:   Hypertension ECG has been reviewed. Normal sinus rhythm   Normal ECG   When compared with ECG of 16-JAN-2021 03:18,   No significant change was found   Confirmed by MD SHERI,  SUE ([id number]) on 4/4/2022 8:33:37 PM    4/2021 DAYANA     Concentric remodeling and normal systolic function.  · The estimated ejection fraction is 60%.  · Normal left ventricular diastolic function.  · With normal right ventricular systolic function.    1/2020 stress test  Nuclear Quantitative Functional Analysis:   LVEF: 65 %     Impression: NORMAL MYOCARDIAL PERFUSION   1. The perfusion scan is free of evidence for myocardial ischemia or injury.   2. Resting wall motion is physiologic.   3. Resting LV function is normal.   4. The ventricular volumes are normal at rest and stress.   5. The extracardiac distribution of radioactivity is normal.       Pulmonary:   Asthma    Renal/:   Chronic Renal Disease, CRI H/O hypertensive nephropathy, s/p kidney transplant, doing well.   Endocrine:  Morbid Obesity / BMI > 40      Physical Exam  General: Alert and Oriented    Airway:  Mallampati: II   Mouth Opening: Normal  TM Distance: Normal  Tongue: Normal  Neck ROM: Normal ROM    Dental:  Intact    Chest/Lungs:  Clear to auscultation, Normal Respiratory Rate    Heart:  Rate: Normal  Rhythm: Regular Rhythm        Anesthesia Plan  Type of Anesthesia, risks & benefits discussed:    Anesthesia Type: Gen Natural Airway  Intra-op Monitoring Plan: Standard ASA Monitors  Induction:  IV  Informed Consent: Informed consent signed with the Patient and all parties understand the risks and agree with anesthesia plan.  All questions answered.   ASA Score: 3  Day of Surgery Review of History & Physical: H&P Update referred to the surgeon/provider.    Ready For Surgery From Anesthesia Perspective.     .

## 2022-06-23 ENCOUNTER — ANESTHESIA (OUTPATIENT)
Dept: ENDOSCOPY | Facility: HOSPITAL | Age: 32
End: 2022-06-23
Payer: MEDICARE

## 2022-06-23 ENCOUNTER — HOSPITAL ENCOUNTER (OUTPATIENT)
Facility: HOSPITAL | Age: 32
Discharge: HOME | End: 2022-06-23
Attending: INTERNAL MEDICINE | Admitting: INTERNAL MEDICINE
Payer: MEDICARE

## 2022-06-23 VITALS
HEART RATE: 65 BPM | DIASTOLIC BLOOD PRESSURE: 66 MMHG | OXYGEN SATURATION: 97 % | BODY MASS INDEX: 41.45 KG/M2 | TEMPERATURE: 98 F | HEIGHT: 66 IN | WEIGHT: 257.94 LBS | RESPIRATION RATE: 22 BRPM | SYSTOLIC BLOOD PRESSURE: 99 MMHG

## 2022-06-23 DIAGNOSIS — E66.01 MORBID OBESITY: ICD-10-CM

## 2022-06-23 DIAGNOSIS — K20.90 ESOPHAGITIS: Primary | ICD-10-CM

## 2022-06-23 LAB
B-HCG UR QL: NEGATIVE
CTP QC/QA: YES

## 2022-06-23 PROCEDURE — D9220A PRA ANESTHESIA: ICD-10-PCS | Mod: ANES,,, | Performed by: ANESTHESIOLOGY

## 2022-06-23 PROCEDURE — 43239 EGD BIOPSY SINGLE/MULTIPLE: CPT | Mod: ,,, | Performed by: INTERNAL MEDICINE

## 2022-06-23 PROCEDURE — 27201012 HC FORCEPS, HOT/COLD, DISP: Performed by: INTERNAL MEDICINE

## 2022-06-23 PROCEDURE — 63600175 PHARM REV CODE 636 W HCPCS: Performed by: SURGERY

## 2022-06-23 PROCEDURE — D9220A PRA ANESTHESIA: Mod: ANES,,, | Performed by: ANESTHESIOLOGY

## 2022-06-23 PROCEDURE — 81025 URINE PREGNANCY TEST: CPT | Performed by: SURGERY

## 2022-06-23 PROCEDURE — D9220A PRA ANESTHESIA: ICD-10-PCS | Mod: CRNA,,, | Performed by: NURSE ANESTHETIST, CERTIFIED REGISTERED

## 2022-06-23 PROCEDURE — D9220A PRA ANESTHESIA: Mod: CRNA,,, | Performed by: NURSE ANESTHETIST, CERTIFIED REGISTERED

## 2022-06-23 PROCEDURE — 63600175 PHARM REV CODE 636 W HCPCS: Performed by: NURSE ANESTHETIST, CERTIFIED REGISTERED

## 2022-06-23 PROCEDURE — 88305 TISSUE EXAM BY PATHOLOGIST: ICD-10-PCS | Mod: 26,,, | Performed by: PATHOLOGY

## 2022-06-23 PROCEDURE — 43239 PR EGD, FLEX, W/BIOPSY, SGL/MULTI: ICD-10-PCS | Mod: ,,, | Performed by: INTERNAL MEDICINE

## 2022-06-23 PROCEDURE — 43239 EGD BIOPSY SINGLE/MULTIPLE: CPT | Performed by: INTERNAL MEDICINE

## 2022-06-23 PROCEDURE — 37000008 HC ANESTHESIA 1ST 15 MINUTES: Performed by: INTERNAL MEDICINE

## 2022-06-23 PROCEDURE — 88305 TISSUE EXAM BY PATHOLOGIST: CPT | Mod: 26,,, | Performed by: PATHOLOGY

## 2022-06-23 PROCEDURE — 88305 TISSUE EXAM BY PATHOLOGIST: CPT | Performed by: PATHOLOGY

## 2022-06-23 RX ORDER — LIDOCAINE HCL/PF 100 MG/5ML
SYRINGE (ML) INTRAVENOUS
Status: DISCONTINUED | OUTPATIENT
Start: 2022-06-23 | End: 2022-06-23

## 2022-06-23 RX ORDER — PROPOFOL 10 MG/ML
VIAL (ML) INTRAVENOUS
Status: DISCONTINUED | OUTPATIENT
Start: 2022-06-23 | End: 2022-06-23

## 2022-06-23 RX ORDER — PANTOPRAZOLE SODIUM 40 MG/1
40 TABLET, DELAYED RELEASE ORAL 2 TIMES DAILY
Qty: 120 TABLET | Refills: 0 | Status: SHIPPED | OUTPATIENT
Start: 2022-06-23 | End: 2022-10-03 | Stop reason: SDUPTHER

## 2022-06-23 RX ORDER — FENTANYL CITRATE 50 UG/ML
INJECTION, SOLUTION INTRAMUSCULAR; INTRAVENOUS
Status: DISCONTINUED | OUTPATIENT
Start: 2022-06-23 | End: 2022-06-23

## 2022-06-23 RX ORDER — SODIUM CHLORIDE, SODIUM LACTATE, POTASSIUM CHLORIDE, CALCIUM CHLORIDE 600; 310; 30; 20 MG/100ML; MG/100ML; MG/100ML; MG/100ML
INJECTION, SOLUTION INTRAVENOUS CONTINUOUS
Status: DISCONTINUED | OUTPATIENT
Start: 2022-06-23 | End: 2022-10-12

## 2022-06-23 RX ADMIN — PROPOFOL 50 MG: 10 INJECTION, EMULSION INTRAVENOUS at 10:06

## 2022-06-23 RX ADMIN — FENTANYL CITRATE 50 MCG: 50 INJECTION, SOLUTION INTRAMUSCULAR; INTRAVENOUS at 10:06

## 2022-06-23 RX ADMIN — SODIUM CHLORIDE, SODIUM LACTATE, POTASSIUM CHLORIDE, AND CALCIUM CHLORIDE: .6; .31; .03; .02 INJECTION, SOLUTION INTRAVENOUS at 09:06

## 2022-06-23 RX ADMIN — Medication 20 MG: at 10:06

## 2022-06-23 NOTE — TRANSFER OF CARE
"Anesthesia Transfer of Care Note    Patient: Leydi Cordero    Procedure(s) Performed: Procedure(s) (LRB):  ESOPHAGOGASTRODUODENOSCOPY (EGD) (N/A)    Patient location: PACU    Anesthesia Type: general    Transport from OR: Transported from OR on room air with adequate spontaneous ventilation    Post pain: adequate analgesia    Post assessment: no apparent anesthetic complications and tolerated procedure well    Post vital signs: stable    Level of consciousness: awake, alert and oriented    Nausea/Vomiting: no nausea/vomiting    Complications: none    Transfer of care protocol was followed      Last vitals:   Visit Vitals  /76 (BP Location: Right arm, Patient Position: Sitting)   Pulse 69   Temp 36.5 °C (97.7 °F) (Temporal)   Resp 16   Ht 5' 6" (1.676 m)   Wt 117 kg (257 lb 15 oz)   SpO2 99%   Breastfeeding No   BMI 41.63 kg/m²     "

## 2022-06-23 NOTE — DISCHARGE SUMMARY
[facility] 1st Fl  Discharge Note  Short Stay    Procedure(s) (LRB):  ESOPHAGOGASTRODUODENOSCOPY (EGD) (N/A)    OUTCOME: Patient tolerated treatment/procedure well without complication and is now ready for discharge.    DISPOSITION: Home or Self Care    FINAL DIAGNOSIS:  Morbid obesity    FOLLOWUP: With primary care provider    DISCHARGE INSTRUCTIONS:    Discharge Procedure Orders   Case Request Endoscopy: EGD (ESOPHAGOGASTRODUODENOSCOPY)     Order Specific Question Answer Comments   Medical Necessity: Medically Non-Urgent [100]    CPT Code: CO EGD, FLEX, DIAGNOSTIC [04315]    Is an on-site pathologist required for this procedure? N/A

## 2022-06-23 NOTE — ANESTHESIA POSTPROCEDURE EVALUATION
Anesthesia Post Evaluation    Patient: Leydi Cordero    Procedure(s) Performed: Procedure(s) (LRB):  ESOPHAGOGASTRODUODENOSCOPY (EGD) (N/A)    Final Anesthesia Type: general      Patient location during evaluation: GI PACU  Patient participation: Yes- Able to Participate  Level of consciousness: awake and alert  Post-procedure vital signs: reviewed and stable  Pain management: adequate  Airway patency: patent    PONV status at discharge: No PONV  Anesthetic complications: no      Cardiovascular status: hemodynamically stable  Respiratory status: unassisted and spontaneous ventilation  Hydration status: euvolemic  Follow-up not needed.          Vitals Value Taken Time   BP 99/66 06/23/22 1045   Temp 36.8 °C (98.2 °F) 06/23/22 1015   Pulse 65 06/23/22 1046   Resp 24 06/23/22 1046   SpO2 97 % 06/23/22 1046   Vitals shown include unvalidated device data.      Event Time   Out of Recovery 10:45:00         Pain/Dilcia Score: Dilcia Score: 10 (6/23/2022 10:25 AM)

## 2022-06-23 NOTE — PLAN OF CARE
Discharge instructions reviewed with pt per language line, handouts given, verbalized understanding with no further questions at this time. Dr. Carvajal spoke to pt at bedside, reviewed procedure and answered questions aware they are awaiting biopsy results with MD telephone number provided per AVS sheet. VSS on RA, no pain or nausea noted, tolerating po fluids without difficulty, no other complaints noted. Fall precautions reviewed, consents in chart, PIV to be removed at discharge.

## 2022-06-23 NOTE — [employer] PATIENT INSTRUCTIONS
Discharge Summary/Instructions after an Endoscopic Procedure  Patient Name: Leydi Cordero  Patient MRN: [id number]  Patient   YOB: 1990 Thursday, June 23, 2022  Essie Carvajal MD  Dear patient,  As a result of recent federal legislation (The Federal Cures Act), you may   receive lab or pathology results from your procedure in your [facility]   account before your physician is able to contact you. Your physician or   their representative will relay the results to you with their   recommendations at their soonest availability.  Thank you,  RESTRICTIONS:  During your procedure today, you received medications for sedation.  These   medications may affect your judgment, balance and coordination.  Therefore,   for 24 hours, you have the following restrictions:   - DO NOT drive a car, operate machinery, make legal/financial decisions,   sign important papers or drink alcohol.    ACTIVITY:  Today: no heavy lifting, straining or running due to procedural   sedation/anesthesia.  The following day: return to full activity including work.  DIET:  Eat and drink normally unless instructed otherwise.     TREATMENT FOR COMMON SIDE EFFECTS:  - Mild abdominal pain, nausea, belching, bloating or excessive gas:  rest,   eat lightly and use a heating pad.  - Sore Throat: treat with throat lozenges and/or gargle with warm salt   water.  - Because air was used during the procedure, expelling large amounts of air   from your rectum or belching is normal.  - If a bowel prep was taken, you may not have a bowel movement for 1-3 days.    This is normal.  SYMPTOMS TO WATCH FOR AND REPORT TO YOUR PHYSICIAN:  1. Abdominal pain or bloating, other than gas cramps.  2. Chest pain.  3. Back pain.  4. Signs of infection such as: chills or fever occurring within 24 hours   after the procedure.  5. Rectal bleeding, which would show as bright red, maroon, or black stools.   (A tablespoon of blood from the rectum is not serious,  especially if   hemorrhoids are present.)  6. Vomiting.  7. Weakness or dizziness.  GO DIRECTLY TO THE NEAREST EMERGENCY ROOM IF YOU HAVE ANY OF THE FOLLOWING:      Difficulty breathing              Chills and/or fever over 101 F   Persistent vomiting and/or vomiting blood   Severe abdominal pain   Severe chest pain   Black, tarry stools   Bleeding- more than one tablespoon   Any other symptom or condition that you feel may need urgent attention  Your doctor recommends these additional instructions:  If any biopsies were taken, your doctors clinic will contact you in 1 to 2   weeks with any results.  - Discharge patient to home (via wheelchair).   - Resume previous diet.   - Continue present medications.   - Await pathology results.   - Use Protonix (pantoprazole) 40 mg PO BID for 2 months.   - Repeat upper endoscopy in 2 months to check healing.   - Telephone GI clinic for pathology results in 2 weeks.   - Patient has a contact number available for emergencies.  The signs and   symptoms of potential delayed complications were discussed with the   patient.  Return to normal activities tomorrow.  Written discharge   instructions were provided to the patient.   - Resume anticoagulant at prior dose.  For questions, problems or results please call your physician Essie Carvajal MD at Work:  [phone]  If you have any questions about the above instructions, call the GI   department at [phone] or call the endoscopy unit at [phone]   from 7am until 3 pm.  [facility] [place], EMERGENCY ROOM PHONE NUMBER:   [phone]  IF A COMPLICATION OR EMERGENCY SITUATION ARISES AND YOU ARE UNABLE TO REACH   YOUR PHYSICIAN - GO DIRECTLY TO THE EMERGENCY ROOM.  I have read or have had read to me these discharge instructions for my   procedure and have received a written copy.  I understand these   instructions and will follow-up with my physician if I have any questions.      __________________________________       _____________________________________  Nurse Signature                                          Patient/Designated   Responsible Party Signature  MD Essie Jackson MD  6/23/2022 10:11:54 AM  [employer]

## 2022-06-23 NOTE — H&P
Short Stay Endoscopy History and Physical    PCP - Primary Doctor No    Procedure - EGD  ASA - 3  Mallampati - per anesthesia  History of Anesthesia problems - no  Family history Anesthesia problems -  no     HPI:  This is a 32 y.o. female here for evaluation of :   Active Hospital Problems    Diagnosis  POA    *Morbid obesity [E66.01]  Yes      Resolved Hospital Problems   No resolved problems to display.       ROS:  CONSTITUTIONAL: Denies weight change,  fatigue, fevers, chills, night sweats.  CARDIOVASCULAR: Denies chest pain, shortness of breath, orthopnea and edema.  RESPIRATORY: Denies cough, hemoptysis, dyspnea, and wheezing.  GI: See HPI.    Medical History:   Past Medical History:   Diagnosis Date    Anxiety     -donor kidney transplant 2021    cPRA 100%, XM negative 3 arteries, 2 on common patch, WIT 40 min    Encounter for blood transfusion     ESRD (end stage renal disease)     Fibroma     H/O kidney transplant     Hypertension     Hypertensive nephropathy     Ovarian cyst        Surgical History:   Past Surgical History:   Procedure Laterality Date    COLPOSCOPY  2020    Needs follow up on or after 21    hemodialysis access left arm      kidney removal       KIDNEY TRANSPLANT  04/15/2015    KIDNEY TRANSPLANT N/A 2021    Procedure: TRANSPLANT, KIDNEY;  Surgeon: Fam Sutton MD;  Location: [address];  Service: Transplant;  Laterality: N/A;    NEPHRECTOMY      OVARIAN CYST REMOVAL      PARATHYROIDECTOMY      partial     PERCUTANEOUS NEPHROSTOMY Left 2021    Procedure: Creation, Nephrostomy, Percutaneous;  Surgeon: Elen Surgeon;  Location: [facility];  Service: Anesthesiology;  Laterality: Left;    right leg hemodialysis access      transplant nephrectomy  2017       Family History:  Family History   Problem Relation Age of Onset    No Known Problems Mother     Colon cancer Father     Cancer Father     Hypertension Father     No  Known Problems Sister     No Known Problems Brother     Kidney disease Maternal Aunt     Hypertension Maternal Aunt     Diabetes Maternal Uncle     Kidney disease Other     Hypertension Other        Social History:   Social History     Tobacco Use    Smoking status: Former Smoker     Packs/day: 0.25     Years: 12.00     Pack years: 3.00     Types: Cigarettes     Start date:      Quit date: 2020     Years since quittin.8    Smokeless tobacco: Never Used   Substance Use Topics    Alcohol use: No    Drug use: No       Allergy  Review of patient's allergies indicates:   Allergen Reactions    Heparin analogues Rash       Medications:   No current facility-administered medications on file prior to encounter.     Current Outpatient Medications on File Prior to Encounter   Medication Sig Dispense Refill    albuterol (PROVENTIL/VENTOLIN HFA) 90 mcg/actuation inhaler Inhale 2 puffs into the lungs every 4 (four) hours as needed for Wheezing or Shortness of Breath. Rescue 8.5 g 11    calcitRIOL (ROCALTROL) 0.25 MCG Cap Take 1 capsule (0.25 mcg total) by mouth once daily. 30 capsule 11    mycophenolate (CELLCEPT) 250 mg Cap Take 4 capsules (1,000 mg total) by mouth 2 (two) times daily. 240 capsule 11    NIFEdipine (PROCARDIA-XL) 30 MG (OSM) 24 hr tablet Take 1 tablet (30 mg total) by mouth 2 (two) times a day. 60 tablet 11    olmesartan (BENICAR) 20 MG tablet Take 1 tablet (20 mg total) by mouth once daily. 30 tablet 11    sodium bicarbonate 650 MG tablet Take 2 tablets (1,300 mg total) by mouth 3 (three) times daily. 180 tablet 11    ergocalciferol (VITAMIN D2) 50,000 unit Cap Take 1 capsule (50,000 Units total) by mouth every 30 days. 1 capsule 11    sars-cov-2, covid-19, (MODERNA COVID-19) 100 mcg/0.5 ml injection Inject 0.5 mLs into the muscle. 0.5 mL 0       Physical Exam:  Vital Signs:   Vitals:    22 0907   BP: 112/76   Pulse: 69   Resp: 16   Temp: 97.7 °F (36.5 °C)     General  Appearance: Well appearing in no acute distress  ENT: OP clear  Chest: CTA B  CV: RRR, no m/r/g  Abd: s/nt/nd/nabs  Ext: no edema    Labs:  Reviewed    IMP:  Active Hospital Problems    Diagnosis  POA    *Morbid obesity [E66.01]  Yes      Resolved Hospital Problems   No resolved problems to display.         Plan:  I have explained the risks and benefits of endoscopy procedures to the patient including but not limited to bleeding, perforation, infection, and death. The patient wishes to proceed.

## 2022-06-29 ENCOUNTER — TELEPHONE (OUTPATIENT)
Dept: TRANSPLANT | Facility: CLINIC | Age: 32
End: 2022-06-29
Payer: MEDICARE

## 2022-06-29 LAB
FINAL PATHOLOGIC DIAGNOSIS: NORMAL
GROSS: NORMAL
Lab: NORMAL

## 2022-06-29 NOTE — TELEPHONE ENCOUNTER
Spoke with patient about scheduling upcoming appointments on 7/11 and 7/18.   Used Language . ID#[id number]

## 2022-07-11 ENCOUNTER — LAB VISIT (OUTPATIENT)
Dept: LAB | Facility: HOSPITAL | Age: 32
End: 2022-07-11
Attending: INTERNAL MEDICINE
Payer: MEDICARE

## 2022-07-11 DIAGNOSIS — Z94.0 KIDNEY REPLACED BY TRANSPLANT: ICD-10-CM

## 2022-07-11 LAB
ALBUMIN SERPL BCP-MCNC: 3.5 G/DL (ref 3.5–5.2)
ALBUMIN SERPL BCP-MCNC: 3.5 G/DL (ref 3.5–5.2)
ALP SERPL-CCNC: 78 U/L (ref 55–135)
ALT SERPL W/O P-5'-P-CCNC: 14 U/L (ref 10–44)
ANION GAP SERPL CALC-SCNC: 9 MMOL/L (ref 8–16)
AST SERPL-CCNC: 14 U/L (ref 10–40)
BASOPHILS # BLD AUTO: 0.03 K/UL (ref 0–0.2)
BASOPHILS NFR BLD: 0.4 % (ref 0–1.9)
BILIRUB DIRECT SERPL-MCNC: 0.2 MG/DL (ref 0.1–0.3)
BILIRUB SERPL-MCNC: 0.5 MG/DL (ref 0.1–1)
BUN SERPL-MCNC: 18 MG/DL (ref 6–20)
CALCIUM SERPL-MCNC: 8.7 MG/DL (ref 8.7–10.5)
CHLORIDE SERPL-SCNC: 106 MMOL/L (ref 95–110)
CO2 SERPL-SCNC: 22 MMOL/L (ref 23–29)
CREAT SERPL-MCNC: 1.2 MG/DL (ref 0.5–1.4)
DIFFERENTIAL METHOD: ABNORMAL
EOSINOPHIL # BLD AUTO: 0.1 K/UL (ref 0–0.5)
EOSINOPHIL NFR BLD: 1.6 % (ref 0–8)
ERYTHROCYTE [DISTWIDTH] IN BLOOD BY AUTOMATED COUNT: 13.7 % (ref 11.5–14.5)
EST. GFR  (AFRICAN AMERICAN): >60 ML/MIN/1.73 M^2
EST. GFR  (NON AFRICAN AMERICAN): >60 ML/MIN/1.73 M^2
GLUCOSE SERPL-MCNC: 78 MG/DL (ref 70–110)
HCT VFR BLD AUTO: 42 % (ref 37–48.5)
HGB BLD-MCNC: 13 G/DL (ref 12–16)
IMM GRANULOCYTES # BLD AUTO: 0.05 K/UL (ref 0–0.04)
IMM GRANULOCYTES NFR BLD AUTO: 0.7 % (ref 0–0.5)
LYMPHOCYTES # BLD AUTO: 0.9 K/UL (ref 1–4.8)
LYMPHOCYTES NFR BLD: 11.3 % (ref 18–48)
MAGNESIUM SERPL-MCNC: 1.6 MG/DL (ref 1.6–2.6)
MCH RBC QN AUTO: 28.8 PG (ref 27–31)
MCHC RBC AUTO-ENTMCNC: 31 G/DL (ref 32–36)
MCV RBC AUTO: 93 FL (ref 82–98)
MONOCYTES # BLD AUTO: 0.7 K/UL (ref 0.3–1)
MONOCYTES NFR BLD: 8.6 % (ref 4–15)
NEUTROPHILS # BLD AUTO: 5.9 K/UL (ref 1.8–7.7)
NEUTROPHILS NFR BLD: 77.4 % (ref 38–73)
NRBC BLD-RTO: 0 /100 WBC
PHOSPHATE SERPL-MCNC: 3.3 MG/DL (ref 2.7–4.5)
PLATELET # BLD AUTO: 202 K/UL (ref 150–450)
PMV BLD AUTO: 12.4 FL (ref 9.2–12.9)
POTASSIUM SERPL-SCNC: 4.1 MMOL/L (ref 3.5–5.1)
PROT SERPL-MCNC: 6.1 G/DL (ref 6–8.4)
RBC # BLD AUTO: 4.51 M/UL (ref 4–5.4)
SODIUM SERPL-SCNC: 137 MMOL/L (ref 136–145)
WBC # BLD AUTO: 7.58 K/UL (ref 3.9–12.7)

## 2022-07-11 PROCEDURE — 83735 ASSAY OF MAGNESIUM: CPT | Performed by: INTERNAL MEDICINE

## 2022-07-11 PROCEDURE — 84075 ASSAY ALKALINE PHOSPHATASE: CPT | Performed by: INTERNAL MEDICINE

## 2022-07-11 PROCEDURE — 36415 COLL VENOUS BLD VENIPUNCTURE: CPT | Performed by: INTERNAL MEDICINE

## 2022-07-11 PROCEDURE — 80197 ASSAY OF TACROLIMUS: CPT | Performed by: INTERNAL MEDICINE

## 2022-07-11 PROCEDURE — 80069 RENAL FUNCTION PANEL: CPT | Performed by: INTERNAL MEDICINE

## 2022-07-11 PROCEDURE — 87799 DETECT AGENT NOS DNA QUANT: CPT | Performed by: INTERNAL MEDICINE

## 2022-07-11 PROCEDURE — 85025 COMPLETE CBC W/AUTO DIFF WBC: CPT | Performed by: INTERNAL MEDICINE

## 2022-07-12 LAB — TACROLIMUS BLD-MCNC: 8.9 NG/ML (ref 5–15)

## 2022-07-15 DIAGNOSIS — K20.90 ESOPHAGITIS: Primary | ICD-10-CM

## 2022-07-18 ENCOUNTER — TELEPHONE (OUTPATIENT)
Dept: TRANSPLANT | Facility: CLINIC | Age: 32
End: 2022-07-18
Payer: MEDICARE

## 2022-07-25 ENCOUNTER — OFFICE VISIT (OUTPATIENT)
Dept: TRANSPLANT | Facility: CLINIC | Age: 32
End: 2022-07-25
Payer: MEDICARE

## 2022-07-25 VITALS
SYSTOLIC BLOOD PRESSURE: 101 MMHG | HEART RATE: 70 BPM | TEMPERATURE: 97 F | BODY MASS INDEX: 41.66 KG/M2 | OXYGEN SATURATION: 97 % | WEIGHT: 259.25 LBS | DIASTOLIC BLOOD PRESSURE: 60 MMHG | RESPIRATION RATE: 16 BRPM | HEIGHT: 66 IN

## 2022-07-25 DIAGNOSIS — Z94.0 IMMUNOSUPPRESSIVE MANAGEMENT ENCOUNTER FOLLOWING KIDNEY TRANSPLANT: ICD-10-CM

## 2022-07-25 DIAGNOSIS — Z94.0 DECEASED-DONOR KIDNEY TRANSPLANT: Primary | ICD-10-CM

## 2022-07-25 DIAGNOSIS — Z79.899 IMMUNOSUPPRESSIVE MANAGEMENT ENCOUNTER FOLLOWING KIDNEY TRANSPLANT: ICD-10-CM

## 2022-07-25 DIAGNOSIS — I10 ESSENTIAL HYPERTENSION: ICD-10-CM

## 2022-07-25 DIAGNOSIS — Z79.60 LONG-TERM USE OF IMMUNOSUPPRESSANT MEDICATION: ICD-10-CM

## 2022-07-25 DIAGNOSIS — N18.2 CKD (CHRONIC KIDNEY DISEASE) STAGE 2, GFR 60-89 ML/MIN: ICD-10-CM

## 2022-07-25 PROCEDURE — 99215 PR OFFICE/OUTPT VISIT, EST, LEVL V, 40-54 MIN: ICD-10-PCS | Mod: S$PBB,,, | Performed by: NURSE PRACTITIONER

## 2022-07-25 PROCEDURE — 99214 OFFICE O/P EST MOD 30 MIN: CPT | Mod: PBBFAC | Performed by: NURSE PRACTITIONER

## 2022-07-25 PROCEDURE — 99215 OFFICE O/P EST HI 40 MIN: CPT | Mod: S$PBB,,, | Performed by: NURSE PRACTITIONER

## 2022-07-25 PROCEDURE — 99999 PR PBB SHADOW E&M-EST. PATIENT-LVL IV: CPT | Mod: PBBFAC,,, | Performed by: NURSE PRACTITIONER

## 2022-07-25 PROCEDURE — 99999 PR PBB SHADOW E&M-EST. PATIENT-LVL IV: ICD-10-PCS | Mod: PBBFAC,,, | Performed by: NURSE PRACTITIONER

## 2022-07-25 NOTE — PROGRESS NOTES
Kidney Post-Transplant Assessment    Referring Physician: Won Miller  Current Nephrologist: Julio Jaffe    ORGAN: RIGHT KIDNEY  Donor Type: donation after brain death  PHS Increased Risk: no  Cold Ischemia: 1,251 mins  Induction Medications: thymoglobulin    Subjective:     CC:  Reassessment of renal allograft function and management of chronic immunosuppression.    HPI:  Ms. Oumar Cordero is a 32 y.o. 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  She has CKD stage 2 - GFR 60-89 and her baseline creatinine is between 1 to 1.4. She takes mycophenolate mofetil, prednisone and tacrolimus for maintenance immunosuppression. She denies any recent hospitalizations or ER visits since her previous clinic visit.    Hospitalization/ ED visits  None    Interval HX:  Reports doing well. Has been following with GI for gastritis -infection. Has upcoming repeat EGD per patient. Otherwise doing well. No complaints. interrupter utilized. She denies abd pain, CP, fever, chills. No edema noted. She is drinking 2L of eater a day.        Current Outpatient Medications:     calcitRIOL (ROCALTROL) 0.25 MCG Cap, Take 1 capsule (0.25 mcg total) by mouth once daily., Disp: 30 capsule, Rfl: 11    ergocalciferol (VITAMIN D2) 50,000 unit Cap, Take 1 capsule (50,000 Units total) by mouth every 30 days., Disp: 1 capsule, Rfl: 11    labetaloL (NORMODYNE) 200 MG tablet, Take 1 tablet (200 mg total) by mouth 2 (two) times daily., Disp: 60 tablet, Rfl: 10    magnesium oxide (MAG-OX) 400 mg (241.3 mg magnesium) tablet, Take 1 tablet (400 mg total) by mouth 2 (two) times daily., Disp: 60 tablet, Rfl: 11    mycophenolate (CELLCEPT) 250 mg Cap, Take 4 capsules (1,000 mg total) by mouth 2 (two) times daily., Disp: 240 capsule, Rfl: 11    NIFEdipine (PROCARDIA-XL) 30 MG (OSM) 24 hr tablet, Take 1 tablet (30 mg total) by mouth 2 (two) times a day., Disp: 60 tablet, Rfl: 11    olmesartan  (BENICAR) 20 MG tablet, Take 1 tablet (20 mg total) by mouth once daily., Disp: 30 tablet, Rfl: 11    pantoprazole (PROTONIX) 40 MG tablet, Take 1 tablet (40 mg total) by mouth 2 (two) times daily., Disp: 120 tablet, Rfl: 0    predniSONE (DELTASONE) 5 MG tablet, Take 1 tablet (5 mg total) by mouth once daily., Disp: 120 tablet, Rfl: 11    sodium bicarbonate 650 MG tablet, Take 2 tablets (1,300 mg total) by mouth 3 (three) times daily., Disp: 180 tablet, Rfl: 11    tacrolimus (PROGRAF) 1 MG Cap, Take 3 capsules (3 mg total) by mouth every morning AND 2 capsules (2 mg total) every evening., Disp: 150 capsule, Rfl: 11    albuterol (PROVENTIL/VENTOLIN HFA) 90 mcg/actuation inhaler, Inhale 2 puffs into the lungs every 4 (four) hours as needed for Wheezing or Shortness of Breath. Rescue (Patient not taking: Reported on 7/25/2022), Disp: 8.5 g, Rfl: 11    sars-cov-2, covid-19, (MODERNA COVID-19) 100 mcg/0.5 ml injection, Inject 0.5 mLs into the muscle. (Patient not taking: Reported on 7/25/2022), Disp: 0.5 mL, Rfl: 0    Current Facility-Administered Medications:     acetaminophen tablet 650 mg, 650 mg, Oral, Once PRN, Govind Garcia MD    albuterol inhaler 2 puff, 2 puff, Inhalation, Q20 Min PRN, Govind Garcia MD    diphenhydrAMINE injection 25 mg, 25 mg, Intravenous, Once PRN, Govind Garcia MD    EPINEPHrine (EPIPEN) 0.3 mg/0.3 mL pen injection 0.3 mg, 0.3 mg, Intramuscular, PRN, Govind Garcia MD    methylPREDNISolone sodium succinate injection 40 mg, 40 mg, Intravenous, Once PRN, Govind Garcia MD    ondansetron disintegrating tablet 4 mg, 4 mg, Oral, Once PRN, Govind Garcia MD    sodium chloride 0.9% 500 mL flush bag, , Intravenous, PRN, Govind Garcia MD    sodium chloride 0.9% flush 10 mL, 10 mL, Intravenous, PRN, Govind Garcia MD    Facility-Administered Medications Ordered in Other Visits:     lactated ringers infusion, , Intravenous, Continuous, Cal Parekh MD, Last  "Rate: 10 mL/hr at 22 0920, Restarted at 22 1000      Past Medical History:   Diagnosis Date    Anxiety     -donor kidney transplant 2021    cPRA 100%, XM negative 3 arteries, 2 on common patch, WIT 40 min    Encounter for blood transfusion     ESRD (end stage renal disease)     Fibroma     H/O kidney transplant     Hypertension     Hypertensive nephropathy     Ovarian cyst          Review of Systems   Skin: no skin rash  CNS; no headaches, blurred vision, seizure, or syncope  ENT: No JVD,  Adenopathies,  nasal congestion. No oral lesions  Cardiac: No chest pain, dyspnea, claudication, edema or palpitations  Respiratory: No SOB, cough, hemoptysis   Gastro-intestinal: No diarrhea, constipation, abdominal pain, nausea, vomit. No ascitis  Genitourinary: no hematuria, dysuria, frequency, frequency  Musculoskeletal: joint pain, arthritis or vasculitic changes  Psych: alert awake, oriented, No cranial nerves deficit.      Objective:   /60 (BP Location: Right arm, Patient Position: Sitting, BP Method: Medium (Automatic))   Pulse 70   Temp 97.3 °F (36.3 °C) (Tympanic)   Resp 16   Ht 5' 6" (1.676 m)   Wt 117.6 kg (259 lb 4.2 oz)   SpO2 97%   BMI 41.85 kg/m²     Physical Exam   Head: normocephalic  Neck: No JVD, cervical axillary, or femoral adenopathies  Heart: no murmurs, Normal s1 and s2, No gallops, no rubs, No murmurs  Lungs; CTA, good respiratory effort, no crackles  Abdomen: soft, non tender, no splenomegaly or hepatomegaly, no massess, no bruits  Extremities: No edema, skin rash, joint pain  SNC: awake, alert oriented. Cranial nerves are intact, no focalized, sensitivity and strength preserved    Labs:  Lab Results   Component Value Date    WBC 7.58 2022    HGB 13.0 2022    HCT 42.0 2022     2022    K 4.1 2022     2022    CO2 22 (L) 2022    BUN 18 2022    CREATININE 1.2 2022    EGFRNONAA >60.0 " 2022    CALCIUM 8.7 2022    PHOS 3.3 2022    MG 1.6 2022    ALBUMIN 3.5 2022    ALBUMIN 3.5 2022    AST 14 2022    ALT 14 2022    UTPCR Unable to calculate 2022    .0 (H) 2021    TACROLIMUS 8.9 2022       No results found for: EXTANC, EXTWBC, EXTSEGS, EXTPLATELETS, EXTHEMOGLOBI, EXTHEMATOCRI, EXTCREATININ, EXTSODIUM, EXTPOTASSIUM, EXTBUN, EXTCO2, EXTCALCIUM, EXTPHOSPHORU, EXTGLUCOSE, EXTALBUMIN, EXTAST, EXTALT, EXTBILITOTAL, EXTLIPASE, EXTAMYLASE    No results found for: EXTCYCLOSLVL, EXTSIROLIMUS, EXTTACROLVL, EXTPROTCRE, EXTPTHINTACT, EXTPROTEINUA, EXTWBCUA, EXTRBCUA    Labs were reviewed with the patient.    Assessment:     1. -donor kidney transplant 2021    2. CKD (chronic kidney disease) stage 2, GFR 60-89 ml/min    3. Long-term use of immunosuppressant medication    4. Essential hypertension    5. Immunosuppressive management encounter following kidney transplant        Plan:     Continue to follow with PCP (health maintenance and health screening) and General Nephrologist (CKD care)  Needs to establish Dermatologist for yearly skin check due to IS therapy.  Encourage lifestyle modifications: diet, exercise, weight loss   Continue current IS therapy regimen  No need for refills on IS therapy today.       Allograft function assessment  -CKD 2, remains stable.   Recent Labs   Lab 22  0837 22  1358 22  0744   Creatinine 1.0 1.2 1.2   eGFR if non African American >60.0 >60.0 >60.0   eGFR if African American >60.0 >60.0 >60.0      Lab Results   Component Value Date    LIPASE 2021         Encounter for Monitoring Immunosuppression post Transplant  Recent Labs   Lab 22  0737 22  1358 22  0744   Tacrolimus Lvl 7.2 13.2 8.9   -Target level for Solmari is 5-7  -Recheck as per guidelines.  -Monitor for side effects and toxicities, given narrow therapeutic window and significant risk of AE        Evaluation for bone marrow suppression (r/t immunosuppression toxicity or infection)  Lab Results   Component Value Date    WBC 7.58 07/11/2022    HGB 13.0 07/11/2022    HCT 42.0 07/11/2022     07/11/2022       Renal hypertension--within target, watch  BP Readings from Last 3 Encounters:   07/25/22 101/60   06/23/22 99/66   06/03/22 118/72        Metabolic bone disease [Secondary hyperparathyroidism, Phosphorus metabolism disorders]  Recent Labs   Lab 01/09/21  0511 01/09/21  2206 02/08/21  1006 02/12/21  1939 06/02/21  0828 06/15/21  0820 01/27/22  0900 03/02/22  0737 04/04/22  0837 05/31/22  1358 07/11/22  0744   Albumin 3.5   < > 3.5  3.5   < > 3.5  3.5   < > 3.3 L 3.4 L  3.4 L 3.4 L 3.4 L 3.5  3.5   Calcium 8.2 L   < > 8.7   < > 9.0  9.0   < > 9.8 8.9 8.6 L 9.4 8.7   Phosphorus 4.1   < > 2.4 L   < > 2.7  2.7   < > 2.6 L 2.6 L  --  2.7 3.3   Magnesium  --    < > 1.3 L   < > 1.6   < > 1.6 1.5 L  --   --  1.6   PTH, Intact 292.0 H  --  186.0 H  --  109.0 H  --   --   --   --   --   --     < > = values in this interval not displayed.        Assessment of electrolytes  Lab Results   Component Value Date     07/11/2022    K 4.1 07/11/2022     07/11/2022    CO2 22 (L) 07/11/2022    MG 1.6 07/11/2022       Screening for BK infection to prevent allograft dysfunction  Lab Results   Component Value Date    BKVIRUSPCRQB <125 07/11/2022   - BK PCR is acceptable  -Continue blood PCR screening as per program guidelines to prevent BK viremia and nephropathy leading to allograft dysfunction and potential graft failure      Screening for proteinuria & urinary abnormalities  Recent Labs   Lab 05/31/22  1355 07/11/22  0729   Protein, UA Negative Negative   Glucose, UA Negative Negative   Occult Blood UA Negative Negative   Leukocytes, UA 3+ A 3+ A   Prot/Creat Ratio, Urine Unable to calculate Unable to calculate   - Urine p/c ratio acceptable      Management of kidney disease and related issues (HTN,  anemia, SPTH, metabolic bone disease) should continue by community nephrologist.       Follow-up:   Clinic: return to transplant clinic weekly for the first month after transplant; every 2 weeks during months 2-3; then at 6-, 9-, 12-, 18-, 24-, and 36- months post-transplant to reassess for complications from immunosuppression toxicity and monitor for rejection.  Annually thereafter.    Labs: since patient remains at high risk for rejection and drug-related complications that warrant close monitoring, labs will be ordered as follows: continue twice weekly CBC, renal panel, and drug level for first month; then same labs once weekly through 3rd month post-transplant.  Urine for UA and protein/creatinine ratio monthly.  Serum BK - PCR at 1-, 3-, 6-, 9-, 12-, 18-, 24-, 36- 48-, and 60 months post-transplant.  Hepatic panel at 1-, 2-, 3-, 6-, 9-, 12-, 18-, 24-, and 36- months post-transplant.    Ilsa Garcia NP       Education:   Material provided to the patient.  Patient reminded to call with any health changes since these can be early signs of significant complications.  Also, I advised the patient to be sure any new medications or changes of old medications are discussed with either a pharmacist or physician knowledgeable with transplant to avoid rejection/drug toxicity related to significant drug interactions.    Patient advised that it is recommended that all transplanted patients, and their close contacts and household members receive Covid vaccination.    UNOS Patient Status  Functional Status: 100% - Normal, no complaints, no evidence of disease  Physical Capacity: No Limitations

## 2022-07-25 NOTE — LETTER
July 27, 2022        Julio Jaffe  [address] [address] NEPHROLOGY  [place] [address]  Phone: [phone]  Fax: [phone]             [facility] 1st Fl  [address]  [facility] [address]  Phone: [phone]   Patient: Leydi Cordero   MR Number: [id number]   YOB: 1990   Date of Visit: 7/25/2022       Dear Dr. Julio Jaffe    Thank you for referring Leydi Cordero to me for evaluation. Attached you will find relevant portions of my assessment and plan of care.    If you have questions, please do not hesitate to call me. I look forward to following Leydi Cordero along with you.    Sincerely,    Ilsa Garcia, NP    Enclosure    If you would like to receive this communication electronically, please contact [email or web address] or [phone] to request [employer] access.    [employer] is a tool which provides read-only access to select patient information with whom you have a relationship. Its easy to use and provides real time access to review your patients record including encounter summaries, notes, results, and demographic information.    If you feel you have received this communication in error or would no longer like to receive these types of communications, please e-mail [email or web address]

## 2022-07-26 ENCOUNTER — PATIENT MESSAGE (OUTPATIENT)
Dept: SURGERY | Facility: CLINIC | Age: 32
End: 2022-07-26
Payer: MEDICARE

## 2022-07-27 ENCOUNTER — OFFICE VISIT (OUTPATIENT)
Dept: PULMONOLOGY | Facility: CLINIC | Age: 32
End: 2022-07-27
Payer: MEDICARE

## 2022-07-27 VITALS
OXYGEN SATURATION: 99 % | BODY MASS INDEX: 41.87 KG/M2 | HEIGHT: 66 IN | DIASTOLIC BLOOD PRESSURE: 68 MMHG | HEART RATE: 73 BPM | SYSTOLIC BLOOD PRESSURE: 122 MMHG | RESPIRATION RATE: 18 BRPM | WEIGHT: 260.5 LBS

## 2022-07-27 DIAGNOSIS — Z94.0 KIDNEY REPLACED BY TRANSPLANT: ICD-10-CM

## 2022-07-27 DIAGNOSIS — Z91.89 AT RISK FOR OPPORTUNISTIC INFECTIONS: ICD-10-CM

## 2022-07-27 DIAGNOSIS — G47.33 OSA ON CPAP: ICD-10-CM

## 2022-07-27 DIAGNOSIS — J45.20 ASTHMA, MILD INTERMITTENT, WELL-CONTROLLED: Primary | ICD-10-CM

## 2022-07-27 DIAGNOSIS — I27.20 PULMONARY HYPERTENSION: ICD-10-CM

## 2022-07-27 PROCEDURE — 99999 PR PBB SHADOW E&M-EST. PATIENT-LVL V: ICD-10-PCS | Mod: PBBFAC,,, | Performed by: INTERNAL MEDICINE

## 2022-07-27 PROCEDURE — 99214 PR OFFICE/OUTPT VISIT, EST, LEVL IV, 30-39 MIN: ICD-10-PCS | Mod: S$PBB,,, | Performed by: INTERNAL MEDICINE

## 2022-07-27 PROCEDURE — 99999 PR PBB SHADOW E&M-EST. PATIENT-LVL V: CPT | Mod: PBBFAC,,, | Performed by: INTERNAL MEDICINE

## 2022-07-27 PROCEDURE — 99214 OFFICE O/P EST MOD 30 MIN: CPT | Mod: S$PBB,,, | Performed by: INTERNAL MEDICINE

## 2022-07-27 PROCEDURE — 99215 OFFICE O/P EST HI 40 MIN: CPT | Mod: PBBFAC | Performed by: INTERNAL MEDICINE

## 2022-07-27 NOTE — PROGRESS NOTES
NUTRITION NOTE    Referring Physician: Dr. Parekh  Reason for MNT Referral: Medically Supervised Diet pending Gastric Sleeve    PAST MEDICAL HISTORY:    Patient presents for 3rd visit for MSD with weight loss over the past month; total weight loss by making numerous dietary and lifestyle changes.    Reports remaining consistent with improved dietary habits.  Eliminated dining out.  Increase exercise sessions to 40 minutes on some days.         Past Medical History:   Diagnosis Date    Anxiety     -donor kidney transplant 2021    cPRA 100%, XM negative 3 arteries, 2 on common patch, WIT 40 min    Encounter for blood transfusion     ESRD (end stage renal disease)     Fibroma     H/O kidney transplant     Hypertension     Hypertensive nephropathy     Ovarian cyst        CLINICAL DATA:  32 y.o. female.    There were no vitals filed for this visit.    Current Weight: 260 lbs   2022:  262 lbs              5-: 264 lbs  Weight Change Since Initial Visit: -4 lbs  Ideal Body Weight: 182 lbs +/-10%  BMI: 42.1    CURRENT DIET:  Regular diet.  Diet Recall: Food records are present.    Diet Includes:   Meal pattern: 2 meals + 1 shake  Protein supplements: 1 (premier protein)  Snacking:  Eliminated.   Vegetables: Likes a few. Eats 3-4x/month.  Fruits: Likes a few. Once daily with protein supplement.   Beverages: water, crystal light  Dining out: none since last appointment   Cooking at home: Weekly. Mostly baked and grilled meat, fish, starchy CHO and vegetables.     Exercise:  Current exercise: Increased//  3-4x/week. 30-40 minute sessions on treadmill        Vitamins / Minerals / Herbs:   Vitamin D.      Food Allergies:   NKFA     Social:  No work.  Alcohol: None.  Smoking: None.    ASSESSMENT:  Patient demonstrates all willingness to change lifestyle habits as evidenced by weight loss, good exercise, dietary changes, including protein drinks, increased fruits and reduced dining  out.    Doing well with working on greatest challenges (dining out frequency).    Adequate calorie intake.  Adequate protein intake.    PLAN:    Diet: Adjust diet plan.   Continue to work on eliminating starchy carbohydrate from diet.    Exercise: Increase. As able.    Behavior Modification: Continue to document food & activity logs daily.    Weight loss prior to surgery: -4 lbs    Return to clinic 2 weeks after weight loss surgery.    SESSION TIME:  30 minutes

## 2022-07-27 NOTE — PROGRESS NOTES
Pulmonary Outpatient Follow Up Visit     Subjective:       Patient ID: Leydi Cordero is a 32 y.o. female.    Chief Complaint: Sleep Apnea and Asthma      HPI        30-year-old female patient presenting for 18 months follow-up.    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  I had the impression of asthmatic bronchitis with exacerbation, she was treated with prednisone and a Z-Kalia.    Currently needing albuterol 3 times a week    Not smoking since 2018. 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      Has a cousin and an aunt with asthma.    Underwent home sleep study, moderate obstructive sleep apnea with AHI of 27 events per hour.  Suboptimal compliance with CPAP therapy.      Review of Systems   Constitutional: Positive for weight gain, fatigue and weakness. Negative for chills.   HENT: Negative for nosebleeds.    Eyes: Negative for redness.   Respiratory: Positive for snoring. Negative for choking and dyspnea on extertion.    Cardiovascular: Negative for leg swelling.   Genitourinary: Negative for hematuria.        Kidney transplant x 2 last 2021   Endocrine: Negative for cold intolerance.    Musculoskeletal: Positive for arthralgias.        Left upper extremity AV fistula   Skin: Negative for rash.   Gastrointestinal: Negative for vomiting.   Neurological: Positive for weakness. Negative for syncope.   Hematological: Negative for adenopathy.   Psychiatric/Behavioral: Positive for sleep disturbance.  Negative for confusion.       Outpatient Encounter Medications as of 7/27/2022   Medication Sig Dispense Refill    albuterol (PROVENTIL/VENTOLIN HFA) 90 mcg/actuation inhaler Inhale 2 puffs into the lungs every 4 (four) hours as needed for Wheezing or Shortness of Breath. Rescue 8.5 g 11    calcitRIOL (ROCALTROL) 0.25 MCG Cap Take 1 capsule (0.25 mcg total) by mouth once daily. 30 capsule 11    ergocalciferol (VITAMIN D2) 50,000 unit Cap Take 1 capsule (50,000 Units total) by mouth every 30 days. 1 capsule 11    labetaloL (NORMODYNE) 200 MG tablet Take 1 tablet (200 mg total) by mouth 2 (two) times daily. 60 tablet 10    magnesium oxide (MAG-OX) 400 mg (241.3 mg magnesium) tablet Take 1 tablet (400 mg total) by mouth 2 (two) times daily. 60 tablet 11    mycophenolate (CELLCEPT) 250 mg Cap Take 4 capsules (1,000 mg total) by mouth 2 (two) times daily. 240 capsule 11    NIFEdipine (PROCARDIA-XL) 30 MG (OSM) 24 hr tablet Take 1 tablet (30 mg total) by mouth 2 (two) times a day. 60 tablet 11    olmesartan (BENICAR) 20 MG tablet Take 1 tablet (20 mg total) by mouth once daily. 30 tablet 11    pantoprazole (PROTONIX) 40 MG tablet Take 1 tablet (40 mg total) by mouth 2 (two) times daily. 120 tablet 0    predniSONE (DELTASONE) 5 MG tablet Take 1 tablet (5 mg total) by mouth once daily. 120 tablet 11    sars-cov-2, covid-19, (MODERNA COVID-19) 100 mcg/0.5 ml injection Inject 0.5 mLs into the muscle. 0.5 mL 0    sodium bicarbonate 650 MG tablet Take 2 tablets (1,300 mg total) by mouth 3 (three) times daily. 180 tablet 11    tacrolimus (PROGRAF) 1 MG Cap Take 3 capsules (3 mg total) by mouth every morning AND 2 capsules (2 mg total) every evening. 150 capsule 11     Facility-Administered Encounter Medications as of 7/27/2022   Medication Dose Route Frequency Provider Last Rate Last Admin    acetaminophen tablet 650 mg  650 mg Oral Once PRN Govind Garcia MD        albuterol inhaler 2 puff  2 puff Inhalation  "Q20 Min PRN Govind Garcia MD        diphenhydrAMINE injection 25 mg  25 mg Intravenous Once PRN Govind Garcia MD        EPINEPHrine (EPIPEN) 0.3 mg/0.3 mL pen injection 0.3 mg  0.3 mg Intramuscular PRN Govind Garcia MD        lactated ringers infusion   Intravenous Continuous Cal Parekh MD 10 mL/hr at 06/23/22 0920 Restarted at 06/23/22 1000    methylPREDNISolone sodium succinate injection 40 mg  40 mg Intravenous Once PRN Gvoind Garcia MD        ondansetron disintegrating tablet 4 mg  4 mg Oral Once PRN Govind Garcia MD        sodium chloride 0.9% 500 mL flush bag   Intravenous PRN Govind Garcia MD        sodium chloride 0.9% flush 10 mL  10 mL Intravenous PRN Govind Garcia MD           Objective:     Vital Signs (Most Recent)  Vital Signs  Pulse: 73  Resp: 18  SpO2: 99 %  BP: 122/68  Height and Weight  Height: 5' 6" (167.6 cm)  Weight: 118.2 kg (260 lb 7.6 oz)  BSA (Calculated - sq m): 2.35 sq meters  BMI (Calculated): 42.1  Weight in (lb) to have BMI = 25: 154.6]  Wt Readings from Last 2 Encounters:   07/27/22 118.2 kg (260 lb 7.6 oz)   07/25/22 117.6 kg (259 lb 4.2 oz)       Physical Exam   Constitutional: She is oriented to person, place, and time. She appears well-developed and well-nourished. She is obese.   HENT:   Head: Normocephalic.   Cardiovascular: Normal rate, regular rhythm and normal heart sounds.   Pulmonary/Chest: Normal expansion and effort normal. No stridor. No respiratory distress. She has decreased breath sounds. She has no wheezes. She exhibits no tenderness.   Abdominal: Soft.   Left lower quadrant scar noted   Musculoskeletal:         General: Deformity present. No tenderness.      Cervical back: Neck supple.      Comments: Left upper extremity AV fistula   Lymphadenopathy:     She has no cervical adenopathy.   Neurological: She is alert and oriented to person, place, and time. Gait normal.   Skin: Skin is warm. No cyanosis. Nails show no clubbing. "   Psychiatric: She has a normal mood and affect. Her behavior is normal. Judgment and thought content normal.   Nursing note and vitals reviewed.      Laboratory  Lab Results   Component Value Date    WBC 7.58 07/11/2022    RBC 4.51 07/11/2022    HGB 13.0 07/11/2022    HCT 42.0 07/11/2022    MCV 93 07/11/2022    MCH 28.8 07/11/2022    MCHC 31.0 (L) 07/11/2022    RDW 13.7 07/11/2022     07/11/2022    MPV 12.4 07/11/2022    GRAN 5.9 07/11/2022    GRAN 77.4 (H) 07/11/2022    LYMPH 0.9 (L) 07/11/2022    LYMPH 11.3 (L) 07/11/2022    MONO 0.7 07/11/2022    MONO 8.6 07/11/2022    EOS 0.1 07/11/2022    BASO 0.03 07/11/2022    EOSINOPHIL 1.6 07/11/2022    BASOPHIL 0.4 07/11/2022       BMP  Lab Results   Component Value Date     07/11/2022    K 4.1 07/11/2022     07/11/2022    CO2 22 (L) 07/11/2022    BUN 18 07/11/2022    CREATININE 1.2 07/11/2022    CALCIUM 8.7 07/11/2022    ANIONGAP 9 07/11/2022    ESTGFRAFRICA >60.0 07/11/2022    EGFRNONAA >60.0 07/11/2022    AST 14 07/11/2022    ALT 14 07/11/2022    PROT 6.1 07/11/2022       Lab Results   Component Value Date     (H) 01/03/2020       Lab Results   Component Value Date    TSH 1.890 04/04/2022       No results found for: SEDRATE    No results found for: CRP  No results found for: IGE     No results found for: ASPERGILLUS  No results found for: AFUMIGATUSCL     No results found for: ACE     Diagnostic Results:      I have personally reviewed today the following studies:    CXR 1/3/2019 clear lungs      Nuc stress 1/2020:     Impression: NORMAL MYOCARDIAL PERFUSION  1. The perfusion scan is free of evidence for myocardial ischemia or injury.   2. Resting wall motion is physiologic.   3. Resting LV function is normal.   4. The ventricular volumes are normal at rest and stress.   5. The extracardiac distribution of radioactivity is normal.        Echo Dec 2019:      1 - Concentric hypertrophy.     2 - No wall motion abnormalities.     3 - Normal left  ventricular systolic function (EF 60-65%).     4 - Normal left ventricular diastolic function.     5 - Normal right ventricular systolic function .     6 - Pulmonary hypertension. The estimated PA systolic pressure is 45 mmHg.     7 - Trivial mitral regurgitation.     8 - Moderate to severe tricuspid regurgitation     Echo 8/2018    1 - Concentric remodeling.     2 - No wall motion abnormalities.     3 - Normal left ventricular systolic function (EF 55-60%).     4 - Normal left ventricular diastolic function.     5 - Normal right ventricular systolic function .     6 - The estimated PA systolic pressure is 23 mmHg.     7 - Moderate tricuspid regurgitation       Spirometry 7/21/2020:    Grade A-D -acceptable quality of tracings   Normal spirometry. (FEV1/VC greater than or equal to LLN and FVC greater than or equal to LLN)   Flow volume loops are normal.   Overall there is no significant ventilatory impairment. (FEV1 >LLN)         Assessment and Recommendations  Based on the [employer] practice parameter of CPAP would be the guideline  recommendation of choice.  Other therapies may include ENT procedures were appropriate, significant weight loss.  Inspire hypoglossal nerve stimulator and nasal PROVENT or mandibular advancement device may also  be  considered.  Close follow up to ensure resolution of symptoms.  1 night study  MODERATE OBSTRUCTIVE SLEEP APNEA with overall AHI 27.6/hr ( 204 events): night #1  Oxygen desaturation: 87%. SpO2 between 85% to 89% for < 1 min.  Patient snored 91% time above 50 .  Heart rate range: 73 bpm - 107 bpm  REC's:  Therapy with APAP at 5-20 cm WP using mask of choice with heated humidification is an option.  Weight loss/management. with regular exercise per direction of physician.  Avoid drowsy driving.  Follow up in sleep clinic      On CPAP 4/16 cm water      Assessment/Plan:   Asthma, mild intermittent, well-controlled  -     Spirometry with/without  bronchodilator; Future    Pulmonary hypertension  -     Stress test, pulmonary; Future    NASIMA on CPAP  -     CPAP/BIPAP SUPPLIES    Kidney replaced by transplant    At risk for opportunistic infections      Continue albuterol p.r.n.    Check spirometry with and without bronchodilator.    Check 6 minute walking test..  Pulmonary hypertension likely related to NASIMA and AV fistula    Follow-up with rest 19.    Continue prednisone 5 mg continue CellCept and tacrolimus.    Encouraged patient to improve CPAP compliance    Encouraged patient to continue smoking cessation.    FFM     Sylvia trasnlsaad , patient speaks Mongolian      Follow up in about 6 months (around 1/27/2023).    This note was prepared using voice recognition system and is likely to have sound alike errors that may have been overlooked even after proof reading.  Please call me with any questions    Discussed diagnosis, its evaluation, treatment and usual course. All questions answered.      Maribeth Barajas MD

## 2022-07-28 ENCOUNTER — NUTRITION (OUTPATIENT)
Dept: INTERNAL MEDICINE | Facility: CLINIC | Age: 32
End: 2022-07-28
Payer: MEDICARE

## 2022-07-28 DIAGNOSIS — Z71.3 PRE-BARIATRIC SURGERY NUTRITION EVALUATION: ICD-10-CM

## 2022-07-28 DIAGNOSIS — Z71.3 DIETARY COUNSELING: ICD-10-CM

## 2022-07-28 PROCEDURE — 97803 MED NUTRITION INDIV SUBSEQ: CPT | Mod: S$GLB,,, | Performed by: DIETITIAN, REGISTERED

## 2022-07-28 PROCEDURE — 97803 PR MED NUTR THER, SUBSQ, INDIV, EA 15 MIN: ICD-10-PCS | Mod: S$GLB,,, | Performed by: DIETITIAN, REGISTERED

## 2022-08-02 ENCOUNTER — PATIENT MESSAGE (OUTPATIENT)
Dept: SURGERY | Facility: CLINIC | Age: 32
End: 2022-08-02
Payer: MEDICARE

## 2022-08-15 ENCOUNTER — CLINICAL SUPPORT (OUTPATIENT)
Dept: PULMONOLOGY | Facility: CLINIC | Age: 32
End: 2022-08-15
Payer: MEDICARE

## 2022-08-15 ENCOUNTER — OFFICE VISIT (OUTPATIENT)
Dept: SURGERY | Facility: CLINIC | Age: 32
End: 2022-08-15
Payer: MEDICARE

## 2022-08-15 VITALS
TEMPERATURE: 98 F | WEIGHT: 268.31 LBS | DIASTOLIC BLOOD PRESSURE: 79 MMHG | BODY MASS INDEX: 42.61 KG/M2 | SYSTOLIC BLOOD PRESSURE: 102 MMHG | HEART RATE: 70 BPM

## 2022-08-15 VITALS — WEIGHT: 266.75 LBS | BODY MASS INDEX: 41.87 KG/M2 | HEIGHT: 67 IN

## 2022-08-15 DIAGNOSIS — I27.20 PULMONARY HYPERTENSION: ICD-10-CM

## 2022-08-15 DIAGNOSIS — I10 ESSENTIAL HYPERTENSION: ICD-10-CM

## 2022-08-15 DIAGNOSIS — E66.01 MORBID OBESITY WITH BMI OF 40.0-44.9, ADULT: Primary | ICD-10-CM

## 2022-08-15 DIAGNOSIS — J45.20 ASTHMA, MILD INTERMITTENT, WELL-CONTROLLED: ICD-10-CM

## 2022-08-15 LAB
BRPFT: NORMAL
FEF 25 75 CHG: 2.5 %
FEF 25 75 LLN: 2.38
FEF 25 75 POST REF: 85.4 %
FEF 25 75 PRE REF: 83.3 %
FEF 25 75 REF: 3.72
FET100 CHG: -6.8 %
FEV1 CHG: 0.3 %
FEV1 FVC CHG: 1.4 %
FEV1 FVC LLN: 73
FEV1 FVC POST REF: 98 %
FEV1 FVC PRE REF: 96.6 %
FEV1 FVC REF: 84
FEV1 LLN: 2.78
FEV1 POST REF: 84.8 %
FEV1 PRE REF: 84.6 %
FEV1 REF: 3.46
FVC CHG: -1.1 %
FVC LLN: 3.31
FVC POST REF: 86 %
FVC PRE REF: 87 %
FVC REF: 4.14
PEF CHG: 10.3 %
PEF LLN: 5.55
PEF POST REF: 93.5 %
PEF PRE REF: 84.8 %
PEF REF: 7.4
POST FEF 25 75: 3.18 L/S
POST FET 100: 7.17 SEC
POST FEV1 FVC: 82.55 %
POST FEV1: 2.94 L
POST FVC: 3.56 L
POST PEF: 6.92 L/S
PRE FEF 25 75: 3.1 L/S
PRE FET 100: 7.69 SEC
PRE FEV1 FVC: 81.41 %
PRE FEV1: 2.93 L
PRE FVC: 3.6 L
PRE PEF: 6.27 L/S

## 2022-08-15 PROCEDURE — 99215 OFFICE O/P EST HI 40 MIN: CPT | Mod: S$PBB,,, | Performed by: SURGERY

## 2022-08-15 PROCEDURE — 94060 EVALUATION OF WHEEZING: CPT | Mod: 26,59,S$PBB, | Performed by: INTERNAL MEDICINE

## 2022-08-15 PROCEDURE — 99215 PR OFFICE/OUTPT VISIT, EST, LEVL V, 40-54 MIN: ICD-10-PCS | Mod: S$PBB,,, | Performed by: SURGERY

## 2022-08-15 PROCEDURE — 99214 OFFICE O/P EST MOD 30 MIN: CPT | Mod: PBBFAC,25 | Performed by: SURGERY

## 2022-08-15 PROCEDURE — 94060 EVALUATION OF WHEEZING: CPT | Mod: PBBFAC

## 2022-08-15 PROCEDURE — 94060 PR EVAL OF BRONCHOSPASM: ICD-10-PCS | Mod: 26,59,S$PBB, | Performed by: INTERNAL MEDICINE

## 2022-08-15 PROCEDURE — 99999 PR PBB SHADOW E&M-EST. PATIENT-LVL IV: ICD-10-PCS | Mod: PBBFAC,,, | Performed by: SURGERY

## 2022-08-15 PROCEDURE — 94618 PULMONARY STRESS TESTING: CPT | Mod: 26,S$PBB,, | Performed by: INTERNAL MEDICINE

## 2022-08-15 PROCEDURE — 94618 PULMONARY STRESS TESTING: CPT | Mod: PBBFAC

## 2022-08-15 PROCEDURE — 99999 PR PBB SHADOW E&M-EST. PATIENT-LVL IV: CPT | Mod: PBBFAC,,, | Performed by: SURGERY

## 2022-08-15 PROCEDURE — 94618 PULMONARY STRESS TESTING: ICD-10-PCS | Mod: 26,S$PBB,, | Performed by: INTERNAL MEDICINE

## 2022-08-15 RX ORDER — SODIUM CHLORIDE 9 MG/ML
INJECTION, SOLUTION INTRAVENOUS CONTINUOUS
Status: CANCELLED | OUTPATIENT
Start: 2022-08-15

## 2022-08-15 RX ORDER — LIDOCAINE HYDROCHLORIDE 10 MG/ML
1 INJECTION, SOLUTION EPIDURAL; INFILTRATION; INTRACAUDAL; PERINEURAL ONCE
Status: CANCELLED | OUTPATIENT
Start: 2022-08-15 | End: 2022-08-15

## 2022-08-15 NOTE — PROCEDURES
"[facility]  Six [facility]   SUMMARY   Ordering Provider: Maribeth Barajas MD   Interpreting Provider: Maribeth Barajas MD  Performing nurse/tech/RT: V. T., RT  Diagnosis: Pulmonary Hypertension  Height: 5' 6.54" (169 cm)  Weight: 121 kg (266 lb 12.1 oz)  BMI (Calculated): 42.4   Patient Race:    Phase Oxygen Assessment Supplemental O2 Heart   Rate Blood Pressure Aris Dyspnea Scale Rating   Resting 99 % Room Air 77 bpm 104/70 0   Exercise        Minute        1 97 % Room Air 107 bpm     2 98 % Room Air 113 bpm     3 98 % Room Air 109 bpm     4 97 % Room Air 112 bpm     5 98 % Room Air 114 bpm     6  98 % Room Air 115 bpm 115/63 2   Recovery        Minute        1 97 % Room Air 90 bpm     2 98 % Room Air 87 bpm     3 99 % Room Air 82 bpm     4 99 % Room Air 81 bpm 105/58 1     Six Minute Walk Summary  6MWT Status: completed without stopping  Patient Reported: No complaints     Interpretation:  Did the patient stop or pause?: No                  Total Time Walked (Calculated): 360 seconds  Final Partial Lap Distance (feet): 150 feet  Total Distance Meters (Calculated): 411.48 meters  Predicted Distance Meters (Calculated): 567.32 meters  Percentage of Predicted (Calculated): 72.53  Peak VO2 (Calculated): 16.32  Mets: 4.66  Has The Patient Had a Previous Six Minute Walk Test?: No  Comments: Patient speaks no English;  called from Language line for testing    Previous 6MWT Results  Has The Patient Had a Previous Six Minute Walk Test?: No    "

## 2022-08-22 NOTE — PROGRESS NOTES
"BARIATRIC NEW PATIENT EVALUATION    CHIEF COMPLAINT:   morbid obesity with a BMI of 42 and inability to lose weight.    HISTORY OF PRESENT ILLNESS:  Leydi Crodero is a 32 y.o.-year-old female presents to discuss bariatric surgery.  In the interim she is undergone evaluation with dietitian, psych with no contraindications to surgery.  She is completed EGD (see below).  She would like to move forward with sleeve gastrectomy.    Initially presenting for morbid obesity with a BMI of 42 and inability to lose weight. The patient reports weight gain affecting her health following her kidney transplant. She has had difficulty losing weight since this time.    Height 5'6"  Weight 262lbs  BMI 42    HPI    CO-MORBIDITIES:  hypertension    PAST MEDICAL HISTORY:  Past Medical History:   Diagnosis Date    Anxiety     -donor kidney transplant 2021    cPRA 100%, XM negative 3 arteries, 2 on common patch, WIT 40 min    Encounter for blood transfusion     ESRD (end stage renal disease)     Fibroma     H/O kidney transplant     Hypertension     Hypertensive nephropathy     Ovarian cyst         PAST SURGICAL HISTORY:  Past Surgical History:   Procedure Laterality Date    COLPOSCOPY  2020    Needs follow up on or after 21    ESOPHAGOGASTRODUODENOSCOPY N/A 2022    Procedure: ESOPHAGOGASTRODUODENOSCOPY (EGD);  Surgeon: Essie Carvajal MD;  [facility];  Service: Endoscopy;  Laterality: N/A;    hemodialysis access left arm      kidney removal       KIDNEY TRANSPLANT  04/15/2015    KIDNEY TRANSPLANT N/A 2021    Procedure: TRANSPLANT, KIDNEY;  Surgeon: Fam Sutton MD;  Location: [address];  Service: Transplant;  Laterality: N/A;    NEPHRECTOMY      OVARIAN CYST REMOVAL      PARATHYROIDECTOMY      partial     PERCUTANEOUS NEPHROSTOMY Left 2021    Procedure: Creation, Nephrostomy, Percutaneous;  Surgeon: Elen Surgeon;  Location: [facility];  Service: " Anesthesiology;  Laterality: Left;    right leg hemodialysis access      transplant nephrectomy  12/01/2017       FAMILY HISTORY:  Family History   Problem Relation Age of Onset    No Known Problems Mother     Colon cancer Father     Cancer Father     Hypertension Father     No Known Problems Sister     No Known Problems Brother     Kidney disease Maternal Aunt     Hypertension Maternal Aunt     Diabetes Maternal Uncle     Kidney disease Other     Hypertension Other         SOCIAL HISTORY:   reports that she quit smoking about 2 years ago. Her smoking use included cigarettes. She started smoking about 14 years ago. She has a 3.00 pack-year smoking history. She has never used smokeless tobacco. She reports that she does not drink alcohol and does not use drugs.     MEDICATIONS:  Current Outpatient Medications on File Prior to Visit   Medication Sig Dispense Refill    albuterol (PROVENTIL/VENTOLIN HFA) 90 mcg/actuation inhaler Inhale 2 puffs into the lungs every 4 (four) hours as needed for Wheezing or Shortness of Breath. Rescue 8.5 g 11    calcitRIOL (ROCALTROL) 0.25 MCG Cap Take 1 capsule (0.25 mcg total) by mouth once daily. 30 capsule 11    ergocalciferol (VITAMIN D2) 50,000 unit Cap Take 1 capsule (50,000 Units total) by mouth every 30 days. 1 capsule 11    labetaloL (NORMODYNE) 200 MG tablet Take 1 tablet (200 mg total) by mouth 2 (two) times daily. 60 tablet 10    magnesium oxide (MAG-OX) 400 mg (241.3 mg magnesium) tablet Take 1 tablet (400 mg total) by mouth 2 (two) times daily. 60 tablet 11    mycophenolate (CELLCEPT) 250 mg Cap Take 4 capsules (1,000 mg total) by mouth 2 (two) times daily. 240 capsule 11    NIFEdipine (PROCARDIA-XL) 30 MG (OSM) 24 hr tablet Take 1 tablet (30 mg total) by mouth 2 (two) times a day. 60 tablet 11    olmesartan (BENICAR) 20 MG tablet Take 1 tablet (20 mg total) by mouth once daily. 30 tablet 11    pantoprazole (PROTONIX) 40 MG tablet Take 1 tablet (40 mg  total) by mouth 2 (two) times daily. 120 tablet 0    predniSONE (DELTASONE) 5 MG tablet Take 1 tablet (5 mg total) by mouth once daily. 120 tablet 11    sars-cov-2, covid-19, (MODERNA COVID-19) 100 mcg/0.5 ml injection Inject 0.5 mLs into the muscle. 0.5 mL 0    sodium bicarbonate 650 MG tablet Take 2 tablets (1,300 mg total) by mouth 3 (three) times daily. 180 tablet 11    tacrolimus (PROGRAF) 1 MG Cap Take 3 capsules (3 mg total) by mouth every morning AND 2 capsules (2 mg total) every evening. 150 capsule 11     Current Facility-Administered Medications on File Prior to Visit   Medication Dose Route Frequency Provider Last Rate Last Admin    acetaminophen tablet 650 mg  650 mg Oral Once PRN Govind Garcia MD        albuterol inhaler 2 puff  2 puff Inhalation Q20 Min PRN Govind Garcia MD        diphenhydrAMINE injection 25 mg  25 mg Intravenous Once PRN Govind Garcia MD        EPINEPHrine (EPIPEN) 0.3 mg/0.3 mL pen injection 0.3 mg  0.3 mg Intramuscular PRN Govind Garcia MD        lactated ringers infusion   Intravenous Continuous Cal Parekh MD 10 mL/hr at 06/23/22 0920 Restarted at 06/23/22 1000    methylPREDNISolone sodium succinate injection 40 mg  40 mg Intravenous Once PRN Govind Garcia MD        ondansetron disintegrating tablet 4 mg  4 mg Oral Once PRN Govind Garcia MD        sodium chloride 0.9% 500 mL flush bag   Intravenous PRN Govind Garcia MD        sodium chloride 0.9% flush 10 mL  10 mL Intravenous PRN Govind Garcia MD         Medications have been reviewed.    ALLERGIES:  Review of patient's allergies indicates:   Allergen Reactions    Heparin analogues Rash     Allergies have been reviewed.    ROS:  Review of Systems   Constitutional: Negative for activity change, appetite change, chills, diaphoresis, fatigue, fever and unexpected weight change.   HENT: Negative for congestion, dental problem, rhinorrhea and sore throat.    Eyes: Negative for visual  disturbance.   Respiratory: Negative for cough, chest tightness, shortness of breath and wheezing.    Cardiovascular: Negative for chest pain, palpitations and leg swelling.   Gastrointestinal: Negative for abdominal distention, abdominal pain, constipation, diarrhea, nausea and vomiting.   Endocrine: Negative for cold intolerance, heat intolerance, polydipsia, polyphagia and polyuria.   Genitourinary: Negative for difficulty urinating, dysuria, frequency, hematuria and urgency.   Musculoskeletal: Negative for arthralgias, gait problem, myalgias and neck pain.   Skin: Negative for color change, pallor, rash and wound.   Neurological: Negative for dizziness, syncope, weakness, light-headedness, numbness and headaches.   Hematological: Negative for adenopathy. Does not bruise/bleed easily.   Psychiatric/Behavioral: Negative for confusion, decreased concentration and sleep disturbance. The patient is not nervous/anxious.        PE:  Physical Exam  Vitals reviewed.   Constitutional:       General: She is not in acute distress.     Appearance: She is well-developed. She is not diaphoretic.   HENT:      Head: Normocephalic and atraumatic.      Right Ear: External ear normal.      Left Ear: External ear normal.   Eyes:      General: No scleral icterus.     Conjunctiva/sclera: Conjunctivae normal.      Pupils: Pupils are equal, round, and reactive to light.   Neck:      Thyroid: No thyromegaly.      Trachea: No tracheal deviation.   Cardiovascular:      Rate and Rhythm: Normal rate and regular rhythm.      Heart sounds: Normal heart sounds. No murmur heard.    No friction rub. No gallop.   Pulmonary:      Effort: Pulmonary effort is normal. No respiratory distress.      Breath sounds: Normal breath sounds. No wheezing or rales.   Chest:      Chest wall: No tenderness.   Abdominal:      General: Bowel sounds are normal. There is no distension.      Palpations: Abdomen is soft.      Tenderness: There is no abdominal  tenderness.      Hernia: No hernia is present.      Comments: Well healed surgical scars   Musculoskeletal:         General: No tenderness or deformity. Normal range of motion.      Cervical back: Normal range of motion and neck supple.   Lymphadenopathy:      Cervical: No cervical adenopathy.   Skin:     General: Skin is warm and dry.      Coloration: Skin is not pale.      Findings: No erythema or rash.   Neurological:      Mental Status: She is alert and oriented to person, place, and time.   Psychiatric:         Behavior: Behavior normal.         Thought Content: Thought content normal.         Judgment: Judgment normal.       EGD:  Impression:            - LA Grade C reflux esophagitis.                          - Small hiatal hernia.                          - Gastritis. Biopsied.                          - Duodenitis.                          - Normal first portion of the duodenum and second                          portion of the duodenum.     DIAGNOSIS:  1. Morbid obesity with a BMI of 42 and inability to lose weight.  2. Co-morbidities: hypertension    PLAN:  Robotic sleeve gastrectomy 09/27/2022  Preop:  CBC, CMP, EKG  I have outlined the risks of the procedures including, but not limited to, bleeding, slippage, erosion, stricture, death, deep venous thrombosis, pulmonary embolus, healing issues including intra-abdominal leakage or perforation with abscess or need for drainage or reoperation, wound infection, need for open surgery, injury to intra-abdominal organs or bleeding requiring transfusion, intensive care unit care, and possible prolonged hospitalization.    Other long-term issues were discussed including, but not limited to, permanent change in volume of food and type of foods eaten and importance of ongoing long-term followup.  I advised the patient that if they can lose weight before surgery, it will reduce her operative risk.  The patient understands and wishes to proceed.    EGD hiatal  hernia/reflux esophagitis we discussed how reflux can be affected with sleeve gastrectomy as well as with weight loss potential for improvement but also potential for worsening of symptoms or staying the same.  We discussed how sleeve gastrectomy affects her ability to undergo future surgeries for reflux and what possibilities there are for this should she have worsening.  She would like to proceed with sleeve gastrectomy and is okay with risks and benefits    HTN - stable/medical mgmt    Kidney transplant Nephrology clearance.     used for entirety of visit    Referring Physician: No ref. provider found  RTC: As scheduled.    40minutes of total time spent on the encounter, which includes face to face time and non-face to face time preparing to see the patient (eg, review of tests), Obtaining and/or reviewing separately obtained history, Documenting clinical information in the electronic or other health record, Independently interpreting results (not separately reported) and communicating results to the patient/family/caregiver, or Care coordination (not separately reported).

## 2022-09-01 ENCOUNTER — TELEPHONE (OUTPATIENT)
Dept: NEPHROLOGY | Facility: CLINIC | Age: 32
End: 2022-09-01
Payer: MEDICARE

## 2022-09-01 NOTE — TELEPHONE ENCOUNTER
----- Message from Alejandra Rodarte LPN sent at 9/1/2022 12:08 PM CDT -----  Regarding: FW: speak with office  Contact: bill    ----- Message -----  From: Andrei Siegel RN  Sent: 9/1/2022  11:31 AM CDT  To: Alejandra Rodarte LPN  Subject: FW: speak with office                            Patient would like to reschedule her 9/29  labs and 10/6 clinic appointment with Dr Jaffe .  ----- Message -----  From: Lisa Pereira  Sent: 9/1/2022  11:06 AM CDT  To: [facility]  Subject: speak with office                                Pt is calling to speak with her coordinator Andrei to get an appt ....translation line used .    Bill [phone]

## 2022-09-07 ENCOUNTER — LAB VISIT (OUTPATIENT)
Dept: LAB | Facility: HOSPITAL | Age: 32
End: 2022-09-07
Attending: INTERNAL MEDICINE
Payer: MEDICARE

## 2022-09-07 DIAGNOSIS — N18.2 CKD (CHRONIC KIDNEY DISEASE) STAGE 2, GFR 60-89 ML/MIN: ICD-10-CM

## 2022-09-07 DIAGNOSIS — D84.9 IMMUNOSUPPRESSION: ICD-10-CM

## 2022-09-07 DIAGNOSIS — Z94.0 KIDNEY TRANSPLANT STATUS: ICD-10-CM

## 2022-09-07 DIAGNOSIS — N18.5 CHRONIC KIDNEY DISEASE, STAGE 5: ICD-10-CM

## 2022-09-07 LAB
25(OH)D3+25(OH)D2 SERPL-MCNC: 22 NG/ML (ref 30–96)
ALBUMIN SERPL BCP-MCNC: 3.6 G/DL (ref 3.5–5.2)
ANION GAP SERPL CALC-SCNC: 10 MMOL/L (ref 8–16)
BUN SERPL-MCNC: 21 MG/DL (ref 6–20)
CALCIUM SERPL-MCNC: 9.3 MG/DL (ref 8.7–10.5)
CHLORIDE SERPL-SCNC: 106 MMOL/L (ref 95–110)
CO2 SERPL-SCNC: 23 MMOL/L (ref 23–29)
CREAT SERPL-MCNC: 1.3 MG/DL (ref 0.5–1.4)
EST. GFR  (NO RACE VARIABLE): 56.4 ML/MIN/1.73 M^2
GLUCOSE SERPL-MCNC: 81 MG/DL (ref 70–110)
MAGNESIUM SERPL-MCNC: 1.6 MG/DL (ref 1.6–2.6)
PHOSPHATE SERPL-MCNC: 3.8 MG/DL (ref 2.7–4.5)
POTASSIUM SERPL-SCNC: 4.3 MMOL/L (ref 3.5–5.1)
PTH-INTACT SERPL-MCNC: 67.7 PG/ML (ref 9–77)
SODIUM SERPL-SCNC: 139 MMOL/L (ref 136–145)

## 2022-09-07 PROCEDURE — 36415 COLL VENOUS BLD VENIPUNCTURE: CPT | Performed by: INTERNAL MEDICINE

## 2022-09-07 PROCEDURE — 83970 ASSAY OF PARATHORMONE: CPT | Performed by: INTERNAL MEDICINE

## 2022-09-07 PROCEDURE — 83735 ASSAY OF MAGNESIUM: CPT | Performed by: INTERNAL MEDICINE

## 2022-09-07 PROCEDURE — 80069 RENAL FUNCTION PANEL: CPT | Performed by: INTERNAL MEDICINE

## 2022-09-07 PROCEDURE — 82306 VITAMIN D 25 HYDROXY: CPT | Performed by: INTERNAL MEDICINE

## 2022-09-13 ENCOUNTER — TELEPHONE (OUTPATIENT)
Dept: PREADMISSION TESTING | Facility: HOSPITAL | Age: 32
End: 2022-09-13
Payer: MEDICARE

## 2022-09-13 NOTE — TELEPHONE ENCOUNTER
Pre op instructions reviewed with Pt per phone: Spoke about pre op process and surgery instructions, verbalized understanding.  Language Line [phone]    To confirm, Surgery is scheduled on 9/27/22. We will call you the business day (after 2:30 pm) prior to surgery to confirm arrival time as it is subject to change due to cancellations/ emergencies.    Please report to the [facility] (1st Floor) at [facility] located off of [facility] (2nd building on the left, in front of the [facility]).  Address: [address] [place] [address] [place]. [address]        INSTRUCTIONS IMPORTANT!!!  Do Not Eat, Drink, or Smoke after 12 midnight unless instructed otherwise by your Surgeon. OK to brush teeth, no gum, candy or mints!      *Take Only these medications with a small sip of water Morning of Surgery:  Albuterol inhaler  Cellcept  Labetalol  Nifedipine  Tacrolimus      ____  Stop all Aspirin products, Ibuprofen, Advil, Motrin & Aleve at least 7 days prior to  surgery, unless otherwise instructed by your Physician office (May use Tylenol).  ____  Stop taking any Fish Oil/ Vitamins /Supplements at least 7 days prior to surgery, unless instructed otherwise by your Physician office.  ____  NO Acrylic/fake nails or nail polish worn day of surgery (specifically hand/arm & foot surgeries).  ____  NO powder, lotions, deodorants, oils or cream on body.  ____  Remove jewelry & piercings prior to surgery.  ____  Dentures, Hearing Aids & Contact Lens will need to be removed prior to the       start of surgery.  ____  Bring photo ID and insurance information to hospital (Leave Valuables at Home).  ____  If going home the same day, arrange for a ride home. You will not be able to drive 24 hrs if Anesthesia was used.   ____  Females (ages 11-60) may need to give a urine sample the morning of surgery; please see Pre op Nurse prior to using the restroom.  ____  Wear clean, loose fitting clothing to allow for dressings/ bandages.             Diabetic Patients: If you take diabetic medication, do NOT take morning of surgery unless instructed by Doctor. Metformin to be stopped 24 hrs prior to surgery time. DO NOT take long-acting insulin the evening before surgery. Blood sugars will be checked in pre-op by Nurse.    Bathing Instructions:    -Do not shave your face or body the day before or the day of surgery unless instructed otherwise by your Surgeon.  -Do not shave pubic hair 7 days prior to surgery (gyn pt's).   -Shower & Rinse your body as usual with anti-bacterial Soap (Dial, Lever 2000,  or Hibiclens)   -Do not use Hibiclens on your face (Females should avoid genital area as well).   -Do not wash with anti-bacterial soap after you use the Hibiclens.   -Rinse & dry your body thoroughly. Apply clean clothes after shower.    [facility] Visitor/Ride Policy:  Only 2 adults allowed (over the age of 18) to accompany you to the Hospital. You Must have a ride home from a responsible adult that you know and trust. Medical Transport, Uber or Lyft can only be used if patient has a responsible adult to accompany them during ride home.    Discharge Instructions: You will receive Post-op/Discharge instructions by your Discharge Nurse prior to going home. Please call your Surgeon's office with any post-surgery questions/concerns @ [phone].    *If you are running late or have questions the morning of surgery, please call the Surgery Dept @ [phone]  *Insurance/ Financial Questions, please call [phone]    Thank you,  -[facility] Pre Admit Testing Nurse  [phone] or [phone]  M-F 7:30 am-4 pm

## 2022-09-14 ENCOUNTER — TELEPHONE (OUTPATIENT)
Dept: NEPHROLOGY | Facility: CLINIC | Age: 32
End: 2022-09-14
Payer: MEDICARE

## 2022-09-14 DIAGNOSIS — Z94.0 DECEASED-DONOR KIDNEY TRANSPLANT: Primary | ICD-10-CM

## 2022-09-14 DIAGNOSIS — N18.2 CKD (CHRONIC KIDNEY DISEASE) STAGE 2, GFR 60-89 ML/MIN: ICD-10-CM

## 2022-09-16 ENCOUNTER — LAB VISIT (OUTPATIENT)
Dept: LAB | Facility: HOSPITAL | Age: 32
End: 2022-09-16
Attending: INTERNAL MEDICINE
Payer: MEDICARE

## 2022-09-16 DIAGNOSIS — Z94.0 DECEASED-DONOR KIDNEY TRANSPLANT: ICD-10-CM

## 2022-09-16 DIAGNOSIS — N18.2 CKD (CHRONIC KIDNEY DISEASE) STAGE 2, GFR 60-89 ML/MIN: ICD-10-CM

## 2022-09-16 LAB
ALBUMIN SERPL BCP-MCNC: 3.9 G/DL (ref 3.5–5.2)
ANION GAP SERPL CALC-SCNC: 8 MMOL/L (ref 8–16)
BUN SERPL-MCNC: 33 MG/DL (ref 6–20)
CALCIUM SERPL-MCNC: 9.5 MG/DL (ref 8.7–10.5)
CHLORIDE SERPL-SCNC: 105 MMOL/L (ref 95–110)
CO2 SERPL-SCNC: 21 MMOL/L (ref 23–29)
CREAT SERPL-MCNC: 1.5 MG/DL (ref 0.5–1.4)
EST. GFR  (NO RACE VARIABLE): 47.5 ML/MIN/1.73 M^2
GLUCOSE SERPL-MCNC: 84 MG/DL (ref 70–110)
MAGNESIUM SERPL-MCNC: 2 MG/DL (ref 1.6–2.6)
PHOSPHATE SERPL-MCNC: 3.8 MG/DL (ref 2.7–4.5)
POTASSIUM SERPL-SCNC: 4.2 MMOL/L (ref 3.5–5.1)
PTH-INTACT SERPL-MCNC: 69.5 PG/ML (ref 9–77)
SODIUM SERPL-SCNC: 134 MMOL/L (ref 136–145)

## 2022-09-16 PROCEDURE — 83735 ASSAY OF MAGNESIUM: CPT | Performed by: INTERNAL MEDICINE

## 2022-09-16 PROCEDURE — 80197 ASSAY OF TACROLIMUS: CPT | Performed by: INTERNAL MEDICINE

## 2022-09-16 PROCEDURE — 83970 ASSAY OF PARATHORMONE: CPT | Performed by: INTERNAL MEDICINE

## 2022-09-16 PROCEDURE — 36415 COLL VENOUS BLD VENIPUNCTURE: CPT | Performed by: INTERNAL MEDICINE

## 2022-09-16 PROCEDURE — 80069 RENAL FUNCTION PANEL: CPT | Performed by: INTERNAL MEDICINE

## 2022-09-17 LAB — TACROLIMUS BLD-MCNC: 10.9 NG/ML (ref 5–15)

## 2022-09-21 ENCOUNTER — OFFICE VISIT (OUTPATIENT)
Dept: NEPHROLOGY | Facility: CLINIC | Age: 32
End: 2022-09-21
Payer: MEDICARE

## 2022-09-21 VITALS
WEIGHT: 252.88 LBS | HEIGHT: 66 IN | BODY MASS INDEX: 40.64 KG/M2 | HEART RATE: 86 BPM | SYSTOLIC BLOOD PRESSURE: 90 MMHG | DIASTOLIC BLOOD PRESSURE: 52 MMHG

## 2022-09-21 DIAGNOSIS — I10 PRIMARY HYPERTENSION: ICD-10-CM

## 2022-09-21 DIAGNOSIS — Z94.0 KIDNEY TRANSPLANT STATUS: Primary | ICD-10-CM

## 2022-09-21 DIAGNOSIS — N25.81 SECONDARY HYPERPARATHYROIDISM OF RENAL ORIGIN: ICD-10-CM

## 2022-09-21 DIAGNOSIS — E83.59 OTHER DISORDERS OF CALCIUM METABOLISM: ICD-10-CM

## 2022-09-21 DIAGNOSIS — D84.9 IMMUNOSUPPRESSION: ICD-10-CM

## 2022-09-21 DIAGNOSIS — N39.0 UTI (URINARY TRACT INFECTION), UNCOMPLICATED: ICD-10-CM

## 2022-09-21 PROCEDURE — 99999 PR PBB SHADOW E&M-EST. PATIENT-LVL IV: CPT | Mod: PBBFAC,,, | Performed by: INTERNAL MEDICINE

## 2022-09-21 PROCEDURE — 99999 PR PBB SHADOW E&M-EST. PATIENT-LVL IV: ICD-10-PCS | Mod: PBBFAC,,, | Performed by: INTERNAL MEDICINE

## 2022-09-21 PROCEDURE — 99214 OFFICE O/P EST MOD 30 MIN: CPT | Mod: PBBFAC | Performed by: INTERNAL MEDICINE

## 2022-09-21 PROCEDURE — 99215 OFFICE O/P EST HI 40 MIN: CPT | Mod: S$PBB,,, | Performed by: INTERNAL MEDICINE

## 2022-09-21 PROCEDURE — 99215 PR OFFICE/OUTPT VISIT, EST, LEVL V, 40-54 MIN: ICD-10-PCS | Mod: S$PBB,,, | Performed by: INTERNAL MEDICINE

## 2022-09-21 RX ORDER — CIPROFLOXACIN 500 MG/1
500 TABLET ORAL EVERY 12 HOURS
Qty: 14 TABLET | Refills: 0 | Status: SHIPPED | OUTPATIENT
Start: 2022-09-21 | End: 2023-01-18 | Stop reason: SDUPTHER

## 2022-09-21 NOTE — PROGRESS NOTES
"Subjective:       Patient ID: Leydi Cordero is a 32 y.o. female.    Chief Complaint: Kidney Transplant    HPI    She presents to clinic today for routine follow-up.  Translation was handled by the [employer] system.  Since her last office visit she has been doing well.  Her only complaint today is that she has some symptoms of dysuria.  Her laboratory studies and medications were reviewed.  All Nephrology related questions were answered to her satisfaction.    She is having bariatric surgery within the next week.  She was encouraged to drink more fluids.  She will need to monitor her hydration status closely.      Review of Systems   Constitutional: Negative.    HENT: Negative.     Eyes: Negative.    Respiratory: Negative.     Cardiovascular: Negative.    Gastrointestinal: Negative.    Genitourinary:  Positive for dysuria.   Musculoskeletal: Negative.    Skin: Negative.    Neurological: Negative.        BP (!) 90/52   Pulse 86   Ht 5' 6" (1.676 m)   Wt 114.7 kg (252 lb 13.9 oz)   LMP 08/31/2022   BMI 40.81 kg/m²     Lab Results   Component Value Date    WBC 8.38 09/07/2022    HGB 13.4 09/07/2022    HCT 40.9 09/07/2022    MCV 90 09/07/2022     09/07/2022      BMP  Lab Results   Component Value Date     (L) 09/16/2022    K 4.2 09/16/2022     09/16/2022    CO2 21 (L) 09/16/2022    BUN 33 (H) 09/16/2022    CREATININE 1.5 (H) 09/16/2022    CALCIUM 9.5 09/16/2022    ANIONGAP 8 09/16/2022    ESTGFRAFRICA >60.0 07/11/2022    EGFRNONAA >60.0 07/11/2022     CMP  Sodium   Date Value Ref Range Status   09/16/2022 134 (L) 136 - 145 mmol/L Final     Potassium   Date Value Ref Range Status   09/16/2022 4.2 3.5 - 5.1 mmol/L Final     Chloride   Date Value Ref Range Status   09/16/2022 105 95 - 110 mmol/L Final     CO2   Date Value Ref Range Status   09/16/2022 21 (L) 23 - 29 mmol/L Final     Glucose   Date Value Ref Range Status   09/16/2022 84 70 - 110 mg/dL Final     BUN   Date Value Ref Range Status "   09/16/2022 33 (H) 6 - 20 mg/dL Final     Creatinine   Date Value Ref Range Status   09/16/2022 1.5 (H) 0.5 - 1.4 mg/dL Final     Calcium   Date Value Ref Range Status   09/16/2022 9.5 8.7 - 10.5 mg/dL Final     Total Protein   Date Value Ref Range Status   09/07/2022 6.4 6.0 - 8.4 g/dL Final     Albumin   Date Value Ref Range Status   09/16/2022 3.9 3.5 - 5.2 g/dL Final     Total Bilirubin   Date Value Ref Range Status   09/07/2022 0.3 0.1 - 1.0 mg/dL Final     Comment:     For infants and newborns, interpretation of results should be based  on gestational age, weight and in agreement with clinical  observations.    Premature Infant recommended reference ranges:  Up to 24 hours.............<8.0 mg/dL  Up to 48 hours............<12.0 mg/dL  3-5 days..................<15.0 mg/dL  6-29 days.................<15.0 mg/dL       Alkaline Phosphatase   Date Value Ref Range Status   09/07/2022 82 55 - 135 U/L Final     AST   Date Value Ref Range Status   09/07/2022 14 10 - 40 U/L Final     ALT   Date Value Ref Range Status   09/07/2022 16 10 - 44 U/L Final     Anion Gap   Date Value Ref Range Status   09/16/2022 8 8 - 16 mmol/L Final     eGFR if    Date Value Ref Range Status   07/11/2022 >60.0 >60 mL/min/1.73 m^2 Final     eGFR if non    Date Value Ref Range Status   07/11/2022 >60.0 >60 mL/min/1.73 m^2 Final     Comment:     Calculation used to obtain the estimated glomerular filtration  rate (eGFR) is the CKD-EPI equation.        Current Outpatient Medications on File Prior to Visit   Medication Sig Dispense Refill    albuterol (PROVENTIL/VENTOLIN HFA) 90 mcg/actuation inhaler Inhale 2 puffs into the lungs every 4 (four) hours as needed for Wheezing or Shortness of Breath. Rescue 8.5 g 11    calcitRIOL (ROCALTROL) 0.25 MCG Cap Take 1 capsule (0.25 mcg total) by mouth once daily. 30 capsule 11    ergocalciferol (VITAMIN D2) 50,000 unit Cap Take 1 capsule (50,000 Units total) by mouth every  30 days. 1 capsule 11    labetaloL (NORMODYNE) 200 MG tablet Take 1 tablet (200 mg total) by mouth 2 (two) times daily. 60 tablet 10    magnesium oxide (MAG-OX) 400 mg (241.3 mg magnesium) tablet Take 1 tablet (400 mg total) by mouth 2 (two) times daily. 60 tablet 11    mycophenolate (CELLCEPT) 250 mg Cap Take 4 capsules (1,000 mg total) by mouth 2 (two) times daily. 240 capsule 11    NIFEdipine (PROCARDIA-XL) 30 MG (OSM) 24 hr tablet Take 1 tablet (30 mg total) by mouth 2 (two) times a day. 60 tablet 11    olmesartan (BENICAR) 20 MG tablet Take 1 tablet (20 mg total) by mouth once daily. 30 tablet 11    pantoprazole (PROTONIX) 40 MG tablet Take 1 tablet (40 mg total) by mouth 2 (two) times daily. 120 tablet 0    predniSONE (DELTASONE) 5 MG tablet Take 1 tablet (5 mg total) by mouth once daily. 120 tablet 11    sars-cov-2, covid-19, (MODERNA COVID-19) 100 mcg/0.5 ml injection Inject 0.5 mLs into the muscle. 0.5 mL 0    sodium bicarbonate 650 MG tablet Take 2 tablets (1,300 mg total) by mouth 3 (three) times daily. 180 tablet 11    tacrolimus (PROGRAF) 1 MG Cap Take 3 capsules (3 mg total) by mouth every morning AND 2 capsules (2 mg total) every evening. 150 capsule 11     Current Facility-Administered Medications on File Prior to Visit   Medication Dose Route Frequency Provider Last Rate Last Admin    acetaminophen tablet 650 mg  650 mg Oral Once PRN Govind Garcia MD        albuterol inhaler 2 puff  2 puff Inhalation Q20 Min PRN Govind Garcia MD        diphenhydrAMINE injection 25 mg  25 mg Intravenous Once PRN Govind Garcia MD        EPINEPHrine (EPIPEN) 0.3 mg/0.3 mL pen injection 0.3 mg  0.3 mg Intramuscular PRN Govind Garcia MD        lactated ringers infusion   Intravenous Continuous Cal Parekh MD 10 mL/hr at 06/23/22 0920 Restarted at 06/23/22 1000    methylPREDNISolone sodium succinate injection 40 mg  40 mg Intravenous Once PRN Govind Garcia MD        ondansetron disintegrating tablet  4 mg  4 mg Oral Once PRN Govind Garcia MD        sodium chloride 0.9% 500 mL flush bag   Intravenous PRN Govind Garcia MD        sodium chloride 0.9% flush 10 mL  10 mL Intravenous PRN Govind Garcia MD                Objective:            Physical Exam  Constitutional:       Appearance: Normal appearance.   HENT:      Head: Normocephalic and atraumatic.   Eyes:      General: No scleral icterus.     Extraocular Movements: Extraocular movements intact.      Pupils: Pupils are equal, round, and reactive to light.   Pulmonary:      Effort: Pulmonary effort is normal.      Breath sounds: No stridor.   Musculoskeletal:      Right lower leg: No edema.      Left lower leg: No edema.   Skin:     General: Skin is warm and dry.   Neurological:      General: No focal deficit present.      Mental Status: She is alert and oriented to person, place, and time.   Psychiatric:         Mood and Affect: Mood normal.         Behavior: Behavior normal.       Assessment:       1. Kidney transplant status    2. Immunosuppression    3. Primary hypertension    4. Secondary hyperparathyroidism of renal origin    5. UTI (urinary tract infection), uncomplicated          Plan:         1. She received a kidney transplant in January of 2021.  Creatinine has fluctuated between 1.0 and 1.5.  She was encouraged to drink more water.    2. Prograf level was 10.9.  Will decrease her Prograf to 2 mg in the morning and 2 mg in the evening.  Will plan to recheck Prograf level in 2 weeks.  She continues on mycophenolate 1 g twice a day and prednisone 5 mg a day.    3. Blood pressure is on the low side today.  Will continue to monitor.    4. Intact PTH is now within normal range.  Vitamin-D level is 22.  Phosphorus was 3.8.  Calcium 9.5 with an albumin of 3.9.    5. Urinalysis shows evidence of a urinary tract infection.  She complains of symptoms of dysuria.  She is taken ciprofloxacin in the past without difficulty.  Will give her a 7 day  course of ciprofloxacin 500 mg twice a day.  Will plan to recheck a urinalysis in 2 weeks.      Approximately 45 minutes was spent in face-to-face conversation and chart review.    Julio Jaffe MD

## 2022-09-26 ENCOUNTER — TELEPHONE (OUTPATIENT)
Dept: PREADMISSION TESTING | Facility: HOSPITAL | Age: 32
End: 2022-09-26
Payer: MEDICARE

## 2022-09-26 ENCOUNTER — ANESTHESIA EVENT (OUTPATIENT)
Dept: SURGERY | Facility: HOSPITAL | Age: 32
DRG: 620 | End: 2022-09-26
Payer: MEDICARE

## 2022-09-26 NOTE — TELEPHONE ENCOUNTER
----- Message from Alvaro Sebastian LPN sent at 9/26/2022  2:08 PM CDT -----  Regarding: FW: surgery 9/27/22  Good afternoon Per Dr. Jaffe, yes she is cleared.  Thank you  ----- Message -----  From: Julio Jaffe MD  Sent: 9/26/2022   9:37 AM CDT  To: Alvaro Sebastian LPN  Subject: RE: surgery 9/27/22                              Yes, she is cleared from a renal standpoint.    Thanks  ----- Message -----  From: Alvaro Sebastian LPN  Sent: 9/26/2022   9:24 AM CDT  To: Julio Jaffe MD  Subject: FW: surgery 9/27/22                              Please advise if patient is cleared for surgery  ----- Message -----  From: Nancy Montano RN  Sent: 9/26/2022   9:00 AM CDT  To: Ld Rush  Subject: surgery 9/27/22                                  Good Morning.    This Pt was seen by Dr. Jaffe on 9/21/22.   Is this patient cleared from a Nephrology standpoint for a robotic sleeve gastrectomy?  Surgery scheduled for 9/27/22.  Please Advise    PAT dept

## 2022-09-26 NOTE — TELEPHONE ENCOUNTER
Called and spoke with Pt about the following:   Language Line Used    Please arrive to [facility] main lobby at 8:00am on 9/27/22 for your scheduled procedure. NOTHING to eat or drink after midnight unless instructed otherwise by your Surgeon. Take only the medications instructed by your Pre Admit Nurse with small sip of water.

## 2022-09-27 ENCOUNTER — HOSPITAL ENCOUNTER (INPATIENT)
Facility: HOSPITAL | Age: 32
LOS: 1 days | Discharge: HOME | DRG: 620 | End: 2022-09-28
Attending: SURGERY | Admitting: SURGERY
Payer: MEDICARE

## 2022-09-27 ENCOUNTER — ANESTHESIA (OUTPATIENT)
Dept: SURGERY | Facility: HOSPITAL | Age: 32
DRG: 620 | End: 2022-09-27
Payer: MEDICARE

## 2022-09-27 DIAGNOSIS — E66.01 MORBID OBESITY WITH BMI OF 40.0-44.9, ADULT: ICD-10-CM

## 2022-09-27 DIAGNOSIS — N18.2 CKD (CHRONIC KIDNEY DISEASE) STAGE 2, GFR 60-89 ML/MIN: Primary | ICD-10-CM

## 2022-09-27 DIAGNOSIS — I10 ESSENTIAL HYPERTENSION: ICD-10-CM

## 2022-09-27 LAB
ANION GAP SERPL CALC-SCNC: 12 MMOL/L (ref 8–16)
B-HCG UR QL: NEGATIVE
BUN SERPL-MCNC: 31 MG/DL (ref 6–20)
CALCIUM SERPL-MCNC: 8.7 MG/DL (ref 8.7–10.5)
CHLORIDE SERPL-SCNC: 110 MMOL/L (ref 95–110)
CO2 SERPL-SCNC: 15 MMOL/L (ref 23–29)
CREAT SERPL-MCNC: 1 MG/DL (ref 0.5–1.4)
CTP QC/QA: YES
EST. GFR  (NO RACE VARIABLE): >60 ML/MIN/1.73 M^2
GLUCOSE SERPL-MCNC: 116 MG/DL (ref 70–110)
POTASSIUM SERPL-SCNC: 4.4 MMOL/L (ref 3.5–5.1)
SODIUM SERPL-SCNC: 137 MMOL/L (ref 136–145)

## 2022-09-27 PROCEDURE — 37000009 HC ANESTHESIA EA ADD 15 MINS: Performed by: SURGERY

## 2022-09-27 PROCEDURE — 63600175 PHARM REV CODE 636 W HCPCS: Performed by: ANESTHESIOLOGY

## 2022-09-27 PROCEDURE — 63600175 PHARM REV CODE 636 W HCPCS: Performed by: FAMILY MEDICINE

## 2022-09-27 PROCEDURE — 88307 TISSUE EXAM BY PATHOLOGIST: CPT | Performed by: PATHOLOGY

## 2022-09-27 PROCEDURE — 71000033 HC RECOVERY, INTIAL HOUR: Performed by: SURGERY

## 2022-09-27 PROCEDURE — 81025 URINE PREGNANCY TEST: CPT | Performed by: SURGERY

## 2022-09-27 PROCEDURE — 80048 BASIC METABOLIC PNL TOTAL CA: CPT | Performed by: SURGERY

## 2022-09-27 PROCEDURE — 71000039 HC RECOVERY, EACH ADD'L HOUR: Performed by: SURGERY

## 2022-09-27 PROCEDURE — 43775 PR LAP, GAST RESTRICT PROC, LONGITUDINAL GASTRECTOMY: ICD-10-PCS | Mod: ,,, | Performed by: SURGERY

## 2022-09-27 PROCEDURE — C1894 INTRO/SHEATH, NON-LASER: HCPCS | Performed by: SURGERY

## 2022-09-27 PROCEDURE — 37000008 HC ANESTHESIA 1ST 15 MINUTES: Performed by: SURGERY

## 2022-09-27 PROCEDURE — 25000003 PHARM REV CODE 250: Performed by: SURGERY

## 2022-09-27 PROCEDURE — 11000001 HC ACUTE MED/SURG PRIVATE ROOM

## 2022-09-27 PROCEDURE — 36000712 HC OR TIME LEV V 1ST 15 MIN: Performed by: SURGERY

## 2022-09-27 PROCEDURE — 36000713 HC OR TIME LEV V EA ADD 15 MIN: Performed by: SURGERY

## 2022-09-27 PROCEDURE — 99900035 HC TECH TIME PER 15 MIN (STAT)

## 2022-09-27 PROCEDURE — 25000003 PHARM REV CODE 250: Performed by: FAMILY MEDICINE

## 2022-09-27 PROCEDURE — C9113 INJ PANTOPRAZOLE SODIUM, VIA: HCPCS | Performed by: SURGERY

## 2022-09-27 PROCEDURE — 43775 LAP SLEEVE GASTRECTOMY: CPT | Mod: ,,, | Performed by: SURGERY

## 2022-09-27 PROCEDURE — 63600175 PHARM REV CODE 636 W HCPCS: Performed by: SURGERY

## 2022-09-27 PROCEDURE — 88307 PR  SURG PATH,LEVEL V: ICD-10-PCS | Mod: 26,,, | Performed by: PATHOLOGY

## 2022-09-27 PROCEDURE — 94761 N-INVAS EAR/PLS OXIMETRY MLT: CPT

## 2022-09-27 PROCEDURE — 27201423 OPTIME MED/SURG SUP & DEVICES STERILE SUPPLY: Performed by: SURGERY

## 2022-09-27 PROCEDURE — 88307 TISSUE EXAM BY PATHOLOGIST: CPT | Mod: 26,,, | Performed by: PATHOLOGY

## 2022-09-27 RX ORDER — DIPHENHYDRAMINE HYDROCHLORIDE 50 MG/ML
25 INJECTION INTRAMUSCULAR; INTRAVENOUS EVERY 6 HOURS PRN
Status: DISCONTINUED | OUTPATIENT
Start: 2022-09-27 | End: 2022-09-27 | Stop reason: HOSPADM

## 2022-09-27 RX ORDER — SODIUM CHLORIDE 0.9 % (FLUSH) 0.9 %
3 SYRINGE (ML) INJECTION
Status: DISCONTINUED | OUTPATIENT
Start: 2022-09-27 | End: 2022-09-28 | Stop reason: HOSPADM

## 2022-09-27 RX ORDER — OXYCODONE HYDROCHLORIDE 5 MG/1
5 TABLET ORAL
Status: DISCONTINUED | OUTPATIENT
Start: 2022-09-27 | End: 2022-09-27 | Stop reason: HOSPADM

## 2022-09-27 RX ORDER — ONDANSETRON 2 MG/ML
INJECTION INTRAMUSCULAR; INTRAVENOUS
Status: DISCONTINUED | OUTPATIENT
Start: 2022-09-27 | End: 2022-09-27

## 2022-09-27 RX ORDER — NEOSTIGMINE METHYLSULFATE 1 MG/ML
INJECTION, SOLUTION INTRAVENOUS
Status: DISCONTINUED | OUTPATIENT
Start: 2022-09-27 | End: 2022-09-27

## 2022-09-27 RX ORDER — PANTOPRAZOLE SODIUM 40 MG/10ML
40 INJECTION, POWDER, LYOPHILIZED, FOR SOLUTION INTRAVENOUS DAILY
Status: DISCONTINUED | OUTPATIENT
Start: 2022-09-27 | End: 2022-09-28 | Stop reason: HOSPADM

## 2022-09-27 RX ORDER — MEPERIDINE HYDROCHLORIDE 25 MG/ML
12.5 INJECTION INTRAMUSCULAR; INTRAVENOUS; SUBCUTANEOUS ONCE
Status: DISCONTINUED | OUTPATIENT
Start: 2022-09-27 | End: 2022-09-27 | Stop reason: HOSPADM

## 2022-09-27 RX ORDER — BUPIVACAINE HYDROCHLORIDE 2.5 MG/ML
INJECTION, SOLUTION EPIDURAL; INFILTRATION; INTRACAUDAL
Status: DISCONTINUED | OUTPATIENT
Start: 2022-09-27 | End: 2022-09-27 | Stop reason: HOSPADM

## 2022-09-27 RX ORDER — SODIUM CHLORIDE 9 MG/ML
INJECTION, SOLUTION INTRAVENOUS CONTINUOUS
Status: DISCONTINUED | OUTPATIENT
Start: 2022-09-27 | End: 2022-09-27

## 2022-09-27 RX ORDER — LIDOCAINE HYDROCHLORIDE 10 MG/ML
1 INJECTION, SOLUTION EPIDURAL; INFILTRATION; INTRACAUDAL; PERINEURAL ONCE
Status: DISCONTINUED | OUTPATIENT
Start: 2022-09-27 | End: 2022-09-27 | Stop reason: HOSPADM

## 2022-09-27 RX ORDER — ROCURONIUM BROMIDE 10 MG/ML
INJECTION, SOLUTION INTRAVENOUS
Status: DISCONTINUED | OUTPATIENT
Start: 2022-09-27 | End: 2022-09-27

## 2022-09-27 RX ORDER — CHLORHEXIDINE GLUCONATE ORAL RINSE 1.2 MG/ML
10 SOLUTION DENTAL 2 TIMES DAILY
Status: DISCONTINUED | OUTPATIENT
Start: 2022-09-27 | End: 2022-09-28 | Stop reason: HOSPADM

## 2022-09-27 RX ORDER — HYDROMORPHONE HYDROCHLORIDE 2 MG/ML
0.2 INJECTION, SOLUTION INTRAMUSCULAR; INTRAVENOUS; SUBCUTANEOUS EVERY 5 MIN PRN
Status: DISCONTINUED | OUTPATIENT
Start: 2022-09-27 | End: 2022-09-27 | Stop reason: HOSPADM

## 2022-09-27 RX ORDER — DEXAMETHASONE SODIUM PHOSPHATE 4 MG/ML
INJECTION, SOLUTION INTRA-ARTICULAR; INTRALESIONAL; INTRAMUSCULAR; INTRAVENOUS; SOFT TISSUE
Status: DISCONTINUED | OUTPATIENT
Start: 2022-09-27 | End: 2022-09-27

## 2022-09-27 RX ORDER — MIDAZOLAM HYDROCHLORIDE 1 MG/ML
INJECTION, SOLUTION INTRAMUSCULAR; INTRAVENOUS
Status: DISCONTINUED | OUTPATIENT
Start: 2022-09-27 | End: 2022-09-27

## 2022-09-27 RX ORDER — FENTANYL CITRATE 50 UG/ML
INJECTION, SOLUTION INTRAMUSCULAR; INTRAVENOUS
Status: DISCONTINUED | OUTPATIENT
Start: 2022-09-27 | End: 2022-09-27

## 2022-09-27 RX ORDER — PREDNISONE 5 MG/1
5 TABLET ORAL DAILY
Status: DISCONTINUED | OUTPATIENT
Start: 2022-09-27 | End: 2022-09-28 | Stop reason: HOSPADM

## 2022-09-27 RX ORDER — HYDROMORPHONE HCL IN 0.9% NACL 6 MG/30 ML
PATIENT CONTROLLED ANALGESIA SYRINGE INTRAVENOUS CONTINUOUS
Status: DISCONTINUED | OUTPATIENT
Start: 2022-09-27 | End: 2022-09-28

## 2022-09-27 RX ORDER — MYCOPHENOLATE MOFETIL 250 MG/1
1000 CAPSULE ORAL 2 TIMES DAILY
Status: DISCONTINUED | OUTPATIENT
Start: 2022-09-27 | End: 2022-09-28 | Stop reason: HOSPADM

## 2022-09-27 RX ORDER — SODIUM CHLORIDE 9 MG/ML
INJECTION, SOLUTION INTRAVENOUS CONTINUOUS
Status: DISCONTINUED | OUTPATIENT
Start: 2022-09-27 | End: 2022-09-28 | Stop reason: HOSPADM

## 2022-09-27 RX ORDER — SUCCINYLCHOLINE CHLORIDE 20 MG/ML
INJECTION INTRAMUSCULAR; INTRAVENOUS
Status: DISCONTINUED | OUTPATIENT
Start: 2022-09-27 | End: 2022-09-27

## 2022-09-27 RX ORDER — TACROLIMUS 1 MG/1
2 CAPSULE ORAL 2 TIMES DAILY
Status: DISCONTINUED | OUTPATIENT
Start: 2022-09-27 | End: 2022-09-28 | Stop reason: HOSPADM

## 2022-09-27 RX ORDER — PHENYLEPHRINE HYDROCHLORIDE 10 MG/ML
INJECTION INTRAVENOUS
Status: DISCONTINUED | OUTPATIENT
Start: 2022-09-27 | End: 2022-09-27

## 2022-09-27 RX ORDER — ALBUTEROL SULFATE 0.83 MG/ML
2.5 SOLUTION RESPIRATORY (INHALATION) EVERY 4 HOURS PRN
Status: DISCONTINUED | OUTPATIENT
Start: 2022-09-27 | End: 2022-09-28 | Stop reason: HOSPADM

## 2022-09-27 RX ORDER — PROCHLORPERAZINE EDISYLATE 5 MG/ML
5 INJECTION INTRAMUSCULAR; INTRAVENOUS EVERY 30 MIN PRN
Status: DISCONTINUED | OUTPATIENT
Start: 2022-09-27 | End: 2022-09-27 | Stop reason: HOSPADM

## 2022-09-27 RX ORDER — NALOXONE HCL 0.4 MG/ML
0.02 VIAL (ML) INJECTION
Status: DISCONTINUED | OUTPATIENT
Start: 2022-09-27 | End: 2022-09-28 | Stop reason: HOSPADM

## 2022-09-27 RX ORDER — LIDOCAINE HYDROCHLORIDE 10 MG/ML
INJECTION, SOLUTION EPIDURAL; INFILTRATION; INTRACAUDAL; PERINEURAL
Status: DISCONTINUED | OUTPATIENT
Start: 2022-09-27 | End: 2022-09-27 | Stop reason: HOSPADM

## 2022-09-27 RX ORDER — LIDOCAINE HYDROCHLORIDE 20 MG/ML
INJECTION, SOLUTION EPIDURAL; INFILTRATION; INTRACAUDAL; PERINEURAL
Status: DISCONTINUED | OUTPATIENT
Start: 2022-09-27 | End: 2022-09-27

## 2022-09-27 RX ORDER — ONDANSETRON 2 MG/ML
4 INJECTION INTRAMUSCULAR; INTRAVENOUS DAILY PRN
Status: DISCONTINUED | OUTPATIENT
Start: 2022-09-27 | End: 2022-09-27 | Stop reason: HOSPADM

## 2022-09-27 RX ORDER — CEFAZOLIN SODIUM 2 G/50ML
2 SOLUTION INTRAVENOUS
Status: DISCONTINUED | OUTPATIENT
Start: 2022-09-27 | End: 2022-09-27 | Stop reason: HOSPADM

## 2022-09-27 RX ORDER — PROPOFOL 10 MG/ML
VIAL (ML) INTRAVENOUS
Status: DISCONTINUED | OUTPATIENT
Start: 2022-09-27 | End: 2022-09-27

## 2022-09-27 RX ORDER — ONDANSETRON 2 MG/ML
4 INJECTION INTRAMUSCULAR; INTRAVENOUS EVERY 8 HOURS PRN
Status: DISCONTINUED | OUTPATIENT
Start: 2022-09-27 | End: 2022-09-28 | Stop reason: HOSPADM

## 2022-09-27 RX ADMIN — PHENYLEPHRINE HYDROCHLORIDE 200 MCG: 10 INJECTION INTRAVENOUS at 10:09

## 2022-09-27 RX ADMIN — FENTANYL CITRATE 50 MCG: 50 INJECTION, SOLUTION INTRAMUSCULAR; INTRAVENOUS at 11:09

## 2022-09-27 RX ADMIN — HYDROMORPHONE HYDROCHLORIDE 0.2 MG: 2 INJECTION INTRAMUSCULAR; INTRAVENOUS; SUBCUTANEOUS at 01:09

## 2022-09-27 RX ADMIN — FENTANYL CITRATE 25 MCG: 50 INJECTION, SOLUTION INTRAMUSCULAR; INTRAVENOUS at 12:09

## 2022-09-27 RX ADMIN — SODIUM CHLORIDE, POTASSIUM CHLORIDE, SODIUM LACTATE AND CALCIUM CHLORIDE: 600; 310; 30; 20 INJECTION, SOLUTION INTRAVENOUS at 11:09

## 2022-09-27 RX ADMIN — NEOSTIGMINE METHYLSULFATE 4 MG: 1 INJECTION INTRAVENOUS at 11:09

## 2022-09-27 RX ADMIN — SODIUM CHLORIDE, POTASSIUM CHLORIDE, SODIUM LACTATE AND CALCIUM CHLORIDE: 600; 310; 30; 20 INJECTION, SOLUTION INTRAVENOUS at 10:09

## 2022-09-27 RX ADMIN — PANTOPRAZOLE SODIUM 40 MG: 40 INJECTION, POWDER, FOR SOLUTION INTRAVENOUS at 06:09

## 2022-09-27 RX ADMIN — ONDANSETRON 8 MG: 2 INJECTION, SOLUTION INTRAMUSCULAR; INTRAVENOUS at 11:09

## 2022-09-27 RX ADMIN — GLYCOPYRROLATE 0.6 MG: 0.2 INJECTION, SOLUTION INTRAMUSCULAR; INTRAVENOUS at 11:09

## 2022-09-27 RX ADMIN — PHENYLEPHRINE HYDROCHLORIDE 100 MCG: 10 INJECTION INTRAVENOUS at 10:09

## 2022-09-27 RX ADMIN — ROCURONIUM BROMIDE 10 MG: 10 INJECTION, SOLUTION INTRAVENOUS at 11:09

## 2022-09-27 RX ADMIN — FENTANYL CITRATE 50 MCG: 50 INJECTION, SOLUTION INTRAMUSCULAR; INTRAVENOUS at 10:09

## 2022-09-27 RX ADMIN — SUCCINYLCHOLINE CHLORIDE 140 MG: 20 INJECTION, SOLUTION INTRAMUSCULAR; INTRAVENOUS at 10:09

## 2022-09-27 RX ADMIN — PROCHLORPERAZINE EDISYLATE 5 MG: 5 INJECTION INTRAMUSCULAR; INTRAVENOUS at 01:09

## 2022-09-27 RX ADMIN — ROCURONIUM BROMIDE 15 MG: 10 INJECTION, SOLUTION INTRAVENOUS at 10:09

## 2022-09-27 RX ADMIN — ROCURONIUM BROMIDE 20 MG: 10 INJECTION, SOLUTION INTRAVENOUS at 10:09

## 2022-09-27 RX ADMIN — PROPOFOL 160 MG: 10 INJECTION, EMULSION INTRAVENOUS at 10:09

## 2022-09-27 RX ADMIN — CHLORHEXIDINE GLUCONATE 0.12% ORAL RINSE 10 ML: 1.2 LIQUID ORAL at 09:09

## 2022-09-27 RX ADMIN — MYCOPHENOLATE MOFETIL 1000 MG: 250 CAPSULE ORAL at 09:09

## 2022-09-27 RX ADMIN — DEXAMETHASONE SODIUM PHOSPHATE 4 MG: 4 INJECTION, SOLUTION INTRAMUSCULAR; INTRAVENOUS at 10:09

## 2022-09-27 RX ADMIN — ROCURONIUM BROMIDE 5 MG: 10 INJECTION, SOLUTION INTRAVENOUS at 10:09

## 2022-09-27 RX ADMIN — SODIUM CHLORIDE: 0.9 INJECTION, SOLUTION INTRAVENOUS at 04:09

## 2022-09-27 RX ADMIN — LIDOCAINE HYDROCHLORIDE 80 MG: 20 INJECTION, SOLUTION EPIDURAL; INFILTRATION; INTRACAUDAL; PERINEURAL at 10:09

## 2022-09-27 RX ADMIN — Medication: at 05:09

## 2022-09-27 RX ADMIN — ONDANSETRON 4 MG: 2 INJECTION INTRAMUSCULAR; INTRAVENOUS at 09:09

## 2022-09-27 RX ADMIN — MIDAZOLAM 2 MG: 1 INJECTION INTRAMUSCULAR; INTRAVENOUS at 10:09

## 2022-09-27 NOTE — ANESTHESIA PREPROCEDURE EVALUATION
2022  Leydi Cordero is a 32 y.o., female.    Patient Active Problem List   Diagnosis    Essential hypertension    Acidosis, metabolic    Antibody mediated rejection of kidney transplant    Prophylactic immunotherapy    Immunosuppressive management encounter following kidney transplant    Acute rejection of kidney transplant    S/p nephrectomy 17    Hyperparathyroidism, tertiary    S/P parathyroidectomy--partial    Former light cigarette smoker (1-9 per day)    Nonrheumatic tricuspid valve regurgitation    Pulmonary hypertension    -donor kidney transplant 2021    Thrombosis complicating venous access device    Perinephric hematoma of kidney transplant    At risk for opportunistic infections    Long-term use of immunosuppressant medication    Hypomagnesemia    Kidney replaced by transplant    Urine leak    CKD (chronic kidney disease) stage 2, GFR 60-89 ml/min    Morbid obesity    Boils of multiple sites    Asthma, well controlled     Pre-op Assessment    I have reviewed the Patient Summary Reports.    I have reviewed the NPO Status.   I have reviewed the Medications.     Review of Systems  Anesthesia Hx:  No problems with previous Anesthesia  Denies Family Hx of Anesthesia complications.   Denies Personal Hx of Anesthesia complications.   Social:  Former Smoker    Hematology/Oncology:  Hematology Normal   Oncology Normal     EENT/Dental:EENT/Dental Normal   Cardiovascular:   Hypertension ECG has been reviewed. LUE hemodialysis fistula   Normal ECHO and negative NMST   Pulmonary:   Asthma mild    Renal/:   Chronic Renal Disease    Hepatic/GI:  Hepatic/GI Normal    Musculoskeletal:  Musculoskeletal Normal    Neurological:  Neurology Normal    Endocrine:  Endocrine Normal  Morbid Obesity / BMI > 40  Dermatological:  Skin Normal    Psych:  Psychiatric  Normal           Physical Exam  General: Alert and Oriented    Airway:  Mallampati: II   Mouth Opening: Normal  TM Distance: Normal  Tongue: Normal  Neck ROM: Normal ROM        Anesthesia Plan  Type of Anesthesia, risks & benefits discussed:    Anesthesia Type: Gen ETT  Intra-op Monitoring Plan: Standard ASA Monitors  Induction:  IV  Informed Consent: Informed consent signed with the Patient and all parties understand the risks and agree with anesthesia plan.  All questions answered.   ASA Score: 3  Day of Surgery Review of History & Physical: I have interviewed and examined the patient. I have reviewed the patient's H&P dated:     Ready For Surgery From Anesthesia Perspective.     .

## 2022-09-27 NOTE — ANESTHESIA PROCEDURE NOTES
Intubation    Date/Time: 9/27/2022 10:26 AM  Performed by: Delfino Lin CRNA  Authorized by: Cyril Adorno MD     Intubation:     Induction:  Intravenous    Intubated:  Postinduction    Mask Ventilation:  Easy with oral airway    Attempts:  1    Attempted By:  Student    Method of Intubation:  Direct    Blade:  Briceno 2    Laryngeal View Grade: Grade I - full view of cords      Difficult Airway Encountered?: No      Complications:  None    Airway Device:  Oral endotracheal tube    Airway Device Size:  7.5    Style/Cuff Inflation:  Cuffed (inflated to minimal occlusive pressure)    Inflation Amount (mL):  8    Tube secured:  23    Secured at:  The lips    Placement Verified By:  Capnometry    Complicating Factors:  None    Findings Post-Intubation:  BS equal bilateral and atraumatic/condition of teeth unchanged

## 2022-09-27 NOTE — PLAN OF CARE
Used Sylvia in pre-op to explain intra-op plan.  Pt states she understands and has no questions at this time.     #[id number] (Cam)

## 2022-09-27 NOTE — TRANSFER OF CARE
"Anesthesia Transfer of Care Note    Patient: Leydi Cordero    Procedure(s) Performed: Procedure(s) (LRB):  XI ROBOTIC SLEEVE GASTRECTOMY (N/A)    Patient location: PACU    Anesthesia Type: general    Transport from OR: Transported from OR on room air with adequate spontaneous ventilation    Post pain: adequate analgesia    Post assessment: no apparent anesthetic complications    Post vital signs: stable    Level of consciousness: sedated    Nausea/Vomiting: no nausea/vomiting    Complications: none    Transfer of care protocol was followed      Last vitals:   Visit Vitals  BP (!) 112/59 (BP Location: Right arm, Patient Position: Sitting)   Pulse 77   Temp 36.6 °C (97.9 °F) (Temporal)   Resp 16   Ht 5' 6" (1.676 m)   Wt 113.3 kg (249 lb 14.3 oz)   LMP 08/31/2022   SpO2 100%   Breastfeeding No   BMI 40.33 kg/m²     "

## 2022-09-27 NOTE — TRANSFER OF CARE
"Anesthesia Transfer of Care Note    Patient: Leydi Cordero    Procedure(s) Performed: Procedure(s) (LRB):  XI ROBOTIC SLEEVE GASTRECTOMY (N/A)    Patient location: PACU    Anesthesia Type: general    Transport from OR: Transported from OR on room air with adequate spontaneous ventilation    Post pain: adequate analgesia    Post assessment: no apparent anesthetic complications and tolerated procedure well    Post vital signs: stable    Level of consciousness: awake, alert and oriented    Nausea/Vomiting: no nausea/vomiting    Complications: none    Transfer of care protocol was followed      Last vitals:   Visit Vitals  BP (!) 112/59 (BP Location: Right arm, Patient Position: Sitting)   Pulse 77   Temp 36.6 °C (97.9 °F) (Temporal)   Resp 16   Ht 5' 6" (1.676 m)   Wt 113.3 kg (249 lb 14.3 oz)   LMP 08/31/2022   SpO2 100%   Breastfeeding No   BMI 40.33 kg/m²     "

## 2022-09-27 NOTE — ANESTHESIA POSTPROCEDURE EVALUATION
Anesthesia Post Evaluation    Patient: Leydi Cordero    Procedure(s) Performed: Procedure(s) (LRB):  XI ROBOTIC SLEEVE GASTRECTOMY (N/A)    Final Anesthesia Type: general      Patient location during evaluation: PACU  Patient participation: Yes- Able to Participate  Level of consciousness: awake  Post-procedure vital signs: reviewed and stable  Pain management: adequate  Airway patency: patent    PONV status at discharge: No PONV  Anesthetic complications: no      Cardiovascular status: hemodynamically stable  Respiratory status: unassisted  Hydration status: euvolemic  Follow-up not needed.          Vitals Value Taken Time   /80 09/27/22 1604   Temp 36.6 °C (97.9 °F) 09/27/22 1604   Pulse 59 09/27/22 1604   Resp 16 09/27/22 1604   SpO2 95 % 09/27/22 1604         Event Time   Out of Recovery 15:21:13         Pain/Dilcia Score: Pain Rating Prior to Med Admin: 6 (9/27/2022  1:35 PM)  Dilcia Score: 8 (9/27/2022  3:00 PM)

## 2022-09-27 NOTE — H&P
"BARIATRIC NEW PATIENT EVALUATION     CHIEF COMPLAINT:   morbid obesity with a BMI of 42 and inability to lose weight.     HISTORY OF PRESENT ILLNESS:  Leydi Cordero is a 32 y.o.-year-old female presents to discuss bariatric surgery.  In the interim she is undergone evaluation with dietitian, psych with no contraindications to surgery.  She is completed EGD (see below).  She would like to move forward with sleeve gastrectomy.     Initially presenting for morbid obesity with a BMI of 42 and inability to lose weight. The patient reports weight gain affecting her health following her kidney transplant. She has had difficulty losing weight since this time.     Height 5'6"  Weight 262lbs  BMI 42     HPI     CO-MORBIDITIES:  hypertension     PAST MEDICAL HISTORY:       Past Medical History:   Diagnosis Date    Anxiety      -donor kidney transplant 2021     cPRA 100%, XM negative 3 arteries, 2 on common patch, WIT 40 min    Encounter for blood transfusion      ESRD (end stage renal disease)      Fibroma      H/O kidney transplant      Hypertension      Hypertensive nephropathy      Ovarian cyst           PAST SURGICAL HISTORY:        Past Surgical History:   Procedure Laterality Date    COLPOSCOPY   2020     Needs follow up on or after 21    ESOPHAGOGASTRODUODENOSCOPY N/A 2022     Procedure: ESOPHAGOGASTRODUODENOSCOPY (EGD);  Surgeon: Essie Carvajal MD;  Location: [facility];  Service: Endoscopy;  Laterality: N/A;    hemodialysis access left arm        kidney removal         KIDNEY TRANSPLANT   04/15/2015    KIDNEY TRANSPLANT N/A 2021     Procedure: TRANSPLANT, KIDNEY;  Surgeon: Fam Sutton MD;  Location: [address];  Service: Transplant;  Laterality: N/A;    NEPHRECTOMY        OVARIAN CYST REMOVAL        PARATHYROIDECTOMY         partial     PERCUTANEOUS NEPHROSTOMY Left 2021     Procedure: Creation, Nephrostomy, Percutaneous;  Surgeon: Elen Price;  " Location: [facility];  Service: Anesthesiology;  Laterality: Left;    right leg hemodialysis access        transplant nephrectomy   12/01/2017         FAMILY HISTORY:        Family History   Problem Relation Age of Onset    No Known Problems Mother      Colon cancer Father      Cancer Father      Hypertension Father      No Known Problems Sister      No Known Problems Brother      Kidney disease Maternal Aunt      Hypertension Maternal Aunt      Diabetes Maternal Uncle      Kidney disease Other      Hypertension Other           SOCIAL HISTORY:   reports that she quit smoking about 2 years ago. Her smoking use included cigarettes. She started smoking about 14 years ago. She has a 3.00 pack-year smoking history. She has never used smokeless tobacco. She reports that she does not drink alcohol and does not use drugs.      MEDICATIONS:         Current Outpatient Medications on File Prior to Visit   Medication Sig Dispense Refill    albuterol (PROVENTIL/VENTOLIN HFA) 90 mcg/actuation inhaler Inhale 2 puffs into the lungs every 4 (four) hours as needed for Wheezing or Shortness of Breath. Rescue 8.5 g 11    calcitRIOL (ROCALTROL) 0.25 MCG Cap Take 1 capsule (0.25 mcg total) by mouth once daily. 30 capsule 11    ergocalciferol (VITAMIN D2) 50,000 unit Cap Take 1 capsule (50,000 Units total) by mouth every 30 days. 1 capsule 11    labetaloL (NORMODYNE) 200 MG tablet Take 1 tablet (200 mg total) by mouth 2 (two) times daily. 60 tablet 10    magnesium oxide (MAG-OX) 400 mg (241.3 mg magnesium) tablet Take 1 tablet (400 mg total) by mouth 2 (two) times daily. 60 tablet 11    mycophenolate (CELLCEPT) 250 mg Cap Take 4 capsules (1,000 mg total) by mouth 2 (two) times daily. 240 capsule 11    NIFEdipine (PROCARDIA-XL) 30 MG (OSM) 24 hr tablet Take 1 tablet (30 mg total) by mouth 2 (two) times a day. 60 tablet 11    olmesartan (BENICAR) 20 MG tablet Take 1 tablet (20 mg total) by mouth once daily. 30 tablet 11    pantoprazole  (PROTONIX) 40 MG tablet Take 1 tablet (40 mg total) by mouth 2 (two) times daily. 120 tablet 0    predniSONE (DELTASONE) 5 MG tablet Take 1 tablet (5 mg total) by mouth once daily. 120 tablet 11    sars-cov-2, covid-19, (MODERNA COVID-19) 100 mcg/0.5 ml injection Inject 0.5 mLs into the muscle. 0.5 mL 0    sodium bicarbonate 650 MG tablet Take 2 tablets (1,300 mg total) by mouth 3 (three) times daily. 180 tablet 11    tacrolimus (PROGRAF) 1 MG Cap Take 3 capsules (3 mg total) by mouth every morning AND 2 capsules (2 mg total) every evening. 150 capsule 11                Current Facility-Administered Medications on File Prior to Visit   Medication Dose Route Frequency Provider Last Rate Last Admin    acetaminophen tablet 650 mg  650 mg Oral Once PRN Govind Garcia MD        albuterol inhaler 2 puff  2 puff Inhalation Q20 Min PRN Govind Garcia MD        diphenhydrAMINE injection 25 mg  25 mg Intravenous Once PRN Govind Garcia MD        EPINEPHrine (EPIPEN) 0.3 mg/0.3 mL pen injection 0.3 mg  0.3 mg Intramuscular PRN Govind Garcia MD        lactated ringers infusion   Intravenous Continuous Cal Parekh MD 10 mL/hr at 06/23/22 0920 Restarted at 06/23/22 1000    methylPREDNISolone sodium succinate injection 40 mg  40 mg Intravenous Once PRN Govind Garcia MD        ondansetron disintegrating tablet 4 mg  4 mg Oral Once PRN Govind Garcia MD        sodium chloride 0.9% 500 mL flush bag   Intravenous PRN oGvind Garcia MD        sodium chloride 0.9% flush 10 mL  10 mL Intravenous PRN Govind Garcia MD          Medications have been reviewed.     ALLERGIES:       Review of patient's allergies indicates:   Allergen Reactions    Heparin analogues Rash      Allergies have been reviewed.     ROS:  Review of Systems   Constitutional: Negative for activity change, appetite change, chills, diaphoresis, fatigue, fever and unexpected weight change.   HENT: Negative for congestion, dental problem,  rhinorrhea and sore throat.    Eyes: Negative for visual disturbance.   Respiratory: Negative for cough, chest tightness, shortness of breath and wheezing.    Cardiovascular: Negative for chest pain, palpitations and leg swelling.   Gastrointestinal: Negative for abdominal distention, abdominal pain, constipation, diarrhea, nausea and vomiting.   Endocrine: Negative for cold intolerance, heat intolerance, polydipsia, polyphagia and polyuria.   Genitourinary: Negative for difficulty urinating, dysuria, frequency, hematuria and urgency.   Musculoskeletal: Negative for arthralgias, gait problem, myalgias and neck pain.   Skin: Negative for color change, pallor, rash and wound.   Neurological: Negative for dizziness, syncope, weakness, light-headedness, numbness and headaches.   Hematological: Negative for adenopathy. Does not bruise/bleed easily.   Psychiatric/Behavioral: Negative for confusion, decreased concentration and sleep disturbance. The patient is not nervous/anxious.          PE:  Physical Exam  Vitals reviewed.   Constitutional:       General: She is not in acute distress.     Appearance: She is well-developed. She is not diaphoretic.   HENT:      Head: Normocephalic and atraumatic.      Right Ear: External ear normal.      Left Ear: External ear normal.   Eyes:      General: No scleral icterus.     Conjunctiva/sclera: Conjunctivae normal.      Pupils: Pupils are equal, round, and reactive to light.   Neck:      Thyroid: No thyromegaly.      Trachea: No tracheal deviation.   Cardiovascular:      Rate and Rhythm: Normal rate and regular rhythm.      Heart sounds: Normal heart sounds. No murmur heard.    No friction rub. No gallop.   Pulmonary:      Effort: Pulmonary effort is normal. No respiratory distress.      Breath sounds: Normal breath sounds. No wheezing or rales.   Chest:      Chest wall: No tenderness.   Abdominal:      General: Bowel sounds are normal. There is no distension.      Palpations:  Abdomen is soft.      Tenderness: There is no abdominal tenderness.      Hernia: No hernia is present.      Comments: Well healed surgical scars   Musculoskeletal:         General: No tenderness or deformity. Normal range of motion.      Cervical back: Normal range of motion and neck supple.   Lymphadenopathy:      Cervical: No cervical adenopathy.   Skin:     General: Skin is warm and dry.      Coloration: Skin is not pale.      Findings: No erythema or rash.   Neurological:      Mental Status: She is alert and oriented to person, place, and time.   Psychiatric:         Behavior: Behavior normal.         Thought Content: Thought content normal.         Judgment: Judgment normal.         EGD:  Impression:            - LA Grade C reflux esophagitis.                          - Small hiatal hernia.                          - Gastritis. Biopsied.                          - Duodenitis.                          - Normal first portion of the duodenum and second                          portion of the duodenum.      DIAGNOSIS:  1. Morbid obesity with a BMI of 42 and inability to lose weight.  2. Co-morbidities: hypertension     PLAN:  Robotic sleeve gastrectomy 09/27/2022  Preop:  CBC, CMP, EKG  I have outlined the risks of the procedures including, but not limited to, bleeding, slippage, erosion, stricture, death, deep venous thrombosis, pulmonary embolus, healing issues including intra-abdominal leakage or perforation with abscess or need for drainage or reoperation, wound infection, need for open surgery, injury to intra-abdominal organs or bleeding requiring transfusion, intensive care unit care, and possible prolonged hospitalization.    Other long-term issues were discussed including, but not limited to, permanent change in volume of food and type of foods eaten and importance of ongoing long-term followup.  I advised the patient that if they can lose weight before surgery, it will reduce her operative risk.  The  patient understands and wishes to proceed.     EGD hiatal hernia/reflux esophagitis we discussed how reflux can be affected with sleeve gastrectomy as well as with weight loss potential for improvement but also potential for worsening of symptoms or staying the same.  We discussed how sleeve gastrectomy affects her ability to undergo future surgeries for reflux and what possibilities there are for this should she have worsening.  She would like to proceed with sleeve gastrectomy and is okay with risks and benefits     HTN - stable/medical mgmt     Kidney transplant Nephrology clearance.      used for entirety of visit     Referring Physician: No ref. provider found  RTC: As scheduled.     40minutes of total time spent on the encounter, which includes face to face time and non-face to face time preparing to see the patient (eg, review of tests), Obtaining and/or reviewing separately obtained history, Documenting clinical information in the electronic or other health record, Independently interpreting results (not separately reported) and communicating results to the patient/family/caregiver, or Care coordination (not separately reported).

## 2022-09-28 VITALS
BODY MASS INDEX: 41.88 KG/M2 | SYSTOLIC BLOOD PRESSURE: 136 MMHG | TEMPERATURE: 98 F | HEART RATE: 58 BPM | HEIGHT: 66 IN | OXYGEN SATURATION: 99 % | RESPIRATION RATE: 18 BRPM | DIASTOLIC BLOOD PRESSURE: 85 MMHG | WEIGHT: 260.56 LBS

## 2022-09-28 LAB
ANION GAP SERPL CALC-SCNC: 12 MMOL/L (ref 8–16)
BUN SERPL-MCNC: 23 MG/DL (ref 6–20)
CALCIUM SERPL-MCNC: 8.5 MG/DL (ref 8.7–10.5)
CHLORIDE SERPL-SCNC: 110 MMOL/L (ref 95–110)
CO2 SERPL-SCNC: 16 MMOL/L (ref 23–29)
CREAT SERPL-MCNC: 1.1 MG/DL (ref 0.5–1.4)
ERYTHROCYTE [DISTWIDTH] IN BLOOD BY AUTOMATED COUNT: 13.6 % (ref 11.5–14.5)
EST. GFR  (NO RACE VARIABLE): >60 ML/MIN/1.73 M^2
GLUCOSE SERPL-MCNC: 104 MG/DL (ref 70–110)
HCT VFR BLD AUTO: 41.7 % (ref 37–48.5)
HGB BLD-MCNC: 13.4 G/DL (ref 12–16)
MCH RBC QN AUTO: 28.9 PG (ref 27–31)
MCHC RBC AUTO-ENTMCNC: 32.1 G/DL (ref 32–36)
MCV RBC AUTO: 90 FL (ref 82–98)
PLATELET # BLD AUTO: 214 K/UL (ref 150–450)
PMV BLD AUTO: 12.2 FL (ref 9.2–12.9)
POCT GLUCOSE: 101 MG/DL (ref 70–110)
POTASSIUM SERPL-SCNC: 5.3 MMOL/L (ref 3.5–5.1)
RBC # BLD AUTO: 4.64 M/UL (ref 4–5.4)
SODIUM SERPL-SCNC: 138 MMOL/L (ref 136–145)
WBC # BLD AUTO: 14.36 K/UL (ref 3.9–12.7)

## 2022-09-28 PROCEDURE — 25000003 PHARM REV CODE 250: Performed by: INTERNAL MEDICINE

## 2022-09-28 PROCEDURE — 99223 1ST HOSP IP/OBS HIGH 75: CPT | Mod: ,,, | Performed by: INTERNAL MEDICINE

## 2022-09-28 PROCEDURE — 99223 PR INITIAL HOSPITAL CARE,LEVL III: ICD-10-PCS | Mod: ,,, | Performed by: INTERNAL MEDICINE

## 2022-09-28 PROCEDURE — 85027 COMPLETE CBC AUTOMATED: CPT | Performed by: SURGERY

## 2022-09-28 PROCEDURE — C9113 INJ PANTOPRAZOLE SODIUM, VIA: HCPCS | Performed by: SURGERY

## 2022-09-28 PROCEDURE — 25000003 PHARM REV CODE 250: Performed by: SURGERY

## 2022-09-28 PROCEDURE — 80048 BASIC METABOLIC PNL TOTAL CA: CPT | Performed by: SURGERY

## 2022-09-28 PROCEDURE — 27000221 HC OXYGEN, UP TO 24 HOURS

## 2022-09-28 PROCEDURE — 63600175 PHARM REV CODE 636 W HCPCS: Performed by: SURGERY

## 2022-09-28 PROCEDURE — 94761 N-INVAS EAR/PLS OXIMETRY MLT: CPT

## 2022-09-28 PROCEDURE — 99900035 HC TECH TIME PER 15 MIN (STAT)

## 2022-09-28 PROCEDURE — 36415 COLL VENOUS BLD VENIPUNCTURE: CPT | Performed by: SURGERY

## 2022-09-28 RX ORDER — OXYCODONE HYDROCHLORIDE 5 MG/1
5 TABLET ORAL EVERY 6 HOURS PRN
Qty: 20 TABLET | Refills: 0 | Status: SHIPPED | OUTPATIENT
Start: 2022-09-28 | End: 2022-10-12 | Stop reason: ALTCHOICE

## 2022-09-28 RX ORDER — OXYCODONE HYDROCHLORIDE 5 MG/1
10 TABLET ORAL EVERY 6 HOURS PRN
Status: DISCONTINUED | OUTPATIENT
Start: 2022-09-28 | End: 2022-09-28 | Stop reason: HOSPADM

## 2022-09-28 RX ORDER — SODIUM BICARBONATE 650 MG/1
650 TABLET ORAL 3 TIMES DAILY
Status: DISCONTINUED | OUTPATIENT
Start: 2022-09-28 | End: 2022-09-28 | Stop reason: HOSPADM

## 2022-09-28 RX ORDER — OXYCODONE HYDROCHLORIDE 5 MG/1
5 TABLET ORAL EVERY 6 HOURS PRN
Status: DISCONTINUED | OUTPATIENT
Start: 2022-09-28 | End: 2022-09-28 | Stop reason: HOSPADM

## 2022-09-28 RX ORDER — ACETAMINOPHEN 325 MG/1
650 TABLET ORAL EVERY 6 HOURS PRN
Status: DISCONTINUED | OUTPATIENT
Start: 2022-09-28 | End: 2022-09-28 | Stop reason: HOSPADM

## 2022-09-28 RX ORDER — ONDANSETRON 4 MG/1
4 TABLET, ORALLY DISINTEGRATING ORAL 3 TIMES DAILY PRN
Qty: 30 TABLET | Refills: 0 | Status: SHIPPED | OUTPATIENT
Start: 2022-09-28 | End: 2022-10-12 | Stop reason: ALTCHOICE

## 2022-09-28 RX ORDER — MORPHINE SULFATE 2 MG/ML
2 INJECTION, SOLUTION INTRAMUSCULAR; INTRAVENOUS EVERY 4 HOURS PRN
Status: DISCONTINUED | OUTPATIENT
Start: 2022-09-28 | End: 2022-09-28 | Stop reason: HOSPADM

## 2022-09-28 RX ORDER — DOCUSATE SODIUM 100 MG/1
100 CAPSULE, LIQUID FILLED ORAL 2 TIMES DAILY PRN
Qty: 20 CAPSULE | Refills: 0 | Status: SHIPPED | OUTPATIENT
Start: 2022-09-28

## 2022-09-28 RX ADMIN — MYCOPHENOLATE MOFETIL 1000 MG: 250 CAPSULE ORAL at 09:09

## 2022-09-28 RX ADMIN — CHLORHEXIDINE GLUCONATE 0.12% ORAL RINSE 10 ML: 1.2 LIQUID ORAL at 09:09

## 2022-09-28 RX ADMIN — TACROLIMUS 2 MG: 1 CAPSULE ORAL at 09:09

## 2022-09-28 RX ADMIN — PREDNISONE 5 MG: 5 TABLET ORAL at 09:09

## 2022-09-28 RX ADMIN — SODIUM BICARBONATE 650 MG TABLET 650 MG: at 02:09

## 2022-09-28 RX ADMIN — PROMETHAZINE HYDROCHLORIDE 6.25 MG: 25 INJECTION INTRAMUSCULAR; INTRAVENOUS at 12:09

## 2022-09-28 RX ADMIN — PANTOPRAZOLE SODIUM 40 MG: 40 INJECTION, POWDER, FOR SOLUTION INTRAVENOUS at 09:09

## 2022-09-28 RX ADMIN — ONDANSETRON 4 MG: 2 INJECTION INTRAMUSCULAR; INTRAVENOUS at 07:09

## 2022-09-28 NOTE — PLAN OF CARE
[facility] - Med Surg  Discharge Final Note    Primary Care Provider: Helena Zepeda PA-C    Expected Discharge Date: 9/28/2022    Final Discharge Note (most recent)       Final Note - 09/28/22 1550          Final Note    Assessment Type Final Discharge Note     Anticipated Discharge Disposition Home or Self Care     Hospital Resources/Appts/Education Provided Appointments scheduled and added to AVS                     Important Message from Medicare               oct12 Established Patient Visit con JUNAID Barnett oct 12, 2022 10:30 AM

## 2022-09-28 NOTE — PLAN OF CARE
Problem: Adult Inpatient Plan of Care  Goal: Plan of Care Review  Outcome: Ongoing, Progressing     Problem: Infection  Goal: Absence of Infection Signs and Symptoms  Outcome: Ongoing, Progressing     Problem: Fall Injury Risk  Goal: Absence of Fall and Fall-Related Injury  Outcome: Ongoing, Progressing       Patient remains free from injury. Safety precautions maintained. No s/s of acute distress. Pain controlled per MD order. IVF and PCA pump infusing. Cardiac monitoring in place. Pt education about care completed.

## 2022-09-28 NOTE — PLAN OF CARE
Pt is stable on 2L of O2. Pt arrived to the floor around 1600. Pt lap sites are intact. Pt c/o 8/10 pain. PCA pump is set up and was explained to pt. Pt pain reduced to 6/10 after intervention. Pt on continuous NS at 125 mL/hr. Pt is oriented and call light is in reach.

## 2022-09-28 NOTE — OP NOTE
[facility] Surg  Surgery Department  Operative Note    SUMMARY     Date of Procedure: 9/27/2022     Procedure: Procedure(s) (LRB):  XI ROBOTIC SLEEVE GASTRECTOMY (N/A)     Surgeon(s) and Role:     * Jaimie Chopra MD - Primary    Assisting Surgeon: None    Pre-Operative Diagnosis: Morbid obesity with BMI of 40.0-44.9, adult [E66.01, Z68.41]  Essential hypertension [I10]    Post-Operative Diagnosis: Post-Op Diagnosis Codes:     * Morbid obesity with BMI of 40.0-44.9, adult [E66.01, Z68.41]     * Essential hypertension [I10]    Anesthesia: General    Operative Findings (including complications, if any): Robotic sleeve gastrectomy    Description of Technical Procedures: gastric sleeve    Estimated Blood Loss (EBL): 10cc           Implants: * No implants in log *    Specimens:   Specimen (24h ago, onward)       Start     Ordered    09/27/22 1209  Specimen to Pathology, Surgery General Surgery  Once        Comments: Pre-op Diagnosis: Morbid obesity with BMI of 40.0-44.9, adult [E66.01, Z68.41]Essential hypertension [I10]Procedure(s):XI ROBOTIC SLEEVE GASTRECTOMY Number of specimens: 1Name of specimens: greater curvature of stomach     References:    Click here for ordering Quick Tip   Question Answer Comment   Procedure Type: General Surgery    Specimen Class: Routine/Screening    Which provider would you like to cc? JAIMIE CHOPRA    Release to patient Immediate        09/27/22 1210                            Condition: Good    Disposition: PACU - hemodynamically stable.    Procedure in Detail:  The patient was placed under general anesthesia. Pre-op antibiotics were given within an hour before the incision and SCD's were placed for DVT prophylaxis. The abdomen was prepped and draped in sterile fashion. General anesthesia was applied with lidocaine.     An incision was made superior to the umbilicus. The fascia was elevated and the Veress needle was inserted. Proper position was confirmed by aspiration and saline  meniscus test. The abdomen was insufflated with carbon dioxide to a pressure of 15 mmHg. The patient tolerated insufflation well.  An 8-mm trocar was then inserted under direct vision using the optiview technique in the left mid abdomen. 8 mm trocars were also placed in the left mid abdomen and at the supraumbilically. A 5mm trocar was placed in the right subcostal area, and a 12 mm trocar was placed in the right mid abdomen.  A liver retractor was placed through the 5 mm port.     The short gastric vessels were divided with the vessel sealer from 5 cm proximal to the pylorus to the GE junction. A 38-Jordanian bougie was then placed orally and positioned against the lesser curvature. A vertical sleeve gastrectomy was then performed. Starting 5 cm proximal to the pylorus, the stomach was stapled, keeping the staple line several centimeters from the lesser curve when inferior to the angularis. Superior to the angularis, the staple line was placed against the bougie. The final staple firing was 1 cm lateral to the bougie in order to avoid stapling esophageal tissue. Two green loads were used on the inferior aspect of the staple line, and the rest were blue loads.  There was good hemostasis.     The superior aspect of the staple line was reinforced using strata fix absorbable suture.  The greater curve of the stomach was removed.   The liver retractor and trocars were removed.   The 12 mm trocar site was closed with 0 vicryl. Skin incisions were closed with 4-0 Monocryl and acrylate adhesive.  The patient tolerated the procedure in a stable and satisfactory condition and was transferred to recovery postoperatively.

## 2022-09-28 NOTE — CONSULTS
..Leydi Cordero is a 32 y.o. female for whom nephrology consult has been requested to evaluate and give opinion.     HPI: 32 year old kidney transplant patient (21);  donor); on multiple IS regimen with morbid obesity and HTN and anxiety disorder who is s/p sleeve gastrectomy on 22 by Dr. Parekh; this was tolerated well apparently. Dr. Jaffe saw her in clinic last week; Baseline Scr 1-1.5; prograf level was 10.9 and adjusted down to 2 mg PO BID accordingly by him; MMF 1 gram BID and prednisone 5 mg daily. She did require Cipro then for UTI symptoms. Currently she is without acute c/o and denies sob or chest pain.    PAST MEDICAL HISTORY:  She  has a past medical history of Anxiety, -donor kidney transplant 2021 (2021), Encounter for blood transfusion, ESRD (end stage renal disease), Fibroma, H/O kidney transplant, Hypertension, Hypertensive nephropathy, and Ovarian cyst.    PAST SURGICAL HISTORY:  She  has a past surgical history that includes right leg hemodialysis access; Kidney transplant (04/15/2015); kidney removal ; hemodialysis access left arm; Parathyroidectomy; Nephrectomy; transplant nephrectomy (2017); Ovarian cyst removal; Kidney transplant (N/A, 2021); Colposcopy (2020); Percutaneous nephrostomy (Left, 2021); Esophagogastroduodenoscopy (N/A, 2022); and Robot-assisted laparoscopic sleeve gastrectomy using da Wilner Xi (N/A, 2022).    SOCIAL HISTORY:  She  reports that she quit smoking about 2 years ago. Her smoking use included cigarettes. She started smoking about 14 years ago. She has a 3.00 pack-year smoking history. She has never used smokeless tobacco. She reports that she does not drink alcohol and does not use drugs.      FAMILY MEDICAL HISTORY:  Her family history includes Cancer in her father; Colon cancer in her father; Diabetes in her maternal uncle; Hypertension in her father, maternal aunt, and another family member;  Kidney disease in her maternal aunt and another family member; No Known Problems in her brother, mother, and sister.    Review of patient's allergies indicates:   Allergen Reactions    Heparin analogues Rash        chlorhexidine  10 mL Mouth/Throat BID    mycophenolate  1,000 mg Oral BID    pantoprazole  40 mg Intravenous Daily    predniSONE  5 mg Oral Daily    sodium bicarbonate  650 mg Oral TID    tacrolimus  2 mg Oral BID       Prior to Admission medications    Medication Sig Start Date End Date Taking? Authorizing Provider   albuterol (PROVENTIL/VENTOLIN HFA) 90 mcg/actuation inhaler Inhale 2 puffs into the lungs every 4 (four) hours as needed for Wheezing or Shortness of Breath. Rescue 10/28/21  Yes Linda Robertson NP   calcitRIOL (ROCALTROL) 0.25 MCG Cap Take 1 capsule (0.25 mcg total) by mouth once daily. 12/10/21  Yes José Antonio Venegas MD   ergocalciferol (VITAMIN D2) 50,000 unit Cap Take 1 capsule (50,000 Units total) by mouth every 30 days. 1/11/22  Yes Julio Jaffe MD   labetaloL (NORMODYNE) 200 MG tablet Take 1 tablet (200 mg total) by mouth 2 (two) times daily. 4/8/22  Yes Julio Jaffe MD   magnesium oxide (MAG-OX) 400 mg (241.3 mg magnesium) tablet Take 1 tablet (400 mg total) by mouth 2 (two) times daily. 5/9/22 5/9/23 Yes Julio Jaffe MD   mycophenolate (CELLCEPT) 250 mg Cap Take 4 capsules (1,000 mg total) by mouth 2 (two) times daily. 2/8/22 2/8/23 Yes José Antonio Venegas MD   NIFEdipine (PROCARDIA-XL) 30 MG (OSM) 24 hr tablet Take 1 tablet (30 mg total) by mouth 2 (two) times a day. 1/11/22 1/11/23 Yes Julio Jaffe MD   olmesartan (BENICAR) 20 MG tablet Take 1 tablet (20 mg total) by mouth once daily. 1/11/22 1/11/23 Yes Julio Jaffe MD   predniSONE (DELTASONE) 5 MG tablet Take 1 tablet (5 mg total) by mouth once daily. 6/7/22  Yes José Antonio Venegas MD   sodium bicarbonate 650 MG tablet Take 2 tablets (1,300 mg total) by mouth 3 (three) times daily. 8/30/22 8/30/23  "Yes Julio Jaffe MD   tacrolimus (PROGRAF) 1 MG Cap Take 3 capsules (3 mg total) by mouth every morning AND 2 capsules (2 mg total) every evening. 22 Yes José Antonio Venegas MD   ciprofloxacin HCl (CIPRO) 500 MG tablet Take 1 tablet (500 mg total) by mouth every 12 (twelve) hours. 22   Julio Jaffe MD   pantoprazole (PROTONIX) 40 MG tablet Take 1 tablet (40 mg total) by mouth 2 (two) times daily. 22  Essie Carvajal MD   sars-cov-2, covid-19, (MODERNA COVID-19) 100 mcg/0.5 ml injection Inject 0.5 mLs into the muscle. 12/15/21   Yevgeniy Davis, PharmD        REVIEW OF SYSTEMS:  Patient has no fever, fatigue, visual changes, chest pain, edema, cough, dyspnea, nausea, vomiting, constipation, diarrhea, arthralgias, pruritis, dizziness, weakness, depression, confusion.        PHYSICAL EXAM:   height is 5' 6" (1.676 m) and weight is 118.2 kg (260 lb 9.3 oz). Her oral temperature is 98.1 °F (36.7 °C). Her blood pressure is 137/86 and her pulse is 59 (abnormal). Her respiration is 18 and oxygen saturation is 99%.   Gen: WDWN female in no apparent distress  Psych: Normal mood and affect  Skin: No rashes or ulcers  Eyes: Normal conjunctiva and lids, PERRLA  ENT: Normal hearing with no oropharyngeal lesions  Neck: No JVD  Chest: Clear with no rales, rhonchi, wheezing with normal effort  CV: Regular with no murmurs, gallops or rubs  Abd: Soft, nontender, no distension, positive bowel sounds  Ext: No cyanosis, clubbing or edema          IMPRESSION AND RECOMMENDATIONS:    S/p Robotic sleeve gastrectomy, POD # 1  S/p renal Txp ; donor kidney  on triple IS regimne  HTN  Morbid obesity  Mild hyperkalemia  Mild metabolic acidosis    Plan: Her IS regimen was reviewed; will maintain her on prograf 2 mg PO BID and MMF and pred without change; Na bicarb for mild metabolic acidosis; GFR excellent; my understanding from d/w Dr. Parekh is that she would be dc'ed today at some point; if " so, advised f/u  with Dr. Jaffe for ongoing management of her CKD and acidosis and mild hyperkalemia; renal diet would be beneficial.  If dc is delayed then will need and eventual Tac level re-drawn (trough).  Please call with interim concerns; [phone].    Darryl Rock MD

## 2022-09-28 NOTE — PLAN OF CARE
[facility]  Initial Discharge Assessment       Primary Care Provider: Primary Doctor No    Admission Diagnosis: Morbid obesity with BMI of 40.0-44.9, adult [E66.01, Z68.41]  Essential hypertension [I10]  CKD (chronic kidney disease) stage 2, GFR 60-89 ml/min [N18.2]    Admission Date: 9/27/2022  Expected Discharge Date:     Discharge Barriers Identified: None    Payor: MEDICARE / Plan: MEDICARE PART A & B / Product Type: Government /     Extended Emergency Contact Information  Primary Emergency Contact: Lon Grimes  Address: [address] [place] [address] [place] [address] [facility]  Home Phone: [phone]  Mobile Phone: [phone]  Relation: Relative    Discharge Plan A: Home with family  Discharge Plan B: Home with family      [facility] [address] [address]  Phone: [phone] Fax: [phone]    [facility] [address] [place] [address]  Phone: [phone] Fax: [phone]      Initial Assessment (most recent)       Adult Discharge Assessment - 09/28/22 0943          Discharge Assessment    Assessment Type Discharge Planning Assessment     Confirmed/corrected address, phone number and insurance Yes     Confirmed Demographics Correct on Facesheet     Source of Information patient     When was your last doctors appointment? 09/21/22     Communicated NATALIIA with patient/caregiver Yes     Reason For Admission Planned surgery     Lives With friend(s)     Facility Arrived From: Home     Do you expect to return to your current living situation? Yes     Do you have help at home or someone to help you manage your care at home? Yes     Who are your caregiver(s) and their phone number(s)? Lon Grimes, kasi [phone]     Prior to hospitilization cognitive status: Alert/Oriented     Current cognitive status: Alert/Oriented     Walking or Climbing Stairs Difficulty none     Dressing/Bathing Difficulty none      Equipment Currently Used at Home CPAP     Readmission within 30 days? No     Patient currently being followed by outpatient case management? No     Do you currently have service(s) that help you manage your care at home? No     Do you take prescription medications? Yes     Do you have prescription coverage? Yes     Coverage [facility]     Do you have any problems affording any of your prescribed medications? No     Is the patient taking medications as prescribed? yes     Who is going to help you get home at discharge? Lon     How do you get to doctors appointments? car, drives self     Are you on dialysis? No     Do you take coumadin? No     Discharge Plan A Home with family     Discharge Plan B Home with family     DME Needed Upon Discharge  none     Discharge Plan discussed with: Patient     Discharge Barriers Identified None        Physical Activity    On average, how many days per week do you engage in moderate to strenuous exercise (like a brisk walk)? 0 days     On average, how many minutes do you engage in exercise at this level? 0 min        Financial Resource Strain    How hard is it for you to pay for the very basics like food, housing, medical care, and heating? Not hard at all        Housing Stability    In the last 12 months, was there a time when you were not able to pay the mortgage or rent on time? No     In the last 12 months, how many places have you lived? 1     In the last 12 months, was there a time when you did not have a steady place to sleep or slept in a shelter (including now)? No        Transportation Needs    In the past 12 months, has lack of transportation kept you from medical appointments or from getting medications? No     In the past 12 months, has lack of transportation kept you from meetings, work, or from getting things needed for daily living? No        Food Insecurity    Within the past 12 months, you worried that your food would run out before you got the money to buy more.  Never true     Within the past 12 months, the food you bought just didn't last and you didn't have money to get more. Never true        Stress    Do you feel stress - tense, restless, nervous, or anxious, or unable to sleep at night because your mind is troubled all the time - these days? Not at all        Social Connections    In a typical week, how many times do you talk on the phone with family, friends, or neighbors? More than three times a week     How often do you get together with friends or relatives? Once a week     How often do you attend Taoism or Hindu services? Never     Do you belong to any clubs or organizations such as Taoism groups, unions, fraternal or athletic groups, or school groups? No     How often do you attend meetings of the clubs or organizations you belong to? Never     Are you , , , , never , or living with a partner?         Alcohol Use    Q1: How often do you have a drink containing alcohol? Never     Q2: How many drinks containing alcohol do you have on a typical day when you are drinking? Patient does not drink     Q3: How often do you have six or more drinks on one occasion? Never        Relationship/Environment    Name(s) of Who Lives With Patient Lon Murraylinda GREEN met with patient at the bedside using  ID [id number].  Patient lives at home with a friend, Lon, and Lon will be her help at home.  Patient manages her own healthcare and does not require assistive devices.  Patient has no anticipated discharge needs at this time.  CM provided a transitional care folder, information on advanced directives, information on pharmacy bedside delivery, and discharge planning begins on admission with contact information for any needs/questions.

## 2022-09-28 NOTE — DISCHARGE SUMMARY
[facility]  General Surgery  Discharge Summary      Patient Name: Leydi Cordero  MRN: [id number]  Admission Date: 9/27/2022  Hospital Length of Stay: 1 days  Discharge Date and Time:  09/28/2022 4:40 PM  Attending Physician: Jaimie Chopra MD   Discharging Provider: Radha Joseph PA-C  Primary Care Provider: Helena Zepeda PA-C      Procedure(s) (LRB):  XI ROBOTIC SLEEVE GASTRECTOMY (N/A)      Indwelling Lines/Drains at time of discharge:   Lines/Drains/Airways     Central Venous Catheter Line  Duration                Hemodialysis AV Graft Left upper arm -- days              Hospital Course: POD 1 tolerating clears with pain and nausea controlled. Stable for discharge to home.       Goals of Care Treatment Preferences:  Code Status: Full Code    Living Will: Yes              Consults:   Consults (From admission, onward)        Status Ordering Provider     Inpatient consult to Nephrology  Once        Provider:  Darryl Rock MD    Acknowledged JAIMIE CHOPRA          Significant Diagnostic Studies: Labs:   CMP   Recent Labs   Lab 09/27/22  1232 09/28/22  0506    138   K 4.4 5.3*    110   CO2 15* 16*   * 104   BUN 31* 23*   CREATININE 1.0 1.1   CALCIUM 8.7 8.5*   ANIONGAP 12 12    and CBC   Recent Labs   Lab 09/28/22  0506   WBC 14.36*   HGB 13.4   HCT 41.7          Pending Diagnostic Studies:     Procedure Component Value Units Date/Time    Specimen to Pathology, Surgery General Surgery [[id number]] Collected: 09/27/22 1210    Order Status: Sent Lab Status: In process Updated: 09/27/22 1329    Specimen: Tissue         Final Active Diagnoses:    Diagnosis Date Noted POA    PRINCIPAL PROBLEM:  Morbid obesity with BMI of 40.0-44.9, adult [E66.01, Z68.41] 04/29/2021 Not Applicable    Essential hypertension [I10] 08/11/2016 Yes      Problems Resolved During this Admission:      Discharged Condition: good    Disposition: Home or Self Care    Follow Up:    Patient  Instructions:      Ambulatory referral/consult to Outpatient Case Management   Referral Priority: Routine Referral Type: Consultation   Referral Reason: Specialty Services Required   Number of Visits Requested: 1     Medications:  Reconciled Home Medications:      Medication List      START taking these medications    docusate sodium 100 MG capsule  Commonly known as: COLACE  Take 1 capsule (100 mg total) by mouth 2 (two) times daily as needed.     ondansetron 4 MG Tbdl  Commonly known as: ZOFRAN-ODT  Take 1 tablet (4 mg total) by mouth 3 (three) times daily as needed.     oxyCODONE 5 MG immediate release tablet  Commonly known as: ROXICODONE  [place] 1 tableta (5 mg en total) por vía oral cada 6 (seis) horas según sea necesario para el dolor.  (Take 1 tablet (5 mg total) by mouth every 6 (six) hours as needed for Pain.)        CONTINUE taking these medications    albuterol 90 mcg/actuation inhaler  Commonly known as: PROVENTIL/VENTOLIN HFA  Inhale 2 puffs into the lungs every 4 (four) hours as needed for Wheezing or Shortness of Breath. Rescue     calcitRIOL 0.25 MCG Cap  Commonly known as: ROCALTROL  Take 1 capsule (0.25 mcg total) by mouth once daily.     ciprofloxacin HCl 500 MG tablet  Commonly known as: CIPRO  [place] 1 tableta (500 mg en total) por vía oral cada 12 (doce) horas.  (Take 1 tablet (500 mg total) by mouth every 12 (twelve) hours.)     labetaloL 200 MG tablet  Commonly known as: NORMODYNE  [place] young tableta (200 mg en total) por vía oral 2 veces al día.  (Take 1 tablet (200 mg total) by mouth 2 (two) times daily.)     magnesium oxide 400 mg (241.3 mg magnesium) tablet  Commonly known as: MAG-OX  [place] young tableta (400 mg en total) por vía oral 2 veces al día.  (Take 1 tablet (400 mg total) by mouth 2 (two) times daily.)     mycophenolate 250 mg Cap  Commonly known as: CELLCEPT  Take 4 capsules (1,000 mg total) by mouth 2 (two) times daily.     NIFEdipine 30 MG (OSM) 24 hr tablet  Commonly known as:  PROCARDIA-XL  [place] young tableta (30 mg en total) por vía oral 2 (dos) veces al día.  (Take 1 tablet (30 mg total) by mouth 2 (two) times a day.)     olmesartan 20 MG tablet  Commonly known as: BENICAR  [place] young tableta (20 mg en total) por vía oral diariamente.  (Take 1 tablet (20 mg total) by mouth once daily.)     pantoprazole 40 MG tablet  Commonly known as: PROTONIX  [place] young tableta (40 mg en total) por vía oral 2 veces al día.  (Take 1 tablet (40 mg total) by mouth 2 (two) times daily.)     predniSONE 5 MG tablet  Commonly known as: DELTASONE  [place] young tableta (5 mg en total) por vía oral diariamente.  (Take 1 tablet (5 mg total) by mouth once daily.)     sars-cov-2 (covid-19) 100 mcg/0.5 ml injection  Commonly known as: MODERNA COVID-19  Inject 0.5 mLs into the muscle.     sodium bicarbonate 650 MG tablet  [place] 2 tabletas (1,300 mg en total) por vía oral 3 (regi) veces al día  (Take 2 tablets (1,300 mg total) by mouth 3 (three) times daily.)     tacrolimus 1 MG Cap  Commonly known as: PROGRAF  Take 3 capsules (3 mg total) by mouth every morning AND 2 capsules (2 mg total) every evening.     VITAMIN D2 50,000 unit Cap  Generic drug: ergocalciferol  Take 1 capsule (50,000 Units total) by mouth every 30 days.          Time spent on the discharge of patient: 20 minutes    Radha Joseph PA-C  General Surgery  [facility]

## 2022-09-29 ENCOUNTER — PATIENT OUTREACH (OUTPATIENT)
Dept: ADMINISTRATIVE | Facility: CLINIC | Age: 32
End: 2022-09-29
Payer: MEDICARE

## 2022-09-29 LAB
FINAL PATHOLOGIC DIAGNOSIS: NORMAL
GROSS: NORMAL
Lab: NORMAL

## 2022-09-29 NOTE — PROGRESS NOTES
C3 nurse spoke with Leydi Cordero for a TCC post hospital discharge follow up call. The patient has a scheduled HOSFU appointment with Helena Zepeda PA-C on 10/3/22 @ 1020.

## 2022-09-30 ENCOUNTER — PATIENT OUTREACH (OUTPATIENT)
Dept: ADMINISTRATIVE | Facility: HOSPITAL | Age: 32
End: 2022-09-30
Payer: MEDICARE

## 2022-10-03 ENCOUNTER — OFFICE VISIT (OUTPATIENT)
Dept: PRIMARY CARE CLINIC | Facility: CLINIC | Age: 32
End: 2022-10-03
Payer: MEDICARE

## 2022-10-03 VITALS
DIASTOLIC BLOOD PRESSURE: 70 MMHG | SYSTOLIC BLOOD PRESSURE: 130 MMHG | HEART RATE: 72 BPM | BODY MASS INDEX: 39.77 KG/M2 | HEIGHT: 66 IN | TEMPERATURE: 98 F | WEIGHT: 247.44 LBS

## 2022-10-03 DIAGNOSIS — Z94.0 KIDNEY REPLACED BY TRANSPLANT: ICD-10-CM

## 2022-10-03 DIAGNOSIS — E66.01 MORBID OBESITY WITH BMI OF 40.0-44.9, ADULT: ICD-10-CM

## 2022-10-03 DIAGNOSIS — K21.9 GASTROESOPHAGEAL REFLUX DISEASE, UNSPECIFIED WHETHER ESOPHAGITIS PRESENT: ICD-10-CM

## 2022-10-03 DIAGNOSIS — N18.2 CKD (CHRONIC KIDNEY DISEASE) STAGE 2, GFR 60-89 ML/MIN: ICD-10-CM

## 2022-10-03 DIAGNOSIS — Z90.3 H/O GASTRIC SLEEVE: Primary | ICD-10-CM

## 2022-10-03 PROCEDURE — 99999 PR PBB SHADOW E&M-EST. PATIENT-LVL V: CPT | Mod: PBBFAC,,, | Performed by: PHYSICIAN ASSISTANT

## 2022-10-03 PROCEDURE — 99999 PR PBB SHADOW E&M-EST. PATIENT-LVL V: ICD-10-PCS | Mod: PBBFAC,,, | Performed by: PHYSICIAN ASSISTANT

## 2022-10-03 PROCEDURE — 99495 TRANSJ CARE MGMT MOD F2F 14D: CPT | Mod: S$PBB,,, | Performed by: PHYSICIAN ASSISTANT

## 2022-10-03 PROCEDURE — 99215 OFFICE O/P EST HI 40 MIN: CPT | Mod: PBBFAC,PN | Performed by: PHYSICIAN ASSISTANT

## 2022-10-03 PROCEDURE — 99495 TCM SERVICES (MODERATE COMPLEXITY): ICD-10-PCS | Mod: S$PBB,,, | Performed by: PHYSICIAN ASSISTANT

## 2022-10-03 RX ORDER — PANTOPRAZOLE SODIUM 40 MG/1
40 TABLET, DELAYED RELEASE ORAL 2 TIMES DAILY
Qty: 120 TABLET | Refills: 0 | Status: SHIPPED | OUTPATIENT
Start: 2022-10-03 | End: 2023-05-03 | Stop reason: SDUPTHER

## 2022-10-03 NOTE — PROGRESS NOTES
Subjective:      Patient ID: Leydi Cordero is a 32 y.o. female.    Chief Complaint: Follow-up  Transitional Care Note    Family and/or Caretaker present at visit?  Yes.  Diagnostic tests reviewed/disposition: No diagnosic tests pending after this hospitalization.  Disease/illness education: has had bowels, no fever, no sob, getting around   Home health/community services discussion/referrals: Patient does not have home health established from hospital visit.  They do not need home health.  If needed, we will set up home health for the patient.   Establishment or re-establishment of referral orders for community resources: No other necessary community resources.   Discussion with other health care providers:  follow-up with nephorology        Leydi Cordero is a 32 y.o. female who presents to clinic for follow-up after gastric sleeve surgery with Dr. Parekh     Since surgery  Has had bowel movements since procedure   Has been able to drink what instructed to drink after surgery   No fevers   Getting around, no sob   No problems    Requesting refill of protonix   Has been off bp meds since gastric sleeve as bp has been improved - has bp cuff and can cont. To monitor bp at home     Past Medical History:  No date: Anxiety  2021: -donor kidney transplant 2021      Comment:  cPRA 100%, XM negative 3 arteries, 2 on common patch,                WIT 40 min  No date: Encounter for blood transfusion  No date: ESRD (end stage renal disease)  No date: Fibroma  No date: H/O kidney transplant  No date: Hypertension  No date: Hypertensive nephropathy  No date: Ovarian cyst          Review of Systems   Constitutional:  Negative for activity change, appetite change, fatigue, fever and unexpected weight change.   HENT:  Negative for congestion, ear pain, sore throat and trouble swallowing.    Respiratory:  Negative for cough and shortness of breath.    Cardiovascular:  Negative for chest pain and  "palpitations.   Gastrointestinal:  Negative for abdominal distention, abdominal pain, constipation, diarrhea, nausea and vomiting.   Genitourinary:  Negative for difficulty urinating, frequency and urgency.   Musculoskeletal:  Negative for arthralgias and myalgias.   Neurological:  Negative for dizziness, weakness and light-headedness.   Psychiatric/Behavioral:  Negative for decreased concentration and dysphoric mood. The patient is not nervous/anxious.      Objective:   /70   Pulse 72   Temp 98.1 °F (36.7 °C)   Ht 5' 6" (1.676 m)   Wt 112.2 kg (247 lb 7.5 oz)   LMP 09/25/2022 (Approximate)   BMI 39.94 kg/m²   Physical Exam  Vitals reviewed.   Constitutional:       General: She is not in acute distress.     Appearance: She is well-developed. She is not ill-appearing, toxic-appearing or diaphoretic.   HENT:      Head: Normocephalic and atraumatic.      Right Ear: External ear normal.      Left Ear: External ear normal.      Nose: Nose normal.   Cardiovascular:      Rate and Rhythm: Normal rate and regular rhythm.      Pulses:           Radial pulses are 2+ on the right side and 2+ on the left side.      Heart sounds: Normal heart sounds, S1 normal and S2 normal.   Pulmonary:      Effort: Pulmonary effort is normal. No tachypnea, bradypnea, accessory muscle usage or respiratory distress.      Breath sounds: Normal breath sounds. No decreased breath sounds, wheezing, rhonchi or rales.   Chest:      Chest wall: No tenderness.   Skin:     General: Skin is warm and dry.      Capillary Refill: Capillary refill takes less than 2 seconds.      Comments: Incision sites well healing without erythema, warmth, discharge, drainage   Neurological:      Mental Status: She is alert and oriented to person, place, and time. She is not disoriented.      Cranial Nerves: No cranial nerve deficit.      Sensory: No sensory deficit.      Motor: No abnormal muscle tone.   Psychiatric:         Behavior: Behavior normal. "     Assessment:      1. H/O gastric sleeve    2. Gastroesophageal reflux disease, unspecified whether esophagitis present    3. Kidney replaced by transplant    4. CKD (chronic kidney disease) stage 2, GFR 60-89 ml/min    5. Morbid obesity with BMI of 40.0-44.9, adult       Plan:   H/O gastric sleeve  Comments:  no problems post op, doing well     Gastroesophageal reflux disease, unspecified whether esophagitis present  Comments:  refill protonix   Orders:  -     pantoprazole (PROTONIX) 40 MG tablet; Take 1 tablet (40 mg total) by mouth 2 (two) times daily.  Dispense: 120 tablet; Refill: 0    Kidney replaced by transplant  Comments:  followed by nephrology     CKD (chronic kidney disease) stage 2, GFR 60-89 ml/min  Comments:  avoid nephrotoxic meds     Morbid obesity with BMI of 40.0-44.9, adult  Comments:  s/p gastric sleeve         Helena Zepeda PA-C   Physician Assistant   [facility]

## 2022-10-05 ENCOUNTER — LAB VISIT (OUTPATIENT)
Dept: LAB | Facility: HOSPITAL | Age: 32
End: 2022-10-05
Payer: MEDICARE

## 2022-10-05 ENCOUNTER — TELEPHONE (OUTPATIENT)
Dept: NEPHROLOGY | Facility: CLINIC | Age: 32
End: 2022-10-05
Payer: MEDICARE

## 2022-10-05 DIAGNOSIS — D84.9 IMMUNOSUPPRESSION: ICD-10-CM

## 2022-10-05 DIAGNOSIS — N39.0 UTI (URINARY TRACT INFECTION), UNCOMPLICATED: Primary | ICD-10-CM

## 2022-10-05 DIAGNOSIS — Z94.0 KIDNEY TRANSPLANT STATUS: ICD-10-CM

## 2022-10-05 LAB
ALBUMIN SERPL BCP-MCNC: 3.6 G/DL (ref 3.5–5.2)
ANION GAP SERPL CALC-SCNC: 10 MMOL/L (ref 8–16)
BUN SERPL-MCNC: 16 MG/DL (ref 6–20)
CALCIUM SERPL-MCNC: 9.4 MG/DL (ref 8.7–10.5)
CHLORIDE SERPL-SCNC: 101 MMOL/L (ref 95–110)
CO2 SERPL-SCNC: 25 MMOL/L (ref 23–29)
CREAT SERPL-MCNC: 1.1 MG/DL (ref 0.5–1.4)
EST. GFR  (NO RACE VARIABLE): >60 ML/MIN/1.73 M^2
GLUCOSE SERPL-MCNC: 83 MG/DL (ref 70–110)
PHOSPHATE SERPL-MCNC: 2.6 MG/DL (ref 2.7–4.5)
POTASSIUM SERPL-SCNC: 4.2 MMOL/L (ref 3.5–5.1)
SODIUM SERPL-SCNC: 136 MMOL/L (ref 136–145)

## 2022-10-05 PROCEDURE — 80197 ASSAY OF TACROLIMUS: CPT | Performed by: INTERNAL MEDICINE

## 2022-10-05 PROCEDURE — 80069 RENAL FUNCTION PANEL: CPT | Performed by: INTERNAL MEDICINE

## 2022-10-05 PROCEDURE — 36415 COLL VENOUS BLD VENIPUNCTURE: CPT | Performed by: INTERNAL MEDICINE

## 2022-10-06 LAB — TACROLIMUS BLD-MCNC: 7.6 NG/ML (ref 5–15)

## 2022-10-12 ENCOUNTER — NUTRITION (OUTPATIENT)
Dept: INTERNAL MEDICINE | Facility: CLINIC | Age: 32
End: 2022-10-12
Payer: MEDICARE

## 2022-10-12 ENCOUNTER — OFFICE VISIT (OUTPATIENT)
Dept: SURGERY | Facility: CLINIC | Age: 32
End: 2022-10-12
Payer: MEDICARE

## 2022-10-12 VITALS — BODY MASS INDEX: 38.89 KG/M2 | WEIGHT: 240.94 LBS

## 2022-10-12 DIAGNOSIS — Z98.84 STATUS POST BARIATRIC SURGERY: Primary | ICD-10-CM

## 2022-10-12 DIAGNOSIS — E66.9 OBESITY (BMI 30-39.9): Primary | ICD-10-CM

## 2022-10-12 DIAGNOSIS — Z71.3 DIETARY COUNSELING: ICD-10-CM

## 2022-10-12 PROCEDURE — 99024 POSTOP FOLLOW-UP VISIT: CPT | Mod: POP,,,

## 2022-10-12 PROCEDURE — 99999 PR PBB SHADOW E&M-EST. PATIENT-LVL IV: CPT | Mod: PBBFAC,,,

## 2022-10-12 PROCEDURE — 97803 PR MED NUTR THER, SUBSQ, INDIV, EA 15 MIN: ICD-10-PCS | Mod: S$GLB,,, | Performed by: DIETITIAN, REGISTERED

## 2022-10-12 PROCEDURE — 99024 PR POST-OP FOLLOW-UP VISIT: ICD-10-PCS | Mod: POP,,,

## 2022-10-12 PROCEDURE — 99214 OFFICE O/P EST MOD 30 MIN: CPT | Mod: PBBFAC

## 2022-10-12 PROCEDURE — 99999 PR PBB SHADOW E&M-EST. PATIENT-LVL IV: ICD-10-PCS | Mod: PBBFAC,,,

## 2022-10-12 PROCEDURE — 97803 MED NUTRITION INDIV SUBSEQ: CPT | Mod: S$GLB,,, | Performed by: DIETITIAN, REGISTERED

## 2022-10-12 NOTE — PROGRESS NOTES
NUTRITION NOTE    Referring Physician: Dr. Parekh  Reason for MNT Referral: Follow-up 2 Weeks s/p Gastric Sleeve, 2022    PAST MEDICAL HISTORY:  Denies nausea, vomiting, constipation, and diarrhea.  Reports doing well.    Tolerating liquids well. Using 2 scoops protein powder + water mixed in 20 ounce bottle.  Reports adequate water intake throughout the day.   Have not tried any pureed foods yet.     Blood pressure reading low lately. Have not discussed with physician yet.   Stopped taking blood pressure medication and numbers remain low.       Past Medical History:   Diagnosis Date    Anxiety     -donor kidney transplant 2021    cPRA 100%, XM negative 3 arteries, 2 on common patch, WIT 40 min    Encounter for blood transfusion     ESRD (end stage renal disease)     Fibroma     H/O kidney transplant     Hypertension     Hypertensive nephropathy     Ovarian cyst        CLINICAL DATA:  32 y.o. female.    There were no vitals filed for this visit.    Current Weight: 240 lbs   Surgery weight: 249 lbs   2022: 260 lbs              2022:  262 lbs              5-: 264 lbs  BMI: 38.89  Total Weight Loss: -9 lbs since surgery       LABS:  None available    CURRENT DIET:  Bariatric Liquid Diet    Diet Recall: 30 grams of protein/day; reports adequate oz of fluids/day    Diet Includes: protein powder, liquid  Protein Supplements: premier protein    EXERCISE:  Adequate light exercise.  Little walking right now.   Doctor approved for more.     Restrictions to Exercise: None.    VITAMINS/MINERALS:  Multivitamins:  yes (could not recall brand but states it meets the requirements listed in booklet)  B-Complex: yes  Calcium Citrate + Vitamin D: yes  Vitamin B12: yes     Reports daily intake.      ASSESSMENT:  Doing fairly well overall.  Inadequate protein intake.  Adequate fluid intake.    BARIATRIC DIET DISCUSSION:  Instructed and provided written materials on bariatric pureed diet  plan.  Reinforced post-op nutrition guidelines.    PLAN/RECOMMONDATIONS:  Advance to bariatric pureed diet.  Increase protein intake.  Maintain fluid intake.  Increase light exercise.  Continue appropriate vitamins & minerals.  Adjust vitamins & minerals by: n/a    Discuss low blood pressure with physician.       Return to clinic in 3 months    SESSION TIME: 40 minutes    Language line services used for visit.

## 2022-10-12 NOTE — PROGRESS NOTES
"BARIATRIC PATIENT EVALUATION    CHIEF COMPLAINT:   morbid obesity with a BMI of 42 and inability to lose weight.    HISTORY OF PRESENT ILLNESS:  Leydi Cordero is a 32 y.o.-year-old female presents for post-operative evaluation s/p sleeve gastrectomy on 2022. She is feeling well today other than some remaining right-sided alok-incisional soreness, which is expected. She has not started on her pureed diet yet. She has had minimal nausea and reports feeling full very quickly. She denies fevers, chills, vomiting, dysphagia, odynophagia, hematochezia, and melena.     Initially presenting for morbid obesity with a BMI of 42 and inability to lose weight. The patient reports weight gain affecting her health following her kidney transplant. She has had difficulty losing weight since this time.    Height 5'6"  Weight 262lbs --> 240 lbs  BMI 42 --> 38    CO-MORBIDITIES:  hypertension    PAST MEDICAL HISTORY:  Past Medical History:   Diagnosis Date    Anxiety     -donor kidney transplant 2021    cPRA 100%, XM negative 3 arteries, 2 on common patch, WIT 40 min    Encounter for blood transfusion     ESRD (end stage renal disease)     Fibroma     H/O kidney transplant     Hypertension     Hypertensive nephropathy     Ovarian cyst         PAST SURGICAL HISTORY:  Past Surgical History:   Procedure Laterality Date    COLPOSCOPY  2020    Needs follow up on or after 21    ESOPHAGOGASTRODUODENOSCOPY N/A 2022    Procedure: ESOPHAGOGASTRODUODENOSCOPY (EGD);  Surgeon: Essie Carvajal MD;  Location: [facility];  Service: Endoscopy;  Laterality: N/A;    hemodialysis access left arm      kidney removal       KIDNEY TRANSPLANT  04/15/2015    KIDNEY TRANSPLANT N/A 2021    Procedure: TRANSPLANT, KIDNEY;  Surgeon: Fam Sutton MD;  Location: [address];  Service: Transplant;  Laterality: N/A;    NEPHRECTOMY      OVARIAN CYST REMOVAL      PARATHYROIDECTOMY      partial     " PERCUTANEOUS NEPHROSTOMY Left 2/13/2021    Procedure: Creation, Nephrostomy, Percutaneous;  Surgeon: Elen Surgeon;  Location: [facility];  Service: Anesthesiology;  Laterality: Left;    right leg hemodialysis access      ROBOT-ASSISTED LAPAROSCOPIC SLEEVE GASTRECTOMY USING DA VELMA XI N/A 9/27/2022    Procedure: XI ROBOTIC SLEEVE GASTRECTOMY;  Surgeon: Cal Parekh MD;  Location: [facility];  Service: General;  Laterality: N/A;    transplant nephrectomy  12/01/2017       FAMILY HISTORY:  Family History   Problem Relation Age of Onset    No Known Problems Mother     Colon cancer Father     Cancer Father     Hypertension Father     No Known Problems Sister     No Known Problems Brother     Kidney disease Maternal Aunt     Hypertension Maternal Aunt     Diabetes Maternal Uncle     Kidney disease Other     Hypertension Other         SOCIAL HISTORY:   reports that she quit smoking about 2 years ago. Her smoking use included cigarettes. She started smoking about 14 years ago. She has a 3.00 pack-year smoking history. She has never used smokeless tobacco. She reports that she does not drink alcohol and does not use drugs.     MEDICATIONS:  Current Outpatient Medications on File Prior to Visit   Medication Sig Dispense Refill    albuterol (PROVENTIL/VENTOLIN HFA) 90 mcg/actuation inhaler Inhale 2 puffs into the lungs every 4 (four) hours as needed for Wheezing or Shortness of Breath. Rescue 8.5 g 11    calcitRIOL (ROCALTROL) 0.25 MCG Cap Take 1 capsule (0.25 mcg total) by mouth once daily. 30 capsule 11    ciprofloxacin HCl (CIPRO) 500 MG tablet Take 1 tablet (500 mg total) by mouth every 12 (twelve) hours. 14 tablet 0    docusate sodium (COLACE) 100 MG capsule Take 1 capsule (100 mg total) by mouth 2 (two) times daily as needed. 20 capsule 0    ergocalciferol (VITAMIN D2) 50,000 unit Cap Take 1 capsule (50,000 Units total) by mouth every 30 days. 1 capsule 11    labetaloL (NORMODYNE) 200 MG tablet Take 1 tablet (200 mg total)  by mouth 2 (two) times daily. 60 tablet 10    magnesium oxide (MAG-OX) 400 mg (241.3 mg magnesium) tablet Take 1 tablet (400 mg total) by mouth 2 (two) times daily. 60 tablet 11    mycophenolate (CELLCEPT) 250 mg Cap Take 4 capsules (1,000 mg total) by mouth 2 (two) times daily. 240 capsule 11    NIFEdipine (PROCARDIA-XL) 30 MG (OSM) 24 hr tablet Take 1 tablet (30 mg total) by mouth 2 (two) times a day. 60 tablet 11    olmesartan (BENICAR) 20 MG tablet Take 1 tablet (20 mg total) by mouth once daily. 30 tablet 11    ondansetron (ZOFRAN-ODT) 4 MG TbDL Take 1 tablet (4 mg total) by mouth 3 (three) times daily as needed. 30 tablet 0    oxyCODONE (ROXICODONE) 5 MG immediate release tablet Take 1 tablet (5 mg total) by mouth every 6 (six) hours as needed for Pain. 20 tablet 0    pantoprazole (PROTONIX) 40 MG tablet Take 1 tablet (40 mg total) by mouth 2 (two) times daily. 120 tablet 0    predniSONE (DELTASONE) 5 MG tablet Take 1 tablet (5 mg total) by mouth once daily. 120 tablet 11    sars-cov-2, covid-19, (MODERNA COVID-19) 100 mcg/0.5 ml injection Inject 0.5 mLs into the muscle. 0.5 mL 0    sodium bicarbonate 650 MG tablet Take 2 tablets (1,300 mg total) by mouth 3 (three) times daily. 180 tablet 11    tacrolimus (PROGRAF) 1 MG Cap Take 3 capsules (3 mg total) by mouth every morning AND 2 capsules (2 mg total) every evening. 150 capsule 11     Current Facility-Administered Medications on File Prior to Visit   Medication Dose Route Frequency Provider Last Rate Last Admin    acetaminophen tablet 650 mg  650 mg Oral Once PRN Govind Garcia MD        albuterol inhaler 2 puff  2 puff Inhalation Q20 Min PRN Govind Garcia MD        diphenhydrAMINE injection 25 mg  25 mg Intravenous Once PRN Govind Garcia MD        EPINEPHrine (EPIPEN) 0.3 mg/0.3 mL pen injection 0.3 mg  0.3 mg Intramuscular PRN Govind Garcia MD        lactated ringers infusion   Intravenous Continuous Cal Parekh MD 10 mL/hr at 06/23/22 3944  Restarted at 06/23/22 1000    methylPREDNISolone sodium succinate injection 40 mg  40 mg Intravenous Once PRN Govind Garcia MD        ondansetron disintegrating tablet 4 mg  4 mg Oral Once PRN Govind Garcia MD        sodium chloride 0.9% 500 mL flush bag   Intravenous PRN Govind Garcia MD        sodium chloride 0.9% flush 10 mL  10 mL Intravenous PRN Govind Garcia MD         Medications have been reviewed.    ALLERGIES:  Review of patient's allergies indicates:   Allergen Reactions    Heparin analogues Rash     Allergies have been reviewed.    ROS:  Review of Systems   Constitutional:  Negative for chills, fatigue, fever and unexpected weight change.   Respiratory:  Negative for cough, shortness of breath, wheezing and stridor.    Cardiovascular:  Negative for chest pain, palpitations and leg swelling.   Gastrointestinal:  Positive for abdominal pain (Expected post-operative). Negative for abdominal distention, constipation, diarrhea, nausea and vomiting.   Genitourinary:  Negative for difficulty urinating, dysuria, frequency, hematuria and urgency.   Skin:  Negative for color change, pallor, rash and wound.   Hematological:  Does not bruise/bleed easily.     PE:  Physical Exam  Vitals reviewed.   Constitutional:       General: She is not in acute distress.     Appearance: She is well-developed. She is not ill-appearing.   HENT:      Head: Normocephalic and atraumatic.      Right Ear: External ear normal.      Left Ear: External ear normal.   Eyes:      Extraocular Movements: Extraocular movements intact.      Conjunctiva/sclera: Conjunctivae normal.   Cardiovascular:      Rate and Rhythm: Normal rate.   Pulmonary:      Effort: Pulmonary effort is normal. No respiratory distress.   Abdominal:      General: There is no distension.      Palpations: Abdomen is soft.      Tenderness: There is no abdominal tenderness.      Comments: Incisions healing well without SOI.   Musculoskeletal:      Cervical  back: Neck supple.   Skin:     General: Skin is warm and dry.   Neurological:      Mental Status: She is alert and oriented to person, place, and time.   Psychiatric:         Behavior: Behavior normal.     EGD:  Impression:            - LA Grade C reflux esophagitis.                          - Small hiatal hernia.                          - Gastritis. Biopsied.                          - Duodenitis.                          - Normal first portion of the duodenum and second                          portion of the duodenum.     Surgical pathology:  Final Pathologic Diagnosis 1. Stomach, sleeve gastrectomy:   Segment of stomach, lined by oxyntic mucosa, with no diagnostic abnormality.   Negative for Helicobacter pylori.        DIAGNOSIS:  1. Morbid obesity with a BMI of 42 and inability to lose weight.  2. Co-morbidities: hypertension    PLAN:  - Pathology reviewed  - Reinforced bariatric diet, dietician  - Increase exercise over next few weeks  - RTC 3 months, will get nutritional labs at that time    Radha Joseph PA-C  [facility]

## 2022-11-08 ENCOUNTER — OUTPATIENT CASE MANAGEMENT (OUTPATIENT)
Dept: ADMINISTRATIVE | Facility: OTHER | Age: 32
End: 2022-11-08
Payer: MEDICARE

## 2022-11-08 NOTE — PROGRESS NOTES
Outpatient Care Management  Patient Does Not Consent    Patient: Leydi Cordero  MRN:  [id number]  Date of Service:  11/8/2022  Completed by:  Carlos Burnham RN    Chief Complaint   Patient presents with    OPCM Chart Review     11/8/22    Case Closure     11/8/22       Patient Summary     Program:  OPCM High Risk  Qualification Status:  No

## 2022-11-09 ENCOUNTER — HOSPITAL ENCOUNTER (EMERGENCY)
Facility: HOSPITAL | Age: 32
Discharge: HOME | End: 2022-11-10
Attending: EMERGENCY MEDICINE
Payer: MEDICARE

## 2022-11-09 DIAGNOSIS — J10.1 INFLUENZA A: Primary | ICD-10-CM

## 2022-11-09 DIAGNOSIS — N39.0 URINARY TRACT INFECTION WITHOUT HEMATURIA, SITE UNSPECIFIED: ICD-10-CM

## 2022-11-09 LAB
ALBUMIN SERPL BCP-MCNC: 3.9 G/DL (ref 3.5–5.2)
ALP SERPL-CCNC: 54 U/L (ref 55–135)
ALT SERPL W/O P-5'-P-CCNC: 16 U/L (ref 10–44)
ANION GAP SERPL CALC-SCNC: 12 MMOL/L (ref 8–16)
AST SERPL-CCNC: 18 U/L (ref 10–40)
B-HCG UR QL: NEGATIVE
BACTERIA #/AREA URNS HPF: ABNORMAL /HPF
BASOPHILS # BLD AUTO: 0.01 K/UL (ref 0–0.2)
BASOPHILS NFR BLD: 0.2 % (ref 0–1.9)
BILIRUB SERPL-MCNC: 0.3 MG/DL (ref 0.1–1)
BILIRUB UR QL STRIP: NEGATIVE
BUN SERPL-MCNC: 11 MG/DL (ref 6–20)
CALCIUM SERPL-MCNC: 9.2 MG/DL (ref 8.7–10.5)
CHLORIDE SERPL-SCNC: 107 MMOL/L (ref 95–110)
CLARITY UR: CLEAR
CO2 SERPL-SCNC: 17 MMOL/L (ref 23–29)
COLOR UR: YELLOW
CREAT SERPL-MCNC: 1.3 MG/DL (ref 0.5–1.4)
DIFFERENTIAL METHOD: ABNORMAL
EOSINOPHIL # BLD AUTO: 0 K/UL (ref 0–0.5)
EOSINOPHIL NFR BLD: 0 % (ref 0–8)
ERYTHROCYTE [DISTWIDTH] IN BLOOD BY AUTOMATED COUNT: 13.7 % (ref 11.5–14.5)
EST. GFR  (NO RACE VARIABLE): 56 ML/MIN/1.73 M^2
GLUCOSE SERPL-MCNC: 92 MG/DL (ref 70–110)
GLUCOSE UR QL STRIP: NEGATIVE
GROUP A STREP, MOLECULAR: NEGATIVE
HCT VFR BLD AUTO: 42.2 % (ref 37–48.5)
HCV AB SERPL QL IA: NEGATIVE
HEP C VIRUS HOLD SPECIMEN: NORMAL
HGB BLD-MCNC: 13.6 G/DL (ref 12–16)
HGB UR QL STRIP: NEGATIVE
HIV 1+2 AB+HIV1 P24 AG SERPL QL IA: NEGATIVE
HYALINE CASTS #/AREA URNS LPF: 0 /LPF
IMM GRANULOCYTES # BLD AUTO: 0.02 K/UL (ref 0–0.04)
IMM GRANULOCYTES NFR BLD AUTO: 0.3 % (ref 0–0.5)
INFLUENZA A, MOLECULAR: POSITIVE
INFLUENZA B, MOLECULAR: NEGATIVE
KETONES UR QL STRIP: ABNORMAL
LEUKOCYTE ESTERASE UR QL STRIP: ABNORMAL
LYMPHOCYTES # BLD AUTO: 0.3 K/UL (ref 1–4.8)
LYMPHOCYTES NFR BLD: 4.4 % (ref 18–48)
MCH RBC QN AUTO: 28.5 PG (ref 27–31)
MCHC RBC AUTO-ENTMCNC: 32.2 G/DL (ref 32–36)
MCV RBC AUTO: 88 FL (ref 82–98)
MICROSCOPIC COMMENT: ABNORMAL
MONOCYTES # BLD AUTO: 0.6 K/UL (ref 0.3–1)
MONOCYTES NFR BLD: 9.9 % (ref 4–15)
NEUTROPHILS # BLD AUTO: 5.1 K/UL (ref 1.8–7.7)
NEUTROPHILS NFR BLD: 85.2 % (ref 38–73)
NITRITE UR QL STRIP: NEGATIVE
NRBC BLD-RTO: 0 /100 WBC
PH UR STRIP: 6 [PH] (ref 5–8)
PLATELET # BLD AUTO: 109 K/UL (ref 150–450)
PMV BLD AUTO: 13.1 FL (ref 9.2–12.9)
POTASSIUM SERPL-SCNC: 4.6 MMOL/L (ref 3.5–5.1)
PROT SERPL-MCNC: 7 G/DL (ref 6–8.4)
PROT UR QL STRIP: ABNORMAL
RBC # BLD AUTO: 4.78 M/UL (ref 4–5.4)
RBC #/AREA URNS HPF: 6 /HPF (ref 0–4)
SARS-COV-2 RDRP RESP QL NAA+PROBE: NEGATIVE
SODIUM SERPL-SCNC: 136 MMOL/L (ref 136–145)
SP GR UR STRIP: 1.02 (ref 1–1.03)
SPECIMEN SOURCE: ABNORMAL
SQUAMOUS #/AREA URNS HPF: 5 /HPF
URN SPEC COLLECT METH UR: ABNORMAL
UROBILINOGEN UR STRIP-ACNC: NEGATIVE EU/DL
WBC # BLD AUTO: 5.95 K/UL (ref 3.9–12.7)
WBC #/AREA URNS HPF: 19 /HPF (ref 0–5)

## 2022-11-09 PROCEDURE — 86803 HEPATITIS C AB TEST: CPT | Performed by: EMERGENCY MEDICINE

## 2022-11-09 PROCEDURE — 87651 STREP A DNA AMP PROBE: CPT | Performed by: PHYSICIAN ASSISTANT

## 2022-11-09 PROCEDURE — 99284 EMERGENCY DEPT VISIT MOD MDM: CPT | Mod: 25

## 2022-11-09 PROCEDURE — 87389 HIV-1 AG W/HIV-1&-2 AB AG IA: CPT | Performed by: EMERGENCY MEDICINE

## 2022-11-09 PROCEDURE — 87086 URINE CULTURE/COLONY COUNT: CPT | Performed by: PHYSICIAN ASSISTANT

## 2022-11-09 PROCEDURE — U0002 COVID-19 LAB TEST NON-CDC: HCPCS | Performed by: PHYSICIAN ASSISTANT

## 2022-11-09 PROCEDURE — 81000 URINALYSIS NONAUTO W/SCOPE: CPT | Performed by: PHYSICIAN ASSISTANT

## 2022-11-09 PROCEDURE — 25000003 PHARM REV CODE 250: Performed by: PHYSICIAN ASSISTANT

## 2022-11-09 PROCEDURE — 87502 INFLUENZA DNA AMP PROBE: CPT | Performed by: PHYSICIAN ASSISTANT

## 2022-11-09 PROCEDURE — 80053 COMPREHEN METABOLIC PANEL: CPT | Performed by: PHYSICIAN ASSISTANT

## 2022-11-09 PROCEDURE — 85025 COMPLETE CBC W/AUTO DIFF WBC: CPT | Performed by: PHYSICIAN ASSISTANT

## 2022-11-09 PROCEDURE — 81025 URINE PREGNANCY TEST: CPT | Performed by: PHYSICIAN ASSISTANT

## 2022-11-09 PROCEDURE — 96360 HYDRATION IV INFUSION INIT: CPT

## 2022-11-09 RX ADMIN — SODIUM CHLORIDE 500 ML: 0.9 INJECTION, SOLUTION INTRAVENOUS at 07:11

## 2022-11-09 NOTE — FIRST PROVIDER EVALUATION
Medical screening examination initiated.  I have conducted a focused provider triage encounter, findings are as follows:    Brief history of present illness:  Kidney transplant patient.  Fever at home.  Cough, dysuria, sore throat    There were no vitals filed for this visit.    Pertinent physical exam:  congested, alert, coughing      Preliminary workup initiated; this workup will be continued and followed by the physician or advanced practice provider that is assigned to the patient when roomed.

## 2022-11-10 VITALS
HEIGHT: 67 IN | SYSTOLIC BLOOD PRESSURE: 110 MMHG | TEMPERATURE: 99 F | OXYGEN SATURATION: 98 % | DIASTOLIC BLOOD PRESSURE: 78 MMHG | HEART RATE: 99 BPM | RESPIRATION RATE: 17 BRPM | WEIGHT: 228.94 LBS | BODY MASS INDEX: 35.93 KG/M2

## 2022-11-10 RX ORDER — OSELTAMIVIR PHOSPHATE 75 MG/1
75 CAPSULE ORAL 2 TIMES DAILY
Qty: 10 CAPSULE | Refills: 0 | Status: SHIPPED | OUTPATIENT
Start: 2022-11-10 | End: 2022-11-19

## 2022-11-10 RX ORDER — DOXYCYCLINE 100 MG/1
100 CAPSULE ORAL 2 TIMES DAILY
Qty: 20 CAPSULE | Refills: 0 | Status: SHIPPED | OUTPATIENT
Start: 2022-11-10 | End: 2022-11-24

## 2022-11-10 NOTE — ED NOTES
Patient identifiers verified and correct for Leydi Cordero    Headache, fever, cough, sorethroat    LOC: The patient is awake, alert and aware of environment with an appropriate affect, the patient is oriented x 3 and speaking appropriately.    APPEARANCE: Patient resting comfortably and in no acute distress, patient is clean and well groomed, patient's clothing is properly fastened.    SKIN: The skin is warm and dry, color consistent with ethnicity, patient has normal skin turgor and moist mucus membranes, skin intact, no breakdown or bruising noted.     MUSCULOSKELETAL: Patient moving all extremities spontaneously.    RESPIRATORY: Airway is open and patent, respirations are spontaneous.    CARDIAC: Patient has a normal rate, no periphreal edema noted, capillary refill < 3 seconds.    ABDOMEN: Soft and non tender to palpation.

## 2022-11-10 NOTE — ED PROVIDER NOTES
SCRIBE #1 NOTE: IViv, am scribing for, and in the presence of, Jose Arenas MD. I have scribed the entire note.       History     Chief Complaint   Patient presents with    Headache     Pt here with c/o headache, fever, cough, and sore throat since this morning. Pt has hx of kidney transplant. Pt has a low-grade fever here.      Review of patient's allergies indicates:   Allergen Reactions    Heparin analogues Rash         History of Present Illness     HPI    11/10/2022, 12:31 AM  History obtained from the patient assisted through video  service.       History of Present Illness: Leydi Cordero is a 32 y.o. female patient with a PMHx of HTN, kidney transplant who presents to the Emergency Department for evaluation of flu sxs which onset this AM. Pt c/o fever, bilateral ear pain, cough, and sore throat. Symptoms are constant and moderate in severity. No mitigating or exacerbating factors reported. Patient denies any  N/V/D, chest pain, abdominal pain, and all other sxs at this time. No prior tx reported. No further complaints or concerns at this time.       Arrival mode: Personal vehicle     PCP: Helena Zepeda PA-C        Past Medical History:  Past Medical History:   Diagnosis Date    Anxiety     -donor kidney transplant 2021    cPRA 100%, XM negative 3 arteries, 2 on common patch, WIT 40 min    Encounter for blood transfusion     ESRD (end stage renal disease)     Fibroma     H/O kidney transplant     Hypertension     Hypertensive nephropathy     Ovarian cyst        Past Surgical History:  Past Surgical History:   Procedure Laterality Date    COLPOSCOPY  2020    Needs follow up on or after 21    ESOPHAGOGASTRODUODENOSCOPY N/A 2022    Procedure: ESOPHAGOGASTRODUODENOSCOPY (EGD);  Surgeon: Essie Carvajal MD;  Location: [facility];  Service: Endoscopy;  Laterality: N/A;    hemodialysis access left arm      kidney removal       KIDNEY  TRANSPLANT  04/15/2015    KIDNEY TRANSPLANT N/A 2021    Procedure: TRANSPLANT, KIDNEY;  Surgeon: Fam Sutton MD;  Location: [address];  Service: Transplant;  Laterality: N/A;    NEPHRECTOMY      OVARIAN CYST REMOVAL      PARATHYROIDECTOMY      partial     PERCUTANEOUS NEPHROSTOMY Left 2021    Procedure: Creation, Nephrostomy, Percutaneous;  Surgeon: Elen Surgeon;  Location: [facility];  Service: Anesthesiology;  Laterality: Left;    right leg hemodialysis access      ROBOT-ASSISTED LAPAROSCOPIC SLEEVE GASTRECTOMY USING DA VELMA XI N/A 2022    Procedure: XI ROBOTIC SLEEVE GASTRECTOMY;  Surgeon: Cal Parekh MD;  Location: [facility];  Service: General;  Laterality: N/A;    transplant nephrectomy  2017         Family History:  Family History   Problem Relation Age of Onset    No Known Problems Mother     Colon cancer Father     Cancer Father     Hypertension Father     No Known Problems Sister     No Known Problems Brother     Kidney disease Maternal Aunt     Hypertension Maternal Aunt     Diabetes Maternal Uncle     Kidney disease Other     Hypertension Other        Social History:  Social History     Tobacco Use    Smoking status: Former     Packs/day: 0.25     Years: 12.00     Pack years: 3.00     Types: Cigarettes     Start date:      Quit date: 2020     Years since quittin.2    Smokeless tobacco: Never   Substance and Sexual Activity    Alcohol use: No    Drug use: No    Sexual activity: Yes     Partners: Male     Birth control/protection: None        Review of Systems     Review of Systems   Constitutional:  Positive for fever. Negative for chills.   HENT:  Positive for ear pain and sore throat.    Eyes:  Negative for pain and visual disturbance.   Respiratory:  Positive for cough. Negative for shortness of breath.    Cardiovascular:  Negative for chest pain and palpitations.   Gastrointestinal:  Negative for diarrhea, nausea and vomiting.   Genitourinary:  Negative for  "dysuria and hematuria.   Musculoskeletal:  Negative for back pain and neck pain.   Skin:  Negative for rash and wound.   Neurological:  Positive for headaches. Negative for numbness.   All other systems reviewed and are negative.     Physical Exam     Initial Vitals [11/09/22 1850]   BP Pulse Resp Temp SpO2   107/68 109 18 99.1 °F (37.3 °C) 98 %      MAP       --          Physical Exam  Nursing Notes and Vital Signs Reviewed.  Constitutional: Patient is in no acute distress. Well-developed and well-nourished.  Head: Atraumatic. Normocephalic.  Eyes:  EOM intact. Conjunctivae are not pale. No scleral icterus.  ENT: Mucous membranes are moist. Oropharynx is clear and symmetric. b/l TM's unremarkable.   Neck: Supple. Full ROM.  Cardiovascular: Regular rate. Regular rhythm. No murmurs, rubs, or gallops. Distal pulses are 2+ and symmetric.  Pulmonary/Chest: No respiratory distress. Clear to auscultation bilaterally. No wheezing or rales.  Abdominal: Soft and non-distended.  There is no tenderness.  No rebound, guarding, or rigidity.   Musculoskeletal: Moves all extremities. No obvious deformities. No edema.   Skin: Warm and dry.  Neurological:  Alert, awake, and appropriate.  Normal speech.  No acute focal neurological deficits are appreciated.  Psychiatric: Normal affect. Good eye contact. Appropriate in content.     ED Course   Procedures  ED Vital Signs:  Vitals:    11/09/22 1850   BP: 107/68   Pulse: 109   Resp: 18   Temp: 99.1 °F (37.3 °C)   TempSrc: Oral   SpO2: 98%   Weight: 103.8 kg (228 lb 15.2 oz)   Height: 5' 7" (1.702 m)       Abnormal Lab Results:  Labs Reviewed   INFLUENZA A & B BY MOLECULAR - Abnormal; Notable for the following components:       Result Value    Influenza A, Molecular Positive (*)     All other components within normal limits   CBC W/ AUTO DIFFERENTIAL - Abnormal; Notable for the following components:    Platelets 109 (*)     MPV 13.1 (*)     Lymph # 0.3 (*)     Gran % 85.2 (*)     Lymph % " 4.4 (*)     All other components within normal limits    Narrative:     Release to patient->Immediate   COMPREHENSIVE METABOLIC PANEL - Abnormal; Notable for the following components:    CO2 17 (*)     Alkaline Phosphatase 54 (*)     eGFR 56 (*)     All other components within normal limits    Narrative:     Release to patient->Immediate   URINALYSIS, REFLEX TO URINE CULTURE - Abnormal; Notable for the following components:    Protein, UA 1+ (*)     Ketones, UA 2+ (*)     Leukocytes, UA 3+ (*)     All other components within normal limits    Narrative:     Specimen Source->Urine   URINALYSIS MICROSCOPIC - Abnormal; Notable for the following components:    RBC, UA 6 (*)     WBC, UA 19 (*)     Bacteria Moderate (*)     All other components within normal limits    Narrative:     Specimen Source->Urine   GROUP A STREP, MOLECULAR   CULTURE, URINE   HIV 1 / 2 ANTIBODY    Narrative:     Release to patient->Immediate   HEPATITIS C ANTIBODY    Narrative:     Release to patient->Immediate   HEP C VIRUS HOLD SPECIMEN    Narrative:     Release to patient->Immediate   SARS-COV-2 RNA AMPLIFICATION, QUAL   PREGNANCY TEST, URINE RAPID    Narrative:     Specimen Source->Urine        All Lab Results:  Results for orders placed or performed during the hospital encounter of 11/09/22   Influenza A & B by Molecular    Specimen: Nasopharyngeal Swab   Result Value Ref Range    Influenza A, Molecular Positive (A) Negative    Influenza B, Molecular Negative Negative    Flu A & B Source NP    Group A Strep, Molecular    Specimen: Throat   Result Value Ref Range    Group A Strep, Molecular Negative Negative   HIV 1/2 Ag/Ab (4th Gen)   Result Value Ref Range    HIV 1/2 Ag/Ab Negative Negative   Hepatitis C Antibody   Result Value Ref Range    Hepatitis C Ab Negative Negative   HCV Virus Hold Specimen   Result Value Ref Range    HEP C Virus Hold Specimen Hold for HCV sendout    COVID-19 Rapid Screening   Result Value Ref Range    SARS-CoV-2 RNA,  Amplification, Qual Negative Negative   CBC auto differential   Result Value Ref Range    WBC 5.95 3.90 - 12.70 K/uL    RBC 4.78 4.00 - 5.40 M/uL    Hemoglobin 13.6 12.0 - 16.0 g/dL    Hematocrit 42.2 37.0 - 48.5 %    MCV 88 82 - 98 fL    MCH 28.5 27.0 - 31.0 pg    MCHC 32.2 32.0 - 36.0 g/dL    RDW 13.7 11.5 - 14.5 %    Platelets 109 (L) 150 - 450 K/uL    MPV 13.1 (H) 9.2 - 12.9 fL    Immature Granulocytes 0.3 0.0 - 0.5 %    Gran # (ANC) 5.1 1.8 - 7.7 K/uL    Immature Grans (Abs) 0.02 0.00 - 0.04 K/uL    Lymph # 0.3 (L) 1.0 - 4.8 K/uL    Mono # 0.6 0.3 - 1.0 K/uL    Eos # 0.0 0.0 - 0.5 K/uL    Baso # 0.01 0.00 - 0.20 K/uL    nRBC 0 0 /100 WBC    Gran % 85.2 (H) 38.0 - 73.0 %    Lymph % 4.4 (L) 18.0 - 48.0 %    Mono % 9.9 4.0 - 15.0 %    Eosinophil % 0.0 0.0 - 8.0 %    Basophil % 0.2 0.0 - 1.9 %    Differential Method Automated    Comprehensive metabolic panel   Result Value Ref Range    Sodium 136 136 - 145 mmol/L    Potassium 4.6 3.5 - 5.1 mmol/L    Chloride 107 95 - 110 mmol/L    CO2 17 (L) 23 - 29 mmol/L    Glucose 92 70 - 110 mg/dL    BUN 11 6 - 20 mg/dL    Creatinine 1.3 0.5 - 1.4 mg/dL    Calcium 9.2 8.7 - 10.5 mg/dL    Total Protein 7.0 6.0 - 8.4 g/dL    Albumin 3.9 3.5 - 5.2 g/dL    Total Bilirubin 0.3 0.1 - 1.0 mg/dL    Alkaline Phosphatase 54 (L) 55 - 135 U/L    AST 18 10 - 40 U/L    ALT 16 10 - 44 U/L    Anion Gap 12 8 - 16 mmol/L    eGFR 56 (A) >60 mL/min/1.73 m^2   Urinalysis, Reflex to Urine Culture Urine, Clean Catch    Specimen: Urine   Result Value Ref Range    Specimen UA Urine, Clean Catch     Color, UA Yellow Yellow, Straw, Fransisca    Appearance, UA Clear Clear    pH, UA 6.0 5.0 - 8.0    Specific Gravity, UA 1.020 1.005 - 1.030    Protein, UA 1+ (A) Negative    Glucose, UA Negative Negative    Ketones, UA 2+ (A) Negative    Bilirubin (UA) Negative Negative    Occult Blood UA Negative Negative    Nitrite, UA Negative Negative    Urobilinogen, UA Negative <2.0 EU/dL    Leukocytes, UA 3+ (A) Negative    Pregnancy, urine rapid   Result Value Ref Range    Preg Test, Ur Negative    Urinalysis Microscopic   Result Value Ref Range    RBC, UA 6 (H) 0 - 4 /hpf    WBC, UA 19 (H) 0 - 5 /hpf    Bacteria Moderate (A) None-Occ /hpf    Squam Epithel, UA 5 /hpf    Hyaline Casts, UA 0 0-1/lpf /lpf    Microscopic Comment SEE COMMENT      *Note: Due to a large number of results and/or encounters for the requested time period, some results have not been displayed. A complete set of results can be found in Results Review.         Imaging Results:  Imaging Results    None                   The Emergency Provider reviewed the vital signs and test results, which are outlined above.     ED Discussion       12:36 AM: Reassessed pt at this time.  Discussed with pt all pertinent ED information and results. Discussed pt dx and plan of tx. Gave pt all f/u and return to the ED instructions. All questions and concerns were addressed at this time. Pt expresses understanding of information and instructions, and is comfortable with plan to discharge. Pt is stable for discharge.    I discussed with patient and/or family/caretaker that evaluation in the ED does not suggest any emergent or life threatening medical conditions requiring immediate intervention beyond what was provided in the ED, and I believe patient is safe for discharge.  Regardless, an unremarkable evaluation in the ED does not preclude the development or presence of a serious of life threatening condition. As such, patient was instructed to return immediately for any worsening or change in current symptoms.       Medical Decision Making:   Clinical Tests:   Lab Tests: Ordered and Reviewed         ED Medication(s):  Medications   sodium chloride 0.9% bolus 500 mL (0 mLs Intravenous Stopped 11/9/22 2000)       New Prescriptions    DOXYCYCLINE (VIBRAMYCIN) 100 MG CAP    Take 1 capsule (100 mg total) by mouth 2 (two) times daily. for 7 days    OSELTAMIVIR (TAMIFLU) 75 MG CAPSULE     Take 1 capsule (75 mg total) by mouth 2 (two) times daily. for 5 days        Follow-up Information       Helena Zepeda PA-C. Schedule an appointment as soon as possible for a visit in 2 days.    Specialty: Family Medicine  Why: For re-evaluation and further treatment  Contact information:  [address] [facility] [address]  [phone]               [facility] - Emergency Dept.. Go today.    Specialty: Emergency Medicine  Why: If symptoms worsen, For re-evaluation and further treatment, As needed  Contact information:  [address]  [facility] [address] [phone]                               Scribe Attestation:   Scribe #1: I performed the above scribed service and the documentation accurately describes the services I performed. I attest to the accuracy of the note.     Attending:   Physician Attestation Statement for Scribe #1: I, Jose Arenas MD, personally performed the services described in this documentation, as scribed by Viv Choi, in my presence, and it is both accurate and complete.           Clinical Impression       ICD-10-CM ICD-9-CM   1. Influenza A  J10.1 487.1   2. Urinary tract infection without hematuria, site unspecified  N39.0 599.0       Disposition:   Disposition: Discharged  Condition: Stable       Jose Arenas MD  11/10/22 8886

## 2022-11-11 DIAGNOSIS — J45.20 ASTHMA, MILD INTERMITTENT, WELL-CONTROLLED: ICD-10-CM

## 2022-11-11 LAB
BACTERIA UR CULT: NORMAL
BACTERIA UR CULT: NORMAL

## 2022-11-11 RX ORDER — ALBUTEROL SULFATE 90 UG/1
2 AEROSOL, METERED RESPIRATORY (INHALATION) EVERY 4 HOURS PRN
Qty: 8.5 G | Refills: 11 | Status: CANCELLED | OUTPATIENT
Start: 2022-11-11

## 2022-12-06 DIAGNOSIS — Z94.0 KIDNEY REPLACED BY TRANSPLANT: Primary | ICD-10-CM

## 2022-12-07 ENCOUNTER — PATIENT OUTREACH (OUTPATIENT)
Dept: ADMINISTRATIVE | Facility: HOSPITAL | Age: 32
End: 2022-12-07
Payer: MEDICARE

## 2022-12-07 NOTE — PROGRESS NOTES
Working [facility] Panel Report:     Pt is paneled to [facility]. Called to discuss scheduling an appointment with PCP to establish care.  Left message.

## 2022-12-22 RX ORDER — PREDNISONE 5 MG/1
5 TABLET ORAL DAILY
Qty: 120 TABLET | Refills: 11 | Status: SHIPPED | OUTPATIENT
Start: 2022-12-22 | End: 2024-01-04

## 2023-01-11 ENCOUNTER — OFFICE VISIT (OUTPATIENT)
Dept: SURGERY | Facility: CLINIC | Age: 33
End: 2023-01-11
Payer: MEDICARE

## 2023-01-11 ENCOUNTER — TELEPHONE (OUTPATIENT)
Dept: SURGERY | Facility: CLINIC | Age: 33
End: 2023-01-11
Payer: MEDICARE

## 2023-01-11 ENCOUNTER — LAB VISIT (OUTPATIENT)
Dept: LAB | Facility: HOSPITAL | Age: 33
End: 2023-01-11
Attending: PHYSICIAN ASSISTANT
Payer: MEDICARE

## 2023-01-11 VITALS
HEART RATE: 81 BPM | TEMPERATURE: 99 F | SYSTOLIC BLOOD PRESSURE: 104 MMHG | WEIGHT: 211.19 LBS | HEIGHT: 67 IN | DIASTOLIC BLOOD PRESSURE: 73 MMHG | BODY MASS INDEX: 33.15 KG/M2

## 2023-01-11 DIAGNOSIS — Z94.0 KIDNEY REPLACED BY TRANSPLANT: ICD-10-CM

## 2023-01-11 DIAGNOSIS — E66.9 CLASS 1 OBESITY WITHOUT SERIOUS COMORBIDITY WITH BODY MASS INDEX (BMI) OF 33.0 TO 33.9 IN ADULT, UNSPECIFIED OBESITY TYPE: Primary | ICD-10-CM

## 2023-01-11 LAB
ALBUMIN SERPL BCP-MCNC: 3.4 G/DL (ref 3.5–5.2)
ALBUMIN SERPL BCP-MCNC: 3.4 G/DL (ref 3.5–5.2)
ALP SERPL-CCNC: 57 U/L (ref 55–135)
ALT SERPL W/O P-5'-P-CCNC: 13 U/L (ref 10–44)
ANION GAP SERPL CALC-SCNC: 8 MMOL/L (ref 8–16)
AST SERPL-CCNC: 15 U/L (ref 10–40)
BASOPHILS # BLD AUTO: 0.04 K/UL (ref 0–0.2)
BASOPHILS NFR BLD: 0.5 % (ref 0–1.9)
BILIRUB DIRECT SERPL-MCNC: 0.2 MG/DL (ref 0.1–0.3)
BILIRUB SERPL-MCNC: 0.5 MG/DL (ref 0.1–1)
BUN SERPL-MCNC: 11 MG/DL (ref 6–20)
CALCIUM SERPL-MCNC: 9.6 MG/DL (ref 8.7–10.5)
CHLORIDE SERPL-SCNC: 107 MMOL/L (ref 95–110)
CO2 SERPL-SCNC: 22 MMOL/L (ref 23–29)
CREAT SERPL-MCNC: 0.9 MG/DL (ref 0.5–1.4)
DIFFERENTIAL METHOD: ABNORMAL
EOSINOPHIL # BLD AUTO: 0.1 K/UL (ref 0–0.5)
EOSINOPHIL NFR BLD: 1.3 % (ref 0–8)
ERYTHROCYTE [DISTWIDTH] IN BLOOD BY AUTOMATED COUNT: 14.6 % (ref 11.5–14.5)
EST. GFR  (NO RACE VARIABLE): >60 ML/MIN/1.73 M^2
GLUCOSE SERPL-MCNC: 66 MG/DL (ref 70–110)
HCT VFR BLD AUTO: 40.8 % (ref 37–48.5)
HGB BLD-MCNC: 12.8 G/DL (ref 12–16)
IMM GRANULOCYTES # BLD AUTO: 0.04 K/UL (ref 0–0.04)
IMM GRANULOCYTES NFR BLD AUTO: 0.5 % (ref 0–0.5)
LYMPHOCYTES # BLD AUTO: 1 K/UL (ref 1–4.8)
LYMPHOCYTES NFR BLD: 12.6 % (ref 18–48)
MAGNESIUM SERPL-MCNC: 1.7 MG/DL (ref 1.6–2.6)
MCH RBC QN AUTO: 29.2 PG (ref 27–31)
MCHC RBC AUTO-ENTMCNC: 31.4 G/DL (ref 32–36)
MCV RBC AUTO: 93 FL (ref 82–98)
MONOCYTES # BLD AUTO: 0.7 K/UL (ref 0.3–1)
MONOCYTES NFR BLD: 9.4 % (ref 4–15)
NEUTROPHILS # BLD AUTO: 5.9 K/UL (ref 1.8–7.7)
NEUTROPHILS NFR BLD: 75.7 % (ref 38–73)
NRBC BLD-RTO: 0 /100 WBC
PHOSPHATE SERPL-MCNC: 2.7 MG/DL (ref 2.7–4.5)
PLATELET # BLD AUTO: 156 K/UL (ref 150–450)
PMV BLD AUTO: 13.9 FL (ref 9.2–12.9)
POTASSIUM SERPL-SCNC: 4.3 MMOL/L (ref 3.5–5.1)
PROT SERPL-MCNC: 5.7 G/DL (ref 6–8.4)
RBC # BLD AUTO: 4.39 M/UL (ref 4–5.4)
SODIUM SERPL-SCNC: 137 MMOL/L (ref 136–145)
WBC # BLD AUTO: 7.75 K/UL (ref 3.9–12.7)

## 2023-01-11 PROCEDURE — 99215 OFFICE O/P EST HI 40 MIN: CPT | Mod: PBBFAC

## 2023-01-11 PROCEDURE — 99999 PR PBB SHADOW E&M-EST. PATIENT-LVL V: CPT | Mod: PBBFAC,,,

## 2023-01-11 PROCEDURE — 85025 COMPLETE CBC W/AUTO DIFF WBC: CPT | Performed by: INTERNAL MEDICINE

## 2023-01-11 PROCEDURE — 99999 PR PBB SHADOW E&M-EST. PATIENT-LVL V: ICD-10-PCS | Mod: PBBFAC,,,

## 2023-01-11 PROCEDURE — 99213 PR OFFICE/OUTPT VISIT, EST, LEVL III, 20-29 MIN: ICD-10-PCS | Mod: S$PBB,,,

## 2023-01-11 PROCEDURE — 83735 ASSAY OF MAGNESIUM: CPT | Performed by: INTERNAL MEDICINE

## 2023-01-11 PROCEDURE — 36415 COLL VENOUS BLD VENIPUNCTURE: CPT | Performed by: INTERNAL MEDICINE

## 2023-01-11 PROCEDURE — 99213 OFFICE O/P EST LOW 20 MIN: CPT | Mod: S$PBB,,,

## 2023-01-11 PROCEDURE — 87799 DETECT AGENT NOS DNA QUANT: CPT | Performed by: INTERNAL MEDICINE

## 2023-01-11 PROCEDURE — 80069 RENAL FUNCTION PANEL: CPT | Performed by: INTERNAL MEDICINE

## 2023-01-11 PROCEDURE — 84075 ASSAY ALKALINE PHOSPHATASE: CPT | Performed by: INTERNAL MEDICINE

## 2023-01-11 PROCEDURE — 80197 ASSAY OF TACROLIMUS: CPT | Performed by: INTERNAL MEDICINE

## 2023-01-11 NOTE — PROGRESS NOTES
"BARIATRIC PATIENT EVALUATION    CHIEF COMPLAINT:   morbid obesity with a BMI of 42 and inability to lose weight.    HISTORY OF PRESENT ILLNESS:  Leydi Cordero is a 32 y.o.-year-old female presents for post-operative evaluation s/p sleeve gastrectomy on 2022. She is feeling great. She has been sticking to small portions of healthy foods and exercising. She denies fevers, chills, nausea, and vomiting.    Initially presenting for morbid obesity with a BMI of 42 and inability to lose weight. The patient reports weight gain affecting her health following her kidney transplant. She has had difficulty losing weight since this time.    Height 5'6"  Weight 262lbs --> 240 --> 211 lbs  BMI 42 --> 38 --> 33    CO-MORBIDITIES:  hypertension    PAST MEDICAL HISTORY:  Past Medical History:   Diagnosis Date    Anxiety     -donor kidney transplant 2021    cPRA 100%, XM negative 3 arteries, 2 on common patch, WIT 40 min    Encounter for blood transfusion     ESRD (end stage renal disease)     Fibroma     H/O kidney transplant     Hypertension     Hypertensive nephropathy     Ovarian cyst         PAST SURGICAL HISTORY:  Past Surgical History:   Procedure Laterality Date    COLPOSCOPY  2020    Needs follow up on or after 21    ESOPHAGOGASTRODUODENOSCOPY N/A 2022    Procedure: ESOPHAGOGASTRODUODENOSCOPY (EGD);  Surgeon: Essie Carvajal MD;  Location: [facility];  Service: Endoscopy;  Laterality: N/A;    hemodialysis access left arm      kidney removal       KIDNEY TRANSPLANT  04/15/2015    KIDNEY TRANSPLANT N/A 2021    Procedure: TRANSPLANT, KIDNEY;  Surgeon: Fam Sutton MD;  Location: [address];  Service: Transplant;  Laterality: N/A;    NEPHRECTOMY      OVARIAN CYST REMOVAL      PARATHYROIDECTOMY      partial     PERCUTANEOUS NEPHROSTOMY Left 2021    Procedure: Creation, Nephrostomy, Percutaneous;  Surgeon: Elen Surgeon;  Location: [facility];  Service: " Anesthesiology;  Laterality: Left;    right leg hemodialysis access      ROBOT-ASSISTED LAPAROSCOPIC SLEEVE GASTRECTOMY USING DA VELMA XI N/A 9/27/2022    Procedure: XI ROBOTIC SLEEVE GASTRECTOMY;  Surgeon: Cal Parekh MD;  Location: [facility];  Service: General;  Laterality: N/A;    transplant nephrectomy  12/01/2017       FAMILY HISTORY:  Family History   Problem Relation Age of Onset    No Known Problems Mother     Colon cancer Father     Cancer Father     Hypertension Father     No Known Problems Sister     No Known Problems Brother     Kidney disease Maternal Aunt     Hypertension Maternal Aunt     Diabetes Maternal Uncle     Kidney disease Other     Hypertension Other         SOCIAL HISTORY:   reports that she quit smoking about 2 years ago. Her smoking use included cigarettes. She started smoking about 15 years ago. She has a 3.00 pack-year smoking history. She has never used smokeless tobacco. She reports that she does not drink alcohol and does not use drugs.     MEDICATIONS:  Current Outpatient Medications on File Prior to Visit   Medication Sig Dispense Refill    albuterol (PROVENTIL/VENTOLIN HFA) 90 mcg/actuation inhaler Inhale 2 puffs into the lungs every 4 (four) hours as needed for Wheezing or Shortness of Breath. Rescue 8.5 g 11    calcitRIOL (ROCALTROL) 0.25 MCG Cap Take 1 capsule (0.25 mcg total) by mouth once daily. 30 capsule 11    ciprofloxacin HCl (CIPRO) 500 MG tablet Take 1 tablet (500 mg total) by mouth every 12 (twelve) hours. 14 tablet 0    docusate sodium (COLACE) 100 MG capsule Take 1 capsule (100 mg total) by mouth 2 (two) times daily as needed. 20 capsule 0    ergocalciferol (VITAMIN D2) 50,000 unit Cap Take 1 capsule (50,000 Units total) by mouth every 30 days. 1 capsule 11    labetaloL (NORMODYNE) 200 MG tablet Take 1 tablet (200 mg total) by mouth 2 (two) times daily. 60 tablet 10    magnesium oxide (MAG-OX) 400 mg (241.3 mg magnesium) tablet Take 1 tablet (400 mg total) by mouth 2  (two) times daily. 60 tablet 11    mycophenolate (CELLCEPT) 250 mg Cap Take 4 capsules (1,000 mg total) by mouth 2 (two) times daily. 240 capsule 11    NIFEdipine (PROCARDIA-XL) 30 MG (OSM) 24 hr tablet Take 1 tablet (30 mg total) by mouth 2 (two) times a day. 60 tablet 11    olmesartan (BENICAR) 20 MG tablet Take 1 tablet (20 mg total) by mouth once daily. 30 tablet 11    predniSONE (DELTASONE) 5 MG tablet Take 1 tablet (5 mg total) by mouth once daily. 120 tablet 11    sars-cov-2, covid-19, (MODERNA COVID-19) 100 mcg/0.5 ml injection Inject 0.5 mLs into the muscle. 0.5 mL 0    sodium bicarbonate 650 MG tablet Take 2 tablets (1,300 mg total) by mouth 3 (three) times daily. 180 tablet 11    tacrolimus (PROGRAF) 1 MG Cap Take 3 capsules (3 mg total) by mouth every morning AND 2 capsules (2 mg total) every evening. 150 capsule 11    pantoprazole (PROTONIX) 40 MG tablet Take 1 tablet (40 mg total) by mouth 2 (two) times daily. 120 tablet 0     Current Facility-Administered Medications on File Prior to Visit   Medication Dose Route Frequency Provider Last Rate Last Admin    acetaminophen tablet 650 mg  650 mg Oral Once PRN Govind Garcia MD        diphenhydrAMINE injection 25 mg  25 mg Intravenous Once PRN Govind Garcia MD        EPINEPHrine (EPIPEN) 0.3 mg/0.3 mL pen injection 0.3 mg  0.3 mg Intramuscular PRN Govind Garcia MD        methylPREDNISolone sodium succinate injection 40 mg  40 mg Intravenous Once PRN Govind Garcia MD        ondansetron disintegrating tablet 4 mg  4 mg Oral Once PRN Govind Garcia MD        sodium chloride 0.9% 500 mL flush bag   Intravenous PRN Govind Garcia MD        sodium chloride 0.9% flush 10 mL  10 mL Intravenous PRN Govind Garcia MD         Medications have been reviewed.    ALLERGIES:  Review of patient's allergies indicates:   Allergen Reactions    Heparin analogues Rash     Allergies have been reviewed.    ROS:  Review of Systems   Constitutional:   Negative for chills, fatigue, fever and unexpected weight change.   Respiratory:  Negative for cough, shortness of breath, wheezing and stridor.    Cardiovascular:  Negative for chest pain, palpitations and leg swelling.   Gastrointestinal:  Negative for abdominal distention, abdominal pain, constipation, diarrhea, nausea and vomiting.   Genitourinary:  Negative for difficulty urinating, dysuria, frequency, hematuria and urgency.   Skin:  Negative for color change, pallor, rash and wound.   Hematological:  Does not bruise/bleed easily.     PE:  Physical Exam  Vitals reviewed.   Constitutional:       General: She is not in acute distress.     Appearance: She is well-developed. She is not ill-appearing.   HENT:      Head: Normocephalic and atraumatic.      Right Ear: External ear normal.      Left Ear: External ear normal.   Eyes:      Extraocular Movements: Extraocular movements intact.      Conjunctiva/sclera: Conjunctivae normal.   Cardiovascular:      Rate and Rhythm: Normal rate.   Pulmonary:      Effort: Pulmonary effort is normal. No respiratory distress.   Abdominal:      General: There is no distension.      Palpations: Abdomen is soft.      Tenderness: There is no abdominal tenderness.      Comments: Incisions healing well without SOI.   Musculoskeletal:      Cervical back: Neck supple.   Skin:     General: Skin is warm and dry.   Neurological:      Mental Status: She is alert and oriented to person, place, and time.   Psychiatric:         Behavior: Behavior normal.     EGD:  Impression:            - LA Grade C reflux esophagitis.                          - Small hiatal hernia.                          - Gastritis. Biopsied.                          - Duodenitis.                          - Normal first portion of the duodenum and second                          portion of the duodenum.     Surgical pathology:  Final Pathologic Diagnosis 1. Stomach, sleeve gastrectomy:   Segment of stomach, lined by oxyntic  mucosa, with no diagnostic abnormality.   Negative for Helicobacter pylori.        DIAGNOSIS:  1. Morbid obesity with a BMI of 42 and inability to lose weight.  2. Co-morbidities: hypertension    PLAN:  - Reinforced bariatric diet, dietician  - Continue exercise  - Nutritional labs, will call with results  - RTC 3 months, will get nutritional labs again at that time    Radha Joseph PA-C  [facility]    I spent a total of 20 minutes on the day of the visit.This includes face to face time and non-face to face time preparing to see the patient (eg, review of tests), obtaining and/or reviewing separately obtained history, documenting clinical information in the electronic or other health record, independently interpreting results and communicating results to the patient/family/caregiver, or care coordinator.

## 2023-01-11 NOTE — TELEPHONE ENCOUNTER
Called language line-  Lopez   ID: [id number]    Called patient in regards to appointment with Radha Joseph tomorrow 1/12 at the [facility]. Notified patient that the appointment will actually be at the [facility] and appointment would need to be rescheduled to that location. Offered patient appointment today at the [place] to see Radha Joseph. Patient would like that. Scheduled patient for an appointment today 1/11 at the [facility]. Patient verbalized understanding.

## 2023-01-12 ENCOUNTER — NUTRITION (OUTPATIENT)
Dept: INTERNAL MEDICINE | Facility: CLINIC | Age: 33
End: 2023-01-12
Payer: MEDICARE

## 2023-01-12 ENCOUNTER — TELEPHONE (OUTPATIENT)
Dept: TRANSPLANT | Facility: CLINIC | Age: 33
End: 2023-01-12
Payer: MEDICARE

## 2023-01-12 DIAGNOSIS — Z71.3 DIETARY COUNSELING: ICD-10-CM

## 2023-01-12 DIAGNOSIS — Z98.84 STATUS POST BARIATRIC SURGERY: Primary | ICD-10-CM

## 2023-01-12 DIAGNOSIS — N30.01 ACUTE CYSTITIS WITH HEMATURIA: Primary | ICD-10-CM

## 2023-01-12 LAB — TACROLIMUS BLD-MCNC: 8.4 NG/ML (ref 5–15)

## 2023-01-12 PROCEDURE — 97803 PR MED NUTR THER, SUBSQ, INDIV, EA 15 MIN: ICD-10-PCS | Mod: S$GLB,,, | Performed by: DIETITIAN, REGISTERED

## 2023-01-12 PROCEDURE — 97803 MED NUTRITION INDIV SUBSEQ: CPT | Mod: S$GLB,,, | Performed by: DIETITIAN, REGISTERED

## 2023-01-12 NOTE — TELEPHONE ENCOUNTER
Patient C/O S/S of a UTI x 2 days.  Patient agreed to submit [facility] urine tomorrow 1/13 for a Urine C&S. Patient voice a understanding to present to the ER if S/S get worse and she develops a temp > 100.4.  ----- Message from José Antonio Venegas MD sent at 1/12/2023  7:23 AM CST -----  Any UTI symptoms?

## 2023-01-12 NOTE — PROGRESS NOTES
NUTRITION NOTE    Visit completed using language line services for interpretation.    Referring Physician: Dr. Parekh  Reason for MNT Referral: Follow-up 3.5 months s/p Gastric Sleeve (2022)    PAST MEDICAL HISTORY:    Denies nausea, vomiting, constipation, and diarrhea.  Reports doing well.    Doing well since last appointment.   Tolerating all food and drink choices since last appointment.  No issues with health or nutrition.  Weight continues to drop.   Satisfied with progress so far.     Exercise: yes  Vitamins: daily intake    Protein: at least 80 grams daily    No questions or concerns today.        Past Medical History:   Diagnosis Date    Anxiety     -donor kidney transplant 2021    cPRA 100%, XM negative 3 arteries, 2 on common patch, WIT 40 min    Encounter for blood transfusion     ESRD (end stage renal disease)     Fibroma     H/O kidney transplant     Hypertension     Hypertensive nephropathy     Ovarian cyst        CLINICAL DATA:  32 y.o. female.    There were no vitals filed for this visit.    Current Weight: 211 lbs (2022)   10-: 240 lbs              Surgery weight: 249 lbs              2022: 260 lbs              2022:  262 lbs              5-: 264 lbs  BMI: 33.08  Total Weight Loss: -38 lbs since surgery      LABS:  Reviewed.    CURRENT DIET:  Bariatric Diet.  Diet Recall: minimum intake of 80 grams of protein/day; adequate oz of fluids/day      Adequate protein supplement intake.  Adequate dairy intake.  Adequate vegetable intake. Tolerates raw vegetables and lettuce.  Adequate fruit intake.  Starchy CHO: none noted      EXERCISE:  Adequate light exercise.    Restrictions to Exercise: None.    VITAMINS / MINERALS:  Daily intake following recommendations in nutrition guidelines booklet    ASSESSMENT:  Doing well overall.  Weight loss.  Adequate calorie intake.  Adequate protein intake.  Adequate fluid intake.  Following diet  appropriately.  Exercising.  Adequate vitamins & minerals.    BARIATRIC DIET DISCUSSION:  Instructed and provided written materials on bariatric diet plan.  Reinforced post-op nutrition guidelines.    PLAN / RECOMMENDATIONS:  May begin to incorporate raw vegetables, lettuce, unsalted nuts, and light popcorn as tolerated.  May begin to swallow whole pills as tolerated.  Continue excellent diet plan.  Maintain protein intake.  Maintain fluid intake.  Continue exercise.  Continue appropriate vitamins & minerals.      Return to clinic in 3 months.    SESSION TIME: 15 minutes

## 2023-01-17 ENCOUNTER — DOCUMENTATION ONLY (OUTPATIENT)
Dept: TRANSPLANT | Facility: CLINIC | Age: 33
End: 2023-01-17
Payer: MEDICARE

## 2023-01-18 ENCOUNTER — OFFICE VISIT (OUTPATIENT)
Dept: TRANSPLANT | Facility: CLINIC | Age: 33
End: 2023-01-18
Payer: MEDICARE

## 2023-01-18 VITALS
SYSTOLIC BLOOD PRESSURE: 113 MMHG | HEART RATE: 67 BPM | OXYGEN SATURATION: 100 % | DIASTOLIC BLOOD PRESSURE: 75 MMHG | BODY MASS INDEX: 33.45 KG/M2 | WEIGHT: 208.13 LBS | HEIGHT: 66 IN | RESPIRATION RATE: 18 BRPM | TEMPERATURE: 98 F

## 2023-01-18 DIAGNOSIS — Z79.60 LONG-TERM USE OF IMMUNOSUPPRESSANT MEDICATION: ICD-10-CM

## 2023-01-18 DIAGNOSIS — I12.9 RENAL HYPERTENSION: ICD-10-CM

## 2023-01-18 DIAGNOSIS — I10 ESSENTIAL HYPERTENSION: ICD-10-CM

## 2023-01-18 DIAGNOSIS — E21.2 HYPERPARATHYROIDISM, TERTIARY: ICD-10-CM

## 2023-01-18 DIAGNOSIS — N18.2 CKD (CHRONIC KIDNEY DISEASE) STAGE 2, GFR 60-89 ML/MIN: ICD-10-CM

## 2023-01-18 DIAGNOSIS — N39.0 UTI (URINARY TRACT INFECTION), UNCOMPLICATED: ICD-10-CM

## 2023-01-18 DIAGNOSIS — Z94.0 KIDNEY REPLACED BY TRANSPLANT: ICD-10-CM

## 2023-01-18 DIAGNOSIS — Z94.0 DECEASED-DONOR KIDNEY TRANSPLANT: ICD-10-CM

## 2023-01-18 DIAGNOSIS — Z98.890 S/P PARATHYROIDECTOMY: ICD-10-CM

## 2023-01-18 DIAGNOSIS — Z90.5 S/P NEPHRECTOMY: Primary | ICD-10-CM

## 2023-01-18 DIAGNOSIS — Z90.89 S/P PARATHYROIDECTOMY: ICD-10-CM

## 2023-01-18 PROCEDURE — 87077 CULTURE AEROBIC IDENTIFY: CPT | Performed by: INTERNAL MEDICINE

## 2023-01-18 PROCEDURE — 99999 PR PBB SHADOW E&M-EST. PATIENT-LVL V: ICD-10-PCS | Mod: PBBFAC,,, | Performed by: INTERNAL MEDICINE

## 2023-01-18 PROCEDURE — 87186 SC STD MICRODIL/AGAR DIL: CPT | Performed by: INTERNAL MEDICINE

## 2023-01-18 PROCEDURE — 87088 URINE BACTERIA CULTURE: CPT | Performed by: INTERNAL MEDICINE

## 2023-01-18 PROCEDURE — 99215 OFFICE O/P EST HI 40 MIN: CPT | Mod: S$PBB,,, | Performed by: INTERNAL MEDICINE

## 2023-01-18 PROCEDURE — 87086 URINE CULTURE/COLONY COUNT: CPT | Performed by: INTERNAL MEDICINE

## 2023-01-18 PROCEDURE — 99215 PR OFFICE/OUTPT VISIT, EST, LEVL V, 40-54 MIN: ICD-10-PCS | Mod: S$PBB,,, | Performed by: INTERNAL MEDICINE

## 2023-01-18 PROCEDURE — 99215 OFFICE O/P EST HI 40 MIN: CPT | Mod: PBBFAC | Performed by: INTERNAL MEDICINE

## 2023-01-18 PROCEDURE — 99999 PR PBB SHADOW E&M-EST. PATIENT-LVL V: CPT | Mod: PBBFAC,,, | Performed by: INTERNAL MEDICINE

## 2023-01-18 RX ORDER — CIPROFLOXACIN 500 MG/1
500 TABLET ORAL EVERY 12 HOURS
Qty: 14 TABLET | Refills: 0 | Status: SHIPPED | OUTPATIENT
Start: 2023-01-18 | End: 2023-01-23 | Stop reason: ALTCHOICE

## 2023-01-18 RX ORDER — CALCITRIOL 0.25 UG/1
0.25 CAPSULE ORAL DAILY
Qty: 30 CAPSULE | Refills: 3 | Status: SHIPPED | OUTPATIENT
Start: 2023-01-18 | End: 2023-05-01

## 2023-01-18 RX ORDER — OLMESARTAN MEDOXOMIL 20 MG/1
20 TABLET ORAL DAILY
Qty: 30 TABLET | Refills: 11 | Status: SHIPPED | OUTPATIENT
Start: 2023-01-18 | End: 2023-09-06

## 2023-01-18 RX ORDER — ERGOCALCIFEROL 1.25 MG/1
50000 CAPSULE ORAL
Qty: 1 CAPSULE | Refills: 3 | Status: SHIPPED | OUTPATIENT
Start: 2023-01-18 | End: 2023-03-30

## 2023-01-18 NOTE — LETTER
January 18, 2023        Julio Jaffe  [address] [address] NEPHROLOGY  [place] [address]  Phone: [phone]  Fax: [phone]             [facility]- Transplant 1st Fl  [address]  [facility] [address]  Phone: [phone]   Patient: Leydi Cordero   MR Number: [id number]   YOB: 1990   Date of Visit: 1/18/2023       Dear Dr. Julio Jaffe    Thank you for referring Leydi Cordero to me for evaluation. Attached you will find relevant portions of my assessment and plan of care.    If you have questions, please do not hesitate to call me. I look forward to following Leydi Cordero along with you.    Sincerely,    José Antonio Venegas MD    Enclosure    If you would like to receive this communication electronically, please contact [email or web address] or [phone] to request [employer] access.    [employer] is a tool which provides read-only access to select patient information with whom you have a relationship. Its easy to use and provides real time access to review your patients record including encounter summaries, notes, results, and demographic information.    If you feel you have received this communication in error or would no longer like to receive these types of communications, please e-mail [email or web address]

## 2023-01-18 NOTE — PROGRESS NOTES
Kidney Post-Transplant Assessment    Referring Physician: Won Miller  Current Nephrologist: Julio Jaffe    ORGAN: RIGHT KIDNEY  Donor Type: donation after brain death  PHS Increased Risk: no  Cold Ischemia: 1,251 mins  Induction Medications: thymoglobulin    Subjective:     CC:  Reassessment of renal allograft function and management of chronic immunosuppression.    HPI:  Ms. Oumar Cordero is a 32 y.o. 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  She has CKD stage 2 - GFR 60-89 and her baseline creatinine is between 0.8 to 1/3. She takes mycophenolate mofetil, prednisone, and tacrolimus for maintenance immunosuppression. She denies any recent hospitalizations or ER visits since her previous clinic visit.    Gastric sleeve September 2022    Patient refers that after the gastric sleeve surgery she is frequently holding the BP meds due to low BP readings    Refer dysuria     Current Outpatient Medications on File Prior to Visit   Medication Sig Dispense Refill    albuterol (PROVENTIL/VENTOLIN HFA) 90 mcg/actuation inhaler Inhale 2 puffs into the lungs every 4 (four) hours as needed for Wheezing or Shortness of Breath. Rescue 8.5 g 11    calcitRIOL (ROCALTROL) 0.25 MCG Cap Take 1 capsule (0.25 mcg total) by mouth once daily. 30 capsule 11    ciprofloxacin HCl (CIPRO) 500 MG tablet Take 1 tablet (500 mg total) by mouth every 12 (twelve) hours. 14 tablet 0    docusate sodium (COLACE) 100 MG capsule Take 1 capsule (100 mg total) by mouth 2 (two) times daily as needed. 20 capsule 0    ergocalciferol (VITAMIN D2) 50,000 unit Cap Take 1 capsule (50,000 Units total) by mouth every 30 days. 1 capsule 11    labetaloL (NORMODYNE) 200 MG tablet Take 1 tablet (200 mg total) by mouth 2 (two) times daily. 60 tablet 10    magnesium oxide (MAG-OX) 400 mg (241.3 mg magnesium) tablet Take 1 tablet (400 mg total) by mouth 2 (two) times daily. 60 tablet 11    mycophenolate  (CELLCEPT) 250 mg Cap Take 4 capsules (1,000 mg total) by mouth 2 (two) times daily. 240 capsule 11    NIFEdipine (PROCARDIA-XL) 30 MG (OSM) 24 hr tablet Take 1 tablet (30 mg total) by mouth 2 (two) times a day. 60 tablet 11    olmesartan (BENICAR) 20 MG tablet Take 1 tablet (20 mg total) by mouth once daily. 30 tablet 11    pantoprazole (PROTONIX) 40 MG tablet Take 1 tablet (40 mg total) by mouth 2 (two) times daily. 120 tablet 0    predniSONE (DELTASONE) 5 MG tablet Take 1 tablet (5 mg total) by mouth once daily. 120 tablet 11    sars-cov-2, covid-19, (MODERNA COVID-19) 100 mcg/0.5 ml injection Inject 0.5 mLs into the muscle. 0.5 mL 0    sodium bicarbonate 650 MG tablet Take 2 tablets (1,300 mg total) by mouth 3 (three) times daily. 180 tablet 11    tacrolimus (PROGRAF) 1 MG Cap Take 3 capsules (3 mg total) by mouth every morning AND 2 capsules (2 mg total) every evening. 150 capsule 11     Current Facility-Administered Medications on File Prior to Visit   Medication Dose Route Frequency Provider Last Rate Last Admin    acetaminophen tablet 650 mg  650 mg Oral Once PRN Govind Garcia MD        diphenhydrAMINE injection 25 mg  25 mg Intravenous Once PRN Govind Garcia MD        EPINEPHrine (EPIPEN) 0.3 mg/0.3 mL pen injection 0.3 mg  0.3 mg Intramuscular PRN Govind Garcia MD        methylPREDNISolone sodium succinate injection 40 mg  40 mg Intravenous Once PRN Govind Garcia MD        ondansetron disintegrating tablet 4 mg  4 mg Oral Once PRN Govind Garcia MD        sodium chloride 0.9% 500 mL flush bag   Intravenous PRN Govind Garcia MD        sodium chloride 0.9% flush 10 mL  10 mL Intravenous PRN Govind Garcia MD            Review of Systems    Skin: no skin rash  CNS; no headaches, blurred vision, seizure, or syncope  ENT: No JVD,  Adenopathies,  nasal congestion. No oral lesions  Cardiac: No chest pain, dyspnea, claudication, edema or palpitations  Respiratory: No SOB, cough,  "hemoptysis   Gastro-intestinal: No diarrhea, constipation, abdominal pain, nausea, vomit. No ascitis  Genitourinary: no hematuria, dysuria, frequency, frequency  Musculoskeletal: joint pain, arthritis or vasculitic changes  Psych: alert awake, oriented, No cranial nerves deficit.      Objective:         Physical Exam    /75 (BP Location: Right arm, Patient Position: Sitting, BP Method: Medium (Automatic))   Pulse 67   Temp 97.7 °F (36.5 °C) (Temporal)   Resp 18   Ht 5' 6" (1.676 m)   Wt 94.4 kg (208 lb 1.8 oz)   SpO2 100%   BMI 33.59 kg/m²       Head: normocephalic  Neck: No JVD, cervical axillary, or femoral adenopathies  Heart: no murmurs, Normal s1 and s2, No gallops, no rubs, No murmurs  Lungs; CTA, good respiratory effort, no crackles  Abdomen: soft, non tender, no splenomegaly or hepatomegaly, no massess, no bruits  Extremities: No edema, skin rash, joint pain  SNC: awake, alert oriented. Cranial nerves are intact, no focalized, sensitivity and strength preserved      Labs:  Lab Results   Component Value Date    WBC 7.75 01/11/2023    HGB 12.8 01/11/2023    HCT 40.8 01/11/2023     01/11/2023    K 4.3 01/11/2023     01/11/2023    CO2 22 (L) 01/11/2023    BUN 11 01/11/2023    CREATININE 0.9 01/11/2023    EGFRNONAA >60.0 07/11/2022    CALCIUM 9.6 01/11/2023    PHOS 2.7 01/11/2023    MG 1.7 01/11/2023    ALBUMIN 3.4 (L) 01/11/2023    ALBUMIN 3.4 (L) 01/11/2023    AST 15 01/11/2023    ALT 13 01/11/2023    UTPCR Unable to calculate 01/11/2023    PTH 69.5 09/16/2022    TACROLIMUS 8.4 01/11/2023       No results found for: EXTANC, EXTWBC, EXTSEGS, EXTPLATELETS, EXTHEMOGLOBI, EXTHEMATOCRI, EXTCREATININ, EXTSODIUM, EXTPOTASSIUM, EXTBUN, EXTCO2, EXTCALCIUM, EXTPHOSPHORU, EXTGLUCOSE, EXTALBUMIN, EXTAST, EXTALT, EXTBILITOTAL, EXTLIPASE, EXTAMYLASE    No results found for: EXTCYCLOSLVL, EXTSIROLIMUS, EXTTACROLVL, EXTPROTCRE, EXTPTHINTACT, EXTPROTEINUA, EXTWBCUA, EXTRBCUA    Labs were reviewed with " the patient.    Assessment:     1. S/p nephrectomy 17    2. S/P parathyroidectomy--partial    3. Long-term use of immunosuppressant medication    4. CKD (chronic kidney disease) stage 2, GFR 60-89 ml/min    5. -donor kidney transplant 2021    6. Essential hypertension        Plan:   Will order a urine culture  Will start cipro 500 bid for 7 days  Follow up with her nephrologist   Will d/c Nifedipine with HBP monitoring  I refilled today calcitriol ergo and olmesartan. Next refills with her nephrologist with follow up of MBD markers   Patient feels great and she is very pleased with how she feels post surgery in terms of energy level vitality and BP control        1. CKD stage: will continue follow up as per our center guidelines. patient to continue close follow up with the local General nephrologist. Education provided in appropriate fluid intake, potassium intake. Continue with oral hydration.    1A. Pancreatic Function: N/A for non-pancreas allograft recipients:     2. High risk immunosuppression medication for organ transplantation, requiring regular intensive follow up and monitoring :   Lab Results   Component Value Date    TACROLIMUS 8.4 2023    TACROLIMUS 7.6 10/05/2022    TACROLIMUS 10.9 2022     No results found for: CYCLOSPORINE  @   Will closely monitor for toxicities, education provided about adherence to medicines and need to communicate any side effects to the transplant nurse or physician.    3. Allograft Function:stable at baseline for the patient. Continue follow up as per our guidelines and with the local General nephrologist. Communication will be sent today.  Lab Results   Component Value Date    CREATININE 0.9 2023    CREATININE 1.3 2022    CREATININE 1.1 10/05/2022     Lab Results   Component Value Date    LIPASE 31 2021    LIPASE 55 04/15/2020       4. Hypertension management:  Continue with home blood pressure monitoring, low salt and healthy  life discussed with the patient..    5. Metabolic Bone Disease/Secondary Hyperparathyroidism:calcium and phosphorus level discussed with the patient, patient will continue follow up with the general nephrologist for management of metabolic bone disease. Will monitor PTH and Vit D per our transplant center guidelines.      Lab Results   Component Value Date    PTH 69.5 09/16/2022    CALCIUM 9.6 01/11/2023    CAION 1.21 02/01/2021    PHOS 2.7 01/11/2023    PHOS 2.6 (L) 10/05/2022    PHOS 3.8 09/16/2022       6. Electrolytes and acid base balance: reviewed with the patient, essentially within the normal range no need for acute changes today, will monitor as per our center guidelines.     Lab Results   Component Value Date     01/11/2023    K 4.3 01/11/2023     01/11/2023    CO2 22 (L) 01/11/2023    CO2 17 (L) 11/09/2022    CO2 25 10/05/2022       7. Anemia: will continue monitoring as per our center guidelines. No indication for acute intervention today.     Lab Results   Component Value Date    WBC 7.75 01/11/2023    HGB 12.8 01/11/2023    HCT 40.8 01/11/2023    MCV 93 01/11/2023     01/11/2023       8.Proteinuria: will continue with pr/cr ratio as per our center guidelines.  Lab Results   Component Value Date    PROTEINURINE <7 01/11/2023    CREATRANDUR 134.0 01/11/2023    UTPCR Unable to calculate 01/11/2023    UTPCR 0.09 09/16/2022    UTPCR 0.04 09/07/2022        9. BK virus infection screening: will continue with urine or blood PCR as per our guidelines to prevent BK virus viremia and allograft dysfunction  Lab Results   Component Value Date    BKVIRUSPCRQB <125 01/11/2023         10. Weight and metabolic management: education provided provided during the clinic visit.   There is no height or weight on file to calculate BMI.       11.Patient safety education regarding immunosuppression including prophylaxis posttransplant for CMV, PCP : Education provided about vaccination and prevention of  infections.    12.  Cytopenias: no significant cytopenias will monitor as per our guidelines. Medicine list reviewed including potential causes of drug-induced cytopenias     Lab Results   Component Value Date    WBC 7.75 01/11/2023    HGB 12.8 01/11/2023    HCT 40.8 01/11/2023    MCV 93 01/11/2023     01/11/2023       13. Post-transplant Prophylaxis; CMV Infection, PJP and Candida mucosistis and other indicated for this particular patient.     I spoke with the patient for 30 minutes. More than half dedicated to counseling and education. All questions answered    José Antonio Venegas MD  Transplant Nephrology            Follow-up:   Clinic: return to transplant clinic weekly for the first month after transplant; every 2 weeks during months 2-3; then at 6-, 9-, 12-, 18-, 24-, and 36- months post-transplant to reassess for complications from immunosuppression toxicity and monitor for rejection.  Annually thereafter.    Labs: since patient remains at high risk for rejection and drug-related complications that warrant close monitoring, labs will be ordered as follows: continue twice weekly CBC, renal panel, and drug level for first month; then same labs once weekly through 3rd month post-transplant.  Urine for UA and protein/creatinine ratio monthly.  Serum BK - PCR at 1-, 3-, 6-, 9-, 12-, 18-, 24-, 36- 48-, and 60 months post-transplant.  Hepatic panel at 1-, 2-, 3-, 6-, 9-, 12-, 18-, 24-, and 36- months post-transplant.    José Antonio Venegas MD       Education:   Material provided to the patient.  Patient reminded to call with any health changes since these can be early signs of significant complications.  Also, I advised the patient to be sure any new medications or changes of old medications are discussed with either a pharmacist or physician knowledgeable with transplant to avoid rejection/drug toxicity related to significant drug interactions.    Patient advised that it is recommended that all transplanted  patients, and their close contacts and household members receive Covid vaccination.    UNOS Patient Status  Functional Status: 100% - Normal, no complaints, no evidence of disease  Physical Capacity: No Limitations

## 2023-01-21 LAB — BACTERIA UR CULT: ABNORMAL

## 2023-01-22 NOTE — PROGRESS NOTES
Please switch to Augmentin or macrodantin. Please discuss with Pharm D dose, treat this for 7 days  D/c cipro

## 2023-01-23 ENCOUNTER — TELEPHONE (OUTPATIENT)
Dept: TRANSPLANT | Facility: CLINIC | Age: 33
End: 2023-01-23
Payer: MEDICARE

## 2023-01-23 DIAGNOSIS — N39.0 UTI (URINARY TRACT INFECTION), UNCOMPLICATED: Primary | ICD-10-CM

## 2023-01-23 DIAGNOSIS — N39.0 UTI (URINARY TRACT INFECTION), UNCOMPLICATED: ICD-10-CM

## 2023-01-23 RX ORDER — AMOXICILLIN AND CLAVULANATE POTASSIUM 875; 125 MG/1; MG/1
1 TABLET, FILM COATED ORAL EVERY 12 HOURS
Qty: 20 TABLET | Refills: 0 | Status: SHIPPED | OUTPATIENT
Start: 2023-01-23 | End: 2023-02-02

## 2023-01-23 RX ORDER — AMOXICILLIN AND CLAVULANATE POTASSIUM 875; 125 MG/1; MG/1
1 TABLET, FILM COATED ORAL EVERY 12 HOURS
Qty: 20 TABLET | Refills: 0 | Status: SHIPPED | OUTPATIENT
Start: 2023-01-23 | End: 2023-01-23 | Stop reason: SDUPTHER

## 2023-01-23 NOTE — TELEPHONE ENCOUNTER
Patient repeated back and voice a understanding of orders. Requesting RX sent to [facility].  ----- Message from Tim Reddy PharmD sent at 1/23/2023 10:10 AM CST -----  BERNARD Jones sent a prescription for Aug 875mg BID x 10 days to the patient's home [facility].    Thank you,  Tim Reddy, PharmJACOB. BCTXP, [facility]    ----- Message -----  From: Andrei Siegel RN  Sent: 1/23/2023   9:39 AM CST  To: Abdominal Transplant Pharmacists    Please advise.  ----- Message -----  From: José Antonio Venegas MD  Sent: 1/22/2023   5:24 PM CST  To: [facility] Post-Kidney Transplant Clinical    Please switch to Augmentin or macrodantin. Please discuss with Pharm D dose, treat this for 7 days  D/c cipro

## 2023-03-01 NOTE — TELEPHONE ENCOUNTER
Patient requesting RX for MMF sent to her Local [facility].  Patient also states her Menstrual cycle is late and check a UPT and call coordinator back with results.  ----- Message from Josh Aiken sent at 3/1/2023  3:49 PM CST -----  Regarding: call back  Pt call to speak with Andrei in regards to her RX being move to a closer pharmacy    Call

## 2023-03-02 ENCOUNTER — TELEPHONE (OUTPATIENT)
Dept: TRANSPLANT | Facility: CLINIC | Age: 33
End: 2023-03-02
Payer: MEDICARE

## 2023-03-02 RX ORDER — MYCOPHENOLATE MOFETIL 250 MG/1
1000 CAPSULE ORAL 2 TIMES DAILY
Qty: 240 CAPSULE | Refills: 11 | Status: SHIPPED | OUTPATIENT
Start: 2023-03-02 | End: 2023-03-06 | Stop reason: SDUPTHER

## 2023-03-02 NOTE — TELEPHONE ENCOUNTER
Spoke with Julian Allen at [facility] and notified that Medicare part B pays for IS meds.  States he does not have patients Medicare info.  Patient notified to take her Medicare Card to [facility] now.Patient also states that she took a home UPT that resulted Negative.    ----- Message from Andrei Siegel RN sent at 3/2/2023  1:19 PM CST -----  Regarding: FW: Prior auth    ----- Message -----  From: Latisha Tsai  Sent: 3/2/2023  12:33 PM CST  To: [facility] Post-Kidney Transplant Clinical  Subject: Prior auth                                       Pt calling to speak to staff regarding, Prior auth for medication.       mycophenolate (CELLCEPT) 250 mg Cap          [id number] - [place] - [address] AT [address]  [address] [address]  Phone: [phone] Fax: [phone]

## 2023-03-28 RX ORDER — ERGOCALCIFEROL 1.25 MG/1
50000 CAPSULE ORAL
Qty: 1 CAPSULE | Refills: 3 | Status: CANCELLED | OUTPATIENT
Start: 2023-03-28

## 2023-04-21 ENCOUNTER — OFFICE VISIT (OUTPATIENT)
Dept: OBSTETRICS AND GYNECOLOGY | Facility: CLINIC | Age: 33
End: 2023-04-21
Payer: MEDICARE

## 2023-04-21 VITALS
BODY MASS INDEX: 32.03 KG/M2 | SYSTOLIC BLOOD PRESSURE: 108 MMHG | HEIGHT: 66 IN | DIASTOLIC BLOOD PRESSURE: 70 MMHG | WEIGHT: 199.31 LBS

## 2023-04-21 DIAGNOSIS — Z01.419 WELL WOMAN EXAM WITH ROUTINE GYNECOLOGICAL EXAM: Primary | ICD-10-CM

## 2023-04-21 DIAGNOSIS — A59.01 TRICHOMONAL VAGINITIS: ICD-10-CM

## 2023-04-21 PROCEDURE — 88175 CYTOPATH C/V AUTO FLUID REDO: CPT | Performed by: PATHOLOGY

## 2023-04-21 PROCEDURE — 88141 CYTOPATH C/V INTERPRET: CPT | Mod: ,,, | Performed by: PATHOLOGY

## 2023-04-21 PROCEDURE — 99999 PR PBB SHADOW E&M-EST. PATIENT-LVL IV: CPT | Mod: PBBFAC,,, | Performed by: OBSTETRICS & GYNECOLOGY

## 2023-04-21 PROCEDURE — 99999 PR PBB SHADOW E&M-EST. PATIENT-LVL IV: ICD-10-PCS | Mod: PBBFAC,,, | Performed by: OBSTETRICS & GYNECOLOGY

## 2023-04-21 PROCEDURE — G0101 CA SCREEN;PELVIC/BREAST EXAM: HCPCS | Mod: S$PBB,GZ,, | Performed by: OBSTETRICS & GYNECOLOGY

## 2023-04-21 PROCEDURE — 88141 PR  CYTOPATH CERV/VAG INTERPRET: ICD-10-PCS | Mod: ,,, | Performed by: PATHOLOGY

## 2023-04-21 PROCEDURE — G0101 PR CA SCREEN;PELVIC/BREAST EXAM: ICD-10-PCS | Mod: S$PBB,GZ,, | Performed by: OBSTETRICS & GYNECOLOGY

## 2023-04-21 PROCEDURE — 87624 HPV HI-RISK TYP POOLED RSLT: CPT | Performed by: OBSTETRICS & GYNECOLOGY

## 2023-04-21 PROCEDURE — 87210 SMEAR WET MOUNT SALINE/INK: CPT | Mod: PBBFAC | Performed by: OBSTETRICS & GYNECOLOGY

## 2023-04-21 PROCEDURE — 99214 OFFICE O/P EST MOD 30 MIN: CPT | Mod: PBBFAC | Performed by: OBSTETRICS & GYNECOLOGY

## 2023-04-21 RX ORDER — METRONIDAZOLE 500 MG/1
500 TABLET ORAL 2 TIMES DAILY
Qty: 14 TABLET | Refills: 0 | Status: SHIPPED | OUTPATIENT
Start: 2023-04-21 | End: 2023-05-03

## 2023-04-21 NOTE — PROGRESS NOTES
Subjective:      Patient ID: Leydi Cordero is a 33 y.o. female.    Chief Complaint:  Annual Exam and vaginal odor      History of Present Illness  HPI  Annual Exam-Premenopausal  Patient presents for annual exam. The patient complains of intermittent malodorous discharge and vaginal discomfort. The patient is sexually active. GYN screening history: last pap: approximate date  and was abnormal: ASCUS HPV+ . The patient wears seatbelts: yes. The patient participates in regular exercise: no. Has the patient ever been transfused or tattooed?: yes. The patient reports that there is not domestic violence in her life.  Menses are regular with periods lasting 5 days.  Denies excessive bleeding or cramping.  Pt is not trying for pregnancy but would like to discuss if there is a test to determine if she is fertile.  Pt is s/p renal transplant.      GYN & OB History  Patient's last menstrual period was 2023 (approximate).   Date of Last Pap: 2019    OB History    Para Term  AB Living   0 0 0 0 0 0   SAB IAB Ectopic Multiple Live Births   0 0 0 0 0       Review of Systems  Review of Systems   Constitutional:  Negative for activity change, appetite change, chills, fatigue, fever and unexpected weight change.   Respiratory:  Negative for shortness of breath.    Cardiovascular:  Negative for chest pain, palpitations and leg swelling.   Gastrointestinal:  Negative for abdominal pain, bloating, blood in stool, constipation, diarrhea, nausea and vomiting.   Genitourinary:  Positive for vaginal discharge and vaginal odor. Negative for dysmenorrhea, dyspareunia, dysuria, flank pain, frequency, genital sores, hematuria, menorrhagia, menstrual problem, pelvic pain, urgency, vaginal bleeding, vaginal pain, urinary incontinence, postcoital bleeding and vaginal dryness.   Musculoskeletal:  Negative for back pain.   Integumentary:  Negative for breast mass, nipple discharge, breast skin changes and  breast tenderness.   Neurological:  Negative for syncope and headaches.   Breast: Negative for asymmetry, lump, mass, mastodynia, nipple discharge, skin changes and tenderness       Objective:     Physical Exam:   Constitutional: She is oriented to person, place, and time. She appears well-developed and well-nourished. No distress.    HENT:   Head: Normocephalic and atraumatic.    Eyes: Pupils are equal, round, and reactive to light. EOM are normal.     Cardiovascular:  Normal rate, regular rhythm and normal heart sounds.             Pulmonary/Chest: Effort normal and breath sounds normal.        Abdominal: Soft. Bowel sounds are normal. She exhibits no distension. There is no abdominal tenderness.     Genitourinary:    Uterus, right adnexa and left adnexa normal.      Pelvic exam was performed with patient supine.   There is no rash, tenderness, lesion or injury on the right labia. There is no rash, tenderness, lesion or injury on the left labia. Cervix is normal. Right adnexum displays no mass, no tenderness and no fullness. Left adnexum displays no mass, no tenderness and no fullness. There is erythema and vaginal discharge in the vagina. No tenderness or bleeding in the vagina.    No foreign body in the vagina.      No signs of injury in the vagina.   Cervix exhibits no motion tenderness, no discharge and no friability. Uterus is not deviated, not enlarged and not tender.    Genitourinary Comments: Wet prep: many trichomonads; negative for yeast or clue cells             Musculoskeletal: Normal range of motion and moves all extremeties. No tenderness or edema.       Neurological: She is alert and oriented to person, place, and time.    Skin: Skin is warm and dry.    Psychiatric: She has a normal mood and affect. Her behavior is normal. Thought content normal.       Assessment:     1. Well woman exam with routine gynecological exam    2. Trichomonal vaginitis             Plan:     Well woman exam with routine  gynecological exam  -     Liquid-Based Pap Smear, Screening  -     HPV High Risk Genotypes, PCR  -     Pt was counseled on cervical/vaginal screening guidelines and recommendations.  Last pap ASCUS HPV+ on 2019 (pt lost to follow up).  If today's pap smear result is negative, next pap smear will be due in 1 yr (renal transplant).  -     Pt was advised on current breast cancer screening recommendations.  -     Follow up with PCP for routine health maintenance needs.  -     Pt counseled on fertility and pregnancy planning.  Pt advised that no testing is indicated at this time as pt does not report a fertility issue.  Pt also reports that she is not interested in pregnancy at this stage and was simply curious about her fertility.  Pt was advised on risks of pregnancy associated with her significant medical issues and was advised to use reliable contraception if not trying for pregnancy.  Also advised pt to consult with her specialists and MFM first if she decides to try for pregnancy.  Pt voiced understanding and declines contraception at this time but will think about it.    Trichomonal vaginitis  -     POCT Wet Prep  -     metroNIDAZOLE (FLAGYL) 500 MG tablet; Take 1 tablet (500 mg total) by mouth 2 (two) times daily. for 7 days  Dispense: 14 tablet; Refill: 0  -     Pt was counseled on Trichomonas, including that it is a parasite that is most commonly acquired through sexual intercourse with an infected person.  Most cases are either asymptomatic or mildly symptomatic.  Associated symptoms (discharge, vaginal discomfort/itching, odor) were discussed.  It is benign and is easily treated with Metronidazole.  Medication details, dosing, risks, side-effects, and interactions were discussed.  Refer partner for testing and treatment and abstain from sexual intercourse with partner for 2 weeks after both have completed the treatment.  Risk of re-infection was reviewed.  ALEJANDRO is not necessary, however, pt is welcome to  return for re-testing if symptoms fail to improve or if suspects re-infection.      Follow up in about 1 year (around 4/21/2024).

## 2023-04-27 ENCOUNTER — LAB VISIT (OUTPATIENT)
Dept: LAB | Facility: HOSPITAL | Age: 33
End: 2023-04-27
Attending: INTERNAL MEDICINE
Payer: MEDICARE

## 2023-04-27 DIAGNOSIS — D84.9 IMMUNOSUPPRESSION: ICD-10-CM

## 2023-04-27 DIAGNOSIS — I10 PRIMARY HYPERTENSION: ICD-10-CM

## 2023-04-27 DIAGNOSIS — Z94.0 KIDNEY TRANSPLANT STATUS: ICD-10-CM

## 2023-04-27 DIAGNOSIS — N25.81 SECONDARY HYPERPARATHYROIDISM OF RENAL ORIGIN: ICD-10-CM

## 2023-04-27 LAB
ALBUMIN SERPL BCP-MCNC: 3.6 G/DL (ref 3.5–5.2)
ANION GAP SERPL CALC-SCNC: 8 MMOL/L (ref 8–16)
BUN SERPL-MCNC: 20 MG/DL (ref 6–20)
CALCIUM SERPL-MCNC: 9.1 MG/DL (ref 8.7–10.5)
CHLORIDE SERPL-SCNC: 108 MMOL/L (ref 95–110)
CO2 SERPL-SCNC: 23 MMOL/L (ref 23–29)
CREAT SERPL-MCNC: 1.1 MG/DL (ref 0.5–1.4)
EST. GFR  (NO RACE VARIABLE): >60 ML/MIN/1.73 M^2
GLUCOSE SERPL-MCNC: 74 MG/DL (ref 70–110)
MAGNESIUM SERPL-MCNC: 1.8 MG/DL (ref 1.6–2.6)
PHOSPHATE SERPL-MCNC: 3.8 MG/DL (ref 2.7–4.5)
POTASSIUM SERPL-SCNC: 4.9 MMOL/L (ref 3.5–5.1)
PTH-INTACT SERPL-MCNC: 47.7 PG/ML (ref 9–77)
SODIUM SERPL-SCNC: 139 MMOL/L (ref 136–145)

## 2023-04-27 PROCEDURE — 83735 ASSAY OF MAGNESIUM: CPT | Performed by: INTERNAL MEDICINE

## 2023-04-27 PROCEDURE — 83970 ASSAY OF PARATHORMONE: CPT | Performed by: INTERNAL MEDICINE

## 2023-04-27 PROCEDURE — 80069 RENAL FUNCTION PANEL: CPT | Performed by: INTERNAL MEDICINE

## 2023-04-27 PROCEDURE — 36415 COLL VENOUS BLD VENIPUNCTURE: CPT | Performed by: INTERNAL MEDICINE

## 2023-04-27 PROCEDURE — 80197 ASSAY OF TACROLIMUS: CPT | Performed by: INTERNAL MEDICINE

## 2023-04-28 LAB
COMMENT: ABNORMAL
FINAL PATHOLOGIC DIAGNOSIS: ABNORMAL
Lab: ABNORMAL
TACROLIMUS BLD-MCNC: 9.8 NG/ML (ref 5–15)

## 2023-04-29 DIAGNOSIS — Z94.0 DECEASED-DONOR KIDNEY TRANSPLANT: ICD-10-CM

## 2023-04-29 DIAGNOSIS — E21.2 HYPERPARATHYROIDISM, TERTIARY: ICD-10-CM

## 2023-05-01 RX ORDER — CALCITRIOL 0.25 UG/1
0.25 CAPSULE ORAL DAILY
Qty: 30 CAPSULE | Refills: 3 | Status: SHIPPED | OUTPATIENT
Start: 2023-05-01 | End: 2023-09-11 | Stop reason: SDUPTHER

## 2023-05-03 ENCOUNTER — OFFICE VISIT (OUTPATIENT)
Dept: NEPHROLOGY | Facility: CLINIC | Age: 33
End: 2023-05-03
Payer: MEDICARE

## 2023-05-03 VITALS
HEIGHT: 66 IN | DIASTOLIC BLOOD PRESSURE: 62 MMHG | HEART RATE: 70 BPM | SYSTOLIC BLOOD PRESSURE: 100 MMHG | WEIGHT: 194.25 LBS | BODY MASS INDEX: 31.22 KG/M2

## 2023-05-03 DIAGNOSIS — K21.9 GASTROESOPHAGEAL REFLUX DISEASE, UNSPECIFIED WHETHER ESOPHAGITIS PRESENT: ICD-10-CM

## 2023-05-03 DIAGNOSIS — Z94.0 KIDNEY TRANSPLANT STATUS: Primary | ICD-10-CM

## 2023-05-03 DIAGNOSIS — D84.9 IMMUNOSUPPRESSION: ICD-10-CM

## 2023-05-03 DIAGNOSIS — I10 PRIMARY HYPERTENSION: ICD-10-CM

## 2023-05-03 DIAGNOSIS — N25.81 SECONDARY HYPERPARATHYROIDISM OF RENAL ORIGIN: ICD-10-CM

## 2023-05-03 PROCEDURE — 99999 PR PBB SHADOW E&M-EST. PATIENT-LVL IV: ICD-10-PCS | Mod: PBBFAC,,, | Performed by: INTERNAL MEDICINE

## 2023-05-03 PROCEDURE — 99999 PR PBB SHADOW E&M-EST. PATIENT-LVL IV: CPT | Mod: PBBFAC,,, | Performed by: INTERNAL MEDICINE

## 2023-05-03 PROCEDURE — 99215 OFFICE O/P EST HI 40 MIN: CPT | Mod: S$PBB,,, | Performed by: INTERNAL MEDICINE

## 2023-05-03 PROCEDURE — 99214 OFFICE O/P EST MOD 30 MIN: CPT | Mod: PBBFAC | Performed by: INTERNAL MEDICINE

## 2023-05-03 PROCEDURE — 99215 PR OFFICE/OUTPT VISIT, EST, LEVL V, 40-54 MIN: ICD-10-PCS | Mod: S$PBB,,, | Performed by: INTERNAL MEDICINE

## 2023-05-03 RX ORDER — PANTOPRAZOLE SODIUM 40 MG/1
40 TABLET, DELAYED RELEASE ORAL 2 TIMES DAILY
Qty: 120 TABLET | Refills: 3 | Status: SHIPPED | OUTPATIENT
Start: 2023-05-03 | End: 2023-08-01

## 2023-05-03 NOTE — PROGRESS NOTES
"Subjective:       Patient ID: Leydi Cordero is a 33 y.o. female.    Chief Complaint: Kidney Transplant    HPI    She presents to clinic today for routine follow-up.  Since her last office visit she had bariatric surgery.  She is lost quite a bit of weight.  She does relate having worsening of her GERD symptoms.  I refilled her PPI today.  Her laboratory studies and medications were reviewed.  All Nephrology related questions were answered to her satisfaction.      Review of Systems   Constitutional: Negative.    HENT: Negative.     Eyes: Negative.    Respiratory: Negative.     Cardiovascular: Negative.    Gastrointestinal:         Symptoms of GERD.   Genitourinary: Negative.    Musculoskeletal: Negative.    Skin: Negative.    Neurological: Negative.    Hematological: Negative.        /62   Pulse 70   Ht 5' 6" (1.676 m)   Wt 88.1 kg (194 lb 3.6 oz)   LMP 04/05/2023 (Approximate)   BMI 31.35 kg/m²     Lab Results   Component Value Date    WBC 7.75 01/11/2023    HGB 12.8 01/11/2023    HCT 40.8 01/11/2023    MCV 93 01/11/2023     01/11/2023      BMP  Lab Results   Component Value Date     04/27/2023    K 4.9 04/27/2023     04/27/2023    CO2 23 04/27/2023    BUN 20 04/27/2023    CREATININE 1.1 04/27/2023    CALCIUM 9.1 04/27/2023    ANIONGAP 8 04/27/2023    ESTGFRAFRICA >60.0 07/11/2022    EGFRNONAA >60.0 07/11/2022     CMP  Sodium   Date Value Ref Range Status   04/27/2023 139 136 - 145 mmol/L Final     Potassium   Date Value Ref Range Status   04/27/2023 4.9 3.5 - 5.1 mmol/L Final     Chloride   Date Value Ref Range Status   04/27/2023 108 95 - 110 mmol/L Final     CO2   Date Value Ref Range Status   04/27/2023 23 23 - 29 mmol/L Final     Glucose   Date Value Ref Range Status   04/27/2023 74 70 - 110 mg/dL Final     BUN   Date Value Ref Range Status   04/27/2023 20 6 - 20 mg/dL Final     Creatinine   Date Value Ref Range Status   04/27/2023 1.1 0.5 - 1.4 mg/dL Final     Calcium "   Date Value Ref Range Status   04/27/2023 9.1 8.7 - 10.5 mg/dL Final     Total Protein   Date Value Ref Range Status   01/11/2023 5.7 (L) 6.0 - 8.4 g/dL Final     Albumin   Date Value Ref Range Status   04/27/2023 3.6 3.5 - 5.2 g/dL Final     Total Bilirubin   Date Value Ref Range Status   01/11/2023 0.5 0.1 - 1.0 mg/dL Final     Comment:     For infants and newborns, interpretation of results should be based  on gestational age, weight and in agreement with clinical  observations.    Premature Infant recommended reference ranges:  Up to 24 hours.............<8.0 mg/dL  Up to 48 hours............<12.0 mg/dL  3-5 days..................<15.0 mg/dL  6-29 days.................<15.0 mg/dL       Alkaline Phosphatase   Date Value Ref Range Status   01/11/2023 57 55 - 135 U/L Final     AST   Date Value Ref Range Status   01/11/2023 15 10 - 40 U/L Final     ALT   Date Value Ref Range Status   01/11/2023 13 10 - 44 U/L Final     Anion Gap   Date Value Ref Range Status   04/27/2023 8 8 - 16 mmol/L Final     eGFR if    Date Value Ref Range Status   07/11/2022 >60.0 >60 mL/min/1.73 m^2 Final     eGFR if non    Date Value Ref Range Status   07/11/2022 >60.0 >60 mL/min/1.73 m^2 Final     Comment:     Calculation used to obtain the estimated glomerular filtration  rate (eGFR) is the CKD-EPI equation.        Current Outpatient Medications on File Prior to Visit   Medication Sig Dispense Refill    albuterol (PROVENTIL/VENTOLIN HFA) 90 mcg/actuation inhaler Inhale 2 puffs into the lungs every 4 (four) hours as needed for Wheezing or Shortness of Breath. Rescue 8.5 g 11    calcitRIOL (ROCALTROL) 0.25 MCG Cap Take 1 capsule (0.25 mcg total) by mouth once daily. 30 capsule 3    docusate sodium (COLACE) 100 MG capsule Take 1 capsule (100 mg total) by mouth 2 (two) times daily as needed. 20 capsule 0    ergocalciferol (VITAMIN D2) 50,000 unit Cap Take 1 capsule (50,000 Units total) by mouth every 30 days. 3  capsule 0    labetaloL (NORMODYNE) 200 MG tablet Take 1 tablet (200 mg total) by mouth 2 (two) times daily. 60 tablet 1    magnesium oxide (MAG-OX) 400 mg (241.3 mg magnesium) tablet Take 1 tablet (400 mg total) by mouth 2 (two) times daily. 60 tablet 11    mycophenolate (CELLCEPT) 250 mg Cap Take 4 capsules (1,000 mg total) by mouth 2 (two) times daily. 240 capsule 11    NIFEdipine (PROCARDIA-XL) 30 MG (OSM) 24 hr tablet Take 1 tablet (30 mg total) by mouth 2 (two) times a day. 60 tablet 11    olmesartan (BENICAR) 20 MG tablet Take 1 tablet (20 mg total) by mouth once daily. 30 tablet 11    predniSONE (DELTASONE) 5 MG tablet Take 1 tablet (5 mg total) by mouth once daily. 120 tablet 11    sars-cov-2, covid-19, (MODERNA COVID-19) 100 mcg/0.5 ml injection Inject 0.5 mLs into the muscle. 0.5 mL 0    sodium bicarbonate 650 MG tablet Take 2 tablets (1,300 mg total) by mouth 3 (three) times daily. 180 tablet 11    tacrolimus (PROGRAF) 1 MG Cap Take 3 capsules (3 mg total) by mouth every morning AND 2 capsules (2 mg total) every evening. 150 capsule 11    [DISCONTINUED] pantoprazole (PROTONIX) 40 MG tablet Take 1 tablet (40 mg total) by mouth 2 (two) times daily. 120 tablet 0    [DISCONTINUED] metroNIDAZOLE (FLAGYL) 500 MG tablet Take 1 tablet (500 mg total) by mouth 2 (two) times daily. for 7 days 14 tablet 0     Current Facility-Administered Medications on File Prior to Visit   Medication Dose Route Frequency Provider Last Rate Last Admin    acetaminophen tablet 650 mg  650 mg Oral Once PRN Govind Garcia MD        diphenhydrAMINE injection 25 mg  25 mg Intravenous Once PRN Govind Garcia MD        EPINEPHrine (EPIPEN) 0.3 mg/0.3 mL pen injection 0.3 mg  0.3 mg Intramuscular PRN Govind Garcia MD        methylPREDNISolone sodium succinate injection 40 mg  40 mg Intravenous Once PRN Govind Garcia MD        ondansetron disintegrating tablet 4 mg  4 mg Oral Once PRN Govind Garcia MD        sodium chloride  0.9% 500 mL flush bag   Intravenous PRN Govind Garcia MD        sodium chloride 0.9% flush 10 mL  10 mL Intravenous PRN Govind Garcia MD                Objective:            Physical Exam  Constitutional:       Appearance: Normal appearance.   HENT:      Head: Normocephalic and atraumatic.   Eyes:      General: No scleral icterus.     Extraocular Movements: Extraocular movements intact.      Pupils: Pupils are equal, round, and reactive to light.   Pulmonary:      Effort: Pulmonary effort is normal.      Breath sounds: No stridor.   Musculoskeletal:      Right lower leg: No edema.      Left lower leg: No edema.   Skin:     General: Skin is warm and dry.   Neurological:      General: No focal deficit present.      Mental Status: She is alert and oriented to person, place, and time.   Psychiatric:         Mood and Affect: Mood normal.         Behavior: Behavior normal.       Assessment:       1. Kidney transplant status    2. Immunosuppression    3. Primary hypertension    4. Secondary hyperparathyroidism of renal origin    5. Gastroesophageal reflux disease, unspecified whether esophagitis present        Plan:       1. Status post kidney transplant in January of 2021.  Stable renal allograft.    2. She is on 3 mg the morning 2 mg in the evening Prograf.  Most recent level was 9.8.  Will decrease her to 2 mg twice a day and recheck a level in 2 weeks.  We discussed that we are trying to target her Prograf level around 5-6.    She continues on mycophenolate 1 g twice a day and prednisone 5 mg daily.    3. Blood pressure is on the low side in the clinic today.  On review of transplant Nephrology notes her blood pressure medicines are on hold since having her bariatric surgery.  Will continue to monitor.      4. Intact PTH is now normal 47.  Calcium was normal at 9.1 with an albumin of 3.6.  Phosphorus is normal at 3.8.    5. Since having bariatric surgery she is had more difficulty with GERD.  I refilled her  pantoprazole today.    Proximally 40 minutes was spent in face-to-face conversation and chart review.        Julio Jaffe MD

## 2023-05-17 ENCOUNTER — LAB VISIT (OUTPATIENT)
Dept: LAB | Facility: HOSPITAL | Age: 33
End: 2023-05-17
Attending: INTERNAL MEDICINE
Payer: MEDICARE

## 2023-05-17 DIAGNOSIS — D84.9 IMMUNOSUPPRESSION: ICD-10-CM

## 2023-05-17 PROCEDURE — 80197 ASSAY OF TACROLIMUS: CPT | Performed by: INTERNAL MEDICINE

## 2023-05-17 PROCEDURE — 36415 COLL VENOUS BLD VENIPUNCTURE: CPT | Performed by: INTERNAL MEDICINE

## 2023-05-18 ENCOUNTER — OFFICE VISIT (OUTPATIENT)
Dept: PRIMARY CARE CLINIC | Facility: CLINIC | Age: 33
End: 2023-05-18
Payer: MEDICARE

## 2023-05-18 VITALS
DIASTOLIC BLOOD PRESSURE: 74 MMHG | SYSTOLIC BLOOD PRESSURE: 128 MMHG | HEART RATE: 70 BPM | TEMPERATURE: 98 F | RESPIRATION RATE: 18 BRPM | OXYGEN SATURATION: 100 % | WEIGHT: 197.75 LBS | BODY MASS INDEX: 31.92 KG/M2

## 2023-05-18 DIAGNOSIS — K92.1 BLOOD IN STOOL: Primary | ICD-10-CM

## 2023-05-18 DIAGNOSIS — K59.09 CHRONIC CONSTIPATION: ICD-10-CM

## 2023-05-18 DIAGNOSIS — K64.4 EXTERNAL HEMORRHOID: ICD-10-CM

## 2023-05-18 LAB — TACROLIMUS BLD-MCNC: 9.1 NG/ML (ref 5–15)

## 2023-05-18 PROCEDURE — 99999 PR PBB SHADOW E&M-EST. PATIENT-LVL V: CPT | Mod: PBBFAC,,, | Performed by: INTERNAL MEDICINE

## 2023-05-18 PROCEDURE — 99215 OFFICE O/P EST HI 40 MIN: CPT | Mod: PBBFAC,PN | Performed by: INTERNAL MEDICINE

## 2023-05-18 PROCEDURE — 99999 PR PBB SHADOW E&M-EST. PATIENT-LVL V: ICD-10-PCS | Mod: PBBFAC,,, | Performed by: INTERNAL MEDICINE

## 2023-05-18 PROCEDURE — 99213 OFFICE O/P EST LOW 20 MIN: CPT | Mod: S$PBB,,, | Performed by: INTERNAL MEDICINE

## 2023-05-18 PROCEDURE — 99213 PR OFFICE/OUTPT VISIT, EST, LEVL III, 20-29 MIN: ICD-10-PCS | Mod: S$PBB,,, | Performed by: INTERNAL MEDICINE

## 2023-05-18 RX ORDER — HYDROCORTISONE ACETATE 25 MG/1
25 SUPPOSITORY RECTAL 2 TIMES DAILY
Qty: 20 SUPPOSITORY | Refills: 0 | Status: SHIPPED | OUTPATIENT
Start: 2023-05-18 | End: 2023-06-01

## 2023-05-18 NOTE — PROGRESS NOTES
Subjective     Patient ID: Leydi Cordero is a 33 y.o. female.    Chief Complaint: Establish Care (Pt presents to clinic to est care, joint pain in knees and elbows, possible blood in stool maybe a month ago( pt states she worried and need to get it checked).)      HPI  Here for small amount of blood in stool when wiped x 1 over a month ago and then once more about 3-5 days ago.  Has chronic constipation.  A little discomfort when defecating and she has itching in the area of ext. hem. as well.  Otw, no pain concerning that.    She does have chronic joint pain.  Had tacrolimus level yesterday that was less than 10.      Language line used, Gopal.    Past Medical History:   Diagnosis Date    Anxiety     -donor kidney transplant 2021    cPRA 100%, XM negative 3 arteries, 2 on common patch, WIT 40 min    Encounter for blood transfusion     ESRD (end stage renal disease)     Fibroma     H/O kidney transplant     Hypertension     Hypertensive nephropathy     Ovarian cyst      Review of patient's allergies indicates:   Allergen Reactions    Heparin analogues Rash     Past Surgical History:   Procedure Laterality Date    COLPOSCOPY  2020    Needs follow up on or after 21    ESOPHAGOGASTRODUODENOSCOPY N/A 2022    Procedure: ESOPHAGOGASTRODUODENOSCOPY (EGD);  Surgeon: Essie Carvajal MD;  Location: [facility];  Service: Endoscopy;  Laterality: N/A;    hemodialysis access left arm      kidney removal       KIDNEY TRANSPLANT  04/15/2015    KIDNEY TRANSPLANT N/A 2021    Procedure: TRANSPLANT, KIDNEY;  Surgeon: Fam Sutton MD;  Location: [address];  Service: Transplant;  Laterality: N/A;    NEPHRECTOMY      OVARIAN CYST REMOVAL      PARATHYROIDECTOMY      partial     PERCUTANEOUS NEPHROSTOMY Left 2021    Procedure: Creation, Nephrostomy, Percutaneous;  Surgeon: Elen Surgeon;  Location: [facility];  Service: Anesthesiology;  Laterality: Left;    right leg  hemodialysis access      ROBOT-ASSISTED LAPAROSCOPIC SLEEVE GASTRECTOMY USING DA VELMA XI N/A 2022    Procedure: XI ROBOTIC SLEEVE GASTRECTOMY;  Surgeon: Cal Parekh MD;  Location: [facility];  Service: General;  Laterality: N/A;    transplant nephrectomy  2017     Family History   Problem Relation Age of Onset    No Known Problems Mother     Colon cancer Father     Cancer Father     Hypertension Father     No Known Problems Sister     No Known Problems Brother     Kidney disease Maternal Aunt     Hypertension Maternal Aunt     Diabetes Maternal Uncle     Kidney disease Other     Hypertension Other      Social History     Socioeconomic History    Marital status: Single   Tobacco Use    Smoking status: Former     Packs/day: 0.25     Years: 12.00     Pack years: 3.00     Types: Cigarettes     Start date:      Quit date: 2020     Years since quittin.7    Smokeless tobacco: Never   Substance and Sexual Activity    Alcohol use: No    Drug use: No    Sexual activity: Yes     Partners: Male     Birth control/protection: None     Social Determinants of Health     Financial Resource Strain: Low Risk     Difficulty of Paying Living Expenses: Not hard at all   Food Insecurity: No Food Insecurity    Worried About Running Out of Food in the Last Year: Never true    Ran Out of Food in the Last Year: Never true   Transportation Needs: No Transportation Needs    Lack of Transportation (Medical): No    Lack of Transportation (Non-Medical): No   Physical Activity: Inactive    Days of Exercise per Week: 0 days    Minutes of Exercise per Session: 0 min   Stress: No Stress Concern Present    Feeling of Stress : Not at all   Social Connections: Socially Isolated    Frequency of Communication with Friends and Family: More than three times a week    Frequency of Social Gatherings with Friends and Family: Once a week    Attends [employer] Services: Never    Active Member of Clubs or Organizations: No    Attends Club or  Organization Meetings: Never    Marital Status:    Housing Stability: Low Risk     Unable to Pay for Housing in the Last Year: No    Number of Places Lived in the Last Year: 1    Unstable Housing in the Last Year: No         /74   Pulse 70   Temp 98 °F (36.7 °C)   Resp 18   Wt 89.7 kg (197 lb 12 oz)   LMP 05/05/2023 (Exact Date)   SpO2 100%   BMI 31.92 kg/m²   Outpatient Medications as of 5/18/2023   Medication Sig Dispense Refill    albuterol (PROVENTIL/VENTOLIN HFA) 90 mcg/actuation inhaler Inhale 2 puffs into the lungs every 4 (four) hours as needed for Wheezing or Shortness of Breath. Rescue 8.5 g 11    calcitRIOL (ROCALTROL) 0.25 MCG Cap Take 1 capsule (0.25 mcg total) by mouth once daily. 30 capsule 3    docusate sodium (COLACE) 100 MG capsule Take 1 capsule (100 mg total) by mouth 2 (two) times daily as needed. 20 capsule 0    ergocalciferol (VITAMIN D2) 50,000 unit Cap Take 1 capsule (50,000 Units total) by mouth every 30 days. 3 capsule 0    labetaloL (NORMODYNE) 200 MG tablet Take 1 tablet (200 mg total) by mouth 2 (two) times daily. 60 tablet 1    magnesium oxide (MAG-OX) 400 mg (241.3 mg magnesium) tablet Take 1 tablet (400 mg total) by mouth 2 (two) times daily. 60 tablet 11    mycophenolate (CELLCEPT) 250 mg Cap Take 4 capsules (1,000 mg total) by mouth 2 (two) times daily. 240 capsule 11    olmesartan (BENICAR) 20 MG tablet Take 1 tablet (20 mg total) by mouth once daily. 30 tablet 11    pantoprazole (PROTONIX) 40 MG tablet Take 1 tablet (40 mg total) by mouth 2 (two) times daily. 120 tablet 3    predniSONE (DELTASONE) 5 MG tablet Take 1 tablet (5 mg total) by mouth once daily. 120 tablet 11    sars-cov-2, covid-19, (MODERNA COVID-19) 100 mcg/0.5 ml injection Inject 0.5 mLs into the muscle. 0.5 mL 0    sodium bicarbonate 650 MG tablet Take 2 tablets (1,300 mg total) by mouth 3 (three) times daily. 180 tablet 11    tacrolimus (PROGRAF) 1 MG Cap Take 3 capsules (3 mg total) by mouth  every morning AND 2 capsules (2 mg total) every evening. 150 capsule 11    NIFEdipine (PROCARDIA-XL) 30 MG (OSM) 24 hr tablet Take 1 tablet (30 mg total) by mouth 2 (two) times a day. 60 tablet 11     Facility-Administered Medications as of 5/18/2023   Medication Dose Route Frequency Provider Last Rate Last Admin    acetaminophen tablet 650 mg  650 mg Oral Once PRN Govind Garcia MD        diphenhydrAMINE injection 25 mg  25 mg Intravenous Once PRN Govind Garcia MD        EPINEPHrine (EPIPEN) 0.3 mg/0.3 mL pen injection 0.3 mg  0.3 mg Intramuscular PRN Govind Garcia MD        methylPREDNISolone sodium succinate injection 40 mg  40 mg Intravenous Once PRN Govind Garcia MD        ondansetron disintegrating tablet 4 mg  4 mg Oral Once PRN Govind Garcia MD        sodium chloride 0.9% 500 mL flush bag   Intravenous PRN Govind Garcia MD        sodium chloride 0.9% flush 10 mL  10 mL Intravenous PRN Govind Garcia MD           Review of Systems   All other systems reviewed and are negative.       Objective     Physical Exam  Constitutional:       General: She is not in acute distress.     Appearance: Normal appearance. She is obese. She is not ill-appearing, toxic-appearing or diaphoretic.   HENT:      Head: Normocephalic and atraumatic.      Mouth/Throat:      Mouth: Mucous membranes are moist.   Eyes:      Extraocular Movements: Extraocular movements intact.   Cardiovascular:      Rate and Rhythm: Normal rate.   Pulmonary:      Effort: No respiratory distress.   Skin:     General: Skin is dry.   Neurological:      Mental Status: She is alert and oriented to person, place, and time.   Psychiatric:         Mood and Affect: Mood normal.         Behavior: Behavior normal.          Assessment and Plan     1. Blood in stool  -     hydrocortisone (ANUSOL-HC) 25 mg suppository; Place 1 suppository (25 mg total) rectally 2 (two) times daily. for 10 days  Dispense: 20 suppository; Refill: 0    2. External  hemorrhoid  -     hydrocortisone (ANUSOL-HC) 25 mg suppository; Place 1 suppository (25 mg total) rectally 2 (two) times daily. for 10 days  Dispense: 20 suppository; Refill: 0    3. Chronic constipation       Pt given information for sitz baths and medication in Jordanian.  She plans to do her planned lab work today.      Follow up if symptoms worsen or fail to improve.    Immunization History   Administered Date(s) Administered    COVID-19, MRNA, LN-S, PF (MODERNA FULL 0.5 ML DOSE) 10/29/2021, 12/15/2021    Hepatitis A, Adult 11/20/2019    Influenza - Quadrivalent 01/08/2016    Influenza - Quadrivalent - PF *Preferred* (6 months and older) 09/25/2017, 09/20/2018, 10/26/2019, 10/26/2020    PPD Test 05/12/2017, 05/22/2017, 05/22/2018, 05/23/2019, 05/22/2020, 05/29/2020    Pneumococcal 06/19/2017, 11/21/2019    Tdap 11/20/2019

## 2023-05-26 ENCOUNTER — PROCEDURE VISIT (OUTPATIENT)
Dept: OBSTETRICS AND GYNECOLOGY | Facility: CLINIC | Age: 33
End: 2023-05-26
Payer: MEDICARE

## 2023-05-26 VITALS
BODY MASS INDEX: 31.25 KG/M2 | WEIGHT: 194.44 LBS | HEIGHT: 66 IN | DIASTOLIC BLOOD PRESSURE: 82 MMHG | SYSTOLIC BLOOD PRESSURE: 119 MMHG

## 2023-05-26 DIAGNOSIS — R87.611 ATYPICAL SQUAMOUS CELLS CANNOT EXCLUDE HIGH GRADE SQUAMOUS INTRAEPITHELIAL LESION ON CYTOLOGIC SMEAR OF CERVIX (ASC-H): Primary | ICD-10-CM

## 2023-05-26 DIAGNOSIS — Z86.19 HISTORY OF TRICHOMONAL VAGINITIS: ICD-10-CM

## 2023-05-26 LAB
B-HCG UR QL: NEGATIVE
CTP QC/QA: YES

## 2023-05-26 PROCEDURE — 88305 TISSUE EXAM BY PATHOLOGIST: CPT | Mod: 59 | Performed by: PATHOLOGY

## 2023-05-26 PROCEDURE — 57454 BX/CURETT OF CERVIX W/SCOPE: CPT | Mod: PBBFAC | Performed by: OBSTETRICS & GYNECOLOGY

## 2023-05-26 PROCEDURE — 88305 TISSUE EXAM BY PATHOLOGIST: ICD-10-PCS | Mod: 26,,, | Performed by: PATHOLOGY

## 2023-05-26 PROCEDURE — 88342 IMHCHEM/IMCYTCHM 1ST ANTB: CPT | Mod: 26,,, | Performed by: PATHOLOGY

## 2023-05-26 PROCEDURE — 88342 CHG IMMUNOCYTOCHEMISTRY: ICD-10-PCS | Mod: 26,,, | Performed by: PATHOLOGY

## 2023-05-26 PROCEDURE — 87210 SMEAR WET MOUNT SALINE/INK: CPT | Mod: PBBFAC | Performed by: OBSTETRICS & GYNECOLOGY

## 2023-05-26 PROCEDURE — 88305 TISSUE EXAM BY PATHOLOGIST: CPT | Mod: 26,,, | Performed by: PATHOLOGY

## 2023-05-26 PROCEDURE — 81025 URINE PREGNANCY TEST: CPT | Mod: PBBFAC | Performed by: OBSTETRICS & GYNECOLOGY

## 2023-05-26 PROCEDURE — 57454 COLPOSCOPY-TODAY: ICD-10-PCS | Mod: S$PBB,,, | Performed by: OBSTETRICS & GYNECOLOGY

## 2023-05-26 PROCEDURE — 88342 IMHCHEM/IMCYTCHM 1ST ANTB: CPT | Performed by: PATHOLOGY

## 2023-05-26 NOTE — PROCEDURES
Colposcopy-Today    Date/Time: 2023 9:00 AM  Performed by: Jourdan Conklin MD  Authorized by: Jourdan Conklin MD     Consent Done?:  Yes (Written)  Timeout:Immediately prior to procedure a time out was called to verify the correct patient, procedure, equipment, support staff and site/side marked as required  Assistants?: No      Colposcopy Site:  Cervix  Position:  Supine  Acrowhite Lesion? Yes    Atypical Vessels: No    Transformation Zone Adequate?: Yes    Biopsy?: Yes         Location:  Cervix ((1 00))  ECC Performed?: Yes    LEEP Performed?: No    Estimated blood loss (cc):  1   Patient tolerated the procedure well with no immediate complications.   Post-operative instructions were provided for the patient.   Patient was discharged and will follow up if any complications occur     COLPOSCOPY:    Leydi Cordero is a 33 y.o. female   presents for colposcopy.  Patient's last menstrual period was 2023 (exact date)..  Her most recent pap smear shows ASC cannot exclude high grade lesion (ASCH).      The abnormal test findings were discussed, as well as HPV infection, need for colposcopy and possible biopsies to determine the plan of care, treatments available, the minimal risk of bleeding and infection with colposcopy, and alternatives to colposcopy.  Pt voiced understanding and desires to proceed.  Pt also requests ALEJANDRO for trichomonas.  Denies symptoms or known re-exposure.    UPT is negative    COLPOSCOPY EXAM:   TIME OUT PERFORMED.     No gross vulvovaginal or cervix lesions noted.  On colpo: no mosaicism, no punctation, and visible lesion(s) at 1 o'clock (raised coarse/verrucous lesion)    Biopsy was taken at 1 o'clock.  ECC was performed.  SCJ was entirely visualized.    Hemostasis was adequate with application of Monsel's solution.  The speculum was removed.  The patient did tolerate the procedure well.    Wet prep: negative for trichomonas, yeast, or clue cells      All collected  specimens sent to pathology for histologic analysis.    Post-colposcopy counseling:  The patient was instructed to manage post-colposcopy cramping with NSAIDs or Tylenol, or with a prescription per the medication card.  Avoid intercourse, douching, or tampons in the vagina for at least 2-3 days.  Expect a clumpy blackish discharge due to Monsel's solution application for several days.  Report heavy bleeding, worsening pain or pain that does not respond to above medications, or foul-smelling vaginal discharge. HPV vaccine recommended according to FDA age guidelines.  Importance of follow-up stressed.      Follow up based on colposcopy results.  Impression:  PETRONA 2-3.  If confirmed by pathology results, recommend excisional procedure (OR based LEEP vs CKC).

## 2023-05-31 LAB
FINAL PATHOLOGIC DIAGNOSIS: NORMAL
GROSS: NORMAL
Lab: NORMAL

## 2023-06-02 RX ORDER — LABETALOL 200 MG/1
200 TABLET, FILM COATED ORAL 2 TIMES DAILY
Qty: 60 TABLET | Refills: 1 | Status: SHIPPED | OUTPATIENT
Start: 2023-06-02 | End: 2023-09-06

## 2023-06-02 RX ORDER — LANOLIN ALCOHOL/MO/W.PET/CERES
400 CREAM (GRAM) TOPICAL 2 TIMES DAILY
Qty: 60 TABLET | Refills: 11 | Status: ON HOLD | OUTPATIENT
Start: 2023-06-02 | End: 2023-09-23 | Stop reason: HOSPADM

## 2023-07-06 DIAGNOSIS — Z94.0 KIDNEY REPLACED BY TRANSPLANT: ICD-10-CM

## 2023-07-06 RX ORDER — TACROLIMUS 1 MG/1
CAPSULE ORAL
Qty: 150 CAPSULE | Refills: 11 | Status: SHIPPED | OUTPATIENT
Start: 2023-07-06 | End: 2023-09-12

## 2023-07-06 RX ORDER — ERGOCALCIFEROL 1.25 MG/1
50000 CAPSULE ORAL
Qty: 3 CAPSULE | Refills: 0 | OUTPATIENT
Start: 2023-07-06

## 2023-07-06 RX ORDER — ERGOCALCIFEROL 1.25 MG/1
50000 CAPSULE ORAL
Qty: 3 CAPSULE | Refills: 0 | Status: ON HOLD | OUTPATIENT
Start: 2023-07-06 | End: 2023-09-23 | Stop reason: HOSPADM

## 2023-07-07 ENCOUNTER — PATIENT MESSAGE (OUTPATIENT)
Dept: INFECTIOUS DISEASES | Facility: CLINIC | Age: 33
End: 2023-07-07
Payer: MEDICARE

## 2023-07-07 ENCOUNTER — OFFICE VISIT (OUTPATIENT)
Dept: OBSTETRICS AND GYNECOLOGY | Facility: CLINIC | Age: 33
End: 2023-07-07
Payer: MEDICARE

## 2023-07-07 VITALS
BODY MASS INDEX: 30.72 KG/M2 | SYSTOLIC BLOOD PRESSURE: 120 MMHG | WEIGHT: 191.13 LBS | DIASTOLIC BLOOD PRESSURE: 72 MMHG | HEIGHT: 66 IN

## 2023-07-07 DIAGNOSIS — D06.9 HIGH GRADE SQUAMOUS INTRAEPITHELIAL LESION (HGSIL), GRADE 3 CIN, ON BIOPSY OF CERVIX: Primary | ICD-10-CM

## 2023-07-07 PROCEDURE — 99213 OFFICE O/P EST LOW 20 MIN: CPT | Mod: S$GLB,,, | Performed by: OBSTETRICS & GYNECOLOGY

## 2023-07-07 PROCEDURE — 3074F PR MOST RECENT SYSTOLIC BLOOD PRESSURE < 130 MM HG: ICD-10-PCS | Mod: CPTII,S$GLB,, | Performed by: OBSTETRICS & GYNECOLOGY

## 2023-07-07 PROCEDURE — 4010F ACE/ARB THERAPY RXD/TAKEN: CPT | Mod: CPTII,S$GLB,, | Performed by: OBSTETRICS & GYNECOLOGY

## 2023-07-07 PROCEDURE — 1160F RVW MEDS BY RX/DR IN RCRD: CPT | Mod: CPTII,S$GLB,, | Performed by: OBSTETRICS & GYNECOLOGY

## 2023-07-07 PROCEDURE — 3044F HG A1C LEVEL LT 7.0%: CPT | Mod: CPTII,S$GLB,, | Performed by: OBSTETRICS & GYNECOLOGY

## 2023-07-07 PROCEDURE — 3066F NEPHROPATHY DOC TX: CPT | Mod: CPTII,S$GLB,, | Performed by: OBSTETRICS & GYNECOLOGY

## 2023-07-07 PROCEDURE — 3008F PR BODY MASS INDEX (BMI) DOCUMENTED: ICD-10-PCS | Mod: CPTII,S$GLB,, | Performed by: OBSTETRICS & GYNECOLOGY

## 2023-07-07 PROCEDURE — 4010F PR ACE/ARB THEARPY RXD/TAKEN: ICD-10-PCS | Mod: CPTII,S$GLB,, | Performed by: OBSTETRICS & GYNECOLOGY

## 2023-07-07 PROCEDURE — 3008F BODY MASS INDEX DOCD: CPT | Mod: CPTII,S$GLB,, | Performed by: OBSTETRICS & GYNECOLOGY

## 2023-07-07 PROCEDURE — 3044F PR MOST RECENT HEMOGLOBIN A1C LEVEL <7.0%: ICD-10-PCS | Mod: CPTII,S$GLB,, | Performed by: OBSTETRICS & GYNECOLOGY

## 2023-07-07 PROCEDURE — 1159F PR MEDICATION LIST DOCUMENTED IN MEDICAL RECORD: ICD-10-PCS | Mod: CPTII,S$GLB,, | Performed by: OBSTETRICS & GYNECOLOGY

## 2023-07-07 PROCEDURE — 1159F MED LIST DOCD IN RCRD: CPT | Mod: CPTII,S$GLB,, | Performed by: OBSTETRICS & GYNECOLOGY

## 2023-07-07 PROCEDURE — 3078F PR MOST RECENT DIASTOLIC BLOOD PRESSURE < 80 MM HG: ICD-10-PCS | Mod: CPTII,S$GLB,, | Performed by: OBSTETRICS & GYNECOLOGY

## 2023-07-07 PROCEDURE — 1160F PR REVIEW ALL MEDS BY PRESCRIBER/CLIN PHARMACIST DOCUMENTED: ICD-10-PCS | Mod: CPTII,S$GLB,, | Performed by: OBSTETRICS & GYNECOLOGY

## 2023-07-07 PROCEDURE — 99999 PR PBB SHADOW E&M-EST. PATIENT-LVL IV: CPT | Mod: PBBFAC,,, | Performed by: OBSTETRICS & GYNECOLOGY

## 2023-07-07 PROCEDURE — 3074F SYST BP LT 130 MM HG: CPT | Mod: CPTII,S$GLB,, | Performed by: OBSTETRICS & GYNECOLOGY

## 2023-07-07 PROCEDURE — 3078F DIAST BP <80 MM HG: CPT | Mod: CPTII,S$GLB,, | Performed by: OBSTETRICS & GYNECOLOGY

## 2023-07-07 PROCEDURE — 3066F PR DOCUMENTATION OF TREATMENT FOR NEPHROPATHY: ICD-10-PCS | Mod: CPTII,S$GLB,, | Performed by: OBSTETRICS & GYNECOLOGY

## 2023-07-07 PROCEDURE — 99213 PR OFFICE/OUTPT VISIT, EST, LEVL III, 20-29 MIN: ICD-10-PCS | Mod: S$GLB,,, | Performed by: OBSTETRICS & GYNECOLOGY

## 2023-07-07 PROCEDURE — 99999 PR PBB SHADOW E&M-EST. PATIENT-LVL IV: ICD-10-PCS | Mod: PBBFAC,,, | Performed by: OBSTETRICS & GYNECOLOGY

## 2023-07-07 NOTE — PROGRESS NOTES
Subjective:      Patient ID: Leydi Cordero is a 33 y.o. female.    Chief Complaint:  Results      History of Present Illness  HPI  Pt is here to review colpo results and discuss options.  Reports no complaints.    GYN & OB History  Patient's last menstrual period was 2023 (exact date).   Date of Last Pap: 2023    OB History    Para Term  AB Living   0 0 0 0 0 0   SAB IAB Ectopic Multiple Live Births   0 0 0 0 0       Review of Systems  Review of Systems   Constitutional:  Negative for activity change, appetite change, chills, fatigue, fever and unexpected weight change.   Respiratory:  Negative for shortness of breath.    Cardiovascular:  Negative for chest pain, palpitations and leg swelling.   Gastrointestinal:  Negative for abdominal pain, bloating, blood in stool, constipation, diarrhea, nausea and vomiting.   Genitourinary:  Negative for dysmenorrhea, dyspareunia, dysuria, flank pain, frequency, genital sores, hematuria, menorrhagia, menstrual problem, pelvic pain, urgency, vaginal bleeding, vaginal discharge, vaginal pain, urinary incontinence, postcoital bleeding, vaginal dryness and vaginal odor.   Musculoskeletal:  Negative for back pain.   Neurological:  Negative for syncope and headaches.        Objective:     Physical Exam:   Constitutional: She is oriented to person, place, and time. She appears well-developed and well-nourished. No distress.                           Neurological: She is alert and oriented to person, place, and time.     Psychiatric: She has a normal mood and affect. Her behavior is normal. Thought content normal.       Assessment:     1. High grade squamous intraepithelial lesion (HGSIL), grade 3 PETRONA, on biopsy of cervix             Plan:     High grade squamous intraepithelial lesion (HGSIL), grade 3 PETRONA, on biopsy of cervix  -     Pathology results reviewed with pt (PETRONA 3; cannot rule out papillary squamous CA).  Recommend CKC.  Procedure  details, indications, risks, benefits, and alternatives were reviewed with pt.  Pt voiced understanding and desires to proceed with CKC.  Pt will need pre-operative clearance.  Will have PA contact pt to schedule.

## 2023-07-11 DIAGNOSIS — D06.9 HIGH GRADE SQUAMOUS INTRAEPITHELIAL LESION (HGSIL), GRADE 3 CIN, ON BIOPSY OF CERVIX: Primary | ICD-10-CM

## 2023-08-01 ENCOUNTER — TELEPHONE (OUTPATIENT)
Dept: PREADMISSION TESTING | Facility: HOSPITAL | Age: 33
End: 2023-08-01
Payer: MEDICARE

## 2023-08-01 NOTE — TELEPHONE ENCOUNTER
----- Message from José Antonio Venegas MD sent at 8/1/2023  3:30 PM CDT -----  Regarding: RE: Surgery  From the kidney function point of view there is no contraindication for the procedure   ----- Message -----  From: Kayleigh Gamez RN  Sent: 8/1/2023   3:26 PM CDT  To: José Antonio Venegas MD  Subject: Surgery                                          Hello,   This pt will be having a Cervical Biopsy surgery on 8/21/23 with Dr Conklin. We have reviewed the patient's medical history and are requesting a Transplant clearance note. Please advise on whether this patient can be cleared to proceed from your standpoint or will need any further medical therapy or testing to ensure they are optimized to proceed. Thank you in advance.      -[facility] Dept.  [phone] or [phone]  Mon-Fri 7:30 am- 4 pm

## 2023-08-02 ENCOUNTER — TELEPHONE (OUTPATIENT)
Dept: PREADMISSION TESTING | Facility: HOSPITAL | Age: 33
End: 2023-08-02
Payer: MEDICARE

## 2023-08-02 NOTE — TELEPHONE ENCOUNTER
----- Message from Julio Jaffe MD sent at 8/1/2023  4:07 PM CDT -----  Regarding: RE: Surgery  She is stable.  She is clear from a Nephrology standpoint for her procedure.    ----- Message -----  From: Kayleigh Gamez RN  Sent: 8/1/2023   3:27 PM CDT  To: Julio Jaffe MD  Subject: Surgery                                          Hello,   This pt will be having surgery on 8/21/23 with Dr Conklin. We have reviewed the patient's medical history and are requesting a Nephrology clearance note. Please advise on whether this patient can be cleared to proceed from your standpoint or will need any further medical therapy or testing to ensure they are optimized to proceed. Thank you in advance.      -[facility]  [phone] or [phone]  Mon-Fri 7:30 am- 4 pm

## 2023-08-03 ENCOUNTER — HOSPITAL ENCOUNTER (EMERGENCY)
Facility: HOSPITAL | Age: 33
Discharge: HOME | End: 2023-08-04
Attending: EMERGENCY MEDICINE
Payer: MEDICARE

## 2023-08-03 DIAGNOSIS — R10.84 GENERALIZED ABDOMINAL PAIN: Primary | ICD-10-CM

## 2023-08-03 LAB
ALBUMIN SERPL BCP-MCNC: 3.6 G/DL (ref 3.5–5.2)
ALP SERPL-CCNC: 86 U/L (ref 55–135)
ALT SERPL W/O P-5'-P-CCNC: 29 U/L (ref 10–44)
ANION GAP SERPL CALC-SCNC: 10 MMOL/L (ref 8–16)
AST SERPL-CCNC: 27 U/L (ref 10–40)
B-HCG UR QL: NEGATIVE
BASOPHILS # BLD AUTO: 0.01 K/UL (ref 0–0.2)
BASOPHILS NFR BLD: 0.2 % (ref 0–1.9)
BILIRUB SERPL-MCNC: 0.5 MG/DL (ref 0.1–1)
BILIRUB UR QL STRIP: NEGATIVE
BUN SERPL-MCNC: 15 MG/DL (ref 6–20)
CALCIUM SERPL-MCNC: 9.4 MG/DL (ref 8.7–10.5)
CHLORIDE SERPL-SCNC: 108 MMOL/L (ref 95–110)
CLARITY UR: CLEAR
CO2 SERPL-SCNC: 20 MMOL/L (ref 23–29)
COLOR UR: YELLOW
CREAT SERPL-MCNC: 1.1 MG/DL (ref 0.5–1.4)
DIFFERENTIAL METHOD: ABNORMAL
EOSINOPHIL # BLD AUTO: 0 K/UL (ref 0–0.5)
EOSINOPHIL NFR BLD: 0.2 % (ref 0–8)
ERYTHROCYTE [DISTWIDTH] IN BLOOD BY AUTOMATED COUNT: 13.9 % (ref 11.5–14.5)
EST. GFR  (NO RACE VARIABLE): >60 ML/MIN/1.73 M^2
GLUCOSE SERPL-MCNC: 103 MG/DL (ref 70–110)
GLUCOSE UR QL STRIP: NEGATIVE
HCT VFR BLD AUTO: 42.4 % (ref 37–48.5)
HGB BLD-MCNC: 13.6 G/DL (ref 12–16)
HGB UR QL STRIP: NEGATIVE
IMM GRANULOCYTES # BLD AUTO: 0.04 K/UL (ref 0–0.04)
IMM GRANULOCYTES NFR BLD AUTO: 0.8 % (ref 0–0.5)
KETONES UR QL STRIP: NEGATIVE
LEUKOCYTE ESTERASE UR QL STRIP: NEGATIVE
LIPASE SERPL-CCNC: 34 U/L (ref 4–60)
LYMPHOCYTES # BLD AUTO: 0.3 K/UL (ref 1–4.8)
LYMPHOCYTES NFR BLD: 5.3 % (ref 18–48)
MCH RBC QN AUTO: 28.5 PG (ref 27–31)
MCHC RBC AUTO-ENTMCNC: 32.1 G/DL (ref 32–36)
MCV RBC AUTO: 89 FL (ref 82–98)
MONOCYTES # BLD AUTO: 0.4 K/UL (ref 0.3–1)
MONOCYTES NFR BLD: 6.9 % (ref 4–15)
NEUTROPHILS # BLD AUTO: 4.4 K/UL (ref 1.8–7.7)
NEUTROPHILS NFR BLD: 86.6 % (ref 38–73)
NITRITE UR QL STRIP: NEGATIVE
NRBC BLD-RTO: 0 /100 WBC
PH UR STRIP: 6 [PH] (ref 5–8)
PLATELET # BLD AUTO: 163 K/UL (ref 150–450)
PMV BLD AUTO: 11.2 FL (ref 9.2–12.9)
POTASSIUM SERPL-SCNC: 4.3 MMOL/L (ref 3.5–5.1)
PROT SERPL-MCNC: 6.6 G/DL (ref 6–8.4)
PROT UR QL STRIP: ABNORMAL
RBC # BLD AUTO: 4.78 M/UL (ref 4–5.4)
SODIUM SERPL-SCNC: 138 MMOL/L (ref 136–145)
SP GR UR STRIP: 1.02 (ref 1–1.03)
URN SPEC COLLECT METH UR: ABNORMAL
UROBILINOGEN UR STRIP-ACNC: NEGATIVE EU/DL
WBC # BLD AUTO: 5.05 K/UL (ref 3.9–12.7)

## 2023-08-03 PROCEDURE — 80053 COMPREHEN METABOLIC PANEL: CPT | Performed by: NURSE PRACTITIONER

## 2023-08-03 PROCEDURE — 96375 TX/PRO/DX INJ NEW DRUG ADDON: CPT

## 2023-08-03 PROCEDURE — 99285 EMERGENCY DEPT VISIT HI MDM: CPT | Mod: 25

## 2023-08-03 PROCEDURE — 25000003 PHARM REV CODE 250: Performed by: EMERGENCY MEDICINE

## 2023-08-03 PROCEDURE — 85025 COMPLETE CBC W/AUTO DIFF WBC: CPT | Performed by: NURSE PRACTITIONER

## 2023-08-03 PROCEDURE — 63600175 PHARM REV CODE 636 W HCPCS: Performed by: EMERGENCY MEDICINE

## 2023-08-03 PROCEDURE — 81025 URINE PREGNANCY TEST: CPT | Performed by: NURSE PRACTITIONER

## 2023-08-03 PROCEDURE — 96374 THER/PROPH/DIAG INJ IV PUSH: CPT

## 2023-08-03 PROCEDURE — 96361 HYDRATE IV INFUSION ADD-ON: CPT

## 2023-08-03 PROCEDURE — 83690 ASSAY OF LIPASE: CPT | Performed by: NURSE PRACTITIONER

## 2023-08-03 PROCEDURE — 81003 URINALYSIS AUTO W/O SCOPE: CPT | Performed by: NURSE PRACTITIONER

## 2023-08-03 RX ORDER — SUCRALFATE 1 G/1
1 TABLET ORAL 4 TIMES DAILY
Qty: 30 TABLET | Refills: 0 | Status: SHIPPED | OUTPATIENT
Start: 2023-08-03

## 2023-08-03 RX ORDER — ONDANSETRON 2 MG/ML
4 INJECTION INTRAMUSCULAR; INTRAVENOUS
Status: COMPLETED | OUTPATIENT
Start: 2023-08-03 | End: 2023-08-03

## 2023-08-03 RX ORDER — FENTANYL CITRATE 50 UG/ML
50 INJECTION, SOLUTION INTRAMUSCULAR; INTRAVENOUS
Status: COMPLETED | OUTPATIENT
Start: 2023-08-03 | End: 2023-08-03

## 2023-08-03 RX ORDER — MAG HYDROX/ALUMINUM HYD/SIMETH 200-200-20
30 SUSPENSION, ORAL (FINAL DOSE FORM) ORAL ONCE
Status: COMPLETED | OUTPATIENT
Start: 2023-08-03 | End: 2023-08-03

## 2023-08-03 RX ADMIN — ONDANSETRON 4 MG: 2 INJECTION INTRAMUSCULAR; INTRAVENOUS at 10:08

## 2023-08-03 RX ADMIN — FENTANYL CITRATE 50 MCG: 50 INJECTION INTRAMUSCULAR; INTRAVENOUS at 10:08

## 2023-08-03 RX ADMIN — SODIUM CHLORIDE 1000 ML: 9 INJECTION, SOLUTION INTRAVENOUS at 09:08

## 2023-08-03 RX ADMIN — ALUMINUM HYDROXIDE, MAGNESIUM HYDROXIDE, AND DIMETHICONE 30 ML: 200; 20; 200 SUSPENSION ORAL at 10:08

## 2023-08-04 VITALS
OXYGEN SATURATION: 100 % | DIASTOLIC BLOOD PRESSURE: 68 MMHG | BODY MASS INDEX: 30.28 KG/M2 | TEMPERATURE: 98 F | HEART RATE: 70 BPM | RESPIRATION RATE: 22 BRPM | SYSTOLIC BLOOD PRESSURE: 106 MMHG | WEIGHT: 187.63 LBS

## 2023-08-04 NOTE — FIRST PROVIDER EVALUATION
Medical screening examination initiated.  I have conducted a focused provider triage encounter, findings are as follows:    Brief history of present illness:  Patient presents with upper abdominal and diarrhea x1.  Denies any nausea or vomiting.    Vitals:    08/03/23 1954   BP: (!) 92/55   BP Location: Right arm   Patient Position: Sitting   Pulse: 92   Resp: 18   Temp: 98.1 °F (36.7 °C)   TempSrc: Oral   SpO2: 98%   Weight: 85.1 kg (187 lb 9.8 oz)       Pertinent physical exam:      Brief workup plan:      Preliminary workup initiated; this workup will be continued and followed by the physician or advanced practice provider that is assigned to the patient when roomed.

## 2023-08-04 NOTE — ED PROVIDER NOTES
SCRIBE #1 NOTE: I, Patrick Ryan and Valerio Massey, am scribing for, and in the presence of, Елена Pacheco DO. I have scribed the entire note.       History     Chief Complaint   Patient presents with    Abdominal Pain     Pt presents to the ER with midline stomach pain that hurts all over at times, pt reports this pain has been hurting for the past week. Pt denies N/V but does report diarrhea.       Review of patient's allergies indicates:   Allergen Reactions    Heparin analogues Rash         History of Present Illness     HPI  YOUSIF used for Hebrew interpretation    8/3/2023, 10:21 PM  History obtained from the patient      History of Present Illness: Leydi Cordero is a 33 y.o. female patient with a PMHx of ESRD, kidney transplant, and HTN, who presents to the Emergency Department for evaluation of abdominal pains which onset last week. Symptoms are intermittent and  severe. Sxs are exacerbated with eating and drinking; no mitigating factors reported. Associated sxs include diarrhea. Patient denies any vaginal bleeding, vaginal discharge, blood in stool, hematuria, fever, nausea, vomiting and all other sxs at this time. No prior Tx reported. Pt has had a kidney transplant due to kidney failure 2021. She also reports bariatric surgery 2022. No further complaints or concerns at this time.       Arrival mode: Personal vehicle  EMS  [facility]    PCP: Helena Zepeda PA-C        Past Medical History:  Past Medical History:   Diagnosis Date    Anxiety     -donor kidney transplant 2021    cPRA 100%, XM negative 3 arteries, 2 on common patch, WIT 40 min    Encounter for blood transfusion     ESRD (end stage renal disease)     Fibroma     H/O kidney transplant     Hypertension     Hypertensive nephropathy     Ovarian cyst        Past Surgical History:  Past Surgical History:   Procedure Laterality Date    COLPOSCOPY  2020    Needs follow up on or after 21     ESOPHAGOGASTRODUODENOSCOPY N/A 6/23/2022    Procedure: ESOPHAGOGASTRODUODENOSCOPY (EGD);  Surgeon: Essie Carvajal MD;  Location: [facility];  Service: Endoscopy;  Laterality: N/A;    hemodialysis access left arm      kidney removal       KIDNEY TRANSPLANT  04/15/2015    KIDNEY TRANSPLANT N/A 1/9/2021    Procedure: TRANSPLANT, KIDNEY;  Surgeon: Fam Sutton MD;  Location: [address];  Service: Transplant;  Laterality: N/A;    NEPHRECTOMY      OVARIAN CYST REMOVAL      PARATHYROIDECTOMY      partial     PERCUTANEOUS NEPHROSTOMY Left 2/13/2021    Procedure: Creation, Nephrostomy, Percutaneous;  Surgeon: Elen Surgeon;  Location[facility];  Service: Anesthesiology;  Laterality: Left;    right leg hemodialysis access      ROBOT-ASSISTED LAPAROSCOPIC SLEEVE GASTRECTOMY USING DA VELMA XI N/A 9/27/2022    Procedure: XI ROBOTIC SLEEVE GASTRECTOMY;  Surgeon: Cal Parekh MD;  Location: [facility];  Service: General;  Laterality: N/A;    transplant nephrectomy  12/01/2017         Family History:  Family History   Problem Relation Age of Onset    No Known Problems Mother     Colon cancer Father     Cancer Father     Hypertension Father     No Known Problems Sister     No Known Problems Brother     Kidney disease Maternal Aunt     Hypertension Maternal Aunt     Diabetes Maternal Uncle     Kidney disease Other     Hypertension Other        Social History:  Social History     Tobacco Use    Smoking status: Former     Current packs/day: 0.00     Average packs/day: 0.3 packs/day for 12.6 years (3.1 ttl pk-yrs)     Types: Cigarettes     Start date: 2008     Quit date: 8/2/2020     Years since quitting: 3.0    Smokeless tobacco: Never   Substance and Sexual Activity    Alcohol use: No    Drug use: No    Sexual activity: Yes     Partners: Male     Birth control/protection: None        Review of Systems     Review of Systems   Constitutional:  Negative for fever.   Gastrointestinal:  Positive for abdominal pain and diarrhea.  Negative for blood in stool.   Genitourinary:  Negative for dysuria, hematuria, vaginal bleeding and vaginal discharge.        Physical Exam     Initial Vitals [08/03/23 1954]   BP Pulse Resp Temp SpO2   (!) 92/55 92 18 98.1 °F (36.7 °C) 98 %      MAP       --          Physical Exam  Nursing Notes and Vital Signs Reviewed.  Constitutional: Patient is in no acute distress. Well-developed and well-nourished.  Head: Atraumatic. Normocephalic.  Eyes: PERRL. EOM intact.  No scleral icterus.  ENT: Mucous membranes are moist.   Neck: Supple. Full ROM.   Cardiovascular: Regular rate. Regular rhythm. No murmurs, rubs, or gallops. Distal pulses are 2+ and symmetric.  Pulmonary/Chest: No respiratory distress. Clear to auscultation bilaterally. No wheezing or rales.  Abdominal: Diffuse abdominal tenderness to palpation with voluntary guarding.  No rebound or rigidity. Good bowel sounds.  Genitourinary: No CVA tenderness  Musculoskeletal: Moves all extremities. No obvious deformities. No edema. No calf tenderness.  Skin: Warm and dry.  Neurological:  Alert, awake, and appropriate.  Normal speech.  No acute focal neurological deficits are appreciated.  Psychiatric: Normal affect. Good eye contact. Appropriate in content.     ED Course   Procedures  ED Vital Signs:  Vitals:    08/03/23 1954 08/03/23 2149 08/03/23 2220 08/04/23 0000   BP: (!) 92/55  113/70 106/68   Pulse: 92 78 81 70   Resp: 18  (!) 28 (!) 22   Temp: 98.1 °F (36.7 °C)   98.2 °F (36.8 °C)   TempSrc: Oral   Oral   SpO2: 98%  99% 100%   Weight: 85.1 kg (187 lb 9.8 oz)          Abnormal Lab Results:  Labs Reviewed   CBC W/ AUTO DIFFERENTIAL - Abnormal; Notable for the following components:       Result Value    Immature Granulocytes 0.8 (*)     Lymph # 0.3 (*)     Gran % 86.6 (*)     Lymph % 5.3 (*)     All other components within normal limits   COMPREHENSIVE METABOLIC PANEL - Abnormal; Notable for the following components:    CO2 20 (*)     All other components  within normal limits   URINALYSIS, REFLEX TO URINE CULTURE - Abnormal; Notable for the following components:    Protein, UA Trace (*)     All other components within normal limits    Narrative:     Specimen Source->Urine   LIPASE   PREGNANCY TEST, URINE RAPID    Narrative:     Specimen Source->Urine        All Lab Results:  Results for orders placed or performed during the hospital encounter of 08/03/23   CBC auto differential   Result Value Ref Range    WBC 5.05 3.90 - 12.70 K/uL    RBC 4.78 4.00 - 5.40 M/uL    Hemoglobin 13.6 12.0 - 16.0 g/dL    Hematocrit 42.4 37.0 - 48.5 %    MCV 89 82 - 98 fL    MCH 28.5 27.0 - 31.0 pg    MCHC 32.1 32.0 - 36.0 g/dL    RDW 13.9 11.5 - 14.5 %    Platelets 163 150 - 450 K/uL    MPV 11.2 9.2 - 12.9 fL    Immature Granulocytes 0.8 (H) 0.0 - 0.5 %    Gran # (ANC) 4.4 1.8 - 7.7 K/uL    Immature Grans (Abs) 0.04 0.00 - 0.04 K/uL    Lymph # 0.3 (L) 1.0 - 4.8 K/uL    Mono # 0.4 0.3 - 1.0 K/uL    Eos # 0.0 0.0 - 0.5 K/uL    Baso # 0.01 0.00 - 0.20 K/uL    nRBC 0 0 /100 WBC    Gran % 86.6 (H) 38.0 - 73.0 %    Lymph % 5.3 (L) 18.0 - 48.0 %    Mono % 6.9 4.0 - 15.0 %    Eosinophil % 0.2 0.0 - 8.0 %    Basophil % 0.2 0.0 - 1.9 %    Differential Method Automated    Comprehensive metabolic panel   Result Value Ref Range    Sodium 138 136 - 145 mmol/L    Potassium 4.3 3.5 - 5.1 mmol/L    Chloride 108 95 - 110 mmol/L    CO2 20 (L) 23 - 29 mmol/L    Glucose 103 70 - 110 mg/dL    BUN 15 6 - 20 mg/dL    Creatinine 1.1 0.5 - 1.4 mg/dL    Calcium 9.4 8.7 - 10.5 mg/dL    Total Protein 6.6 6.0 - 8.4 g/dL    Albumin 3.6 3.5 - 5.2 g/dL    Total Bilirubin 0.5 0.1 - 1.0 mg/dL    Alkaline Phosphatase 86 55 - 135 U/L    AST 27 10 - 40 U/L    ALT 29 10 - 44 U/L    eGFR >60 >60 mL/min/1.73 m^2    Anion Gap 10 8 - 16 mmol/L   Lipase   Result Value Ref Range    Lipase 34 4 - 60 U/L   Urinalysis, Reflex to Urine Culture Urine, Clean Catch    Specimen: Urine   Result Value Ref Range    Specimen UA Urine, Clean  Catch     Color, UA Yellow Yellow, Straw, Fransisca    Appearance, UA Clear Clear    pH, UA 6.0 5.0 - 8.0    Specific Gravity, UA 1.025 1.005 - 1.030    Protein, UA Trace (A) Negative    Glucose, UA Negative Negative    Ketones, UA Negative Negative    Bilirubin (UA) Negative Negative    Occult Blood UA Negative Negative    Nitrite, UA Negative Negative    Urobilinogen, UA Negative <2.0 EU/dL    Leukocytes, UA Negative Negative   Pregnancy, urine rapid   Result Value Ref Range    Preg Test, Ur Negative      *Note: Due to a large number of results and/or encounters for the requested time period, some results have not been displayed. A complete set of results can be found in Results Review.         Imaging Results:  Imaging Results              CT Abdomen Pelvis  Without Contrast (Final result)  Result time 08/03/23 22:18:16      Final result by Theo Petersen MD (08/03/23 22:18:16)                   Impression:      No acute process.    Status post  left iliac fossa renal transplant.    All CT scans   are performed using dose optimization techniques including the following: automated exposure control; adjustment of the mA and/or kV; use of iterative reconstruction technique.  Dose modulation was employed for ALARA by means of: Automated exposure control; adjustment of the mA and/or kV according to patient size (this includes techniques or standardized protocols for targeted exams where dose is matched to indication/reason for exam; i.e. extremities or head); and/or use of iterative reconstructive technique.      Electronically signed by: Theo Petersen  Date:    08/03/2023  Time:    22:18               Narrative:    EXAMINATION:  CT ABDOMEN PELVIS WITHOUT CONTRAST    CLINICAL HISTORY:  Abdominal pain, acute, nonlocalized;    TECHNIQUE:  Low dose axial images, sagittal and coronal reformations were obtained from the lung bases to the pubic symphysis, .    COMPARISON:  None    FINDINGS:  Heart: Normal in size as far as  seen.  No pericardial effusion as far seen.    Lung Bases: Well aerated, without consolidation or pleural fluid.    Liver: Normal in size and attenuation, with no focal hepatic lesions.    Gallbladder: No calcified gallstones.    Bile Ducts: No evidence of dilated ducts.    Pancreas: No mass or peripancreatic fat stranding.    Spleen: Spleen is prominent measuring up to 13 cm.    Adrenals: Unremarkable.    Kidneys/ Ureters: Left renal fossa renal transplant.  Native renal atrophy.    Bladder: No evidence of wall thickening.  Bladder is not distended.    Reproductive organs: Unremarkable.    GI Tract/Mesentery: No evidence of bowel obstruction or inflammation. No secondary signs of appendicitis.  Status post gastric bypass    Peritoneal Space: No ascites. No free air.  Trace free fluid in the dependent portion of pelvis.    Retroperitoneum: No significant adenopathy.    Abdominal wall: Unremarkable.    Vasculature: No aneurysm.    Bones: No acute fracture.                                                  The Emergency Provider reviewed the vital signs and test results, which are outlined above.     ED Discussion         11:34 PM: Reassessed pt at this time. Discussed with pt all pertinent ED information and results. Discussed pt dx and plan of tx. Gave pt all f/u and return to the ED instructions. All questions and concerns were addressed at this time. Pt expresses understanding of information and instructions, and is comfortable with plan to discharge. Pt is stable for discharge.    I discussed with patient and/or family/caretaker that evaluation in the ED does not suggest any emergent or life threatening medical conditions requiring immediate intervention beyond what was provided in the ED, and I believe patient is safe for discharge.  Regardless, an unremarkable evaluation in the ED does not preclude the development or presence of a serious of life threatening condition. As such, patient was instructed to return  immediately for any worsening or change in current symptoms.     ED Course as of 08/04/23 0107   Fri Aug 04, 2023   0104 33-year-old female with a history of kidney transplant on immune suppressants and gastric sleeve year ago presents with abdominal pain that is improved with Maalox.  Pregnancy is negative therefore ectopic pregnancy unlikely.  UA shows no signs of infection therefore cystitis or pyelonephritis unlikely.  Lipase negative for acute pancreatitis.  CT abdomen and pelvis without contrast performed due to her report of kidney transplant and showed no signs of acute cholecystitis, acute appendicitis, tubo-ovarian abscess, ovarian torsion, small-bowel obstruction, ruptured diverticulitis or ulcer, or small-bowel obstruction.  CBC showed no leukocytosis and she was afebrile.  Nothing to indicate infection.  Her H&H is normal.  CMP shows normal renal function, electrolytes, and LFTs.  Symptoms are likely secondary to gastritis or some form of peptic ulcer disease.  Will escalate treatment to Carafate and give diet restrictions.  Instructed to follow up with her primary care physician or return to the ER with changing or worsening pain, bloody stool or vomit, fever, or persistent vomiting. [NF]      ED Course User Index  [NF] Елена Pacheco, DO     Medical Decision Making:   Clinical Tests:   Lab Tests: Ordered and Reviewed  Radiological Study: Ordered and Reviewed           ED Medication(s):  Medications   sodium chloride 0.9% bolus 1,000 mL 1,000 mL (0 mLs Intravenous Stopped 8/3/23 2239)   fentaNYL 50 mcg/mL injection 50 mcg (50 mcg Intravenous Given 8/3/23 2220)   ondansetron injection 4 mg (4 mg Intravenous Given 8/3/23 2220)   aluminum-magnesium hydroxide-simethicone 200-200-20 mg/5 mL suspension 30 mL (30 mLs Oral Given 8/3/23 2235)       Discharge Medication List as of 8/3/2023 11:29 PM        START taking these medications    Details   sucralfate (CARAFATE) 1 gram tablet Take 1 tablet (1 g total)  by mouth 4 (four) times daily., Starting Thu 8/3/2023, Print              Follow-up Information       Helena Zepeda PA-C On 8/7/2023.    Specialty: Family Medicine  Contact information:  [address] [facility] [address]  [phone]               [facility] - Emergency Dept..    Specialty: Emergency Medicine  Why: As needed, If symptoms worsen  Contact information:  [address]  [facility] [address] [phone]                               Scribe Attestation:   Scribe #1: I performed the above scribed service and the documentation accurately describes the services I performed. I attest to the accuracy of the note.     Attending:   Physician Attestation Statement for Scribe #1: I, Елена Pacheco DO, personally performed the services described in this documentation, as scribed by Patrick Ryan and Valerio Massey, in my presence, and it is both accurate and complete.           Clinical Impression       ICD-10-CM ICD-9-CM   1. Generalized abdominal pain  R10.84 789.07       Disposition:   Disposition: Discharged  Condition: Stable        Елена Pacheco DO  08/04/23 0107

## 2023-08-13 ENCOUNTER — HOSPITAL ENCOUNTER (EMERGENCY)
Facility: HOSPITAL | Age: 33
Discharge: HOME | End: 2023-08-13
Attending: EMERGENCY MEDICINE
Payer: MEDICARE

## 2023-08-13 VITALS
SYSTOLIC BLOOD PRESSURE: 117 MMHG | HEART RATE: 65 BPM | HEIGHT: 66 IN | BODY MASS INDEX: 28.91 KG/M2 | TEMPERATURE: 98 F | OXYGEN SATURATION: 99 % | RESPIRATION RATE: 21 BRPM | WEIGHT: 179.88 LBS | DIASTOLIC BLOOD PRESSURE: 73 MMHG

## 2023-08-13 DIAGNOSIS — R19.7 DIARRHEA, UNSPECIFIED TYPE: ICD-10-CM

## 2023-08-13 DIAGNOSIS — E86.1 INTRAVASCULAR VOLUME DEPLETION: ICD-10-CM

## 2023-08-13 DIAGNOSIS — R10.84 GENERALIZED ABDOMINAL PAIN: Primary | ICD-10-CM

## 2023-08-13 LAB
ALBUMIN SERPL BCP-MCNC: 3 G/DL (ref 3.5–5.2)
ALP SERPL-CCNC: 81 U/L (ref 55–135)
ALT SERPL W/O P-5'-P-CCNC: 23 U/L (ref 10–44)
AMPHET+METHAMPHET UR QL: NEGATIVE
ANION GAP SERPL CALC-SCNC: 12 MMOL/L (ref 8–16)
AST SERPL-CCNC: 36 U/L (ref 10–40)
B-HCG UR QL: NEGATIVE
BACTERIA #/AREA URNS HPF: ABNORMAL /HPF
BARBITURATES UR QL SCN>200 NG/ML: NEGATIVE
BASOPHILS # BLD AUTO: 0.01 K/UL (ref 0–0.2)
BASOPHILS NFR BLD: 0.2 % (ref 0–1.9)
BENZODIAZ UR QL SCN>200 NG/ML: NEGATIVE
BILIRUB SERPL-MCNC: 0.4 MG/DL (ref 0.1–1)
BILIRUB UR QL STRIP: NEGATIVE
BUN SERPL-MCNC: 18 MG/DL (ref 6–20)
BZE UR QL SCN: NEGATIVE
CALCIUM SERPL-MCNC: 9.1 MG/DL (ref 8.7–10.5)
CANNABINOIDS UR QL SCN: NEGATIVE
CHLORIDE SERPL-SCNC: 110 MMOL/L (ref 95–110)
CLARITY UR: CLEAR
CO2 SERPL-SCNC: 16 MMOL/L (ref 23–29)
COLOR UR: YELLOW
CREAT SERPL-MCNC: 1.4 MG/DL (ref 0.5–1.4)
CREAT UR-MCNC: 110.4 MG/DL (ref 15–325)
DIFFERENTIAL METHOD: ABNORMAL
EOSINOPHIL # BLD AUTO: 0 K/UL (ref 0–0.5)
EOSINOPHIL NFR BLD: 0.2 % (ref 0–8)
ERYTHROCYTE [DISTWIDTH] IN BLOOD BY AUTOMATED COUNT: 13.5 % (ref 11.5–14.5)
EST. GFR  (NO RACE VARIABLE): 51 ML/MIN/1.73 M^2
GLUCOSE SERPL-MCNC: 104 MG/DL (ref 70–110)
GLUCOSE UR QL STRIP: NEGATIVE
HCT VFR BLD AUTO: 37.7 % (ref 37–48.5)
HGB BLD-MCNC: 12.4 G/DL (ref 12–16)
HGB UR QL STRIP: NEGATIVE
IMM GRANULOCYTES # BLD AUTO: 0.03 K/UL (ref 0–0.04)
IMM GRANULOCYTES NFR BLD AUTO: 0.6 % (ref 0–0.5)
KETONES UR QL STRIP: NEGATIVE
LEUKOCYTE ESTERASE UR QL STRIP: ABNORMAL
LIPASE SERPL-CCNC: 14 U/L (ref 4–60)
LYMPHOCYTES # BLD AUTO: 0.3 K/UL (ref 1–4.8)
LYMPHOCYTES NFR BLD: 5.2 % (ref 18–48)
MAGNESIUM SERPL-MCNC: 1.9 MG/DL (ref 1.6–2.6)
MCH RBC QN AUTO: 29 PG (ref 27–31)
MCHC RBC AUTO-ENTMCNC: 32.9 G/DL (ref 32–36)
MCV RBC AUTO: 88 FL (ref 82–98)
METHADONE UR QL SCN>300 NG/ML: NEGATIVE
MICROSCOPIC COMMENT: ABNORMAL
MONOCYTES # BLD AUTO: 0.4 K/UL (ref 0.3–1)
MONOCYTES NFR BLD: 7.4 % (ref 4–15)
NEUTROPHILS # BLD AUTO: 4.5 K/UL (ref 1.8–7.7)
NEUTROPHILS NFR BLD: 86.4 % (ref 38–73)
NITRITE UR QL STRIP: NEGATIVE
NRBC BLD-RTO: 0 /100 WBC
OPIATES UR QL SCN: NEGATIVE
PCP UR QL SCN>25 NG/ML: NEGATIVE
PH UR STRIP: 6 [PH] (ref 5–8)
PLATELET # BLD AUTO: 125 K/UL (ref 150–450)
PMV BLD AUTO: 12.1 FL (ref 9.2–12.9)
POTASSIUM SERPL-SCNC: 5 MMOL/L (ref 3.5–5.1)
PROT SERPL-MCNC: 6.1 G/DL (ref 6–8.4)
PROT UR QL STRIP: ABNORMAL
RBC # BLD AUTO: 4.28 M/UL (ref 4–5.4)
SODIUM SERPL-SCNC: 138 MMOL/L (ref 136–145)
SP GR UR STRIP: 1.01 (ref 1–1.03)
SQUAMOUS #/AREA URNS HPF: 2 /HPF
TOXICOLOGY INFORMATION: NORMAL
UNIDENT CRYS URNS QL MICRO: ABNORMAL
URN SPEC COLLECT METH UR: ABNORMAL
UROBILINOGEN UR STRIP-ACNC: NEGATIVE EU/DL
WBC # BLD AUTO: 5.16 K/UL (ref 3.9–12.7)
WBC #/AREA URNS HPF: 3 /HPF (ref 0–5)
WBC CLUMPS URNS QL MICRO: ABNORMAL

## 2023-08-13 PROCEDURE — 83690 ASSAY OF LIPASE: CPT | Performed by: EMERGENCY MEDICINE

## 2023-08-13 PROCEDURE — 81000 URINALYSIS NONAUTO W/SCOPE: CPT | Performed by: EMERGENCY MEDICINE

## 2023-08-13 PROCEDURE — 80053 COMPREHEN METABOLIC PANEL: CPT | Performed by: EMERGENCY MEDICINE

## 2023-08-13 PROCEDURE — 80307 DRUG TEST PRSMV CHEM ANLYZR: CPT | Performed by: EMERGENCY MEDICINE

## 2023-08-13 PROCEDURE — 96361 HYDRATE IV INFUSION ADD-ON: CPT

## 2023-08-13 PROCEDURE — 81025 URINE PREGNANCY TEST: CPT | Performed by: EMERGENCY MEDICINE

## 2023-08-13 PROCEDURE — 99284 EMERGENCY DEPT VISIT MOD MDM: CPT | Mod: 25

## 2023-08-13 PROCEDURE — 96374 THER/PROPH/DIAG INJ IV PUSH: CPT

## 2023-08-13 PROCEDURE — 25000003 PHARM REV CODE 250: Performed by: EMERGENCY MEDICINE

## 2023-08-13 PROCEDURE — 83735 ASSAY OF MAGNESIUM: CPT | Performed by: EMERGENCY MEDICINE

## 2023-08-13 PROCEDURE — 63600175 PHARM REV CODE 636 W HCPCS: Performed by: EMERGENCY MEDICINE

## 2023-08-13 PROCEDURE — 85025 COMPLETE CBC W/AUTO DIFF WBC: CPT | Performed by: EMERGENCY MEDICINE

## 2023-08-13 PROCEDURE — 96372 THER/PROPH/DIAG INJ SC/IM: CPT | Performed by: EMERGENCY MEDICINE

## 2023-08-13 RX ORDER — DICYCLOMINE HYDROCHLORIDE 10 MG/ML
20 INJECTION INTRAMUSCULAR
Status: COMPLETED | OUTPATIENT
Start: 2023-08-13 | End: 2023-08-13

## 2023-08-13 RX ORDER — ONDANSETRON 2 MG/ML
4 INJECTION INTRAMUSCULAR; INTRAVENOUS
Status: COMPLETED | OUTPATIENT
Start: 2023-08-13 | End: 2023-08-13

## 2023-08-13 RX ADMIN — DICYCLOMINE HYDROCHLORIDE 20 MG: 20 INJECTION, SOLUTION INTRAMUSCULAR at 02:08

## 2023-08-13 RX ADMIN — ONDANSETRON 4 MG: 2 INJECTION INTRAMUSCULAR; INTRAVENOUS at 02:08

## 2023-08-13 RX ADMIN — SODIUM CHLORIDE 1000 ML: 9 INJECTION, SOLUTION INTRAVENOUS at 02:08

## 2023-08-13 NOTE — ED NOTES
Pt resting in ER stretcher, aaox4, rr e/u, NAD noted. Vss noted. Bed low and locked, call light in reach, side rails up x2. IV fluids infusing.  Friend at the bedside.  Pt verbalized understanding of status and POC; denies further needs. Will continue to monitor.

## 2023-08-13 NOTE — ED PROVIDER NOTES
"SCRIBE #1 NOTE: I, Ann Marie Mcgill, am scribing for, and in the presence of, Perri Oseguera MD. I have scribed the entire note.       History     Chief Complaint   Patient presents with    Abdominal Pain     "Unbearable" diffuse abdominal pain. Was recently seen here for same approx. 1.5-2wk ago. +Vomiting, diarrhea. Hx. Of kidney transplant.     Review of patient's allergies indicates:   Allergen Reactions    Heparin analogues Rash         History of Present Illness     HPI    2023, 2:05 PM  History obtained from the patient via [employer]       History of Present Illness: Leydi Cordero is a 33 y.o. female patient with a PMHx of kidney transplant, HTN, and ESRD who presents to the Emergency Department for evaluation of abdominal pain  and diarrhea everyday x few weeks. Pt was seen in ED on 2023 for same sxs. Pt states the pain "is unbearable"  and no pain medication has improved sxs. Pt also complains of  N/V which onset today. Pt states she was able to take all of her normal medications today. Symptoms are constant and moderate in severity. No mitigating or exacerbating factors reported. Pt denies any fever. No further complaints or concerns at this time.       Arrival mode: Personal vehicle      PCP: Helena Zepeda PA-C        Past Medical History:  Past Medical History:   Diagnosis Date    Anxiety     -donor kidney transplant 2021    cPRA 100%, XM negative 3 arteries, 2 on common patch, WIT 40 min    Encounter for blood transfusion     ESRD (end stage renal disease)     Fibroma     H/O kidney transplant     Hypertension     Hypertensive nephropathy     Ovarian cyst        Past Surgical History:  Past Surgical History:   Procedure Laterality Date    COLPOSCOPY  2020    Needs follow up on or after 21    ESOPHAGOGASTRODUODENOSCOPY N/A 2022    Procedure: ESOPHAGOGASTRODUODENOSCOPY (EGD);  Surgeon: Essie Carvajal MD;  Location: [facility];  Service: " Endoscopy;  Laterality: N/A;    hemodialysis access left arm      kidney removal       KIDNEY TRANSPLANT  04/15/2015    KIDNEY TRANSPLANT N/A 1/9/2021    Procedure: TRANSPLANT, KIDNEY;  Surgeon: Fam Sutton MD;  Location: [address];  Service: Transplant;  Laterality: N/A;    NEPHRECTOMY      OVARIAN CYST REMOVAL      PARATHYROIDECTOMY      partial     PERCUTANEOUS NEPHROSTOMY Left 2/13/2021    Procedure: Creation, Nephrostomy, Percutaneous;  Surgeon: Elen Surgeon;  Location: [facility];  Service: Anesthesiology;  Laterality: Left;    right leg hemodialysis access      ROBOT-ASSISTED LAPAROSCOPIC SLEEVE GASTRECTOMY USING DA VELMA XI N/A 9/27/2022    Procedure: XI ROBOTIC SLEEVE GASTRECTOMY;  Surgeon: Cal Parekh MD;  Location: [facility];  Service: General;  Laterality: N/A;    transplant nephrectomy  12/01/2017         Family History:  Family History   Problem Relation Age of Onset    No Known Problems Mother     Colon cancer Father     Cancer Father     Hypertension Father     No Known Problems Sister     No Known Problems Brother     Kidney disease Maternal Aunt     Hypertension Maternal Aunt     Diabetes Maternal Uncle     Kidney disease Other     Hypertension Other        Social History:  Social History     Tobacco Use    Smoking status: Former     Current packs/day: 0.00     Average packs/day: 0.3 packs/day for 12.6 years (3.1 ttl pk-yrs)     Types: Cigarettes     Start date: 2008     Quit date: 8/2/2020     Years since quitting: 3.0    Smokeless tobacco: Never   Substance and Sexual Activity    Alcohol use: No    Drug use: No    Sexual activity: Yes     Partners: Male     Birth control/protection: None        Review of Systems     Review of Systems   Constitutional:  Negative for fever.   Gastrointestinal:  Positive for abdominal pain, diarrhea, nausea and vomiting.      Physical Exam     Initial Vitals [08/13/23 1306]   BP Pulse Resp Temp SpO2   111/64 90 (!) 21 97.8 °F (36.6 °C) 99 %      MAP       --   "        Physical Exam  Nursing Notes and Vital Signs Reviewed.  Constitutional: Patient is in no acute distress. Well-developed and well-nourished.  Head: Atraumatic. Normocephalic.  Eyes: PERRL. EOM intact. Conjunctivae are not pale. No scleral icterus.  ENT: Mucous membranes are moist. Oropharynx is clear and symmetric.    Neck: Supple. Full ROM. No lymphadenopathy.  Cardiovascular: Regular rate. Regular rhythm. No murmurs, rubs, or gallops. Distal pulses are 2+ and symmetric.  Pulmonary/Chest: No respiratory distress. Clear to auscultation bilaterally. No wheezing or rales.  Abdominal: Soft and non-distended.  There is no tenderness.  No rebound, guarding, or rigidity.  Genitourinary: No CVA tenderness  Musculoskeletal: Moves all extremities. No obvious deformities. No edema.  Skin: Warm and dry.  Neurological:  Alert, awake, and appropriate.  Normal speech.  No acute focal neurological deficits are appreciated.  Psychiatric: Normal affect. Good eye contact. Appropriate in content.     ED Course   Procedures  ED Vital Signs:  Vitals:    08/13/23 1306 08/13/23 1403 08/13/23 1532 08/13/23 1631   BP: 111/64 (!) 94/50 (!) 93/54 117/73   Pulse: 90 73 72 65   Resp: (!) 21      Temp: 97.8 °F (36.6 °C)      TempSrc: Oral      SpO2: 99% 97% 97% 99%   Weight: 81.6 kg (179 lb 14.3 oz)      Height: 5' 6" (1.676 m)          Abnormal Lab Results:  Labs Reviewed   CBC W/ AUTO DIFFERENTIAL - Abnormal; Notable for the following components:       Result Value    Platelets 125 (*)     Immature Granulocytes 0.6 (*)     Lymph # 0.3 (*)     Gran % 86.4 (*)     Lymph % 5.2 (*)     All other components within normal limits   COMPREHENSIVE METABOLIC PANEL - Abnormal; Notable for the following components:    CO2 16 (*)     Albumin 3.0 (*)     eGFR 51 (*)     All other components within normal limits   URINALYSIS, REFLEX TO URINE CULTURE - Abnormal; Notable for the following components:    Protein, UA Trace (*)     Leukocytes, UA Trace (*) "     All other components within normal limits    Narrative:     Specimen Source->Urine   URINALYSIS MICROSCOPIC - Abnormal; Notable for the following components:    WBC Clumps, UA Occasional (*)     All other components within normal limits    Narrative:     Specimen Source->Urine   CULTURE, STOOL   CLOSTRIDIUM DIFFICILE   LIPASE   MAGNESIUM   PREGNANCY TEST, URINE RAPID    Narrative:     Specimen Source->Urine   DRUG SCREEN PANEL, URINE EMERGENCY    Narrative:     Specimen Source->Urine   WBC, STOOL        All Lab Results:  Results for orders placed or performed during the hospital encounter of 08/13/23   CBC W/ AUTO DIFFERENTIAL   Result Value Ref Range    WBC 5.16 3.90 - 12.70 K/uL    RBC 4.28 4.00 - 5.40 M/uL    Hemoglobin 12.4 12.0 - 16.0 g/dL    Hematocrit 37.7 37.0 - 48.5 %    MCV 88 82 - 98 fL    MCH 29.0 27.0 - 31.0 pg    MCHC 32.9 32.0 - 36.0 g/dL    RDW 13.5 11.5 - 14.5 %    Platelets 125 (L) 150 - 450 K/uL    MPV 12.1 9.2 - 12.9 fL    Immature Granulocytes 0.6 (H) 0.0 - 0.5 %    Gran # (ANC) 4.5 1.8 - 7.7 K/uL    Immature Grans (Abs) 0.03 0.00 - 0.04 K/uL    Lymph # 0.3 (L) 1.0 - 4.8 K/uL    Mono # 0.4 0.3 - 1.0 K/uL    Eos # 0.0 0.0 - 0.5 K/uL    Baso # 0.01 0.00 - 0.20 K/uL    nRBC 0 0 /100 WBC    Gran % 86.4 (H) 38.0 - 73.0 %    Lymph % 5.2 (L) 18.0 - 48.0 %    Mono % 7.4 4.0 - 15.0 %    Eosinophil % 0.2 0.0 - 8.0 %    Basophil % 0.2 0.0 - 1.9 %    Differential Method Automated    Comp. Metabolic Panel   Result Value Ref Range    Sodium 138 136 - 145 mmol/L    Potassium 5.0 3.5 - 5.1 mmol/L    Chloride 110 95 - 110 mmol/L    CO2 16 (L) 23 - 29 mmol/L    Glucose 104 70 - 110 mg/dL    BUN 18 6 - 20 mg/dL    Creatinine 1.4 0.5 - 1.4 mg/dL    Calcium 9.1 8.7 - 10.5 mg/dL    Total Protein 6.1 6.0 - 8.4 g/dL    Albumin 3.0 (L) 3.5 - 5.2 g/dL    Total Bilirubin 0.4 0.1 - 1.0 mg/dL    Alkaline Phosphatase 81 55 - 135 U/L    AST 36 10 - 40 U/L    ALT 23 10 - 44 U/L    eGFR 51 (A) >60 mL/min/1.73 m^2    Anion  Gap 12 8 - 16 mmol/L   Lipase   Result Value Ref Range    Lipase 14 4 - 60 U/L   Urinalysis, Reflex to Urine Culture Urine, Clean Catch    Specimen: Urine   Result Value Ref Range    Specimen UA Urine, Clean Catch     Color, UA Yellow Yellow, Straw, Fransisca    Appearance, UA Clear Clear    pH, UA 6.0 5.0 - 8.0    Specific Gravity, UA 1.015 1.005 - 1.030    Protein, UA Trace (A) Negative    Glucose, UA Negative Negative    Ketones, UA Negative Negative    Bilirubin (UA) Negative Negative    Occult Blood UA Negative Negative    Nitrite, UA Negative Negative    Urobilinogen, UA Negative <2.0 EU/dL    Leukocytes, UA Trace (A) Negative   Magnesium   Result Value Ref Range    Magnesium 1.9 1.6 - 2.6 mg/dL   Pregnancy, urine rapid (UPT)   Result Value Ref Range    Preg Test, Ur Negative    Drug screen panel, emergency   Result Value Ref Range    Benzodiazepines Negative Negative    Methadone metabolites Negative Negative    Cocaine (Metab.) Negative Negative    Opiate Scrn, Ur Negative Negative    Barbiturate Screen, Ur Negative Negative    Amphetamine Screen, Ur Negative Negative    THC Negative Negative    Phencyclidine Negative Negative    Creatinine, Urine 110.4 15.0 - 325.0 mg/dL    Toxicology Information SEE COMMENT    Urinalysis Microscopic   Result Value Ref Range    WBC, UA 3 0 - 5 /hpf    WBC Clumps, UA Occasional (A) None-Rare    Bacteria Rare None-Occ /hpf    Squam Epithel, UA 2 /hpf    Unclass Trudi UA Rare None-Moderate    Microscopic Comment SEE COMMENT      *Note: Due to a large number of results and/or encounters for the requested time period, some results have not been displayed. A complete set of results can be found in Results Review.         Imaging Results:  Imaging Results    None                   The Emergency Provider reviewed the vital signs and test results, which are outlined above.     ED Discussion     4:28 PM: Reassessed pt at this time.  Pt did not have diarrhea in the ED so no stool sample  was able to be collected. . Discussed with pt all pertinent ED information and results. Discussed pt dx and plan of tx. Gave pt all f/u and return to the ED instructions. All questions and concerns were addressed at this time. Pt expresses understanding of information and instructions, and is comfortable with plan to discharge. Pt is stable for discharge.    I discussed with patient and/or family/caretaker that evaluation in the ED does not suggest any emergent or life threatening medical conditions requiring immediate intervention beyond what was provided in the ED, and I believe patient is safe for discharge.  Regardless, an unremarkable evaluation in the ED does not preclude the development or presence of a serious of life threatening condition. As such, patient was instructed to return immediately for any worsening or change in current symptoms.         Medical Decision Making:   History:   Old Records Summarized: records from previous admission(s).       <> Summary of Records: Seen in the ER on 8/3 for same complaint, had lab work ad   Initial Assessment:   Seen in the ER on 8/3 for same, had lab work and CT scan done all reviewed and no acute findings noted, has chronic abdominal pain.   Differential Diagnosis:   Dehydration  Infection  Electrolyte Imbalance  Clinical Tests:   Lab Tests: Ordered and Reviewed  ED Management:  Hx of kidney tx in 2021, GERD, chronic abdominal pain, presents with intermittent pain, diarrhea for past several days, no fever or systemic symptoms, initially slightly hypotensive, responded to fluid challenge, lab work reviewed and consistent with mild dehydration, patient unable to produce stool specimen, her abdominal exam is benign, her vitals are stable, she is stable in my opinion for discharge with close follow up outpatient.            ED Medication(s):  Medications   sodium chloride 0.9% bolus 1,000 mL 1,000 mL (0 mLs Intravenous Stopped 8/13/23 2702)   dicyclomine injection 20  mg (20 mg Intramuscular Given 8/13/23 1436)   ondansetron injection 4 mg (4 mg Intravenous Given 8/13/23 1436)       Discharge Medication List as of 8/13/2023  4:28 PM           Follow-up Information       Helena Zepeda PA-C. Schedule an appointment as soon as possible for a visit in 1 day.    Specialty: Family Medicine  Why: Return to the Emergency Room, If symptoms worsen  Contact information:  [address] [facility] [address]  [phone]                                 Scribe Attestation:   Scribe #1: I performed the above scribed service and the documentation accurately describes the services I performed. I attest to the accuracy of the note.     Attending:   Physician Attestation Statement for Scribe #1: I, Perri Oseguera MD, personally performed the services described in this documentation, as scribed by Ann Marie Mcgill, in my presence, and it is both accurate and complete.           Clinical Impression       ICD-10-CM ICD-9-CM   1. Generalized abdominal pain  R10.84 789.07   2. Diarrhea, unspecified type  R19.7 787.91   3. Intravascular volume depletion  E86.1 276.52       Disposition:   Disposition: Discharged  Condition: Stable         Perri Oseguera MD  08/13/23 5841

## 2023-08-13 NOTE — ED NOTES
Bed: 18  Expected date:   Expected time:   Means of arrival: Personal Transportation  Comments:  When clean

## 2023-08-16 ENCOUNTER — TELEPHONE (OUTPATIENT)
Dept: PREADMISSION TESTING | Facility: HOSPITAL | Age: 33
End: 2023-08-16
Payer: MEDICARE

## 2023-08-16 NOTE — TELEPHONE ENCOUNTER
Pre op instructions reviewed with pt ,verbalized understanding.    To confirm, Surgery is scheduled on 8/21/23. We will call you late afternoon the business day prior to surgery with your arrival time.    *Please report to the [facility] (1st Floor) located off of [facility] (2nd Entrance/Building on the left, in front of the flag pole).  Address: [address] [place] [address] [place]. [id number]      INSTRUCTIONS IMPORTANT!!!  Do Not Eat, Drink, or Smoke after 12 midnight unless instructed otherwise by your Surgeon. OK to brush teeth, no gum, candy or mints!      *Take Only these medications with a small sip of water Morning of Surgery:  -inhaler  -tacrolimus  -mycophenolate  -labetalol  -procardia    ____  HOLD all vitamins, herbal supplements, aspirin products & NSAIDS 7 days prior to surgery, as these can thin the blood.  ____  Avoid Alcoholic beverages 3 days prior to surgery, as it can thin the blood.  ____  NO Acrylic/fake nails or nail polish worn day of surgery (specifically hand/arm & foot surgeries).  ____  NO powder, lotions, deodorants, oils or cream on body.  ____  Remove all jewelry & piercings & foreign objects before arrival & leave at home.  ____  Remove Dentures, Hearing Aids & Contact Lens prior to surgery.  ____  Bring photo ID and insurance information to hospital (Leave Valuables at Home).  ____  If going home the same day, arrange for a ride home. You will not be able to drive for 24 hrs if Anesthesia was used.   ____  Females (ages 11-60): may need to give a urine sample the morning of surgery; please see Pre op Nurse prior to using the restroom.  ____  Males: Stop ED medications (Viagra, Cialis) 24 hrs prior to surgery.  ____  Wear clean, loose fitting clothing to allow for dressings/ bandages.    Bathing Instructions:    -Shower with anti-bacterial Soap (Hibiclens or Dial) the night before surgery and the morning of.   -Do not use Hibiclens on your face or genitals.   -Apply  clean clothes after shower.  -Do not shave your face or body 2 days prior to surgery unless instructed otherwise by your Surgeon.  -Do not shave pubic hair 7 days prior to surgery (gyn pt's).    [facility] Visitor/Ride Policy:  Only 2 adults allowed in pre op/recovery area during your procedure. You MUST HAVE A RIDE HOME from a responsible adult that you know and trust. Medical Transport, Uber or Lyft can ONLY be used if patient has a responsible adult to accompany them during ride home.    Discharge Instructions: You will receive Post-op/Discharge instructions by your Discharge Nurse prior to going home.   *Prevention of surgical site infections:   -Keep incisions clean and dry.   -Do not soak/submerge incisions in water until completely healed.   -Do not apply lotions, powders, creams, or deodorants to site.   -Always make sure hands are cleaned with antibacterial soap/ alcohol-based  prior to touching the surgical site.        *Signs and symptoms:               -Redness and pain around the area where you had surgery               -Drainage of cloudy fluid from your surgical wound               -Fever over 100.4 or chills      >>>Call Surgeon office/on-call Surgeon if you experience any of these signs & symptoms post-surgery @ [phone].       *If you are running late or have questions the morning of surgery, please call the [facility] Surgery Dept @ [phone].     *Billing questions:  [phone]       Thank you,  -[facility] Pre [facility].  [phone] or [phone]  M-F 7:30 am-4:00 pm (Closed Major Holidays)

## 2023-08-17 ENCOUNTER — HOSPITAL ENCOUNTER (OUTPATIENT)
Dept: CARDIOLOGY | Facility: HOSPITAL | Age: 33
Discharge: HOME | End: 2023-08-17
Attending: ANESTHESIOLOGY
Payer: MEDICARE

## 2023-08-17 ENCOUNTER — OFFICE VISIT (OUTPATIENT)
Dept: OBSTETRICS AND GYNECOLOGY | Facility: CLINIC | Age: 33
End: 2023-08-17
Payer: MEDICARE

## 2023-08-17 VITALS — BODY MASS INDEX: 28.56 KG/M2 | HEIGHT: 66 IN | WEIGHT: 177.69 LBS

## 2023-08-17 DIAGNOSIS — Z01.818 PREOP TESTING: ICD-10-CM

## 2023-08-17 DIAGNOSIS — D06.9 HIGH GRADE SQUAMOUS INTRAEPITHELIAL LESION (HGSIL), GRADE 3 CIN, ON BIOPSY OF CERVIX: Primary | ICD-10-CM

## 2023-08-17 PROCEDURE — 99999 PR PBB SHADOW E&M-EST. PATIENT-LVL III: ICD-10-PCS | Mod: PBBFAC,,, | Performed by: OBSTETRICS & GYNECOLOGY

## 2023-08-17 PROCEDURE — 99499 NO LOS: ICD-10-PCS | Mod: S$GLB,,, | Performed by: OBSTETRICS & GYNECOLOGY

## 2023-08-17 PROCEDURE — 99499 UNLISTED E&M SERVICE: CPT | Mod: S$GLB,,, | Performed by: OBSTETRICS & GYNECOLOGY

## 2023-08-17 PROCEDURE — 93010 EKG 12-LEAD: ICD-10-PCS | Mod: ,,, | Performed by: INTERNAL MEDICINE

## 2023-08-17 PROCEDURE — 93005 ELECTROCARDIOGRAM TRACING: CPT

## 2023-08-17 PROCEDURE — 99999 PR PBB SHADOW E&M-EST. PATIENT-LVL III: CPT | Mod: PBBFAC,,, | Performed by: OBSTETRICS & GYNECOLOGY

## 2023-08-17 PROCEDURE — 93010 ELECTROCARDIOGRAM REPORT: CPT | Mod: ,,, | Performed by: INTERNAL MEDICINE

## 2023-08-17 RX ORDER — FAMOTIDINE 20 MG/1
20 TABLET, FILM COATED ORAL
Status: CANCELLED | OUTPATIENT
Start: 2023-08-17

## 2023-08-17 RX ORDER — SODIUM CHLORIDE 9 MG/ML
INJECTION, SOLUTION INTRAVENOUS CONTINUOUS
Status: CANCELLED | OUTPATIENT
Start: 2023-08-17

## 2023-08-17 RX ORDER — MUPIROCIN 20 MG/G
OINTMENT TOPICAL
Status: CANCELLED | OUTPATIENT
Start: 2023-08-17

## 2023-08-17 NOTE — PROGRESS NOTES
Pt is here for pre-operative visit.  Pt reports no complaints.  Ready for surgery.    ROS Negative     Physical Exam:  See H+P    A/P:  High grade squamous intraepithelial lesion (HGSIL), grade 3 PETRONA, on biopsy of cervix  -     Procedure risks, benefits, and alternatives were discussed.  Pt voiced understanding and expressed consent for [facility].  Hospital forms were reviewed with pt and signed.  Pre-operative instructions were given.

## 2023-08-17 NOTE — H&P
[facility]  Obstetrics & Gynecology  History & Physical    Patient Name: Leydi Cordero  MRN: [id number]  Admission Date: (Not on file)  Primary Care Provider: Helena Zepeda PA-C    Subjective:     Chief Complaint/Reason for Admission: surgery    History of Present Illness:   32 yo G0 with history of cervix dysplasia is here for scheduled surgery.  Pt reports no complaints and is ready for surgery.    Current Outpatient Medications on File Prior to Visit   Medication Sig    albuterol (PROVENTIL/VENTOLIN HFA) 90 mcg/actuation inhaler Inhale 2 puffs into the lungs every 4 (four) hours as needed for Wheezing or Shortness of Breath. Rescue    calcitRIOL (ROCALTROL) 0.25 MCG Cap Take 1 capsule (0.25 mcg total) by mouth once daily.    cholecalciferol, vitamin D3, (VITAMIN D3) 50 mcg (2,000 unit) Tab Take 1 tablet by mouth once daily    docusate sodium (COLACE) 100 MG capsule Take 1 capsule (100 mg total) by mouth 2 (two) times daily as needed.    ergocalciferol (VITAMIN D2) 50,000 unit Cap Take 1 capsule (50,000 Units total) by mouth every 30 days.    labetaloL (NORMODYNE) 200 MG tablet Take 1 tablet (200 mg total) by mouth 2 (two) times daily.    loperamide (IMODIUM) 2 mg capsule Take 1-2 capsule by mouth four times daily as needed for 14 days    magnesium oxide (MAG-OX) 400 mg (241.3 mg magnesium) tablet Take 1 tablet (400 mg total) by mouth 2 (two) times daily.    mycophenolate (CELLCEPT) 250 mg Cap Take 4 capsules (1,000 mg total) by mouth 2 (two) times daily.    NIFEdipine (PROCARDIA-XL) 30 MG (OSM) 24 hr tablet Take 1 tablet (30 mg total) by mouth 2 (two) times a day.    olmesartan (BENICAR) 20 MG tablet Take 1 tablet (20 mg total) by mouth once daily.    pantoprazole (PROTONIX) 40 MG tablet Take 1 tablet by mouth once daily    predniSONE (DELTASONE) 5 MG tablet Take 1 tablet (5 mg total) by mouth once daily.    sars-cov-2, covid-19, (MODERNA COVID-19) 100 mcg/0.5 ml injection Inject 0.5 mLs into the  muscle.    sodium bicarbonate 650 MG tablet Take 2 tablets (1,300 mg total) by mouth 3 (three) times daily.    sucralfate (CARAFATE) 1 gram tablet Take 1 tablet (1 g total) by mouth 4 (four) times daily.    tacrolimus (PROGRAF) 1 MG Cap Take 3 capsules (3 mg total) by mouth every morning AND 2 capsules (2 mg total) every evening.     Current Facility-Administered Medications on File Prior to Visit   Medication    acetaminophen tablet 650 mg    diphenhydrAMINE injection 25 mg    EPINEPHrine (EPIPEN) 0.3 mg/0.3 mL pen injection 0.3 mg    methylPREDNISolone sodium succinate injection 40 mg    ondansetron disintegrating tablet 4 mg    sodium chloride 0.9% 500 mL flush bag    sodium chloride 0.9% flush 10 mL       Review of patient's allergies indicates:   Allergen Reactions    Heparin analogues Rash       Past Medical History:   Diagnosis Date    Anxiety     -donor kidney transplant 2021    cPRA 100%, XM negative 3 arteries, 2 on common patch, WIT 40 min    Encounter for blood transfusion     ESRD (end stage renal disease)     Fibroma     H/O kidney transplant     Hypertension     Hypertensive nephropathy     Ovarian cyst     Sleep apnea      OB History    Para Term  AB Living   0 0 0 0 0 0   SAB IAB Ectopic Multiple Live Births   0 0 0 0 0     Past Surgical History:   Procedure Laterality Date    COLPOSCOPY  2020    Needs follow up on or after 21    ESOPHAGOGASTRODUODENOSCOPY N/A 2022    Procedure: ESOPHAGOGASTRODUODENOSCOPY (EGD);  Surgeon: Essie Carvajal MD;  [facility]: [facility];  Service: Endoscopy;  Laterality: N/A;    hemodialysis access left arm      kidney removal       KIDNEY TRANSPLANT  04/15/2015    KIDNEY TRANSPLANT N/A 2021    Procedure: TRANSPLANT, KIDNEY;  Surgeon: Fam Sutton MD;  Location: [address];  Service: Transplant;  Laterality: N/A;    NEPHRECTOMY      OVARIAN CYST REMOVAL      PARATHYROIDECTOMY      partial     PERCUTANEOUS  NEPHROSTOMY Left 2/13/2021    Procedure: Creation, Nephrostomy, Percutaneous;  Surgeon: Elen Surgeon;  Location[facility];  Service: Anesthesiology;  Laterality: Left;    right leg hemodialysis access      ROBOT-ASSISTED LAPAROSCOPIC SLEEVE GASTRECTOMY USING DA VELMA XI N/A 9/27/2022    Procedure: XI ROBOTIC SLEEVE GASTRECTOMY;  Surgeon: Cal Parekh MD;  Location: [facility];  Service: General;  Laterality: N/A;    transplant nephrectomy  12/01/2017     Family History       Problem Relation (Age of Onset)    Cancer Father    Colon cancer Father    Diabetes Maternal Uncle    Hypertension Father, Maternal Aunt, Other    Kidney disease Maternal Aunt, Other    No Known Problems Mother, Sister, Brother          Tobacco Use    Smoking status: Former     Current packs/day: 0.00     Average packs/day: 0.3 packs/day for 12.6 years (3.1 ttl pk-yrs)     Types: Cigarettes     Start date: 2008     Quit date: 8/2/2020     Years since quitting: 3.0    Smokeless tobacco: Never   Substance and Sexual Activity    Alcohol use: No    Drug use: No    Sexual activity: Yes     Partners: Male     Birth control/protection: None     Review of Systems   Constitutional:  Negative for activity change, appetite change, chills, fatigue, fever and unexpected weight change.   Respiratory:  Negative for shortness of breath.    Cardiovascular:  Negative for chest pain, palpitations and leg swelling.   Gastrointestinal:  Negative for abdominal pain, bloating, blood in stool, constipation, diarrhea, nausea and vomiting.   Genitourinary:  Negative for dysmenorrhea, dyspareunia, dysuria, flank pain, frequency, genital sores, hematuria, menorrhagia, menstrual problem, pelvic pain, urgency, vaginal bleeding, vaginal discharge, vaginal pain, urinary incontinence, postcoital bleeding, vaginal dryness and vaginal odor.   Musculoskeletal:  Negative for back pain.   Neurological:  Negative for syncope and headaches.     Objective:     Vital Signs (Most  Recent):    Vital Signs (24h Range):  [unfilled]        There is no height or weight on file to calculate BMI.  Patient's last menstrual period was 08/11/2023.    Physical Exam:   Constitutional: She is oriented to person, place, and time. She appears well-developed and well-nourished. No distress.    HENT:   Head: Normocephalic and atraumatic.    Eyes: Pupils are equal, round, and reactive to light. EOM are normal.     Cardiovascular:  Normal rate, regular rhythm and normal heart sounds.             Pulmonary/Chest: Effort normal and breath sounds normal.        Abdominal: Soft. Bowel sounds are normal. She exhibits no distension. There is no abdominal tenderness.             Musculoskeletal: Normal range of motion and moves all extremeties. No tenderness or edema.       Neurological: She is alert and oriented to person, place, and time.    Skin: Skin is warm and dry.    Psychiatric: She has a normal mood and affect. Her behavior is normal. Thought content normal.       Laboratory:  I have personallly reviewed all pertinent lab results from the last 24 hours.    Diagnostic Results:  Labs: Reviewed  CT: Reviewed  Pathology reviewed    Assessment/Plan:     There are no hospital problems to display for this patient.    High grade squamous intraepithelial lesion (HGSIL), grade 3 PETRONA, on biopsy of cervix  -     Procedure risks, benefits, and alternatives were discussed.  Pt voiced understanding and expressed consent for [facility].  Hospital forms were reviewed with pt and signed.  Pre-operative instructions were given.    Jourdan Conklin MD  Obstetrics & Gynecology  [facility]

## 2023-08-17 NOTE — H&P (VIEW-ONLY)
[facility]  Obstetrics & Gynecology  History & Physical    Patient Name: Leydi Cordero  MRN: [id number]  Admission Date: (Not on file)  Primary Care Provider: Helena Zepeda PA-C    Subjective:     Chief Complaint/Reason for Admission: surgery    History of Present Illness:   34 yo G0 with history of cervix dysplasia is here for scheduled surgery.  Pt reports no complaints and is ready for surgery.    Current Outpatient Medications on File Prior to Visit   Medication Sig    albuterol (PROVENTIL/VENTOLIN HFA) 90 mcg/actuation inhaler Inhale 2 puffs into the lungs every 4 (four) hours as needed for Wheezing or Shortness of Breath. Rescue    calcitRIOL (ROCALTROL) 0.25 MCG Cap Take 1 capsule (0.25 mcg total) by mouth once daily.    cholecalciferol, vitamin D3, (VITAMIN D3) 50 mcg (2,000 unit) Tab Take 1 tablet by mouth once daily    docusate sodium (COLACE) 100 MG capsule Take 1 capsule (100 mg total) by mouth 2 (two) times daily as needed.    ergocalciferol (VITAMIN D2) 50,000 unit Cap Take 1 capsule (50,000 Units total) by mouth every 30 days.    labetaloL (NORMODYNE) 200 MG tablet Take 1 tablet (200 mg total) by mouth 2 (two) times daily.    loperamide (IMODIUM) 2 mg capsule Take 1-2 capsule by mouth four times daily as needed for 14 days    magnesium oxide (MAG-OX) 400 mg (241.3 mg magnesium) tablet Take 1 tablet (400 mg total) by mouth 2 (two) times daily.    mycophenolate (CELLCEPT) 250 mg Cap Take 4 capsules (1,000 mg total) by mouth 2 (two) times daily.    NIFEdipine (PROCARDIA-XL) 30 MG (OSM) 24 hr tablet Take 1 tablet (30 mg total) by mouth 2 (two) times a day.    olmesartan (BENICAR) 20 MG tablet Take 1 tablet (20 mg total) by mouth once daily.    pantoprazole (PROTONIX) 40 MG tablet Take 1 tablet by mouth once daily    predniSONE (DELTASONE) 5 MG tablet Take 1 tablet (5 mg total) by mouth once daily.    sars-cov-2, covid-19, (MODERNA COVID-19) 100 mcg/0.5 ml injection Inject 0.5 mLs into the  muscle.    sodium bicarbonate 650 MG tablet Take 2 tablets (1,300 mg total) by mouth 3 (three) times daily.    sucralfate (CARAFATE) 1 gram tablet Take 1 tablet (1 g total) by mouth 4 (four) times daily.    tacrolimus (PROGRAF) 1 MG Cap Take 3 capsules (3 mg total) by mouth every morning AND 2 capsules (2 mg total) every evening.     Current Facility-Administered Medications on File Prior to Visit   Medication    acetaminophen tablet 650 mg    diphenhydrAMINE injection 25 mg    EPINEPHrine (EPIPEN) 0.3 mg/0.3 mL pen injection 0.3 mg    methylPREDNISolone sodium succinate injection 40 mg    ondansetron disintegrating tablet 4 mg    sodium chloride 0.9% 500 mL flush bag    sodium chloride 0.9% flush 10 mL       Review of patient's allergies indicates:   Allergen Reactions    Heparin analogues Rash       Past Medical History:   Diagnosis Date    Anxiety     -donor kidney transplant 2021    cPRA 100%, XM negative 3 arteries, 2 on common patch, WIT 40 min    Encounter for blood transfusion     ESRD (end stage renal disease)     Fibroma     H/O kidney transplant     Hypertension     Hypertensive nephropathy     Ovarian cyst     Sleep apnea      OB History    Para Term  AB Living   0 0 0 0 0 0   SAB IAB Ectopic Multiple Live Births   0 0 0 0 0     Past Surgical History:   Procedure Laterality Date    COLPOSCOPY  2020    Needs follow up on or after 21    ESOPHAGOGASTRODUODENOSCOPY N/A 2022    Procedure: ESOPHAGOGASTRODUODENOSCOPY (EGD);  Surgeon: Essie Carvajal MD;  [facility]: [facility];  Service: Endoscopy;  Laterality: N/A;    hemodialysis access left arm      kidney removal       KIDNEY TRANSPLANT  04/15/2015    KIDNEY TRANSPLANT N/A 2021    Procedure: TRANSPLANT, KIDNEY;  Surgeon: Fam Sutton MD;  Location: [address];  Service: Transplant;  Laterality: N/A;    NEPHRECTOMY      OVARIAN CYST REMOVAL      PARATHYROIDECTOMY      partial     PERCUTANEOUS  NEPHROSTOMY Left 2/13/2021    Procedure: Creation, Nephrostomy, Percutaneous;  Surgeon: Elen Surgeon;  Location: [facility];  Service: Anesthesiology;  Laterality: Left;    right leg hemodialysis access      ROBOT-ASSISTED LAPAROSCOPIC SLEEVE GASTRECTOMY USING DA VELMA XI N/A 9/27/2022    Procedure: XI ROBOTIC SLEEVE GASTRECTOMY;  Surgeon: aCl Parekh MD;  [facility];  Service: General;  Laterality: N/A;    transplant nephrectomy  12/01/2017     Family History       Problem Relation (Age of Onset)    Cancer Father    Colon cancer Father    Diabetes Maternal Uncle    Hypertension Father, Maternal Aunt, Other    Kidney disease Maternal Aunt, Other    No Known Problems Mother, Sister, Brother          Tobacco Use    Smoking status: Former     Current packs/day: 0.00     Average packs/day: 0.3 packs/day for 12.6 years (3.1 ttl pk-yrs)     Types: Cigarettes     Start date: 2008     Quit date: 8/2/2020     Years since quitting: 3.0    Smokeless tobacco: Never   Substance and Sexual Activity    Alcohol use: No    Drug use: No    Sexual activity: Yes     Partners: Male     Birth control/protection: None     Review of Systems   Constitutional:  Negative for activity change, appetite change, chills, fatigue, fever and unexpected weight change.   Respiratory:  Negative for shortness of breath.    Cardiovascular:  Negative for chest pain, palpitations and leg swelling.   Gastrointestinal:  Negative for abdominal pain, bloating, blood in stool, constipation, diarrhea, nausea and vomiting.   Genitourinary:  Negative for dysmenorrhea, dyspareunia, dysuria, flank pain, frequency, genital sores, hematuria, menorrhagia, menstrual problem, pelvic pain, urgency, vaginal bleeding, vaginal discharge, vaginal pain, urinary incontinence, postcoital bleeding, vaginal dryness and vaginal odor.   Musculoskeletal:  Negative for back pain.   Neurological:  Negative for syncope and headaches.     Objective:     Vital Signs (Most  Recent):    Vital Signs (24h Range):  [unfilled]        There is no height or weight on file to calculate BMI.  Patient's last menstrual period was 08/11/2023.    Physical Exam:   Constitutional: She is oriented to person, place, and time. She appears well-developed and well-nourished. No distress.    HENT:   Head: Normocephalic and atraumatic.    Eyes: Pupils are equal, round, and reactive to light. EOM are normal.     Cardiovascular:  Normal rate, regular rhythm and normal heart sounds.             Pulmonary/Chest: Effort normal and breath sounds normal.        Abdominal: Soft. Bowel sounds are normal. She exhibits no distension. There is no abdominal tenderness.             Musculoskeletal: Normal range of motion and moves all extremeties. No tenderness or edema.       Neurological: She is alert and oriented to person, place, and time.    Skin: Skin is warm and dry.    Psychiatric: She has a normal mood and affect. Her behavior is normal. Thought content normal.       Laboratory:  I have personallly reviewed all pertinent lab results from the last 24 hours.    Diagnostic Results:  Labs: Reviewed  CT: Reviewed  Pathology reviewed    Assessment/Plan:     There are no hospital problems to display for this patient.    High grade squamous intraepithelial lesion (HGSIL), grade 3 PETRONA, on biopsy of cervix  -     Procedure risks, benefits, and alternatives were discussed.  Pt voiced understanding and expressed consent for [facility].  Hospital forms were reviewed with pt and signed.  Pre-operative instructions were given.    Jourdan Conklin MD  Obstetrics & Gynecology  '[facility]

## 2023-08-18 ENCOUNTER — TELEPHONE (OUTPATIENT)
Dept: PREADMISSION TESTING | Facility: HOSPITAL | Age: 33
End: 2023-08-18
Payer: MEDICARE

## 2023-08-18 ENCOUNTER — TELEPHONE (OUTPATIENT)
Dept: OBSTETRICS AND GYNECOLOGY | Facility: CLINIC | Age: 33
End: 2023-08-18
Payer: MEDICARE

## 2023-08-18 DIAGNOSIS — D06.9 HIGH GRADE SQUAMOUS INTRAEPITHELIAL LESION (HGSIL), GRADE 3 CIN, ON BIOPSY OF CERVIX: Primary | ICD-10-CM

## 2023-08-18 NOTE — TELEPHONE ENCOUNTER
BMP was sent off without being spun. Pt needs to come back in for a redraw per Lab Client Services ext [phone]

## 2023-08-18 NOTE — TELEPHONE ENCOUNTER
Called and spoke with Pt about the following:   Language Line #[id number]    Please arrive to [facility] at 7:00 am on 8/21/23 for your scheduled procedure.  Address: [address] [place] [address] [place] [id number] ([address] Building on left, 1st Floor Lobby)  >>>NO eating or drinking after midnight unless instructed otherwise by your Surgeon<<<

## 2023-08-18 NOTE — TELEPHONE ENCOUNTER
----- Message from Baylee Thomas sent at 8/18/2023  4:54 AM CDT -----  Regarding: Lab Client Services  Contact: [phone]  Good Morning,     My name is Baylee Thomas I work in the Lab Client Services. We had a problem with some lab work on this patient. If someone from your office could call us at [phone] or ext. [phone] that would be great. Anyone in my department can help.      Thank you

## 2023-08-20 ENCOUNTER — ANESTHESIA EVENT (OUTPATIENT)
Dept: SURGERY | Facility: HOSPITAL | Age: 33
End: 2023-08-20
Payer: MEDICARE

## 2023-08-20 NOTE — ANESTHESIA PREPROCEDURE EVALUATION
2023  Leydi Cordero is a 33 y.o., female.    Patient Active Problem List   Diagnosis    Essential hypertension    Acidosis, metabolic    Antibody mediated rejection of kidney transplant    Prophylactic immunotherapy    Immunosuppressive management encounter following kidney transplant    Acute rejection of kidney transplant    S/p nephrectomy 17    Hyperparathyroidism, tertiary    S/P parathyroidectomy--partial    Former light cigarette smoker (1-9 per day)    Nonrheumatic tricuspid valve regurgitation    Pulmonary hypertension    -donor kidney transplant 2021    Thrombosis complicating venous access device    Perinephric hematoma of kidney transplant    At risk for opportunistic infections    Long-term use of immunosuppressant medication    Hypomagnesemia    Kidney replaced by transplant    Urine leak    CKD (chronic kidney disease) stage 2, GFR 60-89 ml/min    Morbid obesity with BMI of 40.0-44.9, adult    Boils of multiple sites    Asthma, well controlled     Pre-op Assessment    I have reviewed the Patient Summary Reports.     I have reviewed the Nursing Notes. I have reviewed the NPO Status.   I have reviewed the Medications.     Review of Systems  Anesthesia Hx:  Denies Family Hx of Anesthesia complications.   Denies Personal Hx of Anesthesia complications.   Social:  Former Smoker    Hematology/Oncology:  Hematology Normal   Oncology Normal     EENT/Dental:EENT/Dental Normal   Cardiovascular:   Hypertension ECG has been reviewed.    Pulmonary:   Asthma Sleep Apnea    Renal/:   Chronic Renal Disease Imunosupressed with well functioning renal transplant   Hepatic/GI:  Hepatic/GI Normal Two recent ED visits for severe abdominal pain with negative workup   No abdominal pain today.  However patient complains of diarrhea for 4-6 weeks.  She feels  well otherwise.   Musculoskeletal:  Musculoskeletal Normal    OB/GYN/PEDS:  Cervical HGSIL   Neurological:  Neurology Normal    Endocrine:  Endocrine Normal    Dermatological:  Skin Normal    Psych:  Psychiatric Normal           Physical Exam  General: Alert and Oriented    Airway:  Mallampati: II   Mouth Opening: Normal  TM Distance: Normal  Tongue: Normal  Neck ROM: Normal ROM        Anesthesia Plan  Type of Anesthesia, risks & benefits discussed:    Anesthesia Type: Gen ETT, Gen Supraglottic Airway, MAC  Intra-op Monitoring Plan: Standard ASA Monitors  Induction:  IV  Informed Consent: Informed consent signed with the Patient and all parties understand the risks and agree with anesthesia plan.  All questions answered.   ASA Score: 3  Day of Surgery Review of History & Physical: H&P Update referred to the surgeon/provider.  Anesthesia Plan Notes: Mild hypotension probably due to dehydration discussed with Dr Conklin who agrees it is reasonable to proceed.  We will bolus patient with crystalloids in holding    Ready For Surgery From Anesthesia Perspective.     .

## 2023-08-21 ENCOUNTER — ANESTHESIA (OUTPATIENT)
Dept: SURGERY | Facility: HOSPITAL | Age: 33
End: 2023-08-21
Payer: MEDICARE

## 2023-08-21 ENCOUNTER — HOSPITAL ENCOUNTER (OUTPATIENT)
Facility: HOSPITAL | Age: 33
Discharge: HOME | End: 2023-08-21
Attending: OBSTETRICS & GYNECOLOGY | Admitting: OBSTETRICS & GYNECOLOGY
Payer: MEDICARE

## 2023-08-21 VITALS
DIASTOLIC BLOOD PRESSURE: 57 MMHG | WEIGHT: 173.94 LBS | SYSTOLIC BLOOD PRESSURE: 94 MMHG | HEIGHT: 66 IN | TEMPERATURE: 98 F | BODY MASS INDEX: 27.95 KG/M2 | OXYGEN SATURATION: 93 % | HEART RATE: 92 BPM | RESPIRATION RATE: 15 BRPM

## 2023-08-21 DIAGNOSIS — Z98.890 STATUS POST CONE BIOPSY OF CERVIX: Primary | ICD-10-CM

## 2023-08-21 DIAGNOSIS — D06.9 HIGH GRADE SQUAMOUS INTRAEPITHELIAL LESION (HGSIL), GRADE 3 CIN, ON BIOPSY OF CERVIX: ICD-10-CM

## 2023-08-21 DIAGNOSIS — Z01.818 PREOP TESTING: ICD-10-CM

## 2023-08-21 LAB
B-HCG UR QL: NEGATIVE
CTP QC/QA: YES

## 2023-08-21 PROCEDURE — 71000033 HC RECOVERY, INTIAL HOUR: Performed by: OBSTETRICS & GYNECOLOGY

## 2023-08-21 PROCEDURE — 57520 CONIZATION OF CERVIX: CPT | Mod: ,,, | Performed by: OBSTETRICS & GYNECOLOGY

## 2023-08-21 PROCEDURE — 63600175 PHARM REV CODE 636 W HCPCS

## 2023-08-21 PROCEDURE — 57520 PR CONIZATION CERVIX,KNIFE/LASER: ICD-10-PCS | Mod: ,,, | Performed by: OBSTETRICS & GYNECOLOGY

## 2023-08-21 PROCEDURE — 63600175 PHARM REV CODE 636 W HCPCS: Performed by: OBSTETRICS & GYNECOLOGY

## 2023-08-21 PROCEDURE — 27201423 OPTIME MED/SURG SUP & DEVICES STERILE SUPPLY: Performed by: OBSTETRICS & GYNECOLOGY

## 2023-08-21 PROCEDURE — 63600175 PHARM REV CODE 636 W HCPCS: Performed by: ANESTHESIOLOGY

## 2023-08-21 PROCEDURE — 25000003 PHARM REV CODE 250: Performed by: OBSTETRICS & GYNECOLOGY

## 2023-08-21 PROCEDURE — 88307 TISSUE EXAM BY PATHOLOGIST: CPT | Performed by: PATHOLOGY

## 2023-08-21 PROCEDURE — 25000003 PHARM REV CODE 250

## 2023-08-21 PROCEDURE — 71000015 HC POSTOP RECOV 1ST HR: Performed by: OBSTETRICS & GYNECOLOGY

## 2023-08-21 PROCEDURE — 88307 TISSUE EXAM BY PATHOLOGIST: CPT | Mod: 26,,, | Performed by: PATHOLOGY

## 2023-08-21 PROCEDURE — 36000707: Performed by: OBSTETRICS & GYNECOLOGY

## 2023-08-21 PROCEDURE — 88307 PR  SURG PATH,LEVEL V: ICD-10-PCS | Mod: 26,,, | Performed by: PATHOLOGY

## 2023-08-21 PROCEDURE — 36000706: Performed by: OBSTETRICS & GYNECOLOGY

## 2023-08-21 PROCEDURE — 37000009 HC ANESTHESIA EA ADD 15 MINS: Performed by: OBSTETRICS & GYNECOLOGY

## 2023-08-21 PROCEDURE — 81025 URINE PREGNANCY TEST: CPT | Performed by: OBSTETRICS & GYNECOLOGY

## 2023-08-21 PROCEDURE — 37000008 HC ANESTHESIA 1ST 15 MINUTES: Performed by: OBSTETRICS & GYNECOLOGY

## 2023-08-21 RX ORDER — HYDROMORPHONE HYDROCHLORIDE 2 MG/ML
1 INJECTION, SOLUTION INTRAMUSCULAR; INTRAVENOUS; SUBCUTANEOUS EVERY 6 HOURS PRN
Status: CANCELLED | OUTPATIENT
Start: 2023-08-21

## 2023-08-21 RX ORDER — MEPERIDINE HYDROCHLORIDE 25 MG/ML
12.5 INJECTION INTRAMUSCULAR; INTRAVENOUS; SUBCUTANEOUS ONCE
Status: DISCONTINUED | OUTPATIENT
Start: 2023-08-21 | End: 2023-08-21 | Stop reason: HOSPADM

## 2023-08-21 RX ORDER — MIDAZOLAM HYDROCHLORIDE 1 MG/ML
INJECTION INTRAMUSCULAR; INTRAVENOUS
Status: DISCONTINUED | OUTPATIENT
Start: 2023-08-21 | End: 2023-08-21

## 2023-08-21 RX ORDER — PROPOFOL 10 MG/ML
VIAL (ML) INTRAVENOUS CONTINUOUS PRN
Status: DISCONTINUED | OUTPATIENT
Start: 2023-08-21 | End: 2023-08-21

## 2023-08-21 RX ORDER — DIPHENHYDRAMINE HCL 25 MG
25 CAPSULE ORAL EVERY 4 HOURS PRN
Status: CANCELLED | OUTPATIENT
Start: 2023-08-21

## 2023-08-21 RX ORDER — PHENYLEPHRINE HYDROCHLORIDE 10 MG/ML
INJECTION INTRAVENOUS
Status: DISCONTINUED | OUTPATIENT
Start: 2023-08-21 | End: 2023-08-21

## 2023-08-21 RX ORDER — SODIUM CHLORIDE 0.9 % (FLUSH) 0.9 %
3 SYRINGE (ML) INJECTION
Status: DISCONTINUED | OUTPATIENT
Start: 2023-08-21 | End: 2023-08-21 | Stop reason: HOSPADM

## 2023-08-21 RX ORDER — PROCHLORPERAZINE EDISYLATE 5 MG/ML
5 INJECTION INTRAMUSCULAR; INTRAVENOUS EVERY 30 MIN PRN
Status: DISCONTINUED | OUTPATIENT
Start: 2023-08-21 | End: 2023-08-21 | Stop reason: HOSPADM

## 2023-08-21 RX ORDER — HYDROMORPHONE HYDROCHLORIDE 2 MG/ML
0.2 INJECTION, SOLUTION INTRAMUSCULAR; INTRAVENOUS; SUBCUTANEOUS EVERY 5 MIN PRN
Status: DISCONTINUED | OUTPATIENT
Start: 2023-08-21 | End: 2023-08-21 | Stop reason: HOSPADM

## 2023-08-21 RX ORDER — LIDOCAINE HYDROCHLORIDE 10 MG/ML
INJECTION, SOLUTION EPIDURAL; INFILTRATION; INTRACAUDAL; PERINEURAL
Status: DISCONTINUED | OUTPATIENT
Start: 2023-08-21 | End: 2023-08-21

## 2023-08-21 RX ORDER — DIPHENHYDRAMINE HYDROCHLORIDE 50 MG/ML
25 INJECTION INTRAMUSCULAR; INTRAVENOUS EVERY 6 HOURS PRN
Status: DISCONTINUED | OUTPATIENT
Start: 2023-08-21 | End: 2023-08-21 | Stop reason: HOSPADM

## 2023-08-21 RX ORDER — HYDROCODONE BITARTRATE AND ACETAMINOPHEN 10; 325 MG/1; MG/1
1 TABLET ORAL EVERY 4 HOURS PRN
Status: CANCELLED | OUTPATIENT
Start: 2023-08-21

## 2023-08-21 RX ORDER — PROPOFOL 10 MG/ML
VIAL (ML) INTRAVENOUS
Status: DISCONTINUED | OUTPATIENT
Start: 2023-08-21 | End: 2023-08-21

## 2023-08-21 RX ORDER — FENTANYL CITRATE 50 UG/ML
INJECTION, SOLUTION INTRAMUSCULAR; INTRAVENOUS
Status: DISCONTINUED | OUTPATIENT
Start: 2023-08-21 | End: 2023-08-21

## 2023-08-21 RX ORDER — HYDROCODONE BITARTRATE AND ACETAMINOPHEN 5; 325 MG/1; MG/1
1 TABLET ORAL EVERY 4 HOURS PRN
Qty: 10 TABLET | Refills: 0 | Status: ON HOLD | OUTPATIENT
Start: 2023-08-21 | End: 2023-09-23 | Stop reason: HOSPADM

## 2023-08-21 RX ORDER — DIPHENHYDRAMINE HYDROCHLORIDE 50 MG/ML
25 INJECTION INTRAMUSCULAR; INTRAVENOUS EVERY 4 HOURS PRN
Status: CANCELLED | OUTPATIENT
Start: 2023-08-21

## 2023-08-21 RX ORDER — ALBUTEROL SULFATE 0.83 MG/ML
2.5 SOLUTION RESPIRATORY (INHALATION) EVERY 4 HOURS PRN
Status: DISCONTINUED | OUTPATIENT
Start: 2023-08-21 | End: 2023-08-21 | Stop reason: HOSPADM

## 2023-08-21 RX ORDER — PROCHLORPERAZINE EDISYLATE 5 MG/ML
5 INJECTION INTRAMUSCULAR; INTRAVENOUS EVERY 6 HOURS PRN
Status: DISCONTINUED | OUTPATIENT
Start: 2023-08-21 | End: 2023-08-21 | Stop reason: HOSPADM

## 2023-08-21 RX ORDER — SODIUM CHLORIDE 9 MG/ML
INJECTION, SOLUTION INTRAVENOUS CONTINUOUS
Status: DISCONTINUED | OUTPATIENT
Start: 2023-08-21 | End: 2023-08-21 | Stop reason: HOSPADM

## 2023-08-21 RX ORDER — FAMOTIDINE 20 MG/1
20 TABLET, FILM COATED ORAL
Status: COMPLETED | OUTPATIENT
Start: 2023-08-21 | End: 2023-08-21

## 2023-08-21 RX ORDER — ONDANSETRON 2 MG/ML
4 INJECTION INTRAMUSCULAR; INTRAVENOUS DAILY PRN
Status: DISCONTINUED | OUTPATIENT
Start: 2023-08-21 | End: 2023-08-21 | Stop reason: HOSPADM

## 2023-08-21 RX ORDER — ONDANSETRON 8 MG/1
8 TABLET, ORALLY DISINTEGRATING ORAL EVERY 8 HOURS PRN
Status: DISCONTINUED | OUTPATIENT
Start: 2023-08-21 | End: 2023-08-21 | Stop reason: HOSPADM

## 2023-08-21 RX ORDER — VASOPRESSIN 20 [USP'U]/ML
INJECTION, SOLUTION INTRAMUSCULAR; SUBCUTANEOUS
Status: DISCONTINUED | OUTPATIENT
Start: 2023-08-21 | End: 2023-08-21 | Stop reason: HOSPADM

## 2023-08-21 RX ORDER — ONDANSETRON 2 MG/ML
INJECTION INTRAMUSCULAR; INTRAVENOUS
Status: DISCONTINUED | OUTPATIENT
Start: 2023-08-21 | End: 2023-08-21

## 2023-08-21 RX ORDER — MUPIROCIN 20 MG/G
OINTMENT TOPICAL
Status: DISCONTINUED | OUTPATIENT
Start: 2023-08-21 | End: 2023-08-21 | Stop reason: HOSPADM

## 2023-08-21 RX ORDER — CARBOXYMETHYLCELLULOSE SODIUM 10 MG/ML
GEL OPHTHALMIC
Status: DISCONTINUED | OUTPATIENT
Start: 2023-08-21 | End: 2023-08-21

## 2023-08-21 RX ORDER — OXYCODONE HYDROCHLORIDE 5 MG/1
5 TABLET ORAL
Status: DISCONTINUED | OUTPATIENT
Start: 2023-08-21 | End: 2023-08-21 | Stop reason: HOSPADM

## 2023-08-21 RX ORDER — HYDROCODONE BITARTRATE AND ACETAMINOPHEN 5; 325 MG/1; MG/1
1 TABLET ORAL EVERY 4 HOURS PRN
Status: CANCELLED | OUTPATIENT
Start: 2023-08-21

## 2023-08-21 RX ORDER — KETAMINE HCL IN 0.9 % NACL 50 MG/5 ML
SYRINGE (ML) INTRAVENOUS
Status: DISCONTINUED | OUTPATIENT
Start: 2023-08-21 | End: 2023-08-21

## 2023-08-21 RX ADMIN — FENTANYL CITRATE 50 MCG: 50 INJECTION, SOLUTION INTRAMUSCULAR; INTRAVENOUS at 09:08

## 2023-08-21 RX ADMIN — HYDROMORPHONE HYDROCHLORIDE 0.2 MG: 2 INJECTION INTRAMUSCULAR; INTRAVENOUS; SUBCUTANEOUS at 10:08

## 2023-08-21 RX ADMIN — FENTANYL CITRATE 25 MCG: 50 INJECTION, SOLUTION INTRAMUSCULAR; INTRAVENOUS at 09:08

## 2023-08-21 RX ADMIN — ONDANSETRON 4 MG: 2 INJECTION INTRAMUSCULAR; INTRAVENOUS at 09:08

## 2023-08-21 RX ADMIN — PROPOFOL 50 MCG/KG/MIN: 10 INJECTION, EMULSION INTRAVENOUS at 09:08

## 2023-08-21 RX ADMIN — Medication 20 MG: at 09:08

## 2023-08-21 RX ADMIN — LIDOCAINE HYDROCHLORIDE 50 MG: 10 SOLUTION INTRAVENOUS at 09:08

## 2023-08-21 RX ADMIN — CARBOXYMETHYLCELLULOSE SODIUM 2 EACH: 10 GEL OPHTHALMIC at 09:08

## 2023-08-21 RX ADMIN — PROPOFOL 20 MG: 10 INJECTION, EMULSION INTRAVENOUS at 09:08

## 2023-08-21 RX ADMIN — PROPOFOL 30 MG: 10 INJECTION, EMULSION INTRAVENOUS at 09:08

## 2023-08-21 RX ADMIN — Medication 10 MG: at 09:08

## 2023-08-21 RX ADMIN — GLYCOPYRROLATE 0.2 MG: 0.2 INJECTION, SOLUTION INTRAMUSCULAR; INTRAVENOUS at 09:08

## 2023-08-21 RX ADMIN — MIDAZOLAM HYDROCHLORIDE 2 MG: 1 INJECTION, SOLUTION INTRAMUSCULAR; INTRAVENOUS at 09:08

## 2023-08-21 RX ADMIN — PHENYLEPHRINE HYDROCHLORIDE 100 MCG: 10 INJECTION INTRAVENOUS at 09:08

## 2023-08-21 RX ADMIN — SODIUM CHLORIDE, POTASSIUM CHLORIDE, SODIUM LACTATE AND CALCIUM CHLORIDE: 600; 310; 30; 20 INJECTION, SOLUTION INTRAVENOUS at 09:08

## 2023-08-21 RX ADMIN — FAMOTIDINE 20 MG: 20 TABLET, FILM COATED ORAL at 08:08

## 2023-08-21 NOTE — TRANSFER OF CARE
"Anesthesia Transfer of Care Note    Patient: Leydi Cordero    Procedure(s) Performed: Procedure(s) (LRB):  CONE BIOPSY, CERVIX, USING COLD KNIFE (N/A)    Patient location: PACU    Anesthesia Type: MAC    Transport from OR: Transported from OR on room air with adequate spontaneous ventilation    Post pain: adequate analgesia    Post assessment: no apparent anesthetic complications    Post vital signs: stable    Level of consciousness: awake    Nausea/Vomiting: no nausea/vomiting    Complications: none    Transfer of care protocol was followed      Last vitals:   Visit Vitals  BP (!) 86/53   Pulse 99   Temp 36.4 °C (97.5 °F) (Temporal)   Resp 16   Ht 5' 6" (1.676 m)   Wt 78.9 kg (173 lb 15.1 oz)   LMP 08/11/2023   SpO2 100%   Breastfeeding No   BMI 28.08 kg/m²     "

## 2023-08-21 NOTE — DISCHARGE SUMMARY
Discharge Note  Short Stay      SUMMARY     Admit Date: 8/21/2023    Attending Physician: Jourdan Conklin MD     Discharge Physician: Jourdan Conklin MD    Discharge Date: 8/21/2023 9:48 AM    Final Diagnosis:   1. Status post cone biopsy of cervix    2. Preop testing    3. High grade squamous intraepithelial lesion (HGSIL), grade 3 PETRONA, on biopsy of cervix        Disposition: Home or Self Care    Condition:  Stable    Hospital Course:  Pt was admitted for scheduled surgery.  CKC was performed without complications.  Post-operative course was unremarkable and pt recovered well.  Pt was discharged upon meeting all discharge criteria.  Pt was counseled on post-operative care and warning sign instructions prior to her discharge.    Patient Instructions:   Current Discharge Medication List        START taking these medications    Details   HYDROcodone-acetaminophen (NORCO) 5-325 mg per tablet Take 1 tablet by mouth every 4 (four) hours as needed for Pain.  Qty: 10 tablet, Refills: 0    Comments: Quantity prescribed more than 7 day supply? No  Associated Diagnoses: Status post cone biopsy of cervix           CONTINUE these medications which have NOT CHANGED    Details   albuterol (PROVENTIL/VENTOLIN HFA) 90 mcg/actuation inhaler Inhale 2 puffs into the lungs every 4 (four) hours as needed for Wheezing or Shortness of Breath. Rescue  Qty: 8.5 g, Refills: 11    Associated Diagnoses: Asthma, mild intermittent, well-controlled      cholecalciferol, vitamin D3, (VITAMIN D3) 50 mcg (2,000 unit) Tab Take 1 tablet by mouth once daily  Qty: 30 tablet, Refills: 0    Comments: OTC      labetaloL (NORMODYNE) 200 MG tablet Take 1 tablet (200 mg total) by mouth 2 (two) times daily.  Qty: 60 tablet, Refills: 1    Comments: .      magnesium oxide (MAG-OX) 400 mg (241.3 mg magnesium) tablet Take 1 tablet (400 mg total) by mouth 2 (two) times daily.  Qty: 60 tablet, Refills: 11      mycophenolate (CELLCEPT) 250 mg Cap Take 4 capsules  (1,000 mg total) by mouth 2 (two) times daily.  Qty: 240 capsule, Refills: 11    Comments: Txp Date:2021 (Kidney) Disch Date:2021 ICD10:Z94.0 Txp Location:[facility]  Associated Diagnoses: -donor kidney transplant      olmesartan (BENICAR) 20 MG tablet Take 1 tablet (20 mg total) by mouth once daily.  Qty: 30 tablet, Refills: 11    Comments: .  Associated Diagnoses: Kidney replaced by transplant; Renal hypertension      pantoprazole (PROTONIX) 40 MG tablet Take 1 tablet by mouth once daily  Qty: 30 tablet, Refills: 0      predniSONE (DELTASONE) 5 MG tablet Take 1 tablet (5 mg total) by mouth once daily.  Qty: 120 tablet, Refills: 11    Comments: Txp Date:2021 (Kidney) Disch Date:2021 ICD10:Z94.0 Txp Location:[facility]      sodium bicarbonate 650 MG tablet Take 2 tablets (1,300 mg total) by mouth 3 (three) times daily.  Qty: 180 tablet, Refills: 11      sucralfate (CARAFATE) 1 gram tablet Take 1 tablet (1 g total) by mouth 4 (four) times daily.  Qty: 30 tablet, Refills: 0      tacrolimus (PROGRAF) 1 MG Cap Take 3 capsules (3 mg total) by mouth every morning AND 2 capsules (2 mg total) every evening.  Qty: 150 capsule, Refills: 11    Comments: Txp Date:2021 (Kidney) Disch Date:2021 ICD10:Z94.0 Txp Location:[facility]  Associated Diagnoses: Kidney replaced by transplant      calcitRIOL (ROCALTROL) 0.25 MCG Cap Take 1 capsule (0.25 mcg total) by mouth once daily.  Qty: 30 capsule, Refills: 3    Associated Diagnoses: -donor kidney transplant; Hyperparathyroidism, tertiary      docusate sodium (COLACE) 100 MG capsule Take 1 capsule (100 mg total) by mouth 2 (two) times daily as needed.  Qty: 20 capsule, Refills: 0      ergocalciferol (VITAMIN D2) 50,000 unit Cap Take 1 capsule (50,000 Units total) by mouth every 30 days.  Qty: 3 capsule, Refills: 0      loperamide (IMODIUM) 2 mg capsule Take 1-2 capsule by mouth four times daily as needed for 14 days  Qty: 112 capsule, Refills: 0      NIFEdipine  (PROCARDIA-XL) 30 MG (OSM) 24 hr tablet Take 1 tablet (30 mg total) by mouth 2 (two) times a day.  Qty: 60 tablet, Refills: 11    Comments: .      sars-cov-2, covid-19, (MODERNA COVID-19) 100 mcg/0.5 ml injection Inject 0.5 mLs into the muscle.  Qty: 0.5 mL, Refills: 0             Discharge Procedure Orders (must include Diet, Follow-up, Activity)   Discharge Procedure Orders (must include Diet, Follow-up, Activity)   Diet general     Pelvic Rest   Order Comments: X 4 weeks     Call MD for:  extreme fatigue     Call MD for:  persistent dizziness or light-headedness     Call MD for:  hives     Call MD for:  redness, tenderness, or signs of infection (pain, swelling, redness, odor or green/yellow discharge around incision site)     Call MD for:  difficulty breathing, headache or visual disturbances     Call MD for:  severe uncontrolled pain     Call MD for:  persistent nausea and vomiting     Call MD for:  temperature >100.4     No dressing needed     EKG 12-lead   Standing Status: Future Number of Occurrences: 1 Standing Exp. Date: 08/01/24         Follow-up Information       Jourdan Conklin MD Follow up in 4 week(s).    Specialty: Obstetrics and Gynecology  Why: Post-operative visit  Contact information:  [id number]  [place] [address] [phone]

## 2023-08-21 NOTE — ANESTHESIA POSTPROCEDURE EVALUATION
Anesthesia Post Evaluation    Patient: Leydi Cordero    Procedure(s) Performed: Procedure(s) (LRB):  CONE BIOPSY, CERVIX, USING COLD KNIFE (N/A)    Final Anesthesia Type: MAC      Patient location during evaluation: PACU  Patient participation: Yes- Able to Participate  Level of consciousness: awake  Post-procedure vital signs: reviewed and stable  Pain management: adequate  Airway patency: patent    PONV status at discharge: No PONV  Anesthetic complications: no      Cardiovascular status: hemodynamically stable  Respiratory status: unassisted  Hydration status: euvolemic  Follow-up not needed.          Vitals Value Taken Time   BP 98/60 08/21/23 1021   Temp 36.4 °C (97.6 °F) 08/21/23 0950   Pulse 93 08/21/23 1021   Resp 17 08/21/23 1021   SpO2 95 % 08/21/23 1021   Vitals shown include unvalidated device data.      Event Time   Out of Recovery 10:23:43         Pain/Dilcia Score: Pain Rating Prior to Med Admin: 6 (8/21/2023 10:15 AM)  Dilcia Score: 9 (8/21/2023 10:15 AM)

## 2023-08-21 NOTE — OP NOTE
DATE:  08/21/2023    PREOP DIAGNOSIS:   High Grade Cervix Dysplasia    POSTOP DIAGNOSIS:  same    PROCEDURE:  [facility]    SURGEON:  Jourdan Conklin MD    ASSISTANT:  None    ANESTHESIA:  GETA    COMPLICATIONS:  None    EBL:  5 mL     SPECIMEN:  Cone biopsy    IMPLANTS:  None     FINDINGS:  No vulvovaginal lesions; small uterus with good descent; no adnexal masses on BME; small area of light staining after Lugol's covering anterior cervix    PROCEDURE:  Patient was taken to the operating room where general anesthesia was administered and found to be adequate.  She was prepped and draped in normal sterile fashion.  A weighted sterile speculum was placed.  The anterior lip of the cervix was grasped with a single tooth tenaculum.  Lugol's solution was applied and a small area of light staining was noted covering anterior.  'Stay' sutures of 0-Chromic were placed at 3 and 9 o'clock positions.  Dilute vaospressin/saline (20 units in 50 mL ratio) was injected in a pericervical fashion, avoiding the 3 and 9 o'clock positions. A total of 5 mL of this solution was used.  An 11 blade knife was used to outline and resect a cone shaped biopsy of cervix. Specimen was tagged at 12 o'clock position with silk suture.  Endocervical curettage was then performed and specimen placed on a telfa pad.  Base of the cervix was cauterized with the bovie until hemostasis was noted.  Surgicel was then place into the base to reinforce hemostasis.  All instruments were removed from the vagina.  Sponge, lap, needle and instrument count was correct x 2.  Patient was taken to the recovery room in stable condition.

## 2023-08-25 LAB
FINAL PATHOLOGIC DIAGNOSIS: NORMAL
GROSS: NORMAL
Lab: NORMAL

## 2023-08-30 ENCOUNTER — LAB VISIT (OUTPATIENT)
Dept: LAB | Facility: HOSPITAL | Age: 33
End: 2023-08-30
Attending: INTERNAL MEDICINE
Payer: MEDICARE

## 2023-08-30 DIAGNOSIS — Z94.0 KIDNEY TRANSPLANT STATUS: ICD-10-CM

## 2023-08-30 DIAGNOSIS — N25.81 SECONDARY HYPERPARATHYROIDISM OF RENAL ORIGIN: ICD-10-CM

## 2023-08-30 DIAGNOSIS — D84.9 IMMUNOSUPPRESSION: ICD-10-CM

## 2023-08-30 DIAGNOSIS — I10 PRIMARY HYPERTENSION: ICD-10-CM

## 2023-08-30 LAB
25(OH)D3+25(OH)D2 SERPL-MCNC: 34 NG/ML (ref 30–96)
ALBUMIN SERPL BCP-MCNC: 2.9 G/DL (ref 3.5–5.2)
ANION GAP SERPL CALC-SCNC: 11 MMOL/L (ref 8–16)
BUN SERPL-MCNC: 16 MG/DL (ref 6–20)
CALCIUM SERPL-MCNC: 9.2 MG/DL (ref 8.7–10.5)
CHLORIDE SERPL-SCNC: 108 MMOL/L (ref 95–110)
CO2 SERPL-SCNC: 21 MMOL/L (ref 23–29)
CREAT SERPL-MCNC: 1.1 MG/DL (ref 0.5–1.4)
EST. GFR  (NO RACE VARIABLE): >60 ML/MIN/1.73 M^2
GLUCOSE SERPL-MCNC: 75 MG/DL (ref 70–110)
MAGNESIUM SERPL-MCNC: 1.7 MG/DL (ref 1.6–2.6)
PHOSPHATE SERPL-MCNC: 3.4 MG/DL (ref 2.7–4.5)
POTASSIUM SERPL-SCNC: 4.4 MMOL/L (ref 3.5–5.1)
PTH-INTACT SERPL-MCNC: 24.9 PG/ML (ref 9–77)
SODIUM SERPL-SCNC: 140 MMOL/L (ref 136–145)

## 2023-08-30 PROCEDURE — 36415 COLL VENOUS BLD VENIPUNCTURE: CPT | Performed by: INTERNAL MEDICINE

## 2023-08-30 PROCEDURE — 82306 VITAMIN D 25 HYDROXY: CPT | Performed by: INTERNAL MEDICINE

## 2023-08-30 PROCEDURE — 83970 ASSAY OF PARATHORMONE: CPT | Performed by: INTERNAL MEDICINE

## 2023-08-30 PROCEDURE — 83735 ASSAY OF MAGNESIUM: CPT | Performed by: INTERNAL MEDICINE

## 2023-08-30 PROCEDURE — 80197 ASSAY OF TACROLIMUS: CPT | Performed by: INTERNAL MEDICINE

## 2023-08-30 PROCEDURE — 80069 RENAL FUNCTION PANEL: CPT | Performed by: INTERNAL MEDICINE

## 2023-08-31 LAB — TACROLIMUS BLD-MCNC: 8 NG/ML (ref 5–15)

## 2023-09-06 ENCOUNTER — OFFICE VISIT (OUTPATIENT)
Dept: NEPHROLOGY | Facility: CLINIC | Age: 33
End: 2023-09-06
Payer: MEDICARE

## 2023-09-06 VITALS
DIASTOLIC BLOOD PRESSURE: 54 MMHG | BODY MASS INDEX: 28.42 KG/M2 | WEIGHT: 176.81 LBS | SYSTOLIC BLOOD PRESSURE: 88 MMHG | HEIGHT: 66 IN | HEART RATE: 90 BPM

## 2023-09-06 DIAGNOSIS — I10 PRIMARY HYPERTENSION: ICD-10-CM

## 2023-09-06 DIAGNOSIS — D84.9 IMMUNOSUPPRESSION: ICD-10-CM

## 2023-09-06 DIAGNOSIS — Z94.0 KIDNEY TRANSPLANT STATUS: Primary | ICD-10-CM

## 2023-09-06 DIAGNOSIS — I95.0 IDIOPATHIC HYPOTENSION: ICD-10-CM

## 2023-09-06 PROCEDURE — 3066F PR DOCUMENTATION OF TREATMENT FOR NEPHROPATHY: ICD-10-PCS | Mod: CPTII,S$GLB,, | Performed by: INTERNAL MEDICINE

## 2023-09-06 PROCEDURE — 4010F PR ACE/ARB THEARPY RXD/TAKEN: ICD-10-PCS | Mod: CPTII,S$GLB,, | Performed by: INTERNAL MEDICINE

## 2023-09-06 PROCEDURE — 1159F PR MEDICATION LIST DOCUMENTED IN MEDICAL RECORD: ICD-10-PCS | Mod: CPTII,S$GLB,, | Performed by: INTERNAL MEDICINE

## 2023-09-06 PROCEDURE — 3066F NEPHROPATHY DOC TX: CPT | Mod: CPTII,S$GLB,, | Performed by: INTERNAL MEDICINE

## 2023-09-06 PROCEDURE — 3044F PR MOST RECENT HEMOGLOBIN A1C LEVEL <7.0%: ICD-10-PCS | Mod: CPTII,S$GLB,, | Performed by: INTERNAL MEDICINE

## 2023-09-06 PROCEDURE — 3078F PR MOST RECENT DIASTOLIC BLOOD PRESSURE < 80 MM HG: ICD-10-PCS | Mod: CPTII,S$GLB,, | Performed by: INTERNAL MEDICINE

## 2023-09-06 PROCEDURE — 99215 OFFICE O/P EST HI 40 MIN: CPT | Mod: S$GLB,,, | Performed by: INTERNAL MEDICINE

## 2023-09-06 PROCEDURE — 3044F HG A1C LEVEL LT 7.0%: CPT | Mod: CPTII,S$GLB,, | Performed by: INTERNAL MEDICINE

## 2023-09-06 PROCEDURE — 3074F PR MOST RECENT SYSTOLIC BLOOD PRESSURE < 130 MM HG: ICD-10-PCS | Mod: CPTII,S$GLB,, | Performed by: INTERNAL MEDICINE

## 2023-09-06 PROCEDURE — 4010F ACE/ARB THERAPY RXD/TAKEN: CPT | Mod: CPTII,S$GLB,, | Performed by: INTERNAL MEDICINE

## 2023-09-06 PROCEDURE — 3008F BODY MASS INDEX DOCD: CPT | Mod: CPTII,S$GLB,, | Performed by: INTERNAL MEDICINE

## 2023-09-06 PROCEDURE — 99215 PR OFFICE/OUTPT VISIT, EST, LEVL V, 40-54 MIN: ICD-10-PCS | Mod: S$GLB,,, | Performed by: INTERNAL MEDICINE

## 2023-09-06 PROCEDURE — 3078F DIAST BP <80 MM HG: CPT | Mod: CPTII,S$GLB,, | Performed by: INTERNAL MEDICINE

## 2023-09-06 PROCEDURE — 1160F RVW MEDS BY RX/DR IN RCRD: CPT | Mod: CPTII,S$GLB,, | Performed by: INTERNAL MEDICINE

## 2023-09-06 PROCEDURE — 99999 PR PBB SHADOW E&M-EST. PATIENT-LVL IV: ICD-10-PCS | Mod: PBBFAC,,, | Performed by: INTERNAL MEDICINE

## 2023-09-06 PROCEDURE — 3008F PR BODY MASS INDEX (BMI) DOCUMENTED: ICD-10-PCS | Mod: CPTII,S$GLB,, | Performed by: INTERNAL MEDICINE

## 2023-09-06 PROCEDURE — 99999 PR PBB SHADOW E&M-EST. PATIENT-LVL IV: CPT | Mod: PBBFAC,,, | Performed by: INTERNAL MEDICINE

## 2023-09-06 PROCEDURE — 1159F MED LIST DOCD IN RCRD: CPT | Mod: CPTII,S$GLB,, | Performed by: INTERNAL MEDICINE

## 2023-09-06 PROCEDURE — 1160F PR REVIEW ALL MEDS BY PRESCRIBER/CLIN PHARMACIST DOCUMENTED: ICD-10-PCS | Mod: CPTII,S$GLB,, | Performed by: INTERNAL MEDICINE

## 2023-09-06 PROCEDURE — 3074F SYST BP LT 130 MM HG: CPT | Mod: CPTII,S$GLB,, | Performed by: INTERNAL MEDICINE

## 2023-09-06 RX ORDER — PANTOPRAZOLE SODIUM 40 MG/1
TABLET, DELAYED RELEASE ORAL
Qty: 90 TABLET | Refills: 3 | Status: SHIPPED | OUTPATIENT
Start: 2023-09-06

## 2023-09-06 RX ORDER — MIDODRINE HYDROCHLORIDE 5 MG/1
5 TABLET ORAL 2 TIMES DAILY WITH MEALS
Qty: 180 TABLET | Refills: 3 | Status: ON HOLD | OUTPATIENT
Start: 2023-09-06 | End: 2023-09-23 | Stop reason: SDUPTHER

## 2023-09-06 NOTE — PROGRESS NOTES
"Subjective:       Patient ID: Leydi Cordero is a 33 y.o. female.    Chief Complaint: Kidney Transplant    HPI    Since her last office visit she reports having stomach difficulty for about a month.  Every time she would eat she ended up having to run to the bathroom.  As result she has lost about 20 lb since her last office visit.  Additionally she reports that her blood pressures are continuing to run very low.  She finds herself getting dizzy if she stands up too quickly.  She stop taking labetalol several weeks ago her blood pressures continue to run low.  Her laboratory studies medications were reviewed.  All Nephrology related questions were answered to her satisfaction.    Review of Systems   Constitutional: Negative.    HENT: Negative.     Respiratory: Negative.     Cardiovascular: Negative.    Gastrointestinal: Negative.    Genitourinary: Negative.    Musculoskeletal: Negative.    Skin: Negative.    Neurological:  Positive for dizziness.       BP (!) 88/54   Pulse 90   Ht 5' 6" (1.676 m)   Wt 80.2 kg (176 lb 12.9 oz)   BMI 28.54 kg/m²     Lab Results   Component Value Date    WBC 5.28 08/17/2023    HGB 12.7 08/17/2023    HCT 39.8 08/17/2023    MCV 89 08/17/2023     08/17/2023      BMP  Lab Results   Component Value Date     08/30/2023    K 4.4 08/30/2023     08/30/2023    CO2 21 (L) 08/30/2023    BUN 16 08/30/2023    CREATININE 1.1 08/30/2023    CALCIUM 9.2 08/30/2023    ANIONGAP 11 08/30/2023    ESTGFRAFRICA >60.0 07/11/2022    EGFRNONAA >60.0 07/11/2022     CMP  Sodium   Date Value Ref Range Status   08/30/2023 140 136 - 145 mmol/L Final     Potassium   Date Value Ref Range Status   08/30/2023 4.4 3.5 - 5.1 mmol/L Final     Chloride   Date Value Ref Range Status   08/30/2023 108 95 - 110 mmol/L Final     CO2   Date Value Ref Range Status   08/30/2023 21 (L) 23 - 29 mmol/L Final     Glucose   Date Value Ref Range Status   08/30/2023 75 70 - 110 mg/dL Final     BUN   Date " Value Ref Range Status   08/30/2023 16 6 - 20 mg/dL Final     Creatinine   Date Value Ref Range Status   08/30/2023 1.1 0.5 - 1.4 mg/dL Final     Calcium   Date Value Ref Range Status   08/30/2023 9.2 8.7 - 10.5 mg/dL Final     Total Protein   Date Value Ref Range Status   08/13/2023 6.1 6.0 - 8.4 g/dL Final     Albumin   Date Value Ref Range Status   08/30/2023 2.9 (L) 3.5 - 5.2 g/dL Final     Total Bilirubin   Date Value Ref Range Status   08/13/2023 0.4 0.1 - 1.0 mg/dL Final     Comment:     For infants and newborns, interpretation of results should be based  on gestational age, weight and in agreement with clinical  observations.    Premature Infant recommended reference ranges:  Up to 24 hours.............<8.0 mg/dL  Up to 48 hours............<12.0 mg/dL  3-5 days..................<15.0 mg/dL  6-29 days.................<15.0 mg/dL       Alkaline Phosphatase   Date Value Ref Range Status   08/13/2023 81 55 - 135 U/L Final     AST   Date Value Ref Range Status   08/13/2023 36 10 - 40 U/L Final     ALT   Date Value Ref Range Status   08/13/2023 23 10 - 44 U/L Final     Anion Gap   Date Value Ref Range Status   08/30/2023 11 8 - 16 mmol/L Final     eGFR if    Date Value Ref Range Status   07/11/2022 >60.0 >60 mL/min/1.73 m^2 Final     eGFR if non    Date Value Ref Range Status   07/11/2022 >60.0 >60 mL/min/1.73 m^2 Final     Comment:     Calculation used to obtain the estimated glomerular filtration  rate (eGFR) is the CKD-EPI equation.        Current Outpatient Medications on File Prior to Visit   Medication Sig Dispense Refill    albuterol (PROVENTIL/VENTOLIN HFA) 90 mcg/actuation inhaler Inhale 2 puffs into the lungs every 4 (four) hours as needed for Wheezing or Shortness of Breath. Rescue 8.5 g 11    calcitRIOL (ROCALTROL) 0.25 MCG Cap Take 1 capsule (0.25 mcg total) by mouth once daily. 30 capsule 3    cholecalciferol, vitamin D3, (VITAMIN D3) 50 mcg (2,000 unit) Tab Take 1  tablet by mouth once daily 30 tablet 0    docusate sodium (COLACE) 100 MG capsule Take 1 capsule (100 mg total) by mouth 2 (two) times daily as needed. 20 capsule 0    ergocalciferol (VITAMIN D2) 50,000 unit Cap Take 1 capsule (50,000 Units total) by mouth every 30 days. 3 capsule 0    HYDROcodone-acetaminophen (NORCO) 5-325 mg per tablet Take 1 tablet by mouth every 4 (four) hours as needed for Pain. 10 tablet 0    labetaloL (NORMODYNE) 200 MG tablet Take 1 tablet (200 mg total) by mouth 2 (two) times daily. 60 tablet 1    loperamide (IMODIUM) 2 mg capsule Take 1-2 capsule by mouth four times daily as needed for 14 days 112 capsule 0    magnesium oxide (MAG-OX) 400 mg (241.3 mg magnesium) tablet Take 1 tablet (400 mg total) by mouth 2 (two) times daily. 60 tablet 11    multivit-min/iron/folic acid/K (ADULTS MULTIVITAMIN ORAL) 1 tablet Orally Once a day      mycophenolate (CELLCEPT) 250 mg Cap Take 4 capsules (1,000 mg total) by mouth 2 (two) times daily. 240 capsule 11    olmesartan (BENICAR) 20 MG tablet Take 1 tablet (20 mg total) by mouth once daily. 30 tablet 11    pantoprazole (PROTONIX) 40 MG tablet Take 1 tablet by mouth once daily 30 tablet 0    predniSONE (DELTASONE) 5 MG tablet Take 1 tablet (5 mg total) by mouth once daily. 120 tablet 11    sars-cov-2, covid-19, (MODERNA COVID-19) 100 mcg/0.5 ml injection Inject 0.5 mLs into the muscle. 0.5 mL 0    sucralfate (CARAFATE) 1 gram tablet Take 1 tablet (1 g total) by mouth 4 (four) times daily. 30 tablet 0    tacrolimus (PROGRAF) 1 MG Cap Take 3 capsules (3 mg total) by mouth every morning AND 2 capsules (2 mg total) every evening. 150 capsule 11    NIFEdipine (PROCARDIA-XL) 30 MG (OSM) 24 hr tablet Take 1 tablet (30 mg total) by mouth 2 (two) times a day. 60 tablet 11    sodium bicarbonate 650 MG tablet Take 2 tablets (1,300 mg total) by mouth 3 (three) times daily. 180 tablet 11     Current Facility-Administered Medications on File Prior to Visit    Medication Dose Route Frequency Provider Last Rate Last Admin    acetaminophen tablet 650 mg  650 mg Oral Once PRN Govind Garcia MD        diphenhydrAMINE injection 25 mg  25 mg Intravenous Once PRN Govind Garcia MD        EPINEPHrine (EPIPEN) 0.3 mg/0.3 mL pen injection 0.3 mg  0.3 mg Intramuscular PRN Govind Garcia MD        methylPREDNISolone sodium succinate injection 40 mg  40 mg Intravenous Once PRN Govind Garcia MD        ondansetron disintegrating tablet 4 mg  4 mg Oral Once PRN Govind Garcia MD        sodium chloride 0.9% 500 mL flush bag   Intravenous PRN Govind Garcia MD        sodium chloride 0.9% flush 10 mL  10 mL Intravenous PRN Govind Garcia MD                Objective:            Physical Exam  Constitutional:       Appearance: Normal appearance.   HENT:      Head: Normocephalic and atraumatic.   Eyes:      General: No scleral icterus.     Extraocular Movements: Extraocular movements intact.      Pupils: Pupils are equal, round, and reactive to light.   Pulmonary:      Effort: Pulmonary effort is normal.      Breath sounds: No stridor.   Musculoskeletal:      Right lower leg: No edema.      Left lower leg: No edema.   Skin:     General: Skin is warm and dry.   Neurological:      General: No focal deficit present.      Mental Status: She is alert and oriented to person, place, and time.   Psychiatric:         Mood and Affect: Mood normal.         Behavior: Behavior normal.       Assessment:       1. Kidney transplant status    2. Immunosuppression    3. Primary hypertension    4. Idiopathic hypotension        Plan:       1. Status post kidney transplant in January of 2021.  Stable renal allograft.  Creatinine is normal ranging between 0.9-1.4.  Urinalysis is bland without evidence of proteinuria.    2. Her Prograf level continues to run higher than goal.  It was 8.0 most recent laboratory studies.  We decreased her Prograf from 2 mg twice daily 2 mg in the morning  and 1 mg in the evening.  She continues on 5 mg of prednisone daily and 1 g of mycophenolate twice daily.    At this time she reports that her GI symptoms have resolved.  If they become more frequent we may have to decrease her mycophenolate dose.    3. She is now hypotensive.  We discontinued all antihypertensive medications.    4. Her blood pressure in the clinic was 88 systolic.  She reports having frequent low blood pressures and is sometimes symptomatic.  Will start her on low-dose midodrine to see if we can improve her blood pressure.  Additionally I have asked her to liberalize salt in her diet and make sure to stay well hydrated.    Will plan to check a cortisol level however she is on prednisone at an appropriate dose for adrenal insufficiency.  I doubt that the cortisol be very yielding.    Approximately 40 minutes was spent in face-to-face conversation and chart review.      Julio Jaffe MD

## 2023-09-11 DIAGNOSIS — I12.9 RENAL HYPERTENSION: ICD-10-CM

## 2023-09-11 DIAGNOSIS — E21.2 HYPERPARATHYROIDISM, TERTIARY: ICD-10-CM

## 2023-09-11 DIAGNOSIS — Z94.0 DECEASED-DONOR KIDNEY TRANSPLANT: ICD-10-CM

## 2023-09-11 DIAGNOSIS — Z94.0 KIDNEY REPLACED BY TRANSPLANT: ICD-10-CM

## 2023-09-11 RX ORDER — OLMESARTAN MEDOXOMIL 20 MG/1
20 TABLET ORAL DAILY
Qty: 30 TABLET | Refills: 11 | Status: CANCELLED | OUTPATIENT
Start: 2023-09-11 | End: 2024-09-10

## 2023-09-11 RX ORDER — CALCITRIOL 0.25 UG/1
0.25 CAPSULE ORAL DAILY
Qty: 30 CAPSULE | Refills: 3 | Status: SHIPPED | OUTPATIENT
Start: 2023-09-11 | End: 2024-03-08 | Stop reason: SDUPTHER

## 2023-09-12 RX ORDER — TACROLIMUS 1 MG/1
CAPSULE ORAL
Qty: 270 CAPSULE | Refills: 3 | Status: ON HOLD | OUTPATIENT
Start: 2023-09-12 | End: 2023-09-23 | Stop reason: SDUPTHER

## 2023-09-12 RX ORDER — TACROLIMUS 1 MG/1
CAPSULE ORAL
COMMUNITY
End: 2023-09-12 | Stop reason: SDUPTHER

## 2023-09-12 NOTE — TELEPHONE ENCOUNTER
----- Message from Julio Jaffe MD sent at 9/11/2023 10:30 AM CDT -----  Regarding: FW: New rx needed for tacrolimus  Would you please put in a new script?    Thanks  ----- Message -----  From: Nathalie Lee, PharmD  Sent: 9/11/2023   9:54 AM CDT  To: Julio Jaffe MD  Subject: New rx needed for tacrolimus                     Good morning,    patient called to have her meds refill and stated her tacro dose changed from 3/2 to 2/1. Can we get a new script sent to [facility] to reflect her current dose please? Thanks!    Matthias Lee, PharmD  [facility] - Transplant  [facility] [place] [address]  P: [phone] F: [phone]  Ext. [phone]

## 2023-09-16 ENCOUNTER — HOSPITAL ENCOUNTER (EMERGENCY)
Facility: HOSPITAL | Age: 33
Discharge: ANOTHER HOSPITAL | End: 2023-09-17
Attending: EMERGENCY MEDICINE
Payer: MEDICARE

## 2023-09-16 DIAGNOSIS — D84.9 IMMUNOCOMPROMISED: ICD-10-CM

## 2023-09-16 DIAGNOSIS — R50.9 FEVER, UNSPECIFIED FEVER CAUSE: ICD-10-CM

## 2023-09-16 DIAGNOSIS — R21 RASH: Primary | ICD-10-CM

## 2023-09-16 DIAGNOSIS — J18.9 PNEUMONIA OF BOTH LOWER LOBES DUE TO INFECTIOUS ORGANISM: ICD-10-CM

## 2023-09-16 DIAGNOSIS — R07.89 CHEST TIGHTNESS: ICD-10-CM

## 2023-09-16 LAB
ALBUMIN SERPL BCP-MCNC: 3.1 G/DL (ref 3.5–5.2)
ALP SERPL-CCNC: 86 U/L (ref 55–135)
ALT SERPL W/O P-5'-P-CCNC: 38 U/L (ref 10–44)
ANION GAP SERPL CALC-SCNC: 12 MMOL/L (ref 8–16)
AST SERPL-CCNC: 39 U/L (ref 10–40)
BASOPHILS # BLD AUTO: 0.01 K/UL (ref 0–0.2)
BASOPHILS NFR BLD: 0.2 % (ref 0–1.9)
BILIRUB SERPL-MCNC: 0.4 MG/DL (ref 0.1–1)
BNP SERPL-MCNC: 80 PG/ML (ref 0–99)
BUN SERPL-MCNC: 10 MG/DL (ref 6–20)
CALCIUM SERPL-MCNC: 8.8 MG/DL (ref 8.7–10.5)
CHLORIDE SERPL-SCNC: 111 MMOL/L (ref 95–110)
CO2 SERPL-SCNC: 13 MMOL/L (ref 23–29)
CREAT SERPL-MCNC: 1.1 MG/DL (ref 0.5–1.4)
DIFFERENTIAL METHOD: ABNORMAL
EOSINOPHIL # BLD AUTO: 0 K/UL (ref 0–0.5)
EOSINOPHIL NFR BLD: 0.4 % (ref 0–8)
ERYTHROCYTE [DISTWIDTH] IN BLOOD BY AUTOMATED COUNT: 14.6 % (ref 11.5–14.5)
EST. GFR  (NO RACE VARIABLE): >60 ML/MIN/1.73 M^2
GLUCOSE SERPL-MCNC: 93 MG/DL (ref 70–110)
HCT VFR BLD AUTO: 34.2 % (ref 37–48.5)
HGB BLD-MCNC: 11.4 G/DL (ref 12–16)
IMM GRANULOCYTES # BLD AUTO: 0.05 K/UL (ref 0–0.04)
IMM GRANULOCYTES NFR BLD AUTO: 1 % (ref 0–0.5)
LACTATE SERPL-SCNC: 1.2 MMOL/L (ref 0.5–2.2)
LYMPHOCYTES # BLD AUTO: 0.3 K/UL (ref 1–4.8)
LYMPHOCYTES NFR BLD: 6.4 % (ref 18–48)
MCH RBC QN AUTO: 28.9 PG (ref 27–31)
MCHC RBC AUTO-ENTMCNC: 33.3 G/DL (ref 32–36)
MCV RBC AUTO: 87 FL (ref 82–98)
MONOCYTES # BLD AUTO: 0.5 K/UL (ref 0.3–1)
MONOCYTES NFR BLD: 8.7 % (ref 4–15)
NEUTROPHILS # BLD AUTO: 4.3 K/UL (ref 1.8–7.7)
NEUTROPHILS NFR BLD: 83.3 % (ref 38–73)
NRBC BLD-RTO: 0 /100 WBC
PLATELET # BLD AUTO: 171 K/UL (ref 150–450)
PMV BLD AUTO: 10.1 FL (ref 9.2–12.9)
POTASSIUM SERPL-SCNC: 3.7 MMOL/L (ref 3.5–5.1)
PROT SERPL-MCNC: 6.8 G/DL (ref 6–8.4)
RBC # BLD AUTO: 3.95 M/UL (ref 4–5.4)
RPR SER QL: NORMAL
SODIUM SERPL-SCNC: 136 MMOL/L (ref 136–145)
TROPONIN I SERPL DL<=0.01 NG/ML-MCNC: <0.006 NG/ML (ref 0–0.03)
WBC # BLD AUTO: 5.19 K/UL (ref 3.9–12.7)

## 2023-09-16 PROCEDURE — 87040 BLOOD CULTURE FOR BACTERIA: CPT | Mod: 59 | Performed by: EMERGENCY MEDICINE

## 2023-09-16 PROCEDURE — 99285 EMERGENCY DEPT VISIT HI MDM: CPT | Mod: 25

## 2023-09-16 PROCEDURE — 84484 ASSAY OF TROPONIN QUANT: CPT | Performed by: EMERGENCY MEDICINE

## 2023-09-16 PROCEDURE — 93010 ELECTROCARDIOGRAM REPORT: CPT | Mod: ,,, | Performed by: INTERNAL MEDICINE

## 2023-09-16 PROCEDURE — 86592 SYPHILIS TEST NON-TREP QUAL: CPT | Performed by: EMERGENCY MEDICINE

## 2023-09-16 PROCEDURE — 93010 EKG 12-LEAD: ICD-10-PCS | Mod: ,,, | Performed by: INTERNAL MEDICINE

## 2023-09-16 PROCEDURE — 93005 ELECTROCARDIOGRAM TRACING: CPT

## 2023-09-16 PROCEDURE — 85025 COMPLETE CBC W/AUTO DIFF WBC: CPT | Performed by: EMERGENCY MEDICINE

## 2023-09-16 PROCEDURE — 83605 ASSAY OF LACTIC ACID: CPT | Performed by: EMERGENCY MEDICINE

## 2023-09-16 PROCEDURE — 83880 ASSAY OF NATRIURETIC PEPTIDE: CPT | Performed by: EMERGENCY MEDICINE

## 2023-09-16 PROCEDURE — 80053 COMPREHEN METABOLIC PANEL: CPT | Performed by: EMERGENCY MEDICINE

## 2023-09-16 PROCEDURE — 87593 ORTHOPOXVIRUS AMP PRB EACH: CPT | Performed by: EMERGENCY MEDICINE

## 2023-09-17 ENCOUNTER — HOSPITAL ENCOUNTER (INPATIENT)
Facility: HOSPITAL | Age: 33
LOS: 6 days | Discharge: HOME | DRG: 865 | End: 2023-09-23
Attending: HOSPITALIST | Admitting: HOSPITALIST
Payer: MEDICARE

## 2023-09-17 VITALS
HEIGHT: 66 IN | TEMPERATURE: 98 F | HEART RATE: 71 BPM | RESPIRATION RATE: 20 BRPM | OXYGEN SATURATION: 97 % | WEIGHT: 171.94 LBS | DIASTOLIC BLOOD PRESSURE: 72 MMHG | BODY MASS INDEX: 27.63 KG/M2 | SYSTOLIC BLOOD PRESSURE: 108 MMHG

## 2023-09-17 DIAGNOSIS — R91.8 PULMONARY NODULES: ICD-10-CM

## 2023-09-17 DIAGNOSIS — E87.20 ACIDOSIS: ICD-10-CM

## 2023-09-17 DIAGNOSIS — B25.9 CYTOMEGALOVIRUS INFECTION, UNSPECIFIED CYTOMEGALOVIRAL INFECTION TYPE: ICD-10-CM

## 2023-09-17 DIAGNOSIS — L98.9 SKIN LESIONS: ICD-10-CM

## 2023-09-17 DIAGNOSIS — Z79.899 IMMUNOSUPPRESSIVE MANAGEMENT ENCOUNTER FOLLOWING KIDNEY TRANSPLANT: ICD-10-CM

## 2023-09-17 DIAGNOSIS — Z94.0 H/O KIDNEY TRANSPLANT: ICD-10-CM

## 2023-09-17 DIAGNOSIS — I10 PRIMARY HYPERTENSION: ICD-10-CM

## 2023-09-17 DIAGNOSIS — I10 ESSENTIAL HYPERTENSION: ICD-10-CM

## 2023-09-17 DIAGNOSIS — B25.8 OTHER CYTOMEGALOVIRAL DISEASES: ICD-10-CM

## 2023-09-17 DIAGNOSIS — L98.9 SKIN LESIONS: Primary | ICD-10-CM

## 2023-09-17 DIAGNOSIS — E87.1 HYPONATREMIA: ICD-10-CM

## 2023-09-17 DIAGNOSIS — Z29.89 NEED FOR PROPHYLACTIC IMMUNOTHERAPY: ICD-10-CM

## 2023-09-17 DIAGNOSIS — R21 RASH: ICD-10-CM

## 2023-09-17 DIAGNOSIS — N18.2 CKD (CHRONIC KIDNEY DISEASE) STAGE 2, GFR 60-89 ML/MIN: ICD-10-CM

## 2023-09-17 DIAGNOSIS — B02.9 VZV (VARICELLA-ZOSTER VIRUS) INFECTION: ICD-10-CM

## 2023-09-17 DIAGNOSIS — Z94.0 IMMUNOSUPPRESSIVE MANAGEMENT ENCOUNTER FOLLOWING KIDNEY TRANSPLANT: ICD-10-CM

## 2023-09-17 DIAGNOSIS — Z79.899 IMMUNODEFICIENCY DUE TO LONG TERM IMMUNOSUPPRESSIVE DRUG THERAPY: ICD-10-CM

## 2023-09-17 DIAGNOSIS — U07.1 COVID-19: ICD-10-CM

## 2023-09-17 DIAGNOSIS — Z94.0 DECEASED-DONOR KIDNEY TRANSPLANT: ICD-10-CM

## 2023-09-17 DIAGNOSIS — R07.9 CHEST PAIN: ICD-10-CM

## 2023-09-17 DIAGNOSIS — I95.9 HYPOTENSION: ICD-10-CM

## 2023-09-17 DIAGNOSIS — T45.1X5A IMMUNODEFICIENCY DUE TO LONG TERM IMMUNOSUPPRESSIVE DRUG THERAPY: ICD-10-CM

## 2023-09-17 DIAGNOSIS — Z79.60 LONG-TERM USE OF IMMUNOSUPPRESSANT MEDICATION: ICD-10-CM

## 2023-09-17 DIAGNOSIS — D84.821 IMMUNODEFICIENCY DUE TO LONG TERM IMMUNOSUPPRESSIVE DRUG THERAPY: ICD-10-CM

## 2023-09-17 LAB
ALBUMIN SERPL BCP-MCNC: 3 G/DL (ref 3.5–5.2)
ALP SERPL-CCNC: 91 U/L (ref 55–135)
ALT SERPL W/O P-5'-P-CCNC: 40 U/L (ref 10–44)
ANION GAP SERPL CALC-SCNC: 7 MMOL/L (ref 8–16)
AST SERPL-CCNC: 44 U/L (ref 10–40)
BASOPHILS # BLD AUTO: 0.02 K/UL (ref 0–0.2)
BASOPHILS NFR BLD: 0.3 % (ref 0–1.9)
BILIRUB SERPL-MCNC: 0.4 MG/DL (ref 0.1–1)
BUN SERPL-MCNC: 8 MG/DL (ref 6–20)
CALCIUM SERPL-MCNC: 9 MG/DL (ref 8.7–10.5)
CHLORIDE SERPL-SCNC: 109 MMOL/L (ref 95–110)
CO2 SERPL-SCNC: 17 MMOL/L (ref 23–29)
CREAT SERPL-MCNC: 1.1 MG/DL (ref 0.5–1.4)
DIFFERENTIAL METHOD: ABNORMAL
EOSINOPHIL # BLD AUTO: 0 K/UL (ref 0–0.5)
EOSINOPHIL NFR BLD: 0 % (ref 0–8)
ERYTHROCYTE [DISTWIDTH] IN BLOOD BY AUTOMATED COUNT: 14.8 % (ref 11.5–14.5)
EST. GFR  (NO RACE VARIABLE): >60 ML/MIN/1.73 M^2
GLUCOSE SERPL-MCNC: 114 MG/DL (ref 70–110)
HCT VFR BLD AUTO: 38.5 % (ref 37–48.5)
HGB BLD-MCNC: 12.3 G/DL (ref 12–16)
IMM GRANULOCYTES # BLD AUTO: 0.06 K/UL (ref 0–0.04)
IMM GRANULOCYTES NFR BLD AUTO: 0.9 % (ref 0–0.5)
INFLUENZA A, MOLECULAR: NEGATIVE
INFLUENZA B, MOLECULAR: NEGATIVE
LYMPHOCYTES # BLD AUTO: 0.3 K/UL (ref 1–4.8)
LYMPHOCYTES NFR BLD: 4.1 % (ref 18–48)
MCH RBC QN AUTO: 28.5 PG (ref 27–31)
MCHC RBC AUTO-ENTMCNC: 31.9 G/DL (ref 32–36)
MCV RBC AUTO: 89 FL (ref 82–98)
MONOCYTES # BLD AUTO: 0.4 K/UL (ref 0.3–1)
MONOCYTES NFR BLD: 5.4 % (ref 4–15)
NEUTROPHILS # BLD AUTO: 5.9 K/UL (ref 1.8–7.7)
NEUTROPHILS NFR BLD: 89.3 % (ref 38–73)
NRBC BLD-RTO: 0 /100 WBC
PLATELET # BLD AUTO: 170 K/UL (ref 150–450)
PMV BLD AUTO: 10.3 FL (ref 9.2–12.9)
POTASSIUM SERPL-SCNC: 3.5 MMOL/L (ref 3.5–5.1)
PROT SERPL-MCNC: 7.2 G/DL (ref 6–8.4)
RBC # BLD AUTO: 4.31 M/UL (ref 4–5.4)
SARS-COV-2 RDRP RESP QL NAA+PROBE: NEGATIVE
SODIUM SERPL-SCNC: 133 MMOL/L (ref 136–145)
SPECIMEN SOURCE: NORMAL
WBC # BLD AUTO: 6.65 K/UL (ref 3.9–12.7)

## 2023-09-17 PROCEDURE — 21400001 HC TELEMETRY ROOM

## 2023-09-17 PROCEDURE — 63600175 PHARM REV CODE 636 W HCPCS: Performed by: INTERNAL MEDICINE

## 2023-09-17 PROCEDURE — 63600175 PHARM REV CODE 636 W HCPCS: Performed by: STUDENT IN AN ORGANIZED HEALTH CARE EDUCATION/TRAINING PROGRAM

## 2023-09-17 PROCEDURE — 96366 THER/PROPH/DIAG IV INF ADDON: CPT

## 2023-09-17 PROCEDURE — 96367 TX/PROPH/DG ADDL SEQ IV INF: CPT

## 2023-09-17 PROCEDURE — 25000003 PHARM REV CODE 250: Performed by: STUDENT IN AN ORGANIZED HEALTH CARE EDUCATION/TRAINING PROGRAM

## 2023-09-17 PROCEDURE — 94640 AIRWAY INHALATION TREATMENT: CPT

## 2023-09-17 PROCEDURE — U0002 COVID-19 LAB TEST NON-CDC: HCPCS | Performed by: EMERGENCY MEDICINE

## 2023-09-17 PROCEDURE — 25000003 PHARM REV CODE 250: Performed by: INTERNAL MEDICINE

## 2023-09-17 PROCEDURE — 99223 1ST HOSP IP/OBS HIGH 75: CPT | Mod: ,,, | Performed by: STUDENT IN AN ORGANIZED HEALTH CARE EDUCATION/TRAINING PROGRAM

## 2023-09-17 PROCEDURE — 87081 CULTURE SCREEN ONLY: CPT | Performed by: EMERGENCY MEDICINE

## 2023-09-17 PROCEDURE — 85025 COMPLETE CBC W/AUTO DIFF WBC: CPT | Performed by: STUDENT IN AN ORGANIZED HEALTH CARE EDUCATION/TRAINING PROGRAM

## 2023-09-17 PROCEDURE — 99223 PR INITIAL HOSPITAL CARE,LEVL III: ICD-10-PCS | Mod: ,,, | Performed by: STUDENT IN AN ORGANIZED HEALTH CARE EDUCATION/TRAINING PROGRAM

## 2023-09-17 PROCEDURE — 96365 THER/PROPH/DIAG IV INF INIT: CPT

## 2023-09-17 PROCEDURE — 25000242 PHARM REV CODE 250 ALT 637 W/ HCPCS: Performed by: EMERGENCY MEDICINE

## 2023-09-17 PROCEDURE — 80053 COMPREHEN METABOLIC PANEL: CPT | Performed by: STUDENT IN AN ORGANIZED HEALTH CARE EDUCATION/TRAINING PROGRAM

## 2023-09-17 PROCEDURE — 36415 COLL VENOUS BLD VENIPUNCTURE: CPT | Performed by: STUDENT IN AN ORGANIZED HEALTH CARE EDUCATION/TRAINING PROGRAM

## 2023-09-17 PROCEDURE — 87502 INFLUENZA DNA AMP PROBE: CPT | Performed by: EMERGENCY MEDICINE

## 2023-09-17 PROCEDURE — 25000003 PHARM REV CODE 250: Performed by: EMERGENCY MEDICINE

## 2023-09-17 PROCEDURE — 63600175 PHARM REV CODE 636 W HCPCS: Performed by: EMERGENCY MEDICINE

## 2023-09-17 RX ORDER — ALBUTEROL SULFATE 90 UG/1
2 AEROSOL, METERED RESPIRATORY (INHALATION) EVERY 4 HOURS PRN
Status: DISCONTINUED | OUTPATIENT
Start: 2023-09-17 | End: 2023-09-24 | Stop reason: HOSPADM

## 2023-09-17 RX ORDER — NALOXONE HCL 0.4 MG/ML
0.02 VIAL (ML) INJECTION
Status: DISCONTINUED | OUTPATIENT
Start: 2023-09-17 | End: 2023-09-24 | Stop reason: HOSPADM

## 2023-09-17 RX ORDER — GUAIFENESIN/DEXTROMETHORPHAN 100-10MG/5
5 SYRUP ORAL EVERY 4 HOURS PRN
Status: DISCONTINUED | OUTPATIENT
Start: 2023-09-17 | End: 2023-09-18

## 2023-09-17 RX ORDER — ONDANSETRON 4 MG/1
4 TABLET, ORALLY DISINTEGRATING ORAL ONCE AS NEEDED
Status: COMPLETED | OUTPATIENT
Start: 2023-09-17 | End: 2023-09-19

## 2023-09-17 RX ORDER — SODIUM CHLORIDE 0.9 % (FLUSH) 0.9 %
10 SYRINGE (ML) INJECTION EVERY 12 HOURS PRN
Status: DISCONTINUED | OUTPATIENT
Start: 2023-09-17 | End: 2023-09-24 | Stop reason: HOSPADM

## 2023-09-17 RX ORDER — IBUPROFEN 200 MG
24 TABLET ORAL
Status: DISCONTINUED | OUTPATIENT
Start: 2023-09-17 | End: 2023-09-24 | Stop reason: HOSPADM

## 2023-09-17 RX ORDER — PANTOPRAZOLE SODIUM 40 MG/1
40 TABLET, DELAYED RELEASE ORAL DAILY
Status: DISCONTINUED | OUTPATIENT
Start: 2023-09-17 | End: 2023-09-24 | Stop reason: HOSPADM

## 2023-09-17 RX ORDER — DOCUSATE SODIUM 100 MG/1
100 CAPSULE, LIQUID FILLED ORAL 2 TIMES DAILY PRN
Status: DISCONTINUED | OUTPATIENT
Start: 2023-09-17 | End: 2023-09-24 | Stop reason: HOSPADM

## 2023-09-17 RX ORDER — ACETAMINOPHEN 325 MG/1
650 TABLET ORAL ONCE AS NEEDED
Status: DISCONTINUED | OUTPATIENT
Start: 2023-09-17 | End: 2023-09-19

## 2023-09-17 RX ORDER — PREDNISONE 5 MG/1
5 TABLET ORAL DAILY
Status: DISCONTINUED | OUTPATIENT
Start: 2023-09-17 | End: 2023-09-24 | Stop reason: HOSPADM

## 2023-09-17 RX ORDER — HEPARIN SODIUM 5000 [USP'U]/ML
5000 INJECTION, SOLUTION INTRAVENOUS; SUBCUTANEOUS EVERY 8 HOURS
Status: DISCONTINUED | OUTPATIENT
Start: 2023-09-17 | End: 2023-09-17

## 2023-09-17 RX ORDER — SUCRALFATE 1 G/1
1 TABLET ORAL 4 TIMES DAILY
Status: DISCONTINUED | OUTPATIENT
Start: 2023-09-17 | End: 2023-09-24 | Stop reason: HOSPADM

## 2023-09-17 RX ORDER — SODIUM BICARBONATE 650 MG/1
1300 TABLET ORAL 2 TIMES DAILY
Status: DISCONTINUED | OUTPATIENT
Start: 2023-09-17 | End: 2023-09-24 | Stop reason: HOSPADM

## 2023-09-17 RX ORDER — GLUCAGON 1 MG
1 KIT INJECTION
Status: DISCONTINUED | OUTPATIENT
Start: 2023-09-17 | End: 2023-09-24 | Stop reason: HOSPADM

## 2023-09-17 RX ORDER — IBUPROFEN 200 MG
16 TABLET ORAL
Status: DISCONTINUED | OUTPATIENT
Start: 2023-09-17 | End: 2023-09-24 | Stop reason: HOSPADM

## 2023-09-17 RX ORDER — CALCITRIOL 0.25 UG/1
0.25 CAPSULE ORAL DAILY
Status: DISCONTINUED | OUTPATIENT
Start: 2023-09-17 | End: 2023-09-24 | Stop reason: HOSPADM

## 2023-09-17 RX ORDER — HYDROXYZINE HYDROCHLORIDE 25 MG/1
25 TABLET, FILM COATED ORAL 3 TIMES DAILY PRN
Status: DISCONTINUED | OUTPATIENT
Start: 2023-09-17 | End: 2023-09-24 | Stop reason: HOSPADM

## 2023-09-17 RX ORDER — IPRATROPIUM BROMIDE AND ALBUTEROL SULFATE 2.5; .5 MG/3ML; MG/3ML
3 SOLUTION RESPIRATORY (INHALATION)
Status: COMPLETED | OUTPATIENT
Start: 2023-09-17 | End: 2023-09-17

## 2023-09-17 RX ORDER — MIDODRINE HYDROCHLORIDE 5 MG/1
5 TABLET ORAL 2 TIMES DAILY WITH MEALS
Status: DISCONTINUED | OUTPATIENT
Start: 2023-09-17 | End: 2023-09-23

## 2023-09-17 RX ORDER — EPINEPHRINE 1 MG/ML
0.3 INJECTION, SOLUTION, CONCENTRATE INTRAVENOUS
Status: DISCONTINUED | OUTPATIENT
Start: 2023-09-17 | End: 2023-09-24 | Stop reason: HOSPADM

## 2023-09-17 RX ORDER — TACROLIMUS 1 MG/1
1 CAPSULE ORAL 2 TIMES DAILY
Status: DISCONTINUED | OUTPATIENT
Start: 2023-09-17 | End: 2023-09-24 | Stop reason: HOSPADM

## 2023-09-17 RX ORDER — SODIUM BICARBONATE 650 MG/1
1300 TABLET ORAL 3 TIMES DAILY
Status: DISCONTINUED | OUTPATIENT
Start: 2023-09-17 | End: 2023-09-17

## 2023-09-17 RX ADMIN — CEFEPIME 1 G: 1 INJECTION, POWDER, FOR SOLUTION INTRAMUSCULAR; INTRAVENOUS at 12:09

## 2023-09-17 RX ADMIN — PREDNISONE 5 MG: 5 TABLET ORAL at 05:09

## 2023-09-17 RX ADMIN — SODIUM BICARBONATE: 84 INJECTION, SOLUTION INTRAVENOUS at 05:09

## 2023-09-17 RX ADMIN — SUCRALFATE 1 G: 1 TABLET ORAL at 10:09

## 2023-09-17 RX ADMIN — VANCOMYCIN HYDROCHLORIDE 2000 MG: 500 INJECTION, POWDER, LYOPHILIZED, FOR SOLUTION INTRAVENOUS at 01:09

## 2023-09-17 RX ADMIN — CEFEPIME 2 G: 2 INJECTION, POWDER, FOR SOLUTION INTRAVENOUS at 10:09

## 2023-09-17 RX ADMIN — VANCOMYCIN HYDROCHLORIDE 1000 MG: 1 INJECTION, POWDER, LYOPHILIZED, FOR SOLUTION INTRAVENOUS at 03:09

## 2023-09-17 RX ADMIN — IPRATROPIUM BROMIDE AND ALBUTEROL SULFATE 3 ML: .5; 3 SOLUTION RESPIRATORY (INHALATION) at 02:09

## 2023-09-17 RX ADMIN — TACROLIMUS 1 MG: 1 CAPSULE ORAL at 05:09

## 2023-09-17 RX ADMIN — HYDROXYZINE HYDROCHLORIDE 25 MG: 25 TABLET, FILM COATED ORAL at 06:09

## 2023-09-17 RX ADMIN — CEFEPIME 2 G: 2 INJECTION, POWDER, FOR SOLUTION INTRAVENOUS at 02:09

## 2023-09-17 RX ADMIN — GUAIFENESIN AND DEXTROMETHORPHAN 5 ML: 100; 10 SYRUP ORAL at 06:09

## 2023-09-17 RX ADMIN — SUCRALFATE 1 G: 1 TABLET ORAL at 05:09

## 2023-09-17 RX ADMIN — MIDODRINE HYDROCHLORIDE 5 MG: 5 TABLET ORAL at 05:09

## 2023-09-17 NOTE — NURSING
Patient arrived to room in stable condition, [facility] armband applied. Educated not to take home meds. [facility] paged to assign to [facility] team. Awaiting MD orders. Primary nurse at bedside.

## 2023-09-17 NOTE — HPI
Oumar is a 33 year old female with ESRD s/p kidney transplant on immunosuppresion and HTN who [place] [facility] [place] of care. Dermatology consulted for umbilicated rash all over the body with fever illness.    Patient reports she was in her baseline state of health until 4 days prior to admission. She developed fever and lesions to her skin at that time, and she subsequently developed SOB and cough. She reports skin lesions started as tiny blisters on her forehead and face, and they have since spread to involve bilateral arms, chest, abdomen, inguinal region, and now her L knee. She continues to get new lesions. They never drain purulent material, but some of the little blisters will pop. As they progress they get scabs in the center. She reports intense pain and pruritus. Asked mom and she never had chickenpox. She believes she had all her vaccinations. She is unsure about chickenpox vaccine. Reports history cold sores. No new sexual partners. Not currently sexually active.     Patient moved to [place] from [place] in 2016. The cause of her renal failure is thought to be hypertensive nephropathy. She started hemodialysis 2/7/2008 and had first kidney transplant at [facility] in [place] 4/15/15. First transplant failed, and patient was relisted for transplant on 5/10/17. Second kidney transplant 1/9/2021. Patient currently on an immunosuppressive regimen CellCept, tacrolimus and prednisone.    On presentation patient was afebrile and hemodynamically stable. Initial blood work including CBC, BMP, troponin and lactate within normal limits.  COVID, RPR negative. UA pending. Monkeypox swab pending. Blood cultures obtained 9/16 no growth so far. CXR with patchy consolidation in lower lung fields suggestive of pneumonia. Patient initiated on vancomycin and cefepime.

## 2023-09-17 NOTE — HPI
34 y/o F with PMH of ESRD s/p Kidney transplant on immunosuppression, HTN presented to outside hospital on 09/16 complaining of fever and rash since past 3 days.  Patient was in her usual state of health 3 days ago, initially developed blisters and vesicles on her forehead and face.  Later lesions spread to her bilateral arms, chest, abdomen, bilateral inguinal region.  Lesions in the inguinal region where umbilicated.  The lesions ruptured draining serous fluid.  Patient also developed fever at the same time.  Patient also complains of cough associated with clear sputum.  And now complaints of throat pain due to persistent coughing.  Patient also felt nauseous yesterday however denied vomiting.  Patient reports no one else in the family developed similar lesions.  Patient denies shortness of breath, chest pain, abdominal pain, dysuria or hematuria, myalgia, arthralgia, exposure to sick contacts.  Patient has history of kidney transplant dating back to 2021.  On immunosuppression CellCept, tacrolimus and prednisone.  Patient reports compliance with medication      Patient received vancomycin and cefepime 1 dose in the ER at [place].  [place] [facility] [place].  On presentation patient was afebrile, normotensive, saturating well on room air.  Initial blood work including CBC, BMP, troponin and lactate within normal limits.  COVID, RPR negative.  Blood cultures were obtained yesterday no growth so far.  Chest x-ray suggestive of patchy consolidation in lower lung fields suggestive of pneumonia.  UA pending.  Initiated patient on vancomycin and cefepime.  Infectious diseases, kidney transplant team, dermatology were consulted.

## 2023-09-17 NOTE — H&P
[facility] - Intensive Care ([address] Medicine  History & Physical    Patient Name: Leydi Cordero  MRN: [id number]  Patient Class: IP- Inpatient  Admission Date: 9/17/2023  Attending Physician: Gini Case MD   Primary Care Provider: Helena Zepeda PA-C         Patient information was obtained from patient and ER records.     Subjective:     Principal Problem:<principal problem not specified>    Chief Complaint: No chief complaint on file.       HPI: 32 y/o F with PMH of ESRD s/p Kidney transplant on immunosuppression, HTN presented to outside hospital on 09/16 complaining of fever and rash since past 3 days.  Patient was in her usual state of health 3 days ago, initially developed blisters and vesicles on her forehead and face.  Later lesions spread to her bilateral arms, chest, abdomen, bilateral inguinal region.  Lesions in the inguinal region where umbilicated.  The lesions ruptured draining serous fluid.  Patient also developed fever at the same time.  Patient also complains of cough associated with clear sputum.  And now complaints of throat pain due to persistent coughing.  Patient also felt nauseous yesterday however denied vomiting.  Patient reports no one else in the family developed similar lesions.  Patient denies shortness of breath, chest pain, abdominal pain, dysuria or hematuria, myalgia, arthralgia, exposure to sick contacts.  Patient has history of kidney transplant dating back to 2021.  On immunosuppression CellCept, tacrolimus and prednisone.  Patient reports compliance with medication      Patient received vancomycin and cefepime 1 dose in the ER at [facility].  Subsequently transferred to [facility] [place]ological evaluation.  On presentation patient was afebrile, normotensive, saturating well on room air.  Initial blood work including CBC, BMP, troponin and lactate within normal limits.  COVID, RPR negative.  Blood cultures were obtained yesterday no growth so  far.  Chest x-ray suggestive of patchy consolidation in lower lung fields suggestive of pneumonia.  UA pending.  Initiated patient on vancomycin and cefepime.  Infectious diseases, kidney transplant team, dermatology were consulted.      No new subjective & objective note has been filed under this hospital service since the last note was generated.    Assessment/Plan:     Skin lesions  -Patient  developed blisters and vesicles on her forehead and face 3 days ago.  Later lesions spread to her bilateral arms, chest, abdomen, bilateral inguinal region.  - Lesions in the inguinal region where umbilicated.  The lesions ruptured draining serous fluid.   - Patient also developed fever at the same time.  -Patient reports no one else in the family developed similar lesions.  Patient denies myalgia, arthralgia, exposure to sick contacts.  Patient has history of kidney transplant dating back to 2021.  On immunosuppression CellCept, tacrolimus and prednisone.   COVID, RPR negative.  Monkey pox testing pending.  Blood cultures were obtained yesterday no growth so far.  Chest x-ray suggestive of patchy consolidation in lower lung fields suggestive of pneumonia.   Patient received vancomycin and cefepime 1 dose in the ER      Plan:   -blood cultures and UA ordered.  Will empirically start treating patient with vancomycin and cefepime.    -rapid progression of the lesion increases the concern for infectious origin, ID consulted.    -patient will need biopsy of these lesions for better diagnosis, dermatology consulted.  -will follow airborne and contact precautions until we have a definite diagnosis.        H/O kidney transplant  Patient has history of kidney transplant from decreased tone dating back to 2021.    Patient is on immunosuppressive therapy including tacrolimus, CellCept and prednisone.  Kidney transplant team consulted.      Hyperparathyroidism, tertiary    Continue calcitriol    Acidosis, metabolic  Patient placed on  bicarbonate drip by kidney transplant team.  Home bicarbonate dose restarted.        VTE Risk Mitigation (From admission, onward)         Ordered     IP VTE HIGH RISK PATIENT  Once         09/17/23 1232     Place sequential compression device  Until discontinued         09/17/23 1232                           Gini Case MD  Department of Hospital Medicine  [facility] - Intensive Care ([facility]

## 2023-09-17 NOTE — ASSESSMENT & PLAN NOTE
-Patient  developed blisters and vesicles on her forehead and face 3 days ago.  Later lesions spread to her bilateral arms, chest, abdomen, bilateral inguinal region.  - Lesions in the inguinal region where umbilicated.  The lesions ruptured draining serous fluid.   - Patient also developed fever at the same time.  -Patient reports no one else in the family developed similar lesions.  Patient denies myalgia, arthralgia, exposure to sick contacts.  Patient has history of kidney transplant dating back to 2021.  On immunosuppression CellCept, tacrolimus and prednisone.   COVID, RPR negative.  Monkey pox testing pending.  Blood cultures were obtained yesterday no growth so far.  Chest x-ray suggestive of patchy consolidation in lower lung fields suggestive of pneumonia.   Patient received vancomycin and cefepime 1 dose in the ER      Plan:   -blood cultures and UA ordered.  Will empirically start treating patient with vancomycin and cefepime.    -rapid progression of the lesion increases the concern for infectious origin, ID consulted.    -patient will need biopsy of these lesions for better diagnosis, dermatology consulted.  -will follow airborne and contact precautions until we have a definite diagnosis.

## 2023-09-17 NOTE — CONSULTS
"Pharmacokinetic Initial Assessment: IV Vancomycin    Assessment/Plan:    Intravenous vancomycin loading dose of 2000 mg once received in the ED  Initiate a maintenance dose of vancomycin 1000 mg IV every 12 hours  Desired empiric serum trough concentration is 10 to 15 mcg/mL  Draw vancomycin trough level 60 min prior to fourth dose on 9/18 at approximately 1330    Pharmacy will continue to follow and monitor vancomycin.      Please contact pharmacy at extension [phone] with any questions regarding this assessment.     Thank you for the consult,   Jacey Ingram    Patient brief summary:  Leydi Cordero is a 33 y.o. female initiated on antimicrobial therapy with IV Vancomycin for treatment of suspected skin & soft tissue infection    Drug Allergies:   Review of patient's allergies indicates:   Allergen Reactions    Heparin analogues Rash     Actual Body Weight:   Wt Readings from Last 3 Encounters:   09/16/23 2214 78 kg (171 lb 15.3 oz)   09/06/23 1012 80.2 kg (176 lb 12.9 oz)   08/21/23 0750 78.9 kg (173 lb 15.1 oz)   08/16/23 1048 82.6 kg (182 lb)      Renal Function:   CrCl cannot be calculated (Unknown ideal weight.).,       Dialysis Method (if applicable):  N/A    CBC (last 72 hours):  Recent Labs   Lab Result Units 09/16/23  2248   WBC K/uL 5.19   Hemoglobin g/dL 11.4*   Hematocrit % 34.2*   Platelets K/uL 171   Gran % % 83.3*   Lymph % % 6.4*   Mono % % 8.7   Eosinophil % % 0.4   Basophil % % 0.2   Differential Method  Automated       Metabolic Panel (last 72 hours):  Recent Labs   Lab Result Units 09/16/23  2248   Sodium mmol/L 136   Potassium mmol/L 3.7   Chloride mmol/L 111*   CO2 mmol/L 13*   Glucose mg/dL 93   BUN mg/dL 10   Creatinine mg/dL 1.1   Albumin g/dL 3.1*   Total Bilirubin mg/dL 0.4   Alkaline Phosphatase U/L 86   AST U/L 39   ALT U/L 38       Drug levels (last 3 results):  No results for input(s): "VANCOMYCINRA", "VANCORANDOM", "VANCOMYCINPE", "VANCOPEAK", "VANCOMYCINTR", " ""GEN" in the last 72 hours.    Microbiologic Results:  Microbiology Results (last 7 days)       ** No results found for the last 168 hours. **            "

## 2023-09-17 NOTE — SUBJECTIVE & OBJECTIVE
Past Medical History:   Diagnosis Date    Anxiety     -donor kidney transplant 2021    cPRA 100%, XM negative 3 arteries, 2 on common patch, WIT 40 min    Encounter for blood transfusion     ESRD (end stage renal disease)     Fibroma     H/O kidney transplant     Hypertension     Hypertensive nephropathy     Ovarian cyst     Sleep apnea        Past Surgical History:   Procedure Laterality Date    COLD KNIFE CONIZATION OF CERVIX N/A 2023    Procedure: CONE BIOPSY, CERVIX, USING COLD KNIFE;  Surgeon: Jourdan Conklin MD;  Location: [facility] OR;  Service: OB/GYN;  Laterality: N/A;    COLPOSCOPY  2020    Needs follow up on or after 21    ESOPHAGOGASTRODUODENOSCOPY N/A 2022    Procedure: ESOPHAGOGASTRODUODENOSCOPY (EGD);  Surgeon: Essie Carvajal MD;  Location: [facility];  Service: Endoscopy;  Laterality: N/A;    hemodialysis access left arm      kidney removal       KIDNEY TRANSPLANT  04/15/2015    KIDNEY TRANSPLANT N/A 2021    Procedure: TRANSPLANT, KIDNEY;  Surgeon: Fam Sutton MD;  Location: [address];  Service: Transplant;  Laterality: N/A;    NEPHRECTOMY      OVARIAN CYST REMOVAL      PARATHYROIDECTOMY      partial     PERCUTANEOUS NEPHROSTOMY Left 2021    Procedure: Creation, Nephrostomy, Percutaneous;  Surgeon: Elen Surgeon;  Location: [facility];  Service: Anesthesiology;  Laterality: Left;    right leg hemodialysis access      ROBOT-ASSISTED LAPAROSCOPIC SLEEVE GASTRECTOMY USING DA VELMA XI N/A 2022    Procedure: XI ROBOTIC SLEEVE GASTRECTOMY;  Surgeon: Cal Parekh MD;  Location: [facility];  Service: General;  Laterality: N/A;    transplant nephrectomy  2017       Review of patient's allergies indicates:   Allergen Reactions    Heparin analogues Rash       Current Facility-Administered Medications on File Prior to Encounter   Medication    [COMPLETED] albuterol-ipratropium 2.5 mg-0.5 mg/3 mL nebulizer solution 3 mL    [COMPLETED] ceFEPIme  (MAXIPIME) 1 g in dextrose 5 % in water (D5W) 100 mL IVPB (MB+)    [COMPLETED] vancomycin 2 g in dextrose 5 % 500 mL IVPB    [DISCONTINUED] vancomycin - pharmacy to dose     Current Outpatient Medications on File Prior to Encounter   Medication Sig    albuterol (PROVENTIL/VENTOLIN HFA) 90 mcg/actuation inhaler Inhale 2 puffs into the lungs every 4 (four) hours as needed for Wheezing or Shortness of Breath. Rescue    calcitRIOL (ROCALTROL) 0.25 MCG Cap Take 1 capsule (0.25 mcg total) by mouth once daily.    cholecalciferol, vitamin D3, (VITAMIN D3) 50 mcg (2,000 unit) Tab Take 1 tablet by mouth once daily    docusate sodium (COLACE) 100 MG capsule Take 1 capsule (100 mg total) by mouth 2 (two) times daily as needed.    ergocalciferol (VITAMIN D2) 50,000 unit Cap Take 1 capsule (50,000 Units total) by mouth every 30 days.    HYDROcodone-acetaminophen (NORCO) 5-325 mg per tablet Take 1 tablet by mouth every 4 (four) hours as needed for Pain.    loperamide (IMODIUM) 2 mg capsule Take 1-2 capsule by mouth four times daily as needed for 14 days    magnesium oxide (MAG-OX) 400 mg (241.3 mg magnesium) tablet Take 1 tablet (400 mg total) by mouth 2 (two) times daily.    midodrine (PROAMATINE) 5 MG Tab Take 1 tablet (5 mg total) by mouth 2 (two) times daily with meals.    multivit-min/iron/folic acid/K (ADULTS MULTIVITAMIN ORAL) 1 tablet Orally Once a day    mycophenolate (CELLCEPT) 250 mg Cap Take 4 capsules (1,000 mg total) by mouth 2 (two) times daily.    pantoprazole (PROTONIX) 40 MG tablet Take 1 tablet by mouth once daily    predniSONE (DELTASONE) 5 MG tablet Take 1 tablet (5 mg total) by mouth once daily.    sars-cov-2, covid-19, (MODERNA COVID-19) 100 mcg/0.5 ml injection Inject 0.5 mLs into the muscle.    sodium bicarbonate 650 MG tablet Take 2 tablets (1,300 mg total) by mouth 3 (three) times daily.    sucralfate (CARAFATE) 1 gram tablet Take 1 tablet (1 g total) by mouth 4 (four) times daily.    tacrolimus  (PROGRAF) 1 MG Cap Take 2 capsules (2 mg total) by mouth every morning AND 1 capsule (1 mg total) every evening.     Family History       Problem Relation (Age of Onset)    Cancer Father    Colon cancer Father    Diabetes Maternal Uncle    Hypertension Father, Maternal Aunt, Other    Kidney disease Maternal Aunt, Other    No Known Problems Mother, Sister, Brother          Tobacco Use    Smoking status: Former     Current packs/day: 0.00     Average packs/day: 0.3 packs/day for 12.6 years (3.1 ttl pk-yrs)     Types: Cigarettes     Start date: 2008     Quit date: 8/2/2020     Years since quitting: 3.1    Smokeless tobacco: Never   Substance and Sexual Activity    Alcohol use: No    Drug use: No    Sexual activity: Yes     Partners: Male     Birth control/protection: None     Review of Systems   Constitutional:  Positive for fever. Negative for chills.   Eyes:  Negative for photophobia and visual disturbance.   Respiratory:  Positive for cough. Negative for shortness of breath and wheezing.    Cardiovascular:  Negative for chest pain and palpitations.   Gastrointestinal:  Negative for abdominal pain, nausea and vomiting.   Genitourinary:  Negative for dysuria.   Musculoskeletal:  Negative for arthralgias and myalgias.   Skin:  Positive for rash.   Neurological:  Negative for dizziness and headaches.     Objective:     Vital Signs (Most Recent):  Temp: 99 °F (37.2 °C) (09/17/23 1415)  Pulse: 82 (09/17/23 1415)  Resp: 18 (09/17/23 1415)  BP: 115/82 (09/17/23 1415)  SpO2: 97 % (09/17/23 1415) Vital Signs (24h Range):  Temp:  [98.3 °F (36.8 °C)-99 °F (37.2 °C)] 99 °F (37.2 °C)  Pulse:  [71-91] 82  Resp:  [16-24] 18  SpO2:  [96 %-98 %] 97 %  BP: (106-133)/(69-94) 115/82     Weight: 78 kg (171 lb 15.3 oz)  Body mass index is 27.75 kg/m².     Physical Exam  Constitutional:       Appearance: Normal appearance.   HENT:      Head:      Comments: I patient has an ulcerative lesion on the scalp, multiple palpable lesions scattered  over the scalp.  Pustules which have ruptured were noted on the forehead and chin.  Similar lesions were noted in the back of the neck.  Eyes:      Extraocular Movements: Extraocular movements intact.      Conjunctiva/sclera: Conjunctivae normal.      Pupils: Pupils are equal, round, and reactive to light.   Cardiovascular:      Rate and Rhythm: Normal rate and regular rhythm.   Pulmonary:      Effort: Pulmonary effort is normal.      Breath sounds: Normal breath sounds.   Abdominal:      Comments: Umbilicated lesions which have ulcerated are noted on the abdomen   Genitourinary:     Comments: Multiple dark umbilicated lesions noted in bilateral inguinal regions measuring 0.5 x 0.5 cm, tender are located in bilateral inguinal region.  Musculoskeletal:         General: No swelling or tenderness. Normal range of motion.   Skin:     Findings: Rash present.   Neurological:      General: No focal deficit present.      Mental Status: She is alert and oriented to person, place, and time.               Significant Labs: All pertinent labs within the past 24 hours have been reviewed.    Significant Imaging: I have reviewed all pertinent imaging results/findings within the past 24 hours.

## 2023-09-17 NOTE — ED PROVIDER NOTES
Encounter Date: 2023       History     Chief Complaint   Patient presents with    Chest Pain     Rash all over, s/p kidney transplant 2 years ago     34 y/o F with PMH of ESRD s/p Kidney transplant on immunosuppression, HTN here with c/o chest tightness, rash, and fever starting 2d ago. Patient reports taking tylenol PTA, did not measure fever, it was subjective. Patient noticed a painful rash breaking out mostly in her bilateral inguinal regions, and on her abdomin, chest, neck, face, and scalp. She denies being in contact with anyone with a similar rash. Denies any cough, congestion, dysuria, abdominal pain, sore throat, eye pain or vision changes, numbness, weakness, b/b dysfunction.     The history is provided by the patient.     Review of patient's allergies indicates:   Allergen Reactions    Heparin analogues Rash     Past Medical History:   Diagnosis Date    Anxiety     -donor kidney transplant 2021    cPRA 100%, XM negative 3 arteries, 2 on common patch, WIT 40 min    Encounter for blood transfusion     ESRD (end stage renal disease)     Fibroma     H/O kidney transplant     Hypertension     Hypertensive nephropathy     Ovarian cyst     Sleep apnea      Past Surgical History:   Procedure Laterality Date    COLD KNIFE CONIZATION OF CERVIX N/A 2023    Procedure: CONE BIOPSY, CERVIX, USING COLD KNIFE;  Surgeon: Jourdan Conklin MD;  Location: [facility];  Service: OB/GYN;  Laterality: N/A;    COLPOSCOPY  2020    Needs follow up on or after 21    ESOPHAGOGASTRODUODENOSCOPY N/A 2022    Procedure: ESOPHAGOGASTRODUODENOSCOPY (EGD);  Surgeon: Essie Carvajal MD;  Location: [facility];  Service: Endoscopy;  Laterality: N/A;    hemodialysis access left arm      kidney removal       KIDNEY TRANSPLANT  04/15/2015    KIDNEY TRANSPLANT N/A 2021    Procedure: TRANSPLANT, KIDNEY;  Surgeon: Fam Sutton MD;  Location: [address];  Service: Transplant;  Laterality:  N/A;    NEPHRECTOMY      OVARIAN CYST REMOVAL      PARATHYROIDECTOMY      partial     PERCUTANEOUS NEPHROSTOMY Left 2/13/2021    Procedure: Creation, Nephrostomy, Percutaneous;  Surgeon: Elen Surgeon;  Location: [facility];  Service: Anesthesiology;  Laterality: Left;    right leg hemodialysis access      ROBOT-ASSISTED LAPAROSCOPIC SLEEVE GASTRECTOMY USING DA VELMA XI N/A 9/27/2022    Procedure: XI ROBOTIC SLEEVE GASTRECTOMY;  Surgeon: Cal Parekh MD;  Location: [facility];  Service: General;  Laterality: N/A;    transplant nephrectomy  12/01/2017     Family History   Problem Relation Age of Onset    No Known Problems Mother     Colon cancer Father     Cancer Father     Hypertension Father     No Known Problems Sister     No Known Problems Brother     Kidney disease Maternal Aunt     Hypertension Maternal Aunt     Diabetes Maternal Uncle     Kidney disease Other     Hypertension Other      Social History     Tobacco Use    Smoking status: Former     Current packs/day: 0.00     Average packs/day: 0.3 packs/day for 12.6 years (3.1 ttl pk-yrs)     Types: Cigarettes     Start date: 2008     Quit date: 8/2/2020     Years since quitting: 3.1    Smokeless tobacco: Never   Substance Use Topics    Alcohol use: No    Drug use: No     Review of Systems   Constitutional:  Positive for fever. Negative for diaphoresis.   HENT:  Negative for congestion, dental problem and sore throat.    Eyes:  Negative for pain and visual disturbance.   Respiratory:  Negative for cough and shortness of breath.    Cardiovascular:  Positive for chest pain. Negative for palpitations.   Gastrointestinal:  Negative for abdominal pain, diarrhea, nausea and vomiting.   Genitourinary:  Negative for dysuria and flank pain.   Musculoskeletal:  Negative for back pain and neck pain.   Skin:  Positive for rash. Negative for wound.   Neurological:  Negative for weakness, numbness and headaches.   Psychiatric/Behavioral:  Negative for agitation and confusion.         Physical Exam     Initial Vitals [09/16/23 2214]   BP Pulse Resp Temp SpO2   111/83 86 (!) 24 98.3 °F (36.8 °C) 97 %      MAP       --         Physical Exam    Constitutional: She appears well-developed and well-nourished. No distress.   HENT:   Head: Normocephalic and atraumatic.   On the left temporal scalp, there is an isolated ulcerative lesion, tender, see image.   On the chin, and upper lip, there are several pustular appearing lesions, see image.    Eyes: EOM are normal. Pupils are equal, round, and reactive to light.   Neck: Neck supple.   On the posterior neck, there are several pustular appearing lesions.    Normal range of motion.  Cardiovascular:  Normal rate, regular rhythm and intact distal pulses.           Old shunt in LUE.   Pulmonary/Chest: Breath sounds normal. No respiratory distress.   Abdominal: Abdomen is soft. There is no abdominal tenderness.   On the abdominal wall there are several pustular and ulcerative lesions, tender to palpation. See image.    Genitourinary:    Genitourinary Comments: In the bilateral inguinal creases, there are several umbilicated lesions that are tender to palpation. See image.      Musculoskeletal:         General: No tenderness. Normal range of motion.      Cervical back: Normal range of motion and neck supple.     Neurological: She is alert and oriented to person, place, and time. She has normal strength. No sensory deficit. GCS score is 15. GCS eye subscore is 4. GCS verbal subscore is 5. GCS motor subscore is 6.   Skin: Rash noted.   On the palp of the left hand, there are several papular lesions, see image.   On the proximal left arm, there are 2 ulcerative lesions. See image.                                            ED Course   Procedures  Labs Reviewed   CBC W/ AUTO DIFFERENTIAL - Abnormal; Notable for the following components:       Result Value    RBC 3.95 (*)     Hemoglobin 11.4 (*)     Hematocrit 34.2 (*)     RDW 14.6 (*)     Immature  Granulocytes 1.0 (*)     Immature Grans (Abs) 0.05 (*)     Lymph # 0.3 (*)     Gran % 83.3 (*)     Lymph % 6.4 (*)     All other components within normal limits    Narrative:     Release to patient->Immediate   COMPREHENSIVE METABOLIC PANEL - Abnormal; Notable for the following components:    Chloride 111 (*)     CO2 13 (*)     Albumin 3.1 (*)     All other components within normal limits    Narrative:     Release to patient->Immediate   INFLUENZA A & B BY MOLECULAR   CULTURE, BLOOD   CULTURE, BLOOD   CULTURE, METHICILLIN-RESISTANT STAPHYLOCOCCUS AUREUS   RPR    Narrative:     Release to patient->Immediate   B-TYPE NATRIURETIC PEPTIDE    Narrative:     Release to patient->Immediate   TROPONIN I    Narrative:     Release to patient->Immediate   LACTIC ACID, PLASMA    Narrative:     Release to patient->Immediate   SARS-COV-2 RNA AMPLIFICATION, QUAL   MPOX PCR     EKG Readings: (Independently Interpreted)   Rate of 60 beats per minute.  Normal sinus rhythm.  Normal axis.  P.r., QRS and QTC intervals within normal limits.  No STEMI.       Imaging Results              X-Ray Chest AP Portable (Final result)  Result time 09/16/23 23:12:40      Final result by Theo Petersen MD (09/16/23 23:12:40)                   Impression:      As above      Electronically signed by: Theo Petersen  Date:    09/16/2023  Time:    23:12               Narrative:    EXAMINATION:  XR CHEST AP PORTABLE    CLINICAL HISTORY:  Chest pain;    TECHNIQUE:  Single frontal view of the chest was performed.    COMPARISON:  None    FINDINGS:  Suggestion of patchy airspace disease in the lower lung zones likely related to pneumonia.  Non enlarged cardiac silhouette.  Postsurgical changes of the left arm including stent.    Bones are intact.                                       Medications   vancomycin - pharmacy to dose (has no administration in time range)   vancomycin 2 g in dextrose 5 % 500 mL IVPB (2,000 mg Intravenous New Bag 9/17/23 0134)    ceFEPIme (MAXIPIME) 1 g in dextrose 5 % in water (D5W) 100 mL IVPB (MB+) (0 g Intravenous Stopped 9/17/23 0126)   albuterol-ipratropium 2.5 mg-0.5 mg/3 mL nebulizer solution 3 mL (3 mLs Nebulization Given 9/17/23 0214)     Medical Decision Making  DDx includes: ACS, Sepsis, Pox lesions, Other causes of infection not specified.     Amount and/or Complexity of Data Reviewed  Labs: ordered.  Radiology: ordered.    Risk  Prescription drug management.               ED Course as of 09/17/23 0236   Sat Sep 16, 2023   2312 I swabbed a right inguinal lesion.  [BA]   Sun Sep 17, 2023   0147 Discussed with Dr. Mckeon with Hospital Medicine, believes patient warrants transfer for dermatology evaluation.  [BA]   0147 I discussed the case with Dr. Lund with hospital medicine at [facility], and patient will be accepted at their facility. See encounter note.  [BA]   7215 2:35 AM: Consult with Dr. Lund (Hospital Medicine) at [facility] concerning pt. There are no dermatology services, which the patient requires, offered at [facility] at this time. Dr. Lund expresses understanding and will accept transfer for dermatology.  Accepting Facility: Dr. Lund  Accepting Physician: [facility]     [BA]   9057 2:36 AM: Re-evaluated pt. Informed pt and family that there are no dermatology services available at this time. I have discussed test results, shared treatment plan, and the need for transfer with patient and family at bedsided. All historical, clinical, radiographic, and laboratory findings were reviewed with the patient/family in detail. Patient will be transferred by [facility] services with routine care required en route. Patient understands that there could be unforeseen motor vehicle accidents or loss of vital signs that could result in potential death or permanent disability. Pt and family express understanding at this time and agree with all information. All questions answered. Pt and family  have no further questions or concerns at this time. Pt is ready for transfer.      [BA]      ED Course User Index  [BA] Jose Arenas MD                      Clinical Impression:   Final diagnoses:  [R07.89] Chest tightness  [R21] Rash (Primary)  [J18.9] Pneumonia of both lower lobes due to infectious organism  [R50.9] Fever, unspecified fever cause  [D84.9] Immunocompromised        ED Disposition Condition    Transfer to Another Facility Stable                Jose Arenas MD  09/17/23 6651

## 2023-09-17 NOTE — ASSESSMENT & PLAN NOTE
Patient has history of kidney transplant from decreased tone dating back to 2021.    Patient is on immunosuppressive therapy including tacrolimus, CellCept and prednisone.  Kidney transplant team consulted.

## 2023-09-17 NOTE — H&P
[facility] - [facility] ([address] Medicine  History & Physical    Patient Name: Leydi Cordero  MRN: [id number]  Patient Class: IP- Inpatient  Admission Date: 9/17/2023  Attending Physician: Gini Case MD   Primary Care Provider: Helena Zepeda PA-C         Patient information was obtained from patient and ER records.     Subjective:     Principal Problem:<principal problem not specified>    Chief Complaint: No chief complaint on file.       HPI: 32 y/o F with PMH of ESRD s/p Kidney transplant on immunosuppression, HTN presented to outside hospital on 09/16 complaining of fever and rash since past 3 days.  Patient was in her usual state of health 3 days ago, initially developed blisters and vesicles on her forehead and face.  Later lesions spread to her bilateral arms, chest, abdomen, bilateral inguinal region.  Lesions in the inguinal region where umbilicated.  The lesions ruptured draining serous fluid.  Patient also developed fever at the same time.  Patient also complains of cough associated with clear sputum.  And now complaints of throat pain due to persistent coughing.  Patient also felt nauseous yesterday however denied vomiting.  Patient reports no one else in the family developed similar lesions.  Patient denies shortness of breath, chest pain, abdominal pain, dysuria or hematuria, myalgia, arthralgia, exposure to sick contacts.  Patient has history of kidney transplant dating back to 2021.  On immunosuppression CellCept, tacrolimus and prednisone.  Patient reports compliance with medication      Patient received vancomycin and cefepime 1 dose in the ER at [place].  Subsequently transferred to [facility] for dermatological evaluation.  On presentation patient was afebrile, normotensive, saturating well on room air.  Initial blood work including CBC, BMP, troponin and lactate within normal limits.  COVID, RPR negative.  Blood cultures were obtained yesterday no growth so  far.  Chest x-ray suggestive of patchy consolidation in lower lung fields suggestive of pneumonia.  UA pending.  Initiated patient on vancomycin and cefepime.  Infectious diseases, kidney transplant team, dermatology were consulted.      Past Medical History:   Diagnosis Date    Anxiety     -donor kidney transplant 2021    cPRA 100%, XM negative 3 arteries, 2 on common patch, WIT 40 min    Encounter for blood transfusion     ESRD (end stage renal disease)     Fibroma     H/O kidney transplant     Hypertension     Hypertensive nephropathy     Ovarian cyst     Sleep apnea        Past Surgical History:   Procedure Laterality Date    COLD KNIFE CONIZATION OF CERVIX N/A 2023    Procedure: CONE BIOPSY, CERVIX, USING COLD KNIFE;  Surgeon: Jourdan Conklin MD;  Location: [facility];  Service: OB/GYN;  Laterality: N/A;    COLPOSCOPY  2020    Needs follow up on or after 21    ESOPHAGOGASTRODUODENOSCOPY N/A 2022    Procedure: ESOPHAGOGASTRODUODENOSCOPY (EGD);  Surgeon: Essie Carvajal MD;  Location: [facility];  Service: Endoscopy;  Laterality: N/A;    hemodialysis access left arm      kidney removal       KIDNEY TRANSPLANT  04/15/2015    KIDNEY TRANSPLANT N/A 2021    Procedure: TRANSPLANT, KIDNEY;  Surgeon: Fam Sutton MD;  Location: [address];  Service: Transplant;  Laterality: N/A;    NEPHRECTOMY      OVARIAN CYST REMOVAL      PARATHYROIDECTOMY      partial     PERCUTANEOUS NEPHROSTOMY Left 2021    Procedure: Creation, Nephrostomy, Percutaneous;  Surgeon: Elen Surgeon;  [facility];  Service: Anesthesiology;  Laterality: Left;    right leg hemodialysis access      ROBOT-ASSISTED LAPAROSCOPIC SLEEVE GASTRECTOMY USING DA VELMA XI N/A 2022    Procedure: XI ROBOTIC SLEEVE GASTRECTOMY;  Surgeon: Cal Parekh MD;  Location: [facility];  Service: General;  Laterality: N/A;    transplant nephrectomy  2017       Review of patient's  allergies indicates:   Allergen Reactions    Heparin analogues Rash       Current Facility-Administered Medications on File Prior to Encounter   Medication    [COMPLETED] albuterol-ipratropium 2.5 mg-0.5 mg/3 mL nebulizer solution 3 mL    [COMPLETED] ceFEPIme (MAXIPIME) 1 g in dextrose 5 % in water (D5W) 100 mL IVPB (MB+)    [COMPLETED] vancomycin 2 g in dextrose 5 % 500 mL IVPB    [DISCONTINUED] vancomycin - pharmacy to dose     Current Outpatient Medications on File Prior to Encounter   Medication Sig    albuterol (PROVENTIL/VENTOLIN HFA) 90 mcg/actuation inhaler Inhale 2 puffs into the lungs every 4 (four) hours as needed for Wheezing or Shortness of Breath. Rescue    calcitRIOL (ROCALTROL) 0.25 MCG Cap Take 1 capsule (0.25 mcg total) by mouth once daily.    cholecalciferol, vitamin D3, (VITAMIN D3) 50 mcg (2,000 unit) Tab Take 1 tablet by mouth once daily    docusate sodium (COLACE) 100 MG capsule Take 1 capsule (100 mg total) by mouth 2 (two) times daily as needed.    ergocalciferol (VITAMIN D2) 50,000 unit Cap Take 1 capsule (50,000 Units total) by mouth every 30 days.    HYDROcodone-acetaminophen (NORCO) 5-325 mg per tablet Take 1 tablet by mouth every 4 (four) hours as needed for Pain.    loperamide (IMODIUM) 2 mg capsule Take 1-2 capsule by mouth four times daily as needed for 14 days    magnesium oxide (MAG-OX) 400 mg (241.3 mg magnesium) tablet Take 1 tablet (400 mg total) by mouth 2 (two) times daily.    midodrine (PROAMATINE) 5 MG Tab Take 1 tablet (5 mg total) by mouth 2 (two) times daily with meals.    multivit-min/iron/folic acid/K (ADULTS MULTIVITAMIN ORAL) 1 tablet Orally Once a day    mycophenolate (CELLCEPT) 250 mg Cap Take 4 capsules (1,000 mg total) by mouth 2 (two) times daily.    pantoprazole (PROTONIX) 40 MG tablet Take 1 tablet by mouth once daily    predniSONE (DELTASONE) 5 MG tablet Take 1 tablet (5 mg total) by mouth once daily.    sars-cov-2, covid-19, (MODERNA  COVID-19) 100 mcg/0.5 ml injection Inject 0.5 mLs into the muscle.    sodium bicarbonate 650 MG tablet Take 2 tablets (1,300 mg total) by mouth 3 (three) times daily.    sucralfate (CARAFATE) 1 gram tablet Take 1 tablet (1 g total) by mouth 4 (four) times daily.    tacrolimus (PROGRAF) 1 MG Cap Take 2 capsules (2 mg total) by mouth every morning AND 1 capsule (1 mg total) every evening.     Family History       Problem Relation (Age of Onset)    Cancer Father    Colon cancer Father    Diabetes Maternal Uncle    Hypertension Father, Maternal Aunt, Other    Kidney disease Maternal Aunt, Other    No Known Problems Mother, Sister, Brother          Tobacco Use    Smoking status: Former     Current packs/day: 0.00     Average packs/day: 0.3 packs/day for 12.6 years (3.1 ttl pk-yrs)     Types: Cigarettes     Start date: 2008     Quit date: 8/2/2020     Years since quitting: 3.1    Smokeless tobacco: Never   Substance and Sexual Activity    Alcohol use: No    Drug use: No    Sexual activity: Yes     Partners: Male     Birth control/protection: None     Review of Systems   Constitutional:  Positive for fever. Negative for chills.   Eyes:  Negative for photophobia and visual disturbance.   Respiratory:  Positive for cough. Negative for shortness of breath and wheezing.    Cardiovascular:  Negative for chest pain and palpitations.   Gastrointestinal:  Negative for abdominal pain, nausea and vomiting.   Genitourinary:  Negative for dysuria.   Musculoskeletal:  Negative for arthralgias and myalgias.   Skin:  Positive for rash.   Neurological:  Negative for dizziness and headaches.     Objective:     Vital Signs (Most Recent):  Temp: 99 °F (37.2 °C) (09/17/23 1415)  Pulse: 82 (09/17/23 1415)  Resp: 18 (09/17/23 1415)  BP: 115/82 (09/17/23 1415)  SpO2: 97 % (09/17/23 1415) Vital Signs (24h Range):  Temp:  [98.3 °F (36.8 °C)-99 °F (37.2 °C)] 99 °F (37.2 °C)  Pulse:  [71-91] 82  Resp:  [16-24] 18  SpO2:  [96 %-98 %] 97  %  BP: (106-133)/(69-94) 115/82     Weight: 78 kg (171 lb 15.3 oz)  Body mass index is 27.75 kg/m².     Physical Exam  Constitutional:       Appearance: Normal appearance.   HENT:      Head:      Comments: I patient has an ulcerative lesion on the scalp, multiple palpable lesions scattered over the scalp.  Pustules which have ruptured were noted on the forehead and chin.  Similar lesions were noted in the back of the neck.  Eyes:      Extraocular Movements: Extraocular movements intact.      Conjunctiva/sclera: Conjunctivae normal.      Pupils: Pupils are equal, round, and reactive to light.   Cardiovascular:      Rate and Rhythm: Normal rate and regular rhythm.   Pulmonary:      Effort: Pulmonary effort is normal.      Breath sounds: Normal breath sounds.   Abdominal:      Comments: Umbilicated lesions which have ulcerated are noted on the abdomen   Genitourinary:     Comments: Multiple dark umbilicated lesions noted in bilateral inguinal regions measuring 0.5 x 0.5 cm, tender are located in bilateral inguinal region.  Musculoskeletal:         General: No swelling or tenderness. Normal range of motion.   Skin:     Findings: Rash present.   Neurological:      General: No focal deficit present.      Mental Status: She is alert and oriented to person, place, and time.               Significant Labs: All pertinent labs within the past 24 hours have been reviewed.    Significant Imaging: I have reviewed all pertinent imaging results/findings within the past 24 hours.    Assessment/Plan:     Skin lesions  -Patient  developed blisters and vesicles on her forehead and face 3 days ago.  Later lesions spread to her bilateral arms, chest, abdomen, bilateral inguinal region.  - Lesions in the inguinal region where umbilicated.  The lesions ruptured draining serous fluid.   - Patient also developed fever at the same time.  -Patient reports no one else in the family developed similar lesions.  Patient denies myalgia, arthralgia,  exposure to sick contacts.  Patient has history of kidney transplant dating back to 2021.  On immunosuppression CellCept, tacrolimus and prednisone.   COVID, RPR negative.  Monkey pox testing pending.  Blood cultures were obtained yesterday no growth so far.  Chest x-ray suggestive of patchy consolidation in lower lung fields suggestive of pneumonia.   Patient received vancomycin and cefepime 1 dose in the ER      Plan:   -blood cultures and UA ordered.  Will empirically start treating patient with vancomycin and cefepime.    -rapid progression of the lesion increases the concern for infectious origin, ID consulted.    -patient will need biopsy of these lesions for better diagnosis, dermatology consulted.  -will follow airborne and contact precautions until we have a definite diagnosis.        H/O kidney transplant  Patient has history of kidney transplant from decreased tone dating back to 2021.    Patient is on immunosuppressive therapy including tacrolimus, CellCept and prednisone.  Kidney transplant team consulted.      Acidosis, metabolic  Patient placed on bicarbonate drip by kidney transplant team.  Home bicarbonate dose restarted.        VTE Risk Mitigation (From admission, onward)         Ordered     IP VTE HIGH RISK PATIENT  Once         09/17/23 1232     Place sequential compression device  Until discontinued         09/17/23 1232                           Gini Case MD  Department of Hospital Medicine  [facility] - Intensive Care ([facility]

## 2023-09-17 NOTE — PROVIDER TRANSFER
Outside Transfer Acceptance Note / Regional Referral Center    Referring facility: [facility]   Referring provider: ILAN NAVARRO  Accepting facility: OUTSIDE FACILITY  Accepting provider: Avani Lund   Admitting provider: Avani Lund   Reason for transfer:  Dermatology evaluation   Transfer diagnosis: Rasg   Transfer specialty requested: Dermatology  Transfer specialty notified: No  Transfer level: NUMBER 1-5: 2  Bed type requested: Telemetry   Isolation status: Airborne and Contact and Droplet   Admission class or status: IP- Inpatient      Narrative     The patient is a 34 y/o female with PMH of kidney transplant in 01/2021 on immunosuppression with prograf, cellcept, and prednisone. She presnted with chest tightness and SOB and atypical rash. Rash noted on palms, neck, face, temples, inguinal region and breast. Lesions look umbilicated concerning for monkey pox. Lesions swabbed and sent off. Covid and flu negative. CXR showed BL patchy airspace disease. Referring facility has ID available but not dermatology services. Patient was given vanc and cefepime.       Objective     Vitals: Temp: 98.3 °F (36.8 °C) (09/16/23 2214)  Pulse: 87 (09/17/23 0002)  Resp: (!) 22 (09/17/23 0002)  BP: 133/83 (09/17/23 0002)  SpO2: 98 % (09/17/23 0002)  Recent Labs: All pertinent labs within the past 24 hours have been reviewed.  Recent imaging:    Airway:     Vent settings:         IV access:        Peripheral IV - Single Lumen 09/16/23 2247 20 G Anterior;Left;Proximal Forearm (Active)   Site Assessment No redness;No swelling;No drainage;No warmth 09/16/23 2247   Extremity Assessment Distal to IV No warmth;No swelling;No redness;No abnormal discoloration 09/16/23 2247   Line Status Blood return noted;Flushed;Saline locked 09/16/23 2247   Dressing Status Clean;Dry;Intact 09/16/23 2247   Dressing Intervention First dressing 09/16/23 2247     Infusions:   Allergies:   Review of patient's allergies indicates:    Allergen Reactions    Heparin analogues Rash      NPO: No    Anticoagulation:   Anticoagulants       None             Instructions      [facility]-  Admit to Hospital Medicine  Upon patient arrival to floor, please send SecureChat to [facility] or call extension [phone] (if no answer, do NOT leave a callback number after the beep, rather please send a SecureChat to [facility]), for Hospital Medicine admit team assignment and for additional admit orders for the patient.  Do not page the attending physician associated with the patient on arrival (this physician may not be on duty at the time of arrival).  Rather, always send a SecureChat to [facility] or call [phone] to reach the triage physician for orders and team assignment.

## 2023-09-18 LAB
ANION GAP SERPL CALC-SCNC: 10 MMOL/L (ref 8–16)
BASOPHILS # BLD AUTO: 0.01 K/UL (ref 0–0.2)
BASOPHILS NFR BLD: 0.2 % (ref 0–1.9)
BILIRUB UR QL STRIP: NEGATIVE
BUN SERPL-MCNC: 8 MG/DL (ref 6–20)
CALCIUM SERPL-MCNC: 8.3 MG/DL (ref 8.7–10.5)
CHLORIDE SERPL-SCNC: 106 MMOL/L (ref 95–110)
CLARITY UR REFRACT.AUTO: CLEAR
CO2 SERPL-SCNC: 18 MMOL/L (ref 23–29)
COLOR UR AUTO: COLORLESS
CREAT SERPL-MCNC: 0.8 MG/DL (ref 0.5–1.4)
DIFFERENTIAL METHOD: ABNORMAL
EOSINOPHIL # BLD AUTO: 0 K/UL (ref 0–0.5)
EOSINOPHIL NFR BLD: 0.2 % (ref 0–8)
ERYTHROCYTE [DISTWIDTH] IN BLOOD BY AUTOMATED COUNT: 14.9 % (ref 11.5–14.5)
EST. GFR  (NO RACE VARIABLE): >60 ML/MIN/1.73 M^2
GLUCOSE SERPL-MCNC: 84 MG/DL (ref 70–110)
GLUCOSE UR QL STRIP: NEGATIVE
HCT VFR BLD AUTO: 31.6 % (ref 37–48.5)
HGB BLD-MCNC: 10.8 G/DL (ref 12–16)
HGB UR QL STRIP: ABNORMAL
IMM GRANULOCYTES # BLD AUTO: 0.04 K/UL (ref 0–0.04)
IMM GRANULOCYTES NFR BLD AUTO: 0.9 % (ref 0–0.5)
KETONES UR QL STRIP: NEGATIVE
LEUKOCYTE ESTERASE UR QL STRIP: NEGATIVE
LYMPHOCYTES # BLD AUTO: 0.4 K/UL (ref 1–4.8)
LYMPHOCYTES NFR BLD: 8.9 % (ref 18–48)
MCH RBC QN AUTO: 28.5 PG (ref 27–31)
MCHC RBC AUTO-ENTMCNC: 34.2 G/DL (ref 32–36)
MCV RBC AUTO: 83 FL (ref 82–98)
MICROSCOPIC COMMENT: NORMAL
MONOCYTES # BLD AUTO: 0.4 K/UL (ref 0.3–1)
MONOCYTES NFR BLD: 8.5 % (ref 4–15)
NEUTROPHILS # BLD AUTO: 3.5 K/UL (ref 1.8–7.7)
NEUTROPHILS NFR BLD: 81.3 % (ref 38–73)
NITRITE UR QL STRIP: NEGATIVE
NRBC BLD-RTO: 0 /100 WBC
PH UR STRIP: 6 [PH] (ref 5–8)
PLATELET # BLD AUTO: 166 K/UL (ref 150–450)
PMV BLD AUTO: 11 FL (ref 9.2–12.9)
POTASSIUM SERPL-SCNC: 3.5 MMOL/L (ref 3.5–5.1)
PROT UR QL STRIP: ABNORMAL
RBC # BLD AUTO: 3.79 M/UL (ref 4–5.4)
RBC #/AREA URNS AUTO: 1 /HPF (ref 0–4)
SODIUM SERPL-SCNC: 134 MMOL/L (ref 136–145)
SP GR UR STRIP: 1.01 (ref 1–1.03)
SQUAMOUS #/AREA URNS AUTO: 2 /HPF
TACROLIMUS BLD-MCNC: 5 NG/ML (ref 5–15)
URN SPEC COLLECT METH UR: ABNORMAL
VANCOMYCIN TROUGH SERPL-MCNC: 10.5 UG/ML (ref 10–22)
WBC # BLD AUTO: 4.26 K/UL (ref 3.9–12.7)
WBC #/AREA URNS AUTO: 3 /HPF (ref 0–5)

## 2023-09-18 PROCEDURE — 63600175 PHARM REV CODE 636 W HCPCS: Performed by: NURSE PRACTITIONER

## 2023-09-18 PROCEDURE — 99223 1ST HOSP IP/OBS HIGH 75: CPT | Mod: ,,, | Performed by: INTERNAL MEDICINE

## 2023-09-18 PROCEDURE — 25000003 PHARM REV CODE 250: Performed by: INTERNAL MEDICINE

## 2023-09-18 PROCEDURE — 80048 BASIC METABOLIC PNL TOTAL CA: CPT | Performed by: STUDENT IN AN ORGANIZED HEALTH CARE EDUCATION/TRAINING PROGRAM

## 2023-09-18 PROCEDURE — 21400001 HC TELEMETRY ROOM

## 2023-09-18 PROCEDURE — 85025 COMPLETE CBC W/AUTO DIFF WBC: CPT | Performed by: STUDENT IN AN ORGANIZED HEALTH CARE EDUCATION/TRAINING PROGRAM

## 2023-09-18 PROCEDURE — 87205 SMEAR GRAM STAIN: CPT | Performed by: STUDENT IN AN ORGANIZED HEALTH CARE EDUCATION/TRAINING PROGRAM

## 2023-09-18 PROCEDURE — 80197 ASSAY OF TACROLIMUS: CPT | Performed by: INTERNAL MEDICINE

## 2023-09-18 PROCEDURE — 63600175 PHARM REV CODE 636 W HCPCS: Performed by: INTERNAL MEDICINE

## 2023-09-18 PROCEDURE — 87529 HSV DNA AMP PROBE: CPT | Mod: 59

## 2023-09-18 PROCEDURE — 87070 CULTURE OTHR SPECIMN AEROBIC: CPT | Performed by: STUDENT IN AN ORGANIZED HEALTH CARE EDUCATION/TRAINING PROGRAM

## 2023-09-18 PROCEDURE — 80202 ASSAY OF VANCOMYCIN: CPT | Performed by: STUDENT IN AN ORGANIZED HEALTH CARE EDUCATION/TRAINING PROGRAM

## 2023-09-18 PROCEDURE — 99233 SBSQ HOSP IP/OBS HIGH 50: CPT | Mod: ,,, | Performed by: STUDENT IN AN ORGANIZED HEALTH CARE EDUCATION/TRAINING PROGRAM

## 2023-09-18 PROCEDURE — 25000003 PHARM REV CODE 250: Performed by: NURSE PRACTITIONER

## 2023-09-18 PROCEDURE — 25000003 PHARM REV CODE 250: Performed by: STUDENT IN AN ORGANIZED HEALTH CARE EDUCATION/TRAINING PROGRAM

## 2023-09-18 PROCEDURE — 99223 PR INITIAL HOSPITAL CARE,LEVL III: ICD-10-PCS | Mod: ,,, | Performed by: INTERNAL MEDICINE

## 2023-09-18 PROCEDURE — 99233 PR SUBSEQUENT HOSPITAL CARE,LEVL III: ICD-10-PCS | Mod: ,,, | Performed by: STUDENT IN AN ORGANIZED HEALTH CARE EDUCATION/TRAINING PROGRAM

## 2023-09-18 PROCEDURE — 36415 COLL VENOUS BLD VENIPUNCTURE: CPT | Performed by: STUDENT IN AN ORGANIZED HEALTH CARE EDUCATION/TRAINING PROGRAM

## 2023-09-18 PROCEDURE — 87529 HSV DNA AMP PROBE: CPT | Mod: 59 | Performed by: STUDENT IN AN ORGANIZED HEALTH CARE EDUCATION/TRAINING PROGRAM

## 2023-09-18 PROCEDURE — 81001 URINALYSIS AUTO W/SCOPE: CPT | Performed by: STUDENT IN AN ORGANIZED HEALTH CARE EDUCATION/TRAINING PROGRAM

## 2023-09-18 PROCEDURE — 63600175 PHARM REV CODE 636 W HCPCS: Performed by: STUDENT IN AN ORGANIZED HEALTH CARE EDUCATION/TRAINING PROGRAM

## 2023-09-18 PROCEDURE — 87798 DETECT AGENT NOS DNA AMP: CPT

## 2023-09-18 RX ORDER — GUAIFENESIN/DEXTROMETHORPHAN 100-10MG/5
5 SYRUP ORAL EVERY 6 HOURS
Status: DISCONTINUED | OUTPATIENT
Start: 2023-09-18 | End: 2023-09-24 | Stop reason: HOSPADM

## 2023-09-18 RX ADMIN — GUAIFENESIN AND DEXTROMETHORPHAN 5 ML: 100; 10 SYRUP ORAL at 05:09

## 2023-09-18 RX ADMIN — MIDODRINE HYDROCHLORIDE 5 MG: 5 TABLET ORAL at 05:09

## 2023-09-18 RX ADMIN — SODIUM BICARBONATE 1300 MG: 650 TABLET ORAL at 09:09

## 2023-09-18 RX ADMIN — SUCRALFATE 1 G: 1 TABLET ORAL at 01:09

## 2023-09-18 RX ADMIN — ACYCLOVIR SODIUM 460 MG: 50 INJECTION, SOLUTION INTRAVENOUS at 06:09

## 2023-09-18 RX ADMIN — PREDNISONE 5 MG: 5 TABLET ORAL at 10:09

## 2023-09-18 RX ADMIN — CEFEPIME 2 G: 2 INJECTION, POWDER, FOR SOLUTION INTRAVENOUS at 09:09

## 2023-09-18 RX ADMIN — MIDODRINE HYDROCHLORIDE 5 MG: 5 TABLET ORAL at 10:09

## 2023-09-18 RX ADMIN — PANTOPRAZOLE SODIUM 40 MG: 40 TABLET, DELAYED RELEASE ORAL at 10:09

## 2023-09-18 RX ADMIN — SUCRALFATE 1 G: 1 TABLET ORAL at 10:09

## 2023-09-18 RX ADMIN — TACROLIMUS 1 MG: 1 CAPSULE ORAL at 10:09

## 2023-09-18 RX ADMIN — SUCRALFATE 1 G: 1 TABLET ORAL at 09:09

## 2023-09-18 RX ADMIN — CALCITRIOL CAPSULES 0.25 MCG 0.25 MCG: 0.25 CAPSULE ORAL at 10:09

## 2023-09-18 RX ADMIN — HYDROXYZINE HYDROCHLORIDE 25 MG: 25 TABLET, FILM COATED ORAL at 11:09

## 2023-09-18 RX ADMIN — VANCOMYCIN HYDROCHLORIDE 1250 MG: 1.25 INJECTION, POWDER, LYOPHILIZED, FOR SOLUTION INTRAVENOUS at 04:09

## 2023-09-18 RX ADMIN — VANCOMYCIN HYDROCHLORIDE 1000 MG: 1 INJECTION, POWDER, LYOPHILIZED, FOR SOLUTION INTRAVENOUS at 02:09

## 2023-09-18 RX ADMIN — TACROLIMUS 1 MG: 1 CAPSULE ORAL at 05:09

## 2023-09-18 RX ADMIN — SUCRALFATE 1 G: 1 TABLET ORAL at 05:09

## 2023-09-18 RX ADMIN — SODIUM BICARBONATE 1300 MG: 650 TABLET ORAL at 10:09

## 2023-09-18 RX ADMIN — CEFEPIME 2 G: 2 INJECTION, POWDER, FOR SOLUTION INTRAVENOUS at 01:09

## 2023-09-18 RX ADMIN — CEFEPIME 2 G: 2 INJECTION, POWDER, FOR SOLUTION INTRAVENOUS at 06:09

## 2023-09-18 NOTE — PROGRESS NOTES
Pharmacokinetic Assessment Follow Up: IV Vancomycin    Vancomycin serum concentration assessment(s):    The trough level was drawn correctly and can be used to guide therapy at this time. The measurement is within the desired definitive target range of 10 to 15 mcg/mL. However, the true trough is 9.8 mcg/mL    Vancomycin Regimen Plan:    Change regimen to Vancomycin 1250 mg IV every 12 hours with next serum trough concentration measured at 1400 prior to 5th dose on 9/20. This regimen is estimated to provide a trough of 12.5 mcg/mL and AUC/HORACIO of 537 (goal 400-600).     Drug levels (last 3 results):  Recent Labs   Lab Result Units 09/18/23  1350   Vancomycin-Trough ug/mL 10.5       Pharmacy will continue to follow and monitor vancomycin.    Please contact pharmacy at extension [phone] for questions regarding this assessment.    Thank you for the consult,   Son Vera       Patient brief summary:  Leydi Cordero is a 33 y.o. female initiated on antimicrobial therapy with IV Vancomycin for treatment of skin & soft tissue infection    Drug Allergies:   Review of patient's allergies indicates:   Allergen Reactions    Heparin analogues Rash       Actual Body Weight:   78 kg    Renal Function:   Estimated Creatinine Clearance: 93.2 mL/min (based on SCr of 0.8 mg/dL).,     Dialysis Method (if applicable):  N/A

## 2023-09-18 NOTE — ASSESSMENT & PLAN NOTE
-Patient  developed blisters and vesicles on her forehead and face 3 days ago.  Later lesions spread to her bilateral arms, chest, abdomen, bilateral inguinal region.  - Lesions in the inguinal region where umbilicated.  The lesions ruptured draining serous fluid.   - Patient also developed fever at the same time.  -Patient reports no one else in the family developed similar lesions.  Patient denies myalgia, arthralgia, exposure to sick contacts.  Patient has history of kidney transplant dating back to 2021.  On immunosuppression CellCept, tacrolimus and prednisone.   COVID, RPR negative.  Monkey pox testing pending.  Blood cultures were obtained yesterday no growth so far.  Chest x-ray suggestive of patchy consolidation in lower lung fields suggestive of pneumonia.   Patient received vancomycin and cefepime 1 dose in the ER      Plan:   -Follow blood cultures and UA   Will empirically start treating patient with vancomycin and cefepime.    -rapid progression of the lesion increases the concern for infectious origin, herpes zoster vs varicella zoster  vs disseminated zoster, id recommended starting patient on acyclovir.  -HSV and varicella zoster PCR ordered.  -dermatology on board, appreciate recommendation  -will follow airborne and contact precautions until we have a definite diagnosis.

## 2023-09-18 NOTE — HPI
33 year old female with history of second -donor kidney transplant 2021 (2021) (first 4/15/15) on prograf and cellcept, prednisone,  Hypertension, Hypertensive nephropathy, Ovarian cyst, and Sleep apnea who presents to [facility] as a transfer from [facility] with complaint of rash, fevers, malaise. She reports that 4 days prior to admission she began to develop lesion on face on chin, throughout the day she began to have spreading of these lesions to her back, chest, abdomen, hands, inguinal region. She describes them as itchy and painful. She has noticed some purulent drainage from some of the lesions. Has not had a rash like this before. After developing rash she noticed that she began to feel feverish. She additionally complains of headaches and ear pain. No tinnitus, no decreased hearing, no visual changes. She complains of shortness of breath and cough productive of yellow sputum. She complains of generalized weakness, malaise, myalgias. Complains of sore throat, mild pain with swallowing. She complains of crampy lower abdominal pain that she attributes to starting menses 2 days ago. No N/V. No recent sick contacts. No similar presentations. She does not think had chicken pox as a child and confirmed this with mother. Thinks had all childhood vaccines, not sure about varicella vaccine.    Was seen over summer for persistent diarrhea with weight loss, worsened with eating and discussion of possible GI evaluation for scope. At last appt with transplant nephro these sx had resolved.   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 HR HPV+ 16    From ID. Living with roommate in [place] in apt. No recent travel. No pets or animal exposures. No known bug bites. No new soaps/detergents/perfumes. Not exposed to children. Sexually active with male partners - last encounter 4 months ago, monogamous.

## 2023-09-18 NOTE — SUBJECTIVE & OBJECTIVE
Overnight events:  No significant overnight events.  Patient reports that cough has worsened.  Patient has been coughing up clear sputum.      Review of Systems   Constitutional:  Positive for fever. Negative for chills.   Eyes:  Negative for photophobia and visual disturbance.   Respiratory:  Positive for cough. Negative for shortness of breath and wheezing.    Cardiovascular:  Negative for chest pain and palpitations.   Gastrointestinal:  Negative for abdominal pain, nausea and vomiting.   Genitourinary:  Negative for dysuria.   Musculoskeletal:  Negative for arthralgias and myalgias.   Skin:  Positive for rash.   Neurological:  Negative for dizziness and headaches.     Objective:     Vital Signs (Most Recent):  Temp: 98.9 °F (37.2 °C) (09/18/23 1140)  Pulse: 87 (09/18/23 1140)  Resp: 18 (09/18/23 1140)  BP: 104/61 (09/18/23 1140)  SpO2: (!) 93 % (09/18/23 1140) Vital Signs (24h Range):  Temp:  [98.6 °F (37 °C)-99.6 °F (37.6 °C)] 98.9 °F (37.2 °C)  Pulse:  [71-94] 87  Resp:  [17-18] 18  SpO2:  [91 %-97 %] 93 %  BP: (104-126)/(61-82) 104/61     Weight: 78 kg (171 lb 15.3 oz)  Body mass index is 33.58 kg/m².     Physical Exam  Constitutional:       Appearance: Normal appearance.   HENT:      Head:      Comments: I patient has an ulcerative lesion on the scalp, multiple palpable lesions scattered over the scalp.  Pustules which have ruptured were noted on the forehead and chin.  Similar lesions were noted in the back of the neck.  Eyes:      Extraocular Movements: Extraocular movements intact.      Conjunctiva/sclera: Conjunctivae normal.      Pupils: Pupils are equal, round, and reactive to light.   Cardiovascular:      Rate and Rhythm: Normal rate and regular rhythm.   Pulmonary:      Effort: Pulmonary effort is normal.      Breath sounds: Normal breath sounds.   Abdominal:      Comments: Umbilicated lesions which have ulcerated are noted on the abdomen   Genitourinary:     Comments: Multiple dark umbilicated  lesions noted in bilateral inguinal regions measuring 0.5 x 0.5 cm, tender are located in bilateral inguinal region.  Musculoskeletal:         General: No swelling or tenderness. Normal range of motion.   Skin:     Findings: Rash present.   Neurological:      General: No focal deficit present.      Mental Status: She is alert and oriented to person, place, and time.               Significant Labs: All pertinent labs within the past 24 hours have been reviewed.    Significant Imaging: I have reviewed all pertinent imaging results/findings within the past 24 hours.

## 2023-09-18 NOTE — CONSULTS
[facility] - Intensive Care ([place] [address])  Dermatology  Consult Note    Patient Name: Leydi Cordero  MRN: [id number]  Admission Date: 9/17/2023  Hospital Length of Stay: 1 days  Attending Physician: Gini Case MD  Primary Care Provider: Helena Zepeda PA-C     Inpatient consult to Dermatology  Consult performed by: Jael Schaefer MD  Consult ordered by: Gini Case MD        Subjective:     Principal Problem:<principal problem not specified>    HPI:  Oumar is a 33 year old female with ESRD s/p kidney transplant on immunosuppresion and HTN who was transferred to [facility] 9/17/23 for escalation of care. Dermatology consulted for umbilicated rash all over the body with fever illness.    Patient reports she was in her baseline state of health until 4 days prior to admission. She developed fever and lesions to her skin at that time, and she subsequently developed SOB and cough. She reports skin lesions started as tiny blisters on her forehead and face, and they have since spread to involve bilateral arms, chest, abdomen, inguinal region, and now her L knee. She continues to get new lesions. They never drain purulent material, but some of the little blisters will pop. As they progress they get scabs in the center. She reports intense pain and pruritus. Asked mom and she never had chickenpox. She believes she had all her vaccinations. She is unsure about chickenpox vaccine. Reports history cold sores. No new sexual partners. Not currently sexually active.     Patient moved to [place] from [place] in 2016. The cause of her renal failure is thought to be hypertensive nephropathy. She started hemodialysis 2/7/2008 and had first kidney transplant at [facility] in [place] 4/15/15. First transplant failed, and patient was relisted for transplant on 5/10/17. Second kidney transplant 1/9/2021. Patient currently on an immunosuppressive regimen CellCept, tacrolimus and  prednisone.    On presentation patient was afebrile and hemodynamically stable. Initial blood work including CBC, BMP, troponin and lactate within normal limits.  COVID, RPR negative. UA pending. Monkeypox swab pending. Blood cultures obtained  no growth so far. CXR with patchy consolidation in lower lung fields suggestive of pneumonia. Patient initiated on vancomycin and cefepime.       Past Medical History:   Diagnosis Date    Anxiety     -donor kidney transplant 2021    cPRA 100%, XM negative 3 arteries, 2 on common patch, WIT 40 min    Encounter for blood transfusion     ESRD (end stage renal disease)     Fibroma     H/O kidney transplant     Hypertension     Hypertensive nephropathy     Ovarian cyst     Sleep apnea        Past Surgical History:   Procedure Laterality Date    COLD KNIFE CONIZATION OF CERVIX N/A 2023    Procedure: CONE BIOPSY, CERVIX, USING COLD KNIFE;  Surgeon: Jourdan Conklin MD;  Location: [facility] OR;  Service: OB/GYN;  Laterality: N/A;    COLPOSCOPY  2020    Needs follow up on or after 21    ESOPHAGOGASTRODUODENOSCOPY N/A 2022    Procedure: ESOPHAGOGASTRODUODENOSCOPY (EGD);  Surgeon: Essie Carvajal MD;  Location: [facility];  Service: Endoscopy;  Laterality: N/A;    hemodialysis access left arm      kidney removal       KIDNEY TRANSPLANT  04/15/2015    KIDNEY TRANSPLANT N/A 2021    Procedure: TRANSPLANT, KIDNEY;  Surgeon: Fam Sutton MD;  Location: [address];  Service: Transplant;  Laterality: N/A;    NEPHRECTOMY      OVARIAN CYST REMOVAL      PARATHYROIDECTOMY      partial     PERCUTANEOUS NEPHROSTOMY Left 2021    Procedure: Creation, Nephrostomy, Percutaneous;  Surgeon: Elen Surgeon;  Location: [facility];  Service: Anesthesiology;  Laterality: Left;    right leg hemodialysis access      ROBOT-ASSISTED LAPAROSCOPIC SLEEVE GASTRECTOMY USING DA VELMA XI N/A 2022    Procedure: XI ROBOTIC SLEEVE  GASTRECTOMY;  Surgeon: Cal Parekh MD;  Location: [facility];  Service: General;  Laterality: N/A;    transplant nephrectomy  12/01/2017     Family History       Problem Relation (Age of Onset)    Cancer Father    Colon cancer Father    Diabetes Maternal Uncle    Hypertension Father, Maternal Aunt, Other    Kidney disease Maternal Aunt, Other    No Known Problems Mother, Sister, Brother          Tobacco Use    Smoking status: Former     Current packs/day: 0.00     Average packs/day: 0.3 packs/day for 12.6 years (3.1 ttl pk-yrs)     Types: Cigarettes     Start date: 2008     Quit date: 8/2/2020     Years since quitting: 3.1    Smokeless tobacco: Never   Substance and Sexual Activity    Alcohol use: No    Drug use: No    Sexual activity: Yes     Partners: Male     Birth control/protection: None       Review of patient's allergies indicates:   Allergen Reactions    Heparin analogues Rash       Medications:  Continuous Infusions:  Scheduled Meds:   calcitRIOL  0.25 mcg Oral Daily    ceFEPime (MAXIPIME) IVPB  2 g Intravenous Q8H    midodrine  5 mg Oral BID WM    pantoprazole  40 mg Oral Daily    predniSONE  5 mg Oral Daily    sodium bicarbonate  1,300 mg Oral BID    sucralfate  1 g Oral QID    tacrolimus  1 mg Oral BID    vancomycin (VANCOCIN) IV (PEDS and ADULTS)  1,000 mg Intravenous Q12H     PRN Meds:acetaminophen, albuterol, dextromethorphan-guaiFENesin  mg/5 ml, dextrose 10%, dextrose 10%, docusate sodium, EPINEPHrine (PF), glucagon (human recombinant), glucose, glucose, hydrOXYzine HCL, naloxone, ondansetron, sodium chloride 0.9%, Pharmacy to dose Vancomycin consult **AND** vancomycin - pharmacy to dose    Review of Systems   Constitutional:  Positive for fever, chills and fatigue.   HENT:  Positive for congestion, sore throat and postnasal drip. Negative for mouth sores.    Eyes:  Positive for itching, eye irritation and eyelid inflammation. Negative for visual change.   Respiratory:  Positive  for cough and shortness of breath.    Gastrointestinal:  Negative for nausea, vomiting, diarrhea and constipation.   Genitourinary:  Negative for dysuria and hematuria.   Skin:  Positive for itching and rash.     Objective:     Vital Signs (Most Recent):  Temp: 99.6 °F (37.6 °C) (09/18/23 0732)  Pulse: 94 (09/18/23 0732)  Resp: 18 (09/18/23 0732)  BP: 126/74 (09/18/23 0732)  SpO2: (!) 92 % (09/18/23 0732) Vital Signs (24h Range):  Temp:  [98.6 °F (37 °C)-99.6 °F (37.6 °C)] 99.6 °F (37.6 °C)  Pulse:  [71-94] 94  Resp:  [17-18] 18  SpO2:  [91 %-97 %] 92 %  BP: (107-126)/(64-82) 126/74     Weight: 78 kg (171 lb 15.3 oz)  Body mass index is 33.58 kg/m².     Physical Exam   Constitutional: She appears well-developed and well-nourished. No distress.   Neurological: She is alert and oriented to person, place, and time. She is not disoriented.   Skin:   Areas Examined (abnormalities noted in diagram):   Scalp / Hair Palpated and Inspected  Head / Face Inspection Performed  Neck Inspection Performed  Chest / Axilla Inspection Performed  Abdomen Inspection Performed  Genitals / Buttocks / Groin Inspection Performed  Back Inspection Performed  RUE Inspected  LUE Inspection Performed  RLE Inspected  LLE Inspection Performed  Nails and Digits Inspection Performed                                                        Significant Labs: All pertinent labs within the past 24 hours have been reviewed.    Significant Imaging: None        Assessment/Plan:     Derm  Skin lesions  This is a case of disseminated vesicular skin eruption in a 34 yo F s/p kidney transplant on chronic immunosuppression. Lesions herpetic in nature, which raises most concern for disseminated VZV, disseminated HSV, primary varicella. Other entities such as monkey pox, molluscum considered as well. Given that the greatest concern is for disseminated VZV>HSV, will obtain swabs at bedside and treat empirically. Plan defer biopsy unless both HSV and VZV  negative.    - HSV and VZV swabs obtained from lesions on most proximal portion RLE  - Monkeypox swab pending.   - Suggest IV acyclovir at 10mg/kg every 8 hours. Would okay dosing with kidney transplant team.   - Suggest discussion with transplant team on immunosuppression / if able to hold any given acute infection.        Thank you for your consult. I will continue to peripherally following until swabs result. Please call with any questions.    Jael Schaefer MD  Dermatology  [facility] - Intensive Care ([facility]

## 2023-09-18 NOTE — PLAN OF CARE
[facility] - Intensive Care ([facility]  Initial Discharge Assessment       Primary Care Provider: Helena Zepeda PA-C    Admission Diagnosis: Rash [R21]    Admission Date: 9/17/2023  Expected Discharge Date: 9/20/2023    Transition of Care Barriers: (P) None    Payor: [employer]GED MEDICARE / Plan: [employer] SECURE COMPLETE / Product Type: Medicare Advantage /     Extended Emergency Contact Information  Primary Emergency Contact: Lon Grimes  Address: [address] [place] [address] [place] [address] [facility]  Home Phone: [phone]  Mobile Phone: [phone]  Relation: Relative    Discharge Plan A: (P) Home  Discharge Plan B: (P) Home      [facility] [address] [place] [address]  Phone: [phone] Fax: [phone]    [facility] [place] [address] [address]ainz  [facility] [address]  Phone: [phone] Fax: [phone]      Initial Assessment (most recent)       Adult Discharge Assessment - 09/18/23 1608          Discharge Assessment    Assessment Type Discharge Planning Assessment (P)      Confirmed/corrected address, phone number and insurance Yes (P)      Confirmed Demographics Correct on Facesheet (P)      Source of Information patient (P)      Communicated NATALIIA with patient/caregiver No (P)      Reason For Admission rash (P)      People in Home friend(s) (P)      Facility Arrived From: home (P)      Do you expect to return to your current living situation? Yes (P)      Do you have help at home or someone to help you manage your care at home? Yes (P)      Who are your caregiver(s) and their phone number(s)? Sofiya Watson [phone] (P)      Prior to hospitilization cognitive status: Unable to Assess (P)      Current cognitive status: Alert/Oriented (P)      Home Layout Able to live on 1st floor (P)      Equipment Currently Used at Home none (P)      Readmission within 30 days? Yes (P)      Do you currently have  service(s) that help you manage your care at home? No (P)      Do you take prescription medications? Yes (P)      Do you have prescription coverage? Yes (P)      Coverage kassie SCHWAB (P)      Who is going to help you get home at discharge? Sofiya Walter [phone] (P)      How do you get to doctors appointments? car, drives self (P)      Do you take coumadin? No (P)      DME Needed Upon Discharge  none (P)      Discharge Plan discussed with: Patient (P)      Transition of Care Barriers None (P)      Discharge Plan A Home (P)      Discharge Plan B Home (P)         Physical Activity    On average, how many days per week do you engage in moderate to strenuous exercise (like a brisk walk)? 0 days (P)      On average, how many minutes do you engage in exercise at this level? 0 min (P)         Financial Resource Strain    How hard is it for you to pay for the very basics like food, housing, medical care, and heating? Not hard at all (P)         Stress    Do you feel stress - tense, restless, nervous, or anxious, or unable to sleep at night because your mind is troubled all the time - these days? Not at all (P)         Social Connections    In a typical week, how many times do you talk on the phone with family, friends, or neighbors? More than three times a week (P)      How often do you get together with friends or relatives? More than three times a week (P)      How often do you attend Congregation or Christian services? Never (P)      Do you belong to any clubs or organizations such as Congregation groups, unions, fraternal or athletic groups, or school groups? No (P)      How often do you attend meetings of the clubs or organizations you belong to? Never (P)      Are you , , , , never , or living with a partner?  (P)         Alcohol Use    Q1: How often do you have a drink containing alcohol? Never (P)      Q2: How many drinks containing alcohol do you have on a typical day when you  are drinking? Patient does not drink (P)      Q3: How often do you have six or more drinks on one occasion? Never (P)         OTHER    Name(s) of People in Home Sofiya Watson [phone] (P)                  CM spoke with pt in room using language line.  Pt lives in 1 story home with friend Sofiya Watson [phone].  She will call someone to pick her up from hospital, and drives to MD appointments.  30D readmission.  No HH, DME, coumadin, or HD; indep with ADL's.    Rachelle Spann, MARIEN, BS, RN, CCM

## 2023-09-18 NOTE — SUBJECTIVE & OBJECTIVE
Past Medical History:   Diagnosis Date    Anxiety     -donor kidney transplant 2021    cPRA 100%, XM negative 3 arteries, 2 on common patch, WIT 40 min    Encounter for blood transfusion     ESRD (end stage renal disease)     Fibroma     H/O kidney transplant     Hypertension     Hypertensive nephropathy     Ovarian cyst     Sleep apnea        Past Surgical History:   Procedure Laterality Date    COLD KNIFE CONIZATION OF CERVIX N/A 2023    Procedure: CONE BIOPSY, CERVIX, USING COLD KNIFE;  Surgeon: Jourdan Conklin MD;  Location: [facility] OR;  Service: OB/GYN;  Laterality: N/A;    COLPOSCOPY  2020    Needs follow up on or after 21    ESOPHAGOGASTRODUODENOSCOPY N/A 2022    Procedure: ESOPHAGOGASTRODUODENOSCOPY (EGD);  Surgeon: Essie Carvajal MD;  Location: [facility];  Service: Endoscopy;  Laterality: N/A;    hemodialysis access left arm      kidney removal       KIDNEY TRANSPLANT  04/15/2015    KIDNEY TRANSPLANT N/A 2021    Procedure: TRANSPLANT, KIDNEY;  Surgeon: Fam Sutton MD;  Location: [address];  Service: Transplant;  Laterality: N/A;    NEPHRECTOMY      OVARIAN CYST REMOVAL      PARATHYROIDECTOMY      partial     PERCUTANEOUS NEPHROSTOMY Left 2021    Procedure: Creation, Nephrostomy, Percutaneous;  Surgeon: Elen Surgeon;  Location: [facility];  Service: Anesthesiology;  Laterality: Left;    right leg hemodialysis access      ROBOT-ASSISTED LAPAROSCOPIC SLEEVE GASTRECTOMY USING DA VELMA XI N/A 2022    Procedure: XI ROBOTIC SLEEVE GASTRECTOMY;  Surgeon: Cal Parekh MD;  Location: [facility];  Service: General;  Laterality: N/A;    transplant nephrectomy  2017     Family History       Problem Relation (Age of Onset)    Cancer Father    Colon cancer Father    Diabetes Maternal Uncle    Hypertension Father, Maternal Aunt, Other    Kidney disease Maternal Aunt, Other    No Known Problems Mother, Sister, Brother          Tobacco Use    Smoking  status: Former     Current packs/day: 0.00     Average packs/day: 0.3 packs/day for 12.6 years (3.1 ttl pk-yrs)     Types: Cigarettes     Start date: 2008     Quit date: 8/2/2020     Years since quitting: 3.1    Smokeless tobacco: Never   Substance and Sexual Activity    Alcohol use: No    Drug use: No    Sexual activity: Yes     Partners: Male     Birth control/protection: None       Review of patient's allergies indicates:   Allergen Reactions    Heparin analogues Rash       Medications:  Continuous Infusions:  Scheduled Meds:   calcitRIOL  0.25 mcg Oral Daily    ceFEPime (MAXIPIME) IVPB  2 g Intravenous Q8H    midodrine  5 mg Oral BID WM    pantoprazole  40 mg Oral Daily    predniSONE  5 mg Oral Daily    sodium bicarbonate  1,300 mg Oral BID    sucralfate  1 g Oral QID    tacrolimus  1 mg Oral BID    vancomycin (VANCOCIN) IV (PEDS and ADULTS)  1,000 mg Intravenous Q12H     PRN Meds:acetaminophen, albuterol, dextromethorphan-guaiFENesin  mg/5 ml, dextrose 10%, dextrose 10%, docusate sodium, EPINEPHrine (PF), glucagon (human recombinant), glucose, glucose, hydrOXYzine HCL, naloxone, ondansetron, sodium chloride 0.9%, Pharmacy to dose Vancomycin consult **AND** vancomycin - pharmacy to dose    Review of Systems   Constitutional:  Positive for fever, chills and fatigue.   HENT:  Positive for congestion, sore throat and postnasal drip. Negative for mouth sores.    Eyes:  Positive for itching, eye irritation and eyelid inflammation. Negative for visual change.   Respiratory:  Positive for cough and shortness of breath.    Gastrointestinal:  Negative for nausea, vomiting, diarrhea and constipation.   Genitourinary:  Negative for dysuria and hematuria.   Skin:  Positive for itching and rash.     Objective:     Vital Signs (Most Recent):  Temp: 99.6 °F (37.6 °C) (09/18/23 0732)  Pulse: 94 (09/18/23 0732)  Resp: 18 (09/18/23 0732)  BP: 126/74 (09/18/23 0732)  SpO2: (!) 92 % (09/18/23 0732) Vital Signs (24h  Range):  Temp:  [98.6 °F (37 °C)-99.6 °F (37.6 °C)] 99.6 °F (37.6 °C)  Pulse:  [71-94] 94  Resp:  [17-18] 18  SpO2:  [91 %-97 %] 92 %  BP: (107-126)/(64-82) 126/74     Weight: 78 kg (171 lb 15.3 oz)  Body mass index is 33.58 kg/m².     Physical Exam   Constitutional: She appears well-developed and well-nourished. No distress.   Neurological: She is alert and oriented to person, place, and time. She is not disoriented.   Skin:   Areas Examined (abnormalities noted in diagram):   Scalp / Hair Palpated and Inspected  Head / Face Inspection Performed  Neck Inspection Performed  Chest / Axilla Inspection Performed  Abdomen Inspection Performed  Genitals / Buttocks / Groin Inspection Performed  Back Inspection Performed  RUE Inspected  LUE Inspection Performed  RLE Inspected  LLE Inspection Performed  Nails and Digits Inspection Performed                                                        Significant Labs: All pertinent labs within the past 24 hours have been reviewed.    Significant Imaging: None

## 2023-09-18 NOTE — ASSESSMENT & PLAN NOTE
This is a case of disseminated vesicular skin eruption in a 34 yo F s/p kidney transplant on chronic immunosuppression. Lesions herpetic in nature, which raises most concern for disseminated VZV, disseminated HSV, primary varicella. Other entities such as monkey pox, molluscum considered as well. Given that the greatest concern is for disseminated VZV>HSV, will obtain swabs at bedside and treat empirically. Plan defer biopsy unless both HSV and VZV negative.    - HSV and VZV swabs obtained from lesions on most proximal portion RLE  - Monkeypox swab pending.   - Suggest IV acyclovir at 10mg/kg every 8 hours. Would okay dosing with kidney transplant team.   - Suggest discussion with transplant team on immunosuppression / if able to hold any given acute infection.

## 2023-09-18 NOTE — SUBJECTIVE & OBJECTIVE
Past Medical History:   Diagnosis Date    Anxiety     -donor kidney transplant 2021    cPRA 100%, XM negative 3 arteries, 2 on common patch, WIT 40 min    Encounter for blood transfusion     ESRD (end stage renal disease)     Fibroma     H/O kidney transplant     Hypertension     Hypertensive nephropathy     Ovarian cyst     Sleep apnea        Past Surgical History:   Procedure Laterality Date    COLD KNIFE CONIZATION OF CERVIX N/A 2023    Procedure: CONE BIOPSY, CERVIX, USING COLD KNIFE;  Surgeon: Jourdan Conklin MD;  Location: [facility] OR;  Service: OB/GYN;  Laterality: N/A;    COLPOSCOPY  2020    Needs follow up on or after 21    ESOPHAGOGASTRODUODENOSCOPY N/A 2022    Procedure: ESOPHAGOGASTRODUODENOSCOPY (EGD);  Surgeon: Essie Carvajal MD;  Location: [facility];  Service: Endoscopy;  Laterality: N/A;    hemodialysis access left arm      kidney removal       KIDNEY TRANSPLANT  04/15/2015    KIDNEY TRANSPLANT N/A 2021    Procedure: TRANSPLANT, KIDNEY;  Surgeon: Fam Sutton MD;  Location: [address];  Service: Transplant;  Laterality: N/A;    NEPHRECTOMY      OVARIAN CYST REMOVAL      PARATHYROIDECTOMY      partial     PERCUTANEOUS NEPHROSTOMY Left 2021    Procedure: Creation, Nephrostomy, Percutaneous;  Surgeon: Elen Surgeon;  Location: [facility];  Service: Anesthesiology;  Laterality: Left;    right leg hemodialysis access      ROBOT-ASSISTED LAPAROSCOPIC SLEEVE GASTRECTOMY USING DA VELMA XI N/A 2022    Procedure: XI ROBOTIC SLEEVE GASTRECTOMY;  Surgeon: Cal Parekh MD;  Location: [facility];  Service: General;  Laterality: N/A;    transplant nephrectomy  2017       Review of patient's allergies indicates:   Allergen Reactions    Heparin analogues Rash       Medications:  Facility-Administered Medications Prior to Admission   Medication    acetaminophen tablet 650 mg    diphenhydrAMINE injection 25 mg    EPINEPHrine (EPIPEN) 0.3 mg/0.3 mL pen  injection 0.3 mg    methylPREDNISolone sodium succinate injection 40 mg    ondansetron disintegrating tablet 4 mg    sodium chloride 0.9% 500 mL flush bag    sodium chloride 0.9% flush 10 mL     Medications Prior to Admission   Medication Sig    albuterol (PROVENTIL/VENTOLIN HFA) 90 mcg/actuation inhaler Inhale 2 puffs into the lungs every 4 (four) hours as needed for Wheezing or Shortness of Breath. Rescue    calcitRIOL (ROCALTROL) 0.25 MCG Cap Take 1 capsule (0.25 mcg total) by mouth once daily.    cholecalciferol, vitamin D3, (VITAMIN D3) 50 mcg (2,000 unit) Tab Take 1 tablet by mouth once daily    docusate sodium (COLACE) 100 MG capsule Take 1 capsule (100 mg total) by mouth 2 (two) times daily as needed.    ergocalciferol (VITAMIN D2) 50,000 unit Cap Take 1 capsule (50,000 Units total) by mouth every 30 days.    HYDROcodone-acetaminophen (NORCO) 5-325 mg per tablet Take 1 tablet by mouth every 4 (four) hours as needed for Pain.    loperamide (IMODIUM) 2 mg capsule Take 1-2 capsule by mouth four times daily as needed for 14 days    magnesium oxide (MAG-OX) 400 mg (241.3 mg magnesium) tablet Take 1 tablet (400 mg total) by mouth 2 (two) times daily.    midodrine (PROAMATINE) 5 MG Tab Take 1 tablet (5 mg total) by mouth 2 (two) times daily with meals.    multivit-min/iron/folic acid/K (ADULTS MULTIVITAMIN ORAL) 1 tablet Orally Once a day    mycophenolate (CELLCEPT) 250 mg Cap Take 4 capsules (1,000 mg total) by mouth 2 (two) times daily.    pantoprazole (PROTONIX) 40 MG tablet Take 1 tablet by mouth once daily    predniSONE (DELTASONE) 5 MG tablet Take 1 tablet (5 mg total) by mouth once daily.    sars-cov-2, covid-19, (MODERNA COVID-19) 100 mcg/0.5 ml injection Inject 0.5 mLs into the muscle.    sodium bicarbonate 650 MG tablet Take 2 tablets (1,300 mg total) by mouth 3 (three) times daily.    sucralfate (CARAFATE) 1 gram tablet Take 1 tablet (1 g total) by mouth 4 (four) times daily.    tacrolimus (PROGRAF) 1 MG  Cap Take 2 capsules (2 mg total) by mouth every morning AND 1 capsule (1 mg total) every evening.     Antibiotics (From admission, onward)      Start     Stop Route Frequency Ordered    09/17/23 1430  vancomycin (VANCOCIN) 1,000 mg in dextrose 5 % (D5W) 250 mL IVPB (Vial-Mate)         -- IV Every 12 hours (non-standard times) 09/17/23 1317    09/17/23 1400  ceFEPIme (MAXIPIME) 2 g in dextrose 5 % in water (D5W) 100 mL IVPB (MB+)         -- IV Every 8 hours (non-standard times) 09/17/23 1248    09/17/23 1348  vancomycin - pharmacy to dose  (vancomycin IVPB (PEDS and ADULTS))        See Hyperspace for full Linked Orders Report.    -- IV pharmacy to manage frequency 09/17/23 1248          Antifungals (From admission, onward)      None          Antivirals (From admission, onward)      None             Immunization History   Administered Date(s) Administered    COVID-19, MRNA, LN-S, PF (MODERNA FULL 0.5 ML DOSE) 10/29/2021, 12/15/2021    Hepatitis A, Adult 11/20/2019    Influenza - Quadrivalent 01/08/2016    Influenza - Quadrivalent - PF *Preferred* (6 months and older) 09/25/2017, 09/20/2018, 10/26/2019, 10/26/2020    PPD Test 05/12/2017, 05/22/2017, 05/22/2018, 05/23/2019, 05/22/2020, 05/29/2020    Pneumococcal 06/19/2017, 11/21/2019    Tdap 11/20/2019       Family History       Problem Relation (Age of Onset)    Cancer Father    Colon cancer Father    Diabetes Maternal Uncle    Hypertension Father, Maternal Aunt, Other    Kidney disease Maternal Aunt, Other    No Known Problems Mother, Sister, Brother          Social History     Socioeconomic History    Marital status: Single   Tobacco Use    Smoking status: Former     Current packs/day: 0.00     Average packs/day: 0.3 packs/day for 12.6 years (3.1 ttl pk-yrs)     Types: Cigarettes     Start date: 2008     Quit date: 8/2/2020     Years since quitting: 3.1    Smokeless tobacco: Never   Substance and Sexual Activity    Alcohol use: No    Drug use: No    Sexual activity:  Yes     Partners: Male     Birth control/protection: None     Social Determinants of Health     Financial Resource Strain: Low Risk  (9/28/2022)    Overall Financial Resource Strain (CARDIA)     Difficulty of Paying Living Expenses: Not hard at all   Food Insecurity: No Food Insecurity (9/28/2022)    Hunger Vital Sign     Worried About Running Out of Food in the Last Year: Never true     Ran Out of Food in the Last Year: Never true   Transportation Needs: No Transportation Needs (9/28/2022)    PRAPARE - Transportation     Lack of Transportation (Medical): No     Lack of Transportation (Non-Medical): No   Physical Activity: Inactive (9/28/2022)    Exercise Vital Sign     Days of Exercise per Week: 0 days     Minutes of Exercise per Session: 0 min   Stress: No Stress Concern Present (9/28/2022)    [employer] - Occupational Stress Questionnaire     Feeling of Stress : Not at all   Social Connections: Socially Isolated (9/28/2022)    Social Connection and Isolation Panel [NHANES]     Frequency of Communication with Friends and Family: More than three times a week     Frequency of Social Gatherings with Friends and Family: Once a week     Attends [employer] Services: Never     Active Member of Clubs or Organizations: No     Attends Club or Organization Meetings: Never     Marital Status:    Housing Stability: Low Risk  (9/28/2022)    Housing Stability Vital Sign     Unable to Pay for Housing in the Last Year: No     Number of Places Lived in the Last Year: 1     Unstable Housing in the Last Year: No     Review of Systems   Constitutional:  Positive for appetite change, fatigue, fever and unexpected weight change. Negative for chills.   HENT:  Positive for ear pain, sore throat and trouble swallowing. Negative for congestion, ear discharge, mouth sores, rhinorrhea, sinus pain and tinnitus.    Eyes:  Negative for photophobia, pain and visual disturbance.   Respiratory:  Positive for  cough and shortness of breath. Negative for wheezing.    Cardiovascular:  Positive for chest pain (pleuritic - when coughs). Negative for palpitations and leg swelling.   Gastrointestinal:  Positive for abdominal pain and diarrhea. Negative for abdominal distention, blood in stool, constipation, nausea and vomiting.   Endocrine: Negative.    Genitourinary:  Negative for decreased urine volume, dysuria, frequency and pelvic pain.   Musculoskeletal:  Positive for myalgias. Negative for arthralgias, back pain and joint swelling.   Skin:  Positive for rash. Negative for color change and wound.   Allergic/Immunologic: Positive for immunocompromised state.   Neurological:  Positive for weakness (generalized) and headaches. Negative for numbness.   Hematological:  Negative for adenopathy. Does not bruise/bleed easily.   Psychiatric/Behavioral:  Negative for agitation and confusion.      Objective:     Vital Signs (Most Recent):  Temp: 98.9 °F (37.2 °C) (09/18/23 1140)  Pulse: 87 (09/18/23 1140)  Resp: 18 (09/18/23 1140)  BP: 104/61 (09/18/23 1140)  SpO2: (!) 93 % (09/18/23 1140) Vital Signs (24h Range):  Temp:  [98.6 °F (37 °C)-99.6 °F (37.6 °C)] 98.9 °F (37.2 °C)  Pulse:  [71-94] 87  Resp:  [17-18] 18  SpO2:  [91 %-97 %] 93 %  BP: (104-126)/(61-82) 104/61     Weight: 78 kg (171 lb 15.3 oz)  Body mass index is 33.58 kg/m².    Estimated Creatinine Clearance: 93.2 mL/min (based on SCr of 0.8 mg/dL).     Physical Exam  Vitals and nursing note reviewed.   Constitutional:       Appearance: She is ill-appearing.   HENT:      Head: Normocephalic and atraumatic.      Right Ear: External ear normal.      Left Ear: External ear normal.      Nose: Nose normal. No congestion.      Mouth/Throat:      Mouth: Mucous membranes are moist.      Pharynx: Oropharynx is clear. No oropharyngeal exudate or posterior oropharyngeal erythema.   Eyes:      General: No scleral icterus.     Extraocular Movements: Extraocular movements intact.       Conjunctiva/sclera: Conjunctivae normal.      Pupils: Pupils are equal, round, and reactive to light.      Comments: Small lesion on upper eyelid near eyelashes   Cardiovascular:      Rate and Rhythm: Normal rate and regular rhythm.      Pulses: Normal pulses.      Heart sounds: Normal heart sounds. No murmur heard.  Pulmonary:      Effort: Pulmonary effort is normal. No respiratory distress.      Breath sounds: Normal breath sounds.   Abdominal:      General: Abdomen is flat. There is no distension.      Palpations: Abdomen is soft.      Tenderness: There is no abdominal tenderness.   Musculoskeletal:         General: No swelling or deformity. Normal range of motion.      Cervical back: Normal range of motion and neck supple. No rigidity or tenderness.   Lymphadenopathy:      Cervical: No cervical adenopathy.   Skin:     General: Skin is warm and dry.      Capillary Refill: Capillary refill takes less than 2 seconds.      Findings: Erythema, lesion and rash present.      Comments: Multiple lesions as seen in photos - some umbilicated, some vesicular with erythematous base    Neurological:      General: No focal deficit present.      Mental Status: She is alert and oriented to person, place, and time. Mental status is at baseline.   Psychiatric:         Mood and Affect: Mood normal.         Behavior: Behavior normal.         Thought Content: Thought content normal.         Judgment: Judgment normal.                                                    Significant Labs: All pertinent labs within the past 24 hours have been reviewed.    Significant Imaging: I have reviewed all pertinent imaging results/findings within the past 24 hours.

## 2023-09-18 NOTE — PROGRESS NOTES
[facility] - [facility] ([address] Medicine  Progress Note    Patient Name: Leydi Cordero  MRN: [id number]  Patient Class: IP- Inpatient   Admission Date: 9/17/2023  Length of Stay: 1 days  Attending Physician: Gini Case MD  Primary Care Provider: Helena Zepeda PA-C        Subjective:     Principal Problem:<principal problem not specified>        HPI:  34 y/o F with PMH of ESRD s/p Kidney transplant on immunosuppression, HTN presented to outside hospital on 09/16 complaining of fever and rash since past 3 days.  Patient was in her usual state of health 3 days ago, initially developed blisters and vesicles on her forehead and face.  Later lesions spread to her bilateral arms, chest, abdomen, bilateral inguinal region.  Lesions in the inguinal region where umbilicated.  The lesions ruptured draining serous fluid.  Patient also developed fever at the same time.  Patient also complains of cough associated with clear sputum.  And now complaints of throat pain due to persistent coughing.  Patient also felt nauseous yesterday however denied vomiting.  Patient reports no one else in the family developed similar lesions.  Patient denies shortness of breath, chest pain, abdominal pain, dysuria or hematuria, myalgia, arthralgia, exposure to sick contacts.  Patient has history of kidney transplant dating back to 2021.  On immunosuppression CellCept, tacrolimus and prednisone.  Patient reports compliance with medication      Patient received vancomycin and cefepime 1 dose in the ER at [place].  Subsequently transferred [place] [facility] [place]ological evaluation.  On presentation patient was afebrile, normotensive, saturating well on room air.  Initial blood work including CBC, BMP, troponin and lactate within normal limits.  COVID, RPR negative.  Blood cultures were obtained yesterday no growth so far.  Chest x-ray suggestive of patchy consolidation in lower lung fields suggestive of  pneumonia.  UA pending.  Initiated patient on vancomycin and cefepime.  Infectious diseases, kidney transplant team, dermatology were consulted.      Overview/Hospital Course:  32 y/o F with PMH of ESRD s/p Kidney transplant on immunosuppression, HTN presented to outside hospital on 09/16 complaining of fever and rash since past 3 days.  Lesions were noted on face, bilateral arms, chest, abdomen, bilateral inguinal region.  Lesions in the inguinal region where umbilicated.  Patient received vancomycin and cefepime 1 dose in the ER at [place].  Subsequently transferred to [facility] for dermatological evaluation.  Blood cultures and UA were obtained.  Chest x-ray was suggestive of patchy consolidation bilateral lower lobes.  Started broad-spectrum antibiotics including vanc and cefepime.  Patient has history of kidney transplant in 2021, on immunosuppressive therapy.  Id, dermatology and kidney transplant team were consulted.  Id was concerned for disseminated herpes versus varicella, patient was started on acyclovir at immunosuppressive dose and swabs were collected.        Overnight events:  No significant overnight events.  Patient reports that cough has worsened.  Patient has been coughing up clear sputum.      Review of Systems   Constitutional:  Positive for fever. Negative for chills.   Eyes:  Negative for photophobia and visual disturbance.   Respiratory:  Positive for cough. Negative for shortness of breath and wheezing.    Cardiovascular:  Negative for chest pain and palpitations.   Gastrointestinal:  Negative for abdominal pain, nausea and vomiting.   Genitourinary:  Negative for dysuria.   Musculoskeletal:  Negative for arthralgias and myalgias.   Skin:  Positive for rash.   Neurological:  Negative for dizziness and headaches.     Objective:     Vital Signs (Most Recent):  Temp: 98.9 °F (37.2 °C) (09/18/23 1140)  Pulse: 87 (09/18/23 1140)  Resp: 18 (09/18/23 1140)  BP: 104/61 (09/18/23 1140)  SpO2: (!) 93 %  (09/18/23 1140) Vital Signs (24h Range):  Temp:  [98.6 °F (37 °C)-99.6 °F (37.6 °C)] 98.9 °F (37.2 °C)  Pulse:  [71-94] 87  Resp:  [17-18] 18  SpO2:  [91 %-97 %] 93 %  BP: (104-126)/(61-82) 104/61     Weight: 78 kg (171 lb 15.3 oz)  Body mass index is 33.58 kg/m².     Physical Exam  Constitutional:       Appearance: Normal appearance.   HENT:      Head:      Comments: I patient has an ulcerative lesion on the scalp, multiple palpable lesions scattered over the scalp.  Pustules which have ruptured were noted on the forehead and chin.  Similar lesions were noted in the back of the neck.  Eyes:      Extraocular Movements: Extraocular movements intact.      Conjunctiva/sclera: Conjunctivae normal.      Pupils: Pupils are equal, round, and reactive to light.   Cardiovascular:      Rate and Rhythm: Normal rate and regular rhythm.   Pulmonary:      Effort: Pulmonary effort is normal.      Breath sounds: Normal breath sounds.   Abdominal:      Comments: Umbilicated lesions which have ulcerated are noted on the abdomen   Genitourinary:     Comments: Multiple dark umbilicated lesions noted in bilateral inguinal regions measuring 0.5 x 0.5 cm, tender are located in bilateral inguinal region.  Musculoskeletal:         General: No swelling or tenderness. Normal range of motion.   Skin:     Findings: Rash present.   Neurological:      General: No focal deficit present.      Mental Status: She is alert and oriented to person, place, and time.               Significant Labs: All pertinent labs within the past 24 hours have been reviewed.    Significant Imaging: I have reviewed all pertinent imaging results/findings within the past 24 hours.      Assessment/Plan:      Skin lesions  -Patient  developed blisters and vesicles on her forehead and face 3 days ago.  Later lesions spread to her bilateral arms, chest, abdomen, bilateral inguinal region.  - Lesions in the inguinal region where umbilicated.  The lesions ruptured draining  serous fluid.   - Patient also developed fever at the same time.  -Patient reports no one else in the family developed similar lesions.  Patient denies myalgia, arthralgia, exposure to sick contacts.  Patient has history of kidney transplant dating back to 2021.  On immunosuppression CellCept, tacrolimus and prednisone.   COVID, RPR negative.  Monkey pox testing pending.  Blood cultures were obtained yesterday no growth so far.  Chest x-ray suggestive of patchy consolidation in lower lung fields suggestive of pneumonia.   Patient received vancomycin and cefepime 1 dose in the ER      Plan:   -Follow blood cultures and UA   Will empirically start treating patient with vancomycin and cefepime.    -rapid progression of the lesion increases the concern for infectious origin, herpes zoster vs varicella zoster  vs disseminated zoster, id recommended starting patient on acyclovir.  -HSV and varicella zoster PCR ordered.  -dermatology on board, appreciate recommendation  -will follow airborne and contact precautions until we have a definite diagnosis.        H/O kidney transplant  Patient has history of kidney transplant from decreased tone dating back to 2021.    Patient is on immunosuppressive therapy including tacrolimus, CellCept and prednisone.  Kidney transplant team consulted.      Hyperparathyroidism, tertiary    Continue calcitriol    Acidosis  Patient placed on bicarbonate drip by kidney transplant team.  Home bicarbonate dose restarted.        VTE Risk Mitigation (From admission, onward)         Ordered     IP VTE HIGH RISK PATIENT  Once         09/17/23 1232     Place sequential compression device  Until discontinued         09/17/23 1232                Discharge Planning   NATALIIA: 9/20/2023     Code Status: Full Code   Is the patient medically ready for discharge?:     Reason for patient still in hospital (select all that apply): Laboratory test and Treatment                     Gini Case  MD  Department of Hospital Medicine   [facility] - [facility] ([place])

## 2023-09-18 NOTE — CONSULTS
Leydi Cordero is a 33 y.o. female for whom nephrology consult has been requested to evaluate and give opinion.     HPI: 33 yr-old females from [place] s/p a second kidney transplant  2 yrs and 8 months ago. Prograf MMF 1000 bid and prednisone 5 mg daily. Patient went to the ER yesterday with a 4 day h/p new development of skiing lesions comp[promising back and chest and upper extremities, circular 5 mm in diameter with ulceration in the center, umbilicated. 2 days later patient refer high fever malaise poor appetite poor oral intake. Initially cough with yellow sputum, now she refers some mild hemoptysis and mildly SOB.     PAST MEDICAL HISTORY:  She  has a past medical history of Anxiety, -donor kidney transplant 2021 (2021), Encounter for blood transfusion, ESRD (end stage renal disease), Fibroma, H/O kidney transplant, Hypertension, Hypertensive nephropathy, Ovarian cyst, and Sleep apnea.    PAST SURGICAL HISTORY:  She  has a past surgical history that includes right leg hemodialysis access; Kidney transplant (04/15/2015); kidney removal ; hemodialysis access left arm; Parathyroidectomy; Nephrectomy; transplant nephrectomy (2017); Ovarian cyst removal; Kidney transplant (N/A, 2021); Colposcopy (2020); Percutaneous nephrostomy (Left, 2021); Esophagogastroduodenoscopy (N/A, 2022); Robot-assisted laparoscopic sleeve gastrectomy using da Wilner Xi (N/A, 2022); and Cold knife conization of cervix (N/A, 2023).    SOCIAL HISTORY:  She  reports that she quit smoking about 3 years ago. Her smoking use included cigarettes. She started smoking about 15 years ago. She has a 3.1 pack-year smoking history. She has never used smokeless tobacco. She reports that she does not drink alcohol and does not use drugs.    FAMILY MEDICAL HISTORY:  Her family history includes Cancer in her father; Colon cancer in her father; Diabetes in her maternal uncle; Hypertension in  her father, maternal aunt, and another family member; Kidney disease in her maternal aunt and another family member; No Known Problems in her brother, mother, and sister.    Review of patient's allergies indicates:   Allergen Reactions    Heparin analogues Rash        calcitRIOL  0.25 mcg Oral Daily    ceFEPime (MAXIPIME) IVPB  2 g Intravenous Q8H    midodrine  5 mg Oral BID WM    pantoprazole  40 mg Oral Daily    predniSONE  5 mg Oral Daily    sodium bicarbonate  1,300 mg Oral BID    sucralfate  1 g Oral QID    tacrolimus  1 mg Oral BID    vancomycin (VANCOCIN) IV (PEDS and ADULTS)  1,000 mg Intravenous Q12H       Prior to Admission medications    Medication Sig Start Date End Date Taking? Authorizing Provider   albuterol (PROVENTIL/VENTOLIN HFA) 90 mcg/actuation inhaler Inhale 2 puffs into the lungs every 4 (four) hours as needed for Wheezing or Shortness of Breath. Rescue 11/13/22   Maribeth Barajas MD   calcitRIOL (ROCALTROL) 0.25 MCG Cap Take 1 capsule (0.25 mcg total) by mouth once daily. 9/11/23   Julio Jaffe MD   cholecalciferol, vitamin D3, (VITAMIN D3) 50 mcg (2,000 unit) Tab Take 1 tablet by mouth once daily 7/27/23      docusate sodium (COLACE) 100 MG capsule Take 1 capsule (100 mg total) by mouth 2 (two) times daily as needed. 9/28/22   Radha Joseph PA-C   ergocalciferol (VITAMIN D2) 50,000 unit Cap Take 1 capsule (50,000 Units total) by mouth every 30 days. 7/6/23   Julio Jaffe MD   HYDROcodone-acetaminophen (NORCO) 5-325 mg per tablet Take 1 tablet by mouth every 4 (four) hours as needed for Pain. 8/21/23   Jourdan Conklin MD   loperamide (IMODIUM) 2 mg capsule Take 1-2 capsule by mouth four times daily as needed for 14 days 8/10/23      magnesium oxide (MAG-OX) 400 mg (241.3 mg magnesium) tablet Take 1 tablet (400 mg total) by mouth 2 (two) times daily. 6/2/23 6/1/24  Julio Jaffe MD   midodrine (PROAMATINE) 5 MG Tab Take 1 tablet (5 mg total) by mouth 2 (two) times  daily with meals. 9/6/23 9/5/24  Julio Jaffe MD   multivit-min/iron/folic acid/K (ADULTS MULTIVITAMIN ORAL) 1 tablet Orally Once a day    Provider, Delilah   mycophenolate (CELLCEPT) 250 mg Cap Take 4 capsules (1,000 mg total) by mouth 2 (two) times daily. 3/6/23 3/5/24  José Antonio Venegas MD   pantoprazole (PROTONIX) 40 MG tablet Take 1 tablet by mouth once daily 9/6/23   Julio Jaffe MD   predniSONE (DELTASONE) 5 MG tablet Take 1 tablet (5 mg total) by mouth once daily. 12/22/22   José Antonio Venegas MD   sars-cov-2, covid-19, (MODERNA COVID-19) 100 mcg/0.5 ml injection Inject 0.5 mLs into the muscle. 12/15/21   Yevgeniy Davis, PharmD   sodium bicarbonate 650 MG tablet Take 2 tablets (1,300 mg total) by mouth 3 (three) times daily. 8/30/22 8/30/23  Julio Jaffe MD   sucralfate (CARAFATE) 1 gram tablet Take 1 tablet (1 g total) by mouth 4 (four) times daily. 8/3/23   Елена Pacheco,    tacrolimus (PROGRAF) 1 MG Cap Take 2 capsules (2 mg total) by mouth every morning AND 1 capsule (1 mg total) every evening. 9/12/23   Julio Jaffe MD        REVIEW OF SYSTEMS:  Patient has no fever, fatigue, visual changes, chest pain, edema, cough, dyspnea, nausea, vomiting, constipation, diarrhea, arthralgias, pruritis, dizziness, weakness, depression, confusion.    No intake or output data in the 24 hours ending 09/18/23 0754    PHYSICAL EXAM:   height is 5' (1.524 m) and weight is 78 kg (171 lb 15.3 oz). Her oral temperature is 99.6 °F (37.6 °C). Her blood pressure is 126/74 and her pulse is 94. Her respiration is 18 and oxygen saturation is 92% (abnormal).   Gen: WDWN female in no apparent distress  Psych: Normal mood and affect  Skin: No rashes or ulcers  Eyes: Normal conjunctiva and lids, PERRLA  ENT: Normal hearing with no oropharyngeal lesions  Neck: No JVD  Chest: Clear with no rales, rhonchi, wheezing with normal effort  CV: Regular with no murmurs, gallops or rubs  Abd: Soft,  nontender, no distension, positive bowel sounds  Ext: No cyanosis, clubbing or edema    Recent Labs   Lab 09/16/23 2248 09/17/23  1519 09/18/23  0517    133* 134*   K 3.7 3.5 3.5   * 109 106   CO2 13* 17* 18*   BUN 10 8 8   CREATININE 1.1 1.1 0.8   CALCIUM 8.8 9.0 8.3*     Recent Labs   Lab 09/16/23 2248 09/17/23  1519   WBC 5.19 6.65   HGB 11.4* 12.3   HCT 34.2* 38.5    170       IMPRESSION AND RECOMMENDATIONS:      Ckd stage 2  Skin lesions  Hypertension Immunosuppression encounter after kidney transplant  Fever    D/c MMF  Skin biopsy  ID and dermatology consult  Daily labs including tacrolimus level    Varicella? And associated Pneumonia  Monkeypox  Superimposed MRSA infection    I spoke with patient at bedside and briefly with dermatology

## 2023-09-19 LAB
ANION GAP SERPL CALC-SCNC: 8 MMOL/L (ref 8–16)
BASOPHILS # BLD AUTO: 0.02 K/UL (ref 0–0.2)
BASOPHILS NFR BLD: 0.5 % (ref 0–1.9)
BUN SERPL-MCNC: 8 MG/DL (ref 6–20)
C TRACH DNA SPEC QL NAA+PROBE: NOT DETECTED
CALCIUM SERPL-MCNC: 8.1 MG/DL (ref 8.7–10.5)
CHLORIDE SERPL-SCNC: 106 MMOL/L (ref 95–110)
CO2 SERPL-SCNC: 21 MMOL/L (ref 23–29)
CREAT SERPL-MCNC: 0.8 MG/DL (ref 0.5–1.4)
DIFFERENTIAL METHOD: ABNORMAL
EOSINOPHIL # BLD AUTO: 0 K/UL (ref 0–0.5)
EOSINOPHIL NFR BLD: 0.5 % (ref 0–8)
ERYTHROCYTE [DISTWIDTH] IN BLOOD BY AUTOMATED COUNT: 14.6 % (ref 11.5–14.5)
EST. GFR  (NO RACE VARIABLE): >60 ML/MIN/1.73 M^2
GLUCOSE SERPL-MCNC: 105 MG/DL (ref 70–110)
HCT VFR BLD AUTO: 32 % (ref 37–48.5)
HGB BLD-MCNC: 10.8 G/DL (ref 12–16)
IMM GRANULOCYTES # BLD AUTO: 0.05 K/UL (ref 0–0.04)
IMM GRANULOCYTES NFR BLD AUTO: 1.3 % (ref 0–0.5)
LYMPHOCYTES # BLD AUTO: 0.3 K/UL (ref 1–4.8)
LYMPHOCYTES NFR BLD: 8.6 % (ref 18–48)
MCH RBC QN AUTO: 28.1 PG (ref 27–31)
MCHC RBC AUTO-ENTMCNC: 33.8 G/DL (ref 32–36)
MCV RBC AUTO: 83 FL (ref 82–98)
MONOCYTES # BLD AUTO: 0.3 K/UL (ref 0.3–1)
MONOCYTES NFR BLD: 7.1 % (ref 4–15)
MRSA SPEC QL CULT: NORMAL
N GONORRHOEA DNA SPEC QL NAA+PROBE: NOT DETECTED
NEUTROPHILS # BLD AUTO: 3.3 K/UL (ref 1.8–7.7)
NEUTROPHILS NFR BLD: 82 % (ref 38–73)
NRBC BLD-RTO: 0 /100 WBC
PLATELET # BLD AUTO: 155 K/UL (ref 150–450)
PMV BLD AUTO: 10.8 FL (ref 9.2–12.9)
POTASSIUM SERPL-SCNC: 3 MMOL/L (ref 3.5–5.1)
RBC # BLD AUTO: 3.84 M/UL (ref 4–5.4)
SODIUM SERPL-SCNC: 135 MMOL/L (ref 136–145)
TACROLIMUS BLD-MCNC: 3.2 NG/ML (ref 5–15)
WBC # BLD AUTO: 3.96 K/UL (ref 3.9–12.7)

## 2023-09-19 PROCEDURE — 21400001 HC TELEMETRY ROOM

## 2023-09-19 PROCEDURE — 99233 SBSQ HOSP IP/OBS HIGH 50: CPT | Mod: ,,, | Performed by: NURSE PRACTITIONER

## 2023-09-19 PROCEDURE — 63600175 PHARM REV CODE 636 W HCPCS: Performed by: HOSPITALIST

## 2023-09-19 PROCEDURE — 99233 PR SUBSEQUENT HOSPITAL CARE,LEVL III: ICD-10-PCS | Mod: ,,, | Performed by: STUDENT IN AN ORGANIZED HEALTH CARE EDUCATION/TRAINING PROGRAM

## 2023-09-19 PROCEDURE — 99233 PR SUBSEQUENT HOSPITAL CARE,LEVL III: ICD-10-PCS | Mod: GC,,, | Performed by: INTERNAL MEDICINE

## 2023-09-19 PROCEDURE — 87591 N.GONORRHOEAE DNA AMP PROB: CPT | Performed by: STUDENT IN AN ORGANIZED HEALTH CARE EDUCATION/TRAINING PROGRAM

## 2023-09-19 PROCEDURE — 63600175 PHARM REV CODE 636 W HCPCS: Performed by: INTERNAL MEDICINE

## 2023-09-19 PROCEDURE — 63600175 PHARM REV CODE 636 W HCPCS: Performed by: NURSE PRACTITIONER

## 2023-09-19 PROCEDURE — 25500020 PHARM REV CODE 255: Performed by: STUDENT IN AN ORGANIZED HEALTH CARE EDUCATION/TRAINING PROGRAM

## 2023-09-19 PROCEDURE — 25000003 PHARM REV CODE 250: Performed by: INTERNAL MEDICINE

## 2023-09-19 PROCEDURE — 80197 ASSAY OF TACROLIMUS: CPT | Performed by: INTERNAL MEDICINE

## 2023-09-19 PROCEDURE — 87491 CHLMYD TRACH DNA AMP PROBE: CPT | Performed by: STUDENT IN AN ORGANIZED HEALTH CARE EDUCATION/TRAINING PROGRAM

## 2023-09-19 PROCEDURE — 63600175 PHARM REV CODE 636 W HCPCS: Performed by: STUDENT IN AN ORGANIZED HEALTH CARE EDUCATION/TRAINING PROGRAM

## 2023-09-19 PROCEDURE — 99233 SBSQ HOSP IP/OBS HIGH 50: CPT | Mod: GC,,, | Performed by: INTERNAL MEDICINE

## 2023-09-19 PROCEDURE — 80048 BASIC METABOLIC PNL TOTAL CA: CPT | Performed by: STUDENT IN AN ORGANIZED HEALTH CARE EDUCATION/TRAINING PROGRAM

## 2023-09-19 PROCEDURE — 25000003 PHARM REV CODE 250: Performed by: STUDENT IN AN ORGANIZED HEALTH CARE EDUCATION/TRAINING PROGRAM

## 2023-09-19 PROCEDURE — 25000003 PHARM REV CODE 250: Performed by: HOSPITALIST

## 2023-09-19 PROCEDURE — 99233 PR SUBSEQUENT HOSPITAL CARE,LEVL III: ICD-10-PCS | Mod: ,,, | Performed by: NURSE PRACTITIONER

## 2023-09-19 PROCEDURE — 99233 SBSQ HOSP IP/OBS HIGH 50: CPT | Mod: ,,, | Performed by: STUDENT IN AN ORGANIZED HEALTH CARE EDUCATION/TRAINING PROGRAM

## 2023-09-19 PROCEDURE — 25000003 PHARM REV CODE 250: Performed by: NURSE PRACTITIONER

## 2023-09-19 PROCEDURE — 85025 COMPLETE CBC W/AUTO DIFF WBC: CPT | Performed by: STUDENT IN AN ORGANIZED HEALTH CARE EDUCATION/TRAINING PROGRAM

## 2023-09-19 PROCEDURE — 36415 COLL VENOUS BLD VENIPUNCTURE: CPT | Performed by: STUDENT IN AN ORGANIZED HEALTH CARE EDUCATION/TRAINING PROGRAM

## 2023-09-19 RX ORDER — MORPHINE SULFATE 2 MG/ML
2 INJECTION, SOLUTION INTRAMUSCULAR; INTRAVENOUS EVERY 4 HOURS PRN
Status: DISCONTINUED | OUTPATIENT
Start: 2023-09-19 | End: 2023-09-24 | Stop reason: HOSPADM

## 2023-09-19 RX ORDER — OXYCODONE HYDROCHLORIDE 5 MG/1
5 TABLET ORAL EVERY 6 HOURS PRN
Status: DISCONTINUED | OUTPATIENT
Start: 2023-09-19 | End: 2023-09-24 | Stop reason: HOSPADM

## 2023-09-19 RX ORDER — POTASSIUM CHLORIDE 20 MEQ/1
40 TABLET, EXTENDED RELEASE ORAL EVERY 4 HOURS
Status: COMPLETED | OUTPATIENT
Start: 2023-09-19 | End: 2023-09-19

## 2023-09-19 RX ORDER — ACETAMINOPHEN 325 MG/1
650 TABLET ORAL EVERY 6 HOURS PRN
Status: DISCONTINUED | OUTPATIENT
Start: 2023-09-19 | End: 2023-09-24 | Stop reason: HOSPADM

## 2023-09-19 RX ORDER — DIPHENHYDRAMINE HCL 25 MG
25 CAPSULE ORAL EVERY 6 HOURS PRN
Status: DISCONTINUED | OUTPATIENT
Start: 2023-09-19 | End: 2023-09-24 | Stop reason: HOSPADM

## 2023-09-19 RX ORDER — ACETAMINOPHEN 325 MG/1
650 TABLET ORAL ONCE AS NEEDED
Status: DISCONTINUED | OUTPATIENT
Start: 2023-09-17 | End: 2023-09-24 | Stop reason: HOSPADM

## 2023-09-19 RX ADMIN — POTASSIUM CHLORIDE 40 MEQ: 1500 TABLET, EXTENDED RELEASE ORAL at 03:09

## 2023-09-19 RX ADMIN — TACROLIMUS 1 MG: 1 CAPSULE ORAL at 08:09

## 2023-09-19 RX ADMIN — OXYCODONE HYDROCHLORIDE 5 MG: 5 TABLET ORAL at 07:09

## 2023-09-19 RX ADMIN — SUCRALFATE 1 G: 1 TABLET ORAL at 05:09

## 2023-09-19 RX ADMIN — MORPHINE SULFATE 2 MG: 2 INJECTION, SOLUTION INTRAMUSCULAR; INTRAVENOUS at 11:09

## 2023-09-19 RX ADMIN — SODIUM BICARBONATE 1300 MG: 650 TABLET ORAL at 08:09

## 2023-09-19 RX ADMIN — POTASSIUM CHLORIDE 40 MEQ: 1500 TABLET, EXTENDED RELEASE ORAL at 05:09

## 2023-09-19 RX ADMIN — DIPHENHYDRAMINE HYDROCHLORIDE 25 MG: 25 CAPSULE ORAL at 09:09

## 2023-09-19 RX ADMIN — ONDANSETRON 4 MG: 4 TABLET, ORALLY DISINTEGRATING ORAL at 02:09

## 2023-09-19 RX ADMIN — MORPHINE SULFATE 2 MG: 2 INJECTION, SOLUTION INTRAMUSCULAR; INTRAVENOUS at 03:09

## 2023-09-19 RX ADMIN — CEFEPIME 2 G: 2 INJECTION, POWDER, FOR SOLUTION INTRAVENOUS at 01:09

## 2023-09-19 RX ADMIN — GUAIFENESIN AND DEXTROMETHORPHAN 5 ML: 100; 10 SYRUP ORAL at 12:09

## 2023-09-19 RX ADMIN — VANCOMYCIN HYDROCHLORIDE 1250 MG: 1.25 INJECTION, POWDER, LYOPHILIZED, FOR SOLUTION INTRAVENOUS at 02:09

## 2023-09-19 RX ADMIN — PREDNISONE 5 MG: 5 TABLET ORAL at 09:09

## 2023-09-19 RX ADMIN — OXYCODONE HYDROCHLORIDE 5 MG: 5 TABLET ORAL at 09:09

## 2023-09-19 RX ADMIN — PANTOPRAZOLE SODIUM 40 MG: 40 TABLET, DELAYED RELEASE ORAL at 09:09

## 2023-09-19 RX ADMIN — CALCITRIOL CAPSULES 0.25 MCG 0.25 MCG: 0.25 CAPSULE ORAL at 09:09

## 2023-09-19 RX ADMIN — CEFEPIME 2 G: 2 INJECTION, POWDER, FOR SOLUTION INTRAVENOUS at 05:09

## 2023-09-19 RX ADMIN — GUAIFENESIN AND DEXTROMETHORPHAN 5 ML: 100; 10 SYRUP ORAL at 07:09

## 2023-09-19 RX ADMIN — CEFEPIME 2 G: 2 INJECTION, POWDER, FOR SOLUTION INTRAVENOUS at 10:09

## 2023-09-19 RX ADMIN — SUCRALFATE 1 G: 1 TABLET ORAL at 09:09

## 2023-09-19 RX ADMIN — SUCRALFATE 1 G: 1 TABLET ORAL at 08:09

## 2023-09-19 RX ADMIN — HYDROXYZINE HYDROCHLORIDE 25 MG: 25 TABLET, FILM COATED ORAL at 07:09

## 2023-09-19 RX ADMIN — DIPHENHYDRAMINE HYDROCHLORIDE 25 MG: 25 CAPSULE ORAL at 10:09

## 2023-09-19 RX ADMIN — TACROLIMUS 1 MG: 1 CAPSULE ORAL at 05:09

## 2023-09-19 RX ADMIN — MIDODRINE HYDROCHLORIDE 5 MG: 5 TABLET ORAL at 05:09

## 2023-09-19 RX ADMIN — SUCRALFATE 1 G: 1 TABLET ORAL at 12:09

## 2023-09-19 RX ADMIN — SODIUM BICARBONATE 1300 MG: 650 TABLET ORAL at 09:09

## 2023-09-19 RX ADMIN — ACYCLOVIR SODIUM 460 MG: 50 INJECTION, SOLUTION INTRAVENOUS at 09:09

## 2023-09-19 RX ADMIN — GUAIFENESIN AND DEXTROMETHORPHAN 5 ML: 100; 10 SYRUP ORAL at 05:09

## 2023-09-19 RX ADMIN — ACYCLOVIR SODIUM 460 MG: 50 INJECTION, SOLUTION INTRAVENOUS at 12:09

## 2023-09-19 RX ADMIN — DIPHENHYDRAMINE HYDROCHLORIDE 25 MG: 25 CAPSULE ORAL at 03:09

## 2023-09-19 RX ADMIN — HYDROXYZINE HYDROCHLORIDE 25 MG: 25 TABLET, FILM COATED ORAL at 09:09

## 2023-09-19 RX ADMIN — MIDODRINE HYDROCHLORIDE 5 MG: 5 TABLET ORAL at 07:09

## 2023-09-19 RX ADMIN — ACYCLOVIR SODIUM 460 MG: 50 INJECTION, SOLUTION INTRAVENOUS at 03:09

## 2023-09-19 RX ADMIN — ACETAMINOPHEN 650 MG: 325 TABLET ORAL at 08:09

## 2023-09-19 RX ADMIN — IOHEXOL 75 ML: 350 INJECTION, SOLUTION INTRAVENOUS at 07:09

## 2023-09-19 RX ADMIN — OXYCODONE HYDROCHLORIDE 5 MG: 5 TABLET ORAL at 02:09

## 2023-09-19 NOTE — PROGRESS NOTES
Therapy with vancomycin has been discontinued by provider.  Will sign off, please re-consult, if needed.      Rosie Quigley, Pharm. D.   Pharmacist  [facility]

## 2023-09-19 NOTE — CONSULTS
[facility] ([place] [address])  Infectious Disease  Consult Note    Patient Name: Leydi Cordero  MRN: [id number]  Admission Date: 2023  Hospital Length of Stay: 1 days  Attending Physician: Gini Case MD  Primary Care Provider: Helena Zepeda PA-C     Isolation Status: No active isolations    Patient information was obtained from patient, past medical records, ER records and primary team.      Inpatient consult to Infectious Diseases  Consult performed by: Ilsa Tang MD  Consult ordered by: Gini Case MD        Assessment/Plan:     Derm  Skin lesions  33 year old female with history of second -donor kidney transplant 2021 (2021) (first 4/15/15) on prograf and cellcept, prednisone, Hypertension, Hypertensive nephropathy, Ovarian cyst, and Sleep apnea who [place] [facility] [place] [facility] with complaint of rash, fevers, malaise beginning 4 days prior to arrival with progressive spread. She complains of shortness of breath and cough productive of yellow sputum. On admission afebrile and HDS. WBC 4.26. RPR negative. COVID negative. Blood cx NGTD. CXR with patchy airspace disease in lower lobes. She was started on vanc/cefepime. Pictures in chart - some vesicular lesions with erythematous and a few umbilicated lesions. MPOX swab sent. ID and dermatology consulted. Presentation suspicious for disseminated herpes zoster with possible pneumonia. Other differentials per derm - disseminated HSV, primary varicella, MPOX, molluscum. Lesions on face appear to have bacterial superinfection.     --HSV and VZV PCR from swabs of lesions  --MPOX swab pending  --continue acyclovir 10mg/kg q8h   --would recommend CTCAP to evaluate for underlying pneumonia and r/o other etiologies of presentation   --continue vancomycin for superinfection of lesions  --continue cefepime pending workup above  --biopsy per derm if no improvement on acyclovir  --check  GC/CT urine - hand lesions are atypical of VZV  --reduce immunosuppression as able          Thank you for your consult. I will follow-up with patient. Please contact us if you have any additional questions.    Ilsa Tang, DO  Infectious Disease  [facility] ([facility])    Subjective:     Principal Problem: <principal problem not specified>    HPI: 33 year old female with history of second -donor kidney transplant 2021 (2021) (first 4/15/15) on prograf and cellcept, prednisone,  Hypertension, Hypertensive nephropathy, Ovarian cyst, and Sleep apnea who [place] [facility] [place] [facility] with complaint of rash, fevers, malaise. She reports that 4 days prior to admission she began to develop lesion on face on chin, throughout the day she began to have spreading of these lesions to her back, chest, abdomen, hands, inguinal region. She describes them as itchy and painful. She has noticed some purulent drainage from some of the lesions. Has not had a rash like this before. After developing rash she noticed that she began to feel feverish. She additionally complains of headaches and ear pain. No tinnitus, no decreased hearing, no visual changes. She complains of shortness of breath and cough productive of yellow sputum. She complains of generalized weakness, malaise, myalgias. Complains of sore throat, mild pain with swallowing. She complains of crampy lower abdominal pain that she attributes to starting menses 2 days ago. No N/V. No recent sick contacts. No similar presentations. She does not think had chicken pox as a child and confirmed this with mother. Thinks had all childhood vaccines, not sure about varicella vaccine.    Was seen over summer for persistent diarrhea with weight loss, worsened with eating and discussion of possible GI evaluation for scope. At last appt with transplant nephro these sx had resolved.   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 HR HPV+ 16    From [place]. Living with roommate in [place] in apt. No recent travel. No pets or animal exposures. No known bug bites. No new soaps/detergents/perfumes. Not exposed to children. Sexually active with male partners - last encounter 4 months ago, monogamous.       Past Medical History:   Diagnosis Date    Anxiety     -donor kidney transplant 2021    cPRA 100%, XM negative 3 arteries, 2 on common patch, WIT 40 min    Encounter for blood transfusion     ESRD (end stage renal disease)     Fibroma     H/O kidney transplant     Hypertension     Hypertensive nephropathy     Ovarian cyst     Sleep apnea        Past Surgical History:   Procedure Laterality Date    COLD KNIFE CONIZATION OF CERVIX N/A 2023    Procedure: CONE BIOPSY, CERVIX, USING COLD KNIFE;  Surgeon: Jourdan Conklin MD;  Location: [facility];  Service: OB/GYN;  Laterality: N/A;    COLPOSCOPY  2020    Needs follow up on or after 21    ESOPHAGOGASTRODUODENOSCOPY N/A 2022    Procedure: ESOPHAGOGASTRODUODENOSCOPY (EGD);  Surgeon: Essie Carvajal MD;  Location: [facility];  Service: Endoscopy;  Laterality: N/A;    hemodialysis access left arm      kidney removal       KIDNEY TRANSPLANT  04/15/2015    KIDNEY TRANSPLANT N/A 2021    Procedure: TRANSPLANT, KIDNEY;  Surgeon: Fam Sutton MD;  Location: [address];  Service: Transplant;  Laterality: N/A;    NEPHRECTOMY      OVARIAN CYST REMOVAL      PARATHYROIDECTOMY      partial     PERCUTANEOUS NEPHROSTOMY Left 2021    Procedure: Creation, Nephrostomy, Percutaneous;  Surgeon: Elen Surgeon;  Location: [facility];  Service: Anesthesiology;  Laterality: Left;    right leg hemodialysis access      ROBOT-ASSISTED LAPAROSCOPIC SLEEVE GASTRECTOMY USING DA VELMA XI N/A 2022    Procedure: XI ROBOTIC SLEEVE GASTRECTOMY;  Surgeon: Cal Parekh MD;  Location: [facility];  Service: General;  Laterality: N/A;    transplant nephrectomy   12/01/2017       Review of patient's allergies indicates:   Allergen Reactions    Heparin analogues Rash       Medications:  Facility-Administered Medications Prior to Admission   Medication    acetaminophen tablet 650 mg    diphenhydrAMINE injection 25 mg    EPINEPHrine (EPIPEN) 0.3 mg/0.3 mL pen injection 0.3 mg    methylPREDNISolone sodium succinate injection 40 mg    ondansetron disintegrating tablet 4 mg    sodium chloride 0.9% 500 mL flush bag    sodium chloride 0.9% flush 10 mL     Medications Prior to Admission   Medication Sig    albuterol (PROVENTIL/VENTOLIN HFA) 90 mcg/actuation inhaler Inhale 2 puffs into the lungs every 4 (four) hours as needed for Wheezing or Shortness of Breath. Rescue    calcitRIOL (ROCALTROL) 0.25 MCG Cap Take 1 capsule (0.25 mcg total) by mouth once daily.    cholecalciferol, vitamin D3, (VITAMIN D3) 50 mcg (2,000 unit) Tab Take 1 tablet by mouth once daily    docusate sodium (COLACE) 100 MG capsule Take 1 capsule (100 mg total) by mouth 2 (two) times daily as needed.    ergocalciferol (VITAMIN D2) 50,000 unit Cap Take 1 capsule (50,000 Units total) by mouth every 30 days.    HYDROcodone-acetaminophen (NORCO) 5-325 mg per tablet Take 1 tablet by mouth every 4 (four) hours as needed for Pain.    loperamide (IMODIUM) 2 mg capsule Take 1-2 capsule by mouth four times daily as needed for 14 days    magnesium oxide (MAG-OX) 400 mg (241.3 mg magnesium) tablet Take 1 tablet (400 mg total) by mouth 2 (two) times daily.    midodrine (PROAMATINE) 5 MG Tab Take 1 tablet (5 mg total) by mouth 2 (two) times daily with meals.    multivit-min/iron/folic acid/K (ADULTS MULTIVITAMIN ORAL) 1 tablet Orally Once a day    mycophenolate (CELLCEPT) 250 mg Cap Take 4 capsules (1,000 mg total) by mouth 2 (two) times daily.    pantoprazole (PROTONIX) 40 MG tablet Take 1 tablet by mouth once daily    predniSONE (DELTASONE) 5 MG tablet Take 1 tablet (5 mg total) by mouth once daily.    sars-cov-2, covid-19,  (MODERNA COVID-19) 100 mcg/0.5 ml injection Inject 0.5 mLs into the muscle.    sodium bicarbonate 650 MG tablet Take 2 tablets (1,300 mg total) by mouth 3 (three) times daily.    sucralfate (CARAFATE) 1 gram tablet Take 1 tablet (1 g total) by mouth 4 (four) times daily.    tacrolimus (PROGRAF) 1 MG Cap Take 2 capsules (2 mg total) by mouth every morning AND 1 capsule (1 mg total) every evening.     Antibiotics (From admission, onward)      Start     Stop Route Frequency Ordered    09/17/23 1430  vancomycin (VANCOCIN) 1,000 mg in dextrose 5 % (D5W) 250 mL IVPB (Vial-Mate)         -- IV Every 12 hours (non-standard times) 09/17/23 1317    09/17/23 1400  ceFEPIme (MAXIPIME) 2 g in dextrose 5 % in water (D5W) 100 mL IVPB (MB+)         -- IV Every 8 hours (non-standard times) 09/17/23 1248    09/17/23 1348  vancomycin - pharmacy to dose  (vancomycin IVPB (PEDS and ADULTS))        See Hyperspace for full Linked Orders Report.    -- IV pharmacy to manage frequency 09/17/23 1248          Antifungals (From admission, onward)      None          Antivirals (From admission, onward)      None             Immunization History   Administered Date(s) Administered    COVID-19, MRNA, LN-S, PF (MODERNA FULL 0.5 ML DOSE) 10/29/2021, 12/15/2021    Hepatitis A, Adult 11/20/2019    Influenza - Quadrivalent 01/08/2016    Influenza - Quadrivalent - PF *Preferred* (6 months and older) 09/25/2017, 09/20/2018, 10/26/2019, 10/26/2020    PPD Test 05/12/2017, 05/22/2017, 05/22/2018, 05/23/2019, 05/22/2020, 05/29/2020    Pneumococcal 06/19/2017, 11/21/2019    Tdap 11/20/2019       Family History       Problem Relation (Age of Onset)    Cancer Father    Colon cancer Father    Diabetes Maternal Uncle    Hypertension Father, Maternal Aunt, Other    Kidney disease Maternal Aunt, Other    No Known Problems Mother, Sister, Brother          Social History     Socioeconomic History    Marital status: Single   Tobacco Use    Smoking status: Former      Current packs/day: 0.00     Average packs/day: 0.3 packs/day for 12.6 years (3.1 ttl pk-yrs)     Types: Cigarettes     Start date: 2008     Quit date: 8/2/2020     Years since quitting: 3.1    Smokeless tobacco: Never   Substance and Sexual Activity    Alcohol use: No    Drug use: No    Sexual activity: Yes     Partners: Male     Birth control/protection: None     Social Determinants of Health     Financial Resource Strain: Low Risk  (9/28/2022)    Overall Financial Resource Strain (CARDIA)     Difficulty of Paying Living Expenses: Not hard at all   Food Insecurity: No Food Insecurity (9/28/2022)    Hunger Vital Sign     Worried About Running Out of Food in the Last Year: Never true     Ran Out of Food in the Last Year: Never true   Transportation Needs: No Transportation Needs (9/28/2022)    PRAPARE - Transportation     Lack of Transportation (Medical): No     Lack of Transportation (Non-Medical): No   Physical Activity: Inactive (9/28/2022)    Exercise Vital Sign     Days of Exercise per Week: 0 days     Minutes of Exercise per Session: 0 min   Stress: No Stress Concern Present (9/28/2022)    [place] [employer] - Occupational Stress Questionnaire     Feeling of Stress : Not at all   Social Connections: Socially Isolated (9/28/2022)    Social Connection and Isolation Panel [NHANES]     Frequency of Communication with Friends and Family: More than three times a week     Frequency of Social Gatherings with Friends and Family: Once a week     Attends Spiritism Services: Never     Active Member of Clubs or Organizations: No     Attends Club or Organization Meetings: Never     Marital Status:    Housing Stability: Low Risk  (9/28/2022)    Housing Stability Vital Sign     Unable to Pay for Housing in the Last Year: No     Number of Places Lived in the Last Year: 1     Unstable Housing in the Last Year: No     Review of Systems   Constitutional:  Positive for appetite change, fatigue, fever  and unexpected weight change. Negative for chills.   HENT:  Positive for ear pain, sore throat and trouble swallowing. Negative for congestion, ear discharge, mouth sores, rhinorrhea, sinus pain and tinnitus.    Eyes:  Negative for photophobia, pain and visual disturbance.   Respiratory:  Positive for cough and shortness of breath. Negative for wheezing.    Cardiovascular:  Positive for chest pain (pleuritic - when coughs). Negative for palpitations and leg swelling.   Gastrointestinal:  Positive for abdominal pain and diarrhea. Negative for abdominal distention, blood in stool, constipation, nausea and vomiting.   Endocrine: Negative.    Genitourinary:  Negative for decreased urine volume, dysuria, frequency and pelvic pain.   Musculoskeletal:  Positive for myalgias. Negative for arthralgias, back pain and joint swelling.   Skin:  Positive for rash. Negative for color change and wound.   Allergic/Immunologic: Positive for immunocompromised state.   Neurological:  Positive for weakness (generalized) and headaches. Negative for numbness.   Hematological:  Negative for adenopathy. Does not bruise/bleed easily.   Psychiatric/Behavioral:  Negative for agitation and confusion.      Objective:     Vital Signs (Most Recent):  Temp: 98.9 °F (37.2 °C) (09/18/23 1140)  Pulse: 87 (09/18/23 1140)  Resp: 18 (09/18/23 1140)  BP: 104/61 (09/18/23 1140)  SpO2: (!) 93 % (09/18/23 1140) Vital Signs (24h Range):  Temp:  [98.6 °F (37 °C)-99.6 °F (37.6 °C)] 98.9 °F (37.2 °C)  Pulse:  [71-94] 87  Resp:  [17-18] 18  SpO2:  [91 %-97 %] 93 %  BP: (104-126)/(61-82) 104/61     Weight: 78 kg (171 lb 15.3 oz)  Body mass index is 33.58 kg/m².    Estimated Creatinine Clearance: 93.2 mL/min (based on SCr of 0.8 mg/dL).     Physical Exam  Vitals and nursing note reviewed.   Constitutional:       Appearance: She is ill-appearing.   HENT:      Head: Normocephalic and atraumatic.      Right Ear: External ear normal.      Left Ear: External ear normal.       Nose: Nose normal. No congestion.      Mouth/Throat:      Mouth: Mucous membranes are moist.      Pharynx: Oropharynx is clear. No oropharyngeal exudate or posterior oropharyngeal erythema.   Eyes:      General: No scleral icterus.     Extraocular Movements: Extraocular movements intact.      Conjunctiva/sclera: Conjunctivae normal.      Pupils: Pupils are equal, round, and reactive to light.      Comments: Small lesion on upper eyelid near eyelashes   Cardiovascular:      Rate and Rhythm: Normal rate and regular rhythm.      Pulses: Normal pulses.      Heart sounds: Normal heart sounds. No murmur heard.  Pulmonary:      Effort: Pulmonary effort is normal. No respiratory distress.      Breath sounds: Normal breath sounds.   Abdominal:      General: Abdomen is flat. There is no distension.      Palpations: Abdomen is soft.      Tenderness: There is no abdominal tenderness.   Musculoskeletal:         General: No swelling or deformity. Normal range of motion.      Cervical back: Normal range of motion and neck supple. No rigidity or tenderness.   Lymphadenopathy:      Cervical: No cervical adenopathy.   Skin:     General: Skin is warm and dry.      Capillary Refill: Capillary refill takes less than 2 seconds.      Findings: Erythema, lesion and rash present.      Comments: Multiple lesions as seen in photos - some umbilicated, some vesicular with erythematous base    Neurological:      General: No focal deficit present.      Mental Status: She is alert and oriented to person, place, and time. Mental status is at baseline.   Psychiatric:         Mood and Affect: Mood normal.         Behavior: Behavior normal.         Thought Content: Thought content normal.         Judgment: Judgment normal.                                                    Significant Labs: All pertinent labs within the past 24 hours have been reviewed.    Significant Imaging: I have reviewed all pertinent imaging results/findings within the past  24 hours.

## 2023-09-19 NOTE — ASSESSMENT & PLAN NOTE
- s/p kidney transplant #1 4/15/2015 and #2 1/9/2021  - good renal allograft function  - cont to monitor cr

## 2023-09-19 NOTE — PROGRESS NOTES
[facility] ([facility]  Kidney Transplant  Progress Note      Reason for Follow-up: Reassessment of Kidney Transplant - 1/9/2021  (#2) recipient and management of immunosuppression.    ORGAN:  RIGHT KIDNEY   Donor Type:  Donation after Brain Death   PHS Increased Risk: no   Cold Ischemia:   Induction Medications: IV steroids     32 y/o F with PMH of ESRD s/p Kidney transplant on immunosuppression, HTN presented to outside hospital on 09/16 complaining of fever and rash since past 3 days.  Lesions were noted on face, bilateral arms, chest, abdomen, bilateral inguinal region.  Lesions in the inguinal region where umbilicated.  Patient received vancomycin and cefepime 1 dose in the ER at [place].  Subsequently transferred [place] [facility] [place]ological evaluation.  Blood cultures and UA were obtained.  Chest x-ray was suggestive of patchy consolidation bilateral lower lobes.  Started broad-spectrum antibiotics including vanc and cefepime.  Patient has history of kidney transplant in 2021, on immunosuppressive therapy.  Id, dermatology and kidney transplant team were consulted.  Id was concerned for disseminated herpes versus varicella, patient was started on acyclovir at immunosuppressive dose and swabs were collected.    Patient is s/p kidney transplant #1 on 4/15/2-15 and #2 on 1/9/2021. KTM consulted to assist w management of IS.    Interval History: pt reports rash the same, pruritic. S/p skin bx per derm. Path pending. Started Acyclovir 10 mg/kg per ID 9/18/23 over 2 hours. HSV, VZR and MPOX. She notes worsening cough, white secretions. Cr at baseline. She is making good UOP. Plan to cont Prograf 1mg bid and Pred 5 mg. Potassium replaced. VSS. Monitor.      Review of Systems   Constitutional:  Positive for fever. Negative for chills.   HENT:  Negative for trouble swallowing.    Eyes:  Negative for photophobia and visual disturbance.   Respiratory:  Positive for cough and shortness of breath (w  exertion). Negative for wheezing.    Cardiovascular:  Negative for chest pain and palpitations.   Gastrointestinal:  Negative for abdominal pain, nausea and vomiting.   Genitourinary:  Negative for dysuria.   Musculoskeletal:  Negative for arthralgias and myalgias.   Skin:  Positive for rash.   Allergic/Immunologic: Positive for immunocompromised state.   Neurological:  Negative for dizziness and headaches.     Objective:     Vital Signs (Most Recent):  Temp: 99.7 °F (37.6 °C) (09/19/23 1148)  Pulse: 83 (09/19/23 1148)  Resp: 18 (09/19/23 1148)  BP: 102/61 (09/19/23 1148)  SpO2: (!) 92 % (09/19/23 1148) Vital Signs (24h Range):  Temp:  [97.5 °F (36.4 °C)-100.6 °F (38.1 °C)] 99.7 °F (37.6 °C)  Pulse:  [82-91] 83  Resp:  [18-19] 18  SpO2:  [89 %-94 %] 92 %  BP: (100-118)/(58-75) 102/61     Weight: 78 kg (171 lb 15.3 oz)  Body mass index is 33.58 kg/m².     Physical Exam  Constitutional:       Appearance: Normal appearance.   HENT:      Head:      Comments: patient has an ulcerative lesion on the scalp, multiple palpable lesions scattered over the scalp.  Pustules which have ruptured were noted on the forehead and chin.  Similar lesions were noted in the back of the neck.  Eyes:      Extraocular Movements: Extraocular movements intact.      Conjunctiva/sclera: Conjunctivae normal.      Pupils: Pupils are equal, round, and reactive to light.   Cardiovascular:      Rate and Rhythm: Normal rate and regular rhythm.   Pulmonary:      Effort: Pulmonary effort is normal.      Breath sounds: Normal breath sounds.   Abdominal:      Comments: Umbilicated lesions which have ulcerated are noted on the abdomen   Genitourinary:     Comments: Multiple dark umbilicated lesions noted in bilateral inguinal regions measuring 0.5 x 0.5 cm, tender are located in bilateral inguinal region.  Musculoskeletal:         General: No swelling or tenderness. Normal range of motion.   Skin:     Findings: Rash present.   Neurological:      General:  No focal deficit present.      Mental Status: She is alert and oriented to person, place, and time.   Psychiatric:         Mood and Affect: Mood normal.         Behavior: Behavior normal.         Thought Content: Thought content normal.               Significant Labs: All pertinent labs within the past 24 hours have been reviewed.    Significant Imaging: I have reviewed all pertinent imaging results/findings within the past 24 hours.    Assessment/Plan:     * Need for prophylactic immunotherapy  - Chronic Prophylactic Immunosuppression- cont to check prograf level daily.  Assess for toxicity and adjust level as needed  - no MMF  - cont same dose      Skin lesions  - per derm  - skin bx 9/18/23  - started Acyclovir 9/18 per ID    H/O kidney transplant  - s/p kidney transplant #1 4/15/2015 and #2 1/9/2021  - good renal allograft function  - cont to monitor cr        Discharge Planning:  Discussed plan of care.  No plan for discharge today.    Greater than 30 minutes spent with patient discussing diagnosis including reviewing labs and imaging and plan of care.      Shahla Willett NP  Kidney Transplant  [facility] - [facility] ([facility]

## 2023-09-19 NOTE — ASSESSMENT & PLAN NOTE
- Chronic Prophylactic Immunosuppression- cont to check prograf level daily.  Assess for toxicity and adjust level as needed  - no MMF  - cont same dose

## 2023-09-19 NOTE — PROGRESS NOTES
[facility] - Intensive [facility] ([place] [address])  Infectious Disease  Progress Note    Patient Name: Leydi Cordero  MRN: [id number]  Admission Date: 2023  Length of Stay: 2 days  Attending Physician: Gini Case MD  Primary Care Provider: Helena Zepeda PA-C    Isolation Status: No active isolations  Assessment/Plan:      Derm  Skin lesions  33F with second DDKT 2021 (2021) (first 4/15/15) on prograf and cellcept, prednisone, HTN, HTN nephropathy, Ovarian cyst, and NASIMA, who presents to [facility] [place] [facility] as a transfer from [facility] with complaint of rash, fevers, malaise beginning 4 days prior to arrival with progressive spread. RPR negative. COVID negative. Blood cx NGTD. CXR with patchy airspace disease in lower lobes. She was started on vanc/cefepime. MPOX swab sent. Presentation suspicious for disseminated herpes zoster with possible pneumonia. Other differentials per derm - disseminated HSV, primary varicella, MPOX, molluscum. Lesions on face appear to have bacterial superinfection.     Recommendations  Follow up HSV, VZV, and mpox from swabs of lesions  Follow up GC/CT, HIV, respiratory panel  Continue cefepime and acyclovir. Discontinue vancomycin  Follow up CT chest, abdomen, pelvis          Anticipated Disposition: TBD    Thank you for your consult. I will follow-up with patient. Please contact us if you have any additional questions.    Etelvina Ward,   Infectious Disease  [facility] - Intensive [facility] ([place] [address])    Subjective:     Principal Problem:Need for prophylactic immunotherapy    HPI: 33 year old female with history of second -donor kidney transplant 2021 (2021) (first 4/15/15) on prograf and cellcept, prednisone,  Hypertension, Hypertensive nephropathy, Ovarian cyst, and Sleep apnea who presents to [facility] as a transfer from [facility] with complaint of rash, fevers, malaise. She reports that 4 days prior to admission she began to develop lesion on face on chin, throughout  the day she began to have spreading of these lesions to her back, chest, abdomen, hands, inguinal region. She describes them as itchy and painful. She has noticed some purulent drainage from some of the lesions. Has not had a rash like this before. After developing rash she noticed that she began to feel feverish. She additionally complains of headaches and ear pain. No tinnitus, no decreased hearing, no visual changes. She complains of shortness of breath and cough productive of yellow sputum. She complains of generalized weakness, malaise, myalgias. Complains of sore throat, mild pain with swallowing. She complains of crampy lower abdominal pain that she attributes to starting menses 2 days ago. No N/V. No recent sick contacts. No similar presentations. She does not think had chicken pox as a child and confirmed this with mother. Thinks had all childhood vaccines, not sure about varicella vaccine.    Was seen over summer for persistent diarrhea with weight loss, worsened with eating and discussion of possible GI evaluation for scope. At last appt with transplant nephro these sx had resolved.   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 HR HPV+ 16    From [place]. Living with roommate in [place] in apt. No recent travel. No pets or animal exposures. No known bug bites. No new soaps/detergents/perfumes. Not exposed to children. Sexually active with male partners - last encounter 4 months ago, monogamous.     Interval History:     Patient states she is doing ok, but still has pain and itchiness of her skin lesions. Denies any other pain. Denies any nausea/vomiting. Had temp of 100.6 this morning.     Review of Systems   Constitutional:  Positive for fever. Negative for chills.   Respiratory:  Negative for cough and shortness of breath.    Cardiovascular:  Negative for chest pain.   Gastrointestinal:  Negative for abdominal pain, constipation, diarrhea, nausea and vomiting.   Genitourinary:   Negative for dysuria and hematuria.   Musculoskeletal:  Negative for arthralgias and myalgias.   Skin:  Positive for rash.   Neurological:  Negative for weakness and headaches.     Objective:     Vital Signs (Most Recent):  Temp: 99.7 °F (37.6 °C) (09/19/23 1148)  Pulse: 83 (09/19/23 1148)  Resp: 18 (09/19/23 1148)  BP: 102/61 (09/19/23 1148)  SpO2: (!) 92 % (09/19/23 1148) Vital Signs (24h Range):  Temp:  [97.5 °F (36.4 °C)-100.6 °F (38.1 °C)] 99.7 °F (37.6 °C)  Pulse:  [82-91] 83  Resp:  [18-19] 18  SpO2:  [89 %-94 %] 92 %  BP: (100-118)/(58-75) 102/61     Weight: 78 kg (171 lb 15.3 oz)  Body mass index is 33.58 kg/m².    Estimated Creatinine Clearance: 93.2 mL/min (based on SCr of 0.8 mg/dL).     Physical Exam  Vitals reviewed.   Constitutional:       General: She is not in acute distress.     Appearance: Normal appearance. She is not ill-appearing.   HENT:      Head: Normocephalic and atraumatic.   Eyes:      Extraocular Movements: Extraocular movements intact.      Conjunctiva/sclera: Conjunctivae normal.   Cardiovascular:      Rate and Rhythm: Normal rate and regular rhythm.      Heart sounds: No murmur heard.  Pulmonary:      Effort: Pulmonary effort is normal. No respiratory distress.      Breath sounds: Normal breath sounds. No wheezing.   Abdominal:      General: Abdomen is flat. Bowel sounds are normal.      Palpations: Abdomen is soft.      Tenderness: There is no abdominal tenderness.   Musculoskeletal:      Cervical back: Normal range of motion.   Skin:     General: Skin is warm and dry.      Findings: Rash present.   Neurological:      General: No focal deficit present.      Mental Status: She is alert and oriented to person, place, and time.   Psychiatric:         Mood and Affect: Mood normal.         Behavior: Behavior normal.         Thought Content: Thought content normal.          Significant Labs: All pertinent labs within the past 24 hours have been reviewed.  Recent Lab Results  (Last 5  results in the past 24 hours)        09/19/23  0343   09/18/23  1829   09/18/23  1731   09/18/23  1437   09/18/23  1350        Anion Gap 8               Appearance, UA   Clear             Baso # 0.02               Basophil % 0.5               Bilirubin (UA)   Negative             BUN 8               Calcium 8.1               Chloride 106               CO2 21               Color, UA   Colorless             Creatinine 0.8               Differential Method Automated               eGFR >60.0               Eos # 0.0               Eosinophil % 0.5               Glucose 105               Glucose, UA   Negative             Gram Stain (Respiratory)     <10 epithelial cells per low power field.                Many WBC's                Many Gram negative rods                Moderate Gram positive cocci           Gran # (ANC) 3.3               Gran % 82.0               Hematocrit 32.0               Hemoglobin 10.8               HSV PCR Source       leg         Immature Grans (Abs) 0.05  Comment: Mild elevation in immature granulocytes is non specific and   can be seen in a variety of conditions including stress response,   acute inflammation, trauma and pregnancy. Correlation with other   laboratory and clinical findings is essential.                 Immature Granulocytes 1.3               Ketones, UA   Negative             Leukocytes, UA   Negative             Lymph # 0.3               Lymph % 8.6               MCH 28.1               MCHC 33.8               MCV 83               Microscopic Comment   SEE COMMENT  Comment: Other formed elements not mentioned in the report are not   present in the microscopic examination.                Mono # 0.3               Mono % 7.1               MPV 10.8               NITRITE UA   Negative             nRBC 0               Occult Blood UA   2+             pH, UA   6.0             Platelets 155               Potassium 3.0               Protein, UA   Trace  Comment: Recommend a 24 hour urine  protein or a urine   protein/creatinine ratio if globulin induced proteinuria is  clinically suspected.               RBC 3.84               RBC, UA   1             RDW 14.6               Respiratory Culture     Normal respiratory asaf  [P]           Sodium 135               Specific [place], UA   1.010             Specimen UA   Urine, Clean Catch             Squam Epithel, UA   2             Tacrolimus Lvl 3.2  Comment: Testing performed by a chemiluminescent microparticle   immunoassay on the [employer] i System.    CAUTION: No firm therapeutic range exists for tacrolimus in whole   blood. The   complexity of the clinical state, individual differences in   sensitivity to   immunosuppressive and nephrotoxic effects of tacrolimus,   co-administration   of other immunosuppressants, type of transplant, time post-transplant   and a   number of other factors contribute to different requirements for   optimal   blood levels of tacrolimus. Therefore, individual tacrolimus values   cannot   be used as the sole indicator for making changes in treatment regimen   and   each patient should be thoroughly evaluated clinically before changes   in   treatment regimens are made. Each user must establish his or her own   ranges   based on clinical experience.  Therapeutic ranges vary according to the commercial test used, and   therefore   should be established for each commercial test. Values obtained with   different assay methods cannot be used interchangeably due to   differences in   assay methods and cross-reactivity with metabolites, nor should   correction   factors be applied. Therefore, consistent use of one assay for   individual   patients is recommended.                 Vancomycin-Trough         10.5       VZV by PCR Source       leg         WBC, UA   3             WBC 3.96                                       [P] - Preliminary Result               Significant Imaging: I have reviewed all pertinent imaging  results/findings within the past 24 hours.

## 2023-09-19 NOTE — ASSESSMENT & PLAN NOTE
33 year old female with history of second -donor kidney transplant 2021 (2021) (first 4/15/15) on prograf and cellcept, prednisone, Hypertension, Hypertensive nephropathy, Ovarian cyst, and Sleep apnea who presents [facility] with complaint of rash, fevers, malaise beginning 4 days prior to arrival with progressive spread. She complains of shortness of breath and cough productive of yellow sputum. On admission afebrile and HDS. WBC 4.26. RPR negative. COVID negative. Blood cx NGTD. CXR with patchy airspace disease in lower lobes. She was started on vanc/cefepime. Pictures in chart - some vesicular lesions with erythematous and a few umbilicated lesions. MPOX swab sent. ID and dermatology consulted. Presentation suspicious for disseminated herpes zoster with possible pneumonia. Other differentials per derm - disseminated HSV, primary varicella, MPOX, molluscum. Lesions on face appear to have bacterial superinfection.     --HSV and VZV PCR from swabs of lesions  --MPOX swab pending  --continue acyclovir 10mg/kg q8h   --would recommend CTCAP to evaluate for underlying pneumonia and r/o other etiologies of presentation   --continue vancomycin for superinfection of lesions  --continue cefepime pending workup above  --reduce immunosuppression as able

## 2023-09-19 NOTE — SUBJECTIVE & OBJECTIVE
Interval History:     Patient states she is doing ok, but still has pain and itchiness of her skin lesions. Denies any other pain. Denies any nausea/vomiting. Had temp of 100.6 this morning.     Review of Systems   Constitutional:  Positive for fever. Negative for chills.   Respiratory:  Negative for cough and shortness of breath.    Cardiovascular:  Negative for chest pain.   Gastrointestinal:  Negative for abdominal pain, constipation, diarrhea, nausea and vomiting.   Genitourinary:  Negative for dysuria and hematuria.   Musculoskeletal:  Negative for arthralgias and myalgias.   Skin:  Positive for rash.   Neurological:  Negative for weakness and headaches.     Objective:     Vital Signs (Most Recent):  Temp: 99.7 °F (37.6 °C) (09/19/23 1148)  Pulse: 83 (09/19/23 1148)  Resp: 18 (09/19/23 1148)  BP: 102/61 (09/19/23 1148)  SpO2: (!) 92 % (09/19/23 1148) Vital Signs (24h Range):  Temp:  [97.5 °F (36.4 °C)-100.6 °F (38.1 °C)] 99.7 °F (37.6 °C)  Pulse:  [82-91] 83  Resp:  [18-19] 18  SpO2:  [89 %-94 %] 92 %  BP: (100-118)/(58-75) 102/61     Weight: 78 kg (171 lb 15.3 oz)  Body mass index is 33.58 kg/m².    Estimated Creatinine Clearance: 93.2 mL/min (based on SCr of 0.8 mg/dL).     Physical Exam  Vitals reviewed.   Constitutional:       General: She is not in acute distress.     Appearance: Normal appearance. She is not ill-appearing.   HENT:      Head: Normocephalic and atraumatic.   Eyes:      Extraocular Movements: Extraocular movements intact.      Conjunctiva/sclera: Conjunctivae normal.   Cardiovascular:      Rate and Rhythm: Normal rate and regular rhythm.      Heart sounds: No murmur heard.  Pulmonary:      Effort: Pulmonary effort is normal. No respiratory distress.      Breath sounds: Normal breath sounds. No wheezing.   Abdominal:      General: Abdomen is flat. Bowel sounds are normal.      Palpations: Abdomen is soft.      Tenderness: There is no abdominal tenderness.   Musculoskeletal:      Cervical  back: Normal range of motion.   Skin:     General: Skin is warm and dry.      Findings: Rash present.   Neurological:      General: No focal deficit present.      Mental Status: She is alert and oriented to person, place, and time.   Psychiatric:         Mood and Affect: Mood normal.         Behavior: Behavior normal.         Thought Content: Thought content normal.          Significant Labs: All pertinent labs within the past 24 hours have been reviewed.  Recent Lab Results  (Last 5 results in the past 24 hours)        09/19/23  0343   09/18/23  1829   09/18/23  1731   09/18/23  1437   09/18/23  1350        Anion Gap 8               Appearance, UA   Clear             Baso # 0.02               Basophil % 0.5               Bilirubin (UA)   Negative             BUN 8               Calcium 8.1               Chloride 106               CO2 21               Color, UA   Colorless             Creatinine 0.8               Differential Method Automated               eGFR >60.0               Eos # 0.0               Eosinophil % 0.5               Glucose 105               Glucose, UA   Negative             Gram Stain (Respiratory)     <10 epithelial cells per low power field.                Many WBC's                Many Gram negative rods                Moderate Gram positive cocci           Gran # (ANC) 3.3               Gran % 82.0               Hematocrit 32.0               Hemoglobin 10.8               HSV PCR Source       leg         Immature Grans (Abs) 0.05  Comment: Mild elevation in immature granulocytes is non specific and   can be seen in a variety of conditions including stress response,   acute inflammation, trauma and pregnancy. Correlation with other   laboratory and clinical findings is essential.                 Immature Granulocytes 1.3               Ketones, UA   Negative             Leukocytes, UA   Negative             Lymph # 0.3               Lymph % 8.6               MCH 28.1               MCHC 33.8                MCV 83               Microscopic Comment   SEE COMMENT  Comment: Other formed elements not mentioned in the report are not   present in the microscopic examination.                Mono # 0.3               Mono % 7.1               MPV 10.8               NITRITE UA   Negative             nRBC 0               Occult Blood UA   2+             pH, UA   6.0             Platelets 155               Potassium 3.0               Protein, UA   Trace  Comment: Recommend a 24 hour urine protein or a urine   protein/creatinine ratio if globulin induced proteinuria is  clinically suspected.               RBC 3.84               RBC, UA   1             RDW 14.6               Respiratory Culture     Normal respiratory asaf  [P]           Sodium 135               Specific [place], UA   1.010             Specimen UA   Urine, Clean Catch             Squam Epithel, UA   2             Tacrolimus Lvl 3.2  Comment: Testing performed by a chemiluminescent microparticle   immunoassay on the [email or web address] i System.    CAUTION: No firm therapeutic range exists for tacrolimus in whole   blood. The   complexity of the clinical state, individual differences in   sensitivity to   immunosuppressive and nephrotoxic effects of tacrolimus,   co-administration   of other immunosuppressants, type of transplant, time post-transplant   and a   number of other factors contribute to different requirements for   optimal   blood levels of tacrolimus. Therefore, individual tacrolimus values   cannot   be used as the sole indicator for making changes in treatment regimen   and   each patient should be thoroughly evaluated clinically before changes   in   treatment regimens are made. Each user must establish his or her own   ranges   based on clinical experience.  Therapeutic ranges vary according to the commercial test used, and   therefore   should be established for each commercial test. Values obtained with   different assay methods cannot be used  interchangeably due to   differences in   assay methods and cross-reactivity with metabolites, nor should   correction   factors be applied. Therefore, consistent use of one assay for   individual   patients is recommended.                 Vancomycin-Trough         10.5       VZV by PCR Source       leg         WBC, UA   3             WBC 3.96                                       [P] - Preliminary Result               Significant Imaging: I have reviewed all pertinent imaging results/findings within the past 24 hours.

## 2023-09-19 NOTE — HPI
32 y/o F with PMH of ESRD s/p Kidney transplant on immunosuppression, HTN presented to outside hospital on 09/16 complaining of fever and rash since past 3 days.  Lesions were noted on face, bilateral arms, chest, abdomen, bilateral inguinal region.  Lesions in the inguinal region where umbilicated.  Patient received vancomycin and cefepime 1 dose in the ER at [facility].  Subsequently [facility] evaluation.  Blood cultures and UA were obtained.  Chest x-ray was suggestive of patchy consolidation bilateral lower lobes.  Started broad-spectrum antibiotics including vanc and cefepime.  Patient has history of kidney transplant in 2021, on immunosuppressive therapy.  Id, dermatology and kidney transplant team were consulted.  Id was concerned for disseminated herpes versus varicella, patient was started on acyclovir at immunosuppressive dose and swabs were collected.    KTM consulted for management of immunosuppression.

## 2023-09-19 NOTE — HOSPITAL COURSE
Patient is s/p kidney transplant #1 on 4/15/2-15 and #2 on 1/9/2021. KTM consulted to assist w management of IS.    Interval History: pt reports rash the same, pruritic. S/p skin bx per derm. Path pending. Started Acyclovir 10 mg/kg per ID 9/18/23 over 2 hours. HSV, VZR and MPOX. She notes worsening cough, white secretions. Cr at baseline. She is making good UOP. Plan to cont Prograf 1mg bid and Pred 5 mg. Potassium replaced. VSS. Monitor.

## 2023-09-19 NOTE — SUBJECTIVE & OBJECTIVE
Overnight events:  No significant overnight events.  Patient reports that cough has worsened.  Patient has been coughing up clear sputum.      Review of Systems   Constitutional:  Positive for fever. Negative for chills.   HENT:  Negative for trouble swallowing.    Eyes:  Negative for photophobia and visual disturbance.   Respiratory:  Positive for cough and shortness of breath (w exertion). Negative for wheezing.    Cardiovascular:  Negative for chest pain and palpitations.   Gastrointestinal:  Negative for abdominal pain, nausea and vomiting.   Genitourinary:  Negative for dysuria.   Musculoskeletal:  Negative for arthralgias and myalgias.   Skin:  Positive for rash.   Allergic/Immunologic: Positive for immunocompromised state.   Neurological:  Negative for dizziness and headaches.     Objective:     Vital Signs (Most Recent):  Temp: 99.7 °F (37.6 °C) (09/19/23 1148)  Pulse: 83 (09/19/23 1148)  Resp: 18 (09/19/23 1148)  BP: 102/61 (09/19/23 1148)  SpO2: (!) 92 % (09/19/23 1148) Vital Signs (24h Range):  Temp:  [97.5 °F (36.4 °C)-100.6 °F (38.1 °C)] 99.7 °F (37.6 °C)  Pulse:  [82-91] 83  Resp:  [18-19] 18  SpO2:  [89 %-94 %] 92 %  BP: (100-118)/(58-75) 102/61     Weight: 78 kg (171 lb 15.3 oz)  Body mass index is 33.58 kg/m².     Physical Exam  Constitutional:       Appearance: Normal appearance.   HENT:      Head:      Comments: I patient has an ulcerative lesion on the scalp, multiple palpable lesions scattered over the scalp.  Pustules which have ruptured were noted on the forehead and chin.  Similar lesions were noted in the back of the neck.  Eyes:      Extraocular Movements: Extraocular movements intact.      Conjunctiva/sclera: Conjunctivae normal.      Pupils: Pupils are equal, round, and reactive to light.   Cardiovascular:      Rate and Rhythm: Normal rate and regular rhythm.   Pulmonary:      Effort: Pulmonary effort is normal.      Breath sounds: Normal breath sounds.   Abdominal:      Comments:  Umbilicated lesions which have ulcerated are noted on the abdomen   Genitourinary:     Comments: Multiple dark umbilicated lesions noted in bilateral inguinal regions measuring 0.5 x 0.5 cm, tender are located in bilateral inguinal region.  Musculoskeletal:         General: No swelling or tenderness. Normal range of motion.   Skin:     Findings: Rash present.   Neurological:      General: No focal deficit present.      Mental Status: She is alert and oriented to person, place, and time.   Psychiatric:         Mood and Affect: Mood normal.         Behavior: Behavior normal.         Thought Content: Thought content normal.               Significant Labs: All pertinent labs within the past 24 hours have been reviewed.    Significant Imaging: I have reviewed all pertinent imaging results/findings within the past 24 hours.

## 2023-09-19 NOTE — ASSESSMENT & PLAN NOTE
33F with second DDKT 1/9/2021 (01/09/2021) (first 4/15/15) on prograf and cellcept, prednisone, HTN, HTN nephropathy, Ovarian cyst, and NASIMA, who [place] to [facility] [place] as a transfer from [facility] with complaint of rash, fevers, malaise beginning 4 days prior to arrival with progressive spread. RPR negative. COVID negative. Blood cx NGTD. CXR with patchy airspace disease in lower lobes. She was started on vanc/cefepime. MPOX swab sent. Presentation suspicious for disseminated herpes zoster with possible pneumonia. Other differentials per derm - disseminated HSV, primary varicella, MPOX, molluscum. Lesions on face appear to have bacterial superinfection.     Recommendations  · Follow up HSV, VZV, and mpox from swabs of lesions  · Follow up GC/CT, HIV, respiratory panel  · Continue cefepime and acyclovir. Discontinue vancomycin  · Follow up CT chest, abdomen, pelvis

## 2023-09-20 PROBLEM — R91.8 PULMONARY NODULES: Status: ACTIVE | Noted: 2023-09-20

## 2023-09-20 LAB
ANION GAP SERPL CALC-SCNC: 7 MMOL/L (ref 8–16)
BACTERIA SPEC AEROBE CULT: NORMAL
BACTERIA SPEC AEROBE CULT: NORMAL
BASOPHILS # BLD AUTO: 0.01 K/UL (ref 0–0.2)
BASOPHILS NFR BLD: 0.2 % (ref 0–1.9)
BUN SERPL-MCNC: 10 MG/DL (ref 6–20)
CALCIUM SERPL-MCNC: 8.3 MG/DL (ref 8.7–10.5)
CHLORIDE SERPL-SCNC: 105 MMOL/L (ref 95–110)
CO2 SERPL-SCNC: 22 MMOL/L (ref 23–29)
CREAT SERPL-MCNC: 0.9 MG/DL (ref 0.5–1.4)
DIFFERENTIAL METHOD: ABNORMAL
EOSINOPHIL # BLD AUTO: 0 K/UL (ref 0–0.5)
EOSINOPHIL NFR BLD: 0.7 % (ref 0–8)
ERYTHROCYTE [DISTWIDTH] IN BLOOD BY AUTOMATED COUNT: 14.8 % (ref 11.5–14.5)
EST. GFR  (NO RACE VARIABLE): >60 ML/MIN/1.73 M^2
GLUCOSE SERPL-MCNC: 78 MG/DL (ref 70–110)
GRAM STN SPEC: NORMAL
HCT VFR BLD AUTO: 36.1 % (ref 37–48.5)
HGB BLD-MCNC: 11.7 G/DL (ref 12–16)
HIV 1+2 AB+HIV1 P24 AG SERPL QL IA: NORMAL
HSV-1 DNA BY PCR: NEGATIVE
HSV-2 DNA BY PCR: NEGATIVE
HSV1 DNA SPEC QL NAA+PROBE: NEGATIVE
HSV2 DNA SPEC QL NAA+PROBE: NEGATIVE
IMM GRANULOCYTES # BLD AUTO: 0.09 K/UL (ref 0–0.04)
IMM GRANULOCYTES NFR BLD AUTO: 2 % (ref 0–0.5)
LYMPHOCYTES # BLD AUTO: 0.6 K/UL (ref 1–4.8)
LYMPHOCYTES NFR BLD: 12.7 % (ref 18–48)
MCH RBC QN AUTO: 28.6 PG (ref 27–31)
MCHC RBC AUTO-ENTMCNC: 32.4 G/DL (ref 32–36)
MCV RBC AUTO: 88 FL (ref 82–98)
MONOCYTES # BLD AUTO: 0.2 K/UL (ref 0.3–1)
MONOCYTES NFR BLD: 5 % (ref 4–15)
NEUTROPHILS # BLD AUTO: 3.5 K/UL (ref 1.8–7.7)
NEUTROPHILS NFR BLD: 79.4 % (ref 38–73)
NRBC BLD-RTO: 0 /100 WBC
PLATELET # BLD AUTO: 170 K/UL (ref 150–450)
PMV BLD AUTO: 10.9 FL (ref 9.2–12.9)
POTASSIUM SERPL-SCNC: 3.7 MMOL/L (ref 3.5–5.1)
RBC # BLD AUTO: 4.09 M/UL (ref 4–5.4)
SODIUM SERPL-SCNC: 134 MMOL/L (ref 136–145)
SPECIMEN SOURCE: NORMAL
TACROLIMUS BLD-MCNC: 3 NG/ML (ref 5–15)
WBC # BLD AUTO: 4.4 K/UL (ref 3.9–12.7)

## 2023-09-20 PROCEDURE — 99233 PR SUBSEQUENT HOSPITAL CARE,LEVL III: ICD-10-PCS | Mod: ,,, | Performed by: INTERNAL MEDICINE

## 2023-09-20 PROCEDURE — 99233 SBSQ HOSP IP/OBS HIGH 50: CPT | Mod: GC,,, | Performed by: INTERNAL MEDICINE

## 2023-09-20 PROCEDURE — 36415 COLL VENOUS BLD VENIPUNCTURE: CPT | Performed by: INTERNAL MEDICINE

## 2023-09-20 PROCEDURE — 99233 PR SUBSEQUENT HOSPITAL CARE,LEVL III: ICD-10-PCS | Mod: ,,, | Performed by: STUDENT IN AN ORGANIZED HEALTH CARE EDUCATION/TRAINING PROGRAM

## 2023-09-20 PROCEDURE — 25000003 PHARM REV CODE 250: Performed by: STUDENT IN AN ORGANIZED HEALTH CARE EDUCATION/TRAINING PROGRAM

## 2023-09-20 PROCEDURE — 80048 BASIC METABOLIC PNL TOTAL CA: CPT | Performed by: STUDENT IN AN ORGANIZED HEALTH CARE EDUCATION/TRAINING PROGRAM

## 2023-09-20 PROCEDURE — 99233 PR SUBSEQUENT HOSPITAL CARE,LEVL III: ICD-10-PCS | Mod: GC,,, | Performed by: INTERNAL MEDICINE

## 2023-09-20 PROCEDURE — 63600175 PHARM REV CODE 636 W HCPCS: Performed by: STUDENT IN AN ORGANIZED HEALTH CARE EDUCATION/TRAINING PROGRAM

## 2023-09-20 PROCEDURE — 25000003 PHARM REV CODE 250: Performed by: HOSPITALIST

## 2023-09-20 PROCEDURE — 25000003 PHARM REV CODE 250: Performed by: INTERNAL MEDICINE

## 2023-09-20 PROCEDURE — 25000003 PHARM REV CODE 250: Performed by: NURSE PRACTITIONER

## 2023-09-20 PROCEDURE — 63600175 PHARM REV CODE 636 W HCPCS: Performed by: INTERNAL MEDICINE

## 2023-09-20 PROCEDURE — 85025 COMPLETE CBC W/AUTO DIFF WBC: CPT | Performed by: STUDENT IN AN ORGANIZED HEALTH CARE EDUCATION/TRAINING PROGRAM

## 2023-09-20 PROCEDURE — 63600175 PHARM REV CODE 636 W HCPCS: Performed by: NURSE PRACTITIONER

## 2023-09-20 PROCEDURE — 63600175 PHARM REV CODE 636 W HCPCS: Performed by: HOSPITALIST

## 2023-09-20 PROCEDURE — 12000002 HC ACUTE/MED SURGE SEMI-PRIVATE ROOM

## 2023-09-20 PROCEDURE — 87389 HIV-1 AG W/HIV-1&-2 AB AG IA: CPT | Performed by: INTERNAL MEDICINE

## 2023-09-20 PROCEDURE — 99233 SBSQ HOSP IP/OBS HIGH 50: CPT | Mod: ,,, | Performed by: STUDENT IN AN ORGANIZED HEALTH CARE EDUCATION/TRAINING PROGRAM

## 2023-09-20 PROCEDURE — 99233 SBSQ HOSP IP/OBS HIGH 50: CPT | Mod: ,,, | Performed by: INTERNAL MEDICINE

## 2023-09-20 PROCEDURE — 80197 ASSAY OF TACROLIMUS: CPT | Performed by: INTERNAL MEDICINE

## 2023-09-20 RX ADMIN — HYDROXYZINE HYDROCHLORIDE 25 MG: 25 TABLET, FILM COATED ORAL at 12:09

## 2023-09-20 RX ADMIN — ACYCLOVIR SODIUM 460 MG: 50 INJECTION, SOLUTION INTRAVENOUS at 09:09

## 2023-09-20 RX ADMIN — GUAIFENESIN AND DEXTROMETHORPHAN 5 ML: 100; 10 SYRUP ORAL at 06:09

## 2023-09-20 RX ADMIN — CEFEPIME 2 G: 2 INJECTION, POWDER, FOR SOLUTION INTRAVENOUS at 09:09

## 2023-09-20 RX ADMIN — TACROLIMUS 1 MG: 1 CAPSULE ORAL at 06:09

## 2023-09-20 RX ADMIN — PREDNISONE 5 MG: 5 TABLET ORAL at 09:09

## 2023-09-20 RX ADMIN — SODIUM BICARBONATE 1300 MG: 650 TABLET ORAL at 08:09

## 2023-09-20 RX ADMIN — OXYCODONE HYDROCHLORIDE 5 MG: 5 TABLET ORAL at 09:09

## 2023-09-20 RX ADMIN — PANTOPRAZOLE SODIUM 40 MG: 40 TABLET, DELAYED RELEASE ORAL at 09:09

## 2023-09-20 RX ADMIN — HYDROXYZINE HYDROCHLORIDE 25 MG: 25 TABLET, FILM COATED ORAL at 08:09

## 2023-09-20 RX ADMIN — CEFEPIME 2 G: 2 INJECTION, POWDER, FOR SOLUTION INTRAVENOUS at 06:09

## 2023-09-20 RX ADMIN — TACROLIMUS 1 MG: 1 CAPSULE ORAL at 09:09

## 2023-09-20 RX ADMIN — DIPHENHYDRAMINE HYDROCHLORIDE 25 MG: 25 CAPSULE ORAL at 08:09

## 2023-09-20 RX ADMIN — DIPHENHYDRAMINE HYDROCHLORIDE 25 MG: 25 CAPSULE ORAL at 03:09

## 2023-09-20 RX ADMIN — GUAIFENESIN AND DEXTROMETHORPHAN 5 ML: 100; 10 SYRUP ORAL at 12:09

## 2023-09-20 RX ADMIN — OXYCODONE HYDROCHLORIDE 5 MG: 5 TABLET ORAL at 03:09

## 2023-09-20 RX ADMIN — SUCRALFATE 1 G: 1 TABLET ORAL at 12:09

## 2023-09-20 RX ADMIN — ACYCLOVIR SODIUM 460 MG: 50 INJECTION, SOLUTION INTRAVENOUS at 12:09

## 2023-09-20 RX ADMIN — SUCRALFATE 1 G: 1 TABLET ORAL at 09:09

## 2023-09-20 RX ADMIN — MIDODRINE HYDROCHLORIDE 5 MG: 5 TABLET ORAL at 06:09

## 2023-09-20 RX ADMIN — ACYCLOVIR SODIUM 460 MG: 50 INJECTION, SOLUTION INTRAVENOUS at 03:09

## 2023-09-20 RX ADMIN — MIDODRINE HYDROCHLORIDE 5 MG: 5 TABLET ORAL at 09:09

## 2023-09-20 RX ADMIN — CEFEPIME 2 G: 2 INJECTION, POWDER, FOR SOLUTION INTRAVENOUS at 12:09

## 2023-09-20 RX ADMIN — MORPHINE SULFATE 2 MG: 2 INJECTION, SOLUTION INTRAMUSCULAR; INTRAVENOUS at 12:09

## 2023-09-20 RX ADMIN — DIPHENHYDRAMINE HYDROCHLORIDE 25 MG: 25 CAPSULE ORAL at 09:09

## 2023-09-20 RX ADMIN — SUCRALFATE 1 G: 1 TABLET ORAL at 06:09

## 2023-09-20 RX ADMIN — OXYCODONE HYDROCHLORIDE 5 MG: 5 TABLET ORAL at 08:09

## 2023-09-20 RX ADMIN — SODIUM BICARBONATE 1300 MG: 650 TABLET ORAL at 09:09

## 2023-09-20 RX ADMIN — CALCITRIOL CAPSULES 0.25 MCG 0.25 MCG: 0.25 CAPSULE ORAL at 09:09

## 2023-09-20 NOTE — ASSESSMENT & PLAN NOTE
Patient has hyponatremia which is controlled,We will aim to correct the sodium by 4-6mEq in 24 hours. We will monitor sodium Daily. The hyponatremia is due to CKD. We will obtain the following studies:  BMP daily. he patient's sodium results have been reviewed and are listed below.  Recent Labs   Lab 09/20/23  0544   *

## 2023-09-20 NOTE — ASSESSMENT & PLAN NOTE
This is a case of disseminated vesicular skin eruption in a 32 yo F s/p kidney transplant on chronic immunosuppression. Highest diagnosis on differential remains disseminated VZV > HSV at this time. It is possible to see pneumonia as a result of disseminated VZV. Deep, painful violaceous papules to hand can also be a presentation of VZV. Reporting some improvement on acyclovir and very few new lesions. Swabs still pending.    -FU HSV, VZV, Monkeypox swabs.  -Continue IV acyclovir at 10mg/kg every 8 hours.  -Suggest ordering IgG and IgM for both HSV and VZV.  -No need for biopsy at this time. Plan defer biopsy unless both HSV and VZV negative.

## 2023-09-20 NOTE — PROGRESS NOTES
KIDNEY TRANSPLANT MEDICINE PROGRESS NOTE    Please refer to detailed progress note from Shahla Willett NP with kidney transplant(yesterday 9/19/2023) for complete details of this admission.    Interval history: patient feels better today, less cough, malaise is also improved. Skin lesions are  also improved with less inflammatory changes and crusting. Please dermatology note for photos and more precise description.     Past medical, surgical, family, social history reviewed & unchanged since admission.     I/O last 3 completed shifts:  In: 720 [P.O.:720]  Out: -     VITALS:  height is 5' (1.524 m) and weight is 78 kg (171 lb 15.3 oz). Her oral temperature is 98.2 °F (36.8 °C). Her blood pressure is 99/61 and her pulse is 89. Her respiration is 20 and oxygen saturation is 92% (abnormal).       A&O x3, NAD.  Lungs CTA, unlabored.  Heart regular, no rub.  Abdomen soft, nontender, no masses.  Allograft nontender.  Edema: none.    Recent Labs   Lab 09/18/23 0517 09/19/23 0343 09/20/23  0544   WBC 4.26 3.96 4.40   HGB 10.8* 10.8* 11.7*   HCT 31.6* 32.0* 36.1*    155 170       Recent Labs   Lab 09/18/23 0517 09/19/23 0343 09/20/23  0544   * 135* 134*   K 3.5 3.0* 3.7    106 105   CO2 18* 21* 22*   BUN 8 8 10   CREATININE 0.8 0.8 0.9   CALCIUM 8.3* 8.1* 8.3*     Recent Labs   Lab 09/18/23 0517 09/19/23 0343 09/20/23  0544   Tacrolimus Lvl 5.0 3.2 L 3.0 L       ASSESSMENT/PLAN:    Problems/plans as noted in the referenced progress note. Additional pertinent findings:  Ckd stage 2  Efren resolved: supportive care hydration IV in case poor PO intake  Varicella and ?varicella pneumonia (patient had positive abs prior to transplant> other tests pending on IV GCV per ID and dermatology  Immunosuppression OFF MMF and low tacro level: daily tacrolimus level   I spoke with patient and answered all transplant related questions      CKD post kidney transplant  -Follow with strict I/Os, daily weights, BP (goal  <140/90), daily labs.    -Avoid nephrotoxic agents when feasible (NSAIDs, IV contrast dye, Aminoglycoside-containing antibiotics)  -Renally dose all appropriate medications, including antibiotics      Encounter for Monitoring Immunosuppression post Transplant  -Continue to trend immunosuppression levels.   -Monitor for med-related side effects or drug toxicity given immunosuppressant med with narrow therapeutic window.

## 2023-09-20 NOTE — PROGRESS NOTES
[facility] ([place] [address])  Infectious Disease  Progress Note    Patient Name: Leydi Cordero  MRN: [id number]  Admission Date: 2023  Length of Stay: 3 days  Attending Physician: Gini Case MD  Primary Care Provider: Helena Zepeda PA-C    Isolation Status: No active isolations  Assessment/Plan:      Derm  Skin lesions  33F with second DDKT 2021 (2021) (first 4/15/15) on prograf and cellcept, prednisone, HTN, HTN nephropathy, Ovarian cyst, and NASIMA, who presents to [facility] as a transfer from [facility] with complaint of rash, fevers, malaise beginning 4 days prior to arrival with progressive spread. RPR negative. COVID negative. Blood cx NGTD. CXR with patchy airspace disease in lower lobes. She was started on vanc/cefepime. MPOX swab sent. Presentation suspicious for disseminated herpes zoster with possible pneumonia. Other differentials per derm - disseminated HSV, primary varicella, MPOX, molluscum. Lesions on face appear to have bacterial superinfection. HIV, GC/CT, negative.     Recommendations  · Follow up HSV, VZV, and mpox from swabs of lesions  · Follow up respiratory panel  · Continue cefepime and acyclovir.      Pulmonary  Pulmonary nodules  Innumerable pulmonary nodules bilaterally in centrilobular and subpleural locations noted on CT.     Recommendations  · Follow up fungal workup (ordered)  · Pulmonology consult for possible bronch        Anticipated Disposition: TBD    Thank you for your consult. I will follow-up with patient. Please contact us if you have any additional questions.    Etelvina Ward DO  Infectious Disease  [facility] ([place] [address])    Subjective:     Principal Problem:Need for prophylactic immunotherapy    HPI: 33 year old female with history of second -donor kidney transplant 2021 (2021) (first 4/15/15) on prograf and cellcept, prednisone,  Hypertension, Hypertensive nephropathy, Ovarian cyst, and Sleep apnea  who presents to [facility] as a transfer from [facility] with complaint of rash, fevers, malaise. She reports that 4 days prior to admission she began to develop lesion on face on chin, throughout the day she began to have spreading of these lesions to her back, chest, abdomen, hands, inguinal region. She describes them as itchy and painful. She has noticed some purulent drainage from some of the lesions. Has not had a rash like this before. After developing rash she noticed that she began to feel feverish. She additionally complains of headaches and ear pain. No tinnitus, no decreased hearing, no visual changes. She complains of shortness of breath and cough productive of yellow sputum. She complains of generalized weakness, malaise, myalgias. Complains of sore throat, mild pain with swallowing. She complains of crampy lower abdominal pain that she attributes to starting menses 2 days ago. No N/V. No recent sick contacts. No similar presentations. She does not think had chicken pox as a child and confirmed this with mother. Thinks had all childhood vaccines, not sure about varicella vaccine.    Was seen over summer for persistent diarrhea with weight loss, worsened with eating and discussion of possible GI evaluation for scope. At last appt with transplant nephro these sx had resolved.   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 HR HPV+ 16    From [place]. Living with roommate in [place] in apt. No recent travel. No pets or animal exposures. No known bug bites. No new soaps/detergents/perfumes. Not exposed to children. Sexually active with male partners - last encounter 4 months ago, monogamous.     Interval History:     Patient reports continued pain and itchiness of skin lesions. Denies any other new symptoms. No acute events overnight.     Review of Systems   Constitutional:  Negative for chills and fever.   Respiratory:  Negative for cough and shortness of breath.    Cardiovascular:  Negative for  chest pain.   Gastrointestinal:  Negative for abdominal pain, constipation, diarrhea, nausea and vomiting.   Genitourinary:  Negative for dysuria and hematuria.   Musculoskeletal:  Negative for arthralgias and myalgias.   Skin:  Positive for rash.   Neurological:  Negative for weakness and headaches.     Objective:     Vital Signs (Most Recent):  Temp: 97.6 °F (36.4 °C) (09/20/23 1200)  Pulse: 92 (09/20/23 1200)  Resp: 20 (09/20/23 1508)  BP: 103/68 (09/20/23 1200)  SpO2: 95 % (09/20/23 1200) Vital Signs (24h Range):  Temp:  [97.6 °F (36.4 °C)-99.6 °F (37.6 °C)] 97.6 °F (36.4 °C)  Pulse:  [86-99] 92  Resp:  [18-20] 20  SpO2:  [90 %-95 %] 95 %  BP: ()/(53-73) 103/68     Weight: 78 kg (171 lb 15.3 oz)  Body mass index is 33.58 kg/m².    Estimated Creatinine Clearance: 82.9 mL/min (based on SCr of 0.9 mg/dL).     Physical Exam  Vitals reviewed.   Constitutional:       General: She is not in acute distress.     Appearance: Normal appearance. She is not ill-appearing.   HENT:      Head: Normocephalic and atraumatic.   Eyes:      Extraocular Movements: Extraocular movements intact.      Conjunctiva/sclera: Conjunctivae normal.   Cardiovascular:      Rate and Rhythm: Normal rate and regular rhythm.      Heart sounds: No murmur heard.  Pulmonary:      Effort: Pulmonary effort is normal. No respiratory distress.      Breath sounds: Normal breath sounds. No wheezing.   Abdominal:      General: Abdomen is flat. Bowel sounds are normal.      Palpations: Abdomen is soft.      Tenderness: There is no abdominal tenderness.   Musculoskeletal:      Cervical back: Normal range of motion.   Skin:     General: Skin is warm and dry.      Findings: Rash present.   Neurological:      General: No focal deficit present.      Mental Status: She is alert and oriented to person, place, and time.   Psychiatric:         Mood and Affect: Mood normal.         Behavior: Behavior normal.         Thought Content: Thought content normal.           Significant Labs: All pertinent labs within the past 24 hours have been reviewed.  Recent Lab Results         09/20/23  0544   09/19/23  2237        Anion Gap 7         Baso # 0.01         Basophil % 0.2         BUN 10         Calcium 8.3         Chlamydia, Amplified DNA   Not Detected       Chloride 105         CO2 22         Creatinine 0.9         Differential Method Automated         eGFR >60.0         Eos # 0.0         Eosinophil % 0.7         Glucose 78         Gran # (ANC) 3.5         Gran % 79.4         Hematocrit 36.1         Hemoglobin 11.7         HIV 1/2 Ag/Ab Non-reactive         Immature Grans (Abs) 0.09  Comment: Mild elevation in immature granulocytes is non specific and   can be seen in a variety of conditions including stress response,   acute inflammation, trauma and pregnancy. Correlation with other   laboratory and clinical findings is essential.           Immature Granulocytes 2.0         Lymph # 0.6         Lymph % 12.7         MCH 28.6         MCHC 32.4         MCV 88         Mono # 0.2         Mono % 5.0         MPV 10.9         N gonorrhoeae, amplified DNA   Not Detected       nRBC 0         Platelets 170         Potassium 3.7         RBC 4.09         RDW 14.8         Sodium 134         Tacrolimus Lvl 3.0  Comment: Testing performed by a chemiluminescent microparticle   immunoassay on the [employer] i System.    CAUTION: No firm therapeutic range exists for tacrolimus in whole   blood. The   complexity of the clinical state, individual differences in   sensitivity to   immunosuppressive and nephrotoxic effects of tacrolimus,   co-administration   of other immunosuppressants, type of transplant, time post-transplant   and a   number of other factors contribute to different requirements for   optimal   blood levels of tacrolimus. Therefore, individual tacrolimus values   cannot   be used as the sole indicator for making changes in treatment regimen   and   each patient should be  thoroughly evaluated clinically before changes   in   treatment regimens are made. Each user must establish his or her own   ranges   based on clinical experience.  Therapeutic ranges vary according to the commercial test used, and   therefore   should be established for each commercial test. Values obtained with   different assay methods cannot be used interchangeably due to   differences in   assay methods and cross-reactivity with metabolites, nor should   correction   factors be applied. Therefore, consistent use of one assay for   individual   patients is recommended.           WBC 4.40                 Significant Imaging: I have reviewed all pertinent imaging results/findings within the past 24 hours.

## 2023-09-20 NOTE — PLAN OF CARE
Problem: Adult Inpatient Plan of Care  Goal: Plan of Care Review  Outcome: Ongoing, Progressing  Goal: Patient-Specific Goal (Individualized)  Outcome: Ongoing, Progressing  Goal: Absence of Hospital-Acquired Illness or Injury  Outcome: Ongoing, Progressing  Goal: Optimal Comfort and Wellbeing  Outcome: Ongoing, Progressing  Goal: Readiness for Transition of Care  Outcome: Ongoing, Progressing     Pain and itching being managed well throughout shift with prn meds.  Pain was 3 out of 10 this evening.  Alternating prn po and iv pain meds.  Continued IV abx and acyclovir.  Isolation precautions maintained. VSS.  Wcm.

## 2023-09-20 NOTE — ASSESSMENT & PLAN NOTE
Patient has history of kidney transplant from decreased tone dating back to 2021.    Patient is on immunosuppressive therapy including tacrolimus, CellCept and prednisone.  Kidney transplant team on board.  Continue tacrolimus and prednisone.  CellCept on hold.

## 2023-09-20 NOTE — ASSESSMENT & PLAN NOTE
Innumerable pulmonary nodules bilaterally in centrilobular and subpleural locations noted on CT.     Recommendations  · Follow up fungal workup (ordered)  · Pulmonology consult for possible bronch

## 2023-09-20 NOTE — ASSESSMENT & PLAN NOTE
-Patient  developed blisters and vesicles on her forehead and face 3 days ago.  Later lesions spread to her bilateral arms, chest, abdomen, bilateral inguinal region.  - Lesions in the inguinal region where umbilicated.  The lesions ruptured draining serous fluid.   - Patient also developed fever at the same time.  -Patient reports no one else in the family developed similar lesions.  Patient denies myalgia, arthralgia, exposure to sick contacts.  Patient has history of kidney transplant dating back to 2021.  On immunosuppression CellCept, tacrolimus and prednisone.   COVID, RPR negative.  Monkey pox testing pending.  Blood cultures were obtained yesterday no growth so far, UA negative for leukocyte esterase, nitrate.  Chest x-ray suggestive of patchy consolidation in lower lung fields suggestive of pneumonia.   Patient received vancomycin and cefepime 1 dose in the ER      Plan:   -vancomycin DC on 09/19.  Continue  cefepime.    -rapid progression of the lesion increases the concern for infectious origin, herpes zoster vs varicella zoster  vs disseminated zoster, continue acyclovir.  -can not lower the level of immunosuppression any further, prograf level is already below therapeutic range.  -HSV and varicella zoster PCR pending  -dermatology and ID on board, CT chest, abdomen and pelvis showed Left lower lobe pneumonia with innumerable pulmonary nodules scattered throughout the lungs.  Blastomycosis, histoplasmosis, Aspergillus and toxoplasmosis testing ordered.  -will follow airborne and contact precautions until we have a definite diagnosis.

## 2023-09-20 NOTE — ASSESSMENT & PLAN NOTE
Patient has history of kidney transplant from decreased tone dating back to 2021.    Patient is on immunosuppressive therapy including tacrolimus, CellCept and prednisone.  Kidney transplant team on board

## 2023-09-20 NOTE — SUBJECTIVE & OBJECTIVE
Overnight events:  No significant overnight events.  Most of the lesion have crusted, patient complains of itching in [place].  Cough has improved.    Review of Systems   Constitutional:  Positive for fever. Negative for chills.   Eyes:  Negative for photophobia and visual disturbance.   Respiratory:  Positive for cough. Negative for shortness of breath and wheezing.    Cardiovascular:  Negative for chest pain and palpitations.   Gastrointestinal:  Negative for abdominal pain, nausea and vomiting.   Genitourinary:  Negative for dysuria.   Musculoskeletal:  Negative for arthralgias and myalgias.   Skin:  Positive for rash.   Neurological:  Negative for dizziness and headaches.     Objective:     Vital Signs (Most Recent):  Temp: 97.6 °F (36.4 °C) (09/20/23 1200)  Pulse: 92 (09/20/23 1200)  Resp: 20 (09/20/23 1241)  BP: 103/68 (09/20/23 1200)  SpO2: 95 % (09/20/23 1200) Vital Signs (24h Range):  Temp:  [97.6 °F (36.4 °C)-99.6 °F (37.6 °C)] 97.6 °F (36.4 °C)  Pulse:  [86-99] 92  Resp:  [18-20] 20  SpO2:  [90 %-95 %] 95 %  BP: ()/(53-73) 103/68     Weight: 78 kg (171 lb 15.3 oz)  Body mass index is 33.58 kg/m².     Physical Exam  Constitutional:       Appearance: Normal appearance.   HENT:      Head:      Comments: I patient has an ulcerative lesion on the scalp, multiple palpable lesions scattered over the scalp.  Pustules which have ruptured were noted on the forehead and chin.  Similar lesions were noted in the back of the neck.  Eyes:      Extraocular Movements: Extraocular movements intact.      Conjunctiva/sclera: Conjunctivae normal.      Pupils: Pupils are equal, round, and reactive to light.   Cardiovascular:      Rate and Rhythm: Normal rate and regular rhythm.   Pulmonary:      Effort: Pulmonary effort is normal.      Breath sounds: Normal breath sounds.   Abdominal:      Comments: Umbilicated lesions which have ulcerated are noted on the abdomen   Genitourinary:     Comments: Multiple dark umbilicated  lesions noted in bilateral inguinal regions measuring 0.5 x 0.5 cm, tender are located in bilateral inguinal region.  Musculoskeletal:         General: No swelling or tenderness. Normal range of motion.   Skin:     Findings: Rash present.   Neurological:      General: No focal deficit present.      Mental Status: She is alert and oriented to person, place, and time.               Significant Labs: All pertinent labs within the past 24 hours have been reviewed.    Significant Imaging: I have reviewed all pertinent imaging results/findings within the past 24 hours.

## 2023-09-20 NOTE — PROGRESS NOTES
[facility] ([place] [address])  Dermatology  Progress Note    Patient Name: Leydi Cordero  MRN: [id number]  Admission Date: 9/17/2023  Hospital Length of Stay: 3 days  Attending Physician: Gini Case MD  Primary Care Provider: Helena Zepeda PA-C     Subjective:     Principal Problem:Need for prophylactic immunotherapy    Interval History: Patient reports feeling better today. Denies new lesions and feels most of them are crusting over. Continues with some pruritus, but pain largely resolved. Cough and respiratory symptoms a little improved today as well.    Medications:  Continuous Infusions:  Scheduled Meds:   acyclovir  10 mg/kg (Ideal) Intravenous Q8H    calcitRIOL  0.25 mcg Oral Daily    ceFEPime (MAXIPIME) IVPB  2 g Intravenous Q8H    dextromethorphan-guaiFENesin  mg/5 ml  5 mL Oral Q6H    midodrine  5 mg Oral BID WM    pantoprazole  40 mg Oral Daily    predniSONE  5 mg Oral Daily    sodium bicarbonate  1,300 mg Oral BID    sucralfate  1 g Oral QID    tacrolimus  1 mg Oral BID     PRN Meds:acetaminophen, acetaminophen, albuterol, dextrose 10%, dextrose 10%, diphenhydrAMINE, docusate sodium, EPINEPHrine (PF), glucagon (human recombinant), glucose, glucose, hydrOXYzine HCL, morphine, naloxone, oxyCODONE, sodium chloride 0.9%    Review of Systems   Constitutional:  Positive for fatigue. Negative for fever and chills.   HENT:  Positive for sore throat. Negative for mouth sores.    Eyes:  Positive for eye irritation. Negative for visual change.   Respiratory:  Positive for cough and shortness of breath.    Gastrointestinal:  Negative for nausea, vomiting, abdominal pain, diarrhea and constipation.   Genitourinary:  Negative for genital sores.   Skin:  Positive for itching and rash.     Objective:     Vital Signs (Most Recent):  Temp: 98.2 °F (36.8 °C) (09/20/23 0811)  Pulse: 89 (09/20/23 0811)  Resp: 18 (09/20/23 0811)  BP: 99/61 (09/20/23 0811)  SpO2: (!) 92 % (09/20/23  0811) Vital Signs (24h Range):  Temp:  [98.2 °F (36.8 °C)-99.7 °F (37.6 °C)] 98.2 °F (36.8 °C)  Pulse:  [83-99] 89  Resp:  [18] 18  SpO2:  [90 %-94 %] 92 %  BP: ()/(53-73) 99/61     Weight: 78 kg (171 lb 15.3 oz)  Body mass index is 33.58 kg/m².     Physical Exam   Constitutional: She appears well-developed. No distress.   Neurological: She is alert and oriented to person, place, and time. She is not disoriented.   Skin:                                   Significant Labs: All pertinent labs within the past 24 hours have been reviewed.   Swabs still pending for HSV, VZV, and Monkeypox.    Significant Imaging: I have reviewed all pertinent imaging results/findings within the past 24 hours.    Chest CT: Left lower lobe pneumonia with innumerable pulmonary nodules scattered throughout the lungs, new compared to August 3 suggestive of infectious/inflammatory process or less likely septic emboli.        Assessment/Plan:     Derm  Skin lesions  This is a case of disseminated vesicular skin eruption in a 34 yo F s/p kidney transplant on chronic immunosuppression. Highest diagnosis on differential remains disseminated VZV at this time. It is possible to see pneumonia as a result of disseminated VZV. Deep, painful violaceous papules to hand can also be a presentation of VZV. Reporting some improvement on acyclovir and very few new lesions. Swabs still pending.    -FU HSV, VZV, Monkeypox swabs.  -Continue IV acyclovir at 10mg/kg every 8 hours.  -Suggest ordering IgG and IgM for both HSV and VZV.  -No need for biopsy at this time. Plan defer biopsy unless both HSV and VZV negative.      Thank you for your consult. I will follow-up with patient. Please contact us if you have any additional questions.    Jael Schaefer MD  Dermatology  [facility] - Intensive Care ([place])

## 2023-09-20 NOTE — ASSESSMENT & PLAN NOTE
33F with second DDKT 1/9/2021 (01/09/2021) (first 4/15/15) on prograf and cellcept, prednisone, HTN, HTN nephropathy, Ovarian cyst, and NASIMA, who presents to [facility] as a transfer from [facility] with complaint of rash, fevers, malaise beginning 4 days prior to arrival with progressive spread. RPR negative. COVID negative. Blood cx NGTD. CXR with patchy airspace disease in lower lobes. She was started on vanc/cefepime. MPOX swab sent. Presentation suspicious for disseminated herpes zoster with possible pneumonia. Other differentials per derm - disseminated HSV, primary varicella, MPOX, molluscum. Lesions on face appear to have bacterial superinfection. HIV, GC/CT, negative.     Recommendations  · Follow up HSV, VZV, and mpox from swabs of lesions  · Follow up respiratory panel  · Continue cefepime and acyclovir.

## 2023-09-20 NOTE — SUBJECTIVE & OBJECTIVE
Interval History:     Patient reports continued pain and itchiness of skin lesions. Denies any other new symptoms. No acute events overnight.     Review of Systems   Constitutional:  Negative for chills and fever.   Respiratory:  Negative for cough and shortness of breath.    Cardiovascular:  Negative for chest pain.   Gastrointestinal:  Negative for abdominal pain, constipation, diarrhea, nausea and vomiting.   Genitourinary:  Negative for dysuria and hematuria.   Musculoskeletal:  Negative for arthralgias and myalgias.   Skin:  Positive for rash.   Neurological:  Negative for weakness and headaches.     Objective:     Vital Signs (Most Recent):  Temp: 97.6 °F (36.4 °C) (09/20/23 1200)  Pulse: 92 (09/20/23 1200)  Resp: 20 (09/20/23 1508)  BP: 103/68 (09/20/23 1200)  SpO2: 95 % (09/20/23 1200) Vital Signs (24h Range):  Temp:  [97.6 °F (36.4 °C)-99.6 °F (37.6 °C)] 97.6 °F (36.4 °C)  Pulse:  [86-99] 92  Resp:  [18-20] 20  SpO2:  [90 %-95 %] 95 %  BP: ()/(53-73) 103/68     Weight: 78 kg (171 lb 15.3 oz)  Body mass index is 33.58 kg/m².    Estimated Creatinine Clearance: 82.9 mL/min (based on SCr of 0.9 mg/dL).     Physical Exam  Vitals reviewed.   Constitutional:       General: She is not in acute distress.     Appearance: Normal appearance. She is not ill-appearing.   HENT:      Head: Normocephalic and atraumatic.   Eyes:      Extraocular Movements: Extraocular movements intact.      Conjunctiva/sclera: Conjunctivae normal.   Cardiovascular:      Rate and Rhythm: Normal rate and regular rhythm.      Heart sounds: No murmur heard.  Pulmonary:      Effort: Pulmonary effort is normal. No respiratory distress.      Breath sounds: Normal breath sounds. No wheezing.   Abdominal:      General: Abdomen is flat. Bowel sounds are normal.      Palpations: Abdomen is soft.      Tenderness: There is no abdominal tenderness.   Musculoskeletal:      Cervical back: Normal range of motion.   Skin:     General: Skin is warm and  dry.      Findings: Rash present.   Neurological:      General: No focal deficit present.      Mental Status: She is alert and oriented to person, place, and time.   Psychiatric:         Mood and Affect: Mood normal.         Behavior: Behavior normal.         Thought Content: Thought content normal.          Significant Labs: All pertinent labs within the past 24 hours have been reviewed.  Recent Lab Results         09/20/23  0544   09/19/23  2237        Anion Gap 7         Baso # 0.01         Basophil % 0.2         BUN 10         Calcium 8.3         Chlamydia, Amplified DNA   Not Detected       Chloride 105         CO2 22         Creatinine 0.9         Differential Method Automated         eGFR >60.0         Eos # 0.0         Eosinophil % 0.7         Glucose 78         Gran # (ANC) 3.5         Gran % 79.4         Hematocrit 36.1         Hemoglobin 11.7         HIV 1/2 Ag/Ab Non-reactive         Immature Grans (Abs) 0.09  Comment: Mild elevation in immature granulocytes is non specific and   can be seen in a variety of conditions including stress response,   acute inflammation, trauma and pregnancy. Correlation with other   laboratory and clinical findings is essential.           Immature Granulocytes 2.0         Lymph # 0.6         Lymph % 12.7         MCH 28.6         MCHC 32.4         MCV 88         Mono # 0.2         Mono % 5.0         MPV 10.9         N gonorrhoeae, amplified DNA   Not Detected       nRBC 0         Platelets 170         Potassium 3.7         RBC 4.09         RDW 14.8         Sodium 134         Tacrolimus Lvl 3.0  Comment: Testing performed by a chemiluminescent microparticle   immunoassay on the [employer] i System.    CAUTION: No firm therapeutic range exists for tacrolimus in whole   blood. The   complexity of the clinical state, individual differences in   sensitivity to   immunosuppressive and nephrotoxic effects of tacrolimus,   co-administration   of other immunosuppressants, type of  transplant, time post-transplant   and a   number of other factors contribute to different requirements for   optimal   blood levels of tacrolimus. Therefore, individual tacrolimus values   cannot   be used as the sole indicator for making changes in treatment regimen   and   each patient should be thoroughly evaluated clinically before changes   in   treatment regimens are made. Each user must establish his or her own   ranges   based on clinical experience.  Therapeutic ranges vary according to the commercial test used, and   therefore   should be established for each commercial test. Values obtained with   different assay methods cannot be used interchangeably due to   differences in   assay methods and cross-reactivity with metabolites, nor should   correction   factors be applied. Therefore, consistent use of one assay for   individual   patients is recommended.           WBC 4.40                 Significant Imaging: I have reviewed all pertinent imaging results/findings within the past 24 hours.

## 2023-09-20 NOTE — ASSESSMENT & PLAN NOTE
-Patient  developed blisters and vesicles on her forehead and face 3 days ago.  Later lesions spread to her bilateral arms, chest, abdomen, bilateral inguinal region.  - Lesions in the inguinal region where umbilicated.  The lesions ruptured draining serous fluid.   - Patient also developed fever at the same time.  -Patient reports no one else in the family developed similar lesions.  Patient denies myalgia, arthralgia, exposure to sick contacts.  Patient has history of kidney transplant dating back to 2021.  On immunosuppression CellCept, tacrolimus and prednisone.   COVID, RPR negative.  Monkey pox testing pending.  Blood cultures were obtained yesterday no growth so far, UA negative for leukocyte esterase, nitrate.  Chest x-ray suggestive of patchy consolidation in lower lung fields suggestive of pneumonia.   Patient received vancomycin and cefepime 1 dose in the ER      Plan:   -vancomycin DC on 09/19.  Continue  cefepime.    -rapid progression of the lesion increases the concern for infectious origin, herpes zoster vs varicella zoster  vs disseminated zoster, continue acyclovir.  -can not lower the level of immunosuppression any further, prograf level is already below therapeutic range.  -HSV and varicella zoster PCR pending  -dermatology and ID on board, recommended getting CT chest and abdomen to look for other source of infection.  -will follow airborne and contact precautions until we have a definite diagnosis.

## 2023-09-20 NOTE — PROGRESS NOTES
[facility] - [facility] ([address] Medicine  Progress Note    Patient Name: Leydi Cordero  MRN: [id number]  Patient Class: IP- Inpatient   Admission Date: 9/17/2023  Length of Stay: 3 days  Attending Physician: Gini Case MD  Primary Care Provider: Helena Zepeda PA-C        Subjective:     Principal Problem:Need for prophylactic immunotherapy        HPI:  32 y/o F with PMH of ESRD s/p Kidney transplant on immunosuppression, HTN presented to outside hospital on 09/16 complaining of fever and rash since past 3 days.  Patient was in her usual state of health 3 days ago, initially developed blisters and vesicles on her forehead and face.  Later lesions spread to her bilateral arms, chest, abdomen, bilateral inguinal region.  Lesions in the inguinal region where umbilicated.  The lesions ruptured draining serous fluid.  Patient also developed fever at the same time.  Patient also complains of cough associated with clear sputum.  And now complaints of throat pain due to persistent coughing.  Patient also felt nauseous yesterday however denied vomiting.  Patient reports no one else in the family developed similar lesions.  Patient denies shortness of breath, chest pain, abdominal pain, dysuria or hematuria, myalgia, arthralgia, exposure to sick contacts.  Patient has history of kidney transplant dating back to 2021.  On immunosuppression CellCept, tacrolimus and prednisone.  Patient reports compliance with medication      Patient received vancomycin and cefepime 1 dose in the ER at [place].  Subsequently transferred to [facility] for dermatological evaluation.  On presentation patient was afebrile, normotensive, saturating well on room air.  Initial blood work including CBC, BMP, troponin and lactate within normal limits.  COVID, RPR negative.  Blood cultures were obtained yesterday no growth so far.  Chest x-ray suggestive of patchy consolidation in lower lung fields suggestive of  pneumonia.  UA pending.  Initiated patient on vancomycin and cefepime.  Infectious diseases, kidney transplant team, dermatology were consulted.      Overview/Hospital Course:  34 y/o F with PMH of ESRD s/p Kidney transplant on immunosuppression, HTN presented to outside hospital on 09/16 complaining of fever and rash since past 3 days.  Lesions were noted on face, bilateral arms, chest, abdomen, bilateral inguinal region.  Lesions in the inguinal region where umbilicated.  Patient received vancomycin and cefepime 1 dose in the ER at [place].  Subsequently transferred to [facility] [place] for dermatological evaluation.  Blood cultures and UA were obtained.  Chest x-ray was suggestive of patchy consolidation bilateral lower lobes.  Started broad-spectrum antibiotics including vanc and cefepime.  Patient has history of kidney transplant in 2021, on immunosuppressive therapy.  Id, dermatology and kidney transplant team were consulted.  Id was concerned for disseminated herpes versus varicella, patient was started on acyclovir at immunosuppressive dose and swabs were collected.  As there was concern for consolidation on chest x-ray this raised the possibility of disseminated herpes with pneumonia.  CT chest and abdomen were obtained.        Overnight events:  No significant overnight events.  Most of the lesion have crusted, patient complains of itching in [place].  Cough has improved.    Review of Systems   Constitutional:  Positive for fever. Negative for chills.   Eyes:  Negative for photophobia and visual disturbance.   Respiratory:  Positive for cough. Negative for shortness of breath and wheezing.    Cardiovascular:  Negative for chest pain and palpitations.   Gastrointestinal:  Negative for abdominal pain, nausea and vomiting.   Genitourinary:  Negative for dysuria.   Musculoskeletal:  Negative for arthralgias and myalgias.   Skin:  Positive for rash.   Neurological:  Negative for dizziness and headaches.     Objective:      Vital Signs (Most Recent):  Temp: 97.6 °F (36.4 °C) (09/20/23 1200)  Pulse: 92 (09/20/23 1200)  Resp: 20 (09/20/23 1241)  BP: 103/68 (09/20/23 1200)  SpO2: 95 % (09/20/23 1200) Vital Signs (24h Range):  Temp:  [97.6 °F (36.4 °C)-99.6 °F (37.6 °C)] 97.6 °F (36.4 °C)  Pulse:  [86-99] 92  Resp:  [18-20] 20  SpO2:  [90 %-95 %] 95 %  BP: ()/(53-73) 103/68     Weight: 78 kg (171 lb 15.3 oz)  Body mass index is 33.58 kg/m².     Physical Exam  Constitutional:       Appearance: Normal appearance.   HENT:      Head:      Comments: I patient has an ulcerative lesion on the scalp, multiple palpable lesions scattered over the scalp.  Pustules which have ruptured were noted on the forehead and chin.  Similar lesions were noted in the back of the neck.  Eyes:      Extraocular Movements: Extraocular movements intact.      Conjunctiva/sclera: Conjunctivae normal.      Pupils: Pupils are equal, round, and reactive to light.   Cardiovascular:      Rate and Rhythm: Normal rate and regular rhythm.   Pulmonary:      Effort: Pulmonary effort is normal.      Breath sounds: Normal breath sounds.   Abdominal:      Comments: Umbilicated lesions which have ulcerated are noted on the abdomen   Genitourinary:     Comments: Multiple dark umbilicated lesions noted in bilateral inguinal regions measuring 0.5 x 0.5 cm, tender are located in bilateral inguinal region.  Musculoskeletal:         General: No swelling or tenderness. Normal range of motion.   Skin:     Findings: Rash present.   Neurological:      General: No focal deficit present.      Mental Status: She is alert and oriented to person, place, and time.               Significant Labs: All pertinent labs within the past 24 hours have been reviewed.    Significant Imaging: I have reviewed all pertinent imaging results/findings within the past 24 hours.      Assessment/Plan:      * Need for prophylactic immunotherapy        Skin lesions  -Patient  developed blisters and vesicles on  her forehead and face 3 days ago.  Later lesions spread to her bilateral arms, chest, abdomen, bilateral inguinal region.  - Lesions in the inguinal region where umbilicated.  The lesions ruptured draining serous fluid.   - Patient also developed fever at the same time.  -Patient reports no one else in the family developed similar lesions.  Patient denies myalgia, arthralgia, exposure to sick contacts.  Patient has history of kidney transplant dating back to 2021.  On immunosuppression CellCept, tacrolimus and prednisone.   COVID, RPR negative.  Monkey pox testing pending.  Blood cultures were obtained yesterday no growth so far, UA negative for leukocyte esterase, nitrate.  Chest x-ray suggestive of patchy consolidation in lower lung fields suggestive of pneumonia.   Patient received vancomycin and cefepime 1 dose in the ER      Plan:   -vancomycin DC on 09/19.  Continue  cefepime.    -rapid progression of the lesion increases the concern for infectious origin, herpes zoster vs varicella zoster  vs disseminated zoster, continue acyclovir.  -can not lower the level of immunosuppression any further, prograf level is already below therapeutic range.  -HSV and varicella zoster PCR pending  -dermatology and ID on board, CT chest, abdomen and pelvis showed Left lower lobe pneumonia with innumerable pulmonary nodules scattered throughout the lungs.  Blastomycosis, histoplasmosis, Aspergillus and toxoplasmosis testing ordered.  -will follow airborne and contact precautions until we have a definite diagnosis.        H/O kidney transplant  Patient has history of kidney transplant from decreased tone dating back to 2021.    Patient is on immunosuppressive therapy including tacrolimus, CellCept and prednisone.  Kidney transplant team on board.  Continue tacrolimus and prednisone.  CellCept on hold.      Hyponatremia  Patient has hyponatremia which is controlled,We will aim to correct the sodium by 4-6mEq in 24 hours. We will  monitor sodium Daily. The hyponatremia is due to CKD. We will obtain the following studies:  BMP daily. he patient's sodium results have been reviewed and are listed below.  Recent Labs   Lab 09/20/23  0544   *       Hyperparathyroidism, tertiary    Continue calcitriol    Acidosis    Home bicarbonate dose restarted.        VTE Risk Mitigation (From admission, onward)         Ordered     IP VTE HIGH RISK PATIENT  Once         09/17/23 1232     Place sequential compression device  Until discontinued         09/17/23 1232                Discharge Planning   NATALIIA: 9/22/2023     Code Status: Full Code   Is the patient medically ready for discharge?:     Reason for patient still in hospital (select all that apply): Patient trending condition and Consult recommendations  Discharge Plan A: Home                  Gini Case MD  Department of Hospital Medicine   [facility] - Intensive Care ([id number])

## 2023-09-20 NOTE — SUBJECTIVE & OBJECTIVE
Subjective:     Principal Problem:Need for prophylactic immunotherapy    Interval History: Patient reports feeling better today. Denies new lesions and feels most of them are crusting over. Continues with some pruritus, but pain largely resolved. Cough and respiratory symptoms a little improved today as well.    Medications:  Continuous Infusions:  Scheduled Meds:   acyclovir  10 mg/kg (Ideal) Intravenous Q8H    calcitRIOL  0.25 mcg Oral Daily    ceFEPime (MAXIPIME) IVPB  2 g Intravenous Q8H    dextromethorphan-guaiFENesin  mg/5 ml  5 mL Oral Q6H    midodrine  5 mg Oral BID WM    pantoprazole  40 mg Oral Daily    predniSONE  5 mg Oral Daily    sodium bicarbonate  1,300 mg Oral BID    sucralfate  1 g Oral QID    tacrolimus  1 mg Oral BID     PRN Meds:acetaminophen, acetaminophen, albuterol, dextrose 10%, dextrose 10%, diphenhydrAMINE, docusate sodium, EPINEPHrine (PF), glucagon (human recombinant), glucose, glucose, hydrOXYzine HCL, morphine, naloxone, oxyCODONE, sodium chloride 0.9%    Review of Systems   Constitutional:  Positive for fatigue. Negative for fever and chills.   HENT:  Positive for sore throat. Negative for mouth sores.    Eyes:  Positive for eye irritation. Negative for visual change.   Respiratory:  Positive for cough and shortness of breath.    Gastrointestinal:  Negative for nausea, vomiting, abdominal pain, diarrhea and constipation.   Genitourinary:  Negative for genital sores.   Skin:  Positive for itching and rash.     Objective:     Vital Signs (Most Recent):  Temp: 98.2 °F (36.8 °C) (09/20/23 0811)  Pulse: 89 (09/20/23 0811)  Resp: 18 (09/20/23 0811)  BP: 99/61 (09/20/23 0811)  SpO2: (!) 92 % (09/20/23 0811) Vital Signs (24h Range):  Temp:  [98.2 °F (36.8 °C)-99.7 °F (37.6 °C)] 98.2 °F (36.8 °C)  Pulse:  [83-99] 89  Resp:  [18] 18  SpO2:  [90 %-94 %] 92 %  BP: ()/(53-73) 99/61     Weight: 78 kg (171 lb 15.3 oz)  Body mass index is 33.58 kg/m².     Physical Exam   Constitutional:  She appears well-developed. No distress.   Neurological: She is alert and oriented to person, place, and time. She is not disoriented.   Skin:                                   Significant Labs: All pertinent labs within the past 24 hours have been reviewed.   Swabs still pending for HSV, VZV, and Monkeypox.    Significant Imaging: I have reviewed all pertinent imaging results/findings within the past 24 hours.    Chest CT: Left lower lobe pneumonia with innumerable pulmonary nodules scattered throughout the lungs, new compared to August 3 suggestive of infectious/inflammatory process or less likely septic emboli.

## 2023-09-20 NOTE — PROGRESS NOTES
[facility] - Intensive Care ([address] Medicine  Progress Note    Patient Name: Leydi Cordero  MRN: [id number]  Patient Class: IP- Inpatient   Admission Date: 9/17/2023  Length of Stay: 2 days  Attending Physician: Gini Case MD  Primary Care Provider: Helena Zepeda PA-C        Subjective:     Principal Problem:Need for prophylactic immunotherapy        HPI:  34 y/o F with PMH of ESRD s/p Kidney transplant on immunosuppression, HTN presented to outside hospital on 09/16 complaining of fever and rash since past 3 days.  Patient was in her usual state of health 3 days ago, initially developed blisters and vesicles on her forehead and face.  Later lesions spread to her bilateral arms, chest, abdomen, bilateral inguinal region.  Lesions in the inguinal region where umbilicated.  The lesions ruptured draining serous fluid.  Patient also developed fever at the same time.  Patient also complains of cough associated with clear sputum.  And now complaints of throat pain due to persistent coughing.  Patient also felt nauseous yesterday however denied vomiting.  Patient reports no one else in the family developed similar lesions.  Patient denies shortness of breath, chest pain, abdominal pain, dysuria or hematuria, myalgia, arthralgia, exposure to sick contacts.  Patient has history of kidney transplant dating back to 2021.  On immunosuppression CellCept, tacrolimus and prednisone.  Patient reports compliance with medication      Patient received vancomycin and cefepime 1 dose in the ER at [place].  Subsequently transferred to [facility] for dermatological evaluation.  On presentation patient was afebrile, normotensive, saturating well on room air.  Initial blood work including CBC, BMP, troponin and lactate within normal limits.  COVID, RPR negative.  Blood cultures were obtained yesterday no growth so far.  Chest x-ray suggestive of patchy consolidation in lower lung fields suggestive of  pneumonia.  UA pending.  Initiated patient on vancomycin and cefepime.  Infectious diseases, kidney transplant team, dermatology were consulted.      Overview/Hospital Course:  32 y/o F with PMH of ESRD s/p Kidney transplant on immunosuppression, HTN presented to outside hospital on 09/16 complaining of fever and rash since past 3 days.  Lesions were noted on face, bilateral arms, chest, abdomen, bilateral inguinal region.  Lesions in the inguinal region where umbilicated.  Patient received vancomycin and cefepime 1 dose in the ER at [place].  Subsequently transferred to [facility] for dermatological evaluation.  Blood cultures and UA were obtained.  Chest x-ray was suggestive of patchy consolidation bilateral lower lobes.  Started broad-spectrum antibiotics including vanc and cefepime.  Patient has history of kidney transplant in 2021, on immunosuppressive therapy.  Id, dermatology and kidney transplant team were consulted.  Id was concerned for disseminated herpes versus varicella, patient was started on acyclovir at immunosuppressive dose and swabs were collected.        Overnight events:  No significant overnight events.  Patient reports that her cough has improved..  Patient has spiked a fever of 100.5 this morning, received Tylenol.    Review of Systems   Constitutional:  Positive for fever. Negative for chills.   Eyes:  Negative for photophobia and visual disturbance.   Respiratory:  Positive for cough. Negative for shortness of breath and wheezing.    Cardiovascular:  Negative for chest pain and palpitations.   Gastrointestinal:  Negative for abdominal pain, nausea and vomiting.   Genitourinary:  Negative for dysuria.   Musculoskeletal:  Negative for arthralgias and myalgias.   Skin:  Positive for rash.   Neurological:  Negative for dizziness and headaches.     Objective:     Vital Signs (Most Recent):  Temp: 98.9 °F (37.2 °C) (09/19/23 2006)  Pulse: 99 (09/19/23 2006)  Resp: 18 (09/19/23 2006)  BP: (!) 94/53  (09/19/23 2006)  SpO2: (!) 90 % (09/19/23 2006) Vital Signs (24h Range):  Temp:  [97.5 °F (36.4 °C)-100.6 °F (38.1 °C)] 98.9 °F (37.2 °C)  Pulse:  [83-99] 99  Resp:  [18] 18  SpO2:  [89 %-92 %] 90 %  BP: ()/(53-73) 94/53     Weight: 78 kg (171 lb 15.3 oz)  Body mass index is 33.58 kg/m².     Physical Exam  Constitutional:       Appearance: Normal appearance.   HENT:      Head:      Comments: I patient has an ulcerative lesion on the scalp, multiple palpable lesions scattered over the scalp.  Pustules which have ruptured were noted on the forehead and chin.  Similar lesions were noted in the back of the neck.  Eyes:      Extraocular Movements: Extraocular movements intact.      Conjunctiva/sclera: Conjunctivae normal.      Pupils: Pupils are equal, round, and reactive to light.   Cardiovascular:      Rate and Rhythm: Normal rate and regular rhythm.   Pulmonary:      Effort: Pulmonary effort is normal.      Breath sounds: Normal breath sounds.   Abdominal:      Comments: Umbilicated lesions which have ulcerated are noted on the abdomen   Genitourinary:     Comments: Multiple dark umbilicated lesions noted in bilateral inguinal regions measuring 0.5 x 0.5 cm, tender are located in bilateral inguinal region.  Musculoskeletal:         General: No swelling or tenderness. Normal range of motion.   Skin:     Findings: Rash present.   Neurological:      General: No focal deficit present.      Mental Status: She is alert and oriented to person, place, and time.               Significant Labs: All pertinent labs within the past 24 hours have been reviewed.    Significant Imaging: I have reviewed all pertinent imaging results/findings within the past 24 hours.      Assessment/Plan:      * Need for prophylactic immunotherapy        Skin lesions  -Patient  developed blisters and vesicles on her forehead and face 3 days ago.  Later lesions spread to her bilateral arms, chest, abdomen, bilateral inguinal region.  - Lesions in  the inguinal region where umbilicated.  The lesions ruptured draining serous fluid.   - Patient also developed fever at the same time.  -Patient reports no one else in the family developed similar lesions.  Patient denies myalgia, arthralgia, exposure to sick contacts.  Patient has history of kidney transplant dating back to 2021.  On immunosuppression CellCept, tacrolimus and prednisone.   COVID, RPR negative.  Monkey pox testing pending.  Blood cultures were obtained yesterday no growth so far, UA negative for leukocyte esterase, nitrate.  Chest x-ray suggestive of patchy consolidation in lower lung fields suggestive of pneumonia.   Patient received vancomycin and cefepime 1 dose in the ER      Plan:   -vancomycin DC on 09/19.  Continue  cefepime.    -rapid progression of the lesion increases the concern for infectious origin, herpes zoster vs varicella zoster  vs disseminated zoster, continue acyclovir.  -can not lower the level of immunosuppression any further, prograf level is already below therapeutic range.  -HSV and varicella zoster PCR pending  -dermatology and ID on board, recommended getting CT chest and abdomen to look for other source of infection.  -will follow airborne and contact precautions until we have a definite diagnosis.        H/O kidney transplant  Patient has history of kidney transplant from decreased tone dating back to 2021.    Patient is on immunosuppressive therapy including tacrolimus, CellCept and prednisone.  Kidney transplant team on board      Hyperparathyroidism, tertiary    Continue calcitriol    Acidosis  Patient placed on bicarbonate drip by kidney transplant team.  Home bicarbonate dose restarted.        VTE Risk Mitigation (From admission, onward)         Ordered     IP VTE HIGH RISK PATIENT  Once         09/17/23 1232     Place sequential compression device  Until discontinued         09/17/23 1232                Discharge Planning   NATALIIA: 9/22/2023     Code Status: Full Code    Is the patient medically ready for discharge?:     Reason for patient still in hospital (select all that apply): Patient trending condition, Treatment and Imaging  Discharge Plan A: Home                  Gini Case MD  Department of Hospital Medicine   [facility] - Intensive Care ([place]

## 2023-09-21 ENCOUNTER — ANESTHESIA EVENT (OUTPATIENT)
Dept: SURGERY | Facility: HOSPITAL | Age: 33
DRG: 865 | End: 2023-09-21
Payer: MEDICARE

## 2023-09-21 PROBLEM — B25.9 CMV (CYTOMEGALOVIRUS INFECTION): Status: ACTIVE | Noted: 2023-09-21

## 2023-09-21 LAB
ADENOVIRUS: NOT DETECTED
ANION GAP SERPL CALC-SCNC: 7 MMOL/L (ref 8–16)
BASOPHILS # BLD AUTO: 0.03 K/UL (ref 0–0.2)
BASOPHILS NFR BLD: 0.6 % (ref 0–1.9)
BORDETELLA PARAPERTUSSIS (IS1001): NOT DETECTED
BORDETELLA PERTUSSIS (PTXP): NOT DETECTED
BUN SERPL-MCNC: 12 MG/DL (ref 6–20)
CALCIUM SERPL-MCNC: 8.5 MG/DL (ref 8.7–10.5)
CHLAMYDIA PNEUMONIAE: NOT DETECTED
CHLORIDE SERPL-SCNC: 105 MMOL/L (ref 95–110)
CMV DNA SPEC QL NAA+PROBE: ABNORMAL
CO2 SERPL-SCNC: 23 MMOL/L (ref 23–29)
CORONAVIRUS 229E, COMMON COLD VIRUS: NOT DETECTED
CORONAVIRUS HKU1, COMMON COLD VIRUS: NOT DETECTED
CORONAVIRUS NL63, COMMON COLD VIRUS: NOT DETECTED
CORONAVIRUS OC43, COMMON COLD VIRUS: NOT DETECTED
CREAT SERPL-MCNC: 0.9 MG/DL (ref 0.5–1.4)
CRYPTOC AG SER QL LA: NEGATIVE
CYTOMEGALOVIRUS LOG (IU/ML): 2.94 LOGIU/ML
CYTOMEGALOVIRUS PCR, QUANT: 879 IU/ML
DIFFERENTIAL METHOD: ABNORMAL
EOSINOPHIL # BLD AUTO: 0.2 K/UL (ref 0–0.5)
EOSINOPHIL NFR BLD: 2.8 % (ref 0–8)
ERYTHROCYTE [DISTWIDTH] IN BLOOD BY AUTOMATED COUNT: 14.6 % (ref 11.5–14.5)
EST. GFR  (NO RACE VARIABLE): >60 ML/MIN/1.73 M^2
FLUBV RNA NPH QL NAA+NON-PROBE: NOT DETECTED
GLUCOSE SERPL-MCNC: 78 MG/DL (ref 70–110)
HCT VFR BLD AUTO: 34 % (ref 37–48.5)
HGB BLD-MCNC: 10.9 G/DL (ref 12–16)
HPIV1 RNA NPH QL NAA+NON-PROBE: NOT DETECTED
HPIV2 RNA NPH QL NAA+NON-PROBE: NOT DETECTED
HPIV3 RNA NPH QL NAA+NON-PROBE: NOT DETECTED
HPIV4 RNA NPH QL NAA+NON-PROBE: NOT DETECTED
HSV1 IGG SERPL QL IA: POSITIVE
HSV2 IGG SERPL QL IA: NEGATIVE
HUMAN METAPNEUMOVIRUS: NOT DETECTED
IMM GRANULOCYTES # BLD AUTO: 0.16 K/UL (ref 0–0.04)
IMM GRANULOCYTES NFR BLD AUTO: 3 % (ref 0–0.5)
INFLUENZA A (SUBTYPES H1,H1-2009,H3): NOT DETECTED
LYMPHOCYTES # BLD AUTO: 0.7 K/UL (ref 1–4.8)
LYMPHOCYTES NFR BLD: 13.8 % (ref 18–48)
Lab: NOT DETECTED
MCH RBC QN AUTO: 28.5 PG (ref 27–31)
MCHC RBC AUTO-ENTMCNC: 32.1 G/DL (ref 32–36)
MCV RBC AUTO: 89 FL (ref 82–98)
MONOCYTES # BLD AUTO: 0.2 K/UL (ref 0.3–1)
MONOCYTES NFR BLD: 3.9 % (ref 4–15)
MYCOPLASMA PNEUMONIAE: NOT DETECTED
NEUTROPHILS # BLD AUTO: 4.1 K/UL (ref 1.8–7.7)
NEUTROPHILS NFR BLD: 75.9 % (ref 38–73)
NRBC BLD-RTO: 0 /100 WBC
ORTHOPOXVIRUS, PCR: NOT DETECTED
PLATELET # BLD AUTO: 181 K/UL (ref 150–450)
PMV BLD AUTO: 11 FL (ref 9.2–12.9)
POTASSIUM SERPL-SCNC: 3.4 MMOL/L (ref 3.5–5.1)
RBC # BLD AUTO: 3.83 M/UL (ref 4–5.4)
RESPIRATORY INFECTION PANEL SOURCE: NORMAL
RSV RNA NPH QL NAA+NON-PROBE: NOT DETECTED
RV+EV RNA NPH QL NAA+NON-PROBE: NOT DETECTED
SARS-COV-2 RNA RESP QL NAA+PROBE: NOT DETECTED
SODIUM SERPL-SCNC: 135 MMOL/L (ref 136–145)
SPECIMEN SOURCE: ABNORMAL
TACROLIMUS BLD-MCNC: 3.8 NG/ML (ref 5–15)
VARICELLA INTERPRETATION: POSITIVE
VARICELLA ZOSTER BY PCR RESULT: POSITIVE
VARICELLA ZOSTER IGG: >4000 AU/ML
WBC # BLD AUTO: 5.37 K/UL (ref 3.9–12.7)

## 2023-09-21 PROCEDURE — 99233 SBSQ HOSP IP/OBS HIGH 50: CPT | Mod: ,,, | Performed by: PHYSICIAN ASSISTANT

## 2023-09-21 PROCEDURE — 12000002 HC ACUTE/MED SURGE SEMI-PRIVATE ROOM

## 2023-09-21 PROCEDURE — 86777 TOXOPLASMA ANTIBODY: CPT | Performed by: INTERNAL MEDICINE

## 2023-09-21 PROCEDURE — 63600175 PHARM REV CODE 636 W HCPCS: Performed by: INTERNAL MEDICINE

## 2023-09-21 PROCEDURE — 87798 DETECT AGENT NOS DNA AMP: CPT | Performed by: INTERNAL MEDICINE

## 2023-09-21 PROCEDURE — 63600175 PHARM REV CODE 636 W HCPCS: Performed by: HOSPITALIST

## 2023-09-21 PROCEDURE — 87798 DETECT AGENT NOS DNA AMP: CPT | Mod: 59 | Performed by: INTERNAL MEDICINE

## 2023-09-21 PROCEDURE — 63600175 PHARM REV CODE 636 W HCPCS: Performed by: NURSE PRACTITIONER

## 2023-09-21 PROCEDURE — 86787 VARICELLA-ZOSTER ANTIBODY: CPT | Mod: 91 | Performed by: PHYSICIAN ASSISTANT

## 2023-09-21 PROCEDURE — 86635 COCCIDIOIDES ANTIBODY: CPT | Performed by: INTERNAL MEDICINE

## 2023-09-21 PROCEDURE — 85025 COMPLETE CBC W/AUTO DIFF WBC: CPT | Performed by: STUDENT IN AN ORGANIZED HEALTH CARE EDUCATION/TRAINING PROGRAM

## 2023-09-21 PROCEDURE — 87305 ASPERGILLUS AG IA: CPT | Performed by: INTERNAL MEDICINE

## 2023-09-21 PROCEDURE — 87449 NOS EACH ORGANISM AG IA: CPT | Mod: 91 | Performed by: INTERNAL MEDICINE

## 2023-09-21 PROCEDURE — 25000003 PHARM REV CODE 250: Performed by: STUDENT IN AN ORGANIZED HEALTH CARE EDUCATION/TRAINING PROGRAM

## 2023-09-21 PROCEDURE — 86694 HERPES SIMPLEX NES ANTBDY: CPT | Performed by: PHYSICIAN ASSISTANT

## 2023-09-21 PROCEDURE — 86778 TOXOPLASMA ANTIBODY IGM: CPT | Performed by: INTERNAL MEDICINE

## 2023-09-21 PROCEDURE — 27000207 HC ISOLATION

## 2023-09-21 PROCEDURE — 99233 PR SUBSEQUENT HOSPITAL CARE,LEVL III: ICD-10-PCS | Mod: GC,,, | Performed by: INTERNAL MEDICINE

## 2023-09-21 PROCEDURE — 86787 VARICELLA-ZOSTER ANTIBODY: CPT | Performed by: PHYSICIAN ASSISTANT

## 2023-09-21 PROCEDURE — 99223 PR INITIAL HOSPITAL CARE,LEVL III: ICD-10-PCS | Mod: GC,,, | Performed by: EMERGENCY MEDICINE

## 2023-09-21 PROCEDURE — 25000003 PHARM REV CODE 250: Performed by: NURSE PRACTITIONER

## 2023-09-21 PROCEDURE — 86635 COCCIDIOIDES ANTIBODY: CPT | Mod: 91 | Performed by: INTERNAL MEDICINE

## 2023-09-21 PROCEDURE — 99223 1ST HOSP IP/OBS HIGH 75: CPT | Mod: GC,,, | Performed by: EMERGENCY MEDICINE

## 2023-09-21 PROCEDURE — 25000003 PHARM REV CODE 250: Performed by: HOSPITALIST

## 2023-09-21 PROCEDURE — 80048 BASIC METABOLIC PNL TOTAL CA: CPT | Performed by: STUDENT IN AN ORGANIZED HEALTH CARE EDUCATION/TRAINING PROGRAM

## 2023-09-21 PROCEDURE — 25000003 PHARM REV CODE 250: Performed by: INTERNAL MEDICINE

## 2023-09-21 PROCEDURE — 63600175 PHARM REV CODE 636 W HCPCS: Performed by: STUDENT IN AN ORGANIZED HEALTH CARE EDUCATION/TRAINING PROGRAM

## 2023-09-21 PROCEDURE — 99233 SBSQ HOSP IP/OBS HIGH 50: CPT | Mod: GC,,, | Performed by: INTERNAL MEDICINE

## 2023-09-21 PROCEDURE — 99233 PR SUBSEQUENT HOSPITAL CARE,LEVL III: ICD-10-PCS | Mod: ,,, | Performed by: PHYSICIAN ASSISTANT

## 2023-09-21 PROCEDURE — 87449 NOS EACH ORGANISM AG IA: CPT | Performed by: INTERNAL MEDICINE

## 2023-09-21 PROCEDURE — 80197 ASSAY OF TACROLIMUS: CPT | Performed by: INTERNAL MEDICINE

## 2023-09-21 PROCEDURE — 86696 HERPES SIMPLEX TYPE 2 TEST: CPT | Performed by: PHYSICIAN ASSISTANT

## 2023-09-21 PROCEDURE — 86403 PARTICLE AGGLUT ANTBDY SCRN: CPT | Performed by: INTERNAL MEDICINE

## 2023-09-21 RX ADMIN — GANCICLOVIR SODIUM 390 MG: 500 INJECTION, POWDER, LYOPHILIZED, FOR SOLUTION INTRAVENOUS at 04:09

## 2023-09-21 RX ADMIN — TACROLIMUS 1 MG: 1 CAPSULE ORAL at 05:09

## 2023-09-21 RX ADMIN — SUCRALFATE 1 G: 1 TABLET ORAL at 04:09

## 2023-09-21 RX ADMIN — CEFEPIME 2 G: 2 INJECTION, POWDER, FOR SOLUTION INTRAVENOUS at 01:09

## 2023-09-21 RX ADMIN — SUCRALFATE 1 G: 1 TABLET ORAL at 10:09

## 2023-09-21 RX ADMIN — GUAIFENESIN AND DEXTROMETHORPHAN 5 ML: 100; 10 SYRUP ORAL at 12:09

## 2023-09-21 RX ADMIN — OXYCODONE HYDROCHLORIDE 5 MG: 5 TABLET ORAL at 09:09

## 2023-09-21 RX ADMIN — MORPHINE SULFATE 2 MG: 2 INJECTION, SOLUTION INTRAMUSCULAR; INTRAVENOUS at 12:09

## 2023-09-21 RX ADMIN — DIPHENHYDRAMINE HYDROCHLORIDE 25 MG: 25 CAPSULE ORAL at 09:09

## 2023-09-21 RX ADMIN — DIPHENHYDRAMINE HYDROCHLORIDE 25 MG: 25 CAPSULE ORAL at 02:09

## 2023-09-21 RX ADMIN — SUCRALFATE 1 G: 1 TABLET ORAL at 09:09

## 2023-09-21 RX ADMIN — TACROLIMUS 1 MG: 1 CAPSULE ORAL at 09:09

## 2023-09-21 RX ADMIN — GUAIFENESIN AND DEXTROMETHORPHAN 5 ML: 100; 10 SYRUP ORAL at 06:09

## 2023-09-21 RX ADMIN — OXYCODONE HYDROCHLORIDE 5 MG: 5 TABLET ORAL at 01:09

## 2023-09-21 RX ADMIN — CALCITRIOL CAPSULES 0.25 MCG 0.25 MCG: 0.25 CAPSULE ORAL at 10:09

## 2023-09-21 RX ADMIN — DIPHENHYDRAMINE HYDROCHLORIDE 25 MG: 25 CAPSULE ORAL at 04:09

## 2023-09-21 RX ADMIN — SODIUM BICARBONATE 1300 MG: 650 TABLET ORAL at 09:09

## 2023-09-21 RX ADMIN — GUAIFENESIN AND DEXTROMETHORPHAN 5 ML: 100; 10 SYRUP ORAL at 01:09

## 2023-09-21 RX ADMIN — ACYCLOVIR SODIUM 460 MG: 50 INJECTION, SOLUTION INTRAVENOUS at 12:09

## 2023-09-21 RX ADMIN — PREDNISONE 5 MG: 5 TABLET ORAL at 10:09

## 2023-09-21 RX ADMIN — HYDROXYZINE HYDROCHLORIDE 25 MG: 25 TABLET, FILM COATED ORAL at 01:09

## 2023-09-21 RX ADMIN — GUAIFENESIN AND DEXTROMETHORPHAN 5 ML: 100; 10 SYRUP ORAL at 11:09

## 2023-09-21 RX ADMIN — MIDODRINE HYDROCHLORIDE 5 MG: 5 TABLET ORAL at 10:09

## 2023-09-21 RX ADMIN — PANTOPRAZOLE SODIUM 40 MG: 40 TABLET, DELAYED RELEASE ORAL at 10:09

## 2023-09-21 RX ADMIN — HYDROXYZINE HYDROCHLORIDE 25 MG: 25 TABLET, FILM COATED ORAL at 09:09

## 2023-09-21 RX ADMIN — ACYCLOVIR SODIUM 460 MG: 50 INJECTION, SOLUTION INTRAVENOUS at 10:09

## 2023-09-21 RX ADMIN — CEFEPIME 2 G: 2 INJECTION, POWDER, FOR SOLUTION INTRAVENOUS at 11:09

## 2023-09-21 RX ADMIN — MIDODRINE HYDROCHLORIDE 5 MG: 5 TABLET ORAL at 04:09

## 2023-09-21 RX ADMIN — DIPHENHYDRAMINE HYDROCHLORIDE 25 MG: 25 CAPSULE ORAL at 10:09

## 2023-09-21 RX ADMIN — GUAIFENESIN AND DEXTROMETHORPHAN 5 ML: 100; 10 SYRUP ORAL at 05:09

## 2023-09-21 RX ADMIN — SODIUM BICARBONATE 1300 MG: 650 TABLET ORAL at 10:09

## 2023-09-21 RX ADMIN — SUCRALFATE 1 G: 1 TABLET ORAL at 01:09

## 2023-09-21 RX ADMIN — CEFEPIME 2 G: 2 INJECTION, POWDER, FOR SOLUTION INTRAVENOUS at 06:09

## 2023-09-21 NOTE — ASSESSMENT & PLAN NOTE
Patient has hyponatremia which is controlled,We will aim to correct the sodium by 4-6mEq in 24 hours. We will monitor sodium Daily. The hyponatremia is due to CKD. We will obtain the following studies:  BMP daily. he patient's sodium results have been reviewed and are listed below.  Recent Labs   Lab 09/21/23  0546   *

## 2023-09-21 NOTE — ASSESSMENT & PLAN NOTE
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

## 2023-09-21 NOTE — PLAN OF CARE
Problem: Adult Inpatient Plan of Care  Goal: Plan of Care Review  Outcome: Ongoing, Progressing  Goal: Patient-Specific Goal (Individualized)  Outcome: Ongoing, Progressing  Goal: Absence of Hospital-Acquired Illness or Injury  Outcome: Ongoing, Progressing  Goal: Optimal Comfort and Wellbeing  Outcome: Ongoing, Progressing     Patient pain lower today, c/o pain 3-5 out of 10 throughout shift and requested po prn pain med once.  VSS, afebrile.  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  Patient placed on cytovene q12h.  Still requiring prn hydrox and benadryl for itching.

## 2023-09-21 NOTE — ASSESSMENT & PLAN NOTE
33F with second DDKT 1/9/2021 (01/09/2021) (first 4/15/15) on prograf and cellcept, prednisone, HTN, HTN nephropathy, Ovarian cyst, and NASIMA, who presents to [facility] as a transfer from [facility] with complaint of rash, fevers, malaise beginning 4 days prior to arrival with progressive spread. RPR negative. COVID negative. Blood cx NGTD. CXR with patchy airspace disease in lower lobes. She was started on vanc/cefepime. MPOX swab sent. Presentation suspicious for disseminated herpes zoster with possible pneumonia. Other differentials per derm - disseminated HSV, primary varicella, MPOX, molluscum. Lesions on face appear to have bacterial superinfection. HIV, GC/CT, mpox, HSV negative.     Recommendations  · Follow up VZV from swabs of lesions  · Follow up respiratory panel  · Continue cefepime  · Change acyvlovir to gancyclovir for CMV infection

## 2023-09-21 NOTE — SUBJECTIVE & OBJECTIVE
Past Medical History:   Diagnosis Date    Anxiety     -donor kidney transplant 2021    cPRA 100%, XM negative 3 arteries, 2 on common patch, WIT 40 min    Encounter for blood transfusion     ESRD (end stage renal disease)     Fibroma     H/O kidney transplant     Hypertension     Hypertensive nephropathy     Ovarian cyst     Pulmonary nodules 2023    Sleep apnea        Past Surgical History:   Procedure Laterality Date    COLD KNIFE CONIZATION OF CERVIX N/A 2023    Procedure: CONE BIOPSY, CERVIX, USING COLD KNIFE;  Surgeon: Jourdan Conklin MD;  Location: [facility];  Service: OB/GYN;  Laterality: N/A;    COLPOSCOPY  2020    Needs follow up on or after 21    ESOPHAGOGASTRODUODENOSCOPY N/A 2022    Procedure: ESOPHAGOGASTRODUODENOSCOPY (EGD);  Surgeon: Essie Carvajal MD;  Location: [facility];  Service: Endoscopy;  Laterality: N/A;    hemodialysis access left arm      kidney removal       KIDNEY TRANSPLANT  04/15/2015    KIDNEY TRANSPLANT N/A 2021    Procedure: TRANSPLANT, KIDNEY;  Surgeon: Fam Sutton MD;  Location: [address];  Service: Transplant;  Laterality: N/A;    NEPHRECTOMY      OVARIAN CYST REMOVAL      PARATHYROIDECTOMY      partial     PERCUTANEOUS NEPHROSTOMY Left 2021    Procedure: Creation, Nephrostomy, Percutaneous;  Surgeon: Elen Surgeon;  Location: [facility];  Service: Anesthesiology;  Laterality: Left;    right leg hemodialysis access      ROBOT-ASSISTED LAPAROSCOPIC SLEEVE GASTRECTOMY USING DA VELMA XI N/A 2022    Procedure: XI ROBOTIC SLEEVE GASTRECTOMY;  Surgeon: Cal Parekh MD;  Location: [facility];  Service: General;  Laterality: N/A;    transplant nephrectomy  2017       Review of patient's allergies indicates:   Allergen Reactions    Heparin analogues Rash       Family History       Problem Relation (Age of Onset)    Cancer Father    Colon cancer Father    Diabetes Maternal Uncle    Hypertension Father, Maternal  Aunt, Other    Kidney disease Maternal Aunt, Other    No Known Problems Mother, Sister, Brother          Tobacco Use    Smoking status: Former     Current packs/day: 0.00     Average packs/day: 0.3 packs/day for 12.6 years (3.1 ttl pk-yrs)     Types: Cigarettes     Start date: 2008     Quit date: 8/2/2020     Years since quitting: 3.1    Smokeless tobacco: Never   Substance and Sexual Activity    Alcohol use: No    Drug use: No    Sexual activity: Yes     Partners: Male     Birth control/protection: None         Review of Systems   Constitutional:  Positive for fatigue and fever. Negative for chills.   Respiratory:  Positive for cough and shortness of breath. Negative for wheezing.    Cardiovascular:  Negative for chest pain, palpitations and leg swelling.   Gastrointestinal:  Negative for nausea and vomiting.   Psychiatric/Behavioral:  Negative for agitation and confusion.      Objective:     Vital Signs (Most Recent):  Temp: 98.3 °F (36.8 °C) (09/21/23 1153)  Pulse: 72 (09/21/23 1153)  Resp: 18 (09/21/23 1153)  BP: 108/67 (09/21/23 1153)  SpO2: 96 % (09/21/23 1153) Vital Signs (24h Range):  Temp:  [98.1 °F (36.7 °C)-98.5 °F (36.9 °C)] 98.3 °F (36.8 °C)  Pulse:  [69-85] 72  Resp:  [18-20] 18  SpO2:  [93 %-97 %] 96 %  BP: ()/(55-87) 108/67     Weight: 78 kg (171 lb 15.3 oz)  Body mass index is 33.58 kg/m².      Intake/Output Summary (Last 24 hours) at 9/21/2023 1243  Last data filed at 9/21/2023 0714  Gross per 24 hour   Intake 780 ml   Output --   Net 780 ml        Physical Exam  Vitals reviewed.   Constitutional:       General: She is not in acute distress.     Appearance: Normal appearance. She is not ill-appearing.   HENT:      Head: Normocephalic and atraumatic.   Eyes:      Conjunctiva/sclera: Conjunctivae normal.   Cardiovascular:      Rate and Rhythm: Normal rate and regular rhythm.      Heart sounds: No murmur heard.  Pulmonary:      Effort: Pulmonary effort is normal. No respiratory distress.       Breath sounds: Normal breath sounds. No wheezing or rhonchi.   Abdominal:      Palpations: Abdomen is soft.   Musculoskeletal:      Right lower leg: No edema.      Left lower leg: No edema.   Skin:     Findings: Rash present.   Neurological:      General: No focal deficit present.      Mental Status: She is alert and oriented to person, place, and time.   Psychiatric:         Mood and Affect: Mood normal.         Behavior: Behavior normal.          Vents:       Lines/Drains/Airways       Central Venous Catheter Line  Duration                  Hemodialysis AV Graft Left upper arm -- days              Peripheral Intravenous Line  Duration                  Peripheral IV - Single Lumen 09/19/23 1120 20 G;1 3/4 in Left Forearm 2 days                    Significant Labs:    CBC/Anemia Profile:  Recent Labs   Lab 09/20/23  0544 09/21/23  0546   WBC 4.40 5.37   HGB 11.7* 10.9*   HCT 36.1* 34.0*    181   MCV 88 89   RDW 14.8* 14.6*        Chemistries:  Recent Labs   Lab 09/20/23  0544 09/21/23  0546   * 135*   K 3.7 3.4*    105   CO2 22* 23   BUN 10 12   CREATININE 0.9 0.9   CALCIUM 8.3* 8.5*       All pertinent labs within the past 24 hours have been reviewed.    Significant Imaging:   I have reviewed all pertinent imaging results/findings within the past 24 hours.

## 2023-09-21 NOTE — SUBJECTIVE & OBJECTIVE
Overnight events:  No significant overnight events. Cough continues to improve.    Review of Systems   Constitutional:  Positive for fever. Negative for chills.   Eyes:  Negative for photophobia and visual disturbance.   Respiratory:  Positive for cough. Negative for shortness of breath and wheezing.    Cardiovascular:  Negative for chest pain and palpitations.   Gastrointestinal:  Negative for abdominal pain, nausea and vomiting.   Genitourinary:  Negative for dysuria.   Musculoskeletal:  Negative for arthralgias and myalgias.   Skin:  Positive for rash.   Neurological:  Negative for dizziness and headaches.     Objective:     Vital Signs (Most Recent):  Temp: 98.3 °F (36.8 °C) (09/21/23 1153)  Pulse: 72 (09/21/23 1153)  Resp: 20 (09/21/23 1335)  BP: 108/67 (09/21/23 1153)  SpO2: 96 % (09/21/23 1153) Vital Signs (24h Range):  Temp:  [98.1 °F (36.7 °C)-98.5 °F (36.9 °C)] 98.3 °F (36.8 °C)  Pulse:  [69-85] 72  Resp:  [18-20] 20  SpO2:  [93 %-97 %] 96 %  BP: ()/(55-87) 108/67     Weight: 78 kg (171 lb 15.3 oz)  Body mass index is 33.58 kg/m².     Physical Exam  Constitutional:       Appearance: Normal appearance.   HENT:      Head:      Comments: I patient has an ulcerative lesion on the scalp, multiple palpable lesions scattered over the scalp.  Pustules which have ruptured were noted on the forehead and chin.  Similar lesions were noted in the back of the neck.  Eyes:      Extraocular Movements: Extraocular movements intact.      Conjunctiva/sclera: Conjunctivae normal.      Pupils: Pupils are equal, round, and reactive to light.   Cardiovascular:      Rate and Rhythm: Normal rate and regular rhythm.   Pulmonary:      Effort: Pulmonary effort is normal.      Breath sounds: Normal breath sounds.   Abdominal:      Comments: Umbilicated lesions which have ulcerated are noted on the abdomen   Genitourinary:     Comments: Multiple dark umbilicated lesions noted in bilateral inguinal regions measuring 0.5 x 0.5  cm, tender are located in bilateral inguinal region.  Musculoskeletal:         General: No swelling or tenderness. Normal range of motion.   Skin:     Findings: Rash present.   Neurological:      General: No focal deficit present.      Mental Status: She is alert and oriented to person, place, and time.               Significant Labs: All pertinent labs within the past 24 hours have been reviewed.    Significant Imaging: I have reviewed all pertinent imaging results/findings within the past 24 hours.

## 2023-09-21 NOTE — ASSESSMENT & PLAN NOTE
-Patient  developed blisters and vesicles on her forehead and face 3 days ago.  Later lesions spread to her bilateral arms, chest, abdomen, bilateral inguinal region.  - Lesions in the inguinal region where umbilicated.  The lesions ruptured draining serous fluid.   - Patient also developed fever at the same time.  -Patient reports no one else in the family developed similar lesions.  Patient denies myalgia, arthralgia, exposure to sick contacts.  Patient has history of kidney transplant dating back to 2021.  On immunosuppression CellCept, tacrolimus and prednisone.   COVID, RPR negative.  Monkey pox testing pending.  Blood cultures were obtained yesterday no growth so far, UA negative for leukocyte esterase, nitrate.  Chest x-ray suggestive of patchy consolidation in lower lung fields suggestive of pneumonia.   Patient received vancomycin and cefepime 1 dose in the ER      Plan:   -vancomycin DC on 09/19.  Continue  cefepime.    -rapid progression of the lesion increases the concern for infectious origin, herpes zoster vs varicella zoster  vs disseminated zoster, continue acyclovir.  -can not lower the level of immunosuppression any further, prograf level is already below therapeutic range.  -HSV and varicella zoster PCR pending  -dermatology and ID on board, CT chest, abdomen and pelvis showed Left lower lobe pneumonia with innumerable pulmonary nodules scattered throughout the lungs.  Blastomycosis, histoplasmosis, Aspergillus and toxoplasmosis testing ordered.  -will follow airborne and contact precautions until we have a definite diagnosis.  -pulmonology consulted for bronchoscopy tomorrow

## 2023-09-21 NOTE — ASSESSMENT & PLAN NOTE
CMV positive, .      Recommendations   Change acyclovir to gancyclovir   Check VL weekly   Will need atleast 14 days treatment, can stop treatment once VL undetectable

## 2023-09-21 NOTE — ASSESSMENT & PLAN NOTE
CMV positive, .     Recommendations  · Change acyclovir to gancyclovir  · Check VL weekly  · Will need atleast 14 days treatment, can stop treatment once VL undetectable

## 2023-09-21 NOTE — SUBJECTIVE & OBJECTIVE
Interval History:     Patient states she is having trouble taking deep breaths. Reports continued itching of the skin lesions. Denies any other new symptoms.     Review of Systems   Constitutional:  Negative for chills and fever.   Respiratory:  Positive for shortness of breath. Negative for cough.    Cardiovascular:  Negative for chest pain, palpitations and leg swelling.   Gastrointestinal:  Negative for abdominal pain, constipation, diarrhea, nausea and vomiting.   Genitourinary:  Negative for dysuria and hematuria.   Musculoskeletal:  Negative for arthralgias and myalgias.   Skin:  Positive for rash.   Neurological:  Negative for weakness and headaches.     Objective:     Vital Signs (Most Recent):  Temp: 98.3 °F (36.8 °C) (09/21/23 1153)  Pulse: 72 (09/21/23 1153)  Resp: 20 (09/21/23 1335)  BP: 108/67 (09/21/23 1153)  SpO2: 96 % (09/21/23 1153) Vital Signs (24h Range):  Temp:  [98.1 °F (36.7 °C)-98.5 °F (36.9 °C)] 98.3 °F (36.8 °C)  Pulse:  [69-85] 72  Resp:  [18-20] 20  SpO2:  [93 %-97 %] 96 %  BP: ()/(55-87) 108/67     Weight: 78 kg (171 lb 15.3 oz)  Body mass index is 33.58 kg/m².    Estimated Creatinine Clearance: 82.9 mL/min (based on SCr of 0.9 mg/dL).     Physical Exam  Vitals reviewed.   Constitutional:       General: She is not in acute distress.     Appearance: Normal appearance. She is not ill-appearing.   HENT:      Head: Normocephalic and atraumatic.   Eyes:      Extraocular Movements: Extraocular movements intact.      Conjunctiva/sclera: Conjunctivae normal.   Cardiovascular:      Rate and Rhythm: Normal rate and regular rhythm.      Heart sounds: No murmur heard.  Pulmonary:      Effort: Pulmonary effort is normal. No respiratory distress.      Breath sounds: Normal breath sounds. No wheezing.   Abdominal:      General: Abdomen is flat. Bowel sounds are normal.      Palpations: Abdomen is soft.      Tenderness: There is no abdominal tenderness.   Musculoskeletal:      Cervical back: Normal  range of motion.   Skin:     General: Skin is warm and dry.      Findings: Rash present.   Neurological:      General: No focal deficit present.      Mental Status: She is alert and oriented to person, place, and time.   Psychiatric:         Mood and Affect: Mood normal.         Behavior: Behavior normal.         Thought Content: Thought content normal.          Significant Labs: All pertinent labs within the past 24 hours have been reviewed.  Recent Lab Results         09/21/23  0949   09/21/23  0547   09/21/23  0546        Respiratory Infection Panel Source NP Swab           Adeno Test Not Detected           Coronavirus 229E, Common Cold Virus Not Detected           Coronavirus HKU1, Common Cold Virus Not Detected           Coronavirus NL63, Common Cold Virus Not Detected           Coronavirus OC43, Common Cold Virus Not Detected  Comment: The Coronavirus strains detected in this test cause the common cold.  These strains are not the COVID-19 (novel Coronavirus)strain   associated with the respiratory disease outbreak.             Human Metapneumovirus Not Detected           Human Rhinovirus/Enterovirus Not Detected           Influenza A (subtypes H1, H1-2009,H3) Not Detected           Influenza B Not Detected           Parainfluenza Virus 1 Not Detected           Parainfluenza Virus 2 Not Detected           Parainfluenza Virus 3 Not Detected           Parainfluenza Virus 4 Not Detected           Respiratory Syncytial Virus Not Detected           Bordetella Parapertussis (CQ8404) Not Detected           Bordetella pertussis (ptxP) Not Detected           Chlamydia pneumoniae Not Detected           Mycoplasma pneumoniae Not Detected           Toxoplasma gondii DNA, Source     ?       Anion Gap     7       Baso #     0.03       Basophil %     0.6       BUN     12       Calcium     8.5       Chloride     105       CO2     23       Creatinine     0.9       Cryptococcal Ag, Blood   Negative         Cytomegalovirus DNA      CMV DNA detected and quantified       Cytomegalovirus Log (IU/mL)     2.94  Comment: This procedure utilizes a real-time polymerase chain reaction test  from [employer]. The presence of CMV target sequence is   indicated by the fluorescent signal generated using fluorescently-  labeled oligonucleotide probes on the [id number] instrument. This assay  cannot accurately quantify CMV DNA <50 IU/mL (<1.70 Log IU/mL) as  the quantification range of this assay is 50 to 4 million IU/mL  (1.70 Log to 6.60 Log IU/mL).    Specimens reported as detected but <50 IU/mL contain detectable  levels of CMV DNA but the viral load is below the limit of   quantitation. A Not Detected result does not rule out CMV infection,   clinical correlation is recommended.         Cytomegalovirus PCR, Quant     879       Differential Method     Automated       eGFR     >60.0       Eos #     0.2       Eosinophil %     2.8       Glucose     78       Gran # (ANC)     4.1       Gran %     75.9       Hematocrit     34.0       Hemoglobin     10.9       Immature Grans (Abs)     0.16  Comment: Mild elevation in immature granulocytes is non specific and   can be seen in a variety of conditions including stress response,   acute inflammation, trauma and pregnancy. Correlation with other   laboratory and clinical findings is essential.         Immature Granulocytes     3.0       Lymph #     0.7       Lymph %     13.8       MCH     28.5       MCHC     32.1       MCV     89       Mono #     0.2       Mono %     3.9       MPV     11.0       nRBC     0       Platelets     181       Potassium     3.4       RBC     3.83       RDW     14.6       SARS-CoV2 (COVID-19) Qualitative PCR Not Detected           Sodium     135       Tacrolimus Lvl     3.8  Comment: Testing performed by a chemiluminescent microparticle   immunoassay on the [employer] i System.    CAUTION: No firm therapeutic range exists for tacrolimus in whole   blood. The   complexity of the  clinical state, individual differences in   sensitivity to   immunosuppressive and nephrotoxic effects of tacrolimus,   co-administration   of other immunosuppressants, type of transplant, time post-transplant   and a   number of other factors contribute to different requirements for   optimal   blood levels of tacrolimus. Therefore, individual tacrolimus values   cannot   be used as the sole indicator for making changes in treatment regimen   and   each patient should be thoroughly evaluated clinically before changes   in   treatment regimens are made. Each user must establish his or her own   ranges   based on clinical experience.  Therapeutic ranges vary according to the commercial test used, and   therefore   should be established for each commercial test. Values obtained with   different assay methods cannot be used interchangeably due to   differences in   assay methods and cross-reactivity with metabolites, nor should   correction   factors be applied. Therefore, consistent use of one assay for   individual   patients is recommended.         WBC     5.37               Significant Imaging: I have reviewed all pertinent imaging results/findings within the past 24 hours.

## 2023-09-21 NOTE — CONSULTS
[facility] - [facility] ([place] [address])  Pulmonology  Consult Note    Patient Name: Leydi Cordero  MRN: [id number]  Admission Date: 2023  Hospital Length of Stay: 4 days  Code Status: Full Code  Attending Physician: Nancy Izquierdo MD  Primary Care Provider: Helena Zepeda PA-C   Principal Problem: Skin lesions    Inpatient consult to Pulmonology  Consult performed by: Geri Diaz MD  Consult ordered by: Lou Russell Jr., PA-C        Subjective:     HPI:  33 year old female with past medical history of ESRD status post Kidney transplant on immunosuppressive therapy and HTN that presented to outside hospital on  for evaluation of fever and rash since past 3 days. The rashes started on her face and moves to all her body and extremities. Patient described the initial rashes as fluid filled. The rashes are pruritus. Patient discloses that the rash was followed by cough (non productive and sometimes productive with yellow sputum), and extertional shortness of breath. Patient discloses no hemopytsis, chest pain, chills, joint pain, weight loss, nausea or vomiting.  Patient used to smoke but stopped before her transplant.  Patient discloses no ill contact.  On presentation, patient was afebrile and hemodynamically stable. Patient required 2L of oxygen to maintain oxygen saturation.  CT chest abdomen and pelvis of  showed bilateral innumeralble pulmonary nodules with left lower lobe consolidation.  Patient was started on broad spectrum antibiotics for possible pneumonia and acyclovir for VZV.  Given immunocompromised state, pulmonary consulted for diagnostic bronchoscopy.      Past Medical History:   Diagnosis Date    Anxiety     -donor kidney transplant 2021    cPRA 100%, XM negative 3 arteries, 2 on common patch, WIT 40 min    Encounter for blood transfusion     ESRD (end stage renal disease)     Fibroma     H/O kidney transplant     Hypertension      Hypertensive nephropathy     Ovarian cyst     Pulmonary nodules 9/20/2023    Sleep apnea        Past Surgical History:   Procedure Laterality Date    COLD KNIFE CONIZATION OF CERVIX N/A 8/21/2023    Procedure: CONE BIOPSY, CERVIX, USING COLD KNIFE;  Surgeon: Jourdan Conklin MD;  Location: [facility];  Service: OB/GYN;  Laterality: N/A;    COLPOSCOPY  06/29/2020    Needs follow up on or after 6/29/21    ESOPHAGOGASTRODUODENOSCOPY N/A 6/23/2022    Procedure: ESOPHAGOGASTRODUODENOSCOPY (EGD);  Surgeon: Essie Carvajal MD;  Location: [facility];  Service: Endoscopy;  Laterality: N/A;    hemodialysis access left arm      kidney removal       KIDNEY TRANSPLANT  04/15/2015    KIDNEY TRANSPLANT N/A 1/9/2021    Procedure: TRANSPLANT, KIDNEY;  Surgeon: Fam Sutton MD;  Location: [address];  Service: Transplant;  Laterality: N/A;    NEPHRECTOMY      OVARIAN CYST REMOVAL      PARATHYROIDECTOMY      partial     PERCUTANEOUS NEPHROSTOMY Left 2/13/2021    Procedure: Creation, Nephrostomy, Percutaneous;  Surgeon: Elen Surgeon;  [facility];  Service: Anesthesiology;  Laterality: Left;    right leg hemodialysis access      ROBOT-ASSISTED LAPAROSCOPIC SLEEVE GASTRECTOMY USING DA VELMA XI N/A 9/27/2022    Procedure: XI ROBOTIC SLEEVE GASTRECTOMY;  Surgeon: Cal Parekh MD;  Location: [facility];  Service: General;  Laterality: N/A;    transplant nephrectomy  12/01/2017       Review of patient's allergies indicates:   Allergen Reactions    Heparin analogues Rash       Family History       Problem Relation (Age of Onset)    Cancer Father    Colon cancer Father    Diabetes Maternal Uncle    Hypertension Father, Maternal Aunt, Other    Kidney disease Maternal Aunt, Other    No Known Problems Mother, Sister, Brother          Tobacco Use    Smoking status: Former     Current packs/day: 0.00     Average packs/day: 0.3 packs/day for 12.6 years (3.1 ttl pk-yrs)     Types: Cigarettes     Start date: 2008      Quit date: 8/2/2020     Years since quitting: 3.1    Smokeless tobacco: Never   Substance and Sexual Activity    Alcohol use: No    Drug use: No    Sexual activity: Yes     Partners: Male     Birth control/protection: None         Review of Systems   Constitutional:  Positive for fatigue and fever. Negative for chills.   Respiratory:  Positive for cough and shortness of breath. Negative for wheezing.    Cardiovascular:  Negative for chest pain, palpitations and leg swelling.   Gastrointestinal:  Negative for nausea and vomiting.   Psychiatric/Behavioral:  Negative for agitation and confusion.      Objective:     Vital Signs (Most Recent):  Temp: 98.3 °F (36.8 °C) (09/21/23 1153)  Pulse: 72 (09/21/23 1153)  Resp: 18 (09/21/23 1153)  BP: 108/67 (09/21/23 1153)  SpO2: 96 % (09/21/23 1153) Vital Signs (24h Range):  Temp:  [98.1 °F (36.7 °C)-98.5 °F (36.9 °C)] 98.3 °F (36.8 °C)  Pulse:  [69-85] 72  Resp:  [18-20] 18  SpO2:  [93 %-97 %] 96 %  BP: ()/(55-87) 108/67     Weight: 78 kg (171 lb 15.3 oz)  Body mass index is 33.58 kg/m².      Intake/Output Summary (Last 24 hours) at 9/21/2023 1243  Last data filed at 9/21/2023 0714  Gross per 24 hour   Intake 780 ml   Output --   Net 780 ml        Physical Exam  Vitals reviewed.   Constitutional:       General: She is not in acute distress.     Appearance: Normal appearance. She is not ill-appearing.   HENT:      Head: Normocephalic and atraumatic.   Eyes:      Conjunctiva/sclera: Conjunctivae normal.   Cardiovascular:      Rate and Rhythm: Normal rate and regular rhythm.      Heart sounds: No murmur heard.  Pulmonary:      Effort: Pulmonary effort is normal. No respiratory distress.      Breath sounds: Normal breath sounds. No wheezing or rhonchi.   Abdominal:      Palpations: Abdomen is soft.   Musculoskeletal:      Right lower leg: No edema.      Left lower leg: No edema.   Skin:     Findings: Rash present.   Neurological:      General: No focal deficit present.       Mental Status: She is alert and oriented to person, place, and time.   Psychiatric:         Mood and Affect: Mood normal.         Behavior: Behavior normal.          Vents:       Lines/Drains/Airways       Central Venous Catheter Line  Duration                  Hemodialysis AV Graft Left upper arm -- days              Peripheral Intravenous Line  Duration                  Peripheral IV - Single Lumen 09/19/23 1120 20 G;1 3/4 in Left Forearm 2 days                    Significant Labs:    CBC/Anemia Profile:  Recent Labs   Lab 09/20/23  0544 09/21/23  0546   WBC 4.40 5.37   HGB 11.7* 10.9*   HCT 36.1* 34.0*    181   MCV 88 89   RDW 14.8* 14.6*        Chemistries:  Recent Labs   Lab 09/20/23  0544 09/21/23  0546   * 135*   K 3.7 3.4*    105   CO2 22* 23   BUN 10 12   CREATININE 0.9 0.9   CALCIUM 8.3* 8.5*       All pertinent labs within the past 24 hours have been reviewed.    Significant Imaging:   I have reviewed all pertinent imaging results/findings within the past 24 hours.    Assessment/Plan:     Pulmonary  Pulmonary nodules  Presentation concerning for pulmonary VZV on the background of immunocompromised state  Patient possible have superimpose bacteria left lower lobe pneumonia  Continue broad spectrum antibiotics as par ID along with acyclovir  We plan for diagnostic bronchoscopy tomorrow   NPO after midnight  Patient consented for the procedure      Patient is [place] speaking online  used:  Florentino: #[phone]  Rebecca #[id number]      Thank you for your consult. We will follow-up with patient. Please contact us if you have any additional questions.    Patient was seen with the attending physician Dr. Denys Robbins.       Geri Diaz MD  Pulmonology  [facility] - Intensive Care ([place])

## 2023-09-21 NOTE — HPI
33 year old female with past medical history of ESRD status post Kidney transplant on immunosuppressive therapy and HTN that presented to outside hospital on 09/16 for evaluation of fever and rash since past 3 days. The rashes started on her face and moves to all her body and extremities. Patient described the initial rashes as fluid filled. The rashes are pruritus. Patient discloses that the rash was followed by cough (non productive and sometimes productive with yellow sputum), and extertional shortness of breath. Patient discloses no hemopytsis, chest pain, chills, joint pain, weight loss, nausea or vomiting.  Patient used to smoke but stopped before her transplant.  Patient discloses no ill contact.  On presentation, patient was afebrile and hemodynamically stable. Patient required 2L of oxygen to maintain oxygen saturation.  CT chest abdomen and pelvis of 09/19 showed bilateral innumeralble pulmonary nodules with left lower lobe consolidation.  Patient was started on broad spectrum antibiotics for possible pneumonia and acyclovir for VZV.  Given immunocompromised state, pulmonary consulted for diagnostic bronchoscopy.

## 2023-09-21 NOTE — PROGRESS NOTES
[facility] - Intensive Care ([address] Medicine  Progress Note    Patient Name: Leydi Cordero  MRN: [id number]  Patient Class: IP- Inpatient   Admission Date: 9/17/2023  Length of Stay: 4 days  Attending Physician: Nancy Izquierdo MD  Primary Care Provider: Helena Zepeda PA-C        Subjective:     Principal Problem:Skin lesions        HPI:  34 y/o F with PMH of ESRD s/p Kidney transplant on immunosuppression, HTN presented to outside hospital on 09/16 complaining of fever and rash since past 3 days.  Patient was in her usual state of health 3 days ago, initially developed blisters and vesicles on her forehead and face.  Later lesions spread to her bilateral arms, chest, abdomen, bilateral inguinal region.  Lesions in the inguinal region where umbilicated.  The lesions ruptured draining serous fluid.  Patient also developed fever at the same time.  Patient also complains of cough associated with clear sputum.  And now complaints of throat pain due to persistent coughing.  Patient also felt nauseous yesterday however denied vomiting.  Patient reports no one else in the family developed similar lesions.  Patient denies shortness of breath, chest pain, abdominal pain, dysuria or hematuria, myalgia, arthralgia, exposure to sick contacts.  Patient has history of kidney transplant dating back to 2021.  On immunosuppression CellCept, tacrolimus and prednisone.  Patient reports compliance with medication      Patient received vancomycin and cefepime 1 dose in the ER at [place].  [place] [facility] [place]  On presentation patient was afebrile, normotensive, saturating well on room air.  Initial blood work including CBC, BMP, troponin and lactate within normal limits.  COVID, RPR negative.  Blood cultures were obtained yesterday no growth so far.  Chest x-ray suggestive of patchy consolidation in lower lung fields suggestive of pneumonia.  UA  pending.  Initiated patient on vancomycin and cefepime.  Infectious diseases, kidney transplant team, dermatology were consulted.      Overview/Hospital Course:  32 y/o F with PMH of ESRD s/p Kidney transplant on immunosuppression, HTN presented to outside hospital on 09/16 complaining of fever and rash since past 3 days.  Lesions were noted on face, bilateral arms, chest, abdomen, bilateral inguinal region.  Lesions in the inguinal region where umbilicated.  Patient received vancomycin and cefepime 1 dose in the ER at [place].  [place] [facility] [place]  Blood cultures and UA were obtained.  Chest x-ray was suggestive of patchy consolidation bilateral lower lobes.  Started broad-spectrum antibiotics including vanc and cefepime.  Patient has history of kidney transplant in 2021, on immunosuppressive therapy.  Id, dermatology and kidney transplant team were consulted.  Id was concerned for disseminated herpes versus varicella, patient was started on acyclovir at immunosuppressive dose and swabs were collected.  As there was concern for consolidation on chest x-ray this raised the possibility of disseminated herpes with pneumonia.  CT chest and abdomen were obtained. Pulmonology consulted for bronchoscopy tomorrow.        Overnight events:  No significant overnight events. Cough continues to improve.    Review of Systems   Constitutional:  Positive for fever. Negative for chills.   Eyes:  Negative for photophobia and visual disturbance.   Respiratory:  Positive for cough. Negative for shortness of breath and wheezing.    Cardiovascular:  Negative for chest pain and palpitations.   Gastrointestinal:  Negative for abdominal pain, nausea and vomiting.   Genitourinary:  Negative for dysuria.   Musculoskeletal:  Negative for arthralgias and myalgias.   Skin:  Positive for rash.   Neurological:  Negative for dizziness and headaches.     Objective:     Vital Signs (Most  Recent):  Temp: 98.3 °F (36.8 °C) (09/21/23 1153)  Pulse: 72 (09/21/23 1153)  Resp: 20 (09/21/23 1335)  BP: 108/67 (09/21/23 1153)  SpO2: 96 % (09/21/23 1153) Vital Signs (24h Range):  Temp:  [98.1 °F (36.7 °C)-98.5 °F (36.9 °C)] 98.3 °F (36.8 °C)  Pulse:  [69-85] 72  Resp:  [18-20] 20  SpO2:  [93 %-97 %] 96 %  BP: ()/(55-87) 108/67     Weight: 78 kg (171 lb 15.3 oz)  Body mass index is 33.58 kg/m².     Physical Exam  Constitutional:       Appearance: Normal appearance.   HENT:      Head:      Comments: I patient has an ulcerative lesion on the scalp, multiple palpable lesions scattered over the scalp.  Pustules which have ruptured were noted on the forehead and chin.  Similar lesions were noted in the back of the neck.  Eyes:      Extraocular Movements: Extraocular movements intact.      Conjunctiva/sclera: Conjunctivae normal.      Pupils: Pupils are equal, round, and reactive to light.   Cardiovascular:      Rate and Rhythm: Normal rate and regular rhythm.   Pulmonary:      Effort: Pulmonary effort is normal.      Breath sounds: Normal breath sounds.   Abdominal:      Comments: Umbilicated lesions which have ulcerated are noted on the abdomen   Genitourinary:     Comments: Multiple dark umbilicated lesions noted in bilateral inguinal regions measuring 0.5 x 0.5 cm, tender are located in bilateral inguinal region.  Musculoskeletal:         General: No swelling or tenderness. Normal range of motion.   Skin:     Findings: Rash present.   Neurological:      General: No focal deficit present.      Mental Status: She is alert and oriented to person, place, and time.               Significant Labs: All pertinent labs within the past 24 hours have been reviewed.    Significant Imaging: I have reviewed all pertinent imaging results/findings within the past 24 hours.      Assessment/Plan:      * Skin lesions  -Patient  developed blisters and vesicles on her forehead and face 3 days ago.  Later lesions spread to her  bilateral arms, chest, abdomen, bilateral inguinal region.  - Lesions in the inguinal region where umbilicated.  The lesions ruptured draining serous fluid.   - Patient also developed fever at the same time.  -Patient reports no one else in the family developed similar lesions.  Patient denies myalgia, arthralgia, exposure to sick contacts.  Patient has history of kidney transplant dating back to 2021.  On immunosuppression CellCept, tacrolimus and prednisone.   COVID, RPR negative.  Monkey pox testing pending.  Blood cultures were obtained yesterday no growth so far, UA negative for leukocyte esterase, nitrate.  Chest x-ray suggestive of patchy consolidation in lower lung fields suggestive of pneumonia.   Patient received vancomycin and cefepime 1 dose in the ER      Plan:   -vancomycin DC on 09/19.  Continue  cefepime.    -rapid progression of the lesion increases the concern for infectious origin, herpes zoster vs varicella zoster  vs disseminated zoster, continue acyclovir.  -can not lower the level of immunosuppression any further, prograf level is already below therapeutic range.  -HSV and varicella zoster PCR pending  -dermatology and ID on board, CT chest, abdomen and pelvis showed Left lower lobe pneumonia with innumerable pulmonary nodules scattered throughout the lungs.  Blastomycosis, histoplasmosis, Aspergillus and toxoplasmosis testing ordered.  -will follow airborne and contact precautions until we have a definite diagnosis.  -pulmonology consulted for bronchoscopy tomorrow    CMV (cytomegalovirus infection)  CMV positive, .      Recommendations   Change acyclovir to gancyclovir   Check VL weekly   Will need atleast 14 days treatment, can stop treatment once VL undetectable    H/O kidney transplant  Patient has history of kidney transplant from decreased tone dating back to 2021.    Patient is on immunosuppressive therapy including tacrolimus, CellCept and prednisone.  Kidney transplant team  on board.  Continue tacrolimus and prednisone.  CellCept on hold.    Hyponatremia  Patient has hyponatremia which is controlled,We will aim to correct the sodium by 4-6mEq in 24 hours. We will monitor sodium Daily. The hyponatremia is due to CKD. We will obtain the following studies:  BMP daily. he patient's sodium results have been reviewed and are listed below.  Recent Labs   Lab 09/21/23  0546   *       Hyperparathyroidism, tertiary    Continue calcitriol    Need for prophylactic immunotherapy        Acidosis  Home bicarbonate dose restarted.      VTE Risk Mitigation (From admission, onward)         Ordered     IP VTE HIGH RISK PATIENT  Once         09/17/23 1232     Place sequential compression device  Until discontinued         09/17/23 1232                Discharge Planning   NATALIIA: 9/23/2023     Code Status: Full Code   Is the patient medically ready for discharge?: No    Reason for patient still in hospital (select all that apply): Patient unstable  Discharge Plan A: Home                  Lou Russell PA-C  Department of Hospital Medicine   [facility] - Intensive Care ([address])

## 2023-09-21 NOTE — ASSESSMENT & PLAN NOTE
Innumerable pulmonary nodules bilaterally in centrilobular and subpleural locations noted on CT.     Recommendations  · Follow up fungal workup (ordered)  · Follow up pulmonology recs

## 2023-09-21 NOTE — PROGRESS NOTES
[facility] - Intensive Care ([place] [address])  Infectious Disease  Progress Note    Patient Name: Leydi Cordero  MRN: [id number]  Admission Date: 9/17/2023  Length of Stay: 4 days  Attending Physician: Nancy Izquierdo MD  Primary Care Provider: Helena Zepeda PA-C    Isolation Status: No active isolations  Assessment/Plan:      Derm  * Skin lesions  33F with second DDKT 1/9/2021 (01/09/2021) (first 4/15/15) on prograf and cellcept, prednisone, HTN, HTN nephropathy, Ovarian cyst, and NASIMA, who [place] to [facility] [place] a [place] from [facility] with complaint of rash, fevers, malaise beginning 4 days prior to arrival with progressive spread. RPR negative. COVID negative. Blood cx NGTD. CXR with patchy airspace disease in lower lobes. She was started on vanc/cefepime. MPOX swab sent. Presentation suspicious for disseminated herpes zoster with possible pneumonia. Other differentials per derm - disseminated HSV, primary varicella, MPOX, molluscum. Lesions on face appear to have bacterial superinfection. HIV, GC/CT, mpox, HSV negative.     Recommendations  · Follow up VZV from swabs of lesions  · Follow up respiratory panel  · Continue cefepime  · Change acyvlovir to gancyclovir for CMV infection      Pulmonary  Pulmonary nodules  Innumerable pulmonary nodules bilaterally in centrilobular and subpleural locations noted on CT.     Recommendations  · Follow up fungal workup (ordered)  · Follow up pulmonology recs    ID  CMV (cytomegalovirus infection)  CMV positive, .     Recommendations  · Change acyclovir to gancyclovir  · Check VL weekly  · Will need atleast 14 days treatment, can stop treatment once VL undetectable        Anticipated Disposition: TBD    Thank you for your consult. I will follow-up with patient. Please contact us if you have any additional questions.    Etelvina Ward, DO  Infectious Disease  [facility] - Intensive Care ([place] [address])    Subjective:     Principal Problem:Skin lesions    HPI: 33 year  old female with history of second -donor kidney transplant 2021 (2021) (first 4/15/15) on prograf and cellcept, prednisone,  Hypertension, Hypertensive nephropathy, Ovarian cyst, and Sleep apnea who [place] [facility] [place] [facility] with complaint of rash, fevers, malaise. She reports that 4 days prior to admission she began to develop lesion on face on chin, throughout the day she began to have spreading of these lesions to her back, chest, abdomen, hands, inguinal region. She describes them as itchy and painful. She has noticed some purulent drainage from some of the lesions. Has not had a rash like this before. After developing rash she noticed that she began to feel feverish. She additionally complains of headaches and ear pain. No tinnitus, no decreased hearing, no visual changes. She complains of shortness of breath and cough productive of yellow sputum. She complains of generalized weakness, malaise, myalgias. Complains of sore throat, mild pain with swallowing. She complains of crampy lower abdominal pain that she attributes to starting menses 2 days ago. No N/V. No recent sick contacts. No similar presentations. She does not think had chicken pox as a child and confirmed this with mother. Thinks had all childhood vaccines, not sure about varicella vaccine.    Was seen over summer for persistent diarrhea with weight loss, worsened with eating and discussion of possible GI evaluation for scope. At last appt with transplant nephro these sx had resolved.   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 HR HPV+ 16    From [facility]. Living with roommate in [place] in apt. No recent travel. No pets or animal exposures. No known bug bites. No new soaps/detergents/perfumes. Not exposed to children. Sexually active with male partners - last encounter 4 months ago, monogamous.     Interval History:     Patient states she is having trouble taking deep breaths. Reports  continued itching of the skin lesions. Denies any other new symptoms.     Review of Systems   Constitutional:  Negative for chills and fever.   Respiratory:  Positive for shortness of breath. Negative for cough.    Cardiovascular:  Negative for chest pain, palpitations and leg swelling.   Gastrointestinal:  Negative for abdominal pain, constipation, diarrhea, nausea and vomiting.   Genitourinary:  Negative for dysuria and hematuria.   Musculoskeletal:  Negative for arthralgias and myalgias.   Skin:  Positive for rash.   Neurological:  Negative for weakness and headaches.     Objective:     Vital Signs (Most Recent):  Temp: 98.3 °F (36.8 °C) (09/21/23 1153)  Pulse: 72 (09/21/23 1153)  Resp: 20 (09/21/23 1335)  BP: 108/67 (09/21/23 1153)  SpO2: 96 % (09/21/23 1153) Vital Signs (24h Range):  Temp:  [98.1 °F (36.7 °C)-98.5 °F (36.9 °C)] 98.3 °F (36.8 °C)  Pulse:  [69-85] 72  Resp:  [18-20] 20  SpO2:  [93 %-97 %] 96 %  BP: ()/(55-87) 108/67     Weight: 78 kg (171 lb 15.3 oz)  Body mass index is 33.58 kg/m².    Estimated Creatinine Clearance: 82.9 mL/min (based on SCr of 0.9 mg/dL).     Physical Exam  Vitals reviewed.   Constitutional:       General: She is not in acute distress.     Appearance: Normal appearance. She is not ill-appearing.   HENT:      Head: Normocephalic and atraumatic.   Eyes:      Extraocular Movements: Extraocular movements intact.      Conjunctiva/sclera: Conjunctivae normal.   Cardiovascular:      Rate and Rhythm: Normal rate and regular rhythm.      Heart sounds: No murmur heard.  Pulmonary:      Effort: Pulmonary effort is normal. No respiratory distress.      Breath sounds: Normal breath sounds. No wheezing.   Abdominal:      General: Abdomen is flat. Bowel sounds are normal.      Palpations: Abdomen is soft.      Tenderness: There is no abdominal tenderness.   Musculoskeletal:      Cervical back: Normal range of motion.   Skin:     General: Skin is warm and dry.      Findings: Rash  present.   Neurological:      General: No focal deficit present.      Mental Status: She is alert and oriented to person, place, and time.   Psychiatric:         Mood and Affect: Mood normal.         Behavior: Behavior normal.         Thought Content: Thought content normal.          Significant Labs: All pertinent labs within the past 24 hours have been reviewed.  Recent Lab Results         09/21/23  0949   09/21/23  0547   09/21/23  0546        Respiratory Infection Panel Source NP Swab           Adeno Test Not Detected           Coronavirus 229E, Common Cold Virus Not Detected           Coronavirus HKU1, Common Cold Virus Not Detected           Coronavirus NL63, Common Cold Virus Not Detected           Coronavirus OC43, Common Cold Virus Not Detected  Comment: The Coronavirus strains detected in this test cause the common cold.  These strains are not the COVID-19 (novel Coronavirus)strain   associated with the respiratory disease outbreak.             Human Metapneumovirus Not Detected           Human Rhinovirus/Enterovirus Not Detected           Influenza A (subtypes H1, H1-2009,H3) Not Detected           Influenza B Not Detected           Parainfluenza Virus 1 Not Detected           Parainfluenza Virus 2 Not Detected           Parainfluenza Virus 3 Not Detected           Parainfluenza Virus 4 Not Detected           Respiratory Syncytial Virus Not Detected           Bordetella Parapertussis (DU1074) Not Detected           Bordetella pertussis (ptxP) Not Detected           Chlamydia pneumoniae Not Detected           Mycoplasma pneumoniae Not Detected           Toxoplasma gondii DNA, Source     ?       Anion Gap     7       Baso #     0.03       Basophil %     0.6       BUN     12       Calcium     8.5       Chloride     105       CO2     23       Creatinine     0.9       Cryptococcal Ag, Blood   Negative         Cytomegalovirus DNA     CMV DNA detected and quantified       Cytomegalovirus Log (IU/mL)      2.94  Comment: This procedure utilizes a real-time polymerase chain reaction test  from [employer]. The presence of CMV target sequence is   indicated by the fluorescent signal generated using fluorescently-  labeled oligonucleotide probes on the [id number] instrument. This assay  cannot accurately quantify CMV DNA <50 IU/mL (<1.70 Log IU/mL) as  the quantification range of this assay is 50 to 4 million IU/mL  (1.70 Log to 6.60 Log IU/mL).    Specimens reported as detected but <50 IU/mL contain detectable  levels of CMV DNA but the viral load is below the limit of   quantitation. A Not Detected result does not rule out CMV infection,   clinical correlation is recommended.         Cytomegalovirus PCR, Quant     879       Differential Method     Automated       eGFR     >60.0       Eos #     0.2       Eosinophil %     2.8       Glucose     78       Gran # (ANC)     4.1       Gran %     75.9       Hematocrit     34.0       Hemoglobin     10.9       Immature Grans (Abs)     0.16  Comment: Mild elevation in immature granulocytes is non specific and   can be seen in a variety of conditions including stress response,   acute inflammation, trauma and pregnancy. Correlation with other   laboratory and clinical findings is essential.         Immature Granulocytes     3.0       Lymph #     0.7       Lymph %     13.8       MCH     28.5       MCHC     32.1       MCV     89       Mono #     0.2       Mono %     3.9       MPV     11.0       nRBC     0       Platelets     181       Potassium     3.4       RBC     3.83       RDW     14.6       SARS-CoV2 (COVID-19) Qualitative PCR Not Detected           Sodium     135       Tacrolimus Lvl     3.8  Comment: Testing performed by a chemiluminescent microparticle   immunoassay on the [employer] i System.    CAUTION: No firm therapeutic range exists for tacrolimus in whole   blood. The   complexity of the clinical state, individual differences in   sensitivity to    immunosuppressive and nephrotoxic effects of tacrolimus,   co-administration   of other immunosuppressants, type of transplant, time post-transplant   and a   number of other factors contribute to different requirements for   optimal   blood levels of tacrolimus. Therefore, individual tacrolimus values   cannot   be used as the sole indicator for making changes in treatment regimen   and   each patient should be thoroughly evaluated clinically before changes   in   treatment regimens are made. Each user must establish his or her own   ranges   based on clinical experience.  Therapeutic ranges vary according to the commercial test used, and   therefore   should be established for each commercial test. Values obtained with   different assay methods cannot be used interchangeably due to   differences in   assay methods and cross-reactivity with metabolites, nor should   correction   factors be applied. Therefore, consistent use of one assay for   individual   patients is recommended.         WBC     5.37               Significant Imaging: I have reviewed all pertinent imaging results/findings within the past 24 hours.

## 2023-09-21 NOTE — ANESTHESIA PREPROCEDURE EVALUATION
[facility]  Anesthesia Pre-Operative Evaluation         Patient Name: Leydi Cordero  YOB: 1990  MRN: [id number]    SUBJECTIVE:     Pre-operative evaluation for Procedure(s) (LRB):  Bronchoscopy (N/A)     09/21/2023    Leydi Cordero is a 33 y.o. female w/ a significant PMHx of HTN c/b ESRD s/p kidney transplant #2 2021 on tacro, MMF, prednisone, presents with disseminated vesicular rash which is likely disseminated VZV. CT chest with innmumerable nodules; likely VZV pneumonia. Pulmonary was consulted for bronchoscopy for need for BAL samples.     Patient now presents for the above procedure(s).    Results for orders placed during the hospital encounter of 05/17/21    Echo Color Flow Doppler? Yes    Interpretation Summary  · Concentric remodeling and normal systolic function.  · The estimated ejection fraction is 60%.  · Normal left ventricular diastolic function.  · With normal right ventricular systolic function.      LDA:        Hemodialysis AV Graft Left upper arm (Active)   Number of days:             Peripheral IV - Single Lumen 09/19/23 1120 20 G;1 3/4 in Left Forearm (Active)   Site Assessment Clean;Dry;Intact 09/20/23 2015   Extremity Assessment Distal to IV No abnormal discoloration;No redness;No swelling;No warmth 09/20/23 2015   Line Status Blood return noted;Infusing 09/20/23 2015   Dressing Status Clean;Dry;Intact 09/20/23 2015   Dressing Intervention Integrity maintained 09/20/23 2015   Dressing Change Due 09/23/23 09/20/23 2015   Site Change Due 09/23/23 09/20/23 0800   Number of days: 2       Prev airway: None documented.    Drips: None documented.      Patient Active Problem List   Diagnosis    Essential hypertension    Acidosis    Antibody mediated rejection of kidney transplant    Need for prophylactic immunotherapy    Immunosuppressive management encounter following kidney transplant    Acute rejection of kidney transplant    S/p  nephrectomy 17    Hyperparathyroidism, tertiary    S/P parathyroidectomy--partial    Former light cigarette smoker (1-9 per day)    Hyponatremia    Nonrheumatic tricuspid valve regurgitation    Pulmonary hypertension    -donor kidney transplant 2021    Thrombosis complicating venous access device    Perinephric hematoma of kidney transplant    At risk for opportunistic infections    Long-term use of immunosuppressant medication    Hypomagnesemia    H/O kidney transplant    Urine leak    CKD (chronic kidney disease) stage 2, GFR 60-89 ml/min    Morbid obesity with BMI of 40.0-44.9, adult    Boils of multiple sites    Asthma, well controlled    High grade squamous intraepithelial lesion (HGSIL), grade 3 PETRONA, on biopsy of cervix    Status post cone biopsy of cervix    Skin lesions    Pulmonary nodules       Review of patient's allergies indicates:   Allergen Reactions    Heparin analogues Rash       Current Inpatient Medications:   acyclovir  10 mg/kg (Ideal) Intravenous Q8H    calcitRIOL  0.25 mcg Oral Daily    ceFEPime (MAXIPIME) IVPB  2 g Intravenous Q8H    dextromethorphan-guaiFENesin  mg/5 ml  5 mL Oral Q6H    midodrine  5 mg Oral BID WM    pantoprazole  40 mg Oral Daily    predniSONE  5 mg Oral Daily    sodium bicarbonate  1,300 mg Oral BID    sucralfate  1 g Oral QID    tacrolimus  1 mg Oral BID       No current facility-administered medications on file prior to encounter.     Current Outpatient Medications on File Prior to Encounter   Medication Sig Dispense Refill    albuterol (PROVENTIL/VENTOLIN HFA) 90 mcg/actuation inhaler Inhale 2 puffs into the lungs every 4 (four) hours as needed for Wheezing or Shortness of Breath. Rescue 8.5 g 11    calcitRIOL (ROCALTROL) 0.25 MCG Cap Take 1 capsule (0.25 mcg total) by mouth once daily. 30 capsule 3    cholecalciferol, vitamin D3, (VITAMIN D3) 50 mcg (2,000 unit) Tab Take 1 tablet by mouth once daily 30 tablet  0    docusate sodium (COLACE) 100 MG capsule Take 1 capsule (100 mg total) by mouth 2 (two) times daily as needed. 20 capsule 0    ergocalciferol (VITAMIN D2) 50,000 unit Cap Take 1 capsule (50,000 Units total) by mouth every 30 days. 3 capsule 0    HYDROcodone-acetaminophen (NORCO) 5-325 mg per tablet Take 1 tablet by mouth every 4 (four) hours as needed for Pain. 10 tablet 0    loperamide (IMODIUM) 2 mg capsule Take 1-2 capsule by mouth four times daily as needed for 14 days 112 capsule 0    magnesium oxide (MAG-OX) 400 mg (241.3 mg magnesium) tablet Take 1 tablet (400 mg total) by mouth 2 (two) times daily. 60 tablet 11    midodrine (PROAMATINE) 5 MG Tab Take 1 tablet (5 mg total) by mouth 2 (two) times daily with meals. 180 tablet 3    multivit-min/iron/folic acid/K (ADULTS MULTIVITAMIN ORAL) 1 tablet Orally Once a day      mycophenolate (CELLCEPT) 250 mg Cap Take 4 capsules (1,000 mg total) by mouth 2 (two) times daily. 240 capsule 11    pantoprazole (PROTONIX) 40 MG tablet Take 1 tablet by mouth once daily 90 tablet 3    predniSONE (DELTASONE) 5 MG tablet Take 1 tablet (5 mg total) by mouth once daily. 120 tablet 11    sars-cov-2, covid-19, (MODERNA COVID-19) 100 mcg/0.5 ml injection Inject 0.5 mLs into the muscle. 0.5 mL 0    sodium bicarbonate 650 MG tablet Take 2 tablets (1,300 mg total) by mouth 3 (three) times daily. 180 tablet 11    sucralfate (CARAFATE) 1 gram tablet Take 1 tablet (1 g total) by mouth 4 (four) times daily. 30 tablet 0    tacrolimus (PROGRAF) 1 MG Cap Take 2 capsules (2 mg total) by mouth every morning AND 1 capsule (1 mg total) every evening. 270 capsule 3       Past Surgical History:   Procedure Laterality Date    COLD KNIFE CONIZATION OF CERVIX N/A 8/21/2023    Procedure: CONE BIOPSY, CERVIX, USING COLD KNIFE;  Surgeon: Jourdan Conklin MD;  Location: [facility];  Service: OB/GYN;  Laterality: N/A;    COLPOSCOPY  06/29/2020    Needs follow up on or after 6/29/21     ESOPHAGOGASTRODUODENOSCOPY N/A 6/23/2022    Procedure: ESOPHAGOGASTRODUODENOSCOPY (EGD);  Surgeon: Essie Carvajal MD;  Location[facility];  Service: Endoscopy;  Laterality: N/A;    hemodialysis access left arm      kidney removal       KIDNEY TRANSPLANT  04/15/2015    KIDNEY TRANSPLANT N/A 1/9/2021    Procedure: TRANSPLANT, KIDNEY;  Surgeon: Fam Sutton MD;  Location: [address];  Service: Transplant;  Laterality: N/A;    NEPHRECTOMY      OVARIAN CYST REMOVAL      PARATHYROIDECTOMY      partial     PERCUTANEOUS NEPHROSTOMY Left 2/13/2021    Procedure: Creation, Nephrostomy, Percutaneous;  Surgeon: Elen Surgeon;  Location[facility];  Service: Anesthesiology;  Laterality: Left;    right leg hemodialysis access      ROBOT-ASSISTED LAPAROSCOPIC SLEEVE GASTRECTOMY USING DA VELMA XI N/A 9/27/2022    Procedure: XI ROBOTIC SLEEVE GASTRECTOMY;  Surgeon: Cal Parekh MD;  [facility];  Service: General;  Laterality: N/A;    transplant nephrectomy  12/01/2017       Social History:  Tobacco Use: Medium Risk (9/20/2023)    Patient History     Smoking Tobacco Use: Former     Smokeless Tobacco Use: Never     Passive Exposure: Not on file      Alcohol Use: Not At Risk (9/18/2023)    AUDIT-C     Frequency of Alcohol Consumption: Never     Average Number of Drinks: Patient does not drink     Frequency of Binge Drinking: Never        OBJECTIVE:     Vital Signs Range (Last 24H):  Temp:  [36.4 °C (97.6 °F)-36.9 °C (98.5 °F)]   Pulse:  [69-92]   Resp:  [18-20]   BP: ()/(55-87)   SpO2:  [93 %-97 %]       Significant Labs:  Lab Results   Component Value Date    WBC 5.37 09/21/2023    HGB 10.9 (L) 09/21/2023    HCT 34.0 (L) 09/21/2023     09/21/2023    CHOL 187 06/06/2022    TRIG 139 06/06/2022    HDL 39 (L) 06/06/2022    ALT 40 09/17/2023    AST 44 (H) 09/17/2023     (L) 09/21/2023    K 3.4 (L) 09/21/2023     09/21/2023    CREATININE 0.9 09/21/2023    BUN 12 09/21/2023    CO2  "23 09/21/2023    TSH 0.811 01/11/2023    INR 0.9 03/22/2021    HGBA1C 4.8 01/11/2023       Diagnostic Studies: No relevant studies.    EKG:   Results for orders placed or performed during the hospital encounter of 09/16/23   EKG 12-lead    Collection Time: 09/16/23 10:19 PM    Narrative    Test Reason : R07.89,    Vent. Rate : 068 BPM     Atrial Rate : 068 BPM     P-R Int : 136 ms          QRS Dur : 068 ms      QT Int : 376 ms       P-R-T Axes : 025 028 008 degrees     QTc Int : 399 ms    Normal sinus rhythm  Normal ECG  When compared with ECG of 17-AUG-2023 13:05,  No significant change was found  Confirmed by GEORGE MAYER MD ([id number]) on 9/17/2023 6:35:44 PM    Referred By: [email or web address]   SELF           Confirmed By:GEORGE MAYER MD       2D ECHO:  TTE:  Results for orders placed or performed during the hospital encounter of 05/17/21   Echo Color Flow Doppler? Yes   Result Value Ref Range    Ascending aorta 1.77 cm    STJ 1.86 cm    AV mean gradient 6 mmHg    Ao peak norm 1.58 m/s    Ao VTI 29.09 cm    IVRT 88.49 msec    IVS 1.03 0.6 - 1.1 cm    LA size 2.89 cm    Left Atrium Major Axis 4.21 cm    Left Atrium Minor Axis 4.08 cm    LVIDd 3.43 (A) 3.5 - 6.0 cm    LVIDs 1.89 (A) 2.1 - 4.0 cm    LVOT diameter 1.77 cm    LVOT peak VTI 24.42 cm    Posterior Wall 1.08 0.6 - 1.1 cm    MV Peak A Norm 0.62 m/s    E wave deceleration time 211.67 msec    MV Peak E Norm 0.52 m/s    PV Peak D Norm 0.39 m/s    PV Peak S Norm 0.58 m/s    RA Major Axis 3.92 cm    RA Width 3.25 cm    RVDD 3.25 cm    Sinus 2.21 cm    TAPSE 2.27 cm    TR Max Norm 2.73 m/s    TDI LATERAL 0.12 m/s    TDI SEPTAL 0.06 m/s    LA WIDTH 3.32 cm    PV PEAK VELOCITY 1.47 cm/s    MV stenosis pressure 1/2 time 61.38 ms    LV Diastolic Volume 48.55 mL    LV Systolic Volume 10.99 mL    LVOT peak norm 1.21 m/s    RVOT peak VTI 12.19 cm    RVOT peak norm 0.85 m/s    MV "A" wave duration 9.42 msec    PV mean gradient 1 mmHg    LV LATERAL E/E' RATIO 4.33 m/s    LV SEPTAL E/E' RATIO " 8.67 m/s    FS 45 %    LA volume 33.80 cm3    LV mass 108.49 g    Left Ventricle Relative Wall Thickness 0.63 cm    AV valve area 2.06 cm2    AV Velocity Ratio 0.77     AV index (prosthetic) 0.84     MV valve area p 1/2 method 3.58 cm2    E/A ratio 0.84     Mean e' 0.09 m/s    Pulm vein S/D ratio 1.49     LVOT area 2.5 cm2    LVOT stroke volume 60.06 cm3    AV peak gradient 10 mmHg    E/E' ratio 5.78 m/s    Triscuspid Valve Regurgitation Peak Gradient 30 mmHg    BSA 2.32 m2    LV Systolic Volume Index 5.0 mL/m2    LV Diastolic Volume Index 21.87 mL/m2    LA Volume Index 15.2 mL/m2    LV Mass Index 49 g/m2    EF 60 %    Narrative    · Concentric remodeling and normal systolic function.  · The estimated ejection fraction is 60%.  · Normal left ventricular diastolic function.  · With normal right ventricular systolic function.          DAYANA:  No results found. However, due to the size of the patient record, not all encounters were searched. Please check Results Review for a complete set of results.    ASSESSMENT/PLAN:     Pre-op Assessment    I have reviewed the Patient Summary Reports.     I have reviewed the Nursing Notes. I have reviewed the NPO Status.   I have reviewed the Medications.   Prednisone    Review of Systems  Anesthesia Hx:  No problems with previous Anesthesia   Denies Personal Hx of Anesthesia complications.   Cardiovascular:   Hypertension    Pulmonary:   Asthma Sleep Apnea    Renal/:   Chronic Renal Disease (s/p transplant #2 on immunosuppression), ESRD    Hepatic/GI:  Hepatic/GI Normal    Neurological:  Neurology Normal        Physical Exam  General: Well nourished, Cooperative, Alert and Oriented    Airway:  Mallampati: II / I  Mouth Opening: Normal  TM Distance: Normal  Tongue: Normal  Neck ROM: Normal ROM    Dental:  Intact    Chest/Lungs:  Normal Respiratory Rate    Heart:  Rate: Normal        Anesthesia Plan  Type of Anesthesia, risks & benefits discussed:    Anesthesia Type: Gen  ETT  Intra-op Monitoring Plan: Standard ASA Monitors  Post Op Pain Control Plan: multimodal analgesia and IV/PO Opioids PRN  Induction:  IV  Airway Plan: Direct, Post-Induction  Informed Consent: Informed consent signed with the Patient and all parties understand the risks and agree with anesthesia plan.  All questions answered.   ASA Score: 3  Day of Surgery Review of History & Physical: H&P Update referred to the surgeon/provider.    Ready For Surgery From Anesthesia Perspective.     .

## 2023-09-22 ENCOUNTER — ANESTHESIA (OUTPATIENT)
Dept: SURGERY | Facility: HOSPITAL | Age: 33
DRG: 865 | End: 2023-09-22
Payer: MEDICARE

## 2023-09-22 LAB
1,3 BETA GLUCAN SER-MCNC: <31 PG/ML
ANION GAP SERPL CALC-SCNC: 8 MMOL/L (ref 8–16)
ANISOCYTOSIS BLD QL SMEAR: SLIGHT
APPEARANCE FLD: NORMAL
BACTERIA BLD CULT: NORMAL
BACTERIA BLD CULT: NORMAL
BASOPHILS # BLD AUTO: 0.04 K/UL (ref 0–0.2)
BASOPHILS NFR BLD: 0.8 % (ref 0–1.9)
BODY FLD TYPE: NORMAL
BUN SERPL-MCNC: 10 MG/DL (ref 6–20)
C IMMITIS AB SER QL IA: NEGATIVE
CALCIUM SERPL-MCNC: 8.4 MG/DL (ref 8.7–10.5)
CHLORIDE SERPL-SCNC: 105 MMOL/L (ref 95–110)
CO2 SERPL-SCNC: 24 MMOL/L (ref 23–29)
COLOR FLD: NORMAL
CREAT SERPL-MCNC: 0.8 MG/DL (ref 0.5–1.4)
DIFFERENTIAL METHOD: ABNORMAL
EOSINOPHIL # BLD AUTO: 0.2 K/UL (ref 0–0.5)
EOSINOPHIL NFR BLD: 3.2 % (ref 0–8)
ERYTHROCYTE [DISTWIDTH] IN BLOOD BY AUTOMATED COUNT: 14.3 % (ref 11.5–14.5)
EST. GFR  (NO RACE VARIABLE): >60 ML/MIN/1.73 M^2
FUNGITELL COMMENTS: NEGATIVE
GALACTOMANNAN AG SERPL IA-ACNC: <0.5 INDEX
GLUCOSE SERPL-MCNC: 83 MG/DL (ref 70–110)
HCT VFR BLD AUTO: 32.4 % (ref 37–48.5)
HGB BLD-MCNC: 10.6 G/DL (ref 12–16)
HYPOCHROMIA BLD QL SMEAR: ABNORMAL
IMM GRANULOCYTES # BLD AUTO: 0.1 K/UL (ref 0–0.04)
IMM GRANULOCYTES NFR BLD AUTO: 2 % (ref 0–0.5)
LYMPHOCYTES # BLD AUTO: 0.8 K/UL (ref 1–4.8)
LYMPHOCYTES NFR BLD: 16 % (ref 18–48)
LYMPHOCYTES NFR FLD MANUAL: 28 %
MCH RBC QN AUTO: 28.4 PG (ref 27–31)
MCHC RBC AUTO-ENTMCNC: 32.7 G/DL (ref 32–36)
MCV RBC AUTO: 87 FL (ref 82–98)
MESOTHL CELL NFR FLD MANUAL: 3 %
MONOCYTES # BLD AUTO: 0.3 K/UL (ref 0.3–1)
MONOCYTES NFR BLD: 5.3 % (ref 4–15)
MONOS+MACROS NFR FLD MANUAL: 54 %
NEUTROPHILS # BLD AUTO: 3.6 K/UL (ref 1.8–7.7)
NEUTROPHILS NFR BLD: 72.7 % (ref 38–73)
NEUTROPHILS NFR FLD MANUAL: 15 %
NRBC BLD-RTO: 0 /100 WBC
PLATELET # BLD AUTO: 163 K/UL (ref 150–450)
PLATELET BLD QL SMEAR: ABNORMAL
PMV BLD AUTO: 10.6 FL (ref 9.2–12.9)
POTASSIUM SERPL-SCNC: 3 MMOL/L (ref 3.5–5.1)
RBC # BLD AUTO: 3.73 M/UL (ref 4–5.4)
SODIUM SERPL-SCNC: 137 MMOL/L (ref 136–145)
WBC # BLD AUTO: 4.95 K/UL (ref 3.9–12.7)
WBC # FLD: 32 /CU MM

## 2023-09-22 PROCEDURE — 25000003 PHARM REV CODE 250: Performed by: PHYSICIAN ASSISTANT

## 2023-09-22 PROCEDURE — 37000009 HC ANESTHESIA EA ADD 15 MINS: Performed by: EMERGENCY MEDICINE

## 2023-09-22 PROCEDURE — 37000008 HC ANESTHESIA 1ST 15 MINUTES: Performed by: EMERGENCY MEDICINE

## 2023-09-22 PROCEDURE — 63600175 PHARM REV CODE 636 W HCPCS: Performed by: INTERNAL MEDICINE

## 2023-09-22 PROCEDURE — 25000003 PHARM REV CODE 250: Performed by: INTERNAL MEDICINE

## 2023-09-22 PROCEDURE — 25000003 PHARM REV CODE 250: Performed by: STUDENT IN AN ORGANIZED HEALTH CARE EDUCATION/TRAINING PROGRAM

## 2023-09-22 PROCEDURE — 63600175 PHARM REV CODE 636 W HCPCS

## 2023-09-22 PROCEDURE — 87641 MR-STAPH DNA AMP PROBE: CPT | Performed by: HOSPITALIST

## 2023-09-22 PROCEDURE — 89051 BODY FLUID CELL COUNT: CPT | Performed by: HOSPITALIST

## 2023-09-22 PROCEDURE — 87798 DETECT AGENT NOS DNA AMP: CPT | Mod: 59

## 2023-09-22 PROCEDURE — 31624 DX BRONCHOSCOPE/LAVAGE: CPT | Mod: RT,,, | Performed by: EMERGENCY MEDICINE

## 2023-09-22 PROCEDURE — 36415 COLL VENOUS BLD VENIPUNCTURE: CPT | Performed by: STUDENT IN AN ORGANIZED HEALTH CARE EDUCATION/TRAINING PROGRAM

## 2023-09-22 PROCEDURE — 88305 TISSUE EXAM BY PATHOLOGIST: CPT | Performed by: PATHOLOGY

## 2023-09-22 PROCEDURE — 31624 PR BRONCHOSCOPY,DIAG2STIC W LAVAGE: ICD-10-PCS | Mod: RT,,, | Performed by: EMERGENCY MEDICINE

## 2023-09-22 PROCEDURE — 27201423 OPTIME MED/SURG SUP & DEVICES STERILE SUPPLY: Performed by: EMERGENCY MEDICINE

## 2023-09-22 PROCEDURE — 25000003 PHARM REV CODE 250: Performed by: EMERGENCY MEDICINE

## 2023-09-22 PROCEDURE — 27000207 HC ISOLATION

## 2023-09-22 PROCEDURE — 88112 CYTOPATH CELL ENHANCE TECH: CPT | Mod: 26,,, | Performed by: PATHOLOGY

## 2023-09-22 PROCEDURE — 99233 SBSQ HOSP IP/OBS HIGH 50: CPT | Mod: ,,, | Performed by: INTERNAL MEDICINE

## 2023-09-22 PROCEDURE — 25000003 PHARM REV CODE 250

## 2023-09-22 PROCEDURE — 87799 DETECT AGENT NOS DNA QUANT: CPT

## 2023-09-22 PROCEDURE — 36000707: Performed by: EMERGENCY MEDICINE

## 2023-09-22 PROCEDURE — 88312 SPECIAL STAINS GROUP 1: CPT | Mod: 26,,, | Performed by: PATHOLOGY

## 2023-09-22 PROCEDURE — 80048 BASIC METABOLIC PNL TOTAL CA: CPT | Performed by: STUDENT IN AN ORGANIZED HEALTH CARE EDUCATION/TRAINING PROGRAM

## 2023-09-22 PROCEDURE — 87070 CULTURE OTHR SPECIMN AEROBIC: CPT | Performed by: HOSPITALIST

## 2023-09-22 PROCEDURE — D9220A PRA ANESTHESIA: Mod: ,,, | Performed by: ANESTHESIOLOGY

## 2023-09-22 PROCEDURE — 99233 SBSQ HOSP IP/OBS HIGH 50: CPT | Mod: ,,, | Performed by: PHYSICIAN ASSISTANT

## 2023-09-22 PROCEDURE — 71000015 HC POSTOP RECOV 1ST HR: Performed by: EMERGENCY MEDICINE

## 2023-09-22 PROCEDURE — D9220A PRA ANESTHESIA: ICD-10-PCS | Mod: ,,, | Performed by: ANESTHESIOLOGY

## 2023-09-22 PROCEDURE — 12000002 HC ACUTE/MED SURGE SEMI-PRIVATE ROOM

## 2023-09-22 PROCEDURE — 87015 SPECIMEN INFECT AGNT CONCNTJ: CPT | Performed by: HOSPITALIST

## 2023-09-22 PROCEDURE — 88112 PR  CYTOPATH, CELL ENHANCE TECH: ICD-10-PCS | Mod: 26,,, | Performed by: PATHOLOGY

## 2023-09-22 PROCEDURE — 85025 COMPLETE CBC W/AUTO DIFF WBC: CPT | Performed by: STUDENT IN AN ORGANIZED HEALTH CARE EDUCATION/TRAINING PROGRAM

## 2023-09-22 PROCEDURE — C1726 CATH, BAL DIL, NON-VASCULAR: HCPCS | Performed by: EMERGENCY MEDICINE

## 2023-09-22 PROCEDURE — 87529 HSV DNA AMP PROBE: CPT | Mod: 59 | Performed by: HOSPITALIST

## 2023-09-22 PROCEDURE — 87205 SMEAR GRAM STAIN: CPT | Performed by: HOSPITALIST

## 2023-09-22 PROCEDURE — 63600175 PHARM REV CODE 636 W HCPCS: Performed by: STUDENT IN AN ORGANIZED HEALTH CARE EDUCATION/TRAINING PROGRAM

## 2023-09-22 PROCEDURE — 87798 DETECT AGENT NOS DNA AMP: CPT | Mod: 59 | Performed by: HOSPITALIST

## 2023-09-22 PROCEDURE — 88305 TISSUE EXAM BY PATHOLOGIST: CPT | Mod: 26,,, | Performed by: PATHOLOGY

## 2023-09-22 PROCEDURE — 87496 CYTOMEG DNA AMP PROBE: CPT | Performed by: HOSPITALIST

## 2023-09-22 PROCEDURE — 87556 M.TUBERCULO DNA AMP PROBE: CPT | Performed by: HOSPITALIST

## 2023-09-22 PROCEDURE — 25000003 PHARM REV CODE 250: Performed by: HOSPITALIST

## 2023-09-22 PROCEDURE — 36000706: Performed by: EMERGENCY MEDICINE

## 2023-09-22 PROCEDURE — 87102 FUNGUS ISOLATION CULTURE: CPT | Performed by: HOSPITALIST

## 2023-09-22 PROCEDURE — 88305 TISSUE EXAM BY PATHOLOGIST: ICD-10-PCS | Mod: 26,,, | Performed by: PATHOLOGY

## 2023-09-22 PROCEDURE — 88312 PR  SPECIAL STAINS,GROUP I: ICD-10-PCS | Mod: 26,,, | Performed by: PATHOLOGY

## 2023-09-22 PROCEDURE — 99233 PR SUBSEQUENT HOSPITAL CARE,LEVL III: ICD-10-PCS | Mod: ,,, | Performed by: PHYSICIAN ASSISTANT

## 2023-09-22 PROCEDURE — 88112 CYTOPATH CELL ENHANCE TECH: CPT | Performed by: PATHOLOGY

## 2023-09-22 PROCEDURE — 71000033 HC RECOVERY, INTIAL HOUR: Performed by: EMERGENCY MEDICINE

## 2023-09-22 PROCEDURE — 87116 MYCOBACTERIA CULTURE: CPT | Performed by: HOSPITALIST

## 2023-09-22 PROCEDURE — 99233 PR SUBSEQUENT HOSPITAL CARE,LEVL III: ICD-10-PCS | Mod: ,,, | Performed by: INTERNAL MEDICINE

## 2023-09-22 PROCEDURE — 87305 ASPERGILLUS AG IA: CPT | Performed by: HOSPITALIST

## 2023-09-22 PROCEDURE — 88312 SPECIAL STAINS GROUP 1: CPT | Performed by: PATHOLOGY

## 2023-09-22 RX ORDER — HYDROMORPHONE HYDROCHLORIDE 1 MG/ML
0.2 INJECTION, SOLUTION INTRAMUSCULAR; INTRAVENOUS; SUBCUTANEOUS EVERY 5 MIN PRN
Status: DISCONTINUED | OUTPATIENT
Start: 2023-09-22 | End: 2023-09-24 | Stop reason: HOSPADM

## 2023-09-22 RX ORDER — DEXAMETHASONE SODIUM PHOSPHATE 4 MG/ML
INJECTION, SOLUTION INTRA-ARTICULAR; INTRALESIONAL; INTRAMUSCULAR; INTRAVENOUS; SOFT TISSUE
Status: COMPLETED
Start: 2023-09-22 | End: 2023-09-22

## 2023-09-22 RX ORDER — PROPOFOL 10 MG/ML
INJECTION, EMULSION INTRAVENOUS CONTINUOUS PRN
Status: DISCONTINUED | OUTPATIENT
Start: 2023-09-22 | End: 2023-09-22

## 2023-09-22 RX ORDER — DEXAMETHASONE SODIUM PHOSPHATE 4 MG/ML
INJECTION, SOLUTION INTRA-ARTICULAR; INTRALESIONAL; INTRAMUSCULAR; INTRAVENOUS; SOFT TISSUE
Status: DISCONTINUED | OUTPATIENT
Start: 2023-09-22 | End: 2023-09-22

## 2023-09-22 RX ORDER — HALOPERIDOL 5 MG/ML
0.5 INJECTION INTRAMUSCULAR EVERY 10 MIN PRN
Status: DISCONTINUED | OUTPATIENT
Start: 2023-09-22 | End: 2023-09-24 | Stop reason: HOSPADM

## 2023-09-22 RX ORDER — PROPOFOL 10 MG/ML
VIAL (ML) INTRAVENOUS
Status: DISCONTINUED | OUTPATIENT
Start: 2023-09-22 | End: 2023-09-22

## 2023-09-22 RX ORDER — LIDOCAINE HYDROCHLORIDE 20 MG/ML
INJECTION, SOLUTION EPIDURAL; INFILTRATION; INTRACAUDAL; PERINEURAL
Status: DISCONTINUED | OUTPATIENT
Start: 2023-09-22 | End: 2023-09-22

## 2023-09-22 RX ORDER — OXYCODONE HYDROCHLORIDE 5 MG/1
5 TABLET ORAL
Status: DISCONTINUED | OUTPATIENT
Start: 2023-09-22 | End: 2023-09-24 | Stop reason: HOSPADM

## 2023-09-22 RX ORDER — ONDANSETRON 2 MG/ML
INJECTION INTRAMUSCULAR; INTRAVENOUS
Status: DISCONTINUED | OUTPATIENT
Start: 2023-09-22 | End: 2023-09-22

## 2023-09-22 RX ORDER — SODIUM CHLORIDE 0.9 % (FLUSH) 0.9 %
10 SYRINGE (ML) INJECTION
Status: DISCONTINUED | OUTPATIENT
Start: 2023-09-22 | End: 2023-09-24 | Stop reason: HOSPADM

## 2023-09-22 RX ORDER — LIDOCAINE HYDROCHLORIDE 10 MG/ML
INJECTION, SOLUTION EPIDURAL; INFILTRATION; INTRACAUDAL; PERINEURAL
Status: DISCONTINUED | OUTPATIENT
Start: 2023-09-22 | End: 2023-09-22 | Stop reason: HOSPADM

## 2023-09-22 RX ORDER — POTASSIUM CHLORIDE 20 MEQ/1
40 TABLET, EXTENDED RELEASE ORAL
Status: DISPENSED | OUTPATIENT
Start: 2023-09-22 | End: 2023-09-22

## 2023-09-22 RX ORDER — FENTANYL CITRATE 50 UG/ML
INJECTION, SOLUTION INTRAMUSCULAR; INTRAVENOUS
Status: DISCONTINUED | OUTPATIENT
Start: 2023-09-22 | End: 2023-09-22

## 2023-09-22 RX ORDER — ROCURONIUM BROMIDE 10 MG/ML
INJECTION, SOLUTION INTRAVENOUS
Status: DISCONTINUED | OUTPATIENT
Start: 2023-09-22 | End: 2023-09-22

## 2023-09-22 RX ADMIN — DIPHENHYDRAMINE HYDROCHLORIDE 25 MG: 25 CAPSULE ORAL at 02:09

## 2023-09-22 RX ADMIN — POTASSIUM CHLORIDE 40 MEQ: 1500 TABLET, EXTENDED RELEASE ORAL at 02:09

## 2023-09-22 RX ADMIN — PROPOFOL 150 MCG/KG/MIN: 10 INJECTION, EMULSION INTRAVENOUS at 11:09

## 2023-09-22 RX ADMIN — OXYCODONE HYDROCHLORIDE 5 MG: 5 TABLET ORAL at 09:09

## 2023-09-22 RX ADMIN — PANTOPRAZOLE SODIUM 40 MG: 40 TABLET, DELAYED RELEASE ORAL at 02:09

## 2023-09-22 RX ADMIN — GANCICLOVIR SODIUM 390 MG: 500 INJECTION, POWDER, LYOPHILIZED, FOR SOLUTION INTRAVENOUS at 02:09

## 2023-09-22 RX ADMIN — GUAIFENESIN AND DEXTROMETHORPHAN 5 ML: 100; 10 SYRUP ORAL at 07:09

## 2023-09-22 RX ADMIN — SUCRALFATE 1 G: 1 TABLET ORAL at 06:09

## 2023-09-22 RX ADMIN — SODIUM BICARBONATE 1300 MG: 650 TABLET ORAL at 02:09

## 2023-09-22 RX ADMIN — MIDODRINE HYDROCHLORIDE 5 MG: 5 TABLET ORAL at 06:09

## 2023-09-22 RX ADMIN — SUGAMMADEX 400 MG: 100 INJECTION, SOLUTION INTRAVENOUS at 12:09

## 2023-09-22 RX ADMIN — OXYCODONE HYDROCHLORIDE 5 MG: 5 TABLET ORAL at 04:09

## 2023-09-22 RX ADMIN — PREDNISONE 5 MG: 5 TABLET ORAL at 02:09

## 2023-09-22 RX ADMIN — TACROLIMUS 1 MG: 1 CAPSULE ORAL at 06:09

## 2023-09-22 RX ADMIN — SUCRALFATE 1 G: 1 TABLET ORAL at 02:09

## 2023-09-22 RX ADMIN — FENTANYL CITRATE 100 MCG: 50 INJECTION, SOLUTION INTRAMUSCULAR; INTRAVENOUS at 11:09

## 2023-09-22 RX ADMIN — GUAIFENESIN AND DEXTROMETHORPHAN 5 ML: 100; 10 SYRUP ORAL at 06:09

## 2023-09-22 RX ADMIN — LIDOCAINE HYDROCHLORIDE 100 MG: 20 INJECTION, SOLUTION EPIDURAL; INFILTRATION; INTRACAUDAL; PERINEURAL at 11:09

## 2023-09-22 RX ADMIN — CEFEPIME 2 G: 2 INJECTION, POWDER, FOR SOLUTION INTRAVENOUS at 07:09

## 2023-09-22 RX ADMIN — TACROLIMUS 1 MG: 1 CAPSULE ORAL at 02:09

## 2023-09-22 RX ADMIN — SODIUM CHLORIDE, SODIUM GLUCONATE, SODIUM ACETATE, POTASSIUM CHLORIDE, MAGNESIUM CHLORIDE, SODIUM PHOSPHATE, DIBASIC, AND POTASSIUM PHOSPHATE: .53; .5; .37; .037; .03; .012; .00082 INJECTION, SOLUTION INTRAVENOUS at 11:09

## 2023-09-22 RX ADMIN — SODIUM BICARBONATE 1300 MG: 650 TABLET ORAL at 09:09

## 2023-09-22 RX ADMIN — HYDROXYZINE HYDROCHLORIDE 25 MG: 25 TABLET, FILM COATED ORAL at 09:09

## 2023-09-22 RX ADMIN — ROCURONIUM BROMIDE 50 MG: 10 INJECTION, SOLUTION INTRAVENOUS at 11:09

## 2023-09-22 RX ADMIN — GUAIFENESIN AND DEXTROMETHORPHAN 5 ML: 100; 10 SYRUP ORAL at 02:09

## 2023-09-22 RX ADMIN — PROPOFOL 150 MG: 10 INJECTION, EMULSION INTRAVENOUS at 11:09

## 2023-09-22 RX ADMIN — ONDANSETRON 4 MG: 2 INJECTION INTRAMUSCULAR; INTRAVENOUS at 12:09

## 2023-09-22 RX ADMIN — DEXAMETHASONE SODIUM PHOSPHATE 8 MG: 4 INJECTION, SOLUTION INTRAMUSCULAR; INTRAVENOUS at 12:09

## 2023-09-22 RX ADMIN — CALCITRIOL CAPSULES 0.25 MCG 0.25 MCG: 0.25 CAPSULE ORAL at 02:09

## 2023-09-22 NOTE — PLAN OF CARE
Infectious Disease Note      Ms. Oseguera is a 33F with second DDKT 1/9/2021 (01/09/2021) (first 4/15/15) on prograf and cellcept, prednisone, HTN, HTN nephropathy, Ovarian cyst, and NASIMA, who presents to [facility] as a transfer from [facility] with complaint of rash, fevers, malaise beginning 4 days prior to arrival with progressive spread. RPR negative. COVID negative. Blood cx NGTD. CXR with patchy airspace disease in lower lobes. She was started on vanc/cefepime. MPOX swab sent. Presentation suspicious for disseminated herpes zoster with possible pneumonia. Other differentials per derm - disseminated HSV, primary varicella, MPOX, molluscum. Lesions on face appear to have bacterial superinfection. HIV, GC/CT, mpox, HSV, RPR, resp panel negative. VZV PCR of lesions positive. Found to have innumerable pulmonary nodules SANIA in centrilobular and subpleural locations on CT. Also CMV positive with VL of 879. S/p bronch today.       Recommendations  Follow up fungal workup  Follow up workup sent from bronchoscopy  Discontinue cefepime  Continue gancyclovir      Infectious disease team will continue to follow.       Etelvina Ward  Infectious Disease Fellow PGY4

## 2023-09-22 NOTE — PROGRESS NOTES
[facility] - Intensive Care ([address] Medicine  Progress Note    Patient Name: Leydi Cordero  MRN: [id number]  Patient Class: IP- Inpatient   Admission Date: 9/17/2023  Length of Stay: 5 days  Attending Physician: Nancy Izquierdo MD  Primary Care Provider: Helena Zepeda PA-C        Subjective:     Principal Problem:Skin lesions        HPI:  34 y/o F with PMH of ESRD s/p Kidney transplant on immunosuppression, HTN presented to outside hospital on 09/16 complaining of fever and rash since past 3 days.  Patient was in her usual state of health 3 days ago, initially developed blisters and vesicles on her forehead and face.  Later lesions spread to her bilateral arms, chest, abdomen, bilateral inguinal region.  Lesions in the inguinal region where umbilicated.  The lesions ruptured draining serous fluid.  Patient also developed fever at the same time.  Patient also complains of cough associated with clear sputum.  And now complaints of throat pain due to persistent coughing.  Patient also felt nauseous yesterday however denied vomiting.  Patient reports no one else in the family developed similar lesions.  Patient denies shortness of breath, chest pain, abdominal pain, dysuria or hematuria, myalgia, arthralgia, exposure to sick contacts.  Patient has history of kidney transplant dating back to 2021.  On immunosuppression CellCept, tacrolimus and prednisone.  Patient reports compliance with medication      Patient received vancomycin and cefepime 1 dose in the ER at [facility].  Subsequently [place] [facility] [place].  On presentation patient was afebrile, normotensive, saturating well on room air.  Initial blood work including CBC, BMP, troponin and lactate within normal limits.  COVID, RPR negative.  Blood cultures were obtained yesterday no growth so far.  Chest x-ray suggestive of patchy consolidation in lower lung fields suggestive of pneumonia.  UA  pending.  Initiated patient on vancomycin and cefepime.  Infectious diseases, kidney transplant team, dermatology were consulted.      Overview/Hospital Course:  34 y/o F with PMH of ESRD s/p Kidney transplant on immunosuppression, HTN presented to outside hospital on 09/16 complaining of fever and rash since past 3 days.  Lesions were noted on face, bilateral arms, chest, abdomen, bilateral inguinal region.  Lesions in the inguinal region where umbilicated.  Patient received vancomycin and cefepime 1 dose in the ER at [place].  Subsequently transferred to [facility] for dermatological evaluation.  Blood cultures and UA were obtained.  Chest x-ray was suggestive of patchy consolidation bilateral lower lobes.  Started broad-spectrum antibiotics including vanc and cefepime.  Patient has history of kidney transplant in 2021, on immunosuppressive therapy.  Id, dermatology and kidney transplant team were consulted.  Id was concerned for disseminated herpes versus varicella, patient was started on acyclovir at immunosuppressive dose and swabs were collected.  As there was concern for consolidation on chest x-ray this raised the possibility of disseminated herpes with pneumonia.  CT chest and abdomen were obtained. Pulmonology consulted for bronchoscopy and completed today. CMV positive - started on gangicyclovir. VZV positive as well.        Overnight events:  No significant overnight events. Cough continues to improve. Pulm completed bronchoscopy today. Gangicylovir started, HSV and VZV +.    Review of Systems   Constitutional:  Positive for fever. Negative for chills.   Eyes:  Negative for photophobia and visual disturbance.   Respiratory:  Positive for cough. Negative for shortness of breath and wheezing.    Cardiovascular:  Negative for chest pain and palpitations.   Gastrointestinal:  Negative for abdominal pain, nausea and vomiting.   Genitourinary:  Negative for dysuria.   Musculoskeletal:  Negative for arthralgias  and myalgias.   Skin:  Positive for rash.   Neurological:  Negative for dizziness and headaches.     Objective:     Vital Signs (Most Recent):  Temp: 98.6 °F (37 °C) (09/22/23 1345)  Pulse: 75 (09/22/23 1345)  Resp: 20 (09/22/23 1345)  BP: (!) 101/58 (09/22/23 1315)  SpO2: 96 % (09/22/23 1345) Vital Signs (24h Range):  Temp:  [97.8 °F (36.6 °C)-98.7 °F (37.1 °C)] 98.6 °F (37 °C)  Pulse:  [63-84] 75  Resp:  [18-20] 20  SpO2:  [93 %-99 %] 96 %  BP: ()/(51-74) 101/58     Weight: 78 kg (171 lb 15.3 oz)  Body mass index is 33.58 kg/m².     Physical Exam  Constitutional:       Appearance: Normal appearance.   HENT:      Head:      Comments: I patient has an ulcerative lesion on the scalp, multiple palpable lesions scattered over the scalp.  Pustules which have ruptured were noted on the forehead and chin.  Similar lesions were noted in the back of the neck.  Eyes:      Extraocular Movements: Extraocular movements intact.      Conjunctiva/sclera: Conjunctivae normal.      Pupils: Pupils are equal, round, and reactive to light.   Cardiovascular:      Rate and Rhythm: Normal rate and regular rhythm.   Pulmonary:      Effort: Pulmonary effort is normal.      Breath sounds: Normal breath sounds.   Abdominal:      Comments: Umbilicated lesions which have ulcerated are noted on the abdomen   Genitourinary:     Comments: Multiple dark umbilicated lesions noted in bilateral inguinal regions measuring 0.5 x 0.5 cm, tender are located in bilateral inguinal region.  Musculoskeletal:         General: No swelling or tenderness. Normal range of motion.   Skin:     Findings: Rash present.   Neurological:      General: No focal deficit present.      Mental Status: She is alert and oriented to person, place, and time.               Significant Labs: All pertinent labs within the past 24 hours have been reviewed.    Significant Imaging: I have reviewed all pertinent imaging results/findings within the past 24  hours.      Assessment/Plan:      * Skin lesions  -Patient  developed blisters and vesicles on her forehead and face 3 days ago.  Later lesions spread to her bilateral arms, chest, abdomen, bilateral inguinal region.  - Lesions in the inguinal region where umbilicated.  The lesions ruptured draining serous fluid.   - Patient also developed fever at the same time.  -Patient reports no one else in the family developed similar lesions.  Patient denies myalgia, arthralgia, exposure to sick contacts.  Patient has history of kidney transplant dating back to 2021.  On immunosuppression CellCept, tacrolimus and prednisone.   COVID, RPR negative.  Monkey pox testing pending.  Blood cultures were obtained yesterday no growth so far, UA negative for leukocyte esterase, nitrate.  Chest x-ray suggestive of patchy consolidation in lower lung fields suggestive of pneumonia.   Patient received vancomycin and cefepime 1 dose in the ER      Plan:   -rapid progression of the lesion increases the concern for infectious origin, herpes zoster vs varicella zoster  vs disseminated zoster, continue acyclovir.  -can not lower the level of immunosuppression any further, prograf level is already below therapeutic range.  -varicella zoster PCR +  -dermatology and ID on board, CT chest, abdomen and pelvis showed Left lower lobe pneumonia with innumerable pulmonary nodules scattered throughout the lungs.  Blastomycosis, histoplasmosis, Aspergillus and toxoplasmosis testing ordered.  -pulmonology consulted for bronchoscopy, completed today    CMV (cytomegalovirus infection)  CMV positive, .      Recommendations   Change acyclovir to gancyclovir   Check VL weekly   Will need atleast 14 days treatment, can stop treatment once VL undetectable    H/O kidney transplant  Patient has history of kidney transplant from decreased tone dating back to 2021.    Patient is on immunosuppressive therapy including tacrolimus, CellCept and  prednisone.  Kidney transplant team on board.  Continue tacrolimus and prednisone.  CellCept on hold.    Hyponatremia  Patient has hyponatremia which is controlled,We will aim to correct the sodium by 4-6mEq in 24 hours. We will monitor sodium Daily. The hyponatremia is due to CKD. We will obtain the following studies:  BMP daily. he patient's sodium results have been reviewed and are listed below.  Recent Labs   Lab 09/22/23  0226          Hyperparathyroidism, tertiary    Continue calcitriol    Need for prophylactic immunotherapy        Acidosis  Home bicarbonate dose restarted.      VTE Risk Mitigation (From admission, onward)         Ordered     IP VTE HIGH RISK PATIENT  Once         09/17/23 1232     Place sequential compression device  Until discontinued         09/17/23 1232                Discharge Planning   NATALIIA: 9/26/2023     Code Status: Full Code   Is the patient medically ready for discharge?: No    Reason for patient still in hospital (select all that apply): Patient unstable  Discharge Plan A: Home                  Lou Russell PA-C  Department of Hospital Medicine   [facility] - Intensive Care ([place])

## 2023-09-22 NOTE — TRANSFER OF CARE
Anesthesia Transfer of Care Note    Patient: Leydi Cordero    Procedure(s) Performed: Procedure(s) (LRB):  Bronchoscopy (N/A)    Patient location: PACU    Anesthesia Type: general    Transport from OR: Transported from OR on 6-10 L/min O2 by face mask with adequate spontaneous ventilation    Post pain: adequate analgesia    Post assessment: no apparent anesthetic complications    Post vital signs: stable    Level of consciousness: awake and alert    Nausea/Vomiting: no nausea/vomiting    Complications: none    Transfer of care protocol was followed      Last vitals:   Visit Vitals  BP (!) 95/55 (BP Location: Left arm, Patient Position: Lying)   Pulse 76   Temp 36.6 °C (97.9 °F) (Oral)   Resp 18   Ht 5' (1.524 m)   Wt 78 kg (171 lb 15.3 oz)   LMP 09/17/2023   SpO2 96%   Breastfeeding No   BMI 33.58 kg/m²

## 2023-09-22 NOTE — PLAN OF CARE
F/U appointment is schedule for 9:20 am on 9/26/23.        Daniel Grace [facility]  Case Management  [phone]

## 2023-09-22 NOTE — SUBJECTIVE & OBJECTIVE
Overnight events:  No significant overnight events. Cough continues to improve. Pulm completed bronchoscopy today. Gangicylovir started, HSV and VZV +.    Review of Systems   Constitutional:  Positive for fever. Negative for chills.   Eyes:  Negative for photophobia and visual disturbance.   Respiratory:  Positive for cough. Negative for shortness of breath and wheezing.    Cardiovascular:  Negative for chest pain and palpitations.   Gastrointestinal:  Negative for abdominal pain, nausea and vomiting.   Genitourinary:  Negative for dysuria.   Musculoskeletal:  Negative for arthralgias and myalgias.   Skin:  Positive for rash.   Neurological:  Negative for dizziness and headaches.     Objective:     Vital Signs (Most Recent):  Temp: 98.6 °F (37 °C) (09/22/23 1345)  Pulse: 75 (09/22/23 1345)  Resp: 20 (09/22/23 1345)  BP: (!) 101/58 (09/22/23 1315)  SpO2: 96 % (09/22/23 1345) Vital Signs (24h Range):  Temp:  [97.8 °F (36.6 °C)-98.7 °F (37.1 °C)] 98.6 °F (37 °C)  Pulse:  [63-84] 75  Resp:  [18-20] 20  SpO2:  [93 %-99 %] 96 %  BP: ()/(51-74) 101/58     Weight: 78 kg (171 lb 15.3 oz)  Body mass index is 33.58 kg/m².     Physical Exam  Constitutional:       Appearance: Normal appearance.   HENT:      Head:      Comments: I patient has an ulcerative lesion on the scalp, multiple palpable lesions scattered over the scalp.  Pustules which have ruptured were noted on the forehead and chin.  Similar lesions were noted in the back of the neck.  Eyes:      Extraocular Movements: Extraocular movements intact.      Conjunctiva/sclera: Conjunctivae normal.      Pupils: Pupils are equal, round, and reactive to light.   Cardiovascular:      Rate and Rhythm: Normal rate and regular rhythm.   Pulmonary:      Effort: Pulmonary effort is normal.      Breath sounds: Normal breath sounds.   Abdominal:      Comments: Umbilicated lesions which have ulcerated are noted on the abdomen   Genitourinary:     Comments: Multiple dark  umbilicated lesions noted in bilateral inguinal regions measuring 0.5 x 0.5 cm, tender are located in bilateral inguinal region.  Musculoskeletal:         General: No swelling or tenderness. Normal range of motion.   Skin:     Findings: Rash present.   Neurological:      General: No focal deficit present.      Mental Status: She is alert and oriented to person, place, and time.               Significant Labs: All pertinent labs within the past 24 hours have been reviewed.    Significant Imaging: I have reviewed all pertinent imaging results/findings within the past 24 hours.

## 2023-09-22 NOTE — NURSING TRANSFER
Nursing Transfer Note      Reason patient is being transferred: post procedure    Transfer To: [id number]    Transfer via bed    Transfer with N95    Transported by PCT    Medicines sent: none    Any special needs or follow-up needed: routine    Chart send with patient: Yes    Notified: receiving nurse Davina    Patient reassessed at: 9/22/2023 1:00 PM

## 2023-09-22 NOTE — PLAN OF CARE
CHW met with patient/family at bedside. Patient experience rounding completed and reviewed the following.     Do you know your discharge plan? Yes or No,    If yes, what is the plan? (Home, Home Health, Rehab, SNF, LTAC, or Other)  Yes Home     Have you discussed your needs and preferences with your SW/CM? Yes or No Yes    If you are discharging home, do you have help at home? Yes or No  Yes    Do you think you will need help additional at home at discharge? Yes or No  No    Do you currently have difficulty keeping up with bills, affording medicine or buying food? Yes or No  No    Assigned SW/CM notified of any patient/family needs or concerns. Appropriate resources provided to address patient's needs.  Rachelle CHAWLA  Case Management  [phone]

## 2023-09-22 NOTE — ASSESSMENT & PLAN NOTE
Patient has hyponatremia which is controlled,We will aim to correct the sodium by 4-6mEq in 24 hours. We will monitor sodium Daily. The hyponatremia is due to CKD. We will obtain the following studies:  BMP daily. he patient's sodium results have been reviewed and are listed below.  Recent Labs   Lab 09/22/23  0226

## 2023-09-22 NOTE — PLAN OF CARE
Bronchoscopy completed today with no complications, studies pending. Pulmonary medicine will sign off. Please reach out with any questions/concerns.

## 2023-09-22 NOTE — ASSESSMENT & PLAN NOTE
-Patient  developed blisters and vesicles on her forehead and face 3 days ago.  Later lesions spread to her bilateral arms, chest, abdomen, bilateral inguinal region.  - Lesions in the inguinal region where umbilicated.  The lesions ruptured draining serous fluid.   - Patient also developed fever at the same time.  -Patient reports no one else in the family developed similar lesions.  Patient denies myalgia, arthralgia, exposure to sick contacts.  Patient has history of kidney transplant dating back to 2021.  On immunosuppression CellCept, tacrolimus and prednisone.   COVID, RPR negative.  Monkey pox testing pending.  Blood cultures were obtained yesterday no growth so far, UA negative for leukocyte esterase, nitrate.  Chest x-ray suggestive of patchy consolidation in lower lung fields suggestive of pneumonia.   Patient received vancomycin and cefepime 1 dose in the ER      Plan:   -rapid progression of the lesion increases the concern for infectious origin, herpes zoster vs varicella zoster  vs disseminated zoster, continue acyclovir.  -can not lower the level of immunosuppression any further, prograf level is already below therapeutic range.  -varicella zoster PCR +  -dermatology and ID on board, CT chest, abdomen and pelvis showed Left lower lobe pneumonia with innumerable pulmonary nodules scattered throughout the lungs.  Blastomycosis, histoplasmosis, Aspergillus and toxoplasmosis testing ordered.  -pulmonology consulted for bronchoscopy, completed today

## 2023-09-22 NOTE — PLAN OF CARE
AAO x4. Bronchoscopy today, tolerated well. Lesions all over body. Nonproductive cough. On droplet/airborne/contact precautions

## 2023-09-22 NOTE — ANESTHESIA PROCEDURE NOTES
Intubation    Date/Time: 9/22/2023 11:56 AM    Performed by: Orlando Rico MD  Authorized by: Sergio Soto Jr., MD    Intubation:     Induction:  Intravenous    Intubated:  Postinduction    Mask Ventilation:  Easy mask    Attempts:  1    Attempted By:  Resident anesthesiologist    Method of Intubation:  Video laryngoscopy    Blade:  Maynard 3    Laryngeal View Grade: Grade I - full view of cords      Difficult Airway Encountered?: No      Complications:  None    Airway Device:  Oral endotracheal tube    Airway Device Size:  8.5    Style/Cuff Inflation:  Cuffed (inflated to minimal occlusive pressure)    Tube secured:  24    Secured at:  The lips    Placement Verified By:  Capnometry    Complicating Factors:  None    Findings Post-Intubation:  BS equal bilateral and atraumatic/condition of teeth unchanged

## 2023-09-22 NOTE — PROGRESS NOTES
I have reviewed and concur with the resident's history, physical, assessment, and plan.  I have been available to the resident and involved in the medical decision making at all times during the care of this patient.    Carolyn Ayers MD  [facility]  [facility] [place] [address]

## 2023-09-22 NOTE — PROGRESS NOTES
Transplant Nephrology Progress Note    Reason for Consult:     Maintenance immunosuppression management.    Assessment and Plan:     A 32 y/o  F with PMH of ESRD s/p Kidney transplant on 01/09/2021, and HTN, [place] [facility] [place]/ 3 day hx of fever and disseminated skin rash. Pt was also noted to have Left lower lobe pneumonia with innumerable pulmonary nodules scattered throughout the lungs on her CT chest done on 09/19/23. She was started initially w/ empiric Acyclovir for suspected disseminated HSV Vs VZV. She was later tested +ve for CMV w/ 879 copies on PCR and her Acyclovir was switched to Gancyclovir per ID recs. 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 COVID negative. Blood cx NGTD  - VZV swabs of lesions pending  - Resp panel pending  - Plan for bronchoscopy w/ BAL per pulm, 09/22/23  - ID following, appreciate recs  - Derm following, appreciate recs    Subjective:     Pt was evaluated at bedside this  am, Cuban speaking  was used during the encounter. She reports feeling better since admission, her rash seems to be subsiding. Planned for bronchoscopy w/ BAL per pulm later this afternoon.     Objective:   Last 24 Hour Vital Signs:  BP  Min: 82/52  Max: 120/66  Temp  Av.2 °F (36.8 °C)  Min: 97.8 °F (36.6 °C)  Max: 98.7 °F (37.1 °C)  Pulse  Av.3  Min: 63  Max: 84  Resp  Av.1  Min: 18  Max: 20  SpO2  Av.8 %  Min: 92 %  Max: 99 %  I/O last 3 completed shifts:  In: 540 [P.O.:240; IV Piggyback:300]  Out: -     Physical Examination:  GEN: NAD, AAO x4  CV: Regular rhythm, normal rate, S1/S2 heard, no murmurs/rubs/gallops appreciated.   PULM: Symmetrical expansion, clear to auscultation bilaterally, no wheezes, scattered minimal rales bilaterally, on rm air  ABD: soft, non-distended, non-tender to palpation, normoactive BS, no organomegaly, no ascites, no rebound tenderness  NEURO: CN II-XII grossly intact bilaterally, fine touch intact distally in all extremities, 5/5 strength in upper and lower extremities  EXT: 2+ radial and dorsalis pedis pulses bilaterally, moves all extremities, no obvious deformities  MSK: Normal ROM in all extremities bilaterally, no MSK TTP  SKIN: hyperpigmented pustular lesions over the scalp, forehead, chin, abdomen and B/L inguinal regions    Laboratory:  Most Recent Data:  CBC:   Lab Results   Component Value Date    WBC 4.95 2023    HGB 10.6 (L) 2023     2023    MCV 87 2023    RDW 14.3 2023     BMP:   Lab Results   Component Value Date     2023    K 3.0 (L) 2023     2023    CO2 24 2023    GLU 83 2023    BUN 10 2023    CREATININE 0.8 2023    CALCIUM 8.4 (L) 2023    MG 1.7 2023    PHOS 3.4 2023     LFTs:   Lab Results   Component Value Date    PROT 7.2 2023    ALBUMIN 3.0 (L) 2023    AST 44 (H) 2023    ALKPHOS 91 2023    ALT 40  09/17/2023     Anemia:   Lab Results   Component Value Date    IRON 71 01/11/2023    TIBC 337 01/11/2023    FERRITIN 358 (H) 01/14/2021    [id number] 290 01/11/2023    FOLATE 5.7 01/20/2021     Urinalysis:   Lab Results   Component Value Date    LABURIN (A) 01/18/2023     ESCHERICHIA COLI  10,000 - 49,999 cfu/ml  No other significant isolate      COLORU Colorless (A) 09/18/2023    SPECGRAV 1.010 09/18/2023    NITRITE Negative 09/18/2023    KETONESU Negative 09/18/2023    UROBILINOGEN Negative 08/13/2023    WBCUA 3 09/18/2023       Trended Lab Data:  Recent Labs   Lab 09/16/23  2248 09/17/23  1519 09/18/23  0517 09/20/23  0544 09/21/23  0546 09/22/23  0226   WBC 5.19 6.65   < > 4.40 5.37 4.95    170   < > 170 181 163   MCV 87 89   < > 88 89 87   RDW 14.6* 14.8*   < > 14.8* 14.6* 14.3    133*   < > 134* 135* 137   K 3.7 3.5   < > 3.7 3.4* 3.0*   * 109   < > 105 105 105   CO2 13* 17*   < > 22* 23 24   BUN 10 8   < > 10 12 10   CREATININE 1.1 1.1   < > 0.9 0.9 0.8   GLU 93 114*   < > 78 78 83   CALCIUM 8.8 9.0   < > 8.3* 8.5* 8.4*   PROT 6.8 7.2  --   --   --   --    ALBUMIN 3.1* 3.0*  --   --   --   --    AST 39 44*  --   --   --   --    ALKPHOS 86 91  --   --   --   --    ALT 38 40  --   --   --   --     < > = values in this interval not displayed.       Other Relevant Results:  Radiology:    CT chest/Abd/pelvis w/ contrast 09/19/23:  Impression:     Postoperative changes of left lower quadrant renal allograft.     Left lower lobe pneumonia with innumerable pulmonary nodules scattered throughout the lungs, new compared to August 3 suggestive of infectious/inflammatory process or less likely septic emboli.            Thank you for allowing us to participate in the care of this patient. Please contact me if you have any questions regarding this consult.    Patrick Dickson MD  Transplant Nephrology, -

## 2023-09-23 VITALS
OXYGEN SATURATION: 95 % | HEART RATE: 59 BPM | DIASTOLIC BLOOD PRESSURE: 57 MMHG | BODY MASS INDEX: 33.76 KG/M2 | RESPIRATION RATE: 20 BRPM | TEMPERATURE: 98 F | HEIGHT: 60 IN | SYSTOLIC BLOOD PRESSURE: 90 MMHG | WEIGHT: 171.94 LBS

## 2023-09-23 PROBLEM — B01.9 DISSEMINATED VARICELLA: Status: ACTIVE | Noted: 2023-09-23

## 2023-09-23 PROBLEM — L98.9 SKIN LESIONS: Status: RESOLVED | Noted: 2023-09-17 | Resolved: 2023-09-23

## 2023-09-23 PROBLEM — R91.8 PULMONARY NODULES: Status: RESOLVED | Noted: 2023-09-20 | Resolved: 2023-09-23

## 2023-09-23 LAB
ANION GAP SERPL CALC-SCNC: 9 MMOL/L (ref 8–16)
ANISOCYTOSIS BLD QL SMEAR: SLIGHT
BASOPHILS # BLD AUTO: 0.02 K/UL (ref 0–0.2)
BASOPHILS NFR BLD: 0.4 % (ref 0–1.9)
BUN SERPL-MCNC: 9 MG/DL (ref 6–20)
CALCIUM SERPL-MCNC: 8.7 MG/DL (ref 8.7–10.5)
CHLORIDE SERPL-SCNC: 105 MMOL/L (ref 95–110)
CO2 SERPL-SCNC: 23 MMOL/L (ref 23–29)
CREAT SERPL-MCNC: 0.8 MG/DL (ref 0.5–1.4)
DIFFERENTIAL METHOD: ABNORMAL
EOSINOPHIL # BLD AUTO: 0 K/UL (ref 0–0.5)
EOSINOPHIL NFR BLD: 0.2 % (ref 0–8)
ERYTHROCYTE [DISTWIDTH] IN BLOOD BY AUTOMATED COUNT: 14.1 % (ref 11.5–14.5)
EST. GFR  (NO RACE VARIABLE): >60 ML/MIN/1.73 M^2
GLUCOSE SERPL-MCNC: 97 MG/DL (ref 70–110)
HCT VFR BLD AUTO: 33.7 % (ref 37–48.5)
HGB BLD-MCNC: 10.6 G/DL (ref 12–16)
HISTOPLASMA/BLASTOMYCES AG VALUE: NOT DETECTED NG/ML
HISTOPLASMA/BLASTOMYCES RESULT: NOT DETECTED
IMM GRANULOCYTES # BLD AUTO: 0.08 K/UL (ref 0–0.04)
IMM GRANULOCYTES NFR BLD AUTO: 1.4 % (ref 0–0.5)
LYMPHOCYTES # BLD AUTO: 0.7 K/UL (ref 1–4.8)
LYMPHOCYTES NFR BLD: 12.1 % (ref 18–48)
MCH RBC QN AUTO: 28.1 PG (ref 27–31)
MCHC RBC AUTO-ENTMCNC: 31.5 G/DL (ref 32–36)
MCV RBC AUTO: 89 FL (ref 82–98)
MONOCYTES # BLD AUTO: 0.3 K/UL (ref 0.3–1)
MONOCYTES NFR BLD: 6 % (ref 4–15)
NEUTROPHILS # BLD AUTO: 4.6 K/UL (ref 1.8–7.7)
NEUTROPHILS NFR BLD: 79.9 % (ref 38–73)
NRBC BLD-RTO: 0 /100 WBC
PLATELET # BLD AUTO: 204 K/UL (ref 150–450)
PLATELET BLD QL SMEAR: ABNORMAL
PMV BLD AUTO: 10.8 FL (ref 9.2–12.9)
POTASSIUM SERPL-SCNC: 4.4 MMOL/L (ref 3.5–5.1)
RBC # BLD AUTO: 3.77 M/UL (ref 4–5.4)
SODIUM SERPL-SCNC: 137 MMOL/L (ref 136–145)
T GONDII IGM SER-ACNC: <3 AU/ML
TACROLIMUS BLD-MCNC: 3 NG/ML (ref 5–15)
WBC # BLD AUTO: 5.7 K/UL (ref 3.9–12.7)

## 2023-09-23 PROCEDURE — 63600175 PHARM REV CODE 636 W HCPCS: Performed by: INTERNAL MEDICINE

## 2023-09-23 PROCEDURE — 25000003 PHARM REV CODE 250: Performed by: HOSPITALIST

## 2023-09-23 PROCEDURE — 25000003 PHARM REV CODE 250: Performed by: INTERNAL MEDICINE

## 2023-09-23 PROCEDURE — 25000003 PHARM REV CODE 250: Performed by: STUDENT IN AN ORGANIZED HEALTH CARE EDUCATION/TRAINING PROGRAM

## 2023-09-23 PROCEDURE — 12000002 HC ACUTE/MED SURGE SEMI-PRIVATE ROOM

## 2023-09-23 PROCEDURE — 99233 SBSQ HOSP IP/OBS HIGH 50: CPT | Mod: ,,, | Performed by: STUDENT IN AN ORGANIZED HEALTH CARE EDUCATION/TRAINING PROGRAM

## 2023-09-23 PROCEDURE — 99239 HOSP IP/OBS DSCHRG MGMT >30: CPT | Mod: ,,, | Performed by: INTERNAL MEDICINE

## 2023-09-23 PROCEDURE — 99233 PR SUBSEQUENT HOSPITAL CARE,LEVL III: ICD-10-PCS | Mod: ,,, | Performed by: STUDENT IN AN ORGANIZED HEALTH CARE EDUCATION/TRAINING PROGRAM

## 2023-09-23 PROCEDURE — 99233 SBSQ HOSP IP/OBS HIGH 50: CPT | Mod: ,,, | Performed by: INTERNAL MEDICINE

## 2023-09-23 PROCEDURE — 80197 ASSAY OF TACROLIMUS: CPT | Performed by: INTERNAL MEDICINE

## 2023-09-23 PROCEDURE — 85025 COMPLETE CBC W/AUTO DIFF WBC: CPT | Performed by: STUDENT IN AN ORGANIZED HEALTH CARE EDUCATION/TRAINING PROGRAM

## 2023-09-23 PROCEDURE — 99233 PR SUBSEQUENT HOSPITAL CARE,LEVL III: ICD-10-PCS | Mod: ,,, | Performed by: INTERNAL MEDICINE

## 2023-09-23 PROCEDURE — 99239 PR HOSPITAL DISCHARGE DAY,>30 MIN: ICD-10-PCS | Mod: ,,, | Performed by: INTERNAL MEDICINE

## 2023-09-23 PROCEDURE — 36415 COLL VENOUS BLD VENIPUNCTURE: CPT | Performed by: STUDENT IN AN ORGANIZED HEALTH CARE EDUCATION/TRAINING PROGRAM

## 2023-09-23 PROCEDURE — 80048 BASIC METABOLIC PNL TOTAL CA: CPT | Performed by: STUDENT IN AN ORGANIZED HEALTH CARE EDUCATION/TRAINING PROGRAM

## 2023-09-23 PROCEDURE — 27000207 HC ISOLATION

## 2023-09-23 RX ORDER — MIDODRINE HYDROCHLORIDE 5 MG/1
10 TABLET ORAL ONCE
Status: COMPLETED | OUTPATIENT
Start: 2023-09-23 | End: 2023-09-23

## 2023-09-23 RX ORDER — VALGANCICLOVIR 450 MG/1
900 TABLET, FILM COATED ORAL DAILY
Qty: 60 TABLET | Refills: 0 | Status: SHIPPED | OUTPATIENT
Start: 2023-09-23 | End: 2023-10-04

## 2023-09-23 RX ORDER — VALGANCICLOVIR 450 MG/1
900 TABLET, FILM COATED ORAL DAILY
Qty: 56 TABLET | Refills: 0 | Status: SHIPPED | OUTPATIENT
Start: 2023-09-23 | End: 2023-09-23 | Stop reason: SDUPTHER

## 2023-09-23 RX ORDER — MIDODRINE HYDROCHLORIDE 5 MG/1
10 TABLET ORAL 2 TIMES DAILY WITH MEALS
Status: DISCONTINUED | OUTPATIENT
Start: 2023-09-23 | End: 2023-09-24 | Stop reason: HOSPADM

## 2023-09-23 RX ORDER — TACROLIMUS 1 MG/1
1 CAPSULE ORAL EVERY 12 HOURS
Qty: 60 CAPSULE | Refills: 3 | Status: ACTIVE | OUTPATIENT
Start: 2023-09-23 | End: 2023-11-30 | Stop reason: SDUPTHER

## 2023-09-23 RX ORDER — MULTIVIT,CALC,MINS/IRON/FOLIC 9MG-400MCG
1 TABLET ORAL DAILY
Qty: 30 TABLET | Refills: 2 | Status: SHIPPED | OUTPATIENT
Start: 2023-09-23 | End: 2024-03-08

## 2023-09-23 RX ORDER — GUAIFENESIN/DEXTROMETHORPHAN 100-10MG/5
5 SYRUP ORAL EVERY 6 HOURS
Qty: 237 ML | Refills: 0 | Status: SHIPPED | OUTPATIENT
Start: 2023-09-23 | End: 2023-10-07

## 2023-09-23 RX ORDER — MIDODRINE HYDROCHLORIDE 10 MG/1
10 TABLET ORAL 2 TIMES DAILY WITH MEALS
Qty: 60 TABLET | Refills: 2 | Status: SHIPPED | OUTPATIENT
Start: 2023-09-23

## 2023-09-23 RX ORDER — MYCOPHENOLATE MOFETIL 250 MG/1
1000 CAPSULE ORAL 2 TIMES DAILY
Qty: 240 CAPSULE | Refills: 2 | Status: SHIPPED | OUTPATIENT
Start: 2023-10-21 | End: 2023-10-18 | Stop reason: SINTOL

## 2023-09-23 RX ORDER — SODIUM BICARBONATE 650 MG/1
1300 TABLET ORAL 3 TIMES DAILY
Qty: 180 TABLET | Refills: 2 | Status: SHIPPED | OUTPATIENT
Start: 2023-09-23 | End: 2023-11-30 | Stop reason: SDUPTHER

## 2023-09-23 RX ADMIN — CALCITRIOL CAPSULES 0.25 MCG 0.25 MCG: 0.25 CAPSULE ORAL at 08:09

## 2023-09-23 RX ADMIN — GUAIFENESIN AND DEXTROMETHORPHAN 5 ML: 100; 10 SYRUP ORAL at 05:09

## 2023-09-23 RX ADMIN — MIDODRINE HYDROCHLORIDE 10 MG: 5 TABLET ORAL at 01:09

## 2023-09-23 RX ADMIN — DIPHENHYDRAMINE HYDROCHLORIDE 25 MG: 25 CAPSULE ORAL at 03:09

## 2023-09-23 RX ADMIN — GANCICLOVIR SODIUM 390 MG: 500 INJECTION, POWDER, LYOPHILIZED, FOR SOLUTION INTRAVENOUS at 03:09

## 2023-09-23 RX ADMIN — SUCRALFATE 1 G: 1 TABLET ORAL at 05:09

## 2023-09-23 RX ADMIN — TACROLIMUS 1 MG: 1 CAPSULE ORAL at 08:09

## 2023-09-23 RX ADMIN — GUAIFENESIN AND DEXTROMETHORPHAN 5 ML: 100; 10 SYRUP ORAL at 01:09

## 2023-09-23 RX ADMIN — SUCRALFATE 1 G: 1 TABLET ORAL at 08:09

## 2023-09-23 RX ADMIN — SODIUM BICARBONATE 1300 MG: 650 TABLET ORAL at 08:09

## 2023-09-23 RX ADMIN — MIDODRINE HYDROCHLORIDE 5 MG: 5 TABLET ORAL at 08:09

## 2023-09-23 RX ADMIN — SODIUM CHLORIDE 500 ML: 9 INJECTION, SOLUTION INTRAVENOUS at 05:09

## 2023-09-23 RX ADMIN — GUAIFENESIN AND DEXTROMETHORPHAN 5 ML: 100; 10 SYRUP ORAL at 06:09

## 2023-09-23 RX ADMIN — MIDODRINE HYDROCHLORIDE 10 MG: 5 TABLET ORAL at 05:09

## 2023-09-23 RX ADMIN — PANTOPRAZOLE SODIUM 40 MG: 40 TABLET, DELAYED RELEASE ORAL at 08:09

## 2023-09-23 RX ADMIN — TACROLIMUS 1 MG: 1 CAPSULE ORAL at 05:09

## 2023-09-23 RX ADMIN — SUCRALFATE 1 G: 1 TABLET ORAL at 01:09

## 2023-09-23 RX ADMIN — PREDNISONE 5 MG: 5 TABLET ORAL at 08:09

## 2023-09-23 RX ADMIN — GUAIFENESIN AND DEXTROMETHORPHAN 5 ML: 100; 10 SYRUP ORAL at 12:09

## 2023-09-23 RX ADMIN — OXYCODONE HYDROCHLORIDE 5 MG: 5 TABLET ORAL at 03:09

## 2023-09-23 NOTE — SUBJECTIVE & OBJECTIVE
Interval History: No fevers overnight  Most skin lesions have crusted over  No new skin lesions  SOB and cough have resolved    Translation services used for encounter.    Review of Systems   Constitutional:  Negative for chills, diaphoresis and fever.   HENT:  Negative for rhinorrhea and sore throat.    Respiratory:  Negative for cough and shortness of breath.    Cardiovascular:  Negative for chest pain and leg swelling.   Gastrointestinal:  Negative for abdominal pain, diarrhea, nausea and vomiting.   Genitourinary:  Negative for dysuria and hematuria.   Musculoskeletal:  Negative for arthralgias and myalgias.   Skin:  Positive for rash.   Neurological:  Negative for headaches.     Objective:     Vital Signs (Most Recent):  Temp: 97.9 °F (36.6 °C) (09/23/23 1612)  Pulse: (!) 59 (09/23/23 1612)  Resp: 20 (09/23/23 1612)  BP: (!) 90/57 (09/23/23 1612)  SpO2: 95 % (09/23/23 1612) Vital Signs (24h Range):  Temp:  [97.6 °F (36.4 °C)-98.8 °F (37.1 °C)] 97.9 °F (36.6 °C)  Pulse:  [54-65] 59  Resp:  [16-20] 20  SpO2:  [93 %-95 %] 95 %  BP: ()/(50-68) 90/57     Weight: 78 kg (171 lb 15.3 oz)  Body mass index is 33.58 kg/m².    Estimated Creatinine Clearance: 93.2 mL/min (based on SCr of 0.8 mg/dL).     Physical Exam  Vitals reviewed.   Constitutional:       General: She is not in acute distress.     Appearance: She is well-developed. She is not diaphoretic.   HENT:      Head: Normocephalic and atraumatic.      Nose: Nose normal.   Eyes:      Conjunctiva/sclera: Conjunctivae normal.   Pulmonary:      Effort: Pulmonary effort is normal. No respiratory distress.   Abdominal:      General: Abdomen is flat. There is no distension.   Musculoskeletal:      Cervical back: Normal range of motion.      Right lower leg: No edema.      Left lower leg: No edema.   Skin:     General: Skin is warm and dry.      Findings: No erythema or rash.      Comments: Multiple crusted lesions on face and arms bilaterally   Neurological:       Mental Status: She is alert.   Psychiatric:         Behavior: Behavior normal.          Significant Labs: All pertinent labs within the past 24 hours have been reviewed.    Significant Imaging: I have reviewed all pertinent imaging results/findings within the past 24 hours.

## 2023-09-23 NOTE — ASSESSMENT & PLAN NOTE
33F with second DDKT 1/9/2021 (01/09/2021) (first 4/15/15) on prograf and cellcept, prednisone, HTN, HTN nephropathy, Ovarian cyst, and NASIMA, who presents to [facility] [place] as a transfer from [facility] with complaint of rash, fevers, malaise beginning 4 days prior to arrival with progressive spread. RPR negative. COVID negative. Blood cx NGTD. CXR with patchy airspace disease in lower lobes. She was started on vanc/cefepime. MPOX swab sent. Presentation suspicious for disseminated herpes zoster with possible pneumonia. Other differentials per derm - disseminated HSV, primary varicella, MPOX, molluscum. Lesions on face appear to have bacterial superinfection. HIV, GC/CT, mpox, HSV negative.     Recommendations

## 2023-09-23 NOTE — SUBJECTIVE & OBJECTIVE
Subjective:   History of Present Illness:  34 y/o F with PMH of ESRD s/p Kidney transplant on immunosuppression, HTN presented to outside hospital on 09/16 complaining of fever and rash since past 3 days.  Lesions were noted on face, bilateral arms, chest, abdomen, bilateral inguinal region.  Lesions in the inguinal region where umbilicated.  Patient received vancomycin and cefepime 1 dose in the ER at [place].  Subsequently transferred to [facility] [place] for dermatological evaluation.  Blood cultures and UA were obtained.  Chest x-ray was suggestive of patchy consolidation bilateral lower lobes.  Started broad-spectrum antibiotics including vanc and cefepime.  Patient has history of kidney transplant in 2021, on immunosuppressive therapy.  Id, dermatology and kidney transplant team were consulted.  Id was concerned for disseminated herpes versus varicella, patient was started on acyclovir at immunosuppressive dose and swabs were collected.    KTM consulted for management of immunosuppression.    Ms. Oumar Cordero is a 33 y.o. year old female who is status post Kidney Transplant - 1/9/2021  (#2).    Her maintenance immunosuppression consists of:   Immunosuppressants (From admission, onward)      Start     Stop Route Frequency Ordered    09/17/23 1800  tacrolimus capsule 1 mg         -- Oral 2 times daily 09/17/23 1450            Hospital Course:  Patient is s/p kidney transplant #1 on 4/15/2-15 and #2 on 1/9/2021. KTM consulted to assist w management of IS.    Interval History: pt reports rash the same, pruritic. S/p skin bx per derm. Path pending. Started Acyclovir 10 mg/kg per ID 9/18/23 over 2 hours. HSV, VZR and MPOX. She notes worsening cough, white secretions. Cr at baseline. She is making good UOP. Plan to cont Prograf 1mg bid and Pred 5 mg. Potassium replaced. VSS. Monitor.    Interval History:  Used   Doing well today. Says lesions feeling much better. Was able to tell me that she had chicken pox and  another virus and that her kidney function is good.    Past Medical, Surgical, Family, and Social History:   Unchanged from H&P.    Scheduled Meds:   calcitRIOL  0.25 mcg Oral Daily    dextromethorphan-guaiFENesin  mg/5 ml  5 mL Oral Q6H    ganciclovir (CYTOVENE) 390 mg in sodium chloride 0.9% 100 mL IVPB  5 mg/kg Intravenous Q12H    midodrine  10 mg Oral BID WM    pantoprazole  40 mg Oral Daily    predniSONE  5 mg Oral Daily    sodium bicarbonate  1,300 mg Oral BID    sucralfate  1 g Oral QID    tacrolimus  1 mg Oral BID     Continuous Infusions:  PRN Meds:acetaminophen, acetaminophen, albuterol, dextrose 10%, dextrose 10%, diphenhydrAMINE, docusate sodium, EPINEPHrine (PF), glucagon (human recombinant), glucose, glucose, haloperidol lactate, HYDROmorphone, hydrOXYzine HCL, morphine, naloxone, oxyCODONE, oxyCODONE, sodium chloride 0.9%, sodium chloride 0.9%    Intake/Output - Last 3 Shifts         09/21 0700 09/22 0659 09/22 0700 09/23 0659 09/23 0700 09/24 0659    P.O.       I.V. (mL/kg)  100 (1.3)     IV Piggyback 100      Total Intake(mL/kg) 100 (1.3) 100 (1.3)     Net +100 +100                     Review of Systems   Constitutional: Negative.    HENT: Negative.     Eyes: Negative.    Respiratory:  Positive for cough.    Cardiovascular: Negative.    Gastrointestinal: Negative.    Endocrine: Negative.    Genitourinary: Negative.    Musculoskeletal: Negative.    Skin:  Positive for rash.   Neurological: Negative.    Psychiatric/Behavioral: Negative.        Objective:     Vital Signs (Most Recent):  Temp: 97.9 °F (36.6 °C) (09/23/23 1203)  Pulse: 61 (09/23/23 1203)  Resp: 16 (09/23/23 1203)  BP: 109/68 (09/23/23 1203)  SpO2: 95 % (09/23/23 1203) Vital Signs (24h Range):  Temp:  [97.6 °F (36.4 °C)-98.8 °F (37.1 °C)] 97.9 °F (36.6 °C)  Pulse:  [54-75] 61  Resp:  [16-20] 16  SpO2:  [92 %-96 %] 95 %  BP: ()/(50-68) 109/68     Weight: 78 kg (171 lb 15.3 oz)  Height: 5' (152.4 cm)  Body mass index is 33.58  kg/m².     Physical Exam  Constitutional:       General: She is not in acute distress.     Appearance: She is not ill-appearing.   Skin:     Comments: Crusted plaques in various stages of healing on face, arms, hands   Neurological:      Mental Status: She is alert.          Laboratory:  Labs within the past 24 hours have been reviewed.    Diagnostic Results:  None

## 2023-09-23 NOTE — PROGRESS NOTES
[facility] ([place] [address])  Infectious Disease  Progress Note    Patient Name: Leydi Cordero  MRN: [id number]  Admission Date: 2023  Length of Stay: 6 days  Attending Physician: Quintin Stout MD  Primary Care Provider: Helena Zepeda PA-C    Isolation Status: Airborne and Contact and Droplet  Assessment/Plan:          Pulmonary  Pulmonary nodules  Suspect related to VZV. Thus far, serum fungitell, aspergillus antigen, BAL cultures with no growth    Follow-up fungal biomarkers, BAL studies  Will follow-up pending studies in clinic    ID  CMV (cytomegalovirus infection)  See disseminated VZV    Disseminated varicella  33-year-old female with history of kidney transplant #2  on tacro presents with disseminated VZV, likely VZV pneumonia as well. 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    Recommendations:  - Continue ganciclovir 5 mg/kg IV q12 hours while inpatient  - On discharge, transition to valganciclovir 900 mg PO BID - please give 30 day supply  - qweekly CBC with diff, CMP, CMV VL  - Will arrange follow-up in ID in 2 weeks        50 minutes of total time spent on the encounter, which includes face to face time and non-face to face time preparing to see the patient (eg, review of tests), obtaining and reviewing separately obtained history, documenting clinical information in the electronic or other health record, independently interpreting results (not separately reported) and communicating results to the patient/family/caregiver, and care coordination (not separately reported).       Thank you for your consult. I will sign off. Please contact us if you have any additional questions.    Stacie Khan MD  Infectious Disease  [facility] ([place] [address])    Subjective:     Principal Problem:Skin lesions    HPI: 33 year old female with history of second -donor kidney transplant 2021 (2021) (first 4/15/15) on prograf and cellcept, prednisone,   Hypertension, Hypertensive nephropathy, Ovarian cyst, and Sleep apnea who presents to [facility] as a transfer from [facility] with complaint of rash, fevers, malaise. She reports that 4 days prior to admission she began to develop lesion on face on chin, throughout the day she began to have spreading of these lesions to her back, chest, abdomen, hands, inguinal region. She describes them as itchy and painful. She has noticed some purulent drainage from some of the lesions. Has not had a rash like this before. After developing rash she noticed that she began to feel feverish. She additionally complains of headaches and ear pain. No tinnitus, no decreased hearing, no visual changes. She complains of shortness of breath and cough productive of yellow sputum. She complains of generalized weakness, malaise, myalgias. Complains of sore throat, mild pain with swallowing. She complains of crampy lower abdominal pain that she attributes to starting menses 2 days ago. No N/V. No recent sick contacts. No similar presentations. She does not think had chicken pox as a child and confirmed this with mother. Thinks had all childhood vaccines, not sure about varicella vaccine.    Was seen over summer for persistent diarrhea with weight loss, worsened with eating and discussion of possible GI evaluation for scope. At last appt with transplant nephro these sx had resolved.   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 HR HPV+ 16    From [place]. Living with roommate in [place] in apt. No recent travel. No pets or animal exposures. No known bug bites. No new soaps/detergents/perfumes. Not exposed to children. Sexually active with male partners - last encounter 4 months ago, monogamous.     Interval History: No fevers overnight  Most skin lesions have crusted over  No new skin lesions  SOB and cough have resolved    Translation services used for encounter.    Review of Systems   Constitutional:  Negative for chills,  diaphoresis and fever.   HENT:  Negative for rhinorrhea and sore throat.    Respiratory:  Negative for cough and shortness of breath.    Cardiovascular:  Negative for chest pain and leg swelling.   Gastrointestinal:  Negative for abdominal pain, diarrhea, nausea and vomiting.   Genitourinary:  Negative for dysuria and hematuria.   Musculoskeletal:  Negative for arthralgias and myalgias.   Skin:  Positive for rash.   Neurological:  Negative for headaches.     Objective:     Vital Signs (Most Recent):  Temp: 97.9 °F (36.6 °C) (09/23/23 1612)  Pulse: (!) 59 (09/23/23 1612)  Resp: 20 (09/23/23 1612)  BP: (!) 90/57 (09/23/23 1612)  SpO2: 95 % (09/23/23 1612) Vital Signs (24h Range):  Temp:  [97.6 °F (36.4 °C)-98.8 °F (37.1 °C)] 97.9 °F (36.6 °C)  Pulse:  [54-65] 59  Resp:  [16-20] 20  SpO2:  [93 %-95 %] 95 %  BP: ()/(50-68) 90/57     Weight: 78 kg (171 lb 15.3 oz)  Body mass index is 33.58 kg/m².    Estimated Creatinine Clearance: 93.2 mL/min (based on SCr of 0.8 mg/dL).     Physical Exam  Vitals reviewed.   Constitutional:       General: She is not in acute distress.     Appearance: She is well-developed. She is not diaphoretic.   HENT:      Head: Normocephalic and atraumatic.      Nose: Nose normal.   Eyes:      Conjunctiva/sclera: Conjunctivae normal.   Pulmonary:      Effort: Pulmonary effort is normal. No respiratory distress.   Abdominal:      General: Abdomen is flat. There is no distension.   Musculoskeletal:      Cervical back: Normal range of motion.      Right lower leg: No edema.      Left lower leg: No edema.   Skin:     General: Skin is warm and dry.      Findings: No erythema or rash.      Comments: Multiple crusted lesions on face and arms bilaterally   Neurological:      Mental Status: She is alert.   Psychiatric:         Behavior: Behavior normal.          Significant Labs: All pertinent labs within the past 24 hours have been reviewed.    Significant Imaging: I have reviewed all pertinent imaging  results/findings within the past 24 hours.

## 2023-09-23 NOTE — DISCHARGE INSTRUCTIONS
Bronchoscopy results:    - Bilateral infiltrate   - Diffuse lung disease   - Bilateral viral pneumonia   - Immune compromised viral pneumonia   - Abnormal CT scan of chest   - The airway examination of the right lung was normal.   - Mucopurulent, thick secretions were found in the left lower lobe.   - Bronchoalveolar lavage was performed.

## 2023-09-23 NOTE — ASSESSMENT & PLAN NOTE
Suspect related to VZV. Thus far, serum fungitell, aspergillus antigen, BAL cultures with no growth    Follow-up fungal biomarkers, BAL studies  Will follow-up pending studies in clinic

## 2023-09-23 NOTE — PROGRESS NOTES
[facility] ([facility]  Kidney Transplant  Progress Note      Reason for Follow-up: Reassessment of Kidney Transplant - 1/9/2021  (#2) recipient and management of immunosuppression.     ORGAN:  RIGHT KIDNEY   Donor Type:  Donation after Brain Death         Subjective:   History of Present Illness:  34 y/o F with PMH of ESRD s/p Kidney transplant on immunosuppression, HTN presented to outside hospital on 09/16 complaining of fever and rash since past 3 days.  Lesions were noted on face, bilateral arms, chest, abdomen, bilateral inguinal region.  Lesions in the inguinal region where umbilicated.  Patient received vancomycin and cefepime 1 dose in the ER at [place].  Subsequently transferred to [facility] for dermatological evaluation.  Blood cultures and UA were obtained.  Chest x-ray was suggestive of patchy consolidation bilateral lower lobes.  Started broad-spectrum antibiotics including vanc and cefepime.  Patient has history of kidney transplant in 2021, on immunosuppressive therapy.  Id, dermatology and kidney transplant team were consulted.  Id was concerned for disseminated herpes versus varicella, patient was started on acyclovir at immunosuppressive dose and swabs were collected.    KTM consulted for management of immunosuppression.    Ms. Oumar Cordero is a 33 y.o. year old female who is status post Kidney Transplant - 1/9/2021  (#2).    Her maintenance immunosuppression consists of:   Immunosuppressants (From admission, onward)      Start     Stop Route Frequency Ordered    09/17/23 1800  tacrolimus capsule 1 mg         -- Oral 2 times daily 09/17/23 1450            Hospital Course:  Patient is s/p kidney transplant #1 on 4/15/2-15 and #2 on 1/9/2021. KTM consulted to assist w management of IS.    Interval History: pt reports rash the same, pruritic. S/p skin bx per derm. Path pending. Started Acyclovir 10 mg/kg per ID 9/18/23 over 2 hours. HSV, VZR and MPOX. She notes worsening cough,  white secretions. Cr at baseline. She is making good UOP. Plan to cont Prograf 1mg bid and Pred 5 mg. Potassium replaced. VSS. Monitor.    Interval History:  Used   Doing well today. Says lesions feeling much better. Was able to tell me that she had chicken pox and another virus and that her kidney function is good.    Past Medical, Surgical, Family, and Social History:   Unchanged from H&P.    Scheduled Meds:   calcitRIOL  0.25 mcg Oral Daily    dextromethorphan-guaiFENesin  mg/5 ml  5 mL Oral Q6H    ganciclovir (CYTOVENE) 390 mg in sodium chloride 0.9% 100 mL IVPB  5 mg/kg Intravenous Q12H    midodrine  10 mg Oral BID WM    pantoprazole  40 mg Oral Daily    predniSONE  5 mg Oral Daily    sodium bicarbonate  1,300 mg Oral BID    sucralfate  1 g Oral QID    tacrolimus  1 mg Oral BID     Continuous Infusions:  PRN Meds:acetaminophen, acetaminophen, albuterol, dextrose 10%, dextrose 10%, diphenhydrAMINE, docusate sodium, EPINEPHrine (PF), glucagon (human recombinant), glucose, glucose, haloperidol lactate, HYDROmorphone, hydrOXYzine HCL, morphine, naloxone, oxyCODONE, oxyCODONE, sodium chloride 0.9%, sodium chloride 0.9%    Intake/Output - Last 3 Shifts         09/21 0700  09/22 0659 09/22 0700 09/23 0659 09/23 0700  09/24 0659    P.O.       I.V. (mL/kg)  100 (1.3)     IV Piggyback 100      Total Intake(mL/kg) 100 (1.3) 100 (1.3)     Net +100 +100                     Review of Systems   Constitutional: Negative.    HENT: Negative.     Eyes: Negative.    Respiratory:  Positive for cough.    Cardiovascular: Negative.    Gastrointestinal: Negative.    Endocrine: Negative.    Genitourinary: Negative.    Musculoskeletal: Negative.    Skin:  Positive for rash.   Neurological: Negative.    Psychiatric/Behavioral: Negative.        Objective:     Vital Signs (Most Recent):  Temp: 97.9 °F (36.6 °C) (09/23/23 1203)  Pulse: 61 (09/23/23 1203)  Resp: 16 (09/23/23 1203)  BP: 109/68 (09/23/23 1203)  SpO2:  95 % (09/23/23 1203) Vital Signs (24h Range):  Temp:  [97.6 °F (36.4 °C)-98.8 °F (37.1 °C)] 97.9 °F (36.6 °C)  Pulse:  [54-75] 61  Resp:  [16-20] 16  SpO2:  [92 %-96 %] 95 %  BP: ()/(50-68) 109/68     Weight: 78 kg (171 lb 15.3 oz)  Height: 5' (152.4 cm)  Body mass index is 33.58 kg/m².     Physical Exam  Constitutional:       General: She is not in acute distress.     Appearance: She is not ill-appearing.   Skin:     Comments: Crusted plaques in various stages of healing on face, arms, hands   Neurological:      Mental Status: She is alert.          Laboratory:  Labs within the past 24 hours have been reviewed.    Diagnostic Results:  None    Assessment/Plan:     * Skin lesions  - per derm  - skin bx 9/18/23  - started Acyclovir 9/18 per ID switch to gancyclovir for cmv    CMV (cytomegalovirus infection)  Cont gancyclovir      H/O kidney transplant  - s/p kidney transplant #1 4/15/2015 and #2 1/9/2021  - good renal allograft function  - cont to monitor cr      Hyperparathyroidism, tertiary  Cont calcitriol    Immunodeficiency due to long term immunosuppressive drug therapy  - Chronic Prophylactic Immunosuppression- cont to check prograf level daily.  Assess for toxicity and adjust level as needed  - no MMF  - cont same dose            Bob Pedro Jr., MD  Kidney Transplant  [facility] - [facility] ([place])

## 2023-09-23 NOTE — ASSESSMENT & PLAN NOTE
33-year-old female with history of kidney transplant #2 2021 on tacro presents with disseminated VZV, likely VZV pneumonia as well. 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    Recommendations:  - Continue ganciclovir 5 mg/kg IV q12 hours while inpatient  - On discharge, transition to valganciclovir 900 mg PO BID - please give 30 day supply  - qweekly CBC with diff, CMP, CMV VL  - Will arrange follow-up in ID in 2 weeks

## 2023-09-24 NOTE — NURSING
Patient discharged alert verbal and oriented. Family at  her side. Ambulating without difficulty. Respirations full even and non labored. NADN. IV removed. Discharge instructions reviewed with family member and patient stated she already saw them on her phone.

## 2023-09-24 NOTE — DISCHARGE SUMMARY
[facility]an [facility] - Intensive Care ([place] [address])  [facility] Medicine  Discharge Summary      Patient Name: Leydi Cordero  MRN: [id number]  TYRON: [id number]  Patient Class: IP- Inpatient  Admission Date: 9/17/2023  Hospital Length of Stay: 6 days  Discharge Date and Time:  09/23/2023 7:02 PM  Attending Physician: Quintin Stout MD   Discharging Provider: Quintin Stout MD  Primary Care Provider: Helena Zepeda PA-C  [facility] Medicine Team: [facility] HOSP MED O Quintin Stout MD  Primary Care Team: [facility] HOSP MED O    HPI:   34 y/o F with PMH of ESRD s/p Kidney transplant on immunosuppression, HTN presented to outside hospital on 09/16 complaining of fever and rash since past 3 days.  Patient was in her usual state of health 3 days ago, initially developed blisters and vesicles on her forehead and face.  Later lesions spread to her bilateral arms, chest, abdomen, bilateral inguinal region.  Lesions in the inguinal region where umbilicated.  The lesions ruptured draining serous fluid.  Patient also developed fever at the same time.  Patient also complains of cough associated with clear sputum.  And now complaints of throat pain due to persistent coughing.  Patient also felt nauseous yesterday however denied vomiting.  Patient reports no one else in the family developed similar lesions.  Patient denies shortness of breath, chest pain, abdominal pain, dysuria or hematuria, myalgia, arthralgia, exposure to sick contacts.  Patient has history of kidney transplant dating back to 2021.  On immunosuppression CellCept, tacrolimus and prednisone.  Patient reports compliance with medication      Patient received vancomycin and cefepime 1 dose in the ER at [place].  Subsequently transferred to [facility] for dermatological evaluation.  On presentation patient was afebrile, normotensive, saturating well on room air.  Initial blood work including CBC, BMP, troponin and lactate within normal limits.  COVID, RPR negative.  Blood  cultures were obtained yesterday no growth so far.  Chest x-ray suggestive of patchy consolidation in lower lung fields suggestive of pneumonia.  UA pending.  Initiated patient on vancomycin and cefepime.  Infectious diseases, kidney transplant team, dermatology were consulted.      Procedure(s) (LRB):  Bronchoscopy (N/A)      Hospital Course:   34 y/o F with PMH of ESRD s/p Kidney transplant on immunosuppression, HTN presented to outside hospital on 09/16 complaining of fever and rash since past 3 days.  Lesions were noted on face, bilateral arms, chest, abdomen, bilateral inguinal region.  Lesions in the inguinal region where umbilicated.  Patient received vancomycin and cefepime 1 dose in the ER at [place].  Subsequently transferred to [facility] for dermatological evaluation.  Blood cultures and UA were obtained.  Chest x-ray was suggestive of patchy consolidation bilateral lower lobes.  Started broad-spectrum antibiotics including vanc and cefepime.  Patient has history of kidney transplant in 2021, on immunosuppressive therapy.  Id, dermatology and kidney transplant team were consulted.  Id was concerned for disseminated herpes versus varicella, patient was started on acyclovir at immunosuppressive dose and swabs were collected.  As there was concern for consolidation on chest x-ray this raised the possibility of disseminated herpes with pneumonia.  CT chest and abdomen were obtained. CMV positive - started on gangicyclovir. VZV positive as well.     Pulmonology consulted for bronchoscopy which was performed on 9/22 and showed Diffuse lung disease, Bilateral viral pneumonia, Immune compromised viral pneumonia. Airway examination of the right lung was normal. Mucopurulent, thick secretions were found in the left lower lobe. Bronchoalveolar lavage was performed. Per ID, patient was discharged on valganciclovir 900 mg PO BID for 30 day supply. Outpatient order also placed for weekly CBC with diff, CMP, CMV VL and  patient will followup in ID in 2 weeks. Outpatient referral also placed for kidney transplant. She was stable for discharge home.            Goals of Care Treatment Preferences:  Code Status: Full Code    Living Will: Yes              Consults:   Consults (From admission, onward)        Status Ordering Provider     Inpatient consult to Pulmonology  Once        Provider:  (Not yet assigned)    Completed CASSIE COTTER JR     [facility]  Once        Provider:  (Not yet assigned)    Completed RICHARD HOLLAND     Inpatient consult to Dermatology  Once        Provider:  (Not yet assigned)    Completed RICHARD HOLLAND     Inpatient consult to Kidney/Pancreas Transplant Medicine  Once        Provider:  (Not yet assigned)    Completed RICHARD HOLLAND     Inpatient consult to Infectious Diseases  Once        Provider:  (Not yet assigned)    Completed RICHARD HOLLAND          No new Assessment & Plan notes have been filed under this hospital service since the last note was generated.  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     Specialty: Family Medicine  Why: F/U appointment at 9:20 am.  Contact information:  [address] [address]  [phone]                       Patient Instructions:      CBC W/ AUTO DIFFERENTIAL   Standing Status: Standing Number of Occurrences: 4 Standing Exp. Date: 11/21/24   Order Comments: Weekly CBC, CMP, CMV viral     COMPREHENSIVE METABOLIC PANEL   Standing Status: Standing Number of Occurrences: 4 Standing Exp. Date: 11/21/24   Order Comments: Weekly CBC, CMP, CMV viral     CMV DNA, Quantitative, PCR   Standing Status: Standing Number of Occurrences: 4 Standing Exp. Date: 11/21/24   Order Comments: Weekly CBC, CMP, CMV viral     Ambulatory referral/consult to Outpatient Case Management   Referral Priority: Routine Referral Type: Consultation   Referral Reason: Specialty Services Required   Number of Visits Requested: 1     Ambulatory referral/consult to Transplant, Kidney   Standing Status: Future   Referral Priority: Routine Referral Type: Transplants   Number of Visits Requested: 1     Ambulatory referral/consult to Infectious Disease   Standing Status: Future   Referral Priority: Routine Referral Type: Consultation   Referral Reason: Specialty Services Required   Requested Specialty: Infectious Diseases   Number of Visits Requested: 1     Diet Cardiac     Notify your health care provider if you experience any of the following:  temperature >100.4     Notify your health care provider if you experience any of the following:  persistent nausea and vomiting or diarrhea     Notify your health care provider if you experience any of the following:  severe uncontrolled pain     Notify your health care provider if you experience any of the following:  redness, tenderness, or signs of infection (pain, swelling, redness, odor or green/yellow discharge around incision site)     Notify your health care provider if you experience any of the following:  difficulty breathing or increased cough  "    Notify your health care provider if you experience any of the following:  severe persistent headache     Notify your health care provider if you experience any of the following:  worsening rash     Notify your health care provider if you experience any of the following:  persistent dizziness, light-headedness, or visual disturbances     Notify your health care provider if you experience any of the following:  increased confusion or weakness     Activity as tolerated       Significant Diagnostic Studies: Labs:   CMP   Recent Labs   Lab 09/22/23 0226 09/23/23  0510    137   K 3.0* 4.4    105   CO2 24 23   GLU 83 97   BUN 10 9   CREATININE 0.8 0.8   CALCIUM 8.4* 8.7   ANIONGAP 8 9   , CBC   Recent Labs   Lab 09/22/23 0226 09/23/23  0510   WBC 4.95 5.70   HGB 10.6* 10.6*   HCT 32.4* 33.7*    204   , INR   Lab Results   Component Value Date    INR 0.9 03/22/2021    INR 0.9 02/13/2021    INR 1.0 01/09/2021   , Troponin   Recent Labs   Lab 09/16/23  2248   TROPONINI <0.006    and A1C: No results for input(s): "HGBA1C" in the last 4320 hours.  Microbiology:   Microbiology Results (last 7 days)     Procedure Component Value Units Date/Time    Culture, Respiratory with Gram Stain [[id number]] Collected: 09/22/23 1215    Order Status: Completed Specimen: Respiratory from BAL, RML Updated: 09/23/23 0830     Respiratory Culture No Growth     Gram Stain (Respiratory) <10 epithelial cells per low power field.     Gram Stain (Respiratory) No WBC's     Gram Stain (Respiratory) No organisms seen    AFB Culture & Smear [[id number]] Collected: 09/22/23 1215    Order Status: Sent Specimen: Respiratory from Lung, RML Updated: 09/22/23 1346    Fungus culture [[id number]] Collected: 09/22/23 1215    Order Status: Sent Specimen: Respiratory from Lung, RML Updated: 09/22/23 1345    Cryptococcal antigen, blood [[id number]] Collected: 09/21/23 0547    Order Status: Completed Specimen: Blood, Venous Updated: 09/21/23 " 1354     Cryptococcal Ag, Blood Negative    Respiratory Infection Panel (PCR), Nasopharyngeal [[id number]] Collected: 09/21/23 0949    Order Status: Completed Specimen: Nasopharyngeal Swab Updated: 09/21/23 1323     Respiratory Infection Panel Source NP Swab     Adenovirus Not Detected     Coronavirus 229E, Common Cold Virus Not Detected     Coronavirus HKU1, Common Cold Virus Not Detected     Coronavirus NL63, Common Cold Virus Not Detected     Coronavirus OC43, Common Cold Virus Not Detected     Comment: The Coronavirus strains detected in this test cause the common cold.  These strains are not the COVID-19 (novel Coronavirus)strain   associated with the respiratory disease outbreak.          SARS-CoV2 (COVID-19) Qualitative PCR Not Detected     Human Metapneumovirus Not Detected     Human Rhinovirus/Enterovirus Not Detected     Influenza A (subtypes H1, H1-2009,H3) Not Detected     Influenza B Not Detected     Parainfluenza Virus 1 Not Detected     Parainfluenza Virus 2 Not Detected     Parainfluenza Virus 3 Not Detected     Parainfluenza Virus 4 Not Detected     Respiratory Syncytial Virus Not Detected     Bordetella Parapertussis (ED4121) Not Detected     Bordetella pertussis (ptxP) Not Detected     Chlamydia pneumoniae Not Detected     Mycoplasma pneumoniae Not Detected    Narrative:      For all other respiratory sources, order [id number] -  Respiratory Viral Panel by PCR    Toxoplasma Gondii, Dna (Qual) [[id number]] Collected: 09/21/23 0546    Order Status: Completed Specimen: Blood Updated: 09/21/23 0613     Toxoplasma gondii DNA, Source ?    Culture, Respiratory with Gram Stain [[id number]] Collected: 09/18/23 1731    Order Status: Completed Specimen: Respiratory from Sputum Updated: 09/20/23 1041     Respiratory Culture Normal respiratory asaf      No S aureus or Pseudomonas isolated.     Gram Stain (Respiratory) <10 epithelial cells per low power field.     Gram Stain (Respiratory) Many WBC's     Gram  Stain (Respiratory) Many Gram negative rods     Gram Stain (Respiratory) Moderate Gram positive cocci    C. trachomatis/N. gonorrhoeae by AMP DNA [[id number]] Collected: 09/19/23 2237    Order Status: Completed Updated: 09/19/23 2238     Chlamydia, Amplified DNA Not Detected     N gonorrhoeae, amplified DNA Not Detected    Narrative:      Specimen Source->Urine  ADD ON [facility] AMALIA CORTEZ HARITHA, MD @ 23:45  09/18/2023     C. trachomatis/N. gonorrhoeae by AMP DNA [facility]; Urine [[id number]]     Order Status: Completed Specimen: Genital from Vaginal     C. trachomatis/N. gonorrhoeae by AMP DNA [facility]; Urine [[id number]]     Order Status: Canceled Specimen: Genital from Vaginal           Radiology:     Bronchoscopy (9/22/2023):    - Bilateral infiltrate   - Diffuse lung disease   - Bilateral viral pneumonia   - Immune compromised viral pneumonia   - Abnormal CT scan of chest   - The airway examination of the right lung was normal.   - Mucopurulent, thick secretions were found in the left lower lobe.   - Bronchoalveolar lavage was performed.        Pending Diagnostic Studies:     Procedure Component Value Units Date/Time    Aspergillus Antigen, BAL [[id number]] Collected: 09/22/23 1215    Order Status: Sent Lab Status: In process Updated: 09/22/23 1330    Specimen: Body Fluid from Bronchial Alveolar Lavage (BAL)     Blastomyces Dermatitidis Ag, Blood [[id number]]     Order Status: Sent Lab Status: No result     Specimen: Blood     Cytology, Pulmonary [[id number]] Collected: 09/22/23 1215    Order Status: Sent Lab Status: In process Updated: 09/22/23 1551    Echo [[id number]]     Order Status: Sent Lab Status: No result     Fungal Immunodiffusion - Blood [[id number]] Collected: 09/21/23 0546    Order Status: Sent Lab Status: In process Updated: 09/21/23 0613    Specimen: Blood     Herpes simplex type 1 & 2 IgM,Herpes IgM [[id number]] Collected: 09/21/23 0934    Order Status: Sent Lab Status: In process Updated:  09/21/23 0944    Specimen: Blood     Misc Sendout Test, Non-Blood [id number] send to [facility] [[id number]] Collected: 09/22/23 1213    Order Status: Sent Lab Status: In process Updated: 09/22/23 1341    RESPIRATORY PATHOGENS PANEL (TEM-PCR) Sputum (BAL) [[id number]] Collected: 09/22/23 1213    Order Status: Sent Lab Status: In process Updated: 09/22/23 1342    Toxoplasma Gondii IgG [[id number]] Collected: 09/21/23 0546    Order Status: Sent Lab Status: In process Updated: 09/21/23 0613    Specimen: Blood     Varicella zoster antibody, IgM [[id number]] Collected: 09/21/23 0934    Order Status: Sent Lab Status: In process Updated: 09/21/23 0944    Specimen: Blood          Medications:  Reconciled Home Medications:      Medication List      START taking these medications    dextromethorphan-guaiFENesin  mg/5 ml  mg/5 mL liquid  Commonly known as: ROBITUSSIN-DM  Take 5 mLs by mouth every 6 (six) hours. for 10 days     multivitamin per tablet  Commonly known as: THERAGRAN  Take 1 tablet by mouth once daily.     valGANciclovir 450 mg Tab  Commonly known as: VALCYTE  Take 2 tablets (900 mg total) by mouth once daily.        CHANGE how you take these medications    midodrine 10 MG tablet  Commonly known as: PROAMATINE  Take 1 tablet (10 mg total) by mouth 2 (two) times daily with meals.  What changed:   · medication strength  · how much to take     mycophenolate 250 mg Cap  Commonly known as: CELLCEPT  Take 4 capsules (1,000 mg total) by mouth 2 (two) times daily. Resume after completion of antiviral medication  Start taking on: October 21, 2023  What changed:   · additional instructions  · These instructions start on October 21, 2023. If you are unsure what to do until then, ask your doctor or other care provider.     tacrolimus 1 MG Cap  Commonly known as: PROGRAF  Take 1 capsule (1 mg total) by mouth every 12 (twelve) hours.  What changed: See the new instructions.        CONTINUE taking these medications     albuterol 90 mcg/actuation inhaler  Commonly known as: PROVENTIL/VENTOLIN HFA  Inhale 2 aspiraciones para los pulmones cada 4 (cuatro) horas según sea necesario para la sibilancia o dificultad para respirar. Rescate  (Inhale 2 puffs into the lungs every 4 (four) hours as needed for Wheezing or Shortness of Breath. Rescue)     calcitRIOL 0.25 MCG Cap  Commonly known as: ROCALTROL  Take 1 capsule (0.25 mcg total) by mouth once daily.     docusate sodium 100 MG capsule  Commonly known as: COLACE  Take 1 capsule (100 mg total) by mouth 2 (two) times daily as needed.     pantoprazole 40 MG tablet  Commonly known as: PROTONIX  Capac young tableta por vía oral diariamente.  (Take 1 tablet by mouth once daily)     predniSONE 5 MG tablet  Commonly known as: DELTASONE  Capac young tableta (5 mg en total) por vía oral diariamente.  (Take 1 tablet (5 mg total) by mouth once daily.)     sodium bicarbonate 650 MG tablet  Capac 2 tabletas (1,300 mg en total) por vía oral 3 (regi) veces al día  (Take 2 tablets (1,300 mg total) by mouth 3 (three) times daily.)     sucralfate 1 gram tablet  Commonly known as: CARAFATE  Take 1 tablet (1 g total) by mouth 4 (four) times daily.        STOP taking these medications    ADULTS MULTIVITAMIN ORAL     cholecalciferol (vitamin D3) 50 mcg (2,000 unit) Tab  Commonly known as: VITAMIN D3     HYDROcodone-acetaminophen 5-325 mg per tablet  Commonly known as: NORCO     loperamide 2 mg capsule  Commonly known as: IMODIUM     magnesium oxide 400 mg (241.3 mg magnesium) tablet  Commonly known as: MAG-OX     sars-cov-2 (covid-19) 100 mcg/0.5 ml injection  Commonly known as: MODERNA COVID-19     VITAMIN D2 50,000 unit Cap  Generic drug: ergocalciferol            Indwelling Lines/Drains at time of discharge:   Lines/Drains/Airways     Central Venous Catheter Line  Duration                Hemodialysis AV Graft Left upper arm -- days                Time spent on the discharge of patient: 45 minutes          Quintin Stout MD  Department of Hospital Medicine  [facility] - Intensive Care ([facility]

## 2023-09-24 NOTE — PLAN OF CARE
[facility] - Intensive Care ([facility]  Discharge Final Note    Primary Care Provider: Helena Zepeda PA-C    Expected Discharge Date: 9/23/2023    CM reviewed chart. No post acute needs identified. Patient is ready to discharge from case management standpoint.    Final Discharge Note (most recent)       Final Note - 09/23/23 1916          Final Note    Assessment Type Final Discharge Note     Anticipated Discharge Disposition Home or Self Care     Hospital Resources/Appts/Education Provided Provided patient/caregiver with written discharge plan information        Post-Acute Status    Discharge Delays None known at this time                   Important Message from Medicare  Important Message from Medicare regarding Discharge Appeal Rights: Given to patient/caregiver, Explained to patient/caregiver, Signed/date by patient/caregiver     Date IMM was signed: 09/22/23  Time IMM was signed: 0857    Contact Info       Helena Zepeda PA-C   Specialty: Family Medicine   Relationship: PCP - General    [address] [facility] [address]   Phone: [phone]       Next Steps: Follow up    Instructions: F/U appointment at 9:20 am.          Trina Lopez RN  Weekend  - [facility]  Spectralink: [phone]

## 2023-09-25 LAB
ASPERGILLUS AB SER QL ID: NOT DETECTED
B DERMAT AB SER QL ID: NOT DETECTED
BACTERIA SPEC AEROBE CULT: NO GROWTH
C IMMITIS AB SER QL ID: NOT DETECTED
CMV DNA SPEC QL NAA+PROBE: DETECTED
GRAM STN SPEC: NORMAL
H CAPSUL AB SER QL ID: NOT DETECTED
HSV AB, IGM BY EIA: 0.38 INDEX
SPECIMEN SOURCE: ABNORMAL

## 2023-09-25 NOTE — ANESTHESIA POSTPROCEDURE EVALUATION
Anesthesia Post Evaluation    Patient: Leydi Cordero    Procedure(s) Performed: Procedure(s) (LRB):  Bronchoscopy (N/A)    Final Anesthesia Type: general      Patient location during evaluation: PACU  Patient participation: Yes- Able to Participate  Level of consciousness: awake and alert  Post-procedure vital signs: reviewed and stable  Pain management: adequate  Airway patency: patent    PONV status at discharge: No PONV  Anesthetic complications: no      Cardiovascular status: blood pressure returned to baseline  Respiratory status: spontaneous ventilation and room air  Hydration status: euvolemic  Follow-up not needed.          Vitals Value Taken Time   BP 90/57 09/23/23 1612   Temp 36.6 °C (97.9 °F) 09/23/23 1612   Pulse 59 09/23/23 1612   Resp 20 09/23/23 1612   SpO2 95 % 09/23/23 1612         Event Time   Out of Recovery 09/22/2023 12:30:00         Pain/Dilcia Score: No data recorded

## 2023-09-26 LAB
COMMENT: ABNORMAL
FINAL PATHOLOGIC DIAGNOSIS: ABNORMAL
Lab: ABNORMAL

## 2023-09-27 LAB
ENTEROVIRUS/RHINOVIRUS: NOT DETECTED
HUMAN BOCAVIRUS: NOT DETECTED
HUMAN CORONAVIRUS, COMMON COLD VIRUS: NOT DETECTED
INFLUENZA A - H1N1-09: NOT DETECTED
LEGIONELLA PNEUMOPHILA: NOT DETECTED
M TB CMPLX DNA SPEC QL NAA+PROBE: NEGATIVE
MORAXELLA CATARRHALIS: NOT DETECTED
PARAINFLUENZA: NOT DETECTED
RVP - ADENOVIRUS: NOT DETECTED
RVP - HUMAN METAPNEUMOVIRUS (HMPV): NOT DETECTED
RVP - INFLUENZA A: NOT DETECTED
RVP - INFLUENZA B: NOT DETECTED
RVP - RESPIRATORY SYNCTIAL VIRUS (RSV) A: NOT DETECTED
RVP - RESPIRATORY VIRAL PANEL, SOURCE: NORMAL
SPECIMEN SOURCE: NORMAL
T GONDII IGG SER QL IA: NORMAL
T GONDII IGG SERPL IA-ACNC: <5 IU/ML (ref 0–6.4)
TEM - ACINETOBACTER BAUMANNII: NOT DETECTED
TEM - BORDETELLA PERTUSSIS: NOT DETECTED
TEM - CHLAMYDOPHILA PNEUMONIAE: NOT DETECTED
TEM - KLEBSIELLA PNEUMONIAE: NOT DETECTED
TEM - MRSA: NOT DETECTED
TEM - MYCOPLASMA PNEUMONIAE: NOT DETECTED
TEM - NEISSERIA MENINGITIDIS: NOT DETECTED
TEM - PANTON-VALENTINE: NOT DETECTED
TEM - PSEUDOMONAS AERUGINOSA: NOT DETECTED
TEM - STAPHYLOCOCCUS AUREUS: NOT DETECTED
TEM - STREPTOCOCCUS PNEUMONIAE: NOT DETECTED
TEM - STREPTOCOCCUS PYOGENES A: NOT DETECTED
TEM- HAEMOPHILUS INFLUENZAE B: NOT DETECTED
TEM- HAEMOPHILUS INFLUENZAE: NOT DETECTED
VZV IGM SER IA-ACNC: 1.15

## 2023-09-28 ENCOUNTER — OFFICE VISIT (OUTPATIENT)
Dept: OBSTETRICS AND GYNECOLOGY | Facility: CLINIC | Age: 33
End: 2023-09-28
Payer: MEDICARE

## 2023-09-28 VITALS
BODY MASS INDEX: 34.06 KG/M2 | SYSTOLIC BLOOD PRESSURE: 90 MMHG | HEIGHT: 60 IN | DIASTOLIC BLOOD PRESSURE: 60 MMHG | WEIGHT: 173.5 LBS

## 2023-09-28 DIAGNOSIS — Z98.890 STATUS POST CONE BIOPSY OF CERVIX: Primary | ICD-10-CM

## 2023-09-28 LAB
MISCELLANEOUS TEST NAME: NORMAL
REFERENCE LAB: NORMAL
SPECIMEN TYPE: NORMAL
T GONDII DNA SPEC QL NAA+PROBE: NOT DETECTED
TEST RESULT: NORMAL
TOXOPLASMA GONDII DNA SOURCE: NORMAL

## 2023-09-28 PROCEDURE — 4010F PR ACE/ARB THEARPY RXD/TAKEN: ICD-10-PCS | Mod: CPTII,S$GLB,, | Performed by: OBSTETRICS & GYNECOLOGY

## 2023-09-28 PROCEDURE — 99024 PR POST-OP FOLLOW-UP VISIT: ICD-10-PCS | Mod: S$GLB,,, | Performed by: OBSTETRICS & GYNECOLOGY

## 2023-09-28 PROCEDURE — 3044F HG A1C LEVEL LT 7.0%: CPT | Mod: CPTII,S$GLB,, | Performed by: OBSTETRICS & GYNECOLOGY

## 2023-09-28 PROCEDURE — 1160F RVW MEDS BY RX/DR IN RCRD: CPT | Mod: CPTII,S$GLB,, | Performed by: OBSTETRICS & GYNECOLOGY

## 2023-09-28 PROCEDURE — 3044F PR MOST RECENT HEMOGLOBIN A1C LEVEL <7.0%: ICD-10-PCS | Mod: CPTII,S$GLB,, | Performed by: OBSTETRICS & GYNECOLOGY

## 2023-09-28 PROCEDURE — 3008F BODY MASS INDEX DOCD: CPT | Mod: CPTII,S$GLB,, | Performed by: OBSTETRICS & GYNECOLOGY

## 2023-09-28 PROCEDURE — 1160F PR REVIEW ALL MEDS BY PRESCRIBER/CLIN PHARMACIST DOCUMENTED: ICD-10-PCS | Mod: CPTII,S$GLB,, | Performed by: OBSTETRICS & GYNECOLOGY

## 2023-09-28 PROCEDURE — 1159F MED LIST DOCD IN RCRD: CPT | Mod: CPTII,S$GLB,, | Performed by: OBSTETRICS & GYNECOLOGY

## 2023-09-28 PROCEDURE — 3074F PR MOST RECENT SYSTOLIC BLOOD PRESSURE < 130 MM HG: ICD-10-PCS | Mod: CPTII,S$GLB,, | Performed by: OBSTETRICS & GYNECOLOGY

## 2023-09-28 PROCEDURE — 4010F ACE/ARB THERAPY RXD/TAKEN: CPT | Mod: CPTII,S$GLB,, | Performed by: OBSTETRICS & GYNECOLOGY

## 2023-09-28 PROCEDURE — 99999 PR PBB SHADOW E&M-EST. PATIENT-LVL III: ICD-10-PCS | Mod: PBBFAC,,, | Performed by: OBSTETRICS & GYNECOLOGY

## 2023-09-28 PROCEDURE — 3074F SYST BP LT 130 MM HG: CPT | Mod: CPTII,S$GLB,, | Performed by: OBSTETRICS & GYNECOLOGY

## 2023-09-28 PROCEDURE — 3008F PR BODY MASS INDEX (BMI) DOCUMENTED: ICD-10-PCS | Mod: CPTII,S$GLB,, | Performed by: OBSTETRICS & GYNECOLOGY

## 2023-09-28 PROCEDURE — 99024 POSTOP FOLLOW-UP VISIT: CPT | Mod: S$GLB,,, | Performed by: OBSTETRICS & GYNECOLOGY

## 2023-09-28 PROCEDURE — 3066F PR DOCUMENTATION OF TREATMENT FOR NEPHROPATHY: ICD-10-PCS | Mod: CPTII,S$GLB,, | Performed by: OBSTETRICS & GYNECOLOGY

## 2023-09-28 PROCEDURE — 3078F PR MOST RECENT DIASTOLIC BLOOD PRESSURE < 80 MM HG: ICD-10-PCS | Mod: CPTII,S$GLB,, | Performed by: OBSTETRICS & GYNECOLOGY

## 2023-09-28 PROCEDURE — 1159F PR MEDICATION LIST DOCUMENTED IN MEDICAL RECORD: ICD-10-PCS | Mod: CPTII,S$GLB,, | Performed by: OBSTETRICS & GYNECOLOGY

## 2023-09-28 PROCEDURE — 3066F NEPHROPATHY DOC TX: CPT | Mod: CPTII,S$GLB,, | Performed by: OBSTETRICS & GYNECOLOGY

## 2023-09-28 PROCEDURE — 3078F DIAST BP <80 MM HG: CPT | Mod: CPTII,S$GLB,, | Performed by: OBSTETRICS & GYNECOLOGY

## 2023-09-28 PROCEDURE — 99999 PR PBB SHADOW E&M-EST. PATIENT-LVL III: CPT | Mod: PBBFAC,,, | Performed by: OBSTETRICS & GYNECOLOGY

## 2023-09-28 NOTE — PROGRESS NOTES
Subjective:      Patient ID: Leydi Cordero is a 33 y.o. female.    Chief Complaint:  Post-op Evaluation      History of Present Illness  HPI  Pt is s/p CKC on 2023.  Pt is here for her routine post-op visit.  Pt is doing well and is happy with results.  Denies pain or vaginal bleeding.  Reports normal bowel and bladder function.  Denies intercourse or strenuous activity since surgery.  Pt reports recent hospital admission secondary to adult onset Varicella infection.      GYN & OB History  Patient's last menstrual period was 2023.   Date of Last Pap: 2023    OB History    Para Term  AB Living   0 0 0 0 0 0   SAB IAB Ectopic Multiple Live Births   0 0 0 0 0       Review of Systems  Review of Systems   Constitutional:  Negative for activity change, appetite change, chills, fatigue, fever and unexpected weight change.   Respiratory:  Positive for cough. Negative for shortness of breath.    Cardiovascular:  Negative for chest pain, palpitations and leg swelling.   Gastrointestinal:  Negative for abdominal pain, bloating, blood in stool, constipation, diarrhea, nausea and vomiting.   Genitourinary:  Negative for dysmenorrhea, dyspareunia, dysuria, flank pain, frequency, genital sores, hematuria, menorrhagia, menstrual problem, pelvic pain, urgency, vaginal bleeding, vaginal discharge, vaginal pain, urinary incontinence, postcoital bleeding, vaginal dryness and vaginal odor.   Musculoskeletal:  Negative for back pain.   Integumentary:  Positive for rash.   Neurological:  Negative for syncope and headaches.          Objective:     Physical Exam:   Constitutional: She is oriented to person, place, and time. She appears well-developed and well-nourished. No distress.       Cardiovascular:  Normal rate and regular rhythm.             Pulmonary/Chest: Effort normal and breath sounds normal.        Abdominal: Soft. Bowel sounds are normal. She exhibits no distension. There is no  abdominal tenderness.     Genitourinary:    Vagina, uterus, right adnexa and left adnexa normal.      Pelvic exam was performed with patient supine.   There is no rash, tenderness, lesion or injury on the right labia. There is no rash, tenderness, lesion or injury on the left labia. Cervix is normal. Right adnexum displays no mass, no tenderness and no fullness. Left adnexum displays no mass, no tenderness and no fullness. No erythema,  no vaginal discharge, tenderness or bleeding in the vagina.    No foreign body in the vagina.      No signs of injury in the vagina.         Cervix exhibits no motion tenderness, no discharge and no friability. Uterus is not deviated, not enlarged and not tender.           Musculoskeletal: Normal range of motion and moves all extremeties. No tenderness or edema.       Neurological: She is alert and oriented to person, place, and time.    Skin: Skin is warm and dry. Rash noted.    Psychiatric: She has a normal mood and affect. Her behavior is normal. Thought content normal.         Assessment:     1. Status post cone biopsy of cervix             Plan:     Status post cone biopsy of cervix  -     Pt doing well and is happy with results.  Pathology shows PETRONA 3 with negative margins.  Pt cleared for all activities.  -     Recommend follow up pap in 6 months.      Follow up in about 6 months (around 3/28/2024) for Pap.

## 2023-10-04 ENCOUNTER — LAB VISIT (OUTPATIENT)
Dept: LAB | Facility: HOSPITAL | Age: 33
End: 2023-10-04
Attending: INTERNAL MEDICINE
Payer: MEDICARE

## 2023-10-04 ENCOUNTER — TELEPHONE (OUTPATIENT)
Dept: TRANSPLANT | Facility: CLINIC | Age: 33
End: 2023-10-04
Payer: MEDICARE

## 2023-10-04 ENCOUNTER — OFFICE VISIT (OUTPATIENT)
Dept: INFECTIOUS DISEASES | Facility: CLINIC | Age: 33
End: 2023-10-04
Payer: MEDICARE

## 2023-10-04 VITALS
BODY MASS INDEX: 34.41 KG/M2 | TEMPERATURE: 98 F | HEART RATE: 79 BPM | WEIGHT: 175.25 LBS | DIASTOLIC BLOOD PRESSURE: 78 MMHG | SYSTOLIC BLOOD PRESSURE: 110 MMHG | HEIGHT: 60 IN

## 2023-10-04 DIAGNOSIS — B25.8 OTHER CYTOMEGALOVIRAL DISEASES: ICD-10-CM

## 2023-10-04 DIAGNOSIS — B25.0 CYTOMEGALOVIRUS PNEUMONIA: ICD-10-CM

## 2023-10-04 DIAGNOSIS — B01.2: ICD-10-CM

## 2023-10-04 DIAGNOSIS — B01.9 DISSEMINATED VARICELLA: ICD-10-CM

## 2023-10-04 DIAGNOSIS — B25.8 OTHER CYTOMEGALOVIRAL DISEASES: Primary | ICD-10-CM

## 2023-10-04 LAB
ALBUMIN SERPL BCP-MCNC: 2.6 G/DL (ref 3.5–5.2)
ALP SERPL-CCNC: 75 U/L (ref 55–135)
ALT SERPL W/O P-5'-P-CCNC: 13 U/L (ref 10–44)
ANION GAP SERPL CALC-SCNC: 5 MMOL/L (ref 8–16)
ANISOCYTOSIS BLD QL SMEAR: SLIGHT
AST SERPL-CCNC: 15 U/L (ref 10–40)
BASOPHILS # BLD AUTO: 0.03 K/UL (ref 0–0.2)
BASOPHILS NFR BLD: 0.5 % (ref 0–1.9)
BILIRUB SERPL-MCNC: 0.2 MG/DL (ref 0.1–1)
BUN SERPL-MCNC: 6 MG/DL (ref 6–20)
CALCIUM SERPL-MCNC: 9 MG/DL (ref 8.7–10.5)
CHLORIDE SERPL-SCNC: 108 MMOL/L (ref 95–110)
CO2 SERPL-SCNC: 25 MMOL/L (ref 23–29)
CREAT SERPL-MCNC: 0.8 MG/DL (ref 0.5–1.4)
DIFFERENTIAL METHOD: ABNORMAL
EOSINOPHIL # BLD AUTO: 0.1 K/UL (ref 0–0.5)
EOSINOPHIL NFR BLD: 0.8 % (ref 0–8)
ERYTHROCYTE [DISTWIDTH] IN BLOOD BY AUTOMATED COUNT: 14.4 % (ref 11.5–14.5)
EST. GFR  (NO RACE VARIABLE): >60 ML/MIN/1.73 M^2
GLUCOSE SERPL-MCNC: 80 MG/DL (ref 70–110)
HCT VFR BLD AUTO: 34.1 % (ref 37–48.5)
HGB BLD-MCNC: 10.7 G/DL (ref 12–16)
IMM GRANULOCYTES # BLD AUTO: 0.04 K/UL (ref 0–0.04)
IMM GRANULOCYTES NFR BLD AUTO: 0.7 % (ref 0–0.5)
LYMPHOCYTES # BLD AUTO: 1 K/UL (ref 1–4.8)
LYMPHOCYTES NFR BLD: 17.1 % (ref 18–48)
MCH RBC QN AUTO: 29.2 PG (ref 27–31)
MCHC RBC AUTO-ENTMCNC: 31.4 G/DL (ref 32–36)
MCV RBC AUTO: 93 FL (ref 82–98)
MONOCYTES # BLD AUTO: 0.2 K/UL (ref 0.3–1)
MONOCYTES NFR BLD: 3.2 % (ref 4–15)
NEUTROPHILS # BLD AUTO: 4.7 K/UL (ref 1.8–7.7)
NEUTROPHILS NFR BLD: 77.7 % (ref 38–73)
NRBC BLD-RTO: 0 /100 WBC
PLATELET # BLD AUTO: 280 K/UL (ref 150–450)
PLATELET BLD QL SMEAR: ABNORMAL
PMV BLD AUTO: 11.2 FL (ref 9.2–12.9)
POTASSIUM SERPL-SCNC: 4 MMOL/L (ref 3.5–5.1)
PROT SERPL-MCNC: 7.1 G/DL (ref 6–8.4)
RBC # BLD AUTO: 3.67 M/UL (ref 4–5.4)
SODIUM SERPL-SCNC: 138 MMOL/L (ref 136–145)
WBC # BLD AUTO: 6.03 K/UL (ref 3.9–12.7)

## 2023-10-04 PROCEDURE — 3008F BODY MASS INDEX DOCD: CPT | Mod: CPTII,S$GLB,, | Performed by: INTERNAL MEDICINE

## 2023-10-04 PROCEDURE — 3074F SYST BP LT 130 MM HG: CPT | Mod: CPTII,S$GLB,, | Performed by: INTERNAL MEDICINE

## 2023-10-04 PROCEDURE — 99215 PR OFFICE/OUTPT VISIT, EST, LEVL V, 40-54 MIN: ICD-10-PCS | Mod: S$GLB,,, | Performed by: INTERNAL MEDICINE

## 2023-10-04 PROCEDURE — 99999 PR PBB SHADOW E&M-EST. PATIENT-LVL III: CPT | Mod: PBBFAC,,, | Performed by: INTERNAL MEDICINE

## 2023-10-04 PROCEDURE — 99999 PR PBB SHADOW E&M-EST. PATIENT-LVL III: ICD-10-PCS | Mod: PBBFAC,,, | Performed by: INTERNAL MEDICINE

## 2023-10-04 PROCEDURE — 3044F PR MOST RECENT HEMOGLOBIN A1C LEVEL <7.0%: ICD-10-PCS | Mod: CPTII,S$GLB,, | Performed by: INTERNAL MEDICINE

## 2023-10-04 PROCEDURE — 1159F PR MEDICATION LIST DOCUMENTED IN MEDICAL RECORD: ICD-10-PCS | Mod: CPTII,S$GLB,, | Performed by: INTERNAL MEDICINE

## 2023-10-04 PROCEDURE — 1111F PR DISCHARGE MEDS RECONCILED W/ CURRENT OUTPATIENT MED LIST: ICD-10-PCS | Mod: CPTII,S$GLB,, | Performed by: INTERNAL MEDICINE

## 2023-10-04 PROCEDURE — 99215 OFFICE O/P EST HI 40 MIN: CPT | Mod: S$GLB,,, | Performed by: INTERNAL MEDICINE

## 2023-10-04 PROCEDURE — 80053 COMPREHEN METABOLIC PANEL: CPT | Performed by: INTERNAL MEDICINE

## 2023-10-04 PROCEDURE — 36415 COLL VENOUS BLD VENIPUNCTURE: CPT | Performed by: INTERNAL MEDICINE

## 2023-10-04 PROCEDURE — 85025 COMPLETE CBC W/AUTO DIFF WBC: CPT | Performed by: INTERNAL MEDICINE

## 2023-10-04 PROCEDURE — 3066F NEPHROPATHY DOC TX: CPT | Mod: CPTII,S$GLB,, | Performed by: INTERNAL MEDICINE

## 2023-10-04 PROCEDURE — 4010F PR ACE/ARB THEARPY RXD/TAKEN: ICD-10-PCS | Mod: CPTII,S$GLB,, | Performed by: INTERNAL MEDICINE

## 2023-10-04 PROCEDURE — 4010F ACE/ARB THERAPY RXD/TAKEN: CPT | Mod: CPTII,S$GLB,, | Performed by: INTERNAL MEDICINE

## 2023-10-04 PROCEDURE — 1111F DSCHRG MED/CURRENT MED MERGE: CPT | Mod: CPTII,S$GLB,, | Performed by: INTERNAL MEDICINE

## 2023-10-04 PROCEDURE — 3008F PR BODY MASS INDEX (BMI) DOCUMENTED: ICD-10-PCS | Mod: CPTII,S$GLB,, | Performed by: INTERNAL MEDICINE

## 2023-10-04 PROCEDURE — 3074F PR MOST RECENT SYSTOLIC BLOOD PRESSURE < 130 MM HG: ICD-10-PCS | Mod: CPTII,S$GLB,, | Performed by: INTERNAL MEDICINE

## 2023-10-04 PROCEDURE — 1160F RVW MEDS BY RX/DR IN RCRD: CPT | Mod: CPTII,S$GLB,, | Performed by: INTERNAL MEDICINE

## 2023-10-04 PROCEDURE — 3078F PR MOST RECENT DIASTOLIC BLOOD PRESSURE < 80 MM HG: ICD-10-PCS | Mod: CPTII,S$GLB,, | Performed by: INTERNAL MEDICINE

## 2023-10-04 PROCEDURE — 1160F PR REVIEW ALL MEDS BY PRESCRIBER/CLIN PHARMACIST DOCUMENTED: ICD-10-PCS | Mod: CPTII,S$GLB,, | Performed by: INTERNAL MEDICINE

## 2023-10-04 PROCEDURE — 3066F PR DOCUMENTATION OF TREATMENT FOR NEPHROPATHY: ICD-10-PCS | Mod: CPTII,S$GLB,, | Performed by: INTERNAL MEDICINE

## 2023-10-04 PROCEDURE — 1159F MED LIST DOCD IN RCRD: CPT | Mod: CPTII,S$GLB,, | Performed by: INTERNAL MEDICINE

## 2023-10-04 PROCEDURE — 3078F DIAST BP <80 MM HG: CPT | Mod: CPTII,S$GLB,, | Performed by: INTERNAL MEDICINE

## 2023-10-04 PROCEDURE — 3044F HG A1C LEVEL LT 7.0%: CPT | Mod: CPTII,S$GLB,, | Performed by: INTERNAL MEDICINE

## 2023-10-04 RX ORDER — VALGANCICLOVIR 450 MG/1
900 TABLET, FILM COATED ORAL 2 TIMES DAILY
Qty: 120 TABLET | Refills: 1 | Status: SHIPPED | OUTPATIENT
Start: 2023-10-04 | End: 2023-10-23 | Stop reason: ALTCHOICE

## 2023-10-04 NOTE — PROGRESS NOTES
Subjective:      Patient ID: Leydi Cordero is a 33 y.o. female.    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    Patient presented to hospital with diffuse rash - diagnosed with disseminated VZV. Lesion was VZV PCR positive. Mpox and RPR negative. She was started on IV acyclovir. Patient also with cough, CT chest performed - found to have diffuse nodules. BAL performed. Fungal biomarkers were negative. CMV VL elevated, CMV PCR from BAL positive - she was transitioned to IV ganciclovir. She was discharged home on valganciclovir - dose was supposed to be 900 mg PO BID, but she was sent home on qdaily.    Patient has been taking valganciclovir 900 mg PO qdaily since discharge. Reports having ongoing cough.    Review of Systems   Constitutional: Negative for chills, decreased appetite, fever, malaise/fatigue, night sweats, weight gain and weight loss.   HENT:  Negative for congestion, ear pain, hearing loss, hoarse voice, sore throat and tinnitus.    Eyes:  Negative for blurred vision, redness and visual disturbance.   Cardiovascular:  Negative for chest pain, leg swelling and palpitations.   Respiratory:  Positive for cough and sputum production. Negative for hemoptysis, shortness of breath and wheezing.    Hematologic/Lymphatic: Negative for adenopathy. Does not bruise/bleed easily.   Skin:  Negative for dry skin, itching, rash and suspicious lesions.   Musculoskeletal:  Negative for back pain, joint pain, myalgias and neck pain.   Gastrointestinal:  Negative for abdominal pain, constipation, diarrhea, heartburn, nausea and vomiting.   Genitourinary:  Negative for dysuria, flank pain, frequency, hematuria, hesitancy and urgency.   Neurological:  Negative for dizziness, headaches,  numbness, paresthesias and weakness.   Psychiatric/Behavioral:  Negative for depression and memory loss. The patient does not have insomnia and is not nervous/anxious.      Objective:   Physical Exam  Vitals reviewed.   Constitutional:       General: She is not in acute distress.     Appearance: She is well-developed. She is not diaphoretic.   HENT:      Head: Normocephalic and atraumatic.      Nose: Nose normal.   Eyes:      Conjunctiva/sclera: Conjunctivae normal.   Pulmonary:      Effort: Pulmonary effort is normal. No respiratory distress.      Breath sounds: No wheezing or rales.   Abdominal:      General: Abdomen is flat. There is no distension.   Musculoskeletal:      Cervical back: Normal range of motion.      Right lower leg: No edema.      Left lower leg: No edema.   Skin:     General: Skin is warm and dry.      Findings: No erythema or rash.   Neurological:      Mental Status: She is alert.   Psychiatric:         Behavior: Behavior normal.         Sodium   Date Value Ref Range Status   10/04/2023 138 136 - 145 mmol/L Final   09/23/2023 137 136 - 145 mmol/L Final   09/22/2023 137 136 - 145 mmol/L Final      Potassium   Date Value Ref Range Status   10/04/2023 4.0 3.5 - 5.1 mmol/L Final   09/23/2023 4.4 3.5 - 5.1 mmol/L Final   09/22/2023 3.0 (L) 3.5 - 5.1 mmol/L Final      Chloride   Date Value Ref Range Status   10/04/2023 108 95 - 110 mmol/L Final   09/23/2023 105 95 - 110 mmol/L Final   09/22/2023 105 95 - 110 mmol/L Final      CO2   Date Value Ref Range Status   10/04/2023 25 23 - 29 mmol/L Final   09/23/2023 23 23 - 29 mmol/L Final   09/22/2023 24 23 - 29 mmol/L Final      BUN   Date Value Ref Range Status   10/04/2023 6 6 - 20 mg/dL Final   09/23/2023 9 6 - 20 mg/dL Final   09/22/2023 10 6 - 20 mg/dL Final      Creatinine   Date Value Ref Range Status   10/04/2023 0.8 0.5 - 1.4 mg/dL Final   09/23/2023 0.8 0.5 - 1.4 mg/dL Final   09/22/2023 0.8 0.5 - 1.4 mg/dL Final      Glucose   Date Value Ref  Range Status   10/04/2023 80 70 - 110 mg/dL Final   09/23/2023 97 70 - 110 mg/dL Final   09/22/2023 83 70 - 110 mg/dL Final       ALT   Date Value Ref Range Status   10/04/2023 13 10 - 44 U/L Final   09/17/2023 40 10 - 44 U/L Final   09/16/2023 38 10 - 44 U/L Final      AST   Date Value Ref Range Status   10/04/2023 15 10 - 40 U/L Final   09/17/2023 44 (H) 10 - 40 U/L Final   09/16/2023 39 10 - 40 U/L Final      Total Bilirubin   Date Value Ref Range Status   10/04/2023 0.2 0.1 - 1.0 mg/dL Final     Comment:     For infants and newborns, interpretation of results should be based  on gestational age, weight and in agreement with clinical  observations.    Premature Infant recommended reference ranges:  Up to 24 hours.............<8.0 mg/dL  Up to 48 hours............<12.0 mg/dL  3-5 days..................<15.0 mg/dL  6-29 days.................<15.0 mg/dL     09/17/2023 0.4 0.1 - 1.0 mg/dL Final     Comment:     For infants and newborns, interpretation of results should be based  on gestational age, weight and in agreement with clinical  observations.    Premature Infant recommended reference ranges:  Up to 24 hours.............<8.0 mg/dL  Up to 48 hours............<12.0 mg/dL  3-5 days..................<15.0 mg/dL  6-29 days.................<15.0 mg/dL     09/16/2023 0.4 0.1 - 1.0 mg/dL Final     Comment:     For infants and newborns, interpretation of results should be based  on gestational age, weight and in agreement with clinical  observations.    Premature Infant recommended reference ranges:  Up to 24 hours.............<8.0 mg/dL  Up to 48 hours............<12.0 mg/dL  3-5 days..................<15.0 mg/dL  6-29 days.................<15.0 mg/dL        Albumin   Date Value Ref Range Status   10/04/2023 2.6 (L) 3.5 - 5.2 g/dL Final   09/17/2023 3.0 (L) 3.5 - 5.2 g/dL Final   09/16/2023 3.1 (L) 3.5 - 5.2 g/dL Final      Total Protein   Date Value Ref Range Status   10/04/2023 7.1 6.0 - 8.4 g/dL Final   09/17/2023  7.2 6.0 - 8.4 g/dL Final   09/16/2023 6.8 6.0 - 8.4 g/dL Final      Alkaline Phosphatase   Date Value Ref Range Status   10/04/2023 75 55 - 135 U/L Final   09/17/2023 91 55 - 135 U/L Final   09/16/2023 86 55 - 135 U/L Final        WBC   Date Value Ref Range Status   10/04/2023 6.03 3.90 - 12.70 K/uL Final   09/23/2023 5.70 3.90 - 12.70 K/uL Final   09/22/2023 4.95 3.90 - 12.70 K/uL Final      Hemoglobin   Date Value Ref Range Status   10/04/2023 10.7 (L) 12.0 - 16.0 g/dL Final   09/23/2023 10.6 (L) 12.0 - 16.0 g/dL Final   09/22/2023 10.6 (L) 12.0 - 16.0 g/dL Final      POC Hematocrit   Date Value Ref Range Status   01/16/2021 20 (LL) 36 - 54 %PCV Final   01/09/2021 25 (L) 36 - 54 %PCV Final   01/09/2021 28 (L) 36 - 54 %PCV Final     Hematocrit   Date Value Ref Range Status   10/04/2023 34.1 (L) 37.0 - 48.5 % Final   09/23/2023 33.7 (L) 37.0 - 48.5 % Final   09/22/2023 32.4 (L) 37.0 - 48.5 % Final      Platelets   Date Value Ref Range Status   10/04/2023 280 150 - 450 K/uL Final   09/23/2023 204 150 - 450 K/uL Final   09/22/2023 163 150 - 450 K/uL Final        9/18/2023  VZV PCR positive      9/21/2023  RIP neg  Toxo neg  Crypto neg    Cocci Ab neg  Fungal immunodiffusion neg  Fungitell neg  Histo Ag neg  Toxo IgM neg  Toxo IgG neg    Component      Latest Ref Rng 9/21/2023   Cytomegalovirus PCR, Quant      <50 IU/mL 879 !    Cytomegalovirus DNA      Not Detected  CMV DNA detected and quantified !    Cytomegalovirus Log (IU/mL)      <1.70 LogIU/mL 2.94 !       Legend:  ! 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 Patient rash has resolved, however, continues to have cough - will continue valganciclovir, trend CMV, and repeat CT chest.    Plan:   -  Continue valganciclovir - increase dose to 900 mg PO BID  - qweekly CBC with diff, CMP, CMV VL  - Repeat CT chest in 2 weeks to reassess pulmonary nodules  - Follow-up in ID clinic in 3 weeks to reassess cough    40 minutes of total time spent on the encounter, which includes face to face time and non-face to face time preparing to see the patient (eg, review of tests), obtaining and reviewing separately obtained history, documenting clinical information in the electronic or other health record, independently interpreting results (not separately reported) and communicating results to the patient/family/caregiver, and care coordination (not separately reported).

## 2023-10-04 NOTE — TELEPHONE ENCOUNTER
Left voice message to call coordinator back.  ----- Message from José Antonio Venegas MD sent at 10/4/2023  1:09 PM CDT -----  How is she doing ?she was very sick last month with disseminated varicella infection

## 2023-10-04 NOTE — TELEPHONE ENCOUNTER
Phone call to patient states she feels a lot better but still has a ongoing cough.  ----- Message from José Antonio Venegas MD sent at 10/4/2023  1:09 PM CDT -----  How is she doing ?she was very sick last month with disseminated varicella infection

## 2023-10-05 LAB
CMV DNA SPEC QL NAA+PROBE: ABNORMAL
CYTOMEGALOVIRUS LOG (IU/ML): 1.72 LOGIU/ML
CYTOMEGALOVIRUS PCR, QUANT: 53 IU/ML

## 2023-10-16 ENCOUNTER — HOSPITAL ENCOUNTER (OUTPATIENT)
Dept: RADIOLOGY | Facility: HOSPITAL | Age: 33
Discharge: HOME | End: 2023-10-16
Attending: INTERNAL MEDICINE
Payer: MEDICARE

## 2023-10-16 DIAGNOSIS — B01.2: ICD-10-CM

## 2023-10-16 DIAGNOSIS — B01.9 DISSEMINATED VARICELLA: ICD-10-CM

## 2023-10-16 DIAGNOSIS — B25.8 OTHER CYTOMEGALOVIRAL DISEASES: ICD-10-CM

## 2023-10-16 DIAGNOSIS — B25.0 CYTOMEGALOVIRUS PNEUMONIA: ICD-10-CM

## 2023-10-16 PROCEDURE — 71250 CT CHEST WITHOUT CONTRAST: ICD-10-PCS | Mod: 26,,, | Performed by: RADIOLOGY

## 2023-10-16 PROCEDURE — 71250 CT THORAX DX C-: CPT | Mod: TC

## 2023-10-16 PROCEDURE — 71250 CT THORAX DX C-: CPT | Mod: 26,,, | Performed by: RADIOLOGY

## 2023-10-18 ENCOUNTER — TELEPHONE (OUTPATIENT)
Dept: TRANSPLANT | Facility: CLINIC | Age: 33
End: 2023-10-18
Payer: MEDICARE

## 2023-10-18 ENCOUNTER — LAB VISIT (OUTPATIENT)
Dept: LAB | Facility: HOSPITAL | Age: 33
End: 2023-10-18
Attending: INTERNAL MEDICINE
Payer: MEDICARE

## 2023-10-18 DIAGNOSIS — B25.8 OTHER CYTOMEGALOVIRAL DISEASES: ICD-10-CM

## 2023-10-18 LAB
ALBUMIN SERPL BCP-MCNC: 3 G/DL (ref 3.5–5.2)
ALP SERPL-CCNC: 76 U/L (ref 55–135)
ALT SERPL W/O P-5'-P-CCNC: 16 U/L (ref 10–44)
ANION GAP SERPL CALC-SCNC: 9 MMOL/L (ref 8–16)
AST SERPL-CCNC: 23 U/L (ref 10–40)
BASOPHILS # BLD AUTO: 0.03 K/UL (ref 0–0.2)
BASOPHILS NFR BLD: 0.3 % (ref 0–1.9)
BILIRUB SERPL-MCNC: 0.2 MG/DL (ref 0.1–1)
BUN SERPL-MCNC: 10 MG/DL (ref 6–20)
CALCIUM SERPL-MCNC: 9.1 MG/DL (ref 8.7–10.5)
CHLORIDE SERPL-SCNC: 109 MMOL/L (ref 95–110)
CO2 SERPL-SCNC: 20 MMOL/L (ref 23–29)
CREAT SERPL-MCNC: 0.8 MG/DL (ref 0.5–1.4)
DIFFERENTIAL METHOD: ABNORMAL
EOSINOPHIL # BLD AUTO: 0 K/UL (ref 0–0.5)
EOSINOPHIL NFR BLD: 0.2 % (ref 0–8)
ERYTHROCYTE [DISTWIDTH] IN BLOOD BY AUTOMATED COUNT: 16.4 % (ref 11.5–14.5)
EST. GFR  (NO RACE VARIABLE): >60 ML/MIN/1.73 M^2
GLUCOSE SERPL-MCNC: 84 MG/DL (ref 70–110)
HCT VFR BLD AUTO: 38 % (ref 37–48.5)
HGB BLD-MCNC: 11.8 G/DL (ref 12–16)
IMM GRANULOCYTES # BLD AUTO: 0.06 K/UL (ref 0–0.04)
IMM GRANULOCYTES NFR BLD AUTO: 0.6 % (ref 0–0.5)
LYMPHOCYTES # BLD AUTO: 0.9 K/UL (ref 1–4.8)
LYMPHOCYTES NFR BLD: 9.9 % (ref 18–48)
MCH RBC QN AUTO: 29.1 PG (ref 27–31)
MCHC RBC AUTO-ENTMCNC: 31.1 G/DL (ref 32–36)
MCV RBC AUTO: 94 FL (ref 82–98)
MONOCYTES # BLD AUTO: 0.2 K/UL (ref 0.3–1)
MONOCYTES NFR BLD: 2.6 % (ref 4–15)
NEUTROPHILS # BLD AUTO: 8 K/UL (ref 1.8–7.7)
NEUTROPHILS NFR BLD: 86.4 % (ref 38–73)
NRBC BLD-RTO: 0 /100 WBC
PLATELET # BLD AUTO: 210 K/UL (ref 150–450)
PLATELET BLD QL SMEAR: ABNORMAL
PMV BLD AUTO: 11.2 FL (ref 9.2–12.9)
POTASSIUM SERPL-SCNC: 4.4 MMOL/L (ref 3.5–5.1)
PROT SERPL-MCNC: 7.4 G/DL (ref 6–8.4)
RBC # BLD AUTO: 4.06 M/UL (ref 4–5.4)
SODIUM SERPL-SCNC: 138 MMOL/L (ref 136–145)
WBC # BLD AUTO: 9.28 K/UL (ref 3.9–12.7)

## 2023-10-18 PROCEDURE — 36415 COLL VENOUS BLD VENIPUNCTURE: CPT | Performed by: INTERNAL MEDICINE

## 2023-10-18 PROCEDURE — 80053 COMPREHEN METABOLIC PANEL: CPT | Performed by: INTERNAL MEDICINE

## 2023-10-18 PROCEDURE — 85025 COMPLETE CBC W/AUTO DIFF WBC: CPT | Performed by: INTERNAL MEDICINE

## 2023-10-18 NOTE — TELEPHONE ENCOUNTER
Phone call to patient per PharmD request to verify that she is not taking MMF at present since recent Hospital admit.  Patient states she is not taking MMF due to ongoing CMV infection.

## 2023-10-19 LAB
CMV DNA SPEC QL NAA+PROBE: NORMAL
CYTOMEGALOVIRUS PCR, QUANT: NOT DETECTED IU/ML

## 2023-10-23 ENCOUNTER — OFFICE VISIT (OUTPATIENT)
Dept: INFECTIOUS DISEASES | Facility: CLINIC | Age: 33
End: 2023-10-23
Payer: MEDICARE

## 2023-10-23 ENCOUNTER — CLINICAL SUPPORT (OUTPATIENT)
Dept: INFECTIOUS DISEASES | Facility: CLINIC | Age: 33
End: 2023-10-23
Payer: MEDICARE

## 2023-10-23 VITALS
WEIGHT: 177.5 LBS | TEMPERATURE: 98 F | HEIGHT: 66 IN | HEART RATE: 73 BPM | DIASTOLIC BLOOD PRESSURE: 79 MMHG | SYSTOLIC BLOOD PRESSURE: 113 MMHG | BODY MASS INDEX: 28.53 KG/M2

## 2023-10-23 DIAGNOSIS — B01.2: ICD-10-CM

## 2023-10-23 DIAGNOSIS — Z79.60 LONG-TERM USE OF IMMUNOSUPPRESSANT MEDICATION: ICD-10-CM

## 2023-10-23 DIAGNOSIS — Z71.85 VACCINE COUNSELING: ICD-10-CM

## 2023-10-23 DIAGNOSIS — L02.91 ABSCESS: ICD-10-CM

## 2023-10-23 DIAGNOSIS — B25.0 CYTOMEGALOVIRUS PNEUMONIA: ICD-10-CM

## 2023-10-23 DIAGNOSIS — B01.9 DISSEMINATED VARICELLA: ICD-10-CM

## 2023-10-23 DIAGNOSIS — B25.8 OTHER CYTOMEGALOVIRAL DISEASES: ICD-10-CM

## 2023-10-23 DIAGNOSIS — Z79.60 LONG-TERM USE OF IMMUNOSUPPRESSANT MEDICATION: Primary | ICD-10-CM

## 2023-10-23 DIAGNOSIS — Z94.0 DECEASED-DONOR KIDNEY TRANSPLANT: Primary | ICD-10-CM

## 2023-10-23 PROCEDURE — 3078F PR MOST RECENT DIASTOLIC BLOOD PRESSURE < 80 MM HG: ICD-10-PCS | Mod: CPTII,S$GLB,, | Performed by: INTERNAL MEDICINE

## 2023-10-23 PROCEDURE — 1111F PR DISCHARGE MEDS RECONCILED W/ CURRENT OUTPATIENT MED LIST: ICD-10-PCS | Mod: CPTII,S$GLB,, | Performed by: INTERNAL MEDICINE

## 2023-10-23 PROCEDURE — 3008F BODY MASS INDEX DOCD: CPT | Mod: CPTII,S$GLB,, | Performed by: INTERNAL MEDICINE

## 2023-10-23 PROCEDURE — 3066F PR DOCUMENTATION OF TREATMENT FOR NEPHROPATHY: ICD-10-PCS | Mod: CPTII,S$GLB,, | Performed by: INTERNAL MEDICINE

## 2023-10-23 PROCEDURE — 99999 PR PBB SHADOW E&M-EST. PATIENT-LVL III: ICD-10-PCS | Mod: PBBFAC,,, | Performed by: INTERNAL MEDICINE

## 2023-10-23 PROCEDURE — 99215 OFFICE O/P EST HI 40 MIN: CPT | Mod: 25,S$GLB,, | Performed by: INTERNAL MEDICINE

## 2023-10-23 PROCEDURE — 1111F DSCHRG MED/CURRENT MED MERGE: CPT | Mod: CPTII,S$GLB,, | Performed by: INTERNAL MEDICINE

## 2023-10-23 PROCEDURE — 90677 PCV20 VACCINE IM: CPT | Mod: S$GLB,,, | Performed by: INTERNAL MEDICINE

## 2023-10-23 PROCEDURE — 4010F ACE/ARB THERAPY RXD/TAKEN: CPT | Mod: CPTII,S$GLB,, | Performed by: INTERNAL MEDICINE

## 2023-10-23 PROCEDURE — 3078F DIAST BP <80 MM HG: CPT | Mod: CPTII,S$GLB,, | Performed by: INTERNAL MEDICINE

## 2023-10-23 PROCEDURE — G0009 PNEUMOCOCCAL CONJUGATE VACCINE 20-VALENT: ICD-10-PCS | Mod: S$GLB,,, | Performed by: INTERNAL MEDICINE

## 2023-10-23 PROCEDURE — 1159F PR MEDICATION LIST DOCUMENTED IN MEDICAL RECORD: ICD-10-PCS | Mod: CPTII,S$GLB,, | Performed by: INTERNAL MEDICINE

## 2023-10-23 PROCEDURE — 4010F PR ACE/ARB THEARPY RXD/TAKEN: ICD-10-PCS | Mod: CPTII,S$GLB,, | Performed by: INTERNAL MEDICINE

## 2023-10-23 PROCEDURE — 3044F PR MOST RECENT HEMOGLOBIN A1C LEVEL <7.0%: ICD-10-PCS | Mod: CPTII,S$GLB,, | Performed by: INTERNAL MEDICINE

## 2023-10-23 PROCEDURE — 99999 PR PBB SHADOW E&M-EST. PATIENT-LVL III: CPT | Mod: PBBFAC,,, | Performed by: INTERNAL MEDICINE

## 2023-10-23 PROCEDURE — 3074F SYST BP LT 130 MM HG: CPT | Mod: CPTII,S$GLB,, | Performed by: INTERNAL MEDICINE

## 2023-10-23 PROCEDURE — G0009 ADMIN PNEUMOCOCCAL VACCINE: HCPCS | Mod: S$GLB,,, | Performed by: INTERNAL MEDICINE

## 2023-10-23 PROCEDURE — 1160F PR REVIEW ALL MEDS BY PRESCRIBER/CLIN PHARMACIST DOCUMENTED: ICD-10-PCS | Mod: CPTII,S$GLB,, | Performed by: INTERNAL MEDICINE

## 2023-10-23 PROCEDURE — 3074F PR MOST RECENT SYSTOLIC BLOOD PRESSURE < 130 MM HG: ICD-10-PCS | Mod: CPTII,S$GLB,, | Performed by: INTERNAL MEDICINE

## 2023-10-23 PROCEDURE — 99215 PR OFFICE/OUTPT VISIT, EST, LEVL V, 40-54 MIN: ICD-10-PCS | Mod: 25,S$GLB,, | Performed by: INTERNAL MEDICINE

## 2023-10-23 PROCEDURE — 3066F NEPHROPATHY DOC TX: CPT | Mod: CPTII,S$GLB,, | Performed by: INTERNAL MEDICINE

## 2023-10-23 PROCEDURE — 3044F HG A1C LEVEL LT 7.0%: CPT | Mod: CPTII,S$GLB,, | Performed by: INTERNAL MEDICINE

## 2023-10-23 PROCEDURE — 3008F PR BODY MASS INDEX (BMI) DOCUMENTED: ICD-10-PCS | Mod: CPTII,S$GLB,, | Performed by: INTERNAL MEDICINE

## 2023-10-23 PROCEDURE — 1159F MED LIST DOCD IN RCRD: CPT | Mod: CPTII,S$GLB,, | Performed by: INTERNAL MEDICINE

## 2023-10-23 PROCEDURE — 90677 PNEUMOCOCCAL CONJUGATE VACCINE 20-VALENT: ICD-10-PCS | Mod: S$GLB,,, | Performed by: INTERNAL MEDICINE

## 2023-10-23 PROCEDURE — 1160F RVW MEDS BY RX/DR IN RCRD: CPT | Mod: CPTII,S$GLB,, | Performed by: INTERNAL MEDICINE

## 2023-10-23 RX ORDER — MESALAMINE 1.2 G/1
1.2 TABLET, DELAYED RELEASE ORAL
COMMUNITY

## 2023-10-23 RX ORDER — ZOSTER VACCINE RECOMBINANT, ADJUVANTED 50 MCG/0.5
0.5 KIT INTRAMUSCULAR ONCE
Qty: 1 EACH | Refills: 0 | Status: SHIPPED | OUTPATIENT
Start: 2023-10-23 | End: 2023-10-23

## 2023-10-23 RX ORDER — DOXYCYCLINE 100 MG/1
100 CAPSULE ORAL 2 TIMES DAILY
Qty: 60 CAPSULE | Refills: 0 | Status: SHIPPED | OUTPATIENT
Start: 2023-10-23 | End: 2023-11-24

## 2023-10-23 RX ORDER — CEFADROXIL 500 MG/1
1000 CAPSULE ORAL EVERY 12 HOURS
Qty: 120 CAPSULE | Refills: 0 | Status: SHIPPED | OUTPATIENT
Start: 2023-10-23 | End: 2023-11-24

## 2023-10-23 NOTE — PROGRESS NOTES
Subjective:      Patient ID: Leydi Cordero is a 33 y.o. female.    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    Summary of Illness:  Patient presented to hospital with diffuse rash - diagnosed with disseminated VZV. Lesion was VZV PCR positive. Mpox and RPR negative. She was started on IV acyclovir. Patient also with cough, CT chest performed - found to have diffuse nodules. BAL performed. Fungal biomarkers were negative. CMV VL elevated, CMV PCR from BAL positive - she was transitioned to IV ganciclovir. She was discharged home on valganciclovir - dose was supposed to be 900 mg PO BID, but she was sent home on qdaily.    Subjective:  Patient last seen about 2 weeks ago. Patient valganciclovir was increased to 900 mg PO BID. Patient reports coughing has resolved.    She does have a new lesion on her left leg which started about 1 week ago. It was initially a pimple, but then started getting larger, tender, swollen, now draining pus.     Review of Systems   Constitutional: Negative for chills, fever and night sweats.   HENT:  Negative for congestion and sore throat.    Cardiovascular:  Negative for chest pain, dyspnea on exertion and leg swelling.   Respiratory:  Negative for cough, hemoptysis and shortness of breath.    Skin:  Positive for suspicious lesions. Negative for rash.   Musculoskeletal:  Negative for joint pain and joint swelling.   Gastrointestinal:  Negative for abdominal pain, diarrhea, nausea and vomiting.   Genitourinary:  Negative for dysuria and hematuria.   Neurological:  Negative for headaches and numbness.   Psychiatric/Behavioral:  Negative for depression. The patient is not nervous/anxious.      Objective:   Physical Exam  Constitutional:       General: She is  not in acute distress.     Appearance: She is well-developed. She is not diaphoretic.   HENT:      Head: Normocephalic and atraumatic.      Nose: Nose normal.   Eyes:      Conjunctiva/sclera: Conjunctivae normal.   Pulmonary:      Effort: Pulmonary effort is normal. No respiratory distress.   Abdominal:      General: Abdomen is flat. There is no distension.   Musculoskeletal:      Cervical back: Normal range of motion.      Right lower leg: No edema.      Left lower leg: No edema.   Skin:     General: Skin is warm and dry.      Findings: No erythema or rash.      Comments: Right thigh with 4 cm boil draining pus   Neurological:      Mental Status: She is alert.   Psychiatric:         Behavior: Behavior normal.         Sodium   Date Value Ref Range Status   10/18/2023 138 136 - 145 mmol/L Final   10/04/2023 138 136 - 145 mmol/L Final   09/23/2023 137 136 - 145 mmol/L Final      Potassium   Date Value Ref Range Status   10/18/2023 4.4 3.5 - 5.1 mmol/L Final   10/04/2023 4.0 3.5 - 5.1 mmol/L Final   09/23/2023 4.4 3.5 - 5.1 mmol/L Final      Chloride   Date Value Ref Range Status   10/18/2023 109 95 - 110 mmol/L Final   10/04/2023 108 95 - 110 mmol/L Final   09/23/2023 105 95 - 110 mmol/L Final        CO2   Date Value Ref Range Status   10/18/2023 20 (L) 23 - 29 mmol/L Final   10/04/2023 25 23 - 29 mmol/L Final   09/23/2023 23 23 - 29 mmol/L Final        BUN   Date Value Ref Range Status   10/18/2023 10 6 - 20 mg/dL Final   10/04/2023 6 6 - 20 mg/dL Final   09/23/2023 9 6 - 20 mg/dL Final        Creatinine   Date Value Ref Range Status   10/18/2023 0.8 0.5 - 1.4 mg/dL Final   10/04/2023 0.8 0.5 - 1.4 mg/dL Final   09/23/2023 0.8 0.5 - 1.4 mg/dL Final        Glucose   Date Value Ref Range Status   10/18/2023 84 70 - 110 mg/dL Final   10/04/2023 80 70 - 110 mg/dL Final   09/23/2023 97 70 - 110 mg/dL Final       ALT   Date Value Ref Range Status   10/18/2023 16 10 - 44 U/L Final   10/04/2023 13 10 - 44 U/L Final    09/17/2023 40 10 - 44 U/L Final      AST   Date Value Ref Range Status   10/18/2023 23 10 - 40 U/L Final   10/04/2023 15 10 - 40 U/L Final   09/17/2023 44 (H) 10 - 40 U/L Final        Total Bilirubin   Date Value Ref Range Status   10/18/2023 0.2 0.1 - 1.0 mg/dL Final     Comment:     For infants and newborns, interpretation of results should be based  on gestational age, weight and in agreement with clinical  observations.    Premature Infant recommended reference ranges:  Up to 24 hours.............<8.0 mg/dL  Up to 48 hours............<12.0 mg/dL  3-5 days..................<15.0 mg/dL  6-29 days.................<15.0 mg/dL     10/04/2023 0.2 0.1 - 1.0 mg/dL Final     Comment:     For infants and newborns, interpretation of results should be based  on gestational age, weight and in agreement with clinical  observations.    Premature Infant recommended reference ranges:  Up to 24 hours.............<8.0 mg/dL  Up to 48 hours............<12.0 mg/dL  3-5 days..................<15.0 mg/dL  6-29 days.................<15.0 mg/dL     09/17/2023 0.4 0.1 - 1.0 mg/dL Final     Comment:     For infants and newborns, interpretation of results should be based  on gestational age, weight and in agreement with clinical  observations.    Premature Infant recommended reference ranges:  Up to 24 hours.............<8.0 mg/dL  Up to 48 hours............<12.0 mg/dL  3-5 days..................<15.0 mg/dL  6-29 days.................<15.0 mg/dL          Albumin   Date Value Ref Range Status   10/18/2023 3.0 (L) 3.5 - 5.2 g/dL Final   10/04/2023 2.6 (L) 3.5 - 5.2 g/dL Final   09/17/2023 3.0 (L) 3.5 - 5.2 g/dL Final        Total Protein   Date Value Ref Range Status   10/18/2023 7.4 6.0 - 8.4 g/dL Final   10/04/2023 7.1 6.0 - 8.4 g/dL Final   09/17/2023 7.2 6.0 - 8.4 g/dL Final        Alkaline Phosphatase   Date Value Ref Range Status   10/18/2023 76 55 - 135 U/L Final   10/04/2023 75 55 - 135 U/L Final   09/17/2023 91 55 - 135 U/L Final           WBC   Date Value Ref Range Status   10/18/2023 9.28 3.90 - 12.70 K/uL Final   10/04/2023 6.03 3.90 - 12.70 K/uL Final   09/23/2023 5.70 3.90 - 12.70 K/uL Final        Hemoglobin   Date Value Ref Range Status   10/18/2023 11.8 (L) 12.0 - 16.0 g/dL Final   10/04/2023 10.7 (L) 12.0 - 16.0 g/dL Final   09/23/2023 10.6 (L) 12.0 - 16.0 g/dL Final        POC Hematocrit   Date Value Ref Range Status   01/16/2021 20 (LL) 36 - 54 %PCV Final   01/09/2021 25 (L) 36 - 54 %PCV Final   01/09/2021 28 (L) 36 - 54 %PCV Final     Hematocrit   Date Value Ref Range Status   10/18/2023 38.0 37.0 - 48.5 % Final   10/04/2023 34.1 (L) 37.0 - 48.5 % Final   09/23/2023 33.7 (L) 37.0 - 48.5 % Final        Platelets   Date Value Ref Range Status   10/18/2023 210 150 - 450 K/uL Final   10/04/2023 280 150 - 450 K/uL Final   09/23/2023 204 150 - 450 K/uL Final          9/18/2023  VZV PCR positive      9/21/2023  RIP neg  Toxo neg  Crypto neg    Cocci Ab neg  Fungal immunodiffusion neg  Fungitell neg  Histo Ag neg  Toxo IgM neg  Toxo IgG neg    Component      Latest Ref Rng 9/21/2023   Cytomegalovirus PCR, Quant      <50 IU/mL 879 !    Cytomegalovirus DNA      Not Detected  CMV DNA detected and quantified !    Cytomegalovirus Log (IU/mL)      <1.70 LogIU/mL 2.94 !       Legend:  ! 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    Patient disseminated zoster lesions have resolved, cough has resolved, CMV negative, CT chest improved, will discontinue valganciclovir.    Patient with new leg abscess in site of prior pimple - recommend I&D of lesion - patient prefers to follow-up with ED in [place].    Plan:   - Discontinue valganciclovir    - Start cefadroxil 1g PO BID, doxycycline 100 mg PO  BID    - Present to ED or urgent care for I&D of lesions    - HD influenza vaccine and Prevnar 20 today    - Shingrix sent to her pharmacy. Also due for COVID-19 [id number] booster vaccine    RTC 2 weeks    40 minutes of total time spent on the encounter, which includes face to face time and non-face to face time preparing to see the patient (eg, review of tests), obtaining and reviewing separately obtained history, documenting clinical information in the electronic or other health record, independently interpreting results (not separately reported) and communicating results to the patient/family/caregiver, and care coordination (not separately reported).

## 2023-10-24 LAB — FUNGUS SPEC CULT: NORMAL

## 2023-11-09 ENCOUNTER — HOSPITAL ENCOUNTER (EMERGENCY)
Facility: HOSPITAL | Age: 33
Discharge: HOME | End: 2023-11-09
Attending: EMERGENCY MEDICINE
Payer: MEDICARE

## 2023-11-09 VITALS
HEART RATE: 64 BPM | DIASTOLIC BLOOD PRESSURE: 71 MMHG | SYSTOLIC BLOOD PRESSURE: 109 MMHG | BODY MASS INDEX: 28.63 KG/M2 | HEIGHT: 66 IN | TEMPERATURE: 99 F | RESPIRATION RATE: 16 BRPM | WEIGHT: 178.13 LBS | OXYGEN SATURATION: 95 %

## 2023-11-09 DIAGNOSIS — L02.415 ABSCESS OF RIGHT THIGH: ICD-10-CM

## 2023-11-09 DIAGNOSIS — L03.115 CELLULITIS OF RIGHT LOWER EXTREMITY: Primary | ICD-10-CM

## 2023-11-09 PROCEDURE — 96372 THER/PROPH/DIAG INJ SC/IM: CPT | Performed by: NURSE PRACTITIONER

## 2023-11-09 PROCEDURE — 10060 I&D ABSCESS SIMPLE/SINGLE: CPT

## 2023-11-09 PROCEDURE — 63600175 PHARM REV CODE 636 W HCPCS: Performed by: NURSE PRACTITIONER

## 2023-11-09 PROCEDURE — 99284 EMERGENCY DEPT VISIT MOD MDM: CPT | Mod: 25

## 2023-11-09 PROCEDURE — 25000003 PHARM REV CODE 250: Performed by: NURSE PRACTITIONER

## 2023-11-09 RX ORDER — CLINDAMYCIN HYDROCHLORIDE 150 MG/1
450 CAPSULE ORAL EVERY 6 HOURS
Status: DISCONTINUED | OUTPATIENT
Start: 2023-11-09 | End: 2023-11-09

## 2023-11-09 RX ORDER — LIDOCAINE HYDROCHLORIDE 10 MG/ML
10 INJECTION, SOLUTION EPIDURAL; INFILTRATION; INTRACAUDAL; PERINEURAL
Status: COMPLETED | OUTPATIENT
Start: 2023-11-09 | End: 2023-11-09

## 2023-11-09 RX ORDER — CLINDAMYCIN PHOSPHATE 150 MG/ML
600 INJECTION, SOLUTION INTRAVENOUS
Status: DISCONTINUED | OUTPATIENT
Start: 2023-11-09 | End: 2023-11-09

## 2023-11-09 RX ORDER — ONDANSETRON 4 MG/1
4 TABLET, ORALLY DISINTEGRATING ORAL
Status: COMPLETED | OUTPATIENT
Start: 2023-11-09 | End: 2023-11-09

## 2023-11-09 RX ORDER — HYDROCODONE BITARTRATE AND ACETAMINOPHEN 5; 325 MG/1; MG/1
1 TABLET ORAL EVERY 4 HOURS PRN
Qty: 18 TABLET | Refills: 0 | Status: SHIPPED | OUTPATIENT
Start: 2023-11-09

## 2023-11-09 RX ORDER — CLINDAMYCIN HYDROCHLORIDE 150 MG/1
450 CAPSULE ORAL EVERY 8 HOURS
Qty: 90 CAPSULE | Refills: 0 | Status: SHIPPED | OUTPATIENT
Start: 2023-11-09 | End: 2023-11-19

## 2023-11-09 RX ORDER — CLINDAMYCIN HYDROCHLORIDE 150 MG/1
450 CAPSULE ORAL
Status: COMPLETED | OUTPATIENT
Start: 2023-11-09 | End: 2023-11-09

## 2023-11-09 RX ORDER — HYDROMORPHONE HYDROCHLORIDE 2 MG/ML
1 INJECTION, SOLUTION INTRAMUSCULAR; INTRAVENOUS; SUBCUTANEOUS
Status: COMPLETED | OUTPATIENT
Start: 2023-11-09 | End: 2023-11-09

## 2023-11-09 RX ADMIN — CLINDAMYCIN HYDROCHLORIDE 450 MG: 150 CAPSULE ORAL at 12:11

## 2023-11-09 RX ADMIN — LIDOCAINE HYDROCHLORIDE 100 MG: 10 SOLUTION INTRAVENOUS at 10:11

## 2023-11-09 RX ADMIN — HYDROMORPHONE HYDROCHLORIDE 1 MG: 2 INJECTION INTRAMUSCULAR; INTRAVENOUS; SUBCUTANEOUS at 10:11

## 2023-11-09 RX ADMIN — ONDANSETRON 4 MG: 4 TABLET, ORALLY DISINTEGRATING ORAL at 10:11

## 2023-11-09 NOTE — ED PROVIDER NOTES
Encounter Date: 2023       History     Chief Complaint   Patient presents with    Abscess     To R leg x 3 days      Patient is a 33-year-old female that presents with an abscess to the posterior right thigh.  Onset of symptoms was 3 days ago.  Denies any relief with antibiotics that she started taken at home.  Reports that there is drainage coming from the abscess at this time.  Patient shows no signs of distress at this time.  Denies any fever, nausea or vomiting.      Review of patient's allergies indicates:   Allergen Reactions    Heparin analogues Rash     Past Medical History:   Diagnosis Date    Anxiety     -donor kidney transplant 2021    cPRA 100%, XM negative 3 arteries, 2 on common patch, WIT 40 min    Encounter for blood transfusion     ESRD (end stage renal disease)     Fibroma     H/O kidney transplant     Hypertension     Hypertensive nephropathy     Ovarian cyst     Pulmonary nodules 2023    Sleep apnea      Past Surgical History:   Procedure Laterality Date    BRONCHOSCOPY N/A 2023    Procedure: Bronchoscopy;  Surgeon: Denys Robbins MD;  Location: [facility] OR [address] [place] [address];  Service: Pulmonary;  Laterality: N/A;    COLD KNIFE CONIZATION OF CERVIX N/A 2023    Procedure: CONE BIOPSY, CERVIX, USING COLD KNIFE;  Surgeon: Jourdan Conklin MD;  Location: [facility] OR;  Service: OB/GYN;  Laterality: N/A;    COLPOSCOPY  2020    Needs follow up on or after 21    ESOPHAGOGASTRODUODENOSCOPY N/A 2022    Procedure: ESOPHAGOGASTRODUODENOSCOPY (EGD);  Surgeon: Essie Carvajal MD;  Location: [facility];  Service: Endoscopy;  Laterality: N/A;    hemodialysis access left arm      kidney removal       KIDNEY TRANSPLANT  04/15/2015    KIDNEY TRANSPLANT N/A 2021    Procedure: TRANSPLANT, KIDNEY;  Surgeon: Fam Sutton MD;  Location: [facility] OR [address] [place] [address];  Service: Transplant;  Laterality: N/A;    NEPHRECTOMY      OVARIAN CYST REMOVAL      PARATHYROIDECTOMY       partial     PERCUTANEOUS NEPHROSTOMY Left 2/13/2021    Procedure: Creation, Nephrostomy, Percutaneous;  Surgeon: Elen Surgeon;  Location: [facility];  Service: Anesthesiology;  Laterality: Left;    right leg hemodialysis access      ROBOT-ASSISTED LAPAROSCOPIC SLEEVE GASTRECTOMY USING DA VELMA XI N/A 9/27/2022    Procedure: XI ROBOTIC SLEEVE GASTRECTOMY;  Surgeon: Cal Parekh MD;  Location: [facility];  Service: General;  Laterality: N/A;    transplant nephrectomy  12/01/2017     Family History   Problem Relation Age of Onset    No Known Problems Mother     Colon cancer Father     Cancer Father     Hypertension Father     No Known Problems Sister     No Known Problems Brother     Kidney disease Maternal Aunt     Hypertension Maternal Aunt     Diabetes Maternal Uncle     Kidney disease Other     Hypertension Other      Social History     Tobacco Use    Smoking status: Former     Current packs/day: 0.00     Average packs/day: 0.3 packs/day for 12.6 years (3.1 ttl pk-yrs)     Types: Cigarettes     Start date: 2008     Quit date: 8/2/2020     Years since quitting: 3.2    Smokeless tobacco: Never   Substance Use Topics    Alcohol use: No    Drug use: No     Review of Systems   Constitutional:  Negative for fever.   HENT:  Negative for sore throat.    Respiratory:  Negative for shortness of breath.    Cardiovascular:  Negative for chest pain.   Gastrointestinal:  Negative for nausea.   Genitourinary:  Negative for dysuria.   Musculoskeletal:  Negative for back pain.   Skin:  Positive for wound (Abscess to right thigh). Negative for rash.   Neurological:  Negative for weakness.   Hematological:  Does not bruise/bleed easily.       Physical Exam     Initial Vitals [11/09/23 0959]   BP Pulse Resp Temp SpO2   129/79 75 18 98.6 °F (37 °C) 100 %      MAP       --         Physical Exam    Nursing note and vitals reviewed.  Constitutional: She appears well-developed and well-nourished.   HENT:   Head: Normocephalic and atraumatic.    Eyes: EOM are normal. Pupils are equal, round, and reactive to light.   Neck: Neck supple.   Normal range of motion.  Cardiovascular:  Normal rate, regular rhythm, normal heart sounds and intact distal pulses.           Pulmonary/Chest: Breath sounds normal.   Abdominal: Abdomen is soft. Bowel sounds are normal.   Musculoskeletal:         General: Normal range of motion.      Cervical back: Normal range of motion and neck supple.     Neurological: She is alert and oriented to person, place, and time. She has normal strength and normal reflexes.   Skin: Skin is warm and dry. Abscess (induration and fluctuance noted to the right posterior thigh with active draining.) noted.         ED Course   I & D - Incision and Drainage    Date/Time: 11/9/2023 11:45 AM  Location procedure was performed: [facility] EMERGENCY DEPARTMENT    Performed by: Chad Hogan NP  Authorized by: Chad Hogan NP  Type: abscess  Body area: lower extremity  Location details: right leg  Anesthesia: local infiltration    Anesthesia:  Local Anesthetic: lidocaine 1% without epinephrine  Scalpel size: 11  Incision type: single straight  Incision depth: dermal  Complexity: simple  Drainage: purulent and bloody  Drainage amount: moderate  Wound treatment: incision, wound left open, drainage, deloculation and wound packed  Packing material: 1/4 in gauze  Patient tolerance: Patient tolerated the procedure well with no immediate complications    Incision depth: dermal        Labs Reviewed - No data to display       Imaging Results    None          Medications   LIDOcaine (PF) 10 mg/ml (1%) injection 100 mg (100 mg Infiltration Given 11/9/23 1045)   HYDROmorphone (PF) injection 1 mg (1 mg Intramuscular Given 11/9/23 1057)   ondansetron disintegrating tablet 4 mg (4 mg Oral Given 11/9/23 1057)   clindamycin capsule 450 mg (450 mg Oral Given 11/9/23 1201)     Medical Decision Making  Patient instructed to take antibiotics as prescribed and remove packing  in 48 hours.  Patient to return to the emergency room with any worsening symptoms.  No distress noted at time of discharge.    Risk  Prescription drug management.                               Clinical Impression:   Final diagnoses:  [L03.115] Cellulitis of right lower extremity (Primary)  [L02.415] Abscess of right thigh        ED Disposition Condition    Discharge Stable          ED Prescriptions       Medication Sig Dispense Start Date End Date Auth. Provider    clindamycin (CLEOCIN) 150 MG capsule Take 3 capsules (450 mg total) by mouth every 8 (eight) hours. for 10 days 90 capsule 11/9/2023 11/19/2023 Chad Hogan NP    HYDROcodone-acetaminophen (NORCO) 5-325 mg per tablet Take 1 tablet by mouth every 4 (four) hours as needed. 18 tablet 11/9/2023 -- Chad Hogan NP          Follow-up Information       Follow up With Specialties Details Why Contact Info    Helena Zepeda PA-C Family Medicine  As needed [address] [facility] [address]  [phone]               Chad Hogan NP  11/09/23 7200

## 2023-11-30 DIAGNOSIS — Z94.0 KIDNEY REPLACED BY TRANSPLANT: Primary | ICD-10-CM

## 2023-11-30 RX ORDER — MYCOPHENOLATE MOFETIL 250 MG/1
1000 CAPSULE ORAL 2 TIMES DAILY
Qty: 240 CAPSULE | Refills: 11 | Status: SHIPPED | OUTPATIENT
Start: 2023-11-30 | End: 2024-11-29

## 2023-11-30 RX ORDER — SODIUM BICARBONATE 650 MG/1
1300 TABLET ORAL 3 TIMES DAILY
Qty: 540 TABLET | Refills: 3 | Status: SHIPPED | OUTPATIENT
Start: 2023-11-30 | End: 2024-11-29

## 2023-11-30 RX ORDER — TACROLIMUS 1 MG/1
1 CAPSULE ORAL EVERY 12 HOURS
Qty: 60 CAPSULE | Refills: 11 | Status: SHIPPED | OUTPATIENT
Start: 2023-11-30 | End: 2024-11-29

## 2023-11-30 NOTE — TELEPHONE ENCOUNTER
"----- Message from Tom Lin sent at 11/30/2023 12:18 PM CST -----  RX Name and Strength:        sodium bicarbonate 650 MG tablet      tacrolimus (PROGRAF) 1 MG Cap     mycophenolate (CELLCEPT) 250 mg Cap      How is the patient currently taking it?        Take 2 tablets (1,300 mg total) by mouth 3 (three) times daily. - Oral    Take 1 capsule (1 mg total) by mouth every 12 (twelve) hours. - Oral    Take 4 capsules (1,000 mg total) by mouth 2 (two) times daily. Resume after completion of antiviral medication - Oral      Is this a 30 day or 90 day Rx?        180 tablet    60 capsule    240 capsule      Preferred Pharmacy with phone number:    [facility] [address]  [facility] [place] [address]  Phone: [phone] Fax: [phone]        Local or Mail Order: Local        Ordering Provider: Robbin        Contact Preference: [phone] (home)         Additional Information: Because pt requires  -  stating she was having issues communicating w/ the pharmacy on her own; Also requesting to speak w/ Andrei about 11/22 Nephrology appt that she wasn't able to attend    "Thank you for all that you do for our patients"      "

## 2023-12-08 DIAGNOSIS — Z94.0 KIDNEY REPLACED BY TRANSPLANT: Primary | ICD-10-CM

## 2023-12-20 ENCOUNTER — TELEPHONE (OUTPATIENT)
Dept: TRANSPLANT | Facility: CLINIC | Age: 33
End: 2023-12-20
Payer: MEDICARE

## 2023-12-20 NOTE — TELEPHONE ENCOUNTER
Return call to patient requesting January clinic appointment to see Dr Venegas.  Schedule for 1/24 and labs on 1/16/2024.  ----- Message from Andrei Siegel RN sent at 12/20/2023  2:47 PM CST -----  Regarding: FW: call back    ----- Message -----  From: Josh Aiken  Sent: 12/20/2023   1:26 PM CST  To: [facility] Post-Kidney Transplant Non-Clinical  Subject: call back                                        Pt call to schedule annual appt    Call

## 2024-01-04 RX ORDER — PREDNISONE 5 MG/1
5 TABLET ORAL DAILY
Qty: 120 TABLET | Refills: 11 | Status: SHIPPED | OUTPATIENT
Start: 2024-01-04

## 2024-01-16 ENCOUNTER — LAB VISIT (OUTPATIENT)
Dept: LAB | Facility: HOSPITAL | Age: 34
End: 2024-01-16
Attending: INTERNAL MEDICINE
Payer: MEDICAID

## 2024-01-16 DIAGNOSIS — Z94.0 KIDNEY REPLACED BY TRANSPLANT: ICD-10-CM

## 2024-01-16 LAB
ALBUMIN SERPL BCP-MCNC: 3.3 G/DL (ref 3.5–5.2)
ALBUMIN SERPL BCP-MCNC: 3.3 G/DL (ref 3.5–5.2)
ALP SERPL-CCNC: 78 U/L (ref 55–135)
ALT SERPL W/O P-5'-P-CCNC: 21 U/L (ref 10–44)
ANION GAP SERPL CALC-SCNC: 7 MMOL/L (ref 8–16)
AST SERPL-CCNC: 20 U/L (ref 10–40)
BASOPHILS # BLD AUTO: 0.04 K/UL (ref 0–0.2)
BASOPHILS NFR BLD: 0.8 % (ref 0–1.9)
BILIRUB DIRECT SERPL-MCNC: 0.1 MG/DL (ref 0.1–0.3)
BILIRUB SERPL-MCNC: 0.4 MG/DL (ref 0.1–1)
BUN SERPL-MCNC: 13 MG/DL (ref 6–20)
CALCIUM SERPL-MCNC: 9 MG/DL (ref 8.7–10.5)
CHLORIDE SERPL-SCNC: 109 MMOL/L (ref 95–110)
CO2 SERPL-SCNC: 23 MMOL/L (ref 23–29)
CREAT SERPL-MCNC: 0.9 MG/DL (ref 0.5–1.4)
DIFFERENTIAL METHOD BLD: ABNORMAL
EOSINOPHIL # BLD AUTO: 0.1 K/UL (ref 0–0.5)
EOSINOPHIL NFR BLD: 2 % (ref 0–8)
ERYTHROCYTE [DISTWIDTH] IN BLOOD BY AUTOMATED COUNT: 14.1 % (ref 11.5–14.5)
EST. GFR  (NO RACE VARIABLE): >60 ML/MIN/1.73 M^2
GLUCOSE SERPL-MCNC: 76 MG/DL (ref 70–110)
HCT VFR BLD AUTO: 50.4 % (ref 37–48.5)
HGB BLD-MCNC: 15.8 G/DL (ref 12–16)
IMM GRANULOCYTES # BLD AUTO: 0.01 K/UL (ref 0–0.04)
IMM GRANULOCYTES NFR BLD AUTO: 0.2 % (ref 0–0.5)
LYMPHOCYTES # BLD AUTO: 0.9 K/UL (ref 1–4.8)
LYMPHOCYTES NFR BLD: 18.1 % (ref 18–48)
MAGNESIUM SERPL-MCNC: 2.4 MG/DL (ref 1.6–2.6)
MCH RBC QN AUTO: 28.7 PG (ref 27–31)
MCHC RBC AUTO-ENTMCNC: 31.3 G/DL (ref 32–36)
MCV RBC AUTO: 92 FL (ref 82–98)
MONOCYTES # BLD AUTO: 0.5 K/UL (ref 0.3–1)
MONOCYTES NFR BLD: 10.5 % (ref 4–15)
NEUTROPHILS # BLD AUTO: 3.5 K/UL (ref 1.8–7.7)
NEUTROPHILS NFR BLD: 68.4 % (ref 38–73)
NRBC BLD-RTO: 0 /100 WBC
PHOSPHATE SERPL-MCNC: 2.9 MG/DL (ref 2.7–4.5)
PLATELET # BLD AUTO: 237 K/UL (ref 150–450)
PMV BLD AUTO: 11.3 FL (ref 9.2–12.9)
POTASSIUM SERPL-SCNC: 4.6 MMOL/L (ref 3.5–5.1)
PROT SERPL-MCNC: 7.2 G/DL (ref 6–8.4)
RBC # BLD AUTO: 5.5 M/UL (ref 4–5.4)
SODIUM SERPL-SCNC: 139 MMOL/L (ref 136–145)
WBC # BLD AUTO: 5.04 K/UL (ref 3.9–12.7)

## 2024-01-16 PROCEDURE — 80197 ASSAY OF TACROLIMUS: CPT | Performed by: INTERNAL MEDICINE

## 2024-01-16 PROCEDURE — 83735 ASSAY OF MAGNESIUM: CPT | Performed by: INTERNAL MEDICINE

## 2024-01-16 PROCEDURE — 80069 RENAL FUNCTION PANEL: CPT | Performed by: INTERNAL MEDICINE

## 2024-01-16 PROCEDURE — 36415 COLL VENOUS BLD VENIPUNCTURE: CPT | Performed by: INTERNAL MEDICINE

## 2024-01-16 PROCEDURE — 87799 DETECT AGENT NOS DNA QUANT: CPT | Performed by: INTERNAL MEDICINE

## 2024-01-16 PROCEDURE — 85025 COMPLETE CBC W/AUTO DIFF WBC: CPT | Performed by: INTERNAL MEDICINE

## 2024-01-16 PROCEDURE — 84075 ASSAY ALKALINE PHOSPHATASE: CPT | Performed by: INTERNAL MEDICINE

## 2024-01-17 LAB
CMV DNA SPEC QL NAA+PROBE: NORMAL
CYTOMEGALOVIRUS PCR, QUANT: NOT DETECTED IU/ML
TACROLIMUS BLD-MCNC: 6.2 NG/ML (ref 5–15)

## 2024-01-26 ENCOUNTER — OFFICE VISIT (OUTPATIENT)
Dept: TRANSPLANT | Facility: CLINIC | Age: 34
End: 2024-01-26
Payer: MEDICARE

## 2024-01-26 VITALS
OXYGEN SATURATION: 100 % | TEMPERATURE: 97 F | BODY MASS INDEX: 30.33 KG/M2 | HEART RATE: 76 BPM | WEIGHT: 188.69 LBS | DIASTOLIC BLOOD PRESSURE: 72 MMHG | SYSTOLIC BLOOD PRESSURE: 110 MMHG | HEIGHT: 66 IN

## 2024-01-26 DIAGNOSIS — E21.2 HYPERPARATHYROIDISM, TERTIARY: ICD-10-CM

## 2024-01-26 DIAGNOSIS — Z94.0 DECEASED-DONOR KIDNEY TRANSPLANT: ICD-10-CM

## 2024-01-26 DIAGNOSIS — Z90.89 S/P PARATHYROIDECTOMY: ICD-10-CM

## 2024-01-26 DIAGNOSIS — D84.821 IMMUNODEFICIENCY DUE TO LONG TERM IMMUNOSUPPRESSIVE DRUG THERAPY: ICD-10-CM

## 2024-01-26 DIAGNOSIS — Z98.890 S/P PARATHYROIDECTOMY: ICD-10-CM

## 2024-01-26 DIAGNOSIS — Z79.899 IMMUNOSUPPRESSIVE MANAGEMENT ENCOUNTER FOLLOWING KIDNEY TRANSPLANT: ICD-10-CM

## 2024-01-26 DIAGNOSIS — Z79.899 IMMUNODEFICIENCY DUE TO LONG TERM IMMUNOSUPPRESSIVE DRUG THERAPY: ICD-10-CM

## 2024-01-26 DIAGNOSIS — B97.89 VIRAL SINUSITIS: ICD-10-CM

## 2024-01-26 DIAGNOSIS — B01.9 DISSEMINATED VARICELLA: ICD-10-CM

## 2024-01-26 DIAGNOSIS — Z90.5 S/P NEPHRECTOMY: ICD-10-CM

## 2024-01-26 DIAGNOSIS — D06.9 HIGH GRADE SQUAMOUS INTRAEPITHELIAL LESION (HGSIL), GRADE 3 CIN, ON BIOPSY OF CERVIX: ICD-10-CM

## 2024-01-26 DIAGNOSIS — T45.1X5A IMMUNODEFICIENCY DUE TO LONG TERM IMMUNOSUPPRESSIVE DRUG THERAPY: ICD-10-CM

## 2024-01-26 DIAGNOSIS — J32.9 VIRAL SINUSITIS: ICD-10-CM

## 2024-01-26 DIAGNOSIS — T86.11 ANTIBODY MEDIATED REJECTION OF KIDNEY TRANSPLANT: ICD-10-CM

## 2024-01-26 DIAGNOSIS — N18.2 CKD (CHRONIC KIDNEY DISEASE) STAGE 2, GFR 60-89 ML/MIN: Primary | ICD-10-CM

## 2024-01-26 DIAGNOSIS — I10 ESSENTIAL HYPERTENSION: ICD-10-CM

## 2024-01-26 DIAGNOSIS — Z94.0 IMMUNOSUPPRESSIVE MANAGEMENT ENCOUNTER FOLLOWING KIDNEY TRANSPLANT: ICD-10-CM

## 2024-01-26 DIAGNOSIS — Z91.89 AT RISK FOR OPPORTUNISTIC INFECTIONS: ICD-10-CM

## 2024-01-26 PROCEDURE — 99999 PR PBB SHADOW E&M-EST. PATIENT-LVL III: CPT | Mod: PBBFAC,,, | Performed by: INTERNAL MEDICINE

## 2024-01-26 PROCEDURE — 99215 OFFICE O/P EST HI 40 MIN: CPT | Mod: S$GLB,,, | Performed by: INTERNAL MEDICINE

## 2024-01-26 NOTE — PROGRESS NOTES
Kidney Post-Transplant Assessment    Referring Physician: Won Miller  Current Nephrologist: Julio Jaffe    ORGAN: RIGHT KIDNEY  Donor Type: donation after brain death  PHS Increased Risk: no  Cold Ischemia: 1,251 mins  Induction Medications: thymoglobulin    Subjective:     CC:  Reassessment of renal allograft function and management of chronic immunosuppression.    HPI:  Ms. Oumar Cordero is a 33 y.o. 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  She has CKD stage 2 - GFR 60-89 and her baseline creatinine is between 0.9 to 1. She takes mycophenolate mofetil, prednisone, and tacrolimus for maintenance immunosuppression. She denies any recent hospitalizations or ER visits since her previous clinic visit.    Refers some URI symptoms for 9 days. She denied any improvements or worsening.  No fevers, or cough only nasal congestion. No chest pain or SOB.great appetite. No sick contacts   She feels overall well.  No other issues to report to me today. No admissions to any facility after discharge in 10/23 with disseminated varicella infection . Seen by transplant ID.          Current Outpatient Medications on File Prior to Visit   Medication Sig Dispense Refill    albuterol (PROVENTIL/VENTOLIN HFA) 90 mcg/actuation inhaler Inhale 2 puffs into the lungs every 4 (four) hours as needed for Wheezing or Shortness of Breath. Rescue 8.5 g 11    calcitRIOL (ROCALTROL) 0.25 MCG Cap Take 1 capsule (0.25 mcg total) by mouth once daily. 30 capsule 3    docusate sodium (COLACE) 100 MG capsule Take 1 capsule (100 mg total) by mouth 2 (two) times daily as needed. 20 capsule 0    HYDROcodone-acetaminophen (NORCO) 5-325 mg per tablet Take 1 tablet by mouth every 4 (four) hours as needed. 18 tablet 0    mesalamine (LIALDA) 1.2 gram TbEC Take 1.2 g by mouth daily with breakfast.      midodrine (PROAMATINE) 10 MG tablet Take 1 tablet (10 mg total) by mouth 2 (two) times daily  "with meals. 60 tablet 2    multivit-iron-FA-calcium-mins (HIGH POTENCY MULTIVIT, W-IRON,) 9 mg iron-400 mcg Tab tablet Take 1 tablet by mouth once daily. 30 tablet 2    mycophenolate (CELLCEPT) 250 mg Cap Take 4 capsules (1,000 mg total) by mouth 2 (two) times daily. Txp Date:1/9/2021 (Kidney) Disch Date:1/14/2021 ICD10:Z94.0 Txp Location:[facility] 240 capsule 11    pantoprazole (PROTONIX) 40 MG tablet Take 1 tablet by mouth once daily 90 tablet 3    predniSONE (DELTASONE) 5 MG tablet Take 1 tablet (5 mg total) by mouth once daily. 120 tablet 11    sodium bicarbonate 650 MG tablet Take 2 tablets (1,300 mg total) by mouth 3 (three) times daily. 540 tablet 3    sucralfate (CARAFATE) 1 gram tablet Take 1 tablet (1 g total) by mouth 4 (four) times daily. 30 tablet 0    tacrolimus (PROGRAF) 1 MG Cap Take 1 capsule (1 mg total) by mouth every 12 (twelve) hours. 60 capsule 11     No current facility-administered medications on file prior to visit.        Review of Systems    Skin: no skin rash  CNS; no headaches, blurred vision, seizure, or syncope  ENT: No JVD,  Adenopathies,  nasal congestion. No oral lesions  Cardiac: No chest pain, dyspnea, claudication, edema or palpitations  Respiratory: No SOB, cough, hemoptysis   Gastro-intestinal: No diarrhea, constipation, abdominal pain, nausea, vomit. No ascitis  Genitourinary: no hematuria, dysuria, frequency, frequency  Musculoskeletal: joint pain, arthritis or vasculitic changes  Psych: alert awake, oriented, No cranial nerves deficit.      Objective:       Physical Exam    /72 (BP Location: Right arm, Patient Position: Sitting, BP Method: Small (Manual))   Pulse 76   Temp 97.2 °F (36.2 °C) (Temporal)   Ht 5' 6" (1.676 m)   Wt 85.6 kg (188 lb 11.4 oz)   SpO2 100%   BMI 30.46 kg/m²       Head: normocephalic  Neck: No JVD, cervical axillary, or femoral adenopathies  Heart: no murmurs, Normal s1 and s2, No gallops, no rubs, No murmurs. Otoscopy unremarkable.   Lungs; " "CTA, good respiratory effort, no crackles  Abdomen: soft, non tender, no splenomegaly or hepatomegaly, no massess, no bruits  Extremities: No edema, skin rash, joint pain  SNC: awake, alert oriented. Cranial nerves are intact, no focalized, sensitivity and strength preserved      Labs:  Lab Results   Component Value Date    WBC 5.04 2024    HGB 15.8 2024    HCT 50.4 (H) 2024     2024    K 4.6 2024     2024    CO2 23 2024    BUN 13 2024    CREATININE 0.9 2024    EGFRNORACEVR >60.0 2024    CALCIUM 9.0 2024    PHOS 2.9 2024    MG 2.4 2024    ALBUMIN 3.3 (L) 2024    ALBUMIN 3.3 (L) 2024    AST 20 2024    ALT 21 2024       No results found for: "EXTANC", "EXTWBC", "EXTSEGS", "EXTPLATELETS", "EXTHEMOGLOBI", "EXTHEMATOCRI", "EXTCREATININ", "EXTSODIUM", "EXTPOTASSIUM", "EXTBUN", "EXTCO2", "EXTCALCIUM", "EXTPHOSPHORU", "EXTGLUCOSE", "EXTALBUMIN", "EXTAST", "EXTALT", "EXTBILITOTAL", "EXTLIPASE", "EXTAMYLASE"    No results found for: "EXTCYCLOSLVL", "EXTSIROLIMUS", "EXTTACROLVL", "EXTPROTCRE", "EXTPTHINTACT", "EXTPROTEINUA", "EXTWBCUA", "EXTRBCUA"    Labs were reviewed with the patient.    Assessment:     1. CKD (chronic kidney disease) stage 2, GFR 60-89 ml/min    2. -donor kidney transplant 2021    3. Disseminated varicella    4. Essential hypertension    5. Antibody mediated rejection of kidney transplant    6. High grade squamous intraepithelial lesion (HGSIL), grade 3 PETRONA, on biopsy of cervix    7. At risk for opportunistic infections    8. Hyperparathyroidism, tertiary    9. Immunodeficiency due to long term immunosuppressive drug therapy    10. Immunosuppressive management encounter following kidney transplant    11. S/p nephrectomy 17    12. S/P parathyroidectomy--partial        Plan:   I advised patient urgent care visit to be tested for COVID and Influenza  I advised patient to call me if " "no resolution of current symptoms  No change with immunosuppression (second transplant and sensitized)  Education provided  All questions answered.      1. CKD stage: 2 with normal and excellent GFR will continue follow up as per our center guidelines. patient to continue close follow up with the local General nephrologist. Education provided in appropriate fluid intake, potassium intake. Continue with oral hydration.    1A. Pancreatic Function: N/A for non-pancreas allograft recipients:     2. High risk immunosuppression medication for organ transplantation, requiring regular intensive follow up and monitoring : tacro level at target MMF 1000 bid prednisone 5 mg daily   Lab Results   Component Value Date    TACROLIMUS 6.2 01/16/2024    TACROLIMUS 3.0 (L) 09/23/2023    TACROLIMUS 3.8 (L) 09/21/2023     No results found for: "CYCLOSPORINE"  @   Will closely monitor for toxicities, education provided about adherence to medicines and need to communicate any side effects to the transplant nurse or physician.    3. Allograft Function:stable at baseline for the patient. Continue follow up as per our guidelines and with the local General nephrologist. Communication will be sent today.  Lab Results   Component Value Date    CREATININE 0.9 01/16/2024    CREATININE 0.8 10/18/2023    CREATININE 0.8 10/04/2023     Lab Results   Component Value Date    LIPASE 14 08/13/2023    LIPASE 34 08/03/2023    LIPASE 31 01/16/2021       4. Hypertension management:well controlled   Continue with home blood pressure monitoring, low salt and healthy life discussed with the patient..    5. Metabolic Bone Disease/Secondary Hyperparathyroidism:calcium and phosphorus level discussed with the patient, patient will continue follow up with the general nephrologist for management of metabolic bone disease. Will monitor PTH and Vit D per our transplant center guidelines.      Lab Results   Component Value Date    PTH 24.9 08/30/2023    CALCIUM 9.0 " 01/16/2024    CAION 1.21 02/01/2021    PHOS 2.9 01/16/2024    PHOS 3.4 08/30/2023    PHOS 3.8 04/27/2023       6. Electrolytes and acid base balance: reviewed with the patient, essentially within the normal range no need for acute changes today, will monitor as per our center guidelines.     Lab Results   Component Value Date     01/16/2024    K 4.6 01/16/2024     01/16/2024    CO2 23 01/16/2024    CO2 20 (L) 10/18/2023    CO2 25 10/04/2023       7. Anemia: will continue monitoring as per our center guidelines. No indication for acute intervention today.     Lab Results   Component Value Date    WBC 5.04 01/16/2024    HGB 15.8 01/16/2024    HCT 50.4 (H) 01/16/2024    MCV 92 01/16/2024     01/16/2024       8.Proteinuria: will continue with pr/cr ratio as per our center guidelines.  Lab Results   Component Value Date    PROTEINURINE <7 01/16/2024    CREATRANDUR 49.0 01/16/2024    UTPCR Unable to calculate 01/16/2024    UTPCR 0.08 08/30/2023    UTPCR Unable to calculate 04/27/2023        9. BK virus infection screening: will continue with urine or blood PCR as per our guidelines to prevent BK virus viremia and allograft dysfunction  Lab Results   Component Value Date    BKVIRUSPCRQB <125 01/16/2024         10. Weight and metabolic management: education provided provided during the clinic visit.   There is no height or weight on file to calculate BMI.       11.Patient safety education regarding immunosuppression including prophylaxis posttransplant for CMV, PCP : Education provided about vaccination and prevention of infections.    12.  Cytopenias: no significant cytopenias will monitor as per our guidelines. Medicine list reviewed including potential causes of drug-induced cytopenias     Lab Results   Component Value Date    WBC 5.04 01/16/2024    HGB 15.8 01/16/2024    HCT 50.4 (H) 01/16/2024    MCV 92 01/16/2024     01/16/2024       13. Post-transplant Prophylaxis; CMV Infection, PJP and  Candida mucosistis and other indicated for this particular patient. OFF prophylaxis.    I spoke with the patient for 30 minutes. More than half dedicated to counseling and education. All questions answered    José Antonio Venegas MD  Transplant Nephrology            Follow-up:   Annual follow-up with kidney transplant clinic per written guidelines.  Patient also reminded to follow-up with general nephrologist.    Labs: since patient remains at high risk for rejection and drug-related complications that warrant close monitoring, labs will be ordered as follows: continue twice weekly CBC, renal panel, and drug level for first month; then same labs once weekly through 3rd month post-transplant.  Urine for UA and protein/creatinine ratio monthly.  Serum BK - PCR at 1-, 3-, 6-, 9-, 12-, 18-, 24-, 36-, 48-, and 60 months post-transplant.  Hepatic panel at 1-, 2-, 3-, 6-, 9-, 12-, 18-, 24-, and 36- months post-transplant.    José Antonio Venegas MD       Education:   Material provided to the patient.  Patient reminded to call with any health changes since these can be early signs of significant complications.  Also, I advised the patient to be sure any new medications or changes of old medications are discussed with either a pharmacist or physician knowledgeable with transplant to avoid rejection/drug toxicity related to significant drug interactions.    Patient advised that it is recommended that all transplant candidates, and their close contacts and household members receive Covid vaccination.    UNOS Patient Status  Functional Status: 90% - Able to carry on normal activity: minor symptoms of disease  Physical Capacity: No Limitations

## 2024-01-26 NOTE — LETTER
January 29, 2024        Julio Jaffe  [address] [address] NEPHROLOGY  [place] [address]  Phone: [phone]  Fax: [phone]             [facility]- Transplant 1st Fl  [address]  [facility] [address]  Phone: [phone]   Patient: Leydi Cordero   MR Number: [id number]   YOB: 1990   Date of Visit: 1/26/2024       Dear Dr. Julio Jaffe    Thank you for referring Leydi Cordero to me for evaluation. Attached you will find relevant portions of my assessment and plan of care.    If you have questions, please do not hesitate to call me. I look forward to following Leydi Cordero along with you.    Sincerely,    José Antonio Venegas MD    Enclosure    If you would like to receive this communication electronically, please contact [email or web address] or [phone] to request [employer] access.    [employer] is a tool which provides read-only access to select patient information with whom you have a relationship. Its easy to use and provides real time access to review your patients record including encounter summaries, notes, results, and demographic information.    If you feel you have received this communication in error or would no longer like to receive these types of communications, please e-mail [email or web address]

## 2024-02-27 ENCOUNTER — TELEPHONE (OUTPATIENT)
Dept: NEPHROLOGY | Facility: CLINIC | Age: 34
End: 2024-02-27
Payer: MEDICAID

## 2024-02-27 ENCOUNTER — PATIENT MESSAGE (OUTPATIENT)
Dept: NEPHROLOGY | Facility: CLINIC | Age: 34
End: 2024-02-27
Payer: MEDICAID

## 2024-02-28 ENCOUNTER — LAB VISIT (OUTPATIENT)
Dept: LAB | Facility: HOSPITAL | Age: 34
End: 2024-02-28
Attending: INTERNAL MEDICINE
Payer: MEDICAID

## 2024-02-28 DIAGNOSIS — D84.9 IMMUNOSUPPRESSION: ICD-10-CM

## 2024-02-28 DIAGNOSIS — Z94.0 KIDNEY TRANSPLANT STATUS: ICD-10-CM

## 2024-02-28 DIAGNOSIS — I10 PRIMARY HYPERTENSION: ICD-10-CM

## 2024-02-28 LAB
ALBUMIN SERPL BCP-MCNC: 3.3 G/DL (ref 3.5–5.2)
ANION GAP SERPL CALC-SCNC: 9 MMOL/L (ref 8–16)
BUN SERPL-MCNC: 13 MG/DL (ref 6–20)
CALCIUM SERPL-MCNC: 9.3 MG/DL (ref 8.7–10.5)
CHLORIDE SERPL-SCNC: 109 MMOL/L (ref 95–110)
CO2 SERPL-SCNC: 21 MMOL/L (ref 23–29)
CREAT SERPL-MCNC: 1 MG/DL (ref 0.5–1.4)
EST. GFR  (NO RACE VARIABLE): >60 ML/MIN/1.73 M^2
GLUCOSE SERPL-MCNC: 64 MG/DL (ref 70–110)
MAGNESIUM SERPL-MCNC: 1.9 MG/DL (ref 1.6–2.6)
PHOSPHATE SERPL-MCNC: 2.3 MG/DL (ref 2.7–4.5)
POTASSIUM SERPL-SCNC: 4.3 MMOL/L (ref 3.5–5.1)
SODIUM SERPL-SCNC: 139 MMOL/L (ref 136–145)

## 2024-02-28 PROCEDURE — 80069 RENAL FUNCTION PANEL: CPT | Performed by: INTERNAL MEDICINE

## 2024-02-28 PROCEDURE — 83735 ASSAY OF MAGNESIUM: CPT | Performed by: INTERNAL MEDICINE

## 2024-02-28 PROCEDURE — 80197 ASSAY OF TACROLIMUS: CPT | Performed by: INTERNAL MEDICINE

## 2024-02-28 PROCEDURE — 36415 COLL VENOUS BLD VENIPUNCTURE: CPT | Performed by: INTERNAL MEDICINE

## 2024-02-29 LAB — TACROLIMUS BLD-MCNC: 3.7 NG/ML (ref 5–15)

## 2024-03-08 DIAGNOSIS — J45.20 ASTHMA, MILD INTERMITTENT, WELL-CONTROLLED: ICD-10-CM

## 2024-03-08 DIAGNOSIS — E21.2 HYPERPARATHYROIDISM, TERTIARY: ICD-10-CM

## 2024-03-08 DIAGNOSIS — Z94.0 DECEASED-DONOR KIDNEY TRANSPLANT: ICD-10-CM

## 2024-03-08 RX ORDER — CALCITRIOL 0.25 UG/1
0.25 CAPSULE ORAL DAILY
Qty: 30 CAPSULE | Refills: 3 | Status: SHIPPED | OUTPATIENT
Start: 2024-03-08

## 2024-03-08 RX ORDER — ALBUTEROL SULFATE 90 UG/1
2 AEROSOL, METERED RESPIRATORY (INHALATION) EVERY 4 HOURS PRN
Qty: 8.5 G | Refills: 11 | Status: CANCELLED | OUTPATIENT
Start: 2024-03-08

## 2024-03-26 ENCOUNTER — OFFICE VISIT (OUTPATIENT)
Dept: OBSTETRICS AND GYNECOLOGY | Facility: CLINIC | Age: 34
End: 2024-03-26
Payer: MEDICAID

## 2024-03-26 VITALS
DIASTOLIC BLOOD PRESSURE: 74 MMHG | BODY MASS INDEX: 31.78 KG/M2 | SYSTOLIC BLOOD PRESSURE: 116 MMHG | WEIGHT: 196.88 LBS

## 2024-03-26 DIAGNOSIS — Z12.4 PAP SMEAR FOR CERVICAL CANCER SCREENING: Primary | ICD-10-CM

## 2024-03-26 DIAGNOSIS — R30.0 DYSURIA: ICD-10-CM

## 2024-03-26 LAB
BILIRUBIN, UA POC OHS: NEGATIVE
BLOOD, UA POC OHS: NEGATIVE
CLARITY, UA POC OHS: CLEAR
COLOR, UA POC OHS: YELLOW
GLUCOSE, UA POC OHS: NEGATIVE
KETONES, UA POC OHS: NEGATIVE
LEUKOCYTES, UA POC OHS: ABNORMAL
NITRITE, UA POC OHS: NEGATIVE
PH, UA POC OHS: 5.5
PROTEIN, UA POC OHS: NEGATIVE
SPECIFIC GRAVITY, UA POC OHS: 1.02
UROBILINOGEN, UA POC OHS: 0.2

## 2024-03-26 PROCEDURE — 87624 HPV HI-RISK TYP POOLED RSLT: CPT | Performed by: OBSTETRICS & GYNECOLOGY

## 2024-03-26 PROCEDURE — 99999 PR PBB SHADOW E&M-EST. PATIENT-LVL III: CPT | Mod: PBBFAC,,, | Performed by: OBSTETRICS & GYNECOLOGY

## 2024-03-26 PROCEDURE — 99999PBSHW PR PBB SHADOW TECHNICAL ONLY FILED TO HB: Mod: PBBFAC,,,

## 2024-03-26 PROCEDURE — 81002 URINALYSIS NONAUTO W/O SCOPE: CPT | Mod: PBBFAC | Performed by: OBSTETRICS & GYNECOLOGY

## 2024-03-26 PROCEDURE — 88141 CYTOPATH C/V INTERPRET: CPT | Mod: ,,, | Performed by: PATHOLOGY

## 2024-03-26 PROCEDURE — 88175 CYTOPATH C/V AUTO FLUID REDO: CPT | Performed by: PATHOLOGY

## 2024-03-26 PROCEDURE — 99213 OFFICE O/P EST LOW 20 MIN: CPT | Mod: S$PBB,,, | Performed by: OBSTETRICS & GYNECOLOGY

## 2024-03-26 PROCEDURE — 99213 OFFICE O/P EST LOW 20 MIN: CPT | Mod: PBBFAC | Performed by: OBSTETRICS & GYNECOLOGY

## 2024-03-26 NOTE — PROGRESS NOTES
Subjective:      Patient ID: Leydi Cordero is a 34 y.o. female.    Chief Complaint:  Follow-up      History of Present Illness  Pt is here for scheduled 6 month pap.  Reports complaints of dysuria x 3 days.  Otherwise, doing well.    GYN & OB History  No LMP recorded.   Date of Last Pap: 2023    OB History    Para Term  AB Living   0 0 0 0 0 0   SAB IAB Ectopic Multiple Live Births   0 0 0 0 0       Review of Systems  Review of Systems   Constitutional:  Negative for activity change, appetite change, chills, fatigue, fever and unexpected weight change.   Respiratory:  Negative for shortness of breath.    Cardiovascular:  Negative for chest pain, palpitations and leg swelling.   Gastrointestinal:  Negative for abdominal pain, bloating, blood in stool, constipation, diarrhea, nausea and vomiting.   Genitourinary:  Positive for dysuria. Negative for dysmenorrhea, dyspareunia, flank pain, frequency, genital sores, hematuria, menorrhagia, menstrual problem, pelvic pain, urgency, vaginal bleeding, vaginal discharge, vaginal pain, urinary incontinence, postcoital bleeding, vaginal dryness and vaginal odor.   Musculoskeletal:  Negative for back pain.   Neurological:  Negative for syncope and headaches.          Objective:     Physical Exam:   Constitutional: She is oriented to person, place, and time. She appears well-developed and well-nourished. No distress.       Cardiovascular:  Normal rate and regular rhythm.             Pulmonary/Chest: Effort normal and breath sounds normal.        Abdominal: Soft. Bowel sounds are normal. She exhibits no distension. There is no abdominal tenderness.     Genitourinary:    Vagina, uterus, right adnexa and left adnexa normal.      Pelvic exam was performed with patient supine.   There is no rash, tenderness, lesion or injury on the right labia. There is no rash, tenderness, lesion or injury on the left labia. Cervix is normal. Right adnexum displays no  mass, no tenderness and no fullness. Left adnexum displays no mass, no tenderness and no fullness. No erythema, vaginal discharge, tenderness or bleeding in the vagina.    No foreign body in the vagina.      No signs of injury in the vagina.   Cervix exhibits no motion tenderness, no discharge and no friability.    pap smear completedUterus is not deviated, not enlarged and not tender.    Genitourinary Comments: UA today - neg nitrites; 1+ leuk             Musculoskeletal: Normal range of motion and moves all extremeties. No tenderness or edema.       Neurological: She is alert and oriented to person, place, and time.    Skin: Skin is warm and dry.    Psychiatric: She has a normal mood and affect. Her behavior is normal. Thought content normal.      Pap history:  08/23 - [facility] PETRONA 3 with negative margins  05/23 - PETRONA 3, ECC neg  04/23 - LSIL/ASC-H  06/20 - Colpo neg  12/19 - ASCUS HPV+     Assessment:     1. Pap smear for cervical cancer screening    2. Dysuria             Plan:     Pap smear for cervical cancer screening  -     Liquid-Based Pap Smear, Screening  -     Pt was counseled on cervical/vaginal screening guidelines and recommendations.  See pap history above.  If today's pap smear result is negative, next pap smear will be due in 6 months.  -     Follow up with PCP for routine health maintenance needs.    Dysuria  -     POCT Urinalysis(Instrument)  -     No evidence of UTI today.  Pt reassured.      Follow up in about 6 months (around 9/26/2024) for Annual exam with pap.

## 2024-04-03 ENCOUNTER — PATIENT MESSAGE (OUTPATIENT)
Dept: PULMONOLOGY | Facility: CLINIC | Age: 34
End: 2024-04-03
Payer: MEDICAID

## 2024-04-03 LAB
FINAL PATHOLOGIC DIAGNOSIS: ABNORMAL
Lab: ABNORMAL

## 2024-04-05 LAB
HPV HR 12 DNA SPEC QL NAA+PROBE: NEGATIVE
HPV16 AG SPEC QL: NEGATIVE
HPV18 DNA SPEC QL NAA+PROBE: NEGATIVE

## 2024-04-08 ENCOUNTER — TELEPHONE (OUTPATIENT)
Dept: NEPHROLOGY | Facility: CLINIC | Age: 34
End: 2024-04-08
Payer: MEDICAID

## 2024-04-08 DIAGNOSIS — Z94.0 KIDNEY TRANSPLANT STATUS: Primary | ICD-10-CM

## 2024-04-08 DIAGNOSIS — N18.2 CKD (CHRONIC KIDNEY DISEASE) STAGE 2, GFR 60-89 ML/MIN: Primary | ICD-10-CM

## 2024-04-10 ENCOUNTER — TELEPHONE (OUTPATIENT)
Dept: NEPHROLOGY | Facility: CLINIC | Age: 34
End: 2024-04-10
Payer: MEDICAID

## 2024-04-10 ENCOUNTER — LAB VISIT (OUTPATIENT)
Dept: LAB | Facility: HOSPITAL | Age: 34
End: 2024-04-10
Attending: INTERNAL MEDICINE
Payer: MEDICARE

## 2024-04-10 DIAGNOSIS — Z94.0 KIDNEY TRANSPLANT STATUS: ICD-10-CM

## 2024-04-10 DIAGNOSIS — N18.2 CKD (CHRONIC KIDNEY DISEASE) STAGE 2, GFR 60-89 ML/MIN: ICD-10-CM

## 2024-04-10 LAB
ALBUMIN SERPL BCP-MCNC: 3.3 G/DL (ref 3.5–5.2)
ANION GAP SERPL CALC-SCNC: 8 MMOL/L (ref 8–16)
BUN SERPL-MCNC: 14 MG/DL (ref 6–20)
CALCIUM SERPL-MCNC: 9 MG/DL (ref 8.7–10.5)
CHLORIDE SERPL-SCNC: 108 MMOL/L (ref 95–110)
CO2 SERPL-SCNC: 24 MMOL/L (ref 23–29)
CREAT SERPL-MCNC: 0.9 MG/DL (ref 0.5–1.4)
EST. GFR  (NO RACE VARIABLE): >60 ML/MIN/1.73 M^2
GLUCOSE SERPL-MCNC: 65 MG/DL (ref 70–110)
PHOSPHATE SERPL-MCNC: 3.8 MG/DL (ref 2.7–4.5)
POTASSIUM SERPL-SCNC: 3.8 MMOL/L (ref 3.5–5.1)
SODIUM SERPL-SCNC: 140 MMOL/L (ref 136–145)

## 2024-04-10 PROCEDURE — 36415 COLL VENOUS BLD VENIPUNCTURE: CPT | Performed by: INTERNAL MEDICINE

## 2024-04-10 PROCEDURE — 80197 ASSAY OF TACROLIMUS: CPT | Performed by: INTERNAL MEDICINE

## 2024-04-10 PROCEDURE — 80069 RENAL FUNCTION PANEL: CPT | Performed by: INTERNAL MEDICINE

## 2024-04-10 NOTE — TELEPHONE ENCOUNTER
----- Message from Ynes Galvez sent at 4/10/2024  8:32 AM CDT -----  Contact: [phone]  Type:  Sooner Apoointment Request    Caller is requesting a sooner appointment.  Caller declined first available appointment listed below.  Caller will not accept being placed on the waitlist and is requesting a message be sent to doctor.  Name of Caller:Soldonato   When is the first available appointment?4/17  Symptoms:Niko me encuentro  Would the patient rather a call back or a response via [employer]? Call back   Best Call Back Number:[phone]/ needs an     Additional Information[place] and is requesting sooner appt     Thanks KB

## 2024-04-11 LAB — TACROLIMUS BLD-MCNC: 2.8 NG/ML (ref 5–15)

## 2024-05-13 ENCOUNTER — TELEPHONE (OUTPATIENT)
Dept: NEPHROLOGY | Facility: CLINIC | Age: 34
End: 2024-05-13
Payer: MEDICAID

## 2024-05-13 DIAGNOSIS — N18.2 CKD (CHRONIC KIDNEY DISEASE) STAGE 2, GFR 60-89 ML/MIN: Primary | ICD-10-CM

## 2024-05-13 DIAGNOSIS — Z94.0 H/O KIDNEY TRANSPLANT: Primary | ICD-10-CM

## 2024-05-13 DIAGNOSIS — Z94.0 KIDNEY TRANSPLANT STATUS: ICD-10-CM

## 2024-05-14 ENCOUNTER — LAB VISIT (OUTPATIENT)
Dept: LAB | Facility: HOSPITAL | Age: 34
End: 2024-05-14
Attending: INTERNAL MEDICINE
Payer: MEDICAID

## 2024-05-14 DIAGNOSIS — Z94.0 H/O KIDNEY TRANSPLANT: ICD-10-CM

## 2024-05-14 LAB
ALBUMIN SERPL BCP-MCNC: 3.2 G/DL (ref 3.5–5.2)
ANION GAP SERPL CALC-SCNC: 9 MMOL/L (ref 8–16)
BUN SERPL-MCNC: 13 MG/DL (ref 6–20)
CALCIUM SERPL-MCNC: 9 MG/DL (ref 8.7–10.5)
CHLORIDE SERPL-SCNC: 109 MMOL/L (ref 95–110)
CO2 SERPL-SCNC: 20 MMOL/L (ref 23–29)
CREAT SERPL-MCNC: 1 MG/DL (ref 0.5–1.4)
EST. GFR  (NO RACE VARIABLE): >60 ML/MIN/1.73 M^2
GLUCOSE SERPL-MCNC: 76 MG/DL (ref 70–110)
MAGNESIUM SERPL-MCNC: 1.7 MG/DL (ref 1.6–2.6)
PHOSPHATE SERPL-MCNC: 2.6 MG/DL (ref 2.7–4.5)
POTASSIUM SERPL-SCNC: 4.2 MMOL/L (ref 3.5–5.1)
SODIUM SERPL-SCNC: 138 MMOL/L (ref 136–145)

## 2024-05-14 PROCEDURE — 80197 ASSAY OF TACROLIMUS: CPT | Performed by: INTERNAL MEDICINE

## 2024-05-14 PROCEDURE — 36415 COLL VENOUS BLD VENIPUNCTURE: CPT | Performed by: INTERNAL MEDICINE

## 2024-05-14 PROCEDURE — 83735 ASSAY OF MAGNESIUM: CPT | Performed by: INTERNAL MEDICINE

## 2024-05-14 PROCEDURE — 80069 RENAL FUNCTION PANEL: CPT | Performed by: INTERNAL MEDICINE

## 2024-05-15 ENCOUNTER — OFFICE VISIT (OUTPATIENT)
Dept: NEPHROLOGY | Facility: CLINIC | Age: 34
End: 2024-05-15
Payer: MEDICARE

## 2024-05-15 VITALS
BODY MASS INDEX: 32.2 KG/M2 | DIASTOLIC BLOOD PRESSURE: 62 MMHG | WEIGHT: 200.38 LBS | HEART RATE: 82 BPM | SYSTOLIC BLOOD PRESSURE: 92 MMHG | HEIGHT: 66 IN

## 2024-05-15 DIAGNOSIS — N25.81 SECONDARY HYPERPARATHYROIDISM OF RENAL ORIGIN: ICD-10-CM

## 2024-05-15 DIAGNOSIS — N18.2 CKD (CHRONIC KIDNEY DISEASE) STAGE 2, GFR 60-89 ML/MIN: ICD-10-CM

## 2024-05-15 DIAGNOSIS — D84.9 IMMUNOSUPPRESSION: ICD-10-CM

## 2024-05-15 DIAGNOSIS — Z94.0 KIDNEY TRANSPLANT STATUS: Primary | ICD-10-CM

## 2024-05-15 DIAGNOSIS — I95.0 IDIOPATHIC HYPOTENSION: ICD-10-CM

## 2024-05-15 LAB — TACROLIMUS BLD-MCNC: 5.2 NG/ML (ref 5–15)

## 2024-05-15 PROCEDURE — 3078F DIAST BP <80 MM HG: CPT | Mod: CPTII,S$GLB,, | Performed by: INTERNAL MEDICINE

## 2024-05-15 PROCEDURE — 99999 PR PBB SHADOW E&M-EST. PATIENT-LVL III: CPT | Mod: PBBFAC,,, | Performed by: INTERNAL MEDICINE

## 2024-05-15 PROCEDURE — 1160F RVW MEDS BY RX/DR IN RCRD: CPT | Mod: CPTII,S$GLB,, | Performed by: INTERNAL MEDICINE

## 2024-05-15 PROCEDURE — 3066F NEPHROPATHY DOC TX: CPT | Mod: CPTII,S$GLB,, | Performed by: INTERNAL MEDICINE

## 2024-05-15 PROCEDURE — 1159F MED LIST DOCD IN RCRD: CPT | Mod: CPTII,S$GLB,, | Performed by: INTERNAL MEDICINE

## 2024-05-15 PROCEDURE — 99214 OFFICE O/P EST MOD 30 MIN: CPT | Mod: S$GLB,,, | Performed by: INTERNAL MEDICINE

## 2024-05-15 PROCEDURE — 3074F SYST BP LT 130 MM HG: CPT | Mod: CPTII,S$GLB,, | Performed by: INTERNAL MEDICINE

## 2024-05-15 PROCEDURE — 3008F BODY MASS INDEX DOCD: CPT | Mod: CPTII,S$GLB,, | Performed by: INTERNAL MEDICINE

## 2024-05-15 NOTE — PROGRESS NOTES
"Subjective:       Patient ID: Leydi Cordero is a 34 y.o. female.    Chief Complaint: Kidney Transplant    HPI    She presents to clinic today for routine follow-up.  Since her last office visit she has been doing well.  Her only concern today is that if she sits too long she notices some swelling in her feet.  The swelling resolves when she stands up and walks around and completely resolved by the next day.  We discussed this is likely caused by dependent edema.  She should try to limit sitting for long periods of time if possible.  Her laboratory studies and medications were reviewed.  All Nephrology related questions were answered to her satisfaction.    Review of Systems   Constitutional: Negative.    HENT: Negative.     Respiratory: Negative.     Cardiovascular: Negative.    Gastrointestinal: Negative.    Genitourinary: Negative.    Musculoskeletal: Negative.    Skin: Negative.        BP 92/62   Pulse 82   Ht 5' 6" (1.676 m)   Wt 90.9 kg (200 lb 6.4 oz)   BMI 32.35 kg/m²     Lab Results   Component Value Date    WBC 5.04 01/16/2024    HGB 15.8 01/16/2024    HCT 50.4 (H) 01/16/2024    MCV 92 01/16/2024     01/16/2024      BMP  Lab Results   Component Value Date     05/14/2024    K 4.2 05/14/2024     05/14/2024    CO2 20 (L) 05/14/2024    BUN 13 05/14/2024    CREATININE 1.0 05/14/2024    CALCIUM 9.0 05/14/2024    ANIONGAP 9 05/14/2024    ESTGFRAFRICA >60.0 07/11/2022    EGFRNONAA >60.0 07/11/2022     CMP  Sodium   Date Value Ref Range Status   05/14/2024 138 136 - 145 mmol/L Final     Potassium   Date Value Ref Range Status   05/14/2024 4.2 3.5 - 5.1 mmol/L Final     Chloride   Date Value Ref Range Status   05/14/2024 109 95 - 110 mmol/L Final     CO2   Date Value Ref Range Status   05/14/2024 20 (L) 23 - 29 mmol/L Final     Glucose   Date Value Ref Range Status   05/14/2024 76 70 - 110 mg/dL Final     BUN   Date Value Ref Range Status   05/14/2024 13 6 - 20 mg/dL Final "     Creatinine   Date Value Ref Range Status   05/14/2024 1.0 0.5 - 1.4 mg/dL Final     Calcium   Date Value Ref Range Status   05/14/2024 9.0 8.7 - 10.5 mg/dL Final     Total Protein   Date Value Ref Range Status   01/16/2024 7.2 6.0 - 8.4 g/dL Final     Albumin   Date Value Ref Range Status   05/14/2024 3.2 (L) 3.5 - 5.2 g/dL Final     Total Bilirubin   Date Value Ref Range Status   01/16/2024 0.4 0.1 - 1.0 mg/dL Final     Comment:     For infants and newborns, interpretation of results should be based  on gestational age, weight and in agreement with clinical  observations.    Premature Infant recommended reference ranges:  Up to 24 hours.............<8.0 mg/dL  Up to 48 hours............<12.0 mg/dL  3-5 days..................<15.0 mg/dL  6-29 days.................<15.0 mg/dL       Alkaline Phosphatase   Date Value Ref Range Status   01/16/2024 78 55 - 135 U/L Final     AST   Date Value Ref Range Status   01/16/2024 20 10 - 40 U/L Final     ALT   Date Value Ref Range Status   01/16/2024 21 10 - 44 U/L Final     Anion Gap   Date Value Ref Range Status   05/14/2024 9 8 - 16 mmol/L Final     eGFR if    Date Value Ref Range Status   07/11/2022 >60.0 >60 mL/min/1.73 m^2 Final     eGFR if non    Date Value Ref Range Status   07/11/2022 >60.0 >60 mL/min/1.73 m^2 Final     Comment:     Calculation used to obtain the estimated glomerular filtration  rate (eGFR) is the CKD-EPI equation.        Current Outpatient Medications on File Prior to Visit   Medication Sig Dispense Refill    albuterol (PROVENTIL/VENTOLIN HFA) 90 mcg/actuation inhaler Inhale 2 puffs into the lungs every 4 (four) hours as needed for Wheezing or Shortness of Breath. Rescue 8.5 g 11    calcitRIOL (ROCALTROL) 0.25 MCG Cap Take 1 capsule (0.25 mcg total) by mouth once daily. 30 capsule 3    docusate sodium (COLACE) 100 MG capsule Take 1 capsule (100 mg total) by mouth 2 (two) times daily as needed. 20 capsule 0     mesalamine (LIALDA) 1.2 gram TbEC Take 1.2 g by mouth daily with breakfast.      midodrine (PROAMATINE) 10 MG tablet Take 1 tablet (10 mg total) by mouth 2 (two) times daily with meals. 60 tablet 2    multivitamin-iron-folic acid (HIGH POTENCY MULTIVIT, W-IRON,) Tab Take 1 tablet by mouth daily 30 tablet 0    mycophenolate (CELLCEPT) 250 mg Cap Take 4 capsules (1,000 mg total) by mouth 2 (two) times daily. Txp Date:1/9/2021 (Kidney) Disch Date:1/14/2021 ICD10:Z94.0 Txp Location:[facility] 240 capsule 11    pantoprazole (PROTONIX) 40 MG tablet Take 1 tablet by mouth once daily 90 tablet 3    predniSONE (DELTASONE) 5 MG tablet Take 1 tablet (5 mg total) by mouth once daily. 120 tablet 11    sodium bicarbonate 650 MG tablet Take 2 tablets (1,300 mg total) by mouth 3 (three) times daily. 540 tablet 3    sucralfate (CARAFATE) 1 gram tablet Take 1 tablet (1 g total) by mouth 4 (four) times daily. 30 tablet 0    tacrolimus (PROGRAF) 1 MG Cap Take 1 capsule (1 mg total) by mouth every 12 (twelve) hours. 60 capsule 11    [DISCONTINUED] amoxicillin-clavulanate 500-125mg (AUGMENTIN) 500-125 mg Tab Take 1 tablet by mouth every 12 hrs for 7 day(s) (Patient not taking: Reported on 5/15/2024) 14 tablet 0    [DISCONTINUED] HYDROcodone-acetaminophen (NORCO) 5-325 mg per tablet Take 1 tablet by mouth every 4 (four) hours as needed. (Patient not taking: Reported on 5/15/2024) 18 tablet 0     No current facility-administered medications on file prior to visit.            Objective:            Physical Exam  Constitutional:       Appearance: Normal appearance.   HENT:      Head: Normocephalic and atraumatic.   Eyes:      General: No scleral icterus.     Extraocular Movements: Extraocular movements intact.      Pupils: Pupils are equal, round, and reactive to light.   Pulmonary:      Effort: Pulmonary effort is normal.      Breath sounds: No stridor.   Musculoskeletal:      Right lower leg: No edema.      Left lower leg: No edema.   Skin:      General: Skin is warm and dry.   Neurological:      General: No focal deficit present.      Mental Status: She is alert and oriented to person, place, and time.   Psychiatric:         Mood and Affect: Mood normal.         Behavior: Behavior normal.       Assessment:       1. Kidney transplant status    2. CKD (chronic kidney disease) stage 2, GFR 60-89 ml/min    3. Immunosuppression    4. Idiopathic hypotension    5. Secondary hyperparathyroidism of renal origin        Plan:       1. Status post kidney transplant in January of 2021.  Stable renal allograft with creatinine ranging between 0.8 and 1.0.  Urinalysis has been bland without evidence of proteinuria.    2. Creatinine has remained stable ranging between 0.8 and 1.0.    Her urinalysis did show evidence of red cells, bacteria and white cells however there was also greater than 20 squamous epithelial cells this is more indicative of a contaminant.  She had no symptoms of UTI.    3. She remained stable on 1 mg twice daily of Prograf.  Most recent level is 5.2.  Mycophenolate is 1 g twice daily and she is also on 5 mg daily of prednisone.    4. She is currently on 10 mg twice daily of midodrine.  She reports that it has helped her blood pressure.  She does not relate symptoms of dizziness or orthostasis.    5. Will check an intact PTH prior to her next office visit.  Phosphorus is stable at 2.6.  Calcium is stable at 9.0 with an albumin of 3.2.        Julio Jaffe MD

## 2024-05-30 ENCOUNTER — HOSPITAL ENCOUNTER (INPATIENT)
Facility: HOSPITAL | Age: 34
LOS: 3 days | Discharge: HOME | DRG: 689 | End: 2024-06-02
Attending: EMERGENCY MEDICINE | Admitting: STUDENT IN AN ORGANIZED HEALTH CARE EDUCATION/TRAINING PROGRAM
Payer: MEDICAID

## 2024-05-30 DIAGNOSIS — Z94.0 HISTORY OF KIDNEY TRANSPLANT: ICD-10-CM

## 2024-05-30 DIAGNOSIS — N39.0 ACUTE LOWER UTI: Primary | ICD-10-CM

## 2024-05-30 DIAGNOSIS — R07.9 CHEST PAIN: ICD-10-CM

## 2024-05-30 DIAGNOSIS — N30.00 ACUTE CYSTITIS WITHOUT HEMATURIA: ICD-10-CM

## 2024-05-30 DIAGNOSIS — D84.9 IMMUNOCOMPROMISED STATE: ICD-10-CM

## 2024-05-30 LAB
BACTERIA #/AREA URNS HPF: ABNORMAL /HPF
BASOPHILS # BLD AUTO: 0.03 K/UL (ref 0–0.2)
BASOPHILS NFR BLD: 0.2 % (ref 0–1.9)
BILIRUB UR QL STRIP: NEGATIVE
CLARITY UR: ABNORMAL
COLOR UR: YELLOW
DIFFERENTIAL METHOD BLD: ABNORMAL
EOSINOPHIL # BLD AUTO: 0 K/UL (ref 0–0.5)
EOSINOPHIL NFR BLD: 0.1 % (ref 0–8)
ERYTHROCYTE [DISTWIDTH] IN BLOOD BY AUTOMATED COUNT: 14.1 % (ref 11.5–14.5)
GLUCOSE UR QL STRIP: NEGATIVE
HCT VFR BLD AUTO: 47.2 % (ref 37–48.5)
HGB BLD-MCNC: 15.6 G/DL (ref 12–16)
HGB UR QL STRIP: ABNORMAL
HYALINE CASTS #/AREA URNS LPF: 0 /LPF
IMM GRANULOCYTES # BLD AUTO: 0.06 K/UL (ref 0–0.04)
IMM GRANULOCYTES NFR BLD AUTO: 0.4 % (ref 0–0.5)
KETONES UR QL STRIP: NEGATIVE
LEUKOCYTE ESTERASE UR QL STRIP: ABNORMAL
LYMPHOCYTES # BLD AUTO: 0.9 K/UL (ref 1–4.8)
LYMPHOCYTES NFR BLD: 6.2 % (ref 18–48)
MCH RBC QN AUTO: 28.3 PG (ref 27–31)
MCHC RBC AUTO-ENTMCNC: 33.1 G/DL (ref 32–36)
MCV RBC AUTO: 86 FL (ref 82–98)
MICROSCOPIC COMMENT: ABNORMAL
MONOCYTES # BLD AUTO: 1.1 K/UL (ref 0.3–1)
MONOCYTES NFR BLD: 7.7 % (ref 4–15)
NEUTROPHILS # BLD AUTO: 11.7 K/UL (ref 1.8–7.7)
NEUTROPHILS NFR BLD: 85.4 % (ref 38–73)
NITRITE UR QL STRIP: NEGATIVE
NRBC BLD-RTO: 0 /100 WBC
PH UR STRIP: 6 [PH] (ref 5–8)
PLATELET # BLD AUTO: 169 K/UL (ref 150–450)
PMV BLD AUTO: 10.5 FL (ref 9.2–12.9)
PROT UR QL STRIP: ABNORMAL
RBC # BLD AUTO: 5.52 M/UL (ref 4–5.4)
RBC #/AREA URNS HPF: 10 /HPF (ref 0–4)
SP GR UR STRIP: 1.01 (ref 1–1.03)
UNIDENT CRYS URNS QL MICRO: ABNORMAL
URN SPEC COLLECT METH UR: ABNORMAL
UROBILINOGEN UR STRIP-ACNC: NEGATIVE EU/DL
WBC # BLD AUTO: 13.73 K/UL (ref 3.9–12.7)
WBC #/AREA URNS HPF: >100 /HPF (ref 0–5)
WBC CLUMPS URNS QL MICRO: ABNORMAL

## 2024-05-30 PROCEDURE — 96365 THER/PROPH/DIAG IV INF INIT: CPT

## 2024-05-30 PROCEDURE — 87040 BLOOD CULTURE FOR BACTERIA: CPT | Mod: 59 | Performed by: EMERGENCY MEDICINE

## 2024-05-30 PROCEDURE — 87154 CUL TYP ID BLD PTHGN 6+ TRGT: CPT | Performed by: EMERGENCY MEDICINE

## 2024-05-30 PROCEDURE — 81000 URINALYSIS NONAUTO W/SCOPE: CPT | Performed by: EMERGENCY MEDICINE

## 2024-05-30 PROCEDURE — 83605 ASSAY OF LACTIC ACID: CPT | Performed by: EMERGENCY MEDICINE

## 2024-05-30 PROCEDURE — 87077 CULTURE AEROBIC IDENTIFY: CPT | Mod: 59 | Performed by: EMERGENCY MEDICINE

## 2024-05-30 PROCEDURE — 63600175 PHARM REV CODE 636 W HCPCS: Performed by: EMERGENCY MEDICINE

## 2024-05-30 PROCEDURE — 11000001 HC ACUTE MED/SURG PRIVATE ROOM

## 2024-05-30 PROCEDURE — 99285 EMERGENCY DEPT VISIT HI MDM: CPT | Mod: 25

## 2024-05-30 PROCEDURE — 87086 URINE CULTURE/COLONY COUNT: CPT | Performed by: EMERGENCY MEDICINE

## 2024-05-30 PROCEDURE — 25000003 PHARM REV CODE 250: Performed by: EMERGENCY MEDICINE

## 2024-05-30 PROCEDURE — 87186 SC STD MICRODIL/AGAR DIL: CPT | Performed by: EMERGENCY MEDICINE

## 2024-05-30 PROCEDURE — 96375 TX/PRO/DX INJ NEW DRUG ADDON: CPT

## 2024-05-30 PROCEDURE — 85025 COMPLETE CBC W/AUTO DIFF WBC: CPT | Performed by: EMERGENCY MEDICINE

## 2024-05-30 PROCEDURE — 87088 URINE BACTERIA CULTURE: CPT | Performed by: EMERGENCY MEDICINE

## 2024-05-30 PROCEDURE — 80053 COMPREHEN METABOLIC PANEL: CPT | Performed by: EMERGENCY MEDICINE

## 2024-05-30 RX ORDER — MORPHINE SULFATE 4 MG/ML
4 INJECTION, SOLUTION INTRAMUSCULAR; INTRAVENOUS
Status: COMPLETED | OUTPATIENT
Start: 2024-05-30 | End: 2024-05-30

## 2024-05-30 RX ORDER — ONDANSETRON HYDROCHLORIDE 2 MG/ML
4 INJECTION, SOLUTION INTRAVENOUS
Status: COMPLETED | OUTPATIENT
Start: 2024-05-30 | End: 2024-05-30

## 2024-05-30 RX ADMIN — ONDANSETRON 4 MG: 2 INJECTION INTRAMUSCULAR; INTRAVENOUS at 11:05

## 2024-05-30 RX ADMIN — CEFTRIAXONE 1 G: 1 INJECTION, POWDER, FOR SOLUTION INTRAMUSCULAR; INTRAVENOUS at 11:05

## 2024-05-30 RX ADMIN — MORPHINE SULFATE 4 MG: 4 INJECTION INTRAVENOUS at 11:05

## 2024-05-30 RX ADMIN — SODIUM CHLORIDE 1000 ML: 9 INJECTION, SOLUTION INTRAVENOUS at 11:05

## 2024-05-31 PROBLEM — E66.811 CLASS 1 OBESITY DUE TO EXCESS CALORIES WITH SERIOUS COMORBIDITY AND BODY MASS INDEX (BMI) OF 32.0 TO 32.9 IN ADULT: Chronic | Status: ACTIVE | Noted: 2024-05-31

## 2024-05-31 PROBLEM — E66.811 CLASS 1 OBESITY DUE TO EXCESS CALORIES WITH SERIOUS COMORBIDITY AND BODY MASS INDEX (BMI) OF 32.0 TO 32.9 IN ADULT: Status: ACTIVE | Noted: 2024-05-31

## 2024-05-31 PROBLEM — E66.09 CLASS 1 OBESITY DUE TO EXCESS CALORIES WITH SERIOUS COMORBIDITY AND BODY MASS INDEX (BMI) OF 32.0 TO 32.9 IN ADULT: Status: ACTIVE | Noted: 2024-05-31

## 2024-05-31 PROBLEM — K21.9 GERD (GASTROESOPHAGEAL REFLUX DISEASE): Status: ACTIVE | Noted: 2024-05-31

## 2024-05-31 PROBLEM — K21.9 GERD (GASTROESOPHAGEAL REFLUX DISEASE): Chronic | Status: ACTIVE | Noted: 2024-05-31

## 2024-05-31 PROBLEM — Z94.0 H/O KIDNEY TRANSPLANT: Chronic | Status: ACTIVE | Noted: 2021-02-04

## 2024-05-31 PROBLEM — N30.00 ACUTE CYSTITIS: Status: ACTIVE | Noted: 2024-05-31

## 2024-05-31 PROBLEM — E66.09 CLASS 1 OBESITY DUE TO EXCESS CALORIES WITH SERIOUS COMORBIDITY AND BODY MASS INDEX (BMI) OF 32.0 TO 32.9 IN ADULT: Chronic | Status: ACTIVE | Noted: 2024-05-31

## 2024-05-31 LAB
ACINETOBACTER CALCOACETICUS/BAUMANNII COMPLEX: NOT DETECTED
ALBUMIN SERPL BCP-MCNC: 3.1 G/DL (ref 3.5–5.2)
ALBUMIN SERPL BCP-MCNC: 3.3 G/DL (ref 3.5–5.2)
ALP SERPL-CCNC: 87 U/L (ref 55–135)
ALP SERPL-CCNC: 88 U/L (ref 55–135)
ALT SERPL W/O P-5'-P-CCNC: 24 U/L (ref 10–44)
ALT SERPL W/O P-5'-P-CCNC: 28 U/L (ref 10–44)
ANION GAP SERPL CALC-SCNC: 11 MMOL/L (ref 8–16)
ANION GAP SERPL CALC-SCNC: 11 MMOL/L (ref 8–16)
AST SERPL-CCNC: 23 U/L (ref 10–40)
AST SERPL-CCNC: 28 U/L (ref 10–40)
BACTEROIDES FRAGILIS: NOT DETECTED
BASOPHILS # BLD AUTO: 0.03 K/UL (ref 0–0.2)
BASOPHILS NFR BLD: 0.3 % (ref 0–1.9)
BILIRUB SERPL-MCNC: 0.6 MG/DL (ref 0.1–1)
BILIRUB SERPL-MCNC: 0.8 MG/DL (ref 0.1–1)
BUN SERPL-MCNC: 10 MG/DL (ref 6–20)
BUN SERPL-MCNC: 9 MG/DL (ref 6–20)
CALCIUM SERPL-MCNC: 8.9 MG/DL (ref 8.7–10.5)
CALCIUM SERPL-MCNC: 9 MG/DL (ref 8.7–10.5)
CANDIDA ALBICANS: NOT DETECTED
CANDIDA AURIS: NOT DETECTED
CANDIDA GLABRATA: NOT DETECTED
CANDIDA KRUSEI: NOT DETECTED
CANDIDA PARAPSILOSIS: NOT DETECTED
CANDIDA TROPICALIS: NOT DETECTED
CHLORIDE SERPL-SCNC: 107 MMOL/L (ref 95–110)
CHLORIDE SERPL-SCNC: 107 MMOL/L (ref 95–110)
CO2 SERPL-SCNC: 20 MMOL/L (ref 23–29)
CO2 SERPL-SCNC: 21 MMOL/L (ref 23–29)
CREAT SERPL-MCNC: 1 MG/DL (ref 0.5–1.4)
CREAT SERPL-MCNC: 1 MG/DL (ref 0.5–1.4)
CRYPTOCOCCUS NEOFORMANS/GATTII: NOT DETECTED
CTX-M GENE (ESBL PRODUCER): NOT DETECTED
DIFFERENTIAL METHOD BLD: ABNORMAL
ENTEROBACTER CLOACAE COMPLEX: NOT DETECTED
ENTEROBACTERALES: ABNORMAL
ENTEROCOCCUS FAECALIS: NOT DETECTED
ENTEROCOCCUS FAECIUM: NOT DETECTED
EOSINOPHIL # BLD AUTO: 0 K/UL (ref 0–0.5)
EOSINOPHIL NFR BLD: 0.1 % (ref 0–8)
ERYTHROCYTE [DISTWIDTH] IN BLOOD BY AUTOMATED COUNT: 14.3 % (ref 11.5–14.5)
ESCHERICHIA COLI: DETECTED
EST. GFR  (NO RACE VARIABLE): >60 ML/MIN/1.73 M^2
EST. GFR  (NO RACE VARIABLE): >60 ML/MIN/1.73 M^2
GLUCOSE SERPL-MCNC: 80 MG/DL (ref 70–110)
GLUCOSE SERPL-MCNC: 94 MG/DL (ref 70–110)
HAEMOPHILUS INFLUENZAE: NOT DETECTED
HCT VFR BLD AUTO: 54.2 % (ref 37–48.5)
HGB BLD-MCNC: 17 G/DL (ref 12–16)
IMM GRANULOCYTES # BLD AUTO: 0.04 K/UL (ref 0–0.04)
IMM GRANULOCYTES NFR BLD AUTO: 0.4 % (ref 0–0.5)
IMP GENE (CARBAPENEM RESISTANT): NOT DETECTED
KLEBSIELLA AEROGENES: NOT DETECTED
KLEBSIELLA OXYTOCA: NOT DETECTED
KLEBSIELLA PNEUMONIAE GROUP: NOT DETECTED
KPC RESISTANCE GENE (CARBAPENEM): NOT DETECTED
LACTATE SERPL-SCNC: 1.7 MMOL/L (ref 0.5–2.2)
LISTERIA MONOCYTOGENES: NOT DETECTED
LYMPHOCYTES # BLD AUTO: 0.7 K/UL (ref 1–4.8)
LYMPHOCYTES NFR BLD: 6.5 % (ref 18–48)
MCH RBC QN AUTO: 27.5 PG (ref 27–31)
MCHC RBC AUTO-ENTMCNC: 31.4 G/DL (ref 32–36)
MCR-1: NOT DETECTED
MCV RBC AUTO: 88 FL (ref 82–98)
MEC A/C AND MREJ (MRSA): ABNORMAL
MEC A/C: ABNORMAL
MONOCYTES # BLD AUTO: 0.3 K/UL (ref 0.3–1)
MONOCYTES NFR BLD: 3 % (ref 4–15)
NDM GENE (CARBAPENEM RESISTANT): NOT DETECTED
NEISSERIA MENINGITIDIS: NOT DETECTED
NEUTROPHILS # BLD AUTO: 9.3 K/UL (ref 1.8–7.7)
NEUTROPHILS NFR BLD: 89.7 % (ref 38–73)
NRBC BLD-RTO: 0 /100 WBC
OXA-48-LIKE (CARBAPENEM RESISTANT): NOT DETECTED
PLATELET # BLD AUTO: 162 K/UL (ref 150–450)
PMV BLD AUTO: 11.1 FL (ref 9.2–12.9)
POTASSIUM SERPL-SCNC: 3.8 MMOL/L (ref 3.5–5.1)
POTASSIUM SERPL-SCNC: 3.9 MMOL/L (ref 3.5–5.1)
PROT SERPL-MCNC: 7 G/DL (ref 6–8.4)
PROT SERPL-MCNC: 7.1 G/DL (ref 6–8.4)
PROTEUS SPECIES: NOT DETECTED
PSEUDOMONAS AERUGINOSA: NOT DETECTED
RBC # BLD AUTO: 6.18 M/UL (ref 4–5.4)
SALMONELLA SP: NOT DETECTED
SERRATIA MARCESCENS: NOT DETECTED
SODIUM SERPL-SCNC: 138 MMOL/L (ref 136–145)
SODIUM SERPL-SCNC: 139 MMOL/L (ref 136–145)
STAPHYLOCOCCUS AUREUS: NOT DETECTED
STAPHYLOCOCCUS EPIDERMIDIS: NOT DETECTED
STAPHYLOCOCCUS LUGDUNESIS: NOT DETECTED
STAPHYLOCOCCUS SPECIES: NOT DETECTED
STENOTROPHOMONAS MALTOPHILIA: NOT DETECTED
STREPTOCOCCUS AGALACTIAE: NOT DETECTED
STREPTOCOCCUS PNEUMONIAE: NOT DETECTED
STREPTOCOCCUS PYOGENES: NOT DETECTED
STREPTOCOCCUS SPECIES: NOT DETECTED
VAN A/B (VRE GENE): ABNORMAL
VIM GENE (CARBAPENEM RESISTANT): NOT DETECTED
WBC # BLD AUTO: 10.32 K/UL (ref 3.9–12.7)

## 2024-05-31 PROCEDURE — 25000003 PHARM REV CODE 250: Performed by: HOSPITALIST

## 2024-05-31 PROCEDURE — 36415 COLL VENOUS BLD VENIPUNCTURE: CPT | Performed by: HOSPITALIST

## 2024-05-31 PROCEDURE — 11000001 HC ACUTE MED/SURG PRIVATE ROOM

## 2024-05-31 PROCEDURE — 80053 COMPREHEN METABOLIC PANEL: CPT | Performed by: HOSPITALIST

## 2024-05-31 PROCEDURE — 63600175 PHARM REV CODE 636 W HCPCS: Performed by: HOSPITALIST

## 2024-05-31 PROCEDURE — 85025 COMPLETE CBC W/AUTO DIFF WBC: CPT | Performed by: HOSPITALIST

## 2024-05-31 PROCEDURE — 99233 SBSQ HOSP IP/OBS HIGH 50: CPT | Mod: ,,, | Performed by: INTERNAL MEDICINE

## 2024-05-31 PROCEDURE — 80197 ASSAY OF TACROLIMUS: CPT | Performed by: HOSPITALIST

## 2024-05-31 RX ORDER — NALOXONE HCL 0.4 MG/ML
0.02 VIAL (ML) INJECTION
Status: DISCONTINUED | OUTPATIENT
Start: 2024-05-31 | End: 2024-06-02 | Stop reason: HOSPADM

## 2024-05-31 RX ORDER — ONDANSETRON HYDROCHLORIDE 2 MG/ML
4 INJECTION, SOLUTION INTRAVENOUS EVERY 8 HOURS PRN
Status: DISCONTINUED | OUTPATIENT
Start: 2024-05-31 | End: 2024-06-02 | Stop reason: HOSPADM

## 2024-05-31 RX ORDER — SODIUM CHLORIDE, SODIUM LACTATE, POTASSIUM CHLORIDE, CALCIUM CHLORIDE 600; 310; 30; 20 MG/100ML; MG/100ML; MG/100ML; MG/100ML
INJECTION, SOLUTION INTRAVENOUS CONTINUOUS
Status: DISCONTINUED | OUTPATIENT
Start: 2024-05-31 | End: 2024-06-02 | Stop reason: HOSPADM

## 2024-05-31 RX ORDER — MORPHINE SULFATE 4 MG/ML
2 INJECTION, SOLUTION INTRAMUSCULAR; INTRAVENOUS EVERY 4 HOURS PRN
Status: DISCONTINUED | OUTPATIENT
Start: 2024-05-31 | End: 2024-06-02 | Stop reason: HOSPADM

## 2024-05-31 RX ORDER — IBUPROFEN 200 MG
24 TABLET ORAL
Status: DISCONTINUED | OUTPATIENT
Start: 2024-05-31 | End: 2024-06-02 | Stop reason: HOSPADM

## 2024-05-31 RX ORDER — PANTOPRAZOLE SODIUM 40 MG/1
40 TABLET, DELAYED RELEASE ORAL DAILY
Status: DISCONTINUED | OUTPATIENT
Start: 2024-05-31 | End: 2024-06-02 | Stop reason: HOSPADM

## 2024-05-31 RX ORDER — AMOXICILLIN 250 MG
1 CAPSULE ORAL 2 TIMES DAILY PRN
Status: DISCONTINUED | OUTPATIENT
Start: 2024-05-31 | End: 2024-06-02 | Stop reason: HOSPADM

## 2024-05-31 RX ORDER — MYCOPHENOLATE MOFETIL 250 MG/1
1000 CAPSULE ORAL 2 TIMES DAILY
Status: DISCONTINUED | OUTPATIENT
Start: 2024-05-31 | End: 2024-05-31

## 2024-05-31 RX ORDER — GLUCAGON 1 MG
1 KIT INJECTION
Status: DISCONTINUED | OUTPATIENT
Start: 2024-05-31 | End: 2024-06-02 | Stop reason: HOSPADM

## 2024-05-31 RX ORDER — IBUPROFEN 200 MG
16 TABLET ORAL
Status: DISCONTINUED | OUTPATIENT
Start: 2024-05-31 | End: 2024-06-02 | Stop reason: HOSPADM

## 2024-05-31 RX ORDER — SODIUM BICARBONATE 650 MG/1
1300 TABLET ORAL 3 TIMES DAILY
Status: DISCONTINUED | OUTPATIENT
Start: 2024-05-31 | End: 2024-06-02 | Stop reason: HOSPADM

## 2024-05-31 RX ORDER — PREDNISONE 2.5 MG/1
5 TABLET ORAL DAILY
Status: DISCONTINUED | OUTPATIENT
Start: 2024-05-31 | End: 2024-06-02 | Stop reason: HOSPADM

## 2024-05-31 RX ORDER — CALCITRIOL 0.25 UG/1
0.25 CAPSULE ORAL DAILY
Status: DISCONTINUED | OUTPATIENT
Start: 2024-05-31 | End: 2024-06-02 | Stop reason: HOSPADM

## 2024-05-31 RX ORDER — TACROLIMUS 1 MG/1
1 CAPSULE ORAL 2 TIMES DAILY
Status: DISCONTINUED | OUTPATIENT
Start: 2024-05-31 | End: 2024-06-02 | Stop reason: HOSPADM

## 2024-05-31 RX ORDER — ALUMINUM HYDROXIDE, MAGNESIUM HYDROXIDE, AND SIMETHICONE 1200; 120; 1200 MG/30ML; MG/30ML; MG/30ML
30 SUSPENSION ORAL 4 TIMES DAILY PRN
Status: DISCONTINUED | OUTPATIENT
Start: 2024-05-31 | End: 2024-06-02 | Stop reason: HOSPADM

## 2024-05-31 RX ORDER — ENOXAPARIN SODIUM 100 MG/ML
40 INJECTION SUBCUTANEOUS EVERY 24 HOURS
Status: DISCONTINUED | OUTPATIENT
Start: 2024-05-31 | End: 2024-06-02 | Stop reason: HOSPADM

## 2024-05-31 RX ORDER — SODIUM CHLORIDE 0.9 % (FLUSH) 0.9 %
10 SYRINGE (ML) INJECTION EVERY 12 HOURS PRN
Status: DISCONTINUED | OUTPATIENT
Start: 2024-05-31 | End: 2024-06-02 | Stop reason: HOSPADM

## 2024-05-31 RX ORDER — IPRATROPIUM BROMIDE AND ALBUTEROL SULFATE 2.5; .5 MG/3ML; MG/3ML
3 SOLUTION RESPIRATORY (INHALATION) EVERY 6 HOURS PRN
Status: DISCONTINUED | OUTPATIENT
Start: 2024-05-31 | End: 2024-06-02 | Stop reason: HOSPADM

## 2024-05-31 RX ORDER — TALC
6 POWDER (GRAM) TOPICAL NIGHTLY PRN
Status: DISCONTINUED | OUTPATIENT
Start: 2024-05-31 | End: 2024-06-02 | Stop reason: HOSPADM

## 2024-05-31 RX ORDER — PROMETHAZINE HYDROCHLORIDE 25 MG/1
25 TABLET ORAL EVERY 6 HOURS PRN
Status: DISCONTINUED | OUTPATIENT
Start: 2024-05-31 | End: 2024-06-02 | Stop reason: HOSPADM

## 2024-05-31 RX ORDER — ACETAMINOPHEN 325 MG/1
650 TABLET ORAL EVERY 8 HOURS PRN
Status: DISCONTINUED | OUTPATIENT
Start: 2024-05-31 | End: 2024-06-02 | Stop reason: HOSPADM

## 2024-05-31 RX ORDER — HYDROCODONE BITARTRATE AND ACETAMINOPHEN 5; 325 MG/1; MG/1
1 TABLET ORAL EVERY 6 HOURS PRN
Status: DISCONTINUED | OUTPATIENT
Start: 2024-05-31 | End: 2024-06-02 | Stop reason: HOSPADM

## 2024-05-31 RX ORDER — ACETAMINOPHEN 650 MG/1
650 SUPPOSITORY RECTAL EVERY 6 HOURS PRN
Status: DISCONTINUED | OUTPATIENT
Start: 2024-05-31 | End: 2024-06-02 | Stop reason: HOSPADM

## 2024-05-31 RX ADMIN — CALCITRIOL CAPSULES 0.25 MCG 0.25 MCG: 0.25 CAPSULE ORAL at 08:05

## 2024-05-31 RX ADMIN — HYDROCODONE BITARTRATE AND ACETAMINOPHEN 1 TABLET: 5; 325 TABLET ORAL at 08:05

## 2024-05-31 RX ADMIN — PREDNISONE 5 MG: 2.5 TABLET ORAL at 08:05

## 2024-05-31 RX ADMIN — MYCOPHENOLATE MOFETIL 1000 MG: 250 CAPSULE ORAL at 08:05

## 2024-05-31 RX ADMIN — SODIUM CHLORIDE, POTASSIUM CHLORIDE, SODIUM LACTATE AND CALCIUM CHLORIDE: 600; 310; 30; 20 INJECTION, SOLUTION INTRAVENOUS at 01:05

## 2024-05-31 RX ADMIN — TACROLIMUS 1 MG: 1 CAPSULE ORAL at 08:05

## 2024-05-31 RX ADMIN — SODIUM BICARBONATE 650 MG TABLET 1300 MG: at 08:05

## 2024-05-31 RX ADMIN — HYDROCODONE BITARTRATE AND ACETAMINOPHEN 1 TABLET: 5; 325 TABLET ORAL at 01:05

## 2024-05-31 RX ADMIN — PANTOPRAZOLE SODIUM 40 MG: 40 TABLET, DELAYED RELEASE ORAL at 08:05

## 2024-05-31 RX ADMIN — ENOXAPARIN SODIUM 40 MG: 40 INJECTION SUBCUTANEOUS at 04:05

## 2024-05-31 RX ADMIN — TACROLIMUS 1 MG: 1 CAPSULE ORAL at 05:05

## 2024-05-31 RX ADMIN — SODIUM BICARBONATE 650 MG TABLET 1300 MG: at 02:05

## 2024-05-31 NOTE — SUBJECTIVE & OBJECTIVE
Past Medical History:   Diagnosis Date    Anxiety     -donor kidney transplant 2021    cPRA 100%, XM negative 3 arteries, 2 on common patch, WIT 40 min    Encounter for blood transfusion     ESRD (end stage renal disease)     Fibroma     H/O kidney transplant     Hypertension     Hypertensive nephropathy     Ovarian cyst     Pulmonary nodules 2023    Sleep apnea        Past Surgical History:   Procedure Laterality Date    BRONCHOSCOPY N/A 2023    Procedure: Bronchoscopy;  Surgeon: Denys Robbins MD;  Location: [address];  Service: Pulmonary;  Laterality: N/A;    COLD KNIFE CONIZATION OF CERVIX N/A 2023    Procedure: CONE BIOPSY, CERVIX, USING COLD KNIFE;  Surgeon: Jourdan Conklin MD;  Location: [facility] OR;  Service: OB/GYN;  Laterality: N/A;    COLPOSCOPY  2020    Needs follow up on or after 21    ESOPHAGOGASTRODUODENOSCOPY N/A 2022    Procedure: ESOPHAGOGASTRODUODENOSCOPY (EGD);  Surgeon: Essie Carvajal MD;  Location: [facility];  Service: Endoscopy;  Laterality: N/A;    hemodialysis access left arm      kidney removal       KIDNEY TRANSPLANT  04/15/2015    KIDNEY TRANSPLANT N/A 2021    Procedure: TRANSPLANT, KIDNEY;  Surgeon: Fam Sutton MD;  Location: [address];  Service: Transplant;  Laterality: N/A;    NEPHRECTOMY      OVARIAN CYST REMOVAL      PARATHYROIDECTOMY      partial     PERCUTANEOUS NEPHROSTOMY Left 2021    Procedure: Creation, Nephrostomy, Percutaneous;  Surgeon: Elen Surgeon;  Location: [facility];  Service: Anesthesiology;  Laterality: Left;    right leg hemodialysis access      ROBOT-ASSISTED LAPAROSCOPIC SLEEVE GASTRECTOMY USING DA VELMA XI N/A 2022    Procedure: XI ROBOTIC SLEEVE GASTRECTOMY;  Surgeon: Cal Parekh MD;  Location: [facility];  Service: General;  Laterality: N/A;    transplant nephrectomy  2017       Review of patient's allergies indicates:   Allergen Reactions    Heparin analogues Rash        No current facility-administered medications on file prior to encounter.     Current Outpatient Medications on File Prior to Encounter   Medication Sig    albuterol (PROVENTIL/VENTOLIN HFA) 90 mcg/actuation inhaler Inhale 2 puffs into the lungs every 4 (four) hours as needed for Wheezing or Shortness of Breath. Rescue    calcitRIOL (ROCALTROL) 0.25 MCG Cap Take 1 capsule (0.25 mcg total) by mouth once daily.    docusate sodium (COLACE) 100 MG capsule Take 1 capsule (100 mg total) by mouth 2 (two) times daily as needed.    mesalamine (LIALDA) 1.2 gram TbEC Take 1.2 g by mouth daily with breakfast.    midodrine (PROAMATINE) 10 MG tablet Take 1 tablet (10 mg total) by mouth 2 (two) times daily with meals.    multivitamin-iron-folic acid (HIGH POTENCY MULTIVIT, W-IRON,) Tab Take 1 tablet by mouth daily    mycophenolate (CELLCEPT) 250 mg Cap Take 4 capsules (1,000 mg total) by mouth 2 (two) times daily. Txp Date:1/9/2021 (Kidney) Disch Date:1/14/2021 ICD10:Z94.0 Txp Location:[facility]    pantoprazole (PROTONIX) 40 MG tablet Take 1 tablet by mouth once daily    predniSONE (DELTASONE) 5 MG tablet Take 1 tablet (5 mg total) by mouth once daily.    sodium bicarbonate 650 MG tablet Take 2 tablets (1,300 mg total) by mouth 3 (three) times daily.    sucralfate (CARAFATE) 1 gram tablet Take 1 tablet (1 g total) by mouth 4 (four) times daily.    tacrolimus (PROGRAF) 1 MG Cap Take 1 capsule (1 mg total) by mouth every 12 (twelve) hours.     Family History       Problem Relation (Age of Onset)    Cancer Father    Colon cancer Father    Diabetes Maternal Uncle    Hypertension Father, Maternal Aunt, Other    Kidney disease Maternal Aunt, Other    No Known Problems Mother, Sister, Brother          Tobacco Use    Smoking status: Former     Current packs/day: 0.00     Average packs/day: 0.3 packs/day for 12.6 years (3.1 ttl pk-yrs)     Types: Cigarettes     Start date: 2008     Quit date: 8/2/2020     Years since quitting: 3.8     Smokeless tobacco: Never   Substance and Sexual Activity    Alcohol use: No    Drug use: No    Sexual activity: Yes     Partners: Male     Birth control/protection: None     Review of Systems   All other systems reviewed and are negative.    Objective:     Vital Signs (Most Recent):  Temp: 99.4 °F (37.4 °C) (05/30/24 2120)  Pulse: 105 (05/30/24 2120)  Resp: 19 (05/30/24 2353)  BP: 111/63 (05/30/24 2120)  SpO2: 100 % (05/30/24 2120) Vital Signs (24h Range):  Temp:  [99.4 °F (37.4 °C)] 99.4 °F (37.4 °C)  Pulse:  [105] 105  Resp:  [19] 19  SpO2:  [100 %] 100 %  BP: (111)/(63) 111/63     Weight: 92 kg (202 lb 13.2 oz)  Body mass index is 32.74 kg/m².     Physical Exam  Vitals reviewed.   Constitutional:       General: She is in acute distress.      Appearance: Normal appearance. She is obese. She is not ill-appearing, toxic-appearing or diaphoretic.   HENT:      Head: Normocephalic and atraumatic.      Right Ear: External ear normal.      Left Ear: External ear normal.      Nose: Nose normal. No congestion or rhinorrhea.      Mouth/Throat:      Mouth: Mucous membranes are moist.      Pharynx: Oropharynx is clear. No oropharyngeal exudate or posterior oropharyngeal erythema.   Eyes:      General: No scleral icterus.     Extraocular Movements: Extraocular movements intact.      Conjunctiva/sclera: Conjunctivae normal.      Pupils: Pupils are equal, round, and reactive to light.   Neck:      Vascular: No carotid bruit.   Cardiovascular:      Rate and Rhythm: Regular rhythm. Tachycardia present.      Pulses: Normal pulses.      Heart sounds: Normal heart sounds. No murmur heard.     No friction rub. No gallop.   Pulmonary:      Effort: Pulmonary effort is normal. No respiratory distress.      Breath sounds: Normal breath sounds. No stridor. No wheezing, rhonchi or rales.   Chest:      Chest wall: No tenderness.   Abdominal:      General: Abdomen is flat. Bowel sounds are normal. There is no distension.      Palpations:  Abdomen is soft.      Tenderness: There is abdominal tenderness. There is left CVA tenderness and guarding. There is no right CVA tenderness or rebound.      Hernia: No hernia is present.      Comments: Mild TTP noted throughout left lower quadrant extending up to left flank region with guarding noted.   Musculoskeletal:         General: No swelling, tenderness, deformity or signs of injury. Normal range of motion.      Cervical back: Normal range of motion and neck supple. No rigidity or tenderness.      Right lower leg: No edema.      Left lower leg: No edema.   Lymphadenopathy:      Cervical: No cervical adenopathy.   Skin:     General: Skin is warm and dry.      Capillary Refill: Capillary refill takes less than 2 seconds.      Coloration: Skin is not jaundiced or pale.      Findings: No bruising, erythema, lesion or rash.   Neurological:      General: No focal deficit present.      Mental Status: She is alert and oriented to person, place, and time. Mental status is at baseline.      Cranial Nerves: No cranial nerve deficit.      Sensory: No sensory deficit.      Motor: No weakness.      Coordination: Coordination normal.   Psychiatric:         Mood and Affect: Mood normal.         Behavior: Behavior normal.         Thought Content: Thought content normal.         Judgment: Judgment normal.              CRANIAL NERVES     CN III, IV, VI   Pupils are equal, round, and reactive to light.       Significant Labs: All pertinent labs within the past 24 hours have been reviewed.    Significant Imaging: I have reviewed all pertinent imaging results/findings within the past 24 hours.    LABS:  Recent Results (from the past 24 hour(s))   Urinalysis, Reflex to Urine Culture Urine, Clean Catch    Collection Time: 05/30/24 11:10 PM    Specimen: Urine   Result Value Ref Range    Specimen UA Urine, Clean Catch     Color, UA Yellow Yellow, Straw, Fransisca    Appearance, UA Hazy (A) Clear    pH, UA 6.0 5.0 - 8.0    Specific  Gravity, UA 1.010 1.005 - 1.030    Protein, UA 1+ (A) Negative    Glucose, UA Negative Negative    Ketones, UA Negative Negative    Bilirubin (UA) Negative Negative    Occult Blood UA 3+ (A) Negative    Nitrite, UA Negative Negative    Urobilinogen, UA Negative <2.0 EU/dL    Leukocytes, UA 3+ (A) Negative   Urinalysis Microscopic    Collection Time: 05/30/24 11:10 PM   Result Value Ref Range    RBC, UA 10 (H) 0 - 4 /hpf    WBC, UA >100 (H) 0 - 5 /hpf    WBC Clumps, UA Many (A) None-Rare    Bacteria Occasional None-Occ /hpf    Hyaline Casts, UA 0 0-1/lpf /lpf    Unclass Trudi UA Rare None-Moderate    Microscopic Comment SEE COMMENT    Comprehensive metabolic panel    Collection Time: 05/30/24 11:46 PM   Result Value Ref Range    Sodium 138 136 - 145 mmol/L    Potassium 3.8 3.5 - 5.1 mmol/L    Chloride 107 95 - 110 mmol/L    CO2 20 (L) 23 - 29 mmol/L    Glucose 94 70 - 110 mg/dL    BUN 10 6 - 20 mg/dL    Creatinine 1.0 0.5 - 1.4 mg/dL    Calcium 9.0 8.7 - 10.5 mg/dL    Total Protein 7.1 6.0 - 8.4 g/dL    Albumin 3.3 (L) 3.5 - 5.2 g/dL    Total Bilirubin 0.8 0.1 - 1.0 mg/dL    Alkaline Phosphatase 87 55 - 135 U/L    AST 23 10 - 40 U/L    ALT 24 10 - 44 U/L    eGFR >60 >60 mL/min/1.73 m^2    Anion Gap 11 8 - 16 mmol/L   CBC auto differential    Collection Time: 05/30/24 11:46 PM   Result Value Ref Range    WBC 13.73 (H) 3.90 - 12.70 K/uL    RBC 5.52 (H) 4.00 - 5.40 M/uL    Hemoglobin 15.6 12.0 - 16.0 g/dL    Hematocrit 47.2 37.0 - 48.5 %    MCV 86 82 - 98 fL    MCH 28.3 27.0 - 31.0 pg    MCHC 33.1 32.0 - 36.0 g/dL    RDW 14.1 11.5 - 14.5 %    Platelets 169 150 - 450 K/uL    MPV 10.5 9.2 - 12.9 fL    Immature Granulocytes 0.4 0.0 - 0.5 %    Gran # (ANC) 11.7 (H) 1.8 - 7.7 K/uL    Immature Grans (Abs) 0.06 (H) 0.00 - 0.04 K/uL    Lymph # 0.9 (L) 1.0 - 4.8 K/uL    Mono # 1.1 (H) 0.3 - 1.0 K/uL    Eos # 0.0 0.0 - 0.5 K/uL    Baso # 0.03 0.00 - 0.20 K/uL    nRBC 0 0 /100 WBC    Gran % 85.4 (H) 38.0 - 73.0 %    Lymph % 6.2 (L)  18.0 - 48.0 %    Mono % 7.7 4.0 - 15.0 %    Eosinophil % 0.1 0.0 - 8.0 %    Basophil % 0.2 0.0 - 1.9 %    Differential Method Automated    Lactic acid, plasma    Collection Time: 05/30/24 11:46 PM   Result Value Ref Range    Lactate (Lactic Acid) 1.7 0.5 - 2.2 mmol/L       RADIOLOGY  No results found.    EKG    MICROBIOLOGY    MDM

## 2024-05-31 NOTE — NURSING
Pt remains free of falls/injury. Safety precautions maintained. Pt denies pain/discomfort. Pt ambulated to bathroom and in room without any noted issues.  Regular diet tolerated. VSS. No S/S of distress noted at this time. Bed alarm refused.12 hour chart check complete. Will continue to monitor.   was used to communicate with pt.

## 2024-05-31 NOTE — PLAN OF CARE
Patient remains free of hospital injury. VSS. IV fluids continued. Pain controlled with PRN meds. Call light in reach, bed in low position. All patient questions answered. Purposeful rounding Q2 hours. Will continue to monitor. Chart review complete.     Problem: Adult Inpatient Plan of Care  Goal: Absence of Hospital-Acquired Illness or Injury  Outcome: Progressing     Problem: Adult Inpatient Plan of Care  Goal: Optimal Comfort and Wellbeing  Outcome: Progressing     Problem: Adult Inpatient Plan of Care  Goal: Readiness for Transition of Care  Outcome: Progressing     Problem: Adult Inpatient Plan of Care  Goal: Plan of Care Review  Outcome: Progressing

## 2024-05-31 NOTE — ED PROVIDER NOTES
SCRIBE #1 NOTE: I, Joe Teresa, am scribing for, and in the presence of, Montrell Chavez Jr., MD. I have scribed the entire note.       History     Chief Complaint   Patient presents with    Urinary Frequency     Pt states that she has been having pelvic pain since 1100 am today. Pt states that she has back pain when urinating, n/d. Kidney transplant done 3 years.      Review of patient's allergies indicates:   Allergen Reactions    Heparin analogues Rash         History of Present Illness     HPI    2024, 11:31 PM  History obtained from the patient      History of Present Illness: Leydi Cordero is a 34 y.o. female patient with a PMHx of s/p kidney transplant in 2021, HTN, ovarian cyst who presents to the Emergency Department for evaluation of urinary frequency which onset gradually 11 AM today. Symptoms are constant and moderate in severity. No mitigating or exacerbating factors reported. Associated sxs include mild fever and left sided abdominal pain. Patient denies any N/V/D, CP, fatigue, weakness, hematuria, vaginal symptoms, and all other sxs at this time. No further complaints or concerns at this time.  [facility].      Arrival mode:Personal vehicle      PCP: Helena Zepeda PA-C        Past Medical History:  Past Medical History:   Diagnosis Date    Anxiety     -donor kidney transplant 2021    cPRA 100%, XM negative 3 arteries, 2 on common patch, WIT 40 min    Encounter for blood transfusion     ESRD (end stage renal disease)     Fibroma     H/O kidney transplant     Hypertension     Hypertensive nephropathy     Ovarian cyst     Pulmonary nodules 2023    Sleep apnea        Past Surgical History:  Past Surgical History:   Procedure Laterality Date    BRONCHOSCOPY N/A 2023    Procedure: Bronchoscopy;  Surgeon: Denys Robbins MD;  Location: [facility] OR [address] [place] [address];  Service: Pulmonary;  Laterality: N/A;    COLD KNIFE  CONIZATION OF CERVIX N/A 8/21/2023    Procedure: CONE BIOPSY, CERVIX, USING COLD KNIFE;  Surgeon: Jourdan Conklin MD;  Location: [facility];  Service: OB/GYN;  Laterality: N/A;    COLPOSCOPY  06/29/2020    Needs follow up on or after 6/29/21    ESOPHAGOGASTRODUODENOSCOPY N/A 6/23/2022    Procedure: ESOPHAGOGASTRODUODENOSCOPY (EGD);  Surgeon: Essie Carvajal MD;  Location: [facility];  Service: Endoscopy;  Laterality: N/A;    hemodialysis access left arm      kidney removal       KIDNEY TRANSPLANT  04/15/2015    KIDNEY TRANSPLANT N/A 1/9/2021    Procedure: TRANSPLANT, KIDNEY;  Surgeon: Fam Sutton MD;  Location: [address];  Service: Transplant;  Laterality: N/A;    NEPHRECTOMY      OVARIAN CYST REMOVAL      PARATHYROIDECTOMY      partial     PERCUTANEOUS NEPHROSTOMY Left 2/13/2021    Procedure: Creation, Nephrostomy, Percutaneous;  Surgeon: Elen Surgeon;  Location: [facility];  Service: Anesthesiology;  Laterality: Left;    right leg hemodialysis access      ROBOT-ASSISTED LAPAROSCOPIC SLEEVE GASTRECTOMY USING DA VELMA XI N/A 9/27/2022    Procedure: XI ROBOTIC SLEEVE GASTRECTOMY;  Surgeon: Cal Parekh MD;  Location: [facility];  Service: General;  Laterality: N/A;    transplant nephrectomy  12/01/2017         Family History:  Family History   Problem Relation Name Age of Onset    No Known Problems Mother      Colon cancer Father      Cancer Father      Hypertension Father      No Known Problems Sister      No Known Problems Brother      Kidney disease Maternal Aunt      Hypertension Maternal Aunt      Diabetes Maternal Uncle      Kidney disease Other Dad's nephew     Hypertension Other Dad's nephew        Social History:  Social History     Tobacco Use    Smoking status: Former     Current packs/day: 0.00     Average packs/day: 0.3 packs/day for 12.6 years (3.1 ttl pk-yrs)     Types: Cigarettes     Start date: 2008     Quit date: 8/2/2020     Years since quitting: 3.8    Smokeless tobacco: Never   Substance  and Sexual Activity    Alcohol use: No    Drug use: No    Sexual activity: Yes     Partners: Male     Birth control/protection: None        Review of Systems     Review of Systems   Constitutional:  Positive for fever (mild).   HENT:  Negative for sore throat.    Respiratory:  Negative for shortness of breath.    Cardiovascular:  Negative for chest pain.   Gastrointestinal:  Positive for abdominal pain (left sided). Negative for diarrhea, nausea and vomiting.   Genitourinary:  Positive for frequency. Negative for dysuria, vaginal bleeding and vaginal discharge.   Musculoskeletal:  Negative for back pain.   Skin:  Negative for rash.   Neurological:  Negative for weakness.   Hematological:  Does not bruise/bleed easily.   All other systems reviewed and are negative.       Physical Exam     Initial Vitals [05/30/24 2120]   BP Pulse Resp Temp SpO2   111/63 105 19 99.4 °F (37.4 °C) 100 %      MAP       --          Physical Exam  Nursing Notes and Vital Signs Reviewed.  Constitutional: Patient is in no acute distress. Well-developed and well-nourished. Patient was nontoxic  Head: Atraumatic. Normocephalic.  Eyes:  EOM intact.  No scleral icterus.  ENT: Mucous membranes are moist.  Nares clear   Neck:  Full ROM. No JVD.  Cardiovascular: Regular rate. Regular rhythm No murmurs, rubs, or gallops. Distal pulses are 2+ and symmetric  Pulmonary/Chest: No respiratory distress. Clear to auscultation bilaterally. No wheezing or rales.  Equal chest wall rise bilaterally  Abdominal: Soft and non-distended.    Tenderness with guarding in the left lower quadrant around the of the left flank.  Genitourinary:   Mild left CVA tenderness.  No suprapubic tenderness  Musculoskeletal: Moves all extremities. No obvious deformities.  5 x 5 strength in all extremities   Skin: Warm and dry.  Neurological:  Alert, awake, and appropriate.  Normal speech.  No acute focal neurological deficits are appreciated.  Two through 12 intact  "bilaterally.  Psychiatric: Normal affect. Good eye contact. Appropriate in content.     ED Course   Procedures  ED Vital Signs:  Vitals:    05/30/24 2120 05/30/24 2353   BP: 111/63    Pulse: 105    Resp: 19 19   Temp: 99.4 °F (37.4 °C)    TempSrc: Oral    SpO2: 100%    Weight: 92 kg (202 lb 13.2 oz)    Height: 5' 6" (1.676 m)        Abnormal Lab Results:  Labs Reviewed   URINALYSIS, REFLEX TO URINE CULTURE - Abnormal; Notable for the following components:       Result Value    Appearance, UA Hazy (*)     Protein, UA 1+ (*)     Occult Blood UA 3+ (*)     Leukocytes, UA 3+ (*)     All other components within normal limits    Narrative:     Specimen Source->Urine   COMPREHENSIVE METABOLIC PANEL - Abnormal; Notable for the following components:    CO2 20 (*)     Albumin 3.3 (*)     All other components within normal limits   CBC W/ AUTO DIFFERENTIAL - Abnormal; Notable for the following components:    WBC 13.73 (*)     RBC 5.52 (*)     Gran # (ANC) 11.7 (*)     Immature Grans (Abs) 0.06 (*)     Lymph # 0.9 (*)     Mono # 1.1 (*)     Gran % 85.4 (*)     Lymph % 6.2 (*)     All other components within normal limits   URINALYSIS MICROSCOPIC - Abnormal; Notable for the following components:    RBC, UA 10 (*)     WBC, UA >100 (*)     WBC Clumps, UA Many (*)     All other components within normal limits    Narrative:     Specimen Source->Urine   CULTURE, BLOOD   CULTURE, BLOOD   CULTURE, URINE   LACTIC ACID, PLASMA        All Lab Results:  Results for orders placed or performed during the hospital encounter of 05/30/24   Urinalysis, Reflex to Urine Culture Urine, Clean Catch    Specimen: Urine   Result Value Ref Range    Specimen UA Urine, Clean Catch     Color, UA Yellow Yellow, Straw, Fransisca    Appearance, UA Hazy (A) Clear    pH, UA 6.0 5.0 - 8.0    Specific Gravity, UA 1.010 1.005 - 1.030    Protein, UA 1+ (A) Negative    Glucose, UA Negative Negative    Ketones, UA Negative Negative    Bilirubin (UA) Negative Negative    " Occult Blood UA 3+ (A) Negative    Nitrite, UA Negative Negative    Urobilinogen, UA Negative <2.0 EU/dL    Leukocytes, UA 3+ (A) Negative   Comprehensive metabolic panel   Result Value Ref Range    Sodium 138 136 - 145 mmol/L    Potassium 3.8 3.5 - 5.1 mmol/L    Chloride 107 95 - 110 mmol/L    CO2 20 (L) 23 - 29 mmol/L    Glucose 94 70 - 110 mg/dL    BUN 10 6 - 20 mg/dL    Creatinine 1.0 0.5 - 1.4 mg/dL    Calcium 9.0 8.7 - 10.5 mg/dL    Total Protein 7.1 6.0 - 8.4 g/dL    Albumin 3.3 (L) 3.5 - 5.2 g/dL    Total Bilirubin 0.8 0.1 - 1.0 mg/dL    Alkaline Phosphatase 87 55 - 135 U/L    AST 23 10 - 40 U/L    ALT 24 10 - 44 U/L    eGFR >60 >60 mL/min/1.73 m^2    Anion Gap 11 8 - 16 mmol/L   CBC auto differential   Result Value Ref Range    WBC 13.73 (H) 3.90 - 12.70 K/uL    RBC 5.52 (H) 4.00 - 5.40 M/uL    Hemoglobin 15.6 12.0 - 16.0 g/dL    Hematocrit 47.2 37.0 - 48.5 %    MCV 86 82 - 98 fL    MCH 28.3 27.0 - 31.0 pg    MCHC 33.1 32.0 - 36.0 g/dL    RDW 14.1 11.5 - 14.5 %    Platelets 169 150 - 450 K/uL    MPV 10.5 9.2 - 12.9 fL    Immature Granulocytes 0.4 0.0 - 0.5 %    Gran # (ANC) 11.7 (H) 1.8 - 7.7 K/uL    Immature Grans (Abs) 0.06 (H) 0.00 - 0.04 K/uL    Lymph # 0.9 (L) 1.0 - 4.8 K/uL    Mono # 1.1 (H) 0.3 - 1.0 K/uL    Eos # 0.0 0.0 - 0.5 K/uL    Baso # 0.03 0.00 - 0.20 K/uL    nRBC 0 0 /100 WBC    Gran % 85.4 (H) 38.0 - 73.0 %    Lymph % 6.2 (L) 18.0 - 48.0 %    Mono % 7.7 4.0 - 15.0 %    Eosinophil % 0.1 0.0 - 8.0 %    Basophil % 0.2 0.0 - 1.9 %    Differential Method Automated    Lactic acid, plasma   Result Value Ref Range    Lactate (Lactic Acid) 1.7 0.5 - 2.2 mmol/L   Urinalysis Microscopic   Result Value Ref Range    RBC, UA 10 (H) 0 - 4 /hpf    WBC, UA >100 (H) 0 - 5 /hpf    WBC Clumps, UA Many (A) None-Rare    Bacteria Occasional None-Occ /hpf    Hyaline Casts, UA 0 0-1/lpf /lpf    Unclass Trudi UA Rare None-Moderate    Microscopic Comment SEE COMMENT      *Note: Due to a large number of results and/or  encounters for the requested time period, some results have not been displayed. A complete set of results can be found in Results Review.         Imaging Results:  Imaging Results    None         The Emergency Provider reviewed the vital signs and test results, which are outlined above.     ED Discussion         Medical Decision Making   Differential diagnosis:  Urinary tract infection, kidney infection, abdominal pain, intra-abdominal abscess, history of kidney transplant     Patient was evaluated history physical examination.  Patient was complaining of dysuria and some lower abdominal pain.  Patient was workup shows urine consistent with a UTI with a mild increase in her white count at 13.7 left shift.  She was normal renal function however.  Lactate is normal at 1.7.  She was dosed with Rocephin as well as pain medication.  Patient was being admitted to Hospital Medicine further evaluation and treatment    Amount and/or Complexity of Data Reviewed  External Data Reviewed: labs and notes.  Labs: ordered. Decision-making details documented in ED Course.  Discussion of management or test interpretation with external provider(s):     12:35 AM: Discussed case with Juan Carlos Hopper MD ([facility]). Dr. Hopper agrees with current care and management of pt and accepts admission.   Admitting Service: Hospital Medicine  Admitting Physician: Dr. Hopper  Admit to: obs med surg    12:35 AM: Re-evaluated pt. I have discussed test results, shared treatment plan, and the need for admission with patient and family at bedside. Pt and family express understanding at this time and agree with all information. All questions answered. Pt and family have no further questions or concerns at this time. Pt is ready for admit.      Risk  OTC drugs.  Prescription drug management.  Parenteral controlled substances.  Decision regarding hospitalization.  Diagnosis or treatment significantly limited by social determinants of  health.                 ED Medication(s):  Medications   sodium chloride 0.9% bolus 1,000 mL 1,000 mL (1,000 mLs Intravenous New Bag 5/30/24 2359)   sodium chloride 0.9% flush 10 mL (has no administration in time range)   lactated ringers infusion (has no administration in time range)   albuterol-ipratropium 2.5 mg-0.5 mg/3 mL nebulizer solution 3 mL (has no administration in time range)   melatonin tablet 6 mg (has no administration in time range)   ondansetron injection 4 mg (has no administration in time range)   promethazine tablet 25 mg (has no administration in time range)   senna-docusate 8.6-50 mg per tablet 1 tablet (has no administration in time range)   acetaminophen tablet 650 mg (has no administration in time range)   aluminum-magnesium hydroxide-simethicone 200-200-20 mg/5 mL suspension 30 mL (has no administration in time range)   acetaminophen suppository 650 mg (has no administration in time range)   HYDROcodone-acetaminophen 5-325 mg per tablet 1 tablet (has no administration in time range)   morphine injection 2 mg (has no administration in time range)   naloxone 0.4 mg/mL injection 0.02 mg (has no administration in time range)   glucose chewable tablet 16 g (has no administration in time range)   glucose chewable tablet 24 g (has no administration in time range)   glucagon (human recombinant) injection 1 mg (has no administration in time range)   enoxaparin injection 40 mg (has no administration in time range)   dextrose 10% bolus 125 mL 125 mL (has no administration in time range)   dextrose 10% bolus 250 mL 250 mL (has no administration in time range)   morphine injection 4 mg (4 mg Intravenous Given 5/30/24 2353)   ondansetron injection 4 mg (4 mg Intravenous Given 5/30/24 2353)   cefTRIAXone (Rocephin) 1 g in dextrose 5 % in water (D5W) 100 mL IVPB (MB+) (0 g Intravenous Stopped 5/31/24 0029)       New Prescriptions    No medications on file               Scribe Attestation:   Scribe #1: I  performed the above scribed service and the documentation accurately describes the services I performed. I attest to the accuracy of the note.     Attending:   Physician Attestation Statement for Scribe #1: I, Montrell Chavez Jr., MD, personally performed the services described in this documentation, as scribed by Joe Moore, in my presence, and it is both accurate and complete.           Clinical Impression       ICD-10-CM ICD-9-CM   1. Acute lower UTI  N39.0 599.0   2. History of kidney transplant  Z94.0 V42.0   3. Chest pain  R07.9 786.50   4. Immunocompromised state  D84.9 279.9       Disposition:   Disposition: Placed in Observation  Condition: Montrell Germain Jr., MD  05/31/24 0057

## 2024-05-31 NOTE — ASSESSMENT & PLAN NOTE
Patient s/p renal transplant currently on cellcept, prograf, and prednisone outpatient. She continues to follow Dr. Jaffe from nephology and Dr. Venegas from transplant.  Patient currently without signs of rejection in his currently being treated for UTI.  Plan:  -continue home medications  -continued treatment of UTI, f/u cultures  -f/u nephrology and transplant as directed

## 2024-05-31 NOTE — ASSESSMENT & PLAN NOTE
Body mass index is 32.74 kg/m². Morbid obesity complicates all aspects of disease management from diagnostic modalities to treatment. Weight loss encouraged and health benefits explained to patient.

## 2024-05-31 NOTE — H&P
[facility] - Emergency Dept.  [facility] Medicine  History & Physical    Patient Name: Leydi Cordero  MRN: [id number]  Patient Class: OP- Observation  Admission Date: 2024  Attending Physician: Juan Carlos Hopper MD   Primary Care Provider: Helena Zepeda PA-C         Patient information was obtained from patient, past medical records, and ER records.     Subjective:     Principal Problem:Acute cystitis    Chief Complaint:   Chief Complaint   Patient presents with    Urinary Frequency     Pt states that she has been having pelvic pain since 1100 am today. Pt states that she has back pain when urinating, n/d. Kidney transplant done 3 years.         HPI: Leydi Cordero is a 34 y.o. female with a PMH  has a past medical history of Anxiety, -donor kidney transplant 2021 (2021), Encounter for blood transfusion, ESRD (end stage renal disease), Fibroma, H/O kidney transplant, Hypertension, Hypertensive nephropathy, Ovarian cyst, Pulmonary nodules (2023), and Sleep apnea. who presented to the ED for further evaluation of acute onset and worsening urinary frequency, left lower quadrant and left flank pain, subjective fevers, chills, and myalgias which began approximately 11:00 a.m. earlier today.  Patient has significant history of renal transplant currently on immunosuppressive therapy which she reports compliance with home medications but denied prior history of UTIs or kidney stones.  Patient currently endorsing pain which is constant, rated 6/10 in severity at time of bedside assessment, radiating throughout her lower abdomen, left lower quadrant, and left flank region with no known alleviating or aggravating factors noted.  She denied exposure to recent ill contacts, recent use of antibiotics, and reported all other review of systems negative except as noted above.  Prior to onset of symptoms, patient reported being in her usual state of health with no other concerns or  complaints.  Initial workup in the ED fairly unremarkable with the exception of UA positive for UTI.  Patient remains afebrile without leukocytosis with normal renal function.  Patient initiated on IVFs and Rocephin and admitted to Hospital Medicine under observation for continued medical management of UTI in an immunosuppressive patient.    PCP: Helena Zepeda      Past Medical History:   Diagnosis Date    Anxiety     -donor kidney transplant 2021    cPRA 100%, XM negative 3 arteries, 2 on common patch, WIT 40 min    Encounter for blood transfusion     ESRD (end stage renal disease)     Fibroma     H/O kidney transplant     Hypertension     Hypertensive nephropathy     Ovarian cyst     Pulmonary nodules 2023    Sleep apnea        Past Surgical History:   Procedure Laterality Date    BRONCHOSCOPY N/A 2023    Procedure: Bronchoscopy;  Surgeon: Denys Robbins MD;  Location: [address];  Service: Pulmonary;  Laterality: N/A;    COLD KNIFE CONIZATION OF CERVIX N/A 2023    Procedure: CONE BIOPSY, CERVIX, USING COLD KNIFE;  Surgeon: Jourdan Conklin MD;  Location: [facility] OR;  Service: OB/GYN;  Laterality: N/A;    COLPOSCOPY  2020    Needs follow up on or after 21    ESOPHAGOGASTRODUODENOSCOPY N/A 2022    Procedure: ESOPHAGOGASTRODUODENOSCOPY (EGD);  Surgeon: Essie Carvajal MD;  Location: [facility];  Service: Endoscopy;  Laterality: N/A;    hemodialysis access left arm      kidney removal       KIDNEY TRANSPLANT  04/15/2015    KIDNEY TRANSPLANT N/A 2021    Procedure: TRANSPLANT, KIDNEY;  Surgeon: Fam Sutton MD;  Location: [address];  Service: Transplant;  Laterality: N/A;    NEPHRECTOMY      OVARIAN CYST REMOVAL      PARATHYROIDECTOMY      partial     PERCUTANEOUS NEPHROSTOMY Left 2021    Procedure: Creation, Nephrostomy, Percutaneous;  Surgeon: Elen Surgeon;  Location: [facility];  Service: Anesthesiology;  Laterality: Left;    right  leg hemodialysis access      ROBOT-ASSISTED LAPAROSCOPIC SLEEVE GASTRECTOMY USING DA VELMA XI N/A 9/27/2022    Procedure: XI ROBOTIC SLEEVE GASTRECTOMY;  Surgeon: Cal Parekh MD;  Location: [facility];  Service: General;  Laterality: N/A;    transplant nephrectomy  12/01/2017       Review of patient's allergies indicates:   Allergen Reactions    Heparin analogues Rash       No current facility-administered medications on file prior to encounter.     Current Outpatient Medications on File Prior to Encounter   Medication Sig    albuterol (PROVENTIL/VENTOLIN HFA) 90 mcg/actuation inhaler Inhale 2 puffs into the lungs every 4 (four) hours as needed for Wheezing or Shortness of Breath. Rescue    calcitRIOL (ROCALTROL) 0.25 MCG Cap Take 1 capsule (0.25 mcg total) by mouth once daily.    docusate sodium (COLACE) 100 MG capsule Take 1 capsule (100 mg total) by mouth 2 (two) times daily as needed.    mesalamine (LIALDA) 1.2 gram TbEC Take 1.2 g by mouth daily with breakfast.    midodrine (PROAMATINE) 10 MG tablet Take 1 tablet (10 mg total) by mouth 2 (two) times daily with meals.    multivitamin-iron-folic acid (HIGH POTENCY MULTIVIT, W-IRON,) Tab Take 1 tablet by mouth daily    mycophenolate (CELLCEPT) 250 mg Cap Take 4 capsules (1,000 mg total) by mouth 2 (two) times daily. Txp Date:1/9/2021 (Kidney) Disch Date:1/14/2021 ICD10:Z94.0 Txp Location:[facility]    pantoprazole (PROTONIX) 40 MG tablet Take 1 tablet by mouth once daily    predniSONE (DELTASONE) 5 MG tablet Take 1 tablet (5 mg total) by mouth once daily.    sodium bicarbonate 650 MG tablet Take 2 tablets (1,300 mg total) by mouth 3 (three) times daily.    sucralfate (CARAFATE) 1 gram tablet Take 1 tablet (1 g total) by mouth 4 (four) times daily.    tacrolimus (PROGRAF) 1 MG Cap Take 1 capsule (1 mg total) by mouth every 12 (twelve) hours.     Family History       Problem Relation (Age of Onset)    Cancer Father    Colon cancer Father    Diabetes Maternal Uncle     Hypertension Father, Maternal Aunt, Other    Kidney disease Maternal Aunt, Other    No Known Problems Mother, Sister, Brother          Tobacco Use    Smoking status: Former     Current packs/day: 0.00     Average packs/day: 0.3 packs/day for 12.6 years (3.1 ttl pk-yrs)     Types: Cigarettes     Start date: 2008     Quit date: 8/2/2020     Years since quitting: 3.8    Smokeless tobacco: Never   Substance and Sexual Activity    Alcohol use: No    Drug use: No    Sexual activity: Yes     Partners: Male     Birth control/protection: None     Review of Systems   All other systems reviewed and are negative.    Objective:     Vital Signs (Most Recent):  Temp: 99.4 °F (37.4 °C) (05/30/24 2120)  Pulse: 105 (05/30/24 2120)  Resp: 19 (05/30/24 2353)  BP: 111/63 (05/30/24 2120)  SpO2: 100 % (05/30/24 2120) Vital Signs (24h Range):  Temp:  [99.4 °F (37.4 °C)] 99.4 °F (37.4 °C)  Pulse:  [105] 105  Resp:  [19] 19  SpO2:  [100 %] 100 %  BP: (111)/(63) 111/63     Weight: 92 kg (202 lb 13.2 oz)  Body mass index is 32.74 kg/m².     Physical Exam  Vitals reviewed.   Constitutional:       General: She is in acute distress.      Appearance: Normal appearance. She is obese. She is not ill-appearing, toxic-appearing or diaphoretic.   HENT:      Head: Normocephalic and atraumatic.      Right Ear: External ear normal.      Left Ear: External ear normal.      Nose: Nose normal. No congestion or rhinorrhea.      Mouth/Throat:      Mouth: Mucous membranes are moist.      Pharynx: Oropharynx is clear. No oropharyngeal exudate or posterior oropharyngeal erythema.   Eyes:      General: No scleral icterus.     Extraocular Movements: Extraocular movements intact.      Conjunctiva/sclera: Conjunctivae normal.      Pupils: Pupils are equal, round, and reactive to light.   Neck:      Vascular: No carotid bruit.   Cardiovascular:      Rate and Rhythm: Regular rhythm. Tachycardia present.      Pulses: Normal pulses.      Heart sounds: Normal heart  sounds. No murmur heard.     No friction rub. No gallop.   Pulmonary:      Effort: Pulmonary effort is normal. No respiratory distress.      Breath sounds: Normal breath sounds. No stridor. No wheezing, rhonchi or rales.   Chest:      Chest wall: No tenderness.   Abdominal:      General: Abdomen is flat. Bowel sounds are normal. There is no distension.      Palpations: Abdomen is soft.      Tenderness: There is abdominal tenderness. There is left CVA tenderness and guarding. There is no right CVA tenderness or rebound.      Hernia: No hernia is present.      Comments: Mild TTP noted throughout left lower quadrant extending up to left flank region with guarding noted.   Musculoskeletal:         General: No swelling, tenderness, deformity or signs of injury. Normal range of motion.      Cervical back: Normal range of motion and neck supple. No rigidity or tenderness.      Right lower leg: No edema.      Left lower leg: No edema.   Lymphadenopathy:      Cervical: No cervical adenopathy.   Skin:     General: Skin is warm and dry.      Capillary Refill: Capillary refill takes less than 2 seconds.      Coloration: Skin is not jaundiced or pale.      Findings: No bruising, erythema, lesion or rash.   Neurological:      General: No focal deficit present.      Mental Status: She is alert and oriented to person, place, and time. Mental status is at baseline.      Cranial Nerves: No cranial nerve deficit.      Sensory: No sensory deficit.      Motor: No weakness.      Coordination: Coordination normal.   Psychiatric:         Mood and Affect: Mood normal.         Behavior: Behavior normal.         Thought Content: Thought content normal.         Judgment: Judgment normal.              CRANIAL NERVES     CN III, IV, VI   Pupils are equal, round, and reactive to light.       Significant Labs: All pertinent labs within the past 24 hours have been reviewed.    Significant Imaging: I have reviewed all pertinent imaging  results/findings within the past 24 hours.    LABS:  Recent Results (from the past 24 hour(s))   Urinalysis, Reflex to Urine Culture Urine, Clean Catch    Collection Time: 05/30/24 11:10 PM    Specimen: Urine   Result Value Ref Range    Specimen UA Urine, Clean Catch     Color, UA Yellow Yellow, Straw, Fransisca    Appearance, UA Hazy (A) Clear    pH, UA 6.0 5.0 - 8.0    Specific Gravity, UA 1.010 1.005 - 1.030    Protein, UA 1+ (A) Negative    Glucose, UA Negative Negative    Ketones, UA Negative Negative    Bilirubin (UA) Negative Negative    Occult Blood UA 3+ (A) Negative    Nitrite, UA Negative Negative    Urobilinogen, UA Negative <2.0 EU/dL    Leukocytes, UA 3+ (A) Negative   Urinalysis Microscopic    Collection Time: 05/30/24 11:10 PM   Result Value Ref Range    RBC, UA 10 (H) 0 - 4 /hpf    WBC, UA >100 (H) 0 - 5 /hpf    WBC Clumps, UA Many (A) None-Rare    Bacteria Occasional None-Occ /hpf    Hyaline Casts, UA 0 0-1/lpf /lpf    Unclass Trudi UA Rare None-Moderate    Microscopic Comment SEE COMMENT    Comprehensive metabolic panel    Collection Time: 05/30/24 11:46 PM   Result Value Ref Range    Sodium 138 136 - 145 mmol/L    Potassium 3.8 3.5 - 5.1 mmol/L    Chloride 107 95 - 110 mmol/L    CO2 20 (L) 23 - 29 mmol/L    Glucose 94 70 - 110 mg/dL    BUN 10 6 - 20 mg/dL    Creatinine 1.0 0.5 - 1.4 mg/dL    Calcium 9.0 8.7 - 10.5 mg/dL    Total Protein 7.1 6.0 - 8.4 g/dL    Albumin 3.3 (L) 3.5 - 5.2 g/dL    Total Bilirubin 0.8 0.1 - 1.0 mg/dL    Alkaline Phosphatase 87 55 - 135 U/L    AST 23 10 - 40 U/L    ALT 24 10 - 44 U/L    eGFR >60 >60 mL/min/1.73 m^2    Anion Gap 11 8 - 16 mmol/L   CBC auto differential    Collection Time: 05/30/24 11:46 PM   Result Value Ref Range    WBC 13.73 (H) 3.90 - 12.70 K/uL    RBC 5.52 (H) 4.00 - 5.40 M/uL    Hemoglobin 15.6 12.0 - 16.0 g/dL    Hematocrit 47.2 37.0 - 48.5 %    MCV 86 82 - 98 fL    MCH 28.3 27.0 - 31.0 pg    MCHC 33.1 32.0 - 36.0 g/dL    RDW 14.1 11.5 - 14.5 %     Platelets 169 150 - 450 K/uL    MPV 10.5 9.2 - 12.9 fL    Immature Granulocytes 0.4 0.0 - 0.5 %    Gran # (ANC) 11.7 (H) 1.8 - 7.7 K/uL    Immature Grans (Abs) 0.06 (H) 0.00 - 0.04 K/uL    Lymph # 0.9 (L) 1.0 - 4.8 K/uL    Mono # 1.1 (H) 0.3 - 1.0 K/uL    Eos # 0.0 0.0 - 0.5 K/uL    Baso # 0.03 0.00 - 0.20 K/uL    nRBC 0 0 /100 WBC    Gran % 85.4 (H) 38.0 - 73.0 %    Lymph % 6.2 (L) 18.0 - 48.0 %    Mono % 7.7 4.0 - 15.0 %    Eosinophil % 0.1 0.0 - 8.0 %    Basophil % 0.2 0.0 - 1.9 %    Differential Method Automated    Lactic acid, plasma    Collection Time: 05/30/24 11:46 PM   Result Value Ref Range    Lactate (Lactic Acid) 1.7 0.5 - 2.2 mmol/L       RADIOLOGY  No results found.    EKG    MICROBIOLOGY    MDM    Assessment/Plan:     * Acute cystitis  Patient presented with signs/symptoms of acute cystitis including subjective fevers, increased frequency, pelvic and flank pain, and was found to have evidence of UTI on UA.  Patient remains afebrile with leukocytosis 13.73.  Patient initiated on Rocephin with cultures obtained and pending.  Patient admitted under observation given increased risk of immunosuppresion/renal transplant.  Plan:  -Continue current pain regimen, titrate as needed  -Bowel regimen prn  -Bedrest  -IVFs prn  -Antiemetics prn  -Tylenol as needed for fever   -Continue antibiotics   -f/u cultures  -f/u renal US, consider CT if symptoms fail to improve or clinical course worsens       H/O kidney transplant  Patient s/p renal transplant currently on cellcept, prograf, and prednisone outpatient. She continues to follow Dr. Jaffe from nephology and Dr. Venegas from transplant.  Patient currently without signs of rejection in his currently being treated for UTI.  Plan:  -continue home medications  -continued treatment of UTI, f/u cultures  -f/u nephrology and transplant as directed         Essential hypertension  Chronic, controlled. Patient also reported history of hypotension treated with midodrine  prn. Latest blood pressure and vitals reviewed-     Temp:  [99.1 °F (37.3 °C)-99.4 °F (37.4 °C)]   Pulse:  []   Resp:  [18-19]   BP: (106-116)/(63-75)   SpO2:  [97 %-100 %] .   Home meds for hypertension were reviewed and noted below.     While in the hospital, will manage blood pressure as follows; Adjust home antihypertensive regimen as follows- holding home medications as patient currently normotensive.    Will utilize p.r.n. blood pressure medication only if patient's blood pressure greater than 160/100 and she develops symptoms such as worsening chest pain or shortness of breath. Will also resume midodrine as needed for hypotension if unresponsive to IVFs.      GERD (gastroesophageal reflux disease)  Chronic. Stable. Currently asymptomatic. Home medications include PPI/Antacids as needed.  Plan:  -Continue PPI/Antacids as needed       Class 1 obesity due to excess calories with serious comorbidity and body mass index (BMI) of 32.0 to 32.9 in adult  Body mass index is 32.74 kg/m². Morbid obesity complicates all aspects of disease management from diagnostic modalities to treatment. Weight loss encouraged and health benefits explained to patient.         VTE Risk Mitigation (From admission, onward)           Ordered     enoxaparin injection 40 mg  Daily         05/31/24 0057     IP VTE HIGH RISK PATIENT  Once         05/31/24 0057     Place sequential compression device  Until discontinued         05/31/24 0057                  //Core Measures   -DVT proph: SCDs, Lovenox   -Code status: Full    -Surrogate: none provided       Components of this note were documented using a voice recognition system and are subject to errors not corrected at the time the document was proof read. Please contact the author for any clarifications.       On 05/31/2024, patient should be placed in hospital observation services under my care.       Juan Carlos Hopper MD  Department of Hospital Medicine  [facility] - Emergency  Dept.

## 2024-05-31 NOTE — ASSESSMENT & PLAN NOTE
Chronic, controlled. Patient also reported history of hypotension treated with midodrine prn. Latest blood pressure and vitals reviewed-     Temp:  [99.1 °F (37.3 °C)-99.4 °F (37.4 °C)]   Pulse:  []   Resp:  [18-19]   BP: (106-116)/(63-75)   SpO2:  [97 %-100 %] .   Home meds for hypertension were reviewed and noted below.     While in the hospital, will manage blood pressure as follows; Adjust home antihypertensive regimen as follows- holding home medications as patient currently normotensive.    Will utilize p.r.n. blood pressure medication only if patient's blood pressure greater than 160/100 and she develops symptoms such as worsening chest pain or shortness of breath. Will also resume midodrine as needed for hypotension if unresponsive to IVFs.

## 2024-05-31 NOTE — CARE UPDATE
Leydi Cordero is a 34 y.o. female with a PMH has a past medical history of Anxiety, -donor kidney transplant 2021 (2021), Encounter for blood transfusion, ESRD (end stage renal disease), Fibroma, H/O kidney transplant, Hypertension, Hypertensive nephropathy, Ovarian cyst, Pulmonary nodules (2023), and Sleep apnea. who presented to the ED for further evaluation of acute onset and worsening urinary frequency, left lower quadrant and left flank pain, subjective fevers, chills, and myalgias which began approximately 11:00 a.m. earlier today. Patient has significant history of renal transplant currently on immunosuppressive therapy which she reports compliance with home medications but denied prior history of UTIs or kidney stones. Patient currently endorsing pain which is constant, rated 6/10 in severity at time of bedside assessment, radiating throughout her lower abdomen, left lower quadrant, and left flank region with no known alleviating or aggravating factors noted. She denied exposure to recent ill contacts, recent use of antibiotics, and reported all other review of systems negative except as noted above. Prior to onset of symptoms, patient reported being in her usual state of health with no other concerns or complaints. Initial workup in the ED fairly unremarkable with the exception of UA positive for UTI. Patient remains afebrile without leukocytosis with normal renal function. Patient initiated on IVFs and Rocephin and admitted to Hospital Medicine under observation for continued medical management of UTI in an immunosuppressive patient.     2024   Examination done at bedside, communicated using .    Denied acute events, stated feeling slightly better, denied fever, chills, chest pain, shortness O breath, nausea, vomiting,; complaining of burning micturition.    Afebrile, WBC trended down   UA suggestive of UTI, currently on empiric antibiotics, pending  urine cultures.    Hold CellCept, currently on tacrolimus, ordered levels.  Nephrology consulted.    Patient/family agreed with the plan

## 2024-05-31 NOTE — ASSESSMENT & PLAN NOTE
Patient presented with signs/symptoms of acute cystitis including subjective fevers, increased frequency, pelvic and flank pain, and was found to have evidence of UTI on UA.  Patient remains afebrile with leukocytosis 13.73.  Patient initiated on Rocephin with cultures obtained and pending.  Patient admitted under observation given increased risk of immunosuppresion/renal transplant.  Plan:  -Continue current pain regimen, titrate as needed  -Bowel regimen prn  -Bedrest  -IVFs prn  -Antiemetics prn  -Tylenol as needed for fever   -Continue antibiotics   -f/u cultures  -f/u renal US, consider CT if symptoms fail to improve or clinical course worsens

## 2024-05-31 NOTE — PLAN OF CARE
[facility] - [facility]  Initial Discharge Assessment       Primary Care Provider: Helena Zepeda PA-C    Admission Diagnosis: Acute lower UTI [N39.0]  Immunocompromised state [D84.9]  History of kidney transplant [Z94.0]  Chest pain [R07.9]    Admission Date: 5/30/2024  Expected Discharge Date:     Transition of Care Barriers: None    Payor: MEDICAID / Plan: [place]ICA[place] / Product Type: Government /     Extended Emergency Contact Information  Primary Emergency Contact: Lon Grimes  Address: [address] [place] [address] [address] [place] [address] [facility]  Home Phone: [phone]  Mobile Phone: [phone]  Relation: Relative    Discharge Plan A: Home  Discharge Plan B: Home      [facility]  [address] [place] [address] [address] [place] [address]  Phone: [phone] Fax: [phone]    [facility] [address] [address]  Phone: [phone] Fax: [phone]      Initial Assessment (most recent)       Adult Discharge Assessment - 05/31/24 1144          Discharge Assessment    Assessment Type Discharge Planning Assessment     Confirmed/corrected address, phone number and insurance Yes     Confirmed Demographics Correct on Facesheet     Source of Information patient     Communicated NATALIIA with patient/caregiver Date not available/Unable to determine     Reason For Admission acute cystitis     People in Home alone     Facility Arrived From: Home     Do you expect to return to your current living situation? Yes     Do you have help at home or someone to help you manage your care at home? Yes     Who are your caregiver(s) and their phone number(s)? Sofiya and other friends/family     Prior to hospitilization cognitive status: Alert/Oriented     Current cognitive status: Alert/Oriented     Walking or Climbing Stairs Difficulty no     Dressing/Bathing Difficulty no     Equipment Currently Used at Home none     Readmission within 30 days? No      Patient currently being followed by outpatient case management? No     Do you currently have service(s) that help you manage your care at home? No     Do you take prescription medications? Yes     Do you have prescription coverage? Yes     Coverage PHN, medicaid     Do you have any problems affording any of your prescribed medications? No     Is the patient taking medications as prescribed? yes     Who is going to help you get home at discharge? Pt will arrange     How do you get to doctors appointments? car, drives self     Are you on dialysis? No     Do you take coumadin? No     Discharge Plan A Home     Discharge Plan B Home     DME Needed Upon Discharge  none     Discharge Plan discussed with: Patient     Transition of Care Barriers None                   SW met with patient at bedside to complete discharge assessment. Prydeinig interrupter Bunny # [id number] was used.    Pt reports living at home alone. Pt independence with Adls and drives. Pt reports support from family/friends. Pt will arrange transportation for discharge.   No needs expressed at this time.  Pt's whiteboard updated with CM contact and anticipated discharge disposition. SW to remain available as needed.

## 2024-05-31 NOTE — HPI
Leydi Cordero is a 34 y.o. female with a PMH  has a past medical history of Anxiety, -donor kidney transplant 2021 (2021), Encounter for blood transfusion, ESRD (end stage renal disease), Fibroma, H/O kidney transplant, Hypertension, Hypertensive nephropathy, Ovarian cyst, Pulmonary nodules (2023), and Sleep apnea. who presented to the ED for further evaluation of acute onset and worsening urinary frequency, left lower quadrant and left flank pain, subjective fevers, chills, and myalgias which began approximately 11:00 a.m. earlier today.  Patient has significant history of renal transplant currently on immunosuppressive therapy which she reports compliance with home medications but denied prior history of UTIs or kidney stones.  Patient currently endorsing pain which is constant, rated 6/10 in severity at time of bedside assessment, radiating throughout her lower abdomen, left lower quadrant, and left flank region with no known alleviating or aggravating factors noted.  She denied exposure to recent ill contacts, recent use of antibiotics, and reported all other review of systems negative except as noted above.  Prior to onset of symptoms, patient reported being in her usual state of health with no other concerns or complaints.  Initial workup in the ED fairly unremarkable with the exception of UA positive for UTI.  Patient remains afebrile without leukocytosis with normal renal function.  Patient initiated on IVFs and Rocephin and admitted to Hospital Medicine under observation for continued medical management of UTI in an immunosuppressive patient.    PCP: Helena Zepeda

## 2024-05-31 NOTE — CONSULTS
Nephrology Consultation    Reason for consultation:  Status post renal transplant currently on CellCept, Prograf, and prednisone outpatient; currently with UTI    Requesting Physician: Dr. Sales    HPI:  HPI is from the chart and through a     Ms. Cordero is a 34-year-old  female (H/O DDKT - 01/09/2021, followed by Dr. Jaffe) who presented to the ED with acute onset of urinary frequency, left lower quadrant pain/flank pain, subjective fever/chills/malaise.  She denies nausea, vomiting, or chest pain.  She denies gross hematuria, cola colored urine, or diarrhea.  Due to her immunocompromised state, she was admitted by Hospital Medicine for IV antibiotics and observation.  Due to her transplant status, Nephrology was consulted to assist in evaluation and management     At present, she reports she is feeling better with less pain and no urgency/frequency/hesitancy.  All she is tolerating her diet.  No fever/chills/myalgias.  No family present.    [unfilled]    Past Medical and Surgical History  Allergies :   Heparin analogues    [email or web address]     Current medications:     Current Facility-Administered Medications:     acetaminophen suppository 650 mg, 650 mg, Rectal, Q6H PRN, Juan Carlos Hopper MD    acetaminophen tablet 650 mg, 650 mg, Oral, Q8H PRN, Juan Carlos Hopper MD    albuterol-ipratropium 2.5 mg-0.5 mg/3 mL nebulizer solution 3 mL, 3 mL, Nebulization, Q6H PRN, Juan Carlos Hopper MD    aluminum-magnesium hydroxide-simethicone 200-200-20 mg/5 mL suspension 30 mL, 30 mL, Oral, QID PRN, Juan Carlos Hopper MD    calcitRIOL capsule 0.25 mcg, 0.25 mcg, Oral, Daily, Juan Carlos Hopper MD, 0.25 mcg at 05/31/24 0812    cefTRIAXone (Rocephin) 1 g in dextrose 5 % in water (D5W) 100 mL IVPB (MB+), 1 g, Intravenous, Q24H, Juan Carlos Hopper MD    dextrose 10% bolus 125 mL 125 mL, 12.5 g, Intravenous, PRN, Juan Carlos Hopper MD    dextrose 10% bolus 250 mL 250 mL, 25 g, Intravenous, PRN,  Juan Carlos Hopper MD    enoxaparin injection 40 mg, 40 mg, Subcutaneous, Daily, Juan Carlos Hopper MD    glucagon (human recombinant) injection 1 mg, 1 mg, Intramuscular, PRN, Juan Carlos Hopper MD    glucose chewable tablet 16 g, 16 g, Oral, PRN, Juan Carlos Hopper MD    glucose chewable tablet 24 g, 24 g, Oral, PRN, Juan Carlos Hopper MD    HYDROcodone-acetaminophen 5-325 mg per tablet 1 tablet, 1 tablet, Oral, Q6H PRN, Juan Carlos Hopper MD, 1 tablet at 24 0816    lactated ringers infusion, , Intravenous, Continuous, Juan Carlos Hopper MD, Last Rate: 75 mL/hr at 24, Rate Verify at 24    melatonin tablet 6 mg, 6 mg, Oral, Nightly PRN, Juan Carlos Hopper MD    morphine injection 2 mg, 2 mg, Intravenous, Q4H PRN, Juan Carlos Hopper MD    naloxone 0.4 mg/mL injection 0.02 mg, 0.02 mg, Intravenous, PRN, Juan Carlos Hopper MD    ondansetron injection 4 mg, 4 mg, Intravenous, Q8H PRN, Juan Carlos Hopper MD    pantoprazole EC tablet 40 mg, 40 mg, Oral, Daily, Juan Carlos Hopper MD, 40 mg at 24 0812    predniSONE tablet 5 mg, 5 mg, Oral, Daily, Juan Carlos Hopper MD, 5 mg at 24 0811    promethazine tablet 25 mg, 25 mg, Oral, Q6H PRN, Juan Carlos Hopper MD    senna-docusate 8.6-50 mg per tablet 1 tablet, 1 tablet, Oral, BID PRN, Juan Carlos Hopper MD    sodium bicarbonate tablet 1,300 mg, 1,300 mg, Oral, TID, Juan Carlos Hopper MD, 1,300 mg at 24 0812    sodium chloride 0.9% flush 10 mL, 10 mL, Intravenous, Q12H PRN, Juan Carlos Hopper MD    tacrolimus capsule 1 mg, 1 mg, Oral, BID, Juan Carlos Hopper MD, 1 mg at 24 0811     Medical:   She has a past medical history of Anxiety, -donor kidney transplant 2021 (2021), Encounter for blood transfusion, ESRD (end stage renal disease), Fibroma, H/O kidney transplant, Hypertension, Hypertensive nephropathy, Ovarian cyst, Pulmonary nodules (2023), and Sleep  apnea.    Surgical:   She has a past surgical history that includes right leg hemodialysis access; Kidney transplant (04/15/2015); kidney removal ; hemodialysis access left arm; Parathyroidectomy; Nephrectomy; transplant nephrectomy (12/01/2017); Ovarian cyst removal; Kidney transplant (N/A, 1/9/2021); Colposcopy (06/29/2020); Percutaneous nephrostomy (Left, 2/13/2021); Esophagogastroduodenoscopy (N/A, 6/23/2022); Robot-assisted laparoscopic sleeve gastrectomy using da Wilner Xi (N/A, 9/27/2022); Cold knife conization of cervix (N/A, 8/21/2023); and Bronchoscopy (N/A, 9/22/2023). .    Family History  Her family history includes Cancer in her father; Colon cancer in her father; Diabetes in her maternal uncle; Hypertension in her father, maternal aunt, and another family member; Kidney disease in her maternal aunt and another family member; No Known Problems in her brother, mother, and sister.    Social History  She reports that she quit smoking about 3 years ago. Her smoking use included cigarettes. She started smoking about 16 years ago. She has a 3.1 pack-year smoking history. She has never used smokeless tobacco. She reports that she does not drink alcohol and does not use drugs.     Review of Systems   Constitutional: Negative for fatigue and fever.   HEENT: Negative for congestion, and sore throat.    Eyes: Negative for pain and visual disturbance.   Respiratory: Negative for cough and shortness of breath.    Cardiovascular: Negative for chest pain and palpitations.   Gastrointestinal: Negative for diarrhea, nausea and vomiting.   Endocrine: Negative for polydipsia and polyuria.   Genitourinary:  Positive for urinary symptoms/left-sided flank pain   Musculoskeletal: Negative for arthralgias and myalgias.   Skin: Negative for rash and wound.   Neurological: Negative for weakness, numbness and headaches.   Hematological: Does not bruise/bleed easily.     Vitals Signs:  Wt Readings from Last 3 Encounters:   05/31/24  93.1 kg (205 lb 4 oz)   05/15/24 90.9 kg (200 lb 6.4 oz)   03/26/24 89.3 kg (196 lb 13.9 oz)     Temp Readings from Last 3 Encounters:   05/31/24 97.8 °F (36.6 °C) (Oral)   01/26/24 97.2 °F (36.2 °C) (Temporal)   11/09/23 98.6 °F (37 °C) (Oral)     BP Readings from Last 3 Encounters:   05/31/24 (!) 89/52   05/15/24 92/62   03/26/24 116/74     Pulse Readings from Last 3 Encounters:   05/31/24 83   05/15/24 82   01/26/24 76       Physical Exam   Constitutional:  Awake, in no acute distress  HEENT:  Normocephalic and atraumatic, no scleral icterus.   Neck:  Neck supple. No tracheal deviation present.   Cardiovascular:  Regular rhythm.  Exam reveals no gallop and no friction rub.    Pulmonary/Chest:  Breath sounds normal. No rales.   Abdominal: Soft.  Bowel sounds are normal.  Minimal left-sided tenderness with direct palpation, no rebound tenderness.  Allograft - nontender, no bruit  Musculoskeletal:  Exhibits no contracture or deformity.   Extremity:  No clubbing/cyanosis, Edema =   Neurological:  Alert and oriented to person, place, and time.   Skin:  Skin is warm and dry.   Psychiatric:  Normal mood and affect.   Vitals reviewed.    Recent Results (from the past 24 hour(s))   Urinalysis, Reflex to Urine Culture Urine, Clean Catch    Collection Time: 05/30/24 11:10 PM    Specimen: Urine   Result Value Ref Range    Specimen UA Urine, Clean Catch     Color, UA Yellow Yellow, Straw, Fransisca    Appearance, UA Hazy (A) Clear    pH, UA 6.0 5.0 - 8.0    Specific Gravity, UA 1.010 1.005 - 1.030    Protein, UA 1+ (A) Negative    Glucose, UA Negative Negative    Ketones, UA Negative Negative    Bilirubin (UA) Negative Negative    Occult Blood UA 3+ (A) Negative    Nitrite, UA Negative Negative    Urobilinogen, UA Negative <2.0 EU/dL    Leukocytes, UA 3+ (A) Negative   Urinalysis Microscopic    Collection Time: 05/30/24 11:10 PM   Result Value Ref Range    RBC, UA 10 (H) 0 - 4 /hpf    WBC, UA >100 (H) 0 - 5 /hpf    WBC Clumps, UA  Many (A) None-Rare    Bacteria Occasional None-Occ /hpf    Hyaline Casts, UA 0 0-1/lpf /lpf    Unclass Trudi UA Rare None-Moderate    Microscopic Comment SEE COMMENT    Comprehensive metabolic panel    Collection Time: 05/30/24 11:46 PM   Result Value Ref Range    Sodium 138 136 - 145 mmol/L    Potassium 3.8 3.5 - 5.1 mmol/L    Chloride 107 95 - 110 mmol/L    CO2 20 (L) 23 - 29 mmol/L    Glucose 94 70 - 110 mg/dL    BUN 10 6 - 20 mg/dL    Creatinine 1.0 0.5 - 1.4 mg/dL    Calcium 9.0 8.7 - 10.5 mg/dL    Total Protein 7.1 6.0 - 8.4 g/dL    Albumin 3.3 (L) 3.5 - 5.2 g/dL    Total Bilirubin 0.8 0.1 - 1.0 mg/dL    Alkaline Phosphatase 87 55 - 135 U/L    AST 23 10 - 40 U/L    ALT 24 10 - 44 U/L    eGFR >60 >60 mL/min/1.73 m^2    Anion Gap 11 8 - 16 mmol/L   CBC auto differential    Collection Time: 05/30/24 11:46 PM   Result Value Ref Range    WBC 13.73 (H) 3.90 - 12.70 K/uL    RBC 5.52 (H) 4.00 - 5.40 M/uL    Hemoglobin 15.6 12.0 - 16.0 g/dL    Hematocrit 47.2 37.0 - 48.5 %    MCV 86 82 - 98 fL    MCH 28.3 27.0 - 31.0 pg    MCHC 33.1 32.0 - 36.0 g/dL    RDW 14.1 11.5 - 14.5 %    Platelets 169 150 - 450 K/uL    MPV 10.5 9.2 - 12.9 fL    Immature Granulocytes 0.4 0.0 - 0.5 %    Gran # (ANC) 11.7 (H) 1.8 - 7.7 K/uL    Immature Grans (Abs) 0.06 (H) 0.00 - 0.04 K/uL    Lymph # 0.9 (L) 1.0 - 4.8 K/uL    Mono # 1.1 (H) 0.3 - 1.0 K/uL    Eos # 0.0 0.0 - 0.5 K/uL    Baso # 0.03 0.00 - 0.20 K/uL    nRBC 0 0 /100 WBC    Gran % 85.4 (H) 38.0 - 73.0 %    Lymph % 6.2 (L) 18.0 - 48.0 %    Mono % 7.7 4.0 - 15.0 %    Eosinophil % 0.1 0.0 - 8.0 %    Basophil % 0.2 0.0 - 1.9 %    Differential Method Automated    Lactic acid, plasma    Collection Time: 05/30/24 11:46 PM   Result Value Ref Range    Lactate (Lactic Acid) 1.7 0.5 - 2.2 mmol/L   Comprehensive Metabolic Panel (CMP)    Collection Time: 05/31/24  3:41 AM   Result Value Ref Range    Sodium 139 136 - 145 mmol/L    Potassium 3.9 3.5 - 5.1 mmol/L    Chloride 107 95 - 110 mmol/L    CO2  21 (L) 23 - 29 mmol/L    Glucose 80 70 - 110 mg/dL    BUN 9 6 - 20 mg/dL    Creatinine 1.0 0.5 - 1.4 mg/dL    Calcium 8.9 8.7 - 10.5 mg/dL    Total Protein 7.0 6.0 - 8.4 g/dL    Albumin 3.1 (L) 3.5 - 5.2 g/dL    Total Bilirubin 0.6 0.1 - 1.0 mg/dL    Alkaline Phosphatase 88 55 - 135 U/L    AST 28 10 - 40 U/L    ALT 28 10 - 44 U/L    eGFR >60 >60 mL/min/1.73 m^2    Anion Gap 11 8 - 16 mmol/L   CBC with Automated Differential    Collection Time: 05/31/24  3:41 AM   Result Value Ref Range    WBC 10.32 3.90 - 12.70 K/uL    RBC 6.18 (H) 4.00 - 5.40 M/uL    Hemoglobin 17.0 (H) 12.0 - 16.0 g/dL    Hematocrit 54.2 (H) 37.0 - 48.5 %    MCV 88 82 - 98 fL    MCH 27.5 27.0 - 31.0 pg    MCHC 31.4 (L) 32.0 - 36.0 g/dL    RDW 14.3 11.5 - 14.5 %    Platelets 162 150 - 450 K/uL    MPV 11.1 9.2 - 12.9 fL    Immature Granulocytes 0.4 0.0 - 0.5 %    Gran # (ANC) 9.3 (H) 1.8 - 7.7 K/uL    Immature Grans (Abs) 0.04 0.00 - 0.04 K/uL    Lymph # 0.7 (L) 1.0 - 4.8 K/uL    Mono # 0.3 0.3 - 1.0 K/uL    Eos # 0.0 0.0 - 0.5 K/uL    Baso # 0.03 0.00 - 0.20 K/uL    nRBC 0 0 /100 WBC    Gran % 89.7 (H) 38.0 - 73.0 %    Lymph % 6.5 (L) 18.0 - 48.0 %    Mono % 3.0 (L) 4.0 - 15.0 %    Eosinophil % 0.1 0.0 - 8.0 %    Basophil % 0.3 0.0 - 1.9 %    Differential Method Automated      @[email or web address]@ [unfilled]        Transplant Kidney With Doppler - 05/31/2024 Resulted: 05/31/24 1142   Order Status: Completed Updated: 05/31/24 1145   Narrative:     EXAMINATION:  US TRANSPLANT KIDNEY WITH DOPPLER  CLINICAL HISTORY: left flank and LLQ pain;  TECHNIQUE: Real time gray scale and doppler ultrasound was performed over the patient's renal allograft.   COMPARISON: Transplant the kidney ultrasound 01/26/2021, 01/22/2021, 04/01/2021   Impression:     Satisfactory Doppler evaluation of the renal allograft.  No hydronephrosis or fluid collection.       Assessment & Plan   Active Hospital Problems    Diagnosis  POA    *Acute cystitis [N30.00]  Yes    Class 1 obesity due to  excess calories with serious comorbidity and body mass index (BMI) of 32.0 to 32.9 in adult [E66.09, Z68.32]  Not Applicable     Chronic    GERD (gastroesophageal reflux disease) [K21.9]  Yes     Chronic    H/O kidney transplant [Z94.0]  Not Applicable     Chronic    Essential hypertension [I10]  Yes     Chronic      Resolved Hospital Problems   No resolved problems to display.     Assessment:  - Acute cystitis in a immunocompromised host (renal transplant patient)   - H/O DDKT - 01/09/2021, followed by Dr. Jaffe.  Baseline creatinine 0.8 1.0 mg/dL  - Hypertension  - Metabolic acidosis.  Mild  - Obesity      Recommendations:  - Agree with current plan of care.  - Follow laboratory studies      -Portions of this note were created using voice recognition software.  It is possible that there are errors, which have persisted, despite proofreading.  If there is a question regarding contents of this document, please contact me for clarification.  Consulting .

## 2024-06-01 LAB
ALBUMIN SERPL BCP-MCNC: 2.6 G/DL (ref 3.5–5.2)
ALBUMIN SERPL BCP-MCNC: 2.6 G/DL (ref 3.5–5.2)
ALP SERPL-CCNC: 89 U/L (ref 55–135)
ALT SERPL W/O P-5'-P-CCNC: 45 U/L (ref 10–44)
ANION GAP SERPL CALC-SCNC: 9 MMOL/L (ref 8–16)
ANION GAP SERPL CALC-SCNC: 9 MMOL/L (ref 8–16)
AST SERPL-CCNC: 37 U/L (ref 10–40)
BASOPHILS # BLD AUTO: 0.01 K/UL (ref 0–0.2)
BASOPHILS # BLD AUTO: 0.01 K/UL (ref 0–0.2)
BASOPHILS NFR BLD: 0.1 % (ref 0–1.9)
BASOPHILS NFR BLD: 0.1 % (ref 0–1.9)
BILIRUB SERPL-MCNC: 0.7 MG/DL (ref 0.1–1)
BUN SERPL-MCNC: 7 MG/DL (ref 6–20)
BUN SERPL-MCNC: 7 MG/DL (ref 6–20)
CALCIUM SERPL-MCNC: 8.5 MG/DL (ref 8.7–10.5)
CALCIUM SERPL-MCNC: 8.5 MG/DL (ref 8.7–10.5)
CHLORIDE SERPL-SCNC: 106 MMOL/L (ref 95–110)
CHLORIDE SERPL-SCNC: 106 MMOL/L (ref 95–110)
CO2 SERPL-SCNC: 20 MMOL/L (ref 23–29)
CO2 SERPL-SCNC: 20 MMOL/L (ref 23–29)
CREAT SERPL-MCNC: 0.9 MG/DL (ref 0.5–1.4)
CREAT SERPL-MCNC: 0.9 MG/DL (ref 0.5–1.4)
DIFFERENTIAL METHOD BLD: ABNORMAL
DIFFERENTIAL METHOD BLD: ABNORMAL
EOSINOPHIL # BLD AUTO: 0 K/UL (ref 0–0.5)
EOSINOPHIL # BLD AUTO: 0 K/UL (ref 0–0.5)
EOSINOPHIL NFR BLD: 0.1 % (ref 0–8)
EOSINOPHIL NFR BLD: 0.1 % (ref 0–8)
ERYTHROCYTE [DISTWIDTH] IN BLOOD BY AUTOMATED COUNT: 14.2 % (ref 11.5–14.5)
ERYTHROCYTE [DISTWIDTH] IN BLOOD BY AUTOMATED COUNT: 14.2 % (ref 11.5–14.5)
EST. GFR  (NO RACE VARIABLE): >60 ML/MIN/1.73 M^2
EST. GFR  (NO RACE VARIABLE): >60 ML/MIN/1.73 M^2
GLUCOSE SERPL-MCNC: 90 MG/DL (ref 70–110)
GLUCOSE SERPL-MCNC: 90 MG/DL (ref 70–110)
HCT VFR BLD AUTO: 44.6 % (ref 37–48.5)
HCT VFR BLD AUTO: 44.6 % (ref 37–48.5)
HGB BLD-MCNC: 14.4 G/DL (ref 12–16)
HGB BLD-MCNC: 14.4 G/DL (ref 12–16)
IMM GRANULOCYTES # BLD AUTO: 0.06 K/UL (ref 0–0.04)
IMM GRANULOCYTES # BLD AUTO: 0.06 K/UL (ref 0–0.04)
IMM GRANULOCYTES NFR BLD AUTO: 0.8 % (ref 0–0.5)
IMM GRANULOCYTES NFR BLD AUTO: 0.8 % (ref 0–0.5)
LYMPHOCYTES # BLD AUTO: 0.4 K/UL (ref 1–4.8)
LYMPHOCYTES # BLD AUTO: 0.4 K/UL (ref 1–4.8)
LYMPHOCYTES NFR BLD: 5.4 % (ref 18–48)
LYMPHOCYTES NFR BLD: 5.4 % (ref 18–48)
MCH RBC QN AUTO: 28.1 PG (ref 27–31)
MCH RBC QN AUTO: 28.1 PG (ref 27–31)
MCHC RBC AUTO-ENTMCNC: 32.3 G/DL (ref 32–36)
MCHC RBC AUTO-ENTMCNC: 32.3 G/DL (ref 32–36)
MCV RBC AUTO: 87 FL (ref 82–98)
MCV RBC AUTO: 87 FL (ref 82–98)
MONOCYTES # BLD AUTO: 0.4 K/UL (ref 0.3–1)
MONOCYTES # BLD AUTO: 0.4 K/UL (ref 0.3–1)
MONOCYTES NFR BLD: 5.1 % (ref 4–15)
MONOCYTES NFR BLD: 5.1 % (ref 4–15)
NEUTROPHILS # BLD AUTO: 6.7 K/UL (ref 1.8–7.7)
NEUTROPHILS # BLD AUTO: 6.7 K/UL (ref 1.8–7.7)
NEUTROPHILS NFR BLD: 88.5 % (ref 38–73)
NEUTROPHILS NFR BLD: 88.5 % (ref 38–73)
NRBC BLD-RTO: 0 /100 WBC
NRBC BLD-RTO: 0 /100 WBC
PHOSPHATE SERPL-MCNC: 2.4 MG/DL (ref 2.7–4.5)
PLATELET # BLD AUTO: 127 K/UL (ref 150–450)
PLATELET # BLD AUTO: 127 K/UL (ref 150–450)
PMV BLD AUTO: 11.2 FL (ref 9.2–12.9)
PMV BLD AUTO: 11.2 FL (ref 9.2–12.9)
POTASSIUM SERPL-SCNC: 3.8 MMOL/L (ref 3.5–5.1)
POTASSIUM SERPL-SCNC: 3.8 MMOL/L (ref 3.5–5.1)
PROT SERPL-MCNC: 6.2 G/DL (ref 6–8.4)
RBC # BLD AUTO: 5.13 M/UL (ref 4–5.4)
RBC # BLD AUTO: 5.13 M/UL (ref 4–5.4)
SODIUM SERPL-SCNC: 135 MMOL/L (ref 136–145)
SODIUM SERPL-SCNC: 135 MMOL/L (ref 136–145)
TACROLIMUS BLD-MCNC: 3.4 NG/ML (ref 5–15)
WBC # BLD AUTO: 7.58 K/UL (ref 3.9–12.7)
WBC # BLD AUTO: 7.58 K/UL (ref 3.9–12.7)

## 2024-06-01 PROCEDURE — 63600175 PHARM REV CODE 636 W HCPCS: Performed by: STUDENT IN AN ORGANIZED HEALTH CARE EDUCATION/TRAINING PROGRAM

## 2024-06-01 PROCEDURE — 87040 BLOOD CULTURE FOR BACTERIA: CPT | Mod: 59 | Performed by: STUDENT IN AN ORGANIZED HEALTH CARE EDUCATION/TRAINING PROGRAM

## 2024-06-01 PROCEDURE — 80053 COMPREHEN METABOLIC PANEL: CPT | Performed by: HOSPITALIST

## 2024-06-01 PROCEDURE — 36415 COLL VENOUS BLD VENIPUNCTURE: CPT | Performed by: HOSPITALIST

## 2024-06-01 PROCEDURE — 11000001 HC ACUTE MED/SURG PRIVATE ROOM

## 2024-06-01 PROCEDURE — 85025 COMPLETE CBC W/AUTO DIFF WBC: CPT | Performed by: HOSPITALIST

## 2024-06-01 PROCEDURE — 25000003 PHARM REV CODE 250: Performed by: STUDENT IN AN ORGANIZED HEALTH CARE EDUCATION/TRAINING PROGRAM

## 2024-06-01 PROCEDURE — 99232 SBSQ HOSP IP/OBS MODERATE 35: CPT | Mod: ,,, | Performed by: INTERNAL MEDICINE

## 2024-06-01 PROCEDURE — 25000003 PHARM REV CODE 250: Performed by: HOSPITALIST

## 2024-06-01 PROCEDURE — 80069 RENAL FUNCTION PANEL: CPT | Performed by: INTERNAL MEDICINE

## 2024-06-01 PROCEDURE — 63600175 PHARM REV CODE 636 W HCPCS: Performed by: HOSPITALIST

## 2024-06-01 RX ADMIN — ENOXAPARIN SODIUM 40 MG: 40 INJECTION SUBCUTANEOUS at 04:06

## 2024-06-01 RX ADMIN — SODIUM BICARBONATE 650 MG TABLET 1300 MG: at 03:06

## 2024-06-01 RX ADMIN — TACROLIMUS 1 MG: 1 CAPSULE ORAL at 05:06

## 2024-06-01 RX ADMIN — PIPERACILLIN AND TAZOBACTAM 4.5 G: 4; .5 INJECTION, POWDER, LYOPHILIZED, FOR SOLUTION INTRAVENOUS; PARENTERAL at 03:06

## 2024-06-01 RX ADMIN — PANTOPRAZOLE SODIUM 40 MG: 40 TABLET, DELAYED RELEASE ORAL at 08:06

## 2024-06-01 RX ADMIN — PREDNISONE 5 MG: 2.5 TABLET ORAL at 08:06

## 2024-06-01 RX ADMIN — SODIUM BICARBONATE 650 MG TABLET 1300 MG: at 09:06

## 2024-06-01 RX ADMIN — ACETAMINOPHEN 650 MG: 325 TABLET ORAL at 09:06

## 2024-06-01 RX ADMIN — TACROLIMUS 1 MG: 1 CAPSULE ORAL at 08:06

## 2024-06-01 RX ADMIN — CEFTRIAXONE 1 G: 1 INJECTION, POWDER, FOR SOLUTION INTRAMUSCULAR; INTRAVENOUS at 01:06

## 2024-06-01 RX ADMIN — HYDROCODONE BITARTRATE AND ACETAMINOPHEN 1 TABLET: 5; 325 TABLET ORAL at 08:06

## 2024-06-01 RX ADMIN — PIPERACILLIN AND TAZOBACTAM 4.5 G: 4; .5 INJECTION, POWDER, LYOPHILIZED, FOR SOLUTION INTRAVENOUS; PARENTERAL at 08:06

## 2024-06-01 RX ADMIN — CALCITRIOL CAPSULES 0.25 MCG 0.25 MCG: 0.25 CAPSULE ORAL at 08:06

## 2024-06-01 RX ADMIN — SODIUM BICARBONATE 650 MG TABLET 1300 MG: at 08:06

## 2024-06-01 NOTE — HOSPITAL COURSE
Leydi Cordero is a 34 y.o. female with a PMH has a past medical history of Anxiety, -donor kidney transplant 2021 (2021), Encounter for blood transfusion, ESRD (end stage renal disease), Fibroma, H/O kidney transplant, Hypertension, Hypertensive nephropathy, Ovarian cyst, Pulmonary nodules (2023), and Sleep apnea. who presented to the ED for further evaluation of acute onset and worsening urinary frequency, left lower quadrant and left flank pain, subjective fevers, chills, and myalgias which began approximately 11:00 a.m. earlier today. Patient has significant history of renal transplant currently on immunosuppressive therapy which she reports compliance with home medications but denied prior history of UTIs or kidney stones. Patient currently endorsing pain which is constant, rated 6/10 in severity at time of bedside assessment, radiating throughout her lower abdomen, left lower quadrant, and left flank region with no known alleviating or aggravating factors noted. She denied exposure to recent ill contacts, recent use of antibiotics, and reported all other review of systems negative except as noted above. Prior to onset of symptoms, patient reported being in her usual state of health with no other concerns or complaints. Initial workup in the ED fairly unremarkable with the exception of UA positive for UTI. Patient remains afebrile without leukocytosis with normal renal function. Patient initiated on IVFs and Rocephin and admitted to Hospital Medicine under observation for continued medical management of UTI in an immunosuppressive patient.      2024   Examination done at bedside, communicated using .    Denied acute events, stated feeling slightly better, denied fever, chills, chest pain, shortness O breath, nausea, vomiting,; complaining of burning micturition.    Afebrile, WBC trended down   UA suggestive of UTI, currently on empiric antibiotics,  pending urine cultures.    Hold CellCept, currently on tacrolimus, ordered levels.  Nephrology consulted.    Patient/family agreed with the plan     06/01/2024   Examination done at bedside, appeared alert and oriented x3, no acute events overnight.    Episode of fever overnight, responded to Tylenol   Blood culture x1 as of 05/30/2024 positive for Gram-negative rods pending susceptibility   Ordered for repeat blood cultures on 06/01/2024, antibiotics broaden       06/02/2024   Examination of patient done at bedside.    Appeared alert and oriented x3, denied acute events.    Tolerating diet without issues   Denied fever, chills, chest pain, shortness O breath, nausea, vomiting, bowel or bladder issues.  Communicated using    Afebrile for past 24 hours, no leukocytosis, hemodynamically stable.    UTI with urine cultures positive for E coli, blood culture x1 as of 05/30/2024 positive for E coli, repeat blood cultures as of 06/02/2020 4- to date.    Received IV antibiotics/Zosyn during hospital stay.    CellCept has been held during hospital stay due to bacteremia, nephrology on board, agreed with the plan.    Considering clinical and hemodynamic stability, planning to discharge patient today, emphasized on compliance with medications, outpatient follow up with PCP/Nephrology within 1-2 days upon discharge, recommended to complete course of antibiotics, to hold CellCept until she follows with Nephrology/ completes antibiotics-- patient expressed understanding, agreed with the plan

## 2024-06-01 NOTE — PROGRESS NOTES
Nephrology Progress Note:    HPI:   HPI is from the chart and through a     Ms. Cordero is a 34-year-old  female (H/O DDKT - 01/09/2021, followed by Dr. Jaffe) who presented to the ED with acute onset of urinary frequency, left lower quadrant pain/flank pain, subjective fever/chills/malaise.  She denies nausea, vomiting, or chest pain.  She denies gross hematuria, cola colored urine, or diarrhea.  Due to her immunocompromised state, she was admitted by Hospital Medicine for IV antibiotics and observation.  Due to her transplant status, Nephrology was consulted to assist in evaluation and management on 5/31/24.    Interval History:  An  was used to interview the patient.  Chart reviewed/patient examined.  She had an uneventful night with no new issues reported.  She denied UTI symptoms.  Her abdominal pain has improved.  She is tolerating her diet.    Health Status   Allergies:    is allergic to heparin analogues.    Current medications:     Current Facility-Administered Medications:     acetaminophen suppository 650 mg, 650 mg, Rectal, Q6H PRN, Juan Carlos Hopper MD    acetaminophen tablet 650 mg, 650 mg, Oral, Q8H PRN, Juan Carlos Hopper MD    albuterol-ipratropium 2.5 mg-0.5 mg/3 mL nebulizer solution 3 mL, 3 mL, Nebulization, Q6H PRN, Juan Carlos Hopper MD    aluminum-magnesium hydroxide-simethicone 200-200-20 mg/5 mL suspension 30 mL, 30 mL, Oral, QID PRN, Juan Carlos Hopper MD    calcitRIOL capsule 0.25 mcg, 0.25 mcg, Oral, Daily, Juan Carlos Hopper MD, 0.25 mcg at 06/01/24 0830    dextrose 10% bolus 125 mL 125 mL, 12.5 g, Intravenous, PRN, Juan Carlos Hopper MD    dextrose 10% bolus 250 mL 250 mL, 25 g, Intravenous, PRN, Juan Carlos Hopper MD    enoxaparin injection 40 mg, 40 mg, Subcutaneous, Daily, Juan Carlos Hopper MD, 40 mg at 05/31/24 1638    glucagon (human recombinant) injection 1 mg, 1 mg, Intramuscular, PRN, Juan Carlos Hopper MD    glucose chewable  "tablet 16 g, 16 g, Oral, PRN, Juan Carlos Hopper MD    glucose chewable tablet 24 g, 24 g, Oral, PRN, Juan Carlos Hopper MD    HYDROcodone-acetaminophen 5-325 mg per tablet 1 tablet, 1 tablet, Oral, Q6H PRN, Juan Carlos Hopper MD, 1 tablet at 06/01/24 0832    lactated ringers infusion, , Intravenous, Continuous, Juan Carlos Hopper MD, Stopped at 06/01/24 1101    melatonin tablet 6 mg, 6 mg, Oral, Nightly PRN, Juan Carlos Hopper MD    morphine injection 2 mg, 2 mg, Intravenous, Q4H PRN, Juan Carlos Hopper MD    naloxone 0.4 mg/mL injection 0.02 mg, 0.02 mg, Intravenous, PRN, Juan Carlos Hopper MD    ondansetron injection 4 mg, 4 mg, Intravenous, Q8H PRN, Juan Carlos Hopper MD    pantoprazole EC tablet 40 mg, 40 mg, Oral, Daily, Juan Carlos Hopper MD, 40 mg at 06/01/24 0830    piperacillin-tazobactam (ZOSYN) 4.5 g in dextrose 5 % in water (D5W) 100 mL IVPB (MB+), 4.5 g, Intravenous, Q8H, Marimar Sales MD, Last Rate: 25 mL/hr at 06/01/24 0830, 4.5 g at 06/01/24 0830    predniSONE tablet 5 mg, 5 mg, Oral, Daily, Juan Carlos Hopper MD, 5 mg at 06/01/24 0830    promethazine tablet 25 mg, 25 mg, Oral, Q6H PRN, Juan Carlos Hopper MD    senna-docusate 8.6-50 mg per tablet 1 tablet, 1 tablet, Oral, BID PRN, Juan Carlos Hopper MD    sodium bicarbonate tablet 1,300 mg, 1,300 mg, Oral, TID, Juan Carlos Hopper MD, 1,300 mg at 06/01/24 0830    sodium chloride 0.9% flush 10 mL, 10 mL, Intravenous, Q12H PRN, Juan Carlos Hopper MD    tacrolimus capsule 1 mg, 1 mg, Oral, BID, Juan Carlos Hopper MD, 1 mg at 06/01/24 0830         Objective       Physical Examination:   VS/Measurements   /63 (BP Location: Right arm, Patient Position: Lying)   Pulse 95   Temp 98.6 °F (37 °C) (Oral)   Resp 17   Ht 5' 6" (1.676 m)   Wt 93.1 kg (205 lb 4 oz)   LMP 05/13/2024   SpO2 97%   Breastfeeding No   BMI 33.13 kg/m²   Vitals:    06/01/24 0832   BP:    Pulse:    Resp: 17   Temp:        UOP = "   Constitutional:  Awake, in no acute distress  HEENT:  Normocephalic and atraumatic, no scleral icterus.   Neck:  Neck supple. No tracheal deviation present.   Cardiovascular:  Regular rhythm.  Exam reveals no gallop and no friction rub.    Pulmonary/Chest:  Breath sounds normal. No rales.   Abdominal: Soft.  Bowel sounds are normal.  Minimal left-sided tenderness with direct palpation, no rebound tenderness.  Allograft - nontender, no bruit  Musculoskeletal:  Exhibits no contracture or deformity.   Extremity:  No clubbing/cyanosis, Edema =   Neurological:  Alert and oriented to person, place, and time.   Skin:  Skin is warm and dry.   Psychiatric:  Normal mood and affect.   Vitals reviewed.    Laboratory Results   Today's Lab Results :    Recent Results (from the past 24 hour(s))   Comprehensive Metabolic Panel (CMP)    Collection Time: 06/01/24  6:02 AM   Result Value Ref Range    Sodium 135 (L) 136 - 145 mmol/L    Potassium 3.8 3.5 - 5.1 mmol/L    Chloride 106 95 - 110 mmol/L    CO2 20 (L) 23 - 29 mmol/L    Glucose 90 70 - 110 mg/dL    BUN 7 6 - 20 mg/dL    Creatinine 0.9 0.5 - 1.4 mg/dL    Calcium 8.5 (L) 8.7 - 10.5 mg/dL    Total Protein 6.2 6.0 - 8.4 g/dL    Albumin 2.6 (L) 3.5 - 5.2 g/dL    Total Bilirubin 0.7 0.1 - 1.0 mg/dL    Alkaline Phosphatase 89 55 - 135 U/L    AST 37 10 - 40 U/L    ALT 45 (H) 10 - 44 U/L    eGFR >60 >60 mL/min/1.73 m^2    Anion Gap 9 8 - 16 mmol/L   CBC with Automated Differential    Collection Time: 06/01/24  6:02 AM   Result Value Ref Range    WBC 7.58 3.90 - 12.70 K/uL    RBC 5.13 4.00 - 5.40 M/uL    Hemoglobin 14.4 12.0 - 16.0 g/dL    Hematocrit 44.6 37.0 - 48.5 %    MCV 87 82 - 98 fL    MCH 28.1 27.0 - 31.0 pg    MCHC 32.3 32.0 - 36.0 g/dL    RDW 14.2 11.5 - 14.5 %    Platelets 127 (L) 150 - 450 K/uL    MPV 11.2 9.2 - 12.9 fL    Immature Granulocytes 0.8 (H) 0.0 - 0.5 %    Gran # (ANC) 6.7 1.8 - 7.7 K/uL    Immature Grans (Abs) 0.06 (H) 0.00 - 0.04 K/uL    Lymph # 0.4 (L) 1.0 -  4.8 K/uL    Mono # 0.4 0.3 - 1.0 K/uL    Eos # 0.0 0.0 - 0.5 K/uL    Baso # 0.01 0.00 - 0.20 K/uL    nRBC 0 0 /100 WBC    Gran % 88.5 (H) 38.0 - 73.0 %    Lymph % 5.4 (L) 18.0 - 48.0 %    Mono % 5.1 4.0 - 15.0 %    Eosinophil % 0.1 0.0 - 8.0 %    Basophil % 0.1 0.0 - 1.9 %    Differential Method Automated    CBC Auto Differential    Collection Time: 06/01/24  6:02 AM   Result Value Ref Range    WBC 7.58 3.90 - 12.70 K/uL    RBC 5.13 4.00 - 5.40 M/uL    Hemoglobin 14.4 12.0 - 16.0 g/dL    Hematocrit 44.6 37.0 - 48.5 %    MCV 87 82 - 98 fL    MCH 28.1 27.0 - 31.0 pg    MCHC 32.3 32.0 - 36.0 g/dL    RDW 14.2 11.5 - 14.5 %    Platelets 127 (L) 150 - 450 K/uL    MPV 11.2 9.2 - 12.9 fL    Immature Granulocytes 0.8 (H) 0.0 - 0.5 %    Gran # (ANC) 6.7 1.8 - 7.7 K/uL    Immature Grans (Abs) 0.06 (H) 0.00 - 0.04 K/uL    Lymph # 0.4 (L) 1.0 - 4.8 K/uL    Mono # 0.4 0.3 - 1.0 K/uL    Eos # 0.0 0.0 - 0.5 K/uL    Baso # 0.01 0.00 - 0.20 K/uL    nRBC 0 0 /100 WBC    Gran % 88.5 (H) 38.0 - 73.0 %    Lymph % 5.4 (L) 18.0 - 48.0 %    Mono % 5.1 4.0 - 15.0 %    Eosinophil % 0.1 0.0 - 8.0 %    Basophil % 0.1 0.0 - 1.9 %    Differential Method Automated    Renal Function Panel    Collection Time: 06/01/24  6:02 AM   Result Value Ref Range    Glucose 90 70 - 110 mg/dL    Sodium 135 (L) 136 - 145 mmol/L    Potassium 3.8 3.5 - 5.1 mmol/L    Chloride 106 95 - 110 mmol/L    CO2 20 (L) 23 - 29 mmol/L    BUN 7 6 - 20 mg/dL    Calcium 8.5 (L) 8.7 - 10.5 mg/dL    Creatinine 0.9 0.5 - 1.4 mg/dL    Albumin 2.6 (L) 3.5 - 5.2 g/dL    Phosphorus 2.4 (L) 2.7 - 4.5 mg/dL    eGFR >60 >60 mL/min/1.73 m^2    Anion Gap 9 8 - 16 mmol/L       @[facility] [place] [facility]@ @[facility]@    Assessment & Plan   Active Hospital Problems    Diagnosis  POA    *Acute cystitis [N30.00]  Yes    Class 1 obesity due to excess calories with serious comorbidity and body mass index (BMI) of 32.0 to 32.9 in adult [E66.09, Z68.32]  Not Applicable     Chronic    GERD (gastroesophageal reflux disease)  [K21.9]  Yes     Chronic    H/O kidney transplant [Z94.0]  Not Applicable     Chronic    Essential hypertension [I10]  Yes     Chronic      Resolved Hospital Problems   No resolved problems to display.     Blood cultures (05/30/2024):  1/2 sets positive for Gram-negative rods  Urine culture (05/30/2024):  Gram-negative rods, presumptive E coli    Assessment/Recommendations:  - Acute cystitis/Gram-negative amanda bacteremia in a immunocompromised host (renal transplant patient).  On Zosyn    - H/O DDKT - 01/09/2021, followed by Dr. Jaffe.  Baseline creatinine 0.8 - 1.0 mg/dL.  Renal function stable.  CellCept on hold for now.  - Hypertension  - Metabolic acidosis.  Mild.  On oral sodium bicarbonate, follow laboratory studies  - Hypophosphatemia.  Mild and follow up laboratory studies for now  - Obesity      -Portions of this note were created using voice recognition software.  It is possible that there are errors, which have persisted, despite proofreading.  If there is a question regarding contents of this document, please contact me for clarification.

## 2024-06-01 NOTE — PROGRESS NOTES
[facility] Med [facility]  [facility] Medicine  Progress Note    Patient Name: Leydi Cordero  MRN: [id number]  Patient Class: IP- Inpatient   Admission Date: 2024  Length of Stay: 1 days  Attending Physician: Marimar Sales,*  Primary Care Provider: Helena Zepeda PA-C        Subjective:     Principal Problem:Acute cystitis        HPI:  Leydi Cordero is a 34 y.o. female with a PMH  has a past medical history of Anxiety, -donor kidney transplant 2021 (2021), Encounter for blood transfusion, ESRD (end stage renal disease), Fibroma, H/O kidney transplant, Hypertension, Hypertensive nephropathy, Ovarian cyst, Pulmonary nodules (2023), and Sleep apnea. who presented to the ED for further evaluation of acute onset and worsening urinary frequency, left lower quadrant and left flank pain, subjective fevers, chills, and myalgias which began approximately 11:00 a.m. earlier today.  Patient has significant history of renal transplant currently on immunosuppressive therapy which she reports compliance with home medications but denied prior history of UTIs or kidney stones.  Patient currently endorsing pain which is constant, rated 6/10 in severity at time of bedside assessment, radiating throughout her lower abdomen, left lower quadrant, and left flank region with no known alleviating or aggravating factors noted.  She denied exposure to recent ill contacts, recent use of antibiotics, and reported all other review of systems negative except as noted above.  Prior to onset of symptoms, patient reported being in her usual state of health with no other concerns or complaints.  Initial workup in the ED fairly unremarkable with the exception of UA positive for UTI.  Patient remains afebrile without leukocytosis with normal renal function.  Patient initiated on IVFs and Rocephin and admitted to Hospital Medicine under observation for continued medical management of UTI in an  immunosuppressive patient.    PCP: Helena Zepeda      Overview/Hospital Course:  Leydi Cordero is a 34 y.o. female with a PMH has a past medical history of Anxiety, -donor kidney transplant 2021 (2021), Encounter for blood transfusion, ESRD (end stage renal disease), Fibroma, H/O kidney transplant, Hypertension, Hypertensive nephropathy, Ovarian cyst, Pulmonary nodules (2023), and Sleep apnea. who presented to the ED for further evaluation of acute onset and worsening urinary frequency, left lower quadrant and left flank pain, subjective fevers, chills, and myalgias which began approximately 11:00 a.m. earlier today. Patient has significant history of renal transplant currently on immunosuppressive therapy which she reports compliance with home medications but denied prior history of UTIs or kidney stones. Patient currently endorsing pain which is constant, rated 6/10 in severity at time of bedside assessment, radiating throughout her lower abdomen, left lower quadrant, and left flank region with no known alleviating or aggravating factors noted. She denied exposure to recent ill contacts, recent use of antibiotics, and reported all other review of systems negative except as noted above. Prior to onset of symptoms, patient reported being in her usual state of health with no other concerns or complaints. Initial workup in the ED fairly unremarkable with the exception of UA positive for UTI. Patient remains afebrile without leukocytosis with normal renal function. Patient initiated on IVFs and Rocephin and admitted to Hospital Medicine under observation for continued medical management of UTI in an immunosuppressive patient.      2024   Examination done at bedside, communicated using .    Denied acute events, stated feeling slightly better, denied fever, chills, chest pain, shortness O breath, nausea, vomiting,; complaining of burning micturition.     Afebrile, WBC trended down   UA suggestive of UTI, currently on empiric antibiotics, pending urine cultures.    Hold CellCept, currently on tacrolimus, ordered levels.  Nephrology consulted.    Patient/family agreed with the plan     06/01/2024   Examination done at bedside, appeared alert and oriented x3, no acute events overnight.    Episode of fever overnight, responded to Tylenol   Blood culture x1 as of 05/30/2024 positive for Gram-negative rods pending susceptibility   Ordered for repeat blood cultures on 06/01/2024, antibiotics broaden           Review of Systems  Objective:     Vital Signs (Most Recent):  Temp: 98.6 °F (37 °C) (06/01/24 0829)  Pulse: 95 (06/01/24 0829)  Resp: 17 (06/01/24 0832)  BP: 115/63 (06/01/24 0829)  SpO2: 97 % (06/01/24 0829) Vital Signs (24h Range):  Temp:  [97.7 °F (36.5 °C)-102.7 °F (39.3 °C)] 98.6 °F (37 °C)  Pulse:  [68-95] 95  Resp:  [17-19] 17  SpO2:  [95 %-99 %] 97 %  BP: ()/(52-82) 115/63     Weight: 93.1 kg (205 lb 4 oz)  Body mass index is 33.13 kg/m².    Intake/Output Summary (Last 24 hours) at 6/1/2024 1131  Last data filed at 6/1/2024 1116  Gross per 24 hour   Intake 2326.35 ml   Output --   Net 2326.35 ml         Physical Exam      Constitutional:       General: She is in acute distress.      Appearance: Normal appearance. She is obese. She is not ill-appearing, toxic-appearing or diaphoretic.   HENT:      Head: Normocephalic and atraumatic.      Right Ear: External ear normal.      Left Ear: External ear normal.      Nose: Nose normal. No congestion or rhinorrhea.      Mouth/Throat:      Mouth: Mucous membranes are moist.      Pharynx: Oropharynx is clear. No oropharyngeal exudate or posterior oropharyngeal erythema.   Eyes:      General: No scleral icterus.     Extraocular Movements: Extraocular movements intact.      Conjunctiva/sclera: Conjunctivae normal.      Pupils: Pupils are equal, round, and reactive to light.   Neck:      Vascular: No carotid bruit.    Cardiovascular:      Rate and Rhythm: Regular rhythm. Tachycardia present.      Pulses: Normal pulses.      Heart sounds: Normal heart sounds. No murmur heard.     No friction rub. No gallop.   Pulmonary:      Effort: Pulmonary effort is normal. No respiratory distress.      Breath sounds: Normal breath sounds. No stridor. No wheezing, rhonchi or rales.   Chest:      Chest wall: No tenderness.   Abdominal:      General: Abdomen is flat. Bowel sounds are normal. There is no distension.      Palpations: Abdomen is soft.      Tenderness: There is abdominal tenderness. There is left CVA tenderness and guarding. There is no right CVA tenderness or rebound.      Hernia: No hernia is present.      Comments: Mild TTP noted throughout left lower quadrant extending up to left flank region with guarding noted.   Musculoskeletal:         General: No swelling, tenderness, deformity or signs of injury. Normal range of motion.      Cervical back: Normal range of motion and neck supple. No rigidity or tenderness.      Right lower leg: No edema.      Left lower leg: No edema.   Lymphadenopathy:      Cervical: No cervical adenopathy.   Skin:     General: Skin is warm and dry.      Capillary Refill: Capillary refill takes less than 2 seconds.      Coloration: Skin is not jaundiced or pale.      Findings: No bruising, erythema, lesion or rash.   Neurological:      General: No focal deficit present.      Mental Status: She is alert and oriented to person, place, and time. Mental status is at baseline.      Cranial Nerves: No cranial nerve deficit.      Sensory: No sensory deficit.      Motor: No weakness.      Coordination: Coordination normal.   Psychiatric:         Mood and Affect: Mood normal.         Behavior: Behavior normal.         Thought Content: Thought content normal.         Judgment: Judgment normal.          Significant Labs: All pertinent labs within the past 24 hours have been reviewed.  CBC:   Recent Labs   Lab  05/30/24  2346 05/31/24  0341 06/01/24  0602   WBC 13.73* 10.32 7.58  7.58   HGB 15.6 17.0* 14.4  14.4   HCT 47.2 54.2* 44.6  44.6    162 127*  127*     CMP:   Recent Labs   Lab 05/30/24 2346 05/31/24  0341 06/01/24  0602    139 135*  135*   K 3.8 3.9 3.8  3.8    107 106  106   CO2 20* 21* 20*  20*   GLU 94 80 90  90   BUN 10 9 7  7   CREATININE 1.0 1.0 0.9  0.9   CALCIUM 9.0 8.9 8.5*  8.5*   PROT 7.1 7.0 6.2   ALBUMIN 3.3* 3.1* 2.6*  2.6*   BILITOT 0.8 0.6 0.7   ALKPHOS 87 88 89   AST 23 28 37   ALT 24 28 45*   ANIONGAP 11 11 9  9       Significant Imaging:     Imaging Results    None          Assessment/Plan:      * Acute cystitis  Patient presented with signs/symptoms of acute cystitis including subjective fevers, increased frequency, pelvic and flank pain, and was found to have evidence of UTI on UA.  Patient remains afebrile with leukocytosis 13.73.  Patient initiated on Rocephin with cultures obtained and pending.  Patient admitted under observation given increased risk of immunosuppresion/renal transplant.  Plan:  -Continue current pain regimen, titrate as needed  -Bowel regimen prn  -Bedrest  -IVFs prn  -Antiemetics prn  -Tylenol as needed for fever   -Continue antibiotics   -f/u cultures  -f/u renal US, consider CT if symptoms fail to improve or clinical course worsens       GERD (gastroesophageal reflux disease)  Chronic. Stable. Currently asymptomatic. Home medications include PPI/Antacids as needed.  Plan:  -Continue PPI/Antacids as needed       Class 1 obesity due to excess calories with serious comorbidity and body mass index (BMI) of 32.0 to 32.9 in adult  Body mass index is 32.74 kg/m². Morbid obesity complicates all aspects of disease management from diagnostic modalities to treatment. Weight loss encouraged and health benefits explained to patient.       H/O kidney transplant  Patient s/p renal transplant currently on cellcept, prograf, and prednisone outpatient.  She continues to follow Dr. Jaffe from nephology and Dr. Venegas from transplant.  Patient currently without signs of rejection in his currently being treated for UTI.  Plan:  -continue home medications  -continued treatment of UTI, f/u cultures  -f/u nephrology and transplant as directed         Essential hypertension  Chronic, controlled. Patient also reported history of hypotension treated with midodrine prn. Latest blood pressure and vitals reviewed-     Temp:  [99.1 °F (37.3 °C)-99.4 °F (37.4 °C)]   Pulse:  []   Resp:  [18-19]   BP: (106-116)/(63-75)   SpO2:  [97 %-100 %] .   Home meds for hypertension were reviewed and noted below.     While in the hospital, will manage blood pressure as follows; Adjust home antihypertensive regimen as follows- holding home medications as patient currently normotensive.    Will utilize p.r.n. blood pressure medication only if patient's blood pressure greater than 160/100 and she develops symptoms such as worsening chest pain or shortness of breath. Will also resume midodrine as needed for hypotension if unresponsive to IVFs.        VTE Risk Mitigation (From admission, onward)           Ordered     enoxaparin injection 40 mg  Daily         05/31/24 0057     IP VTE HIGH RISK PATIENT  Once         05/31/24 0057     Place sequential compression device  Until discontinued         05/31/24 0057                    Discharge Planning   NATALIIA:      Code Status: Full Code   Is the patient medically ready for discharge?:     Reason for patient still in hospital (select all that apply): Patient trending condition, Consult recommendations, and Pending disposition  Discharge Plan A: Home                  [facility] Adam Sales MD  Department of Hospital Medicine   [facility]

## 2024-06-01 NOTE — SUBJECTIVE & OBJECTIVE
Review of Systems  Objective:     Vital Signs (Most Recent):  Temp: 98.6 °F (37 °C) (06/01/24 0829)  Pulse: 95 (06/01/24 0829)  Resp: 17 (06/01/24 0832)  BP: 115/63 (06/01/24 0829)  SpO2: 97 % (06/01/24 0829) Vital Signs (24h Range):  Temp:  [97.7 °F (36.5 °C)-102.7 °F (39.3 °C)] 98.6 °F (37 °C)  Pulse:  [68-95] 95  Resp:  [17-19] 17  SpO2:  [95 %-99 %] 97 %  BP: ()/(52-82) 115/63     Weight: 93.1 kg (205 lb 4 oz)  Body mass index is 33.13 kg/m².    Intake/Output Summary (Last 24 hours) at 6/1/2024 1131  Last data filed at 6/1/2024 1116  Gross per 24 hour   Intake 2326.35 ml   Output --   Net 2326.35 ml         Physical Exam      Constitutional:       General: She is in acute distress.      Appearance: Normal appearance. She is obese. She is not ill-appearing, toxic-appearing or diaphoretic.   HENT:      Head: Normocephalic and atraumatic.      Right Ear: External ear normal.      Left Ear: External ear normal.      Nose: Nose normal. No congestion or rhinorrhea.      Mouth/Throat:      Mouth: Mucous membranes are moist.      Pharynx: Oropharynx is clear. No oropharyngeal exudate or posterior oropharyngeal erythema.   Eyes:      General: No scleral icterus.     Extraocular Movements: Extraocular movements intact.      Conjunctiva/sclera: Conjunctivae normal.      Pupils: Pupils are equal, round, and reactive to light.   Neck:      Vascular: No carotid bruit.   Cardiovascular:      Rate and Rhythm: Regular rhythm. Tachycardia present.      Pulses: Normal pulses.      Heart sounds: Normal heart sounds. No murmur heard.     No friction rub. No gallop.   Pulmonary:      Effort: Pulmonary effort is normal. No respiratory distress.      Breath sounds: Normal breath sounds. No stridor. No wheezing, rhonchi or rales.   Chest:      Chest wall: No tenderness.   Abdominal:      General: Abdomen is flat. Bowel sounds are normal. There is no distension.      Palpations: Abdomen is soft.      Tenderness: There is  abdominal tenderness. There is left CVA tenderness and guarding. There is no right CVA tenderness or rebound.      Hernia: No hernia is present.      Comments: Mild TTP noted throughout left lower quadrant extending up to left flank region with guarding noted.   Musculoskeletal:         General: No swelling, tenderness, deformity or signs of injury. Normal range of motion.      Cervical back: Normal range of motion and neck supple. No rigidity or tenderness.      Right lower leg: No edema.      Left lower leg: No edema.   Lymphadenopathy:      Cervical: No cervical adenopathy.   Skin:     General: Skin is warm and dry.      Capillary Refill: Capillary refill takes less than 2 seconds.      Coloration: Skin is not jaundiced or pale.      Findings: No bruising, erythema, lesion or rash.   Neurological:      General: No focal deficit present.      Mental Status: She is alert and oriented to person, place, and time. Mental status is at baseline.      Cranial Nerves: No cranial nerve deficit.      Sensory: No sensory deficit.      Motor: No weakness.      Coordination: Coordination normal.   Psychiatric:         Mood and Affect: Mood normal.         Behavior: Behavior normal.         Thought Content: Thought content normal.         Judgment: Judgment normal.          Significant Labs: All pertinent labs within the past 24 hours have been reviewed.  CBC:   Recent Labs   Lab 05/30/24  2346 05/31/24  0341 06/01/24  0602   WBC 13.73* 10.32 7.58  7.58   HGB 15.6 17.0* 14.4  14.4   HCT 47.2 54.2* 44.6  44.6    162 127*  127*     CMP:   Recent Labs   Lab 05/30/24  2346 05/31/24  0341 06/01/24  0602    139 135*  135*   K 3.8 3.9 3.8  3.8    107 106  106   CO2 20* 21* 20*  20*   GLU 94 80 90  90   BUN 10 9 7  7   CREATININE 1.0 1.0 0.9  0.9   CALCIUM 9.0 8.9 8.5*  8.5*   PROT 7.1 7.0 6.2   ALBUMIN 3.3* 3.1* 2.6*  2.6*   BILITOT 0.8 0.6 0.7   ALKPHOS 87 88 89   AST 23 28 37   ALT 24 28 45*    ANIONGAP 11 11 9  9       Significant Imaging:     Imaging Results    None

## 2024-06-01 NOTE — PROGRESS NOTES
Pharmacist Renal Dose Adjustment Note    Leydi Cordero is a 34 y.o. female being treated with the medication zosyn    Patient Data:    Vital Signs (Most Recent):  Temp: (!) 102.7 °F (39.3 °C) (06/01/24 0430)  Pulse: 83 (06/01/24 0430)  Resp: 18 (06/01/24 0430)  BP: 117/70 (06/01/24 0430)  SpO2: 95 % (06/01/24 0430) Vital Signs (72h Range):  Temp:  [97.7 °F (36.5 °C)-102.7 °F (39.3 °C)]   Pulse:  []   Resp:  [15-19]   BP: ()/(52-82)   SpO2:  [95 %-100 %]      Recent Labs   Lab 05/30/24  2346 05/31/24  0341 06/01/24  0602   CREATININE 1.0 1.0 0.9  0.9     Serum creatinine: 0.9 mg/dL 06/01/24 0602  Estimated creatinine clearance: 102.2 mL/min    Medication:zosyn 3.375 gm iv q12hrs will be changed to medication:zosyn 4.5 gm iv q8hrs per renal dosing protocol.   Pharmacist's Name: Rea De Anda [facility]  Pharmacist's Extension: [phone]

## 2024-06-01 NOTE — NURSING
Pt remains free of falls/injury. Safety precautions maintained. Pt denies pain/discomfort. IV abx given.  Regular diet tolerated. VSS. No S/S of distress noted at this time. Bed alarm refused.  Pt educated on reason mycophenolate was discontinued.  Pt verbalized understanding of education via .  Pt ambulated to bathroom w/o any noted issues.  12 hour chart check complete. Will continue to monitor.

## 2024-06-02 VITALS
DIASTOLIC BLOOD PRESSURE: 84 MMHG | RESPIRATION RATE: 19 BRPM | SYSTOLIC BLOOD PRESSURE: 125 MMHG | WEIGHT: 205.25 LBS | TEMPERATURE: 99 F | HEART RATE: 72 BPM | OXYGEN SATURATION: 96 % | HEIGHT: 66 IN | BODY MASS INDEX: 32.99 KG/M2

## 2024-06-02 LAB
ALBUMIN SERPL BCP-MCNC: 2.4 G/DL (ref 3.5–5.2)
ALBUMIN SERPL BCP-MCNC: 2.4 G/DL (ref 3.5–5.2)
ALP SERPL-CCNC: 78 U/L (ref 55–135)
ALT SERPL W/O P-5'-P-CCNC: 40 U/L (ref 10–44)
ANION GAP SERPL CALC-SCNC: 9 MMOL/L (ref 8–16)
ANION GAP SERPL CALC-SCNC: 9 MMOL/L (ref 8–16)
AST SERPL-CCNC: 28 U/L (ref 10–40)
BACTERIA BLD CULT: ABNORMAL
BASOPHILS # BLD AUTO: 0.01 K/UL (ref 0–0.2)
BASOPHILS # BLD AUTO: 0.01 K/UL (ref 0–0.2)
BASOPHILS NFR BLD: 0.2 % (ref 0–1.9)
BASOPHILS NFR BLD: 0.2 % (ref 0–1.9)
BILIRUB SERPL-MCNC: 0.6 MG/DL (ref 0.1–1)
BUN SERPL-MCNC: 8 MG/DL (ref 6–20)
BUN SERPL-MCNC: 8 MG/DL (ref 6–20)
CALCIUM SERPL-MCNC: 8.5 MG/DL (ref 8.7–10.5)
CALCIUM SERPL-MCNC: 8.5 MG/DL (ref 8.7–10.5)
CHLORIDE SERPL-SCNC: 106 MMOL/L (ref 95–110)
CHLORIDE SERPL-SCNC: 106 MMOL/L (ref 95–110)
CO2 SERPL-SCNC: 23 MMOL/L (ref 23–29)
CO2 SERPL-SCNC: 23 MMOL/L (ref 23–29)
CREAT SERPL-MCNC: 1 MG/DL (ref 0.5–1.4)
CREAT SERPL-MCNC: 1 MG/DL (ref 0.5–1.4)
DIFFERENTIAL METHOD BLD: ABNORMAL
DIFFERENTIAL METHOD BLD: ABNORMAL
EOSINOPHIL # BLD AUTO: 0.1 K/UL (ref 0–0.5)
EOSINOPHIL # BLD AUTO: 0.1 K/UL (ref 0–0.5)
EOSINOPHIL NFR BLD: 1.2 % (ref 0–8)
EOSINOPHIL NFR BLD: 1.2 % (ref 0–8)
ERYTHROCYTE [DISTWIDTH] IN BLOOD BY AUTOMATED COUNT: 14.2 % (ref 11.5–14.5)
ERYTHROCYTE [DISTWIDTH] IN BLOOD BY AUTOMATED COUNT: 14.2 % (ref 11.5–14.5)
EST. GFR  (NO RACE VARIABLE): >60 ML/MIN/1.73 M^2
EST. GFR  (NO RACE VARIABLE): >60 ML/MIN/1.73 M^2
GLUCOSE SERPL-MCNC: 85 MG/DL (ref 70–110)
GLUCOSE SERPL-MCNC: 85 MG/DL (ref 70–110)
HCT VFR BLD AUTO: 43.6 % (ref 37–48.5)
HCT VFR BLD AUTO: 43.6 % (ref 37–48.5)
HGB BLD-MCNC: 13.9 G/DL (ref 12–16)
HGB BLD-MCNC: 13.9 G/DL (ref 12–16)
IMM GRANULOCYTES # BLD AUTO: 0.03 K/UL (ref 0–0.04)
IMM GRANULOCYTES # BLD AUTO: 0.03 K/UL (ref 0–0.04)
IMM GRANULOCYTES NFR BLD AUTO: 0.7 % (ref 0–0.5)
IMM GRANULOCYTES NFR BLD AUTO: 0.7 % (ref 0–0.5)
LYMPHOCYTES # BLD AUTO: 0.4 K/UL (ref 1–4.8)
LYMPHOCYTES # BLD AUTO: 0.4 K/UL (ref 1–4.8)
LYMPHOCYTES NFR BLD: 8.9 % (ref 18–48)
LYMPHOCYTES NFR BLD: 8.9 % (ref 18–48)
MAGNESIUM SERPL-MCNC: 1.8 MG/DL (ref 1.6–2.6)
MCH RBC QN AUTO: 27.5 PG (ref 27–31)
MCH RBC QN AUTO: 27.5 PG (ref 27–31)
MCHC RBC AUTO-ENTMCNC: 31.9 G/DL (ref 32–36)
MCHC RBC AUTO-ENTMCNC: 31.9 G/DL (ref 32–36)
MCV RBC AUTO: 86 FL (ref 82–98)
MCV RBC AUTO: 86 FL (ref 82–98)
MONOCYTES # BLD AUTO: 0.4 K/UL (ref 0.3–1)
MONOCYTES # BLD AUTO: 0.4 K/UL (ref 0.3–1)
MONOCYTES NFR BLD: 8.2 % (ref 4–15)
MONOCYTES NFR BLD: 8.2 % (ref 4–15)
NEUTROPHILS # BLD AUTO: 3.5 K/UL (ref 1.8–7.7)
NEUTROPHILS # BLD AUTO: 3.5 K/UL (ref 1.8–7.7)
NEUTROPHILS NFR BLD: 80.8 % (ref 38–73)
NEUTROPHILS NFR BLD: 80.8 % (ref 38–73)
NRBC BLD-RTO: 0 /100 WBC
NRBC BLD-RTO: 0 /100 WBC
PHOSPHATE SERPL-MCNC: 3.1 MG/DL (ref 2.7–4.5)
PLATELET # BLD AUTO: 121 K/UL (ref 150–450)
PLATELET # BLD AUTO: 121 K/UL (ref 150–450)
PMV BLD AUTO: 10.7 FL (ref 9.2–12.9)
PMV BLD AUTO: 10.7 FL (ref 9.2–12.9)
POTASSIUM SERPL-SCNC: 4 MMOL/L (ref 3.5–5.1)
POTASSIUM SERPL-SCNC: 4 MMOL/L (ref 3.5–5.1)
PROT SERPL-MCNC: 6 G/DL (ref 6–8.4)
RBC # BLD AUTO: 5.05 M/UL (ref 4–5.4)
RBC # BLD AUTO: 5.05 M/UL (ref 4–5.4)
SODIUM SERPL-SCNC: 138 MMOL/L (ref 136–145)
SODIUM SERPL-SCNC: 138 MMOL/L (ref 136–145)
WBC # BLD AUTO: 4.27 K/UL (ref 3.9–12.7)
WBC # BLD AUTO: 4.27 K/UL (ref 3.9–12.7)

## 2024-06-02 PROCEDURE — 99232 SBSQ HOSP IP/OBS MODERATE 35: CPT | Mod: ,,, | Performed by: INTERNAL MEDICINE

## 2024-06-02 PROCEDURE — 80069 RENAL FUNCTION PANEL: CPT | Performed by: INTERNAL MEDICINE

## 2024-06-02 PROCEDURE — 83735 ASSAY OF MAGNESIUM: CPT | Performed by: INTERNAL MEDICINE

## 2024-06-02 PROCEDURE — 63600175 PHARM REV CODE 636 W HCPCS: Performed by: STUDENT IN AN ORGANIZED HEALTH CARE EDUCATION/TRAINING PROGRAM

## 2024-06-02 PROCEDURE — 36415 COLL VENOUS BLD VENIPUNCTURE: CPT | Performed by: HOSPITALIST

## 2024-06-02 PROCEDURE — 80197 ASSAY OF TACROLIMUS: CPT | Performed by: INTERNAL MEDICINE

## 2024-06-02 PROCEDURE — 25000003 PHARM REV CODE 250: Performed by: STUDENT IN AN ORGANIZED HEALTH CARE EDUCATION/TRAINING PROGRAM

## 2024-06-02 PROCEDURE — 63600175 PHARM REV CODE 636 W HCPCS: Performed by: HOSPITALIST

## 2024-06-02 PROCEDURE — 25000003 PHARM REV CODE 250: Performed by: HOSPITALIST

## 2024-06-02 PROCEDURE — 85025 COMPLETE CBC W/AUTO DIFF WBC: CPT | Performed by: HOSPITALIST

## 2024-06-02 PROCEDURE — 80053 COMPREHEN METABOLIC PANEL: CPT | Performed by: HOSPITALIST

## 2024-06-02 RX ORDER — CIPROFLOXACIN 500 MG/1
500 TABLET ORAL EVERY 12 HOURS
Qty: 10 TABLET | Refills: 0 | Status: SHIPPED | OUTPATIENT
Start: 2024-06-02 | End: 2024-06-07

## 2024-06-02 RX ADMIN — TACROLIMUS 1 MG: 1 CAPSULE ORAL at 08:06

## 2024-06-02 RX ADMIN — PIPERACILLIN AND TAZOBACTAM 4.5 G: 4; .5 INJECTION, POWDER, LYOPHILIZED, FOR SOLUTION INTRAVENOUS; PARENTERAL at 12:06

## 2024-06-02 RX ADMIN — PREDNISONE 5 MG: 2.5 TABLET ORAL at 08:06

## 2024-06-02 RX ADMIN — SODIUM BICARBONATE 650 MG TABLET 1300 MG: at 08:06

## 2024-06-02 RX ADMIN — PANTOPRAZOLE SODIUM 40 MG: 40 TABLET, DELAYED RELEASE ORAL at 08:06

## 2024-06-02 RX ADMIN — CALCITRIOL CAPSULES 0.25 MCG 0.25 MCG: 0.25 CAPSULE ORAL at 08:06

## 2024-06-02 RX ADMIN — PIPERACILLIN AND TAZOBACTAM 4.5 G: 4; .5 INJECTION, POWDER, LYOPHILIZED, FOR SOLUTION INTRAVENOUS; PARENTERAL at 08:06

## 2024-06-02 NOTE — DISCHARGE INSTRUCTIONS
Recommend compliance with medications   Recommended outpatient follow up with Nephrology within 2-3 sh upon discharge given that CellCept has been held due to bacteremia currently;  Recommended to hold CellCept until completion of antibiotics, recommended close follow up with Nephrology regarding this scenario.

## 2024-06-02 NOTE — DISCHARGE SUMMARY
[facility] Surg 3  [facility] Medicine  Discharge Summary      Patient Name: Leydi Cordero  MRN: [id number]  [facility]: [id number]  Patient Class: IP- Inpatient  Admission Date: 2024  Hospital Length of Stay: 2 days  Discharge Date and Time: 24  Attending Physician: Marimar Sales,*   Discharging Provider: Marimar Sales MD  Primary Care Provider: Helena Zepeda PA-C    Primary Care Team: Networked reference to record PCT     HPI:   Leydi Cordero is a 34 y.o. female with a PMH  has a past medical history of Anxiety, -donor kidney transplant 2021 (2021), Encounter for blood transfusion, ESRD (end stage renal disease), Fibroma, H/O kidney transplant, Hypertension, Hypertensive nephropathy, Ovarian cyst, Pulmonary nodules (2023), and Sleep apnea. who presented to the ED for further evaluation of acute onset and worsening urinary frequency, left lower quadrant and left flank pain, subjective fevers, chills, and myalgias which began approximately 11:00 a.m. earlier today.  Patient has significant history of renal transplant currently on immunosuppressive therapy which she reports compliance with home medications but denied prior history of UTIs or kidney stones.  Patient currently endorsing pain which is constant, rated 6/10 in severity at time of bedside assessment, radiating throughout her lower abdomen, left lower quadrant, and left flank region with no known alleviating or aggravating factors noted.  She denied exposure to recent ill contacts, recent use of antibiotics, and reported all other review of systems negative except as noted above.  Prior to onset of symptoms, patient reported being in her usual state of health with no other concerns or complaints.  Initial workup in the ED fairly unremarkable with the exception of UA positive for UTI.  Patient remains afebrile without leukocytosis with normal renal function.  Patient initiated on IVFs and  Rocephin and admitted to Hospital Medicine under observation for continued medical management of UTI in an immunosuppressive patient.    PCP: Helena Zepeda      * No surgery found *      Hospital Course:   Leydi Cordero is a 34 y.o. female with a PMH has a past medical history of Anxiety, -donor kidney transplant 2021 (2021), Encounter for blood transfusion, ESRD (end stage renal disease), Fibroma, H/O kidney transplant, Hypertension, Hypertensive nephropathy, Ovarian cyst, Pulmonary nodules (2023), and Sleep apnea. who presented to the ED for further evaluation of acute onset and worsening urinary frequency, left lower quadrant and left flank pain, subjective fevers, chills, and myalgias which began approximately 11:00 a.m. earlier today. Patient has significant history of renal transplant currently on immunosuppressive therapy which she reports compliance with home medications but denied prior history of UTIs or kidney stones. Patient currently endorsing pain which is constant, rated 6/10 in severity at time of bedside assessment, radiating throughout her lower abdomen, left lower quadrant, and left flank region with no known alleviating or aggravating factors noted. She denied exposure to recent ill contacts, recent use of antibiotics, and reported all other review of systems negative except as noted above. Prior to onset of symptoms, patient reported being in her usual state of health with no other concerns or complaints. Initial workup in the ED fairly unremarkable with the exception of UA positive for UTI. Patient remains afebrile without leukocytosis with normal renal function. Patient initiated on IVFs and Rocephin and admitted to Hospital Medicine under observation for continued medical management of UTI in an immunosuppressive patient.      2024   Examination done at bedside, communicated using .    Denied acute events, stated feeling slightly  better, denied fever, chills, chest pain, shortness O breath, nausea, vomiting,; complaining of burning micturition.    Afebrile, WBC trended down   UA suggestive of UTI, currently on empiric antibiotics, pending urine cultures.    Hold CellCept, currently on tacrolimus, ordered levels.  Nephrology consulted.    Patient/family agreed with the plan     06/01/2024   Examination done at bedside, appeared alert and oriented x3, no acute events overnight.    Episode of fever overnight, responded to Tylenol   Blood culture x1 as of 05/30/2024 positive for Gram-negative rods pending susceptibility   Ordered for repeat blood cultures on 06/01/2024, antibiotics broaden       06/02/2024   Examination of patient done at bedside.    Appeared alert and oriented x3, denied acute events.    Tolerating diet without issues   Denied fever, chills, chest pain, shortness O breath, nausea, vomiting, bowel or bladder issues.  Communicated using    Afebrile for past 24 hours, no leukocytosis, hemodynamically stable.    UTI with urine cultures positive for E coli, blood culture x1 as of 05/30/2024 positive for E coli, repeat blood cultures as of 06/02/2020 4- to date.    Received IV antibiotics/Zosyn during hospital stay.    CellCept has been held during hospital stay due to bacteremia, nephrology on board, agreed with the plan.    Considering clinical and hemodynamic stability, planning to discharge patient today, emphasized on compliance with medications, outpatient follow up with PCP/Nephrology within 1-2 days upon discharge, recommended to complete course of antibiotics, to hold CellCept until she follows with Nephrology/ completes antibiotics-- patient expressed understanding, agreed with the plan            Goals of Care Treatment Preferences:  Code Status: Full Code    Living Will: Yes              Consults:   Consults (From admission, onward)          Status Ordering Provider     Inpatient consult to Nephrology   Once        Provider:  Aaron Blanco MD    Completed NICK NAM            No new Assessment & Plan notes have been filed under this hospital service since the last note was generated.  Service: Hospital Medicine    Final Active Diagnoses:    Diagnosis Date Noted POA    PRINCIPAL PROBLEM:  Acute cystitis [N30.00] 05/31/2024 Yes    Class 1 obesity due to excess calories with serious comorbidity and body mass index (BMI) of 32.0 to 32.9 in adult [E66.09, Z68.32] 05/31/2024 Not Applicable     Chronic    GERD (gastroesophageal reflux disease) [K21.9] 05/31/2024 Yes     Chronic    H/O kidney transplant [Z94.0] 02/04/2021 Not Applicable     Chronic    Essential hypertension [I10] 08/11/2016 Yes     Chronic      Problems Resolved During this Admission:       Discharged Condition: fair    Disposition: Home or Self Care    Follow Up:   Follow-up Information       Helena Zepeda PA-C Follow up in 1 week(s).    Specialty: Family Medicine  Contact information:  [address] [facility] [address] [phone]                           Patient Instructions:      Ambulatory referral/consult to Nephrology   Standing Status: Future   Referral Priority: Routine Referral Type: Consultation   Referral Reason: Specialty Services Required   Referred to Provider: TAYLOR MARTINEZ Requested Specialty: Nephrology   Number of Visits Requested: 1       Significant Diagnostic Studies:     Results for orders placed or performed during the hospital encounter of 05/30/24   Blood culture #1 **CANNOT BE ORDERED STAT**    Specimen: Peripheral, Antecubital, Left; Blood   Result Value Ref Range    Blood Culture, Routine No Growth to date     Blood Culture, Routine No Growth to date     Blood Culture, Routine No Growth to date    Blood culture #2 **CANNOT BE ORDERED STAT**    Specimen: Peripheral, Antecubital, Left; Blood   Result Value Ref Range    Blood Culture, Routine Gram stain aer bottle: Gram negative rods      Blood Culture, Routine       Results called to and read back by: Daryl Posadas RN  05/31/2024 21:04    Blood Culture, Routine ESCHERICHIA COLI (A)        Susceptibility    Escherichia coli - CULTURE, BLOOD     Amp/Sulbactam >16/8 Resistant mcg/mL     Ampicillin >16 Resistant mcg/mL     Amox/K Clav'ate 16/8 Intermediate mcg/mL     Ceftriaxone <=1 Sensitive mcg/mL     Cefazolin >16 Resistant mcg/mL     Ciprofloxacin <=0.5 Sensitive mcg/mL     Cefepime <=2 Sensitive mcg/mL     Ertapenem <=0.5 Sensitive mcg/mL     Gentamicin <=1 Sensitive mcg/mL     Levofloxacin <=1 Sensitive mcg/mL     Meropenem <=1 Sensitive mcg/mL     Piperacillin/Tazo >64 Resistant mcg/mL     Trimeth/Sulfa >2/38 Resistant mcg/mL     Tetracycline <=2 Sensitive mcg/mL     Tobramycin <=2 Sensitive mcg/mL   Urine culture    Specimen: Urine   Result Value Ref Range    Urine Culture, Routine (A)      ESCHERICHIA COLI  >100,000 cfu/ml  No other significant isolate         Susceptibility    Escherichia coli - CULTURE, URINE     Amp/Sulbactam >16/8 Resistant mcg/mL     Ampicillin >16 Resistant mcg/mL     Amox/K Clav'ate 16/8 Intermediate mcg/mL     Ceftriaxone <=1 Sensitive mcg/mL     Cefazolin 16 Intermediate mcg/mL     Ciprofloxacin <=1 Sensitive mcg/mL     Cefepime <=2 Sensitive mcg/mL     Ertapenem <=0.5 Sensitive mcg/mL     Nitrofurantoin <=32 Sensitive mcg/mL     Gentamicin <=4 Sensitive mcg/mL     Levofloxacin <=2 Sensitive mcg/mL     Meropenem <=1 Sensitive mcg/mL     Piperacillin/Tazo <=16 Sensitive mcg/mL     Trimeth/Sulfa >2/38 Resistant mcg/mL     Tobramycin <=4 Sensitive mcg/mL   Rapid Organism ID by PCR (from Blood culture)   Result Value Ref Range    Enterococcus faecalis Not Detected Not Detected    Enterococcus faecium Not Detected Not Detected    Listeria monocytogenes Not Detected Not Detected    Staphylococcus spp. Not Detected Not Detected    Staphylococcus aureus Not Detected Not Detected    Staphylococcus epidermidis Not Detected Not  Detected    Staphylococcus lugdunensis Not Detected Not Detected    Streptococcus species Not Detected Not Detected    Streptococcus agalactiae Not Detected Not Detected    Streptococcus pneumoniae Not Detected Not Detected    Streptococcus pyogenes Not Detected Not Detected    Acinetobacter calcoaceticus/baumannii complex Not Detected Not Detected    Bacteroides fragilis Not Detected Not Detected    Enterobacterales See species for ID (A) Not Detected    Enterobacter cloacae complex Not Detected Not Detected    Escherichia coli Detected (A) Not Detected    Klebsiella aerogenes Not Detected Not Detected    Klebsiella oxytoca Not Detected Not Detected    Klebsiella pneumoniae group Not Detected Not Detected    Proteus Not Detected Not Detected    Salmonella sp Not Detected Not Detected    Serratia marcescens Not Detected Not Detected    Haemophilus influenzae Not Detected Not Detected    Neisseria meningtidis Not Detected Not Detected    Pseudomonas aeruginosa Not Detected Not Detected    Stenotrophomonas maltophilia Not Detected Not Detected    Candida albicans Not Detected Not Detected    Candida auris Not Detected Not Detected    Candida glabrata Not Detected Not Detected    Candida krusei Not Detected Not Detected    Candida parapsilosis Not Detected Not Detected    Candida tropicalis Not Detected Not Detected    Cryptococcus neoformans/gattii Not Detected Not Detected    CTX-M (ESBL ) Not Detected Not Detected    IMP (Carbapenem resistant) Not Detected Not Detected    KPC resistance gene (Carbapenem resistant) Not Detected Not Detected    mcr-1  Not Detected Not Detected    mec A/C  Test Not Applicable Not Detected    mec A/C and MREJ (MRSA) gene Test Not Applicable Not Detected    NDM (Carbapenem resistant) Not Detected Not Detected    OXA-48-like (Carbapenem resistant) Not Detected Not Detected    van A/B (VRE gene) Test Not Applicable Not Detected    VIM (Carbapenem resistant) Not Detected Not  Detected   Blood culture    Specimen: Blood   Result Value Ref Range    Blood Culture, Routine No Growth to date    Blood culture    Specimen: Blood   Result Value Ref Range    Blood Culture, Routine No Growth to date    Urinalysis, Reflex to Urine Culture Urine, Clean Catch    Specimen: Urine   Result Value Ref Range    Specimen UA Urine, Clean Catch     Color, UA Yellow Yellow, Straw, Fransisca    Appearance, UA Hazy (A) Clear    pH, UA 6.0 5.0 - 8.0    Specific Gravity, UA 1.010 1.005 - 1.030    Protein, UA 1+ (A) Negative    Glucose, UA Negative Negative    Ketones, UA Negative Negative    Bilirubin (UA) Negative Negative    Occult Blood UA 3+ (A) Negative    Nitrite, UA Negative Negative    Urobilinogen, UA Negative <2.0 EU/dL    Leukocytes, UA 3+ (A) Negative   Comprehensive metabolic panel   Result Value Ref Range    Sodium 138 136 - 145 mmol/L    Potassium 3.8 3.5 - 5.1 mmol/L    Chloride 107 95 - 110 mmol/L    CO2 20 (L) 23 - 29 mmol/L    Glucose 94 70 - 110 mg/dL    BUN 10 6 - 20 mg/dL    Creatinine 1.0 0.5 - 1.4 mg/dL    Calcium 9.0 8.7 - 10.5 mg/dL    Total Protein 7.1 6.0 - 8.4 g/dL    Albumin 3.3 (L) 3.5 - 5.2 g/dL    Total Bilirubin 0.8 0.1 - 1.0 mg/dL    Alkaline Phosphatase 87 55 - 135 U/L    AST 23 10 - 40 U/L    ALT 24 10 - 44 U/L    eGFR >60 >60 mL/min/1.73 m^2    Anion Gap 11 8 - 16 mmol/L   CBC auto differential   Result Value Ref Range    WBC 13.73 (H) 3.90 - 12.70 K/uL    RBC 5.52 (H) 4.00 - 5.40 M/uL    Hemoglobin 15.6 12.0 - 16.0 g/dL    Hematocrit 47.2 37.0 - 48.5 %    MCV 86 82 - 98 fL    MCH 28.3 27.0 - 31.0 pg    MCHC 33.1 32.0 - 36.0 g/dL    RDW 14.1 11.5 - 14.5 %    Platelets 169 150 - 450 K/uL    MPV 10.5 9.2 - 12.9 fL    Immature Granulocytes 0.4 0.0 - 0.5 %    Gran # (ANC) 11.7 (H) 1.8 - 7.7 K/uL    Immature Grans (Abs) 0.06 (H) 0.00 - 0.04 K/uL    Lymph # 0.9 (L) 1.0 - 4.8 K/uL    Mono # 1.1 (H) 0.3 - 1.0 K/uL    Eos # 0.0 0.0 - 0.5 K/uL    Baso # 0.03 0.00 - 0.20 K/uL    nRBC 0 0  /100 WBC    Gran % 85.4 (H) 38.0 - 73.0 %    Lymph % 6.2 (L) 18.0 - 48.0 %    Mono % 7.7 4.0 - 15.0 %    Eosinophil % 0.1 0.0 - 8.0 %    Basophil % 0.2 0.0 - 1.9 %    Differential Method Automated    Lactic acid, plasma   Result Value Ref Range    Lactate (Lactic Acid) 1.7 0.5 - 2.2 mmol/L   Urinalysis Microscopic   Result Value Ref Range    RBC, UA 10 (H) 0 - 4 /hpf    WBC, UA >100 (H) 0 - 5 /hpf    WBC Clumps, UA Many (A) None-Rare    Bacteria Occasional None-Occ /hpf    Hyaline Casts, UA 0 0-1/lpf /lpf    Unclass Trudi UA Rare None-Moderate    Microscopic Comment SEE COMMENT    Comprehensive Metabolic Panel (CMP)   Result Value Ref Range    Sodium 139 136 - 145 mmol/L    Potassium 3.9 3.5 - 5.1 mmol/L    Chloride 107 95 - 110 mmol/L    CO2 21 (L) 23 - 29 mmol/L    Glucose 80 70 - 110 mg/dL    BUN 9 6 - 20 mg/dL    Creatinine 1.0 0.5 - 1.4 mg/dL    Calcium 8.9 8.7 - 10.5 mg/dL    Total Protein 7.0 6.0 - 8.4 g/dL    Albumin 3.1 (L) 3.5 - 5.2 g/dL    Total Bilirubin 0.6 0.1 - 1.0 mg/dL    Alkaline Phosphatase 88 55 - 135 U/L    AST 28 10 - 40 U/L    ALT 28 10 - 44 U/L    eGFR >60 >60 mL/min/1.73 m^2    Anion Gap 11 8 - 16 mmol/L   CBC with Automated Differential   Result Value Ref Range    WBC 10.32 3.90 - 12.70 K/uL    RBC 6.18 (H) 4.00 - 5.40 M/uL    Hemoglobin 17.0 (H) 12.0 - 16.0 g/dL    Hematocrit 54.2 (H) 37.0 - 48.5 %    MCV 88 82 - 98 fL    MCH 27.5 27.0 - 31.0 pg    MCHC 31.4 (L) 32.0 - 36.0 g/dL    RDW 14.3 11.5 - 14.5 %    Platelets 162 150 - 450 K/uL    MPV 11.1 9.2 - 12.9 fL    Immature Granulocytes 0.4 0.0 - 0.5 %    Gran # (ANC) 9.3 (H) 1.8 - 7.7 K/uL    Immature Grans (Abs) 0.04 0.00 - 0.04 K/uL    Lymph # 0.7 (L) 1.0 - 4.8 K/uL    Mono # 0.3 0.3 - 1.0 K/uL    Eos # 0.0 0.0 - 0.5 K/uL    Baso # 0.03 0.00 - 0.20 K/uL    nRBC 0 0 /100 WBC    Gran % 89.7 (H) 38.0 - 73.0 %    Lymph % 6.5 (L) 18.0 - 48.0 %    Mono % 3.0 (L) 4.0 - 15.0 %    Eosinophil % 0.1 0.0 - 8.0 %    Basophil % 0.3 0.0 - 1.9 %     Differential Method Automated    Tacrolimus Level   Result Value Ref Range    Tacrolimus Lvl 3.4 (L) 5.0 - 15.0 ng/mL   Comprehensive Metabolic Panel (CMP)   Result Value Ref Range    Sodium 135 (L) 136 - 145 mmol/L    Potassium 3.8 3.5 - 5.1 mmol/L    Chloride 106 95 - 110 mmol/L    CO2 20 (L) 23 - 29 mmol/L    Glucose 90 70 - 110 mg/dL    BUN 7 6 - 20 mg/dL    Creatinine 0.9 0.5 - 1.4 mg/dL    Calcium 8.5 (L) 8.7 - 10.5 mg/dL    Total Protein 6.2 6.0 - 8.4 g/dL    Albumin 2.6 (L) 3.5 - 5.2 g/dL    Total Bilirubin 0.7 0.1 - 1.0 mg/dL    Alkaline Phosphatase 89 55 - 135 U/L    AST 37 10 - 40 U/L    ALT 45 (H) 10 - 44 U/L    eGFR >60 >60 mL/min/1.73 m^2    Anion Gap 9 8 - 16 mmol/L   CBC with Automated Differential   Result Value Ref Range    WBC 7.58 3.90 - 12.70 K/uL    RBC 5.13 4.00 - 5.40 M/uL    Hemoglobin 14.4 12.0 - 16.0 g/dL    Hematocrit 44.6 37.0 - 48.5 %    MCV 87 82 - 98 fL    MCH 28.1 27.0 - 31.0 pg    MCHC 32.3 32.0 - 36.0 g/dL    RDW 14.2 11.5 - 14.5 %    Platelets 127 (L) 150 - 450 K/uL    MPV 11.2 9.2 - 12.9 fL    Immature Granulocytes 0.8 (H) 0.0 - 0.5 %    Gran # (ANC) 6.7 1.8 - 7.7 K/uL    Immature Grans (Abs) 0.06 (H) 0.00 - 0.04 K/uL    Lymph # 0.4 (L) 1.0 - 4.8 K/uL    Mono # 0.4 0.3 - 1.0 K/uL    Eos # 0.0 0.0 - 0.5 K/uL    Baso # 0.01 0.00 - 0.20 K/uL    nRBC 0 0 /100 WBC    Gran % 88.5 (H) 38.0 - 73.0 %    Lymph % 5.4 (L) 18.0 - 48.0 %    Mono % 5.1 4.0 - 15.0 %    Eosinophil % 0.1 0.0 - 8.0 %    Basophil % 0.1 0.0 - 1.9 %    Differential Method Automated    CBC Auto Differential   Result Value Ref Range    WBC 7.58 3.90 - 12.70 K/uL    RBC 5.13 4.00 - 5.40 M/uL    Hemoglobin 14.4 12.0 - 16.0 g/dL    Hematocrit 44.6 37.0 - 48.5 %    MCV 87 82 - 98 fL    MCH 28.1 27.0 - 31.0 pg    MCHC 32.3 32.0 - 36.0 g/dL    RDW 14.2 11.5 - 14.5 %    Platelets 127 (L) 150 - 450 K/uL    MPV 11.2 9.2 - 12.9 fL    Immature Granulocytes 0.8 (H) 0.0 - 0.5 %    Gran # (ANC) 6.7 1.8 - 7.7 K/uL    Immature Grans  (Abs) 0.06 (H) 0.00 - 0.04 K/uL    Lymph # 0.4 (L) 1.0 - 4.8 K/uL    Mono # 0.4 0.3 - 1.0 K/uL    Eos # 0.0 0.0 - 0.5 K/uL    Baso # 0.01 0.00 - 0.20 K/uL    nRBC 0 0 /100 WBC    Gran % 88.5 (H) 38.0 - 73.0 %    Lymph % 5.4 (L) 18.0 - 48.0 %    Mono % 5.1 4.0 - 15.0 %    Eosinophil % 0.1 0.0 - 8.0 %    Basophil % 0.1 0.0 - 1.9 %    Differential Method Automated    Renal Function Panel   Result Value Ref Range    Glucose 90 70 - 110 mg/dL    Sodium 135 (L) 136 - 145 mmol/L    Potassium 3.8 3.5 - 5.1 mmol/L    Chloride 106 95 - 110 mmol/L    CO2 20 (L) 23 - 29 mmol/L    BUN 7 6 - 20 mg/dL    Calcium 8.5 (L) 8.7 - 10.5 mg/dL    Creatinine 0.9 0.5 - 1.4 mg/dL    Albumin 2.6 (L) 3.5 - 5.2 g/dL    Phosphorus 2.4 (L) 2.7 - 4.5 mg/dL    eGFR >60 >60 mL/min/1.73 m^2    Anion Gap 9 8 - 16 mmol/L   Comprehensive Metabolic Panel (CMP)   Result Value Ref Range    Sodium 138 136 - 145 mmol/L    Potassium 4.0 3.5 - 5.1 mmol/L    Chloride 106 95 - 110 mmol/L    CO2 23 23 - 29 mmol/L    Glucose 85 70 - 110 mg/dL    BUN 8 6 - 20 mg/dL    Creatinine 1.0 0.5 - 1.4 mg/dL    Calcium 8.5 (L) 8.7 - 10.5 mg/dL    Total Protein 6.0 6.0 - 8.4 g/dL    Albumin 2.4 (L) 3.5 - 5.2 g/dL    Total Bilirubin 0.6 0.1 - 1.0 mg/dL    Alkaline Phosphatase 78 55 - 135 U/L    AST 28 10 - 40 U/L    ALT 40 10 - 44 U/L    eGFR >60 >60 mL/min/1.73 m^2    Anion Gap 9 8 - 16 mmol/L   CBC with Automated Differential   Result Value Ref Range    WBC 4.27 3.90 - 12.70 K/uL    RBC 5.05 4.00 - 5.40 M/uL    Hemoglobin 13.9 12.0 - 16.0 g/dL    Hematocrit 43.6 37.0 - 48.5 %    MCV 86 82 - 98 fL    MCH 27.5 27.0 - 31.0 pg    MCHC 31.9 (L) 32.0 - 36.0 g/dL    RDW 14.2 11.5 - 14.5 %    Platelets 121 (L) 150 - 450 K/uL    MPV 10.7 9.2 - 12.9 fL    Immature Granulocytes 0.7 (H) 0.0 - 0.5 %    Gran # (ANC) 3.5 1.8 - 7.7 K/uL    Immature Grans (Abs) 0.03 0.00 - 0.04 K/uL    Lymph # 0.4 (L) 1.0 - 4.8 K/uL    Mono # 0.4 0.3 - 1.0 K/uL    Eos # 0.1 0.0 - 0.5 K/uL    Baso # 0.01  0.00 - 0.20 K/uL    nRBC 0 0 /100 WBC    Gran % 80.8 (H) 38.0 - 73.0 %    Lymph % 8.9 (L) 18.0 - 48.0 %    Mono % 8.2 4.0 - 15.0 %    Eosinophil % 1.2 0.0 - 8.0 %    Basophil % 0.2 0.0 - 1.9 %    Differential Method Automated    Magnesium   Result Value Ref Range    Magnesium 1.8 1.6 - 2.6 mg/dL   Renal Function Panel   Result Value Ref Range    Glucose 85 70 - 110 mg/dL    Sodium 138 136 - 145 mmol/L    Potassium 4.0 3.5 - 5.1 mmol/L    Chloride 106 95 - 110 mmol/L    CO2 23 23 - 29 mmol/L    BUN 8 6 - 20 mg/dL    Calcium 8.5 (L) 8.7 - 10.5 mg/dL    Creatinine 1.0 0.5 - 1.4 mg/dL    Albumin 2.4 (L) 3.5 - 5.2 g/dL    Phosphorus 3.1 2.7 - 4.5 mg/dL    eGFR >60 >60 mL/min/1.73 m^2    Anion Gap 9 8 - 16 mmol/L   CBC Auto Differential   Result Value Ref Range    WBC 4.27 3.90 - 12.70 K/uL    RBC 5.05 4.00 - 5.40 M/uL    Hemoglobin 13.9 12.0 - 16.0 g/dL    Hematocrit 43.6 37.0 - 48.5 %    MCV 86 82 - 98 fL    MCH 27.5 27.0 - 31.0 pg    MCHC 31.9 (L) 32.0 - 36.0 g/dL    RDW 14.2 11.5 - 14.5 %    Platelets 121 (L) 150 - 450 K/uL    MPV 10.7 9.2 - 12.9 fL    Immature Granulocytes 0.7 (H) 0.0 - 0.5 %    Gran # (ANC) 3.5 1.8 - 7.7 K/uL    Immature Grans (Abs) 0.03 0.00 - 0.04 K/uL    Lymph # 0.4 (L) 1.0 - 4.8 K/uL    Mono # 0.4 0.3 - 1.0 K/uL    Eos # 0.1 0.0 - 0.5 K/uL    Baso # 0.01 0.00 - 0.20 K/uL    nRBC 0 0 /100 WBC    Gran % 80.8 (H) 38.0 - 73.0 %    Lymph % 8.9 (L) 18.0 - 48.0 %    Mono % 8.2 4.0 - 15.0 %    Eosinophil % 1.2 0.0 - 8.0 %    Basophil % 0.2 0.0 - 1.9 %    Differential Method Automated      *Note: Due to a large number of results and/or encounters for the requested time period, some results have not been displayed. A complete set of results can be found in Results Review.        Imaging Results    None          Pending Diagnostic Studies:       Procedure Component Value Units Date/Time    Tacrolimus level [[id number]] Collected: 06/02/24 0636    Order Status: Sent Lab Status: No result     Specimen: Blood             Medications:       Medication List        START taking these medications      ciprofloxacin HCl 500 MG tablet  Commonly known as: CIPRO  Take 1 tablet (500 mg total) by mouth every 12 (twelve) hours. for 5 days            CONTINUE taking these medications      albuterol 90 mcg/actuation inhaler  Commonly known as: PROVENTIL/VENTOLIN HFA  Inhale 2 aspiraciones para los pulmones cada 4 (cuatro) horas según sea necesario para la sibilancia o dificultad para respirar. Rescate  (Inhale 2 puffs into the lungs every 4 (four) hours as needed for Wheezing or Shortness of Breath. Rescue)     calcitRIOL 0.25 MCG Cap  Commonly known as: ROCALTROL  Take 1 capsule (0.25 mcg total) by mouth once daily.     docusate sodium 100 MG capsule  Commonly known as: COLACE  Take 1 capsule (100 mg total) by mouth 2 (two) times daily as needed.     HIGH POTENCY MULTIVIT (W-IRON) Tab  Generic drug: multivitamin-iron-folic acid  [place] young tableta por vía oral diariamente.  (Take 1 tablet by mouth daily)     mesalamine 1.2 gram Tbec  Commonly known as: LIALDA     midodrine 10 MG tablet  Commonly known as: PROAMATINE  [place] 1 tableta (10 mg en total) por vía oral 2 (dos) veces al día con las comidas.  (Take 1 tablet (10 mg total) by mouth 2 (two) times daily with meals.)     pantoprazole 40 MG tablet  Commonly known as: PROTONIX  [place] young tableta por vía oral diariamente.  (Take 1 tablet by mouth once daily)     predniSONE 5 MG tablet  Commonly known as: DELTASONE  [place] young tableta (5 mg en total) por vía oral diariamente.  (Take 1 tablet (5 mg total) by mouth once daily.)     sodium bicarbonate 650 MG tablet  [place] 2 tabletas (1,300 mg en total) por vía oral 3 (regi) veces al día  (Take 2 tablets (1,300 mg total) by mouth 3 (three) times daily.)     sucralfate 1 gram tablet  Commonly known as: CARAFATE  Take 1 tablet (1 g total) by mouth 4 (four) times daily.     tacrolimus 1 MG Cap  Commonly known as: PROGRAF  Take 1 capsule (1 mg  total) by mouth every 12 (twelve) hours.            STOP taking these medications      mycophenolate 250 mg Cap  Commonly known as: CELLCEPT               Where to Get Your Medications        These medications were sent to [id number] - [place] - [address] AT [facility]  [address], [place] [address]      Phone: [phone]   ciprofloxacin HCl 500 MG tablet          Indwelling Lines/Drains at time of discharge:   Lines/Drains/Airways       Central Venous Catheter Line  Duration                  Hemodialysis AV Graft Left upper arm -- days                    Time spent on the discharge of patient: 89 minutes         Marimar Sales MD  Department of Hospital Medicine  [facility] Surg 3

## 2024-06-02 NOTE — PLAN OF CARE
[facility] Surg 3  Discharge Final Note    Primary Care Provider: Helena Zepeda PA-C    Expected Discharge Date: 6/2/2024    Final Discharge Note (most recent)       Final Note - 06/02/24 0957          Final Note    Assessment Type Final Discharge Note     Anticipated Discharge Disposition Home or Self Care        Post-Acute Status    Discharge Delays None known at this time                     Important Message from Medicare           Discharge home, no home health or dme orders noted.  Patient will schedule own follow up appointments.

## 2024-06-02 NOTE — PLAN OF CARE
315/315 BRENDA Cordero is a 34 y.o.female admitted on 2024 for Acute cystitis   Code Status: Full Code MRN: [id number]   Review of patient's allergies indicates:   Allergen Reactions    Heparin analogues Rash     Past Medical History:   Diagnosis Date    Anxiety     -donor kidney transplant 2021    cPRA 100%, XM negative 3 arteries, 2 on common patch, WIT 40 min    Encounter for blood transfusion     ESRD (end stage renal disease)     Fibroma     H/O kidney transplant     Hypertension     Hypertensive nephropathy     Ovarian cyst     Pulmonary nodules 2023    Sleep apnea       PRN meds    acetaminophen, 650 mg, Q6H PRN  acetaminophen, 650 mg, Q8H PRN  albuterol-ipratropium, 3 mL, Q6H PRN  aluminum-magnesium hydroxide-simethicone, 30 mL, QID PRN  dextrose 10%, 12.5 g, PRN  dextrose 10%, 25 g, PRN  glucagon (human recombinant), 1 mg, PRN  glucose, 16 g, PRN  glucose, 24 g, PRN  HYDROcodone-acetaminophen, 1 tablet, Q6H PRN  melatonin, 6 mg, Nightly PRN  morphine, 2 mg, Q4H PRN  naloxone, 0.02 mg, PRN  ondansetron, 4 mg, Q8H PRN  promethazine, 25 mg, Q6H PRN  senna-docusate 8.6-50 mg, 1 tablet, BID PRN  sodium chloride 0.9%, 10 mL, Q12H PRN      AVS Discharge instructions received and reviewed with pt and family at bedside.  Pt voiced understanding and all questions answered to satisfaction.  Stressed importance to making and keeping all follow up appointments.  Medications reviewed with pt.  Tele monitor removed and brought to monitor tech.  IV d/c'd with tip intact, pressure dressing applied.  Pt transported to front of hospital via w/c to be discharged home.      Orientation: oriented x 4  Maria Elena Coma Scale Score: 15     Lead Monitored: Lead II         VTE Required Core Measure: Pharmacological prophylaxis initiated/maintained Last Bowel Movement: 24  Diet Adult Regular (IDDSI Level 7)  Voiding Characteristics: frequency  Rex Score: 20  Fall Risk Score:  3  Accucheck []   Freq?      Lines/Drains/Airways       Central Venous Catheter Line  Duration                  Hemodialysis AV Graft Left upper arm -- days              Peripheral Intravenous Line  Duration                  Peripheral IV - Single Lumen 05/30/24 2348 20 G Left Antecubital 2 days                       Problem: Adult Inpatient Plan of Care  Goal: Plan of Care Review  Outcome: Met  Goal: Patient-Specific Goal (Individualized)  Outcome: Met  Goal: Absence of Hospital-Acquired Illness or Injury  Outcome: Met  Goal: Optimal Comfort and Wellbeing  Outcome: Met  Goal: Readiness for Transition of Care  Outcome: Met

## 2024-06-02 NOTE — PROGRESS NOTES
Nephrology Progress Note:    HPI:   HPI is from the chart and through a     Ms. Cordero is a 34-year-old  female (H/O DDKT - 01/09/2021, followed by Dr. Jaffe) who presented to the ED with acute onset of urinary frequency, left lower quadrant pain/flank pain, subjective fever/chills/malaise.  She denies nausea, vomiting, or chest pain.  She denies gross hematuria, cola colored urine, or diarrhea.  Due to her immunocompromised state, she was admitted by Hospital Medicine for IV antibiotics and observation.  Due to her transplant status, Nephrology was consulted to assist in evaluation and management on 5/31/24.     Interval History:  An  was used to interview the patient.  Chart reviewed/patient examined.  She had an uneventful night with no new issues reported.  She denied UTI symptoms.  She has no abdominal pain or flank pain.  She is tolerating her diet.    Health Status   Allergies:    is allergic to heparin analogues.    Current medications:     Current Facility-Administered Medications:     acetaminophen suppository 650 mg, 650 mg, Rectal, Q6H PRN, Juan Carlos Hopper MD    acetaminophen tablet 650 mg, 650 mg, Oral, Q8H PRN, Juan Carlos Hopper MD, 650 mg at 06/01/24 2121    albuterol-ipratropium 2.5 mg-0.5 mg/3 mL nebulizer solution 3 mL, 3 mL, Nebulization, Q6H PRN, Juan Carlos Hopper MD    aluminum-magnesium hydroxide-simethicone 200-200-20 mg/5 mL suspension 30 mL, 30 mL, Oral, QID PRN, Juan Carlos Hopper MD    calcitRIOL capsule 0.25 mcg, 0.25 mcg, Oral, Daily, Juan Carlos Hopper MD, 0.25 mcg at 06/02/24 0847    dextrose 10% bolus 125 mL 125 mL, 12.5 g, Intravenous, PRN, Juan Carlos Hopepr MD    dextrose 10% bolus 250 mL 250 mL, 25 g, Intravenous, PRN, Juan Carlos Hopper MD    enoxaparin injection 40 mg, 40 mg, Subcutaneous, Daily, Juan Carlos Hopper MD, 40 mg at 06/01/24 1605    glucagon (human recombinant) injection 1 mg, 1 mg, Intramuscular, PRN, Ruchi,  "Juan Carlos RENDON MD    glucose chewable tablet 16 g, 16 g, Oral, PRN, Juan Carlos Hopper MD    glucose chewable tablet 24 g, 24 g, Oral, PRN, Juan Carlos Hopper MD    HYDROcodone-acetaminophen 5-325 mg per tablet 1 tablet, 1 tablet, Oral, Q6H PRN, Juan Carlos Hopper MD, 1 tablet at 06/01/24 0832    lactated ringers infusion, , Intravenous, Continuous, Juan Carlos Hopper MD, Stopped at 06/01/24 1101    melatonin tablet 6 mg, 6 mg, Oral, Nightly PRN, Juan Carlos Hopper MD    morphine injection 2 mg, 2 mg, Intravenous, Q4H PRN, Juan Carlos Hopper MD    naloxone 0.4 mg/mL injection 0.02 mg, 0.02 mg, Intravenous, PRN, Juan Carlos Hopper MD    ondansetron injection 4 mg, 4 mg, Intravenous, Q8H PRN, Juan Carlos Hopper MD    pantoprazole EC tablet 40 mg, 40 mg, Oral, Daily, Juan Carlos Hopper MD, 40 mg at 06/02/24 0847    piperacillin-tazobactam (ZOSYN) 4.5 g in dextrose 5 % in water (D5W) 100 mL IVPB (MB+), 4.5 g, Intravenous, Q8H, Marimar Sales MD, Last Rate: 25 mL/hr at 06/02/24 0847, 4.5 g at 06/02/24 0847    predniSONE tablet 5 mg, 5 mg, Oral, Daily, Juan Carlos Hopper MD, 5 mg at 06/02/24 0847    promethazine tablet 25 mg, 25 mg, Oral, Q6H PRN, Jua nCarlos Hopper MD    senna-docusate 8.6-50 mg per tablet 1 tablet, 1 tablet, Oral, BID PRN, Juan Carlos Hopper MD    sodium bicarbonate tablet 1,300 mg, 1,300 mg, Oral, TID, Juan Carlos Hopper MD, 1,300 mg at 06/02/24 0847    sodium chloride 0.9% flush 10 mL, 10 mL, Intravenous, Q12H PRN, Juan Carlos Hopper MD    tacrolimus capsule 1 mg, 1 mg, Oral, BID, Juan Carlos Hopper MD, 1 mg at 06/02/24 0847         Objective       Physical Examination:   VS/Measurements   /84 (BP Location: Right arm, Patient Position: Sitting)   Pulse 72   Temp 98.5 °F (36.9 °C) (Oral)   Resp 19   Ht 5' 6" (1.676 m)   Wt 93.1 kg (205 lb 4 oz)   LMP 05/13/2024   SpO2 96%   Breastfeeding No   BMI 33.13 kg/m²   Vitals:    06/02/24 1206   BP: 125/84 "   Pulse: 72   Resp: 19   Temp: 98.5 °F (36.9 °C)     UOP = not quantitated  Constitutional:  Awake, in no acute distress  HEENT:  Normocephalic and atraumatic, no scleral icterus.   Neck:  Neck supple. No tracheal deviation present.   Cardiovascular:  Regular rhythm.  Exam reveals no gallop and no friction rub.    Pulmonary/Chest:  Breath sounds normal. No rales.   Abdominal: Soft.  Bowel sounds are normal.  No tenderness.  No flank tenderness.  Allograft - nontender, no bruit  Musculoskeletal:  Exhibits no contracture or deformity.   Extremity:  No clubbing/cyanosis, Edema = none  Neurological:  Alert and oriented to person, place, and time.   Skin:  Skin is warm and dry.   Psychiatric:  Normal mood and affect.   Vitals reviewed    Laboratory Results   Today's Lab Results :    Recent Results (from the past 24 hour(s))   Comprehensive Metabolic Panel (CMP)    Collection Time: 06/02/24  6:36 AM   Result Value Ref Range    Sodium 138 136 - 145 mmol/L    Potassium 4.0 3.5 - 5.1 mmol/L    Chloride 106 95 - 110 mmol/L    CO2 23 23 - 29 mmol/L    Glucose 85 70 - 110 mg/dL    BUN 8 6 - 20 mg/dL    Creatinine 1.0 0.5 - 1.4 mg/dL    Calcium 8.5 (L) 8.7 - 10.5 mg/dL    Total Protein 6.0 6.0 - 8.4 g/dL    Albumin 2.4 (L) 3.5 - 5.2 g/dL    Total Bilirubin 0.6 0.1 - 1.0 mg/dL    Alkaline Phosphatase 78 55 - 135 U/L    AST 28 10 - 40 U/L    ALT 40 10 - 44 U/L    eGFR >60 >60 mL/min/1.73 m^2    Anion Gap 9 8 - 16 mmol/L   Magnesium    Collection Time: 06/02/24  6:36 AM   Result Value Ref Range    Magnesium 1.8 1.6 - 2.6 mg/dL   Renal Function Panel    Collection Time: 06/02/24  6:36 AM   Result Value Ref Range    Glucose 85 70 - 110 mg/dL    Sodium 138 136 - 145 mmol/L    Potassium 4.0 3.5 - 5.1 mmol/L    Chloride 106 95 - 110 mmol/L    CO2 23 23 - 29 mmol/L    BUN 8 6 - 20 mg/dL    Calcium 8.5 (L) 8.7 - 10.5 mg/dL    Creatinine 1.0 0.5 - 1.4 mg/dL    Albumin 2.4 (L) 3.5 - 5.2 g/dL    Phosphorus 3.1 2.7 - 4.5 mg/dL    eGFR >60 >60  mL/min/1.73 m^2    Anion Gap 9 8 - 16 mmol/L   CBC with Automated Differential    Collection Time: 06/02/24  6:37 AM   Result Value Ref Range    WBC 4.27 3.90 - 12.70 K/uL    RBC 5.05 4.00 - 5.40 M/uL    Hemoglobin 13.9 12.0 - 16.0 g/dL    Hematocrit 43.6 37.0 - 48.5 %    MCV 86 82 - 98 fL    MCH 27.5 27.0 - 31.0 pg    MCHC 31.9 (L) 32.0 - 36.0 g/dL    RDW 14.2 11.5 - 14.5 %    Platelets 121 (L) 150 - 450 K/uL    MPV 10.7 9.2 - 12.9 fL    Immature Granulocytes 0.7 (H) 0.0 - 0.5 %    Gran # (ANC) 3.5 1.8 - 7.7 K/uL    Immature Grans (Abs) 0.03 0.00 - 0.04 K/uL    Lymph # 0.4 (L) 1.0 - 4.8 K/uL    Mono # 0.4 0.3 - 1.0 K/uL    Eos # 0.1 0.0 - 0.5 K/uL    Baso # 0.01 0.00 - 0.20 K/uL    nRBC 0 0 /100 WBC    Gran % 80.8 (H) 38.0 - 73.0 %    Lymph % 8.9 (L) 18.0 - 48.0 %    Mono % 8.2 4.0 - 15.0 %    Eosinophil % 1.2 0.0 - 8.0 %    Basophil % 0.2 0.0 - 1.9 %    Differential Method Automated    CBC Auto Differential    Collection Time: 06/02/24  6:37 AM   Result Value Ref Range    WBC 4.27 3.90 - 12.70 K/uL    RBC 5.05 4.00 - 5.40 M/uL    Hemoglobin 13.9 12.0 - 16.0 g/dL    Hematocrit 43.6 37.0 - 48.5 %    MCV 86 82 - 98 fL    MCH 27.5 27.0 - 31.0 pg    MCHC 31.9 (L) 32.0 - 36.0 g/dL    RDW 14.2 11.5 - 14.5 %    Platelets 121 (L) 150 - 450 K/uL    MPV 10.7 9.2 - 12.9 fL    Immature Granulocytes 0.7 (H) 0.0 - 0.5 %    Gran # (ANC) 3.5 1.8 - 7.7 K/uL    Immature Grans (Abs) 0.03 0.00 - 0.04 K/uL    Lymph # 0.4 (L) 1.0 - 4.8 K/uL    Mono # 0.4 0.3 - 1.0 K/uL    Eos # 0.1 0.0 - 0.5 K/uL    Baso # 0.01 0.00 - 0.20 K/uL    nRBC 0 0 /100 WBC    Gran % 80.8 (H) 38.0 - 73.0 %    Lymph % 8.9 (L) 18.0 - 48.0 %    Mono % 8.2 4.0 - 15.0 %    Eosinophil % 1.2 0.0 - 8.0 %    Basophil % 0.2 0.0 - 1.9 %    Differential Method Automated        @[email or web address] @[facility]@    Assessment & Plan   Active Hospital Problems    Diagnosis  POA    *Acute cystitis [N30.00]  Yes    Class 1 obesity due to excess calories with serious comorbidity and body mass  index (BMI) of 32.0 to 32.9 in adult [E66.09, Z68.32]  Not Applicable     Chronic    GERD (gastroesophageal reflux disease) [K21.9]  Yes     Chronic    H/O kidney transplant [Z94.0]  Not Applicable     Chronic    Essential hypertension [I10]  Yes     Chronic      Resolved Hospital Problems   No resolved problems to display.     ment/Recommendations:  - Acute cystitis/E coli bacteremia in a immunocompromised host (renal transplant patient).  On Zosyn    - H/O DDKT - 01/09/2021, followed by Dr. Jaffe.  Baseline creatinine 0.8 - 1.0 mg/dL.  Renal function stable.  CellCept on hold for now.  - Hypertension  - Metabolic acidosis.  Mild.  On oral sodium bicarbonate, follow laboratory studies  - Obesity        -Portions of this note were created using voice recognition software.  It is possible that there are errors, which have persisted, despite proofreading.  If there is a question regarding contents of this document, please contact me for clarification.

## 2024-06-03 LAB
BACTERIA UR CULT: ABNORMAL
TACROLIMUS BLD-MCNC: 3.8 NG/ML (ref 5–15)

## 2024-06-05 LAB — BACTERIA BLD CULT: NORMAL

## 2024-06-06 LAB
BACTERIA BLD CULT: NORMAL
BACTERIA BLD CULT: NORMAL

## 2024-06-27 ENCOUNTER — PATIENT OUTREACH (OUTPATIENT)
Dept: ADMINISTRATIVE | Facility: OTHER | Age: 34
End: 2024-06-27
Payer: MEDICAID

## 2024-06-27 NOTE — PROGRESS NOTES
CHW - Outreach Attempt  ( id # [id number])      Community Health Worker left a voicemail message for 1st attempt to contact patient regarding: community resources    Community Health Worker to attempt to contact patient on: 6/27/24

## 2024-07-15 DIAGNOSIS — Z94.0 DECEASED-DONOR KIDNEY TRANSPLANT: ICD-10-CM

## 2024-07-15 DIAGNOSIS — J45.20 ASTHMA, MILD INTERMITTENT, WELL-CONTROLLED: ICD-10-CM

## 2024-07-15 DIAGNOSIS — E21.2 HYPERPARATHYROIDISM, TERTIARY: ICD-10-CM

## 2024-07-16 RX ORDER — ALBUTEROL SULFATE 90 UG/1
2 AEROSOL, METERED RESPIRATORY (INHALATION) EVERY 4 HOURS PRN
Qty: 8.5 G | Refills: 11 | OUTPATIENT
Start: 2024-07-16

## 2024-07-16 RX ORDER — PANTOPRAZOLE SODIUM 40 MG/1
TABLET, DELAYED RELEASE ORAL
Qty: 90 TABLET | Refills: 3 | Status: SHIPPED | OUTPATIENT
Start: 2024-07-16

## 2024-07-16 RX ORDER — CALCITRIOL 0.25 UG/1
0.25 CAPSULE ORAL DAILY
Qty: 30 CAPSULE | Refills: 3 | Status: SHIPPED | OUTPATIENT
Start: 2024-07-16

## 2024-07-19 ENCOUNTER — TELEPHONE (OUTPATIENT)
Dept: NEPHROLOGY | Facility: CLINIC | Age: 34
End: 2024-07-19

## 2024-07-19 ENCOUNTER — TELEPHONE (OUTPATIENT)
Dept: NEPHROLOGY | Facility: CLINIC | Age: 34
End: 2024-07-19
Payer: MEDICAID

## 2024-07-19 NOTE — TELEPHONE ENCOUNTER
"Returned call per language services "zan" id # [id number]. Pt states she tried to refill her mycophenalate per the my chart demetrius . She is unable to do so as it was not listed on her med list". After researching dr cardoso notes and he last AVS, med is still active so I called in her refill to [facility].7/19/24/sf   "
None

## 2024-07-19 NOTE — TELEPHONE ENCOUNTER
----- Message from Ophelia Lin sent at 7/19/2024  8:29 AM CDT -----  The patient does not speak English. Please call the Language Services Department [phone]. The pt is calling in regards to her medication. She I stated that is is no longer on her chart. She is wanting to be advised about taking.Please give a call back at [phone]

## 2024-07-19 NOTE — TELEPHONE ENCOUNTER
"S/w/ pharmacist to confirm pt can cont cellcept 250mg take 4 caps twice daily. With 11 refills. Per dr meija". 7/19/24/sf   "

## 2024-07-19 NOTE — TELEPHONE ENCOUNTER
----- Message from Julio Jaffe MD sent at 7/19/2024  1:09 PM CDT -----  Yes, thank you.  ----- Message -----  From: Alejandra Rodarte LPN  Sent: 7/19/2024   9:55 AM CDT  To: Julio Jaffe MD    Pt was in hospital for bacteremia 6/2/24 mycophenolate was held. She has since discharged and is taking her mycophenolate. She is almost out. Should she have resumed the med? Ok to call in for her at 1 gram twice daily?

## 2024-07-22 ENCOUNTER — TELEPHONE (OUTPATIENT)
Dept: NEPHROLOGY | Facility: CLINIC | Age: 34
End: 2024-07-22
Payer: MEDICAID

## 2024-07-22 NOTE — TELEPHONE ENCOUNTER
----- Message from Manuel Faust sent at 7/22/2024  4:27 PM CDT -----  Contact: Leydi Holley is needing a call back in regards to scheduling an appt for her medication. Please give her a call back with an  at [phone]

## 2024-07-23 ENCOUNTER — TELEPHONE (OUTPATIENT)
Dept: NEPHROLOGY | Facility: CLINIC | Age: 34
End: 2024-07-23
Payer: MEDICAID

## 2024-07-23 NOTE — TELEPHONE ENCOUNTER
"Called [facility] to see if pt cellcept can be covered..they explained that pt was told by pharmacist to call medicaid to remove a block by them that says she has another insurance coverage and they will be billed first then medicaid takes over. They s/w/pts son and he was telling his mother the same but she still trying to call us to get it approved. Called pt per  [phone] William. To call the medicaid number to remove the block from her insurance saying that she has another secondary insurance. We were very specific about this and had her to repeat back to me what she needs to do per the . She asked what to do if she was unable to get this done? I told her she will have to pay out of pocket for the meds until its done". Interpeter asked her to clarify that she understood this but he said "oh she humg up."7/23/24/sf   "

## 2024-07-23 NOTE — TELEPHONE ENCOUNTER
"Returned call thru language line  #[phone] christiana". Pt says that she has 2 days of med left and went to [facility] to fill the med and they told her the insurance she has medicaid aetna. Does not cover the cellcelpt. She said she had transplant in 2021 while she had medicare a &b but no longer has that insurance.' I called pharmacy to see if an p.a. was started or not. 7/23/24/sf   "

## 2024-07-24 ENCOUNTER — TELEPHONE (OUTPATIENT)
Dept: TRANSPLANT | Facility: CLINIC | Age: 34
End: 2024-07-24
Payer: MEDICAID

## 2024-07-24 NOTE — TELEPHONE ENCOUNTER
Please see follow up below:      ----- Message from Shaina Salas PharmD sent at 7/24/2024 11:19 AM CDT -----  Regarding: MEDICARE ENDED, WAITING FOR MEDICAID TO UPDATE HER PROFILE  Eddie Santiago,  My staff spoke to pt via  as well recently.   This pt's MediCARE termed and Medicaid has not gotten the nikita yet so they are rejecting her rx's telling us to bill her primary plan (medicare).  We contacted [employer] yesterday to also ask them to update her profile with this information, but this could take several days.  (it also appears that pt might have waited until the last minute to request her refills, which is why she paid out of pocket instead of waiting for Medicaid's update to take place)    Hope this helps!    Shaina Salas, Pharm.D., B.C.P.S.  Clinical Pharmacist for Transplant Services  [facility].  Phone [phone] (or ext [phone])  Fax [phone]       ----------------------------------------------------------  Hello,    I spoke with this pt who reports being charged for her medication but reports this should not have happened. Pt reports she is unsure if the pharmacy ([facility]) is aware that she now has Medicaid and Aetna, instead of Medicare.  Pt reports she was only able to receive medication because her friend paid for a two week supply. Pt reports she should not have a copay and is worried she won't be able to afford medication in two weeks if [facility] does not run the proper insurance.    Can pharmacy follow up on this? To note, I had to utilize an .    Thank you!

## 2024-07-25 ENCOUNTER — TELEPHONE (OUTPATIENT)
Dept: TRANSPLANT | Facility: CLINIC | Age: 34
End: 2024-07-25
Payer: MEDICAID

## 2024-07-25 NOTE — TELEPHONE ENCOUNTER
Return call to patient , notified that I spoke to Carrie Moore in the pharmacy and they are aware and is awaiting Medicaid to fix the the insurance issue.  Patient voice a understanding that she needs to contact medicaid regarding the issue.  Patient received a 14 day supply of IS meds X 2 days.   Agreed to keep coordinator posted.  ----- Message from Stacie Ramos sent at 7/25/2024  9:52 AM CDT -----  Regarding: Consult/Advisory  Contact: [phone]  CONSULT/ADVISORY    Name of Caller:  EDMOND RAMIREZ [[id number]]    Contact Preference:  [phone] (home)       Nature of Call:  Per , Jennifer pt is requesting a call back using  services stating she's having difficulty in getting her medication. mycophenolate (CELLCEPT) 250 mg Cap, sodium bicarbonate 650 MG tablet and tacrolimus (PROGRAF) 1 MG Cap.  Due to insurance issues.  Pt doesn't have Medicare A & B.          [facility] [address] [place] [address]  Phone: [phone] Fax: [phone]

## 2024-10-11 ENCOUNTER — OFFICE VISIT (OUTPATIENT)
Dept: OBSTETRICS AND GYNECOLOGY | Facility: CLINIC | Age: 34
End: 2024-10-11
Payer: MEDICAID

## 2024-10-11 VITALS
SYSTOLIC BLOOD PRESSURE: 118 MMHG | BODY MASS INDEX: 35.41 KG/M2 | HEART RATE: 79 BPM | DIASTOLIC BLOOD PRESSURE: 84 MMHG | WEIGHT: 219.38 LBS

## 2024-10-11 DIAGNOSIS — Z12.4 PAP SMEAR FOR CERVICAL CANCER SCREENING: ICD-10-CM

## 2024-10-11 DIAGNOSIS — Z87.440 HISTORY OF RECURRENT UTI (URINARY TRACT INFECTION): ICD-10-CM

## 2024-10-11 DIAGNOSIS — N92.6 IRREGULAR MENSES: Primary | ICD-10-CM

## 2024-10-11 DIAGNOSIS — Z11.3 SCREEN FOR STD (SEXUALLY TRANSMITTED DISEASE): ICD-10-CM

## 2024-10-11 LAB
B-HCG UR QL: NEGATIVE
BILIRUBIN, UA POC OHS: NEGATIVE
BLOOD, UA POC OHS: NEGATIVE
CLARITY, UA POC OHS: CLEAR
COLOR, UA POC OHS: YELLOW
CTP QC/QA: YES
GLUCOSE, UA POC OHS: NEGATIVE
KETONES, UA POC OHS: NEGATIVE
LEUKOCYTES, UA POC OHS: NEGATIVE
NITRITE, UA POC OHS: NEGATIVE
PH, UA POC OHS: 7
PROTEIN, UA POC OHS: NEGATIVE
SPECIFIC GRAVITY, UA POC OHS: 1.02
UROBILINOGEN, UA POC OHS: 0.2

## 2024-10-11 PROCEDURE — 99999 PR PBB SHADOW E&M-EST. PATIENT-LVL III: CPT | Mod: PBBFAC,,, | Performed by: OBSTETRICS & GYNECOLOGY

## 2024-10-11 PROCEDURE — 87591 N.GONORRHOEAE DNA AMP PROB: CPT | Performed by: OBSTETRICS & GYNECOLOGY

## 2024-10-11 PROCEDURE — 87491 CHLMYD TRACH DNA AMP PROBE: CPT | Performed by: OBSTETRICS & GYNECOLOGY

## 2024-10-11 PROCEDURE — 99213 OFFICE O/P EST LOW 20 MIN: CPT | Mod: PBBFAC | Performed by: OBSTETRICS & GYNECOLOGY

## 2024-10-11 NOTE — PROGRESS NOTES
Subjective     Patient ID: Leydi Cordero is a 34 y.o. female.    Chief Complaint:  Urinary Tract Infection      History of Present Illness  Missed Menses  Patient complains of missed menses this month and lower abdominal cramping for the past 2 weeks. Pregnancy is not desired. Sexual Activity: single partner, contraception: none. Current symptoms also include:  none . Last period was normal.  Pt reports issues with recurrent UTI and would like to check today.  Pt normally has irregular menses with skipped months from time to time.  Has declined treatment in the past.  Periods last 2-4 days.  Denies excessive bleeding or cramping.     Patient's last menstrual period was 2024 (exact date).     GYN & OB History  Patient's last menstrual period was 2024 (exact date).   Date of Last Pap: 4/3/2024    OB History    Para Term  AB Living   0 0 0 0 0 0   SAB IAB Ectopic Multiple Live Births   0 0 0 0 0       Review of Systems  Review of Systems   Constitutional:  Negative for activity change, appetite change, chills, fatigue, fever and unexpected weight change.   Respiratory:  Negative for shortness of breath.    Cardiovascular:  Negative for chest pain, palpitations and leg swelling.   Gastrointestinal:  Positive for abdominal pain. Negative for bloating, blood in stool, constipation, diarrhea, nausea and vomiting.   Genitourinary:  Positive for menstrual problem. Negative for dysmenorrhea, dyspareunia, dysuria, flank pain, frequency, genital sores, hematuria, menorrhagia, pelvic pain, urgency, vaginal bleeding, vaginal discharge, vaginal pain, urinary incontinence, postcoital bleeding, vaginal dryness and vaginal odor.   Musculoskeletal:  Negative for back pain.   Neurological:  Negative for syncope and headaches.          Objective   Physical Exam:   Constitutional: She is oriented to person, place, and time. She appears well-developed and well-nourished. No distress.        Cardiovascular:  Normal rate and regular rhythm.             Pulmonary/Chest: Effort normal and breath sounds normal.        Abdominal: Soft. Bowel sounds are normal. She exhibits no distension. There is no abdominal tenderness.     Genitourinary:    Vagina, uterus, right adnexa and left adnexa normal.      Pelvic exam was performed with patient supine.   There is no rash, tenderness, lesion or injury on the right labia. There is no rash, tenderness, lesion or injury on the left labia. Cervix is normal. Right adnexum displays no mass, no tenderness and no fullness. Left adnexum displays no mass, no tenderness and no fullness. No erythema, vaginal discharge, tenderness or bleeding in the vagina.    No foreign body in the vagina.      No signs of injury in the vagina.   Cervix exhibits no motion tenderness, no discharge and no friability.    pap smear completedUterus is not deviated, not enlarged and not tender.    Genitourinary Comments: UPT today negative  UA today negative             Musculoskeletal: Normal range of motion and moves all extremeties. No tenderness or edema.       Neurological: She is alert and oriented to person, place, and time.    Skin: Skin is warm and dry.    Psychiatric: She has a normal mood and affect. Her behavior is normal. Thought content normal.        Pap History:  03/24 - ASCUS HPV neg  08/23 - CKC PETRONA 3 with negative margins  05/23 - PETRONA 3, ECC neg  04/23 - LSIL/ASC-H  06/20 - Colpo neg  12/19 - ASCUS HPV+        Assessment and Plan     1. Irregular menses    2. History of recurrent UTI (urinary tract infection)    3. Pap smear for cervical cancer screening    4. Screen for STD (sexually transmitted disease)           Plan:  Irregular menses  -     POCT Urine Pregnancy  -     Likely secondary to chronic anovulation.  Pt was counseled on chronic anovulation, including common signs/symptoms and the many factors that influence it.  Pt was also counseled on available treatment options,  including the associated risks and benefits of each.  Pt voiced understanding and desires to continue with expectant management.    History of recurrent UTI (urinary tract infection)  -     POCT Urinalysis(Instrument)  -     No evidence of UTI on exam today.  Pt reassured.      Pap smear for cervical cancer screening  -     Liquid-Based Pap Smear, Screening  -     Pt was counseled on cervical/vaginal screening guidelines and recommendations.  See pap history above.  If today's pap smear result is negative, next pap smear will be due in 6 months.  -     Follow up with PCP for routine health maintenance needs.    Screen for STD (sexually transmitted disease)  -     C. trachomatis/N. gonorrhoeae by AMP DNA      Follow up in about 6 months (around 4/11/2025) for Pap/Annual.

## 2024-10-12 LAB
C TRACH DNA SPEC QL NAA+PROBE: NOT DETECTED
N GONORRHOEA DNA SPEC QL NAA+PROBE: NOT DETECTED

## 2024-11-20 DIAGNOSIS — E21.2 HYPERPARATHYROIDISM, TERTIARY: ICD-10-CM

## 2024-11-20 DIAGNOSIS — Z94.0 DECEASED-DONOR KIDNEY TRANSPLANT: ICD-10-CM

## 2024-11-20 RX ORDER — CALCITRIOL 0.25 UG/1
0.25 CAPSULE ORAL DAILY
Qty: 30 CAPSULE | Refills: 3 | Status: SHIPPED | OUTPATIENT
Start: 2024-11-20

## 2024-12-03 DIAGNOSIS — Z94.0 KIDNEY REPLACED BY TRANSPLANT: ICD-10-CM

## 2024-12-03 RX ORDER — TACROLIMUS 1 MG/1
1 CAPSULE ORAL EVERY 12 HOURS
Qty: 60 CAPSULE | Refills: 11 | Status: CANCELLED | OUTPATIENT
Start: 2024-12-03 | End: 2025-12-03

## 2024-12-06 ENCOUNTER — HOSPITAL ENCOUNTER (OUTPATIENT)
Dept: RADIOLOGY | Facility: HOSPITAL | Age: 34
Discharge: HOME | End: 2024-12-06
Attending: INTERNAL MEDICINE
Payer: MEDICAID

## 2024-12-06 ENCOUNTER — HOSPITAL ENCOUNTER (OUTPATIENT)
Dept: CARDIOLOGY | Facility: HOSPITAL | Age: 34
Discharge: HOME | End: 2024-12-06
Attending: INTERNAL MEDICINE
Payer: MEDICAID

## 2024-12-06 ENCOUNTER — OFFICE VISIT (OUTPATIENT)
Dept: PULMONOLOGY | Facility: CLINIC | Age: 34
End: 2024-12-06
Payer: MEDICAID

## 2024-12-06 ENCOUNTER — PATIENT MESSAGE (OUTPATIENT)
Dept: CARDIOLOGY | Facility: HOSPITAL | Age: 34
End: 2024-12-06
Payer: MEDICAID

## 2024-12-06 VITALS
RESPIRATION RATE: 17 BRPM | DIASTOLIC BLOOD PRESSURE: 75 MMHG | SYSTOLIC BLOOD PRESSURE: 115 MMHG | OXYGEN SATURATION: 99 % | HEIGHT: 66 IN | WEIGHT: 228.81 LBS | BODY MASS INDEX: 36.77 KG/M2 | HEART RATE: 82 BPM

## 2024-12-06 VITALS
WEIGHT: 228 LBS | HEART RATE: 68 BPM | HEIGHT: 66 IN | DIASTOLIC BLOOD PRESSURE: 75 MMHG | SYSTOLIC BLOOD PRESSURE: 115 MMHG | BODY MASS INDEX: 36.64 KG/M2

## 2024-12-06 DIAGNOSIS — Z79.60 LONG-TERM USE OF IMMUNOSUPPRESSANT MEDICATION: ICD-10-CM

## 2024-12-06 DIAGNOSIS — Z23 NEED FOR IMMUNIZATION AGAINST INFLUENZA: Primary | ICD-10-CM

## 2024-12-06 DIAGNOSIS — I27.20 PULMONARY HYPERTENSION: ICD-10-CM

## 2024-12-06 DIAGNOSIS — Z23 NEED FOR VACCINATION: ICD-10-CM

## 2024-12-06 DIAGNOSIS — G47.33 OSA (OBSTRUCTIVE SLEEP APNEA): ICD-10-CM

## 2024-12-06 LAB
AORTIC ROOT ANNULUS: 2.38 CM
ASCENDING AORTA: 1.92 CM
AV INDEX (PROSTH): 0.75
AV MEAN GRADIENT: 3.1 MMHG
AV PEAK GRADIENT: 5.8 MMHG
AV VALVE AREA BY VELOCITY RATIO: 2.1 CM²
AV VALVE AREA: 2.1 CM²
AV VELOCITY RATIO: 0.75
BSA FOR ECHO PROCEDURE: 2.19 M2
CV ECHO LV RWT: 0.69 CM
DOP CALC AO PEAK VEL: 1.2 M/S
DOP CALC AO VTI: 26.2 CM
DOP CALC LVOT AREA: 2.8 CM2
DOP CALC LVOT DIAMETER: 1.9 CM
DOP CALC LVOT PEAK VEL: 0.9 M/S
DOP CALC LVOT STROKE VOLUME: 55.8 CM3
DOP CALC RVOT PEAK VEL: 0.61 M/S
DOP CALC RVOT VTI: 11.2 CM
DOP CALCLVOT PEAK VEL VTI: 19.7 CM
E WAVE DECELERATION TIME: 177.09 MSEC
E/A RATIO: 1.11
E/E' RATIO: 6.45 M/S
ECHO LV POSTERIOR WALL: 1.1 CM (ref 0.6–1.1)
EJECTION FRACTION: 60 %
FRACTIONAL SHORTENING: 28.1 % (ref 28–44)
INTERVENTRICULAR SEPTUM: 1.1 CM (ref 0.6–1.1)
IVC DIAMETER: 1.26 CM
IVRT: 77.07 MSEC
LA MAJOR: 4.67 CM
LA MINOR: 4.33 CM
LA WIDTH: 2.9 CM
LEFT ATRIUM AREA SYSTOLIC (APICAL 2 CHAMBER): 12.5 CM2
LEFT ATRIUM AREA SYSTOLIC (APICAL 4 CHAMBER): 13.26 CM2
LEFT ATRIUM SIZE: 3.07 CM
LEFT ATRIUM VOLUME INDEX MOD: 13.4 ML/M2
LEFT ATRIUM VOLUME INDEX: 16.1 ML/M2
LEFT ATRIUM VOLUME MOD: 28.31 ML
LEFT ATRIUM VOLUME: 34.01 CM3
LEFT INTERNAL DIMENSION IN SYSTOLE: 2.3 CM (ref 2.1–4)
LEFT VENTRICLE DIASTOLIC VOLUME INDEX: 18.8 ML/M2
LEFT VENTRICLE DIASTOLIC VOLUME: 39.67 ML
LEFT VENTRICLE END SYSTOLIC VOLUME APICAL 2 CHAMBER: 26.97 ML
LEFT VENTRICLE END SYSTOLIC VOLUME APICAL 4 CHAMBER: 26.82 ML
LEFT VENTRICLE MASS INDEX: 49.5 G/M2
LEFT VENTRICLE SYSTOLIC VOLUME INDEX: 8.3 ML/M2
LEFT VENTRICLE SYSTOLIC VOLUME: 17.6 ML
LEFT VENTRICULAR INTERNAL DIMENSION IN DIASTOLE: 3.2 CM (ref 3.5–6)
LEFT VENTRICULAR MASS: 104.3 G
LV LATERAL E/E' RATIO: 5.46 M/S
LV SEPTAL E/E' RATIO: 7.89 M/S
LVED V (TEICH): 39.67 ML
LVES V (TEICH): 17.6 ML
LVOT MG: 1.76 MMHG
LVOT MV: 0.62 CM/S
MV PEAK A VEL: 0.64 M/S
MV PEAK E VEL: 0.71 M/S
MV STENOSIS PRESSURE HALF TIME: 51.36 MS
MV VALVE AREA P 1/2 METHOD: 4.28 CM2
PISA TR MAX VEL: 2.76 M/S
PULM VEIN S/D RATIO: 1.76
PV MEAN GRADIENT: 1 MMHG
PV PEAK D VEL: 0.34 M/S
PV PEAK GRADIENT: 6 MMHG
PV PEAK S VEL: 0.6 M/S
PV PEAK VELOCITY: 1.25 M/S
RA MAJOR: 3.59 CM
RA PRESSURE ESTIMATED: 3 MMHG
RA WIDTH: 3.17 CM
RIGHT VENTRICULAR END-DIASTOLIC DIMENSION: 2.82 CM
RV TB RVSP: 6 MMHG
SINUS: 1.8 CM
STJ: 1.74 CM
TDI LATERAL: 0.13 M/S
TDI SEPTAL: 0.09 M/S
TDI: 0.11 M/S
TR MAX PG: 30 MMHG
TRICUSPID ANNULAR PLANE SYSTOLIC EXCURSION: 2.53 CM
TV REST PULMONARY ARTERY PRESSURE: 33 MMHG
Z-SCORE OF LEFT VENTRICULAR DIMENSION IN END DIASTOLE: -7.26
Z-SCORE OF LEFT VENTRICULAR DIMENSION IN END SYSTOLE: -4.45

## 2024-12-06 PROCEDURE — 93306 TTE W/DOPPLER COMPLETE: CPT | Mod: 26,,, | Performed by: INTERNAL MEDICINE

## 2024-12-06 PROCEDURE — 93306 TTE W/DOPPLER COMPLETE: CPT

## 2024-12-06 PROCEDURE — 71046 X-RAY EXAM CHEST 2 VIEWS: CPT | Mod: TC

## 2024-12-06 PROCEDURE — 71046 X-RAY EXAM CHEST 2 VIEWS: CPT | Mod: 26,,, | Performed by: RADIOLOGY

## 2024-12-06 PROCEDURE — 99214 OFFICE O/P EST MOD 30 MIN: CPT | Mod: PBBFAC,25 | Performed by: INTERNAL MEDICINE

## 2024-12-06 PROCEDURE — 99999 PR PBB SHADOW E&M-EST. PATIENT-LVL IV: CPT | Mod: PBBFAC,,, | Performed by: INTERNAL MEDICINE

## 2024-12-06 RX ORDER — MULTIVITAMIN
TABLET ORAL
COMMUNITY

## 2024-12-06 NOTE — PROGRESS NOTES
Pulmonary Outpatient Follow Up Visit     Subjective:       Patient ID: Leydi Cordero is a 34 y.o. female.    Chief Complaint: Sleep Apnea      HPI        34-year-old female patient presenting for Follow-up.    12/6/2024  rare albuterol use for SOB, last time 3 weeks ago .    Kidney function within normal.  On immunosuppressants.  Chart review revealed bronchoscopy and suspected viral pneumonia in 2023 recovered.      Inconsistent CPAP use would like to be refitted with a different mask.    [place] Sleepiness Scale score 16.      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  I had the impression of asthmatic bronchitis with exacerbation, she was treated with prednisone and a Z-Kalia.    Currently needing albuterol 3 times a week    Not smoking since 2018. 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      Has a cousin and an aunt with asthma.    Underwent home sleep study, moderate obstructive sleep apnea with AHI of 27 events per hour.  Suboptimal compliance with CPAP therapy.      Review of Systems   Constitutional:  Positive for weight gain and fatigue.   Respiratory:  Positive for apnea, snoring, shortness of breath, dyspnea on extertion, use of rescue inhaler and somnolence.    Genitourinary:         Kidney transplant x 2 last 2021   Musculoskeletal:  Positive for arthralgias.        Left upper extremity AV fistula   Neurological:  Positive for weakness.   Psychiatric/Behavioral:  Positive for sleep  "disturbance.        Outpatient Encounter Medications as of 12/6/2024   Medication Sig Dispense Refill    albuterol (PROVENTIL/VENTOLIN HFA) 90 mcg/actuation inhaler Inhale 2 puffs into the lungs every 4 (four) hours as needed for Wheezing or Shortness of Breath. Rescue 8.5 g 11    calcitRIOL (ROCALTROL) 0.25 MCG Cap Take 1 capsule (0.25 mcg total) by mouth once daily. 30 capsule 3    docusate sodium (COLACE) 100 MG capsule Take 1 capsule (100 mg total) by mouth 2 (two) times daily as needed. 20 capsule 0    mesalamine (LIALDA) 1.2 gram TbEC Take 1.2 g by mouth daily with breakfast.      midodrine (PROAMATINE) 10 MG tablet Take 1 tablet (10 mg total) by mouth 2 (two) times daily with meals. 60 tablet 2    multivitamin-iron-folic acid (HIGH POTENCY MULTIVIT, W-IRON,) Tab Take 1 tablet by mouth daily 30 tablet 0    mycophenolate (CELLCEPT) 250 mg Cap Take 4 capsules by mouth twice daily 240 capsule 11    pantoprazole (PROTONIX) 40 MG tablet Take 1 tablet by mouth once daily 90 tablet 3    predniSONE (DELTASONE) 5 MG tablet Take 1 tablet (5 mg total) by mouth once daily. 120 tablet 11    sucralfate (CARAFATE) 1 gram tablet Take 1 tablet (1 g total) by mouth 4 (four) times daily. 30 tablet 0    tacrolimus (PROGRAF) 1 MG Cap Take 1 capsule (1 mg total) by mouth every 12 (twelve) hours. 60 capsule 11    multivit with min-folic acid 0.4 mg Tab 1 tablet Orally Once a day  with Iron      sodium bicarbonate 650 MG tablet Take 2 tablets (1,300 mg total) by mouth 3 (three) times daily. 540 tablet 3    [DISCONTINUED] calcitRIOL (ROCALTROL) 0.25 MCG Cap Take 1 capsule (0.25 mcg total) by mouth once daily. 30 capsule 3     No facility-administered encounter medications on file as of 12/6/2024.       Objective:     Vital Signs (Most Recent)  Vital Signs  Pulse: 82  Resp: 17  SpO2: 99 %  BP: 115/75  Patient Position: Sitting  Pain Score:   4  Pain Loc: Head  Height and Weight  Height: 5' 6" (167.6 cm)  Weight: 103.8 kg (228 lb 13.4 " oz)  BSA (Calculated - sq m): 2.2 sq meters  BMI (Calculated): 37  Weight in (lb) to have BMI = 25: 154.6]  Wt Readings from Last 2 Encounters:   12/06/24 103.4 kg (228 lb)   12/06/24 103.8 kg (228 lb 13.4 oz)       Physical Exam   Constitutional: She is oriented to person, place, and time. She appears well-developed and well-nourished. She is obese.   HENT:   Head: Normocephalic.   Pulmonary/Chest: Normal expansion and effort normal. No stridor. No respiratory distress. She has decreased breath sounds. She has no wheezes. She exhibits no tenderness.   Abdominal:   Left lower quadrant scar noted   Musculoskeletal:      Comments: Left upper extremity AV fistula   Neurological: She is alert and oriented to person, place, and time.   Psychiatric: She has a normal mood and affect. Her behavior is normal. Judgment and thought content normal.   Nursing note and vitals reviewed.      Laboratory  Lab Results   Component Value Date    WBC 4.27 06/02/2024    WBC 4.27 06/02/2024    RBC 5.05 06/02/2024    RBC 5.05 06/02/2024    HGB 13.9 06/02/2024    HGB 13.9 06/02/2024    HCT 43.6 06/02/2024    HCT 43.6 06/02/2024    MCV 86 06/02/2024    MCV 86 06/02/2024    MCH 27.5 06/02/2024    MCH 27.5 06/02/2024    MCHC 31.9 (L) 06/02/2024    MCHC 31.9 (L) 06/02/2024    RDW 14.2 06/02/2024    RDW 14.2 06/02/2024     (L) 06/02/2024     (L) 06/02/2024    MPV 10.7 06/02/2024    MPV 10.7 06/02/2024    GRAN 3.5 06/02/2024    GRAN 80.8 (H) 06/02/2024    GRAN 3.5 06/02/2024    GRAN 80.8 (H) 06/02/2024    LYMPH 0.4 (L) 06/02/2024    LYMPH 8.9 (L) 06/02/2024    LYMPH 0.4 (L) 06/02/2024    LYMPH 8.9 (L) 06/02/2024    MONO 0.4 06/02/2024    MONO 8.2 06/02/2024    MONO 0.4 06/02/2024    MONO 8.2 06/02/2024    EOS 0.1 06/02/2024    EOS 0.1 06/02/2024    BASO 0.01 06/02/2024    BASO 0.01 06/02/2024    EOSINOPHIL 1.2 06/02/2024    EOSINOPHIL 1.2 06/02/2024    BASOPHIL 0.2 06/02/2024    BASOPHIL 0.2 06/02/2024       BMP  Lab Results  "  Component Value Date     06/02/2024     06/02/2024    K 4.0 06/02/2024    K 4.0 06/02/2024     06/02/2024     06/02/2024    CO2 23 06/02/2024    CO2 23 06/02/2024    BUN 8 06/02/2024    BUN 8 06/02/2024    CREATININE 1.0 06/02/2024    CREATININE 1.0 06/02/2024    CALCIUM 8.5 (L) 06/02/2024    CALCIUM 8.5 (L) 06/02/2024    ANIONGAP 9 06/02/2024    ANIONGAP 9 06/02/2024    ESTGFRAFRICA >60.0 07/11/2022    EGFRNONAA >60.0 07/11/2022    AST 28 06/02/2024    ALT 40 06/02/2024    PROT 6.0 06/02/2024       Lab Results   Component Value Date    BNP 80 09/16/2023     (H) 01/03/2020       Lab Results   Component Value Date    TSH 0.811 01/11/2023       No results found for: "SEDRATE"    No results found for: "CRP"  No results found for: "IGE"     Lab Results   Component Value Date    ASPERGILLUS <0.500 09/21/2023    ASPERGILLUS Not Detected 09/21/2023     No results found for: "AFUMIGATUSCL"     No results found for: "ACE"     Diagnostic Results:      I have personally reviewed today the following studies:    CXR 1/3/2019 clear lungs      Nuc stress 1/2020:     Impression: NORMAL MYOCARDIAL PERFUSION  1. The perfusion scan is free of evidence for myocardial ischemia or injury.   2. Resting wall motion is physiologic.   3. Resting LV function is normal.   4. The ventricular volumes are normal at rest and stress.   5. The extracardiac distribution of radioactivity is normal.        Echo Dec 2019:      1 - Concentric hypertrophy.     2 - No wall motion abnormalities.     3 - Normal left ventricular systolic function (EF 60-65%).     4 - Normal left ventricular diastolic function.     5 - Normal right ventricular systolic function .     6 - Pulmonary hypertension. The estimated PA systolic pressure is 45 mmHg.     7 - Trivial mitral regurgitation.     8 - Moderate to severe tricuspid regurgitation     Echo 8/2018    1 - Concentric remodeling.     2 - No wall motion abnormalities.     3 - Normal " left ventricular systolic function (EF 55-60%).     4 - Normal left ventricular diastolic function.     5 - Normal right ventricular systolic function .     6 - The estimated PA systolic pressure is 23 mmHg.     7 - Moderate tricuspid regurgitation       Spirometry 7/21/2020:    Grade A-D -acceptable quality of tracings   Normal spirometry. (FEV1/VC greater than or equal to LLN and FVC greater than or equal to LLN)   Flow volume loops are normal.   Overall there is no significant ventilatory impairment. (FEV1 >LLN)         Assessment and Recommendations  Based on the [employer] practice parameter of CPAP would be the guideline  recommendation of choice.  Other therapies may include ENT procedures were appropriate, significant weight loss.  Inspire hypoglossal nerve stimulator and nasal PROVENT or mandibular advancement device may also  be  considered.  Close follow up to ensure resolution of symptoms.  1 night study  MODERATE OBSTRUCTIVE SLEEP APNEA with overall AHI 27.6/hr ( 204 events): night #1  Oxygen desaturation: 87%. SpO2 between 85% to 89% for < 1 min.  Patient snored 91% time above 50 .  Heart rate range: 73 bpm - 107 bpm  REC's:  Therapy with APAP at 5-20 cm WP using mask of choice with heated humidification is an option.  Weight loss/management. with regular exercise per direction of physician.  Avoid drowsy driving.  Follow up in sleep clinic      On CPAP 4/16 cm water      Assessment/Plan:   Need for immunization against influenza  -     Influenza - Trivalent - PF (ADULT)    NASIMA (obstructive sleep apnea)  -     CPAP/BIPAP SUPPLIES    Long-term use of immunosuppressant medication  -     X-Ray Chest PA And Lateral; Future; Expected date: 12/06/2024    Pulmonary hypertension  -     Complete PFT with bronchodilator; Future; Expected date: 03/06/2025  -     Stress test, pulmonary; Future; Expected date: 03/06/2025  -     Echo; Future    Need for vaccination  -     Influenza -  Trivalent - PF (ADULT)        Continue albuterol p.r.n.    Check PFT 6 minute walking test.  Check PFT.      Repeat echo and reassess for pulmonary hypertension worsening.      Fitting appointment and new CPAP mask supplies order submitted.        Continue prednisone 5 mg continue CellCept and tacrolimus.    Encouraged patient to improve CPAP compliance.    MARISSA mcgill , patient speaks [place]    Follow up in about 3 months (around 3/6/2025).    This note was prepared using voice recognition system and is likely to have sound alike errors that may have been overlooked even after proof reading.  Please call me with any questions    Discussed diagnosis, its evaluation, treatment and usual course. All questions answered.      Maribeth Barajas MD

## 2024-12-09 ENCOUNTER — TELEPHONE (OUTPATIENT)
Dept: TRANSPLANT | Facility: CLINIC | Age: 34
End: 2024-12-09
Payer: MEDICAID

## 2024-12-09 ENCOUNTER — PATIENT MESSAGE (OUTPATIENT)
Dept: NEPHROLOGY | Facility: CLINIC | Age: 34
End: 2024-12-09
Payer: MEDICAID

## 2024-12-09 DIAGNOSIS — Z94.0 KIDNEY REPLACED BY TRANSPLANT: ICD-10-CM

## 2024-12-09 RX ORDER — TACROLIMUS 1 MG/1
1 CAPSULE ORAL EVERY 12 HOURS
Qty: 60 CAPSULE | Refills: 11 | Status: SHIPPED | OUTPATIENT
Start: 2024-12-09 | End: 2025-12-09

## 2024-12-09 NOTE — TELEPHONE ENCOUNTER
Return call to patient, notified that Dr SUSAN Thomason has sent a RX to the pharmacy at [facility] for Tacrolimus 1mg BID.  ----- Message from Andrei Siegel sent at 12/9/2024  2:37 PM CST -----  Regarding: FW: Rx refill  Contact: Leydi pedraza     ----- Message -----  From: Latisha Tsai  Sent: 12/9/2024   1:32 PM CST  To: [facility]  Subject: Rx refill                                        Regarding: Rx refill        Name Of Caller: Leydi Cordero\ with interpretor           Contact Preference: [phone] (home) , Requesting a call back.        Nature of call:  pt is calling to have the following medication refilled, pt  has  no medication left.     tacrolimus (PROGRAF) 1 MG Cap      Pharmacy:         [facility] [address] [place] [address]  Phone: [phone] Fax: [phone]

## 2024-12-12 RX ORDER — SODIUM BICARBONATE 650 MG/1
1300 TABLET ORAL 3 TIMES DAILY
Qty: 540 TABLET | Refills: 3 | Status: SHIPPED | OUTPATIENT
Start: 2024-12-12 | End: 2025-12-12

## 2025-01-25 ENCOUNTER — LAB VISIT (OUTPATIENT)
Dept: LAB | Facility: HOSPITAL | Age: 35
End: 2025-01-25
Attending: INTERNAL MEDICINE
Payer: MEDICAID

## 2025-01-25 DIAGNOSIS — Z94.0 KIDNEY REPLACED BY TRANSPLANT: ICD-10-CM

## 2025-01-25 LAB
ALBUMIN SERPL BCP-MCNC: 3.6 G/DL (ref 3.5–5.2)
ALBUMIN SERPL BCP-MCNC: 3.6 G/DL (ref 3.5–5.2)
ALP SERPL-CCNC: 87 U/L (ref 40–150)
ALT SERPL W/O P-5'-P-CCNC: 19 U/L (ref 10–44)
ANION GAP SERPL CALC-SCNC: 8 MMOL/L (ref 8–16)
AST SERPL-CCNC: 18 U/L (ref 10–40)
BASOPHILS # BLD AUTO: 0.04 K/UL (ref 0–0.2)
BASOPHILS NFR BLD: 0.6 % (ref 0–1.9)
BILIRUB DIRECT SERPL-MCNC: 0.1 MG/DL (ref 0.1–0.3)
BILIRUB SERPL-MCNC: 0.4 MG/DL (ref 0.1–1)
BUN SERPL-MCNC: 12 MG/DL (ref 6–20)
CALCIUM SERPL-MCNC: 9.1 MG/DL (ref 8.7–10.5)
CHLORIDE SERPL-SCNC: 109 MMOL/L (ref 95–110)
CO2 SERPL-SCNC: 22 MMOL/L (ref 23–29)
CREAT SERPL-MCNC: 0.9 MG/DL (ref 0.5–1.4)
DIFFERENTIAL METHOD BLD: ABNORMAL
EOSINOPHIL # BLD AUTO: 0.1 K/UL (ref 0–0.5)
EOSINOPHIL NFR BLD: 1.4 % (ref 0–8)
ERYTHROCYTE [DISTWIDTH] IN BLOOD BY AUTOMATED COUNT: 14.5 % (ref 11.5–14.5)
EST. GFR  (NO RACE VARIABLE): >60 ML/MIN/1.73 M^2
GLUCOSE SERPL-MCNC: 66 MG/DL (ref 70–110)
HCT VFR BLD AUTO: 54.6 % (ref 37–48.5)
HGB BLD-MCNC: 17 G/DL (ref 12–16)
IMM GRANULOCYTES # BLD AUTO: 0.04 K/UL (ref 0–0.04)
IMM GRANULOCYTES NFR BLD AUTO: 0.6 % (ref 0–0.5)
LYMPHOCYTES # BLD AUTO: 1.7 K/UL (ref 1–4.8)
LYMPHOCYTES NFR BLD: 26.3 % (ref 18–48)
MCH RBC QN AUTO: 28.2 PG (ref 27–31)
MCHC RBC AUTO-ENTMCNC: 31.1 G/DL (ref 32–36)
MCV RBC AUTO: 91 FL (ref 82–98)
MONOCYTES # BLD AUTO: 0.6 K/UL (ref 0.3–1)
MONOCYTES NFR BLD: 10.2 % (ref 4–15)
NEUTROPHILS # BLD AUTO: 3.8 K/UL (ref 1.8–7.7)
NEUTROPHILS NFR BLD: 60.9 % (ref 38–73)
NRBC BLD-RTO: 0 /100 WBC
PHOSPHATE SERPL-MCNC: 2.8 MG/DL (ref 2.7–4.5)
PLATELET # BLD AUTO: 242 K/UL (ref 150–450)
PMV BLD AUTO: 11.3 FL (ref 9.2–12.9)
POTASSIUM SERPL-SCNC: 4.3 MMOL/L (ref 3.5–5.1)
PROT SERPL-MCNC: 7.6 G/DL (ref 6–8.4)
RBC # BLD AUTO: 6.03 M/UL (ref 4–5.4)
SODIUM SERPL-SCNC: 139 MMOL/L (ref 136–145)
WBC # BLD AUTO: 6.3 K/UL (ref 3.9–12.7)

## 2025-01-25 PROCEDURE — 80069 RENAL FUNCTION PANEL: CPT | Performed by: INTERNAL MEDICINE

## 2025-01-25 PROCEDURE — 36415 COLL VENOUS BLD VENIPUNCTURE: CPT | Performed by: INTERNAL MEDICINE

## 2025-01-25 PROCEDURE — 80197 ASSAY OF TACROLIMUS: CPT | Performed by: INTERNAL MEDICINE

## 2025-01-25 PROCEDURE — 82247 BILIRUBIN TOTAL: CPT | Performed by: INTERNAL MEDICINE

## 2025-01-25 PROCEDURE — 85025 COMPLETE CBC W/AUTO DIFF WBC: CPT | Performed by: INTERNAL MEDICINE

## 2025-01-26 LAB — TACROLIMUS BLD-MCNC: 6.4 NG/ML (ref 5–15)

## 2025-01-27 ENCOUNTER — TELEPHONE (OUTPATIENT)
Dept: TRANSPLANT | Facility: CLINIC | Age: 35
End: 2025-01-27
Payer: MEDICAID

## 2025-01-27 ENCOUNTER — PATIENT MESSAGE (OUTPATIENT)
Dept: HEMATOLOGY/ONCOLOGY | Facility: CLINIC | Age: 35
End: 2025-01-27
Payer: MEDICAID

## 2025-01-27 DIAGNOSIS — Z94.0 KIDNEY REPLACED BY TRANSPLANT: ICD-10-CM

## 2025-01-27 DIAGNOSIS — D75.1 POLYCYTHEMIA: Primary | ICD-10-CM

## 2025-01-27 NOTE — TELEPHONE ENCOUNTER
Results reviewed with patient and agrees with Hematology referral.  ----- Message from José Antonio Venegas MD sent at 1/27/2025  7:38 AM CST -----  Please refer patient to hematology to address polycythemia

## 2025-01-28 ENCOUNTER — OFFICE VISIT (OUTPATIENT)
Dept: NEPHROLOGY | Facility: CLINIC | Age: 35
End: 2025-01-28
Payer: MEDICAID

## 2025-01-28 VITALS
DIASTOLIC BLOOD PRESSURE: 60 MMHG | SYSTOLIC BLOOD PRESSURE: 100 MMHG | HEART RATE: 70 BPM | WEIGHT: 234.81 LBS | HEIGHT: 66 IN | BODY MASS INDEX: 37.74 KG/M2

## 2025-01-28 DIAGNOSIS — R29.898 HAND WEAKNESS: ICD-10-CM

## 2025-01-28 DIAGNOSIS — N25.81 SECONDARY HYPERPARATHYROIDISM OF RENAL ORIGIN: ICD-10-CM

## 2025-01-28 DIAGNOSIS — Z94.0 KIDNEY REPLACED BY TRANSPLANT: Primary | ICD-10-CM

## 2025-01-28 DIAGNOSIS — D84.9 IMMUNOSUPPRESSION: ICD-10-CM

## 2025-01-28 PROCEDURE — 1159F MED LIST DOCD IN RCRD: CPT | Mod: CPTII,,, | Performed by: INTERNAL MEDICINE

## 2025-01-28 PROCEDURE — 99214 OFFICE O/P EST MOD 30 MIN: CPT | Mod: S$PBB,,, | Performed by: INTERNAL MEDICINE

## 2025-01-28 PROCEDURE — 3078F DIAST BP <80 MM HG: CPT | Mod: CPTII,,, | Performed by: INTERNAL MEDICINE

## 2025-01-28 PROCEDURE — 3074F SYST BP LT 130 MM HG: CPT | Mod: CPTII,,, | Performed by: INTERNAL MEDICINE

## 2025-01-28 PROCEDURE — 1160F RVW MEDS BY RX/DR IN RCRD: CPT | Mod: CPTII,,, | Performed by: INTERNAL MEDICINE

## 2025-01-28 PROCEDURE — 99999 PR PBB SHADOW E&M-EST. PATIENT-LVL III: CPT | Mod: PBBFAC,,, | Performed by: INTERNAL MEDICINE

## 2025-01-28 PROCEDURE — 3066F NEPHROPATHY DOC TX: CPT | Mod: CPTII,,, | Performed by: INTERNAL MEDICINE

## 2025-01-28 PROCEDURE — 99213 OFFICE O/P EST LOW 20 MIN: CPT | Mod: PBBFAC | Performed by: INTERNAL MEDICINE

## 2025-01-28 PROCEDURE — 3008F BODY MASS INDEX DOCD: CPT | Mod: CPTII,,, | Performed by: INTERNAL MEDICINE

## 2025-01-28 NOTE — PROGRESS NOTES
"Subjective:       Patient ID: Leydi Cordero is a 34 y.o. female.    Chief Complaint: Kidney Transplant    HPI    She presents to clinic today for routine follow-up.  Since her last office visit she has been doing well.  Her primary concern today is that she is having some weakness in both hands and describes dropping things.  Her laboratory studies and medications were reviewed.  All Nephrology related questions were answered to her satisfaction.    Review of Systems   Constitutional: Negative.    HENT: Negative.     Respiratory: Negative.     Cardiovascular: Negative.    Gastrointestinal: Negative.    Genitourinary: Negative.    Musculoskeletal: Negative.    Skin: Negative.    Neurological:  Positive for weakness.       /60   Pulse 70   Ht 5' 6" (1.676 m)   Wt 106.5 kg (234 lb 12.6 oz)   BMI 37.90 kg/m²     Lab Results   Component Value Date    WBC 6.30 01/25/2025    HGB 17.0 (H) 01/25/2025    HCT 54.6 (H) 01/25/2025    MCV 91 01/25/2025     01/25/2025      BMP  Lab Results   Component Value Date     01/25/2025    K 4.3 01/25/2025     01/25/2025    CO2 22 (L) 01/25/2025    BUN 12 01/25/2025    CREATININE 0.9 01/25/2025    CALCIUM 9.1 01/25/2025    ANIONGAP 8 01/25/2025    ESTGFRAFRICA >60.0 07/11/2022    EGFRNONAA >60.0 07/11/2022     CMP  Sodium   Date Value Ref Range Status   01/25/2025 139 136 - 145 mmol/L Final     Potassium   Date Value Ref Range Status   01/25/2025 4.3 3.5 - 5.1 mmol/L Final     Chloride   Date Value Ref Range Status   01/25/2025 109 95 - 110 mmol/L Final     CO2   Date Value Ref Range Status   01/25/2025 22 (L) 23 - 29 mmol/L Final     Glucose   Date Value Ref Range Status   01/25/2025 66 (L) 70 - 110 mg/dL Final     BUN   Date Value Ref Range Status   01/25/2025 12 6 - 20 mg/dL Final     Creatinine   Date Value Ref Range Status   01/25/2025 0.9 0.5 - 1.4 mg/dL Final     Calcium   Date Value Ref Range Status   01/25/2025 9.1 8.7 - 10.5 mg/dL Final "     Total Protein   Date Value Ref Range Status   01/25/2025 7.6 6.0 - 8.4 g/dL Final     Albumin   Date Value Ref Range Status   01/25/2025 3.6 3.5 - 5.2 g/dL Final   01/25/2025 3.6 3.5 - 5.2 g/dL Final     Total Bilirubin   Date Value Ref Range Status   01/25/2025 0.4 0.1 - 1.0 mg/dL Final     Comment:     For infants and newborns, interpretation of results should be based  on gestational age, weight and in agreement with clinical  observations.    Premature Infant recommended reference ranges:  Up to 24 hours.............<8.0 mg/dL  Up to 48 hours............<12.0 mg/dL  3-5 days..................<15.0 mg/dL  6-29 days.................<15.0 mg/dL       Alkaline Phosphatase   Date Value Ref Range Status   01/25/2025 87 40 - 150 U/L Final     AST   Date Value Ref Range Status   01/25/2025 18 10 - 40 U/L Final     ALT   Date Value Ref Range Status   01/25/2025 19 10 - 44 U/L Final     Anion Gap   Date Value Ref Range Status   01/25/2025 8 8 - 16 mmol/L Final     eGFR if    Date Value Ref Range Status   07/11/2022 >60.0 >60 mL/min/1.73 m^2 Final     eGFR if non    Date Value Ref Range Status   07/11/2022 >60.0 >60 mL/min/1.73 m^2 Final     Comment:     Calculation used to obtain the estimated glomerular filtration  rate (eGFR) is the CKD-EPI equation.        Current Outpatient Medications on File Prior to Visit   Medication Sig Dispense Refill    albuterol (PROVENTIL/VENTOLIN HFA) 90 mcg/actuation inhaler Inhale 2 puffs into the lungs every 4 (four) hours as needed for Wheezing or Shortness of Breath. Rescue 8.5 g 11    calcitRIOL (ROCALTROL) 0.25 MCG Cap Take 1 capsule (0.25 mcg total) by mouth once daily. 30 capsule 3    mycophenolate (CELLCEPT) 250 mg Cap Take 4 capsules by mouth twice daily 240 capsule 11    pantoprazole (PROTONIX) 40 MG tablet Take 1 tablet by mouth once daily 90 tablet 3    predniSONE (DELTASONE) 5 MG tablet Take 1 tablet (5 mg total) by mouth once daily. 120  tablet 11    sodium bicarbonate 650 MG tablet Take 2 tablets (1,300 mg total) by mouth 3 (three) times daily. 540 tablet 3    tacrolimus (PROGRAF) 1 MG Cap Take 1 capsule (1 mg total) by mouth every 12 (twelve) hours. 60 capsule 11    [DISCONTINUED] docusate sodium (COLACE) 100 MG capsule Take 1 capsule (100 mg total) by mouth 2 (two) times daily as needed. 20 capsule 0    [DISCONTINUED] mesalamine (LIALDA) 1.2 gram TbEC Take 1.2 g by mouth daily with breakfast.      [DISCONTINUED] midodrine (PROAMATINE) 10 MG tablet Take 1 tablet (10 mg total) by mouth 2 (two) times daily with meals. 60 tablet 2    [DISCONTINUED] multivit with min-folic acid 0.4 mg Tab 1 tablet Orally Once a day  with Iron      [DISCONTINUED] multivitamin-iron-folic acid (HIGH POTENCY MULTIVIT, W-IRON,) Tab Take 1 tablet by mouth daily 30 tablet 0    [DISCONTINUED] sucralfate (CARAFATE) 1 gram tablet Take 1 tablet (1 g total) by mouth 4 (four) times daily. 30 tablet 0     No current facility-administered medications on file prior to visit.            Objective:            Physical Exam  Constitutional:       Appearance: Normal appearance.   HENT:      Head: Normocephalic and atraumatic.   Eyes:      General: No scleral icterus.     Extraocular Movements: Extraocular movements intact.      Pupils: Pupils are equal, round, and reactive to light.   Pulmonary:      Effort: Pulmonary effort is normal.      Breath sounds: No stridor.   Musculoskeletal:      Right lower leg: No edema.      Left lower leg: No edema.   Skin:     General: Skin is warm and dry.   Neurological:      General: No focal deficit present.      Mental Status: She is alert and oriented to person, place, and time.   Psychiatric:         Mood and Affect: Mood normal.         Behavior: Behavior normal.       Assessment:       1. Kidney replaced by transplant    2. Immunosuppression    3. Secondary hyperparathyroidism of renal origin    4. Hand weakness        Plan:       1. Status post  kidney transplant in January of 2021.  Stable renal allograft with creatinine ranging between 0.9 and 1.0.  Urinalysis is bland without evidence of proteinuria.    2. She remained stable on 1 mg twice daily of Prograf.  Most recent level is 6.4.  She is also on mycophenolate 1 g twice daily and prednisone 5 mg daily.    3. Phosphorus is stable at 2.8.  Calcium was normal 9.1 with an albumin of 3.6.  Will check a vitamin-D and intact PTH prior to her next office visit.    4. As per HPI she describes bilateral hand weakness and dropping things.  On exam she seems to have adequate  strength.  Will place a referral to neurology for evaluation.      Julio Jaffe MD

## 2025-02-01 RX ORDER — PREDNISONE 5 MG/1
5 TABLET ORAL DAILY
Qty: 120 TABLET | Refills: 11 | Status: SHIPPED | OUTPATIENT
Start: 2025-02-01

## 2025-02-07 ENCOUNTER — OFFICE VISIT (OUTPATIENT)
Dept: TRANSPLANT | Facility: CLINIC | Age: 35
End: 2025-02-07
Payer: MEDICAID

## 2025-02-07 ENCOUNTER — HOSPITAL ENCOUNTER (OUTPATIENT)
Dept: RADIOLOGY | Facility: HOSPITAL | Age: 35
Discharge: HOME | End: 2025-02-07
Attending: INTERNAL MEDICINE
Payer: MEDICAID

## 2025-02-07 ENCOUNTER — TELEPHONE (OUTPATIENT)
Dept: TRANSPLANT | Facility: CLINIC | Age: 35
End: 2025-02-07

## 2025-02-07 VITALS
HEART RATE: 80 BPM | RESPIRATION RATE: 18 BRPM | OXYGEN SATURATION: 97 % | DIASTOLIC BLOOD PRESSURE: 83 MMHG | WEIGHT: 240.31 LBS | HEIGHT: 66 IN | TEMPERATURE: 97 F | BODY MASS INDEX: 38.62 KG/M2 | SYSTOLIC BLOOD PRESSURE: 131 MMHG

## 2025-02-07 DIAGNOSIS — Z91.89 AT RISK FOR OPPORTUNISTIC INFECTIONS: ICD-10-CM

## 2025-02-07 DIAGNOSIS — N18.2 CKD (CHRONIC KIDNEY DISEASE) STAGE 2, GFR 60-89 ML/MIN: ICD-10-CM

## 2025-02-07 DIAGNOSIS — Z79.899 IMMUNOSUPPRESSIVE MANAGEMENT ENCOUNTER FOLLOWING KIDNEY TRANSPLANT: ICD-10-CM

## 2025-02-07 DIAGNOSIS — Z79.899 IMMUNOSUPPRESSIVE MANAGEMENT ENCOUNTER FOLLOWING KIDNEY TRANSPLANT: Primary | ICD-10-CM

## 2025-02-07 DIAGNOSIS — G44.89 OTHER HEADACHE SYNDROME: ICD-10-CM

## 2025-02-07 DIAGNOSIS — I10 ESSENTIAL HYPERTENSION: Chronic | ICD-10-CM

## 2025-02-07 DIAGNOSIS — Z94.0 IMMUNOSUPPRESSIVE MANAGEMENT ENCOUNTER FOLLOWING KIDNEY TRANSPLANT: ICD-10-CM

## 2025-02-07 DIAGNOSIS — Z90.5 S/P NEPHRECTOMY: ICD-10-CM

## 2025-02-07 DIAGNOSIS — Z94.0 DECEASED-DONOR KIDNEY TRANSPLANT: ICD-10-CM

## 2025-02-07 DIAGNOSIS — Z94.0 IMMUNOSUPPRESSIVE MANAGEMENT ENCOUNTER FOLLOWING KIDNEY TRANSPLANT: Primary | ICD-10-CM

## 2025-02-07 PROCEDURE — 70450 CT HEAD/BRAIN W/O DYE: CPT | Mod: 26,,, | Performed by: STUDENT IN AN ORGANIZED HEALTH CARE EDUCATION/TRAINING PROGRAM

## 2025-02-07 PROCEDURE — 99215 OFFICE O/P EST HI 40 MIN: CPT | Mod: S$PBB,,, | Performed by: INTERNAL MEDICINE

## 2025-02-07 PROCEDURE — 99214 OFFICE O/P EST MOD 30 MIN: CPT | Mod: PBBFAC | Performed by: INTERNAL MEDICINE

## 2025-02-07 PROCEDURE — 1159F MED LIST DOCD IN RCRD: CPT | Mod: CPTII,,, | Performed by: INTERNAL MEDICINE

## 2025-02-07 PROCEDURE — 3066F NEPHROPATHY DOC TX: CPT | Mod: CPTII,,, | Performed by: INTERNAL MEDICINE

## 2025-02-07 PROCEDURE — 3079F DIAST BP 80-89 MM HG: CPT | Mod: CPTII,,, | Performed by: INTERNAL MEDICINE

## 2025-02-07 PROCEDURE — 70450 CT HEAD/BRAIN W/O DYE: CPT | Mod: TC

## 2025-02-07 PROCEDURE — 3008F BODY MASS INDEX DOCD: CPT | Mod: CPTII,,, | Performed by: INTERNAL MEDICINE

## 2025-02-07 PROCEDURE — 99999 PR PBB SHADOW E&M-EST. PATIENT-LVL IV: CPT | Mod: PBBFAC,,, | Performed by: INTERNAL MEDICINE

## 2025-02-07 PROCEDURE — 3075F SYST BP GE 130 - 139MM HG: CPT | Mod: CPTII,,, | Performed by: INTERNAL MEDICINE

## 2025-02-07 RX ORDER — METHOCARBAMOL 500 MG/1
500 TABLET, FILM COATED ORAL 2 TIMES DAILY
Qty: 30 TABLET | Refills: 0 | Status: SHIPPED | OUTPATIENT
Start: 2025-02-07 | End: 2025-02-22

## 2025-02-07 NOTE — PROGRESS NOTES
Kidney Post-Transplant Assessment    Referring Physician: Won Miller  Current Nephrologist: Julio Jaffe    ORGAN: RIGHT KIDNEY  Donor Type: donation after brain death  PHS Increased Risk: no  Cold Ischemia: 1,251 mins  Induction Medications: thymoglobulin    Subjective:     CC:  Reassessment of renal allograft function and management of chronic immunosuppression.    HPI:  Ms. Oumar Cordero is a 34 y.o. 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  She has CKD stage 2 - GFR 60-89 and her baseline creatinine is between 0.9 to 1.2. She takes mycophenolate mofetil, prednisone, and tacrolimus for maintenance immunosuppression. She denies any recent hospitalizations or ER visits since her previous clinic visit.    She refers occipital headaches no associated with any other symptoms. She is under stress due to problems with her mom  Otherwise feels fine  No chest pain or SOB  No nausea vomit or diarrhea   Good appetite  She is following with her nephrologist        Current Outpatient Medications on File Prior to Visit   Medication Sig Dispense Refill    albuterol (PROVENTIL/VENTOLIN HFA) 90 mcg/actuation inhaler Inhale 2 puffs into the lungs every 4 (four) hours as needed for Wheezing or Shortness of Breath. Rescue 8.5 g 11    calcitRIOL (ROCALTROL) 0.25 MCG Cap Take 1 capsule (0.25 mcg total) by mouth once daily. 30 capsule 3    mycophenolate (CELLCEPT) 250 mg Cap Take 4 capsules by mouth twice daily 240 capsule 11    pantoprazole (PROTONIX) 40 MG tablet Take 1 tablet by mouth once daily 90 tablet 3    predniSONE (DELTASONE) 5 MG tablet Take 1 tablet (5 mg total) by mouth once daily. 120 tablet 11    sodium bicarbonate 650 MG tablet Take 2 tablets (1,300 mg total) by mouth 3 (three) times daily. 540 tablet 3    tacrolimus (PROGRAF) 1 MG Cap Take 1 capsule (1 mg total) by mouth every 12 (twelve) hours. 60 capsule 11     No current facility-administered  "medications on file prior to visit.        Review of Systems    Skin: no skin rash  CNS; no headaches, blurred vision, seizure, or syncope  ENT: No JVD,  Adenopathies,  nasal congestion. No oral lesions  Cardiac: No chest pain, dyspnea, claudication, edema or palpitations  Respiratory: No SOB, cough, hemoptysis   Gastro-intestinal: No diarrhea, constipation, abdominal pain, nausea, vomit. No ascitis  Genitourinary: no hematuria, dysuria, frequency, frequency  Musculoskeletal: joint pain, arthritis or vasculitic changes  Psych: alert awake, oriented, No cranial nerves deficit.      Objective:       Physical Exam    /83 (BP Location: Right arm, Patient Position: Sitting)   Pulse 80   Temp 97.3 °F (36.3 °C) (Temporal)   Resp 18   Ht 5' 6" (1.676 m)   Wt 109 kg (240 lb 4.8 oz)   SpO2 97%   BMI 38.79 kg/m²       Head: normocephalic  Neck: No JVD, cervical axillary, or femoral adenopathies  Heart: no murmurs, Normal s1 and s2, No gallops, no rubs, No murmurs  Lungs; CTA, good respiratory effort, no crackles  Abdomen: soft, non tender, no splenomegaly or hepatomegaly, no massess, no bruits  Extremities: No edema, skin rash, joint pain  SNC: awake, alert oriented. Cranial nerves are intact, no focalized, sensitivity and strength preserved    Neuro exam was completely normal       Labs:  Lab Results   Component Value Date    WBC 6.30 01/25/2025    HGB 17.0 (H) 01/25/2025    HCT 54.6 (H) 01/25/2025     01/25/2025    K 4.3 01/25/2025     01/25/2025    CO2 22 (L) 01/25/2025    BUN 12 01/25/2025    CREATININE 0.9 01/25/2025    EGFRNORACEVR >60.0 01/25/2025    CALCIUM 9.1 01/25/2025    PHOS 2.8 01/25/2025    MG 1.8 06/02/2024    ALBUMIN 3.6 01/25/2025    ALBUMIN 3.6 01/25/2025    AST 18 01/25/2025    ALT 19 01/25/2025       No results found for: "EXTANC", "EXTWBC", "EXTSEGS", "EXTPLATELETS", "EXTHEMOGLOBI", "EXTHEMATOCRI", "EXTCREATININ", "EXTSODIUM", "EXTPOTASSIUM", "EXTBUN", "EXTCO2", "EXTCALCIUM", " ""EXTPHOSPHORU", "EXTGLUCOSE", "EXTALBUMIN", "EXTAST", "EXTALT", "EXTBILITOTAL", "EXTLIPASE", "EXTAMYLASE"    No results found for: "EXTCYCLOSLVL", "EXTSIROLIMUS", "EXTTACROLVL", "EXTPROTCRE", "EXTPTHINTACT", "EXTPROTEINUA", "EXTWBCUA", "EXTRBCUA"    Labs were reviewed with the patient.    Assessment:     1. Immunosuppressive management encounter following kidney transplant    2. CKD (chronic kidney disease) stage 2, GFR 60-89 ml/min    3. At risk for opportunistic infections    4. Essential hypertension    5. S/p nephrectomy 17    6. -donor kidney transplant 2021        Plan:   For completeness will order non Contrast CT of the head  Methocarbamol 500 bid thinking tension headaches   No other changes     1. CKD stage 2 with normal GFR; : will continue follow up as per our center guidelines. patient to continue close follow up with the local General nephrologist. Education provided in appropriate fluid intake, potassium intake. Continue with oral hydration.    1A. Pancreatic Function: N/A for non-pancreas allograft recipients:     2. High risk immunosuppression medication for organ transplantation, requiring regular intensive follow up and monitoring : tacro  level at target, MMF same dose 1000 bid  Lab Results   Component Value Date    TACROLIMUS 6.4 2025    TACROLIMUS 3.8 (L) 2024    TACROLIMUS 3.4 (L) 2024     No results found for: "CYCLOSPORINE"  @   Will closely monitor for toxicities, education provided about adherence to medicines and need to communicate any side effects to the transplant nurse or physician.    3. Allograft Function:stable at baseline for the patient. Continue follow up as per our guidelines and with the local General nephrologist. Communication will be sent today.  Lab Results   Component Value Date    CREATININE 0.9 2025    CREATININE 1.0 2024    CREATININE 1.0 2024     Lab Results   Component Value Date    LIPASE 14 2023    LIPASE " 34 08/03/2023    LIPASE 31 01/16/2021       4. Hypertension management:  well controlled. Continue with home blood pressure monitoring, low salt and healthy life discussed with the patient..    5. Metabolic Bone Disease/Secondary Hyperparathyroidism:calcium and phosphorus level discussed with the patient, patient will continue follow up with the general nephrologist for management of metabolic bone disease. Will monitor PTH and Vit D per our transplant center guidelines.      Lab Results   Component Value Date    PTH 24.9 08/30/2023    CALCIUM 9.1 01/25/2025    CAION 1.21 02/01/2021    PHOS 2.8 01/25/2025    PHOS 3.1 06/02/2024    PHOS 2.4 (L) 06/01/2024       6. Electrolytes and acid base balance: reviewed with the patient, essentially within the normal range no need for acute changes today, will monitor as per our center guidelines.     Lab Results   Component Value Date     01/25/2025    K 4.3 01/25/2025     01/25/2025    CO2 22 (L) 01/25/2025    CO2 23 06/02/2024    CO2 23 06/02/2024       7. Anemia: mild polycythemia, referred to see hematology will continue monitoring as per our center guidelines. No indication for acute intervention today.     Lab Results   Component Value Date    WBC 6.30 01/25/2025    HGB 17.0 (H) 01/25/2025    HCT 54.6 (H) 01/25/2025    MCV 91 01/25/2025     01/25/2025       8.Proteinuria: no proteinuria. will continue with pr/cr ratio as per our center guidelines.  Lab Results   Component Value Date    PROTEINURINE 9 01/25/2025    CREATRANDUR 182.5 01/25/2025    UTPCR 0.05 01/25/2025    UTPCR Unable to calculate 05/14/2024    UTPCR Unable to calculate 04/10/2024        9. BK virus infection screening: will continue with urine or blood PCR as per our guidelines to prevent BK virus viremia and allograft dysfunction  Lab Results   Component Value Date    BKVIRUSPCRQB <125 01/16/2024         10. Weight and metabolic management: education provided provided during the clinic  visit.   Body mass index is 38.79 kg/m².       11.Patient safety education regarding immunosuppression including prophylaxis posttransplant for CMV, PCP : Education provided about vaccination and prevention of infections.    12.  Cytopenias: mild polycythemia, already scheduled to see hematology. no significant cytopenias will monitor as per our guidelines. Medicine list reviewed including potential causes of drug-induced cytopenias     Lab Results   Component Value Date    WBC 6.30 01/25/2025    HGB 17.0 (H) 01/25/2025    HCT 54.6 (H) 01/25/2025    MCV 91 01/25/2025     01/25/2025       13. Post-transplant Prophylaxis; CMV Infection, PJP and Candida mucosistis and other indicated for this particular patient. OFF prophylaxis    I spoke with the patient for 30 minutes. More than half dedicated to counseling and education. All questions answered    José Antonio Venegas MD  Transplant Nephrology            Follow-up:   Annual follow-up with kidney transplant clinic per written guidelines.  Patient also reminded to follow-up with general nephrologist.    Labs: since patient remains at high risk for rejection and drug-related complications that warrant close monitoring, labs will be ordered as follows: continue twice weekly CBC, renal panel, and drug level for first month; then same labs once weekly through 3rd month post-transplant.  Urine for UA and protein/creatinine ratio monthly.  Serum BK - PCR at 1-, 3-, 6-, 9-, 12-, 18-, 24-, 36-, 48-, and 60 months post-transplant.  Hepatic panel at 1-, 2-, 3-, 6-, 9-, 12-, 18-, 24-, and 36- months post-transplant.    José Antonio Venegas MD       Education:   Material provided to the patient.  Patient reminded to call with any health changes since these can be early signs of significant complications.  Also, I advised the patient to be sure any new medications or changes of old medications are discussed with either a pharmacist or physician knowledgeable with transplant to avoid  rejection/drug toxicity related to significant drug interactions.    Patient advised that it is recommended that all transplant candidates, and their close contacts and household members receive Covid vaccination.    UNOS Patient Status  Functional Status: 100% - Normal, no complaints, no evidence of disease  Physical Capacity: No Limitations

## 2025-02-07 NOTE — TELEPHONE ENCOUNTER
Results reviewed with patient.  Happy with results.   ----- Message from José Antonio Venegas MD sent at 2/7/2025 12:34 PM CST -----  Please inform the patient that the CT did not show any worrisome finding

## 2025-02-07 NOTE — LETTER
February 7, 2025        Julio Jaffe  [address] [address] NEPHROLOGY  [place] [address]  Phone: [phone]  Fax: [phone]             [facility]- Transplant 1st Fl  [address]  [facility] [address]  Phone: [phone]   Patient: Leydi Cordero   MR Number: [id number]   YOB: 1990   Date of Visit: 2/7/2025       Dear Dr. Julio Jaffe    Thank you for referring Leydi Cordero to me for evaluation. Attached you will find relevant portions of my assessment and plan of care.    If you have questions, please do not hesitate to call me. I look forward to following Leydi Cordero along with you.    Sincerely,    José Antonio Venegas MD    Enclosure    If you would like to receive this communication electronically, please contact [email or web address] or [phone] to request [employer] access.    [employer] is a tool which provides read-only access to select patient information with whom you have a relationship. Its easy to use and provides real time access to review your patients record including encounter summaries, notes, results, and demographic information.    If you feel you have received this communication in error or would no longer like to receive these types of communications, please e-mail [email or web address]

## 2025-02-17 ENCOUNTER — PATIENT MESSAGE (OUTPATIENT)
Dept: PULMONOLOGY | Facility: CLINIC | Age: 35
End: 2025-02-17
Payer: MEDICAID

## 2025-03-04 ENCOUNTER — OFFICE VISIT (OUTPATIENT)
Dept: HEMATOLOGY/ONCOLOGY | Facility: CLINIC | Age: 35
End: 2025-03-04
Payer: MEDICAID

## 2025-03-04 VITALS
DIASTOLIC BLOOD PRESSURE: 76 MMHG | HEIGHT: 66 IN | HEART RATE: 86 BPM | SYSTOLIC BLOOD PRESSURE: 110 MMHG | BODY MASS INDEX: 39.36 KG/M2 | WEIGHT: 244.94 LBS | OXYGEN SATURATION: 97 % | TEMPERATURE: 98 F

## 2025-03-04 DIAGNOSIS — Z79.60 LONG-TERM USE OF IMMUNOSUPPRESSANT MEDICATION: ICD-10-CM

## 2025-03-04 DIAGNOSIS — D75.1 POST-RENAL TRANSPLANT ERYTHROCYTOSIS: Primary | ICD-10-CM

## 2025-03-04 DIAGNOSIS — R05.3 CHRONIC COUGH: ICD-10-CM

## 2025-03-04 DIAGNOSIS — D84.821 IMMUNODEFICIENCY DUE TO LONG TERM IMMUNOSUPPRESSIVE DRUG THERAPY: ICD-10-CM

## 2025-03-04 DIAGNOSIS — E66.01 MORBID OBESITY WITH BMI OF 40.0-44.9, ADULT: ICD-10-CM

## 2025-03-04 DIAGNOSIS — Z79.899 IMMUNODEFICIENCY DUE TO LONG TERM IMMUNOSUPPRESSIVE DRUG THERAPY: ICD-10-CM

## 2025-03-04 DIAGNOSIS — T45.1X5A IMMUNODEFICIENCY DUE TO LONG TERM IMMUNOSUPPRESSIVE DRUG THERAPY: ICD-10-CM

## 2025-03-04 DIAGNOSIS — Z94.0 KIDNEY REPLACED BY TRANSPLANT: ICD-10-CM

## 2025-03-04 DIAGNOSIS — Z87.891 FORMER LIGHT CIGARETTE SMOKER (1-9 PER DAY): ICD-10-CM

## 2025-03-04 DIAGNOSIS — D75.1 POLYCYTHEMIA: ICD-10-CM

## 2025-03-04 DIAGNOSIS — Z72.0 TOBACCO ABUSE: ICD-10-CM

## 2025-03-04 PROCEDURE — 99215 OFFICE O/P EST HI 40 MIN: CPT | Mod: PBBFAC | Performed by: INTERNAL MEDICINE

## 2025-03-04 PROCEDURE — 99999 PR PBB SHADOW E&M-EST. PATIENT-LVL V: CPT | Mod: PBBFAC,,, | Performed by: INTERNAL MEDICINE

## 2025-03-04 RX ORDER — SODIUM CHLORIDE 0.9 % (FLUSH) 0.9 %
10 SYRINGE (ML) INJECTION
Status: CANCELLED | OUTPATIENT
Start: 2025-03-04

## 2025-03-04 RX ORDER — HEPARIN 100 UNIT/ML
500 SYRINGE INTRAVENOUS
Status: CANCELLED | OUTPATIENT
Start: 2025-03-04

## 2025-03-04 RX ORDER — LIDOCAINE AND PRILOCAINE 25; 25 MG/G; MG/G
CREAM TOPICAL
Qty: 30 G | Refills: 11 | Status: SHIPPED | OUTPATIENT
Start: 2025-03-04

## 2025-03-04 NOTE — PROGRESS NOTES
Subjective:       Patient ID: Leydi Cordero is a 35 y.o. female.    Chief Complaint: Results (Polycythemia/)    HPI:  35-year-old female history of post transfusion polycythemia status post renal transplant [facility] proximally 3 years ago patient is currently being followed by transplant service continues to actively smoke.  ECOG status 1 Belarusian translation throughout    Past Medical History:   Diagnosis Date    Anxiety     -donor kidney transplant 2021    cPRA 100%, XM negative 3 arteries, 2 on common patch, WIT 40 min    Encounter for blood transfusion     ESRD (end stage renal disease)     Fibroma     H/O kidney transplant     Hypertension     Hypertensive nephropathy     Ovarian cyst     Pulmonary nodules 2023    Sleep apnea      Family History   Problem Relation Name Age of Onset    No Known Problems Mother      Colon cancer Father      Cancer Father      Hypertension Father      No Known Problems Sister      No Known Problems Brother      Kidney disease Maternal Aunt      Hypertension Maternal Aunt      Diabetes Maternal Uncle      Kidney disease Other Dad's nephew     Hypertension Other Dad's nephew      Social History[1]  Past Surgical History:   Procedure Laterality Date    BRONCHOSCOPY N/A 2023    Procedure: Bronchoscopy;  Surgeon: Denys Robbins MD;  Location: [address];  Service: Pulmonary;  Laterality: N/A;    COLD KNIFE CONIZATION OF CERVIX N/A 2023    Procedure: CONE BIOPSY, CERVIX, USING COLD KNIFE;  Surgeon: Jourdan Conklin MD;  Location: [facility];  Service: OB/GYN;  Laterality: N/A;    COLPOSCOPY  2020    Needs follow up on or after 21    ESOPHAGOGASTRODUODENOSCOPY N/A 2022    Procedure: ESOPHAGOGASTRODUODENOSCOPY (EGD);  Surgeon: Essie Carvajal MD;  Location: [facility];  Service: Endoscopy;  Laterality: N/A;    hemodialysis access left arm      kidney removal       KIDNEY TRANSPLANT  04/15/2015    KIDNEY  TRANSPLANT N/A 1/9/2021    Procedure: TRANSPLANT, KIDNEY;  Surgeon: Fam Sutton MD;  [address];  Service: Transplant;  Laterality: N/A;    NEPHRECTOMY      OVARIAN CYST REMOVAL      PARATHYROIDECTOMY      partial     PERCUTANEOUS NEPHROSTOMY Left 2/13/2021    Procedure: Creation, Nephrostomy, Percutaneous;  Surgeon: Elen Surgeon;  Location: [facility];  Service: Anesthesiology;  Laterality: Left;    right leg hemodialysis access      ROBOT-ASSISTED LAPAROSCOPIC SLEEVE GASTRECTOMY USING DA VELMA XI N/A 9/27/2022    Procedure: XI ROBOTIC SLEEVE GASTRECTOMY;  Surgeon: Cal Parekh MD;  Location: [facility];  Service: General;  Laterality: N/A;    transplant nephrectomy  12/01/2017       Labs:  Lab Results   Component Value Date    WBC 6.30 01/25/2025    HGB 17.0 (H) 01/25/2025    HCT 54.6 (H) 01/25/2025    MCV 91 01/25/2025     01/25/2025     BMP  Lab Results   Component Value Date     01/25/2025    K 4.3 01/25/2025     01/25/2025    CO2 22 (L) 01/25/2025    BUN 12 01/25/2025    CREATININE 0.9 01/25/2025    CALCIUM 9.1 01/25/2025    ANIONGAP 8 01/25/2025    ESTGFRAFRICA >60.0 07/11/2022    EGFRNONAA >60.0 07/11/2022     Lab Results   Component Value Date    ALT 19 01/25/2025    AST 18 01/25/2025    ALKPHOS 87 01/25/2025    BILITOT 0.4 01/25/2025       Lab Results   Component Value Date    IRON 71 01/11/2023    TIBC 337 01/11/2023    FERRITIN 358 (H) 01/14/2021     Lab Results   Component Value Date    LAPRLTLX74 290 01/11/2023     Lab Results   Component Value Date    FOLATE 5.7 01/20/2021     Lab Results   Component Value Date    TSH 0.811 01/11/2023         Review of Systems   Constitutional:  Negative for activity change, appetite change, chills, diaphoresis, fatigue, fever and unexpected weight change.   HENT:  Negative for congestion, dental problem, drooling, ear discharge, ear pain, facial swelling, hearing loss, mouth sores, nosebleeds, postnasal drip, rhinorrhea, sinus  pressure, sneezing, sore throat, tinnitus, trouble swallowing and voice change.    Eyes:  Negative for photophobia, pain, discharge, redness, itching and visual disturbance.   Respiratory:  Positive for cough. Negative for choking, chest tightness, shortness of breath, wheezing and stridor.    Cardiovascular:  Negative for chest pain, palpitations and leg swelling.   Gastrointestinal:  Negative for abdominal distention, abdominal pain, anal bleeding, blood in stool, constipation, diarrhea, nausea, rectal pain and vomiting.   Endocrine: Negative for cold intolerance, heat intolerance, polydipsia, polyphagia and polyuria.   Genitourinary:  Negative for decreased urine volume, difficulty urinating, dyspareunia, dysuria, enuresis, flank pain, frequency, genital sores, hematuria, menstrual problem, pelvic pain, urgency, vaginal bleeding, vaginal discharge and vaginal pain.   Musculoskeletal:  Negative for arthralgias, back pain, gait problem, joint swelling, myalgias, neck pain and neck stiffness.   Skin:  Negative for color change, pallor and rash.   Allergic/Immunologic: Negative for environmental allergies, food allergies and immunocompromised state.   Neurological:  Negative for dizziness, tremors, seizures, syncope, facial asymmetry, speech difficulty, weakness, light-headedness, numbness and headaches.   Hematological:  Negative for adenopathy. Does not bruise/bleed easily.   Psychiatric/Behavioral:  Negative for agitation, behavioral problems, confusion, decreased concentration, dysphoric mood, hallucinations, self-injury, sleep disturbance and suicidal ideas. The patient is not nervous/anxious and is not hyperactive.        Objective:      Physical Exam  Vitals reviewed.   Constitutional:       General: She is not in acute distress.     Appearance: She is well-developed. She is obese. She is not diaphoretic.   HENT:      Head: Normocephalic and atraumatic.      Right Ear: External ear normal.      Left Ear:  External ear normal.      Nose: Nose normal.      Right Sinus: No maxillary sinus tenderness or frontal sinus tenderness.      Left Sinus: No maxillary sinus tenderness or frontal sinus tenderness.      Mouth/Throat:      Pharynx: No oropharyngeal exudate.   Eyes:      General: Lids are normal. No scleral icterus.        Right eye: No discharge.         Left eye: No discharge.      Conjunctiva/sclera: Conjunctivae normal.      Right eye: Right conjunctiva is not injected. No hemorrhage.     Left eye: Left conjunctiva is not injected. No hemorrhage.     Pupils: Pupils are equal, round, and reactive to light.   Neck:      Thyroid: No thyromegaly.      Vascular: No JVD.      Trachea: No tracheal deviation.   Cardiovascular:      Rate and Rhythm: Normal rate.   Pulmonary:      Effort: Pulmonary effort is normal. No respiratory distress.      Breath sounds: No stridor.   Chest:      Chest wall: No tenderness.   Abdominal:      General: Bowel sounds are normal. There is no distension.      Palpations: Abdomen is soft. There is no hepatomegaly, splenomegaly or mass.      Tenderness: There is no abdominal tenderness. There is no rebound.   Musculoskeletal:         General: No tenderness. Normal range of motion.      Cervical back: Normal range of motion and neck supple.   Lymphadenopathy:      Cervical: No cervical adenopathy.      Upper Body:      Right upper body: No supraclavicular adenopathy.      Left upper body: No supraclavicular adenopathy.   Skin:     General: Skin is dry.      Findings: No erythema or rash.   Neurological:      Mental Status: She is alert and oriented to person, place, and time.      Cranial Nerves: No cranial nerve deficit.      Coordination: Coordination normal.   Psychiatric:         Behavior: Behavior normal.         Thought Content: Thought content normal.         Judgment: Judgment normal.             Assessment:      1. Post-renal transplant erythrocytosis    2. Polycythemia    3. Kidney  replaced by transplant    4. Tobacco abuse    5. Immunodeficiency due to long term immunosuppressive drug therapy    6. Morbid obesity with BMI of 40.0-44.9, adult    7. Long-term use of immunosuppressant medication    8. Former light cigarette smoker (1-9 per day)    9. Chronic cough           Med Onc Chart Routing      Follow up with physician . CBC MPN day 1 of phlebotomy return to clinic in 6 weeks with repeat CBC   Follow up with [facility]    Infusion scheduling note    Injection scheduling note Scheduled 1 unit phlebotomy ASAP with CBC and MPN drawn day of   Labs    Imaging    Pharmacy appointment    Other referrals                   Plan:     High likely post Audrey planned phlebotomy needed.  Try to maintain hemoglobin less than 17 g.  Proceed with 1 unit phlebotomy prescription sent for EMLA cream to be placed on her forearm prior to procedure.  Baseline CBC and MPN ordered.  Immunocompromised state.  Return in proximally 6 weeks with repeat CBC.  Will need to contact transplant service for maintenance with possible ACE inhibitor if needed clinically spoke through  patient is in family is multiple English speakers article on post transplant phlebotomy needs in transplant setting sent for their review        Gaurav Izquierdo Jr, MD FACP         [1]   Social History  Socioeconomic History    Marital status: Single   Tobacco Use    Smoking status: Every Day     Current packs/day: 0.00     Average packs/day: 0.3 packs/day for 12.6 years (3.1 ttl pk-yrs)     Types: Cigarettes     Start date:      Last attempt to quit: 2020     Years since quittin.5    Smokeless tobacco: Never   Substance and Sexual Activity    Alcohol use: No    Drug use: No    Sexual activity: Yes     Partners: Male     Birth control/protection: None     Social Drivers of Health     Financial Resource Strain: Low Risk  (2023)    Overall Financial Resource Strain (CARDIA)     Difficulty of Paying Living Expenses: Not hard at  all   Food Insecurity: No Food Insecurity (9/28/2022)    Hunger Vital Sign     Worried About Running Out of Food in the Last Year: Never true     Ran Out of Food in the Last Year: Never true   Transportation Needs: No Transportation Needs (9/28/2022)    PRAPARE - Transportation     Lack of Transportation (Medical): No     Lack of Transportation (Non-Medical): No   Physical Activity: Inactive (9/18/2023)    Exercise Vital Sign     Days of Exercise per Week: 0 days     Minutes of Exercise per Session: 0 min   Stress: No Stress Concern Present (9/18/2023)    [employer] [employer] - Occupational Stress Questionnaire     Feeling of Stress : Not at all   Housing Stability: Low Risk  (9/28/2022)    Housing Stability Vital Sign     Unable to Pay for Housing in the Last Year: No     Number of Places Lived in the Last Year: 1     Unstable Housing in the Last Year: No

## 2025-03-05 ENCOUNTER — TELEPHONE (OUTPATIENT)
Dept: HEMATOLOGY/ONCOLOGY | Facility: CLINIC | Age: 35
End: 2025-03-05
Payer: MEDICAID

## 2025-03-05 NOTE — TELEPHONE ENCOUNTER
SS was consulted by MD to contact pt to assess needs. SW was unable to reach pt. SW will remain available.

## 2025-03-06 ENCOUNTER — LAB VISIT (OUTPATIENT)
Dept: LAB | Facility: HOSPITAL | Age: 35
End: 2025-03-06
Attending: INTERNAL MEDICINE
Payer: MEDICAID

## 2025-03-06 ENCOUNTER — INFUSION (OUTPATIENT)
Dept: INFUSION THERAPY | Facility: HOSPITAL | Age: 35
End: 2025-03-06
Attending: INTERNAL MEDICINE
Payer: MEDICAID

## 2025-03-06 VITALS
HEART RATE: 86 BPM | RESPIRATION RATE: 18 BRPM | SYSTOLIC BLOOD PRESSURE: 113 MMHG | TEMPERATURE: 98 F | DIASTOLIC BLOOD PRESSURE: 80 MMHG | OXYGEN SATURATION: 98 %

## 2025-03-06 DIAGNOSIS — D75.1 POLYCYTHEMIA: ICD-10-CM

## 2025-03-06 DIAGNOSIS — D75.1 POST-RENAL TRANSPLANT ERYTHROCYTOSIS: ICD-10-CM

## 2025-03-06 DIAGNOSIS — T86.11 ANTIBODY MEDIATED REJECTION OF KIDNEY TRANSPLANT: ICD-10-CM

## 2025-03-06 DIAGNOSIS — T86.11 ACUTE REJECTION OF KIDNEY TRANSPLANT: Primary | ICD-10-CM

## 2025-03-06 LAB
BASOPHILS # BLD AUTO: 0.02 K/UL (ref 0–0.2)
BASOPHILS NFR BLD: 0.2 % (ref 0–1.9)
DIFFERENTIAL METHOD BLD: ABNORMAL
EOSINOPHIL # BLD AUTO: 0 K/UL (ref 0–0.5)
EOSINOPHIL NFR BLD: 0.4 % (ref 0–8)
ERYTHROCYTE [DISTWIDTH] IN BLOOD BY AUTOMATED COUNT: 14 % (ref 11.5–14.5)
HCT VFR BLD AUTO: 53.3 % (ref 37–48.5)
HGB BLD-MCNC: 17 G/DL (ref 12–16)
IMM GRANULOCYTES # BLD AUTO: 0.06 K/UL (ref 0–0.04)
IMM GRANULOCYTES NFR BLD AUTO: 0.7 % (ref 0–0.5)
LYMPHOCYTES # BLD AUTO: 1.2 K/UL (ref 1–4.8)
LYMPHOCYTES NFR BLD: 13 % (ref 18–48)
MCH RBC QN AUTO: 27.9 PG (ref 27–31)
MCHC RBC AUTO-ENTMCNC: 31.9 G/DL (ref 32–36)
MCV RBC AUTO: 87 FL (ref 82–98)
MONOCYTES # BLD AUTO: 0.5 K/UL (ref 0.3–1)
MONOCYTES NFR BLD: 5.5 % (ref 4–15)
NEUTROPHILS # BLD AUTO: 7.3 K/UL (ref 1.8–7.7)
NEUTROPHILS NFR BLD: 80.2 % (ref 38–73)
NRBC BLD-RTO: 0 /100 WBC
PLATELET # BLD AUTO: 191 K/UL (ref 150–450)
PMV BLD AUTO: 10.2 FL (ref 9.2–12.9)
RBC # BLD AUTO: 6.1 M/UL (ref 4–5.4)
WBC # BLD AUTO: 9.09 K/UL (ref 3.9–12.7)

## 2025-03-06 PROCEDURE — 85025 COMPLETE CBC W/AUTO DIFF WBC: CPT | Performed by: INTERNAL MEDICINE

## 2025-03-06 PROCEDURE — 99195 PHLEBOTOMY: CPT

## 2025-03-06 PROCEDURE — 36415 COLL VENOUS BLD VENIPUNCTURE: CPT | Performed by: INTERNAL MEDICINE

## 2025-03-06 PROCEDURE — 81219 CALR GENE COM VARIANTS: CPT | Performed by: INTERNAL MEDICINE

## 2025-03-06 RX ORDER — SODIUM CHLORIDE 0.9 % (FLUSH) 0.9 %
10 SYRINGE (ML) INJECTION
OUTPATIENT
Start: 2025-03-06

## 2025-03-06 RX ORDER — HEPARIN 100 UNIT/ML
500 SYRINGE INTRAVENOUS
OUTPATIENT
Start: 2025-03-06

## 2025-03-06 NOTE — NURSING
Remote video interpretation used for this visit.  One unit therapeutic phlebotomy performed via left upper forearm without difficulty.  Upon completion, needle dc'd, pressure dressing applied, and blood discarded in appropriate container.   Juice/snack accepted.  B/P 113/80, pulse 86.  Pt denies weakness/dizziness.  Pt discharged with next appt scheduled.

## 2025-03-11 ENCOUNTER — RESULTS FOLLOW-UP (OUTPATIENT)
Dept: HEMATOLOGY/ONCOLOGY | Facility: CLINIC | Age: 35
End: 2025-03-11

## 2025-03-11 LAB
PATH REPORT.FINAL DX SPEC: NORMAL
SPECIMEN SOURCE: NORMAL

## 2025-03-14 ENCOUNTER — CLINICAL SUPPORT (OUTPATIENT)
Dept: PULMONOLOGY | Facility: CLINIC | Age: 35
End: 2025-03-14
Payer: MEDICAID

## 2025-03-14 ENCOUNTER — OFFICE VISIT (OUTPATIENT)
Dept: PULMONOLOGY | Facility: CLINIC | Age: 35
End: 2025-03-14
Payer: MEDICAID

## 2025-03-14 VITALS
WEIGHT: 244.94 LBS | SYSTOLIC BLOOD PRESSURE: 107 MMHG | HEIGHT: 66 IN | HEIGHT: 66 IN | BODY MASS INDEX: 39.36 KG/M2 | BODY MASS INDEX: 39.36 KG/M2 | HEART RATE: 79 BPM | WEIGHT: 244.94 LBS | OXYGEN SATURATION: 98 % | DIASTOLIC BLOOD PRESSURE: 69 MMHG | RESPIRATION RATE: 18 BRPM

## 2025-03-14 DIAGNOSIS — I27.20 PULMONARY HYPERTENSION: ICD-10-CM

## 2025-03-14 DIAGNOSIS — G47.33 OSA (OBSTRUCTIVE SLEEP APNEA): ICD-10-CM

## 2025-03-14 DIAGNOSIS — Z79.60 LONG-TERM USE OF IMMUNOSUPPRESSANT MEDICATION: ICD-10-CM

## 2025-03-14 DIAGNOSIS — J45.20 ASTHMA, MILD INTERMITTENT, WELL-CONTROLLED: Primary | ICD-10-CM

## 2025-03-14 DIAGNOSIS — Z94.0 KIDNEY TRANSPLANT STATUS: ICD-10-CM

## 2025-03-14 LAB
BRPFT: ABNORMAL
DLCO SINGLE BREATH LLN: 22.06
DLCO SINGLE BREATH PRE REF: 62.3 %
DLCO SINGLE BREATH REF: 27.79
DLCOC SBVA LLN: 3.8
DLCOC SBVA REF: 5.27
DLCOC SINGLE BREATH LLN: 22.06
DLCOC SINGLE BREATH REF: 27.79
DLCOVA LLN: 3.8
DLCOVA PRE REF: 70.2 %
DLCOVA PRE: 3.7 ML/(MIN*MMHG*L) (ref 3.8–6.74)
DLCOVA REF: 5.27
ERV LLN: -16448.79
ERV PRE REF: 71.5 %
ERV REF: 1.21
FEF 25 75 CHG: 3.1 %
FEF 25 75 LLN: 2.46
FEF 25 75 POST REF: 104.1 %
FEF 25 75 PRE REF: 100.9 %
FEF 25 75 REF: 3.86
FET100 CHG: -8.8 %
FEV1 CHG: 1.3 %
FEV1 FVC CHG: 1.6 %
FEV1 FVC LLN: 72
FEV1 FVC POST REF: 104.5 %
FEV1 FVC PRE REF: 102.8 %
FEV1 FVC REF: 84
FEV1 LLN: 2.69
FEV1 POST REF: 97 %
FEV1 PRE REF: 95.7 %
FEV1 REF: 3.36
FRCPLETH LLN: 1.97
FRCPLETH PREREF: 70.8 %
FRCPLETH REF: 2.79
FVC CHG: -0.3 %
FVC LLN: 3.23
FVC POST REF: 92.2 %
FVC PRE REF: 92.5 %
FVC REF: 4.04
IVC PRE: 3.74 L (ref 3.23–4.88)
IVC SINGLE BREATH LLN: 3.23
IVC SINGLE BREATH PRE REF: 92.7 %
IVC SINGLE BREATH REF: 4.04
MVV LLN: 108
MVV PRE REF: 108.8 %
MVV REF: 127
PEF CHG: 7.3 %
PEF LLN: 5.5
PEF POST REF: 103.8 %
PEF PRE REF: 96.7 %
PEF REF: 7.32
POST FEF 25 75: 4.02 L/S (ref 2.46–5.26)
POST FET 100: 5.91 SEC
POST FEV1 FVC: 87.37 % (ref 72.32–92.68)
POST FEV1: 3.26 L (ref 2.69–4.01)
POST FVC: 3.73 L (ref 3.23–4.88)
POST PEF: 7.6 L/S (ref 5.5–9.14)
PRE DLCO: 17.32 ML/(MIN*MMHG) (ref 22.06–33.52)
PRE ERV: 0.87 L (ref -16448.79–16451.21)
PRE FEF 25 75: 3.89 L/S (ref 2.46–5.26)
PRE FET 100: 6.48 SEC
PRE FEV1 FVC: 85.99 % (ref 72.32–92.68)
PRE FEV1: 3.22 L (ref 2.69–4.01)
PRE FRC PL: 1.97 L (ref 1.97–3.61)
PRE FVC: 3.74 L (ref 3.23–4.88)
PRE MVV: 137.99 L/MIN (ref 107.84–145.9)
PRE PEF: 7.08 L/S (ref 5.5–9.14)
PRE RV: 1.11 L (ref 1–2.15)
PRE TLC: 4.86 L (ref 4.29–6.26)
RAW LLN: 3.06
RAW PRE REF: 131.2 %
RAW PRE: 4.01 CMH2O*S/L (ref 3.06–3.06)
RAW REF: 3.06
RV LLN: 1
RV PRE REF: 70.2 %
RV REF: 1.58
RVTLC LLN: 21
RVTLC PRE REF: 74.7 %
RVTLC PRE: 22.81 % (ref 20.93–40.11)
RVTLC REF: 31
TLC LLN: 4.29
TLC PRE REF: 92.1 %
TLC REF: 5.27
VA PRE: 4.68 L (ref 5.12–5.12)
VA SINGLE BREATH LLN: 5.12
VA SINGLE BREATH PRE REF: 91.3 %
VA SINGLE BREATH REF: 5.12
VC LLN: 3.23
VC PRE REF: 92.8 %
VC PRE: 3.75 L (ref 3.23–4.88)
VC REF: 4.04

## 2025-03-14 PROCEDURE — 99213 OFFICE O/P EST LOW 20 MIN: CPT | Mod: PBBFAC,27 | Performed by: INTERNAL MEDICINE

## 2025-03-14 PROCEDURE — 99999 PR PBB SHADOW E&M-EST. PATIENT-LVL I: CPT | Mod: PBBFAC,,,

## 2025-03-14 PROCEDURE — 99211 OFF/OP EST MAY X REQ PHY/QHP: CPT | Mod: PBBFAC

## 2025-03-14 PROCEDURE — 99999 PR PBB SHADOW E&M-EST. PATIENT-LVL III: CPT | Mod: PBBFAC,,, | Performed by: INTERNAL MEDICINE

## 2025-03-14 RX ORDER — ALBUTEROL SULFATE 90 UG/1
2 INHALANT RESPIRATORY (INHALATION) EVERY 4 HOURS PRN
Qty: 8.5 G | Refills: 11 | Status: SHIPPED | OUTPATIENT
Start: 2025-03-14

## 2025-03-14 NOTE — PROCEDURES
"[facility] - Pulmonary Function  Six Minute Walk     SUMMARY     Ordering Provider: Dr. Barajas   Interpreting Provider: Dr. Barajas  Performing nurse/tech/RT: LLOYD Carter, RRT  Diagnosis: Pulmonary Hypertension  Height: 5' 6" (167.6 cm)  Weight: 111.1 kg (244 lb 14.9 oz)  BMI (Calculated): 39.6                Phase Oxygen Assessment Supplemental O2 Heart   Rate Blood Pressure [place] Dyspnea Scale Rating   Resting 97 % Room Air 77 bpm 97/61 1   Exercise        Minute        1 98 % Room Air 105 bpm     2 97 % Room Air 106 bpm     3 96 % Room Air 104 bpm     4 98 % Room Air 109 bpm     5 97 % Room Air 108 bpm     6  98 % Room Air 108 bpm 110/76 5-6   Recovery        Minute        1 98 % Room Air 79 bpm     2 98 % Room Air 82 bpm     3 98 % Room Air 80 bpm     4 98 % Room Air 79 bpm 107/69 0     Six Minute Walk Summary  6MWT Status: completed without stopping  Patient Reported: Dyspnea     Interpretation:  Did the patient stop or pause?: No                                         Total Time Walked (Calculated): 360 seconds  Final Partial Lap Distance (feet): 0 feet  Total Distance Meters (Calculated): 365.76 meters  Predicted Distance Meters (Calculated): 569.7 meters  Percentage of Predicted (Calculated): 64.2  Peak VO2 (Calculated): 14.95  Mets: 4.27  Has The Patient Had a Previous Six Minute Walk Test?: Yes       Previous 6MWT Results  Has The Patient Had a Previous Six Minute Walk Test?: Yes  Date of Previous Test: 08/15/22  Total Time Walked: 360 seconds  Total Distance (meters): 411.48  Predicted Distance (meters): 567.32 meters  Percentage of Predicted: 72.53  Percent Change (Calculated): 0.11    "

## 2025-03-14 NOTE — PROGRESS NOTES
Pulmonary Outpatient Follow Up Visit     Subjective:       Patient ID: Leydi Cordero is a 35 y.o. female.    Chief Complaint: Pulmonary Nodules      HPI        35-year-old female patient presenting for Follow-up.    03/14/2025 asthma control test questionnaire score 22.  Rare need of albuterol.  Requesting refills.  Inconsistent CPAP usage would like to obtain supplies.  Discussed adherence definition and qualification for treatment.    Kidney transplant stable on immunosuppressants.  No significant events since last visit.    12/6/2024  rare albuterol use for SOB, last time 3 weeks ago .    Kidney function within normal.  On immunosuppressants.  Chart review revealed bronchoscopy and suspected viral pneumonia in 2023 recovered.      Inconsistent CPAP use would like to be refitted with a different mask.    [place] Sleepiness Scale score 16.      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  I had the impression of asthmatic bronchitis with exacerbation, she was treated with prednisone and a Z-Kalia.    Currently needing albuterol 3 times a week    Not smoking since 2018. 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      Has a cousin and an aunt with asthma.    Underwent home sleep study, moderate obstructive sleep apnea with AHI of 27 events per hour.  Suboptimal compliance with CPAP therapy.      Review of Systems   Constitutional:  Positive for weight gain and fatigue.   Respiratory:  Positive for apnea,  "snoring, shortness of breath, dyspnea on extertion, use of rescue inhaler and somnolence.    Genitourinary:         Kidney transplant x 2 last    Musculoskeletal:  Positive for arthralgias.        Left upper extremity AV fistula   Neurological:  Positive for weakness.   Psychiatric/Behavioral:  Positive for sleep disturbance.        Outpatient Encounter Medications as of 3/14/2025   Medication Sig Dispense Refill    calcitRIOL (ROCALTROL) 0.25 MCG Cap Take 1 capsule (0.25 mcg total) by mouth once daily. 30 capsule 3    LIDOcaine-prilocaine (EMLA) cream Apply to affected area once 30 g 11    mycophenolate (CELLCEPT) 250 mg Cap Take 4 capsules by mouth twice daily 240 capsule 11    pantoprazole (PROTONIX) 40 MG tablet Take 1 tablet by mouth once daily 90 tablet 3    predniSONE (DELTASONE) 5 MG tablet Take 1 tablet (5 mg total) by mouth once daily. 120 tablet 11    sodium bicarbonate 650 MG tablet Take 2 tablets (1,300 mg total) by mouth 3 (three) times daily. 540 tablet 3    tacrolimus (PROGRAF) 1 MG Cap Take 1 capsule (1 mg total) by mouth every 12 (twelve) hours. 60 capsule 11    [DISCONTINUED] albuterol (PROVENTIL/VENTOLIN HFA) 90 mcg/actuation inhaler Inhale 2 puffs into the lungs every 4 (four) hours as needed for Wheezing or Shortness of Breath. Rescue 8.5 g 11    albuterol (PROVENTIL/VENTOLIN HFA) 90 mcg/actuation inhaler Inhale 2 puffs into the lungs every 4 (four) hours as needed for Wheezing or Shortness of Breath. Rescue 8.5 g 11    [] methocarbamoL (ROBAXIN) 500 MG Tab Take 1 tablet (500 mg total) by mouth 2 (two) times daily. for 15 days 30 tablet 0     No facility-administered encounter medications on file as of 3/14/2025.       Objective:     Vital Signs (Most Recent)  Vital Signs  Pulse: 79  Resp: 18  SpO2: 98 %  BP: 107/69  Patient Position: Sitting  Pain Score: 0-No pain  Height and Weight  Height: 5' 6" (167.6 cm)  Weight: 111.1 kg (244 lb 14.9 oz)  BSA (Calculated - sq m): 2.27 sq " meters  BMI (Calculated): 39.6  Weight in (lb) to have BMI = 25: 154.6]  Wt Readings from Last 2 Encounters:   03/14/25 111.1 kg (244 lb 14.9 oz)   03/14/25 111.1 kg (244 lb 14.9 oz)       Physical Exam   Constitutional: She is oriented to person, place, and time. She appears well-developed and well-nourished. She is obese.   HENT:   Head: Normocephalic.   Pulmonary/Chest: Normal expansion and effort normal. No stridor. No respiratory distress. She has decreased breath sounds. She has no wheezes. She exhibits no tenderness.   Abdominal:   Left lower quadrant scar noted   Musculoskeletal:      Comments: Left upper extremity AV fistula   Neurological: She is alert and oriented to person, place, and time.   Psychiatric: She has a normal mood and affect. Her behavior is normal. Judgment and thought content normal.   Nursing note and vitals reviewed.      Laboratory  Lab Results   Component Value Date    WBC 9.09 03/06/2025    RBC 6.10 (H) 03/06/2025    HGB 17.0 (H) 03/06/2025    HCT 53.3 (H) 03/06/2025    MCV 87 03/06/2025    MCH 27.9 03/06/2025    MCHC 31.9 (L) 03/06/2025    RDW 14.0 03/06/2025     03/06/2025    MPV 10.2 03/06/2025    GRAN 7.3 03/06/2025    GRAN 80.2 (H) 03/06/2025    LYMPH 1.2 03/06/2025    LYMPH 13.0 (L) 03/06/2025    MONO 0.5 03/06/2025    MONO 5.5 03/06/2025    EOS 0.0 03/06/2025    BASO 0.02 03/06/2025    EOSINOPHIL 0.4 03/06/2025    BASOPHIL 0.2 03/06/2025       BMP  Lab Results   Component Value Date     01/25/2025    K 4.3 01/25/2025     01/25/2025    CO2 22 (L) 01/25/2025    BUN 12 01/25/2025    CREATININE 0.9 01/25/2025    CALCIUM 9.1 01/25/2025    ANIONGAP 8 01/25/2025    ESTGFRAFRICA >60.0 07/11/2022    EGFRNONAA >60.0 07/11/2022    AST 18 01/25/2025    ALT 19 01/25/2025    PROT 7.6 01/25/2025       Lab Results   Component Value Date    BNP 80 09/16/2023     (H) 01/03/2020       Lab Results   Component Value Date    TSH 0.811 01/11/2023       No results found for:  ""SEDRATE"    No results found for: "CRP"  No results found for: "IGE"     Lab Results   Component Value Date    ASPERGILLUS <0.500 09/21/2023    ASPERGILLUS Not Detected 09/21/2023     No results found for: "AFUMIGATUSCL"     No results found for: "ACE"   Echo 12/2024      Left Ventricle: The left ventricle is normal in size. Normal wall thickness. There is concentric remodeling. There is normal systolic function. Ejection fraction is approximately 60%. There is normal diastolic function.    Right Ventricle: Normal right ventricular cavity size. Wall thickness is normal. Systolic function is normal.    Mitral Valve: There is moderate to severe regurgitation with an eccentric jet.    Pulmonary Artery: The estimated pulmonary artery systolic pressure is 33 mmHg.    IVC/SVC: Normal venous pressure at 3 mmHg.        Diagnostic Results:      I have personally reviewed today the following studies:    CXR 1/3/2019 clear lungs      Nuc stress 1/2020:     Impression: NORMAL MYOCARDIAL PERFUSION  1. The perfusion scan is free of evidence for myocardial ischemia or injury.   2. Resting wall motion is physiologic.   3. Resting LV function is normal.   4. The ventricular volumes are normal at rest and stress.   5. The extracardiac distribution of radioactivity is normal.        Echo Dec 2019:      1 - Concentric hypertrophy.     2 - No wall motion abnormalities.     3 - Normal left ventricular systolic function (EF 60-65%).     4 - Normal left ventricular diastolic function.     5 - Normal right ventricular systolic function .     6 - Pulmonary hypertension. The estimated PA systolic pressure is 45 mmHg.     7 - Trivial mitral regurgitation.     8 - Moderate to severe tricuspid regurgitation     Echo 8/2018    1 - Concentric remodeling.     2 - No wall motion abnormalities.     3 - Normal left ventricular systolic function (EF 55-60%).     4 - Normal left ventricular diastolic function.     5 - Normal right ventricular systolic " function .     6 - The estimated PA systolic pressure is 23 mmHg.     7 - Moderate tricuspid regurgitation       Spirometry 7/21/2020:    Grade A-D -acceptable quality of tracings   Normal spirometry. (FEV1/VC greater than or equal to LLN and FVC greater than or equal to LLN)   Flow volume loops are normal.   Overall there is no significant ventilatory impairment. (FEV1 >LLN)         Assessment and Recommendations  Based on the [employer] practice parameter of CPAP would be the guideline  recommendation of choice.  Other therapies may include ENT procedures were appropriate, significant weight loss.  Inspire hypoglossal nerve stimulator and nasal PROVENT or mandibular advancement device may also  be  considered.  Close follow up to ensure resolution of symptoms.  1 night study  MODERATE OBSTRUCTIVE SLEEP APNEA with overall AHI 27.6/hr ( 204 events): night #1  Oxygen desaturation: 87%. SpO2 between 85% to 89% for < 1 min.  Patient snored 91% time above 50 .  Heart rate range: 73 bpm - 107 bpm  REC's:  Therapy with APAP at 5-20 cm WP using mask of choice with heated humidification is an option.  Weight loss/management. with regular exercise per direction of physician.  Avoid drowsy driving.  Follow up in sleep clinic      On CPAP 4/16 cm water      Assessment/Plan:   Asthma, mild intermittent, well-controlled  -     albuterol (PROVENTIL/VENTOLIN HFA) 90 mcg/actuation inhaler; Inhale 2 puffs into the lungs every 4 (four) hours as needed for Wheezing or Shortness of Breath. Rescue  Dispense: 8.5 g; Refill: 11    NASIMA (obstructive sleep apnea)  -     CPAP/BIPAP SUPPLIES    Long-term use of immunosuppressant medication    Pulmonary hypertension    Kidney transplant status          Continue albuterol p.r.n.    PFT and 6 minute walk today stable.  Mild decrease of DLCO noted.    Echo 12/20/2024 reviewed mild pulmonary hypertension stable.    DME team messaged.  new CPAP mask supplies order submitted.   Patient okay to pay out-of-pocket.      Continue prednisone 5 mg continue CellCept and tacrolimus.    Encouraged patient to improve CPAP compliance.    FFM     Sylvia mcgill , patient speaks Chinese    Follow up in about 6 months (around 9/14/2025).    This note was prepared using voice recognition system and is likely to have sound alike errors that may have been overlooked even after proof reading.  Please call me with any questions    Discussed diagnosis, its evaluation, treatment and usual course. All questions answered.      Maribeth Barajas MD

## 2025-03-18 DIAGNOSIS — Z94.0 DECEASED-DONOR KIDNEY TRANSPLANT: ICD-10-CM

## 2025-03-18 DIAGNOSIS — E21.2 HYPERPARATHYROIDISM, TERTIARY: ICD-10-CM

## 2025-03-18 RX ORDER — CALCITRIOL 0.25 UG/1
0.25 CAPSULE ORAL DAILY
Qty: 30 CAPSULE | Refills: 3 | Status: SHIPPED | OUTPATIENT
Start: 2025-03-18

## 2025-04-15 ENCOUNTER — LAB VISIT (OUTPATIENT)
Dept: LAB | Facility: HOSPITAL | Age: 35
End: 2025-04-15
Attending: INTERNAL MEDICINE
Payer: MEDICAID

## 2025-04-15 ENCOUNTER — OFFICE VISIT (OUTPATIENT)
Dept: HEMATOLOGY/ONCOLOGY | Facility: CLINIC | Age: 35
End: 2025-04-15
Payer: MEDICAID

## 2025-04-15 ENCOUNTER — INFUSION (OUTPATIENT)
Dept: INFUSION THERAPY | Facility: HOSPITAL | Age: 35
End: 2025-04-15
Attending: INTERNAL MEDICINE
Payer: MEDICAID

## 2025-04-15 ENCOUNTER — RESULTS FOLLOW-UP (OUTPATIENT)
Dept: HEMATOLOGY/ONCOLOGY | Facility: CLINIC | Age: 35
End: 2025-04-15

## 2025-04-15 VITALS
HEART RATE: 70 BPM | OXYGEN SATURATION: 99 % | RESPIRATION RATE: 16 BRPM | SYSTOLIC BLOOD PRESSURE: 104 MMHG | TEMPERATURE: 97 F | DIASTOLIC BLOOD PRESSURE: 76 MMHG

## 2025-04-15 VITALS
HEIGHT: 66 IN | OXYGEN SATURATION: 98 % | SYSTOLIC BLOOD PRESSURE: 102 MMHG | WEIGHT: 246.13 LBS | TEMPERATURE: 97 F | BODY MASS INDEX: 39.55 KG/M2 | HEART RATE: 70 BPM | DIASTOLIC BLOOD PRESSURE: 64 MMHG

## 2025-04-15 DIAGNOSIS — D84.821 IMMUNODEFICIENCY DUE TO LONG TERM IMMUNOSUPPRESSIVE DRUG THERAPY: ICD-10-CM

## 2025-04-15 DIAGNOSIS — T86.11 ACUTE REJECTION OF KIDNEY TRANSPLANT: Primary | ICD-10-CM

## 2025-04-15 DIAGNOSIS — D75.1 POST-RENAL TRANSPLANT ERYTHROCYTOSIS: Primary | ICD-10-CM

## 2025-04-15 DIAGNOSIS — Z79.60 IMMUNODEFICIENCY DUE TO LONG TERM IMMUNOSUPPRESSIVE DRUG THERAPY: ICD-10-CM

## 2025-04-15 DIAGNOSIS — T86.11 ANTIBODY MEDIATED REJECTION OF KIDNEY TRANSPLANT: ICD-10-CM

## 2025-04-15 DIAGNOSIS — T45.1X5A IMMUNODEFICIENCY DUE TO LONG TERM IMMUNOSUPPRESSIVE DRUG THERAPY: ICD-10-CM

## 2025-04-15 DIAGNOSIS — D75.1 POST-RENAL TRANSPLANT ERYTHROCYTOSIS: ICD-10-CM

## 2025-04-15 DIAGNOSIS — D75.1 POLYCYTHEMIA: ICD-10-CM

## 2025-04-15 LAB
ABSOLUTE EOSINOPHIL (OHS): 0.08 K/UL
ABSOLUTE MONOCYTE (OHS): 0.53 K/UL (ref 0.3–1)
ABSOLUTE NEUTROPHIL COUNT (OHS): 3.71 K/UL (ref 1.8–7.7)
BASOPHILS # BLD AUTO: 0.03 K/UL
BASOPHILS NFR BLD AUTO: 0.5 %
ERYTHROCYTE [DISTWIDTH] IN BLOOD BY AUTOMATED COUNT: 13.9 % (ref 11.5–14.5)
HCT VFR BLD AUTO: 47.9 % (ref 37–48.5)
HGB BLD-MCNC: 15.1 GM/DL (ref 12–16)
IMM GRANULOCYTES # BLD AUTO: 0.03 K/UL (ref 0–0.04)
IMM GRANULOCYTES NFR BLD AUTO: 0.5 % (ref 0–0.5)
LYMPHOCYTES # BLD AUTO: 1.39 K/UL (ref 1–4.8)
MCH RBC QN AUTO: 27.7 PG (ref 27–31)
MCHC RBC AUTO-ENTMCNC: 31.5 G/DL (ref 32–36)
MCV RBC AUTO: 88 FL (ref 82–98)
NUCLEATED RBC (/100WBC) (OHS): 0 /100 WBC
PLATELET # BLD AUTO: 231 K/UL (ref 150–450)
PMV BLD AUTO: 10.8 FL (ref 9.2–12.9)
RBC # BLD AUTO: 5.45 M/UL (ref 4–5.4)
RELATIVE EOSINOPHIL (OHS): 1.4 %
RELATIVE LYMPHOCYTE (OHS): 24.1 % (ref 18–48)
RELATIVE MONOCYTE (OHS): 9.2 % (ref 4–15)
RELATIVE NEUTROPHIL (OHS): 64.3 % (ref 38–73)
WBC # BLD AUTO: 5.77 K/UL (ref 3.9–12.7)

## 2025-04-15 PROCEDURE — 85025 COMPLETE CBC W/AUTO DIFF WBC: CPT

## 2025-04-15 PROCEDURE — 99214 OFFICE O/P EST MOD 30 MIN: CPT | Mod: 25,S$PBB,, | Performed by: INTERNAL MEDICINE

## 2025-04-15 PROCEDURE — 99195 PHLEBOTOMY: CPT

## 2025-04-15 PROCEDURE — 36415 COLL VENOUS BLD VENIPUNCTURE: CPT

## 2025-04-15 PROCEDURE — 99213 OFFICE O/P EST LOW 20 MIN: CPT | Mod: PBBFAC,25 | Performed by: INTERNAL MEDICINE

## 2025-04-15 PROCEDURE — 3008F BODY MASS INDEX DOCD: CPT | Mod: CPTII,,, | Performed by: INTERNAL MEDICINE

## 2025-04-15 PROCEDURE — 3074F SYST BP LT 130 MM HG: CPT | Mod: CPTII,,, | Performed by: INTERNAL MEDICINE

## 2025-04-15 PROCEDURE — 3078F DIAST BP <80 MM HG: CPT | Mod: CPTII,,, | Performed by: INTERNAL MEDICINE

## 2025-04-15 PROCEDURE — 99999 PR PBB SHADOW E&M-EST. PATIENT-LVL III: CPT | Mod: PBBFAC,,, | Performed by: INTERNAL MEDICINE

## 2025-04-15 PROCEDURE — 1159F MED LIST DOCD IN RCRD: CPT | Mod: CPTII,,, | Performed by: INTERNAL MEDICINE

## 2025-04-15 PROCEDURE — 3066F NEPHROPATHY DOC TX: CPT | Mod: CPTII,,, | Performed by: INTERNAL MEDICINE

## 2025-04-15 RX ORDER — SODIUM CHLORIDE 0.9 % (FLUSH) 0.9 %
10 SYRINGE (ML) INJECTION
OUTPATIENT
Start: 2025-04-15

## 2025-04-15 RX ORDER — HEPARIN 100 UNIT/ML
500 SYRINGE INTRAVENOUS
OUTPATIENT
Start: 2025-04-15

## 2025-04-15 NOTE — DISCHARGE INSTRUCTIONS
.[facility]  [address]  [address] [address]  [phone] phone     [phone] fax  Hours of Operation: Monday- Friday 8:00am- 5:00pm  After hours phone  [phone]  Hematology / Oncology Physicians on call    Dr. Timbo Ballesteros           Nurse Practitioners:     Grace Molina, SHA Reyes, BECCA Urrutia, SHA Workman, SHA Joe, NP    Please don't hesitate to call if you have any concerns.      FALL PREVENTION   Falls often occur due to slipping, tripping or losing your balance. Here are ways to reduce your risk of falling again.   Was there anything that caused your fall that can be fixed, removed or replaced?   Make your home safe by keeping walkways clear of objects you may trip over.   Use non-slip pads under rugs.   Do not walk in poorly lit areas.   Do not stand on chairs or wobbly ladders.   Use caution when reaching overhead or looking upward. This position can cause a loss of balance.   Be sure your shoes fit properly, have non-slip bottoms and are in good condition.   Be cautious when going up and down stairs, curbs, and when walking on uneven sidewalks.   If your balance is poor, consider using a cane or walker.   If your fall was related to alcohol use, stop or limit alcohol intake.   If your fall was related to use of sleeping medicines, talk to your doctor about this. You may need to reduce your dosage at bedtime if you awaken during the night to go to the bathroom.   To reduce the need for nighttime bathroom trips:   Avoid drinking fluids for several hours before going to bed   Empty your bladder before going to bed   Men can keep a urinal at the bedside   © 5021-4210 [employer], [address] [place] [address], [place] [address]. All rights reserved. This information is not intended as a substitute for professional medical care. Always follow your healthcare  professional's instructions.    WAYS TO HELP PREVENT INFECTION        WASH YOUR HANDS OFTEN DURING THE DAY, ESPECIALLY BEFORE YOU EAT, AFTER USING THE BATHROOM, AND AFTER TOUCHING ANIMALS    STAY AWAY FROM PEOPLE WHO HAVE ILLNESSES YOU CAN CATCH; SUCH AS COLDS, FLU, CHICKEN POX    TRY TO AVOID CROWDS    STAY AWAY FROM CHILDREN WHO RECENTLY HAVE RECEIVED LIVE VIRUS VACCINES    MAINTAIN GOOD MOUTH CARE    DO NOT SQUEEZE OR SCRATCH PIMPLES    CLEAN CUTS & SCRAPES RIGHT AWAY AND DAILY UNTIL HEALED WITH WARM WATER, SOAP & AN ANTISEPTIC    AVOID CONTACT WITH LITTER BOXES, BIRD CAGES, & FISH TANKS    AVOID STANDING WATER, IE., BIRD BATHS, FLOWER POTS/VASES, OR HUMIDIFIERS    WEAR GLOVES WHEN GARDENING OR CLEANING UP AFTER OTHERS, ESPECIALLY BABIES & SMALL CHILDREN    DO NOT EAT RAW FISH, SEAFOOD, MEAT, OR EGGS

## 2025-04-15 NOTE — NURSING
.1 unit therapeutic phlebotomy performed via Left AC then discarded, pressure dressing applied.    Blood pressure  104/76 upon completion.  Pt denies any complaints of dizziness, lightheadedness, or faintness.   Patient ambulated out without difficulty.    Detail Level: Generalized

## 2025-04-15 NOTE — PROGRESS NOTES
Subjective:       Patient ID: Leydi Cordero is a 35 y.o. female.    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 patient has had phlebotomy with excellent response hemoglobin today of 15 g patient is tolerating phlebotomy well is here with help of     Past Medical History:   Diagnosis Date    Anxiety     -donor kidney transplant 2021    cPRA 100%, XM negative 3 arteries, 2 on common patch, WIT 40 min    Encounter for blood transfusion     ESRD (end stage renal disease)     Fibroma     H/O kidney transplant     Hypertension     Hypertensive nephropathy     Ovarian cyst     Pulmonary nodules 2023    Sleep apnea      Family History   Problem Relation Name Age of Onset    No Known Problems Mother      Colon cancer Father      Cancer Father      Hypertension Father      No Known Problems Sister      No Known Problems Brother      Kidney disease Maternal Aunt      Hypertension Maternal Aunt      Diabetes Maternal Uncle      Kidney disease Other Dad's nephew     Hypertension Other Dad's nephew      Social History[1]  Past Surgical History:   Procedure Laterality Date    BRONCHOSCOPY N/A 2023    Procedure: Bronchoscopy;  Surgeon: Denys Robbins MD;  Location: [address];  Service: Pulmonary;  Laterality: N/A;    COLD KNIFE CONIZATION OF CERVIX N/A 2023    Procedure: CONE BIOPSY, CERVIX, USING COLD KNIFE;  Surgeon: Jourdan Conklin MD;  Location: [facility] OR;  Service: OB/GYN;  Laterality: N/A;    COLPOSCOPY  2020    Needs follow up on or after 21    ESOPHAGOGASTRODUODENOSCOPY N/A 2022    Procedure: ESOPHAGOGASTRODUODENOSCOPY (EGD);  Surgeon: Essie Carvajal MD;  Location: [facility];  Service: Endoscopy;  Laterality: N/A;    hemodialysis access left arm      kidney removal       KIDNEY TRANSPLANT  04/15/2015    KIDNEY TRANSPLANT N/A 2021    Procedure: TRANSPLANT,  KIDNEY;  Surgeon: Fam Sutton MD;  Location: [address] FLR;  Service: Transplant;  Laterality: N/A;    NEPHRECTOMY      OVARIAN CYST REMOVAL      PARATHYROIDECTOMY      partial     PERCUTANEOUS NEPHROSTOMY Left 2/13/2021    Procedure: Creation, Nephrostomy, Percutaneous;  Surgeon: Elen Surgeon;  Location: [facility];  Service: Anesthesiology;  Laterality: Left;    right leg hemodialysis access      ROBOT-ASSISTED LAPAROSCOPIC SLEEVE GASTRECTOMY USING DA VELMA XI N/A 9/27/2022    Procedure: XI ROBOTIC SLEEVE GASTRECTOMY;  Surgeon: Cal Parekh MD;  Location: [facility];  Service: General;  Laterality: N/A;    transplant nephrectomy  12/01/2017       Labs:  Lab Results   Component Value Date    WBC 5.77 04/15/2025    HGB 15.1 04/15/2025    HCT 47.9 04/15/2025    MCV 88 04/15/2025     04/15/2025     BMP  Lab Results   Component Value Date     01/25/2025    K 4.3 01/25/2025     01/25/2025    CO2 22 (L) 01/25/2025    BUN 12 01/25/2025    CREATININE 0.9 01/25/2025    CALCIUM 9.1 01/25/2025    ANIONGAP 8 01/25/2025    ESTGFRAFRICA >60.0 07/11/2022    EGFRNONAA >60.0 07/11/2022     Lab Results   Component Value Date    ALT 19 01/25/2025    AST 18 01/25/2025    ALKPHOS 87 01/25/2025    BILITOT 0.4 01/25/2025       Lab Results   Component Value Date    IRON 71 01/11/2023    TIBC 337 01/11/2023    FERRITIN 358 (H) 01/14/2021     Lab Results   Component Value Date    SOQWIJCV43 290 01/11/2023     Lab Results   Component Value Date    FOLATE 5.7 01/20/2021     Lab Results   Component Value Date    TSH 0.811 01/11/2023         Review of Systems   Constitutional:  Negative for activity change, appetite change, chills, diaphoresis, fatigue, fever and unexpected weight change.   HENT:  Negative for congestion, dental problem, drooling, ear discharge, ear pain, facial swelling, hearing loss, mouth sores, nosebleeds, postnasal drip, rhinorrhea, sinus pressure, sneezing, sore throat, tinnitus, trouble swallowing and  voice change.    Eyes:  Negative for photophobia, pain, discharge, redness, itching and visual disturbance.   Respiratory:  Negative for cough, choking, chest tightness, shortness of breath, wheezing and stridor.    Cardiovascular:  Negative for chest pain, palpitations and leg swelling.   Gastrointestinal:  Negative for abdominal distention, abdominal pain, anal bleeding, blood in stool, constipation, diarrhea, nausea, rectal pain and vomiting.   Endocrine: Negative for cold intolerance, heat intolerance, polydipsia, polyphagia and polyuria.   Genitourinary:  Negative for decreased urine volume, difficulty urinating, dyspareunia, dysuria, enuresis, flank pain, frequency, genital sores, hematuria, menstrual problem, pelvic pain, urgency, vaginal bleeding, vaginal discharge and vaginal pain.   Musculoskeletal:  Negative for arthralgias, back pain, gait problem, joint swelling, myalgias, neck pain and neck stiffness.   Skin:  Negative for color change, pallor and rash.   Allergic/Immunologic: Negative for environmental allergies, food allergies and immunocompromised state.   Neurological:  Negative for dizziness, tremors, seizures, syncope, facial asymmetry, speech difficulty, weakness, light-headedness, numbness and headaches.   Hematological:  Negative for adenopathy. Does not bruise/bleed easily.   Psychiatric/Behavioral:  Negative for agitation, behavioral problems, confusion, decreased concentration, dysphoric mood, hallucinations, self-injury, sleep disturbance and suicidal ideas. The patient is not nervous/anxious and is not hyperactive.        Objective:      Physical Exam  Vitals reviewed.   Constitutional:       General: She is not in acute distress.     Appearance: She is well-developed. She is obese. She is not diaphoretic.   HENT:      Head: Normocephalic and atraumatic.      Right Ear: External ear normal.      Left Ear: External ear normal.      Nose: Nose normal.      Right Sinus: No maxillary sinus  tenderness or frontal sinus tenderness.      Left Sinus: No maxillary sinus tenderness or frontal sinus tenderness.      Mouth/Throat:      Pharynx: No oropharyngeal exudate.   Eyes:      General: Lids are normal. No scleral icterus.        Right eye: No discharge.         Left eye: No discharge.      Conjunctiva/sclera: Conjunctivae normal.      Right eye: Right conjunctiva is not injected. No hemorrhage.     Left eye: Left conjunctiva is not injected. No hemorrhage.     Pupils: Pupils are equal, round, and reactive to light.   Neck:      Thyroid: No thyromegaly.      Vascular: No JVD.      Trachea: No tracheal deviation.   Cardiovascular:      Rate and Rhythm: Normal rate.   Pulmonary:      Effort: Pulmonary effort is normal. No respiratory distress.      Breath sounds: No stridor.   Chest:      Chest wall: No tenderness.   Abdominal:      General: Bowel sounds are normal. There is no distension.      Palpations: Abdomen is soft. There is no hepatomegaly, splenomegaly or mass.      Tenderness: There is no abdominal tenderness. There is no rebound.   Musculoskeletal:         General: No tenderness. Normal range of motion.      Cervical back: Normal range of motion and neck supple.   Lymphadenopathy:      Cervical: No cervical adenopathy.      Upper Body:      Right upper body: No supraclavicular adenopathy.      Left upper body: No supraclavicular adenopathy.   Skin:     General: Skin is dry.      Findings: No erythema or rash.   Neurological:      Mental Status: She is alert and oriented to person, place, and time.      Cranial Nerves: No cranial nerve deficit.      Coordination: Coordination normal.   Psychiatric:         Behavior: Behavior normal.         Thought Content: Thought content normal.         Judgment: Judgment normal.             Assessment:      1. Post-renal transplant erythrocytosis    2. Immunodeficiency due to long term immunosuppressive drug therapy           Med Onc Chart Routing      Follow  up with physician . Return in 2-3 months can be seen by [facility]   Follow up with [facility]    Infusion scheduling note    Injection scheduling note Phlebotomy Q 2-3 months to keep hemoglobin near 15 g   Labs    Imaging    Pharmacy appointment    Other referrals                   Plan:      1 unit phlebotomy today.  Follow-up 2-3 months can be seen by [facility] again referral made for advanced 2D discontinue tobacco products.  Orders written reviewed        Gaurav Izquierdo Jr, MD FACP         [1]   Social History  Socioeconomic History    Marital status: Single   Tobacco Use    Smoking status: Every Day     Current packs/day: 0.00     Average packs/day: 0.3 packs/day for 12.6 years (3.1 ttl pk-yrs)     Types: Cigarettes     Start date:      Last attempt to quit: 2020     Years since quittin.7    Smokeless tobacco: Never   Substance and Sexual Activity    Alcohol use: No    Drug use: No    Sexual activity: Yes     Partners: Male     Birth control/protection: None     Social Drivers of Health     Financial Resource Strain: Low Risk  (2023)    Overall Financial Resource Strain (CARDIA)     Difficulty of Paying Living Expenses: Not hard at all   Food Insecurity: No Food Insecurity (2022)    Hunger Vital Sign     Worried About Running Out of Food in the Last Year: Never true     Ran Out of Food in the Last Year: Never true   Transportation Needs: No Transportation Needs (2022)    PRAPARE - Transportation     Lack of Transportation (Medical): No     Lack of Transportation (Non-Medical): No   Physical Activity: Inactive (2023)    Exercise Vital Sign     Days of Exercise per Week: 0 days     Minutes of Exercise per Session: 0 min   Stress: No Stress Concern Present (2023)    [place] [employer] - Occupational Stress Questionnaire     Feeling of Stress : Not at all   Housing Stability: Low Risk  (2022)    Housing Stability Vital Sign     Unable to Pay for Housing in the Last  Year: No     Number of Places Lived in the Last Year: 1     Unstable Housing in the Last Year: No

## 2025-04-19 ENCOUNTER — HOSPITAL ENCOUNTER (EMERGENCY)
Facility: HOSPITAL | Age: 35
Discharge: HOME | End: 2025-04-20
Attending: EMERGENCY MEDICINE
Payer: MEDICAID

## 2025-04-19 DIAGNOSIS — N39.0 URINARY TRACT INFECTION WITHOUT HEMATURIA, SITE UNSPECIFIED: ICD-10-CM

## 2025-04-19 DIAGNOSIS — K63.89 EPIPLOIC APPENDAGITIS: Primary | ICD-10-CM

## 2025-04-19 LAB
ABSOLUTE EOSINOPHIL (OHS): 0.07 K/UL
ABSOLUTE MONOCYTE (OHS): 0.64 K/UL (ref 0.3–1)
ABSOLUTE NEUTROPHIL COUNT (OHS): 7.23 K/UL (ref 1.8–7.7)
ALBUMIN SERPL BCP-MCNC: 3.5 G/DL (ref 3.5–5.2)
ALP SERPL-CCNC: 93 UNIT/L (ref 40–150)
ALT SERPL W/O P-5'-P-CCNC: 17 UNIT/L (ref 10–44)
ANION GAP (OHS): 9 MMOL/L (ref 8–16)
AST SERPL-CCNC: 17 UNIT/L (ref 11–45)
B-HCG UR QL: NEGATIVE
BACTERIA #/AREA URNS AUTO: ABNORMAL /HPF
BASOPHILS # BLD AUTO: 0.04 K/UL
BASOPHILS NFR BLD AUTO: 0.4 %
BILIRUB SERPL-MCNC: 0.2 MG/DL (ref 0.1–1)
BILIRUB UR QL STRIP.AUTO: NEGATIVE
BUN SERPL-MCNC: 11 MG/DL (ref 6–20)
CALCIUM SERPL-MCNC: 8.6 MG/DL (ref 8.7–10.5)
CHLORIDE SERPL-SCNC: 111 MMOL/L (ref 95–110)
CLARITY UR: ABNORMAL
CO2 SERPL-SCNC: 21 MMOL/L (ref 23–29)
COLOR UR AUTO: YELLOW
CREAT SERPL-MCNC: 1 MG/DL (ref 0.5–1.4)
ERYTHROCYTE [DISTWIDTH] IN BLOOD BY AUTOMATED COUNT: 13.3 % (ref 11.5–14.5)
GFR SERPLBLD CREATININE-BSD FMLA CKD-EPI: >60 ML/MIN/1.73/M2
GLUCOSE SERPL-MCNC: 112 MG/DL (ref 70–110)
GLUCOSE UR QL STRIP: NEGATIVE
HCT VFR BLD AUTO: 42.4 % (ref 37–48.5)
HGB BLD-MCNC: 13.7 GM/DL (ref 12–16)
HGB UR QL STRIP: NEGATIVE
HOLD SPECIMEN: NORMAL
IMM GRANULOCYTES # BLD AUTO: 0.03 K/UL (ref 0–0.04)
IMM GRANULOCYTES NFR BLD AUTO: 0.3 % (ref 0–0.5)
KETONES UR QL STRIP: ABNORMAL
LEUKOCYTE ESTERASE UR QL STRIP: ABNORMAL
LIPASE SERPL-CCNC: 77 U/L (ref 4–60)
LYMPHOCYTES # BLD AUTO: 1.66 K/UL (ref 1–4.8)
MCH RBC QN AUTO: 28.4 PG (ref 27–31)
MCHC RBC AUTO-ENTMCNC: 32.3 G/DL (ref 32–36)
MCV RBC AUTO: 88 FL (ref 82–98)
MICROSCOPIC COMMENT: ABNORMAL
NITRITE UR QL STRIP: NEGATIVE
NUCLEATED RBC (/100WBC) (OHS): 0 /100 WBC
PH UR STRIP: 6 [PH]
PLATELET # BLD AUTO: 230 K/UL (ref 150–450)
PMV BLD AUTO: 10.9 FL (ref 9.2–12.9)
POTASSIUM SERPL-SCNC: 4.3 MMOL/L (ref 3.5–5.1)
PROT SERPL-MCNC: 7.2 GM/DL (ref 6–8.4)
PROT UR QL STRIP: ABNORMAL
RBC # BLD AUTO: 4.83 M/UL (ref 4–5.4)
RBC #/AREA URNS AUTO: 13 /HPF (ref 0–4)
RELATIVE EOSINOPHIL (OHS): 0.7 %
RELATIVE LYMPHOCYTE (OHS): 17.2 % (ref 18–48)
RELATIVE MONOCYTE (OHS): 6.6 % (ref 4–15)
RELATIVE NEUTROPHIL (OHS): 74.8 % (ref 38–73)
SODIUM SERPL-SCNC: 141 MMOL/L (ref 136–145)
SP GR UR STRIP: >=1.03
SQUAMOUS #/AREA URNS AUTO: 8 /HPF
UROBILINOGEN UR STRIP-ACNC: ABNORMAL EU/DL
WBC # BLD AUTO: 9.67 K/UL (ref 3.9–12.7)
WBC #/AREA URNS AUTO: 15 /HPF (ref 0–5)

## 2025-04-19 PROCEDURE — 25500020 PHARM REV CODE 255: Performed by: EMERGENCY MEDICINE

## 2025-04-19 PROCEDURE — 96374 THER/PROPH/DIAG INJ IV PUSH: CPT

## 2025-04-19 PROCEDURE — 63600175 PHARM REV CODE 636 W HCPCS: Performed by: EMERGENCY MEDICINE

## 2025-04-19 PROCEDURE — 83690 ASSAY OF LIPASE: CPT | Performed by: EMERGENCY MEDICINE

## 2025-04-19 PROCEDURE — 85025 COMPLETE CBC W/AUTO DIFF WBC: CPT | Performed by: EMERGENCY MEDICINE

## 2025-04-19 PROCEDURE — 99285 EMERGENCY DEPT VISIT HI MDM: CPT | Mod: 25

## 2025-04-19 PROCEDURE — 81003 URINALYSIS AUTO W/O SCOPE: CPT | Performed by: EMERGENCY MEDICINE

## 2025-04-19 PROCEDURE — 87086 URINE CULTURE/COLONY COUNT: CPT | Performed by: EMERGENCY MEDICINE

## 2025-04-19 PROCEDURE — 80053 COMPREHEN METABOLIC PANEL: CPT | Performed by: EMERGENCY MEDICINE

## 2025-04-19 PROCEDURE — 81025 URINE PREGNANCY TEST: CPT | Performed by: EMERGENCY MEDICINE

## 2025-04-19 RX ORDER — CEFTRIAXONE 1 G/1
1 INJECTION, POWDER, FOR SOLUTION INTRAMUSCULAR; INTRAVENOUS
Status: COMPLETED | OUTPATIENT
Start: 2025-04-19 | End: 2025-04-19

## 2025-04-19 RX ADMIN — IOHEXOL 100 ML: 350 INJECTION, SOLUTION INTRAVENOUS at 10:04

## 2025-04-19 RX ADMIN — CEFTRIAXONE 1 G: 1 INJECTION, POWDER, FOR SOLUTION INTRAMUSCULAR; INTRAVENOUS at 10:04

## 2025-04-20 VITALS
TEMPERATURE: 98 F | BODY MASS INDEX: 38.41 KG/M2 | OXYGEN SATURATION: 99 % | DIASTOLIC BLOOD PRESSURE: 86 MMHG | SYSTOLIC BLOOD PRESSURE: 127 MMHG | WEIGHT: 239 LBS | HEIGHT: 66 IN | HEART RATE: 62 BPM | RESPIRATION RATE: 18 BRPM

## 2025-04-20 PROCEDURE — 63600175 PHARM REV CODE 636 W HCPCS: Performed by: EMERGENCY MEDICINE

## 2025-04-20 PROCEDURE — 96375 TX/PRO/DX INJ NEW DRUG ADDON: CPT

## 2025-04-20 RX ORDER — ONDANSETRON HYDROCHLORIDE 2 MG/ML
4 INJECTION, SOLUTION INTRAVENOUS
Status: COMPLETED | OUTPATIENT
Start: 2025-04-20 | End: 2025-04-20

## 2025-04-20 RX ORDER — CEFDINIR 300 MG/1
300 CAPSULE ORAL 2 TIMES DAILY
Qty: 14 CAPSULE | Refills: 0 | Status: SHIPPED | OUTPATIENT
Start: 2025-04-20 | End: 2025-04-27

## 2025-04-20 RX ORDER — MORPHINE SULFATE 15 MG/1
7.5 TABLET ORAL EVERY 6 HOURS PRN
Qty: 12 TABLET | Refills: 0 | Status: SHIPPED | OUTPATIENT
Start: 2025-04-20

## 2025-04-20 RX ORDER — MORPHINE SULFATE 4 MG/ML
4 INJECTION, SOLUTION INTRAMUSCULAR; INTRAVENOUS
Refills: 0 | Status: COMPLETED | OUTPATIENT
Start: 2025-04-20 | End: 2025-04-20

## 2025-04-20 RX ADMIN — MORPHINE SULFATE 4 MG: 4 INJECTION INTRAVENOUS at 02:04

## 2025-04-20 RX ADMIN — ONDANSETRON 4 MG: 2 INJECTION INTRAMUSCULAR; INTRAVENOUS at 02:04

## 2025-04-20 NOTE — ED PROVIDER NOTES
SCRIBE #1 NOTE: I, Larissa Moore, am scribing for, and in the presence of, Jose Arenas MD. I have scribed the entire note.       History     Chief Complaint   Patient presents with    Abdominal Pain     Lower abdominal pain x 2 days; denies n/v/d/c     Review of patient's allergies indicates:   Allergen Reactions    Heparin analogues Rash         History of Present Illness     HPI    2025, 9:17 PM  History obtained from the  medical recorders and patient with the help of [facility] services.  Family members are present at bedside.  Interpretor: [id number]      History of Present Illness: Leydi Cordero is a 35 y.o. female patient with a PMHx of HTN, kidney transplant, and ovarian cysts who presents to the Emergency Department for evaluation of lower abdominal pain which onset 2 days ago. Symptoms are constant and moderate in severity. No mitigating or exacerbating factors reported. Patient denies any N/V/D, dysuria, frequency, and all other sxs at this time. No prior Tx provided. No further complaints or concerns at this time.       Arrival mode: Personal vehicle    PCP: Gali, Primary Doctor        Past Medical History:  Past Medical History:   Diagnosis Date    Anxiety     -donor kidney transplant 2021    cPRA 100%, XM negative 3 arteries, 2 on common patch, WIT 40 min    Encounter for blood transfusion     ESRD (end stage renal disease)     Fibroma     H/O kidney transplant     Hypertension     Hypertensive nephropathy     Ovarian cyst     Pulmonary nodules 2023    Sleep apnea        Past Surgical History:  Past Surgical History:   Procedure Laterality Date    BRONCHOSCOPY N/A 2023    Procedure: Bronchoscopy;  Surgeon: Denys Robbins MD;  Location: [address];  Service: Pulmonary;  Laterality: N/A;    COLD KNIFE CONIZATION OF CERVIX N/A 2023    Procedure: CONE BIOPSY, CERVIX, USING COLD KNIFE;  Surgeon: Jourdan Conklin MD;  Location: [facility] OR;  Service:  OB/GYN;  Laterality: N/A;    COLPOSCOPY  2020    Needs follow up on or after 21    ESOPHAGOGASTRODUODENOSCOPY N/A 2022    Procedure: ESOPHAGOGASTRODUODENOSCOPY (EGD);  Surgeon: Essie Carvajal MD;  Location: [facility];  Service: Endoscopy;  Laterality: N/A;    hemodialysis access left arm      kidney removal       KIDNEY TRANSPLANT  04/15/2015    KIDNEY TRANSPLANT N/A 2021    Procedure: TRANSPLANT, KIDNEY;  Surgeon: Fam Sutton MD;  Location: [address];  Service: Transplant;  Laterality: N/A;    NEPHRECTOMY      OVARIAN CYST REMOVAL      PARATHYROIDECTOMY      partial     PERCUTANEOUS NEPHROSTOMY Left 2021    Procedure: Creation, Nephrostomy, Percutaneous;  Surgeon: Elen Surgeon;  Location: [facility];  Service: Anesthesiology;  Laterality: Left;    right leg hemodialysis access      ROBOT-ASSISTED LAPAROSCOPIC SLEEVE GASTRECTOMY USING DA VELMA XI N/A 2022    Procedure: XI ROBOTIC SLEEVE GASTRECTOMY;  Surgeon: Cal Parekh MD;  Location: [facility];  Service: General;  Laterality: N/A;    transplant nephrectomy  2017         Family History:  Family History   Problem Relation Name Age of Onset    No Known Problems Mother      Colon cancer Father      Cancer Father      Hypertension Father      No Known Problems Sister      No Known Problems Brother      Kidney disease Maternal Aunt      Hypertension Maternal Aunt      Diabetes Maternal Uncle      Kidney disease Other Dad's nephew     Hypertension Other Dad's nephew        Social History:  Social History     Tobacco Use    Smoking status: Every Day     Current packs/day: 0.00     Average packs/day: 0.3 packs/day for 12.6 years (3.1 ttl pk-yrs)     Types: Cigarettes     Start date:      Last attempt to quit: 2020     Years since quittin.7    Smokeless tobacco: Never   Substance and Sexual Activity    Alcohol use: No    Drug use: No    Sexual activity: Yes     Partners: Male     Birth control/protection: None         Review of Systems     Review of Systems   Constitutional:  Negative for fever.   HENT:  Negative for sore throat.    Respiratory:  Negative for shortness of breath.    Cardiovascular:  Negative for chest pain.   Gastrointestinal:  Positive for abdominal pain (Lower). Negative for diarrhea, nausea and vomiting.   Genitourinary:  Negative for dysuria, frequency, vaginal bleeding and vaginal discharge.   Musculoskeletal:  Negative for back pain.   Skin:  Negative for rash.   Neurological:  Negative for weakness.   Hematological:  Does not bruise/bleed easily.   All other systems reviewed and are negative.     Physical Exam     Initial Vitals [04/19/25 1913]   BP Pulse Resp Temp SpO2   124/74 89 16 98.3 °F (36.8 °C) 97 %      MAP       --          Physical Exam  Nursing Notes and Vital Signs Reviewed.  Constitutional: Patient is in no acute distress. Well-developed and well-nourished.  Head: Atraumatic. Normocephalic.  Eyes: PERRL. EOM intact. Conjunctivae are not pale. No scleral icterus.  ENT: Mucous membranes are moist.   Neck: Supple. Full ROM.   Cardiovascular: Regular rate. Regular rhythm. No murmurs, rubs, or gallops. Distal pulses are 2+ and symmetric.  Pulmonary/Chest: No respiratory distress. Clear to auscultation bilaterally. No wheezing or rales.  Abdominal: Soft and non-distended. No rebound, guarding, or rigidity. Good bowel sounds. Diffuse lower abdominal tenderness.  Genitourinary: No CVA tenderness  Musculoskeletal: Moves all extremities. No obvious deformities. No edema. No calf tenderness.  Skin: Warm and dry.  Neurological:  Alert, awake, and appropriate.  Normal speech.  No acute focal neurological deficits are appreciated.  Psychiatric: Normal affect. Good eye contact. Appropriate in content.     ED Course   Procedures  ED Vital Signs:  Vitals:    04/19/25 2016 04/19/25 2030 04/19/25 2032 04/19/25 2048   BP: 121/74  111/73 115/64   Pulse: 76  91 80   Resp:       Temp:       TempSrc:      "  SpO2: 98%  97% 97%   Weight:  108.4 kg (239 lb)     Height:  5' 6" (1.676 m)      04/19/25 2102 04/19/25 2118 04/19/25 2141 04/19/25 2147   BP: (!) 100/57 121/70 (!) 104/57 (!) 90/53   Pulse: 88 91 90 77   Resp:       Temp:       TempSrc:       SpO2: 99% 100% 99% 100%   Weight:       Height:        04/19/25 2235 04/19/25 2303 04/19/25 2331 04/19/25 2348   BP: (!) 129/96 113/66 121/80 125/79   Pulse: 78 77 72 89   Resp:       Temp:       TempSrc:       SpO2: 97% 99% 97% 99%   Weight:       Height:        04/20/25 0153 04/20/25 0200 04/20/25 0208   BP: 117/63  127/86   Pulse: 73  62   Resp:  18    Temp:      TempSrc:      SpO2: 98%  99%   Weight:      Height:          Abnormal Lab Results:  Labs Reviewed   COMPREHENSIVE METABOLIC PANEL - Abnormal       Result Value    Sodium 141      Potassium 4.3      Chloride 111 (*)     CO2 21 (*)     Glucose 112 (*)     BUN 11      Creatinine 1.0      Calcium 8.6 (*)     Protein Total 7.2      Albumin 3.5      Bilirubin Total 0.2      ALP 93      AST 17      ALT 17      Anion Gap 9      eGFR >60     URINALYSIS, REFLEX TO URINE CULTURE - Abnormal    Color, UA Yellow      Appearance, UA Hazy (*)     pH, UA 6.0      Spec Grav UA >=1.030 (*)     Protein, UA Trace (*)     Glucose, UA Negative      Ketones, UA Trace (*)     Bilirubin, UA Negative      Blood, UA Negative      Nitrites, UA Negative      Urobilinogen, UA 2.0-3.0 (*)     Leukocyte Esterase, UA 1+ (*)    LIPASE - Abnormal    Lipase Level 77 (*)    CBC WITH DIFFERENTIAL - Abnormal    WBC 9.67      RBC 4.83      HGB 13.7      HCT 42.4      MCV 88      MCH 28.4      MCHC 32.3      RDW 13.3      Platelet Count 230      MPV 10.9      Nucleated RBC 0      Neut % 74.8 (*)     Lymph % 17.2 (*)     Mono % 6.6      Eos % 0.7      Basophil % 0.4      Imm Grans % 0.3      Neut # 7.23      Lymph # 1.66      Mono # 0.64      Eos # 0.07      Baso # 0.04      Imm Grans # 0.03     URINALYSIS MICROSCOPIC - Abnormal    RBC, UA 13 (*)     " WBC, UA 15 (*)     Bacteria, UA Moderate (*)     Squamous Epithelial Cells, UA 8      Microscopic Comment       PREGNANCY TEST, URINE RAPID - Normal    hCG Qualitative, Urine Negative     CULTURE, URINE   CBC W/ AUTO DIFFERENTIAL    Narrative:     The following orders were created for panel order CBC auto differential.  Procedure                               Abnormality         Status                     ---------                               -----------         ------                     CBC with Differential[[id number]]       Abnormal            Final result                 Please view results for these tests on the individual orders.   [facility] [place] URINE HOLD    Extra Tube Hold for add-ons.          All Lab Results:  Results for orders placed or performed during the hospital encounter of 04/19/25   Comprehensive metabolic panel    Collection Time: 04/19/25  9:26 PM   Result Value Ref Range    Sodium 141 136 - 145 mmol/L    Potassium 4.3 3.5 - 5.1 mmol/L    Chloride 111 (H) 95 - 110 mmol/L    CO2 21 (L) 23 - 29 mmol/L    Glucose 112 (H) 70 - 110 mg/dL    BUN 11 6 - 20 mg/dL    Creatinine 1.0 0.5 - 1.4 mg/dL    Calcium 8.6 (L) 8.7 - 10.5 mg/dL    Protein Total 7.2 6.0 - 8.4 gm/dL    Albumin 3.5 3.5 - 5.2 g/dL    Bilirubin Total 0.2 0.1 - 1.0 mg/dL    ALP 93 40 - 150 unit/L    AST 17 11 - 45 unit/L    ALT 17 10 - 44 unit/L    Anion Gap 9 8 - 16 mmol/L    eGFR >60 >60 mL/min/1.73/m2   Lipase    Collection Time: 04/19/25  9:26 PM   Result Value Ref Range    Lipase Level 77 (H) 4 - 60 U/L   CBC with Differential    Collection Time: 04/19/25  9:26 PM   Result Value Ref Range    WBC 9.67 3.90 - 12.70 K/uL    RBC 4.83 4.00 - 5.40 M/uL    HGB 13.7 12.0 - 16.0 gm/dL    HCT 42.4 37.0 - 48.5 %    MCV 88 82 - 98 fL    MCH 28.4 27.0 - 31.0 pg    MCHC 32.3 32.0 - 36.0 g/dL    RDW 13.3 11.5 - 14.5 %    Platelet Count 230 150 - 450 K/uL    MPV 10.9 9.2 - 12.9 fL    Nucleated RBC 0 <=0 /100 WBC    Neut % 74.8 (H) 38 - 73 %    Lymph %  17.2 (L) 18 - 48 %    Mono % 6.6 4 - 15 %    Eos % 0.7 <=8 %    Basophil % 0.4 <=1.9 %    Imm Grans % 0.3 0.0 - 0.5 %    Neut # 7.23 1.8 - 7.7 K/uL    Lymph # 1.66 1 - 4.8 K/uL    Mono # 0.64 0.3 - 1 K/uL    Eos # 0.07 <=0.5 K/uL    Baso # 0.04 <=0.2 K/uL    Imm Grans # 0.03 0.00 - 0.04 K/uL   Urinalysis, Reflex to Urine Culture    Collection Time: 04/19/25  9:37 PM    Specimen: Urine   Result Value Ref Range    Color, UA Yellow Straw, Fransisca, Yellow, Light-Orange    Appearance, UA Hazy (A) Clear    pH, UA 6.0 5.0 - 8.0    Spec Grav UA >=1.030 (A) 1.005 - 1.030    Protein, UA Trace (A) Negative    Glucose, UA Negative Negative    Ketones, UA Trace (A) Negative    Bilirubin, UA Negative Negative    Blood, UA Negative Negative    Nitrites, UA Negative Negative    Urobilinogen, UA 2.0-3.0 (A) <2.0 EU/dL    Leukocyte Esterase, UA 1+ (A) Negative   [facility] [place] URINE HOLD    Collection Time: 04/19/25  9:37 PM   Result Value Ref Range    Extra Tube Hold for add-ons.    Urinalysis Microscopic    Collection Time: 04/19/25  9:37 PM   Result Value Ref Range    RBC, UA 13 (H) 0 - 4 /HPF    WBC, UA 15 (H) 0 - 5 /HPF    Bacteria, UA Moderate (A) None, Rare, Occasional /HPF    Squamous Epithelial Cells, UA 8 /HPF    Microscopic Comment     Pregnancy, urine rapid    Collection Time: 04/19/25  9:37 PM   Result Value Ref Range    hCG Qualitative, Urine Negative Negative     *Note: Due to a large number of results and/or encounters for the requested time period, some results have not been displayed. A complete set of results can be found in Results Review.       Imaging Results:  Imaging Results              CT Abdomen Pelvis With IV Contrast NO Oral Contrast (In process)                 Type of Interpretation: Outside Written Report.  Radiology Procedure Done: Abdomen/Pelvis CT with Contrast.  Interpretation: Findings likely represent epiploic appendage-itis involving the proximal sigmoid colon. No associated focal fluid collections or  abscess formation. No pneumatosis. No bowel obstruction. No intraperitoneal free fluid or free air.  Radiologist: Tenzin Dumont MD        No EKG was ordered.           The Emergency Provider reviewed the vital signs and test results, which are outlined above.     ED Discussion     1:41 AM: Reassessed pt at this time. Discussed with patient and/or family/caretaker all pertinent ED information and results. Discussed pt dx and plan of tx. Gave the patient and family all f/u and return to the ED instructions. All questions and concerns were addressed at this time. Patient and/or family/caretaker expresses understanding of information and instructions, and is comfortable with plan to discharge. Pt is stable for discharge.     I discussed with patient and/or family/caretaker that evaluation in the ED does not suggest any emergent or life threatening medical conditions requiring immediate intervention beyond what was provided in the ED, and I believe patient is safe for discharge.  Regardless, an unremarkable evaluation in the ED does not preclude the development or presence of a serious of life threatening condition. As such, I instructed that the patient is to return immediately for any worsening or change in current symptoms.    ED Course as of 04/20/25 0311   Sun Apr 20, 2025   0141 Repeat abdominal exam is benign.  [BA]      ED Course User Index  [BA] Jose Arenas MD     Medical Decision Making  Amount and/or Complexity of Data Reviewed  Labs: ordered. Decision-making details documented in ED Course.  Radiology: ordered. Decision-making details documented in ED Course.    Risk  Prescription drug management.  Risk Details: Differential diagnosis;  Gastroenteritis, Bowel obstruction, Colitis, Diverticulitis, Cholecystitis, Appendicitis, Perforated bowel, Herniation, Infectious etiology, UTI, Pyelonephritis,  Biliary obstruction, kidney stone                 ED Medication(s):  Medications   cefTRIAXone injection 1 g  (1 g Intravenous Given 4/19/25 2255)   iohexoL (OMNIPAQUE 350) injection 100 mL (100 mLs Intravenous Given 4/19/25 2248)   morphine injection 4 mg (4 mg Intravenous Given 4/20/25 0200)   ondansetron injection 4 mg (4 mg Intravenous Given 4/20/25 0200)       Discharge Medication List as of 4/20/2025  1:42 AM        START taking these medications    Details   cefdinir (OMNICEF) 300 MG capsule Take 1 capsule (300 mg total) by mouth 2 (two) times daily. for 7 days, Starting Sun 4/20/2025, Until Sun 4/27/2025, Print      morphine (MSIR) 15 MG tablet Take 0.5 tablets (7.5 mg total) by mouth every 6 (six) hours as needed for Pain., Starting Sun 4/20/2025, Print              Follow-up Information       Your Primary Care Provider. Schedule an appointment as soon as possible for a visit in 2 days.    Why: For re-evaluation and further treatment             [facility] - Emergency Dept.. Go today.    Specialty: Emergency Medicine  Why: If symptoms worsen, For re-evaluation and further treatment, As needed  Contact information:  [id number] [place] [address] [phone]                               Scribe Attestation:   Scribe #1: I performed the above scribed service and the documentation accurately describes the services I performed. I attest to the accuracy of the note.     Attending:   Physician Attestation Statement for Scribe #1: I, No att. providers found, personally performed the services described in this documentation, as scribed by Larissa Moore, in my presence, and it is both accurate and complete.           Clinical Impression       ICD-10-CM ICD-9-CM   1. Epiploic appendagitis  K63.89 569.89   2. Urinary tract infection without hematuria, site unspecified  N39.0 599.0       Disposition:   Disposition: Discharged  Condition: Stable       Jose Arenas MD  04/20/25 6429

## 2025-04-21 LAB — BACTERIA UR CULT: NORMAL

## 2025-05-21 ENCOUNTER — LAB VISIT (OUTPATIENT)
Dept: LAB | Facility: HOSPITAL | Age: 35
End: 2025-05-21
Attending: INTERNAL MEDICINE
Payer: MEDICAID

## 2025-05-21 DIAGNOSIS — D84.9 IMMUNOSUPPRESSION: ICD-10-CM

## 2025-05-21 DIAGNOSIS — Z94.0 KIDNEY REPLACED BY TRANSPLANT: ICD-10-CM

## 2025-05-21 DIAGNOSIS — N25.81 SECONDARY HYPERPARATHYROIDISM OF RENAL ORIGIN: ICD-10-CM

## 2025-05-21 LAB
25(OH)D3+25(OH)D2 SERPL-MCNC: 24 NG/ML (ref 30–96)
ALBUMIN SERPL BCP-MCNC: 3.4 G/DL (ref 3.5–5.2)
ANION GAP (OHS): 9 MMOL/L (ref 8–16)
BUN SERPL-MCNC: 11 MG/DL (ref 6–20)
CALCIUM SERPL-MCNC: 8.5 MG/DL (ref 8.7–10.5)
CHLORIDE SERPL-SCNC: 108 MMOL/L (ref 95–110)
CO2 SERPL-SCNC: 21 MMOL/L (ref 23–29)
CREAT SERPL-MCNC: 0.8 MG/DL (ref 0.5–1.4)
GFR SERPLBLD CREATININE-BSD FMLA CKD-EPI: >60 ML/MIN/1.73/M2
GLUCOSE SERPL-MCNC: 80 MG/DL (ref 70–110)
MAGNESIUM SERPL-MCNC: 1.8 MG/DL (ref 1.6–2.6)
PHOSPHATE SERPL-MCNC: 2.1 MG/DL (ref 2.7–4.5)
POTASSIUM SERPL-SCNC: 4 MMOL/L (ref 3.5–5.1)
PTH-INTACT SERPL-MCNC: 74.8 PG/ML (ref 9–77)
SODIUM SERPL-SCNC: 138 MMOL/L (ref 136–145)

## 2025-05-21 PROCEDURE — 83735 ASSAY OF MAGNESIUM: CPT

## 2025-05-21 PROCEDURE — 80197 ASSAY OF TACROLIMUS: CPT

## 2025-05-21 PROCEDURE — 83970 ASSAY OF PARATHORMONE: CPT

## 2025-05-21 PROCEDURE — 82306 VITAMIN D 25 HYDROXY: CPT

## 2025-05-21 PROCEDURE — 80069 RENAL FUNCTION PANEL: CPT

## 2025-05-21 PROCEDURE — 36415 COLL VENOUS BLD VENIPUNCTURE: CPT

## 2025-05-22 LAB — TACROLIMUS BLD-MCNC: 4.5 NG/ML (ref 5–15)

## 2025-05-27 ENCOUNTER — OFFICE VISIT (OUTPATIENT)
Dept: NEUROLOGY | Facility: CLINIC | Age: 35
End: 2025-05-27
Payer: MEDICAID

## 2025-05-27 VITALS
WEIGHT: 252 LBS | HEIGHT: 66 IN | DIASTOLIC BLOOD PRESSURE: 77 MMHG | SYSTOLIC BLOOD PRESSURE: 112 MMHG | BODY MASS INDEX: 40.5 KG/M2 | HEART RATE: 89 BPM

## 2025-05-27 DIAGNOSIS — R29.898 HAND WEAKNESS: ICD-10-CM

## 2025-05-27 DIAGNOSIS — G56.03 CARPAL TUNNEL SYNDROME ON BOTH SIDES: Primary | ICD-10-CM

## 2025-05-27 PROCEDURE — 3008F BODY MASS INDEX DOCD: CPT | Mod: CPTII,,, | Performed by: PHYSICAL MEDICINE & REHABILITATION

## 2025-05-27 PROCEDURE — 99214 OFFICE O/P EST MOD 30 MIN: CPT | Mod: PBBFAC | Performed by: PHYSICAL MEDICINE & REHABILITATION

## 2025-05-27 PROCEDURE — 3066F NEPHROPATHY DOC TX: CPT | Mod: CPTII,,, | Performed by: PHYSICAL MEDICINE & REHABILITATION

## 2025-05-27 PROCEDURE — 99999 PR PBB SHADOW E&M-EST. PATIENT-LVL IV: CPT | Mod: PBBFAC,,, | Performed by: PHYSICAL MEDICINE & REHABILITATION

## 2025-05-27 PROCEDURE — 3074F SYST BP LT 130 MM HG: CPT | Mod: CPTII,,, | Performed by: PHYSICAL MEDICINE & REHABILITATION

## 2025-05-27 PROCEDURE — 3078F DIAST BP <80 MM HG: CPT | Mod: CPTII,,, | Performed by: PHYSICAL MEDICINE & REHABILITATION

## 2025-05-27 PROCEDURE — 99202 OFFICE O/P NEW SF 15 MIN: CPT | Mod: S$PBB,,, | Performed by: PHYSICAL MEDICINE & REHABILITATION

## 2025-05-28 ENCOUNTER — OFFICE VISIT (OUTPATIENT)
Dept: NEPHROLOGY | Facility: CLINIC | Age: 35
End: 2025-05-28
Payer: MEDICAID

## 2025-05-28 VITALS
HEART RATE: 80 BPM | WEIGHT: 250.25 LBS | DIASTOLIC BLOOD PRESSURE: 60 MMHG | HEIGHT: 66 IN | SYSTOLIC BLOOD PRESSURE: 120 MMHG | BODY MASS INDEX: 40.22 KG/M2

## 2025-05-28 DIAGNOSIS — Z94.0 KIDNEY TRANSPLANT STATUS: Primary | ICD-10-CM

## 2025-05-28 DIAGNOSIS — N25.81 SECONDARY HYPERPARATHYROIDISM OF RENAL ORIGIN: ICD-10-CM

## 2025-05-28 DIAGNOSIS — D84.9 IMMUNOSUPPRESSION: ICD-10-CM

## 2025-05-28 PROCEDURE — 99999 PR PBB SHADOW E&M-EST. PATIENT-LVL III: CPT | Mod: PBBFAC,,, | Performed by: INTERNAL MEDICINE

## 2025-05-28 PROCEDURE — 3074F SYST BP LT 130 MM HG: CPT | Mod: CPTII,,, | Performed by: INTERNAL MEDICINE

## 2025-05-28 PROCEDURE — 1159F MED LIST DOCD IN RCRD: CPT | Mod: CPTII,,, | Performed by: INTERNAL MEDICINE

## 2025-05-28 PROCEDURE — 1160F RVW MEDS BY RX/DR IN RCRD: CPT | Mod: CPTII,,, | Performed by: INTERNAL MEDICINE

## 2025-05-28 PROCEDURE — 99213 OFFICE O/P EST LOW 20 MIN: CPT | Mod: PBBFAC | Performed by: INTERNAL MEDICINE

## 2025-05-28 PROCEDURE — 3066F NEPHROPATHY DOC TX: CPT | Mod: CPTII,,, | Performed by: INTERNAL MEDICINE

## 2025-05-28 PROCEDURE — 3008F BODY MASS INDEX DOCD: CPT | Mod: CPTII,,, | Performed by: INTERNAL MEDICINE

## 2025-05-28 PROCEDURE — 3078F DIAST BP <80 MM HG: CPT | Mod: CPTII,,, | Performed by: INTERNAL MEDICINE

## 2025-05-28 PROCEDURE — 99214 OFFICE O/P EST MOD 30 MIN: CPT | Mod: S$PBB,,, | Performed by: INTERNAL MEDICINE

## 2025-05-28 NOTE — PROGRESS NOTES
"Subjective:       Patient ID: Leydi Cordero is a 35 y.o. female.    Chief Complaint:  Kidney transplant status, immunosuppression    HPI    She presents to clinic today for routine follow-up.  Since her last office visit she has been doing well and has no specific or new complaints.  Her laboratory studies and medications were reviewed.  All Nephrology related questions were answered to her satisfaction.    Review of Systems   Constitutional: Negative.    HENT: Negative.     Respiratory: Negative.     Cardiovascular: Negative.    Gastrointestinal: Negative.    Genitourinary: Negative.    Musculoskeletal: Negative.    Skin: Negative.        /60 (BP Location: Right arm, Patient Position: Sitting)   Pulse 80   Ht 5' 6" (1.676 m)   Wt 113.5 kg (250 lb 3.6 oz)   BMI 40.39 kg/m²     Lab Results   Component Value Date    WBC 9.67 04/19/2025    HGB 13.7 04/19/2025    HCT 42.4 04/19/2025    MCV 88 04/19/2025     04/19/2025      BMP  Lab Results   Component Value Date     05/21/2025    K 4.0 05/21/2025     05/21/2025    CO2 21 (L) 05/21/2025    BUN 11 05/21/2025    CREATININE 0.8 05/21/2025    CALCIUM 8.5 (L) 05/21/2025    ANIONGAP 9 05/21/2025    ESTGFRAFRICA >60.0 07/11/2022    EGFRNONAA >60.0 07/11/2022     CMP  Sodium   Date Value Ref Range Status   05/21/2025 138 136 - 145 mmol/L Final   01/25/2025 139 136 - 145 mmol/L Final     Potassium   Date Value Ref Range Status   05/21/2025 4.0 3.5 - 5.1 mmol/L Final   01/25/2025 4.3 3.5 - 5.1 mmol/L Final     Chloride   Date Value Ref Range Status   05/21/2025 108 95 - 110 mmol/L Final   01/25/2025 109 95 - 110 mmol/L Final     CO2   Date Value Ref Range Status   05/21/2025 21 (L) 23 - 29 mmol/L Final   01/25/2025 22 (L) 23 - 29 mmol/L Final     Glucose   Date Value Ref Range Status   05/21/2025 80 70 - 110 mg/dL Final   01/25/2025 66 (L) 70 - 110 mg/dL Final     BUN   Date Value Ref Range Status   05/21/2025 11 6 - 20 mg/dL Final "     Creatinine   Date Value Ref Range Status   05/21/2025 0.8 0.5 - 1.4 mg/dL Final     Calcium   Date Value Ref Range Status   05/21/2025 8.5 (L) 8.7 - 10.5 mg/dL Final   01/25/2025 9.1 8.7 - 10.5 mg/dL Final     Protein Total   Date Value Ref Range Status   04/19/2025 7.2 6.0 - 8.4 gm/dL Final     Comment:     For upper or mid abdominal pain.     Total Protein   Date Value Ref Range Status   01/25/2025 7.6 6.0 - 8.4 g/dL Final     Albumin   Date Value Ref Range Status   05/21/2025 3.4 (L) 3.5 - 5.2 g/dL Final   01/25/2025 3.6 3.5 - 5.2 g/dL Final   01/25/2025 3.6 3.5 - 5.2 g/dL Final     Total Bilirubin   Date Value Ref Range Status   01/25/2025 0.4 0.1 - 1.0 mg/dL Final     Comment:     For infants and newborns, interpretation of results should be based  on gestational age, weight and in agreement with clinical  observations.    Premature Infant recommended reference ranges:  Up to 24 hours.............<8.0 mg/dL  Up to 48 hours............<12.0 mg/dL  3-5 days..................<15.0 mg/dL  6-29 days.................<15.0 mg/dL       Bilirubin Total   Date Value Ref Range Status   04/19/2025 0.2 0.1 - 1.0 mg/dL Final     Comment:     For upper or mid abdominal pain.  For infants and newborns, interpretation of results should be based   on gestational age, weight and in agreement with clinical   observations.    Premature Infant recommended reference ranges:   0-24 hours:  <8.0 mg/dL   24-48 hours: <12.0 mg/dL   3-5 days:    <15.0 mg/dL   6-29 days:   <15.0 mg/dL     Alkaline Phosphatase   Date Value Ref Range Status   01/25/2025 87 40 - 150 U/L Final     ALP   Date Value Ref Range Status   04/19/2025 93 40 - 150 unit/L Final     Comment:     For upper or mid abdominal pain.     AST   Date Value Ref Range Status   04/19/2025 17 11 - 45 unit/L Final     Comment:     For upper or mid abdominal pain.   01/25/2025 18 10 - 40 U/L Final     ALT   Date Value Ref Range Status   04/19/2025 17 10 - 44 unit/L Final      Comment:     For upper or mid abdominal pain.   01/25/2025 19 10 - 44 U/L Final     Anion Gap   Date Value Ref Range Status   05/21/2025 9 8 - 16 mmol/L Final     eGFR if    Date Value Ref Range Status   07/11/2022 >60.0 >60 mL/min/1.73 m^2 Final     eGFR if non    Date Value Ref Range Status   07/11/2022 >60.0 >60 mL/min/1.73 m^2 Final     Comment:     Calculation used to obtain the estimated glomerular filtration  rate (eGFR) is the CKD-EPI equation.        Medications Ordered Prior to Encounter[1]         Objective:            Physical Exam  Constitutional:       Appearance: Normal appearance.   HENT:      Head: Normocephalic and atraumatic.   Eyes:      General: No scleral icterus.     Extraocular Movements: Extraocular movements intact.      Pupils: Pupils are equal, round, and reactive to light.   Pulmonary:      Effort: Pulmonary effort is normal.      Breath sounds: No stridor.   Musculoskeletal:      Right lower leg: No edema.      Left lower leg: No edema.   Skin:     General: Skin is warm and dry.   Neurological:      General: No focal deficit present.      Mental Status: She is alert and oriented to person, place, and time.   Psychiatric:         Mood and Affect: Mood normal.         Behavior: Behavior normal.       Assessment:       1. Kidney transplant status    2. Immunosuppression    3. Secondary hyperparathyroidism of renal origin        Plan:       1. Status post kidney transplant in January of 2021.  Stable renal allograft.  Creatinine has ranged between 0.81.0 for the past year.  Urinalysis is bland without evidence of proteinuria.    2. She remained stable on 1 mg twice daily of Prograf.  Her level was slightly low 4.5.  Will continue to monitor.  She is on 1 g twice daily of mycophenolate and 5 mg of prednisone daily.  She reports no untoward side effects from her immunosuppressive regimen.    3. Intact PTH is now normal at 75.  Vitamin-D is slightly low at 24  but she is on Rocaltrol daily.  Calcium corrects to normal.  Phosphorus is on the low side at 2.1.  Will continue to monitor.    Julio Jaffe MD         [1]   Current Outpatient Medications on File Prior to Visit   Medication Sig Dispense Refill    albuterol (PROVENTIL/VENTOLIN HFA) 90 mcg/actuation inhaler Inhale 2 puffs into the lungs every 4 (four) hours as needed for Wheezing or Shortness of Breath. Rescue 8.5 g 11    calcitRIOL (ROCALTROL) 0.25 MCG Cap Take 1 capsule (0.25 mcg total) by mouth once daily. 30 capsule 3    LIDOcaine-prilocaine (EMLA) cream Apply to affected area once 30 g 11    morphine (MSIR) 15 MG tablet Take 0.5 tablets (7.5 mg total) by mouth every 6 (six) hours as needed for Pain. 12 tablet 0    mycophenolate (CELLCEPT) 250 mg Cap Take 4 capsules by mouth twice daily 240 capsule 11    pantoprazole (PROTONIX) 40 MG tablet Take 1 tablet by mouth once daily 90 tablet 3    predniSONE (DELTASONE) 5 MG tablet Take 1 tablet (5 mg total) by mouth once daily. 120 tablet 11    sodium bicarbonate 650 MG tablet Take 2 tablets (1,300 mg total) by mouth 3 (three) times daily. 540 tablet 3    tacrolimus (PROGRAF) 1 MG Cap Take 1 capsule (1 mg total) by mouth every 12 (twelve) hours. 60 capsule 11     No current facility-administered medications on file prior to visit.

## 2025-06-10 ENCOUNTER — TELEPHONE (OUTPATIENT)
Dept: NEUROLOGY | Facility: CLINIC | Age: 35
End: 2025-06-10
Payer: MEDICAID

## 2025-06-10 NOTE — TELEPHONE ENCOUNTER
DML/Interpretor [facility] 6/10 for patient to callback to [facility]

## 2025-06-16 ENCOUNTER — PROCEDURE VISIT (OUTPATIENT)
Dept: NEUROLOGY | Facility: CLINIC | Age: 35
End: 2025-06-16
Payer: MEDICAID

## 2025-06-16 DIAGNOSIS — G56.03 CARPAL TUNNEL SYNDROME ON BOTH SIDES: ICD-10-CM

## 2025-06-16 DIAGNOSIS — R29.898 HAND WEAKNESS: ICD-10-CM

## 2025-06-16 PROCEDURE — 95912 NRV CNDJ TEST 11-12 STUDIES: CPT | Mod: PBBFAC | Performed by: PSYCHIATRY & NEUROLOGY

## 2025-06-16 PROCEDURE — 95912 NRV CNDJ TEST 11-12 STUDIES: CPT | Mod: 26,S$PBB,, | Performed by: PSYCHIATRY & NEUROLOGY

## 2025-06-19 ENCOUNTER — LAB VISIT (OUTPATIENT)
Dept: LAB | Facility: HOSPITAL | Age: 35
End: 2025-06-19
Attending: INTERNAL MEDICINE
Payer: MEDICAID

## 2025-06-19 ENCOUNTER — INFUSION (OUTPATIENT)
Dept: INFUSION THERAPY | Facility: HOSPITAL | Age: 35
End: 2025-06-19
Attending: INTERNAL MEDICINE
Payer: MEDICAID

## 2025-06-19 ENCOUNTER — OFFICE VISIT (OUTPATIENT)
Dept: HEMATOLOGY/ONCOLOGY | Facility: CLINIC | Age: 35
End: 2025-06-19
Payer: MEDICAID

## 2025-06-19 ENCOUNTER — RESULTS FOLLOW-UP (OUTPATIENT)
Dept: HEMATOLOGY/ONCOLOGY | Facility: CLINIC | Age: 35
End: 2025-06-19

## 2025-06-19 VITALS
SYSTOLIC BLOOD PRESSURE: 96 MMHG | OXYGEN SATURATION: 99 % | TEMPERATURE: 98 F | RESPIRATION RATE: 18 BRPM | SYSTOLIC BLOOD PRESSURE: 101 MMHG | HEART RATE: 79 BPM | DIASTOLIC BLOOD PRESSURE: 59 MMHG | DIASTOLIC BLOOD PRESSURE: 65 MMHG | HEIGHT: 66 IN | BODY MASS INDEX: 41.4 KG/M2 | WEIGHT: 257.63 LBS | HEART RATE: 84 BPM | TEMPERATURE: 98 F | OXYGEN SATURATION: 98 %

## 2025-06-19 DIAGNOSIS — D75.1 POST-RENAL TRANSPLANT ERYTHROCYTOSIS: Primary | ICD-10-CM

## 2025-06-19 DIAGNOSIS — E66.01 MORBID OBESITY WITH BMI OF 40.0-44.9, ADULT: ICD-10-CM

## 2025-06-19 DIAGNOSIS — D75.1 POLYCYTHEMIA: ICD-10-CM

## 2025-06-19 PROBLEM — E66.09 CLASS 1 OBESITY DUE TO EXCESS CALORIES WITH SERIOUS COMORBIDITY AND BODY MASS INDEX (BMI) OF 32.0 TO 32.9 IN ADULT: Chronic | Status: RESOLVED | Noted: 2024-05-31 | Resolved: 2025-06-19

## 2025-06-19 PROBLEM — E66.811 CLASS 1 OBESITY DUE TO EXCESS CALORIES WITH SERIOUS COMORBIDITY AND BODY MASS INDEX (BMI) OF 32.0 TO 32.9 IN ADULT: Chronic | Status: RESOLVED | Noted: 2024-05-31 | Resolved: 2025-06-19

## 2025-06-19 LAB
ABSOLUTE EOSINOPHIL (OHS): 0.11 K/UL
ABSOLUTE MONOCYTE (OHS): 0.6 K/UL (ref 0.3–1)
ABSOLUTE NEUTROPHIL COUNT (OHS): 5.79 K/UL (ref 1.8–7.7)
BASOPHILS # BLD AUTO: 0.04 K/UL
BASOPHILS NFR BLD AUTO: 0.5 %
ERYTHROCYTE [DISTWIDTH] IN BLOOD BY AUTOMATED COUNT: 13.4 % (ref 11.5–14.5)
HCT VFR BLD AUTO: 44.4 % (ref 37–48.5)
HGB BLD-MCNC: 14 GM/DL (ref 12–16)
IMM GRANULOCYTES # BLD AUTO: 0.04 K/UL (ref 0–0.04)
IMM GRANULOCYTES NFR BLD AUTO: 0.5 % (ref 0–0.5)
LYMPHOCYTES # BLD AUTO: 1.76 K/UL (ref 1–4.8)
MCH RBC QN AUTO: 26.7 PG (ref 27–31)
MCHC RBC AUTO-ENTMCNC: 31.5 G/DL (ref 32–36)
MCV RBC AUTO: 85 FL (ref 82–98)
NUCLEATED RBC (/100WBC) (OHS): 0 /100 WBC
PLATELET # BLD AUTO: 236 K/UL (ref 150–450)
PMV BLD AUTO: 10.3 FL (ref 9.2–12.9)
RBC # BLD AUTO: 5.25 M/UL (ref 4–5.4)
RELATIVE EOSINOPHIL (OHS): 1.3 %
RELATIVE LYMPHOCYTE (OHS): 21.1 % (ref 18–48)
RELATIVE MONOCYTE (OHS): 7.2 % (ref 4–15)
RELATIVE NEUTROPHIL (OHS): 69.4 % (ref 38–73)
WBC # BLD AUTO: 8.34 K/UL (ref 3.9–12.7)

## 2025-06-19 PROCEDURE — 3074F SYST BP LT 130 MM HG: CPT | Mod: CPTII,,, | Performed by: NURSE PRACTITIONER

## 2025-06-19 PROCEDURE — 1160F RVW MEDS BY RX/DR IN RCRD: CPT | Mod: CPTII,,, | Performed by: NURSE PRACTITIONER

## 2025-06-19 PROCEDURE — 85025 COMPLETE CBC W/AUTO DIFF WBC: CPT

## 2025-06-19 PROCEDURE — 99214 OFFICE O/P EST MOD 30 MIN: CPT | Mod: S$PBB,,, | Performed by: NURSE PRACTITIONER

## 2025-06-19 PROCEDURE — 99213 OFFICE O/P EST LOW 20 MIN: CPT | Mod: PBBFAC | Performed by: NURSE PRACTITIONER

## 2025-06-19 PROCEDURE — 3078F DIAST BP <80 MM HG: CPT | Mod: CPTII,,, | Performed by: NURSE PRACTITIONER

## 2025-06-19 PROCEDURE — 36415 COLL VENOUS BLD VENIPUNCTURE: CPT

## 2025-06-19 PROCEDURE — 3008F BODY MASS INDEX DOCD: CPT | Mod: CPTII,,, | Performed by: NURSE PRACTITIONER

## 2025-06-19 PROCEDURE — 99999 PR PBB SHADOW E&M-EST. PATIENT-LVL III: CPT | Mod: PBBFAC,,, | Performed by: NURSE PRACTITIONER

## 2025-06-19 PROCEDURE — 1159F MED LIST DOCD IN RCRD: CPT | Mod: CPTII,,, | Performed by: NURSE PRACTITIONER

## 2025-06-19 PROCEDURE — 3066F NEPHROPATHY DOC TX: CPT | Mod: CPTII,,, | Performed by: NURSE PRACTITIONER

## 2025-06-19 NOTE — NURSING
Patient arrived for phlebotomy but blood pressure too low to proceed per Hem/Onc NP. Patient discharged and instructed to follow up in a month. Patient verbalized understanding.

## 2025-06-19 NOTE — PROGRESS NOTES
Subjective:       Patient ID: Leydi Cordero is a 35 y.o. female.    Chief Complaint: review labs. Possible phlebotomy    : Pk ID#[id number]    HPI: 35 y.o female with medical history  ESRD s/p kidney transplant developed erythrocytosis following transplant receiving phlebotomy PRN. Today she presents for consideration of phlebotomy. She feels well. Denies interval clinical concern.      Social History[1]    Past Medical History:   Diagnosis Date    Anxiety     -donor kidney transplant 2021    cPRA 100%, XM negative 3 arteries, 2 on common patch, WIT 40 min    Encounter for blood transfusion     ESRD (end stage renal disease)     Fibroma     H/O kidney transplant     Hypertension     Hypertensive nephropathy     Ovarian cyst     Pulmonary nodules 2023    Sleep apnea        Family History   Problem Relation Name Age of Onset    No Known Problems Mother      Colon cancer Father      Cancer Father      Hypertension Father      No Known Problems Sister      No Known Problems Brother      Kidney disease Maternal Aunt      Hypertension Maternal Aunt      Diabetes Maternal Uncle      Kidney disease Other Dad's nephew     Hypertension Other Dad's nephew        Past Surgical History:   Procedure Laterality Date    BRONCHOSCOPY N/A 2023    Procedure: Bronchoscopy;  Surgeon: Denys Robbins MD;  Location: [address];  Service: Pulmonary;  Laterality: N/A;    COLD KNIFE CONIZATION OF CERVIX N/A 2023    Procedure: CONE BIOPSY, CERVIX, USING COLD KNIFE;  Surgeon: Jourdan Conklin MD;  Location: [facility] OR;  Service: OB/GYN;  Laterality: N/A;    COLPOSCOPY  2020    Needs follow up on or after 21    ESOPHAGOGASTRODUODENOSCOPY N/A 2022    Procedure: ESOPHAGOGASTRODUODENOSCOPY (EGD);  Surgeon: Essie Carvajal MD;  Location: [facility];  Service: Endoscopy;  Laterality: N/A;    hemodialysis access left arm      kidney  removal       KIDNEY TRANSPLANT  04/15/2015    KIDNEY TRANSPLANT N/A 1/9/2021    Procedure: TRANSPLANT, KIDNEY;  Surgeon: Fam Sutton MD;  Location: [address];  Service: Transplant;  Laterality: N/A;    NEPHRECTOMY      OVARIAN CYST REMOVAL      PARATHYROIDECTOMY      partial     PERCUTANEOUS NEPHROSTOMY Left 2/13/2021    Procedure: Creation, Nephrostomy, Percutaneous;  Surgeon: Elen Surgeon;  Location: [facility];  Service: Anesthesiology;  Laterality: Left;    right leg hemodialysis access      ROBOT-ASSISTED LAPAROSCOPIC SLEEVE GASTRECTOMY USING DA VELMA XI N/A 9/27/2022    Procedure: XI ROBOTIC SLEEVE GASTRECTOMY;  Surgeon: Cal Parekh MD;  Location: [facility];  Service: General;  Laterality: N/A;    transplant nephrectomy  12/01/2017       Review of Systems   Constitutional:  Negative for appetite change, chills, fatigue, fever and unexpected weight change.   HENT:  Negative for congestion, mouth sores, nosebleeds, sore throat, trouble swallowing and voice change.    Eyes:  Negative for photophobia and visual disturbance.   Respiratory:  Negative for cough, chest tightness, shortness of breath and wheezing.    Cardiovascular:  Negative for chest pain and leg swelling.   Gastrointestinal:  Negative for abdominal distention, abdominal pain, blood in stool, constipation, diarrhea, nausea and vomiting.   Genitourinary:  Negative for difficulty urinating, dysuria and hematuria.   Musculoskeletal:  Negative for arthralgias, back pain and myalgias.   Skin:  Negative for pallor, rash and wound.   Neurological:  Negative for dizziness, syncope, weakness and headaches.   Hematological:  Negative for adenopathy. Does not bruise/bleed easily.   Psychiatric/Behavioral:  The patient is not nervous/anxious.    All other systems reviewed and are negative.        Medication List with Changes/Refills   Current Medications    ALBUTEROL (PROVENTIL/VENTOLIN HFA) 90 MCG/ACTUATION INHALER    Inhale 2 puffs into the  lungs every 4 (four) hours as needed for Wheezing or Shortness of Breath. Rescue    CALCITRIOL (ROCALTROL) 0.25 MCG CAP    Take 1 capsule (0.25 mcg total) by mouth once daily.    LIDOCAINE-PRILOCAINE (EMLA) CREAM    Apply to affected area once    MORPHINE (MSIR) 15 MG TABLET    Take 0.5 tablets (7.5 mg total) by mouth every 6 (six) hours as needed for Pain.    MYCOPHENOLATE (CELLCEPT) 250 MG CAP    Take 4 capsules by mouth twice daily    PANTOPRAZOLE (PROTONIX) 40 MG TABLET    Take 1 tablet by mouth once daily    PREDNISONE (DELTASONE) 5 MG TABLET    Take 1 tablet (5 mg total) by mouth once daily.    SODIUM BICARBONATE 650 MG TABLET    Take 2 tablets (1,300 mg total) by mouth 3 (three) times daily.    TACROLIMUS (PROGRAF) 1 MG CAP    Take 1 capsule (1 mg total) by mouth every 12 (twelve) hours.     Objective:     Vitals:    06/19/25 1300   BP: 96/65   Pulse: 79   Temp: 97.8 °F (36.6 °C)     Lab Results   Component Value Date    WBC 8.34 06/19/2025    HGB 14.0 06/19/2025    HCT 44.4 06/19/2025    MCV 85 06/19/2025     06/19/2025           Physical Exam  Vitals reviewed.   Constitutional:       Appearance: She is well-developed.   HENT:      Head: Normocephalic.      Right Ear: External ear normal.      Left Ear: External ear normal.      Nose: Nose normal.   Eyes:      General: Lids are normal. No scleral icterus.        Right eye: No discharge.         Left eye: No discharge.      Conjunctiva/sclera: Conjunctivae normal.   Neck:      Thyroid: No thyroid mass.   Cardiovascular:      Rate and Rhythm: Normal rate and regular rhythm.   Pulmonary:      Effort: Pulmonary effort is normal. No respiratory distress.   Abdominal:      General: Bowel sounds are normal. There is no distension.      Palpations: Abdomen is soft.      Tenderness: There is no abdominal tenderness.   Genitourinary:     Comments: deferred  Musculoskeletal:         General: Normal range of motion.      Cervical back: Normal range of motion.    Skin:     General: Skin is warm and dry.   Neurological:      Mental Status: She is alert and oriented to person, place, and time.   Psychiatric:         Speech: Speech normal.         Behavior: Behavior normal. Behavior is cooperative.         Thought Content: Thought content normal.        Assessment:     Problem List Items Addressed This Visit          Oncology    Post-renal transplant erythrocytosis - Primary    Goal hemoglobin <17. Hg 14.0. patient not on ACE/ARB    F/u 1 month with CBC, CMP possible phlebotomy planned.     Per review up to date ACE/ARB preferred first line treatment if hg >17. Followed by phlebotomy PRN if unresponsive. If hg >17 at follow up with discuss with transplant team          Relevant Orders    Comprehensive Metabolic Panel       Endocrine    Morbid obesity with BMI of 40.0-44.9, adult    Encourage healthy nutrition along with portion control. Encourage daily exercise               Plan:     Post-renal transplant erythrocytosis  -     Comprehensive Metabolic Panel; Future; Expected date: 2025    Morbid obesity with BMI of 40.0-44.9, adult        Med Onc Chart Routing      Follow up with physician    Follow up with [facility] . Already scheduled   Infusion scheduling note    Injection scheduling note    Labs    Imaging    Pharmacy appointment    Other referrals               TISHA Chávez           [1]  Social History  Socioeconomic History    Marital status: Single   Tobacco Use    Smoking status: Every Day     Current packs/day: 0.00     Average packs/day: 0.3 packs/day for 12.6 years (3.1 ttl pk-yrs)     Types: Cigarettes     Start date:      Last attempt to quit: 2020     Years since quittin.8    Smokeless tobacco: Never   Substance and Sexual Activity    Alcohol use: No    Drug use: No    Sexual activity: Yes     Partners: Male     Birth control/protection: None     Social Drivers of Health     Financial Resource Strain: Low Risk  (2023)    Overall  Financial Resource Strain (CARDIA)     Difficulty of Paying Living Expenses: Not hard at all   Food Insecurity: No Food Insecurity (9/28/2022)    Hunger Vital Sign     Worried About Running Out of Food in the Last Year: Never true     Ran Out of Food in the Last Year: Never true   Transportation Needs: No Transportation Needs (9/28/2022)    PRAPARE - Transportation     Lack of Transportation (Medical): No     Lack of Transportation (Non-Medical): No   Physical Activity: Unknown (6/19/2025)    Exercise Vital Sign     Days of Exercise per Week: 0 days   Stress: No Stress Concern Present (9/18/2023)    Citizen of [place] of Occupational Health - Occupational Stress Questionnaire     Feeling of Stress : Not at all   Housing Stability: Low Risk  (9/28/2022)    Housing Stability Vital Sign     Unable to Pay for Housing in the Last Year: No     Number of Places Lived in the Last Year: 1     Unstable Housing in the Last Year: No

## 2025-06-19 NOTE — ASSESSMENT & PLAN NOTE
Goal hemoglobin <17. Hg 14.0. patient not on ACE/ARB    F/u 1 month with CBC, CMP possible phlebotomy planned.     Per review up to date ACE/ARB preferred first line treatment if hg >17. Followed by phlebotomy PRN if unresponsive. If hg >17 at follow up with discuss with transplant team

## 2025-06-23 PROBLEM — R29.898 HAND WEAKNESS: Status: ACTIVE | Noted: 2025-06-23

## 2025-06-23 PROBLEM — G56.03 CARPAL TUNNEL SYNDROME ON BOTH SIDES: Status: ACTIVE | Noted: 2025-06-23

## 2025-06-24 NOTE — PROCEDURES
[facility]   Tae Flynn  Department of Neurology  [address] [place] [address] [place]  [address]  Phone [phone]     Fax  [phone]        Full Name: Leydi Cordero Gender: Female  Patient ID: [id number] YOB: 1990      Visit Date: 6/16/2025 9:51 AM  Age: 35 Years  Examining Physician: Dejuan Oneil M.D.  Referring Physician: Felicita Guo MD  Technician: ALEM Meyer  History: CTS workup.  Indication for study: Pain, numbness, and weakness in both hands.  PMHX: Kidney transplant hemodialysis access left arm, sleeve gastrectomy, low Vitamin D, asthma, HTN, ESRD, sleep apnea.    SUMMARY      Nerve conduction studies were performed in the left and right upper extremities. The left median motor study recording the abductor pollicis brevis showed a normal amplitude, normal distal latency and normal conduction velocity. The left ulnar motor study recording the abductor digiti minimi showed a normal amplitude, normal distal latency and normal conduction velocity. No conduction block or focal slowing was present across the elbow.     The left median sensory response recording digit two showed a normal amplitude, latency and conduction velocity. The left ulnar sensory response recording digit five showed a normal amplitude, latency and conduction velocity. The left radial sensory response recording over the extensor snuff box showed a normal amplitude, latency and conduction velocity.     As routine median motor and sensory studies have a false negative rate of 25%, additional internal comparison studies were done to assess for possible median neuropathy at the wrist. These internal comparison studies (median vs. ulnar palmar mixed; median vs. ulnar sensory recording digit four; median vs. ulnar motor studies to the second lumbrical / interosseous; median vs. radial sensory studies recording digit one; median segmental sensory studies comparing the wrist-palm and  palm-digit velocities) increase the electrodiagnostic sensitivity rate to 95%. However, due to statistical issues with multiple tests, it is required that at least two studies are abnormal to reduce the false positive rate to acceptable levels. Left median-ulnar mixed palmar latencies showed no significant difference.    The right median motor study recording the abductor pollicis brevis showed a normal amplitude, normal distal latency and normal conduction velocity. The right ulnar motor study recording the abductor digiti minimi showed a normal amplitude, normal distal latency and normal conduction velocity. No conduction block or focal slowing was present across the elbow.     The right median sensory response recording digit two showed a normal amplitude, latency and conduction velocity. The right ulnar sensory response recording digit five showed a normal amplitude, latency and conduction velocity. The right radial sensory response recording over the extensor snuff box showed a normal amplitude, latency and conduction velocity.     As routine median motor and sensory studies have a false negative rate of 25%, additional internal comparison studies were done to assess for possible median neuropathy at the wrist. These internal comparison studies (median vs. ulnar palmar mixed; median vs. ulnar sensory recording digit four; median vs. ulnar motor studies to the second lumbrical / interosseous; median vs. radial sensory studies recording digit one; median segmental sensory studies comparing the wrist-palm and palm-digit velocities) increase the electrodiagnostic sensitivity rate to 95%. However, due to statistical issues with multiple tests, it is required that at least two studies are abnormal to reduce the false positive rate to acceptable levels. Right median-ulnar mixed palmar latencies showed no significant difference.            IMPRESSION    This is a normal study.    There is no electrophysiologic evidence  of median mononeuropathy across the wrist (carpal tunnel syndrome) on either side.    -----------------------------------------             Dejuan Oneil M.D., F.A.A.N.      Diplomate, American Board of Psychiatry and Neurology  Diplomate, American Board of Clinical Neurophysiology  Fellow, American Academy of Neurology        SENSORY NCS      Nerve / Sites Rec. Site Peak NP Amp PP Amp Dist Norm d Lat.2     ms µV µV cm m/s ms   L Median - Dig II      Wrist II 3.35 19.6 23.1 13 50.3 3.35      Ref.  3.40  20.0  48.0    L Median - Ulnar - Palmar      Median Wrist 2.19 47.5 50.5 8 49.2 2.19      Ulnar Wrist 2.04 16.9 18.9 8 3840.0 -0.15   L Ulnar - Dig V      Wrist Dig V 2.90 16.7 20.1 11 48.4 2.90      Ref.  3.10  12.0  48.0    L Radial - Snuff box      Forearm Snuff box 2.54 35.8 33.4 10 60.0 2.54      Ref.  2.70 18.0       R Median - Dig II      Wrist II 3.31 14.8 20.4 13 45.9 3.31      Ref.  3.40  20.0  48.0    R Median - Ulnar - Palmar      Median Wrist 2.29 41.5 44.5 8 48.6 2.29      Ulnar Wrist 2.31 16.7 20.9 8 1920.0 0.02   R Ulnar - Dig V      Wrist Dig V 2.94 12.0 14.4 11 48.9 2.94      Ref.  3.10  12.0  48.0    R Radial - Snuff box      Forearm Snuff box 2.56 37.3 31.9 10 60.8 2.56      Ref.  2.70 18.0           MOTOR NCS      Nerve / Sites Rec. Site Lat Amp Dist Norm Temp     ms mV cm m/s °C   L Median - APB      Wrist APB 3.33 10.0 8  29.2      Ref.  3.90 6.0         Elbow APB 7.52 7.6 24 57.3 29.2      Ref.     49.0    L Ulnar - ADM      Wrist ADM 2.85 11.8 8  29.4      Ref.  3.10 7.0         B.Elbow ADM 6.15 10.8 21 63.8 29.4      Ref.     50.0       A.Elbow ADM 7.67 10.4 10 65.8 29.4   R Median - APB      Wrist APB 3.54 8.6 8  28.4      Ref.  3.90 6.0         Elbow APB 7.69 6.7 23 55.5 28.5      Ref.     49.0    R Ulnar - ADM      Wrist ADM 2.77 10.8 8  29      Ref.  3.10 7.0         B.Elbow ADM 6.92 9.9 22 53.1 29      Ref.     50.0       A.Elbow ADM 8.44 9.1 10 65.8 29                              -----------------------------------------             Dejuan Oneil M.D., F.A.A.N.      Diplomate, American Board of Psychiatry and Neurology  Diplomate, American Board of Clinical Neurophysiology  Fellow, American Academy of Neurology

## 2025-07-14 RX ORDER — MYCOPHENOLATE MOFETIL 250 MG/1
CAPSULE ORAL
Qty: 240 CAPSULE | Refills: 11 | Status: SHIPPED | OUTPATIENT
Start: 2025-07-14 | End: 2025-07-15

## 2025-07-15 ENCOUNTER — OFFICE VISIT (OUTPATIENT)
Dept: OBSTETRICS AND GYNECOLOGY | Facility: CLINIC | Age: 35
End: 2025-07-15
Payer: MEDICAID

## 2025-07-15 VITALS
DIASTOLIC BLOOD PRESSURE: 68 MMHG | SYSTOLIC BLOOD PRESSURE: 90 MMHG | BODY MASS INDEX: 41.49 KG/M2 | HEIGHT: 66 IN | WEIGHT: 258.19 LBS

## 2025-07-15 DIAGNOSIS — E21.2 HYPERPARATHYROIDISM, TERTIARY: ICD-10-CM

## 2025-07-15 DIAGNOSIS — Z12.4 PAP SMEAR FOR CERVICAL CANCER SCREENING: Primary | ICD-10-CM

## 2025-07-15 DIAGNOSIS — Z94.0 DECEASED-DONOR KIDNEY TRANSPLANT: ICD-10-CM

## 2025-07-15 PROCEDURE — 99999 PR PBB SHADOW E&M-EST. PATIENT-LVL III: CPT | Mod: PBBFAC,,, | Performed by: OBSTETRICS & GYNECOLOGY

## 2025-07-15 PROCEDURE — 99213 OFFICE O/P EST LOW 20 MIN: CPT | Mod: PBBFAC | Performed by: OBSTETRICS & GYNECOLOGY

## 2025-07-15 NOTE — PROGRESS NOTES
Subjective     Patient ID: Leydi Cordero is a 35 y.o. female.    Chief Complaint:  Annual Exam      History of Present Illness  HPI  Annual Exam-Premenopausal  Patient presents for annual exam. The patient has no complaints today. The patient is sexually active. GYN screening history: last pap: approximate date  and was abnormal: see below. The patient wears seatbelts: yes. The patient participates in regular exercise: no. Has the patient ever been transfused or tattooed?: yes. The patient reports that there is not domestic violence in her life.  Menses are regular with periods lasting 3-4 days.  Denies excessive bleeding or cramping.      GYN & OB History  No LMP recorded.   Date of Last Pap: 10/18/2024    OB History    Para Term  AB Living   0 0 0 0 0 0   SAB IAB Ectopic Multiple Live Births   0 0 0 0 0       Review of Systems  Review of Systems   Constitutional:  Negative for activity change, appetite change, chills, fatigue, fever and unexpected weight change.   Respiratory:  Negative for shortness of breath.    Cardiovascular:  Negative for chest pain, palpitations and leg swelling.   Gastrointestinal:  Negative for abdominal pain, bloating, blood in stool, constipation, diarrhea, nausea and vomiting.   Genitourinary:  Negative for dysmenorrhea, dyspareunia, dysuria, flank pain, frequency, genital sores, hematuria, menorrhagia, menstrual problem, pelvic pain, urgency, vaginal bleeding, vaginal discharge, vaginal pain, urinary incontinence, postcoital bleeding, vaginal dryness and vaginal odor.   Musculoskeletal:  Negative for back pain.   Integumentary:  Negative for breast mass, nipple discharge, breast skin changes and breast tenderness.   Neurological:  Negative for syncope and headaches.   Breast: Negative for asymmetry, lump, mass, mastodynia, nipple discharge, skin changes and tenderness         Objective   Physical Exam:   Constitutional: She is oriented to person, place, and  time. She appears well-developed and well-nourished. No distress.    HENT:   Head: Normocephalic and atraumatic.    Eyes: Pupils are equal, round, and reactive to light. EOM are normal.     Cardiovascular:  Normal rate, regular rhythm and normal heart sounds.             Pulmonary/Chest: Effort normal and breath sounds normal.        Abdominal: Soft. Bowel sounds are normal. She exhibits no distension. There is no abdominal tenderness.     Genitourinary:    Vagina, uterus, right adnexa and left adnexa normal.      Pelvic exam was performed with patient supine.   There is no rash, tenderness, lesion or injury on the right labia. There is no rash, tenderness, lesion or injury on the left labia. Cervix is normal. Right adnexum displays no mass, no tenderness and no fullness. Left adnexum displays no mass, no tenderness and no fullness. No erythema, vaginal discharge, tenderness or bleeding in the vagina.    No foreign body in the vagina.      No signs of injury in the vagina.   Cervix exhibits no motion tenderness, no discharge and no friability.    pap smear completedUterus is not deviated, not enlarged and not tender.           Musculoskeletal: Normal range of motion and moves all extremeties. No tenderness or edema.       Neurological: She is alert and oriented to person, place, and time.    Skin: Skin is warm and dry.    Psychiatric: She has a normal mood and affect. Her behavior is normal. Thought content normal.         Pap History:  10/24 - NILM  03/24 - ASCUS HPV neg  08/23 - CKC PETRONA 3 with negative margins  05/23 - PETRONA 3, ECC neg  04/23 - LSIL/ASC-H  06/20 - Colpo neg  12/19 - ASCUS HPV+       Assessment and Plan     1. Pap smear for cervical cancer screening           Plan:  Pap smear for cervical cancer screening  -     Liquid-Based Pap Smear, Screening  -     Pt was counseled on cervical/vaginal screening guidelines and recommendations.  See above.  If today's pap smear result is negative, next pap smear  will be due in 6 months.  -     Pt was advised on current breast cancer screening recommendations.    -     Follow up with PCP for routine health maintenance needs.  -     Pt counseled on contraception options and on risks of unintended pregnancy with no contraception.  Pt states that she has been having unprotected intercourse for years and is not interested in contraception at this time.  Pt aware of high risk state due to her co-morbidities and on recommendation for pregnancy prevention if not actively trying for pregnancy.  Pt voiced understanding and still declines contraception.      Follow up in about 6 months (around 1/15/2026) for Pap.

## 2025-07-16 RX ORDER — CALCITRIOL 0.25 UG/1
0.25 CAPSULE ORAL DAILY
Qty: 30 CAPSULE | Refills: 3 | Status: SHIPPED | OUTPATIENT
Start: 2025-07-16

## 2025-07-16 RX ORDER — MYCOPHENOLATE MOFETIL 250 MG/1
CAPSULE ORAL
Qty: 240 CAPSULE | Refills: 11 | Status: SHIPPED | OUTPATIENT
Start: 2025-07-16

## 2025-07-17 ENCOUNTER — OFFICE VISIT (OUTPATIENT)
Dept: HEMATOLOGY/ONCOLOGY | Facility: CLINIC | Age: 35
End: 2025-07-17
Payer: MEDICAID

## 2025-07-17 ENCOUNTER — LAB VISIT (OUTPATIENT)
Dept: LAB | Facility: HOSPITAL | Age: 35
End: 2025-07-17
Attending: INTERNAL MEDICINE
Payer: MEDICAID

## 2025-07-17 VITALS
OXYGEN SATURATION: 98 % | SYSTOLIC BLOOD PRESSURE: 118 MMHG | DIASTOLIC BLOOD PRESSURE: 81 MMHG | HEART RATE: 74 BPM | HEIGHT: 66 IN | BODY MASS INDEX: 41.52 KG/M2 | TEMPERATURE: 97 F | WEIGHT: 258.38 LBS

## 2025-07-17 DIAGNOSIS — D75.1 POST-RENAL TRANSPLANT ERYTHROCYTOSIS: Primary | ICD-10-CM

## 2025-07-17 DIAGNOSIS — D75.1 POLYCYTHEMIA: ICD-10-CM

## 2025-07-17 DIAGNOSIS — E66.01 MORBID OBESITY WITH BMI OF 40.0-44.9, ADULT: ICD-10-CM

## 2025-07-17 DIAGNOSIS — D75.1 POST-RENAL TRANSPLANT ERYTHROCYTOSIS: ICD-10-CM

## 2025-07-17 LAB
ABSOLUTE EOSINOPHIL (OHS): 0.12 K/UL
ABSOLUTE MONOCYTE (OHS): 0.65 K/UL (ref 0.3–1)
ABSOLUTE NEUTROPHIL COUNT (OHS): 5.07 K/UL (ref 1.8–7.7)
ALBUMIN SERPL BCP-MCNC: 3.3 G/DL (ref 3.5–5.2)
ALP SERPL-CCNC: 64 UNIT/L (ref 40–150)
ALT SERPL W/O P-5'-P-CCNC: 18 UNIT/L (ref 10–44)
ANION GAP (OHS): 5 MMOL/L (ref 8–16)
AST SERPL-CCNC: 16 UNIT/L (ref 11–45)
BASOPHILS # BLD AUTO: 0.03 K/UL
BASOPHILS NFR BLD AUTO: 0.4 %
BILIRUB SERPL-MCNC: 0.4 MG/DL (ref 0.1–1)
BUN SERPL-MCNC: 11 MG/DL (ref 6–20)
CALCIUM SERPL-MCNC: 8.6 MG/DL (ref 8.7–10.5)
CHLORIDE SERPL-SCNC: 111 MMOL/L (ref 95–110)
CO2 SERPL-SCNC: 21 MMOL/L (ref 23–29)
CREAT SERPL-MCNC: 1 MG/DL (ref 0.5–1.4)
ERYTHROCYTE [DISTWIDTH] IN BLOOD BY AUTOMATED COUNT: 14.4 % (ref 11.5–14.5)
GFR SERPLBLD CREATININE-BSD FMLA CKD-EPI: >60 ML/MIN/1.73/M2
GLUCOSE SERPL-MCNC: 91 MG/DL (ref 70–110)
HCT VFR BLD AUTO: 44.9 % (ref 37–48.5)
HGB BLD-MCNC: 14 GM/DL (ref 12–16)
IMM GRANULOCYTES # BLD AUTO: 0.05 K/UL (ref 0–0.04)
IMM GRANULOCYTES NFR BLD AUTO: 0.7 % (ref 0–0.5)
LYMPHOCYTES # BLD AUTO: 1.59 K/UL (ref 1–4.8)
MCH RBC QN AUTO: 25.8 PG (ref 27–31)
MCHC RBC AUTO-ENTMCNC: 31.2 G/DL (ref 32–36)
MCV RBC AUTO: 83 FL (ref 82–98)
NUCLEATED RBC (/100WBC) (OHS): 0 /100 WBC
PLATELET # BLD AUTO: 202 K/UL (ref 150–450)
PMV BLD AUTO: 10.3 FL (ref 9.2–12.9)
POTASSIUM SERPL-SCNC: 3.9 MMOL/L (ref 3.5–5.1)
PROT SERPL-MCNC: 7 GM/DL (ref 6–8.4)
RBC # BLD AUTO: 5.42 M/UL (ref 4–5.4)
RELATIVE EOSINOPHIL (OHS): 1.6 %
RELATIVE LYMPHOCYTE (OHS): 21.2 % (ref 18–48)
RELATIVE MONOCYTE (OHS): 8.7 % (ref 4–15)
RELATIVE NEUTROPHIL (OHS): 67.4 % (ref 38–73)
SODIUM SERPL-SCNC: 137 MMOL/L (ref 136–145)
WBC # BLD AUTO: 7.51 K/UL (ref 3.9–12.7)

## 2025-07-17 PROCEDURE — 3066F NEPHROPATHY DOC TX: CPT | Mod: CPTII,,, | Performed by: NURSE PRACTITIONER

## 2025-07-17 PROCEDURE — 3008F BODY MASS INDEX DOCD: CPT | Mod: CPTII,,, | Performed by: NURSE PRACTITIONER

## 2025-07-17 PROCEDURE — 3074F SYST BP LT 130 MM HG: CPT | Mod: CPTII,,, | Performed by: NURSE PRACTITIONER

## 2025-07-17 PROCEDURE — 1160F RVW MEDS BY RX/DR IN RCRD: CPT | Mod: CPTII,,, | Performed by: NURSE PRACTITIONER

## 2025-07-17 PROCEDURE — 99999 PR PBB SHADOW E&M-EST. PATIENT-LVL III: CPT | Mod: PBBFAC,,, | Performed by: NURSE PRACTITIONER

## 2025-07-17 PROCEDURE — 80053 COMPREHEN METABOLIC PANEL: CPT

## 2025-07-17 PROCEDURE — 99213 OFFICE O/P EST LOW 20 MIN: CPT | Mod: PBBFAC | Performed by: NURSE PRACTITIONER

## 2025-07-17 PROCEDURE — 36415 COLL VENOUS BLD VENIPUNCTURE: CPT

## 2025-07-17 PROCEDURE — 99214 OFFICE O/P EST MOD 30 MIN: CPT | Mod: S$PBB,,, | Performed by: NURSE PRACTITIONER

## 2025-07-17 PROCEDURE — 1159F MED LIST DOCD IN RCRD: CPT | Mod: CPTII,,, | Performed by: NURSE PRACTITIONER

## 2025-07-17 PROCEDURE — 85025 COMPLETE CBC W/AUTO DIFF WBC: CPT

## 2025-07-17 PROCEDURE — 3079F DIAST BP 80-89 MM HG: CPT | Mod: CPTII,,, | Performed by: NURSE PRACTITIONER

## 2025-07-17 NOTE — PROGRESS NOTES
Subjective:       Patient ID: Leydi Cordero is a 35 y.o. female.    Chief Complaint: review labs. Possible phlebotomy    : Nadine ID#[id number]    HPI: 35 y.o female with medical history ESRD s/p kidney transplant developed erythrocytosis following transplant receiving phlebotomy PRN. Today she presents for consideration of phlebotomy. She feels well. Denies interval clinical concern.      Social History[1]    Past Medical History:   Diagnosis Date    Anxiety     -donor kidney transplant 2021    cPRA 100%, XM negative 3 arteries, 2 on common patch, WIT 40 min    Encounter for blood transfusion     ESRD (end stage renal disease)     Fibroma     H/O kidney transplant     Hypertension     Hypertensive nephropathy     Ovarian cyst     Pulmonary nodules 2023    Sleep apnea        Family History   Problem Relation Name Age of Onset    No Known Problems Mother      Colon cancer Father      Cancer Father      Hypertension Father      No Known Problems Sister      No Known Problems Brother      Kidney disease Maternal Aunt      Hypertension Maternal Aunt      Diabetes Maternal Uncle      Kidney disease Other Dad's nephew     Hypertension Other Dad's nephew        Past Surgical History:   Procedure Laterality Date    BRONCHOSCOPY N/A 2023    Procedure: Bronchoscopy;  Surgeon: Denys Robbins MD;  Location: [address];  Service: Pulmonary;  Laterality: N/A;    COLD KNIFE CONIZATION OF CERVIX N/A 2023    Procedure: CONE BIOPSY, CERVIX, USING COLD KNIFE;  Surgeon: Jourdan Conklin MD;  Location: [facility];  Service: OB/GYN;  Laterality: N/A;    COLPOSCOPY  2020    Needs follow up on or after 21    ESOPHAGOGASTRODUODENOSCOPY N/A 2022    Procedure: ESOPHAGOGASTRODUODENOSCOPY (EGD);  Surgeon: Essie Carvajal MD;  Location: [facility];  Service: Endoscopy;  Laterality: N/A;    hemodialysis access left arm      kidney removal        KIDNEY TRANSPLANT  04/15/2015    KIDNEY TRANSPLANT N/A 1/9/2021    Procedure: TRANSPLANT, KIDNEY;  Surgeon: Fam Sutton MD;  Location: [address];  Service: Transplant;  Laterality: N/A;    NEPHRECTOMY      OVARIAN CYST REMOVAL      PARATHYROIDECTOMY      partial     PERCUTANEOUS NEPHROSTOMY Left 2/13/2021    Procedure: Creation, Nephrostomy, Percutaneous;  Surgeon: Elen Surgeon;  Location: [facility];  Service: Anesthesiology;  Laterality: Left;    right leg hemodialysis access      ROBOT-ASSISTED LAPAROSCOPIC SLEEVE GASTRECTOMY USING DA VELMA XI N/A 9/27/2022    Procedure: XI ROBOTIC SLEEVE GASTRECTOMY;  Surgeon: Cal Parekh MD;  Location: [facility];  Service: General;  Laterality: N/A;    transplant nephrectomy  12/01/2017       Review of Systems   Constitutional:  Negative for appetite change, chills, fatigue, fever and unexpected weight change.   HENT:  Negative for congestion, mouth sores, nosebleeds, sore throat, trouble swallowing and voice change.    Eyes:  Negative for photophobia and visual disturbance.   Respiratory:  Negative for cough, chest tightness, shortness of breath and wheezing.    Cardiovascular:  Negative for chest pain and leg swelling.   Gastrointestinal:  Negative for abdominal distention, abdominal pain, blood in stool, constipation, diarrhea, nausea and vomiting.   Genitourinary:  Negative for difficulty urinating, dysuria and hematuria.   Musculoskeletal:  Negative for arthralgias, back pain and myalgias.   Skin:  Negative for pallor, rash and wound.   Neurological:  Negative for dizziness, syncope, weakness and headaches.   Hematological:  Negative for adenopathy. Does not bruise/bleed easily.   Psychiatric/Behavioral:  The patient is not nervous/anxious.    All other systems reviewed and are negative.        Medication List with Changes/Refills   Current Medications    ALBUTEROL (PROVENTIL/VENTOLIN HFA) 90 MCG/ACTUATION INHALER    Inhale 2 puffs into the lungs every  4 (four) hours as needed for Wheezing or Shortness of Breath. Rescue    CALCITRIOL (ROCALTROL) 0.25 MCG CAP    Take 1 capsule (0.25 mcg total) by mouth once daily.    MYCOPHENOLATE (CELLCEPT) 250 MG CAP    Take 4 capsules by mouth twice daily    PANTOPRAZOLE (PROTONIX) 40 MG TABLET    Take 1 tablet by mouth once daily    PREDNISONE (DELTASONE) 5 MG TABLET    Take 1 tablet (5 mg total) by mouth once daily.    SODIUM BICARBONATE 650 MG TABLET    Take 2 tablets (1,300 mg total) by mouth 3 (three) times daily.    TACROLIMUS (PROGRAF) 1 MG CAP    Take 1 capsule (1 mg total) by mouth every 12 (twelve) hours.     Objective:     Vitals:    07/17/25 1019   BP: 118/81   Pulse: 74   Temp: 97.1 °F (36.2 °C)     Lab Results   Component Value Date    WBC 7.51 07/17/2025    HGB 14.0 07/17/2025    HCT 44.9 07/17/2025    MCV 83 07/17/2025     07/17/2025           Physical Exam  Vitals reviewed.   Constitutional:       Appearance: She is well-developed.   HENT:      Head: Normocephalic.      Right Ear: External ear normal.      Left Ear: External ear normal.      Nose: Nose normal.   Eyes:      General: Lids are normal. No scleral icterus.        Right eye: No discharge.         Left eye: No discharge.      Conjunctiva/sclera: Conjunctivae normal.   Neck:      Thyroid: No thyroid mass.   Cardiovascular:      Rate and Rhythm: Normal rate and regular rhythm.   Pulmonary:      Effort: Pulmonary effort is normal. No respiratory distress.   Abdominal:      General: Bowel sounds are normal. There is no distension.      Palpations: Abdomen is soft.      Tenderness: There is no abdominal tenderness.   Genitourinary:     Comments: deferred  Musculoskeletal:         General: Normal range of motion.      Cervical back: Normal range of motion.   Skin:     General: Skin is warm and dry.   Neurological:      Mental Status: She is alert and oriented to person, place, and time.   Psychiatric:         Speech: Speech normal.         Behavior:  Behavior normal. Behavior is cooperative.         Thought Content: Thought content normal.        Assessment:     Problem List Items Addressed This Visit          Oncology    Post-renal transplant erythrocytosis - Primary    Goal hemoglobin <17. Hg 14.0. patient not on ACE/ARB    F/u 2 months with CBC, CMP possible phlebotomy planned.     Per review up to date ACE/ARB preferred first line treatment if hg >17. Followed by phlebotomy PRN if unresponsive. If hg >17 at follow up with discuss with transplant team          Relevant Orders    CBC Auto Differential    Comprehensive Metabolic Panel       Endocrine    Morbid obesity with BMI of 40.0-44.9, adult    Encourage healthy nutrition along with portion control. Encourage daily exercise                 Plan:     Post-renal transplant erythrocytosis  -     CBC Auto Differential; Future; Expected date: 2025  -     Comprehensive Metabolic Panel; Future; Expected date: 2025    Morbid obesity with BMI of 40.0-44.9, adult          Med Onc Chart Routing      Follow up with physician    Follow up with [facility] 2 months.   Infusion scheduling note    Injection scheduling note phlebotomy   Labs CBC and CMP   Scheduling:  Preferred lab:  Lab interval:     Imaging None      Pharmacy appointment No pharmacy appointment needed      Other referrals No nutrition appointment needed -        No additional referrals needed          TISHA Chávez             [1]  Social History  Socioeconomic History    Marital status: Single   Tobacco Use    Smoking status: Every Day     Current packs/day: 0.00     Average packs/day: 0.3 packs/day for 12.6 years (3.1 ttl pk-yrs)     Types: Cigarettes     Start date:      Last attempt to quit: 2020     Years since quittin.9    Smokeless tobacco: Never   Substance and Sexual Activity    Alcohol use: No    Drug use: No    Sexual activity: Yes     Partners: Male     Birth control/protection: None     Social Drivers of Health      Financial Resource Strain: Low Risk  (9/18/2023)    Overall Financial Resource Strain (CARDIA)     Difficulty of Paying Living Expenses: Not hard at all   Food Insecurity: No Food Insecurity (9/28/2022)    Hunger Vital Sign     Worried About Running Out of Food in the Last Year: Never true     Ran Out of Food in the Last Year: Never true   Transportation Needs: No Transportation Needs (9/28/2022)    PRAPARE - Transportation     Lack of Transportation (Medical): No     Lack of Transportation (Non-Medical): No   Physical Activity: Unknown (6/19/2025)    Exercise Vital Sign     Days of Exercise per Week: 0 days   Stress: No Stress Concern Present (9/18/2023)    [employer] - Occupational Stress Questionnaire     Feeling of Stress : Not at all   Housing Stability: Low Risk  (9/28/2022)    Housing Stability Vital Sign     Unable to Pay for Housing in the Last Year: No     Number of Places Lived in the Last Year: 1     Unstable Housing in the Last Year: No

## 2025-07-17 NOTE — ASSESSMENT & PLAN NOTE
Goal hemoglobin <17. Hg 14.0. patient not on ACE/ARB    F/u 2 months with CBC, CMP possible phlebotomy planned.     Per review up to date ACE/ARB preferred first line treatment if hg >17. Followed by phlebotomy PRN if unresponsive. If hg >17 at follow up with discuss with transplant team

## 2025-07-29 DIAGNOSIS — Z94.0 KIDNEY REPLACED BY TRANSPLANT: Primary | ICD-10-CM

## 2025-08-12 ENCOUNTER — LAB VISIT (OUTPATIENT)
Dept: LAB | Facility: HOSPITAL | Age: 35
End: 2025-08-12
Attending: INTERNAL MEDICINE
Payer: MEDICAID

## 2025-08-12 DIAGNOSIS — Z94.0 KIDNEY REPLACED BY TRANSPLANT: ICD-10-CM

## 2025-08-12 LAB
ABSOLUTE EOSINOPHIL (OHS): 0.1 K/UL
ABSOLUTE MONOCYTE (OHS): 0.54 K/UL (ref 0.3–1)
ABSOLUTE NEUTROPHIL COUNT (OHS): 3.73 K/UL (ref 1.8–7.7)
ALBUMIN SERPL BCP-MCNC: 3.6 G/DL (ref 3.5–5.2)
ALP SERPL-CCNC: 64 UNIT/L (ref 40–150)
ALT SERPL W/O P-5'-P-CCNC: 19 UNIT/L (ref 0–55)
ANION GAP (OHS): 6 MMOL/L (ref 8–16)
AST SERPL-CCNC: 21 UNIT/L (ref 0–50)
BACTERIA #/AREA URNS AUTO: ABNORMAL /HPF
BASOPHILS # BLD AUTO: 0.03 K/UL
BASOPHILS NFR BLD AUTO: 0.5 %
BILIRUB DIRECT SERPL-MCNC: 0.2 MG/DL (ref 0.1–0.3)
BILIRUB SERPL-MCNC: 0.4 MG/DL (ref 0.1–1)
BILIRUB UR QL STRIP.AUTO: NEGATIVE
BUN SERPL-MCNC: 15 MG/DL (ref 6–20)
CALCIUM SERPL-MCNC: 8.7 MG/DL (ref 8.7–10.5)
CHLORIDE SERPL-SCNC: 110 MMOL/L (ref 95–110)
CLARITY UR: CLEAR
CO2 SERPL-SCNC: 24 MMOL/L (ref 23–29)
COLOR UR AUTO: YELLOW
CREAT SERPL-MCNC: 1 MG/DL (ref 0.5–1.4)
CREAT UR-MCNC: 121 MG/DL (ref 15–325)
ERYTHROCYTE [DISTWIDTH] IN BLOOD BY AUTOMATED COUNT: 15.7 % (ref 11.5–14.5)
GFR SERPLBLD CREATININE-BSD FMLA CKD-EPI: >60 ML/MIN/1.73/M2
GLUCOSE SERPL-MCNC: 82 MG/DL (ref 70–110)
GLUCOSE UR QL STRIP: NEGATIVE
HCT VFR BLD AUTO: 45.5 % (ref 37–48.5)
HGB BLD-MCNC: 13.9 GM/DL (ref 12–16)
HGB UR QL STRIP: NEGATIVE
IMM GRANULOCYTES # BLD AUTO: 0.03 K/UL (ref 0–0.04)
IMM GRANULOCYTES NFR BLD AUTO: 0.5 % (ref 0–0.5)
KETONES UR QL STRIP: NEGATIVE
LEUKOCYTE ESTERASE UR QL STRIP: ABNORMAL
LYMPHOCYTES # BLD AUTO: 1.62 K/UL (ref 1–4.8)
MCH RBC QN AUTO: 25.2 PG (ref 27–31)
MCHC RBC AUTO-ENTMCNC: 30.5 G/DL (ref 32–36)
MCV RBC AUTO: 83 FL (ref 82–98)
MICROSCOPIC COMMENT: ABNORMAL
NITRITE UR QL STRIP: NEGATIVE
NUCLEATED RBC (/100WBC) (OHS): 0 /100 WBC
PH UR STRIP: 6 [PH]
PHOSPHATE SERPL-MCNC: 3.1 MG/DL (ref 2.7–4.5)
PLATELET # BLD AUTO: 217 K/UL (ref 150–450)
PMV BLD AUTO: 11.4 FL (ref 9.2–12.9)
POTASSIUM SERPL-SCNC: 4.1 MMOL/L (ref 3.5–5.1)
PROT SERPL-MCNC: 6.9 GM/DL (ref 6–8.4)
PROT UR QL STRIP: NEGATIVE
PROT UR-MCNC: 8 MG/DL
PROT/CREAT UR: 0.07 MG/G{CREAT}
RBC # BLD AUTO: 5.51 M/UL (ref 4–5.4)
RBC #/AREA URNS AUTO: 1 /HPF (ref 0–4)
RELATIVE EOSINOPHIL (OHS): 1.7 %
RELATIVE LYMPHOCYTE (OHS): 26.8 % (ref 18–48)
RELATIVE MONOCYTE (OHS): 8.9 % (ref 4–15)
RELATIVE NEUTROPHIL (OHS): 61.6 % (ref 38–73)
SODIUM SERPL-SCNC: 140 MMOL/L (ref 136–145)
SP GR UR STRIP: 1.02
SQUAMOUS #/AREA URNS AUTO: 1 /HPF
UROBILINOGEN UR STRIP-ACNC: NEGATIVE EU/DL
WBC # BLD AUTO: 6.05 K/UL (ref 3.9–12.7)
WBC #/AREA URNS AUTO: 13 /HPF (ref 0–5)

## 2025-08-12 PROCEDURE — 80048 BASIC METABOLIC PNL TOTAL CA: CPT

## 2025-08-12 PROCEDURE — 36415 COLL VENOUS BLD VENIPUNCTURE: CPT

## 2025-08-12 PROCEDURE — 81001 URINALYSIS AUTO W/SCOPE: CPT

## 2025-08-12 PROCEDURE — 85025 COMPLETE CBC W/AUTO DIFF WBC: CPT

## 2025-08-12 PROCEDURE — 84156 ASSAY OF PROTEIN URINE: CPT

## 2025-08-12 PROCEDURE — 87799 DETECT AGENT NOS DNA QUANT: CPT

## 2025-08-12 PROCEDURE — 82248 BILIRUBIN DIRECT: CPT

## 2025-08-12 PROCEDURE — 80197 ASSAY OF TACROLIMUS: CPT

## 2025-08-12 PROCEDURE — 84100 ASSAY OF PHOSPHORUS: CPT

## 2025-08-13 ENCOUNTER — RESULTS FOLLOW-UP (OUTPATIENT)
Dept: TRANSPLANT | Facility: HOSPITAL | Age: 35
End: 2025-08-13
Payer: MEDICAID

## 2025-08-13 DIAGNOSIS — Z94.0 KIDNEY REPLACED BY TRANSPLANT: ICD-10-CM

## 2025-08-13 LAB — TACROLIMUS BLD-MCNC: 3.9 NG/ML (ref 5–15)

## 2025-08-13 RX ORDER — TACROLIMUS 1 MG/1
2 CAPSULE ORAL EVERY 12 HOURS
Qty: 120 CAPSULE | Refills: 11 | Status: ACTIVE | OUTPATIENT
Start: 2025-08-13 | End: 2026-08-13

## 2025-08-14 RX ORDER — PANTOPRAZOLE SODIUM 40 MG/1
TABLET, DELAYED RELEASE ORAL
Qty: 90 TABLET | Refills: 3 | OUTPATIENT
Start: 2025-08-14

## 2025-08-15 LAB
W BK VIRUS DNA, QUALITATIVE, PLASMA: NOT DETECTED
W BK VIRUS DNA, QUANTITATIVE, PLASMA: <50 IU/ML
W LOG BK VIRUS DNA, PLASMA: <1.7 LOG (10) IU/ML

## 2025-08-18 RX ORDER — PANTOPRAZOLE SODIUM 40 MG/1
TABLET, DELAYED RELEASE ORAL
Qty: 90 TABLET | Refills: 3 | Status: SHIPPED | OUTPATIENT
Start: 2025-08-18

## 2025-08-20 ENCOUNTER — TELEPHONE (OUTPATIENT)
Dept: TRANSPLANT | Facility: CLINIC | Age: 35
End: 2025-08-20
Payer: MEDICAID

## 2025-08-21 ENCOUNTER — LAB VISIT (OUTPATIENT)
Dept: LAB | Facility: HOSPITAL | Age: 35
End: 2025-08-21
Attending: INTERNAL MEDICINE
Payer: MEDICAID

## 2025-08-21 DIAGNOSIS — Z94.0 KIDNEY REPLACED BY TRANSPLANT: ICD-10-CM

## 2025-08-21 PROCEDURE — 36415 COLL VENOUS BLD VENIPUNCTURE: CPT

## 2025-08-21 PROCEDURE — 80197 ASSAY OF TACROLIMUS: CPT

## 2025-08-22 ENCOUNTER — OFFICE VISIT (OUTPATIENT)
Dept: TRANSPLANT | Facility: CLINIC | Age: 35
End: 2025-08-22
Payer: MEDICAID

## 2025-08-22 VITALS
SYSTOLIC BLOOD PRESSURE: 134 MMHG | WEIGHT: 257.5 LBS | TEMPERATURE: 97 F | BODY MASS INDEX: 41.38 KG/M2 | HEART RATE: 100 BPM | OXYGEN SATURATION: 99 % | RESPIRATION RATE: 18 BRPM | HEIGHT: 66 IN | DIASTOLIC BLOOD PRESSURE: 67 MMHG

## 2025-08-22 DIAGNOSIS — I10 ESSENTIAL HYPERTENSION: Chronic | ICD-10-CM

## 2025-08-22 DIAGNOSIS — I82.413 ACUTE DEEP VEIN THROMBOSIS (DVT) OF FEMORAL VEIN OF BOTH LOWER EXTREMITIES: ICD-10-CM

## 2025-08-22 DIAGNOSIS — Z79.899 IMMUNOSUPPRESSIVE MANAGEMENT ENCOUNTER FOLLOWING KIDNEY TRANSPLANT: ICD-10-CM

## 2025-08-22 DIAGNOSIS — Z94.0 IMMUNOSUPPRESSIVE MANAGEMENT ENCOUNTER FOLLOWING KIDNEY TRANSPLANT: ICD-10-CM

## 2025-08-22 DIAGNOSIS — Z94.0 DECEASED-DONOR KIDNEY TRANSPLANT: ICD-10-CM

## 2025-08-22 DIAGNOSIS — N18.2 CKD (CHRONIC KIDNEY DISEASE) STAGE 2, GFR 60-89 ML/MIN: Primary | ICD-10-CM

## 2025-08-22 LAB — TACROLIMUS BLD-MCNC: 9.9 NG/ML (ref 5–15)

## 2025-08-22 PROCEDURE — 99999 PR PBB SHADOW E&M-EST. PATIENT-LVL III: CPT | Mod: PBBFAC,,, | Performed by: INTERNAL MEDICINE

## 2025-08-22 PROCEDURE — 99213 OFFICE O/P EST LOW 20 MIN: CPT | Mod: PBBFAC | Performed by: INTERNAL MEDICINE

## (undated) DEVICE — MANIFOLD 4 PORT

## (undated) DEVICE — CLOSURE SKIN STERI STRIP 1/2X4

## (undated) DEVICE — STOCKINETTE 2INX36

## (undated) DEVICE — SUT SILK 3-0 STRANDS 30IN

## (undated) DEVICE — Device

## (undated) DEVICE — COVER OVERHEAD SURG LT BLUE

## (undated) DEVICE — SOL NS 1000CC

## (undated) DEVICE — SYR 10CC LUER LOCK

## (undated) DEVICE — NDL ASPIRATING VIZISHOT 20-40M

## (undated) DEVICE — CONTAINER SPECIMEN OR STER 4OZ

## (undated) DEVICE — ADHESIVE DERMABOND ADVANCED

## (undated) DEVICE — GOWN SURGICAL X-LARGE

## (undated) DEVICE — SUT SILK 2-0 STRANDS 30IN

## (undated) DEVICE — SEE MEDLINE ITEM 157117

## (undated) DEVICE — PUNCH AORTIC 4.0MM 6/CASE

## (undated) DEVICE — SYR 3CC LUER LOC

## (undated) DEVICE — PUNCH AORTIC 4.8MM

## (undated) DEVICE — SUT 2/0 30IN SILK BLK BRAI

## (undated) DEVICE — HANDSET ARGON PLUS

## (undated) DEVICE — SUT 1 36IN PDS II VIO MONO

## (undated) DEVICE — SUT MONOCRYL 4.0 PS2 CP496G

## (undated) DEVICE — PACK BASIC SETUP SC BR

## (undated) DEVICE — ELECTRODE REM PLYHSV RETURN 9

## (undated) DEVICE — CANNULA SEAL 12MM

## (undated) DEVICE — TUBING SUC UNIV W/CONN 12FT

## (undated) DEVICE — STAPLER ENDOWRIST 45 GREEN XI

## (undated) DEVICE — SUT VICRYL CTD 2-0 GI 27 SH

## (undated) DEVICE — HEMOSTAT SURGICEL 4X8IN

## (undated) DEVICE — COVER LIGHT HANDLE 80/CA

## (undated) DEVICE — TRAY SKIN SCRUB WET PREMIUM

## (undated) DEVICE — SEALER VESSEL EXTEND

## (undated) DEVICE — SHEET THYROID W/ISO-BAC

## (undated) DEVICE — EVACUATOR WOUND BULB 100CC

## (undated) DEVICE — SUT PROLENE 5-0 RB1 8756H

## (undated) DEVICE — DRAPE UINDERBUT GRAD PCH

## (undated) DEVICE — ALCOHOL BLEND 95%

## (undated) DEVICE — SYR SLIP TIP 20CC

## (undated) DEVICE — DRAPE SLUSH WARMER WITH DISC

## (undated) DEVICE — HEMOSTAT SURGICEL 2X3IN

## (undated) DEVICE — TOWEL OR XRAY WHITE 17X26IN

## (undated) DEVICE — SUT PROLENE 4-0 SH BLU 36IN

## (undated) DEVICE — STAPLER ENDOWRIST 45 BLUE XI

## (undated) DEVICE — SEE MEDLINE ITEM 157027

## (undated) DEVICE — KIT ANTIFOG W/SPONG & FLUID

## (undated) DEVICE — DRAPE COLUMN DAVINCI XI

## (undated) DEVICE — CLIP LIGACLIP XTRA TITANIUM

## (undated) DEVICE — TOWEL OR DISP STRL BLUE 4/PK

## (undated) DEVICE — STAPLER SKIN PROXIMATE WIDE

## (undated) DEVICE — NDL VIZISHOT 2 FLEX 22G

## (undated) DEVICE — DRAPE THREE-QTR REINF 53X77IN

## (undated) DEVICE — BLADE SURG CARBON STEEL SZ11

## (undated) DEVICE — SEE MEDLINE ITEM 152622

## (undated) DEVICE — SOL NORMAL USPCA 0.9%

## (undated) DEVICE — COVER TIP CURVED SCISSORS XI

## (undated) DEVICE — SUT PROLENE 6-0 BV-1 30IN

## (undated) DEVICE — SUT 4-0 12-18IN SILK BLACK

## (undated) DEVICE — CONTAINER SPECIMEN STRL 4OZ

## (undated) DEVICE — SYS LABEL CORRECT MED

## (undated) DEVICE — PLUG CATHETER STERILE FOLEY

## (undated) DEVICE — CANNULA REDUCER 12-8MM

## (undated) DEVICE — SEE MEDLINE ITEM 156900

## (undated) DEVICE — SYS CLSR DERMABOND PRINEO 22CM

## (undated) DEVICE — PENCIL GOLF STERILE

## (undated) DEVICE — SUT SILK 0 STRANDS 30IN BLK

## (undated) DEVICE — PAD PINK TRENDELENBURG POS XL

## (undated) DEVICE — HEMOSTAT SURGICEL NU-KNIT 6X9

## (undated) DEVICE — JELLY SURGILUBE LUBE PKT 3GM

## (undated) DEVICE — SEE MEDLINE ITEM 146417

## (undated) DEVICE — NDL SAFETY 25G X 1.5 ECLIPSE

## (undated) DEVICE — SUT 1 48IN PDS II VIO MONO

## (undated) DEVICE — SPONGE COTTON TRAY 4X4IN

## (undated) DEVICE — SET DECANTER MEDICHOICE

## (undated) DEVICE — CATH BRONCHOSCOPE F/BF

## (undated) DEVICE — GLOVE PROTEXIS HYDROGEL SZ7.5

## (undated) DEVICE — SUT PROLENE 5-0 36IN C-1

## (undated) DEVICE — SUT PROLENE 2-0 SH 36IN BLU

## (undated) DEVICE — PACK UNIVERSAL SPLIT II

## (undated) DEVICE — LOOP VESSEL BLUE MAXI

## (undated) DEVICE — CYTOSPIN COLLECTION FLUID BLT

## (undated) DEVICE — ADAPTER SWIVEL

## (undated) DEVICE — GAUZE SPONGE PEANUT STRL

## (undated) DEVICE — PAD CURAD NONADH 3X4IN

## (undated) DEVICE — GOWN POLY REINF BRTH SLV XL

## (undated) DEVICE — SUT 0 27IN CHROMIC GUT CT-1

## (undated) DEVICE — STAPLER SUREFORM 60 SPU

## (undated) DEVICE — SEAL UNIVERSAL 5MM-8MM XI

## (undated) DEVICE — CLIP LIGATING TITANIUM SMALL

## (undated) DEVICE — GLOVE SURG BIOGEL LATEX SZ 7.5

## (undated) DEVICE — TRAY FOLEY 16FR INFECTION CONT

## (undated) DEVICE — DRAPE ABDOMINAL TIBURON 14X11

## (undated) DEVICE — SUT 4/0 27IN PDS II VIO MON

## (undated) DEVICE — UNDERGLOVES BIOGEL PI SIZE 8

## (undated) DEVICE — FIBRILLAR ABS HEMOSTAT 4X4

## (undated) DEVICE — SUT 3-0 12-18IN SILK

## (undated) DEVICE — SET IRR URLGY 2LINE UNIV SPIKE

## (undated) DEVICE — CLIPPER BLADE MOD 4406 (CAREF)

## (undated) DEVICE — PACK ECLIPSE SET-UP W/O DRAPE

## (undated) DEVICE — TRAY CATH FOL SIL URIMTR 16FR

## (undated) DEVICE — SUT ETHILON 3-0 PS2 18 BLK

## (undated) DEVICE — SUT 2-0 12-18IN SILK

## (undated) DEVICE — DRESSING ADH ISLAND 3.6 X 14

## (undated) DEVICE — SPONGE LAP 18X18 PREWASHED

## (undated) DEVICE — SUT VICRYL 3-0 27 SH

## (undated) DEVICE — PACK DRAPE PERI/GYN TIBURON

## (undated) DEVICE — DRAPE ARM DAVINCI XI

## (undated) DEVICE — SYR ONLY LUER LOCK 20CC

## (undated) DEVICE — NDL SPINAL SPINOCAN 22GX3.5

## (undated) DEVICE — DRAIN CHANNEL ROUND 15FR

## (undated) DEVICE — OBTURATOR BLADELESS 8MM XI CLR

## (undated) DEVICE — FOLEY BLLN 20FR 3WAY 5CC

## (undated) DEVICE — DRAPE STERI INSTRUMENT 1018

## (undated) DEVICE — SUT PDS BV 6-0

## (undated) DEVICE — SHEATH ENDOWRIST 45MM

## (undated) DEVICE — DRAIN CHANNEL ROUND 19FR

## (undated) DEVICE — BOWL STERILE LARGE 32OZ

## (undated) DEVICE — LUBRICANT SURGILUBE 2 OZ

## (undated) DEVICE — APPLICATOR CHLORAPREP ORN 26ML

## (undated) DEVICE — STAPLER SKIN ROTATING HEAD

## (undated) DEVICE — SEE MEDLINE ITEM 156911

## (undated) DEVICE — SUT STRATAFIX 2-0 30CM